# Patient Record
Sex: MALE | Race: WHITE | Employment: OTHER
[De-identification: names, ages, dates, MRNs, and addresses within clinical notes are randomized per-mention and may not be internally consistent; named-entity substitution may affect disease eponyms.]

---

## 2017-01-05 ENCOUNTER — TELEPHONE (OUTPATIENT)
Dept: FAMILY MEDICINE CLINIC | Facility: CLINIC | Age: 52
End: 2017-01-05

## 2017-01-11 ENCOUNTER — OFFICE VISIT (OUTPATIENT)
Dept: FAMILY MEDICINE CLINIC | Facility: CLINIC | Age: 52
End: 2017-01-11

## 2017-01-11 ENCOUNTER — CARE COORDINATION (OUTPATIENT)
Dept: CARE COORDINATION | Facility: CLINIC | Age: 52
End: 2017-01-11

## 2017-01-11 VITALS
DIASTOLIC BLOOD PRESSURE: 80 MMHG | HEIGHT: 68 IN | HEART RATE: 68 BPM | WEIGHT: 225.8 LBS | TEMPERATURE: 98.7 F | SYSTOLIC BLOOD PRESSURE: 130 MMHG | OXYGEN SATURATION: 96 % | RESPIRATION RATE: 20 BRPM | BODY MASS INDEX: 34.22 KG/M2

## 2017-01-11 DIAGNOSIS — Z12.11 COLON CANCER SCREENING: ICD-10-CM

## 2017-01-11 DIAGNOSIS — E78.5 HYPERLIPIDEMIA WITH TARGET LDL LESS THAN 70: ICD-10-CM

## 2017-01-11 DIAGNOSIS — Z87.891 EX-SMOKER: ICD-10-CM

## 2017-01-11 DIAGNOSIS — M54.2 NECK PAIN OF OVER 3 MONTHS DURATION: ICD-10-CM

## 2017-01-11 DIAGNOSIS — L85.3 DRY SKIN: ICD-10-CM

## 2017-01-11 DIAGNOSIS — F33.3 SEVERE RECURRENT MAJOR DEPRESSIVE DISORDER WITH PSYCHOTIC FEATURES (HCC): ICD-10-CM

## 2017-01-11 DIAGNOSIS — I10 ESSENTIAL HYPERTENSION: Primary | ICD-10-CM

## 2017-01-11 DIAGNOSIS — I25.10 CORONARY ARTERY DISEASE INVOLVING NATIVE CORONARY ARTERY OF NATIVE HEART WITHOUT ANGINA PECTORIS: ICD-10-CM

## 2017-01-11 DIAGNOSIS — R26.89 BALANCE PROBLEM: ICD-10-CM

## 2017-01-11 DIAGNOSIS — G89.29 CHRONIC RIGHT SHOULDER PAIN: ICD-10-CM

## 2017-01-11 DIAGNOSIS — R63.8 ABNORMAL CRAVING: ICD-10-CM

## 2017-01-11 DIAGNOSIS — R06.09 DOE (DYSPNEA ON EXERTION): ICD-10-CM

## 2017-01-11 DIAGNOSIS — M25.511 CHRONIC RIGHT SHOULDER PAIN: ICD-10-CM

## 2017-01-11 PROCEDURE — 99215 OFFICE O/P EST HI 40 MIN: CPT | Performed by: FAMILY MEDICINE

## 2017-01-11 RX ORDER — TIOTROPIUM BROMIDE INHALATION SPRAY 1.56 UG/1
SPRAY, METERED RESPIRATORY (INHALATION)
Refills: 0 | COMMUNITY
Start: 2016-12-24 | End: 2017-02-17 | Stop reason: SDUPTHER

## 2017-01-11 RX ORDER — TIOTROPIUM BROMIDE 18 UG/1
CAPSULE ORAL; RESPIRATORY (INHALATION)
Refills: 0 | COMMUNITY
Start: 2016-11-08 | End: 2017-01-11 | Stop reason: ALTCHOICE

## 2017-01-11 RX ORDER — AMMONIUM LACTATE 12 G/100G
LOTION TOPICAL
Qty: 225 G | Refills: 1 | Status: SHIPPED | OUTPATIENT
Start: 2017-01-11 | End: 2018-07-26

## 2017-01-11 ASSESSMENT — ENCOUNTER SYMPTOMS
VOMITING: 0
NAUSEA: 0
COUGH: 0
ABDOMINAL DISTENTION: 0
WHEEZING: 0
DIARRHEA: 0
ABDOMINAL PAIN: 0
CONSTIPATION: 0
SHORTNESS OF BREATH: 1
CHEST TIGHTNESS: 0

## 2017-01-13 ENCOUNTER — CARE COORDINATION (OUTPATIENT)
Dept: CARE COORDINATION | Facility: CLINIC | Age: 52
End: 2017-01-13

## 2017-01-13 ENCOUNTER — TELEPHONE (OUTPATIENT)
Dept: FAMILY MEDICINE CLINIC | Facility: CLINIC | Age: 52
End: 2017-01-13

## 2017-01-16 ENCOUNTER — TELEPHONE (OUTPATIENT)
Dept: FAMILY MEDICINE CLINIC | Facility: CLINIC | Age: 52
End: 2017-01-16

## 2017-01-17 ENCOUNTER — CARE COORDINATION (OUTPATIENT)
Dept: CARE COORDINATION | Facility: CLINIC | Age: 52
End: 2017-01-17

## 2017-01-17 ENCOUNTER — TELEPHONE (OUTPATIENT)
Dept: FAMILY MEDICINE CLINIC | Facility: CLINIC | Age: 52
End: 2017-01-17

## 2017-01-18 ENCOUNTER — CARE COORDINATION (OUTPATIENT)
Dept: CARE COORDINATION | Facility: CLINIC | Age: 52
End: 2017-01-18

## 2017-01-31 ENCOUNTER — TELEPHONE (OUTPATIENT)
Dept: FAMILY MEDICINE CLINIC | Facility: CLINIC | Age: 52
End: 2017-01-31

## 2017-01-31 DIAGNOSIS — I10 ESSENTIAL HYPERTENSION: ICD-10-CM

## 2017-01-31 RX ORDER — HYDRALAZINE HYDROCHLORIDE 50 MG/1
TABLET, FILM COATED ORAL
Qty: 120 TABLET | Refills: 0 | OUTPATIENT
Start: 2017-01-31

## 2017-02-01 RX ORDER — HYDRALAZINE HYDROCHLORIDE 50 MG/1
100 TABLET, FILM COATED ORAL 2 TIMES DAILY
Qty: 120 TABLET | Refills: 1 | Status: SHIPPED | OUTPATIENT
Start: 2017-02-01 | End: 2017-03-31 | Stop reason: SDUPTHER

## 2017-02-06 ENCOUNTER — TELEPHONE (OUTPATIENT)
Dept: FAMILY MEDICINE CLINIC | Facility: CLINIC | Age: 52
End: 2017-02-06

## 2017-02-17 RX ORDER — TIOTROPIUM BROMIDE INHALATION SPRAY 1.56 UG/1
SPRAY, METERED RESPIRATORY (INHALATION)
Qty: 4 INHALER | Refills: 5 | Status: SHIPPED | OUTPATIENT
Start: 2017-02-17 | End: 2017-08-02 | Stop reason: SDUPTHER

## 2017-03-10 ENCOUNTER — OFFICE VISIT (OUTPATIENT)
Dept: FAMILY MEDICINE CLINIC | Facility: CLINIC | Age: 52
End: 2017-03-10

## 2017-03-10 VITALS
HEIGHT: 69 IN | SYSTOLIC BLOOD PRESSURE: 172 MMHG | OXYGEN SATURATION: 96 % | BODY MASS INDEX: 32.91 KG/M2 | TEMPERATURE: 97.2 F | WEIGHT: 222.2 LBS | DIASTOLIC BLOOD PRESSURE: 94 MMHG | HEART RATE: 85 BPM

## 2017-03-10 DIAGNOSIS — H60.392 OTHER INFECTIVE ACUTE OTITIS EXTERNA OF LEFT EAR: ICD-10-CM

## 2017-03-10 DIAGNOSIS — E78.5 HYPERLIPIDEMIA WITH TARGET LDL LESS THAN 70: ICD-10-CM

## 2017-03-10 DIAGNOSIS — Z13.31 POSITIVE DEPRESSION SCREENING: ICD-10-CM

## 2017-03-10 DIAGNOSIS — Z87.891 EX-SMOKER: ICD-10-CM

## 2017-03-10 DIAGNOSIS — I25.119 CORONARY ARTERY DISEASE INVOLVING NATIVE CORONARY ARTERY OF NATIVE HEART WITH ANGINA PECTORIS (HCC): ICD-10-CM

## 2017-03-10 DIAGNOSIS — I25.10 CORONARY ARTERY DISEASE INVOLVING NATIVE CORONARY ARTERY OF NATIVE HEART WITHOUT ANGINA PECTORIS: ICD-10-CM

## 2017-03-10 DIAGNOSIS — J44.9 MIXED TYPE COPD (CHRONIC OBSTRUCTIVE PULMONARY DISEASE) (HCC): ICD-10-CM

## 2017-03-10 DIAGNOSIS — Z12.11 COLON CANCER SCREENING: ICD-10-CM

## 2017-03-10 DIAGNOSIS — I10 ESSENTIAL HYPERTENSION: Primary | ICD-10-CM

## 2017-03-10 DIAGNOSIS — F33.3 SEVERE RECURRENT MAJOR DEPRESSIVE DISORDER WITH PSYCHOTIC FEATURES (HCC): ICD-10-CM

## 2017-03-10 DIAGNOSIS — Z55.0 UNABLE TO READ OR WRITE: ICD-10-CM

## 2017-03-10 PROCEDURE — 99215 OFFICE O/P EST HI 40 MIN: CPT | Performed by: FAMILY MEDICINE

## 2017-03-10 PROCEDURE — G8431 POS CLIN DEPRES SCRN F/U DOC: HCPCS | Performed by: FAMILY MEDICINE

## 2017-03-10 PROCEDURE — 96127 BRIEF EMOTIONAL/BEHAV ASSMT: CPT | Performed by: FAMILY MEDICINE

## 2017-03-10 RX ORDER — NEOMYCIN SULFATE, POLYMYXIN B SULFATE AND HYDROCORTISONE 10; 3.5; 1 MG/ML; MG/ML; [USP'U]/ML
3 SUSPENSION/ DROPS AURICULAR (OTIC) 4 TIMES DAILY
Qty: 1 BOTTLE | Refills: 0 | Status: ON HOLD | OUTPATIENT
Start: 2017-03-10 | End: 2017-11-28 | Stop reason: HOSPADM

## 2017-03-10 RX ORDER — ATORVASTATIN CALCIUM 20 MG/1
TABLET, FILM COATED ORAL
Qty: 30 TABLET | Refills: 3 | Status: SHIPPED | OUTPATIENT
Start: 2017-03-10 | End: 2017-07-03 | Stop reason: SDUPTHER

## 2017-03-10 RX ORDER — GUAIFENESIN 600 MG/1
600 TABLET, EXTENDED RELEASE ORAL 2 TIMES DAILY
Qty: 30 TABLET | Refills: 0 | Status: SHIPPED | OUTPATIENT
Start: 2017-03-10 | End: 2017-04-11 | Stop reason: SDUPTHER

## 2017-03-10 SDOH — EDUCATIONAL SECURITY - EDUCATION ATTAINMENT: ILITERACY AND LOW LEVEL LITERACY: Z55.0

## 2017-03-10 ASSESSMENT — PATIENT HEALTH QUESTIONNAIRE - PHQ9
SUM OF ALL RESPONSES TO PHQ9 QUESTIONS 1 & 2: 4
5. POOR APPETITE OR OVEREATING: 2
7. TROUBLE CONCENTRATING ON THINGS, SUCH AS READING THE NEWSPAPER OR WATCHING TELEVISION: 3
8. MOVING OR SPEAKING SO SLOWLY THAT OTHER PEOPLE COULD HAVE NOTICED. OR THE OPPOSITE, BEING SO FIGETY OR RESTLESS THAT YOU HAVE BEEN MOVING AROUND A LOT MORE THAN USUAL: 3
3. TROUBLE FALLING OR STAYING ASLEEP: 3
9. THOUGHTS THAT YOU WOULD BE BETTER OFF DEAD, OR OF HURTING YOURSELF: 1
2. FEELING DOWN, DEPRESSED OR HOPELESS: 3
1. LITTLE INTEREST OR PLEASURE IN DOING THINGS: 1
4. FEELING TIRED OR HAVING LITTLE ENERGY: 3
SUM OF ALL RESPONSES TO PHQ QUESTIONS 1-9: 22
6. FEELING BAD ABOUT YOURSELF - OR THAT YOU ARE A FAILURE OR HAVE LET YOURSELF OR YOUR FAMILY DOWN: 3
10. IF YOU CHECKED OFF ANY PROBLEMS, HOW DIFFICULT HAVE THESE PROBLEMS MADE IT FOR YOU TO DO YOUR WORK, TAKE CARE OF THINGS AT HOME, OR GET ALONG WITH OTHER PEOPLE: 2

## 2017-03-10 ASSESSMENT — ENCOUNTER SYMPTOMS
SHORTNESS OF BREATH: 1
CHEST TIGHTNESS: 0
NAUSEA: 0
WHEEZING: 0
ABDOMINAL PAIN: 0
VOMITING: 0
COUGH: 1
ABDOMINAL DISTENTION: 0
CONSTIPATION: 0
DIARRHEA: 0

## 2017-03-13 LAB
AVERAGE GLUCOSE: NORMAL
HBA1C MFR BLD: 5.5 %

## 2017-03-14 DIAGNOSIS — M19.019 SHOULDER ARTHRITIS: ICD-10-CM

## 2017-03-15 RX ORDER — IBUPROFEN 800 MG/1
TABLET ORAL
Qty: 30 TABLET | Refills: 1 | Status: SHIPPED | OUTPATIENT
Start: 2017-03-15 | End: 2017-05-15 | Stop reason: SDUPTHER

## 2017-03-16 ENCOUNTER — TELEPHONE (OUTPATIENT)
Dept: FAMILY MEDICINE CLINIC | Age: 52
End: 2017-03-16

## 2017-03-21 ENCOUNTER — TELEPHONE (OUTPATIENT)
Dept: PULMONOLOGY | Age: 52
End: 2017-03-21

## 2017-03-31 DIAGNOSIS — I10 ESSENTIAL HYPERTENSION: ICD-10-CM

## 2017-03-31 RX ORDER — HYDRALAZINE HYDROCHLORIDE 50 MG/1
TABLET, FILM COATED ORAL
Qty: 120 TABLET | Refills: 1 | Status: SHIPPED | OUTPATIENT
Start: 2017-03-31 | End: 2017-05-26 | Stop reason: SDUPTHER

## 2017-04-11 ENCOUNTER — TELEPHONE (OUTPATIENT)
Dept: PULMONOLOGY | Age: 52
End: 2017-04-11

## 2017-04-11 DIAGNOSIS — J44.9 MIXED TYPE COPD (CHRONIC OBSTRUCTIVE PULMONARY DISEASE) (HCC): ICD-10-CM

## 2017-04-14 DIAGNOSIS — Z29.9 PREVENTIVE MEASURE: ICD-10-CM

## 2017-04-14 RX ORDER — MULTIVITAMIN WITH FOLIC ACID 400 MCG
TABLET ORAL
Qty: 90 TABLET | Refills: 4 | Status: SHIPPED | OUTPATIENT
Start: 2017-04-14 | End: 2019-02-21 | Stop reason: SDUPTHER

## 2017-04-16 DIAGNOSIS — I10 ESSENTIAL HYPERTENSION: ICD-10-CM

## 2017-04-16 RX ORDER — CLONIDINE HYDROCHLORIDE 0.2 MG/1
TABLET ORAL
Qty: 60 TABLET | Refills: 3 | Status: SHIPPED | OUTPATIENT
Start: 2017-04-16 | End: 2017-08-02 | Stop reason: SDUPTHER

## 2017-05-11 ENCOUNTER — HOSPITAL ENCOUNTER (EMERGENCY)
Age: 52
Discharge: HOME | End: 2017-05-11
Attending: EMERGENCY MEDICINE
Payer: COMMERCIAL

## 2017-05-11 VITALS
HEART RATE: 86 BPM | BODY MASS INDEX: 33.33 KG/M2 | RESPIRATION RATE: 27 BRPM | WEIGHT: 225 LBS | DIASTOLIC BLOOD PRESSURE: 73 MMHG | SYSTOLIC BLOOD PRESSURE: 126 MMHG | OXYGEN SATURATION: 96 % | TEMPERATURE: 97.9 F | HEIGHT: 69 IN

## 2017-05-11 DIAGNOSIS — K29.20 ACUTE ALCOHOLIC GASTRITIS WITHOUT HEMORRHAGE: Primary | ICD-10-CM

## 2017-05-11 LAB
% CKMB: 1.9 % (ref 0–3.5)
ABSOLUTE EOS #: 0.1 K/UL (ref 0–0.4)
ABSOLUTE LYMPH #: 1.1 K/UL (ref 1–4.8)
ABSOLUTE MONO #: 0.6 K/UL (ref 0.2–0.8)
ALBUMIN SERPL-MCNC: 3.9 G/DL (ref 3.5–5.2)
ALBUMIN/GLOBULIN RATIO: NORMAL (ref 1–2.5)
ALP BLD-CCNC: 128 U/L (ref 40–129)
ALT SERPL-CCNC: 13 U/L (ref 5–41)
ANION GAP SERPL CALCULATED.3IONS-SCNC: 17 MMOL/L (ref 9–17)
AST SERPL-CCNC: 19 U/L
BASOPHILS # BLD: 0 %
BASOPHILS ABSOLUTE: 0.1 K/UL (ref 0–0.2)
BILIRUB SERPL-MCNC: 0.43 MG/DL (ref 0.3–1.2)
BILIRUBIN DIRECT: 0.13 MG/DL
BILIRUBIN, INDIRECT: 0.3 MG/DL (ref 0–1)
BUN BLDV-MCNC: 8 MG/DL (ref 6–20)
BUN/CREAT BLD: 10 (ref 9–20)
CALCIUM SERPL-MCNC: 8.4 MG/DL (ref 8.6–10.4)
CHLORIDE BLD-SCNC: 95 MMOL/L (ref 98–107)
CK MB: 3.5 NG/ML
CKMB INTERPRETATION: NORMAL
CO2: 23 MMOL/L (ref 20–31)
CREAT SERPL-MCNC: 0.84 MG/DL (ref 0.7–1.2)
DIFFERENTIAL TYPE: ABNORMAL
EKG ATRIAL RATE: 82 BPM
EKG P AXIS: 65 DEGREES
EKG P-R INTERVAL: 154 MS
EKG Q-T INTERVAL: 394 MS
EKG QRS DURATION: 94 MS
EKG QTC CALCULATION (BAZETT): 460 MS
EKG R AXIS: -6 DEGREES
EKG T AXIS: 80 DEGREES
EKG VENTRICULAR RATE: 82 BPM
EOSINOPHILS RELATIVE PERCENT: 1 %
ETHANOL PERCENT: 0.14 %
ETHANOL: 136 MG/DL
GFR AFRICAN AMERICAN: >60 ML/MIN
GFR NON-AFRICAN AMERICAN: >60 ML/MIN
GFR SERPL CREATININE-BSD FRML MDRD: ABNORMAL ML/MIN/{1.73_M2}
GFR SERPL CREATININE-BSD FRML MDRD: ABNORMAL ML/MIN/{1.73_M2}
GLOBULIN: NORMAL G/DL (ref 1.5–3.8)
GLUCOSE BLD-MCNC: 100 MG/DL (ref 70–99)
HCT VFR BLD CALC: 42.2 % (ref 41–53)
HEMOGLOBIN: 13.8 G/DL (ref 13.5–17.5)
LIPASE: 23 U/L (ref 13–60)
LYMPHOCYTES # BLD: 7 %
MCH RBC QN AUTO: 27.6 PG (ref 26–34)
MCHC RBC AUTO-ENTMCNC: 32.8 G/DL (ref 31–37)
MCV RBC AUTO: 84.1 FL (ref 80–100)
MONOCYTES # BLD: 4 %
MYOGLOBIN: 67 NG/ML (ref 28–72)
PDW BLD-RTO: 15.8 % (ref 11.5–14.5)
PLATELET # BLD: 203 K/UL (ref 130–400)
PLATELET ESTIMATE: ABNORMAL
PMV BLD AUTO: 9.1 FL (ref 6–12)
POTASSIUM SERPL-SCNC: 3.4 MMOL/L (ref 3.7–5.3)
RBC # BLD: 5.01 M/UL (ref 4.5–5.9)
RBC # BLD: ABNORMAL 10*6/UL
SEG NEUTROPHILS: 88 %
SEGMENTED NEUTROPHILS ABSOLUTE COUNT: 14.2 K/UL (ref 1.8–7.7)
SODIUM BLD-SCNC: 135 MMOL/L (ref 135–144)
TOTAL CK: 183 U/L (ref 39–308)
TOTAL PROTEIN: 7.1 G/DL (ref 6.4–8.3)
TROPONIN INTERP: NORMAL
TROPONIN T: <0.03 NG/ML
WBC # BLD: 16 K/UL (ref 3.5–11)
WBC # BLD: ABNORMAL 10*3/UL

## 2017-05-11 PROCEDURE — 96375 TX/PRO/DX INJ NEW DRUG ADDON: CPT

## 2017-05-11 PROCEDURE — 83874 ASSAY OF MYOGLOBIN: CPT

## 2017-05-11 PROCEDURE — G0480 DRUG TEST DEF 1-7 CLASSES: HCPCS

## 2017-05-11 PROCEDURE — 85025 COMPLETE CBC W/AUTO DIFF WBC: CPT

## 2017-05-11 PROCEDURE — C9113 INJ PANTOPRAZOLE SODIUM, VIA: HCPCS | Performed by: EMERGENCY MEDICINE

## 2017-05-11 PROCEDURE — 84484 ASSAY OF TROPONIN QUANT: CPT

## 2017-05-11 PROCEDURE — 82553 CREATINE MB FRACTION: CPT

## 2017-05-11 PROCEDURE — 83690 ASSAY OF LIPASE: CPT

## 2017-05-11 PROCEDURE — 80076 HEPATIC FUNCTION PANEL: CPT

## 2017-05-11 PROCEDURE — 96374 THER/PROPH/DIAG INJ IV PUSH: CPT

## 2017-05-11 PROCEDURE — 96361 HYDRATE IV INFUSION ADD-ON: CPT

## 2017-05-11 PROCEDURE — 2580000003 HC RX 258: Performed by: EMERGENCY MEDICINE

## 2017-05-11 PROCEDURE — 80048 BASIC METABOLIC PNL TOTAL CA: CPT

## 2017-05-11 PROCEDURE — 93005 ELECTROCARDIOGRAM TRACING: CPT

## 2017-05-11 PROCEDURE — 6360000002 HC RX W HCPCS: Performed by: EMERGENCY MEDICINE

## 2017-05-11 PROCEDURE — 99284 EMERGENCY DEPT VISIT MOD MDM: CPT

## 2017-05-11 PROCEDURE — 82550 ASSAY OF CK (CPK): CPT

## 2017-05-11 RX ORDER — 0.9 % SODIUM CHLORIDE 0.9 %
10 VIAL (ML) INJECTION ONCE
Status: COMPLETED | OUTPATIENT
Start: 2017-05-11 | End: 2017-05-11

## 2017-05-11 RX ORDER — SODIUM CHLORIDE 9 MG/ML
INJECTION, SOLUTION INTRAVENOUS CONTINUOUS
Status: DISCONTINUED | OUTPATIENT
Start: 2017-05-11 | End: 2017-05-11 | Stop reason: HOSPADM

## 2017-05-11 RX ORDER — 0.9 % SODIUM CHLORIDE 0.9 %
1000 INTRAVENOUS SOLUTION INTRAVENOUS ONCE
Status: COMPLETED | OUTPATIENT
Start: 2017-05-11 | End: 2017-05-11

## 2017-05-11 RX ORDER — ONDANSETRON 4 MG/1
4 TABLET, FILM COATED ORAL EVERY 6 HOURS PRN
Qty: 10 TABLET | Refills: 0 | Status: SHIPPED | OUTPATIENT
Start: 2017-05-11 | End: 2018-07-26

## 2017-05-11 RX ORDER — ONDANSETRON 2 MG/ML
4 INJECTION INTRAMUSCULAR; INTRAVENOUS ONCE
Status: COMPLETED | OUTPATIENT
Start: 2017-05-11 | End: 2017-05-11

## 2017-05-11 RX ORDER — PANTOPRAZOLE SODIUM 40 MG/10ML
40 INJECTION, POWDER, LYOPHILIZED, FOR SOLUTION INTRAVENOUS ONCE
Status: COMPLETED | OUTPATIENT
Start: 2017-05-11 | End: 2017-05-11

## 2017-05-11 RX ORDER — PANTOPRAZOLE SODIUM 40 MG/1
40 TABLET, DELAYED RELEASE ORAL DAILY
Qty: 20 TABLET | Refills: 0 | Status: SHIPPED | OUTPATIENT
Start: 2017-05-11 | End: 2018-01-11 | Stop reason: SDUPTHER

## 2017-05-11 RX ADMIN — SODIUM CHLORIDE 1000 ML: 9 INJECTION, SOLUTION INTRAVENOUS at 01:24

## 2017-05-11 RX ADMIN — ONDANSETRON 4 MG: 2 INJECTION INTRAMUSCULAR; INTRAVENOUS at 01:24

## 2017-05-11 RX ADMIN — PANTOPRAZOLE SODIUM 40 MG: 40 INJECTION, POWDER, FOR SOLUTION INTRAVENOUS at 01:25

## 2017-05-11 RX ADMIN — Medication 10 ML: at 01:25

## 2017-05-15 DIAGNOSIS — M19.019 SHOULDER ARTHRITIS: ICD-10-CM

## 2017-05-15 RX ORDER — IBUPROFEN 800 MG/1
TABLET ORAL
Qty: 30 TABLET | Refills: 1 | Status: SHIPPED | OUTPATIENT
Start: 2017-05-15 | End: 2018-03-27 | Stop reason: SDUPTHER

## 2017-05-16 DIAGNOSIS — J44.9 MIXED TYPE COPD (CHRONIC OBSTRUCTIVE PULMONARY DISEASE) (HCC): ICD-10-CM

## 2017-05-26 DIAGNOSIS — I10 ESSENTIAL HYPERTENSION: ICD-10-CM

## 2017-05-26 RX ORDER — HYDRALAZINE HYDROCHLORIDE 50 MG/1
TABLET, FILM COATED ORAL
Qty: 120 TABLET | Refills: 3 | Status: SHIPPED | OUTPATIENT
Start: 2017-05-26 | End: 2017-09-06 | Stop reason: SDUPTHER

## 2017-06-16 DIAGNOSIS — Z87.891 EX-SMOKER: ICD-10-CM

## 2017-06-16 DIAGNOSIS — R63.8 ABNORMAL CRAVING: ICD-10-CM

## 2017-06-16 RX ORDER — DM/PSEUDOEPHED/ACETAMINOPHEN 30-60-650
SOLUTION, ORAL ORAL
Qty: 170 EACH | Refills: 3 | Status: SHIPPED | OUTPATIENT
Start: 2017-06-16 | End: 2017-10-28 | Stop reason: SDUPTHER

## 2017-07-03 DIAGNOSIS — I25.119 CORONARY ARTERY DISEASE INVOLVING NATIVE CORONARY ARTERY OF NATIVE HEART WITH ANGINA PECTORIS (HCC): ICD-10-CM

## 2017-07-03 DIAGNOSIS — E78.5 HYPERLIPIDEMIA WITH TARGET LDL LESS THAN 70: ICD-10-CM

## 2017-07-03 RX ORDER — ATORVASTATIN CALCIUM 20 MG/1
TABLET, FILM COATED ORAL
Qty: 30 TABLET | Refills: 11 | Status: SHIPPED | OUTPATIENT
Start: 2017-07-03 | End: 2019-11-03 | Stop reason: SDUPTHER

## 2017-07-07 ENCOUNTER — TELEPHONE (OUTPATIENT)
Dept: FAMILY MEDICINE CLINIC | Age: 52
End: 2017-07-07

## 2017-07-20 ENCOUNTER — HOSPITAL ENCOUNTER (EMERGENCY)
Age: 52
Discharge: HOME | End: 2017-07-20
Attending: EMERGENCY MEDICINE
Payer: COMMERCIAL

## 2017-07-20 VITALS
DIASTOLIC BLOOD PRESSURE: 71 MMHG | BODY MASS INDEX: 33.23 KG/M2 | OXYGEN SATURATION: 98 % | WEIGHT: 225 LBS | RESPIRATION RATE: 17 BRPM | TEMPERATURE: 98.5 F | SYSTOLIC BLOOD PRESSURE: 138 MMHG | HEART RATE: 68 BPM

## 2017-07-20 DIAGNOSIS — K44.9 HIATAL HERNIA: Primary | ICD-10-CM

## 2017-07-20 DIAGNOSIS — H60.392 INFECTIVE OTITIS EXTERNA, LEFT: ICD-10-CM

## 2017-07-20 PROCEDURE — 93005 ELECTROCARDIOGRAM TRACING: CPT

## 2017-07-20 PROCEDURE — 6370000000 HC RX 637 (ALT 250 FOR IP): Performed by: EMERGENCY MEDICINE

## 2017-07-20 PROCEDURE — 99283 EMERGENCY DEPT VISIT LOW MDM: CPT

## 2017-07-20 RX ORDER — FAMOTIDINE 20 MG/1
20 TABLET, FILM COATED ORAL ONCE
Status: COMPLETED | OUTPATIENT
Start: 2017-07-20 | End: 2017-07-20

## 2017-07-20 RX ORDER — FAMOTIDINE 20 MG/1
20 TABLET, FILM COATED ORAL 2 TIMES DAILY
Qty: 60 TABLET | Refills: 0 | Status: SHIPPED | OUTPATIENT
Start: 2017-07-20 | End: 2017-07-30

## 2017-07-20 RX ORDER — HYDROCODONE BITARTRATE AND ACETAMINOPHEN 5; 325 MG/1; MG/1
1 TABLET ORAL EVERY 6 HOURS PRN
Qty: 10 TABLET | Refills: 0 | Status: SHIPPED | OUTPATIENT
Start: 2017-07-20 | End: 2017-07-27

## 2017-07-20 RX ORDER — CIPROFLOXACIN HYDROCHLORIDE 3.5 MG/ML
1 SOLUTION/ DROPS TOPICAL
Status: DISCONTINUED | OUTPATIENT
Start: 2017-07-20 | End: 2017-07-21 | Stop reason: HOSPADM

## 2017-07-20 RX ORDER — HYDROCODONE BITARTRATE AND ACETAMINOPHEN 5; 325 MG/1; MG/1
2 TABLET ORAL ONCE
Status: COMPLETED | OUTPATIENT
Start: 2017-07-20 | End: 2017-07-20

## 2017-07-20 RX ADMIN — FAMOTIDINE 20 MG: 20 TABLET ORAL at 22:36

## 2017-07-20 RX ADMIN — HYDROCODONE BITARTRATE AND ACETAMINOPHEN 2 TABLET: 5; 325 TABLET ORAL at 22:35

## 2017-07-20 RX ADMIN — CIPROFLOXACIN HYDROCHLORIDE 1 DROP: 3 SOLUTION/ DROPS OPHTHALMIC at 22:36

## 2017-07-20 ASSESSMENT — ENCOUNTER SYMPTOMS
ABDOMINAL PAIN: 0
NAUSEA: 0
VOMITING: 0
SHORTNESS OF BREATH: 0
DIARRHEA: 0

## 2017-07-20 ASSESSMENT — PAIN DESCRIPTION - DESCRIPTORS: DESCRIPTORS: DISCOMFORT;CRAMPING;SHARP

## 2017-07-20 ASSESSMENT — PAIN SCALES - GENERAL
PAINLEVEL_OUTOF10: 10
PAINLEVEL_OUTOF10: 2
PAINLEVEL_OUTOF10: 8

## 2017-07-20 ASSESSMENT — PAIN DESCRIPTION - FREQUENCY: FREQUENCY: INTERMITTENT

## 2017-07-20 ASSESSMENT — PAIN DESCRIPTION - ORIENTATION: ORIENTATION: MID;LOWER

## 2017-07-20 ASSESSMENT — PAIN DESCRIPTION - LOCATION: LOCATION: CHEST

## 2017-07-24 LAB
EKG ATRIAL RATE: 61 BPM
EKG P AXIS: 46 DEGREES
EKG P-R INTERVAL: 158 MS
EKG Q-T INTERVAL: 426 MS
EKG QRS DURATION: 92 MS
EKG QTC CALCULATION (BAZETT): 428 MS
EKG R AXIS: -19 DEGREES
EKG T AXIS: 66 DEGREES
EKG VENTRICULAR RATE: 61 BPM

## 2017-07-30 ENCOUNTER — HOSPITAL ENCOUNTER (EMERGENCY)
Age: 52
Discharge: HOME | End: 2017-07-30
Attending: EMERGENCY MEDICINE
Payer: COMMERCIAL

## 2017-07-30 ENCOUNTER — APPOINTMENT (OUTPATIENT)
Dept: GENERAL RADIOLOGY | Age: 52
End: 2017-07-30
Payer: COMMERCIAL

## 2017-07-30 VITALS
BODY MASS INDEX: 33.97 KG/M2 | SYSTOLIC BLOOD PRESSURE: 177 MMHG | TEMPERATURE: 98.6 F | DIASTOLIC BLOOD PRESSURE: 113 MMHG | OXYGEN SATURATION: 98 % | HEART RATE: 62 BPM | RESPIRATION RATE: 16 BRPM | WEIGHT: 230 LBS

## 2017-07-30 DIAGNOSIS — K21.9 GASTROESOPHAGEAL REFLUX DISEASE, ESOPHAGITIS PRESENCE NOT SPECIFIED: Primary | ICD-10-CM

## 2017-07-30 DIAGNOSIS — R10.13 ABDOMINAL PAIN, EPIGASTRIC: ICD-10-CM

## 2017-07-30 DIAGNOSIS — R07.9 CHEST PAIN, UNSPECIFIED TYPE: ICD-10-CM

## 2017-07-30 LAB
ABSOLUTE EOS #: 0.1 K/UL (ref 0–0.4)
ABSOLUTE LYMPH #: 1 K/UL (ref 1–4.8)
ABSOLUTE MONO #: 0.5 K/UL (ref 0.1–1.3)
ALBUMIN SERPL-MCNC: 3.7 G/DL (ref 3.5–5.2)
ALBUMIN/GLOBULIN RATIO: NORMAL (ref 1–2.5)
ALP BLD-CCNC: 124 U/L (ref 40–129)
ALT SERPL-CCNC: 10 U/L (ref 5–41)
ANION GAP SERPL CALCULATED.3IONS-SCNC: 12 MMOL/L (ref 9–17)
AST SERPL-CCNC: 15 U/L
BASOPHILS # BLD: 1 %
BASOPHILS ABSOLUTE: 0.1 K/UL (ref 0–0.2)
BILIRUB SERPL-MCNC: 0.31 MG/DL (ref 0.3–1.2)
BNP INTERPRETATION: ABNORMAL
BUN BLDV-MCNC: 7 MG/DL (ref 6–20)
BUN/CREAT BLD: NORMAL (ref 9–20)
CALCIUM SERPL-MCNC: 8.6 MG/DL (ref 8.6–10.4)
CHLORIDE BLD-SCNC: 102 MMOL/L (ref 98–107)
CO2: 25 MMOL/L (ref 20–31)
CREAT SERPL-MCNC: 0.94 MG/DL (ref 0.7–1.2)
DIFFERENTIAL TYPE: ABNORMAL
EOSINOPHILS RELATIVE PERCENT: 2 %
GFR AFRICAN AMERICAN: >60 ML/MIN
GFR NON-AFRICAN AMERICAN: >60 ML/MIN
GFR SERPL CREATININE-BSD FRML MDRD: NORMAL ML/MIN/{1.73_M2}
GFR SERPL CREATININE-BSD FRML MDRD: NORMAL ML/MIN/{1.73_M2}
GLUCOSE BLD-MCNC: 97 MG/DL (ref 70–99)
HCT VFR BLD CALC: 42.1 % (ref 41–53)
HEMOGLOBIN: 13.8 G/DL (ref 13.5–17.5)
LIPASE: 21 U/L (ref 13–60)
LYMPHOCYTES # BLD: 16 %
MCH RBC QN AUTO: 27.6 PG (ref 26–34)
MCHC RBC AUTO-ENTMCNC: 32.9 G/DL (ref 31–37)
MCV RBC AUTO: 83.8 FL (ref 80–100)
MONOCYTES # BLD: 8 %
PDW BLD-RTO: 15.8 % (ref 11.5–14.9)
PLATELET # BLD: 170 K/UL (ref 150–450)
PLATELET ESTIMATE: ABNORMAL
PMV BLD AUTO: 9 FL (ref 6–12)
POTASSIUM SERPL-SCNC: 3.8 MMOL/L (ref 3.7–5.3)
PRO-BNP: 413 PG/ML
RBC # BLD: 5.02 M/UL (ref 4.5–5.9)
RBC # BLD: ABNORMAL 10*6/UL
SEG NEUTROPHILS: 73 %
SEGMENTED NEUTROPHILS ABSOLUTE COUNT: 4.5 K/UL (ref 1.3–9.1)
SODIUM BLD-SCNC: 139 MMOL/L (ref 135–144)
TOTAL PROTEIN: 6.7 G/DL (ref 6.4–8.3)
TROPONIN INTERP: NORMAL
TROPONIN INTERP: NORMAL
TROPONIN T: <0.03 NG/ML
TROPONIN T: <0.03 NG/ML
WBC # BLD: 6.2 K/UL (ref 3.5–11)
WBC # BLD: ABNORMAL 10*3/UL

## 2017-07-30 PROCEDURE — 6370000000 HC RX 637 (ALT 250 FOR IP): Performed by: EMERGENCY MEDICINE

## 2017-07-30 PROCEDURE — 83880 ASSAY OF NATRIURETIC PEPTIDE: CPT

## 2017-07-30 PROCEDURE — 83690 ASSAY OF LIPASE: CPT

## 2017-07-30 PROCEDURE — 99285 EMERGENCY DEPT VISIT HI MDM: CPT

## 2017-07-30 PROCEDURE — 6360000002 HC RX W HCPCS: Performed by: EMERGENCY MEDICINE

## 2017-07-30 PROCEDURE — 80053 COMPREHEN METABOLIC PANEL: CPT

## 2017-07-30 PROCEDURE — 71020 XR CHEST STANDARD TWO VW: CPT

## 2017-07-30 PROCEDURE — 84484 ASSAY OF TROPONIN QUANT: CPT

## 2017-07-30 PROCEDURE — 85025 COMPLETE CBC W/AUTO DIFF WBC: CPT

## 2017-07-30 PROCEDURE — 36415 COLL VENOUS BLD VENIPUNCTURE: CPT

## 2017-07-30 PROCEDURE — 93005 ELECTROCARDIOGRAM TRACING: CPT

## 2017-07-30 RX ORDER — ONDANSETRON 4 MG/1
4 TABLET, ORALLY DISINTEGRATING ORAL ONCE
Status: COMPLETED | OUTPATIENT
Start: 2017-07-30 | End: 2017-07-30

## 2017-07-30 RX ORDER — FAMOTIDINE 20 MG/1
20 TABLET, FILM COATED ORAL ONCE
Status: COMPLETED | OUTPATIENT
Start: 2017-07-30 | End: 2017-07-30

## 2017-07-30 RX ORDER — MAGNESIUM HYDROXIDE/ALUMINUM HYDROXICE/SIMETHICONE 120; 1200; 1200 MG/30ML; MG/30ML; MG/30ML
30 SUSPENSION ORAL
Status: COMPLETED | OUTPATIENT
Start: 2017-07-30 | End: 2017-07-30

## 2017-07-30 RX ORDER — AMOXICILLIN 500 MG/1
500 CAPSULE ORAL 2 TIMES DAILY
Qty: 20 CAPSULE | Refills: 0 | Status: SHIPPED | OUTPATIENT
Start: 2017-07-30 | End: 2017-08-09

## 2017-07-30 RX ORDER — ACETAMINOPHEN 500 MG
1000 TABLET ORAL ONCE
Status: COMPLETED | OUTPATIENT
Start: 2017-07-30 | End: 2017-07-30

## 2017-07-30 RX ORDER — FAMOTIDINE 40 MG/1
40 TABLET, FILM COATED ORAL DAILY
Qty: 14 TABLET | Refills: 0 | Status: ON HOLD | OUTPATIENT
Start: 2017-07-30 | End: 2017-11-28 | Stop reason: HOSPADM

## 2017-07-30 RX ADMIN — ALUMINUM HYDROXIDE, MAGNESIUM HYDROXIDE, AND SIMETHICONE 30 ML: 200; 200; 20 SUSPENSION ORAL at 19:08

## 2017-07-30 RX ADMIN — Medication 15 ML: at 19:08

## 2017-07-30 RX ADMIN — ACETAMINOPHEN 1000 MG: 500 TABLET ORAL at 20:12

## 2017-07-30 RX ADMIN — FAMOTIDINE 20 MG: 20 TABLET ORAL at 19:07

## 2017-07-30 RX ADMIN — ONDANSETRON 4 MG: 4 TABLET, ORALLY DISINTEGRATING ORAL at 19:07

## 2017-07-30 ASSESSMENT — ENCOUNTER SYMPTOMS
ABDOMINAL PAIN: 1
COUGH: 0
DIARRHEA: 0
CHEST TIGHTNESS: 0
EYE DISCHARGE: 0
EYE REDNESS: 0
TROUBLE SWALLOWING: 1
SORE THROAT: 0
SHORTNESS OF BREATH: 1
NAUSEA: 1
RHINORRHEA: 0
VOMITING: 1

## 2017-07-30 ASSESSMENT — PAIN SCALES - GENERAL
PAINLEVEL_OUTOF10: 9

## 2017-07-30 ASSESSMENT — PAIN DESCRIPTION - LOCATION
LOCATION: HEAD
LOCATION: HEAD

## 2017-07-30 ASSESSMENT — PAIN DESCRIPTION - PAIN TYPE
TYPE: ACUTE PAIN
TYPE: ACUTE PAIN

## 2017-08-02 DIAGNOSIS — I10 ESSENTIAL HYPERTENSION: ICD-10-CM

## 2017-08-02 DIAGNOSIS — R94.2 RESTRICTIVE PATTERN PRESENT ON PULMONARY FUNCTION TESTING: ICD-10-CM

## 2017-08-02 DIAGNOSIS — I25.10 CORONARY ARTERY DISEASE INVOLVING NATIVE CORONARY ARTERY OF NATIVE HEART WITHOUT ANGINA PECTORIS: ICD-10-CM

## 2017-08-02 RX ORDER — TIOTROPIUM BROMIDE INHALATION SPRAY 1.56 UG/1
SPRAY, METERED RESPIRATORY (INHALATION)
Qty: 4 INHALER | Refills: 5 | Status: SHIPPED | OUTPATIENT
Start: 2017-08-02 | End: 2018-02-27 | Stop reason: SDUPTHER

## 2017-08-02 RX ORDER — ACETAMINOPHEN/DIPHENHYDRAMINE 500MG-25MG
TABLET ORAL
Qty: 30 TABLET | Refills: 3 | Status: ON HOLD | OUTPATIENT
Start: 2017-08-02 | End: 2017-11-27 | Stop reason: SDUPTHER

## 2017-08-02 RX ORDER — CLONIDINE HYDROCHLORIDE 0.2 MG/1
TABLET ORAL
Qty: 60 TABLET | Refills: 3 | Status: ON HOLD | OUTPATIENT
Start: 2017-08-02 | End: 2017-11-27 | Stop reason: SDUPTHER

## 2017-08-03 LAB
EKG ATRIAL RATE: 78 BPM
EKG P AXIS: 22 DEGREES
EKG P-R INTERVAL: 162 MS
EKG Q-T INTERVAL: 368 MS
EKG QRS DURATION: 90 MS
EKG QTC CALCULATION (BAZETT): 419 MS
EKG R AXIS: -20 DEGREES
EKG T AXIS: 59 DEGREES
EKG VENTRICULAR RATE: 78 BPM

## 2017-09-06 DIAGNOSIS — I10 ESSENTIAL HYPERTENSION: ICD-10-CM

## 2017-09-06 RX ORDER — HYDRALAZINE HYDROCHLORIDE 50 MG/1
TABLET, FILM COATED ORAL
Qty: 120 TABLET | Refills: 3 | Status: SHIPPED | OUTPATIENT
Start: 2017-09-06 | End: 2018-02-27 | Stop reason: SDUPTHER

## 2017-10-28 DIAGNOSIS — R63.8 ABNORMAL CRAVING: ICD-10-CM

## 2017-10-28 DIAGNOSIS — Z87.891 EX-SMOKER: ICD-10-CM

## 2017-11-01 ENCOUNTER — HOSPITAL ENCOUNTER (EMERGENCY)
Age: 52
Discharge: HOME | End: 2017-11-01
Attending: EMERGENCY MEDICINE
Payer: COMMERCIAL

## 2017-11-01 VITALS
DIASTOLIC BLOOD PRESSURE: 106 MMHG | BODY MASS INDEX: 33.33 KG/M2 | SYSTOLIC BLOOD PRESSURE: 185 MMHG | OXYGEN SATURATION: 96 % | HEART RATE: 75 BPM | TEMPERATURE: 98.6 F | RESPIRATION RATE: 16 BRPM | HEIGHT: 69 IN | WEIGHT: 225 LBS

## 2017-11-01 DIAGNOSIS — M25.511 BILATERAL SHOULDER PAIN, UNSPECIFIED CHRONICITY: ICD-10-CM

## 2017-11-01 DIAGNOSIS — F17.200 SMOKING ADDICTION: ICD-10-CM

## 2017-11-01 DIAGNOSIS — H10.9 CONJUNCTIVITIS OF BOTH EYES, UNSPECIFIED CONJUNCTIVITIS TYPE: Primary | ICD-10-CM

## 2017-11-01 DIAGNOSIS — S16.1XXS STRAIN OF NECK MUSCLE, SEQUELA: ICD-10-CM

## 2017-11-01 DIAGNOSIS — M25.512 BILATERAL SHOULDER PAIN, UNSPECIFIED CHRONICITY: ICD-10-CM

## 2017-11-01 PROCEDURE — 6360000002 HC RX W HCPCS: Performed by: EMERGENCY MEDICINE

## 2017-11-01 PROCEDURE — 99282 EMERGENCY DEPT VISIT SF MDM: CPT

## 2017-11-01 PROCEDURE — 96372 THER/PROPH/DIAG INJ SC/IM: CPT

## 2017-11-01 PROCEDURE — 6370000000 HC RX 637 (ALT 250 FOR IP): Performed by: EMERGENCY MEDICINE

## 2017-11-01 RX ORDER — HYDROCODONE BITARTRATE AND ACETAMINOPHEN 5; 325 MG/1; MG/1
1 TABLET ORAL ONCE
Status: COMPLETED | OUTPATIENT
Start: 2017-11-01 | End: 2017-11-01

## 2017-11-01 RX ORDER — NAPROXEN 500 MG/1
500 TABLET ORAL 2 TIMES DAILY WITH MEALS
Qty: 60 TABLET | Refills: 3 | Status: SHIPPED | OUTPATIENT
Start: 2017-11-01 | End: 2018-11-30

## 2017-11-01 RX ORDER — CYCLOBENZAPRINE HCL 10 MG
10 TABLET ORAL ONCE
Status: COMPLETED | OUTPATIENT
Start: 2017-11-01 | End: 2017-11-01

## 2017-11-01 RX ORDER — KETOROLAC TROMETHAMINE 30 MG/ML
60 INJECTION, SOLUTION INTRAMUSCULAR; INTRAVENOUS ONCE
Status: COMPLETED | OUTPATIENT
Start: 2017-11-01 | End: 2017-11-01

## 2017-11-01 RX ORDER — CLONIDINE HYDROCHLORIDE 0.1 MG/1
0.3 TABLET ORAL ONCE
Status: COMPLETED | OUTPATIENT
Start: 2017-11-01 | End: 2017-11-01

## 2017-11-01 RX ORDER — CYCLOBENZAPRINE HCL 10 MG
10 TABLET ORAL 3 TIMES DAILY PRN
Qty: 30 TABLET | Refills: 0 | Status: SHIPPED | OUTPATIENT
Start: 2017-11-01 | End: 2017-11-11

## 2017-11-01 RX ORDER — POLYMYXIN B SULFATE AND TRIMETHOPRIM 1; 10000 MG/ML; [USP'U]/ML
1 SOLUTION OPHTHALMIC
Status: DISCONTINUED | OUTPATIENT
Start: 2017-11-01 | End: 2017-11-01 | Stop reason: HOSPADM

## 2017-11-01 RX ADMIN — CYCLOBENZAPRINE HYDROCHLORIDE 10 MG: 10 TABLET, FILM COATED ORAL at 02:33

## 2017-11-01 RX ADMIN — POLYMYXIN B SULFATE AND TRIMETHOPRIM SULFATE 1 DROP: 10000; 1 SOLUTION/ DROPS OPHTHALMIC at 03:02

## 2017-11-01 RX ADMIN — CLONIDINE HYDROCHLORIDE 0.3 MG: 0.1 TABLET ORAL at 02:52

## 2017-11-01 RX ADMIN — HYDROCODONE BITARTRATE AND ACETAMINOPHEN 1 TABLET: 5; 325 TABLET ORAL at 02:33

## 2017-11-01 RX ADMIN — KETOROLAC TROMETHAMINE 60 MG: 30 INJECTION, SOLUTION INTRAMUSCULAR at 02:34

## 2017-11-01 ASSESSMENT — PAIN SCALES - GENERAL
PAINLEVEL_OUTOF10: 8
PAINLEVEL_OUTOF10: 9
PAINLEVEL_OUTOF10: 9
PAINLEVEL_OUTOF10: 5

## 2017-11-01 ASSESSMENT — PAIN DESCRIPTION - PAIN TYPE: TYPE: CHRONIC PAIN;ACUTE PAIN

## 2017-11-01 ASSESSMENT — PAIN DESCRIPTION - LOCATION: LOCATION: NECK

## 2017-11-01 ASSESSMENT — PAIN DESCRIPTION - ORIENTATION: ORIENTATION: RIGHT;LEFT
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
## 2017-11-16 ASSESSMENT — ENCOUNTER SYMPTOMS
SORE THROAT: 0
COUGH: 0
EYE REDNESS: 1
BACK PAIN: 0
NAUSEA: 0
EYE PAIN: 0
SHORTNESS OF BREATH: 0
ABDOMINAL PAIN: 0
VOMITING: 0
DIARRHEA: 0

## 2017-11-26 ENCOUNTER — APPOINTMENT (OUTPATIENT)
Dept: GENERAL RADIOLOGY | Age: 52
DRG: 113 | End: 2017-11-26
Payer: COMMERCIAL

## 2017-11-26 ENCOUNTER — HOSPITAL ENCOUNTER (INPATIENT)
Age: 52
LOS: 2 days | Discharge: HOME | DRG: 113 | End: 2017-11-28
Attending: EMERGENCY MEDICINE | Admitting: INTERNAL MEDICINE
Payer: COMMERCIAL

## 2017-11-26 ENCOUNTER — APPOINTMENT (OUTPATIENT)
Dept: CT IMAGING | Age: 52
DRG: 113 | End: 2017-11-26
Payer: COMMERCIAL

## 2017-11-26 DIAGNOSIS — H70.91 MASTOIDITIS OF RIGHT SIDE: Primary | ICD-10-CM

## 2017-11-26 DIAGNOSIS — I16.0 HYPERTENSIVE URGENCY: ICD-10-CM

## 2017-11-26 PROBLEM — H70.90 MASTOIDITIS: Status: ACTIVE | Noted: 2017-11-26

## 2017-11-26 PROBLEM — I25.810 CORONARY ARTERY DISEASE INVOLVING CORONARY BYPASS GRAFT OF NATIVE HEART: Status: ACTIVE | Noted: 2017-11-26

## 2017-11-26 LAB
ABSOLUTE EOS #: 0.2 K/UL (ref 0–0.4)
ABSOLUTE IMMATURE GRANULOCYTE: ABNORMAL K/UL (ref 0–0.3)
ABSOLUTE LYMPH #: 1.4 K/UL (ref 1–4.8)
ABSOLUTE MONO #: 0.4 K/UL (ref 0.1–1.3)
ALBUMIN SERPL-MCNC: 4 G/DL (ref 3.5–5.2)
ALBUMIN/GLOBULIN RATIO: ABNORMAL (ref 1–2.5)
ALP BLD-CCNC: 136 U/L (ref 40–129)
ALT SERPL-CCNC: 10 U/L (ref 5–41)
ANION GAP SERPL CALCULATED.3IONS-SCNC: 11 MMOL/L (ref 9–17)
AST SERPL-CCNC: 16 U/L
BASOPHILS # BLD: 1 % (ref 0–2)
BASOPHILS ABSOLUTE: 0.1 K/UL (ref 0–0.2)
BILIRUB SERPL-MCNC: 0.43 MG/DL (ref 0.3–1.2)
BUN BLDV-MCNC: 9 MG/DL (ref 6–20)
BUN/CREAT BLD: ABNORMAL (ref 9–20)
C-REACTIVE PROTEIN: 15.4 MG/L (ref 0–5)
CALCIUM SERPL-MCNC: 9.2 MG/DL (ref 8.6–10.4)
CHLORIDE BLD-SCNC: 100 MMOL/L (ref 98–107)
CO2: 30 MMOL/L (ref 20–31)
CREAT SERPL-MCNC: 0.85 MG/DL (ref 0.7–1.2)
DIFFERENTIAL TYPE: ABNORMAL
EOSINOPHILS RELATIVE PERCENT: 3 % (ref 0–4)
GFR AFRICAN AMERICAN: >60 ML/MIN
GFR NON-AFRICAN AMERICAN: >60 ML/MIN
GFR SERPL CREATININE-BSD FRML MDRD: ABNORMAL ML/MIN/{1.73_M2}
GFR SERPL CREATININE-BSD FRML MDRD: ABNORMAL ML/MIN/{1.73_M2}
GLUCOSE BLD-MCNC: 98 MG/DL (ref 70–99)
HCT VFR BLD CALC: 44.2 % (ref 41–53)
HEMOGLOBIN: 14.9 G/DL (ref 13.5–17.5)
IMMATURE GRANULOCYTES: ABNORMAL %
LACTIC ACID, WHOLE BLOOD: NORMAL MMOL/L (ref 0.7–2.1)
LACTIC ACID: 0.8 MMOL/L (ref 0.5–2.2)
LYMPHOCYTES # BLD: 22 % (ref 24–44)
MAGNESIUM: 1.8 MG/DL (ref 1.6–2.6)
MCH RBC QN AUTO: 28.7 PG (ref 26–34)
MCHC RBC AUTO-ENTMCNC: 33.7 G/DL (ref 31–37)
MCV RBC AUTO: 85.1 FL (ref 80–100)
MONOCYTES # BLD: 7 % (ref 1–7)
PDW BLD-RTO: 15.6 % (ref 11.5–14.9)
PLATELET # BLD: 173 K/UL (ref 150–450)
PLATELET ESTIMATE: ABNORMAL
PMV BLD AUTO: 9.8 FL (ref 6–12)
POTASSIUM SERPL-SCNC: 4 MMOL/L (ref 3.7–5.3)
RBC # BLD: 5.19 M/UL (ref 4.5–5.9)
RBC # BLD: ABNORMAL 10*6/UL
SEDIMENTATION RATE, ERYTHROCYTE: 9 MM (ref 0–15)
SEG NEUTROPHILS: 67 % (ref 36–66)
SEGMENTED NEUTROPHILS ABSOLUTE COUNT: 4.3 K/UL (ref 1.3–9.1)
SODIUM BLD-SCNC: 141 MMOL/L (ref 135–144)
TOTAL PROTEIN: 7.1 G/DL (ref 6.4–8.3)
TROPONIN INTERP: NORMAL
TROPONIN T: <0.03 NG/ML
WBC # BLD: 6.4 K/UL (ref 3.5–11)
WBC # BLD: ABNORMAL 10*3/UL

## 2017-11-26 PROCEDURE — 96374 THER/PROPH/DIAG INJ IV PUSH: CPT

## 2017-11-26 PROCEDURE — 1200000000 HC SEMI PRIVATE

## 2017-11-26 PROCEDURE — 71020 XR CHEST STANDARD TWO VW: CPT

## 2017-11-26 PROCEDURE — 83605 ASSAY OF LACTIC ACID: CPT

## 2017-11-26 PROCEDURE — 86140 C-REACTIVE PROTEIN: CPT

## 2017-11-26 PROCEDURE — 6360000002 HC RX W HCPCS: Performed by: HOSPITALIST

## 2017-11-26 PROCEDURE — 85651 RBC SED RATE NONAUTOMATED: CPT

## 2017-11-26 PROCEDURE — 84484 ASSAY OF TROPONIN QUANT: CPT

## 2017-11-26 PROCEDURE — 6370000000 HC RX 637 (ALT 250 FOR IP): Performed by: HOSPITALIST

## 2017-11-26 PROCEDURE — 80053 COMPREHEN METABOLIC PANEL: CPT

## 2017-11-26 PROCEDURE — 99285 EMERGENCY DEPT VISIT HI MDM: CPT

## 2017-11-26 PROCEDURE — 2500000003 HC RX 250 WO HCPCS: Performed by: EMERGENCY MEDICINE

## 2017-11-26 PROCEDURE — 36415 COLL VENOUS BLD VENIPUNCTURE: CPT

## 2017-11-26 PROCEDURE — 85025 COMPLETE CBC W/AUTO DIFF WBC: CPT

## 2017-11-26 PROCEDURE — 94664 DEMO&/EVAL PT USE INHALER: CPT

## 2017-11-26 PROCEDURE — 93005 ELECTROCARDIOGRAM TRACING: CPT

## 2017-11-26 PROCEDURE — 6370000000 HC RX 637 (ALT 250 FOR IP): Performed by: EMERGENCY MEDICINE

## 2017-11-26 PROCEDURE — 6360000002 HC RX W HCPCS: Performed by: EMERGENCY MEDICINE

## 2017-11-26 PROCEDURE — 70450 CT HEAD/BRAIN W/O DYE: CPT

## 2017-11-26 PROCEDURE — 2580000003 HC RX 258: Performed by: EMERGENCY MEDICINE

## 2017-11-26 PROCEDURE — 6360000002 HC RX W HCPCS: Performed by: RADIOLOGY

## 2017-11-26 PROCEDURE — 2580000003 HC RX 258: Performed by: HOSPITALIST

## 2017-11-26 PROCEDURE — 96375 TX/PRO/DX INJ NEW DRUG ADDON: CPT

## 2017-11-26 PROCEDURE — 81003 URINALYSIS AUTO W/O SCOPE: CPT

## 2017-11-26 PROCEDURE — 83735 ASSAY OF MAGNESIUM: CPT

## 2017-11-26 RX ORDER — MORPHINE SULFATE 8 MG/ML
4 INJECTION, SOLUTION INTRAMUSCULAR; INTRAVENOUS
Status: DISCONTINUED | OUTPATIENT
Start: 2017-11-26 | End: 2017-11-27

## 2017-11-26 RX ORDER — ONDANSETRON 2 MG/ML
4 INJECTION INTRAMUSCULAR; INTRAVENOUS EVERY 6 HOURS PRN
Status: DISCONTINUED | OUTPATIENT
Start: 2017-11-26 | End: 2017-11-28 | Stop reason: HOSPADM

## 2017-11-26 RX ORDER — POTASSIUM CHLORIDE 20MEQ/15ML
40 LIQUID (ML) ORAL PRN
Status: DISCONTINUED | OUTPATIENT
Start: 2017-11-26 | End: 2017-11-28 | Stop reason: HOSPADM

## 2017-11-26 RX ORDER — DOCUSATE SODIUM 100 MG/1
100 CAPSULE, LIQUID FILLED ORAL DAILY
Status: DISCONTINUED | OUTPATIENT
Start: 2017-11-26 | End: 2017-11-28 | Stop reason: HOSPADM

## 2017-11-26 RX ORDER — CLONIDINE HYDROCHLORIDE 0.1 MG/1
0.2 TABLET ORAL 2 TIMES DAILY
Status: DISCONTINUED | OUTPATIENT
Start: 2017-11-26 | End: 2017-11-28 | Stop reason: HOSPADM

## 2017-11-26 RX ORDER — ONDANSETRON 2 MG/ML
4 INJECTION INTRAMUSCULAR; INTRAVENOUS ONCE
Status: COMPLETED | OUTPATIENT
Start: 2017-11-26 | End: 2017-11-26

## 2017-11-26 RX ORDER — MORPHINE SULFATE 8 MG/ML
2 INJECTION, SOLUTION INTRAMUSCULAR; INTRAVENOUS
Status: DISCONTINUED | OUTPATIENT
Start: 2017-11-26 | End: 2017-11-27

## 2017-11-26 RX ORDER — SODIUM CHLORIDE 9 MG/ML
INJECTION, SOLUTION INTRAVENOUS CONTINUOUS
Status: DISCONTINUED | OUTPATIENT
Start: 2017-11-26 | End: 2017-11-28 | Stop reason: HOSPADM

## 2017-11-26 RX ORDER — POTASSIUM CHLORIDE 20 MEQ/1
40 TABLET, EXTENDED RELEASE ORAL PRN
Status: DISCONTINUED | OUTPATIENT
Start: 2017-11-26 | End: 2017-11-28 | Stop reason: HOSPADM

## 2017-11-26 RX ORDER — DULOXETIN HYDROCHLORIDE 30 MG/1
60 CAPSULE, DELAYED RELEASE ORAL DAILY
Status: DISCONTINUED | OUTPATIENT
Start: 2017-11-26 | End: 2017-11-28 | Stop reason: HOSPADM

## 2017-11-26 RX ORDER — TAMSULOSIN HYDROCHLORIDE 0.4 MG/1
0.4 CAPSULE ORAL DAILY
Status: DISCONTINUED | OUTPATIENT
Start: 2017-11-26 | End: 2017-11-28 | Stop reason: HOSPADM

## 2017-11-26 RX ORDER — FENTANYL CITRATE 50 UG/ML
100 INJECTION, SOLUTION INTRAMUSCULAR; INTRAVENOUS ONCE
Status: COMPLETED | OUTPATIENT
Start: 2017-11-26 | End: 2017-11-26

## 2017-11-26 RX ORDER — ATORVASTATIN CALCIUM 20 MG/1
20 TABLET, FILM COATED ORAL DAILY
Status: DISCONTINUED | OUTPATIENT
Start: 2017-11-26 | End: 2017-11-28 | Stop reason: HOSPADM

## 2017-11-26 RX ORDER — ACETAMINOPHEN 325 MG/1
650 TABLET ORAL EVERY 4 HOURS PRN
Status: DISCONTINUED | OUTPATIENT
Start: 2017-11-26 | End: 2017-11-28 | Stop reason: HOSPADM

## 2017-11-26 RX ORDER — NITROGLYCERIN 0.4 MG/1
0.4 TABLET SUBLINGUAL EVERY 5 MIN PRN
Status: DISCONTINUED | OUTPATIENT
Start: 2017-11-26 | End: 2017-11-28 | Stop reason: HOSPADM

## 2017-11-26 RX ORDER — HYDROXYZINE HYDROCHLORIDE 25 MG/1
25 TABLET, FILM COATED ORAL 3 TIMES DAILY PRN
Status: DISCONTINUED | OUTPATIENT
Start: 2017-11-26 | End: 2017-11-28 | Stop reason: HOSPADM

## 2017-11-26 RX ORDER — GUAIFENESIN 600 MG/1
600 TABLET, EXTENDED RELEASE ORAL 2 TIMES DAILY
Status: DISCONTINUED | OUTPATIENT
Start: 2017-11-26 | End: 2017-11-28 | Stop reason: HOSPADM

## 2017-11-26 RX ORDER — ALBUTEROL SULFATE 90 UG/1
2 AEROSOL, METERED RESPIRATORY (INHALATION) 4 TIMES DAILY
Status: DISCONTINUED | OUTPATIENT
Start: 2017-11-26 | End: 2017-11-28 | Stop reason: HOSPADM

## 2017-11-26 RX ORDER — M-VIT,TX,IRON,MINS/CALC/FOLIC 27MG-0.4MG
1 TABLET ORAL DAILY
Status: DISCONTINUED | OUTPATIENT
Start: 2017-11-26 | End: 2017-11-28 | Stop reason: HOSPADM

## 2017-11-26 RX ORDER — PANTOPRAZOLE SODIUM 40 MG/1
40 TABLET, DELAYED RELEASE ORAL DAILY
Status: DISCONTINUED | OUTPATIENT
Start: 2017-11-26 | End: 2017-11-28 | Stop reason: HOSPADM

## 2017-11-26 RX ORDER — NICOTINE 21 MG/24HR
1 PATCH, TRANSDERMAL 24 HOURS TRANSDERMAL DAILY PRN
Status: DISCONTINUED | OUTPATIENT
Start: 2017-11-26 | End: 2017-11-28 | Stop reason: HOSPADM

## 2017-11-26 RX ORDER — ASPIRIN 81 MG/1
324 TABLET, CHEWABLE ORAL ONCE
Status: COMPLETED | OUTPATIENT
Start: 2017-11-26 | End: 2017-11-26

## 2017-11-26 RX ORDER — HYDRALAZINE HYDROCHLORIDE 50 MG/1
50 TABLET, FILM COATED ORAL 2 TIMES DAILY
Status: DISCONTINUED | OUTPATIENT
Start: 2017-11-26 | End: 2017-11-28 | Stop reason: HOSPADM

## 2017-11-26 RX ORDER — NEOMYCIN SULFATE, POLYMYXIN B SULFATE AND HYDROCORTISONE 10; 3.5; 1 MG/ML; MG/ML; [USP'U]/ML
3 SUSPENSION/ DROPS AURICULAR (OTIC) 4 TIMES DAILY
Status: DISCONTINUED | OUTPATIENT
Start: 2017-11-26 | End: 2017-11-27

## 2017-11-26 RX ORDER — TRAZODONE HYDROCHLORIDE 50 MG/1
100 TABLET ORAL NIGHTLY
Status: DISCONTINUED | OUTPATIENT
Start: 2017-11-26 | End: 2017-11-28 | Stop reason: HOSPADM

## 2017-11-26 RX ORDER — MAGNESIUM SULFATE 1 G/100ML
1 INJECTION INTRAVENOUS PRN
Status: DISCONTINUED | OUTPATIENT
Start: 2017-11-26 | End: 2017-11-28 | Stop reason: HOSPADM

## 2017-11-26 RX ORDER — HYDRALAZINE HYDROCHLORIDE 20 MG/ML
10 INJECTION INTRAMUSCULAR; INTRAVENOUS EVERY 6 HOURS PRN
Status: DISCONTINUED | OUTPATIENT
Start: 2017-11-26 | End: 2017-11-28 | Stop reason: HOSPADM

## 2017-11-26 RX ORDER — POTASSIUM CHLORIDE 7.45 MG/ML
10 INJECTION INTRAVENOUS PRN
Status: DISCONTINUED | OUTPATIENT
Start: 2017-11-26 | End: 2017-11-28 | Stop reason: HOSPADM

## 2017-11-26 RX ORDER — SODIUM CHLORIDE 0.9 % (FLUSH) 0.9 %
10 SYRINGE (ML) INJECTION PRN
Status: DISCONTINUED | OUTPATIENT
Start: 2017-11-26 | End: 2017-11-28 | Stop reason: HOSPADM

## 2017-11-26 RX ORDER — BISACODYL 10 MG
10 SUPPOSITORY, RECTAL RECTAL DAILY PRN
Status: DISCONTINUED | OUTPATIENT
Start: 2017-11-26 | End: 2017-11-28 | Stop reason: HOSPADM

## 2017-11-26 RX ORDER — SODIUM CHLORIDE 0.9 % (FLUSH) 0.9 %
10 SYRINGE (ML) INJECTION EVERY 12 HOURS SCHEDULED
Status: DISCONTINUED | OUTPATIENT
Start: 2017-11-26 | End: 2017-11-28 | Stop reason: HOSPADM

## 2017-11-26 RX ORDER — ASPIRIN 81 MG/1
81 TABLET ORAL DAILY
Status: DISCONTINUED | OUTPATIENT
Start: 2017-11-26 | End: 2017-11-28 | Stop reason: HOSPADM

## 2017-11-26 RX ADMIN — ACETAMINOPHEN 650 MG: 325 TABLET, FILM COATED ORAL at 22:28

## 2017-11-26 RX ADMIN — METOPROLOL TARTRATE 25 MG: 25 TABLET ORAL at 22:29

## 2017-11-26 RX ADMIN — DEXTROSE 300 MG: 50 INJECTION, SOLUTION INTRAVENOUS at 17:56

## 2017-11-26 RX ADMIN — ONDANSETRON 4 MG: 2 INJECTION INTRAMUSCULAR; INTRAVENOUS at 16:49

## 2017-11-26 RX ADMIN — SODIUM CHLORIDE: 9 INJECTION, SOLUTION INTRAVENOUS at 22:28

## 2017-11-26 RX ADMIN — ASPIRIN 81 MG 324 MG: 81 TABLET ORAL at 16:47

## 2017-11-26 RX ADMIN — Medication 2 PUFF: at 22:29

## 2017-11-26 RX ADMIN — HYDRALAZINE HYDROCHLORIDE 50 MG: 50 TABLET, FILM COATED ORAL at 22:29

## 2017-11-26 RX ADMIN — HYDRALAZINE HYDROCHLORIDE 10 MG: 20 INJECTION INTRAMUSCULAR; INTRAVENOUS at 21:05

## 2017-11-26 RX ADMIN — NITROGLYCERIN 0.4 MG: 0.4 TABLET SUBLINGUAL at 18:09

## 2017-11-26 RX ADMIN — GUAIFENESIN 600 MG: 600 TABLET, EXTENDED RELEASE ORAL at 22:28

## 2017-11-26 RX ADMIN — TRAZODONE HYDROCHLORIDE 100 MG: 50 TABLET ORAL at 22:28

## 2017-11-26 RX ADMIN — FENTANYL CITRATE 100 MCG: 50 INJECTION, SOLUTION INTRAMUSCULAR; INTRAVENOUS at 16:49

## 2017-11-26 RX ADMIN — NEOMYCIN SULFATE, POLYMYXIN B SULFATE AND HYDROCORTISONE 3 DROP: 3.5; 10000; 1 SUSPENSION AURICULAR (OTIC) at 22:29

## 2017-11-26 RX ADMIN — CLONIDINE HYDROCHLORIDE 0.2 MG: 0.1 TABLET ORAL at 22:28

## 2017-11-26 RX ADMIN — MORPHINE SULFATE 4 MG: 8 INJECTION, SOLUTION INTRAMUSCULAR; INTRAVENOUS at 21:05

## 2017-11-26 RX ADMIN — Medication 10 ML: at 21:05

## 2017-11-26 ASSESSMENT — ENCOUNTER SYMPTOMS
NAUSEA: 0
CONSTIPATION: 1
VOMITING: 0
DIARRHEA: 0
SORE THROAT: 0
BLOOD IN STOOL: 0
WHEEZING: 0
ABDOMINAL PAIN: 0
SINUS PAIN: 0
BLURRED VISION: 1
SHORTNESS OF BREATH: 1

## 2017-11-26 ASSESSMENT — PAIN DESCRIPTION - PAIN TYPE
TYPE: ACUTE PAIN
TYPE: ACUTE PAIN

## 2017-11-26 ASSESSMENT — PAIN DESCRIPTION - ORIENTATION
ORIENTATION: RIGHT
ORIENTATION: RIGHT

## 2017-11-26 ASSESSMENT — PAIN DESCRIPTION - DESCRIPTORS: DESCRIPTORS: THROBBING

## 2017-11-26 ASSESSMENT — PAIN SCALES - GENERAL
PAINLEVEL_OUTOF10: 7
PAINLEVEL_OUTOF10: 10
PAINLEVEL_OUTOF10: 5
PAINLEVEL_OUTOF10: 10
PAINLEVEL_OUTOF10: 10
PAINLEVEL_OUTOF10: 0

## 2017-11-26 ASSESSMENT — PAIN DESCRIPTION - LOCATION: LOCATION: EAR

## 2017-11-26 ASSESSMENT — PAIN DESCRIPTION - DIRECTION: RADIATING_TOWARDS: HEAD

## 2017-11-26 ASSESSMENT — PAIN DESCRIPTION - FREQUENCY: FREQUENCY: CONTINUOUS
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
## 2017-11-26 NOTE — ED PROVIDER NOTES
[address]  eMERGENCY dEPARTMENT eNCOUnter    Pt Name: Dianne Osman  MRN: [id number]  YOB: 1965  Date of evaluation: 11/26/17  PCP: Willy Baig MD    [address] [place] [address]       Chief Complaint   Patient presents with   Hollice Davis    Headache    Shortness of Breath    Chest Pain    Dysuria       HISTORY OF PRESENT ILLNESS    Dianne Osman is a 45 y.o. male who presents With a chief complaint right ear pain. Patient originally placed on side to fast track however he mentioned during triage and is also having chest pain and a headache and was moved to the acute side. Patient states her pain has been going on approximately 4 days. States it radiates to his right eye. He denies any significant dizziness, lightheadedness or any visual changes. He does report some congestion as well. Left ear does not hurt. Patient is also complaining of chest pain that has been going on for about 4 days as well. Reports it as centrally located, nonradiating and moderate in intensity. Nothing seems to make the symptoms better or worse. Patient does acknowledge a long cardiac history including a bypass surgery in 2011 and a recent heart cath. He did take a baby aspirin this morning. States he has been taking all his hypertensive medications as prescribed. Denies any recent fever or chills. Denies any difficulty breathing or cough. Denies abdominal pain, nausea or vomiting. Patient has no other additional complaints at this time. REVIEW OF SYSTEMS       Constitutional: Denies recent fever, chills, fatigue. HEENT: Denies visual changes, sore throat. Positive for right ear pain and congestion. Neck: Denies neck pain or swelling. Respiratory: Denies recent shortness of breath or cough. Cardiac:  Positive for chest pain. GI: Denies recent abdominal pain, nausea or vomiting. Denies diarrhea, constipation, blood in the stool or black tarry stools. : Denies dysuria or hematuria. Anxiety Disorder in his sister; Depression in his brother, sister, and sister; Diabetes in his father; Heart Disease in his father, maternal grandmother, and mother; High Blood Pressure in his father, mother, sister, and sister; Reginold Cascade in his father and mother; Thyroid Disease in his sister. SOCIAL HISTORY      reports that he has been smoking Cigarettes. He has a 37.00 pack-year smoking history. He does not have any smokeless tobacco history on file. He reports that he does not drink alcohol or use drugs. PHYSICAL EXAM     INITIAL VITALS:  height is 5' 9\" (1.753 m) and weight is 225 lb (102.1 kg). His oral temperature is 98.3 °F (36.8 °C). His blood pressure is 195/110 (significant, abnormal) and his pulse is 73. His respiration is 31 (abnormal) and oxygen saturation is 97%. Physical Exam   Constitutional: He is oriented to person, place, and time and well-developed, well-nourished, and in no distress. No distress. HENT:   Head: Normocephalic and atraumatic. Right Ear: There is tenderness. There is mastoid tenderness. Tympanic membrane is erythematous. Tympanic membrane is not bulging. Left Ear: Tympanic membrane normal.   Mouth/Throat: Oropharynx is clear and moist.   Eyes: Conjunctivae are normal. Pupils are equal, round, and reactive to light. Neck: Neck supple. Cardiovascular: Normal rate, regular rhythm, normal heart sounds and intact distal pulses. No murmur heard. Pulmonary/Chest: Effort normal and breath sounds normal. No respiratory distress. Abdominal: Soft. Bowel sounds are normal. He exhibits no distension. There is no tenderness. Musculoskeletal: He exhibits no edema or tenderness. Lymphadenopathy:     He has no cervical adenopathy. Neurological: He is alert and oriented to person, place, and time. He has normal motor skills, normal sensation, normal strength and intact cranial nerves. GCS score is 15. Skin: Skin is warm and dry. No rash noted.    Psychiatric: Affect and judgment normal.   Nursing note and vitals reviewed. DIFFERENTIAL DIAGNOSIS/MDM:   ACS  Stable versus unstable angina  Hypertensive urgency versus emergency  Mastoiditis  Otitis media  Viral versus bacterial URI    49-year-old male with long history of cardiac disease as well as hypertension presents with right ear pain, chest pain and found to be hypertensive in the 200s over 100s. He is afebrile and nontoxic in appearance. Neurologic exam is unremarkable. Differential diagnosis is quite broad for this patient as he doesn't multiple comorbidities. We'll cardiac workup. We'll also get a CT scan of the head. Lower suspicion for mastoiditis however patient does have very mild tenderness of the mastoid bone however no erythema, protrusion of the ear or any discharge from the ear. Anticipate admission. DIAGNOSTIC RESULTS     EKG: All EKG's are interpreted by the Emergency Department Physician who either signs or Co-signs this chart in the absence of a cardiologist.    EKG Interpretation    Interpreted by emergency department physician at 4:07 PM.    Compared to EKG from July 2017    Rhythm: normal sinus   Rate: normal  Axis: left  Ectopy: none  Conduction: normal  ST Segments: nonspecific changes  T Waves: inversion in  aVl  Q Waves: none    EKG  Impression: non-specific EKG      RADIOLOGY:   I directly visualized the following  images and reviewed the radiologist interpretations:  CT Head WO Contrast   Final Result   Right otomastoiditis. Otherwise no acute intracranial disease. XR CHEST STANDARD (2 VW)   Final Result   No acute process.                  ED BEDSIDE ULTRASOUND:      LABS:  Labs Reviewed   CBC WITH AUTO DIFFERENTIAL - Abnormal; Notable for the following:        Result Value    RDW 15.6 (*)     Seg Neutrophils 67 (*)     Lymphocytes 22 (*)     All other components within normal limits   COMPREHENSIVE METABOLIC PANEL - Abnormal; Notable for the following:     Alkaline

## 2017-11-26 NOTE — ED NOTES
Pt ambulates to room- gait steady. Pt initially states he is here for otalgia. Once in room- states he is SOB/has CP/ and dysuria. Pt AOx4.  RR easy, unlabored     Trevon La RN  11/26/17 [id number]

## 2017-11-27 DIAGNOSIS — I25.10 CORONARY ARTERY DISEASE INVOLVING NATIVE CORONARY ARTERY OF NATIVE HEART WITHOUT ANGINA PECTORIS: ICD-10-CM

## 2017-11-27 DIAGNOSIS — I10 ESSENTIAL HYPERTENSION: ICD-10-CM

## 2017-11-27 PROBLEM — H70.91 MASTOIDITIS OF RIGHT SIDE: Status: ACTIVE | Noted: 2017-11-26

## 2017-11-27 LAB
ANION GAP SERPL CALCULATED.3IONS-SCNC: 11 MMOL/L (ref 9–17)
BILIRUBIN URINE: NEGATIVE
BUN BLDV-MCNC: 8 MG/DL (ref 6–20)
BUN/CREAT BLD: ABNORMAL (ref 9–20)
CALCIUM SERPL-MCNC: 8.9 MG/DL (ref 8.6–10.4)
CHLORIDE BLD-SCNC: 100 MMOL/L (ref 98–107)
CO2: 27 MMOL/L (ref 20–31)
COLOR: YELLOW
COMMENT UA: NORMAL
CREAT SERPL-MCNC: 0.8 MG/DL (ref 0.7–1.2)
EKG ATRIAL RATE: 75 BPM
EKG P AXIS: 28 DEGREES
EKG P-R INTERVAL: 162 MS
EKG Q-T INTERVAL: 362 MS
EKG QRS DURATION: 96 MS
EKG QTC CALCULATION (BAZETT): 404 MS
EKG R AXIS: -25 DEGREES
EKG T AXIS: 71 DEGREES
EKG VENTRICULAR RATE: 75 BPM
GFR AFRICAN AMERICAN: >60 ML/MIN
GFR NON-AFRICAN AMERICAN: >60 ML/MIN
GFR SERPL CREATININE-BSD FRML MDRD: ABNORMAL ML/MIN/{1.73_M2}
GFR SERPL CREATININE-BSD FRML MDRD: ABNORMAL ML/MIN/{1.73_M2}
GLUCOSE BLD-MCNC: 130 MG/DL (ref 70–99)
GLUCOSE URINE: NEGATIVE
HCT VFR BLD CALC: 46.2 % (ref 41–53)
HEMOGLOBIN: 15.5 G/DL (ref 13.5–17.5)
INR BLD: 1
KETONES, URINE: NEGATIVE
LEUKOCYTE ESTERASE, URINE: NEGATIVE
MCH RBC QN AUTO: 28.8 PG (ref 26–34)
MCHC RBC AUTO-ENTMCNC: 33.6 G/DL (ref 31–37)
MCV RBC AUTO: 85.7 FL (ref 80–100)
NITRITE, URINE: NEGATIVE
PDW BLD-RTO: 15.7 % (ref 11.5–14.9)
PH UA: 6.5 (ref 5–8)
PLATELET # BLD: 167 K/UL (ref 150–450)
PMV BLD AUTO: 9.4 FL (ref 6–12)
POTASSIUM SERPL-SCNC: 4.1 MMOL/L (ref 3.7–5.3)
PROTEIN UA: NEGATIVE
PROTHROMBIN TIME: 10.6 SEC (ref 9.7–12)
RBC # BLD: 5.39 M/UL (ref 4.5–5.9)
SODIUM BLD-SCNC: 138 MMOL/L (ref 135–144)
SPECIFIC GRAVITY UA: 1.01 (ref 1–1.03)
TROPONIN INTERP: NORMAL
TROPONIN INTERP: NORMAL
TROPONIN T: <0.03 NG/ML
TROPONIN T: <0.03 NG/ML
TURBIDITY: CLEAR
URINE HGB: NEGATIVE
UROBILINOGEN, URINE: NORMAL
WBC # BLD: 5.6 K/UL (ref 3.5–11)

## 2017-11-27 PROCEDURE — 93005 ELECTROCARDIOGRAM TRACING: CPT

## 2017-11-27 PROCEDURE — 97161 PT EVAL LOW COMPLEX 20 MIN: CPT

## 2017-11-27 PROCEDURE — G8980 MOBILITY D/C STATUS: HCPCS

## 2017-11-27 PROCEDURE — 6370000000 HC RX 637 (ALT 250 FOR IP): Performed by: INTERNAL MEDICINE

## 2017-11-27 PROCEDURE — 85027 COMPLETE CBC AUTOMATED: CPT

## 2017-11-27 PROCEDURE — G8978 MOBILITY CURRENT STATUS: HCPCS

## 2017-11-27 PROCEDURE — 6370000000 HC RX 637 (ALT 250 FOR IP): Performed by: OTOLARYNGOLOGY

## 2017-11-27 PROCEDURE — 6360000002 HC RX W HCPCS: Performed by: HOSPITALIST

## 2017-11-27 PROCEDURE — 84484 ASSAY OF TROPONIN QUANT: CPT

## 2017-11-27 PROCEDURE — G8979 MOBILITY GOAL STATUS: HCPCS

## 2017-11-27 PROCEDURE — 99223 1ST HOSP IP/OBS HIGH 75: CPT | Performed by: INTERNAL MEDICINE

## 2017-11-27 PROCEDURE — 80048 BASIC METABOLIC PNL TOTAL CA: CPT

## 2017-11-27 PROCEDURE — 2500000003 HC RX 250 WO HCPCS: Performed by: HOSPITALIST

## 2017-11-27 PROCEDURE — 2580000003 HC RX 258: Performed by: HOSPITALIST

## 2017-11-27 PROCEDURE — 6370000000 HC RX 637 (ALT 250 FOR IP): Performed by: HOSPITALIST

## 2017-11-27 PROCEDURE — 36415 COLL VENOUS BLD VENIPUNCTURE: CPT

## 2017-11-27 PROCEDURE — 1200000000 HC SEMI PRIVATE

## 2017-11-27 PROCEDURE — 6360000002 HC RX W HCPCS: Performed by: RADIOLOGY

## 2017-11-27 PROCEDURE — 85610 PROTHROMBIN TIME: CPT

## 2017-11-27 PROCEDURE — 6370000000 HC RX 637 (ALT 250 FOR IP): Performed by: RADIOLOGY

## 2017-11-27 RX ORDER — OXYCODONE HYDROCHLORIDE AND ACETAMINOPHEN 5; 325 MG/1; MG/1
1 TABLET ORAL EVERY 4 HOURS PRN
Status: DISCONTINUED | OUTPATIENT
Start: 2017-11-27 | End: 2017-11-28 | Stop reason: HOSPADM

## 2017-11-27 RX ORDER — CIPROFLOXACIN AND DEXAMETHASONE 3; 1 MG/ML; MG/ML
4 SUSPENSION/ DROPS AURICULAR (OTIC) 2 TIMES DAILY
Status: DISCONTINUED | OUTPATIENT
Start: 2017-11-27 | End: 2017-11-28 | Stop reason: HOSPADM

## 2017-11-27 RX ORDER — ACETAMINOPHEN/DIPHENHYDRAMINE 500MG-25MG
TABLET ORAL
Qty: 30 TABLET | Refills: 3 | Status: SHIPPED | OUTPATIENT
Start: 2017-11-27 | End: 2019-02-21 | Stop reason: SDUPTHER

## 2017-11-27 RX ORDER — CLONIDINE HYDROCHLORIDE 0.2 MG/1
TABLET ORAL
Qty: 60 TABLET | Refills: 3 | Status: SHIPPED | OUTPATIENT
Start: 2017-11-27 | End: 2019-09-06 | Stop reason: SDUPTHER

## 2017-11-27 RX ADMIN — CLONIDINE HYDROCHLORIDE 0.2 MG: 0.1 TABLET ORAL at 21:25

## 2017-11-27 RX ADMIN — Medication 2 PUFF: at 09:19

## 2017-11-27 RX ADMIN — HYDRALAZINE HYDROCHLORIDE 10 MG: 20 INJECTION INTRAMUSCULAR; INTRAVENOUS at 07:08

## 2017-11-27 RX ADMIN — Medication 10 ML: at 21:35

## 2017-11-27 RX ADMIN — CIPROFLOXACIN AND DEXAMETHASONE 4 DROP: 3; 1 SUSPENSION/ DROPS AURICULAR (OTIC) at 21:30

## 2017-11-27 RX ADMIN — Medication 2 PUFF: at 21:26

## 2017-11-27 RX ADMIN — HYDRALAZINE HYDROCHLORIDE 50 MG: 50 TABLET, FILM COATED ORAL at 09:19

## 2017-11-27 RX ADMIN — CLINDAMYCIN PHOSPHATE 300 MG: 150 INJECTION, SOLUTION, CONCENTRATE INTRAVENOUS at 04:29

## 2017-11-27 RX ADMIN — SODIUM CHLORIDE: 9 INJECTION, SOLUTION INTRAVENOUS at 14:16

## 2017-11-27 RX ADMIN — GUAIFENESIN 600 MG: 600 TABLET, EXTENDED RELEASE ORAL at 09:19

## 2017-11-27 RX ADMIN — TIOTROPIUM BROMIDE 18 MCG: 18 CAPSULE ORAL; RESPIRATORY (INHALATION) at 11:35

## 2017-11-27 RX ADMIN — DOCUSATE SODIUM 100 MG: 100 CAPSULE, LIQUID FILLED ORAL at 09:19

## 2017-11-27 RX ADMIN — TAMSULOSIN HYDROCHLORIDE 0.4 MG: 0.4 CAPSULE ORAL at 09:19

## 2017-11-27 RX ADMIN — MORPHINE SULFATE 2 MG: 8 INJECTION, SOLUTION INTRAMUSCULAR; INTRAVENOUS at 05:49

## 2017-11-27 RX ADMIN — CLINDAMYCIN PHOSPHATE 300 MG: 150 INJECTION, SOLUTION, CONCENTRATE INTRAVENOUS at 23:21

## 2017-11-27 RX ADMIN — MORPHINE SULFATE 4 MG: 8 INJECTION, SOLUTION INTRAMUSCULAR; INTRAVENOUS at 01:43

## 2017-11-27 RX ADMIN — OXYCODONE HYDROCHLORIDE AND ACETAMINOPHEN 1 TABLET: 5; 325 TABLET ORAL at 17:59

## 2017-11-27 RX ADMIN — ASPIRIN 81 MG: 81 TABLET, COATED ORAL at 09:19

## 2017-11-27 RX ADMIN — OXYCODONE HYDROCHLORIDE AND ACETAMINOPHEN 1 TABLET: 5; 325 TABLET ORAL at 11:47

## 2017-11-27 RX ADMIN — MULTIPLE VITAMINS W/ MINERALS TAB 1 TABLET: TAB at 09:19

## 2017-11-27 RX ADMIN — METOPROLOL TARTRATE 25 MG: 25 TABLET ORAL at 21:25

## 2017-11-27 RX ADMIN — PANTOPRAZOLE SODIUM 40 MG: 40 TABLET, DELAYED RELEASE ORAL at 09:19

## 2017-11-27 RX ADMIN — ATORVASTATIN CALCIUM 20 MG: 20 TABLET, FILM COATED ORAL at 09:19

## 2017-11-27 RX ADMIN — CIPROFLOXACIN AND DEXAMETHASONE 4 DROP: 3; 1 SUSPENSION/ DROPS AURICULAR (OTIC) at 16:53

## 2017-11-27 RX ADMIN — HYDRALAZINE HYDROCHLORIDE 50 MG: 50 TABLET, FILM COATED ORAL at 21:21

## 2017-11-27 RX ADMIN — ENOXAPARIN SODIUM 30 MG: 30 INJECTION SUBCUTANEOUS at 21:26

## 2017-11-27 RX ADMIN — ENOXAPARIN SODIUM 30 MG: 30 INJECTION SUBCUTANEOUS at 09:19

## 2017-11-27 RX ADMIN — METOPROLOL TARTRATE 25 MG: 25 TABLET ORAL at 09:19

## 2017-11-27 RX ADMIN — ONDANSETRON 4 MG: 2 INJECTION INTRAMUSCULAR; INTRAVENOUS at 09:36

## 2017-11-27 RX ADMIN — CLINDAMYCIN PHOSPHATE 300 MG: 150 INJECTION, SOLUTION, CONCENTRATE INTRAVENOUS at 11:35

## 2017-11-27 RX ADMIN — OXYCODONE HYDROCHLORIDE AND ACETAMINOPHEN 1 TABLET: 5; 325 TABLET ORAL at 21:21

## 2017-11-27 RX ADMIN — Medication 2 PUFF: at 17:53

## 2017-11-27 RX ADMIN — CLONIDINE HYDROCHLORIDE 0.2 MG: 0.1 TABLET ORAL at 09:19

## 2017-11-27 RX ADMIN — GUAIFENESIN 600 MG: 600 TABLET, EXTENDED RELEASE ORAL at 21:21

## 2017-11-27 RX ADMIN — DULOXETINE HYDROCHLORIDE 60 MG: 30 CAPSULE, DELAYED RELEASE ORAL at 09:19

## 2017-11-27 RX ADMIN — NEOMYCIN SULFATE, POLYMYXIN B SULFATE AND HYDROCORTISONE 3 DROP: 3.5; 10000; 1 SUSPENSION AURICULAR (OTIC) at 09:20

## 2017-11-27 ASSESSMENT — PAIN DESCRIPTION - DESCRIPTORS
DESCRIPTORS: THROBBING
DESCRIPTORS: THROBBING

## 2017-11-27 ASSESSMENT — PAIN DESCRIPTION - ORIENTATION
ORIENTATION: RIGHT;LEFT
ORIENTATION: RIGHT

## 2017-11-27 ASSESSMENT — PAIN SCALES - GENERAL
PAINLEVEL_OUTOF10: 4
PAINLEVEL_OUTOF10: 7
PAINLEVEL_OUTOF10: 10
PAINLEVEL_OUTOF10: 5
PAINLEVEL_OUTOF10: 0
PAINLEVEL_OUTOF10: 5
PAINLEVEL_OUTOF10: 7
PAINLEVEL_OUTOF10: 9
PAINLEVEL_OUTOF10: 5
PAINLEVEL_OUTOF10: 6
PAINLEVEL_OUTOF10: 7

## 2017-11-27 ASSESSMENT — PAIN DESCRIPTION - ONSET: ONSET: ON-GOING

## 2017-11-27 ASSESSMENT — PAIN DESCRIPTION - LOCATION
LOCATION: EAR
LOCATION: HEAD;EAR

## 2017-11-27 ASSESSMENT — PAIN DESCRIPTION - PROGRESSION: CLINICAL_PROGRESSION: NOT CHANGED

## 2017-11-27 ASSESSMENT — PAIN DESCRIPTION - PAIN TYPE
TYPE: ACUTE PAIN
TYPE: ACUTE PAIN

## 2017-11-27 ASSESSMENT — PAIN DESCRIPTION - FREQUENCY
FREQUENCY: CONTINUOUS
FREQUENCY: CONTINUOUS

## 2017-11-27 ASSESSMENT — PAIN DESCRIPTION - DIRECTION: RADIATING_TOWARDS: ENTIRE HEAD
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
## 2017-11-27 NOTE — PROGRESS NOTES
Occupational Therapy    Date: 2017  Patient Name: Belva Hashimoto        : 1965        pt indep adls, ambulation, no OT needs ID        Jered Toth OTR/L Date: 2017 11:00

## 2017-11-27 NOTE — PLAN OF CARE
Problem: Falls - Risk of  Goal: Absence of falls  Outcome: Ongoing  Patient up and about per self gait steady    Problem: Pain:  Goal: Pain level will decrease  Pain level will decrease   Outcome: Ongoing  Patient medicated for pain as ordered with  Moderate effectivenss
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
## 2017-11-28 VITALS
BODY MASS INDEX: 33.33 KG/M2 | HEART RATE: 62 BPM | OXYGEN SATURATION: 96 % | RESPIRATION RATE: 14 BRPM | WEIGHT: 225 LBS | DIASTOLIC BLOOD PRESSURE: 71 MMHG | TEMPERATURE: 97.7 F | SYSTOLIC BLOOD PRESSURE: 126 MMHG | HEIGHT: 69 IN

## 2017-11-28 LAB
EKG ATRIAL RATE: 65 BPM
EKG P AXIS: 30 DEGREES
EKG P-R INTERVAL: 168 MS
EKG Q-T INTERVAL: 432 MS
EKG QRS DURATION: 96 MS
EKG QTC CALCULATION (BAZETT): 449 MS
EKG R AXIS: -25 DEGREES
EKG T AXIS: 78 DEGREES
EKG VENTRICULAR RATE: 65 BPM

## 2017-11-28 PROCEDURE — 6370000000 HC RX 637 (ALT 250 FOR IP): Performed by: INTERNAL MEDICINE

## 2017-11-28 PROCEDURE — 6370000000 HC RX 637 (ALT 250 FOR IP): Performed by: HOSPITALIST

## 2017-11-28 PROCEDURE — 6370000000 HC RX 637 (ALT 250 FOR IP): Performed by: RADIOLOGY

## 2017-11-28 PROCEDURE — 2500000003 HC RX 250 WO HCPCS: Performed by: HOSPITALIST

## 2017-11-28 PROCEDURE — 2580000003 HC RX 258: Performed by: HOSPITALIST

## 2017-11-28 PROCEDURE — 99239 HOSP IP/OBS DSCHRG MGMT >30: CPT | Performed by: INTERNAL MEDICINE

## 2017-11-28 RX ORDER — CIPROFLOXACIN AND DEXAMETHASONE 3; 1 MG/ML; MG/ML
4 SUSPENSION/ DROPS AURICULAR (OTIC) 2 TIMES DAILY
Qty: 1 BOTTLE | Refills: 0 | Status: SHIPPED | OUTPATIENT
Start: 2017-11-28 | End: 2017-12-05

## 2017-11-28 RX ORDER — CLINDAMYCIN HYDROCHLORIDE 300 MG/1
300 CAPSULE ORAL 3 TIMES DAILY
Qty: 21 CAPSULE | Refills: 0 | Status: SHIPPED | OUTPATIENT
Start: 2017-11-28 | End: 2017-12-05

## 2017-11-28 RX ORDER — HYDROCODONE BITARTRATE AND ACETAMINOPHEN 5; 325 MG/1; MG/1
1 TABLET ORAL EVERY 8 HOURS PRN
Qty: 21 TABLET | Refills: 0 | Status: SHIPPED | OUTPATIENT
Start: 2017-11-28 | End: 2017-12-05

## 2017-11-28 RX ADMIN — ATORVASTATIN CALCIUM 20 MG: 20 TABLET, FILM COATED ORAL at 07:39

## 2017-11-28 RX ADMIN — GUAIFENESIN 600 MG: 600 TABLET, EXTENDED RELEASE ORAL at 07:39

## 2017-11-28 RX ADMIN — MULTIPLE VITAMINS W/ MINERALS TAB 1 TABLET: TAB at 07:39

## 2017-11-28 RX ADMIN — OXYCODONE HYDROCHLORIDE AND ACETAMINOPHEN 1 TABLET: 5; 325 TABLET ORAL at 07:38

## 2017-11-28 RX ADMIN — TAMSULOSIN HYDROCHLORIDE 0.4 MG: 0.4 CAPSULE ORAL at 07:39

## 2017-11-28 RX ADMIN — TIOTROPIUM BROMIDE 18 MCG: 18 CAPSULE ORAL; RESPIRATORY (INHALATION) at 07:38

## 2017-11-28 RX ADMIN — PANTOPRAZOLE SODIUM 40 MG: 40 TABLET, DELAYED RELEASE ORAL at 07:39

## 2017-11-28 RX ADMIN — HYDRALAZINE HYDROCHLORIDE 50 MG: 50 TABLET, FILM COATED ORAL at 07:39

## 2017-11-28 RX ADMIN — Medication 2 PUFF: at 07:40

## 2017-11-28 RX ADMIN — METOPROLOL TARTRATE 25 MG: 25 TABLET ORAL at 07:39

## 2017-11-28 RX ADMIN — Medication 2 PUFF: at 11:45

## 2017-11-28 RX ADMIN — CLINDAMYCIN PHOSPHATE 300 MG: 150 INJECTION, SOLUTION, CONCENTRATE INTRAVENOUS at 03:16

## 2017-11-28 RX ADMIN — CIPROFLOXACIN AND DEXAMETHASONE 4 DROP: 3; 1 SUSPENSION/ DROPS AURICULAR (OTIC) at 07:40

## 2017-11-28 RX ADMIN — CLONIDINE HYDROCHLORIDE 0.2 MG: 0.1 TABLET ORAL at 07:39

## 2017-11-28 RX ADMIN — DOCUSATE SODIUM 100 MG: 100 CAPSULE, LIQUID FILLED ORAL at 07:39

## 2017-11-28 RX ADMIN — DULOXETINE HYDROCHLORIDE 60 MG: 30 CAPSULE, DELAYED RELEASE ORAL at 07:39

## 2017-11-28 RX ADMIN — CLINDAMYCIN PHOSPHATE 300 MG: 150 INJECTION, SOLUTION, CONCENTRATE INTRAVENOUS at 11:45

## 2017-11-28 RX ADMIN — ASPIRIN 81 MG: 81 TABLET, COATED ORAL at 07:39

## 2017-11-28 ASSESSMENT — PAIN SCALES - GENERAL
PAINLEVEL_OUTOF10: 6
PAINLEVEL_OUTOF10: 7
PAINLEVEL_OUTOF10: 0
PAINLEVEL_OUTOF10: 6

## 2017-11-28 NOTE — DISCHARGE SUMMARY
[facility]    Patient name:  Cesar Ni  YOB: 1965  Primary Care Physician: Luan Hatch MD    Date of admission:  11/26/2017  3:42 PM  Date of discharge:11/28/2017       DISCHARGE DIAGNOSES     Principal Problem:    Mastoiditis of right side  Active Problems:    Essential hypertension    Unable to read or write    Cord compression [facility]) s/p decompression C5-6 CORPECTOMY; C4-7 FUSION 5/17/16    Cervical disc herniation    History of syncope    Mixed type COPD (chronic obstructive pulmonary disease) [address]    Hypertensive urgency    Coronary artery disease involving coronary bypass graft of [facility]      HOSPITAL COURSE      Pt admitted wit right sided ear and below ear pain  Found to have acute suppurpraive otitits media with no bone disturctino  perforatin of rig ttympani membrane  Clinically mastoidits  Treated with iv abx  Discharged on po  F/u [facility] ent and pcp    Consultants:  -dr Myranda Bird ent      Procedures: ct head    DISCHARGE MEDICATIONS        [facility]   Home Medication Instructions [id number]    Printed on:11/28/17 1137   Medication Information                      ammonium lactate (LAC-HYDRIN) 12 % lotion  Apply topically daily prn hands             atorvastatin (LIPITOR) 20 MG tablet  take 1 tablet by mouth once daily             ciprofloxacin-dexamethasone (CIPRODEX) 0.3-0.1 % otic suspension  Place 4 drops into the right ear 2 times daily for 7 days             clindamycin (CLEOCIN) 300 MG capsule  Take 1 capsule by mouth 3 times daily for 7 days             cloNIDine (CATAPRES) 0.2 MG tablet  take 1 tablet by mouth twice a day             docusate sodium (COLACE) 100 MG capsule  Take 1 capsule by mouth 2 times daily as needed for Constipation             DULoxetine (CYMBALTA) 60 MG extended release capsule  Take 1 capsule by mouth daily             hydrALAZINE Follow-up:   with Cathie Burton MD,    Discharge time spent on pt and paperworki more than MD TERA Melchor[address]    Phone [phone]   Fax: [phone]  Answering Service: [phone]

## 2017-11-28 NOTE — FLOWSHEET NOTE
11/28/17 1100   Encounter Summary   Services provided to: Patient   Referral/Consult From: Rounding   Continue Visiting (11/28/17)   Volunteer Visit Yes   Complexity of Encounter Low   Length of Encounter 15 minutes   Routine   Type Initial   Intervention (Visited by spiritual care volunteer)

## 2017-11-28 NOTE — CARE COORDINATION
ONGOING DISCHARGE PLAN:    Spoke with patient regarding discharge plan and patient confirms that plan is to return home today  Will d/c on home po  ABX, to follow with ENT    Will continue to follow for additional discharge needs.     Electronically signed by Osito Pryor RN on 11/28/2017 at 12:40 PM

## 2017-11-29 ENCOUNTER — TELEPHONE (OUTPATIENT)
Dept: FAMILY MEDICINE CLINIC | Age: 52
End: 2017-11-29

## 2017-11-29 NOTE — TELEPHONE ENCOUNTER
Voicemail left for patient to call the office to discuss a recent hospital stay and to schedule a Hospital follow up visit.

## 2017-12-11 ENCOUNTER — HOSPITAL ENCOUNTER (EMERGENCY)
Age: 52
Discharge: HOME | End: 2017-12-11
Attending: EMERGENCY MEDICINE
Payer: COMMERCIAL

## 2017-12-11 ENCOUNTER — APPOINTMENT (OUTPATIENT)
Dept: CT IMAGING | Age: 52
End: 2017-12-11
Payer: COMMERCIAL

## 2017-12-11 VITALS
HEART RATE: 74 BPM | TEMPERATURE: 97.8 F | SYSTOLIC BLOOD PRESSURE: 186 MMHG | BODY MASS INDEX: 33.23 KG/M2 | WEIGHT: 225 LBS | RESPIRATION RATE: 19 BRPM | OXYGEN SATURATION: 97 % | DIASTOLIC BLOOD PRESSURE: 94 MMHG

## 2017-12-11 DIAGNOSIS — R10.13 EPIGASTRIC PAIN: Primary | ICD-10-CM

## 2017-12-11 DIAGNOSIS — R19.7 DIARRHEA, UNSPECIFIED TYPE: ICD-10-CM

## 2017-12-11 LAB
-: ABNORMAL
ABSOLUTE EOS #: 0.2 K/UL (ref 0–0.4)
ABSOLUTE IMMATURE GRANULOCYTE: ABNORMAL K/UL (ref 0–0.3)
ABSOLUTE LYMPH #: 1.1 K/UL (ref 1–4.8)
ABSOLUTE MONO #: 0.4 K/UL (ref 0.1–1.3)
ALBUMIN SERPL-MCNC: 4.1 G/DL (ref 3.5–5.2)
ALBUMIN/GLOBULIN RATIO: NORMAL (ref 1–2.5)
ALP BLD-CCNC: 125 U/L (ref 40–129)
ALT SERPL-CCNC: 14 U/L (ref 5–41)
AMORPHOUS: ABNORMAL
ANION GAP SERPL CALCULATED.3IONS-SCNC: 9 MMOL/L (ref 9–17)
AST SERPL-CCNC: 22 U/L
BACTERIA: ABNORMAL
BASOPHILS # BLD: 1 % (ref 0–2)
BASOPHILS ABSOLUTE: 0.1 K/UL (ref 0–0.2)
BILIRUB SERPL-MCNC: 0.49 MG/DL (ref 0.3–1.2)
BILIRUBIN DIRECT: 0.13 MG/DL
BILIRUBIN URINE: NEGATIVE
BILIRUBIN, INDIRECT: 0.36 MG/DL (ref 0–1)
BUN BLDV-MCNC: 10 MG/DL (ref 6–20)
BUN/CREAT BLD: NORMAL (ref 9–20)
CALCIUM SERPL-MCNC: 9 MG/DL (ref 8.6–10.4)
CASTS UA: ABNORMAL /LPF
CHLORIDE BLD-SCNC: 100 MMOL/L (ref 98–107)
CO2: 29 MMOL/L (ref 20–31)
COLOR: YELLOW
COMMENT UA: ABNORMAL
CREAT SERPL-MCNC: 0.83 MG/DL (ref 0.7–1.2)
CRYSTALS, UA: ABNORMAL /HPF
DIFFERENTIAL TYPE: ABNORMAL
EOSINOPHILS RELATIVE PERCENT: 3 % (ref 0–4)
EPITHELIAL CELLS UA: ABNORMAL /HPF
GFR AFRICAN AMERICAN: >60 ML/MIN
GFR NON-AFRICAN AMERICAN: >60 ML/MIN
GFR SERPL CREATININE-BSD FRML MDRD: NORMAL ML/MIN/{1.73_M2}
GFR SERPL CREATININE-BSD FRML MDRD: NORMAL ML/MIN/{1.73_M2}
GLOBULIN: NORMAL G/DL (ref 1.5–3.8)
GLUCOSE BLD-MCNC: 94 MG/DL (ref 70–99)
GLUCOSE URINE: NEGATIVE
HCT VFR BLD CALC: 45.9 % (ref 41–53)
HEMOGLOBIN: 15.5 G/DL (ref 13.5–17.5)
IMMATURE GRANULOCYTES: ABNORMAL %
KETONES, URINE: NEGATIVE
LACTIC ACID: 1.1 MMOL/L (ref 0.5–2.2)
LEUKOCYTE ESTERASE, URINE: NEGATIVE
LIPASE: 31 U/L (ref 13–60)
LYMPHOCYTES # BLD: 15 % (ref 24–44)
MCH RBC QN AUTO: 28.7 PG (ref 26–34)
MCHC RBC AUTO-ENTMCNC: 33.7 G/DL (ref 31–37)
MCV RBC AUTO: 85.2 FL (ref 80–100)
MONOCYTES # BLD: 5 % (ref 1–7)
MUCUS: ABNORMAL
NITRITE, URINE: NEGATIVE
OTHER OBSERVATIONS UA: ABNORMAL
PDW BLD-RTO: 15.8 % (ref 11.5–14.9)
PH UA: 7 (ref 5–8)
PLATELET # BLD: 152 K/UL (ref 150–450)
PLATELET ESTIMATE: ABNORMAL
PMV BLD AUTO: 9.8 FL (ref 6–12)
POTASSIUM SERPL-SCNC: 4.2 MMOL/L (ref 3.7–5.3)
PROTEIN UA: NEGATIVE
RBC # BLD: 5.39 M/UL (ref 4.5–5.9)
RBC # BLD: ABNORMAL 10*6/UL
RBC UA: ABNORMAL /HPF
RENAL EPITHELIAL, UA: ABNORMAL /HPF
SEG NEUTROPHILS: 76 % (ref 36–66)
SEGMENTED NEUTROPHILS ABSOLUTE COUNT: 5.7 K/UL (ref 1.3–9.1)
SODIUM BLD-SCNC: 138 MMOL/L (ref 135–144)
SPECIFIC GRAVITY UA: 1.01 (ref 1–1.03)
TOTAL PROTEIN: 7.1 G/DL (ref 6.4–8.3)
TRICHOMONAS: ABNORMAL
TURBIDITY: CLEAR
URINE HGB: NEGATIVE
UROBILINOGEN, URINE: NORMAL
WBC # BLD: 7.6 K/UL (ref 3.5–11)
WBC # BLD: ABNORMAL 10*3/UL
WBC UA: ABNORMAL /HPF
YEAST: ABNORMAL

## 2017-12-11 PROCEDURE — 81001 URINALYSIS AUTO W/SCOPE: CPT

## 2017-12-11 PROCEDURE — 80076 HEPATIC FUNCTION PANEL: CPT

## 2017-12-11 PROCEDURE — 74177 CT ABD & PELVIS W/CONTRAST: CPT

## 2017-12-11 PROCEDURE — 80048 BASIC METABOLIC PNL TOTAL CA: CPT

## 2017-12-11 PROCEDURE — 2580000003 HC RX 258: Performed by: EMERGENCY MEDICINE

## 2017-12-11 PROCEDURE — 83605 ASSAY OF LACTIC ACID: CPT

## 2017-12-11 PROCEDURE — 6360000002 HC RX W HCPCS: Performed by: EMERGENCY MEDICINE

## 2017-12-11 PROCEDURE — 36415 COLL VENOUS BLD VENIPUNCTURE: CPT

## 2017-12-11 PROCEDURE — 6370000000 HC RX 637 (ALT 250 FOR IP): Performed by: EMERGENCY MEDICINE

## 2017-12-11 PROCEDURE — 6360000004 HC RX CONTRAST MEDICATION: Performed by: EMERGENCY MEDICINE

## 2017-12-11 PROCEDURE — 99285 EMERGENCY DEPT VISIT HI MDM: CPT

## 2017-12-11 PROCEDURE — 85025 COMPLETE CBC W/AUTO DIFF WBC: CPT

## 2017-12-11 PROCEDURE — 83690 ASSAY OF LIPASE: CPT

## 2017-12-11 RX ORDER — 0.9 % SODIUM CHLORIDE 0.9 %
100 INTRAVENOUS SOLUTION INTRAVENOUS ONCE
Status: COMPLETED | OUTPATIENT
Start: 2017-12-11 | End: 2017-12-11

## 2017-12-11 RX ORDER — ONDANSETRON 4 MG/1
4 TABLET, FILM COATED ORAL ONCE
Status: COMPLETED | OUTPATIENT
Start: 2017-12-11 | End: 2017-12-11

## 2017-12-11 RX ORDER — METOPROLOL TARTRATE 50 MG/1
25 TABLET, FILM COATED ORAL ONCE
Status: COMPLETED | OUTPATIENT
Start: 2017-12-11 | End: 2017-12-11

## 2017-12-11 RX ORDER — FAMOTIDINE 20 MG/1
20 TABLET, FILM COATED ORAL 2 TIMES DAILY
Qty: 30 TABLET | Refills: 0 | Status: SHIPPED | OUTPATIENT
Start: 2017-12-11 | End: 2018-07-26

## 2017-12-11 RX ORDER — HYDRALAZINE HYDROCHLORIDE 50 MG/1
50 TABLET, FILM COATED ORAL ONCE
Status: COMPLETED | OUTPATIENT
Start: 2017-12-11 | End: 2017-12-11

## 2017-12-11 RX ORDER — SODIUM CHLORIDE 0.9 % (FLUSH) 0.9 %
10 SYRINGE (ML) INJECTION PRN
Status: DISCONTINUED | OUTPATIENT
Start: 2017-12-11 | End: 2017-12-12 | Stop reason: HOSPADM

## 2017-12-11 RX ADMIN — Medication 10 ML: at 21:48

## 2017-12-11 RX ADMIN — SODIUM CHLORIDE 100 ML: 9 INJECTION, SOLUTION INTRAVENOUS at 21:48

## 2017-12-11 RX ADMIN — IOPAMIDOL 100 ML: 755 INJECTION, SOLUTION INTRAVENOUS at 21:48

## 2017-12-11 RX ADMIN — HYDRALAZINE HYDROCHLORIDE 50 MG: 50 TABLET, FILM COATED ORAL at 21:10

## 2017-12-11 RX ADMIN — ONDANSETRON HYDROCHLORIDE 4 MG: 4 TABLET, FILM COATED ORAL at 21:10

## 2017-12-11 RX ADMIN — METOPROLOL TARTRATE 25 MG: 50 TABLET ORAL at 21:10

## 2017-12-11 ASSESSMENT — ENCOUNTER SYMPTOMS
DIARRHEA: 1
VOMITING: 0
SORE THROAT: 0
SHORTNESS OF BREATH: 0
ABDOMINAL PAIN: 1
COUGH: 0
NAUSEA: 1

## 2017-12-11 ASSESSMENT — PAIN SCALES - GENERAL: PAINLEVEL_OUTOF10: 9

## 2017-12-11 ASSESSMENT — PAIN DESCRIPTION - PAIN TYPE: TYPE: ACUTE PAIN

## 2017-12-11 ASSESSMENT — PAIN DESCRIPTION - LOCATION: LOCATION: ABDOMEN;HEAD
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
## 2017-12-12 NOTE — ED PROVIDER NOTES
[address]  eMERGENCY dEPARTMENT eNCOUnter   Attending Attestation     Pt Name: Varghese Gonzalez  MRN: [id number]  [place] 1965  Date of evaluation: 12/11/17       Varghese Gonzalez is a 45 y.o. male who presents with Shortness of Breath; Diarrhea; and Abdominal Pain      History:   Patient is here complaining of acute onset of epigastric abdominal pain about 2 hours ago. Patient denies radiation of the pain but does state his whole stomach hurts but mostly in the epigastrium. Patient states been very nauseous but no vomiting. Patient has had diarrhea but no blood in it. Patient also states she's been having some dysuria. Patient denies any chest pain or change in his normal shortness of breath. Patient is had no fevers. Exam: Vitals:   Vitals:    12/11/17 2006   BP: (!) 199/107   Pulse: 74   Resp: 19   Temp: 97.8 °F (36.6 °C)   TempSrc: Oral   SpO2: 97%   Weight: 225 lb (102.1 kg)     Heart regular rate rhythm no murmurs. Lungs diminished but clear bilaterally. Abdomen soft nondistended with some mild tenderness palpation in the epigastrium no peritoneal signs. I performed a history and physical examination of the patient and discussed management with the resident. I reviewed the residents note and agree with the documented findings and plan of care. Any areas of disagreement are noted on the chart. I was personally present for the key portions of any procedures. I have documented in the chart those procedures where I was not present during the key portions. I have personally reviewed all images and agree with the resident's interpretation. I have reviewed the emergency nurses triage note. I agree with the chief complaint, past medical history, past surgical history, allergies, medications, social and family history as documented unless otherwise noted below.  Documentation of the HPI, Physical Exam and Medical Decision Making performed by medical students or scribes is based on my
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
## 2018-01-11 DIAGNOSIS — K21.9 GASTROESOPHAGEAL REFLUX DISEASE, ESOPHAGITIS PRESENCE NOT SPECIFIED: ICD-10-CM

## 2018-01-11 RX ORDER — PANTOPRAZOLE SODIUM 40 MG/1
TABLET, DELAYED RELEASE ORAL
Qty: 90 TABLET | Refills: 3 | Status: SHIPPED | OUTPATIENT
Start: 2018-01-11 | End: 2018-02-15 | Stop reason: SDUPTHER

## 2018-02-09 ENCOUNTER — HOSPITAL ENCOUNTER (EMERGENCY)
Age: 53
Discharge: HOME | End: 2018-02-09
Attending: EMERGENCY MEDICINE
Payer: COMMERCIAL

## 2018-02-09 ENCOUNTER — APPOINTMENT (OUTPATIENT)
Dept: GENERAL RADIOLOGY | Age: 53
End: 2018-02-09
Payer: COMMERCIAL

## 2018-02-09 VITALS
WEIGHT: 230 LBS | HEART RATE: 64 BPM | RESPIRATION RATE: 16 BRPM | OXYGEN SATURATION: 96 % | SYSTOLIC BLOOD PRESSURE: 155 MMHG | TEMPERATURE: 98 F | BODY MASS INDEX: 34.07 KG/M2 | HEIGHT: 69 IN | DIASTOLIC BLOOD PRESSURE: 82 MMHG

## 2018-02-09 DIAGNOSIS — M79.89 LEG SWELLING: ICD-10-CM

## 2018-02-09 DIAGNOSIS — M79.672 LEFT FOOT PAIN: Primary | ICD-10-CM

## 2018-02-09 LAB
ABSOLUTE EOS #: 0.4 K/UL (ref 0–0.4)
ABSOLUTE IMMATURE GRANULOCYTE: ABNORMAL K/UL (ref 0–0.3)
ABSOLUTE LYMPH #: 1.4 K/UL (ref 1–4.8)
ABSOLUTE MONO #: 0.4 K/UL (ref 0.1–1.3)
ALBUMIN SERPL-MCNC: 3.8 G/DL (ref 3.5–5.2)
ALBUMIN/GLOBULIN RATIO: ABNORMAL (ref 1–2.5)
ALP BLD-CCNC: 119 U/L (ref 40–129)
ALT SERPL-CCNC: 12 U/L (ref 5–41)
ANION GAP SERPL CALCULATED.3IONS-SCNC: 10 MMOL/L (ref 9–17)
AST SERPL-CCNC: 19 U/L
BASOPHILS # BLD: 1 % (ref 0–2)
BASOPHILS ABSOLUTE: 0.1 K/UL (ref 0–0.2)
BILIRUB SERPL-MCNC: 0.54 MG/DL (ref 0.3–1.2)
BNP INTERPRETATION: ABNORMAL
BUN BLDV-MCNC: 9 MG/DL (ref 6–20)
BUN/CREAT BLD: ABNORMAL (ref 9–20)
CALCIUM SERPL-MCNC: 9 MG/DL (ref 8.6–10.4)
CHLORIDE BLD-SCNC: 104 MMOL/L (ref 98–107)
CO2: 27 MMOL/L (ref 20–31)
CREAT SERPL-MCNC: 0.84 MG/DL (ref 0.7–1.2)
DIFFERENTIAL TYPE: ABNORMAL
EOSINOPHILS RELATIVE PERCENT: 6 % (ref 0–4)
GFR AFRICAN AMERICAN: >60 ML/MIN
GFR NON-AFRICAN AMERICAN: >60 ML/MIN
GFR SERPL CREATININE-BSD FRML MDRD: ABNORMAL ML/MIN/{1.73_M2}
GFR SERPL CREATININE-BSD FRML MDRD: ABNORMAL ML/MIN/{1.73_M2}
GLUCOSE BLD-MCNC: 102 MG/DL (ref 70–99)
HCT VFR BLD CALC: 45.8 % (ref 41–53)
HEMOGLOBIN: 15.5 G/DL (ref 13.5–17.5)
IMMATURE GRANULOCYTES: ABNORMAL %
LYMPHOCYTES # BLD: 23 % (ref 24–44)
MCH RBC QN AUTO: 29.2 PG (ref 26–34)
MCHC RBC AUTO-ENTMCNC: 33.8 G/DL (ref 31–37)
MCV RBC AUTO: 86.3 FL (ref 80–100)
MONOCYTES # BLD: 6 % (ref 1–7)
NRBC AUTOMATED: ABNORMAL PER 100 WBC
PDW BLD-RTO: 15.8 % (ref 11.5–14.9)
PLATELET # BLD: 116 K/UL (ref 150–450)
PLATELET ESTIMATE: ABNORMAL
PMV BLD AUTO: 10.2 FL (ref 6–12)
POTASSIUM SERPL-SCNC: 4.1 MMOL/L (ref 3.7–5.3)
PRO-BNP: 353 PG/ML
RBC # BLD: 5.31 M/UL (ref 4.5–5.9)
RBC # BLD: ABNORMAL 10*6/UL
SEG NEUTROPHILS: 64 % (ref 36–66)
SEGMENTED NEUTROPHILS ABSOLUTE COUNT: 4.1 K/UL (ref 1.3–9.1)
SODIUM BLD-SCNC: 141 MMOL/L (ref 135–144)
TOTAL PROTEIN: 6.9 G/DL (ref 6.4–8.3)
WBC # BLD: 6.3 K/UL (ref 3.5–11)
WBC # BLD: ABNORMAL 10*3/UL

## 2018-02-09 PROCEDURE — 6370000000 HC RX 637 (ALT 250 FOR IP): Performed by: EMERGENCY MEDICINE

## 2018-02-09 PROCEDURE — 85025 COMPLETE CBC W/AUTO DIFF WBC: CPT

## 2018-02-09 PROCEDURE — 36415 COLL VENOUS BLD VENIPUNCTURE: CPT

## 2018-02-09 PROCEDURE — 99284 EMERGENCY DEPT VISIT MOD MDM: CPT

## 2018-02-09 PROCEDURE — 71046 X-RAY EXAM CHEST 2 VIEWS: CPT

## 2018-02-09 PROCEDURE — 83880 ASSAY OF NATRIURETIC PEPTIDE: CPT

## 2018-02-09 PROCEDURE — 80053 COMPREHEN METABOLIC PANEL: CPT

## 2018-02-09 RX ORDER — ACETAMINOPHEN 500 MG
1000 TABLET ORAL ONCE
Status: COMPLETED | OUTPATIENT
Start: 2018-02-09 | End: 2018-02-09

## 2018-02-09 RX ADMIN — ACETAMINOPHEN 1000 MG: 500 TABLET ORAL at 22:51

## 2018-02-09 ASSESSMENT — PAIN DESCRIPTION - DESCRIPTORS
DESCRIPTORS_2: PINS AND NEEDLES
DESCRIPTORS: ACHING

## 2018-02-09 ASSESSMENT — ENCOUNTER SYMPTOMS
EYES NEGATIVE: 1
RESPIRATORY NEGATIVE: 1
ALLERGIC/IMMUNOLOGIC NEGATIVE: 1
GASTROINTESTINAL NEGATIVE: 1

## 2018-02-09 ASSESSMENT — PAIN DESCRIPTION - LOCATION
LOCATION: HEAD
LOCATION_2: FOOT

## 2018-02-09 ASSESSMENT — PAIN DESCRIPTION - FREQUENCY: FREQUENCY: CONTINUOUS

## 2018-02-09 ASSESSMENT — PAIN DESCRIPTION - PAIN TYPE: TYPE: ACUTE PAIN

## 2018-02-09 ASSESSMENT — PAIN DESCRIPTION - DURATION: DURATION_2: CONTINUOUS

## 2018-02-09 ASSESSMENT — PAIN SCALES - GENERAL
PAINLEVEL_OUTOF10: 8
PAINLEVEL_OUTOF10: 7

## 2018-02-09 ASSESSMENT — PAIN DESCRIPTION - ORIENTATION: ORIENTATION_2: RIGHT;LEFT
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
## 2018-02-15 ENCOUNTER — TELEPHONE (OUTPATIENT)
Dept: INTERNAL MEDICINE CLINIC | Age: 53
End: 2018-02-15

## 2018-02-15 ENCOUNTER — OFFICE VISIT (OUTPATIENT)
Dept: INTERNAL MEDICINE CLINIC | Age: 53
End: 2018-02-15
Payer: COMMERCIAL

## 2018-02-15 VITALS
BODY MASS INDEX: 34.96 KG/M2 | DIASTOLIC BLOOD PRESSURE: 94 MMHG | SYSTOLIC BLOOD PRESSURE: 158 MMHG | WEIGHT: 236 LBS | HEIGHT: 69 IN

## 2018-02-15 DIAGNOSIS — F17.200 SMOKER: ICD-10-CM

## 2018-02-15 DIAGNOSIS — K21.00 GASTROESOPHAGEAL REFLUX DISEASE WITH ESOPHAGITIS: ICD-10-CM

## 2018-02-15 DIAGNOSIS — J32.0 CHRONIC MAXILLARY SINUSITIS: ICD-10-CM

## 2018-02-15 DIAGNOSIS — K21.9 GASTROESOPHAGEAL REFLUX DISEASE, ESOPHAGITIS PRESENCE NOT SPECIFIED: ICD-10-CM

## 2018-02-15 DIAGNOSIS — G95.20 CORD COMPRESSION (HCC): ICD-10-CM

## 2018-02-15 DIAGNOSIS — F32.A DEPRESSION, UNSPECIFIED DEPRESSION TYPE: ICD-10-CM

## 2018-02-15 DIAGNOSIS — R73.03 PREDIABETES: Primary | ICD-10-CM

## 2018-02-15 DIAGNOSIS — Z12.11 COLON CANCER SCREENING: ICD-10-CM

## 2018-02-15 DIAGNOSIS — I50.32 CHRONIC DIASTOLIC CONGESTIVE HEART FAILURE (HCC): ICD-10-CM

## 2018-02-15 DIAGNOSIS — I25.810 CORONARY ARTERY DISEASE INVOLVING CORONARY BYPASS GRAFT OF NATIVE HEART WITHOUT ANGINA PECTORIS: ICD-10-CM

## 2018-02-15 DIAGNOSIS — R60.0 PEDAL EDEMA: ICD-10-CM

## 2018-02-15 DIAGNOSIS — I10 ESSENTIAL HYPERTENSION: ICD-10-CM

## 2018-02-15 DIAGNOSIS — Z91.14 POOR COMPLIANCE WITH MEDICATION: Primary | ICD-10-CM

## 2018-02-15 PROCEDURE — G8484 FLU IMMUNIZE NO ADMIN: HCPCS | Performed by: INTERNAL MEDICINE

## 2018-02-15 PROCEDURE — 3017F COLORECTAL CA SCREEN DOC REV: CPT | Performed by: INTERNAL MEDICINE

## 2018-02-15 PROCEDURE — 4004F PT TOBACCO SCREEN RCVD TLK: CPT | Performed by: INTERNAL MEDICINE

## 2018-02-15 PROCEDURE — G8417 CALC BMI ABV UP PARAM F/U: HCPCS | Performed by: INTERNAL MEDICINE

## 2018-02-15 PROCEDURE — G8427 DOCREV CUR MEDS BY ELIG CLIN: HCPCS | Performed by: INTERNAL MEDICINE

## 2018-02-15 PROCEDURE — 99215 OFFICE O/P EST HI 40 MIN: CPT | Performed by: INTERNAL MEDICINE

## 2018-02-15 PROCEDURE — G8598 ASA/ANTIPLAT THER USED: HCPCS | Performed by: INTERNAL MEDICINE

## 2018-02-15 RX ORDER — FLUTICASONE PROPIONATE 50 MCG
1 SPRAY, SUSPENSION (ML) NASAL DAILY
Qty: 1 BOTTLE | Refills: 3 | Status: SHIPPED | OUTPATIENT
Start: 2018-02-15 | End: 2018-07-26

## 2018-02-15 RX ORDER — FUROSEMIDE 20 MG/1
20 TABLET ORAL DAILY
Qty: 60 TABLET | Refills: 3 | Status: SHIPPED | OUTPATIENT
Start: 2018-02-15 | End: 2018-03-23 | Stop reason: SDUPTHER

## 2018-02-15 RX ORDER — PANTOPRAZOLE SODIUM 40 MG/1
40 TABLET, DELAYED RELEASE ORAL DAILY
Qty: 90 TABLET | Refills: 3 | Status: SHIPPED | OUTPATIENT
Start: 2018-02-15 | End: 2019-02-21 | Stop reason: SDUPTHER

## 2018-02-15 RX ORDER — SODIUM CHLORIDE 0.65 %
AEROSOL, SPRAY (ML) NASAL
Refills: 0 | COMMUNITY
Start: 2018-02-07 | End: 2018-03-23 | Stop reason: SDUPTHER

## 2018-02-15 NOTE — PROGRESS NOTES
MEDICATION:      Current Outpatient Prescriptions on File Prior to Visit   Medication Sig Dispense Refill    famotidine (PEPCID) 20 MG tablet Take 1 tablet by mouth 2 times daily 30 tablet 0    cloNIDine (CATAPRES) 0.2 MG tablet take 1 tablet by mouth twice a day 60 tablet 3    RA ASPIRIN EC 81 MG EC tablet take 1 tablet by mouth once daily 30 tablet 3    naproxen (NAPROXEN) 500 MG EC tablet Take 1 tablet by mouth 2 times daily (with meals) 60 tablet 3    hydrALAZINE (APRESOLINE) 50 MG tablet take 2 tablets by mouth twice a day 120 tablet 3    PROAIR  (90 Base) MCG/ACT inhaler inhale 2 puffs by mouth every 6 hours if needed for wheezing or shortness of breath 8.5 Inhaler 3    SPIRIVA RESPIMAT 1.25 MCG/ACT AERS inhaler inhale 2 puffs by mouth once daily 4 Inhaler 5    atorvastatin (LIPITOR) 20 MG tablet take 1 tablet by mouth once daily 30 tablet 11    MUCINEX 600 MG extended release tablet take 1 tablet by mouth twice a day 30 tablet 0    ibuprofen (ADVIL;MOTRIN) 800 MG tablet take 1 tablet by mouth every 8 hours if needed for pain 30 tablet 1    ondansetron (ZOFRAN) 4 MG tablet Take 1 tablet by mouth every 6 hours as needed for Nausea or Vomiting 10 tablet 0    Multiple Vitamin (MULTIVITAMIN) tablet take 1 tablet by mouth once daily 90 tablet 4    ammonium lactate (LAC-HYDRIN) 12 % lotion Apply topically daily prn hands 225 g 1    traZODone (DESYREL) 100 MG tablet take 1 tablet by mouth at bedtime if needed for sleep 30 tablet 1    hydrOXYzine (ATARAX) 25 MG tablet Take 1 tablet by mouth 3 times daily as needed for Itching or Anxiety 90 tablet 0    DULoxetine (CYMBALTA) 60 MG extended release capsule Take 1 capsule by mouth daily 30 capsule 0    docusate sodium (COLACE) 100 MG capsule Take 1 capsule by mouth 2 times daily as needed for Constipation 60 capsule 3    metoprolol tartrate (LOPRESSOR) 25 MG tablet Take 1 tablet by mouth 2 times daily 60 tablet 3    tamsulosin (FLOMAX) 0.4 MG time  Neck - supple, no significant adenopathy  Lymphatics - no palpable lymphadenopathy, no hepatosplenomegaly  Chest - clear to auscultation, no wheezes, rales or rhonchi, symmetric air entry  Heart - normal rate, regular rhythm, normal S1, S2, no murmurs, rubs, clicks or gallops  Abdomen - soft, nontender, nondistended, no masses or organomegaly  Back exam - full range of motion, no tenderness, palpable spasm or pain on motion  Neurological - alert, oriented, normal speech, no focal findings or movement disorder noted  Musculoskeletal - no joint tenderness, deformity or swelling  Extremities - peripheral pulses normal, Bilateral pedal edema Present , Left > Right side   Skin - normal coloration and turgor, no rashes, no suspicious skin lesions noted      LABORATORY FINDINGS:    CBC:   Lab Results   Component Value Date    WBC 6.3 02/09/2018    HGB 15.5 02/09/2018     02/09/2018     01/28/2012     BMP:    Lab Results   Component Value Date     02/09/2018    K 4.1 02/09/2018     02/09/2018    CO2 27 02/09/2018    BUN 9 02/09/2018    CREATININE 0.84 02/09/2018    GLUCOSE 102 02/09/2018    GLUCOSE 104 01/25/2012     Hemoglobin A1C:   Lab Results   Component Value Date    LABA1C 5.8 05/27/2016     Lipid profile:   Lab Results   Component Value Date    CHOL 151 03/10/2016    TRIG 64 03/10/2016    HDL 43 03/10/2016     Thyroid functions:   Lab Results   Component Value Date    TSH 1.46 03/10/2016      Hepatic functions:   Lab Results   Component Value Date    ALT 12 02/09/2018    AST 19 02/09/2018    PROT 6.9 02/09/2018    BILITOT 0.54 02/09/2018    BILIDIR 0.13 12/11/2017    LABALBU 3.8 02/09/2018       ASSESSMENT AND PLAN:     1. Prediabetes  - Hemoglobin A1C; Future    2. Colon cancer screening    - POCT Fecal Immunochemical Test (FIT); Future    3. Essential hypertension- uncontrolled , Patient is not taking his medications     4. Pedal edema    - US DOPPLER VENOUS LEG LEFT; Future    5.

## 2018-02-15 NOTE — TELEPHONE ENCOUNTER
Pt was seen by Dr. Lukas Ramos today & he stated pt had been set up for hm care w/ [facility] but moved & Dr. Lukas Ramos wanted to make sure that pts hm care services continued. Writer called [facility] & spoke w/ nurse & she stated that pt had been d/c'd from services some time ago because pt did not have a permanent residence. [facility] will need a new referral to start hm care back up again.     Please fax referral to # [phone]

## 2018-02-27 ENCOUNTER — HOSPITAL ENCOUNTER (OUTPATIENT)
Dept: NON INVASIVE DIAGNOSTICS | Age: 53
Discharge: HOME | End: 2018-02-27
Payer: COMMERCIAL

## 2018-02-27 ENCOUNTER — HOSPITAL ENCOUNTER (OUTPATIENT)
Dept: VASCULAR LAB | Age: 53
Discharge: HOME | End: 2018-02-27
Payer: COMMERCIAL

## 2018-02-27 ENCOUNTER — HOSPITAL ENCOUNTER (OUTPATIENT)
Age: 53
Discharge: HOME | End: 2018-02-27
Payer: COMMERCIAL

## 2018-02-27 DIAGNOSIS — R63.8 ABNORMAL CRAVING: ICD-10-CM

## 2018-02-27 DIAGNOSIS — R60.0 PEDAL EDEMA: ICD-10-CM

## 2018-02-27 DIAGNOSIS — Z87.891 EX-SMOKER: ICD-10-CM

## 2018-02-27 DIAGNOSIS — I50.32 CHRONIC DIASTOLIC CONGESTIVE HEART FAILURE (HCC): ICD-10-CM

## 2018-02-27 DIAGNOSIS — R73.03 PREDIABETES: ICD-10-CM

## 2018-02-27 DIAGNOSIS — I10 ESSENTIAL HYPERTENSION: ICD-10-CM

## 2018-02-27 DIAGNOSIS — R94.2 RESTRICTIVE PATTERN PRESENT ON PULMONARY FUNCTION TESTING: ICD-10-CM

## 2018-02-27 LAB
ESTIMATED AVERAGE GLUCOSE: 120 MG/DL
HBA1C MFR BLD: 5.8 % (ref 4–6)
LV EF: 55 %
LVEF MODALITY: NORMAL

## 2018-02-27 PROCEDURE — 93971 EXTREMITY STUDY: CPT

## 2018-02-27 PROCEDURE — 93306 TTE W/DOPPLER COMPLETE: CPT

## 2018-02-27 PROCEDURE — 83036 HEMOGLOBIN GLYCOSYLATED A1C: CPT

## 2018-02-27 PROCEDURE — 36415 COLL VENOUS BLD VENIPUNCTURE: CPT

## 2018-02-27 RX ORDER — SODIUM CHLORIDE 0.65 %
AEROSOL, SPRAY (ML) NASAL
Qty: 30 EACH | Refills: 0 | Status: SHIPPED | OUTPATIENT
Start: 2018-02-27 | End: 2018-07-26 | Stop reason: SDUPTHER

## 2018-02-27 RX ORDER — TIOTROPIUM BROMIDE INHALATION SPRAY 1.56 UG/1
SPRAY, METERED RESPIRATORY (INHALATION)
Qty: 4 G | Refills: 0 | Status: SHIPPED | OUTPATIENT
Start: 2018-02-27 | End: 2018-03-23 | Stop reason: SDUPTHER

## 2018-02-27 RX ORDER — HYDRALAZINE HYDROCHLORIDE 50 MG/1
TABLET, FILM COATED ORAL
Qty: 120 TABLET | Refills: 0 | Status: SHIPPED | OUTPATIENT
Start: 2018-02-27 | End: 2018-07-23 | Stop reason: SDUPTHER

## 2018-03-02 ENCOUNTER — TELEPHONE (OUTPATIENT)
Dept: INTERNAL MEDICINE CLINIC | Age: 53
End: 2018-03-02

## 2018-03-23 ENCOUNTER — OFFICE VISIT (OUTPATIENT)
Dept: INTERNAL MEDICINE CLINIC | Age: 53
End: 2018-03-23
Payer: COMMERCIAL

## 2018-03-23 VITALS
WEIGHT: 238 LBS | SYSTOLIC BLOOD PRESSURE: 110 MMHG | HEART RATE: 92 BPM | HEIGHT: 69 IN | DIASTOLIC BLOOD PRESSURE: 84 MMHG | BODY MASS INDEX: 35.25 KG/M2 | OXYGEN SATURATION: 94 %

## 2018-03-23 DIAGNOSIS — Z12.11 COLON CANCER SCREENING: ICD-10-CM

## 2018-03-23 DIAGNOSIS — M19.019 SHOULDER ARTHRITIS: ICD-10-CM

## 2018-03-23 DIAGNOSIS — R94.2 RESTRICTIVE PATTERN PRESENT ON PULMONARY FUNCTION TESTING: ICD-10-CM

## 2018-03-23 DIAGNOSIS — I50.32 CHRONIC DIASTOLIC CONGESTIVE HEART FAILURE (HCC): ICD-10-CM

## 2018-03-23 DIAGNOSIS — I25.83 CORONARY ARTERY DISEASE DUE TO LIPID RICH PLAQUE: ICD-10-CM

## 2018-03-23 DIAGNOSIS — R19.5 POSITIVE FIT (FECAL IMMUNOCHEMICAL TEST): Primary | ICD-10-CM

## 2018-03-23 DIAGNOSIS — J44.9 CHRONIC OBSTRUCTIVE PULMONARY DISEASE, UNSPECIFIED COPD TYPE (HCC): Primary | ICD-10-CM

## 2018-03-23 DIAGNOSIS — R60.0 PEDAL EDEMA: ICD-10-CM

## 2018-03-23 DIAGNOSIS — I25.10 CORONARY ARTERY DISEASE DUE TO LIPID RICH PLAQUE: ICD-10-CM

## 2018-03-23 LAB
CONTROL: PRESENT
HEMOCCULT STL QL: POSITIVE

## 2018-03-23 PROCEDURE — 3023F SPIROM DOC REV: CPT | Performed by: INTERNAL MEDICINE

## 2018-03-23 PROCEDURE — 99214 OFFICE O/P EST MOD 30 MIN: CPT | Performed by: INTERNAL MEDICINE

## 2018-03-23 PROCEDURE — G8484 FLU IMMUNIZE NO ADMIN: HCPCS | Performed by: INTERNAL MEDICINE

## 2018-03-23 PROCEDURE — 3017F COLORECTAL CA SCREEN DOC REV: CPT | Performed by: INTERNAL MEDICINE

## 2018-03-23 PROCEDURE — G8598 ASA/ANTIPLAT THER USED: HCPCS | Performed by: INTERNAL MEDICINE

## 2018-03-23 PROCEDURE — 4004F PT TOBACCO SCREEN RCVD TLK: CPT | Performed by: INTERNAL MEDICINE

## 2018-03-23 PROCEDURE — G8427 DOCREV CUR MEDS BY ELIG CLIN: HCPCS | Performed by: INTERNAL MEDICINE

## 2018-03-23 PROCEDURE — G8417 CALC BMI ABV UP PARAM F/U: HCPCS | Performed by: INTERNAL MEDICINE

## 2018-03-23 PROCEDURE — G8926 SPIRO NO PERF OR DOC: HCPCS | Performed by: INTERNAL MEDICINE

## 2018-03-23 PROCEDURE — 82274 ASSAY TEST FOR BLOOD FECAL: CPT | Performed by: FAMILY MEDICINE

## 2018-03-23 RX ORDER — LIDOCAINE 50 MG/G
1 PATCH TOPICAL DAILY
Qty: 30 PATCH | Refills: 0 | Status: SHIPPED | OUTPATIENT
Start: 2018-03-23 | End: 2018-07-26

## 2018-03-23 RX ORDER — SODIUM CHLORIDE 0.65 %
AEROSOL, SPRAY (ML) NASAL
Qty: 110 EACH | Refills: 3 | Status: SHIPPED | OUTPATIENT
Start: 2018-03-23 | End: 2018-11-30

## 2018-03-23 RX ORDER — FUROSEMIDE 40 MG/1
40 TABLET ORAL DAILY
Qty: 60 TABLET | Refills: 3 | Status: ON HOLD | OUTPATIENT
Start: 2018-03-23 | End: 2018-10-30 | Stop reason: HOSPADM

## 2018-03-23 RX ORDER — IBUPROFEN 800 MG/1
TABLET ORAL
Qty: 30 TABLET | Refills: 3 | Status: CANCELLED | OUTPATIENT
Start: 2018-03-23

## 2018-03-23 ASSESSMENT — PATIENT HEALTH QUESTIONNAIRE - PHQ9
1. LITTLE INTEREST OR PLEASURE IN DOING THINGS: 0
SUM OF ALL RESPONSES TO PHQ QUESTIONS 1-9: 0
2. FEELING DOWN, DEPRESSED OR HOPELESS: 0
SUM OF ALL RESPONSES TO PHQ9 QUESTIONS 1 & 2: 0

## 2018-03-23 NOTE — PROGRESS NOTES
Lidoderm patch. Health Maintenance Due   Topic Date Due    Shingles Vaccine (1 of 2 - 2 Dose Series) 04/04/2013    Colon cancer screen colonoscopy  04/04/2013         Allergies   Allergen Reactions    Morphine Itching         MEDICATIONS:      Current Outpatient Prescriptions   Medication Sig Dispense Refill    RA NICOTINE 2 MG gum chew 1 by mouth every 3 hours if needed for SMOKING CESSATION 110 each 3    tiotropium (SPIRIVA RESPIMAT) 1.25 MCG/ACT AERS inhaler inhale 2 puffs by mouth once daily 4 g 3    furosemide (LASIX) 40 MG tablet Take 1 tablet by mouth daily 60 tablet 3    lidocaine (LIDODERM) 5 % Place 1 patch onto the skin daily 12 hours on, 12 hours off.  30 patch 0    pantoprazole (PROTONIX) 40 MG tablet Take 1 tablet by mouth daily 90 tablet 3    fluticasone (FLONASE) 50 MCG/ACT nasal spray 1 spray by Nasal route daily 1 Bottle 3    famotidine (PEPCID) 20 MG tablet Take 1 tablet by mouth 2 times daily 30 tablet 0    cloNIDine (CATAPRES) 0.2 MG tablet take 1 tablet by mouth twice a day 60 tablet 3    RA ASPIRIN EC 81 MG EC tablet take 1 tablet by mouth once daily 30 tablet 3    naproxen (NAPROXEN) 500 MG EC tablet Take 1 tablet by mouth 2 times daily (with meals) 60 tablet 3    PROAIR  (90 Base) MCG/ACT inhaler inhale 2 puffs by mouth every 6 hours if needed for wheezing or shortness of breath 8.5 Inhaler 3    atorvastatin (LIPITOR) 20 MG tablet take 1 tablet by mouth once daily 30 tablet 11    MUCINEX 600 MG extended release tablet take 1 tablet by mouth twice a day 30 tablet 0    ibuprofen (ADVIL;MOTRIN) 800 MG tablet take 1 tablet by mouth every 8 hours if needed for pain 30 tablet 1    ondansetron (ZOFRAN) 4 MG tablet Take 1 tablet by mouth every 6 hours as needed for Nausea or Vomiting 10 tablet 0    Multiple Vitamin (MULTIVITAMIN) tablet take 1 tablet by mouth once daily 90 tablet 4    ammonium lactate (LAC-HYDRIN) 12 % lotion Apply topically daily prn hands 225 g (1.753 m)     BP Readings from Last 3 Encounters:   03/23/18 110/84   02/15/18 (!) 158/94   02/09/18 (!) 155/82        General appearance - alert, well appearing, and in no distress  Mental status - alert, oriented to person, place, and time  Mouth - mucous membranes moist, pharynx normal without lesions  Neck - supple, no significant adenopathy  Lymphatics - no palpable lymphadenopathy, no hepatosplenomegaly  Chest - clear to auscultation, no wheezes, rales or rhonchi, symmetric air entry  Heart - normal rate, regular rhythm, normal S1, S2, no murmurs, rubs, clicks or gallops  Abdomen - soft, nontender, nondistended, no masses or organomegaly  Back exam - Tenderness Present in Neck , Restricted Movement present   Neurological - alert, oriented, normal speech, no focal findings or movement disorder noted  Musculoskeletal - no joint tenderness, deformity or swelling  Extremities - peripheral pulses normal,Bilateral pedal edema present , no clubbing or cyanosis  Skin - normal coloration and turgor, no rashes, no suspicious skin lesions noted      LABORATORY FINDINGS:    CBC:  Lab Results   Component Value Date    WBC 6.3 02/09/2018    HGB 15.5 02/09/2018     02/09/2018     01/28/2012       BMP:    Lab Results   Component Value Date     02/09/2018    K 4.1 02/09/2018     02/09/2018    CO2 27 02/09/2018    BUN 9 02/09/2018    CREATININE 0.84 02/09/2018    GLUCOSE 102 02/09/2018    GLUCOSE 104 01/25/2012       HEMOGLOBIN A1C:   Lab Results   Component Value Date    LABA1C 5.8 02/27/2018       FASTING LIPID PANEL:  Lab Results   Component Value Date    CHOL 151 03/10/2016    HDL 43 03/10/2016    TRIG 64 03/10/2016       ASSESSMENT AND PLAN:      1. Shoulder arthritis  - lidocaine (LIDODERM) 5 %; Place 1 patch onto the skin daily 12 hours on, 12 hours off. Dispense: 30 patch; Refill: 0    2.  Restrictive pattern present on pulmonary function testing  - tiotropium (SPIRIVA RESPIMAT) 1.25 MCG/ACT AERS inhaler; inhale 2 puffs by mouth once daily  Dispense: 4 g; Refill: 3    3. Chronic diastolic congestive heart failure (HCC)  - furosemide (LASIX) 40 MG tablet; Take 1 tablet by mouth daily  Dispense: 60 tablet; Refill: 3    4. Chronic obstructive pulmonary disease, unspecified COPD type (HCC)    - RA NICOTINE 2 MG gum; chew 1 by mouth every 3 hours if needed for SMOKING CESSATION  Dispense: 110 each; Refill: 3  - FULL PFT STUDY WITH PRE AND POST; Future    5. Pedal edema    - Protein / Creatinine Ratio, Urine; Future    6. Coronary artery disease due to lipid rich plaque status post CABG, stable        FOLLOW UP AND INSTRUCTIONS:   · Return in about 3 months (around 6/23/2018). · Discussed use, benefit, and side effects of prescribed medications. Barriers to medication compliance addressed. All patient questions answered. Pt voiced understanding.      · Patient given educational materials - see patient instructions    MD TERA CampbellSaint [facility]  3/23/2018, 2:16 PM

## 2018-03-27 DIAGNOSIS — M19.019 SHOULDER ARTHRITIS: ICD-10-CM

## 2018-03-27 RX ORDER — IBUPROFEN 800 MG/1
TABLET ORAL
Qty: 30 TABLET | Refills: 1 | Status: SHIPPED | OUTPATIENT
Start: 2018-03-27 | End: 2019-12-09

## 2018-04-02 ENCOUNTER — TELEPHONE (OUTPATIENT)
Dept: INTERNAL MEDICINE CLINIC | Age: 53
End: 2018-04-02

## 2018-04-11 ENCOUNTER — PROCEDURE VISIT (OUTPATIENT)
Dept: INTERNAL MEDICINE CLINIC | Age: 53
End: 2018-04-11

## 2018-04-11 ENCOUNTER — TELEPHONE (OUTPATIENT)
Dept: INTERNAL MEDICINE CLINIC | Age: 53
End: 2018-04-11

## 2018-04-11 DIAGNOSIS — J44.9 CHRONIC OBSTRUCTIVE PULMONARY DISEASE, UNSPECIFIED COPD TYPE (HCC): ICD-10-CM

## 2018-05-31 ENCOUNTER — HOSPITAL ENCOUNTER (EMERGENCY)
Age: 53
Discharge: HOME | End: 2018-06-01
Attending: EMERGENCY MEDICINE
Payer: COMMERCIAL

## 2018-05-31 ENCOUNTER — APPOINTMENT (OUTPATIENT)
Dept: CT IMAGING | Age: 53
End: 2018-05-31
Payer: COMMERCIAL

## 2018-05-31 ENCOUNTER — APPOINTMENT (OUTPATIENT)
Dept: GENERAL RADIOLOGY | Age: 53
End: 2018-05-31
Payer: COMMERCIAL

## 2018-05-31 VITALS
SYSTOLIC BLOOD PRESSURE: 163 MMHG | OXYGEN SATURATION: 96 % | BODY MASS INDEX: 33.33 KG/M2 | HEART RATE: 69 BPM | DIASTOLIC BLOOD PRESSURE: 96 MMHG | HEIGHT: 69 IN | RESPIRATION RATE: 16 BRPM | WEIGHT: 225 LBS | TEMPERATURE: 98 F

## 2018-05-31 DIAGNOSIS — R55 SYNCOPE AND COLLAPSE: Primary | ICD-10-CM

## 2018-05-31 DIAGNOSIS — R51.9 HEADACHE, UNSPECIFIED HEADACHE TYPE: ICD-10-CM

## 2018-05-31 DIAGNOSIS — R60.9 PERIPHERAL EDEMA: ICD-10-CM

## 2018-05-31 LAB
ABSOLUTE EOS #: 0.3 K/UL (ref 0–0.4)
ABSOLUTE IMMATURE GRANULOCYTE: ABNORMAL K/UL (ref 0–0.3)
ABSOLUTE LYMPH #: 1.8 K/UL (ref 1–4.8)
ABSOLUTE MONO #: 0.6 K/UL (ref 0.1–1.3)
ANION GAP SERPL CALCULATED.3IONS-SCNC: 11 MMOL/L (ref 9–17)
BASOPHILS # BLD: 1 % (ref 0–2)
BASOPHILS ABSOLUTE: 0.1 K/UL (ref 0–0.2)
BNP INTERPRETATION: ABNORMAL
BUN BLDV-MCNC: 11 MG/DL (ref 6–20)
BUN/CREAT BLD: NORMAL (ref 9–20)
CALCIUM SERPL-MCNC: 9 MG/DL (ref 8.6–10.4)
CHLORIDE BLD-SCNC: 100 MMOL/L (ref 98–107)
CO2: 28 MMOL/L (ref 20–31)
CREAT SERPL-MCNC: 0.97 MG/DL (ref 0.7–1.2)
DIFFERENTIAL TYPE: ABNORMAL
EKG ATRIAL RATE: 65 BPM
EKG P AXIS: 17 DEGREES
EKG P-R INTERVAL: 162 MS
EKG Q-T INTERVAL: 418 MS
EKG QRS DURATION: 100 MS
EKG QTC CALCULATION (BAZETT): 434 MS
EKG R AXIS: -16 DEGREES
EKG T AXIS: 49 DEGREES
EKG VENTRICULAR RATE: 65 BPM
EOSINOPHILS RELATIVE PERCENT: 4 % (ref 0–4)
GFR AFRICAN AMERICAN: >60 ML/MIN
GFR NON-AFRICAN AMERICAN: >60 ML/MIN
GFR SERPL CREATININE-BSD FRML MDRD: NORMAL ML/MIN/{1.73_M2}
GFR SERPL CREATININE-BSD FRML MDRD: NORMAL ML/MIN/{1.73_M2}
GLUCOSE BLD-MCNC: 83 MG/DL (ref 70–99)
HCT VFR BLD CALC: 45.5 % (ref 41–53)
HEMOGLOBIN: 15.1 G/DL (ref 13.5–17.5)
IMMATURE GRANULOCYTES: ABNORMAL %
LYMPHOCYTES # BLD: 25 % (ref 24–44)
MCH RBC QN AUTO: 28.2 PG (ref 26–34)
MCHC RBC AUTO-ENTMCNC: 33.3 G/DL (ref 31–37)
MCV RBC AUTO: 84.8 FL (ref 80–100)
MONOCYTES # BLD: 9 % (ref 1–7)
NRBC AUTOMATED: ABNORMAL PER 100 WBC
PDW BLD-RTO: 15.1 % (ref 11.5–14.9)
PLATELET # BLD: 191 K/UL (ref 150–450)
PLATELET ESTIMATE: ABNORMAL
PMV BLD AUTO: 9.4 FL (ref 6–12)
POTASSIUM SERPL-SCNC: 3.7 MMOL/L (ref 3.7–5.3)
PRO-BNP: 387 PG/ML
RBC # BLD: 5.37 M/UL (ref 4.5–5.9)
RBC # BLD: ABNORMAL 10*6/UL
SEG NEUTROPHILS: 61 % (ref 36–66)
SEGMENTED NEUTROPHILS ABSOLUTE COUNT: 4.4 K/UL (ref 1.3–9.1)
SODIUM BLD-SCNC: 139 MMOL/L (ref 135–144)
TROPONIN INTERP: NORMAL
TROPONIN INTERP: NORMAL
TROPONIN T: <0.03 NG/ML
TROPONIN T: <0.03 NG/ML
WBC # BLD: 7.2 K/UL (ref 3.5–11)
WBC # BLD: ABNORMAL 10*3/UL

## 2018-05-31 PROCEDURE — 70450 CT HEAD/BRAIN W/O DYE: CPT

## 2018-05-31 PROCEDURE — 80048 BASIC METABOLIC PNL TOTAL CA: CPT

## 2018-05-31 PROCEDURE — 83880 ASSAY OF NATRIURETIC PEPTIDE: CPT

## 2018-05-31 PROCEDURE — 85025 COMPLETE CBC W/AUTO DIFF WBC: CPT

## 2018-05-31 PROCEDURE — 6360000002 HC RX W HCPCS: Performed by: EMERGENCY MEDICINE

## 2018-05-31 PROCEDURE — 93005 ELECTROCARDIOGRAM TRACING: CPT

## 2018-05-31 PROCEDURE — 96374 THER/PROPH/DIAG INJ IV PUSH: CPT

## 2018-05-31 PROCEDURE — 71046 X-RAY EXAM CHEST 2 VIEWS: CPT

## 2018-05-31 PROCEDURE — 96375 TX/PRO/DX INJ NEW DRUG ADDON: CPT

## 2018-05-31 PROCEDURE — 99284 EMERGENCY DEPT VISIT MOD MDM: CPT

## 2018-05-31 PROCEDURE — 36415 COLL VENOUS BLD VENIPUNCTURE: CPT

## 2018-05-31 PROCEDURE — 84484 ASSAY OF TROPONIN QUANT: CPT

## 2018-05-31 PROCEDURE — 93970 EXTREMITY STUDY: CPT

## 2018-05-31 RX ORDER — KETOROLAC TROMETHAMINE 30 MG/ML
30 INJECTION, SOLUTION INTRAMUSCULAR; INTRAVENOUS ONCE
Status: COMPLETED | OUTPATIENT
Start: 2018-05-31 | End: 2018-05-31

## 2018-05-31 RX ORDER — DIPHENHYDRAMINE HYDROCHLORIDE 50 MG/ML
25 INJECTION INTRAMUSCULAR; INTRAVENOUS ONCE
Status: COMPLETED | OUTPATIENT
Start: 2018-05-31 | End: 2018-05-31

## 2018-05-31 RX ORDER — MEDICAL SUPPLY, MISCELLANEOUS
1 EACH MISCELLANEOUS DAILY PRN
Qty: 3 EACH | Refills: 0 | Status: SHIPPED | OUTPATIENT
Start: 2018-05-31 | End: 2018-07-26

## 2018-05-31 RX ORDER — DEXAMETHASONE SODIUM PHOSPHATE 4 MG/ML
10 INJECTION, SOLUTION INTRA-ARTICULAR; INTRALESIONAL; INTRAMUSCULAR; INTRAVENOUS; SOFT TISSUE ONCE
Status: COMPLETED | OUTPATIENT
Start: 2018-05-31 | End: 2018-05-31

## 2018-05-31 RX ORDER — PROMETHAZINE HYDROCHLORIDE 25 MG/ML
12.5 INJECTION, SOLUTION INTRAMUSCULAR; INTRAVENOUS ONCE
Status: COMPLETED | OUTPATIENT
Start: 2018-05-31 | End: 2018-05-31

## 2018-05-31 RX ADMIN — PROMETHAZINE HYDROCHLORIDE 12.5 MG: 25 INJECTION INTRAMUSCULAR; INTRAVENOUS at 20:56

## 2018-05-31 RX ADMIN — KETOROLAC TROMETHAMINE 30 MG: 30 INJECTION, SOLUTION INTRAMUSCULAR at 20:57

## 2018-05-31 RX ADMIN — DEXAMETHASONE SODIUM PHOSPHATE 10 MG: 4 INJECTION, SOLUTION INTRAMUSCULAR; INTRAVENOUS at 20:57

## 2018-05-31 RX ADMIN — DIPHENHYDRAMINE HYDROCHLORIDE 25 MG: 50 INJECTION, SOLUTION INTRAMUSCULAR; INTRAVENOUS at 20:56

## 2018-05-31 ASSESSMENT — ENCOUNTER SYMPTOMS
DIARRHEA: 0
BACK PAIN: 0
SORE THROAT: 0
SHORTNESS OF BREATH: 0
VOMITING: 0
ABDOMINAL PAIN: 0
NAUSEA: 0
COUGH: 0
EYE PAIN: 0

## 2018-05-31 ASSESSMENT — PAIN DESCRIPTION - PAIN TYPE: TYPE: ACUTE PAIN

## 2018-05-31 ASSESSMENT — PAIN DESCRIPTION - ORIENTATION: ORIENTATION: RIGHT;LEFT

## 2018-05-31 ASSESSMENT — PAIN SCALES - GENERAL
PAINLEVEL_OUTOF10: 7
PAINLEVEL_OUTOF10: 6

## 2018-05-31 ASSESSMENT — PAIN DESCRIPTION - LOCATION: LOCATION: LEG;HEAD

## 2018-06-04 ENCOUNTER — CARE COORDINATION (OUTPATIENT)
Dept: CARE COORDINATION | Age: 53
End: 2018-06-04

## 2018-06-25 ENCOUNTER — TELEPHONE (OUTPATIENT)
Dept: INTERNAL MEDICINE CLINIC | Age: 53
End: 2018-06-25

## 2018-07-23 DIAGNOSIS — I10 ESSENTIAL HYPERTENSION: ICD-10-CM

## 2018-07-24 RX ORDER — HYDRALAZINE HYDROCHLORIDE 50 MG/1
TABLET, FILM COATED ORAL
Qty: 120 TABLET | Refills: 0 | Status: SHIPPED | OUTPATIENT
Start: 2018-07-24 | End: 2018-09-18 | Stop reason: SDUPTHER

## 2018-07-26 ENCOUNTER — OFFICE VISIT (OUTPATIENT)
Dept: INTERNAL MEDICINE CLINIC | Age: 53
End: 2018-07-26
Payer: COMMERCIAL

## 2018-07-26 ENCOUNTER — HOSPITAL ENCOUNTER (OUTPATIENT)
Dept: GENERAL RADIOLOGY | Facility: CLINIC | Age: 53
Discharge: HOME | End: 2018-07-28
Payer: COMMERCIAL

## 2018-07-26 ENCOUNTER — HOSPITAL ENCOUNTER (OUTPATIENT)
Facility: CLINIC | Age: 53
Discharge: HOME | End: 2018-07-28
Payer: COMMERCIAL

## 2018-07-26 VITALS
HEIGHT: 69 IN | BODY MASS INDEX: 34.8 KG/M2 | OXYGEN SATURATION: 98 % | DIASTOLIC BLOOD PRESSURE: 68 MMHG | WEIGHT: 235 LBS | SYSTOLIC BLOOD PRESSURE: 104 MMHG | HEART RATE: 74 BPM

## 2018-07-26 DIAGNOSIS — M25.511 CHRONIC RIGHT SHOULDER PAIN: Primary | ICD-10-CM

## 2018-07-26 DIAGNOSIS — M25.511 CHRONIC RIGHT SHOULDER PAIN: ICD-10-CM

## 2018-07-26 DIAGNOSIS — I10 ESSENTIAL HYPERTENSION: ICD-10-CM

## 2018-07-26 DIAGNOSIS — G89.29 CHRONIC RIGHT SHOULDER PAIN: ICD-10-CM

## 2018-07-26 DIAGNOSIS — R19.5 POSITIVE FIT (FECAL IMMUNOCHEMICAL TEST): ICD-10-CM

## 2018-07-26 DIAGNOSIS — J44.9 MIXED TYPE COPD (CHRONIC OBSTRUCTIVE PULMONARY DISEASE) (HCC): ICD-10-CM

## 2018-07-26 DIAGNOSIS — G89.29 CHRONIC RIGHT SHOULDER PAIN: Primary | ICD-10-CM

## 2018-07-26 DIAGNOSIS — R94.2 RESTRICTIVE PATTERN PRESENT ON PULMONARY FUNCTION TESTING: ICD-10-CM

## 2018-07-26 DIAGNOSIS — I25.10 CORONARY ARTERY DISEASE INVOLVING NATIVE CORONARY ARTERY OF NATIVE HEART WITHOUT ANGINA PECTORIS: ICD-10-CM

## 2018-07-26 PROCEDURE — 99214 OFFICE O/P EST MOD 30 MIN: CPT | Performed by: INTERNAL MEDICINE

## 2018-07-26 PROCEDURE — G8926 SPIRO NO PERF OR DOC: HCPCS | Performed by: INTERNAL MEDICINE

## 2018-07-26 PROCEDURE — 3017F COLORECTAL CA SCREEN DOC REV: CPT | Performed by: INTERNAL MEDICINE

## 2018-07-26 PROCEDURE — G8598 ASA/ANTIPLAT THER USED: HCPCS | Performed by: INTERNAL MEDICINE

## 2018-07-26 PROCEDURE — G8417 CALC BMI ABV UP PARAM F/U: HCPCS | Performed by: INTERNAL MEDICINE

## 2018-07-26 PROCEDURE — G8427 DOCREV CUR MEDS BY ELIG CLIN: HCPCS | Performed by: INTERNAL MEDICINE

## 2018-07-26 PROCEDURE — 3023F SPIROM DOC REV: CPT | Performed by: INTERNAL MEDICINE

## 2018-07-26 PROCEDURE — 73030 X-RAY EXAM OF SHOULDER: CPT

## 2018-07-26 PROCEDURE — 4004F PT TOBACCO SCREEN RCVD TLK: CPT | Performed by: INTERNAL MEDICINE

## 2018-07-26 RX ORDER — ALBUTEROL SULFATE 90 UG/1
2 AEROSOL, METERED RESPIRATORY (INHALATION) EVERY 6 HOURS PRN
Qty: 8.5 INHALER | Refills: 3 | Status: SHIPPED | OUTPATIENT
Start: 2018-07-26 | End: 2018-12-14 | Stop reason: SDUPTHER

## 2018-07-26 ASSESSMENT — ENCOUNTER SYMPTOMS
FACIAL SWELLING: 0
ABDOMINAL PAIN: 0
CONSTIPATION: 0
SHORTNESS OF BREATH: 0
BACK PAIN: 0
DIARRHEA: 0
ABDOMINAL DISTENTION: 0
COUGH: 0
COLOR CHANGE: 0
WHEEZING: 0
APNEA: 0
CHEST TIGHTNESS: 0

## 2018-08-20 ENCOUNTER — HOSPITAL ENCOUNTER (OUTPATIENT)
Dept: PAIN MANAGEMENT | Age: 53
Discharge: HOME | End: 2018-08-20
Payer: COMMERCIAL

## 2018-08-20 VITALS
RESPIRATION RATE: 20 BRPM | TEMPERATURE: 98.6 F | SYSTOLIC BLOOD PRESSURE: 134 MMHG | BODY MASS INDEX: 34.8 KG/M2 | DIASTOLIC BLOOD PRESSURE: 76 MMHG | OXYGEN SATURATION: 98 % | HEART RATE: 76 BPM | WEIGHT: 235 LBS | HEIGHT: 69 IN

## 2018-08-20 DIAGNOSIS — Z98.1 STATUS POST CERVICAL SPINAL FUSION: ICD-10-CM

## 2018-08-20 DIAGNOSIS — M50.30 DEGENERATIVE DISC DISEASE, CERVICAL: Primary | ICD-10-CM

## 2018-08-20 DIAGNOSIS — M19.019 AC JOINT ARTHROPATHY: ICD-10-CM

## 2018-08-20 DIAGNOSIS — M54.2 NECK PAIN OF OVER 3 MONTHS DURATION: ICD-10-CM

## 2018-08-20 DIAGNOSIS — M75.41 IMPINGEMENT SYNDROME OF RIGHT SHOULDER: ICD-10-CM

## 2018-08-20 DIAGNOSIS — M54.2 CERVICALGIA: ICD-10-CM

## 2018-08-20 DIAGNOSIS — M47.812 SPONDYLOSIS OF CERVICAL REGION WITHOUT MYELOPATHY OR RADICULOPATHY: ICD-10-CM

## 2018-08-20 PROCEDURE — 80307 DRUG TEST PRSMV CHEM ANLYZR: CPT

## 2018-08-20 PROCEDURE — 99244 OFF/OP CNSLTJ NEW/EST MOD 40: CPT | Performed by: PAIN MEDICINE

## 2018-08-20 PROCEDURE — 99204 OFFICE O/P NEW MOD 45 MIN: CPT

## 2018-08-20 ASSESSMENT — ENCOUNTER SYMPTOMS
DIARRHEA: 0
ABDOMINAL PAIN: 0
STRIDOR: 0
VOMITING: 0
BLOOD IN STOOL: 0
BLURRED VISION: 0
COUGH: 0
DOUBLE VISION: 0
HEMOPTYSIS: 0
HEARTBURN: 1
BACK PAIN: 1
SPUTUM PRODUCTION: 0
SHORTNESS OF BREATH: 0
CONSTIPATION: 0
SINUS PAIN: 0
ORTHOPNEA: 0
PHOTOPHOBIA: 0
SORE THROAT: 0
EYE PAIN: 0
EYE DISCHARGE: 0
NAUSEA: 0
EYE REDNESS: 0
WHEEZING: 0

## 2018-08-20 ASSESSMENT — PAIN DESCRIPTION - DESCRIPTORS: DESCRIPTORS: ACHING;TINGLING

## 2018-08-20 ASSESSMENT — PAIN DESCRIPTION - LOCATION: LOCATION: NECK;SHOULDER

## 2018-08-20 ASSESSMENT — PAIN DESCRIPTION - ONSET: ONSET: ON-GOING

## 2018-08-20 ASSESSMENT — PAIN DESCRIPTION - ORIENTATION: ORIENTATION: RIGHT;LEFT

## 2018-08-20 ASSESSMENT — PAIN DESCRIPTION - PAIN TYPE: TYPE: ACUTE PAIN;CHRONIC PAIN

## 2018-08-20 ASSESSMENT — PAIN DESCRIPTION - FREQUENCY: FREQUENCY: CONTINUOUS

## 2018-08-20 ASSESSMENT — PAIN SCALES - GENERAL: PAINLEVEL_OUTOF10: 7

## 2018-08-20 ASSESSMENT — PAIN DESCRIPTION - PROGRESSION: CLINICAL_PROGRESSION: GRADUALLY WORSENING
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
## 2018-08-23 LAB
6-ACETYLMORPHINE, UR: NOT DETECTED
7-AMINOCLONAZEPAM, URINE: NOT DETECTED
ALPHA-OH-ALPRAZ, URINE: NOT DETECTED
ALPRAZOLAM, URINE: NOT DETECTED
AMPHETAMINES, URINE: NOT DETECTED
BARBITURATES, URINE: NOT DETECTED
BENZOYLECGONINE, UR: NOT DETECTED
BUPRENORPHINE URINE: NOT DETECTED
CARISOPRODOL, UR: NOT DETECTED
CLONAZEPAM, URINE: NOT DETECTED
CODEINE, URINE: NOT DETECTED
CREATININE URINE: 151.9 MG/DL (ref 20–400)
DIAZEPAM, URINE: NOT DETECTED
DRUGS EXPECTED, UR: NORMAL
EER HI RES INTERP UR: NORMAL
ETHYL GLUCURONIDE UR: NOT DETECTED
FENTANYL URINE: NOT DETECTED
HYDROCODONE, URINE: NOT DETECTED
HYDROMORPHONE, URINE: NOT DETECTED
LORAZEPAM, URINE: NOT DETECTED
MARIJUANA METAB, UR: PRESENT
MDA, UR: NOT DETECTED
MDEA, EVE, UR: NOT DETECTED
MDMA URINE: NOT DETECTED
MEPERIDINE METAB, UR: NOT DETECTED
METHADONE, URINE: NOT DETECTED
METHAMPHETAMINE, URINE: NOT DETECTED
METHYLPHENIDATE: NOT DETECTED
MIDAZOLAM, URINE: NOT DETECTED
MORPHINE URINE: NOT DETECTED
NORBUPRENORPHINE, URINE: NOT DETECTED
NORDIAZEPAM, URINE: NOT DETECTED
NORFENTANYL, URINE: NOT DETECTED
NORHYDROCODONE, URINE: NOT DETECTED
NOROXYCODONE, URINE: NOT DETECTED
NOROXYMORPHONE, URINE: NOT DETECTED
OXAZEPAM, URINE: NOT DETECTED
OXYCODONE URINE: NOT DETECTED
OXYMORPHONE, URINE: NOT DETECTED
PAIN MANAGEMENT DRUG PANEL INTERP, URINE: NORMAL
PAIN MGT DRUG PANEL, HI RES, UR: NORMAL
PCP,URINE: NOT DETECTED
PHENTERMINE, UR: NOT DETECTED
PROPOXYPHENE, URINE: NOT DETECTED
TAPENTADOL, URINE: NOT DETECTED
TAPENTADOL-O-SULFATE, URINE: NOT DETECTED
TEMAZEPAM, URINE: NOT DETECTED
TRAMADOL, URINE: NOT DETECTED
ZOLPIDEM, URINE: NOT DETECTED

## 2018-09-18 DIAGNOSIS — I10 ESSENTIAL HYPERTENSION: ICD-10-CM

## 2018-09-18 RX ORDER — HYDRALAZINE HYDROCHLORIDE 50 MG/1
TABLET, FILM COATED ORAL
Qty: 120 TABLET | Refills: 3 | Status: SHIPPED | OUTPATIENT
Start: 2018-09-18 | End: 2018-11-30 | Stop reason: SDUPTHER

## 2018-10-17 ENCOUNTER — HOSPITAL ENCOUNTER (OUTPATIENT)
Age: 53
Setting detail: OBSERVATION
Discharge: HOME | End: 2018-10-18
Attending: EMERGENCY MEDICINE | Admitting: EMERGENCY MEDICINE
Payer: COMMERCIAL

## 2018-10-17 ENCOUNTER — APPOINTMENT (OUTPATIENT)
Dept: GENERAL RADIOLOGY | Age: 53
End: 2018-10-17
Payer: COMMERCIAL

## 2018-10-17 DIAGNOSIS — R07.9 CHEST PAIN, UNSPECIFIED TYPE: Primary | ICD-10-CM

## 2018-10-17 LAB
ABSOLUTE EOS #: 0.14 K/UL (ref 0–0.44)
ABSOLUTE IMMATURE GRANULOCYTE: <0.03 K/UL (ref 0–0.3)
ABSOLUTE LYMPH #: 2.2 K/UL (ref 1.1–3.7)
ABSOLUTE MONO #: 0.7 K/UL (ref 0.1–1.2)
ANION GAP SERPL CALCULATED.3IONS-SCNC: 10 MMOL/L (ref 9–17)
BASOPHILS # BLD: 1 % (ref 0–2)
BASOPHILS ABSOLUTE: 0.08 K/UL (ref 0–0.2)
BUN BLDV-MCNC: 13 MG/DL (ref 6–20)
BUN/CREAT BLD: ABNORMAL (ref 9–20)
CALCIUM SERPL-MCNC: 8.6 MG/DL (ref 8.6–10.4)
CHLORIDE BLD-SCNC: 103 MMOL/L (ref 98–107)
CO2: 23 MMOL/L (ref 20–31)
CREAT SERPL-MCNC: 1.03 MG/DL (ref 0.7–1.2)
D-DIMER QUANTITATIVE: 0.49 MG/L FEU
DIFFERENTIAL TYPE: ABNORMAL
EOSINOPHILS RELATIVE PERCENT: 2 % (ref 1–4)
GFR AFRICAN AMERICAN: >60 ML/MIN
GFR NON-AFRICAN AMERICAN: >60 ML/MIN
GFR SERPL CREATININE-BSD FRML MDRD: ABNORMAL ML/MIN/{1.73_M2}
GFR SERPL CREATININE-BSD FRML MDRD: ABNORMAL ML/MIN/{1.73_M2}
GLUCOSE BLD-MCNC: 143 MG/DL (ref 70–99)
HCT VFR BLD CALC: 49.4 % (ref 40.7–50.3)
HEMOGLOBIN: 16.3 G/DL (ref 13–17)
IMMATURE GRANULOCYTES: 0 %
LYMPHOCYTES # BLD: 29 % (ref 24–43)
MCH RBC QN AUTO: 27.9 PG (ref 25.2–33.5)
MCHC RBC AUTO-ENTMCNC: 33 G/DL (ref 28.4–34.8)
MCV RBC AUTO: 84.6 FL (ref 82.6–102.9)
MONOCYTES # BLD: 9 % (ref 3–12)
NRBC AUTOMATED: 0 PER 100 WBC
PDW BLD-RTO: 14.3 % (ref 11.8–14.4)
PLATELET # BLD: 171 K/UL (ref 138–453)
PLATELET ESTIMATE: ABNORMAL
PMV BLD AUTO: 11.5 FL (ref 8.1–13.5)
POC TROPONIN I: 0.01 NG/ML (ref 0–0.1)
POC TROPONIN I: 0.02 NG/ML (ref 0–0.1)
POC TROPONIN INTERP: NORMAL
POC TROPONIN INTERP: NORMAL
POTASSIUM SERPL-SCNC: 3.9 MMOL/L (ref 3.7–5.3)
RBC # BLD: 5.84 M/UL (ref 4.21–5.77)
RBC # BLD: ABNORMAL 10*6/UL
SEG NEUTROPHILS: 59 % (ref 36–65)
SEGMENTED NEUTROPHILS ABSOLUTE COUNT: 4.51 K/UL (ref 1.5–8.1)
SODIUM BLD-SCNC: 136 MMOL/L (ref 135–144)
WBC # BLD: 7.7 K/UL (ref 3.5–11.3)
WBC # BLD: ABNORMAL 10*3/UL

## 2018-10-17 PROCEDURE — 2580000003 HC RX 258: Performed by: EMERGENCY MEDICINE

## 2018-10-17 PROCEDURE — 85025 COMPLETE CBC W/AUTO DIFF WBC: CPT

## 2018-10-17 PROCEDURE — 85379 FIBRIN DEGRADATION QUANT: CPT

## 2018-10-17 PROCEDURE — G0378 HOSPITAL OBSERVATION PER HR: HCPCS

## 2018-10-17 PROCEDURE — 6370000000 HC RX 637 (ALT 250 FOR IP): Performed by: EMERGENCY MEDICINE

## 2018-10-17 PROCEDURE — 71046 X-RAY EXAM CHEST 2 VIEWS: CPT

## 2018-10-17 PROCEDURE — 99285 EMERGENCY DEPT VISIT HI MDM: CPT

## 2018-10-17 PROCEDURE — 93005 ELECTROCARDIOGRAM TRACING: CPT

## 2018-10-17 PROCEDURE — 80048 BASIC METABOLIC PNL TOTAL CA: CPT

## 2018-10-17 PROCEDURE — 84484 ASSAY OF TROPONIN QUANT: CPT

## 2018-10-17 RX ORDER — ACETAMINOPHEN 325 MG/1
650 TABLET ORAL EVERY 4 HOURS PRN
Status: DISCONTINUED | OUTPATIENT
Start: 2018-10-17 | End: 2018-10-18 | Stop reason: HOSPADM

## 2018-10-17 RX ORDER — ASPIRIN 81 MG/1
81 TABLET ORAL DAILY
Status: DISCONTINUED | OUTPATIENT
Start: 2018-10-18 | End: 2018-10-18 | Stop reason: HOSPADM

## 2018-10-17 RX ORDER — ATORVASTATIN CALCIUM 20 MG/1
20 TABLET, FILM COATED ORAL DAILY
Status: DISCONTINUED | OUTPATIENT
Start: 2018-10-18 | End: 2018-10-18 | Stop reason: HOSPADM

## 2018-10-17 RX ORDER — NITROGLYCERIN 0.4 MG/1
0.4 TABLET SUBLINGUAL EVERY 5 MIN PRN
Status: DISCONTINUED | OUTPATIENT
Start: 2018-10-17 | End: 2018-10-18 | Stop reason: HOSPADM

## 2018-10-17 RX ORDER — ALBUTEROL SULFATE 90 UG/1
2 AEROSOL, METERED RESPIRATORY (INHALATION) EVERY 6 HOURS PRN
Status: DISCONTINUED | OUTPATIENT
Start: 2018-10-17 | End: 2018-10-18 | Stop reason: HOSPADM

## 2018-10-17 RX ORDER — TAMSULOSIN HYDROCHLORIDE 0.4 MG/1
0.4 CAPSULE ORAL DAILY
Status: DISCONTINUED | OUTPATIENT
Start: 2018-10-18 | End: 2018-10-18 | Stop reason: HOSPADM

## 2018-10-17 RX ORDER — ASPIRIN 81 MG/1
243 TABLET, CHEWABLE ORAL ONCE
Status: COMPLETED | OUTPATIENT
Start: 2018-10-17 | End: 2018-10-17

## 2018-10-17 RX ORDER — CLONIDINE HYDROCHLORIDE 0.2 MG/1
0.2 TABLET ORAL DAILY
Status: DISCONTINUED | OUTPATIENT
Start: 2018-10-18 | End: 2018-10-18 | Stop reason: HOSPADM

## 2018-10-17 RX ORDER — ACETAMINOPHEN 325 MG/1
650 TABLET ORAL ONCE
Status: COMPLETED | OUTPATIENT
Start: 2018-10-17 | End: 2018-10-17

## 2018-10-17 RX ORDER — SODIUM CHLORIDE 0.9 % (FLUSH) 0.9 %
10 SYRINGE (ML) INJECTION PRN
Status: DISCONTINUED | OUTPATIENT
Start: 2018-10-17 | End: 2018-10-18 | Stop reason: HOSPADM

## 2018-10-17 RX ORDER — HYDRALAZINE HYDROCHLORIDE 50 MG/1
50 TABLET, FILM COATED ORAL 2 TIMES DAILY
Status: DISCONTINUED | OUTPATIENT
Start: 2018-10-17 | End: 2018-10-18 | Stop reason: HOSPADM

## 2018-10-17 RX ORDER — FUROSEMIDE 40 MG/1
40 TABLET ORAL DAILY
Status: DISCONTINUED | OUTPATIENT
Start: 2018-10-18 | End: 2018-10-18 | Stop reason: HOSPADM

## 2018-10-17 RX ORDER — SODIUM CHLORIDE 0.9 % (FLUSH) 0.9 %
10 SYRINGE (ML) INJECTION EVERY 12 HOURS SCHEDULED
Status: DISCONTINUED | OUTPATIENT
Start: 2018-10-17 | End: 2018-10-18 | Stop reason: HOSPADM

## 2018-10-17 RX ORDER — PANTOPRAZOLE SODIUM 40 MG/1
40 TABLET, DELAYED RELEASE ORAL DAILY
Status: DISCONTINUED | OUTPATIENT
Start: 2018-10-18 | End: 2018-10-18 | Stop reason: HOSPADM

## 2018-10-17 RX ADMIN — ACETAMINOPHEN 650 MG: 325 TABLET ORAL at 20:07

## 2018-10-17 RX ADMIN — Medication 10 ML: at 23:21

## 2018-10-17 RX ADMIN — METOPROLOL TARTRATE 25 MG: 25 TABLET ORAL at 23:01

## 2018-10-17 RX ADMIN — ASPIRIN 243 MG: 81 TABLET ORAL at 18:30

## 2018-10-17 RX ADMIN — NITROGLYCERIN 0.4 MG: 0.4 TABLET SUBLINGUAL at 18:13

## 2018-10-17 RX ADMIN — HYDRALAZINE HYDROCHLORIDE 50 MG: 50 TABLET, FILM COATED ORAL at 23:21

## 2018-10-17 ASSESSMENT — PAIN DESCRIPTION - PROGRESSION: CLINICAL_PROGRESSION: NOT CHANGED

## 2018-10-17 ASSESSMENT — PAIN DESCRIPTION - FREQUENCY: FREQUENCY: CONTINUOUS

## 2018-10-17 ASSESSMENT — PAIN SCALES - GENERAL
PAINLEVEL_OUTOF10: 8
PAINLEVEL_OUTOF10: 6

## 2018-10-17 ASSESSMENT — PAIN DESCRIPTION - LOCATION: LOCATION: HEAD

## 2018-10-17 ASSESSMENT — PAIN DESCRIPTION - PAIN TYPE: TYPE: ACUTE PAIN

## 2018-10-17 ASSESSMENT — PAIN DESCRIPTION - DESCRIPTORS: DESCRIPTORS: ACHING

## 2018-10-17 NOTE — ED PROVIDER NOTES
RRR, NO MRG. ABD SOFT, NONDISTENDED, NONTENDER. NORMAL BOWEL SOUNDS. LAB RESULTS REVIEWED. CXR REVIEWED. IMP-CHEST PAIN. PLAN-ADMIT, OBS, CARDIOLOGY CONSULT.       Kalie Hannah MD  10/17/18 1648

## 2018-10-18 VITALS
HEART RATE: 65 BPM | HEIGHT: 69 IN | SYSTOLIC BLOOD PRESSURE: 140 MMHG | TEMPERATURE: 97.9 F | DIASTOLIC BLOOD PRESSURE: 91 MMHG | OXYGEN SATURATION: 97 % | BODY MASS INDEX: 34.07 KG/M2 | WEIGHT: 230 LBS | RESPIRATION RATE: 18 BRPM

## 2018-10-18 LAB
TROPONIN INTERP: NORMAL
TROPONIN T: <0.03 NG/ML

## 2018-10-18 PROCEDURE — 6370000000 HC RX 637 (ALT 250 FOR IP): Performed by: EMERGENCY MEDICINE

## 2018-10-18 PROCEDURE — 2580000003 HC RX 258: Performed by: EMERGENCY MEDICINE

## 2018-10-18 PROCEDURE — 6370000000 HC RX 637 (ALT 250 FOR IP): Performed by: INTERNAL MEDICINE

## 2018-10-18 PROCEDURE — 84484 ASSAY OF TROPONIN QUANT: CPT

## 2018-10-18 PROCEDURE — G0378 HOSPITAL OBSERVATION PER HR: HCPCS

## 2018-10-18 PROCEDURE — 93005 ELECTROCARDIOGRAM TRACING: CPT

## 2018-10-18 PROCEDURE — 6370000000 HC RX 637 (ALT 250 FOR IP): Performed by: STUDENT IN AN ORGANIZED HEALTH CARE EDUCATION/TRAINING PROGRAM

## 2018-10-18 PROCEDURE — 36415 COLL VENOUS BLD VENIPUNCTURE: CPT

## 2018-10-18 RX ORDER — METOPROLOL TARTRATE 50 MG/1
50 TABLET, FILM COATED ORAL 2 TIMES DAILY
Status: DISCONTINUED | OUTPATIENT
Start: 2018-10-18 | End: 2018-10-18 | Stop reason: HOSPADM

## 2018-10-18 RX ORDER — METOPROLOL TARTRATE 50 MG/1
25 TABLET, FILM COATED ORAL ONCE
Status: COMPLETED | OUTPATIENT
Start: 2018-10-18 | End: 2018-10-18

## 2018-10-18 RX ORDER — METOPROLOL TARTRATE 50 MG/1
50 TABLET, FILM COATED ORAL 2 TIMES DAILY
Qty: 60 TABLET | Refills: 0 | Status: SHIPPED | OUTPATIENT
Start: 2018-10-18 | End: 2018-11-16 | Stop reason: SDUPTHER

## 2018-10-18 RX ORDER — ISOSORBIDE MONONITRATE 30 MG/1
30 TABLET, EXTENDED RELEASE ORAL DAILY
Status: DISCONTINUED | OUTPATIENT
Start: 2018-10-18 | End: 2018-10-18 | Stop reason: HOSPADM

## 2018-10-18 RX ORDER — ISOSORBIDE MONONITRATE 30 MG/1
30 TABLET, EXTENDED RELEASE ORAL DAILY
Qty: 30 TABLET | Refills: 3 | Status: SHIPPED | OUTPATIENT
Start: 2018-10-19 | End: 2018-10-18

## 2018-10-18 RX ORDER — METOPROLOL TARTRATE 50 MG/1
50 TABLET, FILM COATED ORAL 2 TIMES DAILY
Qty: 60 TABLET | Refills: 3 | Status: SHIPPED | OUTPATIENT
Start: 2018-10-18 | End: 2018-10-18

## 2018-10-18 RX ORDER — ISOSORBIDE MONONITRATE 30 MG/1
30 TABLET, EXTENDED RELEASE ORAL DAILY
Qty: 30 TABLET | Refills: 0 | Status: SHIPPED | OUTPATIENT
Start: 2018-10-19 | End: 2018-11-16 | Stop reason: SDUPTHER

## 2018-10-18 RX ADMIN — Medication 10 ML: at 09:12

## 2018-10-18 RX ADMIN — CLONIDINE HYDROCHLORIDE 0.2 MG: 0.2 TABLET ORAL at 09:04

## 2018-10-18 RX ADMIN — ISOSORBIDE MONONITRATE 30 MG: 30 TABLET ORAL at 11:46

## 2018-10-18 RX ADMIN — METOPROLOL TARTRATE 25 MG: 25 TABLET ORAL at 09:04

## 2018-10-18 RX ADMIN — METOPROLOL TARTRATE 25 MG: 25 TABLET ORAL at 11:46

## 2018-10-18 RX ADMIN — PANTOPRAZOLE SODIUM 40 MG: 40 TABLET, DELAYED RELEASE ORAL at 09:04

## 2018-10-18 RX ADMIN — ATORVASTATIN CALCIUM 20 MG: 20 TABLET, FILM COATED ORAL at 09:04

## 2018-10-18 RX ADMIN — ASPIRIN 81 MG: 81 TABLET ORAL at 09:04

## 2018-10-18 RX ADMIN — HYDRALAZINE HYDROCHLORIDE 50 MG: 50 TABLET, FILM COATED ORAL at 09:04

## 2018-10-18 RX ADMIN — FUROSEMIDE 40 MG: 40 TABLET ORAL at 09:04

## 2018-10-18 RX ADMIN — TAMSULOSIN HYDROCHLORIDE 0.4 MG: 0.4 CAPSULE ORAL at 09:04

## 2018-10-18 ASSESSMENT — ENCOUNTER SYMPTOMS
CHEST TIGHTNESS: 0
SHORTNESS OF BREATH: 1
RHINORRHEA: 0
SINUS PRESSURE: 0
VOMITING: 0
COLOR CHANGE: 0
ABDOMINAL PAIN: 0
SINUS PAIN: 0
NAUSEA: 1
EYE PAIN: 0
CONSTIPATION: 0

## 2018-10-18 ASSESSMENT — PAIN SCALES - GENERAL
PAINLEVEL_OUTOF10: 0

## 2018-10-18 ASSESSMENT — HEART SCORE: ECG: 1
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
## 2018-10-18 NOTE — H&P
pertinent family history. I have reviewed and agree with the family history entered. I have reviewed the Family History and it is not significant to the case    SOCIAL HISTORY      reports that he has been smoking Cigarettes. He has a 37.00 pack-year smoking history. He has never used smokeless tobacco. He reports that he does not drink alcohol or use drugs. I have reviewed and agree with all Social.  There are no concerns for substance abuse/use. PHYSICAL EXAM     INITIAL VITALS:  height is 5' 9\" (1.753 m) and weight is 230 lb (104.3 kg). His oral temperature is 97.9 °F (36.6 °C). His blood pressure is 140/91 (abnormal) and his pulse is 65. His respiration is 18 and oxygen saturation is 97%.       CONSTITUTIONAL: AOx4, no apparent distress, appears stated age    HEAD: normocephalic, atraumatic   EYES: PERRLA, EOMI    ENT: moist mucous membranes, uvula midline   NECK: supple, symmetric   BACK: symmetric   LUNGS: clear to auscultation bilaterally   CARDIOVASCULAR: regular rate and rhythm, no murmurs, rubs or gallops   ABDOMEN: soft, non-tender, non-distended with normal active bowel sounds   NEUROLOGIC:  MAEx4, no focal sensory or motor deficits   MUSCULOSKELETAL: no clubbing, cyanosis or edema   SKIN: no rash or wounds       DIFFERENTIAL DIAGNOSIS/MDM:   Chest Pain:  DDX: Emergent: ACS/NSTEMI/STEMI/angina, arrhythmia, trauma, aortic dissection,  PE, PNA, pneumothroax, esophageal rupture, tamponade, Cocaine use  Nonemergent: pneumonia, pericarditis, GERD, MSK, Endocarditis, anxiety  Evaluated for: diaphoresis, present chest pain, tachypnea, BP both arms, heart sounds, JVD, tender chest wall, wheezing        DIAGNOSTIC RESULTS     EKG: All EKG's are interpreted by the Observation Physician who either signs or Co-signs this chart in the absence of a cardiologist.    EKG Interpretation    Interpreted by observation physician    Rhythm: normal sinus   Rate: bradycardia   Axis: normal  Ectopy: none  Conduction:
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
## 2018-10-19 LAB
EKG ATRIAL RATE: 108 BPM
EKG ATRIAL RATE: 56 BPM
EKG P AXIS: 41 DEGREES
EKG P AXIS: 44 DEGREES
EKG P-R INTERVAL: 152 MS
EKG P-R INTERVAL: 176 MS
EKG Q-T INTERVAL: 328 MS
EKG Q-T INTERVAL: 444 MS
EKG QRS DURATION: 84 MS
EKG QRS DURATION: 88 MS
EKG QTC CALCULATION (BAZETT): 428 MS
EKG QTC CALCULATION (BAZETT): 439 MS
EKG R AXIS: -19 DEGREES
EKG R AXIS: -28 DEGREES
EKG T AXIS: 63 DEGREES
EKG T AXIS: 77 DEGREES
EKG VENTRICULAR RATE: 108 BPM
EKG VENTRICULAR RATE: 56 BPM

## 2018-10-27 DIAGNOSIS — I50.32 CHRONIC DIASTOLIC CONGESTIVE HEART FAILURE (HCC): ICD-10-CM

## 2018-10-28 ENCOUNTER — TELEPHONE (OUTPATIENT)
Dept: OTHER | Facility: CLINIC | Age: 53
End: 2018-10-28

## 2018-10-28 ENCOUNTER — HOSPITAL ENCOUNTER (OUTPATIENT)
Age: 53
Setting detail: OBSERVATION
Discharge: ANOTHER HOSPITAL | End: 2018-10-30
Attending: EMERGENCY MEDICINE | Admitting: INTERNAL MEDICINE
Payer: COMMERCIAL

## 2018-10-28 ENCOUNTER — APPOINTMENT (OUTPATIENT)
Dept: GENERAL RADIOLOGY | Age: 53
End: 2018-10-28
Payer: COMMERCIAL

## 2018-10-28 DIAGNOSIS — R07.9 CHEST PAIN, UNSPECIFIED TYPE: Primary | ICD-10-CM

## 2018-10-28 LAB
ABSOLUTE EOS #: 0.2 K/UL (ref 0–0.4)
ABSOLUTE IMMATURE GRANULOCYTE: ABNORMAL K/UL (ref 0–0.3)
ABSOLUTE LYMPH #: 1.1 K/UL (ref 1–4.8)
ABSOLUTE MONO #: 0.6 K/UL (ref 0.1–1.3)
ALBUMIN SERPL-MCNC: 3.8 G/DL (ref 3.5–5.2)
ALBUMIN/GLOBULIN RATIO: ABNORMAL (ref 1–2.5)
ALP BLD-CCNC: 124 U/L (ref 40–129)
ALT SERPL-CCNC: 13 U/L (ref 5–41)
ANION GAP SERPL CALCULATED.3IONS-SCNC: 10 MMOL/L (ref 9–17)
AST SERPL-CCNC: 18 U/L
BASOPHILS # BLD: 1 % (ref 0–2)
BASOPHILS ABSOLUTE: 0.1 K/UL (ref 0–0.2)
BILIRUB SERPL-MCNC: 0.47 MG/DL (ref 0.3–1.2)
BUN BLDV-MCNC: 8 MG/DL (ref 6–20)
BUN/CREAT BLD: ABNORMAL (ref 9–20)
CALCIUM SERPL-MCNC: 8.9 MG/DL (ref 8.6–10.4)
CHLORIDE BLD-SCNC: 98 MMOL/L (ref 98–107)
CO2: 29 MMOL/L (ref 20–31)
CREAT SERPL-MCNC: 1.07 MG/DL (ref 0.7–1.2)
DIFFERENTIAL TYPE: ABNORMAL
EKG ATRIAL RATE: 85 BPM
EKG P AXIS: 57 DEGREES
EKG P-R INTERVAL: 158 MS
EKG Q-T INTERVAL: 370 MS
EKG QRS DURATION: 84 MS
EKG QTC CALCULATION (BAZETT): 440 MS
EKG R AXIS: -13 DEGREES
EKG T AXIS: 61 DEGREES
EKG VENTRICULAR RATE: 85 BPM
EOSINOPHILS RELATIVE PERCENT: 2 % (ref 0–4)
GFR AFRICAN AMERICAN: >60 ML/MIN
GFR NON-AFRICAN AMERICAN: >60 ML/MIN
GFR SERPL CREATININE-BSD FRML MDRD: ABNORMAL ML/MIN/{1.73_M2}
GFR SERPL CREATININE-BSD FRML MDRD: ABNORMAL ML/MIN/{1.73_M2}
GLUCOSE BLD-MCNC: 136 MG/DL (ref 70–99)
HCT VFR BLD CALC: 43.6 % (ref 41–53)
HEMOGLOBIN: 14.8 G/DL (ref 13.5–17.5)
IMMATURE GRANULOCYTES: ABNORMAL %
LYMPHOCYTES # BLD: 13 % (ref 24–44)
MCH RBC QN AUTO: 28.5 PG (ref 26–34)
MCHC RBC AUTO-ENTMCNC: 34 G/DL (ref 31–37)
MCV RBC AUTO: 83.8 FL (ref 80–100)
MONOCYTES # BLD: 7 % (ref 1–7)
NRBC AUTOMATED: ABNORMAL PER 100 WBC
PDW BLD-RTO: 15.1 % (ref 11.5–14.9)
PLATELET # BLD: 179 K/UL (ref 150–450)
PLATELET ESTIMATE: ABNORMAL
PMV BLD AUTO: 9.2 FL (ref 6–12)
POTASSIUM SERPL-SCNC: 3.6 MMOL/L (ref 3.7–5.3)
RBC # BLD: 5.2 M/UL (ref 4.5–5.9)
RBC # BLD: ABNORMAL 10*6/UL
SEG NEUTROPHILS: 77 % (ref 36–66)
SEGMENTED NEUTROPHILS ABSOLUTE COUNT: 6.5 K/UL (ref 1.3–9.1)
SODIUM BLD-SCNC: 137 MMOL/L (ref 135–144)
TOTAL PROTEIN: 6.9 G/DL (ref 6.4–8.3)
TROPONIN INTERP: NORMAL
TROPONIN INTERP: NORMAL
TROPONIN T: <0.03 NG/ML
TROPONIN T: <0.03 NG/ML
WBC # BLD: 8.5 K/UL (ref 3.5–11)
WBC # BLD: ABNORMAL 10*3/UL

## 2018-10-28 PROCEDURE — 36415 COLL VENOUS BLD VENIPUNCTURE: CPT

## 2018-10-28 PROCEDURE — 6360000002 HC RX W HCPCS: Performed by: INTERNAL MEDICINE

## 2018-10-28 PROCEDURE — G0378 HOSPITAL OBSERVATION PER HR: HCPCS

## 2018-10-28 PROCEDURE — 6370000000 HC RX 637 (ALT 250 FOR IP): Performed by: INTERNAL MEDICINE

## 2018-10-28 PROCEDURE — 71045 X-RAY EXAM CHEST 1 VIEW: CPT

## 2018-10-28 PROCEDURE — 96365 THER/PROPH/DIAG IV INF INIT: CPT

## 2018-10-28 PROCEDURE — 99285 EMERGENCY DEPT VISIT HI MDM: CPT

## 2018-10-28 PROCEDURE — 93005 ELECTROCARDIOGRAM TRACING: CPT

## 2018-10-28 PROCEDURE — 99220 PR INITIAL OBSERVATION CARE/DAY 70 MINUTES: CPT | Performed by: INTERNAL MEDICINE

## 2018-10-28 PROCEDURE — 94761 N-INVAS EAR/PLS OXIMETRY MLT: CPT

## 2018-10-28 PROCEDURE — 84484 ASSAY OF TROPONIN QUANT: CPT

## 2018-10-28 PROCEDURE — 2580000003 HC RX 258: Performed by: INTERNAL MEDICINE

## 2018-10-28 PROCEDURE — 85025 COMPLETE CBC W/AUTO DIFF WBC: CPT

## 2018-10-28 PROCEDURE — 96372 THER/PROPH/DIAG INJ SC/IM: CPT

## 2018-10-28 PROCEDURE — 94640 AIRWAY INHALATION TREATMENT: CPT

## 2018-10-28 PROCEDURE — 6370000000 HC RX 637 (ALT 250 FOR IP): Performed by: EMERGENCY MEDICINE

## 2018-10-28 PROCEDURE — 80053 COMPREHEN METABOLIC PANEL: CPT

## 2018-10-28 RX ORDER — ALBUTEROL SULFATE 90 UG/1
2 AEROSOL, METERED RESPIRATORY (INHALATION) EVERY 6 HOURS PRN
Status: DISCONTINUED | OUTPATIENT
Start: 2018-10-28 | End: 2018-10-30 | Stop reason: HOSPADM

## 2018-10-28 RX ORDER — SODIUM CHLORIDE 0.9 % (FLUSH) 0.9 %
10 SYRINGE (ML) INJECTION PRN
Status: DISCONTINUED | OUTPATIENT
Start: 2018-10-28 | End: 2018-10-30 | Stop reason: HOSPADM

## 2018-10-28 RX ORDER — PANTOPRAZOLE SODIUM 40 MG/1
40 TABLET, DELAYED RELEASE ORAL DAILY
Status: DISCONTINUED | OUTPATIENT
Start: 2018-10-28 | End: 2018-10-30 | Stop reason: HOSPADM

## 2018-10-28 RX ORDER — METOPROLOL TARTRATE 50 MG/1
50 TABLET, FILM COATED ORAL 2 TIMES DAILY
Status: DISCONTINUED | OUTPATIENT
Start: 2018-10-28 | End: 2018-10-30 | Stop reason: HOSPADM

## 2018-10-28 RX ORDER — ACETAMINOPHEN 325 MG/1
650 TABLET ORAL ONCE
Status: COMPLETED | OUTPATIENT
Start: 2018-10-28 | End: 2018-10-28

## 2018-10-28 RX ORDER — ASPIRIN 81 MG/1
324 TABLET, CHEWABLE ORAL ONCE
Status: DISCONTINUED | OUTPATIENT
Start: 2018-10-28 | End: 2018-10-30

## 2018-10-28 RX ORDER — ACETAMINOPHEN 325 MG/1
650 TABLET ORAL EVERY 4 HOURS PRN
Status: DISCONTINUED | OUTPATIENT
Start: 2018-10-28 | End: 2018-10-30 | Stop reason: HOSPADM

## 2018-10-28 RX ORDER — ATORVASTATIN CALCIUM 20 MG/1
20 TABLET, FILM COATED ORAL DAILY
Status: DISCONTINUED | OUTPATIENT
Start: 2018-10-28 | End: 2018-10-30 | Stop reason: HOSPADM

## 2018-10-28 RX ORDER — NITROGLYCERIN 0.4 MG/1
0.4 TABLET SUBLINGUAL EVERY 5 MIN PRN
Status: DISCONTINUED | OUTPATIENT
Start: 2018-10-28 | End: 2018-10-30 | Stop reason: HOSPADM

## 2018-10-28 RX ORDER — ZOLPIDEM TARTRATE 10 MG/1
10 TABLET ORAL NIGHTLY PRN
Status: DISCONTINUED | OUTPATIENT
Start: 2018-10-28 | End: 2018-10-30 | Stop reason: HOSPADM

## 2018-10-28 RX ORDER — ASPIRIN 81 MG/1
81 TABLET ORAL DAILY
Status: DISCONTINUED | OUTPATIENT
Start: 2018-10-28 | End: 2018-10-30 | Stop reason: HOSPADM

## 2018-10-28 RX ORDER — HYDRALAZINE HYDROCHLORIDE 50 MG/1
50 TABLET, FILM COATED ORAL 2 TIMES DAILY
Status: DISCONTINUED | OUTPATIENT
Start: 2018-10-28 | End: 2018-10-30 | Stop reason: HOSPADM

## 2018-10-28 RX ORDER — ISOSORBIDE MONONITRATE 30 MG/1
30 TABLET, EXTENDED RELEASE ORAL DAILY
Status: DISCONTINUED | OUTPATIENT
Start: 2018-10-28 | End: 2018-10-30 | Stop reason: HOSPADM

## 2018-10-28 RX ORDER — FUROSEMIDE 40 MG/1
40 TABLET ORAL DAILY
Status: DISCONTINUED | OUTPATIENT
Start: 2018-10-28 | End: 2018-10-30 | Stop reason: HOSPADM

## 2018-10-28 RX ORDER — PREDNISONE 20 MG/1
20 TABLET ORAL DAILY
Status: DISCONTINUED | OUTPATIENT
Start: 2018-10-28 | End: 2018-10-30 | Stop reason: HOSPADM

## 2018-10-28 RX ORDER — LEVOFLOXACIN 5 MG/ML
500 INJECTION, SOLUTION INTRAVENOUS EVERY 24 HOURS
Status: DISCONTINUED | OUTPATIENT
Start: 2018-10-28 | End: 2018-10-30 | Stop reason: HOSPADM

## 2018-10-28 RX ORDER — IPRATROPIUM BROMIDE AND ALBUTEROL SULFATE 2.5; .5 MG/3ML; MG/3ML
1 SOLUTION RESPIRATORY (INHALATION)
Status: DISCONTINUED | OUTPATIENT
Start: 2018-10-28 | End: 2018-10-30 | Stop reason: HOSPADM

## 2018-10-28 RX ORDER — SODIUM CHLORIDE 0.9 % (FLUSH) 0.9 %
10 SYRINGE (ML) INJECTION EVERY 12 HOURS SCHEDULED
Status: DISCONTINUED | OUTPATIENT
Start: 2018-10-28 | End: 2018-10-30 | Stop reason: HOSPADM

## 2018-10-28 RX ORDER — CLONIDINE HYDROCHLORIDE 0.2 MG/1
0.2 TABLET ORAL 2 TIMES DAILY
Status: DISCONTINUED | OUTPATIENT
Start: 2018-10-28 | End: 2018-10-30 | Stop reason: HOSPADM

## 2018-10-28 RX ORDER — TAMSULOSIN HYDROCHLORIDE 0.4 MG/1
0.4 CAPSULE ORAL DAILY
Status: DISCONTINUED | OUTPATIENT
Start: 2018-10-28 | End: 2018-10-30 | Stop reason: HOSPADM

## 2018-10-28 RX ADMIN — ENOXAPARIN SODIUM 30 MG: 30 INJECTION SUBCUTANEOUS at 20:31

## 2018-10-28 RX ADMIN — ATORVASTATIN CALCIUM 20 MG: 20 TABLET, FILM COATED ORAL at 13:40

## 2018-10-28 RX ADMIN — METOPROLOL TARTRATE 50 MG: 50 TABLET ORAL at 20:31

## 2018-10-28 RX ADMIN — PREDNISONE 20 MG: 20 TABLET ORAL at 13:40

## 2018-10-28 RX ADMIN — LEVOFLOXACIN 500 MG: 5 INJECTION, SOLUTION INTRAVENOUS at 15:53

## 2018-10-28 RX ADMIN — ACETAMINOPHEN 650 MG: 325 TABLET, FILM COATED ORAL at 15:57

## 2018-10-28 RX ADMIN — ISOSORBIDE MONONITRATE 30 MG: 30 TABLET, EXTENDED RELEASE ORAL at 08:56

## 2018-10-28 RX ADMIN — IPRATROPIUM BROMIDE AND ALBUTEROL SULFATE 1 AMPULE: .5; 3 SOLUTION RESPIRATORY (INHALATION) at 20:38

## 2018-10-28 RX ADMIN — CLONIDINE HYDROCHLORIDE 0.2 MG: 0.2 TABLET ORAL at 08:56

## 2018-10-28 RX ADMIN — Medication 10 ML: at 21:59

## 2018-10-28 RX ADMIN — HYDRALAZINE HYDROCHLORIDE 50 MG: 50 TABLET, FILM COATED ORAL at 20:31

## 2018-10-28 RX ADMIN — ENOXAPARIN SODIUM 30 MG: 30 INJECTION SUBCUTANEOUS at 08:57

## 2018-10-28 RX ADMIN — TAMSULOSIN HYDROCHLORIDE 0.4 MG: 0.4 CAPSULE ORAL at 13:40

## 2018-10-28 RX ADMIN — FUROSEMIDE 40 MG: 40 TABLET ORAL at 13:40

## 2018-10-28 RX ADMIN — CLONIDINE HYDROCHLORIDE 0.2 MG: 0.2 TABLET ORAL at 20:31

## 2018-10-28 RX ADMIN — PANTOPRAZOLE SODIUM 40 MG: 40 TABLET, DELAYED RELEASE ORAL at 13:40

## 2018-10-28 RX ADMIN — ACETAMINOPHEN 650 MG: 325 TABLET, FILM COATED ORAL at 05:01

## 2018-10-28 RX ADMIN — HYDRALAZINE HYDROCHLORIDE 50 MG: 50 TABLET, FILM COATED ORAL at 08:56

## 2018-10-28 RX ADMIN — ZOLPIDEM TARTRATE 10 MG: 10 TABLET, FILM COATED ORAL at 21:59

## 2018-10-28 RX ADMIN — Medication 10 ML: at 08:59

## 2018-10-28 RX ADMIN — METOPROLOL TARTRATE 50 MG: 50 TABLET ORAL at 08:56

## 2018-10-28 RX ADMIN — IPRATROPIUM BROMIDE AND ALBUTEROL SULFATE 1 AMPULE: .5; 3 SOLUTION RESPIRATORY (INHALATION) at 12:11

## 2018-10-28 RX ADMIN — IPRATROPIUM BROMIDE AND ALBUTEROL SULFATE 1 AMPULE: .5; 3 SOLUTION RESPIRATORY (INHALATION) at 15:23

## 2018-10-28 RX ADMIN — ASPIRIN 81 MG: 81 TABLET, COATED ORAL at 13:40

## 2018-10-28 ASSESSMENT — PAIN SCALES - GENERAL
PAINLEVEL_OUTOF10: 8
PAINLEVEL_OUTOF10: 6
PAINLEVEL_OUTOF10: 5
PAINLEVEL_OUTOF10: 7
PAINLEVEL_OUTOF10: 0
PAINLEVEL_OUTOF10: 6

## 2018-10-28 ASSESSMENT — ENCOUNTER SYMPTOMS
BACK PAIN: 0
SHORTNESS OF BREATH: 1
ABDOMINAL PAIN: 0
GASTROINTESTINAL NEGATIVE: 1
EYES NEGATIVE: 1
COUGH: 0

## 2018-10-28 ASSESSMENT — PAIN DESCRIPTION - LOCATION
LOCATION: HEAD
LOCATION: CHEST;HEAD

## 2018-10-28 ASSESSMENT — PAIN DESCRIPTION - PAIN TYPE: TYPE: ACUTE PAIN

## 2018-10-28 ASSESSMENT — PAIN DESCRIPTION - DESCRIPTORS: DESCRIPTORS: TIGHTNESS

## 2018-10-28 ASSESSMENT — PAIN DESCRIPTION - FREQUENCY: FREQUENCY: CONTINUOUS

## 2018-10-28 NOTE — ED PROVIDER NOTES
every 8 hours if needed for pain    ISOSORBIDE MONONITRATE (IMDUR) 30 MG EXTENDED RELEASE TABLET    Take 1 tablet by mouth daily    METOPROLOL TARTRATE (LOPRESSOR) 50 MG TABLET    Take 1 tablet by mouth 2 times daily    MULTIPLE VITAMIN (MULTIVITAMIN) TABLET    take 1 tablet by mouth once daily    NAPROXEN (NAPROXEN) 500 MG EC TABLET    Take 1 tablet by mouth 2 times daily (with meals)    NITROSTAT 0.4 MG SL TABLET    Place 0.4 mg under the tongue every 5 minutes as needed for Chest pain     PANTOPRAZOLE (PROTONIX) 40 MG TABLET    Take 1 tablet by mouth daily    RA ASPIRIN EC 81 MG EC TABLET    take 1 tablet by mouth once daily    RA NICOTINE 2 MG GUM    chew 1 by mouth every 3 hours if needed for SMOKING CESSATION    TAMSULOSIN (FLOMAX) 0.4 MG CAPSULE    Take 0.4 mg by mouth daily     ALLERGIES     is allergic to morphine. FAMILY HISTORY     indicated that his mother is . He indicated that his father is . He indicated that only one of his two sisters is alive. He indicated that his brother is . He indicated that his maternal grandmother is . He indicated that his maternal grandfather is . He indicated that his paternal grandmother is . He indicated that his paternal grandfather is . SOCIAL HISTORY       Social History   Substance Use Topics    Smoking status: Current Every Day Smoker     Packs/day: 1.00     Years: 37.00     Types: Cigarettes    Smokeless tobacco: Never Used    Alcohol use No      Comment: 10/21/16  denies significant use     PHYSICAL EXAM     INITIAL VITALS: BP (!) 166/81   Pulse 87   Temp 98.5 °F (36.9 °C) (Oral)   Resp 23   Ht 5' 9\" (1.753 m)   Wt 230 lb (104.3 kg)   SpO2 91%   BMI 33.97 kg/m²    Physical Exam   Constitutional: He is oriented to person, place, and time. No distress. HENT:   Head: Normocephalic and atraumatic. Eyes: Conjunctivae are normal.   Neck: Normal range of motion. Neck supple.    Cardiovascular:

## 2018-10-28 NOTE — ED NOTES
Pt brought in by squad c/o mid sternal chest pain, shoulder pain and a headache. Pt states he  woke up around 0200 this morning with the symptoms. Pt was given 324 mg aspirin and 1 nitro sublingual by squad. Pt has a Hx of quadruple bypass surgery in 2011. Pt states he has an appt with a cardiologist on 11/2, and has an appt with his PCP on Monday. Pt alert and oriented, PWD, PMS intact.       Claretha Litten, RN  10/28/18 0400

## 2018-10-28 NOTE — ED NOTES
Report called to floor, received by [facility]. Report pt was brought in by EMS d/t chest pain, shoulder pain and headache. Pt has 20 g in right hand. Pt alert and oriented, ambulates with steady gait. Pt given sandwich, snacks and drink in the ED. @nd trop due at 030 70 25 13. RN verbalize understanding.      Donnell Nguyen RN  10/28/18 0225
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
## 2018-10-29 ENCOUNTER — APPOINTMENT (OUTPATIENT)
Dept: NUCLEAR MEDICINE | Age: 53
End: 2018-10-29
Payer: COMMERCIAL

## 2018-10-29 LAB
ABSOLUTE EOS #: 0.1 K/UL (ref 0–0.4)
ABSOLUTE IMMATURE GRANULOCYTE: ABNORMAL K/UL (ref 0–0.3)
ABSOLUTE LYMPH #: 1.5 K/UL (ref 1–4.8)
ABSOLUTE MONO #: 0.7 K/UL (ref 0.1–1.3)
ANION GAP SERPL CALCULATED.3IONS-SCNC: 9 MMOL/L (ref 9–17)
BASOPHILS # BLD: 1 % (ref 0–2)
BASOPHILS ABSOLUTE: 0 K/UL (ref 0–0.2)
BUN BLDV-MCNC: 8 MG/DL (ref 6–20)
BUN/CREAT BLD: ABNORMAL (ref 9–20)
CALCIUM SERPL-MCNC: 8.9 MG/DL (ref 8.6–10.4)
CHLORIDE BLD-SCNC: 102 MMOL/L (ref 98–107)
CO2: 26 MMOL/L (ref 20–31)
CREAT SERPL-MCNC: 0.9 MG/DL (ref 0.7–1.2)
DIFFERENTIAL TYPE: ABNORMAL
EOSINOPHILS RELATIVE PERCENT: 1 % (ref 0–4)
GFR AFRICAN AMERICAN: >60 ML/MIN
GFR NON-AFRICAN AMERICAN: >60 ML/MIN
GFR SERPL CREATININE-BSD FRML MDRD: ABNORMAL ML/MIN/{1.73_M2}
GFR SERPL CREATININE-BSD FRML MDRD: ABNORMAL ML/MIN/{1.73_M2}
GLUCOSE BLD-MCNC: 137 MG/DL (ref 70–99)
HCT VFR BLD CALC: 42.5 % (ref 41–53)
HEMOGLOBIN: 14.3 G/DL (ref 13.5–17.5)
IMMATURE GRANULOCYTES: ABNORMAL %
LV EF: 57 %
LVEF MODALITY: NORMAL
LYMPHOCYTES # BLD: 19 % (ref 24–44)
MCH RBC QN AUTO: 28.6 PG (ref 26–34)
MCHC RBC AUTO-ENTMCNC: 33.6 G/DL (ref 31–37)
MCV RBC AUTO: 85.3 FL (ref 80–100)
MONOCYTES # BLD: 10 % (ref 1–7)
NRBC AUTOMATED: ABNORMAL PER 100 WBC
PDW BLD-RTO: 15.4 % (ref 11.5–14.9)
PLATELET # BLD: 178 K/UL (ref 150–450)
PLATELET ESTIMATE: ABNORMAL
PMV BLD AUTO: 9.2 FL (ref 6–12)
POTASSIUM SERPL-SCNC: 4.1 MMOL/L (ref 3.7–5.3)
RBC # BLD: 4.98 M/UL (ref 4.5–5.9)
RBC # BLD: ABNORMAL 10*6/UL
SEG NEUTROPHILS: 69 % (ref 36–66)
SEGMENTED NEUTROPHILS ABSOLUTE COUNT: 5.5 K/UL (ref 1.3–9.1)
SODIUM BLD-SCNC: 137 MMOL/L (ref 135–144)
WBC # BLD: 7.8 K/UL (ref 3.5–11)
WBC # BLD: ABNORMAL 10*3/UL

## 2018-10-29 PROCEDURE — G0378 HOSPITAL OBSERVATION PER HR: HCPCS

## 2018-10-29 PROCEDURE — 3430000000 HC RX DIAGNOSTIC RADIOPHARMACEUTICAL: Performed by: INTERNAL MEDICINE

## 2018-10-29 PROCEDURE — 80048 BASIC METABOLIC PNL TOTAL CA: CPT

## 2018-10-29 PROCEDURE — 6370000000 HC RX 637 (ALT 250 FOR IP): Performed by: INTERNAL MEDICINE

## 2018-10-29 PROCEDURE — 6360000002 HC RX W HCPCS: Performed by: INTERNAL MEDICINE

## 2018-10-29 PROCEDURE — 99232 SBSQ HOSP IP/OBS MODERATE 35: CPT | Performed by: INTERNAL MEDICINE

## 2018-10-29 PROCEDURE — 2580000003 HC RX 258: Performed by: INTERNAL MEDICINE

## 2018-10-29 PROCEDURE — A9500 TC99M SESTAMIBI: HCPCS | Performed by: INTERNAL MEDICINE

## 2018-10-29 PROCEDURE — 94640 AIRWAY INHALATION TREATMENT: CPT

## 2018-10-29 PROCEDURE — 93005 ELECTROCARDIOGRAM TRACING: CPT

## 2018-10-29 PROCEDURE — 78452 HT MUSCLE IMAGE SPECT MULT: CPT

## 2018-10-29 PROCEDURE — 36415 COLL VENOUS BLD VENIPUNCTURE: CPT

## 2018-10-29 PROCEDURE — 96366 THER/PROPH/DIAG IV INF ADDON: CPT

## 2018-10-29 PROCEDURE — 85025 COMPLETE CBC W/AUTO DIFF WBC: CPT

## 2018-10-29 PROCEDURE — 93017 CV STRESS TEST TRACING ONLY: CPT

## 2018-10-29 PROCEDURE — 96372 THER/PROPH/DIAG INJ SC/IM: CPT

## 2018-10-29 RX ORDER — AMINOPHYLLINE DIHYDRATE 25 MG/ML
100 INJECTION, SOLUTION INTRAVENOUS
Status: ACTIVE | OUTPATIENT
Start: 2018-10-29 | End: 2018-10-29

## 2018-10-29 RX ORDER — SODIUM CHLORIDE 0.9 % (FLUSH) 0.9 %
10 SYRINGE (ML) INJECTION PRN
Status: DISCONTINUED | OUTPATIENT
Start: 2018-10-29 | End: 2018-10-29

## 2018-10-29 RX ORDER — METOPROLOL TARTRATE 5 MG/5ML
2.5 INJECTION INTRAVENOUS PRN
Status: ACTIVE | OUTPATIENT
Start: 2018-10-29 | End: 2018-10-29

## 2018-10-29 RX ORDER — NITROGLYCERIN 0.4 MG/1
0.4 TABLET SUBLINGUAL EVERY 5 MIN PRN
Status: ACTIVE | OUTPATIENT
Start: 2018-10-29 | End: 2018-10-29

## 2018-10-29 RX ORDER — SODIUM CHLORIDE 9 MG/ML
INJECTION, SOLUTION INTRAVENOUS ONCE
Status: DISCONTINUED | OUTPATIENT
Start: 2018-10-29 | End: 2018-10-30

## 2018-10-29 RX ORDER — FUROSEMIDE 20 MG/1
TABLET ORAL
Qty: 60 TABLET | Refills: 3 | Status: SHIPPED | OUTPATIENT
Start: 2018-10-29 | End: 2019-07-28 | Stop reason: SDUPTHER

## 2018-10-29 RX ADMIN — ISOSORBIDE MONONITRATE 30 MG: 30 TABLET, EXTENDED RELEASE ORAL at 12:11

## 2018-10-29 RX ADMIN — METOPROLOL TARTRATE 50 MG: 50 TABLET ORAL at 20:52

## 2018-10-29 RX ADMIN — PANTOPRAZOLE SODIUM 40 MG: 40 TABLET, DELAYED RELEASE ORAL at 12:10

## 2018-10-29 RX ADMIN — METOPROLOL TARTRATE 50 MG: 50 TABLET ORAL at 12:11

## 2018-10-29 RX ADMIN — IPRATROPIUM BROMIDE AND ALBUTEROL SULFATE 1 AMPULE: .5; 3 SOLUTION RESPIRATORY (INHALATION) at 20:54

## 2018-10-29 RX ADMIN — REGADENOSON 0.4 MG: 0.08 INJECTION, SOLUTION INTRAVENOUS at 10:30

## 2018-10-29 RX ADMIN — ENOXAPARIN SODIUM 30 MG: 30 INJECTION SUBCUTANEOUS at 20:52

## 2018-10-29 RX ADMIN — TAMSULOSIN HYDROCHLORIDE 0.4 MG: 0.4 CAPSULE ORAL at 12:11

## 2018-10-29 RX ADMIN — IPRATROPIUM BROMIDE AND ALBUTEROL SULFATE 1 AMPULE: .5; 3 SOLUTION RESPIRATORY (INHALATION) at 15:05

## 2018-10-29 RX ADMIN — Medication 10 ML: at 10:30

## 2018-10-29 RX ADMIN — ENOXAPARIN SODIUM 30 MG: 30 INJECTION SUBCUTANEOUS at 12:12

## 2018-10-29 RX ADMIN — CLONIDINE HYDROCHLORIDE 0.2 MG: 0.2 TABLET ORAL at 12:12

## 2018-10-29 RX ADMIN — ATORVASTATIN CALCIUM 20 MG: 20 TABLET, FILM COATED ORAL at 12:11

## 2018-10-29 RX ADMIN — Medication 10 ML: at 07:31

## 2018-10-29 RX ADMIN — Medication 10 ML: at 20:52

## 2018-10-29 RX ADMIN — Medication 10 ML: at 12:13

## 2018-10-29 RX ADMIN — ASPIRIN 81 MG: 81 TABLET, COATED ORAL at 12:11

## 2018-10-29 RX ADMIN — FUROSEMIDE 40 MG: 40 TABLET ORAL at 12:10

## 2018-10-29 RX ADMIN — LEVOFLOXACIN 500 MG: 5 INJECTION, SOLUTION INTRAVENOUS at 12:48

## 2018-10-29 RX ADMIN — CLONIDINE HYDROCHLORIDE 0.2 MG: 0.2 TABLET ORAL at 20:52

## 2018-10-29 RX ADMIN — PREDNISONE 20 MG: 20 TABLET ORAL at 12:11

## 2018-10-29 RX ADMIN — TETRAKIS(2-METHOXYISOBUTYLISOCYANIDE)COPPER(I) TETRAFLUOROBORATE 11.8 MILLICURIE: 1 INJECTION, POWDER, LYOPHILIZED, FOR SOLUTION INTRAVENOUS at 07:30

## 2018-10-29 RX ADMIN — HYDRALAZINE HYDROCHLORIDE 50 MG: 50 TABLET, FILM COATED ORAL at 20:52

## 2018-10-29 RX ADMIN — Medication 10 ML: at 09:32

## 2018-10-29 RX ADMIN — TETRAKIS(2-METHOXYISOBUTYLISOCYANIDE)COPPER(I) TETRAFLUOROBORATE 37.1 MILLICURIE: 1 INJECTION, POWDER, LYOPHILIZED, FOR SOLUTION INTRAVENOUS at 10:43

## 2018-10-29 RX ADMIN — HYDRALAZINE HYDROCHLORIDE 50 MG: 50 TABLET, FILM COATED ORAL at 12:11

## 2018-10-29 RX ADMIN — IPRATROPIUM BROMIDE AND ALBUTEROL SULFATE 1 AMPULE: .5; 3 SOLUTION RESPIRATORY (INHALATION) at 07:48
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
## 2018-10-29 NOTE — DISCHARGE INSTR - COC
Isolation/Infection:   Isolation          No Isolation            Nurse Assessment:  Last Vital Signs: BP (!) 181/102   Pulse 62   Temp 97.7 °F (36.5 °C) (Oral)   Resp 18   Ht 5' 9\" (1.753 m)   Wt 230 lb (104.3 kg)   SpO2 98%   BMI 33.97 kg/m²     Last documented pain score (0-10 scale): Pain Level: 0  Last Weight:   Wt Readings from Last 1 Encounters:   10/28/18 230 lb (104.3 kg)     Mental Status:  oriented    IV Access:  - None    Nursing Mobility/ADLs:  Walking   Independent  Transfer  Independent  Bathing  Independent  Dressing  Independent  Toileting  Independent  Feeding  Independent  Med Admin  Independent  Med Delivery   whole    Wound Care Documentation and Therapy:        Elimination:  Continence:   · Bowel: Yes  · Bladder: Yes  Urinary Catheter: None   Colostomy/Ileostomy/Ileal Conduit: No       Date of Last BM:     Intake/Output Summary (Last 24 hours) at 10/29/18 1215  Last data filed at 10/28/18 1649   Gross per 24 hour   Intake              569 ml   Output                0 ml   Net              569 ml     I/O last 3 completed shifts: In: 569 [I.V.:569]  Out: -     Safety Concerns:     Patient Does not Read or Write    Impairments/Disabilities:      Patient does not read or write    Nutrition Therapy:  Current Nutrition Therapy:   - Oral Diet:  General    Routes of Feeding: Oral  Liquids: No Restrictions  Daily Fluid Restriction: no  Last Modified Barium Swallow with Video (Video Swallowing Test): not done    Treatments at the Time of Hospital Discharge:   Respiratory Treatments:   Oxygen Therapy:  is not on home oxygen therapy. Ventilator:    - No ventilator support    Rehab Therapies: {THERAPEUTIC INTERVENTION:[id number]}  Weight Bearing Status/Restrictions: No weight bearing restirctions  Other Medical Equipment (for information only, NOT a DME order):  cane  Other Treatments: Skilled Nursing Assessment. Medication Education & Monitoring & Following.  PT CANNOT READ OR WRITER, NEEDS

## 2018-10-30 ENCOUNTER — HOSPITAL ENCOUNTER (OUTPATIENT)
Dept: CARDIAC CATH/INVASIVE PROCEDURES | Age: 53
Discharge: HOME | End: 2018-10-30
Payer: COMMERCIAL

## 2018-10-30 VITALS
OXYGEN SATURATION: 95 % | DIASTOLIC BLOOD PRESSURE: 76 MMHG | RESPIRATION RATE: 20 BRPM | HEART RATE: 55 BPM | TEMPERATURE: 97 F | WEIGHT: 230 LBS | SYSTOLIC BLOOD PRESSURE: 131 MMHG | HEIGHT: 69 IN | BODY MASS INDEX: 34.07 KG/M2

## 2018-10-30 VITALS
TEMPERATURE: 97.1 F | SYSTOLIC BLOOD PRESSURE: 131 MMHG | BODY MASS INDEX: 34.07 KG/M2 | WEIGHT: 230 LBS | HEIGHT: 69 IN | DIASTOLIC BLOOD PRESSURE: 72 MMHG | RESPIRATION RATE: 19 BRPM | OXYGEN SATURATION: 97 % | HEART RATE: 49 BPM

## 2018-10-30 LAB
ANION GAP SERPL CALCULATED.3IONS-SCNC: 9 MMOL/L (ref 9–17)
BUN BLDV-MCNC: 9 MG/DL (ref 6–20)
BUN/CREAT BLD: ABNORMAL (ref 9–20)
CALCIUM SERPL-MCNC: 8.7 MG/DL (ref 8.6–10.4)
CHLORIDE BLD-SCNC: 103 MMOL/L (ref 98–107)
CO2: 26 MMOL/L (ref 20–31)
CREAT SERPL-MCNC: 0.96 MG/DL (ref 0.7–1.2)
GFR AFRICAN AMERICAN: >60 ML/MIN
GFR NON-AFRICAN AMERICAN: >60 ML/MIN
GFR SERPL CREATININE-BSD FRML MDRD: ABNORMAL ML/MIN/{1.73_M2}
GFR SERPL CREATININE-BSD FRML MDRD: ABNORMAL ML/MIN/{1.73_M2}
GLUCOSE BLD-MCNC: 128 MG/DL (ref 70–99)
POTASSIUM SERPL-SCNC: 4.1 MMOL/L (ref 3.7–5.3)
SODIUM BLD-SCNC: 138 MMOL/L (ref 135–144)

## 2018-10-30 PROCEDURE — 80048 BASIC METABOLIC PNL TOTAL CA: CPT

## 2018-10-30 PROCEDURE — 93459 L HRT ART/GRFT ANGIO: CPT | Performed by: INTERNAL MEDICINE

## 2018-10-30 PROCEDURE — 6370000000 HC RX 637 (ALT 250 FOR IP): Performed by: INTERNAL MEDICINE

## 2018-10-30 PROCEDURE — 36415 COLL VENOUS BLD VENIPUNCTURE: CPT

## 2018-10-30 PROCEDURE — 7100000011 HC PHASE II RECOVERY - ADDTL 15 MIN

## 2018-10-30 PROCEDURE — G0378 HOSPITAL OBSERVATION PER HR: HCPCS

## 2018-10-30 PROCEDURE — 6360000004 HC RX CONTRAST MEDICATION

## 2018-10-30 PROCEDURE — 2709999900 HC NON-CHARGEABLE SUPPLY

## 2018-10-30 PROCEDURE — C1894 INTRO/SHEATH, NON-LASER: HCPCS

## 2018-10-30 PROCEDURE — 6360000002 HC RX W HCPCS

## 2018-10-30 PROCEDURE — 99239 HOSP IP/OBS DSCHRG MGMT >30: CPT | Performed by: INTERNAL MEDICINE

## 2018-10-30 PROCEDURE — C1769 GUIDE WIRE: HCPCS

## 2018-10-30 PROCEDURE — 7100000010 HC PHASE II RECOVERY - FIRST 15 MIN

## 2018-10-30 PROCEDURE — 6370000000 HC RX 637 (ALT 250 FOR IP)

## 2018-10-30 RX ORDER — SODIUM CHLORIDE 9 MG/ML
INJECTION, SOLUTION INTRAVENOUS CONTINUOUS
Status: DISCONTINUED | OUTPATIENT
Start: 2018-10-30 | End: 2018-10-30 | Stop reason: HOSPADM

## 2018-10-30 RX ORDER — ACETAMINOPHEN 325 MG/1
650 TABLET ORAL EVERY 4 HOURS PRN
Status: DISCONTINUED | OUTPATIENT
Start: 2018-10-30 | End: 2018-10-31 | Stop reason: HOSPADM

## 2018-10-30 RX ORDER — SODIUM CHLORIDE 0.9 % (FLUSH) 0.9 %
10 SYRINGE (ML) INJECTION EVERY 12 HOURS SCHEDULED
Status: DISCONTINUED | OUTPATIENT
Start: 2018-10-30 | End: 2018-10-31 | Stop reason: HOSPADM

## 2018-10-30 RX ORDER — SODIUM CHLORIDE 0.9 % (FLUSH) 0.9 %
10 SYRINGE (ML) INJECTION PRN
Status: DISCONTINUED | OUTPATIENT
Start: 2018-10-30 | End: 2018-10-31 | Stop reason: HOSPADM

## 2018-10-30 RX ORDER — PREDNISONE 20 MG/1
20 TABLET ORAL DAILY
Status: ON HOLD | COMMUNITY
Start: 2018-10-28 | End: 2018-10-30 | Stop reason: HOSPADM

## 2018-10-30 RX ORDER — SODIUM CHLORIDE 9 MG/ML
INJECTION, SOLUTION INTRAVENOUS CONTINUOUS
Status: DISCONTINUED | OUTPATIENT
Start: 2018-10-30 | End: 2018-10-30

## 2018-10-30 RX ORDER — ZOLPIDEM TARTRATE 10 MG/1
10 TABLET ORAL NIGHTLY PRN
COMMUNITY
End: 2018-11-30

## 2018-10-30 RX ADMIN — SODIUM CHLORIDE: 9 INJECTION, SOLUTION INTRAVENOUS at 08:25

## 2018-10-30 RX ADMIN — CLONIDINE HYDROCHLORIDE 0.2 MG: 0.2 TABLET ORAL at 06:02

## 2018-10-30 RX ADMIN — ISOSORBIDE MONONITRATE 30 MG: 30 TABLET, EXTENDED RELEASE ORAL at 06:03

## 2018-10-30 RX ADMIN — ACETAMINOPHEN 650 MG: 325 TABLET ORAL at 11:26

## 2018-10-30 RX ADMIN — PANTOPRAZOLE SODIUM 40 MG: 40 TABLET, DELAYED RELEASE ORAL at 06:02

## 2018-10-30 RX ADMIN — HYDRALAZINE HYDROCHLORIDE 50 MG: 50 TABLET, FILM COATED ORAL at 06:03

## 2018-10-30 ASSESSMENT — PAIN SCALES - GENERAL
PAINLEVEL_OUTOF10: 3
PAINLEVEL_OUTOF10: 0
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
## 2018-10-30 NOTE — PROGRESS NOTES
Patient transported to [facility]. [facility] by stretcher via [id number] [facility]. Monitor applied. No distress noted.

## 2018-11-05 NOTE — DISCHARGE SUMMARY
Cardiology     Procedures:     Hospital Course:   Pt admit with chest pain   Cardiac cath March 2017 patent LIMA to LAD and SVG to PDA, occluded SVG to OM and diagonal 1  CABG in 2011    Stress test   Mild reversible perfusion defect within the inferolateral wall mid and   apical segments, suggestive of ischemia.         Plan cardiac cath am       Disposition:   Home    Discharged Condition:  Stable     Discharge Medications:       Ronn Mccord   Home Medication Instructions WRA:[id number]    Printed on:11/05/18 0120   Medication Information                      albuterol sulfate HFA (PROAIR HFA) 108 (90 Base) MCG/ACT inhaler  Inhale 2 puffs into the lungs every 6 hours as needed for Wheezing             atorvastatin (LIPITOR) 20 MG tablet  take 1 tablet by mouth once daily             cloNIDine (CATAPRES) 0.2 MG tablet  take 1 tablet by mouth twice a day             furosemide (LASIX) 20 MG tablet  take 1 tablet by mouth once daily             hydrALAZINE (APRESOLINE) 50 MG tablet  take 2 tablets by mouth twice a day             ibuprofen (ADVIL;MOTRIN) 800 MG tablet  take 1 tablet by mouth every 8 hours if needed for pain             isosorbide mononitrate (IMDUR) 30 MG extended release tablet  Take 1 tablet by mouth daily             metoprolol tartrate (LOPRESSOR) 50 MG tablet  Take 1 tablet by mouth 2 times daily             Multiple Vitamin (MULTIVITAMIN) tablet  take 1 tablet by mouth once daily             naproxen (NAPROXEN) 500 MG EC tablet  Take 1 tablet by mouth 2 times daily (with meals)             NITROSTAT 0.4 MG SL tablet  Place 0.4 mg under the tongue every 5 minutes as needed for Chest pain              pantoprazole (PROTONIX) 40 MG tablet  Take 1 tablet by mouth daily             RA ASPIRIN EC 81 MG EC tablet  take 1 tablet by mouth once daily             RA NICOTINE 2 MG gum  chew 1 by mouth every 3 hours if needed for SMOKING CESSATION             tamsulosin (FLOMAX) 0.4 MG

## 2018-11-16 RX ORDER — METOPROLOL TARTRATE 50 MG/1
50 TABLET, FILM COATED ORAL 2 TIMES DAILY
Qty: 60 TABLET | Refills: 0 | Status: SHIPPED | OUTPATIENT
Start: 2018-11-16 | End: 2019-01-10 | Stop reason: SDUPTHER

## 2018-11-16 RX ORDER — ISOSORBIDE MONONITRATE 30 MG/1
30 TABLET, EXTENDED RELEASE ORAL DAILY
Qty: 30 TABLET | Refills: 0 | Status: SHIPPED | OUTPATIENT
Start: 2018-11-16 | End: 2018-12-27 | Stop reason: SDUPTHER

## 2018-11-16 NOTE — TELEPHONE ENCOUNTER
Medication: IMDUR  METOPROLOL  Last visit: 07/26/18  Next visit: 11/30/2018  Last refill: 10/19/18  Pharmacy: [facility] [address]

## 2018-11-30 ENCOUNTER — OFFICE VISIT (OUTPATIENT)
Dept: INTERNAL MEDICINE CLINIC | Age: 53
End: 2018-11-30
Payer: COMMERCIAL

## 2018-11-30 VITALS
DIASTOLIC BLOOD PRESSURE: 52 MMHG | OXYGEN SATURATION: 96 % | SYSTOLIC BLOOD PRESSURE: 99 MMHG | HEART RATE: 63 BPM | WEIGHT: 237 LBS | HEIGHT: 69 IN | BODY MASS INDEX: 35.1 KG/M2

## 2018-11-30 DIAGNOSIS — R53.82 CHRONIC FATIGUE: ICD-10-CM

## 2018-11-30 DIAGNOSIS — I10 ESSENTIAL HYPERTENSION: ICD-10-CM

## 2018-11-30 DIAGNOSIS — I25.10 CORONARY ARTERY DISEASE INVOLVING NATIVE CORONARY ARTERY OF NATIVE HEART WITHOUT ANGINA PECTORIS: Primary | ICD-10-CM

## 2018-11-30 DIAGNOSIS — Z09 HOSPITAL DISCHARGE FOLLOW-UP: ICD-10-CM

## 2018-11-30 DIAGNOSIS — E78.5 HYPERLIPIDEMIA WITH TARGET LDL LESS THAN 70: ICD-10-CM

## 2018-11-30 DIAGNOSIS — Z71.6 TOBACCO ABUSE COUNSELING: ICD-10-CM

## 2018-11-30 DIAGNOSIS — J44.9 MIXED TYPE COPD (CHRONIC OBSTRUCTIVE PULMONARY DISEASE) (HCC): ICD-10-CM

## 2018-11-30 PROCEDURE — 3017F COLORECTAL CA SCREEN DOC REV: CPT | Performed by: PHYSICIAN ASSISTANT

## 2018-11-30 PROCEDURE — 4004F PT TOBACCO SCREEN RCVD TLK: CPT | Performed by: PHYSICIAN ASSISTANT

## 2018-11-30 PROCEDURE — G8598 ASA/ANTIPLAT THER USED: HCPCS | Performed by: PHYSICIAN ASSISTANT

## 2018-11-30 PROCEDURE — 99214 OFFICE O/P EST MOD 30 MIN: CPT | Performed by: PHYSICIAN ASSISTANT

## 2018-11-30 PROCEDURE — G8427 DOCREV CUR MEDS BY ELIG CLIN: HCPCS | Performed by: PHYSICIAN ASSISTANT

## 2018-11-30 PROCEDURE — G8417 CALC BMI ABV UP PARAM F/U: HCPCS | Performed by: PHYSICIAN ASSISTANT

## 2018-11-30 PROCEDURE — 3023F SPIROM DOC REV: CPT | Performed by: PHYSICIAN ASSISTANT

## 2018-11-30 PROCEDURE — 1111F DSCHRG MED/CURRENT MED MERGE: CPT | Performed by: PHYSICIAN ASSISTANT

## 2018-11-30 PROCEDURE — G8484 FLU IMMUNIZE NO ADMIN: HCPCS | Performed by: PHYSICIAN ASSISTANT

## 2018-11-30 PROCEDURE — G8926 SPIRO NO PERF OR DOC: HCPCS | Performed by: PHYSICIAN ASSISTANT

## 2018-11-30 RX ORDER — HYDRALAZINE HYDROCHLORIDE 50 MG/1
50 TABLET, FILM COATED ORAL 2 TIMES DAILY
Qty: 120 TABLET | Refills: 3 | Status: SHIPPED | OUTPATIENT
Start: 2018-11-30 | End: 2019-08-02 | Stop reason: SDUPTHER

## 2018-11-30 RX ORDER — HYDROXYZINE HYDROCHLORIDE 25 MG/1
25 TABLET, FILM COATED ORAL 3 TIMES DAILY PRN
COMMUNITY
End: 2019-07-03 | Stop reason: SDUPTHER
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
## 2018-12-14 ENCOUNTER — OFFICE VISIT (OUTPATIENT)
Dept: INTERNAL MEDICINE CLINIC | Age: 53
End: 2018-12-14
Payer: COMMERCIAL

## 2018-12-14 VITALS
OXYGEN SATURATION: 94 % | DIASTOLIC BLOOD PRESSURE: 64 MMHG | SYSTOLIC BLOOD PRESSURE: 122 MMHG | HEART RATE: 62 BPM | BODY MASS INDEX: 36.73 KG/M2 | WEIGHT: 248 LBS | HEIGHT: 69 IN

## 2018-12-14 DIAGNOSIS — I10 ESSENTIAL HYPERTENSION: ICD-10-CM

## 2018-12-14 DIAGNOSIS — Z87.891 PERSONAL HISTORY OF TOBACCO USE: Primary | ICD-10-CM

## 2018-12-14 DIAGNOSIS — J44.9 MIXED TYPE COPD (CHRONIC OBSTRUCTIVE PULMONARY DISEASE) (HCC): ICD-10-CM

## 2018-12-14 DIAGNOSIS — E78.5 HYPERLIPIDEMIA WITH TARGET LDL LESS THAN 70: ICD-10-CM

## 2018-12-14 DIAGNOSIS — K21.00 GASTROESOPHAGEAL REFLUX DISEASE WITH ESOPHAGITIS: ICD-10-CM

## 2018-12-14 DIAGNOSIS — R94.2 RESTRICTIVE PATTERN PRESENT ON PULMONARY FUNCTION TESTING: ICD-10-CM

## 2018-12-14 PROCEDURE — G8598 ASA/ANTIPLAT THER USED: HCPCS | Performed by: PHYSICIAN ASSISTANT

## 2018-12-14 PROCEDURE — G8926 SPIRO NO PERF OR DOC: HCPCS | Performed by: PHYSICIAN ASSISTANT

## 2018-12-14 PROCEDURE — G8417 CALC BMI ABV UP PARAM F/U: HCPCS | Performed by: PHYSICIAN ASSISTANT

## 2018-12-14 PROCEDURE — G8427 DOCREV CUR MEDS BY ELIG CLIN: HCPCS | Performed by: PHYSICIAN ASSISTANT

## 2018-12-14 PROCEDURE — G8484 FLU IMMUNIZE NO ADMIN: HCPCS | Performed by: PHYSICIAN ASSISTANT

## 2018-12-14 PROCEDURE — 3017F COLORECTAL CA SCREEN DOC REV: CPT | Performed by: PHYSICIAN ASSISTANT

## 2018-12-14 PROCEDURE — 4004F PT TOBACCO SCREEN RCVD TLK: CPT | Performed by: PHYSICIAN ASSISTANT

## 2018-12-14 PROCEDURE — G0296 VISIT TO DETERM LDCT ELIG: HCPCS | Performed by: PHYSICIAN ASSISTANT

## 2018-12-14 PROCEDURE — 3023F SPIROM DOC REV: CPT | Performed by: PHYSICIAN ASSISTANT

## 2018-12-14 PROCEDURE — 99214 OFFICE O/P EST MOD 30 MIN: CPT | Performed by: PHYSICIAN ASSISTANT

## 2018-12-14 RX ORDER — ALBUTEROL SULFATE 90 UG/1
2 AEROSOL, METERED RESPIRATORY (INHALATION) EVERY 6 HOURS PRN
Qty: 8.5 INHALER | Refills: 3 | Status: SHIPPED | OUTPATIENT
Start: 2018-12-14 | End: 2020-01-10

## 2018-12-14 ASSESSMENT — ENCOUNTER SYMPTOMS
EYE PAIN: 0
NAUSEA: 0
COLOR CHANGE: 0
BACK PAIN: 0
ABDOMINAL PAIN: 0
EYE DISCHARGE: 0
BLOOD IN STOOL: 0
DIARRHEA: 0
RHINORRHEA: 0
SHORTNESS OF BREATH: 0
EYE ITCHING: 0
CHEST TIGHTNESS: 0
CONSTIPATION: 0
COUGH: 0
SORE THROAT: 0
VOMITING: 0
SINUS PAIN: 0

## 2018-12-19 ENCOUNTER — TELEPHONE (OUTPATIENT)
Dept: ONCOLOGY | Age: 53
End: 2018-12-19

## 2018-12-21 ENCOUNTER — HOSPITAL ENCOUNTER (OUTPATIENT)
Dept: CT IMAGING | Age: 53
Discharge: HOME | End: 2018-12-23
Payer: COMMERCIAL

## 2018-12-21 DIAGNOSIS — Z87.891 PERSONAL HISTORY OF TOBACCO USE: ICD-10-CM

## 2018-12-21 PROCEDURE — G0297 LDCT FOR LUNG CA SCREEN: HCPCS

## 2018-12-31 RX ORDER — ISOSORBIDE MONONITRATE 30 MG/1
TABLET, EXTENDED RELEASE ORAL
Qty: 30 TABLET | Refills: 5 | Status: SHIPPED | OUTPATIENT
Start: 2018-12-31 | End: 2019-07-19 | Stop reason: SDUPTHER

## 2019-01-10 DIAGNOSIS — I10 ESSENTIAL HYPERTENSION: Primary | ICD-10-CM

## 2019-01-10 RX ORDER — METOPROLOL TARTRATE 50 MG/1
TABLET, FILM COATED ORAL
Qty: 60 TABLET | Refills: 0 | Status: SHIPPED | OUTPATIENT
Start: 2019-01-10 | End: 2019-08-31 | Stop reason: SDUPTHER

## 2019-01-18 RX ORDER — TIOTROPIUM BROMIDE INHALATION SPRAY 1.56 UG/1
SPRAY, METERED RESPIRATORY (INHALATION)
Qty: 12 G | Refills: 5 | Status: SHIPPED | OUTPATIENT
Start: 2019-01-18 | End: 2019-01-24 | Stop reason: SDUPTHER

## 2019-02-21 DIAGNOSIS — Z29.9 PREVENTIVE MEASURE: ICD-10-CM

## 2019-02-21 DIAGNOSIS — I25.10 CORONARY ARTERY DISEASE INVOLVING NATIVE CORONARY ARTERY OF NATIVE HEART WITHOUT ANGINA PECTORIS: ICD-10-CM

## 2019-02-21 DIAGNOSIS — K21.9 GASTROESOPHAGEAL REFLUX DISEASE, ESOPHAGITIS PRESENCE NOT SPECIFIED: ICD-10-CM

## 2019-02-21 RX ORDER — ASPIRIN 81 MG/1
81 TABLET ORAL DAILY
Qty: 90 TABLET | Refills: 3 | Status: SHIPPED | OUTPATIENT
Start: 2019-02-21 | End: 2019-12-27

## 2019-02-21 RX ORDER — PANTOPRAZOLE SODIUM 40 MG/1
40 TABLET, DELAYED RELEASE ORAL DAILY
Qty: 90 TABLET | Refills: 3 | Status: SHIPPED | OUTPATIENT
Start: 2019-02-21 | End: 2020-02-07 | Stop reason: SDUPTHER

## 2019-02-21 RX ORDER — MULTIVITAMIN WITH FOLIC ACID 400 MCG
1 TABLET ORAL DAILY
Qty: 90 TABLET | Refills: 3 | Status: SHIPPED | OUTPATIENT
Start: 2019-02-21 | End: 2020-09-25

## 2019-03-15 ENCOUNTER — OFFICE VISIT (OUTPATIENT)
Dept: INTERNAL MEDICINE CLINIC | Age: 54
End: 2019-03-15
Payer: COMMERCIAL

## 2019-03-15 VITALS
BODY MASS INDEX: 36.14 KG/M2 | WEIGHT: 244 LBS | DIASTOLIC BLOOD PRESSURE: 80 MMHG | SYSTOLIC BLOOD PRESSURE: 132 MMHG | HEART RATE: 66 BPM | HEIGHT: 69 IN | OXYGEN SATURATION: 96 %

## 2019-03-15 DIAGNOSIS — J44.9 MIXED TYPE COPD (CHRONIC OBSTRUCTIVE PULMONARY DISEASE) (HCC): ICD-10-CM

## 2019-03-15 DIAGNOSIS — R73.03 PREDIABETES: ICD-10-CM

## 2019-03-15 DIAGNOSIS — E78.5 HYPERLIPIDEMIA WITH TARGET LDL LESS THAN 70: ICD-10-CM

## 2019-03-15 DIAGNOSIS — Z71.6 TOBACCO ABUSE COUNSELING: ICD-10-CM

## 2019-03-15 DIAGNOSIS — G89.29 CHRONIC RIGHT SHOULDER PAIN: ICD-10-CM

## 2019-03-15 DIAGNOSIS — M25.511 CHRONIC RIGHT SHOULDER PAIN: ICD-10-CM

## 2019-03-15 DIAGNOSIS — I10 ESSENTIAL HYPERTENSION: Primary | ICD-10-CM

## 2019-03-15 LAB — HBA1C MFR BLD: 5.3 %

## 2019-03-15 PROCEDURE — 83036 HEMOGLOBIN GLYCOSYLATED A1C: CPT | Performed by: PHYSICIAN ASSISTANT

## 2019-03-15 PROCEDURE — G8484 FLU IMMUNIZE NO ADMIN: HCPCS | Performed by: PHYSICIAN ASSISTANT

## 2019-03-15 PROCEDURE — 99214 OFFICE O/P EST MOD 30 MIN: CPT | Performed by: PHYSICIAN ASSISTANT

## 2019-03-15 PROCEDURE — 4004F PT TOBACCO SCREEN RCVD TLK: CPT | Performed by: PHYSICIAN ASSISTANT

## 2019-03-15 PROCEDURE — 3023F SPIROM DOC REV: CPT | Performed by: PHYSICIAN ASSISTANT

## 2019-03-15 PROCEDURE — 3017F COLORECTAL CA SCREEN DOC REV: CPT | Performed by: PHYSICIAN ASSISTANT

## 2019-03-15 PROCEDURE — G8598 ASA/ANTIPLAT THER USED: HCPCS | Performed by: PHYSICIAN ASSISTANT

## 2019-03-15 PROCEDURE — G8427 DOCREV CUR MEDS BY ELIG CLIN: HCPCS | Performed by: PHYSICIAN ASSISTANT

## 2019-03-15 PROCEDURE — G8417 CALC BMI ABV UP PARAM F/U: HCPCS | Performed by: PHYSICIAN ASSISTANT

## 2019-03-15 PROCEDURE — G8926 SPIRO NO PERF OR DOC: HCPCS | Performed by: PHYSICIAN ASSISTANT

## 2019-03-18 ASSESSMENT — ENCOUNTER SYMPTOMS
EYE DISCHARGE: 0
EYE PAIN: 0
CONSTIPATION: 0
NAUSEA: 0
SORE THROAT: 0
BLOOD IN STOOL: 0
SINUS PAIN: 0
EYE ITCHING: 0
COLOR CHANGE: 0
ABDOMINAL PAIN: 0
RHINORRHEA: 0
DIARRHEA: 0
VOMITING: 0
BACK PAIN: 0
WHEEZING: 0
COUGH: 0
CHEST TIGHTNESS: 0
SHORTNESS OF BREATH: 1

## 2019-05-06 DIAGNOSIS — J44.9 CHRONIC OBSTRUCTIVE PULMONARY DISEASE, UNSPECIFIED COPD TYPE (HCC): ICD-10-CM

## 2019-05-16 ENCOUNTER — HOSPITAL ENCOUNTER (OUTPATIENT)
Dept: GENERAL RADIOLOGY | Age: 54
Discharge: HOME | End: 2019-05-18
Payer: COMMERCIAL

## 2019-05-16 ENCOUNTER — HOSPITAL ENCOUNTER (OUTPATIENT)
Age: 54
Discharge: HOME | End: 2019-05-16
Payer: COMMERCIAL

## 2019-05-16 ENCOUNTER — HOSPITAL ENCOUNTER (OUTPATIENT)
Age: 54
Discharge: HOME | End: 2019-05-18
Payer: COMMERCIAL

## 2019-05-16 DIAGNOSIS — E78.5 HYPERLIPIDEMIA WITH TARGET LDL LESS THAN 70: ICD-10-CM

## 2019-05-16 DIAGNOSIS — M25.511 CHRONIC RIGHT SHOULDER PAIN: ICD-10-CM

## 2019-05-16 DIAGNOSIS — G89.29 CHRONIC RIGHT SHOULDER PAIN: ICD-10-CM

## 2019-05-16 DIAGNOSIS — Z09 HOSPITAL DISCHARGE FOLLOW-UP: ICD-10-CM

## 2019-05-16 DIAGNOSIS — R53.82 CHRONIC FATIGUE: ICD-10-CM

## 2019-05-16 LAB
ABSOLUTE EOS #: 0.2 K/UL (ref 0–0.4)
ABSOLUTE IMMATURE GRANULOCYTE: ABNORMAL K/UL (ref 0–0.3)
ABSOLUTE LYMPH #: 1.2 K/UL (ref 1–4.8)
ABSOLUTE MONO #: 0.5 K/UL (ref 0.1–1.3)
ALBUMIN SERPL-MCNC: 3.7 G/DL (ref 3.5–5.2)
ALBUMIN/GLOBULIN RATIO: ABNORMAL (ref 1–2.5)
ALP BLD-CCNC: 145 U/L (ref 40–129)
ALT SERPL-CCNC: 16 U/L (ref 5–41)
ANION GAP SERPL CALCULATED.3IONS-SCNC: 12 MMOL/L (ref 9–17)
AST SERPL-CCNC: 18 U/L
BASOPHILS # BLD: 1 % (ref 0–2)
BASOPHILS ABSOLUTE: 0.1 K/UL (ref 0–0.2)
BILIRUB SERPL-MCNC: 0.97 MG/DL (ref 0.3–1.2)
BUN BLDV-MCNC: 11 MG/DL (ref 6–20)
BUN/CREAT BLD: ABNORMAL (ref 9–20)
CALCIUM SERPL-MCNC: 8.8 MG/DL (ref 8.6–10.4)
CHLORIDE BLD-SCNC: 104 MMOL/L (ref 98–107)
CHOLESTEROL/HDL RATIO: 3.6
CHOLESTEROL: 116 MG/DL
CO2: 25 MMOL/L (ref 20–31)
CREAT SERPL-MCNC: 1.02 MG/DL (ref 0.7–1.2)
DIFFERENTIAL TYPE: ABNORMAL
EOSINOPHILS RELATIVE PERCENT: 4 % (ref 0–4)
GFR AFRICAN AMERICAN: >60 ML/MIN
GFR NON-AFRICAN AMERICAN: >60 ML/MIN
GFR SERPL CREATININE-BSD FRML MDRD: ABNORMAL ML/MIN/{1.73_M2}
GFR SERPL CREATININE-BSD FRML MDRD: ABNORMAL ML/MIN/{1.73_M2}
GLUCOSE BLD-MCNC: 99 MG/DL (ref 70–99)
HCT VFR BLD CALC: 46.1 % (ref 41–53)
HDLC SERPL-MCNC: 32 MG/DL
HEMOGLOBIN: 15.5 G/DL (ref 13.5–17.5)
IMMATURE GRANULOCYTES: ABNORMAL %
LDL CHOLESTEROL: 58 MG/DL (ref 0–130)
LYMPHOCYTES # BLD: 22 % (ref 24–44)
MCH RBC QN AUTO: 27.8 PG (ref 26–34)
MCHC RBC AUTO-ENTMCNC: 33.6 G/DL (ref 31–37)
MCV RBC AUTO: 82.8 FL (ref 80–100)
MONOCYTES # BLD: 9 % (ref 1–7)
NRBC AUTOMATED: ABNORMAL PER 100 WBC
PDW BLD-RTO: 15.7 % (ref 11.5–14.9)
PLATELET # BLD: 154 K/UL (ref 150–450)
PLATELET ESTIMATE: ABNORMAL
PMV BLD AUTO: 9.5 FL (ref 6–12)
POTASSIUM SERPL-SCNC: 4.1 MMOL/L (ref 3.7–5.3)
RBC # BLD: 5.57 M/UL (ref 4.5–5.9)
RBC # BLD: ABNORMAL 10*6/UL
SEG NEUTROPHILS: 64 % (ref 36–66)
SEGMENTED NEUTROPHILS ABSOLUTE COUNT: 3.6 K/UL (ref 1.3–9.1)
SODIUM BLD-SCNC: 141 MMOL/L (ref 135–144)
TOTAL PROTEIN: 7.1 G/DL (ref 6.4–8.3)
TRIGL SERPL-MCNC: 131 MG/DL
TSH SERPL DL<=0.05 MIU/L-ACNC: 3.17 MIU/L (ref 0.3–5)
VLDLC SERPL CALC-MCNC: ABNORMAL MG/DL (ref 1–30)
WBC # BLD: 5.6 K/UL (ref 3.5–11)
WBC # BLD: ABNORMAL 10*3/UL

## 2019-05-16 PROCEDURE — 80061 LIPID PANEL: CPT

## 2019-05-16 PROCEDURE — 73030 X-RAY EXAM OF SHOULDER: CPT

## 2019-05-16 PROCEDURE — 80053 COMPREHEN METABOLIC PANEL: CPT

## 2019-05-16 PROCEDURE — 36415 COLL VENOUS BLD VENIPUNCTURE: CPT

## 2019-05-16 PROCEDURE — 85025 COMPLETE CBC W/AUTO DIFF WBC: CPT

## 2019-05-16 PROCEDURE — 84443 ASSAY THYROID STIM HORMONE: CPT

## 2019-07-03 ENCOUNTER — OFFICE VISIT (OUTPATIENT)
Dept: INTERNAL MEDICINE CLINIC | Age: 54
End: 2019-07-03
Payer: COMMERCIAL

## 2019-07-03 VITALS
OXYGEN SATURATION: 96 % | SYSTOLIC BLOOD PRESSURE: 138 MMHG | HEART RATE: 67 BPM | DIASTOLIC BLOOD PRESSURE: 80 MMHG | BODY MASS INDEX: 35.76 KG/M2 | WEIGHT: 241.4 LBS | HEIGHT: 69 IN

## 2019-07-03 DIAGNOSIS — R19.5 POSITIVE FIT (FECAL IMMUNOCHEMICAL TEST): ICD-10-CM

## 2019-07-03 DIAGNOSIS — I10 ESSENTIAL HYPERTENSION: Primary | ICD-10-CM

## 2019-07-03 DIAGNOSIS — F32.A DEPRESSION, UNSPECIFIED DEPRESSION TYPE: ICD-10-CM

## 2019-07-03 DIAGNOSIS — F41.1 GAD (GENERALIZED ANXIETY DISORDER): ICD-10-CM

## 2019-07-03 DIAGNOSIS — K21.00 GASTROESOPHAGEAL REFLUX DISEASE WITH ESOPHAGITIS: ICD-10-CM

## 2019-07-03 DIAGNOSIS — N40.0 BENIGN PROSTATIC HYPERPLASIA WITHOUT LOWER URINARY TRACT SYMPTOMS: ICD-10-CM

## 2019-07-03 DIAGNOSIS — G47.00 INSOMNIA, UNSPECIFIED TYPE: ICD-10-CM

## 2019-07-03 DIAGNOSIS — R21 RASH: ICD-10-CM

## 2019-07-03 PROCEDURE — G8417 CALC BMI ABV UP PARAM F/U: HCPCS | Performed by: INTERNAL MEDICINE

## 2019-07-03 PROCEDURE — 3017F COLORECTAL CA SCREEN DOC REV: CPT | Performed by: INTERNAL MEDICINE

## 2019-07-03 PROCEDURE — 4004F PT TOBACCO SCREEN RCVD TLK: CPT | Performed by: INTERNAL MEDICINE

## 2019-07-03 PROCEDURE — G8598 ASA/ANTIPLAT THER USED: HCPCS | Performed by: INTERNAL MEDICINE

## 2019-07-03 PROCEDURE — 99214 OFFICE O/P EST MOD 30 MIN: CPT | Performed by: INTERNAL MEDICINE

## 2019-07-03 PROCEDURE — G8427 DOCREV CUR MEDS BY ELIG CLIN: HCPCS | Performed by: INTERNAL MEDICINE

## 2019-07-03 RX ORDER — HYDROXYZINE HYDROCHLORIDE 25 MG/1
25 TABLET, FILM COATED ORAL 3 TIMES DAILY PRN
Qty: 90 TABLET | Refills: 2 | Status: SHIPPED | OUTPATIENT
Start: 2019-07-03 | End: 2020-01-29 | Stop reason: SDUPTHER

## 2019-07-03 RX ORDER — DULOXETIN HYDROCHLORIDE 60 MG/1
60 CAPSULE, DELAYED RELEASE ORAL 2 TIMES DAILY
Qty: 60 CAPSULE | Refills: 3 | Status: SHIPPED | OUTPATIENT
Start: 2019-07-03 | End: 2019-11-23 | Stop reason: SDUPTHER

## 2019-07-03 RX ORDER — NICOTINE 21 MG/24HR
1 PATCH, TRANSDERMAL 24 HOURS TRANSDERMAL DAILY
COMMUNITY
End: 2019-10-04

## 2019-07-03 RX ORDER — TAMSULOSIN HYDROCHLORIDE 0.4 MG/1
0.4 CAPSULE ORAL DAILY
COMMUNITY
End: 2019-07-03 | Stop reason: SDUPTHER

## 2019-07-03 RX ORDER — TAMSULOSIN HYDROCHLORIDE 0.4 MG/1
0.4 CAPSULE ORAL DAILY
Qty: 30 CAPSULE | Refills: 2 | Status: SHIPPED | OUTPATIENT
Start: 2019-07-03 | End: 2020-02-05

## 2019-07-03 RX ORDER — DIAPER,BRIEF,INFANT-TODD,DISP
EACH MISCELLANEOUS
Qty: 1 TUBE | Refills: 1 | Status: SHIPPED | OUTPATIENT
Start: 2019-07-03 | End: 2019-07-10

## 2019-07-03 RX ORDER — TRAZODONE HYDROCHLORIDE 100 MG/1
100 TABLET ORAL NIGHTLY
Qty: 30 TABLET | Refills: 2 | Status: SHIPPED | OUTPATIENT
Start: 2019-07-03 | End: 2019-11-04

## 2019-07-03 ASSESSMENT — ENCOUNTER SYMPTOMS
WHEEZING: 0
ABDOMINAL DISTENTION: 0
COUGH: 0
CHEST TIGHTNESS: 0
FACIAL SWELLING: 0
APNEA: 0
COLOR CHANGE: 0
DIARRHEA: 0
BACK PAIN: 0
SHORTNESS OF BREATH: 0
ABDOMINAL PAIN: 0
CONSTIPATION: 0

## 2019-07-03 NOTE — PROGRESS NOTES
Completed    Hepatitis C screen  Addressed       HPI- patient is here for evaluation of Multiple medical problems , He has Depression, GAB , BPH , COPD, CAD s/p CABG , he noticed Rash on his Chest and Abdominal Wall , Noticed 3 weeks , getting worse , No Pain , No new medication , Had similar Rash many years ago.  , No new Perfumes , Medication , he has started new body soap   His FIT test was positive in 3/18 , He did not keep appointment with GI for Colonoscopy, he had Blood in stools , went to ED , in May, 19   He mentioned that his ANxiety is not controlled , likely scratching his skin especially in his sleep     Review of Systems   Constitutional: Negative for activity change, appetite change, chills and diaphoresis. HENT: Negative for congestion, dental problem, ear discharge, facial swelling and hearing loss. Respiratory: Negative for apnea, cough, chest tightness, shortness of breath and wheezing. Cardiovascular: Negative for chest pain and leg swelling. Gastrointestinal: Negative for abdominal distention, abdominal pain, constipation and diarrhea. Genitourinary: Negative for difficulty urinating, dysuria, enuresis, flank pain and frequency. Musculoskeletal: Negative for arthralgias, back pain, gait problem and joint swelling. Skin: Positive for rash (on chest wall and Upper abdomen ). Negative for color change and pallor. Neurological: Negative for dizziness, seizures, facial asymmetry, light-headedness, numbness and headaches. Psychiatric/Behavioral: Positive for sleep disturbance. Negative for agitation, behavioral problems, confusion, decreased concentration and dysphoric mood. The patient is nervous/anxious. Objective:   Physical Exam   Constitutional: He is oriented to person, place, and time. He appears well-developed. Central obesity    HENT:   Head: Normocephalic and atraumatic. Mouth/Throat: No oropharyngeal exudate.    Eyes: Pupils are equal, round, and reactive to light. Conjunctivae are normal. Right eye exhibits no discharge. Left eye exhibits no discharge. No scleral icterus. Neck: Normal range of motion. Neck supple. No JVD present. No tracheal deviation present. No thyromegaly present. Cardiovascular: Normal rate and normal heart sounds. Exam reveals no gallop. No murmur heard. Pulmonary/Chest: Effort normal and breath sounds normal. No stridor. No respiratory distress. He has no wheezes. He has no rales. He exhibits no tenderness. Abdominal: Soft. Bowel sounds are normal. He exhibits no distension. There is no tenderness. There is no rebound and no guarding. Musculoskeletal: Normal range of motion. He exhibits no edema. Neurological: He is alert and oriented to person, place, and time. Skin: Rash (Macular , Eythematous on Chest wall and Upper abdomen ) noted. He is not diaphoretic. Assessment / Plan:   1. Essential hypertension  Controlled     2. Gastroesophageal reflux disease with esophagitis  Stable     3. Benign prostatic hyperplasia without lower urinary tract symptoms    - tamsulosin (FLOMAX) 0.4 MG capsule; Take 1 capsule by mouth daily  Dispense: 30 capsule; Refill: 2    4. Depression, unspecified depression type  - DULoxetine (CYMBALTA) 60 MG extended release capsule; Take 1 capsule by mouth 2 times daily  Dispense: 60 capsule; Refill: 3    5. Insomnia, unspecified type  - traZODone (DESYREL) 100 MG tablet; Take 1 tablet by mouth nightly  Dispense: 30 tablet; Refill: 2    6. GAB (generalized anxiety disorder)  - hydrOXYzine (ATARAX) 25 MG tablet; Take 1 tablet by mouth 3 times daily as needed for Itching  Dispense: 90 tablet; Refill: 2    7. Positive FIT (fecal immunochemical test)  - Ambulatory referral to Gastroenterology  Emphasized the importance of colonoscopy with positive FIT test , patient voiced understanding  8.  Rash  I strongly believe that his rash is from excessive scratching  And advised to follow with his psychiatrist, his

## 2019-07-11 ENCOUNTER — TELEPHONE (OUTPATIENT)
Dept: GASTROENTEROLOGY | Age: 54
End: 2019-07-11

## 2019-07-11 DIAGNOSIS — Z12.11 ENCOUNTER FOR SCREENING COLONOSCOPY: Primary | ICD-10-CM

## 2019-07-11 DIAGNOSIS — R19.5 POSITIVE FIT (FECAL IMMUNOCHEMICAL TEST): ICD-10-CM

## 2019-07-12 RX ORDER — POLYETHYLENE GLYCOL 3350 17 G/17G
POWDER, FOR SOLUTION ORAL
Qty: 255 G | Refills: 0 | Status: SHIPPED | OUTPATIENT
Start: 2019-07-12 | End: 2019-11-04

## 2019-07-19 RX ORDER — ISOSORBIDE MONONITRATE 30 MG/1
TABLET, EXTENDED RELEASE ORAL
Qty: 60 TABLET | Refills: 11 | Status: SHIPPED | OUTPATIENT
Start: 2019-07-19 | End: 2019-08-01 | Stop reason: SDUPTHER

## 2019-07-30 ENCOUNTER — HOSPITAL ENCOUNTER (OUTPATIENT)
Age: 54
Setting detail: OUTPATIENT SURGERY
Discharge: HOME | End: 2019-07-30
Attending: INTERNAL MEDICINE | Admitting: INTERNAL MEDICINE
Payer: COMMERCIAL

## 2019-07-30 ENCOUNTER — ANESTHESIA EVENT (OUTPATIENT)
Dept: ENDOSCOPY | Age: 54
End: 2019-07-30
Payer: COMMERCIAL

## 2019-07-30 ENCOUNTER — ANESTHESIA (OUTPATIENT)
Dept: ENDOSCOPY | Age: 54
End: 2019-07-30
Payer: COMMERCIAL

## 2019-07-30 VITALS
WEIGHT: 240 LBS | OXYGEN SATURATION: 95 % | SYSTOLIC BLOOD PRESSURE: 139 MMHG | HEIGHT: 69 IN | RESPIRATION RATE: 28 BRPM | DIASTOLIC BLOOD PRESSURE: 86 MMHG | HEART RATE: 69 BPM | TEMPERATURE: 98.1 F | BODY MASS INDEX: 35.55 KG/M2

## 2019-07-30 VITALS — SYSTOLIC BLOOD PRESSURE: 116 MMHG | DIASTOLIC BLOOD PRESSURE: 68 MMHG | OXYGEN SATURATION: 98 %

## 2019-07-30 PROCEDURE — 7100000001 HC PACU RECOVERY - ADDTL 15 MIN: Performed by: INTERNAL MEDICINE

## 2019-07-30 PROCEDURE — 45380 COLONOSCOPY AND BIOPSY: CPT | Performed by: INTERNAL MEDICINE

## 2019-07-30 PROCEDURE — 3609010600 HC COLONOSCOPY POLYPECTOMY SNARE/COLD BIOPSY: Performed by: INTERNAL MEDICINE

## 2019-07-30 PROCEDURE — 88305 TISSUE EXAM BY PATHOLOGIST: CPT

## 2019-07-30 PROCEDURE — 3700000001 HC ADD 15 MINUTES (ANESTHESIA): Performed by: INTERNAL MEDICINE

## 2019-07-30 PROCEDURE — 6360000002 HC RX W HCPCS: Performed by: NURSE ANESTHETIST, CERTIFIED REGISTERED

## 2019-07-30 PROCEDURE — 45385 COLONOSCOPY W/LESION REMOVAL: CPT | Performed by: INTERNAL MEDICINE

## 2019-07-30 PROCEDURE — 2580000003 HC RX 258: Performed by: ANESTHESIOLOGY

## 2019-07-30 PROCEDURE — 2709999900 HC NON-CHARGEABLE SUPPLY: Performed by: INTERNAL MEDICINE

## 2019-07-30 PROCEDURE — 7100000000 HC PACU RECOVERY - FIRST 15 MIN: Performed by: INTERNAL MEDICINE

## 2019-07-30 PROCEDURE — 3700000000 HC ANESTHESIA ATTENDED CARE: Performed by: INTERNAL MEDICINE

## 2019-07-30 PROCEDURE — 2500000003 HC RX 250 WO HCPCS: Performed by: NURSE ANESTHETIST, CERTIFIED REGISTERED

## 2019-07-30 RX ORDER — PROPOFOL 10 MG/ML
INJECTION, EMULSION INTRAVENOUS CONTINUOUS PRN
Status: DISCONTINUED | OUTPATIENT
Start: 2019-07-30 | End: 2019-07-30 | Stop reason: SDUPTHER

## 2019-07-30 RX ORDER — SODIUM CHLORIDE, SODIUM LACTATE, POTASSIUM CHLORIDE, CALCIUM CHLORIDE 600; 310; 30; 20 MG/100ML; MG/100ML; MG/100ML; MG/100ML
INJECTION, SOLUTION INTRAVENOUS CONTINUOUS
Status: DISCONTINUED | OUTPATIENT
Start: 2019-07-30 | End: 2019-07-30 | Stop reason: HOSPADM

## 2019-07-30 RX ORDER — GLYCOPYRROLATE 1 MG/5 ML
SYRINGE (ML) INTRAVENOUS PRN
Status: DISCONTINUED | OUTPATIENT
Start: 2019-07-30 | End: 2019-07-30 | Stop reason: SDUPTHER

## 2019-07-30 RX ORDER — PROPOFOL 10 MG/ML
INJECTION, EMULSION INTRAVENOUS PRN
Status: DISCONTINUED | OUTPATIENT
Start: 2019-07-30 | End: 2019-07-30 | Stop reason: SDUPTHER

## 2019-07-30 RX ADMIN — PROPOFOL 125 MCG/KG/MIN: 10 INJECTION, EMULSION INTRAVENOUS at 10:41

## 2019-07-30 RX ADMIN — PROPOFOL 40 MG: 10 INJECTION, EMULSION INTRAVENOUS at 10:41

## 2019-07-30 RX ADMIN — Medication 0.2 MG: at 10:47

## 2019-07-30 RX ADMIN — SODIUM CHLORIDE, POTASSIUM CHLORIDE, SODIUM LACTATE AND CALCIUM CHLORIDE: 600; 310; 30; 20 INJECTION, SOLUTION INTRAVENOUS at 10:17

## 2019-07-30 ASSESSMENT — PULMONARY FUNCTION TESTS
PIF_VALUE: 0
PIF_VALUE: 1
PIF_VALUE: 0
PIF_VALUE: 1
PIF_VALUE: 0

## 2019-07-30 ASSESSMENT — LIFESTYLE VARIABLES: SMOKING_STATUS: 1

## 2019-07-30 ASSESSMENT — PAIN SCALES - GENERAL: PAINLEVEL_OUTOF10: 0

## 2019-07-30 ASSESSMENT — ENCOUNTER SYMPTOMS: SHORTNESS OF BREATH: 1

## 2019-07-30 ASSESSMENT — PAIN - FUNCTIONAL ASSESSMENT: PAIN_FUNCTIONAL_ASSESSMENT: 0-10

## 2019-07-30 NOTE — H&P
Physical activity:     Days per week: None     Minutes per session: None    Stress: None   Relationships    Social connections:     Talks on phone: None     Gets together: None     Attends Advent service: None     Active member of club or organization: None     Attends meetings of clubs or organizations: None     Relationship status: None    Intimate partner violence:     Fear of current or ex partner: None     Emotionally abused: None     Physically abused: None     Forced sexual activity: None   Other Topics Concern    None   Social History Narrative    None           REVIEW OF SYSTEMS      Allergies   Allergen Reactions    Morphine Itching       No current facility-administered medications on file prior to encounter. Current Outpatient Medications on File Prior to Encounter   Medication Sig Dispense Refill    polyethylene glycol (GLYCOLAX) powder Use as directed by following your patient instructions given by office. 255 g 0    bisacodyl (BISACODYL) 5 MG EC tablet Take 2 tabs the day before colonoscopy as directed on bowel prep instructions given by physician office.  2 tablet 0    nicotine (NICODERM CQ) 14 MG/24HR Place 1 patch onto the skin daily      tamsulosin (FLOMAX) 0.4 MG capsule Take 1 capsule by mouth daily 30 capsule 2    hydrOXYzine (ATARAX) 25 MG tablet Take 1 tablet by mouth 3 times daily as needed for Itching 90 tablet 2    DULoxetine (CYMBALTA) 60 MG extended release capsule Take 1 capsule by mouth 2 times daily 60 capsule 3    traZODone (DESYREL) 100 MG tablet Take 1 tablet by mouth nightly 30 tablet 2    nicotine polacrilex (RA NICOTINE) 2 MG gum chew AND PARK 1 PIECE OF GUM EVERY 3 HOURS IF NEEDED FOR SMOKING CESSATION 110 each 3    pantoprazole (PROTONIX) 40 MG tablet Take 1 tablet by mouth daily 90 tablet 3    Multiple Vitamin (MULTIVITAMIN) tablet Take 1 tablet by mouth daily 90 tablet 3    metoprolol tartrate (LOPRESSOR) 50 MG tablet take 1 tablet by mouth twice a day 60 tablet 0    hydrALAZINE (APRESOLINE) 50 MG tablet Take 1 tablet by mouth 2 times daily 120 tablet 3    ibuprofen (ADVIL;MOTRIN) 800 MG tablet take 1 tablet by mouth every 8 hours if needed for pain 30 tablet 1    cloNIDine (CATAPRES) 0.2 MG tablet take 1 tablet by mouth twice a day 60 tablet 3    atorvastatin (LIPITOR) 20 MG tablet take 1 tablet by mouth once daily 30 tablet 11    aspirin (RA ASPIRIN EC) 81 MG EC tablet Take 1 tablet by mouth daily 90 tablet 3    tiotropium (SPIRIVA RESPIMAT) 1.25 MCG/ACT AERS inhaler INHALE TWO PUFFS BY MOUTH ONCE A DAY 12 g 5    albuterol sulfate HFA (PROAIR HFA) 108 (90 Base) MCG/ACT inhaler Inhale 2 puffs into the lungs every 6 hours as needed for Wheezing 8.5 Inhaler 3    NITROSTAT 0.4 MG SL tablet Place 0.4 mg under the tongue every 5 minutes as needed for Chest pain          Negative except for what is mentioned in the HPI. GENERAL PHYSICAL EXAM     Vitals: /86   Pulse 66   Temp 97.5 °F (36.4 °C) (Oral)   Resp 18   Ht 5' 9\" (1.753 m)   Wt 240 lb (108.9 kg)   SpO2 95%   BMI 35.44 kg/m²  Body mass index is 35.44 kg/m². GENERAL APPEARANCE:   Camila Downing is 62 y.o.,  male, moderately obese, nourished, conscious, alert. Does not appear to be distress or pain at this time. SKIN:  Warm, dry, no cyanosis or jaundice. HEAD:  Normocephalic, atraumatic, no swelling or tenderness. EYES:  Pupils equal, reactive to light. EARS:  No discharge, no marked hearing loss. NOSE:  No rhinorrhea, epistaxis or septal deformity. THROAT:  Not congested. No ulceration bleeding or discharge. NECK:  No stiffness, trachea central.  No palpable masses or L.N.                 CHEST:  Symmetrical and equal on expansion. HEART:  RRR S1 > S2. No audible murmurs or gallops.                  LUNGS:  Equal on expansion, normal breath sounds. No adventitious sounds. ABDOMEN:  Obese. Soft on palpation. No dysphagia, No localized tenderness. No guarding or rigidity. No palpable hepatosplenomegaly. LYMPHATICS:  No palpable cervical lymphadenopathy. LOCOMOTOR, BACK AND SPINE:  No tenderness or deformities. EXTREMITIES:  Symmetrical, no pretibial edema. Johns sign negative. No discoloration or ulcerations. NEUROLOGIC:  The patient is conscious, alert, oriented,Cranial nerve II-XII intact, taste and smell were not examined. No apparent focal sensory or motor deficits.              PROVISIONAL DIAGNOSES / SURGERY:      POSITIVE FECAL IMMUNOCHEMICAL TEST & SCREENING     COLONOSCOPY DIAGNOSTIC    Patient Active Problem List    Diagnosis Date Noted    Tobacco abuse counseling 11/30/2018    Chest pain 10/17/2018    Degenerative disc disease, cervical     Positive FIT (fecal immunochemical test)     Constipation     Depression 02/15/2018    Gastroesophageal reflux disease with esophagitis 02/15/2018    Mastoiditis of right side 11/26/2017    Hypertensive urgency 11/26/2017    Coronary artery disease involving coronary bypass graft of native heart 11/26/2017    Mixed type COPD (chronic obstructive pulmonary disease) (HCC) 03/10/2017    Neck pain of over 3 months duration 01/11/2017    Ex-smoker 01/11/2017    Dry skin 01/11/2017    EDUARDO (dyspnea on exertion) 01/11/2017    Abnormal craving 01/11/2017    Slow transit constipation 08/24/2016    Cornu cutaneum, right arm 08/24/2016    History of syncope 08/10/2016    Balance problem 05/26/2016    Prediabetes 05/26/2016    Status post cervical spinal fusion 05/26/2016    Cervical disc herniation     Neuroforaminal stenosis of spine     Cord compression [facility]) s/p decompression C5-6 CORPECTOMY; C4-7 FUSION 5/17/16 05/17/2016    Cervical neuropathic pain, b/l, C7-C8 04/19/2016    Insomnia 04/19/2016    Tremor 04/11/2016    Bilateral
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
## 2019-07-30 NOTE — ANESTHESIA POSTPROCEDURE EVALUATION
Department of Anesthesiology  Postprocedure Note    Patient: Dick Chou  MRN: [id number]  YOB: 1965  Date of evaluation: 7/30/2019  Time:  2:27 PM     Procedure Summary     Date:  07/30/19 Room / Location:  [address] [place] [address] ENDO 01 / [facility] ENDO    Anesthesia Start:  2491 Anesthesia Stop:  1112    Procedure:  COLONOSCOPY POLYPECTOMY SNARE/COLD BIOPSY OF TRANSVERSE COLON AND SIGMOID COLON & RECTAL POLYPECTOMY (N/A ) Diagnosis:  (POSITIVE FECAL IMMUNOCHEMICAL TEST & SCREENING   PAT ON ADMIT PER ANES)    Surgeon:  Donna Rodriguez MD Responsible Provider:  Ottoniel Lorenz MD    Anesthesia Type:  MAC ASA Status:  3          Anesthesia Type: MAC    Don Phase I: Don Score: 10    Don Phase II:      Last vitals: Reviewed and per EMR flowsheets.        Anesthesia Post Evaluation

## 2019-08-01 DIAGNOSIS — I50.32 CHRONIC DIASTOLIC CONGESTIVE HEART FAILURE (HCC): ICD-10-CM

## 2019-08-01 LAB — SURGICAL PATHOLOGY REPORT: NORMAL

## 2019-08-01 RX ORDER — ISOSORBIDE MONONITRATE 30 MG/1
TABLET, EXTENDED RELEASE ORAL
Qty: 60 TABLET | Refills: 1 | Status: SHIPPED | OUTPATIENT
Start: 2019-08-01 | End: 2019-12-23

## 2019-08-01 RX ORDER — FUROSEMIDE 20 MG/1
TABLET ORAL
Qty: 62 TABLET | Refills: 1 | Status: ON HOLD | OUTPATIENT
Start: 2019-08-01 | End: 2019-12-12 | Stop reason: HOSPADM

## 2019-08-02 DIAGNOSIS — I10 ESSENTIAL HYPERTENSION: ICD-10-CM

## 2019-08-02 RX ORDER — HYDRALAZINE HYDROCHLORIDE 50 MG/1
TABLET, FILM COATED ORAL
Qty: 120 TABLET | Refills: 3 | Status: SHIPPED | OUTPATIENT
Start: 2019-08-02 | End: 2019-11-15 | Stop reason: SDUPTHER

## 2019-08-08 ENCOUNTER — TELEPHONE (OUTPATIENT)
Dept: GASTROENTEROLOGY | Age: 54
End: 2019-08-08

## 2019-08-29 ENCOUNTER — TELEPHONE (OUTPATIENT)
Dept: INTERNAL MEDICINE CLINIC | Age: 54
End: 2019-08-29

## 2019-08-29 DIAGNOSIS — R05.9 COUGH: ICD-10-CM

## 2019-08-29 DIAGNOSIS — J06.9 UPPER RESPIRATORY TRACT INFECTION, UNSPECIFIED TYPE: Primary | ICD-10-CM

## 2019-08-29 RX ORDER — AMOXICILLIN AND CLAVULANATE POTASSIUM 875; 125 MG/1; MG/1
1 TABLET, FILM COATED ORAL 2 TIMES DAILY
Qty: 14 TABLET | Refills: 0 | Status: SHIPPED | OUTPATIENT
Start: 2019-08-29 | End: 2019-11-15 | Stop reason: SDUPTHER

## 2019-08-29 RX ORDER — GUAIFENESIN AND DEXTROMETHORPHAN HYDROBROMIDE 100; 10 MG/5ML; MG/5ML
10 SOLUTION ORAL EVERY 4 HOURS PRN
Qty: 473 ML | Refills: 0 | Status: SHIPPED | OUTPATIENT
Start: 2019-08-29 | End: 2019-11-15

## 2019-08-29 NOTE — TELEPHONE ENCOUNTER
Patient does not want to take lasix any longer. When asked Devon Sarabia, he states that he is having to urinate too much and it is causing him to not get good sleep at night.

## 2019-08-31 DIAGNOSIS — I10 ESSENTIAL HYPERTENSION: ICD-10-CM

## 2019-09-03 RX ORDER — METOPROLOL TARTRATE 50 MG/1
TABLET, FILM COATED ORAL
Qty: 184 TABLET | Refills: 0 | Status: ON HOLD | OUTPATIENT
Start: 2019-09-03 | End: 2019-12-12 | Stop reason: HOSPADM

## 2019-09-04 ENCOUNTER — TELEPHONE (OUTPATIENT)
Dept: INTERNAL MEDICINE CLINIC | Age: 54
End: 2019-09-04

## 2019-09-06 DIAGNOSIS — I10 ESSENTIAL HYPERTENSION: ICD-10-CM

## 2019-09-06 RX ORDER — CLONIDINE HYDROCHLORIDE 0.2 MG/1
TABLET ORAL
Qty: 184 TABLET | Refills: 0 | Status: SHIPPED | OUTPATIENT
Start: 2019-09-06 | End: 2019-09-11 | Stop reason: SDUPTHER

## 2019-09-11 DIAGNOSIS — I10 ESSENTIAL HYPERTENSION: ICD-10-CM

## 2019-09-12 RX ORDER — CLONIDINE HYDROCHLORIDE 0.2 MG/1
TABLET ORAL
Qty: 184 TABLET | Refills: 0 | Status: SHIPPED | OUTPATIENT
Start: 2019-09-12 | End: 2020-04-20

## 2019-09-28 ENCOUNTER — APPOINTMENT (OUTPATIENT)
Dept: GENERAL RADIOLOGY | Age: 54
End: 2019-09-28
Payer: COMMERCIAL

## 2019-09-28 ENCOUNTER — HOSPITAL ENCOUNTER (EMERGENCY)
Age: 54
Discharge: HOME | End: 2019-09-28
Attending: EMERGENCY MEDICINE
Payer: COMMERCIAL

## 2019-09-28 VITALS
DIASTOLIC BLOOD PRESSURE: 90 MMHG | TEMPERATURE: 98.5 F | BODY MASS INDEX: 34.8 KG/M2 | WEIGHT: 235 LBS | SYSTOLIC BLOOD PRESSURE: 155 MMHG | HEART RATE: 84 BPM | RESPIRATION RATE: 18 BRPM | HEIGHT: 69 IN | OXYGEN SATURATION: 93 %

## 2019-09-28 DIAGNOSIS — S92.355A CLOSED NONDISPLACED FRACTURE OF FIFTH METATARSAL BONE OF LEFT FOOT, INITIAL ENCOUNTER: Primary | ICD-10-CM

## 2019-09-28 PROCEDURE — 73630 X-RAY EXAM OF FOOT: CPT

## 2019-09-28 PROCEDURE — 73610 X-RAY EXAM OF ANKLE: CPT

## 2019-09-28 PROCEDURE — 99283 EMERGENCY DEPT VISIT LOW MDM: CPT

## 2019-09-28 PROCEDURE — 6370000000 HC RX 637 (ALT 250 FOR IP): Performed by: NURSE PRACTITIONER

## 2019-09-28 PROCEDURE — 29515 APPLICATION SHORT LEG SPLINT: CPT

## 2019-09-28 RX ORDER — HYDROCODONE BITARTRATE AND ACETAMINOPHEN 5; 325 MG/1; MG/1
1 TABLET ORAL EVERY 6 HOURS PRN
Qty: 10 TABLET | Refills: 0 | Status: SHIPPED | OUTPATIENT
Start: 2019-09-28 | End: 2019-10-04 | Stop reason: SDUPTHER

## 2019-09-28 RX ORDER — HYDROCODONE BITARTRATE AND ACETAMINOPHEN 5; 325 MG/1; MG/1
1 TABLET ORAL ONCE
Status: COMPLETED | OUTPATIENT
Start: 2019-09-28 | End: 2019-09-28

## 2019-09-28 RX ADMIN — HYDROCODONE BITARTRATE AND ACETAMINOPHEN 1 TABLET: 5; 325 TABLET ORAL at 20:23

## 2019-09-28 ASSESSMENT — PAIN SCALES - GENERAL
PAINLEVEL_OUTOF10: 2
PAINLEVEL_OUTOF10: 10
PAINLEVEL_OUTOF10: 10

## 2019-09-28 ASSESSMENT — PAIN DESCRIPTION - DESCRIPTORS: DESCRIPTORS: ACHING

## 2019-09-28 ASSESSMENT — ENCOUNTER SYMPTOMS
COUGH: 0
TROUBLE SWALLOWING: 0
SHORTNESS OF BREATH: 0
NAUSEA: 0
VOMITING: 0

## 2019-09-28 ASSESSMENT — PAIN DESCRIPTION - LOCATION: LOCATION: FOOT

## 2019-09-28 ASSESSMENT — PAIN DESCRIPTION - ORIENTATION: ORIENTATION: RIGHT

## 2019-09-28 ASSESSMENT — PAIN DESCRIPTION - PAIN TYPE: TYPE: ACUTE PAIN

## 2019-09-30 ENCOUNTER — TELEPHONE (OUTPATIENT)
Dept: INTERNAL MEDICINE CLINIC | Age: 54
End: 2019-09-30

## 2019-10-04 ENCOUNTER — OFFICE VISIT (OUTPATIENT)
Dept: INTERNAL MEDICINE CLINIC | Age: 54
End: 2019-10-04
Payer: COMMERCIAL

## 2019-10-04 VITALS
SYSTOLIC BLOOD PRESSURE: 162 MMHG | WEIGHT: 224 LBS | DIASTOLIC BLOOD PRESSURE: 98 MMHG | BODY MASS INDEX: 33.18 KG/M2 | HEIGHT: 69 IN

## 2019-10-04 DIAGNOSIS — S92.902A CLOSED FRACTURE OF LEFT FOOT, INITIAL ENCOUNTER: Primary | ICD-10-CM

## 2019-10-04 DIAGNOSIS — S92.355A CLOSED NONDISPLACED FRACTURE OF FIFTH METATARSAL BONE OF LEFT FOOT, INITIAL ENCOUNTER: ICD-10-CM

## 2019-10-04 PROCEDURE — G8484 FLU IMMUNIZE NO ADMIN: HCPCS | Performed by: INTERNAL MEDICINE

## 2019-10-04 PROCEDURE — G8427 DOCREV CUR MEDS BY ELIG CLIN: HCPCS | Performed by: INTERNAL MEDICINE

## 2019-10-04 PROCEDURE — G8598 ASA/ANTIPLAT THER USED: HCPCS | Performed by: INTERNAL MEDICINE

## 2019-10-04 PROCEDURE — 99214 OFFICE O/P EST MOD 30 MIN: CPT | Performed by: INTERNAL MEDICINE

## 2019-10-04 PROCEDURE — G8417 CALC BMI ABV UP PARAM F/U: HCPCS | Performed by: INTERNAL MEDICINE

## 2019-10-04 PROCEDURE — 4004F PT TOBACCO SCREEN RCVD TLK: CPT | Performed by: INTERNAL MEDICINE

## 2019-10-04 PROCEDURE — 3017F COLORECTAL CA SCREEN DOC REV: CPT | Performed by: INTERNAL MEDICINE

## 2019-10-04 RX ORDER — HYDROCODONE BITARTRATE AND ACETAMINOPHEN 5; 325 MG/1; MG/1
1 TABLET ORAL EVERY 6 HOURS PRN
Qty: 10 TABLET | Refills: 0 | Status: SHIPPED | OUTPATIENT
Start: 2019-10-04 | End: 2019-10-07

## 2019-10-29 ENCOUNTER — CARE COORDINATION (OUTPATIENT)
Dept: CARE COORDINATION | Age: 54
End: 2019-10-29

## 2019-11-03 DIAGNOSIS — I25.119 CORONARY ARTERY DISEASE INVOLVING NATIVE CORONARY ARTERY OF NATIVE HEART WITH ANGINA PECTORIS (HCC): ICD-10-CM

## 2019-11-03 DIAGNOSIS — E78.5 HYPERLIPIDEMIA WITH TARGET LDL LESS THAN 70: ICD-10-CM

## 2019-11-04 ENCOUNTER — APPOINTMENT (OUTPATIENT)
Dept: GENERAL RADIOLOGY | Age: 54
DRG: 139 | End: 2019-11-04
Payer: COMMERCIAL

## 2019-11-04 ENCOUNTER — HOSPITAL ENCOUNTER (INPATIENT)
Age: 54
LOS: 2 days | Discharge: HOME HEALTH | DRG: 139 | End: 2019-11-07
Attending: EMERGENCY MEDICINE | Admitting: INTERNAL MEDICINE
Payer: COMMERCIAL

## 2019-11-04 DIAGNOSIS — I10 ESSENTIAL HYPERTENSION: ICD-10-CM

## 2019-11-04 DIAGNOSIS — Z72.0 TOBACCO ABUSE: ICD-10-CM

## 2019-11-04 DIAGNOSIS — J18.9 PNEUMONIA DUE TO ORGANISM: Primary | ICD-10-CM

## 2019-11-04 LAB
ABSOLUTE EOS #: 0.2 K/UL (ref 0–0.4)
ABSOLUTE IMMATURE GRANULOCYTE: ABNORMAL K/UL (ref 0–0.3)
ABSOLUTE LYMPH #: 0.8 K/UL (ref 1–4.8)
ABSOLUTE MONO #: 0.2 K/UL (ref 0.1–1.3)
ALBUMIN SERPL-MCNC: 3.1 G/DL (ref 3.5–5.2)
ALBUMIN/GLOBULIN RATIO: ABNORMAL (ref 1–2.5)
ALP BLD-CCNC: 182 U/L (ref 40–129)
ALT SERPL-CCNC: 9 U/L (ref 5–41)
ANION GAP SERPL CALCULATED.3IONS-SCNC: 13 MMOL/L (ref 9–17)
AST SERPL-CCNC: 18 U/L
BASOPHILS # BLD: 1 % (ref 0–2)
BASOPHILS ABSOLUTE: 0.1 K/UL (ref 0–0.2)
BILIRUB SERPL-MCNC: 0.94 MG/DL (ref 0.3–1.2)
BNP INTERPRETATION: ABNORMAL
BUN BLDV-MCNC: 9 MG/DL (ref 6–20)
BUN/CREAT BLD: ABNORMAL (ref 9–20)
CALCIUM SERPL-MCNC: 8.6 MG/DL (ref 8.6–10.4)
CHLORIDE BLD-SCNC: 100 MMOL/L (ref 98–107)
CO2: 24 MMOL/L (ref 20–31)
CREAT SERPL-MCNC: 1.17 MG/DL (ref 0.7–1.2)
DIFFERENTIAL TYPE: ABNORMAL
DIRECT EXAM: NORMAL
DIRECT EXAM: NORMAL
EOSINOPHILS RELATIVE PERCENT: 4 % (ref 0–4)
GFR AFRICAN AMERICAN: >60 ML/MIN
GFR NON-AFRICAN AMERICAN: >60 ML/MIN
GFR SERPL CREATININE-BSD FRML MDRD: ABNORMAL ML/MIN/{1.73_M2}
GFR SERPL CREATININE-BSD FRML MDRD: ABNORMAL ML/MIN/{1.73_M2}
GLUCOSE BLD-MCNC: 95 MG/DL (ref 70–99)
HCT VFR BLD CALC: 37.4 % (ref 41–53)
HEMOGLOBIN: 12.4 G/DL (ref 13.5–17.5)
IMMATURE GRANULOCYTES: ABNORMAL %
INR BLD: 1.1
LACTIC ACID, WHOLE BLOOD: NORMAL MMOL/L (ref 0.7–2.1)
LACTIC ACID: 1.5 MMOL/L (ref 0.5–2.2)
LYMPHOCYTES # BLD: 15 % (ref 24–44)
Lab: NORMAL
Lab: NORMAL
MAGNESIUM: 1.5 MG/DL (ref 1.6–2.6)
MCH RBC QN AUTO: 26.4 PG (ref 26–34)
MCHC RBC AUTO-ENTMCNC: 33.2 G/DL (ref 31–37)
MCV RBC AUTO: 79.6 FL (ref 80–100)
MONOCYTES # BLD: 5 % (ref 1–7)
NRBC AUTOMATED: ABNORMAL PER 100 WBC
PDW BLD-RTO: 17 % (ref 11.5–14.9)
PLATELET # BLD: 151 K/UL (ref 150–450)
PLATELET ESTIMATE: ABNORMAL
PMV BLD AUTO: 7.6 FL (ref 6–12)
POTASSIUM SERPL-SCNC: 3.4 MMOL/L (ref 3.7–5.3)
PRO-BNP: 1199 PG/ML
PROTHROMBIN TIME: 14.1 SEC (ref 11.8–14.6)
RBC # BLD: 4.7 M/UL (ref 4.5–5.9)
RBC # BLD: ABNORMAL 10*6/UL
SEG NEUTROPHILS: 75 % (ref 36–66)
SEGMENTED NEUTROPHILS ABSOLUTE COUNT: 3.9 K/UL (ref 1.3–9.1)
SODIUM BLD-SCNC: 137 MMOL/L (ref 135–144)
SPECIMEN DESCRIPTION: NORMAL
SPECIMEN DESCRIPTION: NORMAL
TOTAL PROTEIN: 6.8 G/DL (ref 6.4–8.3)
TROPONIN INTERP: NORMAL
TROPONIN T: NORMAL NG/ML
TROPONIN, HIGH SENSITIVITY: 11 NG/L (ref 0–22)
WBC # BLD: 5.2 K/UL (ref 3.5–11)
WBC # BLD: ABNORMAL 10*3/UL

## 2019-11-04 PROCEDURE — 96367 TX/PROPH/DG ADDL SEQ IV INF: CPT

## 2019-11-04 PROCEDURE — 2580000003 HC RX 258: Performed by: PHYSICIAN ASSISTANT

## 2019-11-04 PROCEDURE — 99285 EMERGENCY DEPT VISIT HI MDM: CPT

## 2019-11-04 PROCEDURE — G0378 HOSPITAL OBSERVATION PER HR: HCPCS

## 2019-11-04 PROCEDURE — 83605 ASSAY OF LACTIC ACID: CPT

## 2019-11-04 PROCEDURE — 80053 COMPREHEN METABOLIC PANEL: CPT

## 2019-11-04 PROCEDURE — 96372 THER/PROPH/DIAG INJ SC/IM: CPT

## 2019-11-04 PROCEDURE — 6360000002 HC RX W HCPCS: Performed by: NURSE PRACTITIONER

## 2019-11-04 PROCEDURE — 83880 ASSAY OF NATRIURETIC PEPTIDE: CPT

## 2019-11-04 PROCEDURE — 71046 X-RAY EXAM CHEST 2 VIEWS: CPT

## 2019-11-04 PROCEDURE — 6370000000 HC RX 637 (ALT 250 FOR IP): Performed by: NURSE PRACTITIONER

## 2019-11-04 PROCEDURE — 93005 ELECTROCARDIOGRAM TRACING: CPT | Performed by: PHYSICIAN ASSISTANT

## 2019-11-04 PROCEDURE — 85610 PROTHROMBIN TIME: CPT

## 2019-11-04 PROCEDURE — 87880 STREP A ASSAY W/OPTIC: CPT

## 2019-11-04 PROCEDURE — 84484 ASSAY OF TROPONIN QUANT: CPT

## 2019-11-04 PROCEDURE — 6370000000 HC RX 637 (ALT 250 FOR IP): Performed by: PHYSICIAN ASSISTANT

## 2019-11-04 PROCEDURE — 2580000003 HC RX 258: Performed by: NURSE PRACTITIONER

## 2019-11-04 PROCEDURE — 96365 THER/PROPH/DIAG IV INF INIT: CPT

## 2019-11-04 PROCEDURE — 87804 INFLUENZA ASSAY W/OPTIC: CPT

## 2019-11-04 PROCEDURE — 83735 ASSAY OF MAGNESIUM: CPT

## 2019-11-04 PROCEDURE — 85025 COMPLETE CBC W/AUTO DIFF WBC: CPT

## 2019-11-04 PROCEDURE — 6360000002 HC RX W HCPCS: Performed by: PHYSICIAN ASSISTANT

## 2019-11-04 PROCEDURE — 36415 COLL VENOUS BLD VENIPUNCTURE: CPT

## 2019-11-04 PROCEDURE — 94640 AIRWAY INHALATION TREATMENT: CPT

## 2019-11-04 PROCEDURE — 96375 TX/PRO/DX INJ NEW DRUG ADDON: CPT

## 2019-11-04 RX ORDER — SODIUM CHLORIDE 9 MG/ML
INJECTION, SOLUTION INTRAVENOUS CONTINUOUS
Status: DISCONTINUED | OUTPATIENT
Start: 2019-11-04 | End: 2019-11-05

## 2019-11-04 RX ORDER — METOPROLOL TARTRATE 50 MG/1
50 TABLET, FILM COATED ORAL 2 TIMES DAILY
Status: DISCONTINUED | OUTPATIENT
Start: 2019-11-04 | End: 2019-11-07 | Stop reason: HOSPADM

## 2019-11-04 RX ORDER — ALBUTEROL SULFATE 2.5 MG/3ML
2.5 SOLUTION RESPIRATORY (INHALATION)
Status: DISCONTINUED | OUTPATIENT
Start: 2019-11-04 | End: 2019-11-07 | Stop reason: HOSPADM

## 2019-11-04 RX ORDER — IBUPROFEN 800 MG/1
800 TABLET ORAL EVERY 8 HOURS PRN
Status: DISCONTINUED | OUTPATIENT
Start: 2019-11-04 | End: 2019-11-07 | Stop reason: HOSPADM

## 2019-11-04 RX ORDER — ATORVASTATIN CALCIUM 20 MG/1
TABLET, FILM COATED ORAL
Qty: 90 TABLET | Refills: 3 | Status: SHIPPED | OUTPATIENT
Start: 2019-11-04 | End: 2020-11-19

## 2019-11-04 RX ORDER — CLONIDINE HYDROCHLORIDE 0.2 MG/1
0.2 TABLET ORAL 2 TIMES DAILY
Status: DISCONTINUED | OUTPATIENT
Start: 2019-11-04 | End: 2019-11-07 | Stop reason: HOSPADM

## 2019-11-04 RX ORDER — ASPIRIN 81 MG/1
81 TABLET ORAL DAILY
Status: DISCONTINUED | OUTPATIENT
Start: 2019-11-05 | End: 2019-11-07 | Stop reason: HOSPADM

## 2019-11-04 RX ORDER — HYDROXYZINE HYDROCHLORIDE 25 MG/1
25 TABLET, FILM COATED ORAL 3 TIMES DAILY PRN
Status: DISCONTINUED | OUTPATIENT
Start: 2019-11-04 | End: 2019-11-07 | Stop reason: HOSPADM

## 2019-11-04 RX ORDER — DULOXETIN HYDROCHLORIDE 60 MG/1
60 CAPSULE, DELAYED RELEASE ORAL NIGHTLY
Status: DISCONTINUED | OUTPATIENT
Start: 2019-11-04 | End: 2019-11-07 | Stop reason: HOSPADM

## 2019-11-04 RX ORDER — IPRATROPIUM BROMIDE AND ALBUTEROL SULFATE 2.5; .5 MG/3ML; MG/3ML
1 SOLUTION RESPIRATORY (INHALATION)
Status: DISCONTINUED | OUTPATIENT
Start: 2019-11-04 | End: 2019-11-05

## 2019-11-04 RX ORDER — NICOTINE 21 MG/24HR
1 PATCH, TRANSDERMAL 24 HOURS TRANSDERMAL DAILY
Status: DISCONTINUED | OUTPATIENT
Start: 2019-11-04 | End: 2019-11-07 | Stop reason: HOSPADM

## 2019-11-04 RX ORDER — GUAIFENESIN 600 MG/1
600 TABLET, EXTENDED RELEASE ORAL 2 TIMES DAILY
Status: DISCONTINUED | OUTPATIENT
Start: 2019-11-04 | End: 2019-11-07 | Stop reason: HOSPADM

## 2019-11-04 RX ORDER — M-VIT,TX,IRON,MINS/CALC/FOLIC 27MG-0.4MG
1 TABLET ORAL DAILY
Status: DISCONTINUED | OUTPATIENT
Start: 2019-11-05 | End: 2019-11-07 | Stop reason: HOSPADM

## 2019-11-04 RX ORDER — SODIUM CHLORIDE 0.9 % (FLUSH) 0.9 %
10 SYRINGE (ML) INJECTION EVERY 12 HOURS SCHEDULED
Status: DISCONTINUED | OUTPATIENT
Start: 2019-11-04 | End: 2019-11-07 | Stop reason: HOSPADM

## 2019-11-04 RX ORDER — ATORVASTATIN CALCIUM 20 MG/1
20 TABLET, FILM COATED ORAL NIGHTLY
Status: DISCONTINUED | OUTPATIENT
Start: 2019-11-04 | End: 2019-11-07 | Stop reason: HOSPADM

## 2019-11-04 RX ORDER — PANTOPRAZOLE SODIUM 40 MG/1
40 TABLET, DELAYED RELEASE ORAL DAILY
Status: DISCONTINUED | OUTPATIENT
Start: 2019-11-05 | End: 2019-11-07 | Stop reason: HOSPADM

## 2019-11-04 RX ORDER — ISOSORBIDE MONONITRATE 30 MG/1
30 TABLET, EXTENDED RELEASE ORAL DAILY
Status: DISCONTINUED | OUTPATIENT
Start: 2019-11-05 | End: 2019-11-07 | Stop reason: HOSPADM

## 2019-11-04 RX ORDER — ACETAMINOPHEN 325 MG/1
650 TABLET ORAL EVERY 4 HOURS PRN
Status: DISCONTINUED | OUTPATIENT
Start: 2019-11-04 | End: 2019-11-07 | Stop reason: HOSPADM

## 2019-11-04 RX ORDER — SODIUM CHLORIDE 0.9 % (FLUSH) 0.9 %
10 SYRINGE (ML) INJECTION PRN
Status: DISCONTINUED | OUTPATIENT
Start: 2019-11-04 | End: 2019-11-07 | Stop reason: HOSPADM

## 2019-11-04 RX ORDER — ONDANSETRON 2 MG/ML
4 INJECTION INTRAMUSCULAR; INTRAVENOUS EVERY 6 HOURS PRN
Status: DISCONTINUED | OUTPATIENT
Start: 2019-11-04 | End: 2019-11-07 | Stop reason: HOSPADM

## 2019-11-04 RX ORDER — IPRATROPIUM BROMIDE AND ALBUTEROL SULFATE 2.5; .5 MG/3ML; MG/3ML
1 SOLUTION RESPIRATORY (INHALATION)
Status: DISCONTINUED | OUTPATIENT
Start: 2019-11-05 | End: 2019-11-07 | Stop reason: HOSPADM

## 2019-11-04 RX ORDER — SODIUM CHLORIDE 9 MG/ML
1000 INJECTION, SOLUTION INTRAVENOUS CONTINUOUS
Status: DISCONTINUED | OUTPATIENT
Start: 2019-11-04 | End: 2019-11-05

## 2019-11-04 RX ORDER — TAMSULOSIN HYDROCHLORIDE 0.4 MG/1
0.4 CAPSULE ORAL DAILY
Status: DISCONTINUED | OUTPATIENT
Start: 2019-11-05 | End: 2019-11-07 | Stop reason: HOSPADM

## 2019-11-04 RX ORDER — HYDRALAZINE HYDROCHLORIDE 50 MG/1
100 TABLET, FILM COATED ORAL 2 TIMES DAILY
Status: DISCONTINUED | OUTPATIENT
Start: 2019-11-04 | End: 2019-11-07 | Stop reason: HOSPADM

## 2019-11-04 RX ADMIN — ONDANSETRON 4 MG: 2 INJECTION INTRAMUSCULAR; INTRAVENOUS at 22:49

## 2019-11-04 RX ADMIN — ATORVASTATIN CALCIUM 20 MG: 20 TABLET, FILM COATED ORAL at 23:20

## 2019-11-04 RX ADMIN — ENOXAPARIN SODIUM 30 MG: 30 INJECTION SUBCUTANEOUS at 23:31

## 2019-11-04 RX ADMIN — HYDRALAZINE HYDROCHLORIDE 100 MG: 50 TABLET, FILM COATED ORAL at 23:20

## 2019-11-04 RX ADMIN — METOPROLOL TARTRATE 50 MG: 50 TABLET ORAL at 23:20

## 2019-11-04 RX ADMIN — AZITHROMYCIN MONOHYDRATE 500 MG: 500 INJECTION, POWDER, LYOPHILIZED, FOR SOLUTION INTRAVENOUS at 22:34

## 2019-11-04 RX ADMIN — SODIUM CHLORIDE 1000 ML: 9 INJECTION, SOLUTION INTRAVENOUS at 21:07

## 2019-11-04 RX ADMIN — CEFTRIAXONE SODIUM 1 G: 1 INJECTION, POWDER, FOR SOLUTION INTRAMUSCULAR; INTRAVENOUS at 21:07

## 2019-11-04 RX ADMIN — IPRATROPIUM BROMIDE AND ALBUTEROL SULFATE 1 AMPULE: .5; 3 SOLUTION RESPIRATORY (INHALATION) at 19:55

## 2019-11-04 RX ADMIN — DULOXETINE HYDROCHLORIDE 60 MG: 60 CAPSULE, DELAYED RELEASE ORAL at 23:20

## 2019-11-04 RX ADMIN — CLONIDINE HYDROCHLORIDE 0.2 MG: 0.2 TABLET ORAL at 23:20

## 2019-11-04 RX ADMIN — SODIUM CHLORIDE: 9 INJECTION, SOLUTION INTRAVENOUS at 23:22

## 2019-11-04 RX ADMIN — GUAIFENESIN 600 MG: 600 TABLET, EXTENDED RELEASE ORAL at 23:20

## 2019-11-04 RX ADMIN — ACETAMINOPHEN 650 MG: 325 TABLET, FILM COATED ORAL at 23:20

## 2019-11-04 ASSESSMENT — ENCOUNTER SYMPTOMS
CONSTIPATION: 0
BACK PAIN: 0
SHORTNESS OF BREATH: 0
VOMITING: 0
ABDOMINAL PAIN: 0
RHINORRHEA: 1
COUGH: 1
WHEEZING: 0
SORE THROAT: 0
DIARRHEA: 0
NAUSEA: 0

## 2019-11-04 ASSESSMENT — PAIN SCALES - GENERAL
PAINLEVEL_OUTOF10: 3
PAINLEVEL_OUTOF10: 6
PAINLEVEL_OUTOF10: 0
PAINLEVEL_OUTOF10: 0

## 2019-11-04 ASSESSMENT — PAIN DESCRIPTION - LOCATION: LOCATION: HEAD

## 2019-11-04 ASSESSMENT — PAIN DESCRIPTION - PAIN TYPE: TYPE: ACUTE PAIN

## 2019-11-05 ENCOUNTER — APPOINTMENT (OUTPATIENT)
Dept: GENERAL RADIOLOGY | Age: 54
DRG: 139 | End: 2019-11-05
Payer: COMMERCIAL

## 2019-11-05 PROBLEM — E83.42 HYPOMAGNESEMIA: Status: ACTIVE | Noted: 2019-11-05

## 2019-11-05 LAB
-: NORMAL
ANION GAP SERPL CALCULATED.3IONS-SCNC: 11 MMOL/L (ref 9–17)
BUN BLDV-MCNC: 8 MG/DL (ref 6–20)
BUN/CREAT BLD: ABNORMAL (ref 9–20)
CALCIUM SERPL-MCNC: 8.1 MG/DL (ref 8.6–10.4)
CHLORIDE BLD-SCNC: 104 MMOL/L (ref 98–107)
CO2: 23 MMOL/L (ref 20–31)
CREAT SERPL-MCNC: 1.06 MG/DL (ref 0.7–1.2)
EKG ATRIAL RATE: 98 BPM
EKG P AXIS: 34 DEGREES
EKG P-R INTERVAL: 140 MS
EKG Q-T INTERVAL: 328 MS
EKG QRS DURATION: 86 MS
EKG QTC CALCULATION (BAZETT): 418 MS
EKG R AXIS: -6 DEGREES
EKG T AXIS: 58 DEGREES
EKG VENTRICULAR RATE: 98 BPM
GFR AFRICAN AMERICAN: >60 ML/MIN
GFR NON-AFRICAN AMERICAN: >60 ML/MIN
GFR SERPL CREATININE-BSD FRML MDRD: ABNORMAL ML/MIN/{1.73_M2}
GFR SERPL CREATININE-BSD FRML MDRD: ABNORMAL ML/MIN/{1.73_M2}
GLUCOSE BLD-MCNC: 123 MG/DL (ref 70–99)
HCT VFR BLD CALC: 35.4 % (ref 41–53)
HEMOGLOBIN: 11.6 G/DL (ref 13.5–17.5)
MAGNESIUM: 2.2 MG/DL (ref 1.6–2.6)
MCH RBC QN AUTO: 26.2 PG (ref 26–34)
MCHC RBC AUTO-ENTMCNC: 32.7 G/DL (ref 31–37)
MCV RBC AUTO: 80.1 FL (ref 80–100)
NRBC AUTOMATED: ABNORMAL PER 100 WBC
PDW BLD-RTO: 17 % (ref 11.5–14.9)
PLATELET # BLD: 137 K/UL (ref 150–450)
PMV BLD AUTO: 7.6 FL (ref 6–12)
POTASSIUM SERPL-SCNC: 3.5 MMOL/L (ref 3.7–5.3)
PROCALCITONIN: 0.07 NG/ML
RBC # BLD: 4.42 M/UL (ref 4.5–5.9)
REASON FOR REJECTION: NORMAL
SODIUM BLD-SCNC: 138 MMOL/L (ref 135–144)
WBC # BLD: 3.6 K/UL (ref 3.5–11)
ZZ NTE CLEAN UP: ORDERED TEST: NORMAL
ZZ NTE WITH NAME CLEAN UP: SPECIMEN SOURCE: NORMAL

## 2019-11-05 PROCEDURE — 6370000000 HC RX 637 (ALT 250 FOR IP): Performed by: INTERNAL MEDICINE

## 2019-11-05 PROCEDURE — 83735 ASSAY OF MAGNESIUM: CPT

## 2019-11-05 PROCEDURE — 2580000003 HC RX 258: Performed by: NURSE PRACTITIONER

## 2019-11-05 PROCEDURE — 99223 1ST HOSP IP/OBS HIGH 75: CPT | Performed by: INTERNAL MEDICINE

## 2019-11-05 PROCEDURE — 1200000000 HC SEMI PRIVATE

## 2019-11-05 PROCEDURE — 96366 THER/PROPH/DIAG IV INF ADDON: CPT

## 2019-11-05 PROCEDURE — 71046 X-RAY EXAM CHEST 2 VIEWS: CPT

## 2019-11-05 PROCEDURE — 87205 SMEAR GRAM STAIN: CPT

## 2019-11-05 PROCEDURE — 93010 ELECTROCARDIOGRAM REPORT: CPT | Performed by: INTERNAL MEDICINE

## 2019-11-05 PROCEDURE — 96372 THER/PROPH/DIAG INJ SC/IM: CPT

## 2019-11-05 PROCEDURE — 6370000000 HC RX 637 (ALT 250 FOR IP): Performed by: NURSE PRACTITIONER

## 2019-11-05 PROCEDURE — 85027 COMPLETE CBC AUTOMATED: CPT

## 2019-11-05 PROCEDURE — 36415 COLL VENOUS BLD VENIPUNCTURE: CPT

## 2019-11-05 PROCEDURE — 6360000002 HC RX W HCPCS: Performed by: NURSE PRACTITIONER

## 2019-11-05 PROCEDURE — 87070 CULTURE OTHR SPECIMN AEROBIC: CPT

## 2019-11-05 PROCEDURE — 84145 PROCALCITONIN (PCT): CPT

## 2019-11-05 PROCEDURE — 94761 N-INVAS EAR/PLS OXIMETRY MLT: CPT

## 2019-11-05 PROCEDURE — 94640 AIRWAY INHALATION TREATMENT: CPT

## 2019-11-05 PROCEDURE — 80048 BASIC METABOLIC PNL TOTAL CA: CPT

## 2019-11-05 PROCEDURE — 96367 TX/PROPH/DG ADDL SEQ IV INF: CPT

## 2019-11-05 RX ORDER — POTASSIUM CHLORIDE 20 MEQ/1
40 TABLET, EXTENDED RELEASE ORAL ONCE
Status: COMPLETED | OUTPATIENT
Start: 2019-11-05 | End: 2019-11-05

## 2019-11-05 RX ORDER — FLUTICASONE PROPIONATE 50 MCG
1 SPRAY, SUSPENSION (ML) NASAL DAILY
Status: DISCONTINUED | OUTPATIENT
Start: 2019-11-05 | End: 2019-11-07 | Stop reason: HOSPADM

## 2019-11-05 RX ORDER — MAGNESIUM SULFATE 1 G/100ML
1 INJECTION INTRAVENOUS PRN
Status: DISCONTINUED | OUTPATIENT
Start: 2019-11-05 | End: 2019-11-07 | Stop reason: HOSPADM

## 2019-11-05 RX ADMIN — GUAIFENESIN 600 MG: 600 TABLET, EXTENDED RELEASE ORAL at 08:24

## 2019-11-05 RX ADMIN — IPRATROPIUM BROMIDE AND ALBUTEROL SULFATE 1 AMPULE: .5; 3 SOLUTION RESPIRATORY (INHALATION) at 14:51

## 2019-11-05 RX ADMIN — MULTIPLE VITAMINS W/ MINERALS TAB 1 TABLET: TAB at 08:23

## 2019-11-05 RX ADMIN — CLONIDINE HYDROCHLORIDE 0.2 MG: 0.2 TABLET ORAL at 08:23

## 2019-11-05 RX ADMIN — ASPIRIN 81 MG: 81 TABLET, COATED ORAL at 08:23

## 2019-11-05 RX ADMIN — ACETAMINOPHEN 650 MG: 325 TABLET, FILM COATED ORAL at 18:39

## 2019-11-05 RX ADMIN — HYDRALAZINE HYDROCHLORIDE 100 MG: 50 TABLET, FILM COATED ORAL at 08:23

## 2019-11-05 RX ADMIN — CEFTRIAXONE SODIUM 1 G: 1 INJECTION, POWDER, FOR SOLUTION INTRAMUSCULAR; INTRAVENOUS at 22:13

## 2019-11-05 RX ADMIN — TAMSULOSIN HYDROCHLORIDE 0.4 MG: 0.4 CAPSULE ORAL at 08:23

## 2019-11-05 RX ADMIN — ATORVASTATIN CALCIUM 20 MG: 20 TABLET, FILM COATED ORAL at 20:08

## 2019-11-05 RX ADMIN — ENOXAPARIN SODIUM 30 MG: 30 INJECTION SUBCUTANEOUS at 08:24

## 2019-11-05 RX ADMIN — CLONIDINE HYDROCHLORIDE 0.2 MG: 0.2 TABLET ORAL at 20:03

## 2019-11-05 RX ADMIN — ALBUTEROL SULFATE 2.5 MG: 2.5 SOLUTION RESPIRATORY (INHALATION) at 01:12

## 2019-11-05 RX ADMIN — METOPROLOL TARTRATE 50 MG: 50 TABLET ORAL at 08:24

## 2019-11-05 RX ADMIN — IPRATROPIUM BROMIDE AND ALBUTEROL SULFATE 1 AMPULE: .5; 3 SOLUTION RESPIRATORY (INHALATION) at 06:58

## 2019-11-05 RX ADMIN — METOPROLOL TARTRATE 50 MG: 50 TABLET ORAL at 20:02

## 2019-11-05 RX ADMIN — HYDRALAZINE HYDROCHLORIDE 100 MG: 50 TABLET, FILM COATED ORAL at 20:02

## 2019-11-05 RX ADMIN — ISOSORBIDE MONONITRATE 30 MG: 30 TABLET, EXTENDED RELEASE ORAL at 08:23

## 2019-11-05 RX ADMIN — IPRATROPIUM BROMIDE AND ALBUTEROL SULFATE 1 AMPULE: .5; 3 SOLUTION RESPIRATORY (INHALATION) at 19:12

## 2019-11-05 RX ADMIN — ONDANSETRON 4 MG: 2 INJECTION INTRAMUSCULAR; INTRAVENOUS at 20:33

## 2019-11-05 RX ADMIN — POTASSIUM CHLORIDE 40 MEQ: 20 TABLET, EXTENDED RELEASE ORAL at 13:42

## 2019-11-05 RX ADMIN — FLUTICASONE PROPIONATE 1 SPRAY: 50 SPRAY, METERED NASAL at 18:32

## 2019-11-05 RX ADMIN — AZITHROMYCIN MONOHYDRATE 500 MG: 500 INJECTION, POWDER, LYOPHILIZED, FOR SOLUTION INTRAVENOUS at 20:03

## 2019-11-05 RX ADMIN — DULOXETINE HYDROCHLORIDE 60 MG: 60 CAPSULE, DELAYED RELEASE ORAL at 20:02

## 2019-11-05 RX ADMIN — GUAIFENESIN 600 MG: 600 TABLET, EXTENDED RELEASE ORAL at 20:02

## 2019-11-05 RX ADMIN — Medication 10 ML: at 20:04

## 2019-11-05 RX ADMIN — ENOXAPARIN SODIUM 30 MG: 30 INJECTION SUBCUTANEOUS at 20:03

## 2019-11-05 RX ADMIN — IPRATROPIUM BROMIDE AND ALBUTEROL SULFATE 1 AMPULE: .5; 3 SOLUTION RESPIRATORY (INHALATION) at 10:50

## 2019-11-05 RX ADMIN — MAGNESIUM SULFATE HEPTAHYDRATE 2 G: 500 INJECTION, SOLUTION INTRAMUSCULAR; INTRAVENOUS at 02:38

## 2019-11-05 RX ADMIN — PANTOPRAZOLE SODIUM 40 MG: 40 TABLET, DELAYED RELEASE ORAL at 08:24

## 2019-11-05 ASSESSMENT — PAIN DESCRIPTION - ONSET
ONSET: GRADUAL

## 2019-11-05 ASSESSMENT — PAIN DESCRIPTION - LOCATION
LOCATION: HEAD

## 2019-11-05 ASSESSMENT — PAIN DESCRIPTION - FREQUENCY
FREQUENCY: INTERMITTENT

## 2019-11-05 ASSESSMENT — PAIN SCALES - GENERAL
PAINLEVEL_OUTOF10: 9
PAINLEVEL_OUTOF10: 0
PAINLEVEL_OUTOF10: 5

## 2019-11-05 ASSESSMENT — PAIN DESCRIPTION - PAIN TYPE
TYPE: ACUTE PAIN

## 2019-11-05 ASSESSMENT — PAIN DESCRIPTION - DESCRIPTORS
DESCRIPTORS: ACHING;HEADACHE
DESCRIPTORS: ACHING;HEADACHE
DESCRIPTORS: HEADACHE;THROBBING;POUNDING
DESCRIPTORS: ACHING;HEADACHE
DESCRIPTORS: ACHING;HEADACHE

## 2019-11-05 ASSESSMENT — PAIN DESCRIPTION - ORIENTATION: ORIENTATION: OTHER (COMMENT)

## 2019-11-06 PROBLEM — J44.0 CHRONIC OBSTRUCTIVE PULMONARY DISEASE WITH ACUTE LOWER RESPIRATORY INFECTION (HCC): Status: ACTIVE | Noted: 2017-03-10

## 2019-11-06 LAB
ANION GAP SERPL CALCULATED.3IONS-SCNC: 13 MMOL/L (ref 9–17)
BUN BLDV-MCNC: 9 MG/DL (ref 6–20)
BUN/CREAT BLD: ABNORMAL (ref 9–20)
CALCIUM SERPL-MCNC: 8.4 MG/DL (ref 8.6–10.4)
CHLORIDE BLD-SCNC: 101 MMOL/L (ref 98–107)
CO2: 21 MMOL/L (ref 20–31)
CREAT SERPL-MCNC: 1.16 MG/DL (ref 0.7–1.2)
CULTURE: ABNORMAL
DIRECT EXAM: ABNORMAL
GFR AFRICAN AMERICAN: >60 ML/MIN
GFR NON-AFRICAN AMERICAN: >60 ML/MIN
GFR SERPL CREATININE-BSD FRML MDRD: ABNORMAL ML/MIN/{1.73_M2}
GFR SERPL CREATININE-BSD FRML MDRD: ABNORMAL ML/MIN/{1.73_M2}
GLUCOSE BLD-MCNC: 98 MG/DL (ref 70–99)
HCT VFR BLD CALC: 38.7 % (ref 41–53)
HEMOGLOBIN: 12.5 G/DL (ref 13.5–17.5)
Lab: ABNORMAL
MCH RBC QN AUTO: 26.6 PG (ref 26–34)
MCHC RBC AUTO-ENTMCNC: 32.4 G/DL (ref 31–37)
MCV RBC AUTO: 82.3 FL (ref 80–100)
NRBC AUTOMATED: ABNORMAL PER 100 WBC
PDW BLD-RTO: 17.7 % (ref 11.5–14.9)
PLATELET # BLD: 162 K/UL (ref 150–450)
PMV BLD AUTO: 7.7 FL (ref 6–12)
POTASSIUM SERPL-SCNC: 4.5 MMOL/L (ref 3.7–5.3)
RBC # BLD: 4.71 M/UL (ref 4.5–5.9)
SODIUM BLD-SCNC: 135 MMOL/L (ref 135–144)
SPECIMEN DESCRIPTION: ABNORMAL
WBC # BLD: 6.1 K/UL (ref 3.5–11)

## 2019-11-06 PROCEDURE — 6360000002 HC RX W HCPCS: Performed by: NURSE PRACTITIONER

## 2019-11-06 PROCEDURE — 6370000000 HC RX 637 (ALT 250 FOR IP): Performed by: NURSE PRACTITIONER

## 2019-11-06 PROCEDURE — 2580000003 HC RX 258: Performed by: NURSE PRACTITIONER

## 2019-11-06 PROCEDURE — 85027 COMPLETE CBC AUTOMATED: CPT

## 2019-11-06 PROCEDURE — 80048 BASIC METABOLIC PNL TOTAL CA: CPT

## 2019-11-06 PROCEDURE — 1200000000 HC SEMI PRIVATE

## 2019-11-06 PROCEDURE — 99233 SBSQ HOSP IP/OBS HIGH 50: CPT | Performed by: INTERNAL MEDICINE

## 2019-11-06 PROCEDURE — 94640 AIRWAY INHALATION TREATMENT: CPT

## 2019-11-06 PROCEDURE — 36415 COLL VENOUS BLD VENIPUNCTURE: CPT

## 2019-11-06 PROCEDURE — 6360000002 HC RX W HCPCS: Performed by: INTERNAL MEDICINE

## 2019-11-06 RX ORDER — METHYLPREDNISOLONE SODIUM SUCCINATE 40 MG/ML
40 INJECTION, POWDER, LYOPHILIZED, FOR SOLUTION INTRAMUSCULAR; INTRAVENOUS EVERY 6 HOURS
Status: DISCONTINUED | OUTPATIENT
Start: 2019-11-06 | End: 2019-11-07 | Stop reason: HOSPADM

## 2019-11-06 RX ADMIN — TAMSULOSIN HYDROCHLORIDE 0.4 MG: 0.4 CAPSULE ORAL at 08:46

## 2019-11-06 RX ADMIN — IPRATROPIUM BROMIDE AND ALBUTEROL SULFATE 1 AMPULE: .5; 3 SOLUTION RESPIRATORY (INHALATION) at 16:00

## 2019-11-06 RX ADMIN — ONDANSETRON 4 MG: 2 INJECTION INTRAMUSCULAR; INTRAVENOUS at 21:53

## 2019-11-06 RX ADMIN — METHYLPREDNISOLONE SODIUM SUCCINATE 40 MG: 40 INJECTION, POWDER, FOR SOLUTION INTRAMUSCULAR; INTRAVENOUS at 21:41

## 2019-11-06 RX ADMIN — METHYLPREDNISOLONE SODIUM SUCCINATE 40 MG: 40 INJECTION, POWDER, FOR SOLUTION INTRAMUSCULAR; INTRAVENOUS at 09:50

## 2019-11-06 RX ADMIN — MULTIPLE VITAMINS W/ MINERALS TAB 1 TABLET: TAB at 08:46

## 2019-11-06 RX ADMIN — HYDRALAZINE HYDROCHLORIDE 100 MG: 50 TABLET, FILM COATED ORAL at 08:46

## 2019-11-06 RX ADMIN — ASPIRIN 81 MG: 81 TABLET, COATED ORAL at 08:46

## 2019-11-06 RX ADMIN — HYDRALAZINE HYDROCHLORIDE 100 MG: 50 TABLET, FILM COATED ORAL at 21:43

## 2019-11-06 RX ADMIN — IPRATROPIUM BROMIDE AND ALBUTEROL SULFATE 1 AMPULE: .5; 3 SOLUTION RESPIRATORY (INHALATION) at 19:48

## 2019-11-06 RX ADMIN — IPRATROPIUM BROMIDE AND ALBUTEROL SULFATE 1 AMPULE: .5; 3 SOLUTION RESPIRATORY (INHALATION) at 06:56

## 2019-11-06 RX ADMIN — DULOXETINE HYDROCHLORIDE 60 MG: 60 CAPSULE, DELAYED RELEASE ORAL at 21:43

## 2019-11-06 RX ADMIN — ATORVASTATIN CALCIUM 20 MG: 20 TABLET, FILM COATED ORAL at 21:43

## 2019-11-06 RX ADMIN — METHYLPREDNISOLONE SODIUM SUCCINATE 40 MG: 40 INJECTION, POWDER, FOR SOLUTION INTRAMUSCULAR; INTRAVENOUS at 15:45

## 2019-11-06 RX ADMIN — ENOXAPARIN SODIUM 30 MG: 30 INJECTION SUBCUTANEOUS at 21:41

## 2019-11-06 RX ADMIN — Medication 10 ML: at 15:46

## 2019-11-06 RX ADMIN — Medication 10 ML: at 21:45

## 2019-11-06 RX ADMIN — AZITHROMYCIN MONOHYDRATE 500 MG: 500 INJECTION, POWDER, LYOPHILIZED, FOR SOLUTION INTRAVENOUS at 21:41

## 2019-11-06 RX ADMIN — FLUTICASONE PROPIONATE 1 SPRAY: 50 SPRAY, METERED NASAL at 08:58

## 2019-11-06 RX ADMIN — ENOXAPARIN SODIUM 30 MG: 30 INJECTION SUBCUTANEOUS at 08:45

## 2019-11-06 RX ADMIN — GUAIFENESIN 600 MG: 600 TABLET, EXTENDED RELEASE ORAL at 08:46

## 2019-11-06 RX ADMIN — Medication 10 ML: at 09:50

## 2019-11-06 RX ADMIN — ISOSORBIDE MONONITRATE 30 MG: 30 TABLET, EXTENDED RELEASE ORAL at 08:46

## 2019-11-06 RX ADMIN — CLONIDINE HYDROCHLORIDE 0.2 MG: 0.2 TABLET ORAL at 08:46

## 2019-11-06 RX ADMIN — Medication 10 ML: at 08:47

## 2019-11-06 RX ADMIN — PANTOPRAZOLE SODIUM 40 MG: 40 TABLET, DELAYED RELEASE ORAL at 08:46

## 2019-11-06 RX ADMIN — GUAIFENESIN 600 MG: 600 TABLET, EXTENDED RELEASE ORAL at 21:43

## 2019-11-06 RX ADMIN — CLONIDINE HYDROCHLORIDE 0.2 MG: 0.2 TABLET ORAL at 21:42

## 2019-11-06 RX ADMIN — METOPROLOL TARTRATE 50 MG: 50 TABLET ORAL at 08:46

## 2019-11-06 RX ADMIN — IPRATROPIUM BROMIDE AND ALBUTEROL SULFATE 1 AMPULE: .5; 3 SOLUTION RESPIRATORY (INHALATION) at 11:36

## 2019-11-06 RX ADMIN — METOPROLOL TARTRATE 50 MG: 50 TABLET ORAL at 21:43

## 2019-11-06 ASSESSMENT — PAIN SCALES - GENERAL
PAINLEVEL_OUTOF10: 0
PAINLEVEL_OUTOF10: 9

## 2019-11-06 ASSESSMENT — PAIN DESCRIPTION - PAIN TYPE
TYPE: ACUTE PAIN
TYPE: ACUTE PAIN

## 2019-11-06 ASSESSMENT — PAIN DESCRIPTION - LOCATION
LOCATION: HEAD
LOCATION: HEAD

## 2019-11-06 ASSESSMENT — PAIN DESCRIPTION - DESCRIPTORS: DESCRIPTORS: ACHING;HEADACHE

## 2019-11-06 ASSESSMENT — PAIN DESCRIPTION - FREQUENCY: FREQUENCY: INTERMITTENT

## 2019-11-06 ASSESSMENT — PAIN DESCRIPTION - ONSET: ONSET: GRADUAL

## 2019-11-07 VITALS
WEIGHT: 224 LBS | OXYGEN SATURATION: 93 % | BODY MASS INDEX: 33.18 KG/M2 | TEMPERATURE: 97.3 F | HEIGHT: 69 IN | DIASTOLIC BLOOD PRESSURE: 75 MMHG | SYSTOLIC BLOOD PRESSURE: 134 MMHG | HEART RATE: 75 BPM | RESPIRATION RATE: 16 BRPM

## 2019-11-07 LAB
ANION GAP SERPL CALCULATED.3IONS-SCNC: 12 MMOL/L (ref 9–17)
BUN BLDV-MCNC: 13 MG/DL (ref 6–20)
BUN/CREAT BLD: ABNORMAL (ref 9–20)
CALCIUM SERPL-MCNC: 8.6 MG/DL (ref 8.6–10.4)
CHLORIDE BLD-SCNC: 100 MMOL/L (ref 98–107)
CO2: 22 MMOL/L (ref 20–31)
CREAT SERPL-MCNC: 1.14 MG/DL (ref 0.7–1.2)
GFR AFRICAN AMERICAN: >60 ML/MIN
GFR NON-AFRICAN AMERICAN: >60 ML/MIN
GFR SERPL CREATININE-BSD FRML MDRD: ABNORMAL ML/MIN/{1.73_M2}
GFR SERPL CREATININE-BSD FRML MDRD: ABNORMAL ML/MIN/{1.73_M2}
GLUCOSE BLD-MCNC: 186 MG/DL (ref 70–99)
HCT VFR BLD CALC: 36.5 % (ref 41–53)
HEMOGLOBIN: 12 G/DL (ref 13.5–17.5)
MCH RBC QN AUTO: 26.4 PG (ref 26–34)
MCHC RBC AUTO-ENTMCNC: 32.9 G/DL (ref 31–37)
MCV RBC AUTO: 80.2 FL (ref 80–100)
NRBC AUTOMATED: ABNORMAL PER 100 WBC
PDW BLD-RTO: 17.7 % (ref 11.5–14.9)
PLATELET # BLD: 182 K/UL (ref 150–450)
PMV BLD AUTO: 7.5 FL (ref 6–12)
POTASSIUM SERPL-SCNC: 4.2 MMOL/L (ref 3.7–5.3)
RBC # BLD: 4.55 M/UL (ref 4.5–5.9)
SODIUM BLD-SCNC: 134 MMOL/L (ref 135–144)
WBC # BLD: 7.6 K/UL (ref 3.5–11)

## 2019-11-07 PROCEDURE — 94640 AIRWAY INHALATION TREATMENT: CPT

## 2019-11-07 PROCEDURE — 6370000000 HC RX 637 (ALT 250 FOR IP): Performed by: NURSE PRACTITIONER

## 2019-11-07 PROCEDURE — 6360000002 HC RX W HCPCS: Performed by: NURSE PRACTITIONER

## 2019-11-07 PROCEDURE — 2580000003 HC RX 258: Performed by: NURSE PRACTITIONER

## 2019-11-07 PROCEDURE — 99239 HOSP IP/OBS DSCHRG MGMT >30: CPT | Performed by: INTERNAL MEDICINE

## 2019-11-07 PROCEDURE — 85027 COMPLETE CBC AUTOMATED: CPT

## 2019-11-07 PROCEDURE — 80048 BASIC METABOLIC PNL TOTAL CA: CPT

## 2019-11-07 PROCEDURE — 6360000002 HC RX W HCPCS: Performed by: INTERNAL MEDICINE

## 2019-11-07 PROCEDURE — 36415 COLL VENOUS BLD VENIPUNCTURE: CPT

## 2019-11-07 RX ORDER — IPRATROPIUM BROMIDE AND ALBUTEROL SULFATE 2.5; .5 MG/3ML; MG/3ML
1 SOLUTION RESPIRATORY (INHALATION) EVERY 4 HOURS PRN
Qty: 360 ML | Refills: 0 | Status: SHIPPED | OUTPATIENT
Start: 2019-11-07 | End: 2020-12-18

## 2019-11-07 RX ORDER — AZITHROMYCIN 250 MG/1
250 TABLET, FILM COATED ORAL SEE ADMIN INSTRUCTIONS
Qty: 6 TABLET | Refills: 0 | Status: SHIPPED | OUTPATIENT
Start: 2019-11-07 | End: 2019-11-12

## 2019-11-07 RX ORDER — METHYLPREDNISOLONE 4 MG/1
TABLET ORAL
Qty: 1 KIT | Refills: 0 | Status: SHIPPED | OUTPATIENT
Start: 2019-11-07 | End: 2019-11-13

## 2019-11-07 RX ADMIN — CLONIDINE HYDROCHLORIDE 0.2 MG: 0.2 TABLET ORAL at 07:37

## 2019-11-07 RX ADMIN — TAMSULOSIN HYDROCHLORIDE 0.4 MG: 0.4 CAPSULE ORAL at 07:37

## 2019-11-07 RX ADMIN — Medication 10 ML: at 07:39

## 2019-11-07 RX ADMIN — ASPIRIN 81 MG: 81 TABLET, COATED ORAL at 07:37

## 2019-11-07 RX ADMIN — MULTIPLE VITAMINS W/ MINERALS TAB 1 TABLET: TAB at 07:40

## 2019-11-07 RX ADMIN — HYDRALAZINE HYDROCHLORIDE 100 MG: 50 TABLET, FILM COATED ORAL at 07:37

## 2019-11-07 RX ADMIN — Medication 10 ML: at 09:51

## 2019-11-07 RX ADMIN — IPRATROPIUM BROMIDE AND ALBUTEROL SULFATE 1 AMPULE: .5; 3 SOLUTION RESPIRATORY (INHALATION) at 07:10

## 2019-11-07 RX ADMIN — ENOXAPARIN SODIUM 30 MG: 30 INJECTION SUBCUTANEOUS at 07:42

## 2019-11-07 RX ADMIN — IBUPROFEN 800 MG: 800 TABLET ORAL at 12:04

## 2019-11-07 RX ADMIN — FLUTICASONE PROPIONATE 1 SPRAY: 50 SPRAY, METERED NASAL at 07:40

## 2019-11-07 RX ADMIN — METHYLPREDNISOLONE SODIUM SUCCINATE 40 MG: 40 INJECTION, POWDER, FOR SOLUTION INTRAMUSCULAR; INTRAVENOUS at 09:51

## 2019-11-07 RX ADMIN — ISOSORBIDE MONONITRATE 30 MG: 30 TABLET, EXTENDED RELEASE ORAL at 07:37

## 2019-11-07 RX ADMIN — PANTOPRAZOLE SODIUM 40 MG: 40 TABLET, DELAYED RELEASE ORAL at 07:37

## 2019-11-07 RX ADMIN — GUAIFENESIN 600 MG: 600 TABLET, EXTENDED RELEASE ORAL at 07:37

## 2019-11-07 RX ADMIN — IPRATROPIUM BROMIDE AND ALBUTEROL SULFATE 1 AMPULE: .5; 3 SOLUTION RESPIRATORY (INHALATION) at 11:16

## 2019-11-07 RX ADMIN — ALBUTEROL SULFATE 2.5 MG: 2.5 SOLUTION RESPIRATORY (INHALATION) at 11:15

## 2019-11-07 RX ADMIN — METOPROLOL TARTRATE 50 MG: 50 TABLET ORAL at 07:37

## 2019-11-07 RX ADMIN — METHYLPREDNISOLONE SODIUM SUCCINATE 40 MG: 40 INJECTION, POWDER, FOR SOLUTION INTRAMUSCULAR; INTRAVENOUS at 03:57

## 2019-11-07 ASSESSMENT — PAIN DESCRIPTION - DESCRIPTORS: DESCRIPTORS: ACHING

## 2019-11-07 ASSESSMENT — PAIN DESCRIPTION - PAIN TYPE: TYPE: CHRONIC PAIN

## 2019-11-07 ASSESSMENT — PAIN SCALES - GENERAL
PAINLEVEL_OUTOF10: 7
PAINLEVEL_OUTOF10: 0

## 2019-11-07 ASSESSMENT — PAIN DESCRIPTION - LOCATION: LOCATION: FOOT

## 2019-11-07 ASSESSMENT — PAIN DESCRIPTION - ORIENTATION: ORIENTATION: LEFT

## 2019-11-11 ENCOUNTER — CARE COORDINATION (OUTPATIENT)
Dept: CARE COORDINATION | Age: 54
End: 2019-11-11

## 2019-11-15 ENCOUNTER — OFFICE VISIT (OUTPATIENT)
Dept: INTERNAL MEDICINE CLINIC | Age: 54
End: 2019-11-15
Payer: COMMERCIAL

## 2019-11-15 VITALS
BODY MASS INDEX: 33.03 KG/M2 | DIASTOLIC BLOOD PRESSURE: 60 MMHG | HEIGHT: 69 IN | HEART RATE: 91 BPM | SYSTOLIC BLOOD PRESSURE: 82 MMHG | OXYGEN SATURATION: 96 % | WEIGHT: 223 LBS

## 2019-11-15 DIAGNOSIS — M79.672 LEFT FOOT PAIN: ICD-10-CM

## 2019-11-15 DIAGNOSIS — J40 BRONCHITIS: ICD-10-CM

## 2019-11-15 DIAGNOSIS — J44.9 MIXED TYPE COPD (CHRONIC OBSTRUCTIVE PULMONARY DISEASE) (HCC): ICD-10-CM

## 2019-11-15 DIAGNOSIS — I10 ESSENTIAL HYPERTENSION: ICD-10-CM

## 2019-11-15 DIAGNOSIS — I25.119 CORONARY ARTERY DISEASE INVOLVING NATIVE CORONARY ARTERY OF NATIVE HEART WITH ANGINA PECTORIS (HCC): Primary | ICD-10-CM

## 2019-11-15 DIAGNOSIS — J06.9 UPPER RESPIRATORY TRACT INFECTION, UNSPECIFIED TYPE: ICD-10-CM

## 2019-11-15 PROCEDURE — 1111F DSCHRG MED/CURRENT MED MERGE: CPT | Performed by: INTERNAL MEDICINE

## 2019-11-15 PROCEDURE — 99214 OFFICE O/P EST MOD 30 MIN: CPT | Performed by: INTERNAL MEDICINE

## 2019-11-15 RX ORDER — AMOXICILLIN AND CLAVULANATE POTASSIUM 875; 125 MG/1; MG/1
1 TABLET, FILM COATED ORAL 2 TIMES DAILY
Qty: 14 TABLET | Refills: 0 | Status: SHIPPED | OUTPATIENT
Start: 2019-11-15 | End: 2019-11-22

## 2019-11-15 RX ORDER — HYDRALAZINE HYDROCHLORIDE 50 MG/1
25 TABLET, FILM COATED ORAL 3 TIMES DAILY
Qty: 120 TABLET | Refills: 3 | Status: SHIPPED | OUTPATIENT
Start: 2019-11-15 | End: 2020-08-05

## 2019-11-15 RX ORDER — TRAMADOL HYDROCHLORIDE 50 MG/1
50 TABLET ORAL EVERY 8 HOURS PRN
Qty: 15 TABLET | Refills: 0 | Status: SHIPPED | OUTPATIENT
Start: 2019-11-15 | End: 2019-11-30

## 2019-11-15 RX ORDER — HYDRALAZINE HYDROCHLORIDE 50 MG/1
25 TABLET, FILM COATED ORAL 3 TIMES DAILY
Qty: 120 TABLET | Refills: 3 | Status: SHIPPED | OUTPATIENT
Start: 2019-11-15 | End: 2019-11-15 | Stop reason: SDUPTHER

## 2019-11-15 RX ORDER — AMOXICILLIN AND CLAVULANATE POTASSIUM 875; 125 MG/1; MG/1
1 TABLET, FILM COATED ORAL 2 TIMES DAILY
Qty: 14 TABLET | Refills: 0 | Status: SHIPPED | OUTPATIENT
Start: 2019-11-15 | End: 2019-11-15 | Stop reason: SDUPTHER

## 2019-11-20 ENCOUNTER — CARE COORDINATION (OUTPATIENT)
Dept: CARE COORDINATION | Age: 54
End: 2019-11-20

## 2019-11-22 ENCOUNTER — CARE COORDINATION (OUTPATIENT)
Dept: CARE COORDINATION | Age: 54
End: 2019-11-22

## 2019-11-23 DIAGNOSIS — F32.A DEPRESSION, UNSPECIFIED DEPRESSION TYPE: ICD-10-CM

## 2019-11-25 RX ORDER — DULOXETIN HYDROCHLORIDE 60 MG/1
60 CAPSULE, DELAYED RELEASE ORAL NIGHTLY
Qty: 60 CAPSULE | Refills: 3 | Status: SHIPPED | OUTPATIENT
Start: 2019-11-25 | End: 2020-01-03

## 2019-11-25 ASSESSMENT — ENCOUNTER SYMPTOMS
COUGH: 0
APNEA: 0
CHEST TIGHTNESS: 0
CONSTIPATION: 0
COLOR CHANGE: 0
ABDOMINAL DISTENTION: 0
FACIAL SWELLING: 0
SHORTNESS OF BREATH: 1
DIARRHEA: 0
ABDOMINAL PAIN: 0
BACK PAIN: 0
WHEEZING: 0
CHOKING: 1

## 2019-12-03 ENCOUNTER — CARE COORDINATION (OUTPATIENT)
Dept: CARE COORDINATION | Age: 54
End: 2019-12-03

## 2019-12-04 ENCOUNTER — TELEPHONE (OUTPATIENT)
Dept: INTERNAL MEDICINE CLINIC | Age: 54
End: 2019-12-04

## 2019-12-05 ENCOUNTER — TELEPHONE (OUTPATIENT)
Dept: INTERNAL MEDICINE CLINIC | Age: 54
End: 2019-12-05

## 2019-12-05 NOTE — TELEPHONE ENCOUNTER
Pt called to let staff know he has been sick in bed x3 days which is why he missed his PFT appt on 12/3/19. Pt will call back to reschd once he feels better. Thank you!

## 2019-12-09 ENCOUNTER — APPOINTMENT (OUTPATIENT)
Dept: CT IMAGING | Age: 54
DRG: 194 | End: 2019-12-09
Payer: COMMERCIAL

## 2019-12-09 ENCOUNTER — HOSPITAL ENCOUNTER (INPATIENT)
Age: 54
LOS: 1 days | Discharge: HOME HEALTH | DRG: 194 | End: 2019-12-12
Attending: EMERGENCY MEDICINE | Admitting: INTERNAL MEDICINE
Payer: COMMERCIAL

## 2019-12-09 ENCOUNTER — TELEPHONE (OUTPATIENT)
Dept: INTERNAL MEDICINE CLINIC | Age: 54
End: 2019-12-09

## 2019-12-09 DIAGNOSIS — J90 PLEURAL EFFUSION: Primary | ICD-10-CM

## 2019-12-09 DIAGNOSIS — I21.4 NSTEMI (NON-ST ELEVATED MYOCARDIAL INFARCTION) (HCC): ICD-10-CM

## 2019-12-09 DIAGNOSIS — R06.00 DYSPNEA, UNSPECIFIED TYPE: ICD-10-CM

## 2019-12-09 DIAGNOSIS — R79.89 ELEVATED SERUM CREATININE: ICD-10-CM

## 2019-12-09 DIAGNOSIS — R31.21 ASYMPTOMATIC MICROSCOPIC HEMATURIA: ICD-10-CM

## 2019-12-09 LAB
-: ABNORMAL
ABSOLUTE EOS #: 0.2 K/UL (ref 0–0.4)
ABSOLUTE IMMATURE GRANULOCYTE: ABNORMAL K/UL (ref 0–0.3)
ABSOLUTE LYMPH #: 0.6 K/UL (ref 1–4.8)
ABSOLUTE MONO #: 0.2 K/UL (ref 0.1–1.3)
ACETAMINOPHEN LEVEL: <5 UG/ML (ref 10–30)
ALBUMIN SERPL-MCNC: 3.4 G/DL (ref 3.5–5.2)
ALBUMIN/GLOBULIN RATIO: ABNORMAL (ref 1–2.5)
ALLEN TEST: ABNORMAL
ALLEN TEST: ABNORMAL
ALP BLD-CCNC: 177 U/L (ref 40–129)
ALT SERPL-CCNC: 8 U/L (ref 5–41)
AMORPHOUS: ABNORMAL
ANION GAP SERPL CALCULATED.3IONS-SCNC: 15 MMOL/L (ref 9–17)
AST SERPL-CCNC: 16 U/L
BACTERIA: ABNORMAL
BASOPHILS # BLD: 1 % (ref 0–2)
BASOPHILS ABSOLUTE: 0.1 K/UL (ref 0–0.2)
BILIRUB SERPL-MCNC: 0.83 MG/DL (ref 0.3–1.2)
BILIRUBIN URINE: NEGATIVE
BNP INTERPRETATION: ABNORMAL
BUN BLDV-MCNC: 11 MG/DL (ref 6–20)
BUN/CREAT BLD: ABNORMAL (ref 9–20)
CALCIUM SERPL-MCNC: 8.8 MG/DL (ref 8.6–10.4)
CARBOXYHEMOGLOBIN: 2.2 % (ref 0–5)
CARBOXYHEMOGLOBIN: 3.2 % (ref 0–5)
CHLORIDE BLD-SCNC: 103 MMOL/L (ref 98–107)
CO2: 20 MMOL/L (ref 20–31)
COLOR: YELLOW
COMMENT UA: ABNORMAL
CREAT SERPL-MCNC: 1.33 MG/DL (ref 0.7–1.2)
DIFFERENTIAL TYPE: ABNORMAL
DIRECT EXAM: NORMAL
EOSINOPHILS RELATIVE PERCENT: 4 % (ref 0–4)
EPITHELIAL CELLS UA: ABNORMAL /HPF
ETHANOL PERCENT: <0.01 %
ETHANOL: <10 MG/DL
FIO2: ABNORMAL
FIO2: ABNORMAL
GFR AFRICAN AMERICAN: >60 ML/MIN
GFR NON-AFRICAN AMERICAN: 56 ML/MIN
GFR SERPL CREATININE-BSD FRML MDRD: ABNORMAL ML/MIN/{1.73_M2}
GFR SERPL CREATININE-BSD FRML MDRD: ABNORMAL ML/MIN/{1.73_M2}
GLUCOSE BLD-MCNC: 140 MG/DL (ref 70–99)
GLUCOSE URINE: NEGATIVE
HCO3 ARTERIAL: 21.7 MMOL/L (ref 22–26)
HCO3 VENOUS: 23.8 MMOL/L (ref 24–30)
HCT VFR BLD CALC: 38.5 % (ref 41–53)
HEMOGLOBIN: 12.5 G/DL (ref 13.5–17.5)
IMMATURE GRANULOCYTES: ABNORMAL %
INR BLD: 1
KETONES, URINE: NEGATIVE
LACTIC ACID, SEPSIS WHOLE BLOOD: ABNORMAL MMOL/L (ref 0.5–1.9)
LACTIC ACID, SEPSIS WHOLE BLOOD: NORMAL MMOL/L (ref 0.5–1.9)
LACTIC ACID, SEPSIS: 1.6 MMOL/L (ref 0.5–1.9)
LACTIC ACID, SEPSIS: 3.6 MMOL/L (ref 0.5–1.9)
LEUKOCYTE ESTERASE, URINE: NEGATIVE
LYMPHOCYTES # BLD: 14 % (ref 24–44)
Lab: NORMAL
MCH RBC QN AUTO: 25.8 PG (ref 26–34)
MCHC RBC AUTO-ENTMCNC: 32.5 G/DL (ref 31–37)
MCV RBC AUTO: 79.2 FL (ref 80–100)
METHEMOGLOBIN: 0.4 % (ref 0–1.9)
METHEMOGLOBIN: 0.4 % (ref 0–1.9)
MODE: ABNORMAL
MODE: ABNORMAL
MONOCYTES # BLD: 5 % (ref 1–7)
MUCUS: ABNORMAL
NEGATIVE BASE EXCESS, ART: 2.6 MMOL/L (ref 0–2)
NEGATIVE BASE EXCESS, VEN: 0.7 MMOL/L (ref 0–2)
NITRITE, URINE: NEGATIVE
NOTIFICATION TIME: ABNORMAL
NOTIFICATION TIME: ABNORMAL
NOTIFICATION: ABNORMAL
NOTIFICATION: ABNORMAL
NRBC AUTOMATED: ABNORMAL PER 100 WBC
O2 DEVICE/FLOW/%: ABNORMAL
O2 DEVICE/FLOW/%: ABNORMAL
O2 SAT, ARTERIAL: 95.6 % (ref 95–98)
O2 SAT, VEN: 70.3 % (ref 60–85)
OXYHEMOGLOBIN: ABNORMAL % (ref 95–98)
OXYHEMOGLOBIN: ABNORMAL % (ref 95–98)
PARTIAL THROMBOPLASTIN TIME: 45 SEC (ref 24–36)
PATIENT TEMP: 37
PATIENT TEMP: 37
PCO2 ARTERIAL: 32.7 MMHG (ref 35–45)
PCO2, ART, TEMP ADJ: ABNORMAL (ref 35–45)
PCO2, VEN, TEMP ADJ: ABNORMAL MMHG (ref 39–55)
PCO2, VEN: 37.1 (ref 39–55)
PDW BLD-RTO: 17.8 % (ref 11.5–14.9)
PEEP/CPAP: ABNORMAL
PEEP/CPAP: ABNORMAL
PH ARTERIAL: 7.43 (ref 7.35–7.45)
PH UA: 6 (ref 5–8)
PH VENOUS: 7.42 (ref 7.32–7.42)
PH, ART, TEMP ADJ: ABNORMAL (ref 7.35–7.45)
PH, VEN, TEMP ADJ: ABNORMAL (ref 7.32–7.42)
PLATELET # BLD: 146 K/UL (ref 150–450)
PLATELET ESTIMATE: ABNORMAL
PMV BLD AUTO: 8 FL (ref 6–12)
PO2 ARTERIAL: 97.5 MMHG (ref 80–100)
PO2, ART, TEMP ADJ: ABNORMAL MMHG (ref 80–100)
PO2, VEN, TEMP ADJ: ABNORMAL MMHG (ref 30–50)
PO2, VEN: 36.9 (ref 30–50)
POSITIVE BASE EXCESS, ART: ABNORMAL MMOL/L (ref 0–2)
POSITIVE BASE EXCESS, VEN: ABNORMAL MMOL/L (ref 0–2)
POTASSIUM SERPL-SCNC: 3.4 MMOL/L (ref 3.7–5.3)
PRO-BNP: 680 PG/ML
PROTEIN UA: NEGATIVE
PROTHROMBIN TIME: 13.6 SEC (ref 11.8–14.6)
PSV: ABNORMAL
PSV: ABNORMAL
PT. POSITION: ABNORMAL
PT. POSITION: ABNORMAL
RBC # BLD: 4.86 M/UL (ref 4.5–5.9)
RBC # BLD: ABNORMAL 10*6/UL
RBC UA: ABNORMAL /HPF
RENAL EPITHELIAL, UA: ABNORMAL /HPF
RESPIRATORY RATE: 37
RESPIRATORY RATE: ABNORMAL
SALICYLATE LEVEL: <1 MG/DL (ref 3–10)
SAMPLE SITE: ABNORMAL
SAMPLE SITE: ABNORMAL
SEG NEUTROPHILS: 76 % (ref 36–66)
SEGMENTED NEUTROPHILS ABSOLUTE COUNT: 3.4 K/UL (ref 1.3–9.1)
SET RATE: ABNORMAL
SET RATE: ABNORMAL
SODIUM BLD-SCNC: 138 MMOL/L (ref 135–144)
SPECIFIC GRAVITY UA: 1.02 (ref 1–1.03)
SPECIMEN DESCRIPTION: NORMAL
TEXT FOR RESPIRATORY: ABNORMAL
TEXT FOR RESPIRATORY: ABNORMAL
TOTAL HB: ABNORMAL G/DL (ref 12–16)
TOTAL HB: ABNORMAL G/DL (ref 12–16)
TOTAL PROTEIN: 6.9 G/DL (ref 6.4–8.3)
TOTAL RATE: ABNORMAL
TOTAL RATE: ABNORMAL
TOXIC TRICYCLIC SC,BLOOD: ABNORMAL
TRICHOMONAS: ABNORMAL
TRICYCLIC ANTIDEP,URINE: NEGATIVE
TROPONIN INTERP: ABNORMAL
TROPONIN INTERP: ABNORMAL
TROPONIN INTERP: NORMAL
TROPONIN T: ABNORMAL NG/ML
TROPONIN T: ABNORMAL NG/ML
TROPONIN T: NORMAL NG/ML
TROPONIN, HIGH SENSITIVITY: 19 NG/L (ref 0–22)
TROPONIN, HIGH SENSITIVITY: 27 NG/L (ref 0–22)
TROPONIN, HIGH SENSITIVITY: 36 NG/L (ref 0–22)
TSH SERPL DL<=0.05 MIU/L-ACNC: 3.68 MIU/L (ref 0.3–5)
TURBIDITY: CLEAR
URINE HGB: ABNORMAL
UROBILINOGEN, URINE: NORMAL
VT: ABNORMAL
VT: ABNORMAL
WBC # BLD: 4.5 K/UL (ref 3.5–11)
WBC # BLD: ABNORMAL 10*3/UL
WBC UA: ABNORMAL /HPF
YEAST: ABNORMAL

## 2019-12-09 PROCEDURE — 84484 ASSAY OF TROPONIN QUANT: CPT

## 2019-12-09 PROCEDURE — 87804 INFLUENZA ASSAY W/OPTIC: CPT

## 2019-12-09 PROCEDURE — 6360000002 HC RX W HCPCS: Performed by: EMERGENCY MEDICINE

## 2019-12-09 PROCEDURE — 2500000003 HC RX 250 WO HCPCS: Performed by: EMERGENCY MEDICINE

## 2019-12-09 PROCEDURE — 6360000004 HC RX CONTRAST MEDICATION: Performed by: EMERGENCY MEDICINE

## 2019-12-09 PROCEDURE — 96367 TX/PROPH/DG ADDL SEQ IV INF: CPT

## 2019-12-09 PROCEDURE — 36415 COLL VENOUS BLD VENIPUNCTURE: CPT

## 2019-12-09 PROCEDURE — 71260 CT THORAX DX C+: CPT

## 2019-12-09 PROCEDURE — 6360000002 HC RX W HCPCS: Performed by: NURSE PRACTITIONER

## 2019-12-09 PROCEDURE — 2580000003 HC RX 258: Performed by: EMERGENCY MEDICINE

## 2019-12-09 PROCEDURE — 85025 COMPLETE CBC W/AUTO DIFF WBC: CPT

## 2019-12-09 PROCEDURE — G0378 HOSPITAL OBSERVATION PER HR: HCPCS

## 2019-12-09 PROCEDURE — 87086 URINE CULTURE/COLONY COUNT: CPT

## 2019-12-09 PROCEDURE — 80053 COMPREHEN METABOLIC PANEL: CPT

## 2019-12-09 PROCEDURE — 94660 CPAP INITIATION&MGMT: CPT

## 2019-12-09 PROCEDURE — 82805 BLOOD GASES W/O2 SATURATION: CPT

## 2019-12-09 PROCEDURE — 83880 ASSAY OF NATRIURETIC PEPTIDE: CPT

## 2019-12-09 PROCEDURE — 70450 CT HEAD/BRAIN W/O DYE: CPT

## 2019-12-09 PROCEDURE — 6360000002 HC RX W HCPCS: Performed by: INTERNAL MEDICINE

## 2019-12-09 PROCEDURE — 82800 BLOOD PH: CPT

## 2019-12-09 PROCEDURE — 83605 ASSAY OF LACTIC ACID: CPT

## 2019-12-09 PROCEDURE — 96372 THER/PROPH/DIAG INJ SC/IM: CPT

## 2019-12-09 PROCEDURE — 84443 ASSAY THYROID STIM HORMONE: CPT

## 2019-12-09 PROCEDURE — 2580000003 HC RX 258: Performed by: NURSE PRACTITIONER

## 2019-12-09 PROCEDURE — 99285 EMERGENCY DEPT VISIT HI MDM: CPT

## 2019-12-09 PROCEDURE — 2580000003 HC RX 258: Performed by: INTERNAL MEDICINE

## 2019-12-09 PROCEDURE — G0480 DRUG TEST DEF 1-7 CLASSES: HCPCS

## 2019-12-09 PROCEDURE — 96365 THER/PROPH/DIAG IV INF INIT: CPT

## 2019-12-09 PROCEDURE — 96375 TX/PRO/DX INJ NEW DRUG ADDON: CPT

## 2019-12-09 PROCEDURE — 93005 ELECTROCARDIOGRAM TRACING: CPT | Performed by: EMERGENCY MEDICINE

## 2019-12-09 PROCEDURE — 85730 THROMBOPLASTIN TIME PARTIAL: CPT

## 2019-12-09 PROCEDURE — 80307 DRUG TEST PRSMV CHEM ANLYZR: CPT

## 2019-12-09 PROCEDURE — 85610 PROTHROMBIN TIME: CPT

## 2019-12-09 PROCEDURE — 6370000000 HC RX 637 (ALT 250 FOR IP): Performed by: NURSE PRACTITIONER

## 2019-12-09 PROCEDURE — 6370000000 HC RX 637 (ALT 250 FOR IP): Performed by: EMERGENCY MEDICINE

## 2019-12-09 PROCEDURE — 36600 WITHDRAWAL OF ARTERIAL BLOOD: CPT

## 2019-12-09 PROCEDURE — 87040 BLOOD CULTURE FOR BACTERIA: CPT

## 2019-12-09 PROCEDURE — 81001 URINALYSIS AUTO W/SCOPE: CPT

## 2019-12-09 RX ORDER — SODIUM CHLORIDE 0.9 % (FLUSH) 0.9 %
10 SYRINGE (ML) INJECTION EVERY 12 HOURS SCHEDULED
Status: DISCONTINUED | OUTPATIENT
Start: 2019-12-09 | End: 2019-12-12 | Stop reason: HOSPADM

## 2019-12-09 RX ORDER — SODIUM CHLORIDE 9 MG/ML
INJECTION, SOLUTION INTRAVENOUS CONTINUOUS
Status: DISCONTINUED | OUTPATIENT
Start: 2019-12-09 | End: 2019-12-09

## 2019-12-09 RX ORDER — FENTANYL CITRATE 50 UG/ML
50 INJECTION, SOLUTION INTRAMUSCULAR; INTRAVENOUS ONCE
Status: COMPLETED | OUTPATIENT
Start: 2019-12-09 | End: 2019-12-09

## 2019-12-09 RX ORDER — HYDRALAZINE HYDROCHLORIDE 50 MG/1
25 TABLET, FILM COATED ORAL 3 TIMES DAILY
Status: DISCONTINUED | OUTPATIENT
Start: 2019-12-09 | End: 2019-12-10

## 2019-12-09 RX ORDER — SODIUM CHLORIDE 0.9 % (FLUSH) 0.9 %
10 SYRINGE (ML) INJECTION ONCE
Status: COMPLETED | OUTPATIENT
Start: 2019-12-09 | End: 2019-12-09

## 2019-12-09 RX ORDER — ALBUTEROL SULFATE 2.5 MG/3ML
2.5 SOLUTION RESPIRATORY (INHALATION) EVERY 4 HOURS
Status: DISCONTINUED | OUTPATIENT
Start: 2019-12-09 | End: 2019-12-09

## 2019-12-09 RX ORDER — 0.9 % SODIUM CHLORIDE 0.9 %
80 INTRAVENOUS SOLUTION INTRAVENOUS ONCE
Status: COMPLETED | OUTPATIENT
Start: 2019-12-09 | End: 2019-12-09

## 2019-12-09 RX ORDER — NICOTINE 21 MG/24HR
1 PATCH, TRANSDERMAL 24 HOURS TRANSDERMAL DAILY PRN
Status: DISCONTINUED | OUTPATIENT
Start: 2019-12-09 | End: 2019-12-12 | Stop reason: HOSPADM

## 2019-12-09 RX ORDER — LEVALBUTEROL 1.25 MG/.5ML
1.25 SOLUTION, CONCENTRATE RESPIRATORY (INHALATION) EVERY 4 HOURS
Status: DISCONTINUED | OUTPATIENT
Start: 2019-12-09 | End: 2019-12-12 | Stop reason: HOSPADM

## 2019-12-09 RX ORDER — ASPIRIN 81 MG/1
324 TABLET, CHEWABLE ORAL ONCE
Status: COMPLETED | OUTPATIENT
Start: 2019-12-09 | End: 2019-12-09

## 2019-12-09 RX ORDER — MAGNESIUM SULFATE 1 G/100ML
1 INJECTION INTRAVENOUS PRN
Status: DISCONTINUED | OUTPATIENT
Start: 2019-12-09 | End: 2019-12-12 | Stop reason: HOSPADM

## 2019-12-09 RX ORDER — POTASSIUM CHLORIDE 20 MEQ/1
40 TABLET, EXTENDED RELEASE ORAL PRN
Status: DISCONTINUED | OUTPATIENT
Start: 2019-12-09 | End: 2019-12-12 | Stop reason: HOSPADM

## 2019-12-09 RX ORDER — ASPIRIN 81 MG/1
81 TABLET ORAL DAILY
Status: DISCONTINUED | OUTPATIENT
Start: 2019-12-10 | End: 2019-12-12 | Stop reason: HOSPADM

## 2019-12-09 RX ORDER — HYDROXYZINE HYDROCHLORIDE 25 MG/1
25 TABLET, FILM COATED ORAL 3 TIMES DAILY PRN
Status: DISCONTINUED | OUTPATIENT
Start: 2019-12-09 | End: 2019-12-12 | Stop reason: HOSPADM

## 2019-12-09 RX ORDER — DULOXETIN HYDROCHLORIDE 60 MG/1
60 CAPSULE, DELAYED RELEASE ORAL NIGHTLY
Status: DISCONTINUED | OUTPATIENT
Start: 2019-12-09 | End: 2019-12-12 | Stop reason: HOSPADM

## 2019-12-09 RX ORDER — 0.9 % SODIUM CHLORIDE 0.9 %
30 INTRAVENOUS SOLUTION INTRAVENOUS ONCE
Status: COMPLETED | OUTPATIENT
Start: 2019-12-09 | End: 2019-12-09

## 2019-12-09 RX ORDER — METOPROLOL TARTRATE 50 MG/1
50 TABLET, FILM COATED ORAL 2 TIMES DAILY
Status: DISCONTINUED | OUTPATIENT
Start: 2019-12-09 | End: 2019-12-10

## 2019-12-09 RX ORDER — M-VIT,TX,IRON,MINS/CALC/FOLIC 27MG-0.4MG
1 TABLET ORAL DAILY
Status: DISCONTINUED | OUTPATIENT
Start: 2019-12-10 | End: 2019-12-12 | Stop reason: HOSPADM

## 2019-12-09 RX ORDER — IPRATROPIUM BROMIDE AND ALBUTEROL SULFATE 2.5; .5 MG/3ML; MG/3ML
1 SOLUTION RESPIRATORY (INHALATION) EVERY 4 HOURS PRN
Status: DISCONTINUED | OUTPATIENT
Start: 2019-12-09 | End: 2019-12-12 | Stop reason: HOSPADM

## 2019-12-09 RX ORDER — ISOSORBIDE MONONITRATE 30 MG/1
30 TABLET, EXTENDED RELEASE ORAL DAILY
Status: DISCONTINUED | OUTPATIENT
Start: 2019-12-10 | End: 2019-12-12 | Stop reason: HOSPADM

## 2019-12-09 RX ORDER — DIPHENHYDRAMINE HYDROCHLORIDE 50 MG/ML
25 INJECTION INTRAMUSCULAR; INTRAVENOUS ONCE
Status: COMPLETED | OUTPATIENT
Start: 2019-12-09 | End: 2019-12-09

## 2019-12-09 RX ORDER — PANTOPRAZOLE SODIUM 40 MG/1
40 TABLET, DELAYED RELEASE ORAL DAILY
Status: DISCONTINUED | OUTPATIENT
Start: 2019-12-10 | End: 2019-12-12 | Stop reason: HOSPADM

## 2019-12-09 RX ORDER — POTASSIUM CHLORIDE 7.45 MG/ML
10 INJECTION INTRAVENOUS PRN
Status: DISCONTINUED | OUTPATIENT
Start: 2019-12-09 | End: 2019-12-12 | Stop reason: HOSPADM

## 2019-12-09 RX ORDER — ACETAMINOPHEN 325 MG/1
650 TABLET ORAL EVERY 4 HOURS PRN
Status: DISCONTINUED | OUTPATIENT
Start: 2019-12-09 | End: 2019-12-12 | Stop reason: HOSPADM

## 2019-12-09 RX ORDER — NITROGLYCERIN 0.4 MG/1
0.4 TABLET SUBLINGUAL EVERY 5 MIN PRN
Status: DISCONTINUED | OUTPATIENT
Start: 2019-12-09 | End: 2019-12-12 | Stop reason: HOSPADM

## 2019-12-09 RX ORDER — TAMSULOSIN HYDROCHLORIDE 0.4 MG/1
0.4 CAPSULE ORAL DAILY
Status: DISCONTINUED | OUTPATIENT
Start: 2019-12-10 | End: 2019-12-12 | Stop reason: HOSPADM

## 2019-12-09 RX ORDER — PROCHLORPERAZINE EDISYLATE 5 MG/ML
10 INJECTION INTRAMUSCULAR; INTRAVENOUS ONCE
Status: COMPLETED | OUTPATIENT
Start: 2019-12-09 | End: 2019-12-09

## 2019-12-09 RX ORDER — SODIUM CHLORIDE 0.9 % (FLUSH) 0.9 %
10 SYRINGE (ML) INJECTION PRN
Status: DISCONTINUED | OUTPATIENT
Start: 2019-12-09 | End: 2019-12-12 | Stop reason: HOSPADM

## 2019-12-09 RX ORDER — CLONIDINE HYDROCHLORIDE 0.2 MG/1
0.2 TABLET ORAL 2 TIMES DAILY
Status: DISCONTINUED | OUTPATIENT
Start: 2019-12-09 | End: 2019-12-12 | Stop reason: HOSPADM

## 2019-12-09 RX ORDER — ONDANSETRON 2 MG/ML
4 INJECTION INTRAMUSCULAR; INTRAVENOUS EVERY 6 HOURS PRN
Status: DISCONTINUED | OUTPATIENT
Start: 2019-12-09 | End: 2019-12-12 | Stop reason: HOSPADM

## 2019-12-09 RX ORDER — ATORVASTATIN CALCIUM 20 MG/1
20 TABLET, FILM COATED ORAL NIGHTLY
Status: DISCONTINUED | OUTPATIENT
Start: 2019-12-09 | End: 2019-12-12 | Stop reason: HOSPADM

## 2019-12-09 RX ADMIN — PROCHLORPERAZINE EDISYLATE 10 MG: 5 INJECTION INTRAMUSCULAR; INTRAVENOUS at 19:22

## 2019-12-09 RX ADMIN — AZITHROMYCIN MONOHYDRATE 500 MG: 500 INJECTION, POWDER, LYOPHILIZED, FOR SOLUTION INTRAVENOUS at 20:50

## 2019-12-09 RX ADMIN — POTASSIUM CHLORIDE 40 MEQ: 1500 TABLET, EXTENDED RELEASE ORAL at 23:27

## 2019-12-09 RX ADMIN — DIPHENHYDRAMINE HYDROCHLORIDE 25 MG: 50 INJECTION INTRAMUSCULAR; INTRAVENOUS at 19:22

## 2019-12-09 RX ADMIN — SODIUM CHLORIDE 80 ML: 9 INJECTION, SOLUTION INTRAVENOUS at 17:55

## 2019-12-09 RX ADMIN — Medication 10 ML: at 17:55

## 2019-12-09 RX ADMIN — Medication 10 ML: at 23:30

## 2019-12-09 RX ADMIN — FENTANYL CITRATE 50 MCG: 50 INJECTION INTRAMUSCULAR; INTRAVENOUS at 16:51

## 2019-12-09 RX ADMIN — ASPIRIN 81 MG 324 MG: 81 TABLET ORAL at 18:42

## 2019-12-09 RX ADMIN — ENOXAPARIN SODIUM 30 MG: 30 INJECTION SUBCUTANEOUS at 23:27

## 2019-12-09 RX ADMIN — IOVERSOL 75 ML: 741 INJECTION INTRA-ARTERIAL; INTRAVENOUS at 17:55

## 2019-12-09 RX ADMIN — PIPERACILLIN AND TAZOBACTAM 3.38 G: 3; .375 INJECTION, POWDER, FOR SOLUTION INTRAVENOUS at 19:22

## 2019-12-09 RX ADMIN — Medication 1750 MG: at 20:28

## 2019-12-09 RX ADMIN — SODIUM CHLORIDE 3267 ML: 9 INJECTION, SOLUTION INTRAVENOUS at 16:51

## 2019-12-09 RX ADMIN — Medication 10 ML: at 23:31

## 2019-12-09 RX ADMIN — AMPICILLIN SODIUM AND SULBACTAM SODIUM 3 G: 1; .5 INJECTION, POWDER, FOR SOLUTION INTRAMUSCULAR; INTRAVENOUS at 23:37

## 2019-12-09 ASSESSMENT — PAIN SCALES - GENERAL
PAINLEVEL_OUTOF10: 6
PAINLEVEL_OUTOF10: 10
PAINLEVEL_OUTOF10: 10
PAINLEVEL_OUTOF10: 8

## 2019-12-09 ASSESSMENT — PAIN DESCRIPTION - LOCATION: LOCATION: GENERALIZED;CHEST;HEAD

## 2019-12-09 ASSESSMENT — PAIN DESCRIPTION - PAIN TYPE: TYPE: ACUTE PAIN

## 2019-12-09 ASSESSMENT — PAIN DESCRIPTION - FREQUENCY: FREQUENCY: CONTINUOUS

## 2019-12-09 ASSESSMENT — PAIN DESCRIPTION - ONSET: ONSET: ON-GOING

## 2019-12-09 ASSESSMENT — PAIN DESCRIPTION - DESCRIPTORS: DESCRIPTORS: ACHING

## 2019-12-10 ENCOUNTER — CARE COORDINATION (OUTPATIENT)
Dept: CARE COORDINATION | Age: 54
End: 2019-12-10

## 2019-12-10 ENCOUNTER — APPOINTMENT (OUTPATIENT)
Dept: GENERAL RADIOLOGY | Age: 54
DRG: 194 | End: 2019-12-10
Payer: COMMERCIAL

## 2019-12-10 PROBLEM — I25.10 CAD (CORONARY ARTERY DISEASE): Status: ACTIVE | Noted: 2019-12-10

## 2019-12-10 PROBLEM — J44.9 COPD (CHRONIC OBSTRUCTIVE PULMONARY DISEASE) (HCC): Status: ACTIVE | Noted: 2019-12-10

## 2019-12-10 LAB
ANION GAP SERPL CALCULATED.3IONS-SCNC: 11 MMOL/L (ref 9–17)
BNP INTERPRETATION: ABNORMAL
BUN BLDV-MCNC: 8 MG/DL (ref 6–20)
BUN/CREAT BLD: ABNORMAL (ref 9–20)
CALCIUM SERPL-MCNC: 7.7 MG/DL (ref 8.6–10.4)
CHLORIDE BLD-SCNC: 104 MMOL/L (ref 98–107)
CO2: 21 MMOL/L (ref 20–31)
CREAT SERPL-MCNC: 1.36 MG/DL (ref 0.7–1.2)
EKG ATRIAL RATE: 117 BPM
EKG ATRIAL RATE: 148 BPM
EKG P AXIS: 48 DEGREES
EKG P AXIS: 63 DEGREES
EKG P-R INTERVAL: 126 MS
EKG P-R INTERVAL: 154 MS
EKG Q-T INTERVAL: 324 MS
EKG Q-T INTERVAL: 350 MS
EKG QRS DURATION: 80 MS
EKG QRS DURATION: 84 MS
EKG QTC CALCULATION (BAZETT): 451 MS
EKG QTC CALCULATION (BAZETT): 549 MS
EKG R AXIS: -12 DEGREES
EKG R AXIS: -4 DEGREES
EKG T AXIS: 59 DEGREES
EKG T AXIS: 60 DEGREES
EKG VENTRICULAR RATE: 117 BPM
EKG VENTRICULAR RATE: 148 BPM
FERRITIN: 202 UG/L (ref 30–400)
GFR AFRICAN AMERICAN: >60 ML/MIN
GFR NON-AFRICAN AMERICAN: 54 ML/MIN
GFR SERPL CREATININE-BSD FRML MDRD: ABNORMAL ML/MIN/{1.73_M2}
GFR SERPL CREATININE-BSD FRML MDRD: ABNORMAL ML/MIN/{1.73_M2}
GLUCOSE BLD-MCNC: 118 MG/DL (ref 70–99)
HCT VFR BLD CALC: 30.8 % (ref 41–53)
HEMOGLOBIN: 10.1 G/DL (ref 13.5–17.5)
INR BLD: 1.1
IRON SATURATION: 60 % (ref 20–55)
IRON: 41 UG/DL (ref 59–158)
LACTIC ACID: 1 MMOL/L (ref 0.5–2.2)
LV EF: 58 %
LVEF MODALITY: NORMAL
MCH RBC QN AUTO: 25.9 PG (ref 26–34)
MCHC RBC AUTO-ENTMCNC: 32.8 G/DL (ref 31–37)
MCV RBC AUTO: 78.8 FL (ref 80–100)
MRSA, DNA, NASAL: NORMAL
NRBC AUTOMATED: ABNORMAL PER 100 WBC
PDW BLD-RTO: 17.9 % (ref 11.5–14.9)
PLATELET # BLD: 130 K/UL (ref 150–450)
PMV BLD AUTO: 7.5 FL (ref 6–12)
POTASSIUM SERPL-SCNC: 3.8 MMOL/L (ref 3.7–5.3)
PRO-BNP: 1471 PG/ML
PROCALCITONIN: 0.1 NG/ML
PROTHROMBIN TIME: 14.1 SEC (ref 11.8–14.6)
RBC # BLD: 3.91 M/UL (ref 4.5–5.9)
SODIUM BLD-SCNC: 136 MMOL/L (ref 135–144)
SPECIMEN DESCRIPTION: NORMAL
TOTAL IRON BINDING CAPACITY: 68 UG/DL (ref 250–450)
TROPONIN INTERP: ABNORMAL
TROPONIN T: ABNORMAL NG/ML
TROPONIN, HIGH SENSITIVITY: 23 NG/L (ref 0–22)
UNSATURATED IRON BINDING CAPACITY: 27 UG/DL (ref 112–347)
WBC # BLD: 4 K/UL (ref 3.5–11)

## 2019-12-10 PROCEDURE — 6370000000 HC RX 637 (ALT 250 FOR IP): Performed by: INTERNAL MEDICINE

## 2019-12-10 PROCEDURE — 83550 IRON BINDING TEST: CPT

## 2019-12-10 PROCEDURE — 96366 THER/PROPH/DIAG IV INF ADDON: CPT

## 2019-12-10 PROCEDURE — 85610 PROTHROMBIN TIME: CPT

## 2019-12-10 PROCEDURE — 6360000002 HC RX W HCPCS: Performed by: NURSE PRACTITIONER

## 2019-12-10 PROCEDURE — 36415 COLL VENOUS BLD VENIPUNCTURE: CPT

## 2019-12-10 PROCEDURE — 2700000000 HC OXYGEN THERAPY PER DAY

## 2019-12-10 PROCEDURE — 84484 ASSAY OF TROPONIN QUANT: CPT

## 2019-12-10 PROCEDURE — 96375 TX/PRO/DX INJ NEW DRUG ADDON: CPT

## 2019-12-10 PROCEDURE — 2580000003 HC RX 258: Performed by: NURSE PRACTITIONER

## 2019-12-10 PROCEDURE — 99223 1ST HOSP IP/OBS HIGH 75: CPT | Performed by: INTERNAL MEDICINE

## 2019-12-10 PROCEDURE — 96372 THER/PROPH/DIAG INJ SC/IM: CPT

## 2019-12-10 PROCEDURE — 6360000002 HC RX W HCPCS: Performed by: INTERNAL MEDICINE

## 2019-12-10 PROCEDURE — G0378 HOSPITAL OBSERVATION PER HR: HCPCS

## 2019-12-10 PROCEDURE — 85027 COMPLETE CBC AUTOMATED: CPT

## 2019-12-10 PROCEDURE — 94761 N-INVAS EAR/PLS OXIMETRY MLT: CPT

## 2019-12-10 PROCEDURE — 87641 MR-STAPH DNA AMP PROBE: CPT

## 2019-12-10 PROCEDURE — 82728 ASSAY OF FERRITIN: CPT

## 2019-12-10 PROCEDURE — 80048 BASIC METABOLIC PNL TOTAL CA: CPT

## 2019-12-10 PROCEDURE — 2580000003 HC RX 258: Performed by: INTERNAL MEDICINE

## 2019-12-10 PROCEDURE — 96376 TX/PRO/DX INJ SAME DRUG ADON: CPT

## 2019-12-10 PROCEDURE — 83540 ASSAY OF IRON: CPT

## 2019-12-10 PROCEDURE — 83880 ASSAY OF NATRIURETIC PEPTIDE: CPT

## 2019-12-10 PROCEDURE — 83605 ASSAY OF LACTIC ACID: CPT

## 2019-12-10 PROCEDURE — 84145 PROCALCITONIN (PCT): CPT

## 2019-12-10 PROCEDURE — C8929 TTE W OR WO FOL WCON,DOPPLER: HCPCS

## 2019-12-10 PROCEDURE — 93010 ELECTROCARDIOGRAM REPORT: CPT | Performed by: INTERNAL MEDICINE

## 2019-12-10 PROCEDURE — 71045 X-RAY EXAM CHEST 1 VIEW: CPT

## 2019-12-10 PROCEDURE — 6360000002 HC RX W HCPCS: Performed by: STUDENT IN AN ORGANIZED HEALTH CARE EDUCATION/TRAINING PROGRAM

## 2019-12-10 PROCEDURE — 94640 AIRWAY INHALATION TREATMENT: CPT

## 2019-12-10 RX ORDER — HYDRALAZINE HYDROCHLORIDE 50 MG/1
50 TABLET, FILM COATED ORAL 3 TIMES DAILY
Status: DISCONTINUED | OUTPATIENT
Start: 2019-12-10 | End: 2019-12-12 | Stop reason: HOSPADM

## 2019-12-10 RX ORDER — FUROSEMIDE 10 MG/ML
40 INJECTION INTRAMUSCULAR; INTRAVENOUS ONCE
Status: COMPLETED | OUTPATIENT
Start: 2019-12-10 | End: 2019-12-10

## 2019-12-10 RX ORDER — METOPROLOL TARTRATE 5 MG/5ML
5 INJECTION INTRAVENOUS EVERY 6 HOURS PRN
Status: DISCONTINUED | OUTPATIENT
Start: 2019-12-10 | End: 2019-12-12 | Stop reason: HOSPADM

## 2019-12-10 RX ORDER — METOPROLOL TARTRATE 100 MG/1
100 TABLET ORAL 2 TIMES DAILY
Status: DISCONTINUED | OUTPATIENT
Start: 2019-12-10 | End: 2019-12-12 | Stop reason: HOSPADM

## 2019-12-10 RX ORDER — FUROSEMIDE 10 MG/ML
40 INJECTION INTRAMUSCULAR; INTRAVENOUS DAILY
Status: DISCONTINUED | OUTPATIENT
Start: 2019-12-11 | End: 2019-12-11

## 2019-12-10 RX ORDER — KETOROLAC TROMETHAMINE 30 MG/ML
30 INJECTION, SOLUTION INTRAMUSCULAR; INTRAVENOUS ONCE
Status: DISCONTINUED | OUTPATIENT
Start: 2019-12-10 | End: 2019-12-12 | Stop reason: HOSPADM

## 2019-12-10 RX ADMIN — METOPROLOL TARTRATE 100 MG: 100 TABLET ORAL at 12:40

## 2019-12-10 RX ADMIN — LEVALBUTEROL 1.25 MG: 1.25 SOLUTION, CONCENTRATE RESPIRATORY (INHALATION) at 01:45

## 2019-12-10 RX ADMIN — PANTOPRAZOLE SODIUM 40 MG: 40 TABLET, DELAYED RELEASE ORAL at 10:12

## 2019-12-10 RX ADMIN — LEVALBUTEROL 1.25 MG: 1.25 SOLUTION, CONCENTRATE RESPIRATORY (INHALATION) at 16:42

## 2019-12-10 RX ADMIN — AMPICILLIN SODIUM AND SULBACTAM SODIUM 3 G: 1; .5 INJECTION, POWDER, FOR SOLUTION INTRAMUSCULAR; INTRAVENOUS at 05:17

## 2019-12-10 RX ADMIN — LEVALBUTEROL 1.25 MG: 1.25 SOLUTION, CONCENTRATE RESPIRATORY (INHALATION) at 07:47

## 2019-12-10 RX ADMIN — LEVALBUTEROL 1.25 MG: 1.25 SOLUTION, CONCENTRATE RESPIRATORY (INHALATION) at 11:31

## 2019-12-10 RX ADMIN — IPRATROPIUM BROMIDE 0.5 MG: 0.5 SOLUTION RESPIRATORY (INHALATION) at 16:42

## 2019-12-10 RX ADMIN — IPRATROPIUM BROMIDE 0.5 MG: 0.5 SOLUTION RESPIRATORY (INHALATION) at 07:47

## 2019-12-10 RX ADMIN — TAMSULOSIN HYDROCHLORIDE 0.4 MG: 0.4 CAPSULE ORAL at 10:12

## 2019-12-10 RX ADMIN — Medication 10 ML: at 10:19

## 2019-12-10 RX ADMIN — DULOXETIN HYDROCHLORIDE 60 MG: 60 CAPSULE, DELAYED RELEASE ORAL at 00:54

## 2019-12-10 RX ADMIN — HYDRALAZINE HYDROCHLORIDE 50 MG: 50 TABLET, FILM COATED ORAL at 13:29

## 2019-12-10 RX ADMIN — IPRATROPIUM BROMIDE 0.5 MG: 0.5 SOLUTION RESPIRATORY (INHALATION) at 01:45

## 2019-12-10 RX ADMIN — HYDRALAZINE HYDROCHLORIDE 25 MG: 50 TABLET, FILM COATED ORAL at 10:12

## 2019-12-10 RX ADMIN — FUROSEMIDE 40 MG: 10 INJECTION, SOLUTION INTRAMUSCULAR; INTRAVENOUS at 10:07

## 2019-12-10 RX ADMIN — ATORVASTATIN CALCIUM 20 MG: 20 TABLET, FILM COATED ORAL at 00:54

## 2019-12-10 RX ADMIN — IPRATROPIUM BROMIDE 0.5 MG: 0.5 SOLUTION RESPIRATORY (INHALATION) at 11:31

## 2019-12-10 RX ADMIN — DULOXETIN HYDROCHLORIDE 60 MG: 60 CAPSULE, DELAYED RELEASE ORAL at 20:38

## 2019-12-10 RX ADMIN — Medication 10 ML: at 20:39

## 2019-12-10 RX ADMIN — ENOXAPARIN SODIUM 30 MG: 30 INJECTION SUBCUTANEOUS at 12:38

## 2019-12-10 RX ADMIN — AZITHROMYCIN MONOHYDRATE 500 MG: 500 INJECTION, POWDER, LYOPHILIZED, FOR SOLUTION INTRAVENOUS at 21:30

## 2019-12-10 RX ADMIN — CLONIDINE HYDROCHLORIDE 0.2 MG: 0.2 TABLET ORAL at 10:12

## 2019-12-10 RX ADMIN — HYDRALAZINE HYDROCHLORIDE 50 MG: 50 TABLET, FILM COATED ORAL at 20:38

## 2019-12-10 RX ADMIN — METOPROLOL TARTRATE 100 MG: 100 TABLET ORAL at 20:38

## 2019-12-10 RX ADMIN — IPRATROPIUM BROMIDE 0.5 MG: 0.5 SOLUTION RESPIRATORY (INHALATION) at 20:13

## 2019-12-10 RX ADMIN — ENOXAPARIN SODIUM 30 MG: 30 INJECTION SUBCUTANEOUS at 23:31

## 2019-12-10 RX ADMIN — METOPROLOL TARTRATE 50 MG: 50 TABLET, FILM COATED ORAL at 00:54

## 2019-12-10 RX ADMIN — CLONIDINE HYDROCHLORIDE 0.2 MG: 0.2 TABLET ORAL at 00:54

## 2019-12-10 RX ADMIN — ISOSORBIDE MONONITRATE 30 MG: 30 TABLET, EXTENDED RELEASE ORAL at 10:12

## 2019-12-10 RX ADMIN — ATORVASTATIN CALCIUM 20 MG: 20 TABLET, FILM COATED ORAL at 20:39

## 2019-12-10 RX ADMIN — HYDRALAZINE HYDROCHLORIDE 25 MG: 50 TABLET, FILM COATED ORAL at 00:53

## 2019-12-10 RX ADMIN — LEVALBUTEROL 1.25 MG: 1.25 SOLUTION, CONCENTRATE RESPIRATORY (INHALATION) at 20:13

## 2019-12-10 RX ADMIN — Medication 1 TABLET: at 10:12

## 2019-12-10 RX ADMIN — ASPIRIN 81 MG: 81 TABLET ORAL at 10:12

## 2019-12-10 RX ADMIN — CLONIDINE HYDROCHLORIDE 0.2 MG: 0.2 TABLET ORAL at 20:38

## 2019-12-10 ASSESSMENT — PAIN SCALES - GENERAL
PAINLEVEL_OUTOF10: 4
PAINLEVEL_OUTOF10: 6
PAINLEVEL_OUTOF10: 4

## 2019-12-10 ASSESSMENT — ENCOUNTER SYMPTOMS
BLOOD IN STOOL: 0
SHORTNESS OF BREATH: 1
CONSTIPATION: 0
SINUS PAIN: 0
SINUS PRESSURE: 0
CHEST TIGHTNESS: 0
ABDOMINAL DISTENTION: 0
VOMITING: 0
TROUBLE SWALLOWING: 0
RHINORRHEA: 0
CHEST TIGHTNESS: 1
COUGH: 1
EYE PAIN: 0
ABDOMINAL PAIN: 0
NAUSEA: 0
COUGH: 0
BACK PAIN: 0
DIARRHEA: 0

## 2019-12-11 PROBLEM — R31.21 ASYMPTOMATIC MICROSCOPIC HEMATURIA: Status: ACTIVE | Noted: 2019-12-11

## 2019-12-11 PROBLEM — I50.31 CHF (CONGESTIVE HEART FAILURE), NYHA CLASS I, ACUTE, DIASTOLIC (HCC): Status: ACTIVE | Noted: 2019-12-11

## 2019-12-11 PROBLEM — I50.33 ACUTE ON CHRONIC DIASTOLIC (CONGESTIVE) HEART FAILURE (HCC): Status: ACTIVE | Noted: 2019-12-11

## 2019-12-11 LAB
-: ABNORMAL
AMORPHOUS: ABNORMAL
ANION GAP SERPL CALCULATED.3IONS-SCNC: 11 MMOL/L (ref 9–17)
BACTERIA: ABNORMAL
BILIRUBIN URINE: NEGATIVE
BNP INTERPRETATION: ABNORMAL
BUN BLDV-MCNC: 13 MG/DL (ref 6–20)
BUN/CREAT BLD: ABNORMAL (ref 9–20)
CALCIUM SERPL-MCNC: 7.7 MG/DL (ref 8.6–10.4)
CASTS UA: ABNORMAL /LPF
CHLORIDE BLD-SCNC: 101 MMOL/L (ref 98–107)
CO2: 26 MMOL/L (ref 20–31)
COLOR: YELLOW
COMMENT UA: ABNORMAL
CREAT SERPL-MCNC: 1.61 MG/DL (ref 0.7–1.2)
CRYSTALS, UA: ABNORMAL /HPF
CULTURE: NO GROWTH
EPITHELIAL CELLS UA: ABNORMAL /HPF
GFR AFRICAN AMERICAN: 54 ML/MIN
GFR NON-AFRICAN AMERICAN: 45 ML/MIN
GFR SERPL CREATININE-BSD FRML MDRD: ABNORMAL ML/MIN/{1.73_M2}
GFR SERPL CREATININE-BSD FRML MDRD: ABNORMAL ML/MIN/{1.73_M2}
GLUCOSE BLD-MCNC: 109 MG/DL (ref 70–99)
GLUCOSE URINE: NEGATIVE
HCT VFR BLD CALC: 31.2 % (ref 41–53)
HEMOGLOBIN: 10.4 G/DL (ref 13.5–17.5)
KETONES, URINE: NEGATIVE
LEUKOCYTE ESTERASE, URINE: NEGATIVE
Lab: NORMAL
MAGNESIUM: 1.4 MG/DL (ref 1.6–2.6)
MCH RBC QN AUTO: 26.2 PG (ref 26–34)
MCHC RBC AUTO-ENTMCNC: 33.4 G/DL (ref 31–37)
MCV RBC AUTO: 78.3 FL (ref 80–100)
MUCUS: ABNORMAL
NITRITE, URINE: NEGATIVE
NRBC AUTOMATED: ABNORMAL PER 100 WBC
OTHER OBSERVATIONS UA: ABNORMAL
PDW BLD-RTO: 17.7 % (ref 11.5–14.9)
PH UA: 7 (ref 5–8)
PLATELET # BLD: 140 K/UL (ref 150–450)
PMV BLD AUTO: 7.6 FL (ref 6–12)
POTASSIUM SERPL-SCNC: 3.4 MMOL/L (ref 3.7–5.3)
PRO-BNP: 2343 PG/ML
PROTEIN UA: NEGATIVE
RBC # BLD: 3.98 M/UL (ref 4.5–5.9)
RBC UA: ABNORMAL /HPF
RENAL EPITHELIAL, UA: ABNORMAL /HPF
SODIUM BLD-SCNC: 138 MMOL/L (ref 135–144)
SPECIFIC GRAVITY UA: 1.01 (ref 1–1.03)
SPECIMEN DESCRIPTION: NORMAL
TRICHOMONAS: ABNORMAL
TURBIDITY: CLEAR
URINE HGB: ABNORMAL
UROBILINOGEN, URINE: ABNORMAL
WBC # BLD: 4.4 K/UL (ref 3.5–11)
WBC UA: ABNORMAL /HPF
YEAST: ABNORMAL

## 2019-12-11 PROCEDURE — 6360000002 HC RX W HCPCS: Performed by: NURSE PRACTITIONER

## 2019-12-11 PROCEDURE — 83880 ASSAY OF NATRIURETIC PEPTIDE: CPT

## 2019-12-11 PROCEDURE — 2060000000 HC ICU INTERMEDIATE R&B

## 2019-12-11 PROCEDURE — 2700000000 HC OXYGEN THERAPY PER DAY

## 2019-12-11 PROCEDURE — 2580000003 HC RX 258: Performed by: NURSE PRACTITIONER

## 2019-12-11 PROCEDURE — 99233 SBSQ HOSP IP/OBS HIGH 50: CPT | Performed by: INTERNAL MEDICINE

## 2019-12-11 PROCEDURE — 6360000002 HC RX W HCPCS: Performed by: INTERNAL MEDICINE

## 2019-12-11 PROCEDURE — 80048 BASIC METABOLIC PNL TOTAL CA: CPT

## 2019-12-11 PROCEDURE — 96366 THER/PROPH/DIAG IV INF ADDON: CPT

## 2019-12-11 PROCEDURE — 96372 THER/PROPH/DIAG INJ SC/IM: CPT

## 2019-12-11 PROCEDURE — 36415 COLL VENOUS BLD VENIPUNCTURE: CPT

## 2019-12-11 PROCEDURE — 85027 COMPLETE CBC AUTOMATED: CPT

## 2019-12-11 PROCEDURE — 94761 N-INVAS EAR/PLS OXIMETRY MLT: CPT

## 2019-12-11 PROCEDURE — 6370000000 HC RX 637 (ALT 250 FOR IP): Performed by: INTERNAL MEDICINE

## 2019-12-11 PROCEDURE — 94640 AIRWAY INHALATION TREATMENT: CPT

## 2019-12-11 PROCEDURE — 96367 TX/PROPH/DG ADDL SEQ IV INF: CPT

## 2019-12-11 PROCEDURE — 6370000000 HC RX 637 (ALT 250 FOR IP): Performed by: NURSE PRACTITIONER

## 2019-12-11 PROCEDURE — 6370000000 HC RX 637 (ALT 250 FOR IP): Performed by: STUDENT IN AN ORGANIZED HEALTH CARE EDUCATION/TRAINING PROGRAM

## 2019-12-11 PROCEDURE — 83735 ASSAY OF MAGNESIUM: CPT

## 2019-12-11 PROCEDURE — 96376 TX/PRO/DX INJ SAME DRUG ADON: CPT

## 2019-12-11 PROCEDURE — 81001 URINALYSIS AUTO W/SCOPE: CPT

## 2019-12-11 RX ORDER — GUAIFENESIN 600 MG/1
600 TABLET, EXTENDED RELEASE ORAL 2 TIMES DAILY
Status: DISCONTINUED | OUTPATIENT
Start: 2019-12-11 | End: 2019-12-12 | Stop reason: HOSPADM

## 2019-12-11 RX ORDER — FUROSEMIDE 40 MG/1
40 TABLET ORAL DAILY
Status: DISCONTINUED | OUTPATIENT
Start: 2019-12-11 | End: 2019-12-12 | Stop reason: HOSPADM

## 2019-12-11 RX ORDER — MAGNESIUM SULFATE 1 G/100ML
1 INJECTION INTRAVENOUS ONCE
Status: DISCONTINUED | OUTPATIENT
Start: 2019-12-11 | End: 2019-12-11 | Stop reason: SDUPTHER

## 2019-12-11 RX ADMIN — ENOXAPARIN SODIUM 30 MG: 30 INJECTION SUBCUTANEOUS at 20:53

## 2019-12-11 RX ADMIN — DULOXETIN HYDROCHLORIDE 60 MG: 60 CAPSULE, DELAYED RELEASE ORAL at 20:52

## 2019-12-11 RX ADMIN — IPRATROPIUM BROMIDE 0.5 MG: 0.5 SOLUTION RESPIRATORY (INHALATION) at 07:07

## 2019-12-11 RX ADMIN — Medication 10 ML: at 09:12

## 2019-12-11 RX ADMIN — Medication 1 MG: at 21:28

## 2019-12-11 RX ADMIN — POTASSIUM CHLORIDE 40 MEQ: 1500 TABLET, EXTENDED RELEASE ORAL at 10:02

## 2019-12-11 RX ADMIN — Medication 10 ML: at 23:04

## 2019-12-11 RX ADMIN — LEVALBUTEROL 1.25 MG: 1.25 SOLUTION, CONCENTRATE RESPIRATORY (INHALATION) at 23:53

## 2019-12-11 RX ADMIN — MAGNESIUM SULFATE HEPTAHYDRATE 1 G: 1 INJECTION, SOLUTION INTRAVENOUS at 10:02

## 2019-12-11 RX ADMIN — LEVALBUTEROL 1.25 MG: 1.25 SOLUTION, CONCENTRATE RESPIRATORY (INHALATION) at 07:07

## 2019-12-11 RX ADMIN — IPRATROPIUM BROMIDE 0.5 MG: 0.5 SOLUTION RESPIRATORY (INHALATION) at 00:26

## 2019-12-11 RX ADMIN — Medication 1 TABLET: at 09:10

## 2019-12-11 RX ADMIN — CLONIDINE HYDROCHLORIDE 0.2 MG: 0.2 TABLET ORAL at 23:02

## 2019-12-11 RX ADMIN — HYDRALAZINE HYDROCHLORIDE 50 MG: 50 TABLET, FILM COATED ORAL at 09:17

## 2019-12-11 RX ADMIN — ENOXAPARIN SODIUM 30 MG: 30 INJECTION SUBCUTANEOUS at 09:09

## 2019-12-11 RX ADMIN — LEVALBUTEROL 1.25 MG: 1.25 SOLUTION, CONCENTRATE RESPIRATORY (INHALATION) at 11:27

## 2019-12-11 RX ADMIN — PANTOPRAZOLE SODIUM 40 MG: 40 TABLET, DELAYED RELEASE ORAL at 09:12

## 2019-12-11 RX ADMIN — GUAIFENESIN 600 MG: 600 TABLET, EXTENDED RELEASE ORAL at 20:51

## 2019-12-11 RX ADMIN — LEVALBUTEROL 1.25 MG: 1.25 SOLUTION, CONCENTRATE RESPIRATORY (INHALATION) at 19:03

## 2019-12-11 RX ADMIN — ASPIRIN 81 MG: 81 TABLET ORAL at 09:11

## 2019-12-11 RX ADMIN — LEVALBUTEROL 1.25 MG: 1.25 SOLUTION, CONCENTRATE RESPIRATORY (INHALATION) at 14:28

## 2019-12-11 RX ADMIN — HYDROXYZINE HYDROCHLORIDE 25 MG: 25 TABLET, FILM COATED ORAL at 20:51

## 2019-12-11 RX ADMIN — METOPROLOL TARTRATE 100 MG: 100 TABLET ORAL at 09:17

## 2019-12-11 RX ADMIN — MAGNESIUM SULFATE HEPTAHYDRATE 1 G: 1 INJECTION, SOLUTION INTRAVENOUS at 11:26

## 2019-12-11 RX ADMIN — IPRATROPIUM BROMIDE 0.5 MG: 0.5 SOLUTION RESPIRATORY (INHALATION) at 19:03

## 2019-12-11 RX ADMIN — LEVALBUTEROL 1.25 MG: 1.25 SOLUTION, CONCENTRATE RESPIRATORY (INHALATION) at 04:39

## 2019-12-11 RX ADMIN — TAMSULOSIN HYDROCHLORIDE 0.4 MG: 0.4 CAPSULE ORAL at 09:12

## 2019-12-11 RX ADMIN — IPRATROPIUM BROMIDE 0.5 MG: 0.5 SOLUTION RESPIRATORY (INHALATION) at 14:28

## 2019-12-11 RX ADMIN — IPRATROPIUM BROMIDE 0.5 MG: 0.5 SOLUTION RESPIRATORY (INHALATION) at 11:28

## 2019-12-11 RX ADMIN — IPRATROPIUM BROMIDE 0.5 MG: 0.5 SOLUTION RESPIRATORY (INHALATION) at 23:53

## 2019-12-11 RX ADMIN — HYDRALAZINE HYDROCHLORIDE 50 MG: 50 TABLET, FILM COATED ORAL at 20:54

## 2019-12-11 RX ADMIN — METOPROLOL TARTRATE 100 MG: 100 TABLET ORAL at 20:51

## 2019-12-11 RX ADMIN — GUAIFENESIN 600 MG: 600 TABLET, EXTENDED RELEASE ORAL at 09:08

## 2019-12-11 RX ADMIN — CLONIDINE HYDROCHLORIDE 0.2 MG: 0.2 TABLET ORAL at 09:11

## 2019-12-11 RX ADMIN — ATORVASTATIN CALCIUM 20 MG: 20 TABLET, FILM COATED ORAL at 23:03

## 2019-12-11 RX ADMIN — ISOSORBIDE MONONITRATE 30 MG: 30 TABLET, EXTENDED RELEASE ORAL at 09:11

## 2019-12-11 RX ADMIN — FUROSEMIDE 40 MG: 40 TABLET ORAL at 09:09

## 2019-12-11 RX ADMIN — LEVALBUTEROL 1.25 MG: 1.25 SOLUTION, CONCENTRATE RESPIRATORY (INHALATION) at 00:26

## 2019-12-11 RX ADMIN — IPRATROPIUM BROMIDE 0.5 MG: 0.5 SOLUTION RESPIRATORY (INHALATION) at 04:40

## 2019-12-11 RX ADMIN — HYDRALAZINE HYDROCHLORIDE 50 MG: 50 TABLET, FILM COATED ORAL at 14:16

## 2019-12-11 ASSESSMENT — ENCOUNTER SYMPTOMS
SORE THROAT: 0
COUGH: 1
ABDOMINAL PAIN: 0
CHEST TIGHTNESS: 0
BACK PAIN: 0
SHORTNESS OF BREATH: 1
ABDOMINAL DISTENTION: 0
SINUS PAIN: 0
TROUBLE SWALLOWING: 0

## 2019-12-12 VITALS
HEART RATE: 62 BPM | HEIGHT: 69 IN | DIASTOLIC BLOOD PRESSURE: 65 MMHG | WEIGHT: 213.85 LBS | TEMPERATURE: 97.7 F | BODY MASS INDEX: 31.67 KG/M2 | RESPIRATION RATE: 20 BRPM | SYSTOLIC BLOOD PRESSURE: 107 MMHG | OXYGEN SATURATION: 100 %

## 2019-12-12 LAB
ANION GAP SERPL CALCULATED.3IONS-SCNC: 11 MMOL/L (ref 9–17)
BUN BLDV-MCNC: 12 MG/DL (ref 6–20)
BUN/CREAT BLD: ABNORMAL (ref 9–20)
CALCIUM SERPL-MCNC: 7.7 MG/DL (ref 8.6–10.4)
CHLORIDE BLD-SCNC: 98 MMOL/L (ref 98–107)
CO2: 24 MMOL/L (ref 20–31)
CREAT SERPL-MCNC: 1.55 MG/DL (ref 0.7–1.2)
GFR AFRICAN AMERICAN: 56 ML/MIN
GFR NON-AFRICAN AMERICAN: 47 ML/MIN
GFR SERPL CREATININE-BSD FRML MDRD: ABNORMAL ML/MIN/{1.73_M2}
GFR SERPL CREATININE-BSD FRML MDRD: ABNORMAL ML/MIN/{1.73_M2}
GLUCOSE BLD-MCNC: 158 MG/DL (ref 70–99)
HCT VFR BLD CALC: 30.4 % (ref 41–53)
HEMOGLOBIN: 10.1 G/DL (ref 13.5–17.5)
MCH RBC QN AUTO: 26.3 PG (ref 26–34)
MCHC RBC AUTO-ENTMCNC: 33.3 G/DL (ref 31–37)
MCV RBC AUTO: 78.9 FL (ref 80–100)
NRBC AUTOMATED: ABNORMAL PER 100 WBC
PDW BLD-RTO: 17.9 % (ref 11.5–14.9)
PLATELET # BLD: 142 K/UL (ref 150–450)
PMV BLD AUTO: 7.9 FL (ref 6–12)
POTASSIUM SERPL-SCNC: 3.8 MMOL/L (ref 3.7–5.3)
RBC # BLD: 3.86 M/UL (ref 4.5–5.9)
SODIUM BLD-SCNC: 133 MMOL/L (ref 135–144)
WBC # BLD: 4.2 K/UL (ref 3.5–11)

## 2019-12-12 PROCEDURE — 36415 COLL VENOUS BLD VENIPUNCTURE: CPT

## 2019-12-12 PROCEDURE — 2700000000 HC OXYGEN THERAPY PER DAY

## 2019-12-12 PROCEDURE — 94640 AIRWAY INHALATION TREATMENT: CPT

## 2019-12-12 PROCEDURE — 6370000000 HC RX 637 (ALT 250 FOR IP): Performed by: NURSE PRACTITIONER

## 2019-12-12 PROCEDURE — 99239 HOSP IP/OBS DSCHRG MGMT >30: CPT | Performed by: INTERNAL MEDICINE

## 2019-12-12 PROCEDURE — 6360000002 HC RX W HCPCS: Performed by: STUDENT IN AN ORGANIZED HEALTH CARE EDUCATION/TRAINING PROGRAM

## 2019-12-12 PROCEDURE — 80048 BASIC METABOLIC PNL TOTAL CA: CPT

## 2019-12-12 PROCEDURE — 6360000002 HC RX W HCPCS: Performed by: INTERNAL MEDICINE

## 2019-12-12 PROCEDURE — 6370000000 HC RX 637 (ALT 250 FOR IP): Performed by: INTERNAL MEDICINE

## 2019-12-12 PROCEDURE — 85027 COMPLETE CBC AUTOMATED: CPT

## 2019-12-12 PROCEDURE — 2580000003 HC RX 258: Performed by: STUDENT IN AN ORGANIZED HEALTH CARE EDUCATION/TRAINING PROGRAM

## 2019-12-12 PROCEDURE — 94761 N-INVAS EAR/PLS OXIMETRY MLT: CPT

## 2019-12-12 PROCEDURE — 6360000002 HC RX W HCPCS: Performed by: NURSE PRACTITIONER

## 2019-12-12 PROCEDURE — 6370000000 HC RX 637 (ALT 250 FOR IP): Performed by: STUDENT IN AN ORGANIZED HEALTH CARE EDUCATION/TRAINING PROGRAM

## 2019-12-12 PROCEDURE — 2580000003 HC RX 258: Performed by: NURSE PRACTITIONER

## 2019-12-12 RX ORDER — FUROSEMIDE 40 MG/1
40 TABLET ORAL DAILY
Qty: 60 TABLET | Refills: 3 | Status: ON HOLD | OUTPATIENT
Start: 2019-12-13 | End: 2020-07-13 | Stop reason: HOSPADM

## 2019-12-12 RX ORDER — METOPROLOL TARTRATE 100 MG/1
100 TABLET ORAL 2 TIMES DAILY
Qty: 60 TABLET | Refills: 3 | Status: SHIPPED | OUTPATIENT
Start: 2019-12-12 | End: 2020-01-02 | Stop reason: SDUPTHER

## 2019-12-12 RX ORDER — PREDNISONE 20 MG/1
40 TABLET ORAL DAILY
Status: DISCONTINUED | OUTPATIENT
Start: 2019-12-12 | End: 2019-12-12 | Stop reason: HOSPADM

## 2019-12-12 RX ORDER — PREDNISONE 20 MG/1
40 TABLET ORAL DAILY
Qty: 8 TABLET | Refills: 0 | Status: SHIPPED | OUTPATIENT
Start: 2019-12-13 | End: 2019-12-17

## 2019-12-12 RX ORDER — KETOROLAC TROMETHAMINE 30 MG/ML
30 INJECTION, SOLUTION INTRAMUSCULAR; INTRAVENOUS ONCE
Status: COMPLETED | OUTPATIENT
Start: 2019-12-12 | End: 2019-12-12

## 2019-12-12 RX ADMIN — Medication 1 TABLET: at 09:14

## 2019-12-12 RX ADMIN — KETOROLAC TROMETHAMINE 30 MG: 30 INJECTION, SOLUTION INTRAMUSCULAR at 14:18

## 2019-12-12 RX ADMIN — GUAIFENESIN 600 MG: 600 TABLET, EXTENDED RELEASE ORAL at 09:05

## 2019-12-12 RX ADMIN — CALCIUM GLUCONATE 2 G: 98 INJECTION, SOLUTION INTRAVENOUS at 11:10

## 2019-12-12 RX ADMIN — HYDRALAZINE HYDROCHLORIDE 50 MG: 50 TABLET, FILM COATED ORAL at 09:05

## 2019-12-12 RX ADMIN — ACETAMINOPHEN 650 MG: 325 TABLET, FILM COATED ORAL at 03:15

## 2019-12-12 RX ADMIN — IPRATROPIUM BROMIDE 0.5 MG: 0.5 SOLUTION RESPIRATORY (INHALATION) at 04:15

## 2019-12-12 RX ADMIN — LEVALBUTEROL 1.25 MG: 1.25 SOLUTION, CONCENTRATE RESPIRATORY (INHALATION) at 04:15

## 2019-12-12 RX ADMIN — ASPIRIN 81 MG: 81 TABLET ORAL at 09:11

## 2019-12-12 RX ADMIN — LEVALBUTEROL 1.25 MG: 1.25 SOLUTION, CONCENTRATE RESPIRATORY (INHALATION) at 08:18

## 2019-12-12 RX ADMIN — HYDRALAZINE HYDROCHLORIDE 50 MG: 50 TABLET, FILM COATED ORAL at 13:29

## 2019-12-12 RX ADMIN — FUROSEMIDE 40 MG: 40 TABLET ORAL at 09:05

## 2019-12-12 RX ADMIN — IPRATROPIUM BROMIDE 0.5 MG: 0.5 SOLUTION RESPIRATORY (INHALATION) at 11:24

## 2019-12-12 RX ADMIN — IPRATROPIUM BROMIDE 0.5 MG: 0.5 SOLUTION RESPIRATORY (INHALATION) at 08:17

## 2019-12-12 RX ADMIN — Medication 10 ML: at 09:14

## 2019-12-12 RX ADMIN — TAMSULOSIN HYDROCHLORIDE 0.4 MG: 0.4 CAPSULE ORAL at 09:13

## 2019-12-12 RX ADMIN — METOPROLOL TARTRATE 100 MG: 100 TABLET ORAL at 09:04

## 2019-12-12 RX ADMIN — PANTOPRAZOLE SODIUM 40 MG: 40 TABLET, DELAYED RELEASE ORAL at 09:12

## 2019-12-12 RX ADMIN — ONDANSETRON 4 MG: 2 INJECTION INTRAMUSCULAR; INTRAVENOUS at 03:16

## 2019-12-12 RX ADMIN — ISOSORBIDE MONONITRATE 30 MG: 30 TABLET, EXTENDED RELEASE ORAL at 09:12

## 2019-12-12 RX ADMIN — ENOXAPARIN SODIUM 30 MG: 30 INJECTION SUBCUTANEOUS at 09:04

## 2019-12-12 RX ADMIN — PREDNISONE 40 MG: 20 TABLET ORAL at 13:27

## 2019-12-12 RX ADMIN — LEVALBUTEROL 1.25 MG: 1.25 SOLUTION, CONCENTRATE RESPIRATORY (INHALATION) at 15:31

## 2019-12-12 RX ADMIN — CLONIDINE HYDROCHLORIDE 0.2 MG: 0.2 TABLET ORAL at 09:10

## 2019-12-12 RX ADMIN — IPRATROPIUM BROMIDE 0.5 MG: 0.5 SOLUTION RESPIRATORY (INHALATION) at 15:31

## 2019-12-12 RX ADMIN — LEVALBUTEROL 1.25 MG: 1.25 SOLUTION, CONCENTRATE RESPIRATORY (INHALATION) at 11:25

## 2019-12-12 ASSESSMENT — ENCOUNTER SYMPTOMS
ABDOMINAL DISTENTION: 0
SORE THROAT: 0
SINUS PAIN: 0
BACK PAIN: 0
SHORTNESS OF BREATH: 1
CHEST TIGHTNESS: 0
COUGH: 0
ABDOMINAL PAIN: 0
TROUBLE SWALLOWING: 0

## 2019-12-12 ASSESSMENT — PAIN SCALES - GENERAL
PAINLEVEL_OUTOF10: 6
PAINLEVEL_OUTOF10: 9
PAINLEVEL_OUTOF10: 0

## 2019-12-15 LAB
CULTURE: NORMAL
CULTURE: NORMAL
Lab: NORMAL
Lab: NORMAL
SPECIMEN DESCRIPTION: NORMAL
SPECIMEN DESCRIPTION: NORMAL

## 2019-12-17 ENCOUNTER — OFFICE VISIT (OUTPATIENT)
Dept: INTERNAL MEDICINE CLINIC | Age: 54
End: 2019-12-17
Payer: COMMERCIAL

## 2019-12-17 VITALS
SYSTOLIC BLOOD PRESSURE: 124 MMHG | HEIGHT: 69 IN | OXYGEN SATURATION: 95 % | HEART RATE: 60 BPM | BODY MASS INDEX: 32.73 KG/M2 | WEIGHT: 221 LBS | DIASTOLIC BLOOD PRESSURE: 60 MMHG

## 2019-12-17 DIAGNOSIS — Z09 HOSPITAL DISCHARGE FOLLOW-UP: Primary | ICD-10-CM

## 2019-12-17 DIAGNOSIS — I50.33 ACUTE ON CHRONIC DIASTOLIC (CONGESTIVE) HEART FAILURE (HCC): ICD-10-CM

## 2019-12-17 DIAGNOSIS — R31.29 MICROSCOPIC HEMATURIA: ICD-10-CM

## 2019-12-17 DIAGNOSIS — J90 PLEURAL EFFUSION: ICD-10-CM

## 2019-12-17 DIAGNOSIS — I25.119 CORONARY ARTERY DISEASE INVOLVING NATIVE CORONARY ARTERY OF NATIVE HEART WITH ANGINA PECTORIS (HCC): ICD-10-CM

## 2019-12-17 DIAGNOSIS — J44.9 MIXED TYPE COPD (CHRONIC OBSTRUCTIVE PULMONARY DISEASE) (HCC): ICD-10-CM

## 2019-12-17 DIAGNOSIS — Z71.6 TOBACCO ABUSE COUNSELING: ICD-10-CM

## 2019-12-17 DIAGNOSIS — I10 ESSENTIAL HYPERTENSION: ICD-10-CM

## 2019-12-17 PROCEDURE — 1111F DSCHRG MED/CURRENT MED MERGE: CPT | Performed by: PHYSICIAN ASSISTANT

## 2019-12-17 PROCEDURE — 94010 BREATHING CAPACITY TEST: CPT | Performed by: PHYSICIAN ASSISTANT

## 2019-12-17 PROCEDURE — 99215 OFFICE O/P EST HI 40 MIN: CPT | Performed by: PHYSICIAN ASSISTANT

## 2019-12-18 ENCOUNTER — HOSPITAL ENCOUNTER (OUTPATIENT)
Dept: SLEEP CENTER | Age: 54
Discharge: HOME | End: 2019-12-20
Payer: COMMERCIAL

## 2019-12-18 DIAGNOSIS — J44.9 MIXED TYPE COPD (CHRONIC OBSTRUCTIVE PULMONARY DISEASE) (HCC): ICD-10-CM

## 2019-12-18 PROCEDURE — 95810 POLYSOM 6/> YRS 4/> PARAM: CPT

## 2019-12-19 ENCOUNTER — CARE COORDINATION (OUTPATIENT)
Dept: CARE COORDINATION | Age: 54
End: 2019-12-19

## 2019-12-19 VITALS — RESPIRATION RATE: 16 BRPM | BODY MASS INDEX: 32.73 KG/M2 | HEIGHT: 69 IN | WEIGHT: 221 LBS | HEART RATE: 64 BPM

## 2019-12-19 ASSESSMENT — SLEEP AND FATIGUE QUESTIONNAIRES
HOW LIKELY ARE YOU TO NOD OFF OR FALL ASLEEP WHILE LYING DOWN TO REST IN THE AFTERNOON WHEN CIRCUMSTANCES PERMIT: 2
HOW LIKELY ARE YOU TO NOD OFF OR FALL ASLEEP WHILE SITTING INACTIVE IN A PUBLIC PLACE: 2
HOW LIKELY ARE YOU TO NOD OFF OR FALL ASLEEP IN A CAR, WHILE STOPPED FOR A FEW MINUTES IN TRAFFIC: 0
HOW LIKELY ARE YOU TO NOD OFF OR FALL ASLEEP WHILE SITTING AND TALKING TO SOMEONE: 0
HOW LIKELY ARE YOU TO NOD OFF OR FALL ASLEEP WHILE WATCHING TV: 3
ESS TOTAL SCORE: 10
HOW LIKELY ARE YOU TO NOD OFF OR FALL ASLEEP WHEN YOU ARE A PASSENGER IN A CAR FOR AN HOUR WITHOUT A BREAK: 1
HOW LIKELY ARE YOU TO NOD OFF OR FALL ASLEEP WHILE SITTING AND READING: 0
HOW LIKELY ARE YOU TO NOD OFF OR FALL ASLEEP WHILE SITTING QUIETLY AFTER LUNCH WITHOUT ALCOHOL: 2

## 2019-12-20 ENCOUNTER — TELEPHONE (OUTPATIENT)
Dept: OTHER | Facility: CLINIC | Age: 54
End: 2019-12-20

## 2019-12-20 ENCOUNTER — HOSPITAL ENCOUNTER (EMERGENCY)
Age: 54
Discharge: HOME | End: 2019-12-20
Attending: EMERGENCY MEDICINE
Payer: COMMERCIAL

## 2019-12-20 ENCOUNTER — APPOINTMENT (OUTPATIENT)
Dept: GENERAL RADIOLOGY | Age: 54
End: 2019-12-20
Payer: COMMERCIAL

## 2019-12-20 ENCOUNTER — TELEPHONE (OUTPATIENT)
Dept: UROLOGY | Age: 54
End: 2019-12-20

## 2019-12-20 VITALS
TEMPERATURE: 98.1 F | HEART RATE: 90 BPM | HEIGHT: 69 IN | WEIGHT: 224 LBS | RESPIRATION RATE: 14 BRPM | BODY MASS INDEX: 33.18 KG/M2 | OXYGEN SATURATION: 97 % | DIASTOLIC BLOOD PRESSURE: 96 MMHG | SYSTOLIC BLOOD PRESSURE: 167 MMHG

## 2019-12-20 DIAGNOSIS — E87.6 HYPOKALEMIA: Primary | ICD-10-CM

## 2019-12-20 DIAGNOSIS — R31.9 HEMATURIA, UNSPECIFIED TYPE: ICD-10-CM

## 2019-12-20 LAB
-: ABNORMAL
ABSOLUTE EOS #: 0.47 K/UL (ref 0–0.4)
ABSOLUTE IMMATURE GRANULOCYTE: ABNORMAL K/UL (ref 0–0.3)
ABSOLUTE LYMPH #: 0.77 K/UL (ref 1–4.8)
ABSOLUTE MONO #: 0.24 K/UL (ref 0.1–1.3)
ALBUMIN SERPL-MCNC: 3.3 G/DL (ref 3.5–5.2)
ALBUMIN/GLOBULIN RATIO: ABNORMAL (ref 1–2.5)
ALP BLD-CCNC: 129 U/L (ref 40–129)
ALT SERPL-CCNC: 13 U/L (ref 5–41)
AMORPHOUS: ABNORMAL
ANION GAP SERPL CALCULATED.3IONS-SCNC: 9 MMOL/L (ref 9–17)
AST SERPL-CCNC: 17 U/L
BACTERIA: ABNORMAL
BASOPHILS # BLD: 1 % (ref 0–2)
BASOPHILS ABSOLUTE: 0.06 K/UL (ref 0–0.2)
BILIRUB SERPL-MCNC: 0.87 MG/DL (ref 0.3–1.2)
BILIRUBIN URINE: NEGATIVE
BNP INTERPRETATION: ABNORMAL
BUN BLDV-MCNC: 22 MG/DL (ref 6–20)
BUN/CREAT BLD: ABNORMAL (ref 9–20)
CALCIUM SERPL-MCNC: 8.7 MG/DL (ref 8.6–10.4)
CASTS UA: ABNORMAL /LPF
CHLORIDE BLD-SCNC: 94 MMOL/L (ref 98–107)
CO2: 28 MMOL/L (ref 20–31)
COLOR: ABNORMAL
COMMENT UA: ABNORMAL
CREAT SERPL-MCNC: 1.56 MG/DL (ref 0.7–1.2)
CRYSTALS, UA: ABNORMAL /HPF
DIFFERENTIAL TYPE: ABNORMAL
EOSINOPHILS RELATIVE PERCENT: 8 % (ref 0–4)
EPITHELIAL CELLS UA: ABNORMAL /HPF
GFR AFRICAN AMERICAN: 56 ML/MIN
GFR NON-AFRICAN AMERICAN: 46 ML/MIN
GFR SERPL CREATININE-BSD FRML MDRD: ABNORMAL ML/MIN/{1.73_M2}
GFR SERPL CREATININE-BSD FRML MDRD: ABNORMAL ML/MIN/{1.73_M2}
GLUCOSE BLD-MCNC: 141 MG/DL (ref 70–99)
GLUCOSE URINE: NEGATIVE
HCT VFR BLD CALC: 35.6 % (ref 41–53)
HEMOGLOBIN: 12.2 G/DL (ref 13.5–17.5)
IMMATURE GRANULOCYTES: ABNORMAL %
KETONES, URINE: NEGATIVE
LEUKOCYTE ESTERASE, URINE: ABNORMAL
LYMPHOCYTES # BLD: 13 % (ref 24–44)
MCH RBC QN AUTO: 27.2 PG (ref 26–34)
MCHC RBC AUTO-ENTMCNC: 34.3 G/DL (ref 31–37)
MCV RBC AUTO: 79.4 FL (ref 80–100)
MONOCYTES # BLD: 4 % (ref 1–7)
MORPHOLOGY: ABNORMAL
MUCUS: ABNORMAL
NITRITE, URINE: NEGATIVE
NRBC AUTOMATED: ABNORMAL PER 100 WBC
OTHER OBSERVATIONS UA: ABNORMAL
PDW BLD-RTO: 18.6 % (ref 11.5–14.9)
PH UA: 6.5 (ref 5–8)
PLATELET # BLD: 169 K/UL (ref 150–450)
PLATELET ESTIMATE: ABNORMAL
PMV BLD AUTO: 7.2 FL (ref 6–12)
POTASSIUM SERPL-SCNC: 3.1 MMOL/L (ref 3.7–5.3)
PRO-BNP: 626 PG/ML
PROTEIN UA: ABNORMAL
RBC # BLD: 4.48 M/UL (ref 4.5–5.9)
RBC # BLD: ABNORMAL 10*6/UL
RBC UA: ABNORMAL /HPF
RENAL EPITHELIAL, UA: ABNORMAL /HPF
SEG NEUTROPHILS: 74 % (ref 36–66)
SEGMENTED NEUTROPHILS ABSOLUTE COUNT: 4.36 K/UL (ref 1.3–9.1)
SODIUM BLD-SCNC: 131 MMOL/L (ref 135–144)
SPECIFIC GRAVITY UA: 1.02 (ref 1–1.03)
TOTAL PROTEIN: 6.9 G/DL (ref 6.4–8.3)
TRICHOMONAS: ABNORMAL
TROPONIN INTERP: NORMAL
TROPONIN INTERP: NORMAL
TROPONIN T: NORMAL NG/ML
TROPONIN T: NORMAL NG/ML
TROPONIN, HIGH SENSITIVITY: 14 NG/L (ref 0–22)
TROPONIN, HIGH SENSITIVITY: 14 NG/L (ref 0–22)
TURBIDITY: ABNORMAL
URINE HGB: ABNORMAL
UROBILINOGEN, URINE: ABNORMAL
WBC # BLD: 5.9 K/UL (ref 3.5–11)
WBC # BLD: ABNORMAL 10*3/UL
WBC UA: ABNORMAL /HPF
YEAST: ABNORMAL

## 2019-12-20 PROCEDURE — 36415 COLL VENOUS BLD VENIPUNCTURE: CPT

## 2019-12-20 PROCEDURE — 83880 ASSAY OF NATRIURETIC PEPTIDE: CPT

## 2019-12-20 PROCEDURE — 99285 EMERGENCY DEPT VISIT HI MDM: CPT

## 2019-12-20 PROCEDURE — 80053 COMPREHEN METABOLIC PANEL: CPT

## 2019-12-20 PROCEDURE — 87086 URINE CULTURE/COLONY COUNT: CPT

## 2019-12-20 PROCEDURE — 85025 COMPLETE CBC W/AUTO DIFF WBC: CPT

## 2019-12-20 PROCEDURE — 81001 URINALYSIS AUTO W/SCOPE: CPT

## 2019-12-20 PROCEDURE — 6370000000 HC RX 637 (ALT 250 FOR IP): Performed by: EMERGENCY MEDICINE

## 2019-12-20 PROCEDURE — 71046 X-RAY EXAM CHEST 2 VIEWS: CPT

## 2019-12-20 PROCEDURE — 84484 ASSAY OF TROPONIN QUANT: CPT

## 2019-12-20 PROCEDURE — 93005 ELECTROCARDIOGRAM TRACING: CPT | Performed by: EMERGENCY MEDICINE

## 2019-12-20 RX ORDER — POTASSIUM CHLORIDE 20 MEQ/1
40 TABLET, EXTENDED RELEASE ORAL ONCE
Status: COMPLETED | OUTPATIENT
Start: 2019-12-20 | End: 2019-12-20

## 2019-12-20 RX ORDER — HYDRALAZINE HYDROCHLORIDE 50 MG/1
50 TABLET, FILM COATED ORAL ONCE
Status: COMPLETED | OUTPATIENT
Start: 2019-12-20 | End: 2019-12-20

## 2019-12-20 RX ORDER — CLONIDINE HYDROCHLORIDE 0.1 MG/1
0.1 TABLET ORAL ONCE
Status: COMPLETED | OUTPATIENT
Start: 2019-12-20 | End: 2019-12-20

## 2019-12-20 RX ORDER — HYDROCODONE BITARTRATE AND ACETAMINOPHEN 5; 325 MG/1; MG/1
2 TABLET ORAL ONCE
Status: COMPLETED | OUTPATIENT
Start: 2019-12-20 | End: 2019-12-20

## 2019-12-20 RX ORDER — HYDROCODONE BITARTRATE AND ACETAMINOPHEN 5; 325 MG/1; MG/1
1 TABLET ORAL EVERY 6 HOURS PRN
Qty: 6 TABLET | Refills: 0 | Status: SHIPPED | OUTPATIENT
Start: 2019-12-20 | End: 2019-12-23

## 2019-12-20 RX ORDER — NITROFURANTOIN 25; 75 MG/1; MG/1
100 CAPSULE ORAL 2 TIMES DAILY
Qty: 10 CAPSULE | Refills: 0 | Status: SHIPPED | OUTPATIENT
Start: 2019-12-20 | End: 2019-12-25

## 2019-12-20 RX ORDER — POTASSIUM CHLORIDE 750 MG/1
20 TABLET, EXTENDED RELEASE ORAL 2 TIMES DAILY
Qty: 30 TABLET | Refills: 0 | Status: ON HOLD | OUTPATIENT
Start: 2019-12-20 | End: 2020-03-02

## 2019-12-20 RX ADMIN — HYDROCODONE BITARTRATE AND ACETAMINOPHEN 2 TABLET: 5; 325 TABLET ORAL at 18:31

## 2019-12-20 RX ADMIN — CLONIDINE HYDROCHLORIDE 0.1 MG: 0.1 TABLET ORAL at 16:20

## 2019-12-20 RX ADMIN — HYDRALAZINE HYDROCHLORIDE 50 MG: 50 TABLET, FILM COATED ORAL at 16:20

## 2019-12-20 RX ADMIN — POTASSIUM CHLORIDE 40 MEQ: 1500 TABLET, EXTENDED RELEASE ORAL at 18:31

## 2019-12-20 ASSESSMENT — PAIN DESCRIPTION - PAIN TYPE: TYPE: ACUTE PAIN

## 2019-12-20 ASSESSMENT — ENCOUNTER SYMPTOMS
SHORTNESS OF BREATH: 1
VOMITING: 0
BACK PAIN: 0
ABDOMINAL PAIN: 1

## 2019-12-20 ASSESSMENT — PAIN SCALES - GENERAL
PAINLEVEL_OUTOF10: 8
PAINLEVEL_OUTOF10: 8

## 2019-12-20 ASSESSMENT — PAIN DESCRIPTION - LOCATION: LOCATION: GENERALIZED

## 2019-12-20 ASSESSMENT — PAIN DESCRIPTION - DESCRIPTORS: DESCRIPTORS: ACHING

## 2019-12-21 LAB
CULTURE: NORMAL
EKG ATRIAL RATE: 75 BPM
EKG P AXIS: 43 DEGREES
EKG P-R INTERVAL: 152 MS
EKG Q-T INTERVAL: 364 MS
EKG QRS DURATION: 102 MS
EKG QTC CALCULATION (BAZETT): 406 MS
EKG R AXIS: -10 DEGREES
EKG T AXIS: 76 DEGREES
EKG VENTRICULAR RATE: 75 BPM
Lab: NORMAL
SPECIMEN DESCRIPTION: NORMAL

## 2019-12-21 PROCEDURE — 93010 ELECTROCARDIOGRAM REPORT: CPT | Performed by: INTERNAL MEDICINE

## 2019-12-23 ENCOUNTER — TELEPHONE (OUTPATIENT)
Dept: INTERNAL MEDICINE CLINIC | Age: 54
End: 2019-12-23

## 2019-12-23 ENCOUNTER — TELEPHONE (OUTPATIENT)
Dept: OTHER | Facility: CLINIC | Age: 54
End: 2019-12-23

## 2019-12-23 ENCOUNTER — CARE COORDINATION (OUTPATIENT)
Dept: CARE COORDINATION | Age: 54
End: 2019-12-23

## 2019-12-23 RX ORDER — ISOSORBIDE MONONITRATE 30 MG/1
TABLET, EXTENDED RELEASE ORAL
Qty: 60 TABLET | Refills: 1 | Status: SHIPPED | OUTPATIENT
Start: 2019-12-23 | End: 2020-01-03

## 2019-12-26 DIAGNOSIS — I25.10 CORONARY ARTERY DISEASE INVOLVING NATIVE CORONARY ARTERY OF NATIVE HEART WITHOUT ANGINA PECTORIS: ICD-10-CM

## 2019-12-27 RX ORDER — ASPIRIN 81 MG/1
TABLET ORAL
Qty: 30 TABLET | Refills: 3 | Status: SHIPPED | OUTPATIENT
Start: 2019-12-27 | End: 2020-01-10 | Stop reason: SDUPTHER

## 2019-12-27 RX ORDER — TERBINAFINE HYDROCHLORIDE 250 MG/1
TABLET ORAL
Status: ON HOLD | COMMUNITY
Start: 2019-12-10 | End: 2020-03-02

## 2019-12-27 RX ORDER — POTASSIUM CHLORIDE 750 MG/1
TABLET, FILM COATED, EXTENDED RELEASE ORAL
COMMUNITY
Start: 2019-12-21 | End: 2020-01-29

## 2020-01-02 RX ORDER — METOPROLOL TARTRATE 100 MG/1
100 TABLET ORAL 2 TIMES DAILY
Qty: 180 TABLET | Refills: 3 | Status: ON HOLD | OUTPATIENT
Start: 2020-01-02 | End: 2020-12-20 | Stop reason: HOSPADM

## 2020-01-03 ENCOUNTER — CARE COORDINATION (OUTPATIENT)
Dept: CARE COORDINATION | Age: 55
End: 2020-01-03

## 2020-01-03 RX ORDER — ISOSORBIDE MONONITRATE 30 MG/1
TABLET, EXTENDED RELEASE ORAL
Qty: 60 TABLET | Refills: 1 | Status: SHIPPED | OUTPATIENT
Start: 2020-01-03 | End: 2020-06-12

## 2020-01-03 RX ORDER — DULOXETIN HYDROCHLORIDE 60 MG/1
CAPSULE, DELAYED RELEASE ORAL
Qty: 60 CAPSULE | Refills: 3 | Status: ON HOLD | OUTPATIENT
Start: 2020-01-03 | End: 2020-12-22 | Stop reason: HOSPADM

## 2020-01-03 NOTE — CARE COORDINATION
(NICORETTE) 2 MG gum Take 1 each by mouth as needed for Smoking cessation  Patient not taking: Reported on 12/17/2019 12/12/19   Shelby Ontiveros MD   DULoxetine (CYMBALTA) 60 MG extended release capsule Take 1 capsule by mouth nightly 11/25/19   Ghada Mckeon MD   hydrALAZINE (APRESOLINE) 50 MG tablet Take 0.5 tablets by mouth 3 times daily 11/15/19   Ghada Mckeon MD   ipratropium-albuterol (DUONEB) 0.5-2.5 (3) MG/3ML SOLN nebulizer solution Inhale 3 mLs into the lungs every 4 hours as needed for Shortness of Breath 11/7/19   Ghada Mckeon MD   atorvastatin (LIPITOR) 20 MG tablet take 1 tablet by mouth at bedtime 11/4/19   Ghada Mckeon MD   cloNIDine (CATAPRES) 0.2 MG tablet take 1 tablet by mouth twice a day 9/12/19   Ghada Mckeon MD   tamsulosin (FLOMAX) 0.4 MG capsule Take 1 capsule by mouth daily 7/3/19   Ghada Mckeon MD   hydrOXYzine (ATARAX) 25 MG tablet Take 1 tablet by mouth 3 times daily as needed for Itching 7/3/19   Ghada Mckeon MD   pantoprazole (PROTONIX) 40 MG tablet Take 1 tablet by mouth daily 2/21/19   Giovanna Diallo PA-C   Multiple Vitamin (MULTIVITAMIN) tablet Take 1 tablet by mouth daily 2/21/19   Giovanna Diallo PA-C   tiotropium (SPIRIVA RESPIMAT) 1.25 MCG/ACT AERS inhaler INHALE TWO PUFFS BY MOUTH ONCE A DAY 1/24/19   Ghada Mckeon MD   albuterol sulfate HFA (PROAIR HFA) 108 (90 Base) MCG/ACT inhaler Inhale 2 puffs into the lungs every 6 hours as needed for Wheezing 12/14/18   Giovanna Diallo PA-C   NITROSTAT 0.4 MG SL tablet Place 0.4 mg under the tongue every 5 minutes as needed for Chest pain  3/26/16   Historical Provider, MD       Future Appointments   Date Time Provider Gregg Monroy   1/6/2020  1:00 PM Eric Simpson   1/21/2020  3:30 PM Zuleyma Carrizales  [facility]   1/29/2020  2:30 PM Ghada Mckeon MD [address]      and   General Assessment    Do you have any symptoms that are causing concern?:  Yes  Progression since

## 2020-01-06 ENCOUNTER — TELEPHONE (OUTPATIENT)
Dept: INTERNAL MEDICINE CLINIC | Age: 55
End: 2020-01-06

## 2020-01-09 ENCOUNTER — CARE COORDINATION (OUTPATIENT)
Dept: CARE COORDINATION | Age: 55
End: 2020-01-09

## 2020-01-09 NOTE — CARE COORDINATION
Attempting to reach patient for a follow up care coordination call regarding any needs, questions or concerns.   Rena Lazar, [address] [facility]

## 2020-01-10 RX ORDER — ALBUTEROL SULFATE 90 UG/1
AEROSOL, METERED RESPIRATORY (INHALATION)
Qty: 18 G | Refills: 5 | Status: SHIPPED | OUTPATIENT
Start: 2020-01-10 | End: 2020-09-08 | Stop reason: SDUPTHER

## 2020-01-10 RX ORDER — ASPIRIN 81 MG/1
81 TABLET ORAL DAILY
Qty: 90 TABLET | Refills: 3 | Status: SHIPPED | OUTPATIENT
Start: 2020-01-10 | End: 2020-07-09

## 2020-01-10 NOTE — TELEPHONE ENCOUNTER
Medication: aspirin  albuterol  Last visit: 12/17/19  Next visit: 1/29/2020  Last refill:   Pharmacy: [facility]

## 2020-01-14 LAB — STATUS: NORMAL

## 2020-01-15 ENCOUNTER — CARE COORDINATION (OUTPATIENT)
Dept: CARE COORDINATION | Age: 55
End: 2020-01-15

## 2020-01-15 NOTE — CARE COORDINATION
tongue every 5 minutes as needed for Chest pain  3/26/16   Historical Provider, MD       Future Appointments   Date Time Provider Gregg Monroy   1/21/2020  3:30 PM Abbie Dwyer  [facility] [place]   1/29/2020  2:30 PM Shakila Strange MD [address]     ,   Congestive Heart Failure Assessment    Are you currently restricting fluids?:  Other  Do you understand a low sodium diet?:  Yes  Do you understand how to read food labels?:  Yes  Do you salt your food before tasting it?:  No     No patient-reported symptoms      Symptoms:   None:  Yes      Symptom course:  stable  Salt intake watch compared to last visit:  stable     ,   COPD Assessment    Does the patient understand envrionmental exposure?:  Yes     No patient-reported symptoms         Symptoms:          and   General Assessment    Do you have any symptoms that are causing concern?:  No

## 2020-01-21 ENCOUNTER — OFFICE VISIT (OUTPATIENT)
Dept: UROLOGY | Age: 55
End: 2020-01-21
Payer: COMMERCIAL

## 2020-01-21 ENCOUNTER — HOSPITAL ENCOUNTER (OUTPATIENT)
Age: 55
Setting detail: SPECIMEN
Discharge: HOME | End: 2020-01-21
Payer: COMMERCIAL

## 2020-01-21 ENCOUNTER — HOSPITAL ENCOUNTER (EMERGENCY)
Age: 55
Discharge: HOME | End: 2020-01-21
Attending: EMERGENCY MEDICINE
Payer: COMMERCIAL

## 2020-01-21 VITALS
DIASTOLIC BLOOD PRESSURE: 80 MMHG | BODY MASS INDEX: 32.14 KG/M2 | TEMPERATURE: 98.5 F | WEIGHT: 217 LBS | SYSTOLIC BLOOD PRESSURE: 130 MMHG | HEIGHT: 69 IN

## 2020-01-21 VITALS
TEMPERATURE: 98.8 F | OXYGEN SATURATION: 96 % | DIASTOLIC BLOOD PRESSURE: 91 MMHG | RESPIRATION RATE: 16 BRPM | HEART RATE: 97 BPM | WEIGHT: 217 LBS | SYSTOLIC BLOOD PRESSURE: 161 MMHG | BODY MASS INDEX: 32.14 KG/M2 | HEIGHT: 69 IN

## 2020-01-21 LAB
BILIRUBIN, POC: ABNORMAL
BLOOD URINE, POC: ABNORMAL
CLARITY, POC: ABNORMAL
COLOR, POC: ABNORMAL
GLUCOSE URINE, POC: ABNORMAL
KETONES, POC: ABNORMAL
LEUKOCYTE EST, POC: ABNORMAL
NITRITE, POC: ABNORMAL
PH, POC: ABNORMAL
PROTEIN, POC: ABNORMAL
SPECIFIC GRAVITY, POC: ABNORMAL
UROBILINOGEN, POC: ABNORMAL

## 2020-01-21 PROCEDURE — G8427 DOCREV CUR MEDS BY ELIG CLIN: HCPCS | Performed by: UROLOGY

## 2020-01-21 PROCEDURE — 81002 URINALYSIS NONAUTO W/O SCOPE: CPT | Performed by: UROLOGY

## 2020-01-21 PROCEDURE — 6370000000 HC RX 637 (ALT 250 FOR IP): Performed by: EMERGENCY MEDICINE

## 2020-01-21 PROCEDURE — 3017F COLORECTAL CA SCREEN DOC REV: CPT | Performed by: UROLOGY

## 2020-01-21 PROCEDURE — G8484 FLU IMMUNIZE NO ADMIN: HCPCS | Performed by: UROLOGY

## 2020-01-21 PROCEDURE — 94640 AIRWAY INHALATION TREATMENT: CPT

## 2020-01-21 PROCEDURE — 4004F PT TOBACCO SCREEN RCVD TLK: CPT | Performed by: UROLOGY

## 2020-01-21 PROCEDURE — 94761 N-INVAS EAR/PLS OXIMETRY MLT: CPT

## 2020-01-21 PROCEDURE — G8417 CALC BMI ABV UP PARAM F/U: HCPCS | Performed by: UROLOGY

## 2020-01-21 PROCEDURE — 99204 OFFICE O/P NEW MOD 45 MIN: CPT | Performed by: UROLOGY

## 2020-01-21 PROCEDURE — 99284 EMERGENCY DEPT VISIT MOD MDM: CPT

## 2020-01-21 RX ORDER — IPRATROPIUM BROMIDE AND ALBUTEROL SULFATE 2.5; .5 MG/3ML; MG/3ML
1 SOLUTION RESPIRATORY (INHALATION) ONCE
Status: COMPLETED | OUTPATIENT
Start: 2020-01-21 | End: 2020-01-21

## 2020-01-21 RX ORDER — HYDROXYZINE HYDROCHLORIDE 25 MG/1
25 TABLET, FILM COATED ORAL ONCE
Status: COMPLETED | OUTPATIENT
Start: 2020-01-21 | End: 2020-01-21

## 2020-01-21 RX ADMIN — IPRATROPIUM BROMIDE AND ALBUTEROL SULFATE 1 AMPULE: .5; 3 SOLUTION RESPIRATORY (INHALATION) at 17:49

## 2020-01-21 RX ADMIN — HYDROXYZINE HYDROCHLORIDE 25 MG: 25 TABLET, FILM COATED ORAL at 19:16

## 2020-01-21 ASSESSMENT — ENCOUNTER SYMPTOMS
SHORTNESS OF BREATH: 0
COUGH: 0
NAUSEA: 0
BACK PAIN: 0
EYE REDNESS: 0
VOMITING: 0
EYE PAIN: 0
BACK PAIN: 1
COUGH: 0
COLOR CHANGE: 0
WHEEZING: 0
ABDOMINAL PAIN: 0
ABDOMINAL PAIN: 0

## 2020-01-21 ASSESSMENT — PAIN DESCRIPTION - LOCATION: LOCATION: NECK;SHOULDER;ABDOMEN

## 2020-01-21 ASSESSMENT — PAIN SCALES - GENERAL: PAINLEVEL_OUTOF10: 3

## 2020-01-21 NOTE — PROGRESS NOTES
Review of Systems   Constitutional: Negative for appetite change, chills and fever. Eyes: Negative for pain, redness and visual disturbance. Respiratory: Negative for cough, shortness of breath and wheezing. Cardiovascular: Negative for chest pain and leg swelling. Gastrointestinal: Negative for abdominal pain, nausea and vomiting. Genitourinary: Positive for hematuria. Negative for difficulty urinating, discharge, dysuria, flank pain, frequency, scrotal swelling, testicular pain and urgency. Musculoskeletal: Negative for back pain, joint swelling and myalgias. Skin: Negative for color change, rash and wound. Neurological: Negative for dizziness, tremors and numbness. Hematological: Negative for adenopathy. Does not bruise/bleed easily.
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
## 2020-01-21 NOTE — PROGRESS NOTES
Kylie Gomez MD at . [address] [address]  12/2011    [address]/   [facility].    [address]    Right collapsed Lung  /  [facility]  w/  W    SHOULDER ARTHROSCOPY Right 09/12/2016    [id number]    UPPER GASTROINTESTINAL ENDOSCOPY  6/29/15     Family History   Problem Relation Age of Onset    Anxiety Disorder Sister     Depression Sister     High Blood Pressure Sister     Thyroid Disease Sister     Depression Sister     High Blood Pressure Sister     Lung Cancer Mother     Heart Disease Mother     High Blood Pressure Mother     High Blood Pressure Father     Diabetes Father     Heart Disease Father     Lung Cancer Father     Heart Disease Maternal Grandmother     Depression Brother      Outpatient Medications Marked as Taking for the 1/21/20 encounter (Office Visit) with Lolly Marsh MD   Medication Sig Dispense Refill    albuterol sulfate  (90 Base) MCG/ACT inhaler inhale 2 puffs by mouth every 6 hours if needed for wheezing 18 g 5    aspirin 81 MG EC tablet Take 1 tablet by mouth daily 90 tablet 3    DULoxetine (CYMBALTA) 60 MG extended release capsule take 1 capsule by mouth twice a day 60 capsule 3    isosorbide mononitrate (IMDUR) 30 MG extended release tablet take 1 tablet by mouth once daily 60 tablet 1    metoprolol (LOPRESSOR) 100 MG tablet Take 1 tablet by mouth 2 times daily 180 tablet 3    potassium chloride (KLOR-CON) 10 MEQ extended release tablet       terbinafine (LAMISIL) 250 MG tablet       potassium chloride (KLOR-CON M) 10 MEQ extended release tablet Take 2 tablets by mouth 2 times daily 30 tablet 0    furosemide (LASIX) 40 MG tablet Take 1 tablet by mouth daily 60 tablet 3    nicotine polacrilex (NICORETTE) 2 MG gum Take 1 each by mouth as needed for Smoking cessation 110 each 0    hydrALAZINE (APRESOLINE) 50 MG tablet Take 0.5 tablets by mouth 3 times daily 120 tablet 3    ipratropium-albuterol (DUONEB) 0.5-2.5 (3) MG/3ML SOLN nebulizer solution Inhale 3 mLs into the lungs every 4 hours as needed for Shortness of Breath 360 mL 0    atorvastatin (LIPITOR) 20 MG tablet take 1 tablet by mouth at bedtime 90 tablet 3    cloNIDine (CATAPRES) 0.2 MG tablet take 1 tablet by mouth twice a day 184 tablet 0    tamsulosin (FLOMAX) 0.4 MG capsule Take 1 capsule by mouth daily 30 capsule 2    hydrOXYzine (ATARAX) 25 MG tablet Take 1 tablet by mouth 3 times daily as needed for Itching 90 tablet 2    pantoprazole (PROTONIX) 40 MG tablet Take 1 tablet by mouth daily 90 tablet 3    Multiple Vitamin (MULTIVITAMIN) tablet Take 1 tablet by mouth daily 90 tablet 3    tiotropium (SPIRIVA RESPIMAT) 1.25 MCG/ACT AERS inhaler INHALE TWO PUFFS BY MOUTH ONCE A DAY 12 g 5    NITROSTAT 0.4 MG SL tablet Place 0.4 mg under the tongue every 5 minutes as needed for Chest pain          Morphine  Social History     Tobacco Use   Smoking Status Current Every Day Smoker    Packs/day: 0.50    Years: 37.00    Pack years: 18.50    Types: Cigarettes   Smokeless Tobacco Never Used      (If patient a smoker, smoking cessation counseling offered)   Social History     Substance and Sexual Activity   Alcohol Use No    Alcohol/week: 0.0 standard drinks    Comment: 10/21/16  denies significant use       REVIEW OF SYSTEMS:  Review of Systems    Physical Exam:    This a 62 y.o. female      Vitals:    01/21/20 1547   BP: 130/80   Temp: 98.5 °F (36.9 °C)     Body mass index is 32.05 kg/m². Physical Exam  Constitutional: Patient in no acute distress, ggod grooming, appropriately dressed  Neuro: Alert and oriented to person, place and time.   Psych:Mood normal, affect normal  Skin: No rash noted  HEENT: Head: Normocephalic and atraumatic,Conjunctivae and EOM are normal,Nose- normal, Right/Left External Ear: normal, Mouth: Mucosa Moist  Neck: Supple  Lungs: Respiratory effort is normal  Cardiovascular: strong and regular, no lower leg edema  Abdomen: Soft, non-tender, non-distended with no CVA,    Lymphatics: No cervical palpable lymphadenopathy. Bladder non-tender and not distended. Musculoskeletal: Normal gait and station        Assessment and Plan      1. Microscopic hematuria    2. Benign prostatic hyperplasia with weak urinary stream            Plan:    increase flomax to bid  Ct urogram  Cysto  psa    Prescriptions Ordered:  No orders of the defined types were placed in this encounter. Orders Placed:  Orders Placed This Encounter   Procedures    CT Urogram     Standing Status:   Future     Standing Expiration Date:   1/21/2021    PSA, Diagnostic     Standing Status:   Future     Standing Expiration Date:   1/15/2021    POCT Urinalysis no Charlette Rueda MD    Agree with the ROS entered by the MA.

## 2020-01-21 NOTE — ED NOTES
Provisional Diagnosis:   Depression and Anxiety    Psychosocial and Contextual Factors:   Patient has social issue. Patient has physical health issues. Patient has relationship issues. C-SSRS Summary:      Patient: X  Family:   Agency:         Present Suicidal Behavior:  Patient denies. Verbal:     Attempt:    Past Suicidal Behavior: X    Verbal: Patient reports having suicidal thoughts in the past.     Attempt: Patient denies. Substance Abuse: Patient denies. Self-Injurious/Self-Mutilation: Patient denies. Trauma Identified:  Patient reports he has had multiple health issues. Protective Factors:    Patient has housing. Patient has insurance. Patient has a family care doctor. Risk Factors:    Patient has minimal support system. Patient has physical health issues. Patient is not linked with psychiatrist.       Clinical Summary:    Patient is a 62year old  male that is brought to the ED today by his brother in law for mental health problems. Patient reports he has had an increase in depression and anxiety over the past month. Patient denies S/I and H/I at this time. Patient reports having suicidal thoughts in the past but denies ever acting on these thoughts. Patient reports a week ago he felt so anxious he \"ran away. \" Patient reports he was sleeping at random places but his family made sure he was warm and had blankets. Patient reports today his brother in law picked him up because he had an appointment with a urologist. Patient reports he has had multiple surgeries over the past 6 years and expects to have several more soon. Patient reports feeling very anxious and upset about these upcoming health issues. Patient reports a history of depression and was linked with an outpatient service in the past. Patient reports it has been several years since he was linked or taken any psych medications.            Level of Care Disposition:    Writer and  Efe Will, ED physician agree that patient does not meet criteria for inpatient psychiatric admission at this time. Patient could benefit from psychiatric medications to reduce symptoms of depression and anxiety. Patient is referred to [facility] and will be provided a cab to access these services now.

## 2020-01-22 ENCOUNTER — CARE COORDINATION (OUTPATIENT)
Dept: CARE COORDINATION | Age: 55
End: 2020-01-22

## 2020-01-22 LAB
CULTURE: NO GROWTH
Lab: NORMAL
SPECIMEN DESCRIPTION: NORMAL

## 2020-01-22 NOTE — CARE COORDINATION
Ambulatory Care Coordination Note  1/22/2020  CM Risk Score: 14  Charlson 10 Year Mortality Risk Score: 23%     ACC: Jose Roberto Hinton, NOAH    Summary Note: Spoke with patient following ED visit on 1.21.2020. Per patient he is having increasing depression and ED recommended [facility] with [facility]. Per patient he had a bad experience with them before and he does not want to go. Patient denied any plan to hurt himself. He agreed to see PCP, next week. He asked CC to look into transportation through [place]. Patient qualifies for help with transportation through his insurance but per patient they often do not show up to get him. He had transportation with medicaid in the past and loved it. Referral to MARJORIE Sue and JINA Rogers placed. CC Plan:   Follow up with patient at upcoming PCP appointment      Care Coordination Interventions    Program Enrollment:  Complex Care  Referral from Primary Care Provider:  No  Suggested Interventions and Community Resources  Disease Specific Clinic:  Completed (Comment: Urology, Dr. Gerald Diaz)  Social Work:  Completed  Transportation Support:  Declined  Zone Management Tools:  Completed         Goals Addressed                 This Visit's Progress     Conditions and Symptoms   Improving     I will schedule office visits, as directed by my provider. I will keep my appointment or reschedule if I have to cancel. I will notify my provider of any barriers to my plan of care. I will follow my Zone Management tool to seek urgent or emergent care. I will notify my provider of any symptoms that indicate a worsening of my condition. Barriers: overwhelmed by complexity of regimen  Plan for overcoming my barriers: work with CC   Confidence: 7/10  Anticipated Goal Completion Date: 1.11.20              Prior to Admission medications    Medication Sig Start Date End Date Taking?  Authorizing Provider   albuterol sulfate  (90 Base) MCG/ACT inhaler inhale 2

## 2020-01-23 ENCOUNTER — TELEPHONE (OUTPATIENT)
Dept: INTERNAL MEDICINE CLINIC | Age: 55
End: 2020-01-23

## 2020-01-23 ENCOUNTER — HOSPITAL ENCOUNTER (EMERGENCY)
Age: 55
Discharge: HOME | End: 2020-01-23
Attending: EMERGENCY MEDICINE
Payer: COMMERCIAL

## 2020-01-23 ENCOUNTER — CARE COORDINATION (OUTPATIENT)
Dept: CARE COORDINATION | Age: 55
End: 2020-01-23

## 2020-01-23 ENCOUNTER — TELEPHONE (OUTPATIENT)
Dept: UROLOGY | Age: 55
End: 2020-01-23

## 2020-01-23 VITALS
WEIGHT: 217 LBS | HEART RATE: 83 BPM | OXYGEN SATURATION: 98 % | HEIGHT: 69 IN | TEMPERATURE: 98.2 F | RESPIRATION RATE: 20 BRPM | DIASTOLIC BLOOD PRESSURE: 63 MMHG | SYSTOLIC BLOOD PRESSURE: 112 MMHG | BODY MASS INDEX: 32.14 KG/M2

## 2020-01-23 LAB
-: ABNORMAL
ABSOLUTE EOS #: 0.3 K/UL (ref 0–0.4)
ABSOLUTE IMMATURE GRANULOCYTE: ABNORMAL K/UL (ref 0–0.3)
ABSOLUTE LYMPH #: 0.5 K/UL (ref 1–4.8)
ABSOLUTE MONO #: 0.2 K/UL (ref 0.1–1.3)
ALBUMIN SERPL-MCNC: 3.3 G/DL (ref 3.5–5.2)
ALBUMIN/GLOBULIN RATIO: ABNORMAL (ref 1–2.5)
ALP BLD-CCNC: 172 U/L (ref 40–129)
ALT SERPL-CCNC: 8 U/L (ref 5–41)
AMORPHOUS: ABNORMAL
ANION GAP SERPL CALCULATED.3IONS-SCNC: 11 MMOL/L (ref 9–17)
AST SERPL-CCNC: 17 U/L
BACTERIA: ABNORMAL
BASOPHILS # BLD: 1 % (ref 0–2)
BASOPHILS ABSOLUTE: 0.1 K/UL (ref 0–0.2)
BILIRUB SERPL-MCNC: 0.94 MG/DL (ref 0.3–1.2)
BILIRUBIN URINE: NEGATIVE
BUN BLDV-MCNC: 17 MG/DL (ref 6–20)
BUN/CREAT BLD: ABNORMAL (ref 9–20)
CALCIUM SERPL-MCNC: 8.5 MG/DL (ref 8.6–10.4)
CASTS UA: ABNORMAL /LPF
CHLORIDE BLD-SCNC: 95 MMOL/L (ref 98–107)
CO2: 27 MMOL/L (ref 20–31)
COLOR: YELLOW
COMMENT UA: ABNORMAL
CREAT SERPL-MCNC: 1.68 MG/DL (ref 0.7–1.2)
CRYSTALS, UA: ABNORMAL /HPF
DIFFERENTIAL TYPE: ABNORMAL
EOSINOPHILS RELATIVE PERCENT: 5 % (ref 0–4)
EPITHELIAL CELLS UA: ABNORMAL /HPF
GFR AFRICAN AMERICAN: 51 ML/MIN
GFR NON-AFRICAN AMERICAN: 42 ML/MIN
GFR SERPL CREATININE-BSD FRML MDRD: ABNORMAL ML/MIN/{1.73_M2}
GFR SERPL CREATININE-BSD FRML MDRD: ABNORMAL ML/MIN/{1.73_M2}
GLUCOSE BLD-MCNC: 115 MG/DL (ref 70–99)
GLUCOSE URINE: NEGATIVE
HCT VFR BLD CALC: 35.3 % (ref 41–53)
HEMOGLOBIN: 11.5 G/DL (ref 13.5–17.5)
IMMATURE GRANULOCYTES: ABNORMAL %
KETONES, URINE: NEGATIVE
LEUKOCYTE ESTERASE, URINE: NEGATIVE
LIPASE: 31 U/L (ref 13–60)
LYMPHOCYTES # BLD: 8 % (ref 24–44)
MCH RBC QN AUTO: 25.5 PG (ref 26–34)
MCHC RBC AUTO-ENTMCNC: 32.6 G/DL (ref 31–37)
MCV RBC AUTO: 78.4 FL (ref 80–100)
MONOCYTES # BLD: 3 % (ref 1–7)
MUCUS: ABNORMAL
NITRITE, URINE: NEGATIVE
NRBC AUTOMATED: ABNORMAL PER 100 WBC
OTHER OBSERVATIONS UA: ABNORMAL
PDW BLD-RTO: 18 % (ref 11.5–14.9)
PH UA: 6.5 (ref 5–8)
PLATELET # BLD: 186 K/UL (ref 150–450)
PLATELET ESTIMATE: ABNORMAL
PMV BLD AUTO: 7.6 FL (ref 6–12)
POTASSIUM SERPL-SCNC: 3.7 MMOL/L (ref 3.7–5.3)
PROTEIN UA: NEGATIVE
RBC # BLD: 4.5 M/UL (ref 4.5–5.9)
RBC # BLD: ABNORMAL 10*6/UL
RBC UA: ABNORMAL /HPF
RENAL EPITHELIAL, UA: ABNORMAL /HPF
SEG NEUTROPHILS: 83 % (ref 36–66)
SEGMENTED NEUTROPHILS ABSOLUTE COUNT: 5.3 K/UL (ref 1.3–9.1)
SODIUM BLD-SCNC: 133 MMOL/L (ref 135–144)
SPECIFIC GRAVITY UA: 1.01 (ref 1–1.03)
TOTAL PROTEIN: 7 G/DL (ref 6.4–8.3)
TRICHOMONAS: ABNORMAL
TURBIDITY: CLEAR
URINE HGB: ABNORMAL
UROBILINOGEN, URINE: NORMAL
WBC # BLD: 6.4 K/UL (ref 3.5–11)
WBC # BLD: ABNORMAL 10*3/UL
WBC UA: ABNORMAL /HPF
YEAST: ABNORMAL

## 2020-01-23 PROCEDURE — 81001 URINALYSIS AUTO W/SCOPE: CPT

## 2020-01-23 PROCEDURE — 99284 EMERGENCY DEPT VISIT MOD MDM: CPT

## 2020-01-23 PROCEDURE — 36415 COLL VENOUS BLD VENIPUNCTURE: CPT

## 2020-01-23 PROCEDURE — 2580000003 HC RX 258: Performed by: STUDENT IN AN ORGANIZED HEALTH CARE EDUCATION/TRAINING PROGRAM

## 2020-01-23 PROCEDURE — 80053 COMPREHEN METABOLIC PANEL: CPT

## 2020-01-23 PROCEDURE — 6360000002 HC RX W HCPCS: Performed by: STUDENT IN AN ORGANIZED HEALTH CARE EDUCATION/TRAINING PROGRAM

## 2020-01-23 PROCEDURE — 85025 COMPLETE CBC W/AUTO DIFF WBC: CPT

## 2020-01-23 PROCEDURE — 83690 ASSAY OF LIPASE: CPT

## 2020-01-23 PROCEDURE — 96374 THER/PROPH/DIAG INJ IV PUSH: CPT

## 2020-01-23 RX ORDER — ACETAMINOPHEN 325 MG/1
650 TABLET ORAL EVERY 6 HOURS PRN
Qty: 30 TABLET | Refills: 0 | Status: SHIPPED | OUTPATIENT
Start: 2020-01-23 | End: 2021-02-23

## 2020-01-23 RX ORDER — KETOROLAC TROMETHAMINE 30 MG/ML
15 INJECTION, SOLUTION INTRAMUSCULAR; INTRAVENOUS ONCE
Status: COMPLETED | OUTPATIENT
Start: 2020-01-23 | End: 2020-01-23

## 2020-01-23 RX ORDER — 0.9 % SODIUM CHLORIDE 0.9 %
1000 INTRAVENOUS SOLUTION INTRAVENOUS ONCE
Status: COMPLETED | OUTPATIENT
Start: 2020-01-23 | End: 2020-01-23

## 2020-01-23 RX ADMIN — SODIUM CHLORIDE 1000 ML: 9 INJECTION, SOLUTION INTRAVENOUS at 16:59

## 2020-01-23 RX ADMIN — KETOROLAC TROMETHAMINE 15 MG: 30 INJECTION, SOLUTION INTRAMUSCULAR at 17:00

## 2020-01-23 ASSESSMENT — ENCOUNTER SYMPTOMS
DIARRHEA: 0
CONSTIPATION: 0
COUGH: 0
NAUSEA: 0
ABDOMINAL PAIN: 1
SHORTNESS OF BREATH: 0
VOMITING: 0

## 2020-01-23 ASSESSMENT — PAIN SCALES - GENERAL
PAINLEVEL_OUTOF10: 9
PAINLEVEL_OUTOF10: 10

## 2020-01-23 ASSESSMENT — PAIN DESCRIPTION - LOCATION: LOCATION: ABDOMEN

## 2020-01-23 ASSESSMENT — PAIN DESCRIPTION - PAIN TYPE: TYPE: ACUTE PAIN

## 2020-01-23 NOTE — ED PROVIDER NOTES
partner violence:     Fear of current or ex partner: Not on file     Emotionally abused: Not on file     Physically abused: Not on file     Forced sexual activity: Not on file   Other Topics Concern    Not on file   Social History Narrative    Not on file       Family History   Problem Relation Age of Onset    Anxiety Disorder Sister     Depression Sister     High Blood Pressure Sister     Thyroid Disease Sister     Depression Sister     High Blood Pressure Sister     Lung Cancer Mother     Heart Disease Mother     High Blood Pressure Mother     High Blood Pressure Father     Diabetes Father     Heart Disease Father     Lung Cancer Father     Heart Disease Maternal Grandmother     Depression Brother         Allergies:  Morphine    Home Medications:  Prior to Admission medications    Medication Sig Start Date End Date Taking?  Authorizing Provider   acetaminophen (TYLENOL) 325 MG tablet Take 2 tablets by mouth every 6 hours as needed for Pain 1/23/20  Yes Fernando Michael DO   albuterol sulfate  (90 Base) MCG/ACT inhaler inhale 2 puffs by mouth every 6 hours if needed for wheezing 1/10/20   Ben Ruffin MD   aspirin 81 MG EC tablet Take 1 tablet by mouth daily 1/10/20   Ben Ruffin MD   DULoxetine (CYMBALTA) 60 MG extended release capsule take 1 capsule by mouth twice a day 1/3/20   Ben Ruffin MD   isosorbide mononitrate (IMDUR) 30 MG extended release tablet take 1 tablet by mouth once daily 1/3/20   Ben Ruffin MD   metoprolol (LOPRESSOR) 100 MG tablet Take 1 tablet by mouth 2 times daily 1/2/20   Ben Ruffin MD   potassium chloride (KLOR-CON) 10 MEQ extended release tablet  12/21/19   Historical Provider, MD   terbinafine (LAMISIL) 250 MG tablet  12/10/19   Historical Provider, MD   potassium chloride (KLOR-CON M) 10 MEQ extended release tablet Take 2 tablets by mouth 2 times daily 12/20/19   Ignacio Ibrahim MD   furosemide (LASIX) 40 MG tablet Take 1 tablet by mouth daily light-headedness and headaches. PHYSICAL EXAM   (up to 7 for level 4, 8 or more forlevel 5)      INITIAL VITALS:   ED Triage Vitals   BP Temp Temp Source Pulse Resp SpO2 Height Weight   01/23/20 1616 01/23/20 1614 01/23/20 1614 01/23/20 1614 01/23/20 1614 01/23/20 1614 01/23/20 1614 01/23/20 1614   112/63 98.2 °F (36.8 °C) Oral 83 20 98 % 5' 9\" (1.753 m) 217 lb (98.4 kg)       Physical Exam  Vitals signs and nursing note reviewed. Constitutional:       General: He is not in acute distress. Appearance: Normal appearance. He is well-developed. He is not diaphoretic. HENT:      Head: Normocephalic and atraumatic. Cardiovascular:      Rate and Rhythm: Normal rate and regular rhythm. Heart sounds: Normal heart sounds. Pulmonary:      Effort: Pulmonary effort is normal. No respiratory distress. Breath sounds: Normal breath sounds. No wheezing or rales. Abdominal:      General: Bowel sounds are normal. There is no distension. Palpations: Abdomen is soft. Tenderness: There is tenderness in the right lower quadrant and suprapubic area. There is no right CVA tenderness, left CVA tenderness or guarding. Musculoskeletal: Normal range of motion. Skin:     General: Skin is warm and dry. Neurological:      Mental Status: He is alert and oriented to person, place, and time. Psychiatric:         Behavior: Behavior normal.         Thought Content:  Thought content normal.         DIFFERENTIAL  DIAGNOSIS     PLAN (LABS / IMAGING / EKG):  Orders Placed This Encounter   Procedures    CBC Auto Differential    Comprehensive Metabolic Panel    Lipase    Urinalysis Reflex to Culture    Microscopic Urinalysis       MEDICATIONS ORDERED:  Orders Placed This Encounter   Medications    0.9 % sodium chloride bolus    ketorolac (TORADOL) injection 15 mg    acetaminophen (TYLENOL) 325 MG tablet     Sig: Take 2 tablets by mouth every 6 hours as needed for Pain     Dispense:  30 tablet pain.  Patient agreeable for discharge at this time, all questions answered. Patient discharged home in stable condition. DIAGNOSTIC RESULTS / EMERGENCYDEPARTMENT COURSE / MDM     LABS:  Labs Reviewed   CBC WITH AUTO DIFFERENTIAL - Abnormal; Notable for the following components:       Result Value    Hemoglobin 11.5 (*)     Hematocrit 35.3 (*)     MCV 78.4 (*)     MCH 25.5 (*)     RDW 18.0 (*)     Seg Neutrophils 83 (*)     Lymphocytes 8 (*)     Eosinophils % 5 (*)     Absolute Lymph # 0.50 (*)     All other components within normal limits   COMPREHENSIVE METABOLIC PANEL - Abnormal; Notable for the following components:    Glucose 115 (*)     CREATININE 1.68 (*)     Calcium 8.5 (*)     Sodium 133 (*)     Chloride 95 (*)     Alkaline Phosphatase 172 (*)     Alb 3.3 (*)     GFR Non- 42 (*)     GFR  51 (*)     All other components within normal limits   URINE RT REFLEX TO CULTURE - Abnormal; Notable for the following components:    Urine Hgb SMALL (*)     All other components within normal limits   MICROSCOPIC URINALYSIS - Abnormal; Notable for the following components:    Bacteria, UA FEW (*)     All other components within normal limits   LIPASE           No results found. PROCEDURES:  None    CONSULTS:  None    CRITICAL CARE:  Please see attending note    FINAL IMPRESSION      1.  Flank pain          DISPOSITION / PLAN     DISPOSITION : Decision to discharge      PATIENT REFERRED TO:  [facility] ED  [facility] [address]  [address] [facility] [address]  [phone]  Go to   If symptoms worsen    Adalgisa Mera, [address]  [phone]    In 3 days        DISCHARGE MEDICATIONS:  Discharge Medication List as of 1/23/2020  6:24 PM      START taking these medications    Details   acetaminophen (TYLENOL) 325 MG tablet Take 2 tablets by mouth every 6 hours as needed for Pain, Disp-30 tablet, R-0Print             Artie Montgomery, DO  Emergency Medicine

## 2020-01-23 NOTE — ED PROVIDER NOTES
[address] ED  eMERGENCY dEPARTMENT eNCOUnter   Attending Attestation     Pt Name: Julissa Younger  MRN: [id number]  [place] 1965  Date of evaluation: 1/23/20       Julissa Younger is a 62 y.o. male who presents with Abdominal Pain and Urinary Frequency      History:   Patient is here complaining of urinary frequency hematuria and abdominal pain. This is been ongoing and has an appointment to see her urologist.    Exam: Vitals:   Vitals:    01/23/20 1614 01/23/20 1616   BP:  112/63   Pulse: 83    Resp: 20    Temp: 98.2 °F (36.8 °C)    TempSrc: Oral    SpO2: 98%    Weight: 217 lb (98.4 kg)    Height: 5' 9\" (1.753 m)      Abdomen is soft with tenderness palpation mostly left lower quadrant but no peritoneal signs. I performed a history and physical examination of the patient and discussed management with the resident. I reviewed the residents note and agree with the documented findings and plan of care. Any areas of disagreement are noted on the chart. I was personally present for the key portions of any procedures. I have documented in the chart those procedures where I was not present during the key portions. I have personally reviewed all images and agree with the resident's interpretation. I have reviewed the emergency nurses triage note. I agree with the chief complaint, past medical history, past surgical history, allergies, medications, social and family history as documented unless otherwise noted below. Documentation of the HPI, Physical Exam and Medical Decision Making performed by medical students or scribes is based on my personal performance of the HPI, PE and MDM. I personally evaluated and examined the patient in conjunction with the APC and agree with the assessment, treatment plan, and disposition of the patient as recorded by the APC. Additional findings are as noted.     Tai Yanez MD  Attending Emergency  Physician              Tracy Rhodes MD  01/23/20 7912

## 2020-01-23 NOTE — CARE COORDINATION
Pt was referred to  for assistance in setting up his medical transportation. HC phoned pt, made introductions and shared the reason for calling. Pt was receptive, [facility]. shared the contact number for [facility] transportation system. HC also shared the number of rides that the is eligible for per calendar year, and the information that he will have to share when scheduling his transportation. Pt expressed an understanding. HC gave pt her contact information for questions or future social needs. HC called pt back and shared with him that he has to call two business days prior to his need for transportation, Monday-Friday, 8:30a-5p.   Plan of Care  Will assist pt with future social needs   Will follow up with pt regarding scheduling his cab rides

## 2020-01-24 ENCOUNTER — TELEPHONE (OUTPATIENT)
Dept: INTERNAL MEDICINE CLINIC | Age: 55
End: 2020-01-24

## 2020-01-24 ENCOUNTER — CARE COORDINATION (OUTPATIENT)
Dept: CARE COORDINATION | Age: 55
End: 2020-01-24

## 2020-01-24 NOTE — CARE COORDINATION
Ambulatory Care Coordination Note  1/24/2020  CM Risk Score: 14  Charlson 10 Year Mortality Risk Score: 23%     ACC: Mouna Martinez RN    Summary Note: Spoke with patient as a follow up to ED visit on 1.23.2020. Per patient he is feeling a lot better. He has a follow up with PCP scheduled, does not want to move it up. Per patient they gave him a RX for tylenol and it is helping his pain. CC Plan:   Follow up with patient at upcoming PCP appointment      Care Coordination Interventions    Program Enrollment:  Complex Care  Referral from Primary Care Provider:  No  Suggested Interventions and Community Resources  Disease Specific Clinic:  Completed (Comment: Urology, Dr. Kaylan Jurado)  Social Work:  Completed  Transportation Support:  Declined  Zone Management Tools:  Completed         Goals Addressed                 This Visit's Progress     Conditions and Symptoms   Improving     I will schedule office visits, as directed by my provider. I will keep my appointment or reschedule if I have to cancel. I will notify my provider of any barriers to my plan of care. I will follow my Zone Management tool to seek urgent or emergent care. I will notify my provider of any symptoms that indicate a worsening of my condition. Barriers: overwhelmed by complexity of regimen  Plan for overcoming my barriers: work with CC   Confidence: 7/10  Anticipated Goal Completion Date: 1.11.20              Prior to Admission medications    Medication Sig Start Date End Date Taking?  Authorizing Provider   acetaminophen (TYLENOL) 325 MG tablet Take 2 tablets by mouth every 6 hours as needed for Pain 1/23/20   Ottoniel Crockett DO   albuterol sulfate  (90 Base) MCG/ACT inhaler inhale 2 puffs by mouth every 6 hours if needed for wheezing 1/10/20   Arnold Alonzo MD   aspirin 81 MG EC tablet Take 1 tablet by mouth daily 1/10/20   Arnold Alonzo MD   DULoxetine (CYMBALTA) 60 MG extended release capsule take 1 capsule by mouth twice a day Future Appointments   Date Time Provider Gregg Madridi   1/29/2020  2:30 PM Enoc Sweeney MD [address]   1/31/2020 10:15 AM [facility] CT RM 1 [facility] CT SCAN [facility] Radiolog   2/4/2020 11:00 AM [facility] PAT RM 2 [facility] PAT [facility]     ,   Congestive Heart Failure Assessment    Are you currently restricting fluids?:  Other  Do you understand a low sodium diet?:  Yes  Do you understand how to read food labels?:  Yes  Do you salt your food before tasting it?:  No         Symptoms:   None:  Yes      Symptom course:  stable  Weight trend:  stable  Salt intake watch compared to last visit:  stable     ,   COPD Assessment    Does the patient understand envrionmental exposure?:  Yes  Is the patient able to verbalize Rescue vs. Long Acting medications?:  Yes  Does the patient use a space with inhaled medications?:  Yes     No patient-reported symptoms         Symptoms:          and   General Assessment    Do you have any symptoms that are causing concern?:  No

## 2020-01-29 ENCOUNTER — OFFICE VISIT (OUTPATIENT)
Dept: INTERNAL MEDICINE CLINIC | Age: 55
End: 2020-01-29
Payer: COMMERCIAL

## 2020-01-29 VITALS
OXYGEN SATURATION: 97 % | HEART RATE: 84 BPM | HEIGHT: 69 IN | DIASTOLIC BLOOD PRESSURE: 82 MMHG | WEIGHT: 213 LBS | SYSTOLIC BLOOD PRESSURE: 124 MMHG | BODY MASS INDEX: 31.55 KG/M2

## 2020-01-29 PROCEDURE — G8926 SPIRO NO PERF OR DOC: HCPCS | Performed by: INTERNAL MEDICINE

## 2020-01-29 PROCEDURE — 3017F COLORECTAL CA SCREEN DOC REV: CPT | Performed by: INTERNAL MEDICINE

## 2020-01-29 PROCEDURE — G8417 CALC BMI ABV UP PARAM F/U: HCPCS | Performed by: INTERNAL MEDICINE

## 2020-01-29 PROCEDURE — 4004F PT TOBACCO SCREEN RCVD TLK: CPT | Performed by: INTERNAL MEDICINE

## 2020-01-29 PROCEDURE — 3023F SPIROM DOC REV: CPT | Performed by: INTERNAL MEDICINE

## 2020-01-29 PROCEDURE — 99214 OFFICE O/P EST MOD 30 MIN: CPT | Performed by: INTERNAL MEDICINE

## 2020-01-29 PROCEDURE — G8484 FLU IMMUNIZE NO ADMIN: HCPCS | Performed by: INTERNAL MEDICINE

## 2020-01-29 PROCEDURE — G8427 DOCREV CUR MEDS BY ELIG CLIN: HCPCS | Performed by: INTERNAL MEDICINE

## 2020-01-29 RX ORDER — HYDROXYZINE HYDROCHLORIDE 25 MG/1
25 TABLET, FILM COATED ORAL EVERY 8 HOURS
Qty: 90 TABLET | Refills: 2 | Status: ON HOLD | OUTPATIENT
Start: 2020-01-29 | End: 2020-07-13 | Stop reason: HOSPADM

## 2020-01-29 ASSESSMENT — ENCOUNTER SYMPTOMS
ABDOMINAL DISTENTION: 0
APNEA: 0
FACIAL SWELLING: 0
BACK PAIN: 0
CONSTIPATION: 0
WHEEZING: 0
CHEST TIGHTNESS: 0
DIARRHEA: 0
COLOR CHANGE: 0
COUGH: 0

## 2020-01-29 NOTE — PROGRESS NOTES
numbness and headaches. Psychiatric/Behavioral: Positive for dysphoric mood. Negative for agitation, behavioral problems, confusion and decreased concentration. The patient is nervous/anxious. Objective:   Physical Exam  Vitals signs and nursing note reviewed. Constitutional:       General: He is not in acute distress. Appearance: He is well-developed. He is not diaphoretic. HENT:      Head: Normocephalic and atraumatic. Mouth/Throat:      Pharynx: No oropharyngeal exudate. Eyes:      General: No scleral icterus. Right eye: No discharge. Left eye: No discharge. Conjunctiva/sclera: Conjunctivae normal.      Pupils: Pupils are equal, round, and reactive to light. Neck:      Musculoskeletal: Normal range of motion and neck supple. Thyroid: No thyromegaly. Vascular: No JVD. Trachea: No tracheal deviation. Cardiovascular:      Rate and Rhythm: Normal rate. Heart sounds: Normal heart sounds. No murmur. No gallop. Pulmonary:      Effort: Pulmonary effort is normal. No respiratory distress. Breath sounds: Normal breath sounds. No stridor. No wheezing or rales. Abdominal:      General: Bowel sounds are normal. There is no distension. Palpations: Abdomen is soft. Tenderness: There is no abdominal tenderness. There is no guarding or rebound. Musculoskeletal: Normal range of motion. General: No tenderness. Skin:     General: Skin is warm and dry. Findings: No erythema or rash. Neurological:      Mental Status: He is alert and oriented to person, place, and time. Assessment / Plan:   1. Depression, unspecified depression type  Controlled    2. GAB (generalized anxiety disorder)    - hydrOXYzine (ATARAX) 25 MG tablet; Take 1 tablet by mouth every 8 hours  Dispense: 90 tablet; Refill: 2    3. Mixed type COPD (chronic obstructive pulmonary disease) ([facility] [address]  Compensated    4.  Flank pain  Has appointment coming with

## 2020-01-31 ENCOUNTER — HOSPITAL ENCOUNTER (OUTPATIENT)
Dept: CT IMAGING | Age: 55
Discharge: HOME | End: 2020-02-02
Payer: COMMERCIAL

## 2020-01-31 LAB
BUN BLDV-MCNC: 15 MG/DL (ref 6–20)
CREAT SERPL-MCNC: 1.53 MG/DL (ref 0.7–1.2)
GFR AFRICAN AMERICAN: 57 ML/MIN
GFR NON-AFRICAN AMERICAN: 47 ML/MIN
GFR SERPL CREATININE-BSD FRML MDRD: ABNORMAL ML/MIN/{1.73_M2}
GFR SERPL CREATININE-BSD FRML MDRD: ABNORMAL ML/MIN/{1.73_M2}

## 2020-01-31 PROCEDURE — 82565 ASSAY OF CREATININE: CPT

## 2020-01-31 PROCEDURE — 2580000003 HC RX 258: Performed by: UROLOGY

## 2020-01-31 PROCEDURE — 74178 CT ABD&PLV WO CNTR FLWD CNTR: CPT

## 2020-01-31 PROCEDURE — 84520 ASSAY OF UREA NITROGEN: CPT

## 2020-01-31 PROCEDURE — 6360000004 HC RX CONTRAST MEDICATION: Performed by: UROLOGY

## 2020-01-31 PROCEDURE — 36415 COLL VENOUS BLD VENIPUNCTURE: CPT

## 2020-01-31 RX ORDER — 0.9 % SODIUM CHLORIDE 0.9 %
80 INTRAVENOUS SOLUTION INTRAVENOUS ONCE
Status: COMPLETED | OUTPATIENT
Start: 2020-01-31 | End: 2020-01-31

## 2020-01-31 RX ORDER — SODIUM CHLORIDE 0.9 % (FLUSH) 0.9 %
10 SYRINGE (ML) INJECTION PRN
Status: DISCONTINUED | OUTPATIENT
Start: 2020-01-31 | End: 2020-02-03 | Stop reason: HOSPADM

## 2020-01-31 RX ADMIN — IOPAMIDOL 100 ML: 755 INJECTION, SOLUTION INTRAVENOUS at 10:13

## 2020-01-31 RX ADMIN — SODIUM CHLORIDE 80 ML: 9 INJECTION, SOLUTION INTRAVENOUS at 10:13

## 2020-01-31 RX ADMIN — Medication 10 ML: at 10:13

## 2020-02-04 ENCOUNTER — HOSPITAL ENCOUNTER (OUTPATIENT)
Dept: PREADMISSION TESTING | Age: 55
Discharge: HOME | End: 2020-02-08
Payer: COMMERCIAL

## 2020-02-04 ENCOUNTER — ANESTHESIA EVENT (OUTPATIENT)
Dept: OPERATING ROOM | Age: 55
End: 2020-02-04
Payer: COMMERCIAL

## 2020-02-04 VITALS
WEIGHT: 213 LBS | SYSTOLIC BLOOD PRESSURE: 124 MMHG | DIASTOLIC BLOOD PRESSURE: 87 MMHG | HEART RATE: 89 BPM | OXYGEN SATURATION: 100 % | TEMPERATURE: 97.9 F | RESPIRATION RATE: 20 BRPM | HEIGHT: 69 IN | BODY MASS INDEX: 31.55 KG/M2

## 2020-02-04 LAB
ABSOLUTE EOS #: 0.33 K/UL (ref 0–0.4)
ABSOLUTE IMMATURE GRANULOCYTE: ABNORMAL K/UL (ref 0–0.3)
ABSOLUTE LYMPH #: 0.61 K/UL (ref 1–4.8)
ABSOLUTE MONO #: 0.28 K/UL (ref 0.1–1.3)
ANION GAP SERPL CALCULATED.3IONS-SCNC: 14 MMOL/L (ref 9–17)
BASOPHILS # BLD: 1 % (ref 0–2)
BASOPHILS ABSOLUTE: 0.06 K/UL (ref 0–0.2)
BUN BLDV-MCNC: 14 MG/DL (ref 6–20)
BUN/CREAT BLD: ABNORMAL (ref 9–20)
CALCIUM SERPL-MCNC: 8.7 MG/DL (ref 8.6–10.4)
CHLORIDE BLD-SCNC: 95 MMOL/L (ref 98–107)
CO2: 26 MMOL/L (ref 20–31)
CREAT SERPL-MCNC: 1.64 MG/DL (ref 0.7–1.2)
DIFFERENTIAL TYPE: ABNORMAL
EOSINOPHILS RELATIVE PERCENT: 6 % (ref 0–4)
GFR AFRICAN AMERICAN: 53 ML/MIN
GFR NON-AFRICAN AMERICAN: 44 ML/MIN
GFR SERPL CREATININE-BSD FRML MDRD: ABNORMAL ML/MIN/{1.73_M2}
GFR SERPL CREATININE-BSD FRML MDRD: ABNORMAL ML/MIN/{1.73_M2}
GLUCOSE BLD-MCNC: 160 MG/DL (ref 70–99)
HCT VFR BLD CALC: 34.9 % (ref 41–53)
HEMOGLOBIN: 11.6 G/DL (ref 13.5–17.5)
IMMATURE GRANULOCYTES: ABNORMAL %
LYMPHOCYTES # BLD: 11 % (ref 24–44)
MCH RBC QN AUTO: 25.9 PG (ref 26–34)
MCHC RBC AUTO-ENTMCNC: 33.3 G/DL (ref 31–37)
MCV RBC AUTO: 77.6 FL (ref 80–100)
MONOCYTES # BLD: 5 % (ref 1–7)
MORPHOLOGY: ABNORMAL
NRBC AUTOMATED: ABNORMAL PER 100 WBC
PDW BLD-RTO: 18.3 % (ref 11.5–14.9)
PLATELET # BLD: 209 K/UL (ref 150–450)
PLATELET ESTIMATE: ABNORMAL
PMV BLD AUTO: 7.8 FL (ref 6–12)
POTASSIUM SERPL-SCNC: 3.6 MMOL/L (ref 3.7–5.3)
RBC # BLD: 4.49 M/UL (ref 4.5–5.9)
RBC # BLD: ABNORMAL 10*6/UL
SEG NEUTROPHILS: 77 % (ref 36–66)
SEGMENTED NEUTROPHILS ABSOLUTE COUNT: 4.22 K/UL (ref 1.3–9.1)
SODIUM BLD-SCNC: 135 MMOL/L (ref 135–144)
WBC # BLD: 5.5 K/UL (ref 3.5–11)
WBC # BLD: ABNORMAL 10*3/UL

## 2020-02-04 PROCEDURE — 80048 BASIC METABOLIC PNL TOTAL CA: CPT

## 2020-02-04 PROCEDURE — 85025 COMPLETE CBC W/AUTO DIFF WBC: CPT

## 2020-02-04 PROCEDURE — 36415 COLL VENOUS BLD VENIPUNCTURE: CPT
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
## 2020-02-04 NOTE — H&P
 Positive cardiac stress test     Positive FIT (fecal immunochemical test)     Rotator cuff disorder     Severe recurrent major depressive disorder with psychotic features ([facility] [address] 3/21/2016    Snores     possible sleep apnea, not tested    SOB (shortness of breath)     Suicidal ideation 1/2016, 2009    none currently    Syncope 08/09/2016    meds&dehydration, THC+     SURGICAL HISTORY       Past Surgical History:   Procedure Laterality Date    BACK SURGERY      CARDIAC CATHETERIZATION  10/30/2018    Dr. Chin Enter 2011   [address]  5/19/16    C5-6 CORPECTOMY; C4-7 FUSION    COLONOSCOPY N/A 7/30/2019    COLONOSCOPY POLYPECTOMY SNARE/COLD BIOPSY OF TRANSVERSE COLON AND SIGMOID COLON & RECTAL POLYPECTOMY performed by Miguel Martino MD at [facility].  12/2011    [facility]   [facility]     FOOT SURGERY Left     screw placed    LUNG SURGERY  1982    Right collapsed Lung  /  [facility]  w/  W    SHOULDER ARTHROSCOPY Right 09/12/2016    [id number]    UPPER GASTROINTESTINAL ENDOSCOPY  6/29/15       FAMILY HISTORY       Family History   Problem Relation Age of Onset    Anxiety Disorder Sister     Depression Sister     High Blood Pressure Sister     Thyroid Disease Sister     Depression Sister     High Blood Pressure Sister     Lung Cancer Mother     Heart Disease Mother     High Blood Pressure Mother     High Blood Pressure Father     Diabetes Father     Heart Disease Father     Lung Cancer Father     Heart Disease Maternal Grandmother     Depression Brother        SOCIAL HISTORY       Social History     Socioeconomic History    Marital status: Single     Spouse name: None    Number of children: 3    Years of education: None    Highest education level: None   Occupational History    Occupation: Disabled since 2011   Social Needs    Financial resource strain: None    Food insecurity: Worry: None     Inability: None    Transportation needs:     Medical: None     Non-medical: None   Tobacco Use    Smoking status: Current Every Day Smoker     Packs/day: 0.50     Years: 37.00     Pack years: 18.50     Types: Cigarettes    Smokeless tobacco: Never Used   Substance and Sexual Activity    Alcohol use: No     Alcohol/week: 0.0 standard drinks    Drug use: Not Currently    Sexual activity: Not Currently   Lifestyle    Physical activity:     Days per week: None     Minutes per session: None    Stress: None   Relationships    Social connections:     Talks on phone: None     Gets together: None     Attends Restorationism service: None     Active member of club or organization: None     Attends meetings of clubs or organizations: None     Relationship status: None    Intimate partner violence:     Fear of current or ex partner: None     Emotionally abused: None     Physically abused: None     Forced sexual activity: None   Other Topics Concern    None   Social History Narrative    None           REVIEW OF SYSTEMS      Allergies   Allergen Reactions    Morphine Itching       Current Outpatient Medications on File Prior to Encounter   Medication Sig Dispense Refill    hydrOXYzine (ATARAX) 25 MG tablet Take 1 tablet by mouth every 8 hours 90 tablet 2    acetaminophen (TYLENOL) 325 MG tablet Take 2 tablets by mouth every 6 hours as needed for Pain 30 tablet 0    albuterol sulfate  (90 Base) MCG/ACT inhaler inhale 2 puffs by mouth every 6 hours if needed for wheezing 18 g 5    aspirin 81 MG EC tablet Take 1 tablet by mouth daily 90 tablet 3    DULoxetine (CYMBALTA) 60 MG extended release capsule take 1 capsule by mouth twice a day 60 capsule 3    isosorbide mononitrate (IMDUR) 30 MG extended release tablet take 1 tablet by mouth once daily 60 tablet 1    metoprolol (LOPRESSOR) 100 MG tablet Take 1 tablet by mouth 2 times daily 180 tablet 3    terbinafine (LAMISIL) 250 MG tablet       potassium chloride (KLOR-CON M) 10 MEQ extended release tablet Take 2 tablets by mouth 2 times daily 30 tablet 0    furosemide (LASIX) 40 MG tablet Take 1 tablet by mouth daily 60 tablet 3    nicotine polacrilex (NICORETTE) 2 MG gum Take 1 each by mouth as needed for Smoking cessation 110 each 0    hydrALAZINE (APRESOLINE) 50 MG tablet Take 0.5 tablets by mouth 3 times daily 120 tablet 3    ipratropium-albuterol (DUONEB) 0.5-2.5 (3) MG/3ML SOLN nebulizer solution Inhale 3 mLs into the lungs every 4 hours as needed for Shortness of Breath 360 mL 0    atorvastatin (LIPITOR) 20 MG tablet take 1 tablet by mouth at bedtime 90 tablet 3    cloNIDine (CATAPRES) 0.2 MG tablet take 1 tablet by mouth twice a day 184 tablet 0    tamsulosin (FLOMAX) 0.4 MG capsule Take 1 capsule by mouth daily 30 capsule 2    pantoprazole (PROTONIX) 40 MG tablet Take 1 tablet by mouth daily 90 tablet 3    Multiple Vitamin (MULTIVITAMIN) tablet Take 1 tablet by mouth daily 90 tablet 3    tiotropium (SPIRIVA RESPIMAT) 1.25 MCG/ACT AERS inhaler INHALE TWO PUFFS BY MOUTH ONCE A DAY 12 g 5    NITROSTAT 0.4 MG SL tablet Place 0.4 mg under the tongue every 5 minutes as needed for Chest pain        No current facility-administered medications on file prior to encounter. General health:  Fairly good. No fever or chills. Skin:  No itching, redness or rash. HEENT:  No headache, epistaxis or sore throat. Neck:  No pain, stiffness or masses. Cardiovascular/Respiratory system:  No chest pain, palpitation or shortness of breath. Gastrointestinal tract: No abdominal pain, Dysphagia, nausea, vomiting, diarrhea or constipation. Genitourinary: See HPI. Locomotor:  No bone or joint pains. No swelling. Neuropsychiatric:  No referable complaints.                  GENERAL PHYSICAL EXAM:     Vitals: /87   Pulse 89 Temp 97.9 °F (36.6 °C)   Resp 20   Ht 5' 9\" (1.753 m)   Wt 213 lb (96.6 kg)   SpO2 100%   BMI 31.45 kg/m²  Body mass index is 31.45 kg/m². GENERAL APPEARANCE:   Ana Lyons is 62 y.o.,  male, moderately obese, nourished, conscious, alert. Does not appear to be distress or pain at this time. SKIN:  Warm, dry, no cyanosis or jaundice. HEAD:  Normocephalic, atraumatic, no swelling or tenderness. EYES:  Pupils equal, reactive to light. EARS:  No discharge, no marked hearing loss. NOSE:  No rhinorrhea, epistaxis or septal deformity. THROAT:  Not congested. No ulceration bleeding or discharge. NECK:  No stiffness, trachea central.                  CHEST:  Symmetrical and equal on expansion. Tenderness to the right flank area. HEART:  RRR S1 > S2. No audible murmurs or gallops. LUNGS:  Equal on expansion, normal breath sounds. No adventitious sounds. ABDOMEN:  Obese. Soft on palpation. No localized tenderness. No guarding or rigidity. No palpable hepatosplenomegaly. LYMPHATICS:  No palpable cervical lymphadenopathy. LOCOMOTOR, BACK AND SPINE:  No tenderness or deformities. EXTREMITIES:  Symmetrical, no pretibial edema. Johns sign negative or no calf tenderness. No discoloration or ulcerations. NEUROLOGIC:  The patient is conscious, alert, oriented,Cranial nerve II-XII intact, taste and smell were not examined. No apparent focal sensory or motor deficits.                                                                                      PROVISIONAL DIAGNOSES / SURGERY:      Microscopic hematuria     Benign prostatic hyperplasia with weak urinary stream      CYSTOSCOPY      Patient Active Problem List    Diagnosis Date Noted    Microscopic hematuria 12/11/2019    Acute on chronic diastolic (congestive)

## 2020-02-05 ENCOUNTER — TELEPHONE (OUTPATIENT)
Dept: INTERNAL MEDICINE CLINIC | Age: 55
End: 2020-02-05

## 2020-02-05 RX ORDER — SODIUM CHLORIDE 0.9 % (FLUSH) 0.9 %
10 SYRINGE (ML) INJECTION PRN
Status: CANCELLED | OUTPATIENT
Start: 2020-02-05

## 2020-02-05 RX ORDER — TAMSULOSIN HYDROCHLORIDE 0.4 MG/1
CAPSULE ORAL
Qty: 30 CAPSULE | Refills: 2 | Status: SHIPPED | OUTPATIENT
Start: 2020-02-05 | End: 2020-03-12 | Stop reason: SDUPTHER

## 2020-02-05 RX ORDER — SODIUM CHLORIDE, SODIUM LACTATE, POTASSIUM CHLORIDE, CALCIUM CHLORIDE 600; 310; 30; 20 MG/100ML; MG/100ML; MG/100ML; MG/100ML
INJECTION, SOLUTION INTRAVENOUS CONTINUOUS
Status: CANCELLED | OUTPATIENT
Start: 2020-02-05

## 2020-02-05 RX ORDER — SODIUM CHLORIDE 0.9 % (FLUSH) 0.9 %
10 SYRINGE (ML) INJECTION EVERY 12 HOURS SCHEDULED
Status: CANCELLED | OUTPATIENT
Start: 2020-02-05

## 2020-02-05 RX ORDER — LIDOCAINE HYDROCHLORIDE 10 MG/ML
1 INJECTION, SOLUTION EPIDURAL; INFILTRATION; INTRACAUDAL; PERINEURAL
Status: CANCELLED | OUTPATIENT
Start: 2020-02-05 | End: 2020-02-05

## 2020-02-05 ASSESSMENT — ENCOUNTER SYMPTOMS
ABDOMINAL PAIN: 1
SHORTNESS OF BREATH: 1

## 2020-02-05 NOTE — TELEPHONE ENCOUNTER
She states she received some of the orders yesterday but there are some missing. Please give her a call.

## 2020-02-06 ENCOUNTER — CARE COORDINATION (OUTPATIENT)
Dept: CARE COORDINATION | Age: 55
End: 2020-02-06

## 2020-02-06 ENCOUNTER — TELEPHONE (OUTPATIENT)
Dept: INTERNAL MEDICINE CLINIC | Age: 55
End: 2020-02-06

## 2020-02-06 NOTE — CARE COORDINATION
Attempting to reach patient for a follow up care coordination call. Left detailed message for the patient to return my call with any questions or concerns.   Madeline Becerril RN Care Coordinator

## 2020-02-07 RX ORDER — PANTOPRAZOLE SODIUM 40 MG/1
40 TABLET, DELAYED RELEASE ORAL DAILY
Qty: 90 TABLET | Refills: 3 | Status: SHIPPED | OUTPATIENT
Start: 2020-02-07 | End: 2021-04-16

## 2020-02-07 NOTE — TELEPHONE ENCOUNTER
Medication: Pantoprazole  Last visit: 1/29/2020  Next visit: 4/8/2020  Last refill: 2/21/2019  Pharmacy: [facility] / [place]

## 2020-02-10 ENCOUNTER — CARE COORDINATION (OUTPATIENT)
Dept: CARE COORDINATION | Age: 55
End: 2020-02-10

## 2020-02-10 NOTE — CARE COORDINATION
HC phoned pt today to follow up on his medical transportation through [employer]. Pt stated that he hasn't used the medical transportation but will for an upcoming surgical procedure. Pt thanked that [facility] for contacting him.       Plan of Care  [facility] will follow up with pt and his transportation experience

## 2020-02-17 ENCOUNTER — CARE COORDINATION (OUTPATIENT)
Dept: CARE COORDINATION | Age: 55
End: 2020-02-17

## 2020-02-18 ENCOUNTER — ANESTHESIA (OUTPATIENT)
Dept: OPERATING ROOM | Age: 55
End: 2020-02-18
Payer: COMMERCIAL

## 2020-02-18 ENCOUNTER — HOSPITAL ENCOUNTER (OUTPATIENT)
Age: 55
Setting detail: OUTPATIENT SURGERY
Discharge: HOME | End: 2020-02-18
Attending: UROLOGY | Admitting: UROLOGY
Payer: COMMERCIAL

## 2020-02-18 VITALS
BODY MASS INDEX: 31.55 KG/M2 | RESPIRATION RATE: 16 BRPM | DIASTOLIC BLOOD PRESSURE: 67 MMHG | SYSTOLIC BLOOD PRESSURE: 124 MMHG | WEIGHT: 213 LBS | TEMPERATURE: 98.1 F | HEIGHT: 69 IN | OXYGEN SATURATION: 93 % | HEART RATE: 67 BPM

## 2020-02-18 VITALS — SYSTOLIC BLOOD PRESSURE: 107 MMHG | DIASTOLIC BLOOD PRESSURE: 65 MMHG | OXYGEN SATURATION: 96 %

## 2020-02-18 PROCEDURE — 3600000002 HC SURGERY LEVEL 2 BASE: Performed by: UROLOGY

## 2020-02-18 PROCEDURE — 3600000012 HC SURGERY LEVEL 2 ADDTL 15MIN: Performed by: UROLOGY

## 2020-02-18 PROCEDURE — 2580000003 HC RX 258: Performed by: ANESTHESIOLOGY

## 2020-02-18 PROCEDURE — 7100000031 HC ASPR PHASE II RECOVERY - ADDTL 15 MIN: Performed by: UROLOGY

## 2020-02-18 PROCEDURE — 6370000000 HC RX 637 (ALT 250 FOR IP): Performed by: UROLOGY

## 2020-02-18 PROCEDURE — 3700000001 HC ADD 15 MINUTES (ANESTHESIA): Performed by: UROLOGY

## 2020-02-18 PROCEDURE — 7100000011 HC PHASE II RECOVERY - ADDTL 15 MIN: Performed by: UROLOGY

## 2020-02-18 PROCEDURE — 2500000003 HC RX 250 WO HCPCS: Performed by: NURSE ANESTHETIST, CERTIFIED REGISTERED

## 2020-02-18 PROCEDURE — 6360000002 HC RX W HCPCS: Performed by: NURSE ANESTHETIST, CERTIFIED REGISTERED

## 2020-02-18 PROCEDURE — 7100000010 HC PHASE II RECOVERY - FIRST 15 MIN: Performed by: UROLOGY

## 2020-02-18 PROCEDURE — 7100000001 HC PACU RECOVERY - ADDTL 15 MIN: Performed by: UROLOGY

## 2020-02-18 PROCEDURE — 88108 CYTOPATH CONCENTRATE TECH: CPT

## 2020-02-18 PROCEDURE — 2709999900 HC NON-CHARGEABLE SUPPLY: Performed by: UROLOGY

## 2020-02-18 PROCEDURE — 7100000030 HC ASPR PHASE II RECOVERY - FIRST 15 MIN: Performed by: UROLOGY

## 2020-02-18 PROCEDURE — 7100000000 HC PACU RECOVERY - FIRST 15 MIN: Performed by: UROLOGY

## 2020-02-18 PROCEDURE — 3700000000 HC ANESTHESIA ATTENDED CARE: Performed by: UROLOGY

## 2020-02-18 RX ORDER — LIDOCAINE HYDROCHLORIDE 10 MG/ML
1 INJECTION, SOLUTION EPIDURAL; INFILTRATION; INTRACAUDAL; PERINEURAL
Status: DISCONTINUED | OUTPATIENT
Start: 2020-02-18 | End: 2020-02-18 | Stop reason: HOSPADM

## 2020-02-18 RX ORDER — KETAMINE HYDROCHLORIDE 10 MG/ML
INJECTION, SOLUTION INTRAMUSCULAR; INTRAVENOUS PRN
Status: DISCONTINUED | OUTPATIENT
Start: 2020-02-18 | End: 2020-02-18 | Stop reason: SDUPTHER

## 2020-02-18 RX ORDER — MEPERIDINE HYDROCHLORIDE 25 MG/ML
12.5 INJECTION INTRAMUSCULAR; INTRAVENOUS; SUBCUTANEOUS EVERY 5 MIN PRN
Status: DISCONTINUED | OUTPATIENT
Start: 2020-02-18 | End: 2020-02-18 | Stop reason: HOSPADM

## 2020-02-18 RX ORDER — SODIUM CHLORIDE 0.9 % (FLUSH) 0.9 %
10 SYRINGE (ML) INJECTION PRN
Status: DISCONTINUED | OUTPATIENT
Start: 2020-02-18 | End: 2020-02-18 | Stop reason: HOSPADM

## 2020-02-18 RX ORDER — HYDRALAZINE HYDROCHLORIDE 20 MG/ML
5 INJECTION INTRAMUSCULAR; INTRAVENOUS EVERY 10 MIN PRN
Status: DISCONTINUED | OUTPATIENT
Start: 2020-02-18 | End: 2020-02-18 | Stop reason: HOSPADM

## 2020-02-18 RX ORDER — MIDAZOLAM HYDROCHLORIDE 1 MG/ML
INJECTION INTRAMUSCULAR; INTRAVENOUS PRN
Status: DISCONTINUED | OUTPATIENT
Start: 2020-02-18 | End: 2020-02-18 | Stop reason: SDUPTHER

## 2020-02-18 RX ORDER — LIDOCAINE HYDROCHLORIDE 20 MG/ML
JELLY TOPICAL PRN
Status: DISCONTINUED | OUTPATIENT
Start: 2020-02-18 | End: 2020-02-18 | Stop reason: ALTCHOICE

## 2020-02-18 RX ORDER — LABETALOL 20 MG/4 ML (5 MG/ML) INTRAVENOUS SYRINGE
5 EVERY 10 MIN PRN
Status: DISCONTINUED | OUTPATIENT
Start: 2020-02-18 | End: 2020-02-18 | Stop reason: HOSPADM

## 2020-02-18 RX ORDER — LIDOCAINE HYDROCHLORIDE 10 MG/ML
INJECTION, SOLUTION EPIDURAL; INFILTRATION; INTRACAUDAL; PERINEURAL PRN
Status: DISCONTINUED | OUTPATIENT
Start: 2020-02-18 | End: 2020-02-18 | Stop reason: SDUPTHER

## 2020-02-18 RX ORDER — PROPOFOL 10 MG/ML
INJECTION, EMULSION INTRAVENOUS PRN
Status: DISCONTINUED | OUTPATIENT
Start: 2020-02-18 | End: 2020-02-18 | Stop reason: SDUPTHER

## 2020-02-18 RX ORDER — FENTANYL CITRATE 50 UG/ML
25 INJECTION, SOLUTION INTRAMUSCULAR; INTRAVENOUS EVERY 5 MIN PRN
Status: DISCONTINUED | OUTPATIENT
Start: 2020-02-18 | End: 2020-02-18 | Stop reason: HOSPADM

## 2020-02-18 RX ORDER — ONDANSETRON 2 MG/ML
4 INJECTION INTRAMUSCULAR; INTRAVENOUS
Status: DISCONTINUED | OUTPATIENT
Start: 2020-02-18 | End: 2020-02-18 | Stop reason: HOSPADM

## 2020-02-18 RX ORDER — HYDROCODONE BITARTRATE AND ACETAMINOPHEN 5; 325 MG/1; MG/1
2 TABLET ORAL PRN
Status: DISCONTINUED | OUTPATIENT
Start: 2020-02-18 | End: 2020-02-18 | Stop reason: HOSPADM

## 2020-02-18 RX ORDER — HYDROCODONE BITARTRATE AND ACETAMINOPHEN 5; 325 MG/1; MG/1
1 TABLET ORAL PRN
Status: DISCONTINUED | OUTPATIENT
Start: 2020-02-18 | End: 2020-02-18 | Stop reason: HOSPADM

## 2020-02-18 RX ORDER — DIPHENHYDRAMINE HYDROCHLORIDE 50 MG/ML
12.5 INJECTION INTRAMUSCULAR; INTRAVENOUS
Status: DISCONTINUED | OUTPATIENT
Start: 2020-02-18 | End: 2020-02-18 | Stop reason: HOSPADM

## 2020-02-18 RX ORDER — SODIUM CHLORIDE, SODIUM LACTATE, POTASSIUM CHLORIDE, CALCIUM CHLORIDE 600; 310; 30; 20 MG/100ML; MG/100ML; MG/100ML; MG/100ML
INJECTION, SOLUTION INTRAVENOUS CONTINUOUS
Status: DISCONTINUED | OUTPATIENT
Start: 2020-02-18 | End: 2020-02-18 | Stop reason: HOSPADM

## 2020-02-18 RX ORDER — METOCLOPRAMIDE HYDROCHLORIDE 5 MG/ML
10 INJECTION INTRAMUSCULAR; INTRAVENOUS
Status: DISCONTINUED | OUTPATIENT
Start: 2020-02-18 | End: 2020-02-18 | Stop reason: HOSPADM

## 2020-02-18 RX ORDER — FENTANYL CITRATE 50 UG/ML
50 INJECTION, SOLUTION INTRAMUSCULAR; INTRAVENOUS EVERY 5 MIN PRN
Status: DISCONTINUED | OUTPATIENT
Start: 2020-02-18 | End: 2020-02-18 | Stop reason: HOSPADM

## 2020-02-18 RX ORDER — SODIUM CHLORIDE 0.9 % (FLUSH) 0.9 %
10 SYRINGE (ML) INJECTION EVERY 12 HOURS SCHEDULED
Status: DISCONTINUED | OUTPATIENT
Start: 2020-02-18 | End: 2020-02-18 | Stop reason: HOSPADM

## 2020-02-18 RX ADMIN — KETAMINE HYDROCHLORIDE 20 MG: 10 INJECTION, SOLUTION INTRAMUSCULAR; INTRAVENOUS at 10:51

## 2020-02-18 RX ADMIN — MIDAZOLAM 2 MG: 1 INJECTION INTRAMUSCULAR; INTRAVENOUS at 10:41

## 2020-02-18 RX ADMIN — LIDOCAINE HYDROCHLORIDE 50 MG: 10 INJECTION, SOLUTION EPIDURAL; INFILTRATION; INTRACAUDAL; PERINEURAL at 10:57

## 2020-02-18 RX ADMIN — PROPOFOL 50 MG: 10 INJECTION, EMULSION INTRAVENOUS at 10:57

## 2020-02-18 RX ADMIN — MIDAZOLAM 2 MG: 1 INJECTION INTRAMUSCULAR; INTRAVENOUS at 10:49

## 2020-02-18 RX ADMIN — SODIUM CHLORIDE, POTASSIUM CHLORIDE, SODIUM LACTATE AND CALCIUM CHLORIDE: 600; 310; 30; 20 INJECTION, SOLUTION INTRAVENOUS at 09:26

## 2020-02-18 ASSESSMENT — ENCOUNTER SYMPTOMS
SHORTNESS OF BREATH: 1
STRIDOR: 0

## 2020-02-18 ASSESSMENT — PULMONARY FUNCTION TESTS
PIF_VALUE: 0

## 2020-02-18 ASSESSMENT — PAIN - FUNCTIONAL ASSESSMENT: PAIN_FUNCTIONAL_ASSESSMENT: 0-10

## 2020-02-18 ASSESSMENT — PAIN SCALES - GENERAL: PAINLEVEL_OUTOF10: 0

## 2020-02-18 ASSESSMENT — COPD QUESTIONNAIRES: CAT_SEVERITY: NO INTERVAL CHANGE

## 2020-02-18 NOTE — ANESTHESIA POSTPROCEDURE EVALUATION
POST- ANESTHESIA EVALUATION       Pt Name: Johny Campoverde  MRN: [id number]  YOB: 1965  Date of evaluation: 2/18/2020  Time:  1:58 PM      /67   Pulse 67   Temp 98.1 °F (36.7 °C) (Temporal)   Resp 16   Ht 5' 9\" (1.753 m)   Wt 213 lb (96.6 kg)   SpO2 93%   BMI 31.45 kg/m²      Consciousness Level  Awake  Cardiopulmonary Status  Stable  Pain Adequately Treated YES  Nausea / Vomiting  NO  Adequate Hydration  YES  Anesthesia Related Complications NONE      Electronically signed by Dutch Philippe MD on 2/18/2020 at 1:58 PM       Department of Anesthesiology  Postprocedure Note    Patient: Johny Campoverde  MRN: [id number]  YOB: 1965  Date of evaluation: 2/18/2020  Time:  1:58 PM     Procedure Summary     Date:  02/18/20 Room / Location:  [address] [place] [address] 01 / [facility]    Anesthesia Start:  2598 Anesthesia Stop:  2063    Procedure:  CYSTOSCOPY (N/A ) Diagnosis:  (HEMATURIA)    Surgeon:  Delroy Rueda MD Responsible Provider:  Dutch Philippe MD    Anesthesia Type:  MAC ASA Status:  3          Anesthesia Type: MAC    Don Phase I: Don Score: 9    Don Phase II: Don Score: 10    Last vitals: Reviewed and per EMR flowsheets.        Anesthesia Post Evaluation
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
## 2020-02-18 NOTE — FLOWSHEET NOTE
MEWS OF 4--PT. STABLE---RESTFUL. RESPIRATIONS SHALLOW,  UNLABORED. RESPIRATIONS DECREASE WHEN AWAKE.

## 2020-02-19 ENCOUNTER — CARE COORDINATION (OUTPATIENT)
Dept: CARE COORDINATION | Age: 55
End: 2020-02-19

## 2020-02-19 LAB — SURGICAL PATHOLOGY REPORT: NORMAL

## 2020-02-19 NOTE — OP NOTE
[address]                 [address] [facility], [address]                                OPERATIVE REPORT    PATIENT NAME: Kassandra Seo                   :        1965  MED REC NO:   [id number]                              ROOM:  ACCOUNT NO:   [de-identified]                           ADMIT DATE: 2020  PROVIDER:     Antonio Dewey    DATE OF PROCEDURE:  2020    PREOPERATIVE DIAGNOSIS:  Hematuria. POSTOPERATIVE DIAGNOSIS:  Hematuria. OPERATION PERFORMED:  Cystoscopy. SURGEON:  Antonio Dewey MD    ASSISTANT:  None. ANESTHESIA:  MAC.    COMPLICATIONS:  None. ESTIMATED BLOOD LOSS:  Minimal.    NARRATIVE OF PROCEDURE:  This is a 47-year-old white male, who has a  history of microscopic hematuria. Hematuria evaluation was started,  which consists of CT urogram which was negative for any urologic  abnormalities and I scheduled the above procedure to complete the  hematuria evaluation. All the risks and benefits were explained to the  patient and informed consent was obtained. This 47-year-old white male was brought back to the operating room and  positioned in the supine position after MAC anesthesia was started. He  was sterilely prepped and draped in the standard fashion. A flexible  cystoscope was inserted into his urethra and carefully advanced into his  bladder. Bladder was examined. No tumors or stones were noted. Both  ureteral orifices were normal.  I collected urine for cytology. I  reexamined the urethra. There was mild enlargement of the prostate, but  otherwise there were no other abnormalities in the bladder or the  urethra. I removed the scope and that was the end of the procedure. The patient will be discharged home and he will follow up with me in one  month. I was present and scrubbed throughout the entire case.         Jacqueline Lofton    D: 2020 12:14:10       T: 2020 13:18:14     [id number]  Job#: [id number]     Doc#: [id number]    CC:  Phuc Ring

## 2020-02-26 ENCOUNTER — CARE COORDINATION (OUTPATIENT)
Dept: CARE COORDINATION | Age: 55
End: 2020-02-26

## 2020-02-26 ENCOUNTER — TELEPHONE (OUTPATIENT)
Dept: UROLOGY | Age: 55
End: 2020-02-26

## 2020-02-26 NOTE — CARE COORDINATION
Spoke with patient who reported he is currently admitted to [facility]. He is having \"lots of pain\" and trouble swallowing. Patient will call cc when discharged to make a follow up appointment with PCP. He did say he is healing up well from his urology procedure.

## 2020-02-28 ENCOUNTER — CARE COORDINATION (OUTPATIENT)
Dept: CARE COORDINATION | Age: 55
End: 2020-02-28

## 2020-03-02 ENCOUNTER — HOSPITAL ENCOUNTER (INPATIENT)
Age: 55
LOS: 4 days | Discharge: HOME | DRG: 139 | End: 2020-03-06
Attending: EMERGENCY MEDICINE | Admitting: INTERNAL MEDICINE
Payer: COMMERCIAL

## 2020-03-02 ENCOUNTER — APPOINTMENT (OUTPATIENT)
Dept: CT IMAGING | Age: 55
DRG: 139 | End: 2020-03-02
Payer: COMMERCIAL

## 2020-03-02 ENCOUNTER — TELEPHONE (OUTPATIENT)
Dept: INTERNAL MEDICINE CLINIC | Age: 55
End: 2020-03-02

## 2020-03-02 PROBLEM — J18.9 PNEUMONIA: Status: ACTIVE | Noted: 2020-03-02

## 2020-03-02 PROBLEM — K76.9 LIVER LESION: Status: ACTIVE | Noted: 2020-03-02

## 2020-03-02 LAB
ABSOLUTE EOS #: 0.26 K/UL (ref 0–0.4)
ABSOLUTE IMMATURE GRANULOCYTE: ABNORMAL K/UL (ref 0–0.3)
ABSOLUTE LYMPH #: 0.78 K/UL (ref 1–4.8)
ABSOLUTE MONO #: 0.22 K/UL (ref 0.1–1.3)
ALBUMIN SERPL-MCNC: 3.2 G/DL (ref 3.5–5.2)
ALBUMIN/GLOBULIN RATIO: ABNORMAL (ref 1–2.5)
ALP BLD-CCNC: 141 U/L (ref 40–129)
ALT SERPL-CCNC: 7 U/L (ref 5–41)
ANION GAP SERPL CALCULATED.3IONS-SCNC: 11 MMOL/L (ref 9–17)
AST SERPL-CCNC: 14 U/L
BASOPHILS # BLD: 1 % (ref 0–2)
BASOPHILS ABSOLUTE: 0.04 K/UL (ref 0–0.2)
BILIRUB SERPL-MCNC: 0.8 MG/DL (ref 0.3–1.2)
BUN BLDV-MCNC: 15 MG/DL (ref 6–20)
BUN/CREAT BLD: ABNORMAL (ref 9–20)
CALCIUM SERPL-MCNC: 8.8 MG/DL (ref 8.6–10.4)
CHLORIDE BLD-SCNC: 101 MMOL/L (ref 98–107)
CO2: 26 MMOL/L (ref 20–31)
CREAT SERPL-MCNC: 1.58 MG/DL (ref 0.7–1.2)
DIFFERENTIAL TYPE: ABNORMAL
EOSINOPHILS RELATIVE PERCENT: 7 % (ref 0–4)
GFR AFRICAN AMERICAN: 55 ML/MIN
GFR NON-AFRICAN AMERICAN: 46 ML/MIN
GFR SERPL CREATININE-BSD FRML MDRD: ABNORMAL ML/MIN/{1.73_M2}
GFR SERPL CREATININE-BSD FRML MDRD: ABNORMAL ML/MIN/{1.73_M2}
GLUCOSE BLD-MCNC: 111 MG/DL (ref 70–99)
HCT VFR BLD CALC: 31.3 % (ref 41–53)
HEMOGLOBIN: 10.2 G/DL (ref 13.5–17.5)
IMMATURE GRANULOCYTES: ABNORMAL %
LACTIC ACID: 1.5 MMOL/L (ref 0.5–2.2)
LYMPHOCYTES # BLD: 21 % (ref 24–44)
MCH RBC QN AUTO: 25.4 PG (ref 26–34)
MCHC RBC AUTO-ENTMCNC: 32.6 G/DL (ref 31–37)
MCV RBC AUTO: 78 FL (ref 80–100)
MONOCYTES # BLD: 6 % (ref 1–7)
MORPHOLOGY: ABNORMAL
MORPHOLOGY: ABNORMAL
NRBC AUTOMATED: ABNORMAL PER 100 WBC
PDW BLD-RTO: 18 % (ref 11.5–14.9)
PLATELET # BLD: 171 K/UL (ref 150–450)
PLATELET ESTIMATE: ABNORMAL
PMV BLD AUTO: 7.8 FL (ref 6–12)
POTASSIUM SERPL-SCNC: 4.4 MMOL/L (ref 3.7–5.3)
PROCALCITONIN: 0.06 NG/ML
RBC # BLD: 4.01 M/UL (ref 4.5–5.9)
RBC # BLD: ABNORMAL 10*6/UL
SEG NEUTROPHILS: 65 % (ref 36–66)
SEGMENTED NEUTROPHILS ABSOLUTE COUNT: 2.4 K/UL (ref 1.3–9.1)
SODIUM BLD-SCNC: 138 MMOL/L (ref 135–144)
TOTAL PROTEIN: 7 G/DL (ref 6.4–8.3)
TROPONIN INTERP: NORMAL
TROPONIN T: NORMAL NG/ML
TROPONIN, HIGH SENSITIVITY: 10 NG/L (ref 0–22)
WBC # BLD: 3.7 K/UL (ref 3.5–11)
WBC # BLD: ABNORMAL 10*3/UL

## 2020-03-02 PROCEDURE — 84484 ASSAY OF TROPONIN QUANT: CPT

## 2020-03-02 PROCEDURE — 87070 CULTURE OTHR SPECIMN AEROBIC: CPT

## 2020-03-02 PROCEDURE — 71260 CT THORAX DX C+: CPT

## 2020-03-02 PROCEDURE — 99285 EMERGENCY DEPT VISIT HI MDM: CPT

## 2020-03-02 PROCEDURE — 94640 AIRWAY INHALATION TREATMENT: CPT

## 2020-03-02 PROCEDURE — 74177 CT ABD & PELVIS W/CONTRAST: CPT

## 2020-03-02 PROCEDURE — 6370000000 HC RX 637 (ALT 250 FOR IP): Performed by: EMERGENCY MEDICINE

## 2020-03-02 PROCEDURE — 6360000004 HC RX CONTRAST MEDICATION: Performed by: EMERGENCY MEDICINE

## 2020-03-02 PROCEDURE — 87205 SMEAR GRAM STAIN: CPT

## 2020-03-02 PROCEDURE — 99223 1ST HOSP IP/OBS HIGH 75: CPT | Performed by: INTERNAL MEDICINE

## 2020-03-02 PROCEDURE — 85025 COMPLETE CBC W/AUTO DIFF WBC: CPT

## 2020-03-02 PROCEDURE — 94761 N-INVAS EAR/PLS OXIMETRY MLT: CPT

## 2020-03-02 PROCEDURE — 6360000002 HC RX W HCPCS: Performed by: EMERGENCY MEDICINE

## 2020-03-02 PROCEDURE — 2580000003 HC RX 258: Performed by: EMERGENCY MEDICINE

## 2020-03-02 PROCEDURE — 93005 ELECTROCARDIOGRAM TRACING: CPT | Performed by: EMERGENCY MEDICINE

## 2020-03-02 PROCEDURE — 83605 ASSAY OF LACTIC ACID: CPT

## 2020-03-02 PROCEDURE — 1200000000 HC SEMI PRIVATE

## 2020-03-02 PROCEDURE — 36415 COLL VENOUS BLD VENIPUNCTURE: CPT

## 2020-03-02 PROCEDURE — 96374 THER/PROPH/DIAG INJ IV PUSH: CPT

## 2020-03-02 PROCEDURE — 84145 PROCALCITONIN (PCT): CPT

## 2020-03-02 PROCEDURE — 80053 COMPREHEN METABOLIC PANEL: CPT

## 2020-03-02 RX ORDER — ALBUTEROL SULFATE 2.5 MG/3ML
2.5 SOLUTION RESPIRATORY (INHALATION)
Status: DISCONTINUED | OUTPATIENT
Start: 2020-03-02 | End: 2020-03-06 | Stop reason: HOSPADM

## 2020-03-02 RX ORDER — ISOSORBIDE MONONITRATE 30 MG/1
30 TABLET, EXTENDED RELEASE ORAL DAILY
Status: DISCONTINUED | OUTPATIENT
Start: 2020-03-03 | End: 2020-03-06 | Stop reason: HOSPADM

## 2020-03-02 RX ORDER — SODIUM CHLORIDE 0.9 % (FLUSH) 0.9 %
10 SYRINGE (ML) INJECTION PRN
Status: DISCONTINUED | OUTPATIENT
Start: 2020-03-02 | End: 2020-03-06 | Stop reason: HOSPADM

## 2020-03-02 RX ORDER — SODIUM CHLORIDE 9 MG/ML
INJECTION, SOLUTION INTRAVENOUS CONTINUOUS
Status: DISCONTINUED | OUTPATIENT
Start: 2020-03-02 | End: 2020-03-06 | Stop reason: HOSPADM

## 2020-03-02 RX ORDER — M-VIT,TX,IRON,MINS/CALC/FOLIC 27MG-0.4MG
1 TABLET ORAL DAILY
Status: DISCONTINUED | OUTPATIENT
Start: 2020-03-03 | End: 2020-03-06 | Stop reason: HOSPADM

## 2020-03-02 RX ORDER — DULOXETIN HYDROCHLORIDE 60 MG/1
60 CAPSULE, DELAYED RELEASE ORAL 2 TIMES DAILY
Status: DISCONTINUED | OUTPATIENT
Start: 2020-03-02 | End: 2020-03-06 | Stop reason: HOSPADM

## 2020-03-02 RX ORDER — BUDESONIDE AND FORMOTEROL FUMARATE DIHYDRATE 160; 4.5 UG/1; UG/1
2 AEROSOL RESPIRATORY (INHALATION) 2 TIMES DAILY
Status: DISCONTINUED | OUTPATIENT
Start: 2020-03-02 | End: 2020-03-06 | Stop reason: HOSPADM

## 2020-03-02 RX ORDER — ASPIRIN 81 MG/1
81 TABLET ORAL DAILY
Status: DISCONTINUED | OUTPATIENT
Start: 2020-03-03 | End: 2020-03-06 | Stop reason: HOSPADM

## 2020-03-02 RX ORDER — HYDROXYZINE HYDROCHLORIDE 25 MG/1
25 TABLET, FILM COATED ORAL EVERY 8 HOURS
Status: DISCONTINUED | OUTPATIENT
Start: 2020-03-02 | End: 2020-03-06 | Stop reason: HOSPADM

## 2020-03-02 RX ORDER — FUROSEMIDE 40 MG/1
40 TABLET ORAL DAILY
Status: DISCONTINUED | OUTPATIENT
Start: 2020-03-03 | End: 2020-03-04

## 2020-03-02 RX ORDER — ONDANSETRON 2 MG/ML
4 INJECTION INTRAMUSCULAR; INTRAVENOUS EVERY 8 HOURS PRN
Status: DISCONTINUED | OUTPATIENT
Start: 2020-03-02 | End: 2020-03-06 | Stop reason: HOSPADM

## 2020-03-02 RX ORDER — DOXYCYCLINE HYCLATE 100 MG
100 TABLET ORAL 2 TIMES DAILY
Status: ON HOLD | COMMUNITY
Start: 2020-02-27 | End: 2020-03-06 | Stop reason: HOSPADM

## 2020-03-02 RX ORDER — NITROGLYCERIN 0.4 MG/1
0.4 TABLET SUBLINGUAL EVERY 5 MIN PRN
Status: DISCONTINUED | OUTPATIENT
Start: 2020-03-02 | End: 2020-03-06 | Stop reason: HOSPADM

## 2020-03-02 RX ORDER — SODIUM CHLORIDE 0.9 % (FLUSH) 0.9 %
10 SYRINGE (ML) INJECTION EVERY 12 HOURS SCHEDULED
Status: DISCONTINUED | OUTPATIENT
Start: 2020-03-02 | End: 2020-03-06 | Stop reason: HOSPADM

## 2020-03-02 RX ORDER — HYDRALAZINE HYDROCHLORIDE 25 MG/1
25 TABLET, FILM COATED ORAL 3 TIMES DAILY
Status: DISCONTINUED | OUTPATIENT
Start: 2020-03-02 | End: 2020-03-06 | Stop reason: HOSPADM

## 2020-03-02 RX ORDER — PANTOPRAZOLE SODIUM 40 MG/1
40 TABLET, DELAYED RELEASE ORAL DAILY
Status: DISCONTINUED | OUTPATIENT
Start: 2020-03-03 | End: 2020-03-03

## 2020-03-02 RX ORDER — ACETAMINOPHEN 325 MG/1
650 TABLET ORAL EVERY 4 HOURS PRN
Status: DISCONTINUED | OUTPATIENT
Start: 2020-03-02 | End: 2020-03-06 | Stop reason: HOSPADM

## 2020-03-02 RX ORDER — 0.9 % SODIUM CHLORIDE 0.9 %
80 INTRAVENOUS SOLUTION INTRAVENOUS ONCE
Status: COMPLETED | OUTPATIENT
Start: 2020-03-02 | End: 2020-03-02

## 2020-03-02 RX ORDER — METOPROLOL TARTRATE 100 MG/1
100 TABLET ORAL 2 TIMES DAILY
Status: DISCONTINUED | OUTPATIENT
Start: 2020-03-02 | End: 2020-03-06 | Stop reason: HOSPADM

## 2020-03-02 RX ORDER — FLUTICASONE FUROATE AND VILANTEROL 200; 25 UG/1; UG/1
1 POWDER RESPIRATORY (INHALATION) DAILY
COMMUNITY
End: 2020-12-02 | Stop reason: SDUPTHER

## 2020-03-02 RX ORDER — FENTANYL CITRATE 50 UG/ML
50 INJECTION, SOLUTION INTRAMUSCULAR; INTRAVENOUS ONCE
Status: COMPLETED | OUTPATIENT
Start: 2020-03-02 | End: 2020-03-02

## 2020-03-02 RX ORDER — PROMETHAZINE HYDROCHLORIDE 25 MG/1
12.5 TABLET ORAL EVERY 6 HOURS PRN
Status: DISCONTINUED | OUTPATIENT
Start: 2020-03-02 | End: 2020-03-06 | Stop reason: HOSPADM

## 2020-03-02 RX ORDER — ATORVASTATIN CALCIUM 20 MG/1
20 TABLET, FILM COATED ORAL NIGHTLY
Status: DISCONTINUED | OUTPATIENT
Start: 2020-03-02 | End: 2020-03-06 | Stop reason: HOSPADM

## 2020-03-02 RX ORDER — TAMSULOSIN HYDROCHLORIDE 0.4 MG/1
0.4 CAPSULE ORAL DAILY
Status: DISCONTINUED | OUTPATIENT
Start: 2020-03-03 | End: 2020-03-06 | Stop reason: HOSPADM

## 2020-03-02 RX ORDER — IPRATROPIUM BROMIDE AND ALBUTEROL SULFATE 2.5; .5 MG/3ML; MG/3ML
1 SOLUTION RESPIRATORY (INHALATION)
Status: DISCONTINUED | OUTPATIENT
Start: 2020-03-03 | End: 2020-03-06 | Stop reason: HOSPADM

## 2020-03-02 RX ORDER — CLONIDINE HYDROCHLORIDE 0.2 MG/1
0.2 TABLET ORAL 2 TIMES DAILY
Status: DISCONTINUED | OUTPATIENT
Start: 2020-03-02 | End: 2020-03-06 | Stop reason: HOSPADM

## 2020-03-02 RX ORDER — SODIUM CHLORIDE 0.9 % (FLUSH) 0.9 %
10 SYRINGE (ML) INJECTION ONCE
Status: COMPLETED | OUTPATIENT
Start: 2020-03-02 | End: 2020-03-02

## 2020-03-02 RX ORDER — IPRATROPIUM BROMIDE AND ALBUTEROL SULFATE 2.5; .5 MG/3ML; MG/3ML
1 SOLUTION RESPIRATORY (INHALATION) ONCE
Status: COMPLETED | OUTPATIENT
Start: 2020-03-02 | End: 2020-03-02

## 2020-03-02 RX ADMIN — IPRATROPIUM BROMIDE AND ALBUTEROL SULFATE 1 AMPULE: .5; 3 SOLUTION RESPIRATORY (INHALATION) at 16:51

## 2020-03-02 RX ADMIN — PIPERACILLIN AND TAZOBACTAM 4.5 G: 4; .5 INJECTION, POWDER, LYOPHILIZED, FOR SOLUTION INTRAVENOUS; PARENTERAL at 18:42

## 2020-03-02 RX ADMIN — FENTANYL CITRATE 50 MCG: 50 INJECTION, SOLUTION INTRAMUSCULAR; INTRAVENOUS at 17:45

## 2020-03-02 RX ADMIN — SODIUM CHLORIDE 80 ML: 9 INJECTION, SOLUTION INTRAVENOUS at 17:32

## 2020-03-02 RX ADMIN — IOPAMIDOL 75 ML: 755 INJECTION, SOLUTION INTRAVENOUS at 17:32

## 2020-03-02 RX ADMIN — Medication 10 ML: at 17:32

## 2020-03-02 ASSESSMENT — PAIN SCALES - GENERAL
PAINLEVEL_OUTOF10: 7
PAINLEVEL_OUTOF10: 7
PAINLEVEL_OUTOF10: 3

## 2020-03-02 ASSESSMENT — ENCOUNTER SYMPTOMS
VOMITING: 0
SHORTNESS OF BREATH: 1
NAUSEA: 1
BACK PAIN: 0
CONSTIPATION: 0
SORE THROAT: 0
ABDOMINAL PAIN: 1
WHEEZING: 0
WHEEZING: 1
ANAL BLEEDING: 1
COUGH: 1
SHORTNESS OF BREATH: 0
NAUSEA: 0
DIARRHEA: 0

## 2020-03-02 ASSESSMENT — PAIN DESCRIPTION - PAIN TYPE: TYPE: ACUTE PAIN
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
## 2020-03-03 ENCOUNTER — APPOINTMENT (OUTPATIENT)
Dept: GENERAL RADIOLOGY | Age: 55
DRG: 139 | End: 2020-03-03
Payer: COMMERCIAL

## 2020-03-03 PROBLEM — E44.1 MILD MALNUTRITION (HCC): Status: ACTIVE | Noted: 2020-03-03

## 2020-03-03 LAB
-: ABNORMAL
ADENOVIRUS PCR: NOT DETECTED
AMORPHOUS: ABNORMAL
ANION GAP SERPL CALCULATED.3IONS-SCNC: 14 MMOL/L (ref 9–17)
BACTERIA: ABNORMAL
BILIRUBIN URINE: NEGATIVE
BORDETELLA PARAPERTUSSIS: NOT DETECTED
BORDETELLA PERTUSSIS PCR: NOT DETECTED
BUN BLDV-MCNC: 12 MG/DL (ref 6–20)
BUN/CREAT BLD: ABNORMAL (ref 9–20)
CALCIUM SERPL-MCNC: 8.6 MG/DL (ref 8.6–10.4)
CASTS UA: ABNORMAL /LPF
CHLAMYDIA PNEUMONIAE BY PCR: NOT DETECTED
CHLORIDE BLD-SCNC: 101 MMOL/L (ref 98–107)
CO2: 24 MMOL/L (ref 20–31)
COLOR: YELLOW
COMMENT UA: ABNORMAL
CORONAVIRUS 229E PCR: NOT DETECTED
CORONAVIRUS HKU1 PCR: NOT DETECTED
CORONAVIRUS NL63 PCR: NOT DETECTED
CORONAVIRUS OC43 PCR: NOT DETECTED
CREAT SERPL-MCNC: 1.74 MG/DL (ref 0.7–1.2)
CRYSTALS, UA: ABNORMAL /HPF
EKG ATRIAL RATE: 75 BPM
EKG P AXIS: 51 DEGREES
EKG P-R INTERVAL: 156 MS
EKG Q-T INTERVAL: 384 MS
EKG QRS DURATION: 98 MS
EKG QTC CALCULATION (BAZETT): 428 MS
EKG R AXIS: 4 DEGREES
EKG T AXIS: 67 DEGREES
EKG VENTRICULAR RATE: 75 BPM
EPITHELIAL CELLS UA: ABNORMAL /HPF
GFR AFRICAN AMERICAN: 49 ML/MIN
GFR NON-AFRICAN AMERICAN: 41 ML/MIN
GFR SERPL CREATININE-BSD FRML MDRD: ABNORMAL ML/MIN/{1.73_M2}
GFR SERPL CREATININE-BSD FRML MDRD: ABNORMAL ML/MIN/{1.73_M2}
GLUCOSE BLD-MCNC: 92 MG/DL (ref 70–99)
GLUCOSE URINE: NEGATIVE
HCT VFR BLD CALC: 31.1 % (ref 41–53)
HEMOGLOBIN: 10.3 G/DL (ref 13.5–17.5)
HUMAN METAPNEUMOVIRUS PCR: NOT DETECTED
INFLUENZA A BY PCR: NOT DETECTED
INFLUENZA A H1 (2009) PCR: NORMAL
INFLUENZA A H1 PCR: NORMAL
INFLUENZA A H3 PCR: NORMAL
INFLUENZA B BY PCR: NOT DETECTED
KETONES, URINE: NEGATIVE
LEUKOCYTE ESTERASE, URINE: NEGATIVE
MCH RBC QN AUTO: 25.8 PG (ref 26–34)
MCHC RBC AUTO-ENTMCNC: 33 G/DL (ref 31–37)
MCV RBC AUTO: 78.1 FL (ref 80–100)
MUCUS: ABNORMAL
MYCOPLASMA PNEUMONIAE PCR: NOT DETECTED
NITRITE, URINE: NEGATIVE
NRBC AUTOMATED: ABNORMAL PER 100 WBC
OTHER OBSERVATIONS UA: ABNORMAL
PARAINFLUENZA 1 PCR: NOT DETECTED
PARAINFLUENZA 2 PCR: NOT DETECTED
PARAINFLUENZA 3 PCR: NOT DETECTED
PARAINFLUENZA 4 PCR: NOT DETECTED
PDW BLD-RTO: 18.7 % (ref 11.5–14.9)
PH UA: 6 (ref 5–8)
PLATELET # BLD: 169 K/UL (ref 150–450)
PMV BLD AUTO: 7.7 FL (ref 6–12)
POTASSIUM SERPL-SCNC: 3.8 MMOL/L (ref 3.7–5.3)
PROTEIN UA: ABNORMAL
RBC # BLD: 3.99 M/UL (ref 4.5–5.9)
RBC UA: ABNORMAL /HPF
RENAL EPITHELIAL, UA: ABNORMAL /HPF
RESP SYNCYTIAL VIRUS PCR: NOT DETECTED
RHINO/ENTEROVIRUS PCR: NOT DETECTED
SODIUM BLD-SCNC: 139 MMOL/L (ref 135–144)
SPECIFIC GRAVITY UA: 1.02 (ref 1–1.03)
SPECIMEN DESCRIPTION: NORMAL
TRICHOMONAS: ABNORMAL
TURBIDITY: CLEAR
URINE HGB: ABNORMAL
UROBILINOGEN, URINE: ABNORMAL
WBC # BLD: 3.9 K/UL (ref 3.5–11)
WBC UA: ABNORMAL /HPF
YEAST: ABNORMAL

## 2020-03-03 PROCEDURE — 81001 URINALYSIS AUTO W/SCOPE: CPT

## 2020-03-03 PROCEDURE — 6360000002 HC RX W HCPCS: Performed by: NURSE PRACTITIONER

## 2020-03-03 PROCEDURE — 71046 X-RAY EXAM CHEST 2 VIEWS: CPT

## 2020-03-03 PROCEDURE — 1200000000 HC SEMI PRIVATE

## 2020-03-03 PROCEDURE — 6370000000 HC RX 637 (ALT 250 FOR IP): Performed by: INTERNAL MEDICINE

## 2020-03-03 PROCEDURE — 2580000003 HC RX 258: Performed by: INTERNAL MEDICINE

## 2020-03-03 PROCEDURE — 94761 N-INVAS EAR/PLS OXIMETRY MLT: CPT

## 2020-03-03 PROCEDURE — 36415 COLL VENOUS BLD VENIPUNCTURE: CPT

## 2020-03-03 PROCEDURE — C9113 INJ PANTOPRAZOLE SODIUM, VIA: HCPCS | Performed by: INTERNAL MEDICINE

## 2020-03-03 PROCEDURE — 80048 BASIC METABOLIC PNL TOTAL CA: CPT

## 2020-03-03 PROCEDURE — 85027 COMPLETE CBC AUTOMATED: CPT

## 2020-03-03 PROCEDURE — 0100U HC RESPIRPTHGN MULT REV TRANS & AMP PRB TECH 21 TRGT: CPT

## 2020-03-03 PROCEDURE — 93010 ELECTROCARDIOGRAM REPORT: CPT | Performed by: INTERNAL MEDICINE

## 2020-03-03 PROCEDURE — 6370000000 HC RX 637 (ALT 250 FOR IP): Performed by: NURSE PRACTITIONER

## 2020-03-03 PROCEDURE — 6360000002 HC RX W HCPCS: Performed by: INTERNAL MEDICINE

## 2020-03-03 PROCEDURE — 2580000003 HC RX 258: Performed by: NURSE PRACTITIONER

## 2020-03-03 PROCEDURE — 94640 AIRWAY INHALATION TREATMENT: CPT

## 2020-03-03 RX ORDER — SODIUM CHLORIDE 9 MG/ML
10 INJECTION INTRAVENOUS 2 TIMES DAILY
Status: DISCONTINUED | OUTPATIENT
Start: 2020-03-03 | End: 2020-03-06 | Stop reason: HOSPADM

## 2020-03-03 RX ORDER — HYDROCODONE BITARTRATE AND ACETAMINOPHEN 5; 325 MG/1; MG/1
1 TABLET ORAL EVERY 4 HOURS PRN
Status: DISCONTINUED | OUTPATIENT
Start: 2020-03-03 | End: 2020-03-06 | Stop reason: HOSPADM

## 2020-03-03 RX ORDER — FENTANYL CITRATE 50 UG/ML
25 INJECTION, SOLUTION INTRAMUSCULAR; INTRAVENOUS EVERY 4 HOURS PRN
Status: DISCONTINUED | OUTPATIENT
Start: 2020-03-03 | End: 2020-03-06 | Stop reason: HOSPADM

## 2020-03-03 RX ORDER — PANTOPRAZOLE SODIUM 40 MG/10ML
40 INJECTION, POWDER, LYOPHILIZED, FOR SOLUTION INTRAVENOUS 2 TIMES DAILY
Status: DISCONTINUED | OUTPATIENT
Start: 2020-03-03 | End: 2020-03-06 | Stop reason: HOSPADM

## 2020-03-03 RX ADMIN — FUROSEMIDE 40 MG: 40 TABLET ORAL at 08:13

## 2020-03-03 RX ADMIN — HYDRALAZINE HYDROCHLORIDE 25 MG: 25 TABLET, FILM COATED ORAL at 00:21

## 2020-03-03 RX ADMIN — Medication 10 ML: at 21:37

## 2020-03-03 RX ADMIN — CLONIDINE HYDROCHLORIDE 0.2 MG: 0.2 TABLET ORAL at 08:14

## 2020-03-03 RX ADMIN — IPRATROPIUM BROMIDE AND ALBUTEROL SULFATE 1 AMPULE: 2.5; .5 SOLUTION RESPIRATORY (INHALATION) at 07:15

## 2020-03-03 RX ADMIN — ENOXAPARIN SODIUM 40 MG: 40 INJECTION SUBCUTANEOUS at 08:13

## 2020-03-03 RX ADMIN — METOPROLOL TARTRATE 100 MG: 100 TABLET ORAL at 00:22

## 2020-03-03 RX ADMIN — PIPERACILLIN SODIUM AND TAZOBACTAM SODIUM 3.38 G: 3; .375 INJECTION, POWDER, LYOPHILIZED, FOR SOLUTION INTRAVENOUS at 08:13

## 2020-03-03 RX ADMIN — ACETAMINOPHEN 650 MG: 325 TABLET, FILM COATED ORAL at 00:23

## 2020-03-03 RX ADMIN — DULOXETINE HYDROCHLORIDE 60 MG: 60 CAPSULE, DELAYED RELEASE ORAL at 00:22

## 2020-03-03 RX ADMIN — METOPROLOL TARTRATE 100 MG: 100 TABLET ORAL at 20:34

## 2020-03-03 RX ADMIN — METOPROLOL TARTRATE 100 MG: 100 TABLET ORAL at 08:13

## 2020-03-03 RX ADMIN — Medication 2 PUFF: at 20:25

## 2020-03-03 RX ADMIN — FENTANYL CITRATE 25 MCG: 50 INJECTION INTRAMUSCULAR; INTRAVENOUS at 03:17

## 2020-03-03 RX ADMIN — DULOXETINE HYDROCHLORIDE 60 MG: 60 CAPSULE, DELAYED RELEASE ORAL at 08:14

## 2020-03-03 RX ADMIN — SODIUM CHLORIDE: 9 INJECTION, SOLUTION INTRAVENOUS at 22:46

## 2020-03-03 RX ADMIN — PANTOPRAZOLE SODIUM 40 MG: 40 TABLET, DELAYED RELEASE ORAL at 08:13

## 2020-03-03 RX ADMIN — HYDROXYZINE HYDROCHLORIDE 25 MG: 25 TABLET, FILM COATED ORAL at 20:35

## 2020-03-03 RX ADMIN — ASPIRIN 81 MG: 81 TABLET, COATED ORAL at 08:13

## 2020-03-03 RX ADMIN — Medication 2 PUFF: at 08:13

## 2020-03-03 RX ADMIN — HYDROXYZINE HYDROCHLORIDE 25 MG: 25 TABLET, FILM COATED ORAL at 00:21

## 2020-03-03 RX ADMIN — Medication 2 PUFF: at 00:20

## 2020-03-03 RX ADMIN — CLONIDINE HYDROCHLORIDE 0.2 MG: 0.2 TABLET ORAL at 00:23

## 2020-03-03 RX ADMIN — MULTIPLE VITAMINS W/ MINERALS TAB 1 TABLET: TAB at 08:13

## 2020-03-03 RX ADMIN — IPRATROPIUM BROMIDE AND ALBUTEROL SULFATE 1 AMPULE: 2.5; .5 SOLUTION RESPIRATORY (INHALATION) at 11:14

## 2020-03-03 RX ADMIN — PIPERACILLIN SODIUM AND TAZOBACTAM SODIUM 3.38 G: 3; .375 INJECTION, POWDER, LYOPHILIZED, FOR SOLUTION INTRAVENOUS at 00:27

## 2020-03-03 RX ADMIN — ENOXAPARIN SODIUM 40 MG: 40 INJECTION SUBCUTANEOUS at 00:24

## 2020-03-03 RX ADMIN — CLONIDINE HYDROCHLORIDE 0.2 MG: 0.2 TABLET ORAL at 20:34

## 2020-03-03 RX ADMIN — HYDRALAZINE HYDROCHLORIDE 25 MG: 25 TABLET, FILM COATED ORAL at 20:35

## 2020-03-03 RX ADMIN — FENTANYL CITRATE 25 MCG: 50 INJECTION INTRAMUSCULAR; INTRAVENOUS at 08:13

## 2020-03-03 RX ADMIN — PANTOPRAZOLE SODIUM 40 MG: 40 INJECTION, POWDER, FOR SOLUTION INTRAVENOUS at 20:34

## 2020-03-03 RX ADMIN — IPRATROPIUM BROMIDE AND ALBUTEROL SULFATE 1 AMPULE: 2.5; .5 SOLUTION RESPIRATORY (INHALATION) at 15:49

## 2020-03-03 RX ADMIN — HYDROCODONE BITARTRATE AND ACETAMINOPHEN 1 TABLET: 5; 325 TABLET ORAL at 17:44

## 2020-03-03 RX ADMIN — SODIUM CHLORIDE: 9 INJECTION, SOLUTION INTRAVENOUS at 01:47

## 2020-03-03 RX ADMIN — ATORVASTATIN CALCIUM 20 MG: 20 TABLET, FILM COATED ORAL at 00:22

## 2020-03-03 RX ADMIN — DULOXETINE HYDROCHLORIDE 60 MG: 60 CAPSULE, DELAYED RELEASE ORAL at 20:35

## 2020-03-03 RX ADMIN — HYDROCODONE BITARTRATE AND ACETAMINOPHEN 1 TABLET: 5; 325 TABLET ORAL at 22:45

## 2020-03-03 RX ADMIN — ISOSORBIDE MONONITRATE 30 MG: 30 TABLET, EXTENDED RELEASE ORAL at 08:13

## 2020-03-03 RX ADMIN — PIPERACILLIN SODIUM AND TAZOBACTAM SODIUM 3.38 G: 3; .375 INJECTION, POWDER, LYOPHILIZED, FOR SOLUTION INTRAVENOUS at 16:55

## 2020-03-03 RX ADMIN — TAMSULOSIN HYDROCHLORIDE 0.4 MG: 0.4 CAPSULE ORAL at 08:13

## 2020-03-03 RX ADMIN — HYDROXYZINE HYDROCHLORIDE 25 MG: 25 TABLET, FILM COATED ORAL at 04:47

## 2020-03-03 RX ADMIN — HYDRALAZINE HYDROCHLORIDE 25 MG: 25 TABLET, FILM COATED ORAL at 08:14

## 2020-03-03 RX ADMIN — HYDROXYZINE HYDROCHLORIDE 25 MG: 25 TABLET, FILM COATED ORAL at 13:44

## 2020-03-03 RX ADMIN — IPRATROPIUM BROMIDE AND ALBUTEROL SULFATE 1 AMPULE: 2.5; .5 SOLUTION RESPIRATORY (INHALATION) at 20:33

## 2020-03-03 RX ADMIN — ATORVASTATIN CALCIUM 20 MG: 20 TABLET, FILM COATED ORAL at 20:34

## 2020-03-03 ASSESSMENT — PAIN DESCRIPTION - DESCRIPTORS: DESCRIPTORS: ACHING

## 2020-03-03 ASSESSMENT — PAIN DESCRIPTION - LOCATION
LOCATION: ABDOMEN
LOCATION: ABDOMEN

## 2020-03-03 ASSESSMENT — PAIN SCALES - GENERAL
PAINLEVEL_OUTOF10: 7
PAINLEVEL_OUTOF10: 6
PAINLEVEL_OUTOF10: 4
PAINLEVEL_OUTOF10: 6
PAINLEVEL_OUTOF10: 5
PAINLEVEL_OUTOF10: 10
PAINLEVEL_OUTOF10: 7
PAINLEVEL_OUTOF10: 4
PAINLEVEL_OUTOF10: 2

## 2020-03-03 ASSESSMENT — PAIN DESCRIPTION - PAIN TYPE
TYPE: ACUTE PAIN
TYPE: ACUTE PAIN

## 2020-03-03 ASSESSMENT — PAIN DESCRIPTION - ORIENTATION
ORIENTATION: MID
ORIENTATION: MID

## 2020-03-03 NOTE — PLAN OF CARE
Problem: Falls - Risk of:  Goal: Will remain free from falls  Description  Will remain free from falls  3/3/2020 0440 by Cathie Granados RN  Outcome: Ongoing  3/3/2020 0400 by Cathie Granados RN  Outcome: Ongoing     Problem: Falls - Risk of:  Goal: Absence of physical injury  Description  Absence of physical injury  Outcome: Ongoing     Problem: Pain:  Goal: Patient's pain/discomfort is manageable  Description  Patient's pain/discomfort is manageable  3/3/2020 0440 by Cathie Granados RN  Outcome: Ongoing  3/3/2020 0400 by Cathie Granados RN  Outcome: Ongoing     Problem: Safety:  Goal: Free from accidental physical injury  Description  Free from accidental physical injury  Outcome: Ongoing

## 2020-03-03 NOTE — PROGRESS NOTES
Medication History completed:    New medications: Incruse, Breo, doxycycline    Medications discontinued: Spiriva, terbinafine, potassium chloride    Changes to dosing: none    Stated allergies: As listed    Other pertinent information: Medications confirmed with [facility]. The patient states he has a nurse who sets up his pill boxes.      Thank you,  Etelvina Bee, PharmD, BCPS  [phone]

## 2020-03-03 NOTE — PROGRESS NOTES
Pt admitted to room 2058. A & O x4. Denies needs at this time. Pt voices pain r/t hernia in espohgus.

## 2020-03-03 NOTE — PLAN OF CARE
Problem: Falls - Risk of:  Goal: Will remain free from falls  Description  Will remain free from falls  3/3/2020 1600 by Mirlande Espinoza RN  Outcome: Ongoing   Pt. Remains free of falls, appropriate fall precautions in place. Problem: Pain:  Goal: Patient's pain/discomfort is manageable  Description  Patient's pain/discomfort is manageable  3/3/2020 1600 by Mirlande Espinoza RN  Outcome: Ongoing   Pt. Pain is at a tolerable level.

## 2020-03-04 ENCOUNTER — APPOINTMENT (OUTPATIENT)
Dept: MRI IMAGING | Age: 55
DRG: 139 | End: 2020-03-04
Payer: COMMERCIAL

## 2020-03-04 LAB
ANION GAP SERPL CALCULATED.3IONS-SCNC: 11 MMOL/L (ref 9–17)
BNP INTERPRETATION: ABNORMAL
BUN BLDV-MCNC: 11 MG/DL (ref 6–20)
BUN/CREAT BLD: ABNORMAL (ref 9–20)
CALCIUM SERPL-MCNC: 8.2 MG/DL (ref 8.6–10.4)
CHLORIDE BLD-SCNC: 102 MMOL/L (ref 98–107)
CO2: 26 MMOL/L (ref 20–31)
CREAT SERPL-MCNC: 1.74 MG/DL (ref 0.7–1.2)
GFR AFRICAN AMERICAN: 49 ML/MIN
GFR NON-AFRICAN AMERICAN: 41 ML/MIN
GFR SERPL CREATININE-BSD FRML MDRD: ABNORMAL ML/MIN/{1.73_M2}
GFR SERPL CREATININE-BSD FRML MDRD: ABNORMAL ML/MIN/{1.73_M2}
GLUCOSE BLD-MCNC: 98 MG/DL (ref 70–99)
HCT VFR BLD CALC: 28.7 % (ref 41–53)
HEMOGLOBIN: 9.3 G/DL (ref 13.5–17.5)
MCH RBC QN AUTO: 25.7 PG (ref 26–34)
MCHC RBC AUTO-ENTMCNC: 32.5 G/DL (ref 31–37)
MCV RBC AUTO: 79.2 FL (ref 80–100)
NRBC AUTOMATED: ABNORMAL PER 100 WBC
PDW BLD-RTO: 18.3 % (ref 11.5–14.9)
PLATELET # BLD: 144 K/UL (ref 150–450)
PMV BLD AUTO: 7.7 FL (ref 6–12)
POTASSIUM SERPL-SCNC: 4.2 MMOL/L (ref 3.7–5.3)
PRO-BNP: 1049 PG/ML
RBC # BLD: 3.63 M/UL (ref 4.5–5.9)
SODIUM BLD-SCNC: 139 MMOL/L (ref 135–144)
WBC # BLD: 3.5 K/UL (ref 3.5–11)

## 2020-03-04 PROCEDURE — 6360000002 HC RX W HCPCS: Performed by: INTERNAL MEDICINE

## 2020-03-04 PROCEDURE — 94761 N-INVAS EAR/PLS OXIMETRY MLT: CPT

## 2020-03-04 PROCEDURE — 83880 ASSAY OF NATRIURETIC PEPTIDE: CPT

## 2020-03-04 PROCEDURE — 6360000002 HC RX W HCPCS: Performed by: NURSE PRACTITIONER

## 2020-03-04 PROCEDURE — 99232 SBSQ HOSP IP/OBS MODERATE 35: CPT | Performed by: INTERNAL MEDICINE

## 2020-03-04 PROCEDURE — C9113 INJ PANTOPRAZOLE SODIUM, VIA: HCPCS | Performed by: INTERNAL MEDICINE

## 2020-03-04 PROCEDURE — 6370000000 HC RX 637 (ALT 250 FOR IP): Performed by: NURSE PRACTITIONER

## 2020-03-04 PROCEDURE — 1200000000 HC SEMI PRIVATE

## 2020-03-04 PROCEDURE — 99254 IP/OBS CNSLTJ NEW/EST MOD 60: CPT | Performed by: INTERNAL MEDICINE

## 2020-03-04 PROCEDURE — 85027 COMPLETE CBC AUTOMATED: CPT

## 2020-03-04 PROCEDURE — 74181 MRI ABDOMEN W/O CONTRAST: CPT

## 2020-03-04 PROCEDURE — 94640 AIRWAY INHALATION TREATMENT: CPT

## 2020-03-04 PROCEDURE — 80048 BASIC METABOLIC PNL TOTAL CA: CPT

## 2020-03-04 PROCEDURE — 36415 COLL VENOUS BLD VENIPUNCTURE: CPT

## 2020-03-04 PROCEDURE — 6370000000 HC RX 637 (ALT 250 FOR IP): Performed by: INTERNAL MEDICINE

## 2020-03-04 PROCEDURE — 2580000003 HC RX 258: Performed by: NURSE PRACTITIONER

## 2020-03-04 PROCEDURE — 2700000000 HC OXYGEN THERAPY PER DAY

## 2020-03-04 PROCEDURE — 2580000003 HC RX 258: Performed by: INTERNAL MEDICINE

## 2020-03-04 PROCEDURE — 87641 MR-STAPH DNA AMP PROBE: CPT

## 2020-03-04 RX ORDER — 0.9 % SODIUM CHLORIDE 0.9 %
1000 INTRAVENOUS SOLUTION INTRAVENOUS ONCE
Status: COMPLETED | OUTPATIENT
Start: 2020-03-04 | End: 2020-03-04

## 2020-03-04 RX ADMIN — CLONIDINE HYDROCHLORIDE 0.2 MG: 0.2 TABLET ORAL at 21:48

## 2020-03-04 RX ADMIN — PIPERACILLIN SODIUM AND TAZOBACTAM SODIUM 3.38 G: 3; .375 INJECTION, POWDER, LYOPHILIZED, FOR SOLUTION INTRAVENOUS at 16:25

## 2020-03-04 RX ADMIN — Medication 2 PUFF: at 21:59

## 2020-03-04 RX ADMIN — DULOXETINE HYDROCHLORIDE 60 MG: 60 CAPSULE, DELAYED RELEASE ORAL at 16:15

## 2020-03-04 RX ADMIN — FENTANYL CITRATE 25 MCG: 50 INJECTION INTRAMUSCULAR; INTRAVENOUS at 04:05

## 2020-03-04 RX ADMIN — ASPIRIN 81 MG: 81 TABLET, COATED ORAL at 16:15

## 2020-03-04 RX ADMIN — CLONIDINE HYDROCHLORIDE 0.2 MG: 0.2 TABLET ORAL at 09:45

## 2020-03-04 RX ADMIN — PANTOPRAZOLE SODIUM 40 MG: 40 INJECTION, POWDER, FOR SOLUTION INTRAVENOUS at 09:45

## 2020-03-04 RX ADMIN — HYDRALAZINE HYDROCHLORIDE 25 MG: 25 TABLET, FILM COATED ORAL at 17:48

## 2020-03-04 RX ADMIN — HYDROXYZINE HYDROCHLORIDE 25 MG: 25 TABLET, FILM COATED ORAL at 17:48

## 2020-03-04 RX ADMIN — FENTANYL CITRATE 25 MCG: 50 INJECTION INTRAMUSCULAR; INTRAVENOUS at 20:46

## 2020-03-04 RX ADMIN — IPRATROPIUM BROMIDE AND ALBUTEROL SULFATE 1 AMPULE: 2.5; .5 SOLUTION RESPIRATORY (INHALATION) at 15:51

## 2020-03-04 RX ADMIN — TAMSULOSIN HYDROCHLORIDE 0.4 MG: 0.4 CAPSULE ORAL at 16:15

## 2020-03-04 RX ADMIN — METOPROLOL TARTRATE 100 MG: 100 TABLET ORAL at 09:45

## 2020-03-04 RX ADMIN — FENTANYL CITRATE 25 MCG: 50 INJECTION INTRAMUSCULAR; INTRAVENOUS at 09:38

## 2020-03-04 RX ADMIN — DULOXETINE HYDROCHLORIDE 60 MG: 60 CAPSULE, DELAYED RELEASE ORAL at 21:48

## 2020-03-04 RX ADMIN — HYDROXYZINE HYDROCHLORIDE 25 MG: 25 TABLET, FILM COATED ORAL at 21:48

## 2020-03-04 RX ADMIN — SODIUM CHLORIDE 1000 ML: 9 INJECTION, SOLUTION INTRAVENOUS at 17:48

## 2020-03-04 RX ADMIN — VANCOMYCIN HYDROCHLORIDE 2500 MG: 500 INJECTION, POWDER, LYOPHILIZED, FOR SOLUTION INTRAVENOUS at 22:11

## 2020-03-04 RX ADMIN — MULTIPLE VITAMINS W/ MINERALS TAB 1 TABLET: TAB at 16:15

## 2020-03-04 RX ADMIN — Medication 10 ML: at 09:45

## 2020-03-04 RX ADMIN — ATORVASTATIN CALCIUM 20 MG: 20 TABLET, FILM COATED ORAL at 21:48

## 2020-03-04 RX ADMIN — IPRATROPIUM BROMIDE AND ALBUTEROL SULFATE 1 AMPULE: 2.5; .5 SOLUTION RESPIRATORY (INHALATION) at 10:43

## 2020-03-04 RX ADMIN — HYDRALAZINE HYDROCHLORIDE 25 MG: 25 TABLET, FILM COATED ORAL at 09:45

## 2020-03-04 RX ADMIN — Medication 10 ML: at 21:59

## 2020-03-04 RX ADMIN — HYDRALAZINE HYDROCHLORIDE 25 MG: 25 TABLET, FILM COATED ORAL at 21:48

## 2020-03-04 RX ADMIN — SODIUM CHLORIDE: 9 INJECTION, SOLUTION INTRAVENOUS at 21:56

## 2020-03-04 RX ADMIN — ISOSORBIDE MONONITRATE 30 MG: 30 TABLET, EXTENDED RELEASE ORAL at 16:15

## 2020-03-04 RX ADMIN — IPRATROPIUM BROMIDE AND ALBUTEROL SULFATE 1 AMPULE: 2.5; .5 SOLUTION RESPIRATORY (INHALATION) at 07:18

## 2020-03-04 RX ADMIN — PIPERACILLIN SODIUM AND TAZOBACTAM SODIUM 3.38 G: 3; .375 INJECTION, POWDER, LYOPHILIZED, FOR SOLUTION INTRAVENOUS at 00:10

## 2020-03-04 RX ADMIN — HYDROCODONE BITARTRATE AND ACETAMINOPHEN 1 TABLET: 5; 325 TABLET ORAL at 16:25

## 2020-03-04 RX ADMIN — IPRATROPIUM BROMIDE AND ALBUTEROL SULFATE 1 AMPULE: 2.5; .5 SOLUTION RESPIRATORY (INHALATION) at 20:28

## 2020-03-04 RX ADMIN — PANTOPRAZOLE SODIUM 40 MG: 40 INJECTION, POWDER, FOR SOLUTION INTRAVENOUS at 21:47

## 2020-03-04 RX ADMIN — METOPROLOL TARTRATE 100 MG: 100 TABLET ORAL at 21:47

## 2020-03-04 RX ADMIN — HYDROCODONE BITARTRATE AND ACETAMINOPHEN 1 TABLET: 5; 325 TABLET ORAL at 21:48

## 2020-03-04 ASSESSMENT — PAIN SCALES - GENERAL
PAINLEVEL_OUTOF10: 8
PAINLEVEL_OUTOF10: 4
PAINLEVEL_OUTOF10: 4
PAINLEVEL_OUTOF10: 8
PAINLEVEL_OUTOF10: 9
PAINLEVEL_OUTOF10: 6
PAINLEVEL_OUTOF10: 7
PAINLEVEL_OUTOF10: 8
PAINLEVEL_OUTOF10: 7
PAINLEVEL_OUTOF10: 3

## 2020-03-04 ASSESSMENT — PAIN DESCRIPTION - LOCATION: LOCATION: ABDOMEN

## 2020-03-04 ASSESSMENT — PAIN DESCRIPTION - DESCRIPTORS: DESCRIPTORS: ACHING

## 2020-03-04 ASSESSMENT — PAIN DESCRIPTION - PAIN TYPE: TYPE: ACUTE PAIN

## 2020-03-04 ASSESSMENT — PAIN DESCRIPTION - ORIENTATION: ORIENTATION: MID

## 2020-03-04 NOTE — CARE COORDINATION
ONGOING DISCHARGE PLAN:    Spoke with patient regarding discharge plan and patient confirms that plan is still to go home with [facility] [address]. Pulmonary Consulted    Remains on Iv Zosyn, IV fluids    Follow up appt scheduled for 3/9/20 at 2:15p.m. Will continue to follow for additional discharge needs.     Electronically signed by Brandon Telles RN on 3/4/2020 at 11:55 AM

## 2020-03-04 NOTE — CONSULTS
Consult note dictated:  ?? Hemoptysis  Right-sided patchy infiltrates ? ? Atypical pneumonia versus edema  Bilateral pleural effusions history of diastolic CHF  Chronic respiratory failure with hypoxia on as needed home O2  COPD with chronic bronchitis  Possible LEYLA  Tobacco abuse around 1 pack/day for 40 years quit 7 days ago  HTN, CAD, diastolic CHF, mild pulmonary hypertension  History of anxiety, depression and ADHD  ? ?   CKD

## 2020-03-04 NOTE — PLAN OF CARE
Problem: Falls - Risk of:  Goal: Will remain free from falls  Description  Will remain free from falls  3/4/2020 0437 by Louie Sánchez RN  Outcome: Met This Shift  Note:   Safety maintained. No falls this shift. Safety protocols in place. 3/3/2020 1600 by Tonya Campos RN  Outcome: Ongoing     Problem: Pain:  Goal: Patient's pain/discomfort is manageable  Description  Patient's pain/discomfort is manageable  3/4/2020 0437 by Louie Sánchez RN  Outcome: Met This Shift  Note:   Adequate pain control achieved this shift.  See MAR.   3/3/2020 1600 by Tonya Campos RN  Outcome: Ongoing

## 2020-03-04 NOTE — PROGRESS NOTES
 High Blood Pressure Sister     Lung Cancer Mother     Heart Disease Mother     High Blood Pressure Mother     High Blood Pressure Father     Diabetes Father     Heart Disease Father     Lung Cancer Father     Heart Disease Maternal Grandmother     Depression Brother        Vitals:  /76   Pulse 64   Temp 97.5 °F (36.4 °C) (Oral)   Resp 16   Ht 5' 9\" (1.753 m)   Wt 213 lb (96.6 kg)   SpO2 98%   BMI 31.45 kg/m²   Temp (24hrs), Av.8 °F (36.6 °C), Min:97.4 °F (36.3 °C), Max:98.4 °F (36.9 °C)    No results for input(s): POCGLU in the last 72 hours. I/O (24Hr): Intake/Output Summary (Last 24 hours) at 3/4/2020 1552  Last data filed at 3/4/2020 0407  Gross per 24 hour   Intake 625 ml   Output --   Net 625 ml       Labs:    Lab Results   Component Value Date    WBC 3.5 2020    HGB 9.3 (L) 2020    HCT 28.7 (L) 2020    MCV 79.2 (L) 2020     (L) 2020     Lab Results   Component Value Date     2020    K 4.2 2020     2020    CO2 26 2020    BUN 11 2020    CREATININE 1.74 2020    GLUCOSE 98 2020    GLUCOSE 104 2012    CALCIUM 8.2 2020          Lab Results   Component Value Date/Time    SPECIAL NOT REPORTED 2020 09:46 PM     Lab Results   Component Value Date/Time    CULTURE NO GROWTH 2020 09:46 PM         Radiology:    Recent data reviewed    Physical Examination:        General appearance:  alert, cooperative and no distress  Eyes: Anicteric sclera. Pupils are equally round and reactive to light. Extraocular movements are intact.   Lungs:  clear to auscultation bilaterally, normal effort  Heart:  regular rate and rhythm, no murmur  Abdomen:  soft, nontender, nondistended, normal bowel sounds, no masses, hepatomegaly, splenomegaly  Extremities:  no edema, redness, tenderness in the calves  Skin:  no gross lesions, rashes, induration  Neuro:  Alert, oriented X 3, Gait normal. Non-focal.  Assessment:        Primary Problem  Pneumonia    Active Hospital Problems    Diagnosis Date Noted    Mild malnutrition ([facility] [address] [E44.1] 03/03/2020    Pneumonia [J18.9] 03/02/2020    Liver lesion [K76.9] 03/02/2020    Acute on chronic diastolic (congestive) heart failure (HCC) [I50.33] 12/11/2019    Chronic obstructive pulmonary disease with acute lower respiratory infection ([facility] [address] [J44.0] 03/10/2017    Unable to read or write [Z55.0] 03/23/2016    Restrictive pattern present on pulmonary function testing [R94.2] 03/23/2016    Severe recurrent major depressive disorder with psychotic features ([facility] [address] [F33.3] 03/21/2016    Tobacco abuse [Z72.0] 01/12/2016    Coronary artery disease involving native coronary artery of native heart without angina pectoris [I25.10] 01/12/2016    Essential hypertension [I10]            DVT prophylaxis: IPC only due to likely history of bleed    Day number for antibiotics: Day 2 Zosyn and vancomycin    Plan:        61-year-old male admitted for cough with hemoptysis, abdominal pain and bright red bleeding per rectum. Recent discharge from [facility] end of February after being treated for interstitial lung disease and dysphagia. Patient reports bronchoscopy was pending but not done.     1.  Bilateral LL pneumonia- Zosyn and vancomycin for suspected healthcare associated pneumonia. Pneumonia panel ordered-in process. Will follow. No fever spikes or leukocytosis. 2.  Interstitial lung disease-with hemoptysis. Apparently bronchoscopy was planned in previous admission but could not be done. pulmonology following. 3.  Dysphagia with  epigastric discomfort, patient has lost about 30 pounds in last month. IV PPI added . GI following. 4.  Bright red bleeding per rectum- history of hemorrhoids, digital rectal exam revealed multiple skin tags. Recent colonoscopy July 2019. Hemoglobin downtrending-will monitor.   5.  Liver lesion-noted on recent imaging-MRI liver -fatty infiltration. 6.  Acute kidney injury- saline hydration. Monitor creatinine . Lasix on hold. saline bolus and recheck labs tomorrow. 7.  Lower abdominal tenderness-UA sent. 8.  COPD on home oxygen- chronic hypoxic respiratory failure, patient continues to need oxygen by nasal cannula. Not wheezing, no steroids prescribed. 9.  Hypertension-continue home medication. 10.  Coronary artery disease-continue home medication. 11.  Mild pulmonary hypertension-RVSP 40 mmHg per echo 12/2019. .-Home Lasix on hold due to current ARON.     3/4-pneumonia panel in process, anemia and GI work-up re liver  in process. improvimg from pneumonia perspective.  No further bleed, but hb has dropped further    Zac Arteaga MD  3/4/2020  3:52 PM

## 2020-03-04 NOTE — CONSULTS
[address]                 [address] [facility], [address]                                  CONSULTATION    PATIENT NAME: Mata Davenport                   :        1965  MED REC NO:   [id number]                              ROOM:       2058  ACCOUNT NO:   [de-identified]                           ADMIT DATE: 2020  PROVIDER:     Shahida Telles    PULMONARY CRITICAL CARE CONSULTATION    CONSULT DATE:  2020    REFERRING PROVIDER:  Dr. Arnie Limon. REASON FOR CONSULTATION:  Possible hemoptysis. HISTORY OF PRESENT ILLNESS:  This is a 76-year-old gentleman known to  service from previous admission, failed to follow up with office with  history of COPD with chronic bronchitis, possible LEYLA. He notes that he  had a sleep study recently, I do not see results yet. He was admitted  on the  with two weeks of increased short of breath, worse with  activity, little bit better with rest.  Had some orthopnea, did not note  any increased leg swelling. REVIEW OF SYSTEMS:  GENERAL:  He denies fever or chills. NEUROLOGIC:  Had headache, dizziness and weakness. EYES:  No redness or watery eyes. ENT:  No nasal congestion or drip. CARDIAC:  No chest pain or palpitation. RESPIRATORY:  As noted increased short of breath. Had some intermittent  wheezing, been coughing, he has had reddish sputum, but he was pretty  vague. He noted it was dark sputum. GASTROINTESTINAL:  No nausea, vomiting or choking episode. No diarrhea  or bleeding. GENITOURINARY:  No dysuria or hematuria. MUSCULOSKELETAL:  Had neck and bilateral shoulder pain. SKIN:  No new rash or ulceration noted. PSYCHIATRIC:  Noted he has been anxious. Denies any depressed mood. PAST MEDICAL HISTORY:  Significant for chronic respiratory failure. He  is on home oxygen as needed. He has COPD with chronic bronchitis.   Had  diastolic CHF before, hypertension, coronary artery disease, had Lab work, potassium 4.2, BUN 11,  creatinine 1.74. His blood sugar was 98. LFTs were unremarkable. His  white count was 3.5, hemoglobin 9.3 and platelets 660. His urinalysis  unremarkable. ASSESSMENT:  1.  Questionable hemoptysis. The patient is not a great historian. 2.  Right-sided patchy infiltrates, questionable atypical pneumonia  versus edema. 3.  Bilateral pleural effusions, history of diastolic CHF. 4.  Chronic respiratory failure with hypoxia. He is on p.r.n. home O2.  5.  COPD with chronic bronchitis. 6.  Possible LEYLA. Noted he did have a sleep study recently. 7.  Tobacco abuse around a pack a day for 40 years, quit 7 days ago. 8.  Hypertension, coronary artery disease, diastolic CHF, had mild  pulmonary hypertension on most recent echo. 9.  History of anxiety, depression and ADHD. 10.  Possible chronic kidney disease. PLAN OF TREATMENT:  We are going to check a BNP and his procalcitonin  was unremarkable. Check sputum results and urine for Legionella and  pneumococcal.  He is on empiric antibiotic with Zosyn. If he has  elevated BNP, maybe we will lean more towards aggressive diuresis. Continue with aerosol treatment. He is on DuoNeb and Symbicort and  needs to follow up on his sleep study results. He is on p.r.n. nicotine  gums, Lovenox for DVT prophylaxis. Watch for any further hemoptysis. We do appreciate the consultation and we will follow.         Kimberly Parson    D: 03/04/2020 12:01:53       T: 03/04/2020 12:16:27     [email or web address]  Job#: [id number]     Doc#: [id number]    CC:
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
## 2020-03-04 NOTE — PROGRESS NOTES
I notified Dr. Evy Godoy at 7:12 am on 3/4/2020. Reina Thornton    I will make sure the patient is on the GI list.Cristina Garay
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
## 2020-03-05 LAB
AFP: 1.6 UG/L
ANION GAP SERPL CALCULATED.3IONS-SCNC: 10 MMOL/L (ref 9–17)
BUN BLDV-MCNC: 10 MG/DL (ref 6–20)
BUN/CREAT BLD: ABNORMAL (ref 9–20)
CALCIUM SERPL-MCNC: 8.2 MG/DL (ref 8.6–10.4)
CHLORIDE BLD-SCNC: 102 MMOL/L (ref 98–107)
CO2: 25 MMOL/L (ref 20–31)
CREAT SERPL-MCNC: 1.69 MG/DL (ref 0.7–1.2)
FERRITIN: 198 UG/L (ref 30–400)
GFR AFRICAN AMERICAN: 51 ML/MIN
GFR NON-AFRICAN AMERICAN: 42 ML/MIN
GFR SERPL CREATININE-BSD FRML MDRD: ABNORMAL ML/MIN/{1.73_M2}
GFR SERPL CREATININE-BSD FRML MDRD: ABNORMAL ML/MIN/{1.73_M2}
GLUCOSE BLD-MCNC: 116 MG/DL (ref 70–99)
HAV IGM SER IA-ACNC: NONREACTIVE
HCT VFR BLD CALC: 29 % (ref 41–53)
HEMOGLOBIN: 9.7 G/DL (ref 13.5–17.5)
HEPATITIS B CORE IGM ANTIBODY: NONREACTIVE
HEPATITIS B SURFACE ANTIGEN: NONREACTIVE
HEPATITIS C ANTIBODY: NONREACTIVE
IRON SATURATION: 22 % (ref 20–55)
IRON: 24 UG/DL (ref 59–158)
MCH RBC QN AUTO: 26.6 PG (ref 26–34)
MCHC RBC AUTO-ENTMCNC: 33.6 G/DL (ref 31–37)
MCV RBC AUTO: 79.2 FL (ref 80–100)
NRBC AUTOMATED: ABNORMAL PER 100 WBC
PDW BLD-RTO: 18.4 % (ref 11.5–14.9)
PLATELET # BLD: 164 K/UL (ref 150–450)
PMV BLD AUTO: 7.6 FL (ref 6–12)
POTASSIUM SERPL-SCNC: 4.1 MMOL/L (ref 3.7–5.3)
RBC # BLD: 3.66 M/UL (ref 4.5–5.9)
SODIUM BLD-SCNC: 137 MMOL/L (ref 135–144)
TOTAL IRON BINDING CAPACITY: 110 UG/DL (ref 250–450)
UNSATURATED IRON BINDING CAPACITY: 86 UG/DL (ref 112–347)
WBC # BLD: 3.6 K/UL (ref 3.5–11)

## 2020-03-05 PROCEDURE — 6370000000 HC RX 637 (ALT 250 FOR IP): Performed by: NURSE PRACTITIONER

## 2020-03-05 PROCEDURE — 94640 AIRWAY INHALATION TREATMENT: CPT

## 2020-03-05 PROCEDURE — 1200000000 HC SEMI PRIVATE

## 2020-03-05 PROCEDURE — 87899 AGENT NOS ASSAY W/OPTIC: CPT

## 2020-03-05 PROCEDURE — 2580000003 HC RX 258: Performed by: NURSE PRACTITIONER

## 2020-03-05 PROCEDURE — 6360000002 HC RX W HCPCS: Performed by: INTERNAL MEDICINE

## 2020-03-05 PROCEDURE — 99232 SBSQ HOSP IP/OBS MODERATE 35: CPT | Performed by: INTERNAL MEDICINE

## 2020-03-05 PROCEDURE — 6370000000 HC RX 637 (ALT 250 FOR IP): Performed by: INTERNAL MEDICINE

## 2020-03-05 PROCEDURE — 2700000000 HC OXYGEN THERAPY PER DAY

## 2020-03-05 PROCEDURE — 85027 COMPLETE CBC AUTOMATED: CPT

## 2020-03-05 PROCEDURE — 83550 IRON BINDING TEST: CPT

## 2020-03-05 PROCEDURE — 2580000003 HC RX 258: Performed by: INTERNAL MEDICINE

## 2020-03-05 PROCEDURE — 80048 BASIC METABOLIC PNL TOTAL CA: CPT

## 2020-03-05 PROCEDURE — 36415 COLL VENOUS BLD VENIPUNCTURE: CPT

## 2020-03-05 PROCEDURE — 80074 ACUTE HEPATITIS PANEL: CPT

## 2020-03-05 PROCEDURE — 87449 NOS EACH ORGANISM AG IA: CPT

## 2020-03-05 PROCEDURE — 83540 ASSAY OF IRON: CPT

## 2020-03-05 PROCEDURE — C9113 INJ PANTOPRAZOLE SODIUM, VIA: HCPCS | Performed by: INTERNAL MEDICINE

## 2020-03-05 PROCEDURE — 6360000002 HC RX W HCPCS: Performed by: NURSE PRACTITIONER

## 2020-03-05 PROCEDURE — 82105 ALPHA-FETOPROTEIN SERUM: CPT

## 2020-03-05 PROCEDURE — 94761 N-INVAS EAR/PLS OXIMETRY MLT: CPT

## 2020-03-05 PROCEDURE — 82728 ASSAY OF FERRITIN: CPT

## 2020-03-05 RX ORDER — LEVOFLOXACIN 500 MG/1
750 TABLET, FILM COATED ORAL DAILY
Status: DISCONTINUED | OUTPATIENT
Start: 2020-03-05 | End: 2020-03-06 | Stop reason: HOSPADM

## 2020-03-05 RX ORDER — DOXYCYCLINE 100 MG/1
100 CAPSULE ORAL EVERY 12 HOURS SCHEDULED
Status: DISCONTINUED | OUTPATIENT
Start: 2020-03-05 | End: 2020-03-06

## 2020-03-05 RX ADMIN — PANTOPRAZOLE SODIUM 40 MG: 40 INJECTION, POWDER, FOR SOLUTION INTRAVENOUS at 21:54

## 2020-03-05 RX ADMIN — HYDROCODONE BITARTRATE AND ACETAMINOPHEN 1 TABLET: 5; 325 TABLET ORAL at 04:30

## 2020-03-05 RX ADMIN — HYDROCODONE BITARTRATE AND ACETAMINOPHEN 1 TABLET: 5; 325 TABLET ORAL at 09:28

## 2020-03-05 RX ADMIN — ISOSORBIDE MONONITRATE 30 MG: 30 TABLET, EXTENDED RELEASE ORAL at 09:30

## 2020-03-05 RX ADMIN — HYDROCODONE BITARTRATE AND ACETAMINOPHEN 1 TABLET: 5; 325 TABLET ORAL at 15:34

## 2020-03-05 RX ADMIN — DOXYCYCLINE 100 MG: 100 CAPSULE ORAL at 11:19

## 2020-03-05 RX ADMIN — Medication 10 ML: at 21:54

## 2020-03-05 RX ADMIN — METOPROLOL TARTRATE 100 MG: 100 TABLET ORAL at 21:54

## 2020-03-05 RX ADMIN — Medication 2 PUFF: at 21:54

## 2020-03-05 RX ADMIN — ENOXAPARIN SODIUM 40 MG: 40 INJECTION SUBCUTANEOUS at 09:30

## 2020-03-05 RX ADMIN — Medication 2 PUFF: at 09:30

## 2020-03-05 RX ADMIN — PIPERACILLIN SODIUM AND TAZOBACTAM SODIUM 3.38 G: 3; .375 INJECTION, POWDER, LYOPHILIZED, FOR SOLUTION INTRAVENOUS at 01:30

## 2020-03-05 RX ADMIN — METOPROLOL TARTRATE 100 MG: 100 TABLET ORAL at 09:30

## 2020-03-05 RX ADMIN — Medication 10 ML: at 09:31

## 2020-03-05 RX ADMIN — IPRATROPIUM BROMIDE AND ALBUTEROL SULFATE 1 AMPULE: 2.5; .5 SOLUTION RESPIRATORY (INHALATION) at 15:14

## 2020-03-05 RX ADMIN — IPRATROPIUM BROMIDE AND ALBUTEROL SULFATE 1 AMPULE: 2.5; .5 SOLUTION RESPIRATORY (INHALATION) at 11:01

## 2020-03-05 RX ADMIN — ATORVASTATIN CALCIUM 20 MG: 20 TABLET, FILM COATED ORAL at 21:55

## 2020-03-05 RX ADMIN — IPRATROPIUM BROMIDE AND ALBUTEROL SULFATE 1 AMPULE: 2.5; .5 SOLUTION RESPIRATORY (INHALATION) at 18:47

## 2020-03-05 RX ADMIN — DULOXETINE HYDROCHLORIDE 60 MG: 60 CAPSULE, DELAYED RELEASE ORAL at 09:29

## 2020-03-05 RX ADMIN — PROMETHAZINE HYDROCHLORIDE 12.5 MG: 25 TABLET ORAL at 11:19

## 2020-03-05 RX ADMIN — IPRATROPIUM BROMIDE AND ALBUTEROL SULFATE 1 AMPULE: 2.5; .5 SOLUTION RESPIRATORY (INHALATION) at 06:51

## 2020-03-05 RX ADMIN — DOXYCYCLINE 100 MG: 100 CAPSULE ORAL at 22:25

## 2020-03-05 RX ADMIN — PIPERACILLIN SODIUM AND TAZOBACTAM SODIUM 3.38 G: 3; .375 INJECTION, POWDER, LYOPHILIZED, FOR SOLUTION INTRAVENOUS at 09:30

## 2020-03-05 RX ADMIN — CLONIDINE HYDROCHLORIDE 0.2 MG: 0.2 TABLET ORAL at 21:55

## 2020-03-05 RX ADMIN — HYDROXYZINE HYDROCHLORIDE 25 MG: 25 TABLET, FILM COATED ORAL at 21:55

## 2020-03-05 RX ADMIN — HYDROXYZINE HYDROCHLORIDE 25 MG: 25 TABLET, FILM COATED ORAL at 15:23

## 2020-03-05 RX ADMIN — CLONIDINE HYDROCHLORIDE 0.2 MG: 0.2 TABLET ORAL at 09:30

## 2020-03-05 RX ADMIN — DULOXETINE HYDROCHLORIDE 60 MG: 60 CAPSULE, DELAYED RELEASE ORAL at 21:55

## 2020-03-05 RX ADMIN — HYDRALAZINE HYDROCHLORIDE 25 MG: 25 TABLET, FILM COATED ORAL at 15:23

## 2020-03-05 RX ADMIN — ASPIRIN 81 MG: 81 TABLET, COATED ORAL at 09:30

## 2020-03-05 RX ADMIN — HYDROXYZINE HYDROCHLORIDE 25 MG: 25 TABLET, FILM COATED ORAL at 04:30

## 2020-03-05 RX ADMIN — MULTIPLE VITAMINS W/ MINERALS TAB 1 TABLET: TAB at 09:30

## 2020-03-05 RX ADMIN — HYDROCODONE BITARTRATE AND ACETAMINOPHEN 1 TABLET: 5; 325 TABLET ORAL at 21:54

## 2020-03-05 RX ADMIN — PANTOPRAZOLE SODIUM 40 MG: 40 INJECTION, POWDER, FOR SOLUTION INTRAVENOUS at 09:31

## 2020-03-05 RX ADMIN — TAMSULOSIN HYDROCHLORIDE 0.4 MG: 0.4 CAPSULE ORAL at 09:29

## 2020-03-05 RX ADMIN — HYDRALAZINE HYDROCHLORIDE 25 MG: 25 TABLET, FILM COATED ORAL at 09:29

## 2020-03-05 RX ADMIN — LEVOFLOXACIN 750 MG: 500 TABLET, FILM COATED ORAL at 15:23

## 2020-03-05 RX ADMIN — HYDRALAZINE HYDROCHLORIDE 25 MG: 25 TABLET, FILM COATED ORAL at 21:55

## 2020-03-05 RX ADMIN — SODIUM CHLORIDE: 9 INJECTION, SOLUTION INTRAVENOUS at 23:56

## 2020-03-05 ASSESSMENT — PAIN SCALES - GENERAL
PAINLEVEL_OUTOF10: 7
PAINLEVEL_OUTOF10: 4
PAINLEVEL_OUTOF10: 8
PAINLEVEL_OUTOF10: 8
PAINLEVEL_OUTOF10: 3
PAINLEVEL_OUTOF10: 4
PAINLEVEL_OUTOF10: 5
PAINLEVEL_OUTOF10: 5

## 2020-03-05 ASSESSMENT — PAIN DESCRIPTION - PAIN TYPE: TYPE: ACUTE PAIN

## 2020-03-05 ASSESSMENT — PAIN DESCRIPTION - DESCRIPTORS: DESCRIPTORS: BURNING

## 2020-03-05 ASSESSMENT — PAIN DESCRIPTION - FREQUENCY: FREQUENCY: CONTINUOUS

## 2020-03-05 ASSESSMENT — PAIN DESCRIPTION - PROGRESSION: CLINICAL_PROGRESSION: NOT CHANGED

## 2020-03-05 ASSESSMENT — PAIN DESCRIPTION - LOCATION: LOCATION: FLANK;STERNUM
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
## 2020-03-05 NOTE — PLAN OF CARE
Problem: Falls - Risk of:  Goal: Will remain free from falls  Description  Will remain free from falls  Outcome: Ongoing     Problem: Pain:  Goal: Patient's pain/discomfort is manageable  Description  Patient's pain/discomfort is manageable  Outcome: Ongoing     Problem: Nutrition  Goal: Optimal nutrition therapy  Outcome: Ongoing

## 2020-03-05 NOTE — CARE COORDINATION
ONGOING DISCHARGE PLAN:    Spoke with patient regarding discharge plan and patient confirms that plan is still to go home with [facility] - [address]. Had an MRI of liver yesterday shows    Impression:        1. Posterior hepatic segment 4B area is most consistent with fatty  infiltration as described the overall assessment limited by lack of IV  contrast.  2. Redemonstration of right renal simple cyst.  3. Persistent mild bilateral pleural effusions with associated atelectasis. Remains on IV Zosyn, IV Vanco, IV fluids    Probable discharge home today or tomorrow    Will continue to follow for additional discharge needs.     Electronically signed by Shelley Jones RN on 3/5/2020 at 11:53 AM

## 2020-03-05 NOTE — PROGRESS NOTES
GI Progress notes    3/5/2020   12:31 PM    Name:  Yohana Ponce  MRN:    [id number]     Acct:     [de-identified]   Room:  2058/2058-01   Day: 3     Admit Date: 3/2/2020  3:32 PM  PCP: Tanisha Espinal MD    Subjective:     C/C:   Chief Complaint   Patient presents with    Hemoptysis       Interval History: Status: improved. Patient seen and examined. No acute events overnight. Patient reports R flank pain this morning, resolved presently. No nausea. Reports increased eructations and had emesis x1 today. He reports loose stools, questionable bleeding? Patient had colonoscopy last year, polyps x3. Has hemorrhoids. Patient had MRI to evaluate liver lesion. Likely fatty infiltration. Labs and progress notes reviewed. ROS:  Constitutional: negative for chills, fevers and sweats  Gastrointestinal: negative for constipation, nausea       Medications: Allergies:    Allergies   Allergen Reactions    Morphine Itching       Current Meds: vancomycin (VANCOCIN) 1,500 mg in dextrose 5 % 250 mL IVPB (ADDAVIAL), Q24H  doxycycline monohydrate (MONODOX) capsule 100 mg, 2 times per day  vancomycin (VANCOCIN) intermittent dosing (placeholder), RX Placeholder  fentaNYL (SUBLIMAZE) injection 25 mcg, Q4H PRN  pantoprazole (PROTONIX) injection 40 mg, BID    And  sodium chloride (PF) 0.9 % injection 10 mL, BID  HYDROcodone-acetaminophen (NORCO) 5-325 MG per tablet 1 tablet, Q4H PRN  sodium chloride flush 0.9 % injection 10 mL, 2 times per day  sodium chloride flush 0.9 % injection 10 mL, PRN  acetaminophen (TYLENOL) tablet 650 mg, Q4H PRN  enoxaparin (LOVENOX) injection 40 mg, Daily  ondansetron (ZOFRAN) injection 4 mg, Q8H PRN  aspirin EC tablet 81 mg, Daily  atorvastatin (LIPITOR) tablet 20 mg, Nightly  cloNIDine (CATAPRES) tablet 0.2 mg, BID  DULoxetine (CYMBALTA) extended release capsule 60 mg, BID  tamsulosin (FLOMAX) capsule 0.4 mg, Daily  nitroGLYCERIN (NITROSTAT) SL tablet 0.4 mg, Q5 Min PRN  nicotine polacrilex (NICORETTE) gum 2 mg, PRN  therapeutic multivitamin-minerals 1 tablet, Daily  metoprolol (LOPRESSOR) tablet 100 mg, BID  isosorbide mononitrate (IMDUR) extended release tablet 30 mg, Daily  hydrOXYzine (ATARAX) tablet 25 mg, Q8H  hydrALAZINE (APRESOLINE) tablet 25 mg, TID  budesonide-formoterol (SYMBICORT) 160-4.5 MCG/ACT inhaler 2 puff, BID  piperacillin-tazobactam (ZOSYN) 3.375 g in dextrose 5 % 50 mL IVPB extended infusion (mini-bag), Q8H  0.9 % sodium chloride infusion, Continuous  magnesium hydroxide (MILK OF MAGNESIA) 400 MG/5ML suspension 30 mL, Daily PRN  promethazine (PHENERGAN) tablet 12.5 mg, Q6H PRN  albuterol (PROVENTIL) nebulizer solution 2.5 mg, Q2H PRN  ipratropium-albuterol (DUONEB) nebulizer solution 1 ampule, Q4H WA        Data:     Code Status:  Full Code    Family History   Problem Relation Age of Onset    Anxiety Disorder Sister     Depression Sister     High Blood Pressure Sister     Thyroid Disease Sister     Depression Sister     High Blood Pressure Sister     Lung Cancer Mother     Heart Disease Mother     High Blood Pressure Mother     High Blood Pressure Father     Diabetes Father     Heart Disease Father     Lung Cancer Father     Heart Disease Maternal Grandmother     Depression Brother        Social History     Socioeconomic History    Marital status: Single     Spouse name: Not on file    Number of children: 3    Years of education: Not on file    Highest education level: Not on file   Occupational History    Occupation: Disabled since 2011   Social Needs    Financial resource strain: Not on file    Food insecurity:     Worry: Not on file     Inability: Not on file   Droplet needs:     Medical: Not on file     Non-medical: Not on file   Tobacco Use    Smoking status: Current Every Day Smoker     Packs/day: 0.50     Years: 37.00     Pack years: 18.50     Types: Cigarettes    Smokeless tobacco: Never Used   Substance and Sexual Activity    03/02/2020    MONOPCT 6 03/02/2020    BASOPCT 1 03/02/2020    MONOSABS 0.22 03/02/2020    LYMPHSABS 0.78 03/02/2020    EOSABS 0.26 03/02/2020    BASOSABS 0.04 03/02/2020    DIFFTYPE NOT REPORTED 03/02/2020     Hemoglobin/Hematocrit:    Lab Results   Component Value Date    HGB 9.7 03/05/2020    HCT 29.0 03/05/2020     CMP:    Lab Results   Component Value Date     03/05/2020    K 4.1 03/05/2020     03/05/2020    CO2 25 03/05/2020    BUN 10 03/05/2020    CREATININE 1.69 03/05/2020    GFRAA 51 03/05/2020    LABGLOM 42 03/05/2020    GLUCOSE 116 03/05/2020    GLUCOSE 104 01/25/2012    PROT 7.0 03/02/2020    LABALBU 3.2 03/02/2020    CALCIUM 8.2 03/05/2020    BILITOT 0.80 03/02/2020    ALKPHOS 141 03/02/2020    AST 14 03/02/2020    ALT 7 03/02/2020     BMP:    Lab Results   Component Value Date     03/05/2020    K 4.1 03/05/2020     03/05/2020    CO2 25 03/05/2020    BUN 10 03/05/2020    LABALBU 3.2 03/02/2020    CREATININE 1.69 03/05/2020    CALCIUM 8.2 03/05/2020    GFRAA 51 03/05/2020    LABGLOM 42 03/05/2020    GLUCOSE 116 03/05/2020    GLUCOSE 104 01/25/2012     PT/INR:    Lab Results   Component Value Date    PROTIME 14.1 12/10/2019    PROTIME 10.6 12/19/2011    INR 1.1 12/10/2019     PTT:    Lab Results   Component Value Date    APTT 45.0 12/09/2019   [APTT}    Physical Examination:        General appearance: alert, cooperative and no distress.   Mental Status: oriented to person, place and time and anxious affect  Abdomen: soft, nontender, nondistended, bowel sounds present   Assessment:        Primary Problem  Pneumonia     Active Hospital Problems    Diagnosis Date Noted    Mild malnutrition ([address] [E44.1] 03/03/2020    Pneumonia [J18.9] 03/02/2020    Liver lesion [K76.9] 03/02/2020    Acute on chronic diastolic (congestive) heart failure (HCC) [I50.33] 12/11/2019    Chronic obstructive pulmonary disease with acute [facility] [J44.0] 03/10/2017    Unable to

## 2020-03-05 NOTE — PROGRESS NOTES
Pulmonary Progress Note  [facility]      Patient - Waldemar Correia,  Age - 62 y.o.    - 1965      Room Number - 8202/8304-99   N -  [id number]   [facility]  Date of Admission -  3/2/2020  3:32 PM    Follow-up: Hemoptysis    Consulting Louie Barrera MD  Primary Care Physician - Reyna Childress MD     SUBJECTIVE   Feeling better, less short of breath, coughing thick gray sputum without any further blood, no wheezing  Denies any fever or chills, no chest pain  Stomach ache with heartburn no vomiting or diarrhea  On 2 L O2    OBJECTIVE   VITALS    height is 5' 9\" (1.753 m) and weight is 213 lb (96.6 kg). His oral temperature is 98.1 °F (36.7 °C). His blood pressure is 136/78 and his pulse is 65. His respiration is 16 and oxygen saturation is 98%. Body mass index is 31.45 kg/m². Temperature Range: Temp: 98.1 °F (36.7 °C) Temp  Av.7 °F (36.5 °C)  Min: 97.4 °F (36.3 °C)  Max: 98.1 °F (36.7 °C)  BP Range:  Systolic (69NZC), BO , Min:123 , DEF:588     Diastolic (98JIL), QIF:94, Min:76, Max:78    Pulse Range: Pulse  Av.7  Min: 64  Max: 68  Respiration Range: Resp  Av.3  Min: 16  Max: 18  Current Pulse Ox[de-identified]  SpO2: 98 %  24HR Pulse Ox Range:  SpO2  Av.1 %  Min: 95 %  Max: 99 %  Oxygen Amount and Delivery: O2 Flow Rate (L/min): 2 L/min    Wt Readings from Last 3 Encounters:   20 213 lb (96.6 kg)   20 213 lb (96.6 kg)   20 213 lb (96.6 kg)       I/O (24 Hours)    Intake/Output Summary (Last 24 hours) at 3/5/2020 1030  Last data filed at 3/5/2020 0956  Gross per 24 hour   Intake --   Output 225 ml   Net -225 ml       EXAM     General Appearance  Awake, alert, oriented, in no acute distress  HEENT - normocephalic, atraumatic.   Neck - Supple,  trachea midline, no JVD  Lungs -decreased sounds, coarse, bibasilar crepitations, no wheezing  Cardiovascular - Heart sounds are normal.  Regular rate and rhythm   Abdomen - Soft, nontender, nondistended, no guarding  Neurologic -appropriate, following commands, anxious  Skin - No bruising or bleeding  Extremities - No clubbing, cyanosis, edema    MEDS      vancomycin  1,500 mg Intravenous Q24H    vancomycin (VANCOCIN) intermittent dosing (placeholder)   Other RX Placeholder    pantoprazole  40 mg Intravenous BID    And    sodium chloride (PF)  10 mL Intravenous BID    sodium chloride flush  10 mL Intravenous 2 times per day    enoxaparin  40 mg Subcutaneous Daily    aspirin  81 mg Oral Daily    atorvastatin  20 mg Oral Nightly    cloNIDine  0.2 mg Oral BID    DULoxetine  60 mg Oral BID    tamsulosin  0.4 mg Oral Daily    therapeutic multivitamin-minerals  1 tablet Oral Daily    metoprolol  100 mg Oral BID    isosorbide mononitrate  30 mg Oral Daily    hydrOXYzine  25 mg Oral Q8H    hydrALAZINE  25 mg Oral TID    budesonide-formoterol  2 puff Inhalation BID    piperacillin-tazobactam (ZOSYN) 3.375 g in dextrose 5% IVPB extended infusion (mini-bag)  3.375 g Intravenous Q8H    ipratropium-albuterol  1 ampule Inhalation Q4H WA      sodium chloride 75 mL/hr at 03/04/20 2156     fentanNYL, HYDROcodone 5 mg - acetaminophen, sodium chloride flush, acetaminophen, ondansetron, nitroGLYCERIN, nicotine polacrilex, magnesium hydroxide, promethazine, albuterol    LABS   CBC   Recent Labs     03/05/20  0542   WBC 3.6   HGB 9.7*   HCT 29.0*   MCV 79.2*        BMP:   Lab Results   Component Value Date     03/05/2020    K 4.1 03/05/2020     03/05/2020    CO2 25 03/05/2020    BUN 10 03/05/2020    LABALBU 3.2 03/02/2020    CREATININE 1.69 03/05/2020    CALCIUM 8.2 03/05/2020    GFRAA 51 03/05/2020    LABGLOM 42 03/05/2020     ABGs:  Lab Results   Component Value Date    PHART 7.430 12/09/2019    PO2ART 97.5 12/09/2019    HUD6IYF 32.7 12/09/2019      Lab Results   Component Value Date    MODE NOT REPORTED 12/09/2019 Ionized Calcium:  No results found for: IONCA  Magnesium:    Lab Results   Component Value Date    MG 1.4 12/11/2019      Phosphorus:  No results found for: PHOS     LIVER PROFILE   Recent Labs     03/02/20  1605   AST 14   ALT 7   BILITOT 0.80   ALKPHOS 141*     INR No results for input(s): INR in the last 72 hours. PTT No results for input(s): APTT in the last 72 hours. BNP No results for input(s): BNP in the last 72 hours. RADIOLOGY     (See actual reports for details)    ASSESSMENT/PLAN   Principal Problem:    Pneumonia  Active Problems:    Severe recurrent major depressive disorder with psychotic features (HCC)    Chronic obstructive pulmonary disease with acute lower respiratory infection ([facility] [address]    Acute on chronic diastolic (congestive) heart failure ([facility] [address]    1. Questionable hemoptysis. No further blood noted  2. Right-sided patchy infiltrates, questionable atypical pneumonia  versus edema. 3.  Bilateral pleural effusions, history of diastolic CHF. 4.  Chronic respiratory failure with hypoxia. He is on p.r.n. home O2.  5.  COPD with chronic bronchitis. 6.  Possible LEYLA. Noted he did have a sleep study recently. 7.  Tobacco abuse around a pack a day for 40 years, quit 7 days prior to admission. 8.  Hypertension, coronary artery disease, diastolic CHF, had mild  pulmonary hypertension on most recent echo. 9.  History of anxiety, depression and ADHD. 10.  Possible chronic kidney disease.     PLAN OF TREATMENT:    Pro-BNP at 1049, and his procalcitonin was unremarkable. Check sputum, MRSA screen and urine for Legionella and  pneumococcal.     empiric antibiotic with Zosyn and vancomycin. Add doxycycline  Bronchodilators  needs to follow up on his sleep study results. p.r.n. nicotine gums,   Lovenox for DVT prophylaxis. Watch for any further hemoptysis.     Electronically signed by Lauren Abernathy MD on 3/5/2020 at 10:30 AM

## 2020-03-06 ENCOUNTER — ANESTHESIA (OUTPATIENT)
Dept: ENDOSCOPY | Age: 55
DRG: 139 | End: 2020-03-06
Payer: COMMERCIAL

## 2020-03-06 ENCOUNTER — ANESTHESIA EVENT (OUTPATIENT)
Dept: ENDOSCOPY | Age: 55
DRG: 139 | End: 2020-03-06
Payer: COMMERCIAL

## 2020-03-06 VITALS
OXYGEN SATURATION: 97 % | RESPIRATION RATE: 20 BRPM | WEIGHT: 221.78 LBS | SYSTOLIC BLOOD PRESSURE: 162 MMHG | HEART RATE: 60 BPM | TEMPERATURE: 97.5 F | BODY MASS INDEX: 32.85 KG/M2 | HEIGHT: 69 IN | DIASTOLIC BLOOD PRESSURE: 95 MMHG

## 2020-03-06 VITALS
DIASTOLIC BLOOD PRESSURE: 8 MMHG | SYSTOLIC BLOOD PRESSURE: 120 MMHG | OXYGEN SATURATION: 98 % | RESPIRATION RATE: 11 BRPM

## 2020-03-06 LAB
ANION GAP SERPL CALCULATED.3IONS-SCNC: 9 MMOL/L (ref 9–17)
BUN BLDV-MCNC: 12 MG/DL (ref 6–20)
BUN/CREAT BLD: ABNORMAL (ref 9–20)
CALCIUM SERPL-MCNC: 8.2 MG/DL (ref 8.6–10.4)
CHLORIDE BLD-SCNC: 105 MMOL/L (ref 98–107)
CO2: 25 MMOL/L (ref 20–31)
CREAT SERPL-MCNC: 1.52 MG/DL (ref 0.7–1.2)
DIRECT EXAM: NORMAL
DIRECT EXAM: NORMAL
GFR AFRICAN AMERICAN: 58 ML/MIN
GFR NON-AFRICAN AMERICAN: 48 ML/MIN
GFR SERPL CREATININE-BSD FRML MDRD: ABNORMAL ML/MIN/{1.73_M2}
GFR SERPL CREATININE-BSD FRML MDRD: ABNORMAL ML/MIN/{1.73_M2}
GLUCOSE BLD-MCNC: 90 MG/DL (ref 70–99)
HCT VFR BLD CALC: 29.1 % (ref 41–53)
HEMOGLOBIN: 9.3 G/DL (ref 13.5–17.5)
Lab: NORMAL
Lab: NORMAL
MCH RBC QN AUTO: 25.5 PG (ref 26–34)
MCHC RBC AUTO-ENTMCNC: 31.9 G/DL (ref 31–37)
MCV RBC AUTO: 80 FL (ref 80–100)
NRBC AUTOMATED: ABNORMAL PER 100 WBC
PDW BLD-RTO: 18.9 % (ref 11.5–14.9)
PLATELET # BLD: 145 K/UL (ref 150–450)
PMV BLD AUTO: 7.8 FL (ref 6–12)
POTASSIUM SERPL-SCNC: 4.5 MMOL/L (ref 3.7–5.3)
RBC # BLD: 3.64 M/UL (ref 4.5–5.9)
SODIUM BLD-SCNC: 139 MMOL/L (ref 135–144)
SPECIMEN DESCRIPTION: NORMAL
SPECIMEN DESCRIPTION: NORMAL
WBC # BLD: 4.1 K/UL (ref 3.5–11)

## 2020-03-06 PROCEDURE — 94640 AIRWAY INHALATION TREATMENT: CPT

## 2020-03-06 PROCEDURE — 3700000000 HC ANESTHESIA ATTENDED CARE: Performed by: INTERNAL MEDICINE

## 2020-03-06 PROCEDURE — 94761 N-INVAS EAR/PLS OXIMETRY MLT: CPT

## 2020-03-06 PROCEDURE — 36415 COLL VENOUS BLD VENIPUNCTURE: CPT

## 2020-03-06 PROCEDURE — 43235 EGD DIAGNOSTIC BRUSH WASH: CPT | Performed by: INTERNAL MEDICINE

## 2020-03-06 PROCEDURE — 6370000000 HC RX 637 (ALT 250 FOR IP): Performed by: NURSE PRACTITIONER

## 2020-03-06 PROCEDURE — 2580000003 HC RX 258: Performed by: INTERNAL MEDICINE

## 2020-03-06 PROCEDURE — 2500000003 HC RX 250 WO HCPCS: Performed by: NURSE ANESTHETIST, CERTIFIED REGISTERED

## 2020-03-06 PROCEDURE — 3609017100 HC EGD: Performed by: INTERNAL MEDICINE

## 2020-03-06 PROCEDURE — 99239 HOSP IP/OBS DSCHRG MGMT >30: CPT | Performed by: INTERNAL MEDICINE

## 2020-03-06 PROCEDURE — 6360000002 HC RX W HCPCS: Performed by: INTERNAL MEDICINE

## 2020-03-06 PROCEDURE — 85027 COMPLETE CBC AUTOMATED: CPT

## 2020-03-06 PROCEDURE — 6370000000 HC RX 637 (ALT 250 FOR IP): Performed by: INTERNAL MEDICINE

## 2020-03-06 PROCEDURE — 7100000001 HC PACU RECOVERY - ADDTL 15 MIN: Performed by: INTERNAL MEDICINE

## 2020-03-06 PROCEDURE — 6360000002 HC RX W HCPCS: Performed by: NURSE ANESTHETIST, CERTIFIED REGISTERED

## 2020-03-06 PROCEDURE — 0DJ08ZZ INSPECTION OF UPPER INTESTINAL TRACT, VIA NATURAL OR ARTIFICIAL OPENING ENDOSCOPIC: ICD-10-PCS | Performed by: INTERNAL MEDICINE

## 2020-03-06 PROCEDURE — C9113 INJ PANTOPRAZOLE SODIUM, VIA: HCPCS | Performed by: INTERNAL MEDICINE

## 2020-03-06 PROCEDURE — 80048 BASIC METABOLIC PNL TOTAL CA: CPT

## 2020-03-06 PROCEDURE — 2709999900 HC NON-CHARGEABLE SUPPLY: Performed by: INTERNAL MEDICINE

## 2020-03-06 PROCEDURE — 7100000000 HC PACU RECOVERY - FIRST 15 MIN: Performed by: INTERNAL MEDICINE

## 2020-03-06 PROCEDURE — 2700000000 HC OXYGEN THERAPY PER DAY

## 2020-03-06 RX ORDER — METOCLOPRAMIDE 10 MG/1
10 TABLET ORAL
Qty: 120 TABLET | Refills: 3 | Status: SHIPPED | OUTPATIENT
Start: 2020-03-06 | End: 2020-09-28

## 2020-03-06 RX ORDER — LIDOCAINE HYDROCHLORIDE 10 MG/ML
INJECTION, SOLUTION EPIDURAL; INFILTRATION; INTRACAUDAL; PERINEURAL PRN
Status: DISCONTINUED | OUTPATIENT
Start: 2020-03-06 | End: 2020-03-06 | Stop reason: SDUPTHER

## 2020-03-06 RX ORDER — METOCLOPRAMIDE 10 MG/1
10 TABLET ORAL
Status: DISCONTINUED | OUTPATIENT
Start: 2020-03-06 | End: 2020-03-06 | Stop reason: HOSPADM

## 2020-03-06 RX ORDER — PROPOFOL 10 MG/ML
INJECTION, EMULSION INTRAVENOUS PRN
Status: DISCONTINUED | OUTPATIENT
Start: 2020-03-06 | End: 2020-03-06 | Stop reason: SDUPTHER

## 2020-03-06 RX ORDER — AMOXICILLIN AND CLAVULANATE POTASSIUM 875; 125 MG/1; MG/1
1 TABLET, FILM COATED ORAL 2 TIMES DAILY
Qty: 14 TABLET | Refills: 0 | Status: SHIPPED | OUTPATIENT
Start: 2020-03-06 | End: 2020-03-13

## 2020-03-06 RX ADMIN — HYDROXYZINE HYDROCHLORIDE 25 MG: 25 TABLET, FILM COATED ORAL at 06:04

## 2020-03-06 RX ADMIN — Medication 10 ML: at 08:38

## 2020-03-06 RX ADMIN — LEVOFLOXACIN 750 MG: 500 TABLET, FILM COATED ORAL at 14:05

## 2020-03-06 RX ADMIN — PROPOFOL 150 MG: 10 INJECTION, EMULSION INTRAVENOUS at 12:09

## 2020-03-06 RX ADMIN — PANTOPRAZOLE SODIUM 40 MG: 40 INJECTION, POWDER, FOR SOLUTION INTRAVENOUS at 08:38

## 2020-03-06 RX ADMIN — Medication 2 PUFF: at 08:38

## 2020-03-06 RX ADMIN — HYDROXYZINE HYDROCHLORIDE 25 MG: 25 TABLET, FILM COATED ORAL at 14:03

## 2020-03-06 RX ADMIN — ISOSORBIDE MONONITRATE 30 MG: 30 TABLET, EXTENDED RELEASE ORAL at 14:04

## 2020-03-06 RX ADMIN — ONDANSETRON 4 MG: 2 INJECTION INTRAMUSCULAR; INTRAVENOUS at 08:41

## 2020-03-06 RX ADMIN — ASPIRIN 81 MG: 81 TABLET, COATED ORAL at 14:02

## 2020-03-06 RX ADMIN — METOPROLOL TARTRATE 100 MG: 100 TABLET ORAL at 08:38

## 2020-03-06 RX ADMIN — PROPOFOL 50 MG: 10 INJECTION, EMULSION INTRAVENOUS at 12:11

## 2020-03-06 RX ADMIN — LIDOCAINE HYDROCHLORIDE 50 MG: 10 INJECTION, SOLUTION EPIDURAL; INFILTRATION; INTRACAUDAL; PERINEURAL at 12:09

## 2020-03-06 RX ADMIN — HYDRALAZINE HYDROCHLORIDE 25 MG: 25 TABLET, FILM COATED ORAL at 14:02

## 2020-03-06 RX ADMIN — HYDROCODONE BITARTRATE AND ACETAMINOPHEN 1 TABLET: 5; 325 TABLET ORAL at 14:02

## 2020-03-06 RX ADMIN — IPRATROPIUM BROMIDE AND ALBUTEROL SULFATE 1 AMPULE: 2.5; .5 SOLUTION RESPIRATORY (INHALATION) at 08:38

## 2020-03-06 ASSESSMENT — PULMONARY FUNCTION TESTS
PIF_VALUE: 1
PIF_VALUE: 0
PIF_VALUE: 1
PIF_VALUE: 0

## 2020-03-06 ASSESSMENT — ENCOUNTER SYMPTOMS
STRIDOR: 0
SHORTNESS OF BREATH: 1

## 2020-03-06 ASSESSMENT — PAIN SCALES - GENERAL
PAINLEVEL_OUTOF10: 8
PAINLEVEL_OUTOF10: 0

## 2020-03-06 ASSESSMENT — PAIN - FUNCTIONAL ASSESSMENT: PAIN_FUNCTIONAL_ASSESSMENT: 0-10

## 2020-03-06 ASSESSMENT — PAIN DESCRIPTION - DESCRIPTORS: DESCRIPTORS: CRAMPING

## 2020-03-06 ASSESSMENT — PAIN DESCRIPTION - LOCATION: LOCATION: ABDOMEN

## 2020-03-06 NOTE — DISCHARGE SUMMARY
No fever spikes or leukocytosis. 2.  Interstitial lung disease-with hemoptysis.  Apparently bronchoscopy was planned in previous admission but could not be done.   pulmonology following. 3.  LUWAGUDKT with  epigastric discomfort, patient has lost about 30 pounds in last month.  EGD done-shows some delayed gastric emptying. 4.  Gastroparesis-noted on EGD-patient started on Reglan. 5.  Bright red bleeding per rectum- history of hemorrhoids, digital rectal exam revealed multiple skin tags.  Recent colonoscopy July 2019. Hemoglobin stable. No further interventions in this admission. 6.  Liver lesion-noted on recent imaging-MRI liver -fatty infiltration. 7.  Acute kidney injury- saline hydration. Improved. 8.  COPD on home oxygen- chronic hypoxic respiratory failure, patient continues to need oxygen by nasal cannula.  Not wheezing, no steroids prescribed. 9.  Hypertension-continue home medication. 10.  Coronary artery disease-continue home medication. 11.  Mild pulmonary hypertension-RVSP 40 mmHg per echo 12/2019. .-Home Lasix on hold due to current ARON.          Significant therapeutic interventions: EGD    Significant Diagnostic Studies:   Labs / Micro:    Lab Results   Component Value Date    WBC 4.1 03/06/2020    HGB 9.3 (L) 03/06/2020    HCT 29.1 (L) 03/06/2020    MCV 80.0 03/06/2020     (L) 03/06/2020          Lab Results   Component Value Date     03/06/2020    K 4.5 03/06/2020     03/06/2020    CO2 25 03/06/2020    BUN 12 03/06/2020    CREATININE 1.52 03/06/2020    GLUCOSE 90 03/06/2020    GLUCOSE 104 01/25/2012    CALCIUM 8.2 03/06/2020          Radiology:    Xr Chest Standard (2 Vw)    Result Date: 3/3/2020  EXAMINATION: TWO XRAY VIEWS OF THE CHEST 3/3/2020 9:31 am COMPARISON: 20 December 2019 HISTORY: ORDERING SYSTEM PROVIDED HISTORY: Pneumonia TECHNOLOGIST PROVIDED HISTORY: Pneumonia Reason for Exam: pneumonia Acuity: Unknown Type of Exam: Unknown FINDINGS: Patient is status SOCIAL WORK  IP CONSULT TO PULMONOLOGY  IP CONSULT TO GI  IP CONSULT TO PHARMACY      The patient was seen and examined on day of discharge and this discharge summary is in conjunction with any daily progress note from day of discharge. Discharge plan:     Disposition: Home    Instructions to Patient: Follow-up with pulmonology as well as gastroenterology as recommended. Requiring Further Evaluation/Follow Up POST HOSPITALIZATION/Incidental Findings:    Physician Follow Up: RUTH Mcmillan [facility] [place] [address]  [phone]    Go on 3/9/2020  [facility] Follow up, Appointment Time at 2;15p.m. If unable to keep appt, please call to reschedule. Josef Hensley, [address] [phone]             Diet: Low-fat low spice. Activity: As tolerated    Discharge Medications:      Medication List      START taking these medications    amoxicillin-clavulanate 875-125 MG per tablet  Commonly known as:   Augmentin  Take 1 tablet by mouth 2 times daily for 7 days     metoclopramide 10 MG tablet  Commonly known as:  REGLAN  Take 1 tablet by mouth 3 times daily (before meals)        CONTINUE taking these medications    acetaminophen 325 MG tablet  Commonly known as:  Tylenol  Take 2 tablets by mouth every 6 hours as needed for Pain     albuterol sulfate  (90 Base) MCG/ACT inhaler  inhale 2 puffs by mouth every 6 hours if needed for wheezing     aspirin 81 MG EC tablet  Take 1 tablet by mouth daily     atorvastatin 20 MG tablet  Commonly known as:  LIPITOR  take 1 tablet by mouth at bedtime     Breo Ellipta 200-25 MCG/INH Aepb inhaler  Generic drug:  Fluticasone furoate-vilanterol     cloNIDine 0.2 MG tablet  Commonly known as:  CATAPRES  take 1 tablet by mouth twice a day     DULoxetine 60 MG extended release capsule  Commonly known as:  CYMBALTA  take 1 capsule by mouth twice a day     furosemide 40 MG tablet  Commonly known as:  LASIX  Take 1 tablet by

## 2020-03-06 NOTE — PROGRESS NOTES
Nutrition Assessment    Type and Reason for Visit: Reassess    Nutrition Recommendations: Continue Dental Soft diet and Magic Cup 2x/day. Nutrition Assessment: Patient was NPO for EGD this morning with large amount of retained food found in stomach. GI physician will start Reglan. Continue Dental Soft diet and Magic Cup 2x/day. Malnutrition Assessment:  · Malnutrition Status: Mild Malnutrition  · Context: Acute illness or injury  · Findings of the 6 clinical characteristics of malnutrition (Minimum of 2 out of 6 clinical characteristics is required to make the diagnosis of moderate or severe Protein Calorie Malnutrition based on AND/ASPEN Guidelines):  1. Energy Intake-Less than or equal to 50% of estimated energy requirement, Greater than or equal to 1 month    2. Weight Loss-10% loss or greater, in 1 year  3. Fat Loss-Unable to assess,    4. Muscle Loss-Unable to assess,    5. Fluid Accumulation-No significant fluid accumulation, Extremities  6.  Strength-Not measured    Nutrition Risk Level: Moderate    Nutrient Needs:  · Estimated Daily Total Kcal: 4288-1955 kcal based on 16-18 kcal/kg of admission weight   · Estimated Daily Protein (g): 87-94 gm of protein based on 1.2-1.3 gm/kg of ideal weight     Nutrition Diagnosis:   · Problem: Inadequate oral intake  · Etiology: related to Difficulty swallowing     Signs and symptoms:  as evidenced by Diet history of poor intake, Weight loss, GI abnormality    Objective Information:  · Nutrition-Focused Physical Findings: No edema.  Difficulty/ painful swallowing related to hernia in esophagus  · Current Nutrition Therapies:  · Oral Diet Orders: Dental Soft   · Oral Diet intake: 51-75%, %  · Oral Nutrition Supplement (ONS) Orders: Frozen Oral Supplement  · Anthropometric Measures:  · Ht: 5' 9\" (175.3 cm)   · Current Body Wt: 221 lb (100.2 kg)  · Admission Body Wt: 213 lb (96.6 kg)  · Usual Body Wt: 244 lb (110.7 kg)(3/15/19)  · % Weight Change:  ,
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
## 2020-03-06 NOTE — PROGRESS NOTES
CALCIUM 8.2 03/06/2020    GFRAA 58 03/06/2020    LABGLOM 48 03/06/2020     ABGs:  Lab Results   Component Value Date    PHART 7.430 12/09/2019    PO2ART 97.5 12/09/2019    LNK5YZN 32.7 12/09/2019      Lab Results   Component Value Date    MODE NOT REPORTED 12/09/2019     Ionized Calcium:  No results found for: IONCA  Magnesium:    Lab Results   Component Value Date    MG 1.4 12/11/2019      Phosphorus:  No results found for: PHOS     LIVER PROFILE   No results for input(s): AST, ALT, LIPASE, BILIDIR, BILITOT, ALKPHOS in the last 72 hours. Invalid input(s): AMYLASE,  ALB  INR No results for input(s): INR in the last 72 hours. PTT No results for input(s): APTT in the last 72 hours. BNP No results for input(s): BNP in the last 72 hours. RADIOLOGY     (See actual reports for details)    ASSESSMENT/PLAN   Principal Problem:    Pneumonia  Active Problems:    Severe recurrent major depressive disorder with psychotic features (HCC)    Chronic obstructive pulmonary disease with acute lower respiratory infection ([address]    Acute on chronic diastolic (congestive) heart failure ([facility] [address]    1. ?? hemoptysis. Improved, possibly secondary to  pneumonia  2. Right-sided patchy infiltrates, with the EGD findings raise possibility of aspiration pneumonia, negative urine for Legionella and pneumococcal, normal jones on sputum  3. Bilateral pleural effusions, history of diastolic CHF. 4.  Chronic respiratory failure with hypoxia. He is on p.r.n. home O2.  5.  COPD with chronic bronchitis. 6.  Possible LEYLA. Noted he did have a sleep study recently. 7.  Tobacco abuse around a pack a day for 40 years, quit 7 days prior to admission. 8.  Hypertension, coronary artery disease, diastolic CHF, had mild  pulmonary hypertension on most recent echo. 9.  History of anxiety, depression and ADHD. 10.  Possible chronic kidney disease.   GERD with large amount of retained food in the stomach on EGD  Diarrhea x2, no abdominal pain or leukocytosis     PLAN OF TREATMENT:    Pro-BNP at 1049, and his procalcitonin was unremarkable. Check  MRSA screen, negative  urine for Legionella and  pneumococcal.    Would favor Augmentin to cover for aspiration  Bronchodilators  needs to follow up on his sleep study results. p.r.n. nicotine gums,   Lovenox for DVT prophylaxis.   .    Electronically signed by Dick Perdomo MD on 3/6/2020 at 1:40 PM

## 2020-03-06 NOTE — CARE COORDINATION
ONGOING DISCHARGE PLAN:    Spoke with patient regarding discharge plan and patient confirms that plan is still to go home with [address]    S/P EGD - Findings:     Retropharyngeal area was grossly normal appearing     Esophagus: abnormal: REFLUXED FOOD FROM THE STOMACH  NO GROSS PATHOLOGY     Stomach:    Fundus: abnormal: LARGE AMOUNT OF RETAINED FOOD    Body: abnormal: RETAINED FOOD MUCOSA GROSSLY NORAML    Antrum: normal     Duodenum:     Descending: normal    Bulb: normal    Probable discharge home today    Remains on Iv fluids    Will continue to follow for additional discharge needs.     Electronically signed by Vane Castillo RN on 3/6/2020 at 3:18 PM
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
## 2020-03-06 NOTE — PLAN OF CARE
Problem: Falls - Risk of:  Goal: Will remain free from falls  Description  Will remain free from falls  Outcome: Met This Shift  Note:   Call light in reach. Bed in low position with wheels locked. Non-skid footwear on. Goal: Absence of physical injury  Description  Absence of physical injury  Outcome: Met This Shift     Problem: Safety:  Goal: Free from accidental physical injury  Description  Free from accidental physical injury  Outcome: Met This Shift     Problem: Pain:  Goal: Patient's pain/discomfort is manageable  Description  Patient's pain/discomfort is manageable  Outcome: Ongoing  Note:   Patient using PRN Norco and is satisfied with pain level upon reassessment. Problem: Nutrition  Goal: Optimal nutrition therapy  Outcome: Ongoing  Note:   Denies nausea/vomiting. On cardiac diet. NPO after MN for possible EGD today.

## 2020-03-07 LAB
CULTURE: ABNORMAL
DIRECT EXAM: ABNORMAL
Lab: ABNORMAL
SPECIMEN DESCRIPTION: ABNORMAL

## 2020-03-09 ENCOUNTER — CARE COORDINATION (OUTPATIENT)
Dept: CARE COORDINATION | Age: 55
End: 2020-03-09

## 2020-03-09 RX ORDER — TERBINAFINE HYDROCHLORIDE 250 MG/1
250 TABLET ORAL
COMMUNITY
Start: 2019-12-10 | End: 2020-03-17 | Stop reason: ALTCHOICE

## 2020-03-09 RX ORDER — OXYCODONE HYDROCHLORIDE AND ACETAMINOPHEN 5; 325 MG/1; MG/1
650 TABLET ORAL
COMMUNITY
Start: 2020-01-23 | End: 2020-03-09

## 2020-03-09 RX ORDER — POTASSIUM CHLORIDE 750 MG/1
20 TABLET, EXTENDED RELEASE ORAL
Status: ON HOLD | COMMUNITY
Start: 2019-12-20 | End: 2020-07-13 | Stop reason: HOSPADM

## 2020-03-09 RX ORDER — DOXYCYCLINE HYCLATE 100 MG/1
CAPSULE ORAL
COMMUNITY
Start: 2020-02-27 | End: 2020-03-17 | Stop reason: ALTCHOICE

## 2020-03-09 NOTE — CARE COORDINATION
mononitrate (IMDUR) 30 MG extended release tablet take 1 tablet by mouth once daily 1/3/20   Margie Estrada MD   metoprolol (LOPRESSOR) 100 MG tablet Take 1 tablet by mouth 2 times daily 1/2/20   Margie Estrada MD   furosemide (LASIX) 40 MG tablet Take 1 tablet by mouth daily 12/13/19   Kaelyn Ramos MD   nicotine polacrilex (NICORETTE) 2 MG gum Take 1 each by mouth as needed for Smoking cessation 12/12/19   Kaelyn Ramos MD   hydrALAZINE (APRESOLINE) 50 MG tablet Take 0.5 tablets by mouth 3 times daily 11/15/19   Margie Estrada MD   ipratropium-albuterol (DUONEB) 0.5-2.5 (3) MG/3ML SOLN nebulizer solution Inhale 3 mLs into the lungs every 4 hours as needed for Shortness of Breath 11/7/19   Margie Estrada MD   atorvastatin (LIPITOR) 20 MG tablet take 1 tablet by mouth at bedtime 11/4/19   Margie Estrada MD   cloNIDine (CATAPRES) 0.2 MG tablet take 1 tablet by mouth twice a day 9/12/19   Margie Estrada MD   Multiple Vitamin (MULTIVITAMIN) tablet Take 1 tablet by mouth daily 2/21/19   Gerard Sood PA-C   NITROSTAT 0.4 MG SL tablet Place 0.4 mg under the tongue every 5 minutes as needed for Chest pain  3/26/16   Historical Provider, MD       Future Appointments   Date Time Provider Gregg Monroy   3/12/2020 11:20 AM Jason Parada MD [address] [place] [address]   3/13/2020  1:30 PM [facility]ood [facility]-C [facility]   4/8/2020  3:45 PM [facility]     ,   Congestive Heart Failure Assessment    Are you currently restricting fluids?:  Other  Do you understand a low sodium diet?:  Yes  Do you understand how to read food labels?:  Yes  Do you salt your food before tasting it?:  No         Symptoms:          and   COPD Assessment    Does the patient understand envrionmental exposure?:  Yes  Is the patient able to verbalize Rescue vs. Long Acting medications?:  Yes  Does the patient use a space with inhaled medications?:  Yes            Symptoms:

## 2020-03-10 ENCOUNTER — TELEPHONE (OUTPATIENT)
Dept: INTERNAL MEDICINE CLINIC | Age: 55
End: 2020-03-10

## 2020-03-10 ENCOUNTER — TELEPHONE (OUTPATIENT)
Dept: GASTROENTEROLOGY | Age: 55
End: 2020-03-10

## 2020-03-10 NOTE — TELEPHONE ENCOUNTER
[facility] 45 Transitions Initial Follow Up Call    Outreach made within 2 business days of discharge: Yes    Patient: Miguel Yung Patient : 1965   MRN: [id number]  Reason for Admission: There are no discharge diagnoses documented for the most recent discharge. Discharge Date: 3/6/20       Spoke with: marcy     Discharge department/facility: [address] [place] [address] Interactive Patient Contact:  Was patient able to fill all prescriptions:   Was patient instructed to bring all medications to the follow-up visit:   Is patient taking all medications as directed in the discharge summary?    Does patient understand their discharge instructions:   Does patient have questions or concerns that need addressed prior to 7-14 day follow up office visit:     Scheduled appointment with PCP within 7-14 days    Follow Up  Future Appointments   Date Time Provider Gregg Monroy   3/12/2020 11:20 AM Ezra Hernandez MD [address] [place] [address]   3/13/2020  1:30 PM Amanda Herbert PA-C [facility]   3/18/2020 12:45 PM Francesca Chavez MD [facility]   2020  3:45 PM Krista Harris MD [address] [place] [address]

## 2020-03-12 ENCOUNTER — OFFICE VISIT (OUTPATIENT)
Dept: UROLOGY | Age: 55
End: 2020-03-12
Payer: COMMERCIAL

## 2020-03-12 VITALS
WEIGHT: 209.4 LBS | DIASTOLIC BLOOD PRESSURE: 80 MMHG | SYSTOLIC BLOOD PRESSURE: 153 MMHG | HEIGHT: 69 IN | BODY MASS INDEX: 31.01 KG/M2 | TEMPERATURE: 98 F

## 2020-03-12 PROCEDURE — G8484 FLU IMMUNIZE NO ADMIN: HCPCS | Performed by: UROLOGY

## 2020-03-12 PROCEDURE — 3017F COLORECTAL CA SCREEN DOC REV: CPT | Performed by: UROLOGY

## 2020-03-12 PROCEDURE — 1036F TOBACCO NON-USER: CPT | Performed by: UROLOGY

## 2020-03-12 PROCEDURE — 99214 OFFICE O/P EST MOD 30 MIN: CPT | Performed by: UROLOGY

## 2020-03-12 PROCEDURE — G8417 CALC BMI ABV UP PARAM F/U: HCPCS | Performed by: UROLOGY

## 2020-03-12 PROCEDURE — G8427 DOCREV CUR MEDS BY ELIG CLIN: HCPCS | Performed by: UROLOGY

## 2020-03-12 PROCEDURE — 1111F DSCHRG MED/CURRENT MED MERGE: CPT | Performed by: UROLOGY

## 2020-03-12 RX ORDER — TAMSULOSIN HYDROCHLORIDE 0.4 MG/1
0.4 CAPSULE ORAL DAILY
Qty: 30 CAPSULE | Refills: 5 | Status: SHIPPED | OUTPATIENT
Start: 2020-03-12 | End: 2020-11-23

## 2020-03-12 NOTE — LETTER
[facility]  [facility] [place]  [facility] [address]  Dept: [phone]  Dept Fax: [phone]        3/12/20    Patient: Paola Wallace  YOB: 1965    Dear Aria Marie,    I had the pleasure of seeing one of your patients, Adilson Flores today in the office today. Below are the relevant portions of my assessment and plan of care. IMPRESSION:  1. Asymptomatic microscopic hematuria    2. Current smoker    3. Urgency incontinence    4. Nocturia    5. Benign prostatic hyperplasia without lower urinary tract symptoms        PLAN:  continue Tamsulosin    Smoking cessation    Report any blood in thye urine    Urine cytology was negative    6 month f/u with urine POCT. Have PSA done prior to next visit. .       Thank you for allowing me to participate in the care of this patient. I will keep you updated on this patient's follow up and I look forward to serving you and your patients again in the future.         Noah Pittman
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
## 2020-03-17 ENCOUNTER — OFFICE VISIT (OUTPATIENT)
Dept: INTERNAL MEDICINE CLINIC | Age: 55
End: 2020-03-17
Payer: COMMERCIAL

## 2020-03-17 VITALS
OXYGEN SATURATION: 95 % | SYSTOLIC BLOOD PRESSURE: 125 MMHG | DIASTOLIC BLOOD PRESSURE: 60 MMHG | HEIGHT: 69 IN | BODY MASS INDEX: 31.04 KG/M2 | HEART RATE: 58 BPM | WEIGHT: 209.6 LBS

## 2020-03-17 PROBLEM — N17.9 AKI (ACUTE KIDNEY INJURY) (HCC): Status: ACTIVE | Noted: 2020-03-17

## 2020-03-17 PROBLEM — R13.10 DYSPHAGIA: Status: ACTIVE | Noted: 2020-03-17

## 2020-03-17 PROBLEM — K31.84 GASTROPARESIS: Status: ACTIVE | Noted: 2020-03-17

## 2020-03-17 PROCEDURE — 99214 OFFICE O/P EST MOD 30 MIN: CPT | Performed by: PHYSICIAN ASSISTANT

## 2020-03-17 PROCEDURE — 1111F DSCHRG MED/CURRENT MED MERGE: CPT | Performed by: PHYSICIAN ASSISTANT

## 2020-03-17 RX ORDER — CYCLOBENZAPRINE HCL 5 MG
5 TABLET ORAL NIGHTLY PRN
Qty: 30 TABLET | Refills: 0 | Status: SHIPPED | OUTPATIENT
Start: 2020-03-17 | End: 2020-03-27

## 2020-03-17 RX ORDER — NITROGLYCERIN 0.4 MG/1
0.4 TABLET SUBLINGUAL EVERY 5 MIN PRN
Qty: 25 TABLET | Refills: 2 | Status: SHIPPED | OUTPATIENT
Start: 2020-03-17 | End: 2020-05-05 | Stop reason: SDUPTHER

## 2020-03-17 NOTE — PROGRESS NOTES
cervical    Chest pain    Tobacco abuse counseling    Polyp of transverse colon    Polyp of descending colon    Rectal polyp    Pneumonia due to organism    Hypomagnesemia    Pleural effusion    COPD (chronic obstructive pulmonary disease) (HCC)    CAD (coronary artery disease)    Microscopic hematuria    Acute on chronic diastolic (congestive) heart failure (HCC)    CHF (congestive heart failure), NYHA class I, acute, diastolic (HCC)    Pneumonia    Liver lesion    Mild malnutrition (HCC)    Dysphagia    Gastroparesis    ARON (acute kidney injury) (HCC)     Allergies   Allergen Reactions    Morphine Itching     Medications listed as ordered at the time of discharge from hospital   Lum French   Home Medication Instructions NÉSTOR:    Printed on:03/17/20 7506   Medication Information                      acetaminophen (TYLENOL) 325 MG tablet  Take 2 tablets by mouth every 6 hours as needed for Pain             albuterol sulfate  (90 Base) MCG/ACT inhaler  inhale 2 puffs by mouth every 6 hours if needed for wheezing             aspirin 81 MG EC tablet  Take 1 tablet by mouth daily             atorvastatin (LIPITOR) 20 MG tablet  take 1 tablet by mouth at bedtime             cloNIDine (CATAPRES) 0.2 MG tablet  take 1 tablet by mouth twice a day             cyclobenzaprine (FLEXERIL) 5 MG tablet  Take 1 tablet by mouth nightly as needed for Muscle spasms             DULoxetine (CYMBALTA) 60 MG extended release capsule  take 1 capsule by mouth twice a day             Fluticasone furoate-vilanterol (BREO ELLIPTA) 200-25 MCG/INH AEPB inhaler  Inhale 1 puff into the lungs daily             furosemide (LASIX) 40 MG tablet  Take 1 tablet by mouth daily             hydrALAZINE (APRESOLINE) 50 MG tablet  Take 0.5 tablets by mouth 3 times daily             hydrOXYzine (ATARAX) 25 MG tablet  Take 1 tablet by mouth every 8 hours             ipratropium-albuterol (DUONEB) 0.5-2.5 (3) MG/3ML SOLN (90642)      potassium chloride (KLOR-CON M) 10 MEQ extended release tablet Take 20 mEq by mouth      metoclopramide (REGLAN) 10 MG tablet Take 1 tablet by mouth 3 times daily (before meals) 120 tablet 3    Fluticasone furoate-vilanterol (BREO ELLIPTA) 200-25 MCG/INH AEPB inhaler Inhale 1 puff into the lungs daily      pantoprazole (PROTONIX) 40 MG tablet Take 1 tablet by mouth daily 90 tablet 3    hydrOXYzine (ATARAX) 25 MG tablet Take 1 tablet by mouth every 8 hours 90 tablet 2    acetaminophen (TYLENOL) 325 MG tablet Take 2 tablets by mouth every 6 hours as needed for Pain 30 tablet 0    albuterol sulfate  (90 Base) MCG/ACT inhaler inhale 2 puffs by mouth every 6 hours if needed for wheezing 18 g 5    aspirin 81 MG EC tablet Take 1 tablet by mouth daily 90 tablet 3    DULoxetine (CYMBALTA) 60 MG extended release capsule take 1 capsule by mouth twice a day 60 capsule 3    isosorbide mononitrate (IMDUR) 30 MG extended release tablet take 1 tablet by mouth once daily 60 tablet 1    metoprolol (LOPRESSOR) 100 MG tablet Take 1 tablet by mouth 2 times daily 180 tablet 3    furosemide (LASIX) 40 MG tablet Take 1 tablet by mouth daily 60 tablet 3    hydrALAZINE (APRESOLINE) 50 MG tablet Take 0.5 tablets by mouth 3 times daily 120 tablet 3    ipratropium-albuterol (DUONEB) 0.5-2.5 (3) MG/3ML SOLN nebulizer solution Inhale 3 mLs into the lungs every 4 hours as needed for Shortness of Breath 360 mL 0    atorvastatin (LIPITOR) 20 MG tablet take 1 tablet by mouth at bedtime 90 tablet 3    cloNIDine (CATAPRES) 0.2 MG tablet take 1 tablet by mouth twice a day 184 tablet 0    Multiple Vitamin (MULTIVITAMIN) tablet Take 1 tablet by mouth daily 90 tablet 3      Medications patient taking as of now reconciled against medications ordered at time of hospital discharge: Yes    Chief Complaint   Patient presents with    [facility]. . Stomach pain     Shortness of Breath adenopathy.  Bowel loops are normal   caliber.  Normal heart size.  Redemonstration of mild bilateral pleural   effusions with associated atelectasis.         Impression   1. Posterior hepatic segment 4B area is most consistent with fatty   infiltration as described the overall assessment limited by lack of IV   contrast.   2. Redemonstration of right renal simple cyst.   3. Persistent mild bilateral pleural effusions with associated atelectasis.         A comprehensive review of systems was negative except for what was noted in the HPI. Vitals:    03/17/20 0848 03/17/20 0849   BP: (!) 135/114 125/60   Site: Right Upper Arm Left Upper Arm   Position: Sitting    Cuff Size: Large Adult    Pulse: 58 58   SpO2: 95%    Weight: 209 lb 9.6 oz (95.1 kg)    Height: 5' 9.02\" (1.753 m)      Body mass index is 30.94 kg/m². Wt Readings from Last 3 Encounters:   03/17/20 209 lb 9.6 oz (95.1 kg)   03/12/20 209 lb 6.4 oz (95 kg)   03/06/20 221 lb 12.5 oz (100.6 kg)     BP Readings from Last 3 Encounters:   03/17/20 125/60   03/12/20 (!) 153/80   03/06/20 (!) 162/95      Physical Exam:  General - alert, well appearing, and in no distress  Skin - normal coloration and turgor, no rashes  Eyes - pupils equal and reactive, extraocular eye movements intact  Mouth - mucous membranes moist  Neck - supple, no significant adenopathy, no palpable masses, no bruit ruthann  Chest - faint inspiratory crackles lower lobes bilaterally, no wheezes, rales or rhonchi, symmetric air entry, no hypoxia, no respiratory distress  Heart - normal rate, regular rhythm, no murmur  Abdomen - non distended, soft, no tenderness to palpation   Back - no flank tenderness bilaterally   Neurological -alert, oriented, normal speech, no focal sensory or motor deficit  Extremities - peripheral pulses normal, no pedal edema    Assessment/Plan:  1.  Hospital discharge follow-up  [facility] discharge summary reviewed, medications reconciled   - [address]  RECONCILED W/ CURRENT OUTPATIENT MED LIST    2. Pneumonia of both lower lobes due to infectious organism [facility])  - Status post antibiotics, clinically resolved     3. Chronic obstructive pulmonary disease, unspecified COPD type [facility]  - Stable, discussed smoking cessation, follow up with pulmonologist   - Continue present management    4. Gastroesophageal reflux disease with esophagitis  - Controlled  - Continue present management    5. Dysphagia, unspecified type  6. Gastroparesis  - Started on Reglan with improvement, no extrapyramidal symptoms  - Continue present management, follow up with GI    7. ARON (acute kidney injury) ([facility]  - Basic Metabolic Panel; Future    8. Essential hypertension  - Controlled  - Continue present management    9. Coronary artery disease involving native coronary artery of native heart without angina pectoris  - Stable, no angina  - Continue present management    10. Tobacco abuse  - Emphasized the importance of cessation, risks of continued use  - Patient has cut back to a few cigarettes daily, continue towards cessation   - Continue to assess at each visit      Medical Decision Making: high complexity    Return in about 6 weeks (around 4/28/2020), earlier if needed.      RUTH Mcmillan [facility]  3/17/2020, 9:32 AM

## 2020-03-17 NOTE — PROGRESS NOTES
Hepatitis A vaccine  Aged Out    Hepatitis B vaccine  Aged Out    Hib vaccine  Aged Out    Meningococcal (ACWY) vaccine  Aged Out

## 2020-03-18 ENCOUNTER — CARE COORDINATION (OUTPATIENT)
Dept: CARE COORDINATION | Age: 55
End: 2020-03-18

## 2020-03-18 NOTE — CARE COORDINATION
Attempting to reach patient for a follow up care coordination call regarding any needs, questions or concerns. Clotilde Carbajal, [address] [facility]  Noted patient did attend his follow up with PCP yesterday.

## 2020-03-20 ENCOUNTER — TELEPHONE (OUTPATIENT)
Dept: INTERNAL MEDICINE CLINIC | Age: 55
End: 2020-03-20

## 2020-03-21 ENCOUNTER — HOSPITAL ENCOUNTER (EMERGENCY)
Age: 55
Discharge: HOME | End: 2020-03-21
Attending: EMERGENCY MEDICINE
Payer: COMMERCIAL

## 2020-03-21 ENCOUNTER — APPOINTMENT (OUTPATIENT)
Dept: GENERAL RADIOLOGY | Age: 55
End: 2020-03-21
Payer: COMMERCIAL

## 2020-03-21 VITALS
HEIGHT: 69 IN | HEART RATE: 72 BPM | SYSTOLIC BLOOD PRESSURE: 154 MMHG | DIASTOLIC BLOOD PRESSURE: 99 MMHG | OXYGEN SATURATION: 97 % | BODY MASS INDEX: 30.66 KG/M2 | WEIGHT: 207 LBS | RESPIRATION RATE: 20 BRPM | TEMPERATURE: 98.5 F

## 2020-03-21 LAB
ABSOLUTE EOS #: 0.27 K/UL (ref 0–0.4)
ABSOLUTE IMMATURE GRANULOCYTE: ABNORMAL K/UL (ref 0–0.3)
ABSOLUTE LYMPH #: 0.63 K/UL (ref 1–4.8)
ABSOLUTE MONO #: 0.27 K/UL (ref 0.1–1.3)
ALBUMIN SERPL-MCNC: 3.3 G/DL (ref 3.5–5.2)
ALBUMIN/GLOBULIN RATIO: ABNORMAL (ref 1–2.5)
ALP BLD-CCNC: 176 U/L (ref 40–129)
ALT SERPL-CCNC: 9 U/L (ref 5–41)
ANION GAP SERPL CALCULATED.3IONS-SCNC: 11 MMOL/L (ref 9–17)
AST SERPL-CCNC: 23 U/L
BASOPHILS # BLD: 1 % (ref 0–2)
BASOPHILS ABSOLUTE: 0.05 K/UL (ref 0–0.2)
BILIRUB SERPL-MCNC: 0.64 MG/DL (ref 0.3–1.2)
BUN BLDV-MCNC: 9 MG/DL (ref 6–20)
BUN/CREAT BLD: ABNORMAL (ref 9–20)
CALCIUM SERPL-MCNC: 8.2 MG/DL (ref 8.6–10.4)
CHLORIDE BLD-SCNC: 98 MMOL/L (ref 98–107)
CO2: 27 MMOL/L (ref 20–31)
CREAT SERPL-MCNC: 1.7 MG/DL (ref 0.7–1.2)
DIFFERENTIAL TYPE: ABNORMAL
EOSINOPHILS RELATIVE PERCENT: 6 % (ref 0–4)
GFR AFRICAN AMERICAN: 51 ML/MIN
GFR NON-AFRICAN AMERICAN: 42 ML/MIN
GFR SERPL CREATININE-BSD FRML MDRD: ABNORMAL ML/MIN/{1.73_M2}
GFR SERPL CREATININE-BSD FRML MDRD: ABNORMAL ML/MIN/{1.73_M2}
GLUCOSE BLD-MCNC: 102 MG/DL (ref 70–99)
HCT VFR BLD CALC: 36 % (ref 41–53)
HEMOGLOBIN: 11.7 G/DL (ref 13.5–17.5)
IMMATURE GRANULOCYTES: ABNORMAL %
INR BLD: 1
LYMPHOCYTES # BLD: 14 % (ref 24–44)
MCH RBC QN AUTO: 25.6 PG (ref 26–34)
MCHC RBC AUTO-ENTMCNC: 32.5 G/DL (ref 31–37)
MCV RBC AUTO: 78.7 FL (ref 80–100)
MONOCYTES # BLD: 6 % (ref 1–7)
MORPHOLOGY: ABNORMAL
MORPHOLOGY: ABNORMAL
NRBC AUTOMATED: ABNORMAL PER 100 WBC
PDW BLD-RTO: 17.6 % (ref 11.5–14.9)
PLATELET # BLD: 177 K/UL (ref 150–450)
PLATELET ESTIMATE: ABNORMAL
PMV BLD AUTO: 7.6 FL (ref 6–12)
POTASSIUM SERPL-SCNC: 3.7 MMOL/L (ref 3.7–5.3)
PROTHROMBIN TIME: 13 SEC (ref 11.8–14.6)
RBC # BLD: 4.58 M/UL (ref 4.5–5.9)
RBC # BLD: ABNORMAL 10*6/UL
SEG NEUTROPHILS: 73 % (ref 36–66)
SEGMENTED NEUTROPHILS ABSOLUTE COUNT: 3.28 K/UL (ref 1.3–9.1)
SODIUM BLD-SCNC: 136 MMOL/L (ref 135–144)
TOTAL PROTEIN: 7 G/DL (ref 6.4–8.3)
WBC # BLD: 4.5 K/UL (ref 3.5–11)
WBC # BLD: ABNORMAL 10*3/UL

## 2020-03-21 PROCEDURE — 80053 COMPREHEN METABOLIC PANEL: CPT

## 2020-03-21 PROCEDURE — 85025 COMPLETE CBC W/AUTO DIFF WBC: CPT

## 2020-03-21 PROCEDURE — 36415 COLL VENOUS BLD VENIPUNCTURE: CPT

## 2020-03-21 PROCEDURE — 99285 EMERGENCY DEPT VISIT HI MDM: CPT

## 2020-03-21 PROCEDURE — 6370000000 HC RX 637 (ALT 250 FOR IP): Performed by: EMERGENCY MEDICINE

## 2020-03-21 PROCEDURE — 71045 X-RAY EXAM CHEST 1 VIEW: CPT

## 2020-03-21 PROCEDURE — 85610 PROTHROMBIN TIME: CPT

## 2020-03-21 RX ORDER — LEVOFLOXACIN 750 MG/1
750 TABLET ORAL ONCE
Status: COMPLETED | OUTPATIENT
Start: 2020-03-21 | End: 2020-03-21

## 2020-03-21 RX ORDER — BUTALBITAL, ACETAMINOPHEN AND CAFFEINE 300; 40; 50 MG/1; MG/1; MG/1
1 CAPSULE ORAL ONCE
Status: COMPLETED | OUTPATIENT
Start: 2020-03-21 | End: 2020-03-21

## 2020-03-21 RX ORDER — LEVOFLOXACIN 750 MG/1
750 TABLET ORAL DAILY
Qty: 5 TABLET | Refills: 0 | Status: SHIPPED | OUTPATIENT
Start: 2020-03-21 | End: 2020-03-26

## 2020-03-21 RX ORDER — 0.9 % SODIUM CHLORIDE 0.9 %
500 INTRAVENOUS SOLUTION INTRAVENOUS ONCE
Status: DISCONTINUED | OUTPATIENT
Start: 2020-03-21 | End: 2020-03-21 | Stop reason: HOSPADM

## 2020-03-21 RX ADMIN — BUTALBITAL, ACETAMINOPHEN AND CAFFEINE 1 CAPSULE: 300; 40; 50 CAPSULE ORAL at 18:21

## 2020-03-21 RX ADMIN — LEVOFLOXACIN 750 MG: 750 TABLET, FILM COATED ORAL at 19:40

## 2020-03-21 ASSESSMENT — PAIN DESCRIPTION - PAIN TYPE: TYPE: ACUTE PAIN

## 2020-03-21 ASSESSMENT — PAIN SCALES - GENERAL: PAINLEVEL_OUTOF10: 10

## 2020-03-21 ASSESSMENT — PAIN DESCRIPTION - LOCATION: LOCATION: HEAD

## 2020-03-21 ASSESSMENT — PAIN DESCRIPTION - DESCRIPTORS: DESCRIPTORS: ACHING;SHARP

## 2020-03-21 NOTE — ED PROVIDER NOTES
medications which have NOT CHANGED    Details   NITROSTAT 0.4 MG SL tablet Place 1 tablet under the tongue every 5 minutes as needed for Chest pain, Disp-25 tablet, R-2, DAWNormal      cyclobenzaprine (FLEXERIL) 5 MG tablet Take 1 tablet by mouth nightly as needed for Muscle spasms, Disp-30 tablet, R-0Normal      tamsulosin (FLOMAX) 0.4 MG capsule Take 1 capsule by mouth daily, Disp-30 capsule, R-5Normal      tiotropium (SPIRIVA RESPIMAT) 1.25 MCG/ACT AERS inhaler tiotropium tiotropium (SPIRIVA RESPIMAT) 1.25 MCG/ACT AERS inhaler INHALE TWO PUFFS BY MOUTH ONCE A DAY 12 g 5 01/24/2019 Active 01- [address] [place] [address] ([id number])Historical Med      potassium chloride (KLOR-CON M) 10 MEQ extended release tablet Take 20 mEq by mouthHistorical Med      metoclopramide (REGLAN) 10 MG tablet Take 1 tablet by mouth 3 times daily (before meals), Disp-120 tablet, R-3Normal      Fluticasone furoate-vilanterol (BREO ELLIPTA) 200-25 MCG/INH AEPB inhaler Inhale 1 puff into the lungs dailyHistorical Med      pantoprazole (PROTONIX) 40 MG tablet Take 1 tablet by mouth daily, Disp-90 tablet, R-3Normal      hydrOXYzine (ATARAX) 25 MG tablet Take 1 tablet by mouth every 8 hours, Disp-90 tablet, R-2Normal      acetaminophen (TYLENOL) 325 MG tablet Take 2 tablets by mouth every 6 hours as needed for Pain, Disp-30 tablet, R-0Print      albuterol sulfate  (90 Base) MCG/ACT inhaler inhale 2 puffs by mouth every 6 hours if needed for wheezing, Disp-18 g, R-5Normal      aspirin 81 MG EC tablet Take 1 tablet by mouth daily, Disp-90 tablet, R-3Normal      DULoxetine (CYMBALTA) 60 MG extended release capsule take 1 capsule by mouth twice a day, Disp-60 capsule, R-3Normal      isosorbide mononitrate (IMDUR) 30 MG extended release tablet take 1 tablet by mouth once daily, Disp-60 tablet, R-1Normal      metoprolol (LOPRESSOR) 100 MG tablet Take 1 tablet by mouth 2 times daily, Disp-180 tablet, R-3Normal      furosemide

## 2020-03-22 ASSESSMENT — ENCOUNTER SYMPTOMS
PHOTOPHOBIA: 0
ABDOMINAL PAIN: 0
SHORTNESS OF BREATH: 1
COUGH: 1

## 2020-03-23 ENCOUNTER — CARE COORDINATION (OUTPATIENT)
Dept: CARE COORDINATION | Age: 55
End: 2020-03-23

## 2020-03-23 ENCOUNTER — TELEPHONE (OUTPATIENT)
Dept: OTHER | Facility: CLINIC | Age: 55
End: 2020-03-23

## 2020-03-23 NOTE — CARE COORDINATION
COVID-19 Screening Initial Follow-up Note    Patient contacted regarding COVID-19  risk. Care Transition Nurse/ Ambulatory Care Manager contacted the parent by telephone to perform post discharge assessment. Verified name and  with patient as identifiers. Provided introduction to self, and explanation of the CTN/ACM role, and reason for call due to risk factors for infection and/or exposure to COVID-19. Symptoms reviewed with patient who verbalized the following symptoms: Contraindications ED visit, pneumonia, fatigue, cough, sweating, fast breathing, \"dizziness/lightheadedness and no new/worsening symptoms       Due to Traits encounter was not routed to provider for escalation. Patient has following risk factors of: heart failure, COPD, asthma, pneumonia, and no known risk factors. CTN/ACM reviewed discharge instructions, medical action plan and red flags such as increased shortness of breath, increasing fever and signs of decompensation with patient who verbalized understanding. Discussed exposure protocols and quarantine with CDC Guidelines What to do if you are sick with coronavirus disease 2019 Patient who was given an opportunity for questions and concerns. The patient agrees to contact the [facility] exposure line [phone], local [facility] department [place] Department of Health: ([phone]) and PCP office for questions related to their healthcare. CTN/ACM provided contact information for future reference.     Reviewed and educated patient on any new and changed medications related to discharge diagnosis     Plan for follow-up call in 5-7 days based on severity of symptoms and risk factors

## 2020-03-26 ENCOUNTER — TELEPHONE (OUTPATIENT)
Dept: INTERNAL MEDICINE CLINIC | Age: 55
End: 2020-03-26

## 2020-03-26 ENCOUNTER — CARE COORDINATION (OUTPATIENT)
Dept: CARE COORDINATION | Age: 55
End: 2020-03-26

## 2020-03-26 RX ORDER — NITROGLYCERIN 0.4 MG/1
0.4 TABLET SUBLINGUAL EVERY 5 MIN PRN
Qty: 25 TABLET | Refills: 3 | Status: CANCELLED | OUTPATIENT
Start: 2020-03-26

## 2020-03-26 NOTE — TELEPHONE ENCOUNTER
Received a fax, insurance denied the Nitrostat due to not being covered. Brand name not preferred. Please advise.

## 2020-03-26 NOTE — CARE COORDINATION
RX called in patient notified of PCP recommendations. Patient notified that Nitrostat is not covered  per PCP's MA, she is working on getting a different strength covered.

## 2020-03-26 NOTE — CARE COORDINATION
Patient called stating he is having pain in his shoulder, neck,  And elbow especially when he coughs. He is finishing antibiotics in a couple days, still coughing. No fever. [facility] uses [facility] on [address]. He has tried OTC pain medication that is not helping. Please advise.

## 2020-03-27 NOTE — TELEPHONE ENCOUNTER
Pt notified medication was not covered and to give his insurance company a call to find out which medication is covered

## 2020-03-31 ENCOUNTER — CARE COORDINATION (OUTPATIENT)
Dept: CARE COORDINATION | Age: 55
End: 2020-03-31

## 2020-04-02 ENCOUNTER — CARE COORDINATION (OUTPATIENT)
Dept: CARE COORDINATION | Age: 55
End: 2020-04-02

## 2020-04-02 NOTE — CARE COORDINATION
Attempted to call pt today, there was no answer.   HC left pt a message with contact information for a return call      Plan of Care    [facility]. will follow up with pt with his medical transportation

## 2020-04-06 ENCOUNTER — CARE COORDINATION (OUTPATIENT)
Dept: CARE COORDINATION | Age: 55
End: 2020-04-06

## 2020-04-10 ENCOUNTER — CARE COORDINATION (OUTPATIENT)
Dept: CARE COORDINATION | Age: 55
End: 2020-04-10

## 2020-04-13 ENCOUNTER — CARE COORDINATION (OUTPATIENT)
Dept: CARE COORDINATION | Age: 55
End: 2020-04-13

## 2020-04-13 NOTE — CARE COORDINATION
Attempting to reach patient for a follow up care coordination call regarding any needs, questions or concerns.   Stoney Carl, [address] [facility]

## 2020-04-14 ENCOUNTER — CARE COORDINATION (OUTPATIENT)
Dept: CARE COORDINATION | Age: 55
End: 2020-04-14

## 2020-04-20 RX ORDER — CLONIDINE HYDROCHLORIDE 0.2 MG/1
TABLET ORAL
Qty: 184 TABLET | Refills: 0 | Status: SHIPPED | OUTPATIENT
Start: 2020-04-20 | End: 2020-05-26

## 2020-04-23 ENCOUNTER — CARE COORDINATION (OUTPATIENT)
Dept: CARE COORDINATION | Age: 55
End: 2020-04-23

## 2020-05-05 RX ORDER — NITROGLYCERIN 0.4 MG/1
0.4 TABLET SUBLINGUAL EVERY 5 MIN PRN
Qty: 25 TABLET | Refills: 2 | Status: SHIPPED | OUTPATIENT
Start: 2020-05-05 | End: 2020-07-09

## 2020-05-07 ENCOUNTER — CARE COORDINATION (OUTPATIENT)
Dept: CARE COORDINATION | Age: 55
End: 2020-05-07

## 2020-05-07 NOTE — CARE COORDINATION
Date Taking?  Authorizing Provider   NITROSTAT 0.4 MG SL tablet Place 1 tablet under the tongue every 5 minutes as needed for Chest pain 5/5/20   Jose Rafael Bro MD   cloNIDine (CATAPRES) 0.2 MG tablet take 1 tablet by mouth twice a day 4/20/20   Jose Rafael Bro MD   tamsulosin (FLOMAX) 0.4 MG capsule Take 1 capsule by mouth daily 3/12/20   Dilip Blount MD   tiotropium (SPIRIVA RESPIMAT) 1.25 MCG/ACT AERS inhaler tiotropium tiotropium (SPIRIVA RESPIMAT) 1.25 MCG/ACT AERS inhaler INHALE TWO PUFFS BY MOUTH ONCE A DAY 12 g 5 01/24/2019 Active 01- [address] [place] [address] ([id number]) 1/24/19   Historical Provider, MD   potassium chloride (KLOR-CON M) 10 MEQ extended release tablet Take 20 mEq by mouth 12/20/19   Historical Provider, MD   metoclopramide (REGLAN) 10 MG tablet Take 1 tablet by mouth 3 times daily (before meals) 3/6/20   Renee Plascencia MD   Fluticasone furoate-vilanterol (BREO ELLIPTA) 200-25 MCG/INH AEPB inhaler Inhale 1 puff into the lungs daily    Historical Provider, MD   pantoprazole (PROTONIX) 40 MG tablet Take 1 tablet by mouth daily 2/7/20   Jose Rafael Bro MD   hydrOXYzine (ATARAX) 25 MG tablet Take 1 tablet by mouth every 8 hours 1/29/20   Jose Rafael Bro MD   acetaminophen (TYLENOL) 325 MG tablet Take 2 tablets by mouth every 6 hours as needed for Pain 1/23/20   Queenie Fernandez DO   albuterol sulfate  (90 Base) MCG/ACT inhaler inhale 2 puffs by mouth every 6 hours if needed for wheezing 1/10/20   Jose Rafael Bro MD   aspirin 81 MG EC tablet Take 1 tablet by mouth daily 1/10/20   Jose Rafael Bro MD   DULoxetine (CYMBALTA) 60 MG extended release capsule take 1 capsule by mouth twice a day 1/3/20   Jose Rafael Bro MD   isosorbide mononitrate (IMDUR) 30 MG extended release tablet take 1 tablet by mouth once daily 1/3/20   Jose Rafael Bro MD   metoprolol (LOPRESSOR) 100 MG tablet Take 1 tablet by mouth 2 times daily 1/2/20   Jose Rafael Bro MD   furosemide (LASIX) 40 MG tablet Take 1 tablet

## 2020-05-26 ENCOUNTER — CARE COORDINATION (OUTPATIENT)
Dept: CARE COORDINATION | Age: 55
End: 2020-05-26

## 2020-05-26 RX ORDER — NITROGLYCERIN 0.4 MG/1
0.4 TABLET SUBLINGUAL EVERY 5 MIN PRN
Qty: 25 TABLET | Refills: 3 | Status: SHIPPED | OUTPATIENT
Start: 2020-05-26 | End: 2021-11-19 | Stop reason: SDUPTHER

## 2020-05-26 RX ORDER — CLONIDINE HYDROCHLORIDE 0.2 MG/1
TABLET ORAL
Qty: 90 TABLET | Refills: 1 | Status: SHIPPED | OUTPATIENT
Start: 2020-05-26 | End: 2020-08-10

## 2020-06-12 RX ORDER — ISOSORBIDE MONONITRATE 30 MG/1
TABLET, EXTENDED RELEASE ORAL
Qty: 60 TABLET | Refills: 1 | Status: SHIPPED | OUTPATIENT
Start: 2020-06-12 | End: 2021-03-05

## 2020-07-09 ENCOUNTER — HOSPITAL ENCOUNTER (INPATIENT)
Age: 55
LOS: 4 days | Discharge: HOME | DRG: 754 | End: 2020-07-13
Attending: EMERGENCY MEDICINE | Admitting: PSYCHIATRY & NEUROLOGY
Payer: COMMERCIAL

## 2020-07-09 PROBLEM — F32.9 MAJOR DEPRESSIVE DISORDER, SINGLE EPISODE: Status: ACTIVE | Noted: 2020-07-09

## 2020-07-09 LAB
SARS-COV-2, PCR: NORMAL
SARS-COV-2, RAPID: NOT DETECTED
SARS-COV-2: NORMAL
SOURCE: NORMAL

## 2020-07-09 PROCEDURE — U0002 COVID-19 LAB TEST NON-CDC: HCPCS

## 2020-07-09 PROCEDURE — 99285 EMERGENCY DEPT VISIT HI MDM: CPT

## 2020-07-09 PROCEDURE — 6370000000 HC RX 637 (ALT 250 FOR IP): Performed by: NURSE PRACTITIONER

## 2020-07-09 PROCEDURE — 1240000000 HC EMOTIONAL WELLNESS R&B

## 2020-07-09 RX ORDER — ALBUTEROL SULFATE 90 UG/1
2 AEROSOL, METERED RESPIRATORY (INHALATION) EVERY 6 HOURS PRN
Status: DISCONTINUED | OUTPATIENT
Start: 2020-07-09 | End: 2020-07-13 | Stop reason: HOSPADM

## 2020-07-09 RX ORDER — ARIPIPRAZOLE 5 MG/1
5 TABLET ORAL DAILY
Status: DISCONTINUED | OUTPATIENT
Start: 2020-07-10 | End: 2020-07-10

## 2020-07-09 RX ORDER — POLYETHYLENE GLYCOL 3350 17 G/17G
17 POWDER, FOR SOLUTION ORAL DAILY PRN
Status: DISCONTINUED | OUTPATIENT
Start: 2020-07-09 | End: 2020-07-13 | Stop reason: HOSPADM

## 2020-07-09 RX ORDER — HYDRALAZINE HYDROCHLORIDE 25 MG/1
25 TABLET, FILM COATED ORAL 3 TIMES DAILY
Status: DISCONTINUED | OUTPATIENT
Start: 2020-07-09 | End: 2020-07-13 | Stop reason: HOSPADM

## 2020-07-09 RX ORDER — ATORVASTATIN CALCIUM 20 MG/1
20 TABLET, FILM COATED ORAL NIGHTLY
Status: DISCONTINUED | OUTPATIENT
Start: 2020-07-09 | End: 2020-07-13 | Stop reason: HOSPADM

## 2020-07-09 RX ORDER — TAMSULOSIN HYDROCHLORIDE 0.4 MG/1
0.4 CAPSULE ORAL DAILY
Status: DISCONTINUED | OUTPATIENT
Start: 2020-07-10 | End: 2020-07-13 | Stop reason: HOSPADM

## 2020-07-09 RX ORDER — ARIPIPRAZOLE 5 MG/1
5 TABLET ORAL DAILY
Status: ON HOLD | COMMUNITY
End: 2020-07-13 | Stop reason: HOSPADM

## 2020-07-09 RX ORDER — HYDROXYZINE 50 MG/1
50 TABLET, FILM COATED ORAL 3 TIMES DAILY PRN
Status: DISCONTINUED | OUTPATIENT
Start: 2020-07-09 | End: 2020-07-13 | Stop reason: HOSPADM

## 2020-07-09 RX ORDER — METOPROLOL TARTRATE 50 MG/1
100 TABLET, FILM COATED ORAL 2 TIMES DAILY
Status: DISCONTINUED | OUTPATIENT
Start: 2020-07-09 | End: 2020-07-13 | Stop reason: HOSPADM

## 2020-07-09 RX ORDER — TRAZODONE HYDROCHLORIDE 100 MG/1
100 TABLET ORAL NIGHTLY PRN
Status: DISCONTINUED | OUTPATIENT
Start: 2020-07-09 | End: 2020-07-13 | Stop reason: HOSPADM

## 2020-07-09 RX ORDER — TRAZODONE HYDROCHLORIDE 100 MG/1
100 TABLET ORAL NIGHTLY PRN
Status: ON HOLD | COMMUNITY
End: 2020-12-22 | Stop reason: SDUPTHER

## 2020-07-09 RX ORDER — PANTOPRAZOLE SODIUM 40 MG/1
40 TABLET, DELAYED RELEASE ORAL DAILY
Status: DISCONTINUED | OUTPATIENT
Start: 2020-07-10 | End: 2020-07-13 | Stop reason: HOSPADM

## 2020-07-09 RX ORDER — ACETAMINOPHEN 325 MG/1
650 TABLET ORAL EVERY 4 HOURS PRN
Status: DISCONTINUED | OUTPATIENT
Start: 2020-07-09 | End: 2020-07-13 | Stop reason: HOSPADM

## 2020-07-09 RX ORDER — HYDROXYZINE HYDROCHLORIDE 25 MG/1
25 TABLET, FILM COATED ORAL 3 TIMES DAILY PRN
Status: DISCONTINUED | OUTPATIENT
Start: 2020-07-09 | End: 2020-07-09

## 2020-07-09 RX ORDER — MAGNESIUM HYDROXIDE/ALUMINUM HYDROXICE/SIMETHICONE 120; 1200; 1200 MG/30ML; MG/30ML; MG/30ML
30 SUSPENSION ORAL EVERY 6 HOURS PRN
Status: DISCONTINUED | OUTPATIENT
Start: 2020-07-09 | End: 2020-07-13 | Stop reason: HOSPADM

## 2020-07-09 RX ORDER — FUROSEMIDE 40 MG/1
40 TABLET ORAL DAILY
Status: DISCONTINUED | OUTPATIENT
Start: 2020-07-10 | End: 2020-07-10

## 2020-07-09 RX ORDER — DULOXETIN HYDROCHLORIDE 60 MG/1
120 CAPSULE, DELAYED RELEASE ORAL DAILY
Status: DISCONTINUED | OUTPATIENT
Start: 2020-07-10 | End: 2020-07-13 | Stop reason: HOSPADM

## 2020-07-09 RX ORDER — ISOSORBIDE MONONITRATE 30 MG/1
30 TABLET, EXTENDED RELEASE ORAL DAILY
Status: DISCONTINUED | OUTPATIENT
Start: 2020-07-10 | End: 2020-07-13 | Stop reason: HOSPADM

## 2020-07-09 RX ORDER — NICOTINE 21 MG/24HR
1 PATCH, TRANSDERMAL 24 HOURS TRANSDERMAL DAILY
Status: DISCONTINUED | OUTPATIENT
Start: 2020-07-10 | End: 2020-07-13 | Stop reason: HOSPADM

## 2020-07-09 RX ORDER — TRAZODONE HYDROCHLORIDE 50 MG/1
50 TABLET ORAL NIGHTLY PRN
Status: DISCONTINUED | OUTPATIENT
Start: 2020-07-09 | End: 2020-07-09

## 2020-07-09 RX ORDER — CLONIDINE HYDROCHLORIDE 0.1 MG/1
0.2 TABLET ORAL 2 TIMES DAILY
Status: DISCONTINUED | OUTPATIENT
Start: 2020-07-09 | End: 2020-07-13 | Stop reason: HOSPADM

## 2020-07-09 RX ORDER — METOCLOPRAMIDE 10 MG/1
10 TABLET ORAL
Status: DISCONTINUED | OUTPATIENT
Start: 2020-07-09 | End: 2020-07-13 | Stop reason: HOSPADM

## 2020-07-09 RX ADMIN — ATORVASTATIN CALCIUM 20 MG: 20 TABLET, FILM COATED ORAL at 21:19

## 2020-07-09 RX ADMIN — TRAZODONE HYDROCHLORIDE 100 MG: 100 TABLET ORAL at 21:19

## 2020-07-09 RX ADMIN — METOPROLOL TARTRATE 100 MG: 50 TABLET, FILM COATED ORAL at 21:19

## 2020-07-09 RX ADMIN — CLONIDINE HYDROCHLORIDE 0.2 MG: 0.1 TABLET ORAL at 21:19

## 2020-07-09 RX ADMIN — HYDRALAZINE HYDROCHLORIDE 25 MG: 25 TABLET, FILM COATED ORAL at 21:19

## 2020-07-09 RX ADMIN — ALBUTEROL SULFATE 2 PUFF: 90 AEROSOL, METERED RESPIRATORY (INHALATION) at 21:18

## 2020-07-09 ASSESSMENT — PATIENT HEALTH QUESTIONNAIRE - PHQ9: SUM OF ALL RESPONSES TO PHQ QUESTIONS 1-9: 11

## 2020-07-09 ASSESSMENT — SLEEP AND FATIGUE QUESTIONNAIRES
DO YOU HAVE DIFFICULTY SLEEPING: NO
DO YOU USE A SLEEP AID: YES
RESTFUL SLEEP: YES
DIFFICULTY FALLING ASLEEP: NO
DIFFICULTY ARISING: NO
DIFFICULTY STAYING ASLEEP: NO
AVERAGE NUMBER OF SLEEP HOURS: 8
SLEEP PATTERN: NORMAL

## 2020-07-09 ASSESSMENT — PAIN SCALES - GENERAL: PAINLEVEL_OUTOF10: 3

## 2020-07-09 ASSESSMENT — PAIN DESCRIPTION - PAIN TYPE: TYPE: CHRONIC PAIN

## 2020-07-09 ASSESSMENT — PAIN DESCRIPTION - LOCATION: LOCATION: NECK;SHOULDER

## 2020-07-09 ASSESSMENT — LIFESTYLE VARIABLES: HISTORY_ALCOHOL_USE: NO

## 2020-07-09 NOTE — PROGRESS NOTES
Medication History completed:    New medications:  Aripiprazole 5 mg tablet, take 5 mg by mouth once daily  Trazodone 100 mg tablet, take 100 mg by mouth nightly as needed for sleep    Medications discontinued:  Aspirin 81 mg EC tablet  Nicotine polacrilex 2 mg gum    Stated allergies: Pt confirmed morphine allergy, denied presence of any other known allergies    Other pertinent information: Pt unable to confirm med list. Pt utilizes a pill box and has a home nurse manage his meds. [address] to confirm meds. Unable to confirm Breo Ellipta inhaler or Duoneb nebulizer solution.     Thank you,    Dana Mackenzie, PharmD Candidate [id number]
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
## 2020-07-09 NOTE — ED NOTES
Provisional Diagnosis:   Major Depressive disorder    Psychosocial and Contextual Factors:   Patient reports he has been having severe feelings of loneliness. Patient states he has been isolating himself in his room for the last few months. C-SSRS Summary:      Patient: X  Family:   Agency:     Substance Abuse: Patient Denies. Present Suicidal Behavior:  Patient reports suicidal ideation today and yesterday in which he states he was thinking of way that would be \"quick and not be messy\" to end his life. Verbal: X    Attempt:    Past Suicidal Behavior: Patient denies past suicide attempts, but reports multiple past times of suicidal ideation. Verbal:     Attempt:      Self-Injurious/Self-Mutilation:Patient denies. Trauma Identified:  Patient denies. Protective Factors:    Patient has housing and an income (SSI). Risk Factors:    Patient has poor coping skills. Patient reports severe loneliness. Clinical Summary:    Judi Dorman is a 60 y.o. male who presents to the ED for suicidal ideation. Patient states last night and today he was thinking of things to end his life that \"would be quick and not be messy. \"   Patient states \"Nobody cares about me so I don't care. ... Nobody visits me, I don't have any friend. \"    Patient states over the last \"few months\" he has went from weighing 240 lbs, to 190 lbs, due to loss of appetite, and states none of his cloths fit him. Patient reports poor appetite. Patient reports poor daily sleep. Patient reports he has been having daily crying spells and states last night he could not stop himself from crying. Patient reports he has been isolating himself in his room for months. Patient states \"I feel tired all of the time. I mope all day. \"    Patient reports anxious racing thoughts at night. Patient reports daily feelings of hopelessness, helplessness and worthlessness.     Patient states he lives with his brother in-law, and states he has a nurse that comes to his home every Friday to help with his medications as he can not read or write. Patient states he recently began services at [facility], but states he has only had appointments over the phone so far due to COVID-19 pandemic. Level of Care Disposition:    Writer consulted with Issac Jama CNP who recommends inpatient psychiatric admission for safety and stabilization. Patient is in agreement and signed application for voluntary admission.

## 2020-07-10 ENCOUNTER — APPOINTMENT (OUTPATIENT)
Dept: ULTRASOUND IMAGING | Age: 55
DRG: 754 | End: 2020-07-10
Payer: COMMERCIAL

## 2020-07-10 PROBLEM — R45.851 DEPRESSION WITH SUICIDAL IDEATION: Status: ACTIVE | Noted: 2018-02-15

## 2020-07-10 PROBLEM — E44.0 MODERATE MALNUTRITION (HCC): Status: ACTIVE | Noted: 2020-07-10

## 2020-07-10 PROBLEM — R13.19 ESOPHAGEAL DYSPHAGIA: Status: ACTIVE | Noted: 2020-07-10

## 2020-07-10 PROBLEM — R63.4 UNINTENTIONAL WEIGHT LOSS OF 10% BODY WEIGHT WITHIN 6 MONTHS: Status: ACTIVE | Noted: 2020-07-10

## 2020-07-10 LAB
ABSOLUTE EOS #: 0.2 K/UL (ref 0–0.4)
ABSOLUTE IMMATURE GRANULOCYTE: ABNORMAL K/UL (ref 0–0.3)
ABSOLUTE LYMPH #: 0.6 K/UL (ref 1–4.8)
ABSOLUTE MONO #: 0.3 K/UL (ref 0.1–1.3)
ALBUMIN SERPL-MCNC: 3 G/DL (ref 3.5–5.2)
ALBUMIN/GLOBULIN RATIO: ABNORMAL (ref 1–2.5)
ALP BLD-CCNC: 171 U/L (ref 40–129)
ALT SERPL-CCNC: <5 U/L (ref 5–41)
ANION GAP SERPL CALCULATED.3IONS-SCNC: 10 MMOL/L (ref 9–17)
AST SERPL-CCNC: 10 U/L
BASOPHILS # BLD: 2 % (ref 0–2)
BASOPHILS ABSOLUTE: 0.1 K/UL (ref 0–0.2)
BILIRUB SERPL-MCNC: 0.67 MG/DL (ref 0.3–1.2)
BUN BLDV-MCNC: 21 MG/DL (ref 6–20)
BUN/CREAT BLD: ABNORMAL (ref 9–20)
CALCIUM SERPL-MCNC: 8.3 MG/DL (ref 8.6–10.4)
CHLORIDE BLD-SCNC: 102 MMOL/L (ref 98–107)
CHOLESTEROL/HDL RATIO: 7.8
CHOLESTEROL: 141 MG/DL
CO2: 25 MMOL/L (ref 20–31)
CREAT SERPL-MCNC: 2.42 MG/DL (ref 0.7–1.2)
DIFFERENTIAL TYPE: ABNORMAL
EOSINOPHILS RELATIVE PERCENT: 6 % (ref 0–4)
ESTIMATED AVERAGE GLUCOSE: 100 MG/DL
GFR AFRICAN AMERICAN: 34 ML/MIN
GFR NON-AFRICAN AMERICAN: 28 ML/MIN
GFR SERPL CREATININE-BSD FRML MDRD: ABNORMAL ML/MIN/{1.73_M2}
GFR SERPL CREATININE-BSD FRML MDRD: ABNORMAL ML/MIN/{1.73_M2}
GLUCOSE BLD-MCNC: 105 MG/DL (ref 70–99)
HBA1C MFR BLD: 5.1 % (ref 4–6)
HCT VFR BLD CALC: 29.9 % (ref 41–53)
HDLC SERPL-MCNC: 18 MG/DL
HEMOGLOBIN: 9.7 G/DL (ref 13.5–17.5)
IMMATURE GRANULOCYTES: ABNORMAL %
LDL CHOLESTEROL: 77 MG/DL (ref 0–130)
LYMPHOCYTES # BLD: 20 % (ref 24–44)
MAGNESIUM: 1.6 MG/DL (ref 1.6–2.6)
MCH RBC QN AUTO: 24.7 PG (ref 26–34)
MCHC RBC AUTO-ENTMCNC: 32.6 G/DL (ref 31–37)
MCV RBC AUTO: 75.6 FL (ref 80–100)
MONOCYTES # BLD: 8 % (ref 1–7)
NRBC AUTOMATED: ABNORMAL PER 100 WBC
PDW BLD-RTO: 18.9 % (ref 11.5–14.9)
PLATELET # BLD: 140 K/UL (ref 150–450)
PLATELET ESTIMATE: ABNORMAL
PMV BLD AUTO: 7.6 FL (ref 6–12)
POTASSIUM SERPL-SCNC: 3.2 MMOL/L (ref 3.7–5.3)
RBC # BLD: 3.95 M/UL (ref 4.5–5.9)
RBC # BLD: ABNORMAL 10*6/UL
SEG NEUTROPHILS: 64 % (ref 36–66)
SEGMENTED NEUTROPHILS ABSOLUTE COUNT: 2 K/UL (ref 1.3–9.1)
SODIUM BLD-SCNC: 137 MMOL/L (ref 135–144)
THYROXINE, FREE: 1.28 NG/DL (ref 0.93–1.7)
TOTAL PROTEIN: 6.7 G/DL (ref 6.4–8.3)
TRIGL SERPL-MCNC: 229 MG/DL
TSH SERPL DL<=0.05 MIU/L-ACNC: 2.82 MIU/L (ref 0.3–5)
VLDLC SERPL CALC-MCNC: ABNORMAL MG/DL (ref 1–30)
WBC # BLD: 3 K/UL (ref 3.5–11)
WBC # BLD: ABNORMAL 10*3/UL

## 2020-07-10 PROCEDURE — 76775 US EXAM ABDO BACK WALL LIM: CPT

## 2020-07-10 PROCEDURE — 83036 HEMOGLOBIN GLYCOSYLATED A1C: CPT

## 2020-07-10 PROCEDURE — 6370000000 HC RX 637 (ALT 250 FOR IP): Performed by: NURSE PRACTITIONER

## 2020-07-10 PROCEDURE — 36415 COLL VENOUS BLD VENIPUNCTURE: CPT

## 2020-07-10 PROCEDURE — 85025 COMPLETE CBC W/AUTO DIFF WBC: CPT

## 2020-07-10 PROCEDURE — 1240000000 HC EMOTIONAL WELLNESS R&B

## 2020-07-10 PROCEDURE — 99222 1ST HOSP IP/OBS MODERATE 55: CPT | Performed by: INTERNAL MEDICINE

## 2020-07-10 PROCEDURE — 80053 COMPREHEN METABOLIC PANEL: CPT

## 2020-07-10 PROCEDURE — 6370000000 HC RX 637 (ALT 250 FOR IP): Performed by: PSYCHIATRY & NEUROLOGY

## 2020-07-10 PROCEDURE — 80061 LIPID PANEL: CPT

## 2020-07-10 PROCEDURE — 83735 ASSAY OF MAGNESIUM: CPT

## 2020-07-10 PROCEDURE — 84439 ASSAY OF FREE THYROXINE: CPT

## 2020-07-10 PROCEDURE — 84443 ASSAY THYROID STIM HORMONE: CPT

## 2020-07-10 PROCEDURE — 99223 1ST HOSP IP/OBS HIGH 75: CPT | Performed by: PSYCHIATRY & NEUROLOGY

## 2020-07-10 RX ORDER — OLANZAPINE 5 MG/1
5 TABLET ORAL NIGHTLY
Status: DISCONTINUED | OUTPATIENT
Start: 2020-07-10 | End: 2020-07-13 | Stop reason: HOSPADM

## 2020-07-10 RX ADMIN — HYDRALAZINE HYDROCHLORIDE 25 MG: 25 TABLET, FILM COATED ORAL at 15:00

## 2020-07-10 RX ADMIN — PANTOPRAZOLE SODIUM 40 MG: 40 TABLET, DELAYED RELEASE ORAL at 08:07

## 2020-07-10 RX ADMIN — TRAZODONE HYDROCHLORIDE 100 MG: 100 TABLET ORAL at 21:14

## 2020-07-10 RX ADMIN — ARIPIPRAZOLE 5 MG: 5 TABLET ORAL at 08:06

## 2020-07-10 RX ADMIN — DULOXETINE HYDROCHLORIDE 120 MG: 60 CAPSULE, DELAYED RELEASE ORAL at 08:07

## 2020-07-10 RX ADMIN — CLONIDINE HYDROCHLORIDE 0.2 MG: 0.1 TABLET ORAL at 08:06

## 2020-07-10 RX ADMIN — HYDRALAZINE HYDROCHLORIDE 25 MG: 25 TABLET, FILM COATED ORAL at 08:07

## 2020-07-10 RX ADMIN — TAMSULOSIN HYDROCHLORIDE 0.4 MG: 0.4 CAPSULE ORAL at 08:06

## 2020-07-10 RX ADMIN — FUROSEMIDE 40 MG: 40 TABLET ORAL at 08:12

## 2020-07-10 RX ADMIN — METOPROLOL TARTRATE 100 MG: 50 TABLET, FILM COATED ORAL at 08:06

## 2020-07-10 RX ADMIN — METOCLOPRAMIDE 10 MG: 10 TABLET ORAL at 12:00

## 2020-07-10 RX ADMIN — ATORVASTATIN CALCIUM 20 MG: 20 TABLET, FILM COATED ORAL at 21:14

## 2020-07-10 RX ADMIN — METOCLOPRAMIDE 10 MG: 10 TABLET ORAL at 15:30

## 2020-07-10 RX ADMIN — ISOSORBIDE MONONITRATE 30 MG: 30 TABLET, EXTENDED RELEASE ORAL at 08:12

## 2020-07-10 RX ADMIN — METOCLOPRAMIDE 10 MG: 10 TABLET ORAL at 07:30

## 2020-07-10 RX ADMIN — OLANZAPINE 5 MG: 5 TABLET, FILM COATED ORAL at 21:14

## 2020-07-10 ASSESSMENT — LIFESTYLE VARIABLES: HISTORY_ALCOHOL_USE: NO
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
## 2020-07-10 NOTE — PLAN OF CARE
Problem: Pain:  Goal: Pain level will decrease  Description: Pain level will decrease  7/10/2020 1104 by Darby Burgess  Outcome: Ongoing   Patient denies pain at this time, but is encouraged to seek out staff if that changes. Problem: Altered Mood, Depressive Behavior:  Goal: Able to verbalize and/or display a decrease in depressive symptoms  Description: Able to verbalize and/or display a decrease in depressive symptoms  7/10/2020 1104 by Darby Burgess  Outcome: Ongoing   Patient denies depressive behavior at this time. Patient is pleasant upon approach and states he is eager to start attending groups. Problem: Altered Mood, Depressive Behavior:  Goal: Ability to disclose and discuss suicidal ideas will improve  Description: Ability to disclose and discuss suicidal ideas will improve  7/10/2020 1104 by Darby Burgess  Outcome: Ongoing   Patient denies suicidal ideation at this time. Problem: Altered Mood, Depressive Behavior:  Goal: Absence of self-harm  Description: Absence of self-harm  7/10/2020 1104 by Darby Burgess  Outcome: Ongoing   Patient is absent of self-harm at this time. Patient is safety check every fifteen minutes.
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
## 2020-07-10 NOTE — GROUP NOTE
Group Therapy Note    Date: 7/10/2020    Group Start Time: 1430  Group End Time: 4210  Group Topic: Cognitive Skills    [facility]    ALIDA Guillen        Group Therapy Note    Attendees: 11/19         Patient's Goal:  To increase social interaction and to practice problem solving and communication skills. Notes: Pt participated fully in group task . Pt was able to practice problem solving and communication skills. Pt was pleasant and supportive of peers. Status After Intervention:  Improved     Participation Level:  Active Listener and Interactive     Participation Quality: Appropriate, Attentive, Sharing and Supportive        Speech:  normal        Thought Process/Content: Logical  Linear        Affective Functioning: Congruent        Mood: euthymic        Level of consciousness:  Alert, Oriented x4 and Attentive        Response to Learning: Able to verbalize current knowledge/experience, Able to verbalize/acknowledge new learning, Able to retain information and Progressing to goal        Endings: None Reported     Modes of Intervention: Education, Support, Socialization, Exploration, Clarifying and Problem-solving        Discipline Responsible: Psychoeducational Specialist        Signature:  Etat Quinonez

## 2020-07-10 NOTE — BH NOTE
Patient with complaints of left foot discomfort related to foot surgery with metal appliances applied. MD ok'd order for shoes no laces.

## 2020-07-10 NOTE — BH NOTE
Patient given tobacco quitline number 18773102737 at this time, refusing to call at this time, states \" I just dont want to quit now\"- patient given information as to the dangers of long term tobacco use. Continue to reinforce the importance of tobacco cessation.
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
## 2020-07-10 NOTE — PLAN OF CARE
Nutrition Problem:  Moderate malnutrition, In context of social or environmental circumstances  Intervention: Food and/or Nutrient Delivery: Continue current diet, Start ONS  Nutritional Goals: PO intakes are greater than 75% at meals

## 2020-07-10 NOTE — GROUP NOTE
Group Therapy Note    Date: 7/10/2020    Group Start Time: 0900  Group End Time: 0920  Group Topic: Community Meeting    ALIDA Araujo     Pt did not attend 0900 goal setting  group d/t resting in room despite staff invitation to attend. 1:1 talk time offered as alternative to group session, pt declined.        Signature:  Michelle House

## 2020-07-10 NOTE — GROUP NOTE
Group Therapy Note    Date: 7/10/2020    Group Start Time: 1100  Group End Time: 2347  Group Topic: Cognitive Skills    [facility]    Sheryle Miguel, CTRS        Group Therapy Note    Attendees: 9/20         Patient's Goal:  To increase social interaction and to practice problem solving and communication skills. Notes: Pt participated fully in group task . Pt was able to practice problem solving and communication skills. Pt was pleasant and supportive of peers. Status After Intervention:  Improved     Participation Level:  Active Listener and Interactive     Participation Quality: Appropriate, Attentive, Sharing and Supportive        Speech:  normal        Thought Process/Content: Logical  Linear        Affective Functioning: Congruent        Mood: euthymic        Level of consciousness:  Alert, Oriented x4 and Attentive        Response to Learning: Able to verbalize current knowledge/experience, Able to verbalize/acknowledge new learning, Able to retain information and Progressing to goal        Endings: None Reported     Modes of Intervention: Education, Support, Socialization, Exploration, Clarifying and Problem-solving        Discipline Responsible: Psychoeducational Specialist        Signature:  Arabella Llanes

## 2020-07-10 NOTE — GROUP NOTE
Group Therapy Note    Date: 7/10/2020    Group Start Time: 1600  Group End Time: 9523  Group Topic: Healthy [facility] [address] [place] [address] Psychiatry    Arjun Ban        Group Therapy Note    Attendees: 7/19         Patient's Goal:  To Promote Healthy living and wellness    Notes:      Status After Intervention:  Improved    Participation Level:  Active Listener    Participation Quality: Appropriate      Speech:  normal      Thought Process/Content: Logical      Affective Functioning: Congruent      Mood: normal      Level of consciousness:  Alert      Response to Learning: Able to verbalize current knowledge/experience      Endings: None Reported    Modes of Intervention: Education      Discipline Responsible: [address] [facility] [employer]      Signature:  Arjun Celaya
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
## 2020-07-10 NOTE — PLAN OF CARE
Problem: Pain:  Goal: Pain level will decrease  Description: Pain level will decrease  7/10/2020 1108 by Zena Ca LPN  Outcome: Ongoing  Note: Patient denies any discomfort and exhibits no symptoms. Problem: Pain:  Goal: Control of acute pain  Description: Control of acute pain  7/10/2020 1108 by Zena Ca LPN  Outcome: Ongoing     Problem: Pain:  Goal: Control of chronic pain  Description: Control of chronic pain  7/10/2020 1108 by Zena Ca LPN  Outcome: Ongoing     Problem: Altered Mood, Depressive Behavior:  Goal: Able to verbalize and/or display a decrease in depressive symptoms  Description: Able to verbalize and/or display a decrease in depressive symptoms  7/10/2020 1108 by Zena Ca LPN  Outcome: Ongoing  Note: Patient denies any suicidal or homicidal ideations but verbalizes feeling depressed and anxious due to lack of support and life stressors. Patient is unable to accept his current position in life and is unable to problem those issues which causes causes him to feel depressed and anxious. Tips were provided to support patient emotionally and the importance of programming and med stabilization provided.    7/10/2020 1104 by David Asa  Outcome: Ongoing  7/10/2020 0725 by ALIDA Carey  Outcome: Ongoing     Problem: Altered Mood, Depressive Behavior:  Goal: Ability to disclose and discuss suicidal ideas will improve  Description: Ability to disclose and discuss suicidal ideas will improve  7/10/2020 1108 by Zena Ca LPN  Outcome: Ongoing     Problem: Altered Mood, Depressive Behavior:  Goal: Absence of self-harm  Description: Absence of self-harm  7/10/2020 1108 by Zena Ca LPN  Outcome: Ongoing

## 2020-07-11 LAB
AFP: 1.1 UG/L
EKG ATRIAL RATE: 53 BPM
EKG P AXIS: 47 DEGREES
EKG P-R INTERVAL: 172 MS
EKG Q-T INTERVAL: 480 MS
EKG QRS DURATION: 100 MS
EKG QTC CALCULATION (BAZETT): 450 MS
EKG R AXIS: 12 DEGREES
EKG T AXIS: 58 DEGREES
EKG VENTRICULAR RATE: 53 BPM
HIV AG/AB: NONREACTIVE

## 2020-07-11 PROCEDURE — 86038 ANTINUCLEAR ANTIBODIES: CPT

## 2020-07-11 PROCEDURE — 6370000000 HC RX 637 (ALT 250 FOR IP): Performed by: PSYCHIATRY & NEUROLOGY

## 2020-07-11 PROCEDURE — 36415 COLL VENOUS BLD VENIPUNCTURE: CPT

## 2020-07-11 PROCEDURE — 6370000000 HC RX 637 (ALT 250 FOR IP): Performed by: NURSE PRACTITIONER

## 2020-07-11 PROCEDURE — 84165 PROTEIN E-PHORESIS SERUM: CPT

## 2020-07-11 PROCEDURE — 99232 SBSQ HOSP IP/OBS MODERATE 35: CPT | Performed by: NURSE PRACTITIONER

## 2020-07-11 PROCEDURE — 1240000000 HC EMOTIONAL WELLNESS R&B

## 2020-07-11 PROCEDURE — 84155 ASSAY OF PROTEIN SERUM: CPT

## 2020-07-11 PROCEDURE — 99232 SBSQ HOSP IP/OBS MODERATE 35: CPT | Performed by: INTERNAL MEDICINE

## 2020-07-11 PROCEDURE — 82105 ALPHA-FETOPROTEIN SERUM: CPT

## 2020-07-11 PROCEDURE — 93005 ELECTROCARDIOGRAM TRACING: CPT | Performed by: PSYCHIATRY & NEUROLOGY

## 2020-07-11 PROCEDURE — 87389 HIV-1 AG W/HIV-1&-2 AB AG IA: CPT

## 2020-07-11 RX ADMIN — CLONIDINE HYDROCHLORIDE 0.2 MG: 0.1 TABLET ORAL at 21:40

## 2020-07-11 RX ADMIN — ALBUTEROL SULFATE 2 PUFF: 90 AEROSOL, METERED RESPIRATORY (INHALATION) at 09:46

## 2020-07-11 RX ADMIN — HYDRALAZINE HYDROCHLORIDE 25 MG: 25 TABLET, FILM COATED ORAL at 21:40

## 2020-07-11 RX ADMIN — HYDRALAZINE HYDROCHLORIDE 25 MG: 25 TABLET, FILM COATED ORAL at 15:51

## 2020-07-11 RX ADMIN — PANTOPRAZOLE SODIUM 40 MG: 40 TABLET, DELAYED RELEASE ORAL at 08:45

## 2020-07-11 RX ADMIN — HYDRALAZINE HYDROCHLORIDE 25 MG: 25 TABLET, FILM COATED ORAL at 08:45

## 2020-07-11 RX ADMIN — METOCLOPRAMIDE 10 MG: 10 TABLET ORAL at 06:50

## 2020-07-11 RX ADMIN — ISOSORBIDE MONONITRATE 30 MG: 30 TABLET, EXTENDED RELEASE ORAL at 08:45

## 2020-07-11 RX ADMIN — ATORVASTATIN CALCIUM 20 MG: 20 TABLET, FILM COATED ORAL at 21:40

## 2020-07-11 RX ADMIN — CLONIDINE HYDROCHLORIDE 0.2 MG: 0.1 TABLET ORAL at 08:44

## 2020-07-11 RX ADMIN — DULOXETINE HYDROCHLORIDE 120 MG: 60 CAPSULE, DELAYED RELEASE ORAL at 08:45

## 2020-07-11 RX ADMIN — OLANZAPINE 5 MG: 5 TABLET, FILM COATED ORAL at 21:40

## 2020-07-11 RX ADMIN — METOPROLOL TARTRATE 100 MG: 50 TABLET, FILM COATED ORAL at 21:41

## 2020-07-11 RX ADMIN — METOPROLOL TARTRATE 100 MG: 50 TABLET, FILM COATED ORAL at 08:44

## 2020-07-11 RX ADMIN — TAMSULOSIN HYDROCHLORIDE 0.4 MG: 0.4 CAPSULE ORAL at 08:45

## 2020-07-11 RX ADMIN — ALBUTEROL SULFATE 2 PUFF: 90 AEROSOL, METERED RESPIRATORY (INHALATION) at 15:58

## 2020-07-11 RX ADMIN — TRAZODONE HYDROCHLORIDE 100 MG: 100 TABLET ORAL at 21:40

## 2020-07-11 RX ADMIN — METOCLOPRAMIDE 10 MG: 10 TABLET ORAL at 15:50

## 2020-07-11 NOTE — GROUP NOTE
Group Therapy Note    Date: 7/11/2020    Group Start Time: 1100  Group End Time: 9632  Group Topic: Group Therapy    TODD Iraheta LPN; Rubi Mujica RN        Group Therapy Note    Attendees: 4             Status After Intervention:  Improved    Participation Level:  Active Listener    Participation Quality: Appropriate and Attentive      Speech:  normal      Thought Process/Content: Logical      Affective Functioning: Flat      Mood: anxious      Level of consciousness:  Alert and Oriented x4      Response to Learning: Able to verbalize current knowledge/experience and Able to verbalize/acknowledge new learning      Endings: None Reported    Modes of Intervention: Education, Support and Socialization      Discipline Responsible: Licensed Practical Nurse      Signature:  Rubi Mujica RN

## 2020-07-11 NOTE — GROUP NOTE
Group Therapy Note    Date: 7/11/2020    Group Start Time: 0900  Group End Time: 0915  Group Topic: Community Meeting    TODD VERNON    Shruthi Garcia        Group Therapy Note    Attendees: 4/16         Patient's Goal:  To increase interpersonal interaction    Notes:      Status After Intervention:  Improved    Participation Level:  Active Listener and Interactive    Participation Quality: Appropriate, Attentive and Sharing      Speech:  normal      Thought Process/Content: Logical  Linear      Affective Functioning: Congruent      Mood: euthymic      Level of consciousness:  Alert, Oriented x4 and Attentive      Response to Learning: Able to verbalize current knowledge/experience, Able to verbalize/acknowledge new learning, Able to retain information and Progressing to goal      Endings: None Reported    Modes of Intervention: Education, Support, Socialization, Exploration, Clarifying and Problem-solving      Discipline Responsible: Psychoeducational Specialist      Signature:  Shruthi Garcia
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
## 2020-07-11 NOTE — PLAN OF CARE
Mr. Luis F Frost is seen in the dayroom Reports poor appetite and poor sleep. Out in dayroom he is social with peers and attends groups. Adequate  hygiene and is showering. Denies suicidal ideation or HI. Taking medications without  any problems. 15 minute safety rounds continue.

## 2020-07-11 NOTE — VIRTUAL HEALTH
Department of Psychiatry  Attending Progress Note  Chief Complaint: Depression with suicidal ideation     SUBJECTIVE: Met with patient via telehealth with nurse while in the treatment room. Patient reports improvement. Continues to be overwhelmed and helpless at times. Much of his difficulty currently is related to his GI distress. Is complaining of abdominal pain intermittently. Also talked about his use of oxygen at home. Will need to discuss this further and have evaluated. OBJECTIVE    Physical  /88   Pulse 53   Temp 97.5 °F (36.4 °C) (Oral)   Resp 14   Ht 5' 9\" (1.753 m)   Wt 190 lb (86.2 kg)   SpO2 96%   BMI 28.06 kg/m²      Mental Status Evaluation:  Orientation: alertness: alert   Mood:. anxious and depressed      Affect:  constricted      Appearance:  age appropriate and casually dressed   Activity:  Within Normal Limits   Gait/Posture: Normal   Speech:  normal pitch and normal volume   Thought Process:  within normal limits   Thought Content:  suicidal   Sensorium:  person, place, time/date and situation   Cognition:  grossly intact   Memory: intact   Insight:  limited   Judgment: limited   Suicidal Ideations: passive   Homicidal Ideations: Negative for homicidal ideation      Medication Side Effects: absent       Attention Span attention span and concentration were age appropriate       Labs  Recent Results (from the past 67 hour(s))   COVID-19    Collection Time: 07/09/20  3:51 PM    Specimen: Other   Result Value Ref Range    SARS-CoV-2          SARS-CoV-2, Rapid Not Detected Not Detected    Source . NASOPHARYNGEAL SWAB     SARS-CoV-2, PCR         Comprehensive Metabolic Panel w/ Reflex to MG    Collection Time: 07/10/20  6:18 AM   Result Value Ref Range    Glucose 105 (H) 70 - 99 mg/dL    BUN 21 (H) 6 - 20 mg/dL    CREATININE 2.42 (H) 0.70 - 1.20 mg/dL    Bun/Cre Ratio NOT REPORTED 9 - 20    Calcium 8.3 (L) 8.6 - 10.4 mg/dL    Sodium 137 135 - 144 mmol/L    Potassium 3.2 (L) 3.7 - 5.3 mmol/L    Chloride 102 98 - 107 mmol/L    CO2 25 20 - 31 mmol/L    Anion Gap 10 9 - 17 mmol/L    Alkaline Phosphatase 171 (H) 40 - 129 U/L    ALT <5 (L) 5 - 41 U/L    AST 10 <40 U/L    Total Bilirubin 0.67 0.3 - 1.2 mg/dL    Total Protein 6.7 6.4 - 8.3 g/dL    Alb 3.0 (L) 3.5 - 5.2 g/dL    Albumin/Globulin Ratio NOT REPORTED 1.0 - 2.5    GFR Non-African American 28 (L) >60 mL/min    GFR  34 (L) >60 mL/min    GFR Comment          GFR Staging NOT REPORTED    Hemoglobin A1c    Collection Time: 07/10/20  6:18 AM   Result Value Ref Range    Hemoglobin A1C 5.1 4.0 - 6.0 %    Estimated Avg Glucose 100 mg/dL   Lipid panel - fasting    Collection Time: 07/10/20  6:18 AM   Result Value Ref Range    Cholesterol 141 <200 mg/dL    HDL 18 (L) >40 mg/dL    LDL Cholesterol 77 0 - 130 mg/dL    Chol/HDL Ratio 7.8 (H) <5    Triglycerides 229 (H) <150 mg/dL    VLDL NOT REPORTED (H) 1 - 30 mg/dL   CBC auto differential    Collection Time: 07/10/20  6:18 AM   Result Value Ref Range    WBC 3.0 (L) 3.5 - 11.0 k/uL    RBC 3.95 (L) 4.5 - 5.9 m/uL    Hemoglobin 9.7 (L) 13.5 - 17.5 g/dL    Hematocrit 29.9 (L) 41 - 53 %    MCV 75.6 (L) 80 - 100 fL    MCH 24.7 (L) 26 - 34 pg    MCHC 32.6 31 - 37 g/dL    RDW 18.9 (H) 11.5 - 14.9 %    Platelets 347 (L) 135 - 450 k/uL    MPV 7.6 6.0 - 12.0 fL    NRBC Automated NOT REPORTED per 100 WBC    Differential Type NOT REPORTED     Immature Granulocytes NOT REPORTED 0 %    Absolute Immature Granulocyte NOT REPORTED 0.00 - 0.30 k/uL    WBC Morphology NOT REPORTED     RBC Morphology NOT REPORTED     Platelet Estimate NOT REPORTED     Seg Neutrophils 64 36 - 66 %    Lymphocytes 20 (L) 24 - 44 %    Monocytes 8 (H) 1 - 7 %    Eosinophils % 6 (H) 0 - 4 %    Basophils 2 0 - 2 %    Segs Absolute 2.00 1.3 - 9.1 k/uL    Absolute Lymph # 0.60 (L) 1.0 - 4.8 k/uL    Absolute Mono # 0.30 0.1 - 1.3 k/uL    Absolute Eos # 0.20 0.0 - 0.4 k/uL    Basophils Absolute 0.10 0.0 - 0.2 k/uL   T4, Free aluminum & magnesium hydroxide-simethicone (MAALOX) 200-200-20 MG/5ML suspension 30 mL  30 mL Oral Q6H PRN Peter White APRN - CNP        albuterol sulfate  (90 Base) MCG/ACT inhaler 2 puff  2 puff Inhalation Q6H PRN Antony Deter, APRN - CNP   2 puff at 07/11/20 0946    atorvastatin (LIPITOR) tablet 20 mg  20 mg Oral Nightly Antony Deter, APRN - CNP   20 mg at 07/10/20 2114    cloNIDine (CATAPRES) tablet 0.2 mg  0.2 mg Oral BID Antony Deter, APRN - CNP   0.2 mg at 07/11/20 0844    DULoxetine (CYMBALTA) extended release capsule 120 mg  120 mg Oral Daily Antony Deter, APRN - CNP   120 mg at 07/11/20 0845    hydrALAZINE (APRESOLINE) tablet 25 mg  25 mg Oral TID Antony Deter, APRN - CNP   25 mg at 07/11/20 0845    isosorbide mononitrate (IMDUR) extended release tablet 30 mg  30 mg Oral Daily Antony Deter, APRN - CNP   30 mg at 07/11/20 0845    metoclopramide (REGLAN) tablet 10 mg  10 mg Oral TID AC Antony Deter, APRN - CNP   10 mg at 07/11/20 0650    metoprolol tartrate (LOPRESSOR) tablet 100 mg  100 mg Oral BID Antony Deter, APRN - CNP   100 mg at 07/11/20 0844    pantoprazole (PROTONIX) tablet 40 mg  40 mg Oral Daily Antony Deter, APRN - CNP   40 mg at 07/11/20 0845    tamsulosin (FLOMAX) capsule 0.4 mg  0.4 mg Oral Daily Antony Deter, APRN - CNP   0.4 mg at 07/11/20 0845    traZODone (DESYREL) tablet 100 mg  100 mg Oral Nightly PRN Antony Deter, APRN - CNP   100 mg at 07/10/20 2114    nicotine (NICODERM CQ) 14 MG/24HR 1 patch  1 patch Transdermal Daily Antony Deter, APRN - CNP   1 patch at 07/11/20 0843         OLANZapine  5 mg Oral Nightly    atorvastatin  20 mg Oral Nightly    cloNIDine  0.2 mg Oral BID    DULoxetine  120 mg Oral Daily    hydrALAZINE  25 mg Oral TID    isosorbide mononitrate  30 mg Oral Daily    metoclopramide  10 mg Oral TID AC    metoprolol  100 mg Oral BID    pantoprazole  40 mg Oral Daily    tamsulosin  0.4 mg Oral Daily    nicotine  1 patch Transdermal Daily       ASSESSMENT  Depression with suicidal ideation     Patient's Response to Treatment: positive    PLAN  Continue olanzapine 5 mg at night  Prn Haldol 5mg and Vistaril 50mg q6hr for extreme agitation. Trazodone as ordered for insomnia  Vistaril as ordered for anxiety  Discussed with the patient risk, benefit, alternative and common side effects for the  proposed medication treatment. Patient is consenting to the treatment. Electronically signed by Yoli Howard RN, NP on 7/11/2020 at 2:06 PM.    Dragon voice recognition software used in portions of this document. Patient Location:  [address] [place] [address]    Provider Location ([facility]): [facility]  This virtual visit was conducted via interactive/real-time audio/video.

## 2020-07-11 NOTE — GROUP NOTE
Group Therapy Note    Date: 7/11/2020    Group Start Time: 1330  Group End Time: 1400  Group Topic: Healthy Living/Wellness    [facility]    Nery Carter        Group Therapy Note    Attendees: 5/17         Patient's Goal:  To increase positive coping skills     Notes:      Status After Intervention:  Improved    Participation Level:  Active Listener and Interactive    Participation Quality: Appropriate, Attentive and Sharing      Speech:  normal      Thought Process/Content: Logical  Linear      Affective Functioning: Congruent      Mood: euthymic      Level of consciousness:  Alert, Oriented x4 and Attentive      Response to Learning: Able to verbalize current knowledge/experience, Able to verbalize/acknowledge new learning, Able to retain information and Progressing to goal      Endings: None Reported    Modes of Intervention: Education, Support, Socialization, Exploration, Clarifying and Problem-solving      Discipline Responsible: Psychoeducational Specialist      Signature:  Nery Carter

## 2020-07-12 LAB
-: ABNORMAL
ABSOLUTE EOS #: 0.17 K/UL (ref 0–0.4)
ABSOLUTE IMMATURE GRANULOCYTE: ABNORMAL K/UL (ref 0–0.3)
ABSOLUTE LYMPH #: 0.73 K/UL (ref 1–4.8)
ABSOLUTE MONO #: 0.3 K/UL (ref 0.1–1.3)
AMORPHOUS: ABNORMAL
ANION GAP SERPL CALCULATED.3IONS-SCNC: 10 MMOL/L (ref 9–17)
BACTERIA: ABNORMAL
BASOPHILS # BLD: 2 % (ref 0–2)
BASOPHILS ABSOLUTE: 0.07 K/UL (ref 0–0.2)
BILIRUBIN URINE: NEGATIVE
BUN BLDV-MCNC: 18 MG/DL (ref 6–20)
BUN/CREAT BLD: ABNORMAL (ref 9–20)
CALCIUM SERPL-MCNC: 8.1 MG/DL (ref 8.6–10.4)
CASTS UA: ABNORMAL /LPF
CHLORIDE BLD-SCNC: 103 MMOL/L (ref 98–107)
CHLORIDE, UR: 26 MMOL/L
CO2: 25 MMOL/L (ref 20–31)
COLOR: YELLOW
COMMENT UA: ABNORMAL
CREAT SERPL-MCNC: 2.06 MG/DL (ref 0.7–1.2)
CREATININE URINE: 82.8 MG/DL (ref 39–259)
CRYSTALS, UA: ABNORMAL /HPF
DIFFERENTIAL TYPE: ABNORMAL
EOSINOPHILS RELATIVE PERCENT: 5 % (ref 0–4)
EPITHELIAL CELLS UA: ABNORMAL /HPF
GFR AFRICAN AMERICAN: 41 ML/MIN
GFR NON-AFRICAN AMERICAN: 33 ML/MIN
GFR SERPL CREATININE-BSD FRML MDRD: ABNORMAL ML/MIN/{1.73_M2}
GFR SERPL CREATININE-BSD FRML MDRD: ABNORMAL ML/MIN/{1.73_M2}
GLUCOSE BLD-MCNC: 90 MG/DL (ref 70–99)
GLUCOSE URINE: NEGATIVE
HCT VFR BLD CALC: 27.3 % (ref 41–53)
HEMOGLOBIN: 9.1 G/DL (ref 13.5–17.5)
IMMATURE GRANULOCYTES: ABNORMAL %
KETONES, URINE: NEGATIVE
LEUKOCYTE ESTERASE, URINE: NEGATIVE
LYMPHOCYTES # BLD: 22 % (ref 24–44)
MAGNESIUM: 1.5 MG/DL (ref 1.6–2.6)
MCH RBC QN AUTO: 25.5 PG (ref 26–34)
MCHC RBC AUTO-ENTMCNC: 33.4 G/DL (ref 31–37)
MCV RBC AUTO: 76.2 FL (ref 80–100)
MONOCYTES # BLD: 9 % (ref 1–7)
MORPHOLOGY: ABNORMAL
MORPHOLOGY: ABNORMAL
MUCUS: ABNORMAL
NITRITE, URINE: NEGATIVE
NRBC AUTOMATED: ABNORMAL PER 100 WBC
OTHER OBSERVATIONS UA: ABNORMAL
PDW BLD-RTO: 18.7 % (ref 11.5–14.9)
PH UA: 7 (ref 5–8)
PLATELET # BLD: 135 K/UL (ref 150–450)
PLATELET ESTIMATE: ABNORMAL
PMV BLD AUTO: 7.5 FL (ref 6–12)
POTASSIUM SERPL-SCNC: 3.7 MMOL/L (ref 3.7–5.3)
POTASSIUM, UR: 12.5 MMOL/L
PROTEIN UA: ABNORMAL
RBC # BLD: 3.59 M/UL (ref 4.5–5.9)
RBC # BLD: ABNORMAL 10*6/UL
RBC UA: ABNORMAL /HPF
RENAL EPITHELIAL, UA: ABNORMAL /HPF
SEG NEUTROPHILS: 62 % (ref 36–66)
SEGMENTED NEUTROPHILS ABSOLUTE COUNT: 2.03 K/UL (ref 1.3–9.1)
SODIUM BLD-SCNC: 138 MMOL/L (ref 135–144)
SODIUM,UR: 41 MMOL/L
SPECIFIC GRAVITY UA: 1.01 (ref 1–1.03)
TOTAL PROTEIN, URINE: 42 MG/DL
TRICHOMONAS: ABNORMAL
TURBIDITY: CLEAR
URINE HGB: ABNORMAL
UROBILINOGEN, URINE: ABNORMAL
WBC # BLD: 3.3 K/UL (ref 3.5–11)
WBC # BLD: ABNORMAL 10*3/UL
WBC UA: ABNORMAL /HPF
YEAST: ABNORMAL

## 2020-07-12 PROCEDURE — 6370000000 HC RX 637 (ALT 250 FOR IP): Performed by: PSYCHIATRY & NEUROLOGY

## 2020-07-12 PROCEDURE — 82436 ASSAY OF URINE CHLORIDE: CPT

## 2020-07-12 PROCEDURE — 6370000000 HC RX 637 (ALT 250 FOR IP): Performed by: NURSE PRACTITIONER

## 2020-07-12 PROCEDURE — 85025 COMPLETE CBC W/AUTO DIFF WBC: CPT

## 2020-07-12 PROCEDURE — 6370000000 HC RX 637 (ALT 250 FOR IP): Performed by: INTERNAL MEDICINE

## 2020-07-12 PROCEDURE — 84300 ASSAY OF URINE SODIUM: CPT

## 2020-07-12 PROCEDURE — 82570 ASSAY OF URINE CREATININE: CPT

## 2020-07-12 PROCEDURE — 84133 ASSAY OF URINE POTASSIUM: CPT

## 2020-07-12 PROCEDURE — 99232 SBSQ HOSP IP/OBS MODERATE 35: CPT | Performed by: INTERNAL MEDICINE

## 2020-07-12 PROCEDURE — 99232 SBSQ HOSP IP/OBS MODERATE 35: CPT | Performed by: NURSE PRACTITIONER

## 2020-07-12 PROCEDURE — 84156 ASSAY OF PROTEIN URINE: CPT

## 2020-07-12 PROCEDURE — 36415 COLL VENOUS BLD VENIPUNCTURE: CPT

## 2020-07-12 PROCEDURE — 80048 BASIC METABOLIC PNL TOTAL CA: CPT

## 2020-07-12 PROCEDURE — 83735 ASSAY OF MAGNESIUM: CPT

## 2020-07-12 PROCEDURE — 83516 IMMUNOASSAY NONANTIBODY: CPT

## 2020-07-12 PROCEDURE — 1240000000 HC EMOTIONAL WELLNESS R&B

## 2020-07-12 PROCEDURE — 81001 URINALYSIS AUTO W/SCOPE: CPT

## 2020-07-12 RX ADMIN — PANTOPRAZOLE SODIUM 40 MG: 40 TABLET, DELAYED RELEASE ORAL at 08:38

## 2020-07-12 RX ADMIN — Medication 400 MG: at 20:45

## 2020-07-12 RX ADMIN — TRAZODONE HYDROCHLORIDE 100 MG: 100 TABLET ORAL at 20:49

## 2020-07-12 RX ADMIN — HYDROXYZINE HYDROCHLORIDE 50 MG: 50 TABLET, FILM COATED ORAL at 20:43

## 2020-07-12 RX ADMIN — OLANZAPINE 5 MG: 5 TABLET, FILM COATED ORAL at 20:50

## 2020-07-12 RX ADMIN — METOPROLOL TARTRATE 100 MG: 50 TABLET, FILM COATED ORAL at 20:43

## 2020-07-12 RX ADMIN — HYDRALAZINE HYDROCHLORIDE 25 MG: 25 TABLET, FILM COATED ORAL at 20:45

## 2020-07-12 RX ADMIN — ISOSORBIDE MONONITRATE 30 MG: 30 TABLET, EXTENDED RELEASE ORAL at 08:38

## 2020-07-12 RX ADMIN — HYDRALAZINE HYDROCHLORIDE 25 MG: 25 TABLET, FILM COATED ORAL at 14:16

## 2020-07-12 RX ADMIN — CLONIDINE HYDROCHLORIDE 0.2 MG: 0.1 TABLET ORAL at 08:38

## 2020-07-12 RX ADMIN — DULOXETINE HYDROCHLORIDE 120 MG: 60 CAPSULE, DELAYED RELEASE ORAL at 08:37

## 2020-07-12 RX ADMIN — METOCLOPRAMIDE 10 MG: 10 TABLET ORAL at 12:43

## 2020-07-12 RX ADMIN — Medication 400 MG: at 17:12

## 2020-07-12 RX ADMIN — HYDRALAZINE HYDROCHLORIDE 25 MG: 25 TABLET, FILM COATED ORAL at 08:37

## 2020-07-12 RX ADMIN — METOCLOPRAMIDE 10 MG: 10 TABLET ORAL at 06:16

## 2020-07-12 RX ADMIN — ALBUTEROL SULFATE 2 PUFF: 90 AEROSOL, METERED RESPIRATORY (INHALATION) at 11:17

## 2020-07-12 RX ADMIN — TAMSULOSIN HYDROCHLORIDE 0.4 MG: 0.4 CAPSULE ORAL at 08:38

## 2020-07-12 RX ADMIN — CLONIDINE HYDROCHLORIDE 0.2 MG: 0.1 TABLET ORAL at 20:44

## 2020-07-12 RX ADMIN — METOCLOPRAMIDE 10 MG: 10 TABLET ORAL at 17:13

## 2020-07-12 RX ADMIN — ATORVASTATIN CALCIUM 20 MG: 20 TABLET, FILM COATED ORAL at 20:49

## 2020-07-12 ASSESSMENT — PAIN SCALES - GENERAL: PAINLEVEL_OUTOF10: 6

## 2020-07-12 ASSESSMENT — PAIN DESCRIPTION - ORIENTATION: ORIENTATION: RIGHT

## 2020-07-12 ASSESSMENT — PAIN DESCRIPTION - LOCATION: LOCATION: SHOULDER
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
## 2020-07-12 NOTE — BH NOTE
SAFETY DRILL>  Safety group completed. Unit areas and all room checked for food/trash/contraband. No safety issues noted. Food removed from rooms. Pt. Encouraged to address and safety concerns to staff.

## 2020-07-12 NOTE — PLAN OF CARE
Problem: Altered Mood, Depressive Behavior:  Goal: Able to verbalize and/or display a decrease in depressive symptoms  Description: Able to verbalize and/or display a decrease in depressive symptoms  Outcome: Ongoing   Patient denies depression but presents with depressive symptoms such as flat affect and isolative behaviors. Will continue to offer and provide support as needed as well as maintain Q15 minute checks for safety. Problem: Altered Mood, Depressive Behavior:  Goal: Ability to disclose and discuss suicidal ideas will improve  Description: Ability to disclose and discuss suicidal ideas will improve  Outcome: Ongoing   Patient denies suicidal ideations. Agreeable to talking with staff if thoughts to harm self arise. Q15 minute checks maintained for safety. Problem: Altered Mood, Depressive Behavior:  Goal: Absence of self-harm  Description: Absence of self-harm  Outcome: Ongoing   Safe environment maintained and patient remains free from self-harm. Agreeable to seeking staff should thoughts to harm self or others arise. Q15 minute checks for safety.

## 2020-07-12 NOTE — GROUP NOTE
Group Therapy Note    Date: 7/12/2020    Group Start Time: 1330  Group End Time: 0014  Group Topic: Cognitive Skills    [facility]    Ericka Benavides        Group Therapy Note    Attendees: 13/20         Patient's Goal: To increase interpersonal interaction      Notes:      Status After Intervention:  Improved    Participation Level:  Active Listener and Interactive    Participation Quality: Appropriate, Attentive and Sharing      Speech:  normal      Thought Process/Content: Logical  Linear      Affective Functioning: Congruent      Mood: euthymic      Level of consciousness:  Alert, Oriented x4 and Attentive      Response to Learning: Able to verbalize current knowledge/experience, Able to verbalize/acknowledge new learning, Able to retain information and Progressing to goal      Endings: None Reported    Modes of Intervention: Education, Support, Socialization, Exploration, Clarifying and Problem-solving      Discipline Responsible: Psychoeducational Specialist      Signature:  Ericka Benavides

## 2020-07-12 NOTE — VIRTUAL HEALTH
Department of Psychiatry  Attending Progress Note  Chief Complaint: Depression with suicidal ideation     SUBJECTIVE: Met with patient via telehealth with nurse while in the patient's bedroom. Endorses that he is feeling better and depressive symptoms have started to alleviate. Has been going to groups and has learned about some ways to cope better with his stressors. Biggest difficulty with is with poor appetite as he is having some difficulty with swallowing. Denies any auditory visual hallucinations. OBJECTIVE    Physical  /65   Pulse 54   Temp 97.9 °F (36.6 °C) (Oral)   Resp 14   Ht 5' 9\" (1.753 m)   Wt 190 lb (86.2 kg)   SpO2 96%   BMI 28.06 kg/m²      Mental Status Evaluation:  Orientation: alertness: alert   Mood:. anxious and depressed      Affect:  constricted      Appearance:  age appropriate and casually dressed   Activity:  Within Normal Limits   Gait/Posture: Normal   Speech:  normal pitch and normal volume   Thought Process:  within normal limits   Thought Content:  suicidal   Sensorium:  person, place, time/date and situation   Cognition:  grossly intact   Memory: intact   Insight:  limited   Judgment: limited   Suicidal Ideations: passive   Homicidal Ideations: Negative for homicidal ideation      Medication Side Effects: absent       Attention Span attention span and concentration were age appropriate       Labs  Recent Results (from the past 67 hour(s))   COVID-19    Collection Time: 07/09/20  3:51 PM    Specimen: Other   Result Value Ref Range    SARS-CoV-2          SARS-CoV-2, Rapid Not Detected Not Detected    Source . NASOPHARYNGEAL SWAB     SARS-CoV-2, PCR         Comprehensive Metabolic Panel w/ Reflex to MG    Collection Time: 07/10/20  6:18 AM   Result Value Ref Range    Glucose 105 (H) 70 - 99 mg/dL    BUN 21 (H) 6 - 20 mg/dL    CREATININE 2.42 (H) 0.70 - 1.20 mg/dL    Bun/Cre Ratio NOT REPORTED 9 - 20    Calcium 8.3 (L) 8.6 - 10.4 mg/dL    Sodium 137 135 - 144 mmol/L Potassium 3.2 (L) 3.7 - 5.3 mmol/L    Chloride 102 98 - 107 mmol/L    CO2 25 20 - 31 mmol/L    Anion Gap 10 9 - 17 mmol/L    Alkaline Phosphatase 171 (H) 40 - 129 U/L    ALT <5 (L) 5 - 41 U/L    AST 10 <40 U/L    Total Bilirubin 0.67 0.3 - 1.2 mg/dL    Total Protein 6.7 6.4 - 8.3 g/dL    Alb 3.0 (L) 3.5 - 5.2 g/dL    Albumin/Globulin Ratio NOT REPORTED 1.0 - 2.5    GFR Non-African American 28 (L) >60 mL/min    GFR  34 (L) >60 mL/min    GFR Comment          GFR Staging NOT REPORTED    Hemoglobin A1c    Collection Time: 07/10/20  6:18 AM   Result Value Ref Range    Hemoglobin A1C 5.1 4.0 - 6.0 %    Estimated Avg Glucose 100 mg/dL   Lipid panel - fasting    Collection Time: 07/10/20  6:18 AM   Result Value Ref Range    Cholesterol 141 <200 mg/dL    HDL 18 (L) >40 mg/dL    LDL Cholesterol 77 0 - 130 mg/dL    Chol/HDL Ratio 7.8 (H) <5    Triglycerides 229 (H) <150 mg/dL    VLDL NOT REPORTED (H) 1 - 30 mg/dL   CBC auto differential    Collection Time: 07/10/20  6:18 AM   Result Value Ref Range    WBC 3.0 (L) 3.5 - 11.0 k/uL    RBC 3.95 (L) 4.5 - 5.9 m/uL    Hemoglobin 9.7 (L) 13.5 - 17.5 g/dL    Hematocrit 29.9 (L) 41 - 53 %    MCV 75.6 (L) 80 - 100 fL    MCH 24.7 (L) 26 - 34 pg    MCHC 32.6 31 - 37 g/dL    RDW 18.9 (H) 11.5 - 14.9 %    Platelets 236 (L) 918 - 450 k/uL    MPV 7.6 6.0 - 12.0 fL    NRBC Automated NOT REPORTED per 100 WBC    Differential Type NOT REPORTED     Immature Granulocytes NOT REPORTED 0 %    Absolute Immature Granulocyte NOT REPORTED 0.00 - 0.30 k/uL    WBC Morphology NOT REPORTED     RBC Morphology NOT REPORTED     Platelet Estimate NOT REPORTED     Seg Neutrophils 64 36 - 66 %    Lymphocytes 20 (L) 24 - 44 %    Monocytes 8 (H) 1 - 7 %    Eosinophils % 6 (H) 0 - 4 %    Basophils 2 0 - 2 %    Segs Absolute 2.00 1.3 - 9.1 k/uL    Absolute Lymph # 0.60 (L) 1.0 - 4.8 k/uL    Absolute Mono # 0.30 0.1 - 1.3 k/uL    Absolute Eos # 0.20 0.0 - 0.4 k/uL    Basophils Absolute 0.10 0.0 - 0.2 k/uL   T4, Free    Collection Time: 07/10/20  6:18 AM   Result Value Ref Range    Thyroxine, Free 1.28 0.93 - 1.70 ng/dL   TSH without Reflex    Collection Time: 07/10/20  6:18 AM   Result Value Ref Range    TSH 2.82 0.30 - 5.00 mIU/L   Magnesium    Collection Time: 07/10/20  6:18 AM   Result Value Ref Range    Magnesium 1.6 1.6 - 2.6 mg/dL   Electrophoresis Protein, Serum without Reflex to Immunofixation    Collection Time: 07/11/20  5:38 AM   Result Value Ref Range    Total Protein 5.8 (L) 6.4 - 8.3 g/dL    Albumin (calculated) PENDING g/dL    Albumin % PENDING %    Alpha-1-Globulin PENDING g/dL    Alpha 1 % PENDING %    Alpha-2-Globulin PENDING g/dL    Alpha 2 % PENDING %    Beta Globulin PENDING g/dL    Beta Percent PENDING %    Gamma Globulin PENDING g/dL    Gamma Globulin % PENDING %    Total Prot. Sum PENDING g/dL    Total Prot.  Sum,% PENDING %    Protein Electrophoresis, Serum PENDING     Pathologist PENDING    HIV Screen    Collection Time: 07/11/20  5:38 AM   Result Value Ref Range    HIV Ag/Ab NONREACTIVE NONREACTIVE   Alpha Fetoprotein    Collection Time: 07/11/20  5:38 AM   Result Value Ref Range    AFP (Alpha Fetoprotein) 1.1 <8.4 ug/L   EKG 12 Lead    Collection Time: 07/11/20  5:53 AM   Result Value Ref Range    Ventricular Rate 53 BPM    Atrial Rate 53 BPM    P-R Interval 172 ms    QRS Duration 100 ms    Q-T Interval 480 ms    QTc Calculation (Bazett) 450 ms    P Axis 47 degrees    R Axis 12 degrees    T Axis 58 degrees   Basic Metabolic Panel    Collection Time: 07/12/20  6:23 AM   Result Value Ref Range    Glucose 90 70 - 99 mg/dL    BUN 18 6 - 20 mg/dL    CREATININE 2.06 (H) 0.70 - 1.20 mg/dL    Bun/Cre Ratio NOT REPORTED 9 - 20    Calcium 8.1 (L) 8.6 - 10.4 mg/dL    Sodium 138 135 - 144 mmol/L    Potassium 3.7 3.7 - 5.3 mmol/L    Chloride 103 98 - 107 mmol/L    CO2 25 20 - 31 mmol/L    Anion Gap 10 9 - 17 mmol/L    GFR Non-African American 33 (L) >60 mL/min    GFR  41 (L) >60 mL/min    GFR Comment          GFR Staging NOT REPORTED    CBC Auto Differential    Collection Time: 07/12/20  6:23 AM   Result Value Ref Range    WBC 3.3 (L) 3.5 - 11.0 k/uL    RBC 3.59 (L) 4.5 - 5.9 m/uL    Hemoglobin 9.1 (L) 13.5 - 17.5 g/dL    Hematocrit 27.3 (L) 41 - 53 %    MCV 76.2 (L) 80 - 100 fL    MCH 25.5 (L) 26 - 34 pg    MCHC 33.4 31 - 37 g/dL    RDW 18.7 (H) 11.5 - 14.9 %    Platelets 397 (L) 993 - 450 k/uL    MPV 7.5 6.0 - 12.0 fL    NRBC Automated NOT REPORTED per 100 WBC    Differential Type NOT REPORTED     Immature Granulocytes NOT REPORTED 0 %    Absolute Immature Granulocyte NOT REPORTED 0.00 - 0.30 k/uL    WBC Morphology NOT REPORTED     RBC Morphology NOT REPORTED     Platelet Estimate NOT REPORTED     Seg Neutrophils 62 36 - 66 %    Lymphocytes 22 (L) 24 - 44 %    Monocytes 9 (H) 1 - 7 %    Eosinophils % 5 (H) 0 - 4 %    Basophils 2 0 - 2 %    Segs Absolute 2.03 1.3 - 9.1 k/uL    Absolute Lymph # 0.73 (L) 1.0 - 4.8 k/uL    Absolute Mono # 0.30 0.1 - 1.3 k/uL    Absolute Eos # 0.17 0.0 - 0.4 k/uL    Basophils Absolute 0.07 0.0 - 0.2 k/uL    Morphology ANISOCYTOSIS PRESENT     Morphology HYPOCHROMIA PRESENT    Magnesium    Collection Time: 07/12/20  6:23 AM   Result Value Ref Range    Magnesium 1.5 (L) 1.6 - 2.6 mg/dL       Medications  Current Facility-Administered Medications   Medication Dose Route Frequency Provider Last Rate Last Dose    OLANZapine (ZYPREXA) tablet 5 mg  5 mg Oral Nightly Nik Bell MD   5 mg at 07/11/20 2140    acetaminophen (TYLENOL) tablet 650 mg  650 mg Oral Q4H PRN PJ Cooper CNP        polyethylene glycol (GLYCOLAX) packet 17 g  17 g Oral Daily PRN PJ Cooper CNP        hydrOXYzine (ATARAX) tablet 50 mg  50 mg Oral TID PRN PJ Cooper CNP        aluminum & magnesium hydroxide-simethicone (MAALOX) 200-200-20 MG/5ML suspension 30 mL  30 mL Oral Q6H PRN PJ Cooper - CNP        albuterol sulfate  (90 Base) MCG/ACT Vistaril 50mg q6hr for extreme agitation. Trazodone as ordered for insomnia  Vistaril as ordered for anxiety  Discussed with the patient risk, benefit, alternative and common side effects for the  proposed medication treatment. Patient is consenting to the treatment. Electronically signed by Jhoana Marin RN, NP on 7/12/2020 at 9:48 AM.    [address] [place] [address] voice recognition software used in portions of this document. Patient Location:  [address] [place] [address]    Provider Location ([facility]): [facility]  This virtual visit was conducted via interactive/real-time audio/video.
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
## 2020-07-12 NOTE — PROGRESS NOTES
Nephrology Progress Note    Subjective/   60y.o. year old male who we are seeing in consultation for acute kidney injury. Interval history:  Patient has increased his oral fluid intake. He denies nausea, vomiting, flank pain. Serum creatinine has improved to 2.0 mg/dL. History of Present Illness: This is a 60 y.o. male with a history of essential hypertension, coronary artery disease status post triple bypass in 2011, chronic kidney disease with a baseline creatinine of 1.6 to 1.7 mg/dL. Patient was admitted to the [facility] for depression. He reports weight loss of 50 pounds over the last 3 to 4 months. He has difficulty swallowing food and feels food is stuck in the chest.  He denies any night sweats, fever, cough, hemoptysis or hematochezia. As a result he has had diminished oral intake, loss of appetite and poor nutrition. Dysphagia is mostly to solids and he has no difficulty drinking fluids. He presented with a creatinine of 2.42 mg/dL and BUN of 21. Patient had a serum creatinine of 1.7 mg/dL on 3/21/2020 during admission in  March 2020 after presenting with hemoptysis, abdominal pain. He was managed for bilateral lobe pneumonia and interstitial lung disease. EGD done showed  delayed gastric emptying and patient was started on Reglan. Patient does describe weak stream, incomplete emptying of his bladder but denies nick gross hematuria, urgency dysuria or history of recurrent UTIs or kidney stones. Denies any skin rash, arthralgia, oral ulcers or visual symptoms   Patient uses Aleve infrequently for headaches about once every 2 weeks. There has been no recent exposure to IV contrast. There is no history  of paraprotein disease.    Home medications include tamsulosin, clonidine and hydralazine.     Objective/     Vitals:    07/11/20 1550 07/11/20 1939 07/12/20 0732 07/12/20 0836   BP: 125/80 (!) 158/86 132/65 132/65   Pulse:  70 54 54   Resp:  14 14    Temp:  98 °F (36.7 °C) 97.9 °F (36.6 °C)    TempSrc:   Oral    SpO2:       Weight:       Height:         24HR INTAKE/OUTPUT:  No intake or output data in the 24 hours ending 07/12/20 1355  Patient Vitals for the past 96 hrs (Last 3 readings):   Weight   07/09/20 1836 190 lb (86.2 kg)   07/09/20 1307 190 lb (86.2 kg)       Constitutional:  Alert, awake, no apparent distress  Cardiovascular:  S1, S2 without m/r/g  Respiratory:  CTA B without w/r/r  Abdomen: +bs, soft, nt  Ext:  LE edema    Data/  Recent Labs     07/10/20  0618 07/12/20  0623   WBC 3.0* 3.3*   HGB 9.7* 9.1*   HCT 29.9* 27.3*   MCV 75.6* 76.2*   * 135*     Recent Labs     07/10/20  0618 07/12/20  0623    138   K 3.2* 3.7    103   CO2 25 25   GLUCOSE 105* 90   MG 1.6 1.5*   BUN 21* 18   CREATININE 2.42* 2.06*   LABGLOM 28* 33*   GFRAA 34* 41*         Assessment/   Assessment:  1. Acute kidney injury, nonoliguric likely from prolonged prerenal factors related to poor oral intake and diminished fluid intake. Obstructive uropathy is a differential given his lower urinary tract symptoms. Renal ultrasound does not show any hydronephrosis or collecting system dilatation. Will evaluate for renal vasculitis given prior history of hemoptysis and microscopic hematuria in March 2020  Renal function stable today    2. Chronic kidney disease stage 3 secondary to nephrosclerosis-Baseline creatinine 1.6 to 1.7 mg/dL     3. History of dysphagia     4. Moderately severe protein energy malnutrition.     5. Benign prostatic hypertrophy on tamsulosin     6. Anemia with microcytosis-rule out iron deficiency     7. Hypomagnesemia secondary to nutritional deficiency.     8. Hypertension-resume blood pressure medications    Plan/     1. Encourage  oral fluid intake-  2. Follow  protein electrophoresis to r/o element of occult dysproteinemia. 3.Follow JANAE,ANCA, ANTI GBM and serologies. 4. Check a postvoid bladder scan  5. Start oral magnesium 400 mg twice daily  6.   Continue Flomax 0.4 mg dly        Jacinto Lenz M.D, [facility]  Nephrologist

## 2020-07-12 NOTE — PLAN OF CARE
Problem: Pain:  Goal: Pain level will decrease  Description: Pain level will decrease  Outcome: Ongoing  Note: Denies pain at this time     Problem: Pain:  Goal: Control of acute pain  Description: Control of acute pain  Outcome: Ongoing  Note: Denies pain at this time     Problem: Pain:  Goal: Control of chronic pain  Description: Control of chronic pain  Outcome: Ongoing  Note: Denies pain at this time     Problem: Altered Mood, Depressive Behavior:  Goal: Able to verbalize and/or display a decrease in depressive symptoms  Description: Able to verbalize and/or display a decrease in depressive symptoms  7/12/2020 0009 by Villa Walker RN  Outcome: Ongoing  Note: Pt denies suicidal/homicidal ideation and visual/auditory hallucinations. Isolates to room resting quietly in bed with eyes closed. Is pleasant and cooperative. Endorses anxiety and depression. 1 Medication compliant. 15 minute safety rounds maintained per protocol. Will continue to monitor     Problem: Altered Mood, Depressive Behavior:  Goal: Ability to disclose and discuss suicidal ideas will improve  Description: Ability to disclose and discuss suicidal ideas will improve  Outcome: Ongoing  Note: Pt denies suicidal/homicidal ideation and visual/auditory hallucinations. Isolates to room resting quietly in bed with eyes closed. Is pleasant and cooperative. Endorses anxiety and depression. 1 Medication compliant. 15 minute safety rounds maintained per protocol.  Will continue to monitor     Problem: Altered Mood, Depressive Behavior:  Goal: Absence of self-harm  Description: Absence of self-harm  Outcome: Ongoing  Note: Pt free from harm     Problem: Nutrition  Goal: Optimal nutrition therapy  Outcome: Ongoing

## 2020-07-12 NOTE — GROUP NOTE
Group Therapy Note    Date: 7/12/2020    Group Start Time: 0900  Group End Time: 0915  Group Topic: Community Meeting    TODD LAWSI JANEEN Goddard    Patient refused to attend community meeting/ goals group at 0900 after encouragement from staff. 1:1 talk time provided by staff.       Signature:  Michelle Goddard

## 2020-07-13 VITALS
HEIGHT: 69 IN | BODY MASS INDEX: 28.14 KG/M2 | WEIGHT: 190 LBS | OXYGEN SATURATION: 96 % | SYSTOLIC BLOOD PRESSURE: 139 MMHG | HEART RATE: 56 BPM | RESPIRATION RATE: 14 BRPM | DIASTOLIC BLOOD PRESSURE: 78 MMHG | TEMPERATURE: 98.2 F

## 2020-07-13 LAB
ANION GAP SERPL CALCULATED.3IONS-SCNC: 7 MMOL/L (ref 9–17)
ANTI-NUCLEAR ANTIBODY (ANA): NEGATIVE
BUN BLDV-MCNC: 18 MG/DL (ref 6–20)
BUN/CREAT BLD: ABNORMAL (ref 9–20)
CALCIUM SERPL-MCNC: 8.1 MG/DL (ref 8.6–10.4)
CHLORIDE BLD-SCNC: 103 MMOL/L (ref 98–107)
CO2: 27 MMOL/L (ref 20–31)
CREAT SERPL-MCNC: 2.25 MG/DL (ref 0.7–1.2)
FREE KAPPA/LAMBDA RATIO: 1.82 (ref 0.26–1.65)
GFR AFRICAN AMERICAN: 37 ML/MIN
GFR NON-AFRICAN AMERICAN: 30 ML/MIN
GFR SERPL CREATININE-BSD FRML MDRD: ABNORMAL ML/MIN/{1.73_M2}
GFR SERPL CREATININE-BSD FRML MDRD: ABNORMAL ML/MIN/{1.73_M2}
GLUCOSE BLD-MCNC: 94 MG/DL (ref 70–99)
KAPPA FREE LIGHT CHAINS QNT: 15.41 MG/DL (ref 0.37–1.94)
LAMBDA FREE LIGHT CHAINS QNT: 8.49 MG/DL (ref 0.57–2.63)
POTASSIUM SERPL-SCNC: 4.1 MMOL/L (ref 3.7–5.3)
SODIUM BLD-SCNC: 137 MMOL/L (ref 135–144)

## 2020-07-13 PROCEDURE — 99238 HOSP IP/OBS DSCHRG MGMT 30/<: CPT | Performed by: PSYCHIATRY & NEUROLOGY

## 2020-07-13 PROCEDURE — 36415 COLL VENOUS BLD VENIPUNCTURE: CPT

## 2020-07-13 PROCEDURE — 99232 SBSQ HOSP IP/OBS MODERATE 35: CPT | Performed by: INTERNAL MEDICINE

## 2020-07-13 PROCEDURE — 80048 BASIC METABOLIC PNL TOTAL CA: CPT

## 2020-07-13 PROCEDURE — 83883 ASSAY NEPHELOMETRY NOT SPEC: CPT

## 2020-07-13 PROCEDURE — 6370000000 HC RX 637 (ALT 250 FOR IP): Performed by: NURSE PRACTITIONER

## 2020-07-13 PROCEDURE — 6370000000 HC RX 637 (ALT 250 FOR IP): Performed by: INTERNAL MEDICINE

## 2020-07-13 RX ORDER — HYDROXYZINE 50 MG/1
50 TABLET, FILM COATED ORAL 3 TIMES DAILY PRN
Qty: 30 TABLET | Refills: 0 | Status: SHIPPED | OUTPATIENT
Start: 2020-07-13 | End: 2020-07-23

## 2020-07-13 RX ORDER — OLANZAPINE 5 MG/1
5 TABLET ORAL NIGHTLY
Qty: 30 TABLET | Refills: 3 | Status: ON HOLD | OUTPATIENT
Start: 2020-07-13 | End: 2022-04-16 | Stop reason: HOSPADM

## 2020-07-13 RX ADMIN — DULOXETINE HYDROCHLORIDE 120 MG: 60 CAPSULE, DELAYED RELEASE ORAL at 08:50

## 2020-07-13 RX ADMIN — TAMSULOSIN HYDROCHLORIDE 0.4 MG: 0.4 CAPSULE ORAL at 08:50

## 2020-07-13 RX ADMIN — PANTOPRAZOLE SODIUM 40 MG: 40 TABLET, DELAYED RELEASE ORAL at 08:50

## 2020-07-13 RX ADMIN — HYDRALAZINE HYDROCHLORIDE 25 MG: 25 TABLET, FILM COATED ORAL at 08:50

## 2020-07-13 RX ADMIN — METOCLOPRAMIDE 10 MG: 10 TABLET ORAL at 11:55

## 2020-07-13 RX ADMIN — CLONIDINE HYDROCHLORIDE 0.2 MG: 0.1 TABLET ORAL at 08:50

## 2020-07-13 RX ADMIN — ISOSORBIDE MONONITRATE 30 MG: 30 TABLET, EXTENDED RELEASE ORAL at 08:50

## 2020-07-13 RX ADMIN — Medication 400 MG: at 08:50

## 2020-07-13 RX ADMIN — METOCLOPRAMIDE 10 MG: 10 TABLET ORAL at 06:32

## 2020-07-13 RX ADMIN — HYDRALAZINE HYDROCHLORIDE 25 MG: 25 TABLET, FILM COATED ORAL at 12:00

## 2020-07-13 RX ADMIN — METOPROLOL TARTRATE 100 MG: 50 TABLET, FILM COATED ORAL at 08:50

## 2020-07-13 RX ADMIN — ALBUTEROL SULFATE 2 PUFF: 90 AEROSOL, METERED RESPIRATORY (INHALATION) at 08:49

## 2020-07-13 NOTE — BH NOTE
[facility]  Discharge Note    Pt discharged home with family transporting with followings belongings:   Dentures: None  Vision - Corrective Lenses: None  Hearing Aid: None  Jewelry: None  Body Piercings Removed: N/A  Clothing: Socks, Undergarments (Comment), Footwear, Pants, Shirt  Were All Patient Medications Collected?: Yes  Other Valuables: Money (Comment), Wallet, Cell phone, Other (Comment)(insurance card, [place] ID, credit cards)   Valuables sent home with patient. Valuables retrieved from safe, Security envelope number:  [id number] and [id number] and returned to patient. Patient education on aftercare instructions: on the importance of maintaining follow up care and medication compliance. Information faxed to [facility] by nurse. Patient verbalize understanding of AVS:  Via teach back method.     Status EXAM upon discharge:  Status and Exam  Normal: Yes  Facial Expression: Brightened, Flat  Affect: Appropriate  Level of Consciousness: Alert  Mood:Normal: Yes  Mood: Anxious  Motor Activity:Normal: Yes  Motor Activity: Decreased  Interview Behavior: Cooperative  Preception: [place] to Person, Terrye Christians to Time, [place] to Place, [place] to Situation  Attention:Normal: Yes  Attention: Distractible  Thought Processes: Circumstantial  Thought Content:Normal: Yes  Thought Content: Preoccupations, Poverty of Content  Hallucinations: None  Delusions: No  Memory:Normal: Yes  Memory: Poor Recent  Insight and Judgment: No  Insight and Judgment: Poor Judgment  Present Suicidal Ideation: No  Present Homicidal Ideation: No      Metabolic Screening:    Lab Results   Component Value Date    LABA1C 5.1 07/10/2020       Lab Results   Component Value Date    CHOL 141 07/10/2020    CHOL 116 05/16/2019    CHOL 151 03/10/2016    CHOL 150 06/29/2015    CHOL 122 12/22/2012    CHOL 116 01/28/2012    CHOL 161 12/18/2011     Lab Results   Component Value Date    TRIG 229 (H) 07/10/2020    TRIG 131 05/16/2019    TRIG 64 03/10/2016    TRIG 86 06/29/2015    TRIG 53 12/22/2012    TRIG 61 01/28/2012    TRIG 62 12/18/2011     Lab Results   Component Value Date    HDL 18 (L) 07/10/2020    HDL 32 (L) 05/16/2019    HDL 43 03/10/2016    HDL 29 (L) 06/29/2015    HDL 38 (L) 12/22/2012    HDL 40 01/28/2012    HDL 43 12/18/2011     No components found for: LDLCAL  No results found for: LABVLDL    Patient is calm, controlled and cooperative. Patient currently denies any depression, suicidal or homicidal ideations, and audio/visual hallucinations.   Prescriptions were electronically prescribed to [facility] on [address], RN

## 2020-07-13 NOTE — BH NOTE
Patient given tobacco quitline number [phone] at this time, refusing to call at this time, states \" I just dont want to quit now\"- patient given information as to the dangers of long term tobacco use. Continue to reinforce the importance of tobacco cessation.

## 2020-07-13 NOTE — GROUP NOTE
Group Therapy Note    Date: 7/13/2020    Group Start Time: 1100  Group End Time: 1130  Group Topic: Healthy Living/Wellness    TODD Valera, CTRS    Pt did not attend 1100 wellness group d/t resting in room despite staff invitation to attend. 1:1 talk time offered as alternative to group session, pt declined.             Signature:  Lana Escobedo
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
## 2020-07-13 NOTE — PLAN OF CARE
Problem: Altered Mood, Depressive Behavior:  Goal: Able to verbalize and/or display a decrease in depressive symptoms  Description: Able to verbalize and/or display a decrease in depressive symptoms  7/13/2020 1211 by Yanick Lugo  Outcome: Ongoing   Patient denies depressive feelings, but states anxiety regarding discharge. Problem: Altered Mood, Depressive Behavior:  Goal: Ability to disclose and discuss suicidal ideas will improve  Description: Ability to disclose and discuss suicidal ideas will improve  7/13/2020 1211 by Yanick Lugo  Outcome: Ongoing   Patient denies suicidal and homicidal ideation at this time. Problem: Altered Mood, Depressive Behavior:  Goal: Absence of self-harm  Description: Absence of self-harm  7/13/2020 1211 by Yanick Lugo  Outcome: Ongoing   Patient denies self-harm at this time.  Patient is safety check every fifteen minutes

## 2020-07-13 NOTE — CONSULTS
tablet take 1 tablet by mouth twice a day 5/26/20  Yes Rosaura Landry MD   tamsulosin (FLOMAX) 0.4 MG capsule Take 1 capsule by mouth daily 3/12/20  Yes Nakia Vick MD   metoclopramide (REGLAN) 10 MG tablet Take 1 tablet by mouth 3 times daily (before meals) 3/6/20  Yes Lord Mati MD   pantoprazole (PROTONIX) 40 MG tablet Take 1 tablet by mouth daily 2/7/20  Yes Rosaura Landry MD   hydrOXYzine (ATARAX) 25 MG tablet Take 1 tablet by mouth every 8 hours  Patient taking differently: Take 25 mg by mouth 3 times daily as needed  1/29/20  Yes Rosaura Landry MD   acetaminophen (TYLENOL) 325 MG tablet Take 2 tablets by mouth every 6 hours as needed for Pain 1/23/20  Yes Deedee Dan DO   DULoxetine (CYMBALTA) 60 MG extended release capsule take 1 capsule by mouth twice a day  Patient taking differently: Take by mouth daily 2 capsules by mouth once daily. Do not crush or break. May add contents of capsule to apple juice or apple sauce, but not chocolate. 1/3/20  Yes Rosaura Landry MD   metoprolol (LOPRESSOR) 100 MG tablet Take 1 tablet by mouth 2 times daily 1/2/20  Yes Rosaura Landry MD   furosemide (LASIX) 40 MG tablet Take 1 tablet by mouth daily 12/13/19  Yes Rodney Dale MD   hydrALAZINE (APRESOLINE) 50 MG tablet Take 0.5 tablets by mouth 3 times daily 11/15/19  Yes Rosaura Landry MD   atorvastatin (LIPITOR) 20 MG tablet take 1 tablet by mouth at bedtime 11/4/19  Yes Rosaura Landry MD   traZODone (DESYREL) 100 MG tablet Take 100 mg by mouth nightly as needed for Sleep    Historical Provider, MD   nitroGLYCERIN (NITROSTAT) 0.4 MG SL tablet Place 1 tablet under the tongue every 5 minutes as needed for Chest pain up to max of 3 total doses.  If no relief after 1 dose, call 911. 5/26/20   Rosaura Landry MD   tiotropium (SPIRIVA RESPIMAT) 2.5 MCG/ACT AERS inhaler  1/24/19   Historical Provider, MD   potassium chloride (KLOR-CON M) 10 MEQ extended release tablet Take 20 mEq by mouth 12/20/19   Historical Provider, MD   Fluticasone furoate-vilanterol (BREO ELLIPTA) 200-25 MCG/INH AEPB inhaler Inhale 1 puff into the lungs daily    Historical Provider, MD   albuterol sulfate  (90 Base) MCG/ACT inhaler inhale 2 puffs by mouth every 6 hours if needed for wheezing 1/10/20   Dioni Shankar MD   ipratropium-albuterol (DUONEB) 0.5-2.5 (3) MG/3ML SOLN nebulizer solution Inhale 3 mLs into the lungs every 4 hours as needed for Shortness of Breath 11/7/19   Dioni Shankar MD   Multiple Vitamin (MULTIVITAMIN) tablet Take 1 tablet by mouth daily 2/21/19   Christiano Fuller PA-C        Allergies:     Morphine    Social History:     Tobacco:    reports that he quit smoking about 4 months ago. His smoking use included cigarettes. He has a 18.50 pack-year smoking history. He has never used smokeless tobacco.  Alcohol:      reports no history of alcohol use. Drug Use:  reports previous drug use. Family History:     Family History   Problem Relation Age of Onset    Anxiety Disorder Sister     Depression Sister     High Blood Pressure Sister     Thyroid Disease Sister     Depression Sister     High Blood Pressure Sister     Lung Cancer Mother     Heart Disease Mother     High Blood Pressure Mother     High Blood Pressure Father     Diabetes Father     Heart Disease Father     Lung Cancer Father     Heart Disease Maternal Grandmother     Depression Brother        Review of Systems:     Positive and Negative as described in HPI. CONSTITUTIONAL:  negative for fevers, chills, sweats, fatigue, positive for 50 pound weight loss  HEENT:  negative for vision, hearing changes, runny nose, throat pain  RESPIRATORY:  negative for shortness of breath, cough, congestion, wheezing. CARDIOVASCULAR:  negative for chest pain, palpitations.   GASTROINTESTINAL: Positive for trouble swallowing no odynophagia no diarrhea  GENITOURINARY:  negative for difficulty of urination, burning with urination, frequency   INTEGUMENT:  negative for rash, skin lesions, easy bruising   HEMATOLOGIC/LYMPHATIC:  negative for swelling/edema   ALLERGIC/IMMUNOLOGIC:  negative for urticaria , itching  ENDOCRINE:  negative increase in drinking, increase in urination, hot or cold intolerance  MUSCULOSKELETAL:  negative joint pains, muscle aches, swelling of joints  NEUROLOGICAL:  negative for headaches, dizziness, lightheadedness, numbness, pain, tingling extremities      Physical Exam:     BP (!) 141/69   Pulse 71   Temp 98.2 °F (36.8 °C) (Oral)   Resp 14   Ht 5' 9\" (1.753 m)   Wt 190 lb (86.2 kg)   SpO2 96%   BMI 28.06 kg/m²   Temp (24hrs), Av.2 °F (36.8 °C), Min:98.2 °F (36.8 °C), Max:98.2 °F (36.8 °C)    No results for input(s): POCGLU in the last 72 hours. No intake or output data in the 24 hours ending 20 1023    General Appearance:  alert, well appearing, and in no acute distress  Mental status: oriented to person, place, and time with normal affect  Head:  normocephalic, atraumatic. Eye: no icterus, redness, pupils equal and reactive, extraocular eye movements intact, conjunctiva clear  Ear: normal external ear, no discharge, hearing intact  Nose:  no drainage noted  Mouth: mucous membranes moist  Neck: supple, no carotid bruits, thyroid not palpable  Lungs: Bilateral equal air entry, clear to ausculation, no wheezing, rales or rhonchi, normal effort  Cardiovascular: normal rate, regular rhythm, no murmur, gallop, rub.   Abdomen: Soft, nontender, nondistended, normal bowel sounds, no hepatomegaly or splenomegaly  Neurologic: There are no new focal motor or sensory deficits, normal muscle tone and bulk, no abnormal sensation, normal speech, cranial nerves II through XII grossly intact  Skin: No gross lesions, rashes, bruising or bleeding on exposed skin area  Extremities:  peripheral pulses palpable, no pedal edema or calf pain with palpation      Investigations:      Laboratory Testing:  Recent Results (from the past 24 hour(s))   Protein, urine, random    Collection Time: 07/12/20 11:26 AM   Result Value Ref Range    Total Protein, Urine 42 mg/dL   Urinalysis with Microscopic    Collection Time: 07/12/20 11:26 AM   Result Value Ref Range    Color, UA YELLOW YELLOW    Turbidity UA CLEAR CLEAR    Glucose, Ur NEGATIVE NEGATIVE    Bilirubin Urine NEGATIVE NEGATIVE    Ketones, Urine NEGATIVE NEGATIVE    Specific Gravity, UA 1.008 1.000 - 1.030    Urine Hgb LARGE (A) NEGATIVE    pH, UA 7.0 5.0 - 8.0    Protein, UA 2+ (A) NEGATIVE    Urobilinogen, Urine ELEVATED (A) Normal    Nitrite, Urine NEGATIVE NEGATIVE    Leukocyte Esterase, Urine NEGATIVE NEGATIVE    Urinalysis Comments NOT REPORTED     -          WBC, UA 0 TO 2 /HPF    RBC, UA TOO NUMEROUS TO COUNT /HPF    Casts UA NOT REPORTED /LPF    Crystals, UA NOT REPORTED None /HPF    Epithelial Cells UA 0 TO 2 /HPF    Renal Epithelial, UA NOT REPORTED 0 /HPF    Bacteria, UA FEW (A) None    Mucus, UA NOT REPORTED None    Trichomonas, UA NOT REPORTED None    Amorphous, UA NOT REPORTED None    Other Observations UA NOT REPORTED NOT REQ.     Yeast, UA NOT REPORTED None   Electrolytes urine random    Collection Time: 07/12/20  4:48 PM   Result Value Ref Range    Chloride, Ur 26 mmol/L    Potassium, Ur 12.5 mmol/L    Sodium,Ur 41 mmol/L   Creatinine, Random Urine    Collection Time: 07/12/20  4:48 PM   Result Value Ref Range    Creatinine, Ur 82.8 39.0 - 259.0 mg/dL   Basic Metabolic Panel    Collection Time: 07/13/20  6:32 AM   Result Value Ref Range    Glucose 94 70 - 99 mg/dL    BUN 18 6 - 20 mg/dL    CREATININE 2.25 (H) 0.70 - 1.20 mg/dL    Bun/Cre Ratio NOT REPORTED 9 - 20    Calcium 8.1 (L) 8.6 - 10.4 mg/dL    Sodium 137 135 - 144 mmol/L    Potassium 4.1 3.7 - 5.3 mmol/L    Chloride 103 98 - 107 mmol/L    CO2 27 20 - 31 mmol/L    Anion Gap 7 (L) 9 - 17 mmol/L    GFR Non-African American 30 (L) >60 mL/min    GFR  37 (L) >60 mL/min    GFR Comment          GFR Staging NOT REPORTED Imaging/Diagonstics:      Assessment :      Primary Problem  Depression with suicidal ideation    Active Hospital Problems    Diagnosis Date Noted    Unintentional weight loss of 10% body weight within 6 months [R63.4] 07/10/2020    Esophageal dysphagia [R13.10] 07/10/2020    Moderate malnutrition ([address] [E44.0] 07/10/2020    Major depressive disorder, single episode [F32.9] 07/09/2020    ARON (acute kidney injury) ([facility] [address] [N17.9] 03/17/2020    Acute on chronic diastolic (congestive) heart failure (HCC) [I50.33] 12/11/2019    CHF (congestive heart failure), NYHA class I, acute, diastolic (HCC) [Q05.65] 43/07/0160    Gastroesophageal reflux disease with esophagitis [K21.0] 02/15/2018    Depression with suicidal ideation [F32.9, R45.851] 02/15/2018    Coronary artery disease involving coronary bypass graft of native heart [I25.810] 11/26/2017    Chronic obstructive pulmonary disease with acute lower respiratory infection ([facility] [address] [J44.0] 03/10/2017    Bilateral neuropathy of upper extremities ([facility] [address] [G56.93] 04/11/2016    Tobacco abuse [Z72.0] 01/12/2016       Plan:     7/11/20  Mild anemia as well as hypokalemia and elevated creatinine noted  Nephrology has been consulted    URINE ANALYSIS: No results found for: LABURIN     CBC:  Lab Results   Component Value Date    WBC 3.3 07/12/2020    HGB 9.1 07/12/2020     07/12/2020     01/28/2012        BMP:    Lab Results   Component Value Date     07/13/2020    K 4.1 07/13/2020     07/13/2020    CO2 27 07/13/2020    BUN 18 07/13/2020    CREATININE 2.25 07/13/2020    GLUCOSE 94 07/13/2020    GLUCOSE 104 01/25/2012      LIVER PROFILE:  Lab Results   Component Value Date    ALT <5 07/10/2020    AST 10 07/10/2020    PROT 5.8 07/11/2020    BILITOT 0.67 07/10/2020    BILIDIR 0.13 12/11/2017    LABALBU 3.0 07/10/2020      ·     ·  2.           3. Chronic kidney disease likely secondary to hypertension creatinine baseline 1.6 acute kidney injury sodium, potassium, chloride, Urine protein and creatinine to quantify the proteinuria if present. Will check urinary eosinophils. 3. Check Renal Ultrasound to assess for urinary obstruction and to assess the kidney size , echogenicity. 4.Will Order serum and urine protein electrophoresis to r/o element of occult dysproteinemia. 5.Check  Complement levels, JANAE,ANCA, ANTI GBM and serologies. 6. Check a postvoid bladder scan  7. Start oral magnesium 400 mg twice daily     Thank you for the consultation. ·    15.           7/13/20    · Serum creatinine is fluctuating was 2.06 yesterday to 2.15 today potassium is all right 16. Will sign off nephrology is following  17. Consultations:   IP CONSULT TO HISTORY AND PHYSICAL  IP CONSULT TO NEPHROLOGY  IP CONSULT TO INTERNAL MEDICINE      Terence Cancino MD  7/13/2020  10:23 AM    Copy sent to Dr. Sheryl Nice MD    Please note that this chart was generated using voice recognition Dragon dictation software. Although every effort was made to ensure the accuracy of this automated transcription, some errors in transcription may have occurred.

## 2020-07-14 LAB
ALBUMIN (CALCULATED): 2.9 G/DL (ref 3.2–5.2)
ALBUMIN PERCENT: 50 % (ref 45–65)
ALPHA 1 PERCENT: 3 % (ref 3–6)
ALPHA 2 PERCENT: 11 % (ref 6–13)
ALPHA-1-GLOBULIN: 0.2 G/DL (ref 0.1–0.4)
ALPHA-2-GLOBULIN: 0.7 G/DL (ref 0.5–0.9)
ANCA MYELOPEROXIDASE: 544 AU/ML
ANCA PROTEINASE 3: 378 AU/ML
BETA GLOBULIN: 0.5 G/DL (ref 0.5–1.1)
BETA PERCENT: 9 % (ref 11–19)
GAMMA GLOBULIN %: 26 % (ref 9–20)
GAMMA GLOBULIN: 1.5 G/DL (ref 0.5–1.5)
GBM ANTIBODY IGG: 16 AU/ML
PATHOLOGIST: ABNORMAL
PROTEIN ELECTROPHORESIS, SERUM: ABNORMAL
TOTAL PROT. SUM,%: 99 % (ref 98–102)
TOTAL PROT. SUM: 5.8 G/DL (ref 6.3–8.2)
TOTAL PROTEIN: 5.8 G/DL (ref 6.4–8.3)

## 2020-07-22 PROBLEM — E11.9 TYPE 2 DIABETES MELLITUS WITHOUT COMPLICATION (HCC): Status: ACTIVE | Noted: 2017-03-14

## 2020-07-22 PROBLEM — R76.8 ELEVATED SERUM IMMUNOGLOBULIN FREE LIGHT CHAIN LEVEL: Status: ACTIVE | Noted: 2020-07-22

## 2020-07-22 PROBLEM — J84.9 INTERSTITIAL LUNG DISEASE (HCC): Status: ACTIVE | Noted: 2020-02-27

## 2020-07-22 PROBLEM — D63.1 ANEMIA IN STAGE 3 CHRONIC KIDNEY DISEASE (HCC): Status: ACTIVE | Noted: 2020-07-22

## 2020-07-22 PROBLEM — F41.9 ANXIETY: Status: ACTIVE | Noted: 2017-03-14

## 2020-07-22 PROBLEM — N18.30 CKD (CHRONIC KIDNEY DISEASE), STAGE III (HCC): Status: ACTIVE | Noted: 2020-07-22

## 2020-07-22 PROBLEM — S92.353A CLOSED FRACTURE OF FIFTH METATARSAL BONE: Status: ACTIVE | Noted: 2020-07-22

## 2020-07-22 PROBLEM — R76.8 ABNORMAL ANCA (ANTINEUTROPHIL CYTOPLASMIC ANTIBODY): Status: ACTIVE | Noted: 2020-07-22

## 2020-07-22 PROBLEM — I21.4 NSTEMI (NON-ST ELEVATED MYOCARDIAL INFARCTION) (HCC): Status: ACTIVE | Noted: 2017-03-13

## 2020-07-22 PROBLEM — N18.30 ANEMIA IN STAGE 3 CHRONIC KIDNEY DISEASE (HCC): Status: ACTIVE | Noted: 2020-07-22

## 2020-07-22 PROBLEM — E83.51 HYPOCALCEMIA: Status: ACTIVE | Noted: 2020-07-22

## 2020-07-26 ENCOUNTER — HOSPITAL ENCOUNTER (OUTPATIENT)
Dept: PREADMISSION TESTING | Age: 55
Setting detail: SPECIMEN
Discharge: HOME | End: 2020-07-30
Payer: COMMERCIAL

## 2020-07-26 PROCEDURE — U0003 INFECTIOUS AGENT DETECTION BY NUCLEIC ACID (DNA OR RNA); SEVERE ACUTE RESPIRATORY SYNDROME CORONAVIRUS 2 (SARS-COV-2) (CORONAVIRUS DISEASE [COVID-19]), AMPLIFIED PROBE TECHNIQUE, MAKING USE OF HIGH THROUGHPUT TECHNOLOGIES AS DESCRIBED BY CMS-2020-01-R: HCPCS

## 2020-07-27 ENCOUNTER — HOSPITAL ENCOUNTER (OUTPATIENT)
Age: 55
Discharge: HOME | End: 2020-07-27
Payer: COMMERCIAL

## 2020-07-27 LAB
-: ABNORMAL
ABSOLUTE EOS #: 0.2 K/UL (ref 0–0.4)
ABSOLUTE IMMATURE GRANULOCYTE: ABNORMAL K/UL (ref 0–0.3)
ABSOLUTE LYMPH #: 0.68 K/UL (ref 1–4.8)
ABSOLUTE MONO #: 0.28 K/UL (ref 0.1–1.3)
ALBUMIN SERPL-MCNC: 3 G/DL (ref 3.5–5.2)
ALBUMIN/GLOBULIN RATIO: ABNORMAL (ref 1–2.5)
ALP BLD-CCNC: 189 U/L (ref 40–129)
ALT SERPL-CCNC: 5 U/L (ref 5–41)
AMORPHOUS: ABNORMAL
ANION GAP SERPL CALCULATED.3IONS-SCNC: 11 MMOL/L (ref 9–17)
AST SERPL-CCNC: 11 U/L
BACTERIA: ABNORMAL
BASOPHILS # BLD: 1 % (ref 0–2)
BASOPHILS ABSOLUTE: 0.04 K/UL (ref 0–0.2)
BILIRUB SERPL-MCNC: 0.52 MG/DL (ref 0.3–1.2)
BILIRUBIN URINE: ABNORMAL
BUN BLDV-MCNC: 23 MG/DL (ref 6–20)
BUN/CREAT BLD: ABNORMAL (ref 9–20)
CALCIUM SERPL-MCNC: 8.3 MG/DL (ref 8.6–10.4)
CASTS UA: ABNORMAL /LPF
CHLORIDE BLD-SCNC: 99 MMOL/L (ref 98–107)
CO2: 27 MMOL/L (ref 20–31)
COLOR: YELLOW
COMMENT UA: ABNORMAL
CREAT SERPL-MCNC: 3.23 MG/DL (ref 0.7–1.2)
CRYSTALS, UA: ABNORMAL /HPF
DIFFERENTIAL TYPE: ABNORMAL
EOSINOPHILS RELATIVE PERCENT: 5 % (ref 0–4)
EPITHELIAL CELLS UA: ABNORMAL /HPF
GFR AFRICAN AMERICAN: 24 ML/MIN
GFR NON-AFRICAN AMERICAN: 20 ML/MIN
GFR SERPL CREATININE-BSD FRML MDRD: ABNORMAL ML/MIN/{1.73_M2}
GFR SERPL CREATININE-BSD FRML MDRD: ABNORMAL ML/MIN/{1.73_M2}
GLUCOSE BLD-MCNC: 156 MG/DL (ref 70–99)
GLUCOSE URINE: NEGATIVE
HCT VFR BLD CALC: 30 % (ref 41–53)
HEMOGLOBIN: 9.8 G/DL (ref 13.5–17.5)
IMMATURE GRANULOCYTES: ABNORMAL %
KETONES, URINE: NEGATIVE
LEUKOCYTE ESTERASE, URINE: ABNORMAL
LYMPHOCYTES # BLD: 17 % (ref 24–44)
MAGNESIUM: 1.9 MG/DL (ref 1.6–2.6)
MCH RBC QN AUTO: 25.3 PG (ref 26–34)
MCHC RBC AUTO-ENTMCNC: 32.6 G/DL (ref 31–37)
MCV RBC AUTO: 77.8 FL (ref 80–100)
MONOCYTES # BLD: 7 % (ref 1–7)
MORPHOLOGY: ABNORMAL
MUCUS: ABNORMAL
NITRITE, URINE: NEGATIVE
NRBC AUTOMATED: ABNORMAL PER 100 WBC
OTHER OBSERVATIONS UA: ABNORMAL
PDW BLD-RTO: 18.5 % (ref 11.5–14.9)
PH UA: 5.5 (ref 5–8)
PHOSPHORUS: 3.8 MG/DL (ref 2.5–4.5)
PLATELET # BLD: 132 K/UL (ref 150–450)
PLATELET ESTIMATE: ABNORMAL
PMV BLD AUTO: 7.3 FL (ref 6–12)
POTASSIUM SERPL-SCNC: 4 MMOL/L (ref 3.7–5.3)
PROTEIN UA: ABNORMAL
RBC # BLD: 3.86 M/UL (ref 4.5–5.9)
RBC # BLD: ABNORMAL 10*6/UL
RBC UA: ABNORMAL /HPF
RENAL EPITHELIAL, UA: ABNORMAL /HPF
SEG NEUTROPHILS: 70 % (ref 36–66)
SEGMENTED NEUTROPHILS ABSOLUTE COUNT: 2.8 K/UL (ref 1.3–9.1)
SODIUM BLD-SCNC: 137 MMOL/L (ref 135–144)
SPECIFIC GRAVITY UA: 1.02 (ref 1–1.03)
TOTAL PROTEIN: 6.6 G/DL (ref 6.4–8.3)
TRICHOMONAS: ABNORMAL
TURBIDITY: ABNORMAL
URINE HGB: ABNORMAL
UROBILINOGEN, URINE: NORMAL
WBC # BLD: 4 K/UL (ref 3.5–11)
WBC # BLD: ABNORMAL 10*3/UL
WBC UA: ABNORMAL /HPF
YEAST: ABNORMAL

## 2020-07-27 PROCEDURE — 36415 COLL VENOUS BLD VENIPUNCTURE: CPT

## 2020-07-27 PROCEDURE — 83735 ASSAY OF MAGNESIUM: CPT

## 2020-07-27 PROCEDURE — 85025 COMPLETE CBC W/AUTO DIFF WBC: CPT

## 2020-07-27 PROCEDURE — 81001 URINALYSIS AUTO W/SCOPE: CPT

## 2020-07-27 PROCEDURE — 84100 ASSAY OF PHOSPHORUS: CPT

## 2020-07-27 PROCEDURE — 80053 COMPREHEN METABOLIC PANEL: CPT

## 2020-07-29 RX ORDER — SODIUM CHLORIDE 9 MG/ML
INJECTION, SOLUTION INTRAVENOUS CONTINUOUS
Status: CANCELLED | OUTPATIENT
Start: 2020-07-29

## 2020-07-30 ENCOUNTER — HOSPITAL ENCOUNTER (OUTPATIENT)
Dept: INTERVENTIONAL RADIOLOGY/VASCULAR | Age: 55
Discharge: HOME | End: 2020-08-01
Payer: COMMERCIAL

## 2020-07-30 VITALS
HEART RATE: 62 BPM | RESPIRATION RATE: 18 BRPM | DIASTOLIC BLOOD PRESSURE: 87 MMHG | HEIGHT: 69 IN | OXYGEN SATURATION: 95 % | SYSTOLIC BLOOD PRESSURE: 146 MMHG | WEIGHT: 205 LBS | BODY MASS INDEX: 30.36 KG/M2 | TEMPERATURE: 97.3 F

## 2020-07-30 LAB
INR BLD: 1
PARTIAL THROMBOPLASTIN TIME: 55.3 SEC (ref 24–36)
PLATELET # BLD: 181 K/UL (ref 150–450)
PROTHROMBIN TIME: 13.1 SEC (ref 11.8–14.6)
SARS-COV-2, NAA: NOT DETECTED
SURGICAL PATHOLOGY REPORT: NORMAL

## 2020-07-30 PROCEDURE — 7100000030 HC ASPR PHASE II RECOVERY - FIRST 15 MIN

## 2020-07-30 PROCEDURE — 36415 COLL VENOUS BLD VENIPUNCTURE: CPT

## 2020-07-30 PROCEDURE — 7100000031 HC ASPR PHASE II RECOVERY - ADDTL 15 MIN

## 2020-07-30 PROCEDURE — 88300 SURGICAL PATH GROSS: CPT

## 2020-07-30 PROCEDURE — 88307 TISSUE EXAM BY PATHOLOGIST: CPT

## 2020-07-30 PROCEDURE — 85610 PROTHROMBIN TIME: CPT

## 2020-07-30 PROCEDURE — 6360000002 HC RX W HCPCS: Performed by: RADIOLOGY

## 2020-07-30 PROCEDURE — 85730 THROMBOPLASTIN TIME PARTIAL: CPT

## 2020-07-30 PROCEDURE — 2709999900 CT BIOPSY RENAL

## 2020-07-30 PROCEDURE — 6370000000 HC RX 637 (ALT 250 FOR IP): Performed by: RADIOLOGY

## 2020-07-30 PROCEDURE — 85049 AUTOMATED PLATELET COUNT: CPT

## 2020-07-30 RX ORDER — MIDAZOLAM HYDROCHLORIDE 1 MG/ML
INJECTION INTRAMUSCULAR; INTRAVENOUS
Status: COMPLETED | OUTPATIENT
Start: 2020-07-30 | End: 2020-07-30

## 2020-07-30 RX ORDER — FENTANYL CITRATE 50 UG/ML
INJECTION, SOLUTION INTRAMUSCULAR; INTRAVENOUS
Status: COMPLETED | OUTPATIENT
Start: 2020-07-30 | End: 2020-07-30

## 2020-07-30 RX ORDER — ACETAMINOPHEN 325 MG/1
650 TABLET ORAL EVERY 4 HOURS PRN
Status: DISCONTINUED | OUTPATIENT
Start: 2020-07-30 | End: 2020-08-02 | Stop reason: HOSPADM

## 2020-07-30 RX ORDER — SODIUM CHLORIDE 9 MG/ML
INJECTION, SOLUTION INTRAVENOUS CONTINUOUS
Status: DISCONTINUED | OUTPATIENT
Start: 2020-07-30 | End: 2020-08-02 | Stop reason: HOSPADM

## 2020-07-30 RX ADMIN — MICROFIBRILLAR COLLAGEN HEMOSTAT POWDER 0.5 G: POWDER at 11:57

## 2020-07-30 RX ADMIN — FENTANYL CITRATE 50 MCG: 50 INJECTION, SOLUTION INTRAMUSCULAR; INTRAVENOUS at 11:27

## 2020-07-30 RX ADMIN — MIDAZOLAM 1 MG: 1 INJECTION INTRAMUSCULAR; INTRAVENOUS at 11:27

## 2020-07-30 ASSESSMENT — PAIN SCALES - GENERAL
PAINLEVEL_OUTOF10: 0
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
## 2020-07-30 NOTE — POST SEDATION
Sedation Post Procedure Note    Patient Name: Benton Galindo   YOB: 1965  Room/Bed: Room/bed info not found  Medical Record Number: [id number]  Date: 7/30/2020   Time: 12:16 PM         Physicians/Assistants: Juan Dobbins MD    Procedure Performed:  Renal bx    Post-Sedation Vital Signs:  Vitals:    07/30/20 1200   BP: (!) 141/84   Pulse: 54   Resp: 25   Temp:    SpO2: 99%      Vital signs were reviewed and were stable after the procedure (see flow sheet for vitals)            Post-Sedation Exam: pt remains stable           Complications: no sedation complication, moderate perinephric hematoma    Electronically signed by Juan Dobbins on 7/30/2020 at 12:16 PM
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
## 2020-08-10 RX ORDER — CLONIDINE HYDROCHLORIDE 0.2 MG/1
TABLET ORAL
Qty: 184 TABLET | Refills: 1 | Status: SHIPPED | OUTPATIENT
Start: 2020-08-10 | End: 2021-02-01

## 2020-08-16 ENCOUNTER — HOSPITAL ENCOUNTER (EMERGENCY)
Age: 55
Discharge: HOME | End: 2020-08-16
Attending: EMERGENCY MEDICINE
Payer: COMMERCIAL

## 2020-08-16 ENCOUNTER — APPOINTMENT (OUTPATIENT)
Dept: GENERAL RADIOLOGY | Age: 55
End: 2020-08-16
Payer: COMMERCIAL

## 2020-08-16 VITALS
OXYGEN SATURATION: 94 % | HEART RATE: 49 BPM | RESPIRATION RATE: 11 BRPM | HEIGHT: 69 IN | WEIGHT: 210 LBS | DIASTOLIC BLOOD PRESSURE: 61 MMHG | SYSTOLIC BLOOD PRESSURE: 100 MMHG | TEMPERATURE: 97.4 F | BODY MASS INDEX: 31.1 KG/M2

## 2020-08-16 LAB
ABSOLUTE EOS #: 0.2 K/UL (ref 0–0.4)
ABSOLUTE IMMATURE GRANULOCYTE: ABNORMAL K/UL (ref 0–0.3)
ABSOLUTE LYMPH #: 0.8 K/UL (ref 1–4.8)
ABSOLUTE MONO #: 0.3 K/UL (ref 0.1–1.3)
ALBUMIN SERPL-MCNC: 3.2 G/DL (ref 3.5–5.2)
ALBUMIN/GLOBULIN RATIO: ABNORMAL (ref 1–2.5)
ALP BLD-CCNC: 149 U/L (ref 40–129)
ALT SERPL-CCNC: 5 U/L (ref 5–41)
ANION GAP SERPL CALCULATED.3IONS-SCNC: 9 MMOL/L (ref 9–17)
AST SERPL-CCNC: 12 U/L
BASOPHILS # BLD: 1 % (ref 0–2)
BASOPHILS ABSOLUTE: 0.1 K/UL (ref 0–0.2)
BILIRUB SERPL-MCNC: 0.43 MG/DL (ref 0.3–1.2)
BUN BLDV-MCNC: 31 MG/DL (ref 6–20)
BUN/CREAT BLD: ABNORMAL (ref 9–20)
CALCIUM SERPL-MCNC: 8.3 MG/DL (ref 8.6–10.4)
CHLORIDE BLD-SCNC: 100 MMOL/L (ref 98–107)
CO2: 26 MMOL/L (ref 20–31)
CREAT SERPL-MCNC: 3.68 MG/DL (ref 0.7–1.2)
DIFFERENTIAL TYPE: ABNORMAL
EOSINOPHILS RELATIVE PERCENT: 5 % (ref 0–4)
GFR AFRICAN AMERICAN: 21 ML/MIN
GFR NON-AFRICAN AMERICAN: 17 ML/MIN
GFR SERPL CREATININE-BSD FRML MDRD: ABNORMAL ML/MIN/{1.73_M2}
GFR SERPL CREATININE-BSD FRML MDRD: ABNORMAL ML/MIN/{1.73_M2}
GLUCOSE BLD-MCNC: 99 MG/DL (ref 70–99)
HCT VFR BLD CALC: 28.6 % (ref 41–53)
HEMOGLOBIN: 9.4 G/DL (ref 13.5–17.5)
IMMATURE GRANULOCYTES: ABNORMAL %
LYMPHOCYTES # BLD: 19 % (ref 24–44)
MCH RBC QN AUTO: 25.8 PG (ref 26–34)
MCHC RBC AUTO-ENTMCNC: 32.9 G/DL (ref 31–37)
MCV RBC AUTO: 78.5 FL (ref 80–100)
MONOCYTES # BLD: 7 % (ref 1–7)
NRBC AUTOMATED: ABNORMAL PER 100 WBC
PDW BLD-RTO: 18.9 % (ref 11.5–14.9)
PLATELET # BLD: 150 K/UL (ref 150–450)
PLATELET ESTIMATE: ABNORMAL
PMV BLD AUTO: 7.1 FL (ref 6–12)
POTASSIUM SERPL-SCNC: 4.4 MMOL/L (ref 3.7–5.3)
RBC # BLD: 3.65 M/UL (ref 4.5–5.9)
RBC # BLD: ABNORMAL 10*6/UL
SEG NEUTROPHILS: 68 % (ref 36–66)
SEGMENTED NEUTROPHILS ABSOLUTE COUNT: 2.9 K/UL (ref 1.3–9.1)
SODIUM BLD-SCNC: 135 MMOL/L (ref 135–144)
TOTAL PROTEIN: 6.9 G/DL (ref 6.4–8.3)
TROPONIN INTERP: NORMAL
TROPONIN INTERP: NORMAL
TROPONIN T: NORMAL NG/ML
TROPONIN T: NORMAL NG/ML
TROPONIN, HIGH SENSITIVITY: 13 NG/L (ref 0–22)
TROPONIN, HIGH SENSITIVITY: 14 NG/L (ref 0–22)
WBC # BLD: 4.3 K/UL (ref 3.5–11)
WBC # BLD: ABNORMAL 10*3/UL

## 2020-08-16 PROCEDURE — 80053 COMPREHEN METABOLIC PANEL: CPT

## 2020-08-16 PROCEDURE — 36415 COLL VENOUS BLD VENIPUNCTURE: CPT

## 2020-08-16 PROCEDURE — 84484 ASSAY OF TROPONIN QUANT: CPT

## 2020-08-16 PROCEDURE — 85025 COMPLETE CBC W/AUTO DIFF WBC: CPT

## 2020-08-16 PROCEDURE — 99284 EMERGENCY DEPT VISIT MOD MDM: CPT

## 2020-08-16 PROCEDURE — 93005 ELECTROCARDIOGRAM TRACING: CPT | Performed by: EMERGENCY MEDICINE

## 2020-08-16 PROCEDURE — 71045 X-RAY EXAM CHEST 1 VIEW: CPT

## 2020-08-16 ASSESSMENT — ENCOUNTER SYMPTOMS
NAUSEA: 0
COUGH: 0
ABDOMINAL PAIN: 0
VOMITING: 0
SHORTNESS OF BREATH: 0
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
## 2020-08-17 LAB
EKG ATRIAL RATE: 54 BPM
EKG P AXIS: 4 DEGREES
EKG P-R INTERVAL: 182 MS
EKG Q-T INTERVAL: 444 MS
EKG QRS DURATION: 94 MS
EKG QTC CALCULATION (BAZETT): 421 MS
EKG R AXIS: -13 DEGREES
EKG T AXIS: 63 DEGREES
EKG VENTRICULAR RATE: 54 BPM

## 2020-08-17 PROCEDURE — 93010 ELECTROCARDIOGRAM REPORT: CPT | Performed by: INTERNAL MEDICINE

## 2020-09-08 ENCOUNTER — OFFICE VISIT (OUTPATIENT)
Dept: INTERNAL MEDICINE CLINIC | Age: 55
End: 2020-09-08
Payer: COMMERCIAL

## 2020-09-08 VITALS
TEMPERATURE: 97.3 F | BODY MASS INDEX: 29.62 KG/M2 | HEART RATE: 68 BPM | SYSTOLIC BLOOD PRESSURE: 106 MMHG | WEIGHT: 200 LBS | HEIGHT: 69 IN | DIASTOLIC BLOOD PRESSURE: 68 MMHG | RESPIRATION RATE: 16 BRPM

## 2020-09-08 PROCEDURE — G8431 POS CLIN DEPRES SCRN F/U DOC: HCPCS | Performed by: NURSE PRACTITIONER

## 2020-09-08 PROCEDURE — 96160 PT-FOCUSED HLTH RISK ASSMT: CPT | Performed by: NURSE PRACTITIONER

## 2020-09-08 PROCEDURE — 99215 OFFICE O/P EST HI 40 MIN: CPT | Performed by: NURSE PRACTITIONER

## 2020-09-08 PROCEDURE — 1111F DSCHRG MED/CURRENT MED MERGE: CPT | Performed by: NURSE PRACTITIONER

## 2020-09-08 RX ORDER — ALBUTEROL SULFATE 90 UG/1
AEROSOL, METERED RESPIRATORY (INHALATION)
Qty: 18 G | Refills: 5 | Status: SHIPPED | OUTPATIENT
Start: 2020-09-08 | End: 2020-12-02 | Stop reason: SDUPTHER

## 2020-09-08 ASSESSMENT — PATIENT HEALTH QUESTIONNAIRE - PHQ9
5. POOR APPETITE OR OVEREATING: 1
SUM OF ALL RESPONSES TO PHQ QUESTIONS 1-9: 10
9. THOUGHTS THAT YOU WOULD BE BETTER OFF DEAD, OR OF HURTING YOURSELF: 0
2. FEELING DOWN, DEPRESSED OR HOPELESS: 1
6. FEELING BAD ABOUT YOURSELF - OR THAT YOU ARE A FAILURE OR HAVE LET YOURSELF OR YOUR FAMILY DOWN: 0
4. FEELING TIRED OR HAVING LITTLE ENERGY: 3
10. IF YOU CHECKED OFF ANY PROBLEMS, HOW DIFFICULT HAVE THESE PROBLEMS MADE IT FOR YOU TO DO YOUR WORK, TAKE CARE OF THINGS AT HOME, OR GET ALONG WITH OTHER PEOPLE: 1
7. TROUBLE CONCENTRATING ON THINGS, SUCH AS READING THE NEWSPAPER OR WATCHING TELEVISION: 0
3. TROUBLE FALLING OR STAYING ASLEEP: 1
8. MOVING OR SPEAKING SO SLOWLY THAT OTHER PEOPLE COULD HAVE NOTICED. OR THE OPPOSITE, BEING SO FIGETY OR RESTLESS THAT YOU HAVE BEEN MOVING AROUND A LOT MORE THAN USUAL: 2
1. LITTLE INTEREST OR PLEASURE IN DOING THINGS: 2
SUM OF ALL RESPONSES TO PHQ QUESTIONS 1-9: 10
SUM OF ALL RESPONSES TO PHQ9 QUESTIONS 1 & 2: 3

## 2020-09-08 ASSESSMENT — ENCOUNTER SYMPTOMS
CONSTIPATION: 0
NAUSEA: 0
VOMITING: 0
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
## 2020-09-10 DIAGNOSIS — N04.1 NEPHROTIC SYNDROME WITH FOCAL GLOMERULOSCLEROSIS: ICD-10-CM

## 2020-09-10 DIAGNOSIS — N01.7 RAPID PROGRESSV NEPHRITIC SYNDRM, DIFFUSE CRESCENTC GLOMERULONEPHRITIS: ICD-10-CM

## 2020-09-10 DIAGNOSIS — N17.0 ACUTE KIDNEY FAILURE WITH LESION OF TUBULAR NECROSIS (HCC): ICD-10-CM

## 2020-09-10 RX ORDER — SULFAMETHOXAZOLE AND TRIMETHOPRIM 400; 80 MG/1; MG/1
TABLET ORAL
Qty: 84 TABLET | Refills: 0 | Status: SHIPPED | OUTPATIENT
Start: 2020-09-10 | End: 2020-11-06

## 2020-09-11 ASSESSMENT — ENCOUNTER SYMPTOMS
EYE PAIN: 0
COLOR CHANGE: 0
TROUBLE SWALLOWING: 0
COUGH: 0
SHORTNESS OF BREATH: 0
WHEEZING: 0
RHINORRHEA: 0

## 2020-09-21 ENCOUNTER — TELEPHONE (OUTPATIENT)
Dept: INFUSION THERAPY | Age: 55
End: 2020-09-21

## 2020-09-21 ENCOUNTER — HOSPITAL ENCOUNTER (OUTPATIENT)
Age: 55
Discharge: HOME | End: 2020-09-21
Payer: COMMERCIAL

## 2020-09-21 LAB
-: ABNORMAL
ABSOLUTE BANDS #: 0.09 K/UL (ref 0–1)
ABSOLUTE EOS #: 0.09 K/UL (ref 0–0.4)
ABSOLUTE IMMATURE GRANULOCYTE: ABNORMAL K/UL (ref 0–0.3)
ABSOLUTE LYMPH #: 0.83 K/UL (ref 1–4.8)
ABSOLUTE MONO #: 0.18 K/UL (ref 0.1–1.3)
ALBUMIN SERPL-MCNC: 3.2 G/DL (ref 3.5–5.2)
ALBUMIN/GLOBULIN RATIO: ABNORMAL (ref 1–2.5)
ALP BLD-CCNC: 125 U/L (ref 40–129)
ALT SERPL-CCNC: 21 U/L (ref 5–41)
AMORPHOUS: ABNORMAL
ANION GAP SERPL CALCULATED.3IONS-SCNC: 11 MMOL/L (ref 9–17)
AST SERPL-CCNC: 23 U/L
BACTERIA: ABNORMAL
BANDS: 1 % (ref 0–10)
BASOPHILS # BLD: 0 % (ref 0–2)
BASOPHILS ABSOLUTE: 0 K/UL (ref 0–0.2)
BILIRUB SERPL-MCNC: 0.51 MG/DL (ref 0.3–1.2)
BILIRUBIN URINE: NEGATIVE
BUN BLDV-MCNC: 51 MG/DL (ref 6–20)
BUN/CREAT BLD: ABNORMAL (ref 9–20)
CALCIUM SERPL-MCNC: 8 MG/DL (ref 8.6–10.4)
CASTS UA: ABNORMAL /LPF
CHLORIDE BLD-SCNC: 100 MMOL/L (ref 98–107)
CO2: 25 MMOL/L (ref 20–31)
COLOR: YELLOW
COMMENT UA: ABNORMAL
CREAT SERPL-MCNC: 2.35 MG/DL (ref 0.7–1.2)
CRYSTALS, UA: ABNORMAL /HPF
DIFFERENTIAL TYPE: ABNORMAL
EOSINOPHILS RELATIVE PERCENT: 1 % (ref 0–4)
EPITHELIAL CELLS UA: ABNORMAL /HPF
GFR AFRICAN AMERICAN: 35 ML/MIN
GFR NON-AFRICAN AMERICAN: 29 ML/MIN
GFR SERPL CREATININE-BSD FRML MDRD: ABNORMAL ML/MIN/{1.73_M2}
GFR SERPL CREATININE-BSD FRML MDRD: ABNORMAL ML/MIN/{1.73_M2}
GLUCOSE BLD-MCNC: 142 MG/DL (ref 70–99)
GLUCOSE URINE: ABNORMAL
HCT VFR BLD CALC: 34.4 % (ref 41–53)
HEMOGLOBIN: 11.1 G/DL (ref 13.5–17.5)
IMMATURE GRANULOCYTES: ABNORMAL %
KETONES, URINE: NEGATIVE
LEUKOCYTE ESTERASE, URINE: NEGATIVE
LYMPHOCYTES # BLD: 9 % (ref 24–44)
MAGNESIUM: 2 MG/DL (ref 1.6–2.6)
MCH RBC QN AUTO: 26.9 PG (ref 26–34)
MCHC RBC AUTO-ENTMCNC: 32.3 G/DL (ref 31–37)
MCV RBC AUTO: 83.1 FL (ref 80–100)
MONOCYTES # BLD: 2 % (ref 1–7)
MORPHOLOGY: ABNORMAL
MUCUS: ABNORMAL
NITRITE, URINE: NEGATIVE
NRBC AUTOMATED: ABNORMAL PER 100 WBC
OTHER OBSERVATIONS UA: ABNORMAL
PDW BLD-RTO: 22.1 % (ref 11.5–14.9)
PH UA: 6.5 (ref 5–8)
PHOSPHORUS: 4.5 MG/DL (ref 2.5–4.5)
PLATELET # BLD: 201 K/UL (ref 150–450)
PLATELET ESTIMATE: ABNORMAL
PMV BLD AUTO: 8.4 FL (ref 6–12)
POTASSIUM SERPL-SCNC: 4.7 MMOL/L (ref 3.7–5.3)
PROTEIN UA: ABNORMAL
RBC # BLD: 4.13 M/UL (ref 4.5–5.9)
RBC # BLD: ABNORMAL 10*6/UL
RBC UA: ABNORMAL /HPF
RENAL EPITHELIAL, UA: ABNORMAL /HPF
SEG NEUTROPHILS: 87 % (ref 36–66)
SEGMENTED NEUTROPHILS ABSOLUTE COUNT: 8.01 K/UL (ref 1.3–9.1)
SODIUM BLD-SCNC: 136 MMOL/L (ref 135–144)
SPECIFIC GRAVITY UA: 1.01 (ref 1–1.03)
TOTAL PROTEIN: 6 G/DL (ref 6.4–8.3)
TRICHOMONAS: ABNORMAL
TURBIDITY: CLEAR
URINE HGB: ABNORMAL
UROBILINOGEN, URINE: NORMAL
WBC # BLD: 9.2 K/UL (ref 3.5–11)
WBC # BLD: ABNORMAL 10*3/UL
WBC UA: ABNORMAL /HPF
YEAST: ABNORMAL

## 2020-09-21 PROCEDURE — 85025 COMPLETE CBC W/AUTO DIFF WBC: CPT

## 2020-09-21 PROCEDURE — 81001 URINALYSIS AUTO W/SCOPE: CPT

## 2020-09-21 PROCEDURE — 83735 ASSAY OF MAGNESIUM: CPT

## 2020-09-21 PROCEDURE — 84100 ASSAY OF PHOSPHORUS: CPT

## 2020-09-21 PROCEDURE — 80053 COMPREHEN METABOLIC PANEL: CPT

## 2020-09-21 PROCEDURE — 36415 COLL VENOUS BLD VENIPUNCTURE: CPT

## 2020-09-21 NOTE — TELEPHONE ENCOUNTER
Chemo orders received, ht 69\", wt 200lbs, and bsa 2.09 verified. Dose of chemotherapy verified;  Cytoxan 500mg flat dose.

## 2020-09-21 NOTE — TELEPHONE ENCOUNTER
Chemotherapy orders received:    Ht=69 inches  Gl=791 lbs  BSA=2.10  Chemotherapy doses verified:  Cytoxan 500 mg flat dose   Tommy Dennis RN

## 2020-09-24 ENCOUNTER — TELEPHONE (OUTPATIENT)
Dept: UROLOGY | Age: 55
End: 2020-09-24

## 2020-09-24 ENCOUNTER — OFFICE VISIT (OUTPATIENT)
Dept: UROLOGY | Age: 55
End: 2020-09-24
Payer: COMMERCIAL

## 2020-09-24 VITALS
HEART RATE: 54 BPM | DIASTOLIC BLOOD PRESSURE: 69 MMHG | SYSTOLIC BLOOD PRESSURE: 120 MMHG | WEIGHT: 210 LBS | TEMPERATURE: 97.1 F | BODY MASS INDEX: 31.1 KG/M2 | HEIGHT: 69 IN

## 2020-09-24 LAB
BILIRUBIN, POC: ABNORMAL
BLOOD URINE, POC: ABNORMAL
CLARITY, POC: CLEAR
COLOR, POC: YELLOW
GLUCOSE URINE, POC: ABNORMAL
KETONES, POC: ABNORMAL
LEUKOCYTE EST, POC: ABNORMAL
NITRITE, POC: ABNORMAL
PH, POC: ABNORMAL
PROTEIN, POC: ABNORMAL
SPECIFIC GRAVITY, POC: ABNORMAL
UROBILINOGEN, POC: ABNORMAL

## 2020-09-24 PROCEDURE — 4004F PT TOBACCO SCREEN RCVD TLK: CPT | Performed by: NURSE PRACTITIONER

## 2020-09-24 PROCEDURE — G8427 DOCREV CUR MEDS BY ELIG CLIN: HCPCS | Performed by: NURSE PRACTITIONER

## 2020-09-24 PROCEDURE — 3017F COLORECTAL CA SCREEN DOC REV: CPT | Performed by: NURSE PRACTITIONER

## 2020-09-24 PROCEDURE — 99213 OFFICE O/P EST LOW 20 MIN: CPT | Performed by: NURSE PRACTITIONER

## 2020-09-24 PROCEDURE — 81002 URINALYSIS NONAUTO W/O SCOPE: CPT | Performed by: NURSE PRACTITIONER

## 2020-09-24 PROCEDURE — G8417 CALC BMI ABV UP PARAM F/U: HCPCS | Performed by: NURSE PRACTITIONER

## 2020-09-24 ASSESSMENT — ENCOUNTER SYMPTOMS
SHORTNESS OF BREATH: 0
WHEEZING: 1
ABDOMINAL PAIN: 0
CONSTIPATION: 0
VOMITING: 0
NAUSEA: 0
EYE REDNESS: 0
DIARRHEA: 0
COUGH: 1
BACK PAIN: 0
EYE PAIN: 0

## 2020-09-24 NOTE — PATIENT INSTRUCTIONS
PSA was not done, will need to update PSA      reviewed with Dr Israel Mondragon- recent cysto and FISH negative.  Will follow up in one year per his request, no further testing at this time    Report any visible blood in thee urine, urinary pain or urological questions or cocenrns,
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
## 2020-09-24 NOTE — TELEPHONE ENCOUNTER
Can you see if pt has a  that is helping him navigate his health care needs. He has not had PSA and not sure what day he starts dialysis?

## 2020-09-28 RX ORDER — METOCLOPRAMIDE 10 MG/1
TABLET ORAL
Qty: 120 TABLET | Refills: 3 | Status: SHIPPED | OUTPATIENT
Start: 2020-09-28 | End: 2021-07-30

## 2020-10-01 ENCOUNTER — HOSPITAL ENCOUNTER (OUTPATIENT)
Dept: INFUSION THERAPY | Age: 55
Discharge: HOME | End: 2020-10-01
Payer: COMMERCIAL

## 2020-10-01 VITALS
SYSTOLIC BLOOD PRESSURE: 142 MMHG | WEIGHT: 213.6 LBS | DIASTOLIC BLOOD PRESSURE: 77 MMHG | BODY MASS INDEX: 31.54 KG/M2 | HEART RATE: 68 BPM | TEMPERATURE: 97.7 F | RESPIRATION RATE: 20 BRPM

## 2020-10-01 DIAGNOSIS — N18.4 BENIGN HYPERTENSION WITH CKD (CHRONIC KIDNEY DISEASE) STAGE IV (HCC): Primary | ICD-10-CM

## 2020-10-01 DIAGNOSIS — N04.1 NEPHROTIC SYNDROME WITH FOCAL GLOMERULOSCLEROSIS: ICD-10-CM

## 2020-10-01 DIAGNOSIS — N01.7 RAPID PROGRESSV NEPHRITIC SYNDRM, DIFFUSE CRESCENTC GLOMERULONEPHRITIS: ICD-10-CM

## 2020-10-01 DIAGNOSIS — N18.4 ANEMIA IN STAGE 4 CHRONIC KIDNEY DISEASE (HCC): ICD-10-CM

## 2020-10-01 DIAGNOSIS — N17.0 ACUTE KIDNEY FAILURE WITH LESION OF TUBULAR NECROSIS (HCC): ICD-10-CM

## 2020-10-01 DIAGNOSIS — D63.1 ANEMIA IN STAGE 4 CHRONIC KIDNEY DISEASE (HCC): ICD-10-CM

## 2020-10-01 DIAGNOSIS — N18.4 CKD (CHRONIC KIDNEY DISEASE), STAGE IV (HCC): ICD-10-CM

## 2020-10-01 DIAGNOSIS — I12.9 BENIGN HYPERTENSION WITH CKD (CHRONIC KIDNEY DISEASE) STAGE IV (HCC): Primary | ICD-10-CM

## 2020-10-01 LAB
ABSOLUTE EOS #: 0.11 K/UL (ref 0–0.4)
ABSOLUTE IMMATURE GRANULOCYTE: ABNORMAL K/UL (ref 0–0.3)
ABSOLUTE LYMPH #: 1.02 K/UL (ref 1–4.8)
ABSOLUTE MONO #: 0.34 K/UL (ref 0.1–0.8)
ANION GAP SERPL CALCULATED.3IONS-SCNC: 13 MMOL/L (ref 9–17)
BASOPHILS # BLD: 0 % (ref 0–2)
BASOPHILS ABSOLUTE: 0 K/UL (ref 0–0.2)
BUN BLDV-MCNC: 52 MG/DL (ref 6–20)
BUN/CREAT BLD: ABNORMAL (ref 9–20)
CALCIUM SERPL-MCNC: 9.2 MG/DL (ref 8.6–10.4)
CHLORIDE BLD-SCNC: 100 MMOL/L (ref 98–107)
CO2: 27 MMOL/L (ref 20–31)
CREAT SERPL-MCNC: 2.27 MG/DL (ref 0.7–1.2)
DIFFERENTIAL TYPE: ABNORMAL
EOSINOPHILS RELATIVE PERCENT: 1 % (ref 1–4)
GFR AFRICAN AMERICAN: 36 ML/MIN
GFR NON-AFRICAN AMERICAN: 30 ML/MIN
GFR SERPL CREATININE-BSD FRML MDRD: ABNORMAL ML/MIN/{1.73_M2}
GFR SERPL CREATININE-BSD FRML MDRD: ABNORMAL ML/MIN/{1.73_M2}
GLUCOSE BLD-MCNC: 187 MG/DL (ref 70–99)
HCT VFR BLD CALC: 36 % (ref 41–53)
HEMOGLOBIN: 11.2 G/DL (ref 13.5–17.5)
IMMATURE GRANULOCYTES: ABNORMAL %
LYMPHOCYTES # BLD: 9 % (ref 24–44)
MCH RBC QN AUTO: 26.3 PG (ref 26–34)
MCHC RBC AUTO-ENTMCNC: 31.2 G/DL (ref 31–37)
MCV RBC AUTO: 84.3 FL (ref 80–100)
MONOCYTES # BLD: 3 % (ref 1–7)
MORPHOLOGY: ABNORMAL
NRBC AUTOMATED: ABNORMAL PER 100 WBC
PDW BLD-RTO: 23.1 % (ref 12.5–15.4)
PLATELET # BLD: 186 K/UL (ref 140–450)
PLATELET ESTIMATE: ABNORMAL
PMV BLD AUTO: 8.6 FL (ref 6–12)
POTASSIUM SERPL-SCNC: 4.6 MMOL/L (ref 3.7–5.3)
RBC # BLD: 4.27 M/UL (ref 4.5–5.9)
RBC # BLD: ABNORMAL 10*6/UL
SEG NEUTROPHILS: 87 % (ref 36–66)
SEGMENTED NEUTROPHILS ABSOLUTE COUNT: 9.83 K/UL (ref 1.8–7.7)
SODIUM BLD-SCNC: 140 MMOL/L (ref 135–144)
WBC # BLD: 11.3 K/UL (ref 3.5–11)
WBC # BLD: ABNORMAL 10*3/UL

## 2020-10-01 PROCEDURE — 2580000003 HC RX 258: Performed by: INTERNAL MEDICINE

## 2020-10-01 PROCEDURE — 96413 CHEMO IV INFUSION 1 HR: CPT

## 2020-10-01 PROCEDURE — 96375 TX/PRO/DX INJ NEW DRUG ADDON: CPT

## 2020-10-01 PROCEDURE — 6360000002 HC RX W HCPCS: Performed by: INTERNAL MEDICINE

## 2020-10-01 PROCEDURE — 36415 COLL VENOUS BLD VENIPUNCTURE: CPT

## 2020-10-01 PROCEDURE — 80048 BASIC METABOLIC PNL TOTAL CA: CPT

## 2020-10-01 PROCEDURE — 85025 COMPLETE CBC W/AUTO DIFF WBC: CPT

## 2020-10-01 RX ORDER — SODIUM CHLORIDE 9 MG/ML
20 INJECTION, SOLUTION INTRAVENOUS ONCE
Status: CANCELLED | OUTPATIENT
Start: 2020-10-29

## 2020-10-01 RX ORDER — SODIUM CHLORIDE 9 MG/ML
INJECTION, SOLUTION INTRAVENOUS CONTINUOUS
Status: CANCELLED | OUTPATIENT
Start: 2021-01-14

## 2020-10-01 RX ORDER — ONDANSETRON 2 MG/ML
4 INJECTION INTRAMUSCULAR; INTRAVENOUS ONCE
Status: CANCELLED
Start: 2021-01-14

## 2020-10-01 RX ORDER — HEPARIN SODIUM (PORCINE) LOCK FLUSH IV SOLN 100 UNIT/ML 100 UNIT/ML
500 SOLUTION INTRAVENOUS PRN
Status: CANCELLED | OUTPATIENT
Start: 2020-10-29

## 2020-10-01 RX ORDER — EPINEPHRINE 1 MG/ML
0.3 INJECTION, SOLUTION, CONCENTRATE INTRAVENOUS PRN
Status: CANCELLED | OUTPATIENT
Start: 2020-10-15

## 2020-10-01 RX ORDER — HEPARIN SODIUM (PORCINE) LOCK FLUSH IV SOLN 100 UNIT/ML 100 UNIT/ML
500 SOLUTION INTRAVENOUS PRN
Status: DISCONTINUED | OUTPATIENT
Start: 2020-10-01 | End: 2020-10-02 | Stop reason: HOSPADM

## 2020-10-01 RX ORDER — METHYLPREDNISOLONE SODIUM SUCCINATE 125 MG/2ML
125 INJECTION, POWDER, LYOPHILIZED, FOR SOLUTION INTRAMUSCULAR; INTRAVENOUS ONCE
Status: CANCELLED | OUTPATIENT
Start: 2020-11-12

## 2020-10-01 RX ORDER — SODIUM CHLORIDE 9 MG/ML
20 INJECTION, SOLUTION INTRAVENOUS ONCE
Status: CANCELLED | OUTPATIENT
Start: 2020-11-12

## 2020-10-01 RX ORDER — SODIUM CHLORIDE 0.9 % (FLUSH) 0.9 %
5 SYRINGE (ML) INJECTION PRN
Status: CANCELLED | OUTPATIENT
Start: 2021-01-14

## 2020-10-01 RX ORDER — EPINEPHRINE 1 MG/ML
0.3 INJECTION, SOLUTION, CONCENTRATE INTRAVENOUS PRN
Status: CANCELLED | OUTPATIENT
Start: 2020-11-12

## 2020-10-01 RX ORDER — SODIUM CHLORIDE 0.9 % (FLUSH) 0.9 %
10 SYRINGE (ML) INJECTION PRN
Status: CANCELLED | OUTPATIENT
Start: 2020-11-12

## 2020-10-01 RX ORDER — METHYLPREDNISOLONE SODIUM SUCCINATE 125 MG/2ML
125 INJECTION, POWDER, LYOPHILIZED, FOR SOLUTION INTRAMUSCULAR; INTRAVENOUS ONCE
Status: CANCELLED | OUTPATIENT
Start: 2020-10-15

## 2020-10-01 RX ORDER — SODIUM CHLORIDE 0.9 % (FLUSH) 0.9 %
10 SYRINGE (ML) INJECTION PRN
Status: CANCELLED | OUTPATIENT
Start: 2020-12-10

## 2020-10-01 RX ORDER — SODIUM CHLORIDE 0.9 % (FLUSH) 0.9 %
5 SYRINGE (ML) INJECTION PRN
Status: CANCELLED | OUTPATIENT
Start: 2020-10-15

## 2020-10-01 RX ORDER — SODIUM CHLORIDE 9 MG/ML
20 INJECTION, SOLUTION INTRAVENOUS ONCE
Status: CANCELLED | OUTPATIENT
Start: 2020-12-10

## 2020-10-01 RX ORDER — ONDANSETRON 2 MG/ML
4 INJECTION INTRAMUSCULAR; INTRAVENOUS ONCE
Status: CANCELLED
Start: 2020-12-10

## 2020-10-01 RX ORDER — SODIUM CHLORIDE 9 MG/ML
INJECTION, SOLUTION INTRAVENOUS CONTINUOUS
Status: CANCELLED | OUTPATIENT
Start: 2020-12-10

## 2020-10-01 RX ORDER — HEPARIN SODIUM (PORCINE) LOCK FLUSH IV SOLN 100 UNIT/ML 100 UNIT/ML
500 SOLUTION INTRAVENOUS PRN
Status: CANCELLED | OUTPATIENT
Start: 2020-12-10

## 2020-10-01 RX ORDER — EPINEPHRINE 1 MG/ML
0.3 INJECTION, SOLUTION, CONCENTRATE INTRAVENOUS PRN
Status: CANCELLED | OUTPATIENT
Start: 2020-10-01

## 2020-10-01 RX ORDER — DIPHENHYDRAMINE HYDROCHLORIDE 50 MG/ML
50 INJECTION INTRAMUSCULAR; INTRAVENOUS ONCE
Status: CANCELLED | OUTPATIENT
Start: 2020-10-01

## 2020-10-01 RX ORDER — SODIUM CHLORIDE 9 MG/ML
INJECTION, SOLUTION INTRAVENOUS CONTINUOUS
Status: CANCELLED | OUTPATIENT
Start: 2020-11-12

## 2020-10-01 RX ORDER — ONDANSETRON 2 MG/ML
4 INJECTION INTRAMUSCULAR; INTRAVENOUS ONCE
Status: COMPLETED | OUTPATIENT
Start: 2020-10-01 | End: 2020-10-01

## 2020-10-01 RX ORDER — EPINEPHRINE 1 MG/ML
0.3 INJECTION, SOLUTION, CONCENTRATE INTRAVENOUS PRN
Status: CANCELLED | OUTPATIENT
Start: 2020-10-29

## 2020-10-01 RX ORDER — SODIUM CHLORIDE 9 MG/ML
20 INJECTION, SOLUTION INTRAVENOUS ONCE
Status: COMPLETED | OUTPATIENT
Start: 2020-10-01 | End: 2020-10-01

## 2020-10-01 RX ORDER — SODIUM CHLORIDE 0.9 % (FLUSH) 0.9 %
10 SYRINGE (ML) INJECTION PRN
Status: CANCELLED | OUTPATIENT
Start: 2020-10-29

## 2020-10-01 RX ORDER — DIPHENHYDRAMINE HYDROCHLORIDE 50 MG/ML
50 INJECTION INTRAMUSCULAR; INTRAVENOUS ONCE
Status: CANCELLED | OUTPATIENT
Start: 2020-11-12

## 2020-10-01 RX ORDER — DIPHENHYDRAMINE HYDROCHLORIDE 50 MG/ML
50 INJECTION INTRAMUSCULAR; INTRAVENOUS ONCE
Status: CANCELLED | OUTPATIENT
Start: 2020-10-15

## 2020-10-01 RX ORDER — SODIUM CHLORIDE 0.9 % (FLUSH) 0.9 %
5 SYRINGE (ML) INJECTION PRN
Status: CANCELLED | OUTPATIENT
Start: 2020-12-10

## 2020-10-01 RX ORDER — SODIUM CHLORIDE 0.9 % (FLUSH) 0.9 %
5 SYRINGE (ML) INJECTION PRN
Status: CANCELLED | OUTPATIENT
Start: 2020-11-12

## 2020-10-01 RX ORDER — EPINEPHRINE 1 MG/ML
0.3 INJECTION, SOLUTION, CONCENTRATE INTRAVENOUS PRN
Status: CANCELLED | OUTPATIENT
Start: 2021-01-14

## 2020-10-01 RX ORDER — SODIUM CHLORIDE 0.9 % (FLUSH) 0.9 %
10 SYRINGE (ML) INJECTION PRN
Status: DISCONTINUED | OUTPATIENT
Start: 2020-10-01 | End: 2020-10-02 | Stop reason: HOSPADM

## 2020-10-01 RX ORDER — METHYLPREDNISOLONE SODIUM SUCCINATE 125 MG/2ML
125 INJECTION, POWDER, LYOPHILIZED, FOR SOLUTION INTRAMUSCULAR; INTRAVENOUS ONCE
Status: CANCELLED | OUTPATIENT
Start: 2020-10-01

## 2020-10-01 RX ORDER — HEPARIN SODIUM (PORCINE) LOCK FLUSH IV SOLN 100 UNIT/ML 100 UNIT/ML
500 SOLUTION INTRAVENOUS PRN
Status: CANCELLED | OUTPATIENT
Start: 2020-11-12

## 2020-10-01 RX ORDER — DIPHENHYDRAMINE HYDROCHLORIDE 50 MG/ML
50 INJECTION INTRAMUSCULAR; INTRAVENOUS ONCE
Status: CANCELLED | OUTPATIENT
Start: 2021-01-14

## 2020-10-01 RX ORDER — METHYLPREDNISOLONE SODIUM SUCCINATE 125 MG/2ML
125 INJECTION, POWDER, LYOPHILIZED, FOR SOLUTION INTRAMUSCULAR; INTRAVENOUS ONCE
Status: CANCELLED | OUTPATIENT
Start: 2021-01-14

## 2020-10-01 RX ORDER — SODIUM CHLORIDE 9 MG/ML
INJECTION, SOLUTION INTRAVENOUS CONTINUOUS
Status: CANCELLED | OUTPATIENT
Start: 2020-10-01

## 2020-10-01 RX ORDER — EPINEPHRINE 1 MG/ML
0.3 INJECTION, SOLUTION, CONCENTRATE INTRAVENOUS PRN
Status: CANCELLED | OUTPATIENT
Start: 2020-12-10

## 2020-10-01 RX ORDER — SODIUM CHLORIDE 0.9 % (FLUSH) 0.9 %
5 SYRINGE (ML) INJECTION PRN
Status: CANCELLED | OUTPATIENT
Start: 2020-10-29

## 2020-10-01 RX ORDER — METHYLPREDNISOLONE SODIUM SUCCINATE 125 MG/2ML
125 INJECTION, POWDER, LYOPHILIZED, FOR SOLUTION INTRAMUSCULAR; INTRAVENOUS ONCE
Status: CANCELLED | OUTPATIENT
Start: 2020-12-10

## 2020-10-01 RX ORDER — ONDANSETRON 2 MG/ML
4 INJECTION INTRAMUSCULAR; INTRAVENOUS ONCE
Status: CANCELLED
Start: 2020-10-29

## 2020-10-01 RX ORDER — DIPHENHYDRAMINE HYDROCHLORIDE 50 MG/ML
50 INJECTION INTRAMUSCULAR; INTRAVENOUS ONCE
Status: CANCELLED | OUTPATIENT
Start: 2020-10-29

## 2020-10-01 RX ORDER — SODIUM CHLORIDE 0.9 % (FLUSH) 0.9 %
10 SYRINGE (ML) INJECTION PRN
Status: CANCELLED | OUTPATIENT
Start: 2021-01-14

## 2020-10-01 RX ORDER — SODIUM CHLORIDE 0.9 % (FLUSH) 0.9 %
5 SYRINGE (ML) INJECTION PRN
Status: CANCELLED | OUTPATIENT
Start: 2020-10-01

## 2020-10-01 RX ORDER — METHYLPREDNISOLONE SODIUM SUCCINATE 125 MG/2ML
125 INJECTION, POWDER, LYOPHILIZED, FOR SOLUTION INTRAMUSCULAR; INTRAVENOUS ONCE
Status: CANCELLED | OUTPATIENT
Start: 2020-10-29

## 2020-10-01 RX ORDER — HEPARIN SODIUM (PORCINE) LOCK FLUSH IV SOLN 100 UNIT/ML 100 UNIT/ML
500 SOLUTION INTRAVENOUS PRN
Status: CANCELLED | OUTPATIENT
Start: 2020-10-15

## 2020-10-01 RX ORDER — ONDANSETRON 2 MG/ML
4 INJECTION INTRAMUSCULAR; INTRAVENOUS ONCE
Status: CANCELLED
Start: 2020-10-15

## 2020-10-01 RX ORDER — SODIUM CHLORIDE 0.9 % (FLUSH) 0.9 %
10 SYRINGE (ML) INJECTION PRN
Status: CANCELLED | OUTPATIENT
Start: 2020-10-15

## 2020-10-01 RX ORDER — DIPHENHYDRAMINE HYDROCHLORIDE 50 MG/ML
50 INJECTION INTRAMUSCULAR; INTRAVENOUS ONCE
Status: CANCELLED | OUTPATIENT
Start: 2020-12-10

## 2020-10-01 RX ORDER — SODIUM CHLORIDE 9 MG/ML
20 INJECTION, SOLUTION INTRAVENOUS ONCE
Status: CANCELLED | OUTPATIENT
Start: 2020-10-15

## 2020-10-01 RX ORDER — ONDANSETRON 2 MG/ML
4 INJECTION INTRAMUSCULAR; INTRAVENOUS ONCE
Status: CANCELLED
Start: 2020-11-12

## 2020-10-01 RX ORDER — SODIUM CHLORIDE 9 MG/ML
20 INJECTION, SOLUTION INTRAVENOUS ONCE
Status: CANCELLED | OUTPATIENT
Start: 2021-01-14

## 2020-10-01 RX ORDER — HEPARIN SODIUM (PORCINE) LOCK FLUSH IV SOLN 100 UNIT/ML 100 UNIT/ML
500 SOLUTION INTRAVENOUS PRN
Status: CANCELLED | OUTPATIENT
Start: 2021-01-14

## 2020-10-01 RX ORDER — SODIUM CHLORIDE 9 MG/ML
INJECTION, SOLUTION INTRAVENOUS CONTINUOUS
Status: CANCELLED | OUTPATIENT
Start: 2020-10-15

## 2020-10-01 RX ORDER — SODIUM CHLORIDE 9 MG/ML
INJECTION, SOLUTION INTRAVENOUS CONTINUOUS
Status: CANCELLED | OUTPATIENT
Start: 2020-10-29

## 2020-10-01 RX ADMIN — SODIUM CHLORIDE 20 ML/HR: 9 INJECTION, SOLUTION INTRAVENOUS at 11:38

## 2020-10-01 RX ADMIN — CYCLOPHOSPHAMIDE 500 MG: 500 INJECTION, POWDER, FOR SOLUTION INTRAVENOUS; ORAL at 11:55

## 2020-10-01 RX ADMIN — ONDANSETRON 4 MG: 2 INJECTION INTRAMUSCULAR; INTRAVENOUS at 11:38

## 2020-10-01 NOTE — PROGRESS NOTES
Pt here for 1 of 6 Cytoxan infusions. Denies any complaints. Labs drawn and results reviewed. [facility] information on drug and possible side effects discussed. Pt was treated without incident and d/c'd in stable condition. Pt will return on 10-15-20 for 2 of 6.

## 2020-10-07 ENCOUNTER — CARE COORDINATION (OUTPATIENT)
Dept: CARE COORDINATION | Age: 55
End: 2020-10-07

## 2020-10-07 ENCOUNTER — TELEPHONE (OUTPATIENT)
Dept: UROLOGY | Age: 55
End: 2020-10-07

## 2020-10-07 NOTE — TELEPHONE ENCOUNTER
Spoke with Rosalba Munoz with [facility] and she states that she can get patient set back up with a . Danayjessica Escobedod just states that she would need a msg of everything needed for patient and she will handle it. Melvin Palomino is familiar with patient.  Melvin Palomino can be reached at [phone]

## 2020-10-07 NOTE — CARE COORDINATION
Ambulatory Care Coordination Note  10/7/2020  CM Risk Score: 16  Charlson 10 Year Mortality Risk Score: 100%     ACC: Kim Virk, RN    Summary Note: Spoke with patient, explained care coordination at urology's request. Patient is accepting of program.  Patient is familiar with [facility], has contact information. He agreed to being a little overwhelmed with current health issues. ACM verified that he is not on dialysis, patient stated he meant to say chemo when speaking with urology. He denied transportation needs. He has home care setting up his medications weekly. Per patient he has emotional support, no spiritual needs today. He denied any food insecurities. Patient agreed to call AC with concerns and agreed to have AC check on him regularly. AC recommended patient asking to speak with the  at the cancer center as well, explained they have multiple helpful resources. Patient agreed. [facility] plans to call patient in 5-7 days for a follow up. Precautions reviewed for COVID-19 per CDC  Wash hands often with soap and water for at least 20 seconds  When soap is not available us hand  with at least 70% alcohol  Avoid touching your face  Avoid close contact with others  Maintain 6 feet distance if possible    If you are sick:  Cover mouth and nose when coughing or sneezing and then wash hands  Wear a mask when around others  Clean and disinfect surfaces often with soap or detergent and water. Ambulatory Care Coordination Assessment    Care Coordination Protocol  Program Enrollment:  Complex Care  Referral from Primary Care Provider:  No  Week 1 - Initial Assessment     Do you have all of your prescriptions and are they filled?:  Yes  Barriers to medication adherence:  None  How often do you have trouble taking your medications the way you have been told to take them?:  I always take them as prescribed.      Do you have Home O2 Therapy?:  No      Ability to seek help/take action for Emergent Urgent situations i.e. fire, crime, inclement weather or health crisis. :  Independent  Ability to ambulate to restroom:  Independent  Ability handle personal hygeine needs (bathing/dressing/grooming): Independent  Ability to manage Medications: Independent  Ability to prepare Food Preparation:  Independent  Ability to maintain home (clean home, laundry): Independent  Ability to drive and/or has transportation:  Independent  Ability to do shopping:  Independent  Ability to manage finances: Independent  Is patient able to live independently?:  Yes     Current Housing:  Private Residence              Are you experiencing loss of meaning?:  No  Are you experiencing loss of hope and peace?:  No     Thinking about your patient's physical health needs, are there any symptoms or problems (risk indicators) you are unsure about that require further investigation?:  No identified areas of uncertainly or problems already being investigated   Are the patients physical health problems impacting on their mental well-being?:  No identified areas of concern   Are there any problems with your patients lifestyle behaviors (alcohol, drugs, diet, exercise) that are impacting on physical or mental well-being?:  No identified areas of concern   Do you have any other concerns about your patients mental well-being?  How would you rate their severity and impact on the patient?:  No identified areas of concern   How would you rate their home environment in terms of safety and stability (including domestic violence, insecure housing, neighbor harassment)?:  Consistently safe, supportive, stable, no identified problems   How do daily activities impact on the patient's well-being? (include current or anticipated unemployment, work, caregiving, access to transportation or other):  No identified problems or perceived positive benefits   How would you rate their social network (family, work, friends)?:  Good participation with social networks   How would you rate their financial resources (including ability to afford all required medical care)?:  Financially secure, some resource challenges   How wells does the patient now understand their health and well-being (symptoms, signs or risk factors) and what they need to do to manage their health?:  Reasonable to good understanding and already engages in managing health or is willing to undertake better management   How well do you think your patient can engage in healthcare discussions? (Barriers include language, deafness, aphasia, alcohol or drug problems, learning difficulties, concentration):  Clear and open communication, no identified barriers   Do other services need to be involved to help this patient?:  Other care/services not required at this time   Are current services involved with this patient well-coordinated? (Include coordination with other services you are now recommendation):  Required care/services in place and adequately coordinated   Suggested Interventions and Community Resources                  Prior to Admission medications    Medication Sig Start Date End Date Taking?  Authorizing Provider   metoclopramide (REGLAN) 10 MG tablet take 1 tablet by mouth three times a day before meals 9/28/20   Rhiannon Roper MD   furosemide (LASIX) 40 MG tablet Take 1 tablet by mouth daily 9/25/20   Pedrito Sánchez MD   calcium carbonate (ANTACID) 500 MG chewable tablet Take 1 tablet by mouth daily 9/25/20 10/25/20  Pedrito Sánchez MD   Cholecalciferol (VITAMIN D3) 20 MCG (800 UNIT) TABS Take 800 Units daily 9/25/20   Eugene Hoffman MD   sulfamethoxazole-trimethoprim (BACTRIM) 400-80 MG per tablet Take 1 tablet by mouth daily for 12 weeks starting the day of cyclophosphamide treatment  Patient not taking: Reported on 9/25/2020 9/10/20   Pedrito Sánchez MD   albuterol sulfate  (90 Base) MCG/ACT inhaler inhale 2 puffs by mouth every 6 hours if needed for wheezing 9/8/20   Hayde Mcgovern, APRN - CNP   cloNIDine (CATAPRES) 0.2 MG tablet take 1 tablet by mouth twice a day 8/10/20   Abhi Lu MD   NIFEdipine (PROCARDIA XL) 30 MG extended release tablet Take 1 tablet by mouth daily 7/17/20   Jazmin Aguilar MD   OLANZapine (ZYPREXA) 5 MG tablet Take 1 tablet by mouth nightly 7/13/20   Dayday Mackenzie MD   magnesium oxide (MAG-OX) 400 (241.3 Mg) MG TABS tablet Take 1 tablet by mouth 2 times daily 7/13/20   Dayday Mackenzie MD   traZODone (DESYREL) 100 MG tablet Take 100 mg by mouth nightly as needed for Sleep    Historical Provider, MD   isosorbide mononitrate (IMDUR) 30 MG extended release tablet take 1 tablet by mouth once daily 6/12/20   Abhi Lu MD   nitroGLYCERIN (NITROSTAT) 0.4 MG SL tablet Place 1 tablet under the tongue every 5 minutes as needed for Chest pain up to max of 3 total doses. If no relief after 1 dose, call 911. Patient not taking: Reported on 9/25/2020 5/26/20   Abhi Lu MD   tamsulosin [facility]) 0.4 MG capsule Take 1 capsule by mouth daily 3/12/20   Shelton Donovan MD   Fluticasone furoate-vilanterol (BREO ELLIPTA) 200-25 MCG/INH AEPB inhaler Inhale 1 puff into the lungs daily    Historical Provider, MD   pantoprazole (PROTONIX) 40 MG tablet Take 1 tablet by mouth daily 2/7/20   Abhi Lu MD   acetaminophen (TYLENOL) 325 MG tablet Take 2 tablets by mouth every 6 hours as needed for Pain 1/23/20   Marylee Brunt, DO   DULoxetine (CYMBALTA) 60 MG extended release capsule take 1 capsule by mouth twice a day  Patient taking differently: Take by mouth daily 2 capsules by mouth once daily. Do not crush or break. May add contents of capsule to apple juice or apple sauce, but not chocolate.  1/3/20   Abhi Lu MD   metoprolol (LOPRESSOR) 100 MG tablet Take 1 tablet by mouth 2 times daily 1/2/20   Abhi Lu MD   ipratropium-albuterol (DUONEB) 0.5-2.5 (3) MG/3ML SOLN nebulizer solution Inhale 3 mLs into the lungs every 4 hours as needed for Shortness of Breath 11/7/19   Lizeth Rodriguez MD   atorvastatin (LIPITOR) 20 MG tablet take 1 tablet by mouth at bedtime 11/4/19   Lizeth Rodriguez MD       Future Appointments   Date Time Provider Gregg Monroy   10/8/2020  8:30 AM Lizteh Rodriguez MD [address]   10/15/2020 11:00 AM [facility]   10/29/2020 11:00 AM [facility] MED ONC CHAIR 09 [facility] FALGUNI Read

## 2020-10-08 ENCOUNTER — OFFICE VISIT (OUTPATIENT)
Dept: INTERNAL MEDICINE CLINIC | Age: 55
End: 2020-10-08
Payer: COMMERCIAL

## 2020-10-08 VITALS
WEIGHT: 209 LBS | TEMPERATURE: 98.8 F | HEIGHT: 69 IN | BODY MASS INDEX: 30.96 KG/M2 | SYSTOLIC BLOOD PRESSURE: 140 MMHG | RESPIRATION RATE: 16 BRPM | DIASTOLIC BLOOD PRESSURE: 100 MMHG | HEART RATE: 84 BPM

## 2020-10-08 PROCEDURE — G8427 DOCREV CUR MEDS BY ELIG CLIN: HCPCS | Performed by: INTERNAL MEDICINE

## 2020-10-08 PROCEDURE — 90686 IIV4 VACC NO PRSV 0.5 ML IM: CPT | Performed by: INTERNAL MEDICINE

## 2020-10-08 PROCEDURE — G8926 SPIRO NO PERF OR DOC: HCPCS | Performed by: INTERNAL MEDICINE

## 2020-10-08 PROCEDURE — G8417 CALC BMI ABV UP PARAM F/U: HCPCS | Performed by: INTERNAL MEDICINE

## 2020-10-08 PROCEDURE — 3044F HG A1C LEVEL LT 7.0%: CPT | Performed by: INTERNAL MEDICINE

## 2020-10-08 PROCEDURE — 90471 IMMUNIZATION ADMIN: CPT | Performed by: INTERNAL MEDICINE

## 2020-10-08 PROCEDURE — 4004F PT TOBACCO SCREEN RCVD TLK: CPT | Performed by: INTERNAL MEDICINE

## 2020-10-08 PROCEDURE — 3017F COLORECTAL CA SCREEN DOC REV: CPT | Performed by: INTERNAL MEDICINE

## 2020-10-08 PROCEDURE — G8431 POS CLIN DEPRES SCRN F/U DOC: HCPCS | Performed by: INTERNAL MEDICINE

## 2020-10-08 PROCEDURE — 2022F DILAT RTA XM EVC RTNOPTHY: CPT | Performed by: INTERNAL MEDICINE

## 2020-10-08 PROCEDURE — 96160 PT-FOCUSED HLTH RISK ASSMT: CPT | Performed by: INTERNAL MEDICINE

## 2020-10-08 PROCEDURE — 3023F SPIROM DOC REV: CPT | Performed by: INTERNAL MEDICINE

## 2020-10-08 PROCEDURE — 99214 OFFICE O/P EST MOD 30 MIN: CPT | Performed by: INTERNAL MEDICINE

## 2020-10-08 PROCEDURE — G8482 FLU IMMUNIZE ORDER/ADMIN: HCPCS | Performed by: INTERNAL MEDICINE

## 2020-10-08 RX ORDER — NIFEDIPINE 30 MG/1
60 TABLET, EXTENDED RELEASE ORAL DAILY
Qty: 90 TABLET | Refills: 3 | Status: ON HOLD | OUTPATIENT
Start: 2020-10-08 | End: 2020-12-20 | Stop reason: HOSPADM

## 2020-10-08 ASSESSMENT — ENCOUNTER SYMPTOMS
BACK PAIN: 0
COUGH: 0
FACIAL SWELLING: 0
ABDOMINAL PAIN: 0
COLOR CHANGE: 0
ABDOMINAL DISTENTION: 0
APNEA: 0
CONSTIPATION: 0
CHEST TIGHTNESS: 0
SHORTNESS OF BREATH: 1
WHEEZING: 0
DIARRHEA: 0

## 2020-10-08 ASSESSMENT — PATIENT HEALTH QUESTIONNAIRE - PHQ9
9. THOUGHTS THAT YOU WOULD BE BETTER OFF DEAD, OR OF HURTING YOURSELF: 0
10. IF YOU CHECKED OFF ANY PROBLEMS, HOW DIFFICULT HAVE THESE PROBLEMS MADE IT FOR YOU TO DO YOUR WORK, TAKE CARE OF THINGS AT HOME, OR GET ALONG WITH OTHER PEOPLE: 1
SUM OF ALL RESPONSES TO PHQ9 QUESTIONS 1 & 2: 4
2. FEELING DOWN, DEPRESSED OR HOPELESS: 3
SUM OF ALL RESPONSES TO PHQ QUESTIONS 1-9: 15
1. LITTLE INTEREST OR PLEASURE IN DOING THINGS: 1
5. POOR APPETITE OR OVEREATING: 2
7. TROUBLE CONCENTRATING ON THINGS, SUCH AS READING THE NEWSPAPER OR WATCHING TELEVISION: 3
6. FEELING BAD ABOUT YOURSELF - OR THAT YOU ARE A FAILURE OR HAVE LET YOURSELF OR YOUR FAMILY DOWN: 0
3. TROUBLE FALLING OR STAYING ASLEEP: 3
4. FEELING TIRED OR HAVING LITTLE ENERGY: 2
8. MOVING OR SPEAKING SO SLOWLY THAT OTHER PEOPLE COULD HAVE NOTICED. OR THE OPPOSITE, BEING SO FIGETY OR RESTLESS THAT YOU HAVE BEEN MOVING AROUND A LOT MORE THAN USUAL: 1
SUM OF ALL RESPONSES TO PHQ QUESTIONS 1-9: 15

## 2020-10-08 NOTE — PROGRESS NOTES
Subjective:      Patient ID: Tommi Skiff is a 60 y.o. male. Visit Information    Have you changed or started any medications since your last visit including any over-the-counter medicines, vitamins, or herbal medicines? no   Are you having any side effects from any of your medications? -  no  Have you stopped taking any of your medications? Is so, why? -  no    Have you seen any other physician or provider since your last visit? Yes - Records Obtained  Have you had any other diagnostic tests since your last visit? Yes - Records Obtained  Have you been seen in the emergency room and/or had an admission to a hospital since we last saw you? No  Have you had your routine dental cleaning in the past 6 months? yes -    Have you activated your [employer] account? If not, what are your barriers?  Yes     Patient Care Team:  Gopal Ortega MD as PCP - General (Internal Medicine)  Gopal Ortega MD as PCP - [facility]  Elieser Cunningham as Surgeon (Cardiothoracic Surgery)  Mili Leone MD (Cardiology)  Ronan Daniels MD as Orthopedic Surgeon (Orthopedic Surgery)  Robert Gonzales MD as Surgeon (Orthopedic Surgery)  Ugo Daniel MD as Surgeon (Neurosurgery)  Poncho Johnson MD as Consulting Physician (Neurology)  Zheng Mcgovern MD as Consulting Physician (Pulmonology)  Mis Mcgraw, RN as Ambulatory Care Manager    Medical History Review  Past Medical, Family, and Social History reviewed and does contribute to the patient presenting condition    Health Maintenance   Topic Date Due    Diabetic foot exam  04/04/1973    Diabetic retinal exam  04/04/1973    Hepatitis B vaccine (1 of 3 - Risk 3-dose series) 04/04/1982    Shingles Vaccine (1 of 2) 04/04/2013    Pneumococcal 0-64 years Vaccine (3 of 3 - PCV13) 03/14/2018    Flu vaccine (1) 09/01/2020    A1C test (Diabetic or Prediabetic)  07/10/2021    Lipid screen  07/10/2021    Potassium monitoring  10/01/2021    Creatinine monitoring 10/01/2021    DTaP/Tdap/Td vaccine (2 - Td) 11/17/2026    Colon cancer screen colonoscopy  07/30/2029    HIV screen  Completed    Hepatitis C screen  Addressed    Hepatitis A vaccine  Aged Out    Hib vaccine  Aged Out    Meningococcal (ACWY) vaccine  Aged Out     Chief Complaint   Patient presents with    Follow-up         HPI- Patient is here for evaluation of Multiple medical Problem , he has CAD s/p CABG, HTN, CKD, Follows with Nephrologist, underwent Renal Biopsy . 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 HENT: Negative for congestion, dental problem, ear discharge, facial swelling and hearing loss. Respiratory: Positive for shortness of breath. Negative for apnea, cough, chest tightness and wheezing. Cardiovascular: Negative for chest pain and leg swelling. Gastrointestinal: Negative for abdominal distention, abdominal pain, constipation and diarrhea. Genitourinary: Negative for difficulty urinating, dysuria, enuresis, flank pain and frequency. Musculoskeletal: Negative for arthralgias, back pain, gait problem and joint swelling. Skin: Negative for color change, pallor and rash. Neurological: Negative for dizziness, seizures, facial asymmetry, light-headedness, numbness and headaches. Psychiatric/Behavioral: Negative for agitation, behavioral problems, confusion, decreased concentration and dysphoric mood. Objective:   Physical Exam  Constitutional:       Appearance: He is well-developed.  He and health maintenance. · Discussed use, benefit, and side effects of prescribed medications. Barriers to medication compliance addressed. All patient questions answered. Pt voiced understanding. MD [facility]  10/8/2020, 9:04 AM    Please note that this chart was generated using voice recognition Dragon dictation software. Although every effort was made to ensure the accuracy of this automated transcription, some errors in transcription may have occurred.

## 2020-10-13 ENCOUNTER — HOSPITAL ENCOUNTER (OUTPATIENT)
Age: 55
Discharge: HOME | End: 2020-10-13
Payer: COMMERCIAL

## 2020-10-13 LAB
-: ABNORMAL
AMORPHOUS: ABNORMAL
BACTERIA: ABNORMAL
BILIRUBIN URINE: NEGATIVE
CASTS UA: ABNORMAL /LPF
COLOR: YELLOW
COMMENT UA: ABNORMAL
CRYSTALS, UA: ABNORMAL /HPF
EPITHELIAL CELLS UA: ABNORMAL /HPF
GLUCOSE URINE: NEGATIVE
KETONES, URINE: NEGATIVE
LEUKOCYTE ESTERASE, URINE: NEGATIVE
MUCUS: ABNORMAL
NITRITE, URINE: NEGATIVE
OTHER OBSERVATIONS UA: ABNORMAL
PH UA: 6 (ref 5–8)
PROTEIN UA: ABNORMAL
RBC UA: ABNORMAL /HPF
RENAL EPITHELIAL, UA: ABNORMAL /HPF
SPECIFIC GRAVITY UA: 1.01 (ref 1–1.03)
TRICHOMONAS: ABNORMAL
TURBIDITY: CLEAR
URINE HGB: NEGATIVE
UROBILINOGEN, URINE: NORMAL
WBC UA: ABNORMAL /HPF
YEAST: ABNORMAL

## 2020-10-13 PROCEDURE — 81001 URINALYSIS AUTO W/SCOPE: CPT

## 2020-10-15 ENCOUNTER — CARE COORDINATION (OUTPATIENT)
Dept: CARE COORDINATION | Age: 55
End: 2020-10-15

## 2020-10-15 ENCOUNTER — HOSPITAL ENCOUNTER (OUTPATIENT)
Dept: INFUSION THERAPY | Age: 55
Discharge: HOME | End: 2020-10-15
Payer: COMMERCIAL

## 2020-10-15 VITALS
DIASTOLIC BLOOD PRESSURE: 87 MMHG | SYSTOLIC BLOOD PRESSURE: 148 MMHG | RESPIRATION RATE: 16 BRPM | HEIGHT: 69 IN | BODY MASS INDEX: 31.07 KG/M2 | WEIGHT: 209.8 LBS | TEMPERATURE: 97.6 F | HEART RATE: 65 BPM

## 2020-10-15 DIAGNOSIS — D63.1 ANEMIA IN STAGE 4 CHRONIC KIDNEY DISEASE (HCC): ICD-10-CM

## 2020-10-15 DIAGNOSIS — R76.8 ABNORMAL ANCA (ANTINEUTROPHIL CYTOPLASMIC ANTIBODY): ICD-10-CM

## 2020-10-15 DIAGNOSIS — N18.4 CKD (CHRONIC KIDNEY DISEASE), STAGE IV (HCC): ICD-10-CM

## 2020-10-15 DIAGNOSIS — N01.7 RAPID PROGRESSV NEPHRITIC SYNDRM, DIFFUSE CRESCENTC GLOMERULONEPHRITIS: ICD-10-CM

## 2020-10-15 DIAGNOSIS — N18.4 BENIGN HYPERTENSION WITH CKD (CHRONIC KIDNEY DISEASE) STAGE IV (HCC): Primary | ICD-10-CM

## 2020-10-15 DIAGNOSIS — N18.4 ANEMIA IN STAGE 4 CHRONIC KIDNEY DISEASE (HCC): ICD-10-CM

## 2020-10-15 DIAGNOSIS — N04.1 NEPHROTIC SYNDROME WITH FOCAL GLOMERULOSCLEROSIS: ICD-10-CM

## 2020-10-15 DIAGNOSIS — I12.9 BENIGN HYPERTENSION WITH CKD (CHRONIC KIDNEY DISEASE) STAGE IV (HCC): Primary | ICD-10-CM

## 2020-10-15 DIAGNOSIS — N17.0 ACUTE KIDNEY FAILURE WITH LESION OF TUBULAR NECROSIS (HCC): ICD-10-CM

## 2020-10-15 LAB
ABSOLUTE EOS #: 0 K/UL (ref 0–0.4)
ABSOLUTE IMMATURE GRANULOCYTE: ABNORMAL K/UL (ref 0–0.3)
ABSOLUTE LYMPH #: 1.53 K/UL (ref 1–4.8)
ABSOLUTE MONO #: 0.37 K/UL (ref 0.1–0.8)
ANION GAP SERPL CALCULATED.3IONS-SCNC: 13 MMOL/L (ref 9–17)
BASOPHILS # BLD: 0 % (ref 0–2)
BASOPHILS ABSOLUTE: 0 K/UL (ref 0–0.2)
BUN BLDV-MCNC: 50 MG/DL (ref 6–20)
BUN/CREAT BLD: ABNORMAL (ref 9–20)
CALCIUM SERPL-MCNC: 9 MG/DL (ref 8.6–10.4)
CHLORIDE BLD-SCNC: 100 MMOL/L (ref 98–107)
CO2: 22 MMOL/L (ref 20–31)
CREAT SERPL-MCNC: 2.18 MG/DL (ref 0.7–1.2)
DIFFERENTIAL TYPE: ABNORMAL
EOSINOPHILS RELATIVE PERCENT: 0 % (ref 1–4)
GFR AFRICAN AMERICAN: 38 ML/MIN
GFR NON-AFRICAN AMERICAN: 31 ML/MIN
GFR SERPL CREATININE-BSD FRML MDRD: ABNORMAL ML/MIN/{1.73_M2}
GFR SERPL CREATININE-BSD FRML MDRD: ABNORMAL ML/MIN/{1.73_M2}
GLUCOSE BLD-MCNC: 244 MG/DL (ref 70–99)
HCT VFR BLD CALC: 39.7 % (ref 41–53)
HEMOGLOBIN: 12.8 G/DL (ref 13.5–17.5)
IMMATURE GRANULOCYTES: ABNORMAL %
LYMPHOCYTES # BLD: 21 % (ref 24–44)
MCH RBC QN AUTO: 26.8 PG (ref 26–34)
MCHC RBC AUTO-ENTMCNC: 32.3 G/DL (ref 31–37)
MCV RBC AUTO: 82.9 FL (ref 80–100)
METAMYELOCYTES ABSOLUTE COUNT: 0.29 K/UL
METAMYELOCYTES: 4 %
MONOCYTES # BLD: 5 % (ref 1–7)
MORPHOLOGY: ABNORMAL
NRBC AUTOMATED: ABNORMAL PER 100 WBC
PDW BLD-RTO: 21.4 % (ref 12.5–15.4)
PLATELET # BLD: 175 K/UL (ref 140–450)
PLATELET ESTIMATE: ABNORMAL
PMV BLD AUTO: 7.6 FL (ref 6–12)
POTASSIUM SERPL-SCNC: 4.2 MMOL/L (ref 3.7–5.3)
RBC # BLD: 4.78 M/UL (ref 4.5–5.9)
RBC # BLD: ABNORMAL 10*6/UL
SEG NEUTROPHILS: 70 % (ref 36–66)
SEGMENTED NEUTROPHILS ABSOLUTE COUNT: 5.11 K/UL (ref 1.8–7.7)
SODIUM BLD-SCNC: 135 MMOL/L (ref 135–144)
WBC # BLD: 7.3 K/UL (ref 3.5–11)
WBC # BLD: ABNORMAL 10*3/UL

## 2020-10-15 PROCEDURE — 85025 COMPLETE CBC W/AUTO DIFF WBC: CPT

## 2020-10-15 PROCEDURE — 36415 COLL VENOUS BLD VENIPUNCTURE: CPT

## 2020-10-15 PROCEDURE — 6360000002 HC RX W HCPCS: Performed by: INTERNAL MEDICINE

## 2020-10-15 PROCEDURE — 80048 BASIC METABOLIC PNL TOTAL CA: CPT

## 2020-10-15 PROCEDURE — 96413 CHEMO IV INFUSION 1 HR: CPT | Performed by: NURSE PRACTITIONER

## 2020-10-15 PROCEDURE — 2580000003 HC RX 258: Performed by: INTERNAL MEDICINE

## 2020-10-15 PROCEDURE — 96375 TX/PRO/DX INJ NEW DRUG ADDON: CPT | Performed by: NURSE PRACTITIONER

## 2020-10-15 RX ORDER — ONDANSETRON 2 MG/ML
4 INJECTION INTRAMUSCULAR; INTRAVENOUS ONCE
Status: COMPLETED | OUTPATIENT
Start: 2020-10-15 | End: 2020-10-15

## 2020-10-15 RX ORDER — SODIUM CHLORIDE 9 MG/ML
20 INJECTION, SOLUTION INTRAVENOUS ONCE
Status: COMPLETED | OUTPATIENT
Start: 2020-10-15 | End: 2020-10-15

## 2020-10-15 RX ADMIN — ONDANSETRON 4 MG: 2 INJECTION INTRAMUSCULAR; INTRAVENOUS at 11:01

## 2020-10-15 RX ADMIN — CYCLOPHOSPHAMIDE 500 MG: 500 INJECTION, POWDER, FOR SOLUTION INTRAVENOUS; ORAL at 11:35

## 2020-10-15 RX ADMIN — SODIUM CHLORIDE 20 ML/HR: 9 INJECTION, SOLUTION INTRAVENOUS at 10:57

## 2020-10-15 NOTE — CARE COORDINATION
After opening chart to call patient it is noted that he is currently at his chemo appointment, will call at a later date.

## 2020-10-21 ENCOUNTER — HOSPITAL ENCOUNTER (OUTPATIENT)
Dept: ULTRASOUND IMAGING | Age: 55
Discharge: HOME | End: 2020-10-23
Payer: COMMERCIAL

## 2020-10-21 PROCEDURE — 76775 US EXAM ABDO BACK WALL LIM: CPT

## 2020-10-23 ENCOUNTER — CARE COORDINATION (OUTPATIENT)
Dept: CARE COORDINATION | Age: 55
End: 2020-10-23

## 2020-10-23 SDOH — HEALTH STABILITY: MENTAL HEALTH
STRESS IS WHEN SOMEONE FEELS TENSE, NERVOUS, ANXIOUS, OR CAN'T SLEEP AT NIGHT BECAUSE THEIR MIND IS TROUBLED. HOW STRESSED ARE YOU?: TO SOME EXTENT

## 2020-10-23 SDOH — ECONOMIC STABILITY: FOOD INSECURITY: WITHIN THE PAST 12 MONTHS, YOU WORRIED THAT YOUR FOOD WOULD RUN OUT BEFORE YOU GOT MONEY TO BUY MORE.: NEVER TRUE

## 2020-10-23 SDOH — ECONOMIC STABILITY: TRANSPORTATION INSECURITY
IN THE PAST 12 MONTHS, HAS LACK OF TRANSPORTATION KEPT YOU FROM MEETINGS, WORK, OR FROM GETTING THINGS NEEDED FOR DAILY LIVING?: NO

## 2020-10-23 SDOH — ECONOMIC STABILITY: INCOME INSECURITY: HOW HARD IS IT FOR YOU TO PAY FOR THE VERY BASICS LIKE FOOD, HOUSING, MEDICAL CARE, AND HEATING?: NOT VERY HARD

## 2020-10-23 SDOH — HEALTH STABILITY: PHYSICAL HEALTH: ON AVERAGE, HOW MANY DAYS PER WEEK DO YOU ENGAGE IN MODERATE TO STRENUOUS EXERCISE (LIKE A BRISK WALK)?: 0 DAYS

## 2020-10-23 SDOH — ECONOMIC STABILITY: TRANSPORTATION INSECURITY
IN THE PAST 12 MONTHS, HAS THE LACK OF TRANSPORTATION KEPT YOU FROM MEDICAL APPOINTMENTS OR FROM GETTING MEDICATIONS?: NO

## 2020-10-23 SDOH — ECONOMIC STABILITY: FOOD INSECURITY: WITHIN THE PAST 12 MONTHS, THE FOOD YOU BOUGHT JUST DIDN'T LAST AND YOU DIDN'T HAVE MONEY TO GET MORE.: NEVER TRUE

## 2020-10-23 NOTE — CARE COORDINATION
chocolate.  1/3/20   Suzie Mane MD   metoprolol (LOPRESSOR) 100 MG tablet Take 1 tablet by mouth 2 times daily 1/2/20   Suzie Mane MD   ipratropium-albuterol (DUONEB) 0.5-2.5 (3) MG/3ML SOLN nebulizer solution Inhale 3 mLs into the lungs every 4 hours as needed for Shortness of Breath 11/7/19   Suzie Mane MD   atorvastatin (LIPITOR) 20 MG tablet take 1 tablet by mouth at bedtime 11/4/19   Suzie Mane MD       Future Appointments   Date Time Provider Gregg Monroy   10/29/2020 11:00 AM [facility] ONC CHAIR [address] [facility]     ,   Diabetes Assessment        No patient-reported symptoms      ,   Congestive Heart Failure Assessment    Are you currently restricting fluids?:  Other  Do you understand a low sodium diet?:  Yes  Do you understand how to read food labels?:  Yes  Do you salt your food before tasting it?:  No     No patient-reported symptoms      Symptoms:         ,   COPD Assessment    Does the patient understand envrionmental exposure?:  Yes  Is the patient able to verbalize Rescue vs. Long Acting medications?:  Yes  Does the patient use a space with inhaled medications?:  Yes     No patient-reported symptoms         Symptoms:          and   General Assessment    Do you have any symptoms that are causing concern?:  Yes  Progression since Onset:  Unchanged, Intermittent - Waxing/Waning  Reported Symptoms:  Pain

## 2020-10-27 ENCOUNTER — CARE COORDINATION (OUTPATIENT)
Dept: CARE COORDINATION | Age: 55
End: 2020-10-27

## 2020-10-29 ENCOUNTER — HOSPITAL ENCOUNTER (OUTPATIENT)
Dept: INFUSION THERAPY | Age: 55
Discharge: HOME | End: 2020-10-29
Payer: COMMERCIAL

## 2020-10-29 VITALS
DIASTOLIC BLOOD PRESSURE: 79 MMHG | TEMPERATURE: 97.4 F | BODY MASS INDEX: 32.93 KG/M2 | RESPIRATION RATE: 18 BRPM | SYSTOLIC BLOOD PRESSURE: 151 MMHG | WEIGHT: 223 LBS | HEART RATE: 73 BPM

## 2020-10-29 DIAGNOSIS — N04.1 NEPHROTIC SYNDROME WITH FOCAL GLOMERULOSCLEROSIS: ICD-10-CM

## 2020-10-29 DIAGNOSIS — N18.4 CKD (CHRONIC KIDNEY DISEASE), STAGE IV (HCC): ICD-10-CM

## 2020-10-29 DIAGNOSIS — N18.4 BENIGN HYPERTENSION WITH CKD (CHRONIC KIDNEY DISEASE) STAGE IV (HCC): Primary | ICD-10-CM

## 2020-10-29 DIAGNOSIS — N17.0 ACUTE KIDNEY FAILURE WITH LESION OF TUBULAR NECROSIS (HCC): ICD-10-CM

## 2020-10-29 DIAGNOSIS — N18.4 ANEMIA IN STAGE 4 CHRONIC KIDNEY DISEASE (HCC): ICD-10-CM

## 2020-10-29 DIAGNOSIS — N01.7 RAPID PROGRESSV NEPHRITIC SYNDRM, DIFFUSE CRESCENTC GLOMERULONEPHRITIS: ICD-10-CM

## 2020-10-29 DIAGNOSIS — I12.9 BENIGN HYPERTENSION WITH CKD (CHRONIC KIDNEY DISEASE) STAGE IV (HCC): Primary | ICD-10-CM

## 2020-10-29 DIAGNOSIS — D63.1 ANEMIA IN STAGE 4 CHRONIC KIDNEY DISEASE (HCC): ICD-10-CM

## 2020-10-29 LAB
ABSOLUTE EOS #: 0 K/UL (ref 0–0.4)
ABSOLUTE IMMATURE GRANULOCYTE: ABNORMAL K/UL (ref 0–0.3)
ABSOLUTE LYMPH #: 1.1 K/UL (ref 1–4.8)
ABSOLUTE MONO #: 0.51 K/UL (ref 0.1–0.8)
ANION GAP SERPL CALCULATED.3IONS-SCNC: 10 MMOL/L (ref 9–17)
BASOPHILS # BLD: 0 % (ref 0–2)
BASOPHILS ABSOLUTE: 0 K/UL (ref 0–0.2)
BUN BLDV-MCNC: 30 MG/DL (ref 6–20)
BUN/CREAT BLD: ABNORMAL (ref 9–20)
CALCIUM SERPL-MCNC: 8.5 MG/DL (ref 8.6–10.4)
CHLORIDE BLD-SCNC: 101 MMOL/L (ref 98–107)
CO2: 29 MMOL/L (ref 20–31)
CREAT SERPL-MCNC: 1.85 MG/DL (ref 0.7–1.2)
DIFFERENTIAL TYPE: ABNORMAL
EOSINOPHILS RELATIVE PERCENT: 0 % (ref 1–4)
GFR AFRICAN AMERICAN: 46 ML/MIN
GFR NON-AFRICAN AMERICAN: 38 ML/MIN
GFR SERPL CREATININE-BSD FRML MDRD: ABNORMAL ML/MIN/{1.73_M2}
GFR SERPL CREATININE-BSD FRML MDRD: ABNORMAL ML/MIN/{1.73_M2}
GLUCOSE BLD-MCNC: 236 MG/DL (ref 70–99)
HCT VFR BLD CALC: 38.3 % (ref 41–53)
HEMOGLOBIN: 12.5 G/DL (ref 13.5–17.5)
IMMATURE GRANULOCYTES: ABNORMAL %
LYMPHOCYTES # BLD: 15 % (ref 24–44)
MCH RBC QN AUTO: 27.4 PG (ref 26–34)
MCHC RBC AUTO-ENTMCNC: 32.7 G/DL (ref 31–37)
MCV RBC AUTO: 83.8 FL (ref 80–100)
METAMYELOCYTES ABSOLUTE COUNT: 0.15 K/UL
METAMYELOCYTES: 2 %
MONOCYTES # BLD: 7 % (ref 1–7)
MORPHOLOGY: ABNORMAL
MORPHOLOGY: ABNORMAL
NRBC AUTOMATED: ABNORMAL PER 100 WBC
PDW BLD-RTO: 20.6 % (ref 12.5–15.4)
PLATELET # BLD: 167 K/UL (ref 140–450)
PLATELET ESTIMATE: ABNORMAL
PMV BLD AUTO: 7.6 FL (ref 6–12)
POTASSIUM SERPL-SCNC: 3.4 MMOL/L (ref 3.7–5.3)
RBC # BLD: 4.57 M/UL (ref 4.5–5.9)
RBC # BLD: ABNORMAL 10*6/UL
SEG NEUTROPHILS: 76 % (ref 36–66)
SEGMENTED NEUTROPHILS ABSOLUTE COUNT: 5.54 K/UL (ref 1.8–7.7)
SODIUM BLD-SCNC: 140 MMOL/L (ref 135–144)
WBC # BLD: 7.3 K/UL (ref 3.5–11)
WBC # BLD: ABNORMAL 10*3/UL

## 2020-10-29 PROCEDURE — 85025 COMPLETE CBC W/AUTO DIFF WBC: CPT

## 2020-10-29 PROCEDURE — 2580000003 HC RX 258: Performed by: INTERNAL MEDICINE

## 2020-10-29 PROCEDURE — 6360000002 HC RX W HCPCS: Performed by: INTERNAL MEDICINE

## 2020-10-29 PROCEDURE — 36415 COLL VENOUS BLD VENIPUNCTURE: CPT

## 2020-10-29 PROCEDURE — 6370000000 HC RX 637 (ALT 250 FOR IP): Performed by: INTERNAL MEDICINE

## 2020-10-29 PROCEDURE — 96375 TX/PRO/DX INJ NEW DRUG ADDON: CPT

## 2020-10-29 PROCEDURE — 80048 BASIC METABOLIC PNL TOTAL CA: CPT

## 2020-10-29 PROCEDURE — 96413 CHEMO IV INFUSION 1 HR: CPT

## 2020-10-29 PROCEDURE — 96365 THER/PROPH/DIAG IV INF INIT: CPT

## 2020-10-29 RX ORDER — ONDANSETRON 2 MG/ML
4 INJECTION INTRAMUSCULAR; INTRAVENOUS ONCE
Status: COMPLETED | OUTPATIENT
Start: 2020-10-29 | End: 2020-10-29

## 2020-10-29 RX ORDER — SODIUM CHLORIDE 9 MG/ML
20 INJECTION, SOLUTION INTRAVENOUS ONCE
Status: COMPLETED | OUTPATIENT
Start: 2020-10-29 | End: 2020-10-29

## 2020-10-29 RX ORDER — POTASSIUM CHLORIDE 20 MEQ/1
40 TABLET, EXTENDED RELEASE ORAL ONCE
Status: COMPLETED | OUTPATIENT
Start: 2020-10-29 | End: 2020-10-29

## 2020-10-29 RX ADMIN — POTASSIUM CHLORIDE 40 MEQ: 1500 TABLET, EXTENDED RELEASE ORAL at 11:55

## 2020-10-29 RX ADMIN — SODIUM CHLORIDE 20 ML/HR: 9 INJECTION, SOLUTION INTRAVENOUS at 11:49

## 2020-10-29 RX ADMIN — ONDANSETRON 4 MG: 2 INJECTION INTRAMUSCULAR; INTRAVENOUS at 11:55

## 2020-10-29 RX ADMIN — CYCLOPHOSPHAMIDE 500 MG: 500 INJECTION, POWDER, FOR SOLUTION INTRAVENOUS; ORAL at 12:10

## 2020-10-29 ASSESSMENT — PAIN DESCRIPTION - PAIN TYPE: TYPE: CHRONIC PAIN

## 2020-10-29 ASSESSMENT — PAIN SCALES - GENERAL: PAINLEVEL_OUTOF10: 4

## 2020-10-29 ASSESSMENT — PAIN DESCRIPTION - PROGRESSION: CLINICAL_PROGRESSION: NOT CHANGED

## 2020-10-29 ASSESSMENT — PAIN DESCRIPTION - DESCRIPTORS: DESCRIPTORS: ACHING

## 2020-10-29 ASSESSMENT — PAIN DESCRIPTION - FREQUENCY: FREQUENCY: INTERMITTENT

## 2020-10-29 ASSESSMENT — PAIN DESCRIPTION - LOCATION: LOCATION: BACK

## 2020-10-29 ASSESSMENT — PAIN - FUNCTIONAL ASSESSMENT: PAIN_FUNCTIONAL_ASSESSMENT: PREVENTS OR INTERFERES SOME ACTIVE ACTIVITIES AND ADLS

## 2020-10-29 ASSESSMENT — PAIN DESCRIPTION - ORIENTATION: ORIENTATION: RIGHT

## 2020-10-29 ASSESSMENT — PAIN DESCRIPTION - ONSET: ONSET: ON-GOING

## 2020-11-03 PROBLEM — J18.9 PNEUMONIA DUE TO ORGANISM: Status: RESOLVED | Noted: 2019-11-04 | Resolved: 2020-11-03

## 2020-11-05 ENCOUNTER — HOSPITAL ENCOUNTER (OUTPATIENT)
Dept: CT IMAGING | Age: 55
Discharge: HOME | DRG: 194 | End: 2020-11-07
Payer: COMMERCIAL

## 2020-11-05 PROCEDURE — 72128 CT CHEST SPINE W/O DYE: CPT

## 2020-11-05 PROCEDURE — 72131 CT LUMBAR SPINE W/O DYE: CPT

## 2020-11-06 ENCOUNTER — APPOINTMENT (OUTPATIENT)
Dept: GENERAL RADIOLOGY | Age: 55
DRG: 194 | End: 2020-11-06
Payer: COMMERCIAL

## 2020-11-06 ENCOUNTER — CARE COORDINATION (OUTPATIENT)
Dept: CARE COORDINATION | Age: 55
End: 2020-11-06

## 2020-11-06 ENCOUNTER — HOSPITAL ENCOUNTER (INPATIENT)
Age: 55
LOS: 1 days | Discharge: HOME | DRG: 194 | End: 2020-11-07
Attending: EMERGENCY MEDICINE | Admitting: INTERNAL MEDICINE
Payer: COMMERCIAL

## 2020-11-06 PROBLEM — N18.9 CHRONIC KIDNEY DISEASE: Status: ACTIVE | Noted: 2020-07-22

## 2020-11-06 PROBLEM — R60.9 PERIPHERAL EDEMA: Status: ACTIVE | Noted: 2020-11-06

## 2020-11-06 PROBLEM — R60.0 PERIPHERAL EDEMA: Status: ACTIVE | Noted: 2020-11-06

## 2020-11-06 LAB
-: NORMAL
ABSOLUTE EOS #: 0 K/UL (ref 0–0.4)
ABSOLUTE IMMATURE GRANULOCYTE: ABNORMAL K/UL (ref 0–0.3)
ABSOLUTE LYMPH #: 1.06 K/UL (ref 1–4.8)
ABSOLUTE MONO #: 0.32 K/UL (ref 0.1–1.3)
ALBUMIN SERPL-MCNC: 3.2 G/DL (ref 3.5–5.2)
ALBUMIN/GLOBULIN RATIO: ABNORMAL (ref 1–2.5)
ALP BLD-CCNC: 141 U/L (ref 40–129)
ALT SERPL-CCNC: 23 U/L (ref 5–41)
AMORPHOUS: NORMAL
ANION GAP SERPL CALCULATED.3IONS-SCNC: 9 MMOL/L (ref 9–17)
AST SERPL-CCNC: 17 U/L
BACTERIA: NORMAL
BASOPHILS # BLD: 0 % (ref 0–2)
BASOPHILS ABSOLUTE: 0 K/UL (ref 0–0.2)
BILIRUB SERPL-MCNC: 0.72 MG/DL (ref 0.3–1.2)
BILIRUBIN URINE: NEGATIVE
BNP INTERPRETATION: ABNORMAL
BUN BLDV-MCNC: 32 MG/DL (ref 6–20)
BUN/CREAT BLD: ABNORMAL (ref 9–20)
C-REACTIVE PROTEIN: 60.4 MG/L (ref 0–5)
CALCIUM SERPL-MCNC: 8.7 MG/DL (ref 8.6–10.4)
CASTS UA: NORMAL /LPF
CHLORIDE BLD-SCNC: 96 MMOL/L (ref 98–107)
CO2: 31 MMOL/L (ref 20–31)
COLOR: YELLOW
COMMENT UA: ABNORMAL
CREAT SERPL-MCNC: 2.04 MG/DL (ref 0.7–1.2)
CRYSTALS, UA: NORMAL /HPF
DIFFERENTIAL TYPE: ABNORMAL
EOSINOPHILS RELATIVE PERCENT: 0 % (ref 0–4)
EPITHELIAL CELLS UA: NORMAL /HPF
GFR AFRICAN AMERICAN: 41 ML/MIN
GFR NON-AFRICAN AMERICAN: 34 ML/MIN
GFR SERPL CREATININE-BSD FRML MDRD: ABNORMAL ML/MIN/{1.73_M2}
GFR SERPL CREATININE-BSD FRML MDRD: ABNORMAL ML/MIN/{1.73_M2}
GLUCOSE BLD-MCNC: 113 MG/DL (ref 70–99)
GLUCOSE URINE: NEGATIVE
HCT VFR BLD CALC: 37.6 % (ref 41–53)
HEMOGLOBIN: 12.7 G/DL (ref 13.5–17.5)
IMMATURE GRANULOCYTES: ABNORMAL %
KETONES, URINE: NEGATIVE
LACTATE DEHYDROGENASE: 445 U/L (ref 135–225)
LEUKOCYTE ESTERASE, URINE: NEGATIVE
LYMPHOCYTES # BLD: 20 % (ref 24–44)
MCH RBC QN AUTO: 28.1 PG (ref 26–34)
MCHC RBC AUTO-ENTMCNC: 33.7 G/DL (ref 31–37)
MCV RBC AUTO: 83.6 FL (ref 80–100)
MONOCYTES # BLD: 6 % (ref 1–7)
MORPHOLOGY: ABNORMAL
MUCUS: NORMAL
NITRITE, URINE: NEGATIVE
NRBC AUTOMATED: ABNORMAL PER 100 WBC
OTHER OBSERVATIONS UA: NORMAL
PDW BLD-RTO: 20.7 % (ref 11.5–14.9)
PH UA: 7 (ref 5–8)
PLATELET # BLD: 124 K/UL (ref 150–450)
PLATELET ESTIMATE: ABNORMAL
PMV BLD AUTO: 7.7 FL (ref 6–12)
POTASSIUM SERPL-SCNC: 3.7 MMOL/L (ref 3.7–5.3)
PRO-BNP: 1555 PG/ML
PROTEIN UA: ABNORMAL
RBC # BLD: 4.5 M/UL (ref 4.5–5.9)
RBC # BLD: ABNORMAL 10*6/UL
RBC UA: NORMAL /HPF
RENAL EPITHELIAL, UA: NORMAL /HPF
SARS-COV-2, RAPID: NOT DETECTED
SARS-COV-2: NORMAL
SARS-COV-2: NORMAL
SEG NEUTROPHILS: 74 % (ref 36–66)
SEGMENTED NEUTROPHILS ABSOLUTE COUNT: 3.92 K/UL (ref 1.3–9.1)
SODIUM BLD-SCNC: 136 MMOL/L (ref 135–144)
SOURCE: NORMAL
SPECIFIC GRAVITY UA: 1.01 (ref 1–1.03)
TOTAL PROTEIN: 5.8 G/DL (ref 6.4–8.3)
TRICHOMONAS: NORMAL
TROPONIN INTERP: ABNORMAL
TROPONIN INTERP: ABNORMAL
TROPONIN T: ABNORMAL NG/ML
TROPONIN T: ABNORMAL NG/ML
TROPONIN, HIGH SENSITIVITY: 27 NG/L (ref 0–22)
TROPONIN, HIGH SENSITIVITY: 28 NG/L (ref 0–22)
TURBIDITY: CLEAR
URINE HGB: ABNORMAL
UROBILINOGEN, URINE: NORMAL
WBC # BLD: 5.3 K/UL (ref 3.5–11)
WBC # BLD: ABNORMAL 10*3/UL
WBC UA: NORMAL /HPF
YEAST: NORMAL

## 2020-11-06 PROCEDURE — 99223 1ST HOSP IP/OBS HIGH 75: CPT | Performed by: INTERNAL MEDICINE

## 2020-11-06 PROCEDURE — 86140 C-REACTIVE PROTEIN: CPT

## 2020-11-06 PROCEDURE — 84484 ASSAY OF TROPONIN QUANT: CPT

## 2020-11-06 PROCEDURE — 99285 EMERGENCY DEPT VISIT HI MDM: CPT

## 2020-11-06 PROCEDURE — 85025 COMPLETE CBC W/AUTO DIFF WBC: CPT

## 2020-11-06 PROCEDURE — 83615 LACTATE (LD) (LDH) ENZYME: CPT

## 2020-11-06 PROCEDURE — 81001 URINALYSIS AUTO W/SCOPE: CPT

## 2020-11-06 PROCEDURE — 80053 COMPREHEN METABOLIC PANEL: CPT

## 2020-11-06 PROCEDURE — 1200000000 HC SEMI PRIVATE

## 2020-11-06 PROCEDURE — 36415 COLL VENOUS BLD VENIPUNCTURE: CPT

## 2020-11-06 PROCEDURE — 6370000000 HC RX 637 (ALT 250 FOR IP): Performed by: STUDENT IN AN ORGANIZED HEALTH CARE EDUCATION/TRAINING PROGRAM

## 2020-11-06 PROCEDURE — 6360000002 HC RX W HCPCS: Performed by: EMERGENCY MEDICINE

## 2020-11-06 PROCEDURE — 96365 THER/PROPH/DIAG IV INF INIT: CPT

## 2020-11-06 PROCEDURE — 96375 TX/PRO/DX INJ NEW DRUG ADDON: CPT

## 2020-11-06 PROCEDURE — U0002 COVID-19 LAB TEST NON-CDC: HCPCS

## 2020-11-06 PROCEDURE — 71045 X-RAY EXAM CHEST 1 VIEW: CPT

## 2020-11-06 PROCEDURE — 93005 ELECTROCARDIOGRAM TRACING: CPT | Performed by: EMERGENCY MEDICINE

## 2020-11-06 PROCEDURE — 83880 ASSAY OF NATRIURETIC PEPTIDE: CPT

## 2020-11-06 PROCEDURE — 6370000000 HC RX 637 (ALT 250 FOR IP): Performed by: EMERGENCY MEDICINE

## 2020-11-06 PROCEDURE — 2580000003 HC RX 258: Performed by: EMERGENCY MEDICINE

## 2020-11-06 RX ORDER — POLYETHYLENE GLYCOL 3350 17 G
2 POWDER IN PACKET (EA) ORAL
Status: DISCONTINUED | OUTPATIENT
Start: 2020-11-06 | End: 2020-11-07 | Stop reason: HOSPADM

## 2020-11-06 RX ORDER — ACETAMINOPHEN 325 MG/1
650 TABLET ORAL EVERY 6 HOURS PRN
Status: DISCONTINUED | OUTPATIENT
Start: 2020-11-06 | End: 2020-11-07 | Stop reason: HOSPADM

## 2020-11-06 RX ORDER — HYDRALAZINE HYDROCHLORIDE 50 MG/1
25 TABLET, FILM COATED ORAL 3 TIMES DAILY
COMMUNITY
End: 2020-11-11 | Stop reason: ALTCHOICE

## 2020-11-06 RX ORDER — ONDANSETRON 2 MG/ML
4 INJECTION INTRAMUSCULAR; INTRAVENOUS EVERY 6 HOURS PRN
Status: DISCONTINUED | OUTPATIENT
Start: 2020-11-06 | End: 2020-11-07 | Stop reason: HOSPADM

## 2020-11-06 RX ORDER — NICOTINE 21 MG/24HR
1 PATCH, TRANSDERMAL 24 HOURS TRANSDERMAL DAILY
Status: DISCONTINUED | OUTPATIENT
Start: 2020-11-06 | End: 2020-11-07 | Stop reason: HOSPADM

## 2020-11-06 RX ORDER — METOCLOPRAMIDE 10 MG/1
10 TABLET ORAL
Status: DISCONTINUED | OUTPATIENT
Start: 2020-11-06 | End: 2020-11-07 | Stop reason: HOSPADM

## 2020-11-06 RX ORDER — FAMOTIDINE 20 MG/1
20 TABLET, FILM COATED ORAL 2 TIMES DAILY
Status: CANCELLED | OUTPATIENT
Start: 2020-11-06

## 2020-11-06 RX ORDER — METOPROLOL SUCCINATE 25 MG/1
25 TABLET, EXTENDED RELEASE ORAL DAILY
Status: CANCELLED | OUTPATIENT
Start: 2020-11-06

## 2020-11-06 RX ORDER — OLANZAPINE 10 MG/1
5 TABLET ORAL NIGHTLY
Status: DISCONTINUED | OUTPATIENT
Start: 2020-11-06 | End: 2020-11-07 | Stop reason: HOSPADM

## 2020-11-06 RX ORDER — METOPROLOL TARTRATE 50 MG/1
100 TABLET, FILM COATED ORAL 2 TIMES DAILY
Status: DISCONTINUED | OUTPATIENT
Start: 2020-11-06 | End: 2020-11-07 | Stop reason: HOSPADM

## 2020-11-06 RX ORDER — PANTOPRAZOLE SODIUM 40 MG/1
40 TABLET, DELAYED RELEASE ORAL DAILY
Status: DISCONTINUED | OUTPATIENT
Start: 2020-11-06 | End: 2020-11-07 | Stop reason: HOSPADM

## 2020-11-06 RX ORDER — FUROSEMIDE 10 MG/ML
40 INJECTION INTRAMUSCULAR; INTRAVENOUS ONCE
Status: COMPLETED | OUTPATIENT
Start: 2020-11-06 | End: 2020-11-06

## 2020-11-06 RX ORDER — ACETAMINOPHEN 650 MG/1
650 SUPPOSITORY RECTAL EVERY 6 HOURS PRN
Status: DISCONTINUED | OUTPATIENT
Start: 2020-11-06 | End: 2020-11-07 | Stop reason: HOSPADM

## 2020-11-06 RX ORDER — PROMETHAZINE HYDROCHLORIDE 25 MG/1
12.5 TABLET ORAL EVERY 6 HOURS PRN
Status: DISCONTINUED | OUTPATIENT
Start: 2020-11-06 | End: 2020-11-07 | Stop reason: HOSPADM

## 2020-11-06 RX ORDER — NIFEDIPINE 60 MG/1
60 TABLET, FILM COATED, EXTENDED RELEASE ORAL DAILY
Status: DISCONTINUED | OUTPATIENT
Start: 2020-11-06 | End: 2020-11-07 | Stop reason: HOSPADM

## 2020-11-06 RX ORDER — ISOSORBIDE MONONITRATE 30 MG/1
30 TABLET, EXTENDED RELEASE ORAL DAILY
Status: DISCONTINUED | OUTPATIENT
Start: 2020-11-06 | End: 2020-11-07 | Stop reason: HOSPADM

## 2020-11-06 RX ORDER — LISINOPRIL 5 MG/1
5 TABLET ORAL DAILY
Status: DISCONTINUED | OUTPATIENT
Start: 2020-11-06 | End: 2020-11-07 | Stop reason: HOSPADM

## 2020-11-06 RX ORDER — OXYCODONE HYDROCHLORIDE AND ACETAMINOPHEN 5; 325 MG/1; MG/1
1 TABLET ORAL ONCE
Status: COMPLETED | OUTPATIENT
Start: 2020-11-06 | End: 2020-11-06

## 2020-11-06 RX ADMIN — CEFTRIAXONE SODIUM 1 G: 1 INJECTION, POWDER, FOR SOLUTION INTRAMUSCULAR; INTRAVENOUS at 18:08

## 2020-11-06 RX ADMIN — AZITHROMYCIN MONOHYDRATE 500 MG: 500 INJECTION, POWDER, LYOPHILIZED, FOR SOLUTION INTRAVENOUS at 19:40

## 2020-11-06 RX ADMIN — FUROSEMIDE 40 MG: 10 INJECTION, SOLUTION INTRAVENOUS at 14:40

## 2020-11-06 RX ADMIN — OXYCODONE HYDROCHLORIDE AND ACETAMINOPHEN 1 TABLET: 5; 325 TABLET ORAL at 14:57

## 2020-11-06 RX ADMIN — ACETAMINOPHEN 650 MG: 325 TABLET, FILM COATED ORAL at 21:40

## 2020-11-06 RX ADMIN — PANTOPRAZOLE SODIUM 40 MG: 40 TABLET, DELAYED RELEASE ORAL at 21:40

## 2020-11-06 ASSESSMENT — PAIN SCALES - GENERAL
PAINLEVEL_OUTOF10: 7
PAINLEVEL_OUTOF10: 4
PAINLEVEL_OUTOF10: 2
PAINLEVEL_OUTOF10: 7

## 2020-11-06 ASSESSMENT — ENCOUNTER SYMPTOMS
ABDOMINAL PAIN: 0
CHEST TIGHTNESS: 0
DIARRHEA: 0
CONSTIPATION: 0
NAUSEA: 0
COUGH: 1
VOMITING: 0
BLOOD IN STOOL: 0
BACK PAIN: 1
COLOR CHANGE: 0
TROUBLE SWALLOWING: 0
COUGH: 0
SHORTNESS OF BREATH: 1
SORE THROAT: 0

## 2020-11-06 ASSESSMENT — PAIN DESCRIPTION - ORIENTATION: ORIENTATION: RIGHT;LEFT

## 2020-11-06 ASSESSMENT — PAIN DESCRIPTION - PAIN TYPE: TYPE: ACUTE PAIN

## 2020-11-06 ASSESSMENT — PAIN DESCRIPTION - LOCATION
LOCATION: BACK
LOCATION: LEG;FOOT
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
## 2020-11-07 VITALS
OXYGEN SATURATION: 93 % | DIASTOLIC BLOOD PRESSURE: 72 MMHG | HEIGHT: 69 IN | BODY MASS INDEX: 33.33 KG/M2 | TEMPERATURE: 98 F | SYSTOLIC BLOOD PRESSURE: 131 MMHG | RESPIRATION RATE: 16 BRPM | WEIGHT: 225 LBS | HEART RATE: 100 BPM

## 2020-11-07 LAB
ABSOLUTE BANDS #: 0.26 K/UL (ref 0–1)
ABSOLUTE EOS #: 0.04 K/UL (ref 0–0.4)
ABSOLUTE IMMATURE GRANULOCYTE: ABNORMAL K/UL (ref 0–0.3)
ABSOLUTE LYMPH #: 0.81 K/UL (ref 1–4.8)
ABSOLUTE MONO #: 0.55 K/UL (ref 0.1–1.3)
ANION GAP SERPL CALCULATED.3IONS-SCNC: 10 MMOL/L (ref 9–17)
BANDS: 6 % (ref 0–10)
BASOPHILS # BLD: 0 % (ref 0–2)
BASOPHILS ABSOLUTE: 0 K/UL (ref 0–0.2)
BUN BLDV-MCNC: 34 MG/DL (ref 6–20)
BUN/CREAT BLD: ABNORMAL (ref 9–20)
CALCIUM SERPL-MCNC: 8.1 MG/DL (ref 8.6–10.4)
CHLORIDE BLD-SCNC: 99 MMOL/L (ref 98–107)
CHOLESTEROL/HDL RATIO: 3.6
CHOLESTEROL: 188 MG/DL
CO2: 29 MMOL/L (ref 20–31)
CREAT SERPL-MCNC: 2.05 MG/DL (ref 0.7–1.2)
DIFFERENTIAL TYPE: ABNORMAL
EOSINOPHILS RELATIVE PERCENT: 1 % (ref 0–4)
GFR AFRICAN AMERICAN: 41 ML/MIN
GFR NON-AFRICAN AMERICAN: 34 ML/MIN
GFR SERPL CREATININE-BSD FRML MDRD: ABNORMAL ML/MIN/{1.73_M2}
GFR SERPL CREATININE-BSD FRML MDRD: ABNORMAL ML/MIN/{1.73_M2}
GLUCOSE BLD-MCNC: 137 MG/DL (ref 70–99)
HCT VFR BLD CALC: 35.5 % (ref 41–53)
HDLC SERPL-MCNC: 52 MG/DL
HEMOGLOBIN: 11.9 G/DL (ref 13.5–17.5)
IMMATURE GRANULOCYTES: ABNORMAL %
LDL CHOLESTEROL: 89 MG/DL (ref 0–130)
LV EF: 55 %
LVEF MODALITY: NORMAL
LYMPHOCYTES # BLD: 19 % (ref 24–44)
MAGNESIUM: 1.8 MG/DL (ref 1.6–2.6)
MCH RBC QN AUTO: 28.2 PG (ref 26–34)
MCHC RBC AUTO-ENTMCNC: 33.4 G/DL (ref 31–37)
MCV RBC AUTO: 84.2 FL (ref 80–100)
METAMYELOCYTES ABSOLUTE COUNT: 0.13 K/UL
METAMYELOCYTES: 3 %
MONOCYTES # BLD: 13 % (ref 1–7)
MORPHOLOGY: ABNORMAL
MORPHOLOGY: ABNORMAL
NRBC AUTOMATED: ABNORMAL PER 100 WBC
NUCLEATED RED BLOOD CELLS: 1 PER 100 WBC
PDW BLD-RTO: 20.5 % (ref 11.5–14.9)
PLATELET # BLD: 116 K/UL (ref 150–450)
PLATELET ESTIMATE: ABNORMAL
PMV BLD AUTO: 7.3 FL (ref 6–12)
POTASSIUM SERPL-SCNC: 3.3 MMOL/L (ref 3.7–5.3)
PROCALCITONIN: 0.11 NG/ML
RBC # BLD: 4.22 M/UL (ref 4.5–5.9)
RBC # BLD: ABNORMAL 10*6/UL
SEG NEUTROPHILS: 58 % (ref 36–66)
SEGMENTED NEUTROPHILS ABSOLUTE COUNT: 2.47 K/UL (ref 1.3–9.1)
SODIUM BLD-SCNC: 138 MMOL/L (ref 135–144)
TRIGL SERPL-MCNC: 235 MG/DL
VLDLC SERPL CALC-MCNC: ABNORMAL MG/DL (ref 1–30)
WBC # BLD: 4.3 K/UL (ref 3.5–11)
WBC # BLD: ABNORMAL 10*3/UL

## 2020-11-07 PROCEDURE — 6370000000 HC RX 637 (ALT 250 FOR IP): Performed by: STUDENT IN AN ORGANIZED HEALTH CARE EDUCATION/TRAINING PROGRAM

## 2020-11-07 PROCEDURE — 6360000002 HC RX W HCPCS: Performed by: STUDENT IN AN ORGANIZED HEALTH CARE EDUCATION/TRAINING PROGRAM

## 2020-11-07 PROCEDURE — 6370000000 HC RX 637 (ALT 250 FOR IP): Performed by: INTERNAL MEDICINE

## 2020-11-07 PROCEDURE — 6360000004 HC RX CONTRAST MEDICATION: Performed by: INTERNAL MEDICINE

## 2020-11-07 PROCEDURE — 36415 COLL VENOUS BLD VENIPUNCTURE: CPT

## 2020-11-07 PROCEDURE — 84145 PROCALCITONIN (PCT): CPT

## 2020-11-07 PROCEDURE — 83735 ASSAY OF MAGNESIUM: CPT

## 2020-11-07 PROCEDURE — 80061 LIPID PANEL: CPT

## 2020-11-07 PROCEDURE — 85025 COMPLETE CBC W/AUTO DIFF WBC: CPT

## 2020-11-07 PROCEDURE — 80048 BASIC METABOLIC PNL TOTAL CA: CPT

## 2020-11-07 PROCEDURE — C8929 TTE W OR WO FOL WCON,DOPPLER: HCPCS

## 2020-11-07 RX ORDER — CLONIDINE HYDROCHLORIDE 0.1 MG/1
0.2 TABLET ORAL 2 TIMES DAILY
Status: DISCONTINUED | OUTPATIENT
Start: 2020-11-07 | End: 2020-11-07 | Stop reason: HOSPADM

## 2020-11-07 RX ORDER — BUDESONIDE AND FORMOTEROL FUMARATE DIHYDRATE 160; 4.5 UG/1; UG/1
2 AEROSOL RESPIRATORY (INHALATION) 2 TIMES DAILY
Status: DISCONTINUED | OUTPATIENT
Start: 2020-11-07 | End: 2020-11-07 | Stop reason: HOSPADM

## 2020-11-07 RX ORDER — DULOXETIN HYDROCHLORIDE 60 MG/1
120 CAPSULE, DELAYED RELEASE ORAL NIGHTLY
Status: DISCONTINUED | OUTPATIENT
Start: 2020-11-07 | End: 2020-11-07 | Stop reason: HOSPADM

## 2020-11-07 RX ORDER — FUROSEMIDE 10 MG/ML
20 INJECTION INTRAMUSCULAR; INTRAVENOUS ONCE
Status: COMPLETED | OUTPATIENT
Start: 2020-11-07 | End: 2020-11-07

## 2020-11-07 RX ORDER — LISINOPRIL 5 MG/1
5 TABLET ORAL DAILY
Qty: 30 TABLET | Refills: 1 | Status: SHIPPED | OUTPATIENT
Start: 2020-11-08 | End: 2021-01-08

## 2020-11-07 RX ORDER — NITROGLYCERIN 0.4 MG/1
0.4 TABLET SUBLINGUAL EVERY 5 MIN PRN
Status: DISCONTINUED | OUTPATIENT
Start: 2020-11-07 | End: 2020-11-07 | Stop reason: HOSPADM

## 2020-11-07 RX ORDER — IPRATROPIUM BROMIDE AND ALBUTEROL SULFATE 2.5; .5 MG/3ML; MG/3ML
1 SOLUTION RESPIRATORY (INHALATION) EVERY 4 HOURS PRN
Status: DISCONTINUED | OUTPATIENT
Start: 2020-11-07 | End: 2020-11-07 | Stop reason: HOSPADM

## 2020-11-07 RX ORDER — SODIUM CHLORIDE 0.9 % (FLUSH) 0.9 %
10 SYRINGE (ML) INJECTION PRN
Status: DISCONTINUED | OUTPATIENT
Start: 2020-11-07 | End: 2020-11-07 | Stop reason: HOSPADM

## 2020-11-07 RX ORDER — ATORVASTATIN CALCIUM 20 MG/1
20 TABLET, FILM COATED ORAL NIGHTLY
Status: DISCONTINUED | OUTPATIENT
Start: 2020-11-07 | End: 2020-11-07 | Stop reason: HOSPADM

## 2020-11-07 RX ORDER — POTASSIUM CHLORIDE 20 MEQ/1
40 TABLET, EXTENDED RELEASE ORAL PRN
Status: DISCONTINUED | OUTPATIENT
Start: 2020-11-07 | End: 2020-11-07 | Stop reason: HOSPADM

## 2020-11-07 RX ORDER — BUMETANIDE 2 MG/1
2 TABLET ORAL DAILY
Qty: 30 TABLET | Refills: 1 | Status: ON HOLD | OUTPATIENT
Start: 2020-11-08 | End: 2020-12-20 | Stop reason: SDUPTHER

## 2020-11-07 RX ORDER — SPIRONOLACTONE 25 MG/1
25 TABLET ORAL DAILY
Qty: 30 TABLET | Refills: 1 | Status: SHIPPED | OUTPATIENT
Start: 2020-11-08 | End: 2021-01-08

## 2020-11-07 RX ORDER — BUMETANIDE 1 MG/1
2 TABLET ORAL DAILY
Status: DISCONTINUED | OUTPATIENT
Start: 2020-11-07 | End: 2020-11-07 | Stop reason: HOSPADM

## 2020-11-07 RX ORDER — SODIUM CHLORIDE 0.9 % (FLUSH) 0.9 %
10 SYRINGE (ML) INJECTION EVERY 12 HOURS SCHEDULED
Status: DISCONTINUED | OUTPATIENT
Start: 2020-11-07 | End: 2020-11-07 | Stop reason: HOSPADM

## 2020-11-07 RX ORDER — NICOTINE 21 MG/24HR
1 PATCH, TRANSDERMAL 24 HOURS TRANSDERMAL DAILY
Qty: 30 PATCH | Refills: 1 | Status: SHIPPED | OUTPATIENT
Start: 2020-11-08 | End: 2020-11-11 | Stop reason: SDUPTHER

## 2020-11-07 RX ORDER — POTASSIUM CHLORIDE 7.45 MG/ML
10 INJECTION INTRAVENOUS PRN
Status: DISCONTINUED | OUTPATIENT
Start: 2020-11-07 | End: 2020-11-07 | Stop reason: HOSPADM

## 2020-11-07 RX ORDER — TAMSULOSIN HYDROCHLORIDE 0.4 MG/1
0.4 CAPSULE ORAL DAILY
Status: DISCONTINUED | OUTPATIENT
Start: 2020-11-07 | End: 2020-11-07 | Stop reason: HOSPADM

## 2020-11-07 RX ORDER — ALBUTEROL SULFATE 2.5 MG/3ML
2.5 SOLUTION RESPIRATORY (INHALATION) EVERY 4 HOURS PRN
Status: DISCONTINUED | OUTPATIENT
Start: 2020-11-07 | End: 2020-11-07 | Stop reason: HOSPADM

## 2020-11-07 RX ORDER — AZITHROMYCIN 500 MG/1
500 TABLET, FILM COATED ORAL DAILY
Qty: 3 TABLET | Refills: 0 | Status: SHIPPED | OUTPATIENT
Start: 2020-11-07 | End: 2020-11-10

## 2020-11-07 RX ORDER — POLYETHYLENE GLYCOL 3350 17 G/17G
17 POWDER, FOR SOLUTION ORAL DAILY PRN
Status: DISCONTINUED | OUTPATIENT
Start: 2020-11-07 | End: 2020-11-07 | Stop reason: HOSPADM

## 2020-11-07 RX ORDER — SPIRONOLACTONE 25 MG/1
25 TABLET ORAL DAILY
Status: DISCONTINUED | OUTPATIENT
Start: 2020-11-07 | End: 2020-11-07 | Stop reason: HOSPADM

## 2020-11-07 RX ADMIN — METOCLOPRAMIDE 10 MG: 10 TABLET ORAL at 08:52

## 2020-11-07 RX ADMIN — BUMETANIDE 2 MG: 1 TABLET ORAL at 11:44

## 2020-11-07 RX ADMIN — NIFEDIPINE 60 MG: 60 TABLET, EXTENDED RELEASE ORAL at 08:52

## 2020-11-07 RX ADMIN — PANTOPRAZOLE SODIUM 40 MG: 40 TABLET, DELAYED RELEASE ORAL at 08:52

## 2020-11-07 RX ADMIN — Medication 400 MG: at 05:18

## 2020-11-07 RX ADMIN — SPIRONOLACTONE 25 MG: 25 TABLET, FILM COATED ORAL at 08:52

## 2020-11-07 RX ADMIN — CLONIDINE HYDROCHLORIDE 0.2 MG: 0.1 TABLET ORAL at 08:52

## 2020-11-07 RX ADMIN — LISINOPRIL 5 MG: 5 TABLET ORAL at 05:17

## 2020-11-07 RX ADMIN — METOCLOPRAMIDE 10 MG: 10 TABLET ORAL at 11:45

## 2020-11-07 RX ADMIN — ENOXAPARIN SODIUM 30 MG: 30 INJECTION SUBCUTANEOUS at 08:52

## 2020-11-07 RX ADMIN — METOCLOPRAMIDE 10 MG: 10 TABLET ORAL at 15:58

## 2020-11-07 RX ADMIN — POTASSIUM CHLORIDE 40 MEQ: 1500 TABLET, EXTENDED RELEASE ORAL at 11:45

## 2020-11-07 RX ADMIN — PERFLUTREN 2.2 MG: 6.52 INJECTION, SUSPENSION INTRAVENOUS at 10:31

## 2020-11-07 RX ADMIN — TAMSULOSIN HYDROCHLORIDE 0.4 MG: 0.4 CAPSULE ORAL at 08:52

## 2020-11-07 RX ADMIN — ISOSORBIDE MONONITRATE 30 MG: 30 TABLET, EXTENDED RELEASE ORAL at 08:52

## 2020-11-07 RX ADMIN — BUDESONIDE AND FORMOTEROL FUMARATE DIHYDRATE 2 PUFF: 160; 4.5 AEROSOL RESPIRATORY (INHALATION) at 08:54

## 2020-11-07 RX ADMIN — FUROSEMIDE 20 MG: 10 INJECTION, SOLUTION INTRAMUSCULAR; INTRAVENOUS at 15:58

## 2020-11-07 RX ADMIN — METOPROLOL TARTRATE 100 MG: 50 TABLET, FILM COATED ORAL at 05:17

## 2020-11-07 ASSESSMENT — ENCOUNTER SYMPTOMS
TROUBLE SWALLOWING: 0
VOMITING: 0
CONSTIPATION: 0
COUGH: 0
CHOKING: 0
STRIDOR: 0
ABDOMINAL PAIN: 0
SHORTNESS OF BREATH: 0
CHEST TIGHTNESS: 0
DIARRHEA: 0
NAUSEA: 0

## 2020-11-07 ASSESSMENT — PAIN SCALES - GENERAL: PAINLEVEL_OUTOF10: 0
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
## 2020-11-07 NOTE — PROGRESS NOTES
DATE: 2020    NAME: Clorinda Klinefelter  MRN: [id number]   : 1965    Patient not seen this date for Physical Therapy due to:  [] Blood transfusion in progress  [] Hemodialysis  [] Patient Declined  [] Spine Precautions   [] Strict Bedrest  [] Surgery/ Procedure  [] Testing      [x] Other: Patient reports IND with all functional mobility and ambulation. Patient seen walking in room without difficulty     [] PT is being discontinued at this time. Patient independent. No further needs. [] PT is being discontinued at this time due to declining physical/ medical status. Therapy is not appropriate at this time.     Trudy Galindo, PT

## 2020-11-07 NOTE — CARE COORDINATION
CASE MANAGEMENT NOTE:    Admission Date:  11/6/2020 Chaim Moreno is a 60 y.o.  male    Admitted for : Peripheral edema [R60.9]    Met with:  Patient    PCP:  Vera Liz                                Insurance:  [facility]      Current Residence/ Living Arrangements:  independently at home             Current Services PTA:  Chemo for lung CA at [facility]    Is patient agreeable to VNS: Current with [address] of choice provided:  Yes    List of [address] provided: No    VNS chosen:  Will resume [address]    DME:  straight cane    Home Oxygen: No    Nebulizer: Yes    CPAP/BIPAP: No    Supplier: N/A    Potential Assistance Needed: No    SNF needed: No    Freedom of choice and list provided: No    Pharmacy:  [facility]       Does Patient want to use MEDS to BEDS? No    Is patient currently receiving oral anticoagulation therapy? No    Is the Patient an [facility] HEL[facility] with Readmission Risk Score greater than 14%? Yes  If yes, pt needs a follow up appointment made within 7 days. Family Members/Caregivers that pt would like involved in their care:    Yes    If yes, list name here:  Merlin White and susana King    Transportation Provider:  Family             Is patient in Isolation/One on One/Altered Mental Status? No  If yes, skip next question. If no, would they like an I-Pad to  use? No  If yes, call [phone]. Discharge Plan:  11/7 [facility] - independent from home with brother in law. DME: cane. Peripheral edema. On IV zithromax, IV rocephin. Cardio consult for CHF. PT/OT ordered. Gets chemo @ [facility] ctr for lung CA. Current with [facility]. Will resume VNS. DENISE NEEDS SIGNED AND COMPLETED.                  Electronically signed by: Aurora Marsh RN on 11/7/2020 at 12:40 PM

## 2020-11-07 NOTE — PROGRESS NOTES
Medication History completed:    New medications: Hydralazine 25 mg three times daily    Medications discontinued: Bactrim course completed in September    Changes to dosing: None    Stated allergies: As listed    Other pertinent information: Medications confirmed with patient and [address]      Thank you,  Kimberly Seo, PharmD Candidate [id number]

## 2020-11-07 NOTE — PROGRESS NOTES
[address] [facility].    PROGRESS NOTE             11/7/2020    7:24 AM    Name:   Sanaz Yap  MRN:     [id number]     [place]:      [de-identified]   Room:   GUDELIA/GUDELIA  IP Day:  1  Admit Date:  11/6/2020  2:11 PM    PCP:  Michael Ayers MD  Code Status:  Prior    Subjective:     C/C:   Chief Complaint   Patient presents with    Back Pain    Leg Swelling     Bilateral    Leg Pain    Headache    Shortness of Breath    Cough    Nausea    Nasal Congestion     Interval History Status: improved. Patient resting comfortable in bed getting echocardiogram. No acute events overnight. Patient has been 186/105 and hypertensive overnight. Cardiology had been consulted for recommendations. Patient's bilateral lower extremity has improved from prior, 1+ 2+ peripheral edema. Brief History:     From 11/06/2020 H&P: The patient is a 60 y.o. Non-/non  male who presents withBack Pain; Leg Swelling (Bilateral); Leg Pain; Headache; Shortness of Breath; Cough; Nausea; and Nasal Congestion and he is admitted to the hospital for the management of  Leg swelling bilaterally associated with orthopnea and difficulties sleeping. Patient states his symptoms began 14 days ago and have progressively gotten worse. Patient states his symptoms get better with 2-4 pillows and does not specify what makes it worse. He does reveal that he has been hospitalized for similar symptoms in the past. Patient states he has been compliant to his medication for the most part; however, states that his diet has been erratic. Patient states he has reduced his smoking to 0.5PPD and says he is a chronic smoker. Patient states he does drink alcohol, but does not quantify the amount of alcohol he consumes.  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       Review of Systems:     Review of Systems   Constitutional: Negative for activity change, appetite change, chills, fatigue and fever. HENT: Negative for trouble swallowing. Respiratory: Negative for cough, choking, chest tightness, shortness of breath and stridor. Cardiovascular: Positive for leg swelling. Negative for chest pain and palpitations. Gastrointestinal: Negative for abdominal pain, constipation, diarrhea, nausea and vomiting. Genitourinary: Negative for difficulty urinating and dysuria. Musculoskeletal: Negative for arthralgias and myalgias. Patient states he has chronic back pain alleviated with Tylenol, exacerbated with strenous physical activity, non-radiating, mild-moderate in sseverity    Skin: Negative for pallor and rash. Neurological: Negative for dizziness and syncope. Psychiatric/Behavioral: Negative for agitation, confusion, dysphoric mood, self-injury, sleep disturbance and suicidal ideas. The patient is not nervous/anxious. All other systems reviewed and are negative. Medications: Allergies:     Allergies   Allergen Reactions    Morphine Itching    Hydralazine        Current Meds:   Scheduled Meds:    cloNIDine  0.2 mg Oral BID    azithromycin  250 mg Intravenous Q24H    cefTRIAXone (ROCEPHIN) IV  1 g Intravenous Q24H    magnesium oxide  400 mg Oral BID    metoprolol  100 mg Oral BID    OLANZapine  5 mg Oral Nightly    pantoprazole  40 mg Oral Daily    enoxaparin  30 mg Subcutaneous BID    nicotine  1 patch Transdermal Daily    lisinopril  5 mg Oral Daily    isosorbide mononitrate  30 mg Oral Daily    metoclopramide  10 mg Oral TID AC    NIFEdipine  60 mg Oral Daily     Continuous Infusions:   PRN Meds: acetaminophen **OR** acetaminophen, promethazine **OR** ondansetron, nicotine polacrilex    Data:     Past Medical History:   has a past medical history of ADHD (attention deficit hyperactivity disorder), Biceps rupture, distal, CAD (coronary artery disease), Cardiac disease, Cervical disc disease, Chest REPORTED 03/05/2020 10:15 PM    SPECIAL NOT REPORTED 03/05/2020 10:15 PM     Lab Results   Component Value Date/Time    CULTURE NORMAL RESPIRATORY DOUGIE MODERATE GROWTH 03/05/2020 09:54 AM       [unfilled]    Radiology:    Ct Thoracic Spine Wo Contrast    Result Date: 11/5/2020  EXAMINATION: CT OF THE LUMBAR SPINE WITHOUT CONTRAST; CT OF THE THORACIC SPINE WITHOUT CONTRAST  11/5/2020 TECHNIQUE: CT of the lumbar spine was performed without the administration of intravenous contrast. Multiplanar reformatted images are provided for review. Dose modulation, iterative reconstruction, and/or weight based adjustment of the mA/kV was utilized to reduce the radiation dose to as low as reasonably achievable.; CT of the thoracic spine was performed without the administration of intravenous contrast. Multiplanar reformatted images are provided for review. Dose modulation, iterative reconstruction, and/or weight based adjustment of the mA/kV was utilized to reduce the radiation dose to as low as reasonably achievable. COMPARISON: CT chest March 2, 2020; lumbar radiographs November 2, 2016 HISTORY: ORDERING SYSTEM PROVIDED HISTORY: Flank pain TECHNOLOGIST PROVIDED HISTORY: Ckd 3b and Flank pain Reason for Exam: Ckd 3b and Flank pain Acuity: Unknown Type of Exam: Unknown Additional signs and symptoms: PT STATES FLANK PAIN AND BACK PAIN X 1 MONTH Relevant Medical/Surgical History: NO SURGERIES TO AREA Upper and lower back pain for 1 month. FINDINGS: THORACIC SPINE: BONES/ALIGNMENT: Thoracic kyphosis within normal limits. Mild dextroconvex curvature, within normal limits and unchanged. Vertebral body heights maintained. Multiple Schmorl's nodes and degenerative endplate changes in the mid and lower thoracic spine. Anterolateral bridging syndesmophyte formation of the mid and lower thoracic spine. Partially visualized lower cervical ACDF hardware redemonstrated.  DEGENERATIVE CHANGES: Disc height loss and bridging bone formation at T8-9. No significant hypertrophic facet changes are neural foraminal narrowing. No appreciable central canal narrowing. SOFT TISSUES: No paraspinal mass is seen. Calcified mediastinal lymph nodes and calcified splenic granuloma compatible with prior granulomatous disease. Atherosclerotic calcifications noted of the aortic arch and proximal great vessels. Apical predominant emphysematous changes and basilar interstitial changes redemonstrated. LUMBAR SPINE: BONES/ALIGNMENT: There is normal alignment of the spine. The vertebral body heights are maintained. No osseous destructive lesion is seen. DEGENERATIVE CHANGES: There appear to be mild disc bulges at L3-4 and L4-5 without significant central canal narrowing. Hypertrophic facet changes noted at L1-2 on the left, L2-3 on the right and L5-S1 on the right. Mild neural foraminal narrowing at L2-3 and L5-S1 on the right. SOFT TISSUES/RETROPERITONEUM: No paraspinal mass is seen. Visualized retroperitoneal contents are unremarkable aside from aortoiliac atherosclerotic calcifications. No acute abnormality identified of the thoracic or lumbar spine. Confluent anterolateral vertebral body syndesmophyte formation of the mid to lower thoracic spine compatible with DISH. Degenerative endplate changes and multiple small Schmorl's nodes noted from T6 through T12. Mild degenerative changes of the lumbar spine as described above. Within the limits of technique, there appear to be disc bulges at L3-4 and L4-5 without significant central canal narrowing. Ct Lumbar Spine Wo Contrast    Result Date: 11/5/2020  EXAMINATION: CT OF THE LUMBAR SPINE WITHOUT CONTRAST; CT OF THE THORACIC SPINE WITHOUT CONTRAST  11/5/2020 TECHNIQUE: CT of the lumbar spine was performed without the administration of intravenous contrast. Multiplanar reformatted images are provided for review.  Dose modulation, iterative reconstruction, and/or weight based adjustment of the mA/kV was utilized to reduce the radiation dose to as low as reasonably achievable.; CT of the thoracic spine was performed without the administration of intravenous contrast. Multiplanar reformatted images are provided for review. Dose modulation, iterative reconstruction, and/or weight based adjustment of the mA/kV was utilized to reduce the radiation dose to as low as reasonably achievable. COMPARISON: CT chest March 2, 2020; lumbar radiographs November 2, 2016 HISTORY: ORDERING SYSTEM PROVIDED HISTORY: Flank pain TECHNOLOGIST PROVIDED HISTORY: Ckd 3b and Flank pain Reason for Exam: Ckd 3b and Flank pain Acuity: Unknown Type of Exam: Unknown Additional signs and symptoms: PT STATES FLANK PAIN AND BACK PAIN X 1 MONTH Relevant Medical/Surgical History: NO SURGERIES TO AREA Upper and lower back pain for 1 month. FINDINGS: THORACIC SPINE: BONES/ALIGNMENT: Thoracic kyphosis within normal limits. Mild dextroconvex curvature, within normal limits and unchanged. Vertebral body heights maintained. Multiple Schmorl's nodes and degenerative endplate changes in the mid and lower thoracic spine. Anterolateral bridging syndesmophyte formation of the mid and lower thoracic spine. Partially visualized lower cervical ACDF hardware redemonstrated. DEGENERATIVE CHANGES: Disc height loss and bridging bone formation at T8-9. No significant hypertrophic facet changes are neural foraminal narrowing. No appreciable central canal narrowing. SOFT TISSUES: No paraspinal mass is seen. Calcified mediastinal lymph nodes and calcified splenic granuloma compatible with prior granulomatous disease. Atherosclerotic calcifications noted of the aortic arch and proximal great vessels. Apical predominant emphysematous changes and basilar interstitial changes redemonstrated. LUMBAR SPINE: BONES/ALIGNMENT: There is normal alignment of the spine. The vertebral body heights are maintained. No osseous destructive lesion is seen.  DEGENERATIVE CHANGES: There appear to be mild disc bulges at L3-4 and L4-5 without significant central canal narrowing. Hypertrophic facet changes noted at L1-2 on the left, L2-3 on the right and L5-S1 on the right. Mild neural foraminal narrowing at L2-3 and L5-S1 on the right. SOFT TISSUES/RETROPERITONEUM: No paraspinal mass is seen. Visualized retroperitoneal contents are unremarkable aside from aortoiliac atherosclerotic calcifications. No acute abnormality identified of the thoracic or lumbar spine. Confluent anterolateral vertebral body syndesmophyte formation of the mid to lower thoracic spine compatible with DISH. Degenerative endplate changes and multiple small Schmorl's nodes noted from T6 through T12. Mild degenerative changes of the lumbar spine as described above. Within the limits of technique, there appear to be disc bulges at L3-4 and L4-5 without significant central canal narrowing. Us Urinary Bladder Limited    Result Date: 10/21/2020  EXAMINATION: ULTRASOUND OF THE URINARY BLADDER 10/21/2020 COMPARISON: None. HISTORY: ORDERING SYSTEM PROVIDED HISTORY: Stage 3 chronic kidney disease, unspecified whether stage 3a or 3b CKD TECHNOLOGIST PROVIDED HISTORY: Flank pain, Uti Symptoms, Frequent urination FINDINGS: Prevoid urinary bladder volume 116 mL. Postvoid urinary bladder volume 0.9 mL. No focal urinary bladder wall abnormality was appreciated. Bilateral ureteral jets were visualized. Unremarkable ultrasound of the bladder. Xr Chest Portable    Result Date: 11/6/2020  EXAMINATION: ONE XRAY VIEW OF THE CHEST 11/6/2020 2:30 pm COMPARISON: August 16, 2020 and CT March 2, 2020 HISTORY: ORDERING SYSTEM PROVIDED HISTORY: Back pain, fluid overload TECHNOLOGIST PROVIDED HISTORY: Back pain, fluid overload Reason for Exam: sob Acuity: Unknown Type of Exam: Unknown Shortness of breath FINDINGS: Cardiomediastinal silhouette appears unchanged. Basilar predominant interstitial changes redemonstrated.   No acute pulmonary Esophageal dysphagia [R13.10] 07/10/2020    Liver lesion [K76.9] 03/02/2020    Acute on chronic diastolic (congestive) heart failure (HCC) [I50.33] 12/11/2019    CHF (congestive heart failure), NYHA class I, acute, diastolic (HCC) [M32.68] 17/71/3524    COPD (chronic obstructive pulmonary disease) ([facility] [J44.9] 12/10/2019    CAD (coronary artery disease) [I25.10] 12/10/2019    Polyp of descending colon [K63.5]     Tobacco abuse counseling [Z71.6] 11/30/2018    Gastroesophageal reflux disease with esophagitis [K21.00] 02/15/2018    Coronary artery disease involving coronary bypass graft of native heart [I25.810] 11/26/2017    Type 2 diabetes mellitus without complication ([facility] [U53.1] 03/14/2017    Anxiety [F41.9] 03/14/2017    Insomnia [G47.00] 04/19/2016    Unable to read or write [Z55.0] 03/23/2016    Poor compliance with medication [Z91.14] 03/23/2016    Severe recurrent major depressive disorder with psychotic features ([facility] [F33.3] 03/21/2016    Tobacco abuse [Z72.0] 01/12/2016    Essential hypertension [I10]     Impingement syndrome of right shoulder [M75.41] 12/13/2012    Chronic right shoulder pain [M25.511, G89.29] 12/13/2012       Plan:        Bilateral lower extremity edema, Acute exacerbation of CHF secondary to HTN or ischemic cardiomyopathy, HTN,   Last Echo 2019: EF 93-39%,  diastolic dysfunction, RVSP 30 mmHg, mitral regurgitation disease; repeat ECHO pending  Trops negative  BNP 1555  CXR - no acute [place] infiltrate; chronic basilar predominant interstitial changes concerning for interstitial pneumonia, consider high-resolution CT  Lasix one time 40 mg IV 11/6/2020  Lasix 20mg IV one time 11/7/2020   Lopressor 100mg  Nifedipine 60mg PO daily  Catapres 0.2mg twice a day  Imdur 30mg daily  Aldactone 25mg PO daily  Prinivil; Zestril 5mg PO daily  Nitroglycerin 0.4mg sublingual PRN for chest pain  Monitor renal function  F/u Echo, trops  [place]man for UOP management and given poor mobility. 2g sodium fluid restricted diet, dietican consulted for diet education  Strict I/O   Cardiology, heart failure nurse/coordinator consulted                Possible CAP, interstial PNA, COPD, tobacco abuse  Rocephin, Zithro  Pro-klarissa - pending  Symbicort, Proventil, Duoneb    Hyperlipidemia  Lipitor 20mg nightly PO  Triglycerides 235    Depression, unable to read/write  Olanzapine 5mg PO nightly  Cymbalata 120mg PO nightly    DVT prophylaxis: lovenox 30mg SQ twice daily  GI prophylaxis: Protonix 40mg pO daily      Leigh Harris MD  11/7/2020  7:24 AM          Please note that this chart was generated using voice recognition Dragon dictation software. Although every effort was made to ensure the accuracy of this automated transcription, some errors in transcription may have occurred.

## 2020-11-07 NOTE — ED NOTES
Report given to Edu Becerril from ED. Report method by phone   The following was reviewed with receiving RN:   Current vital signs:  BP (!) 159/103   Pulse 94   Temp 97.7 °F (36.5 °C) (Oral)   Resp 29   Ht 5' 9\" (1.753 m)   Wt 225 lb (102.1 kg)   SpO2 95%   BMI 33.23 kg/m²                MEWS Score: 2     Any medication or safety alerts were reviewed. Any pending diagnostics and notifications were also reviewed, as well as any safety concerns or issues, abnormal labs, abnormal imaging, and abnormal assessment findings. Questions were answered.             Abigail Kay RN  11/07/20 [id number]

## 2020-11-07 NOTE — PLAN OF CARE
Problem: Falls - Risk of:  Goal: Will remain free from falls  Description: Will remain free from falls  Outcome: Ongoing, Patient is alert and oriented, able to make needs known.  Patient is aware of limitations, call light within reach, bed locked, non slip socks on      Problem: Skin Integrity:  Goal: Skin integrity will stabilize  Description: Skin integrity will stabilize  Outcome: Ongoing, Patient encouraged to reposition

## 2020-11-07 NOTE — ED NOTES
Admission Dx: pneumonia    Pts Chief Complaints on Arrival: back pain, leg pain and leg swelling    ADL's - Self-care    Pending Diagnostics:  ECHO    Residence PTA: single story    Special Considerations/Circumstances:  N/V    Vitals: Current vital signs:  BP (!) 159/103   Pulse 94   Temp 97.7 °F (36.5 °C) (Oral)   Resp 29   Ht 5' 9\" (1.753 m)   Wt 225 lb (102.1 kg)   SpO2 95%   BMI 33.23 kg/m²                MEWS Score:  [address] [facility], RN  11/07/20 [id number]

## 2020-11-07 NOTE — FLOWSHEET NOTE
Writer left prayer card with [facility] office phone number; will check back.       11/07/20 1112   Encounter Summary   Services provided to: Patient   Referral/Consult From: Physician;Palliative Care   Support System Family members   Complexity of Encounter Low   Length of Encounter 15 minutes   Spiritual/Jewish   Type Spiritual support   Assessment Sleeping   Intervention Prayer;Provided reading materials/devotional materials

## 2020-11-07 NOTE — CARE COORDINATION
Continuity of Care Form    Patient Name: Nara Dover   :  1965  MRN:  [id number]    Admit date:  2020  Discharge date:      Code Status Order: Full Code   Advance Directives:   Advance Care Flowsheet Documentation     Date/Time Healthcare Directive Type of Healthcare Directive Copy in [address] Agent's Name Healthcare Agent's Phone Number    [phone]  No, patient does not have an advance directive for healthcare treatment -- -- -- -- --          Admitting Physician:  Jennifer Bcukley MD  PCP: Suzie Mane MD    Discharging Nurse: [facility] Unit/Room#: 0235/0315-77  Discharging Unit Phone Number: [phone]    Emergency Contact:   Extended Emergency Contact Information  Primary Emergency Contact: Francisconando Wheeler  Address: [address] [place] [address] Phone: [phone]  Work Phone: [phone]  Mobile Phone: [phone]  Relation: Brother/Sister  Secondary Emergency Contact: Christine Blackburn  Address: [address] Phone: [phone]  Work Phone: [phone]  Mobile Phone: [phone]  Relation: Niece/Nephew    Past Surgical History:  Past Surgical History:   Procedure Laterality Date    BACK SURGERY      CARDIAC CATHETERIZATION  10/30/2018    Dr. Fer Rivero  14    C5-6 CORPECTOMY; C4-7 FUSION    COLONOSCOPY N/A 2019    COLONOSCOPY POLYPECTOMY SNARE/COLD BIOPSY OF TRANSVERSE COLON AND SIGMOID COLON & RECTAL POLYPECTOMY performed by Walter Martinez [facility]  2011    [facility]/   [facility].     CT BIOPSY RENAL  2020    CT BIOPSY RENAL 2020 [facility]ED[facility]    CYSTOSCOPY N/A 2020    CYSTOSCOPY performed by Jessica Birch MD at [address] [place] [address] Left     screw placed   [address] [facility]    Right disease with acute lower respiratory infection ([facility] [address] J44.0    Mastoiditis of right side H70.91    Hypertensive urgency I16.0    Coronary artery disease involving coronary bypass graft of native heart I25.810    Depression with suicidal ideation F32.9, R45.851    Gastroesophageal reflux disease with esophagitis K21.00    Positive FIT (fecal immunochemical test) R19.5    Constipation K59.00    Degenerative disc disease, cervical M50.30    Chest pain R07.9    Tobacco abuse counseling Z71.6    Polyp of transverse colon K63.5    Polyp of descending colon K63.5    Rectal polyp K62.1    Hypomagnesemia E83.42    Pleural effusion J90    COPD (chronic obstructive pulmonary disease) ([facility]) J44.9    CAD (coronary artery disease) I25.10    Microscopic hematuria R31.29    Acute on chronic diastolic (congestive) heart failure ([facility]) I50.33    CHF (congestive heart failure), NYHA class I, acute, diastolic ([facility]) G62.78    Pneumonia J18.9    Liver lesion K76.9    Mild malnutrition ([facility]) E44.1    Dysphagia R13.10    Gastroparesis K31.84    ARON (acute kidney injury) ([facility]) N17.9    Major depressive disorder, single episode F32.9    Unintentional weight loss of 10% body weight within 6 months R63.4    Esophageal dysphagia R13.10    Moderate malnutrition ([facility]) E44.0    Anxiety F41.9    Closed fracture of fifth metatarsal bone S92.353A    Interstitial lung disease ([facility]) J84.9    NSTEMI (non-ST elevated myocardial infarction) ([facility]) I21.4    Type 2 diabetes mellitus without complication ([facility]) O20.5    Elevated serum immunoglobulin free light chain level R76.8    Abnormal ANCA (antineutrophil cytoplasmic antibody) R76.8    Chronic kidney disease N18.9    Hypocalcemia E83.51    Anemia in stage 3 chronic kidney disease N18.30, D63.1    Acute kidney failure with lesion of tubular necrosis ([facility]) N17.0    Nephrotic syndrome with focal glomerulosclerosis N04.1    Rapid progressv nephritic syndrm, 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    Peripheral edema R60.9       Isolation/Infection:   Isolation          No Isolation        Patient Infection Status     Infection Onset Added Last Indicated Last Indicated By Review Planned Expiration Resolved Resolved By    None active    Resolved    COVID-19 Rule Out 11/06/20 11/06/20 11/06/20 COVID-19 (Ordered)   11/06/20 Rule-Out Test Resulted    COVID-19 Rule Out 07/26/20 07/26/20 07/26/20 Covid-19 Ambulatory (Ordered)   07/30/20 Rule-Out Test Resulted          Nurse Assessment:  Last Vital Signs: /73   Pulse 65   Temp 97.8 °F (36.6 °C) (Oral)   Resp 16   Ht 5' 9\" (1.753 m)   Wt 225 lb (102.1 kg)   SpO2 93%   BMI 33.23 kg/m²     Last documented pain score (0-10 scale): Pain Level: 0  Last Weight:   Wt Readings from Last 1 Encounters:   11/06/20 225 lb (102.1 kg)     Mental Status:  oriented and alert    IV Access:  - None    Nursing Mobility/ADLs:  Walking   Independent  Transfer  Independent  Bathing  Assisted  Dressing  Independent  Toileting  Assisted  Feeding  Independent  Med Admin  Assisted  Med Delivery   whole    Wound Care Documentation and Therapy:        Elimination:  Continence:   · Bowel: Yes  · Bladder: Yes  Urinary Catheter: None   Colostomy/Ileostomy/Ileal Conduit: No       Date of Last BM: 11/7    Intake/Output Summary (Last 24 hours) at 11/7/2020 1249  Last data filed at 11/7/2020 0544  Gross per 24 hour   Intake --   Output 1250 ml   Net -1250 ml     I/O last 3 completed shifts:  In: -   Out: 1250 [Urine:1250]    Safety Concerns:     None    Impairments/Disabilities:      None    Nutrition Therapy:  Current Nutrition Therapy:   - Oral Diet:  General    Routes of Feeding: Oral  Liquids: Thin Liquids  Daily Fluid Restriction: no  Last Modified Barium Swallow with Video (Video Swallowing Test): not done    Treatments at the Time of Hospital Discharge:   Respiratory Treatments: n/a  Oxygen Therapy:  is not on home oxygen therapy.   Ventilator: - No ventilator support    Rehab Therapies: Physical Therapy and Occupational Therapy  Weight Bearing Status/Restrictions: No weight bearing restirctions  Other Medical Equipment (for information only, NOT a DME order):  cane  Other Treatments: Skilled nursing assessment. Medication administration and education. Patient's personal belongings (please select all that are sent with patient):  None    RN SIGNATURE:  Electronically signed by Yolanda Wright RN on 11/7/20 at 5:51 PM EST    CASE MANAGEMENT/SOCIAL WORK SECTION    Inpatient Status Date:      Readmission Risk Assessment Score:  Readmission Risk              Risk of Unplanned Readmission:        43           Discharging to Facility/ . [address] [facility] [place]  [phone]          / signature: Electronically signed by Sav Del Real RN on 11/7/20 at 12:49 PM EST    PHYSICIAN SECTION    Prognosis: Good    Condition at Discharge: Stable    Rehab Potential (if transferring to Rehab): Good    Recommended Labs or Other Treatments After Discharge: See above    Physician Certification: I certify the above information and transfer of Shakira Moore  is necessary for the continuing treatment of the diagnosis listed and that he requires Home Care for greater 30 days. Update Admission H&P: No change in H&P    PHYSICIAN SIGNATURE:  Electronically signed by Sav Del Real RN on 11/7/20 at 5:48 PM EST per VO from Dr. Lois Diop.      Current Discharge Medication List     START taking these medications     Medication  Dose    bumetanide (BUMEX) 2 MG tablet  2 mg    Take 1 tablet by mouth daily    Quantity: 30 tablet  Refills: 1        lisinopril (PRINIVIL;ZESTRIL) 5 MG tablet  5 mg    Take 1 tablet by mouth daily    Quantity: 30 tablet  Refills: 1        nicotine (NICODERM CQ) 14 MG/24HR  1 patch    Place 1 patch onto the skin daily    Quantity: 30 patch  Refills: 1 spironolactone (ALDACTONE) 25 MG tablet  25 mg    Take 1 tablet by mouth daily    Quantity: 30 tablet  Refills: 1        azithromycin (ZITHROMAX) 500 MG tablet  500 mg    Take 1 tablet by mouth daily for 3 days    Quantity: 3 tablet  Refills: 0            CONTINUE these medications which have NOT CHANGED     Medication  Dose    hydrALAZINE (APRESOLINE) 50 MG tablet  25 mg    Take 25 mg by mouth 3 times daily        traMADol (ULTRAM) 50 MG tablet  50 mg    Take 50 mg by mouth every 8 hours as needed for Pain.         NIFEdipine (PROCARDIA XL) 30 MG extended release tablet  60 mg    Take 2 tablets by mouth daily    Quantity: 90 tablet  Refills: 3        metoclopramide (REGLAN) 10 MG tablet     take 1 tablet by mouth three times a day before meals    Quantity: 120 tablet  Refills: 3        Cholecalciferol (VITAMIN D3) 20 MCG (800 UNIT) TABS     Take 800 Units daily    Quantity: 30 tablet  Refills: 3        albuterol sulfate  (90 Base) MCG/ACT inhaler     inhale 2 puffs by mouth every 6 hours if needed for wheezing    Quantity: 18 g  Refills: 5        cloNIDine (CATAPRES) 0.2 MG tablet     take 1 tablet by mouth twice a day    Quantity: 184 tablet  Refills: 1        OLANZapine (ZYPREXA) 5 MG tablet  5 mg    Take 1 tablet by mouth nightly    Quantity: 30 tablet  Refills: 3        magnesium oxide (MAG-OX) 400 (241.3 Mg) MG TABS tablet  400 mg    Take 1 tablet by mouth 2 times daily    Quantity: 60 tablet  Refills: 0        traZODone (DESYREL) 100 MG tablet  100 mg    Take 100 mg by mouth nightly as needed for Sleep        isosorbide mononitrate (IMDUR) 30 MG extended release tablet     take 1 tablet by mouth once daily    Quantity: 60 tablet  Refills: 1        tamsulosin (FLOMAX) 0.4 MG capsule  0.4 mg    Take 1 capsule by mouth daily    Quantity: 30 capsule  Refills: 5        Fluticasone furoate-vilanterol (BREO ELLIPTA) 200-25 MCG/INH AEPB inhaler  1 puff    Inhale 1 puff into the lungs daily        pantoprazole

## 2020-11-08 LAB
EKG ATRIAL RATE: 81 BPM
EKG P AXIS: 34 DEGREES
EKG P-R INTERVAL: 146 MS
EKG Q-T INTERVAL: 354 MS
EKG QRS DURATION: 82 MS
EKG QTC CALCULATION (BAZETT): 411 MS
EKG R AXIS: -15 DEGREES
EKG T AXIS: 88 DEGREES
EKG VENTRICULAR RATE: 81 BPM

## 2020-11-08 PROCEDURE — 93010 ELECTROCARDIOGRAM REPORT: CPT | Performed by: INTERNAL MEDICINE

## 2020-11-08 NOTE — DISCHARGE SUMMARY
[address]    Discharge Summary     Patient ID: Norman Harman  :  1965   MRN: [id number]     ACCOUNT:  [de-identified]   Patient's PCP: Ana Colin MD  Admit Date: 2020   Discharge Date: 2020     Length of Stay: 1  Code Status:  Prior  Admitting Physician: Ar Tolbert MD  Discharge Physician: Ammy Austin MD     Active Discharge Diagnoses:       Primary Problem  Acute on chronic diastolic (congestive) heart failure [facility])      [place] Problems    Diagnosis Date Noted    Peripheral edema [R60.9] 2020    Rapid progressv nephritic syndrm, diffuse crescentc glomerulonephritis [N01.7] 09/10/2020    Nephrotic syndrome with focal glomerulosclerosis [N04.1] 09/10/2020    Chronic kidney disease [N18.9] 2020    Anemia in stage 3 chronic kidney disease [N18.30, D63.1] 2020    Abnormal ANCA (antineutrophil cytoplasmic antibody) [R76.8] 2020    Esophageal dysphagia [R13.10] 07/10/2020    Liver lesion [K76.9] 2020    Acute on chronic diastolic (congestive) heart failure ([facility] [I50.33] 2019    CHF (congestive heart failure), NYHA class I, acute, diastolic ([facility] [O53.12]     COPD (chronic obstructive pulmonary disease) ([facility] [J44.9] 12/10/2019    CAD (coronary artery disease) [I25.10] 12/10/2019    Polyp of descending colon [K63.5]     Tobacco abuse counseling [Z71.6] 2018    Gastroesophageal reflux disease with esophagitis [K21.00] 02/15/2018    Coronary artery disease involving coronary bypass graft of native heart [I25.810] 2017    Type 2 diabetes mellitus without complication ([facility] [J36.1] 2017    Anxiety [F41.9] 2017    Insomnia [G47.00] 2016    Unable to read or write [Z55.0] 2016    Poor compliance with medication [Z91.14] 2016    Severe recurrent major depressive disorder with psychotic features ([facility] [F33.3] 03/21/2016    Tobacco abuse [Z72.0] 01/12/2016    Essential hypertension [I10]     Impingement syndrome of right shoulder [M75.41] 12/13/2012    Chronic right shoulder pain [M25.511, G89.29] 12/13/2012       Admission Condition:  poor     Discharged Condition: stable    Hospital Stay:       Hospital Course:  Nara Dover is a 60 y.o. male who was admitted for the management of   Acute on chronic diastolic (congestive) heart failure ([facility] , presented to ER with Back Pain; Leg Swelling (Bilateral); Leg Pain; Headache; Shortness of Breath; Cough; Nausea; and Nasal Congestion    Patient admitted on 11/06/2020 for management of CHF exacerbation associated with orthopnea and b/l lower extremity swelling. Possible Pneumonia required treatment with Rocephin and Zithro. Patient stated he was illiterate and was provided detailed verbal instructions on medication regimen with teach-back method utilized to assess patient understanding. The patient was advised to follow a low fat, low cholesterol diet, reduce salt in diet and cooking and continue current healthy lifestyle patterns. Based upon patient's motivation to change his behavior, the following plan was agreed upon: patient will think about his/her triggers for using tobacco, patient will think about reasons why he/she should quit using tobacco, patient will learn more about the harmful effects of tobacco and patient will avoid triggers and negative influences. Significant therapeutic interventions: CXR, Echocardiogram; bumex added to medication regimen once a day.      Significant Diagnostic Studies:   Labs / Micro:  CBC:   Lab Results   Component Value Date    WBC 4.3 11/07/2020    RBC 4.22 11/07/2020    RBC 4.67 01/28/2012    HGB 11.9 11/07/2020    HCT 35.5 11/07/2020    MCV 84.2 11/07/2020    MCH 28.2 11/07/2020    MCHC 33.4 11/07/2020    RDW 20.5 11/07/2020     11/07/2020     01/28/2012     BMP:    Lab Results OF THE THORACIC SPINE WITHOUT CONTRAST  11/5/2020 TECHNIQUE: CT of the lumbar spine was performed without the administration of intravenous contrast. Multiplanar reformatted images are provided for review. Dose modulation, iterative reconstruction, and/or weight based adjustment of the mA/kV was utilized to reduce the radiation dose to as low as reasonably achievable.; CT of the thoracic spine was performed without the administration of intravenous contrast. Multiplanar reformatted images are provided for review. Dose modulation, iterative reconstruction, and/or weight based adjustment of the mA/kV was utilized to reduce the radiation dose to as low as reasonably achievable. COMPARISON: CT chest March 2, 2020; lumbar radiographs November 2, 2016 HISTORY: ORDERING SYSTEM PROVIDED HISTORY: Flank pain TECHNOLOGIST PROVIDED HISTORY: Ckd 3b and Flank pain Reason for Exam: Ckd 3b and Flank pain Acuity: Unknown Type of Exam: Unknown Additional signs and symptoms: PT STATES FLANK PAIN AND BACK PAIN X 1 MONTH Relevant Medical/Surgical History: NO SURGERIES TO AREA Upper and lower back pain for 1 month. FINDINGS: THORACIC SPINE: BONES/ALIGNMENT: Thoracic kyphosis within normal limits. Mild dextroconvex curvature, within normal limits and unchanged. Vertebral body heights maintained. Multiple Schmorl's nodes and degenerative endplate changes in the mid and lower thoracic spine. Anterolateral bridging syndesmophyte formation of the mid and lower thoracic spine. Partially visualized lower cervical ACDF hardware redemonstrated. DEGENERATIVE CHANGES: Disc height loss and bridging bone formation at T8-9. No significant hypertrophic facet changes are neural foraminal narrowing. No appreciable central canal narrowing. SOFT TISSUES: No paraspinal mass is seen. Calcified mediastinal lymph nodes and calcified splenic granuloma compatible with prior granulomatous disease.  Atherosclerotic calcifications noted of the aortic arch and proximal great vessels. Apical predominant emphysematous changes and basilar interstitial changes redemonstrated. LUMBAR SPINE: BONES/ALIGNMENT: There is normal alignment of the spine. The vertebral body heights are maintained. No osseous destructive lesion is seen. DEGENERATIVE CHANGES: There appear to be mild disc bulges at L3-4 and L4-5 without significant central canal narrowing. Hypertrophic facet changes noted at L1-2 on the left, L2-3 on the right and L5-S1 on the right. Mild neural foraminal narrowing at L2-3 and L5-S1 on the right. SOFT TISSUES/RETROPERITONEUM: No paraspinal mass is seen. Visualized retroperitoneal contents are unremarkable aside from aortoiliac atherosclerotic calcifications. No acute abnormality identified of the thoracic or lumbar spine. Confluent anterolateral vertebral body syndesmophyte formation of the mid to lower thoracic spine compatible with DISH. Degenerative endplate changes and multiple small Schmorl's nodes noted from T6 through T12. Mild degenerative changes of the lumbar spine as described above. Within the limits of technique, there appear to be disc bulges at L3-4 and L4-5 without significant central canal narrowing. Ct Lumbar Spine Wo Contrast    Result Date: 11/5/2020  EXAMINATION: CT OF THE LUMBAR SPINE WITHOUT CONTRAST; CT OF THE THORACIC SPINE WITHOUT CONTRAST  11/5/2020 TECHNIQUE: CT of the lumbar spine was performed without the administration of intravenous contrast. Multiplanar reformatted images are provided for review. Dose modulation, iterative reconstruction, and/or weight based adjustment of the mA/kV was utilized to reduce the radiation dose to as low as reasonably achievable.; CT of the thoracic spine was performed without the administration of intravenous contrast. Multiplanar reformatted images are provided for review.  Dose modulation, iterative reconstruction, and/or weight based adjustment of the mA/kV was utilized to reduce the radiation contents are unremarkable aside from aortoiliac atherosclerotic calcifications. No acute abnormality identified of the thoracic or lumbar spine. Confluent anterolateral vertebral body syndesmophyte formation of the mid to lower thoracic spine compatible with DISH. Degenerative endplate changes and multiple small Schmorl's nodes noted from T6 through T12. Mild degenerative changes of the lumbar spine as described above. Within the limits of technique, there appear to be disc bulges at L3-4 and L4-5 without significant central canal narrowing. Us Urinary Bladder Limited    Result Date: 10/21/2020  EXAMINATION: ULTRASOUND OF THE URINARY BLADDER 10/21/2020 COMPARISON: None. HISTORY: ORDERING SYSTEM PROVIDED HISTORY: Stage 3 chronic kidney disease, unspecified whether stage 3a or 3b CKD TECHNOLOGIST PROVIDED HISTORY: Flank pain, Uti Symptoms, Frequent urination FINDINGS: Prevoid urinary bladder volume 116 mL. Postvoid urinary bladder volume 0.9 mL. No focal urinary bladder wall abnormality was appreciated. Bilateral ureteral jets were visualized. Unremarkable ultrasound of the bladder. Xr Chest Portable    Result Date: 11/6/2020  EXAMINATION: ONE XRAY VIEW OF THE CHEST 11/6/2020 2:30 pm COMPARISON: August 16, 2020 and CT March 2, 2020 HISTORY: ORDERING SYSTEM PROVIDED HISTORY: Back pain, fluid overload TECHNOLOGIST PROVIDED HISTORY: Back pain, fluid overload Reason for Exam: sob Acuity: Unknown Type of Exam: Unknown Shortness of breath FINDINGS: Cardiomediastinal silhouette appears unchanged. Basilar predominant interstitial changes redemonstrated. No acute pulmonary consolidation identified. No pneumothorax or subdiaphragmatic free air. No acute osseous abnormality identified. No acute pulmonary infiltrate. Chronic basilar predominant interstitial changes concerning for interstitial pneumonia. Routine outpatient high-resolution chest CT may be helpful for further evaluation. Consultations:    Consults:     Final Specialist Recommendations/Findings:   IP CONSULT TO INTERNAL MEDICINE  IP CONSULT TO HEART FAILURE NURSE/COORDINATOR  IP CONSULT TO DIETITIAN  IP CONSULT TO CARDIOLOGY  IP CONSULT TO PALLIATIVE CARE  IP CONSULT TO CASE MANAGEMENT  IP CONSULT TO SPIRITUAL SERVICES  IP CONSULT TO CARDIOLOGY      The patient was seen and examined on day of discharge and this discharge summary is in conjunction with any daily progress note from day of discharge. Discharge plan:       Disposition: Home    Physician Follow Up:     [address] [facility]  [address] [facility]  [phone]      They will call and schedule an appointment for a home visit. Sami Rosas, [address]  [phone]    In 1 week  [facility] Visit       Requiring Further Evaluation/Follow Up POST HOSPITALIZATION/Incidental Findings: diastolic CHF with EF of 5%    Diet: cardiac diet and renal diet    Activity: As tolerated    Instructions to Patient: Please follow up with primary care physician in 3-5 days.     Discharge Medications:      Medication List      START taking these medications    azithromycin 500 MG tablet  Commonly known as:  ZITHROMAX  Take 1 tablet by mouth daily for 3 days  Notes to patient:  Antibiotic      bumetanide 2 MG tablet  Commonly known as:  BUMEX  Take 1 tablet by mouth daily  Notes to patient:  Water pill     lisinopril 5 MG tablet  Commonly known as:  PRINIVIL;ZESTRIL  Take 1 tablet by mouth daily  Notes to patient:  Blood pressure      nicotine 14 MG/24HR  Commonly known as:  NICODERM CQ  Place 1 patch onto the skin daily  Notes to patient:  tobacco     spironolactone 25 MG tablet  Commonly known as:  ALDACTONE  Take 1 tablet by mouth daily  Notes to patient:  Water pill         CONTINUE taking these medications    acetaminophen 325 MG tablet  Commonly known as:  Tylenol  Take 2 tablets by mouth every 6 hours as needed for Pain  Notes to patient:  pain     albuterol sulfate  (90 Base) MCG/ACT inhaler  inhale 2 puffs by mouth every 6 hours if needed for wheezing  Notes to patient:  Shortness of breath      atorvastatin 20 MG tablet  Commonly known as:  LIPITOR  take 1 tablet by mouth at bedtime  Notes to patient:  cholesterol       Breo Ellipta 200-25 MCG/INH Aepb inhaler  Generic drug:  Fluticasone furoate-vilanterol  Notes to patient:  Shortness of breath      cloNIDine 0.2 MG tablet  Commonly known as:  CATAPRES  take 1 tablet by mouth twice a day  Notes to patient:  Blood pressure      DULoxetine 60 MG extended release capsule  Commonly known as:  CYMBALTA  take 1 capsule by mouth twice a day  Notes to patient:  Depression      hydrALAZINE 50 MG tablet  Commonly known as:  APRESOLINE  Notes to patient:  Blood pressure      ipratropium-albuterol 0.5-2.5 (3) MG/3ML Soln nebulizer solution  Commonly known as:  DUONEB  Inhale 3 mLs into the lungs every 4 hours as needed for Shortness of Breath     isosorbide mononitrate 30 MG extended release tablet  Commonly known as:  IMDUR  take 1 tablet by mouth once daily  Notes to patient:  Heart      magnesium oxide 400 (241.3 Mg) MG Tabs tablet  Commonly known as:  MAG-OX  Take 1 tablet by mouth 2 times daily  Notes to patient:  Supplement      metoclopramide 10 MG tablet  Commonly known as:  REGLAN  take 1 tablet by mouth three times a day before meals  Notes to patient:  Digestion      metoprolol 100 MG tablet  Commonly known as:  LOPRESSOR  Take 1 tablet by mouth 2 times daily  Notes to patient:  Blood pressure      NIFEdipine 30 MG extended release tablet  Commonly known as:  PROCARDIA XL  Take 2 tablets by mouth daily  Notes to patient:  Blood pressure      nitroGLYCERIN 0.4 MG SL tablet  Commonly known as:  Nitrostat  Place 1 tablet under the tongue every 5 minutes as needed for Chest pain up to max of 3 total doses. If no relief after 1 dose, call 911.   Notes to patient:  Chest pain      OLANZapine 5 MG tablet  Commonly known as:  ZYPREXA  Take 1 tablet by mouth nightly  Notes to patient:  mood     pantoprazole 40 MG tablet  Commonly known as:  PROTONIX  Take 1 tablet by mouth daily  Notes to patient:  Acid reflex     tamsulosin 0.4 MG capsule  Commonly known as:  FLOMAX  Take 1 capsule by mouth daily  Notes to patient:  Urination      traMADol 50 MG tablet  Commonly known as:  ULTRAM  Notes to patient:  pain     traZODone 100 MG tablet  Commonly known as:  DESYREL  Notes to patient:  Sleep     Vitamin D3 20 MCG (800 UNIT) Tabs  Take 800 Units daily  Notes to patient:  Supplement         STOP taking these medications    furosemide 40 MG tablet  Commonly known as:  Lasix           Where to Get Your Medications      These medications were sent to [address] [place] [address]    Phone:  [phone]   · azithromycin 500 MG tablet  · bumetanide 2 MG tablet  · lisinopril 5 MG tablet  · nicotine 14 MG/24HR  · spironolactone 25 MG tablet         Time Spent on discharge is  50 mins in patient examination, evaluation, counseling as well as medication reconciliation, prescriptions for required medications, discharge plan and follow up. Electronically signed by   Leigh Harris MD  11/8/2020  11:06 AM      Thank you Dr. Kurt García MD for the opportunity to be involved in this patient's care.         Please note that this chart was generated using voice recognition Dragon dictation software. Although every effort was made to ensure the accuracy of this automated transcription, some errors in transcription may have occurred.

## 2020-11-10 ENCOUNTER — CARE COORDINATION (OUTPATIENT)
Dept: CARE COORDINATION | Age: 55
End: 2020-11-10

## 2020-11-10 NOTE — CARE COORDINATION
Ambulatory Care Coordination Note  11/10/2020  CM Risk Score: 16  Charlson 10 Year Mortality Risk Score: 100%     ACC: Nusrat Morales, RN    Summary Note: Spoke with patient as a follow up from ED. Per patient he is feeling better but is having swelling in both of his feet. He would prefer a VV with PCP due to transportation not being reliable. ACM will have PCP call to schedule. Education provided about fluid restrictions, low salt diet and reporting any concerns. Precautions reviewed for COVID-19 per CDC  Wash hands often with soap and water for at least 20 seconds  When soap is not available us hand  with at least 70% alcohol  Avoid touching your face  Avoid close contact with others  Maintain 6 feet distance if possible    If you are sick:  Cover mouth and nose when coughing or sneezing and then wash hands  Wear a mask when around others  Clean and disinfect surfaces often with soap or detergent and water. Care Coordination Interventions    Program Enrollment:  Complex Care  Referral from Primary Care Provider:  No  Suggested Interventions and Community Resources  Grief Counselor:  [address] [place] [address]:  Completed  Transportation Support:  Declined  Zone Management Tools: In Process         Goals Addressed                 This Visit's Progress    [employer] Goal   Improving     I will work with CC team for any assistance needs. .    Barriers: overwhelmed by complexity of regimen  Plan for overcoming my barriers: work with ACM  Confidence: 8/10  Anticipated Goal Completion Date: 1.7.20            Prior to Admission medications    Medication Sig Start Date End Date Taking?  Authorizing Provider   bumetanide (BUMEX) 2 MG tablet Take 1 tablet by mouth daily 11/8/20   Steve Melara MD   lisinopril (PRINIVIL;ZESTRIL) 5 MG tablet Take 1 tablet by mouth daily 11/8/20   Steve Melara MD   nicotine (NICODERM CQ) 14 MG/24HR Place 1 patch onto the skin daily 11/8/20   Elio Goldmann Dre Woodward MD   spironolactone (ALDACTONE) 25 MG tablet Take 1 tablet by mouth daily 11/8/20   Conchis Guerra MD   azithromycin (ZITHROMAX) 500 MG tablet Take 1 tablet by mouth daily for 3 days 11/7/20 11/10/20  Conchis Guerra MD   hydrALAZINE (APRESOLINE) 50 MG tablet Take 25 mg by mouth 3 times daily    Historical Provider, MD   traMADol (ULTRAM) 50 MG tablet Take 50 mg by mouth every 8 hours as needed for Pain. Historical Provider, MD   NIFEdipine (PROCARDIA XL) 30 MG extended release tablet Take 2 tablets by mouth daily 10/8/20   Rhiannon Roper MD   metoclopramide (REGLAN) 10 MG tablet take 1 tablet by mouth three times a day before meals 9/28/20   Rhiannon Roper MD   Cholecalciferol (VITAMIN D3) 20 MCG (800 UNIT) TABS Take 800 Units daily 9/25/20   Eugene Sam MD   albuterol sulfate  (90 Base) MCG/ACT inhaler inhale 2 puffs by mouth every 6 hours if needed for wheezing 9/8/20   PJ Campbell CNP   cloNIDine (CATAPRES) 0.2 MG tablet take 1 tablet by mouth twice a day 8/10/20   Rhiannon Roper MD   OLANZapine (ZYPREXA) 5 MG tablet Take 1 tablet by mouth nightly 7/13/20   Deep Marshall MD   magnesium oxide (MAG-OX) 400 (241.3 Mg) MG TABS tablet Take 1 tablet by mouth 2 times daily 7/13/20   Deep Marshall MD   traZODone (DESYREL) 100 MG tablet Take 100 mg by mouth nightly as needed for Sleep    Historical Provider, MD   isosorbide mononitrate (IMDUR) 30 MG extended release tablet take 1 tablet by mouth once daily 6/12/20   Rhiannon Roper MD   nitroGLYCERIN (NITROSTAT) 0.4 MG SL tablet Place 1 tablet under the tongue every 5 minutes as needed for Chest pain up to max of 3 total doses.  If no relief after 1 dose, call 911. 5/26/20   Rhiannon Roper MD   tamsulosin (FLOMAX) 0.4 MG capsule Take 1 capsule by mouth daily 3/12/20   Brenna Pool MD   Fluticasone furoate-vilanterol (BREO ELLIPTA) 200-25 MCG/INH AEPB inhaler Inhale 1 puff into the lungs daily    Historical Provider, MD pantoprazole (PROTONIX) 40 MG tablet Take 1 tablet by mouth daily 2/7/20   Sandra Harris MD   acetaminophen (TYLENOL) 325 MG tablet Take 2 tablets by mouth every 6 hours as needed for Pain 1/23/20   Amanda Lara DO   DULoxetine (CYMBALTA) 60 MG extended release capsule take 1 capsule by mouth twice a day 1/3/20   Sandra Harris MD   metoprolol (LOPRESSOR) 100 MG tablet Take 1 tablet by mouth 2 times daily 1/2/20   Sandra Harris MD   ipratropium-albuterol (DUONEB) 0.5-2.5 (3) MG/3ML SOLN nebulizer solution Inhale 3 mLs into the lungs every 4 hours as needed for Shortness of Breath 11/7/19   Sandra Harris MD   atorvastatin (LIPITOR) 20 MG tablet take 1 tablet by mouth at bedtime 11/4/19   Sandra Harris MD       Future Appointments   Date Time Provider Gregg Eliana   11/12/2020 11:00 AM [facility] MED ONC CHAIR 04 [facility]   11/25/2020 11:00 AM [facility] MED ONC CHAIR 09 [facility]   12/2/2020 11:00 AM Sandra Harris MD [address]   12/14/2020 11:00 AM Bernard Wagner MD [facility]     ,   Diabetes Assessment        No patient-reported symptoms      ,   Congestive Heart Failure Assessment    Are you currently restricting fluids?:  Other  Do you understand a low sodium diet?:  Yes  Do you understand how to read food labels?:  Yes  Do you salt your food before tasting it?:  No         Symptoms:         ,   COPD Assessment    Does the patient understand envrionmental exposure?:  Yes  Is the patient able to verbalize Rescue vs. Long Acting medications?:  Yes  Does the patient use a space with inhaled medications?:  Yes     No patient-reported symptoms         Symptoms:          and   General Assessment    Do you have any symptoms that are causing concern?:  Yes  Progression since Onset:  Unchanged  Reported Symptoms:   (Comment: feet swelling)

## 2020-11-11 ENCOUNTER — VIRTUAL VISIT (OUTPATIENT)
Dept: INTERNAL MEDICINE CLINIC | Age: 55
End: 2020-11-11
Payer: COMMERCIAL

## 2020-11-11 PROCEDURE — 1111F DSCHRG MED/CURRENT MED MERGE: CPT | Performed by: PHYSICIAN ASSISTANT

## 2020-11-11 PROCEDURE — 99443 PR PHYS/QHP TELEPHONE EVALUATION 21-30 MIN: CPT | Performed by: PHYSICIAN ASSISTANT

## 2020-11-11 RX ORDER — ASPIRIN 81 MG/1
81 TABLET ORAL DAILY
Qty: 30 TABLET | Refills: 5 | Status: SHIPPED | OUTPATIENT
Start: 2020-11-11 | End: 2021-04-30 | Stop reason: SDUPTHER

## 2020-11-11 RX ORDER — NICOTINE 21 MG/24HR
1 PATCH, TRANSDERMAL 24 HOURS TRANSDERMAL DAILY
Qty: 30 PATCH | Refills: 1 | Status: ON HOLD | OUTPATIENT
Start: 2020-11-11 | End: 2022-03-17 | Stop reason: HOSPADM

## 2020-11-11 NOTE — PROGRESS NOTES
Visit Information    Have you changed or started any medications since your last visit including any over-the-counter medicines, vitamins, or herbal medicines? no   Are you having any side effects from any of your medications? -  no  Have you stopped taking any of your medications? Is so, why? -  no    Have you seen any other physician or provider since your last visit? Yes - Records Obtained  Have you had any other diagnostic tests since your last visit? Yes - Records Obtained  Have you been seen in the emergency room and/or had an admission to a hospital since we last saw you? Yes - Records Obtained  Have you had your routine dental cleaning in the past 6 months? no    Have you activated your [employer] account? If not, what are your barriers?  {yes :[id number]::[email or web address]    Patient Care Team:  Vera Liz MD as PCP - General (Internal Medicine)  Vera Liz MD as PCP - [facility]  Joanna Cavazos as Surgeon (Cardiothoracic Surgery)  Magaly Ramos MD (Cardiology)  Ryan Carrasquillo MD as Orthopedic Surgeon (Orthopedic Surgery)  Erinn Hogue MD as Surgeon (Orthopedic Surgery)  Karthik Mata MD as Surgeon (Neurosurgery)  Bowen Greene MD as Consulting Physician (Neurology)  Soheila Kam MD as Consulting Physician (Pulmonology)  Katie Chan RN as Ambulatory Care Manager    Medical History Review  Past Medical, Family, and Social History reviewed and does not contribute to the patient presenting condition    Health Maintenance   Topic Date Due    Diabetic retinal exam  04/04/1973    Hepatitis B vaccine (1 of 3 - Risk 3-dose series) 04/04/1982    Shingles Vaccine (1 of 2) 04/04/2013    Pneumococcal 0-64 years Vaccine (3 of 3 - PCV13) 03/14/2018    A1C test (Diabetic or Prediabetic)  07/10/2021    Diabetic foot exam  10/08/2021    Lipid screen  11/07/2021    Potassium monitoring  11/07/2021    Creatinine monitoring  11/07/2021    DTaP/Tdap/Td vaccine (2 - Td) 11/17/2026    Colon cancer screen colonoscopy  07/30/2029    Flu vaccine  Completed    HIV screen  Completed    Hepatitis C screen  Addressed    Hepatitis A vaccine  Aged Out    Hib vaccine  Aged Out    Meningococcal (ACWY) vaccine  Aged Out
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
## 2020-11-12 ENCOUNTER — HOSPITAL ENCOUNTER (OUTPATIENT)
Dept: INFUSION THERAPY | Age: 55
Discharge: HOME | End: 2020-11-12
Payer: COMMERCIAL

## 2020-11-12 VITALS
WEIGHT: 220 LBS | DIASTOLIC BLOOD PRESSURE: 92 MMHG | TEMPERATURE: 97.4 F | SYSTOLIC BLOOD PRESSURE: 150 MMHG | BODY MASS INDEX: 32.49 KG/M2 | RESPIRATION RATE: 18 BRPM | HEART RATE: 68 BPM

## 2020-11-12 DIAGNOSIS — N18.4 BENIGN HYPERTENSION WITH CKD (CHRONIC KIDNEY DISEASE) STAGE IV (HCC): Primary | ICD-10-CM

## 2020-11-12 DIAGNOSIS — N18.4 ANEMIA IN STAGE 4 CHRONIC KIDNEY DISEASE (HCC): ICD-10-CM

## 2020-11-12 DIAGNOSIS — N18.4 CKD (CHRONIC KIDNEY DISEASE), STAGE IV (HCC): ICD-10-CM

## 2020-11-12 DIAGNOSIS — N01.7 RAPID PROGRESSV NEPHRITIC SYNDRM, DIFFUSE CRESCENTC GLOMERULONEPHRITIS: ICD-10-CM

## 2020-11-12 DIAGNOSIS — I12.9 BENIGN HYPERTENSION WITH CKD (CHRONIC KIDNEY DISEASE) STAGE IV (HCC): Primary | ICD-10-CM

## 2020-11-12 DIAGNOSIS — N04.1 NEPHROTIC SYNDROME WITH FOCAL GLOMERULOSCLEROSIS: ICD-10-CM

## 2020-11-12 DIAGNOSIS — N17.0 ACUTE KIDNEY FAILURE WITH LESION OF TUBULAR NECROSIS (HCC): ICD-10-CM

## 2020-11-12 DIAGNOSIS — D63.1 ANEMIA IN STAGE 4 CHRONIC KIDNEY DISEASE (HCC): ICD-10-CM

## 2020-11-12 LAB
ABSOLUTE EOS #: 0.11 K/UL (ref 0–0.4)
ABSOLUTE IMMATURE GRANULOCYTE: ABNORMAL K/UL (ref 0–0.3)
ABSOLUTE LYMPH #: 1.14 K/UL (ref 1–4.8)
ABSOLUTE MONO #: 0.57 K/UL (ref 0.1–0.8)
ANION GAP SERPL CALCULATED.3IONS-SCNC: 10 MMOL/L (ref 9–17)
BASOPHILS # BLD: 0 % (ref 0–2)
BASOPHILS ABSOLUTE: 0 K/UL (ref 0–0.2)
BUN BLDV-MCNC: 31 MG/DL (ref 6–20)
BUN/CREAT BLD: ABNORMAL (ref 9–20)
CALCIUM SERPL-MCNC: 9.2 MG/DL (ref 8.6–10.4)
CHLORIDE BLD-SCNC: 102 MMOL/L (ref 98–107)
CO2: 28 MMOL/L (ref 20–31)
CREAT SERPL-MCNC: 1.98 MG/DL (ref 0.7–1.2)
DIFFERENTIAL TYPE: ABNORMAL
EOSINOPHILS RELATIVE PERCENT: 1 % (ref 1–4)
GFR AFRICAN AMERICAN: 42 ML/MIN
GFR NON-AFRICAN AMERICAN: 35 ML/MIN
GFR SERPL CREATININE-BSD FRML MDRD: ABNORMAL ML/MIN/{1.73_M2}
GFR SERPL CREATININE-BSD FRML MDRD: ABNORMAL ML/MIN/{1.73_M2}
GLUCOSE BLD-MCNC: 204 MG/DL (ref 70–99)
HCT VFR BLD CALC: 38.8 % (ref 41–53)
HEMOGLOBIN: 12.8 G/DL (ref 13.5–17.5)
IMMATURE GRANULOCYTES: ABNORMAL %
LYMPHOCYTES # BLD: 10 % (ref 24–44)
MCH RBC QN AUTO: 27.7 PG (ref 26–34)
MCHC RBC AUTO-ENTMCNC: 33 G/DL (ref 31–37)
MCV RBC AUTO: 84.1 FL (ref 80–100)
METAMYELOCYTES ABSOLUTE COUNT: 0.11 K/UL
METAMYELOCYTES: 1 %
MONOCYTES # BLD: 5 % (ref 1–7)
MORPHOLOGY: ABNORMAL
NRBC AUTOMATED: ABNORMAL PER 100 WBC
PDW BLD-RTO: 20 % (ref 12.5–15.4)
PLATELET # BLD: 243 K/UL (ref 140–450)
PLATELET ESTIMATE: ABNORMAL
PMV BLD AUTO: 7.6 FL (ref 6–12)
POTASSIUM SERPL-SCNC: 3.8 MMOL/L (ref 3.7–5.3)
RBC # BLD: 4.61 M/UL (ref 4.5–5.9)
RBC # BLD: ABNORMAL 10*6/UL
SEG NEUTROPHILS: 83 % (ref 36–66)
SEGMENTED NEUTROPHILS ABSOLUTE COUNT: 9.47 K/UL (ref 1.8–7.7)
SODIUM BLD-SCNC: 140 MMOL/L (ref 135–144)
WBC # BLD: 11.4 K/UL (ref 3.5–11)
WBC # BLD: ABNORMAL 10*3/UL

## 2020-11-12 PROCEDURE — 80048 BASIC METABOLIC PNL TOTAL CA: CPT

## 2020-11-12 PROCEDURE — 85025 COMPLETE CBC W/AUTO DIFF WBC: CPT

## 2020-11-12 PROCEDURE — 2580000003 HC RX 258: Performed by: INTERNAL MEDICINE

## 2020-11-12 PROCEDURE — 36415 COLL VENOUS BLD VENIPUNCTURE: CPT

## 2020-11-12 PROCEDURE — 96375 TX/PRO/DX INJ NEW DRUG ADDON: CPT

## 2020-11-12 PROCEDURE — 6360000002 HC RX W HCPCS: Performed by: INTERNAL MEDICINE

## 2020-11-12 PROCEDURE — 96413 CHEMO IV INFUSION 1 HR: CPT

## 2020-11-12 RX ORDER — ONDANSETRON 2 MG/ML
4 INJECTION INTRAMUSCULAR; INTRAVENOUS ONCE
Status: COMPLETED | OUTPATIENT
Start: 2020-11-12 | End: 2020-11-12

## 2020-11-12 RX ORDER — SODIUM CHLORIDE 9 MG/ML
20 INJECTION, SOLUTION INTRAVENOUS ONCE
Status: COMPLETED | OUTPATIENT
Start: 2020-11-12 | End: 2020-11-12

## 2020-11-12 RX ADMIN — SODIUM CHLORIDE 20 ML/HR: 9 INJECTION, SOLUTION INTRAVENOUS at 12:00

## 2020-11-12 RX ADMIN — CYCLOPHOSPHAMIDE 500 MG: 500 INJECTION, POWDER, FOR SOLUTION INTRAVENOUS; ORAL at 12:14

## 2020-11-12 RX ADMIN — ONDANSETRON 4 MG: 2 INJECTION INTRAMUSCULAR; INTRAVENOUS at 12:05

## 2020-11-12 NOTE — PROGRESS NOTES
Pt here for C1D43. Pt was discharged from hospital on 11/9. Called and spoke to Dr Kyra Bassett and we went over his labs, admission into the hospital and orders. He would like us to continue with treatment today and he needs to see him in the office. Instructed Ethan that he needs to call the office and make an appt. Pt denies any problems and tolerates treatment very well. Pt verbalizes understanding that he needs to call the Dr office. Will return 11/25 for treatment.

## 2020-11-19 RX ORDER — HYDRALAZINE HYDROCHLORIDE 50 MG/1
TABLET, FILM COATED ORAL
Qty: 145 TABLET | Refills: 0 | Status: ON HOLD | OUTPATIENT
Start: 2020-11-19 | End: 2020-12-20 | Stop reason: HOSPADM

## 2020-11-19 RX ORDER — ATORVASTATIN CALCIUM 20 MG/1
TABLET, FILM COATED ORAL
Qty: 300 TABLET | Refills: 1 | Status: SHIPPED | OUTPATIENT
Start: 2020-11-19 | End: 2021-01-21 | Stop reason: DRUGHIGH

## 2020-11-24 RX ORDER — TAMSULOSIN HYDROCHLORIDE 0.4 MG/1
CAPSULE ORAL
Qty: 90 CAPSULE | Refills: 3 | Status: ON HOLD | OUTPATIENT
Start: 2020-11-24 | End: 2020-12-20 | Stop reason: HOSPADM

## 2020-12-02 ENCOUNTER — OFFICE VISIT (OUTPATIENT)
Dept: INTERNAL MEDICINE CLINIC | Age: 55
End: 2020-12-02
Payer: COMMERCIAL

## 2020-12-02 VITALS
HEIGHT: 69 IN | RESPIRATION RATE: 16 BRPM | WEIGHT: 211 LBS | BODY MASS INDEX: 31.25 KG/M2 | SYSTOLIC BLOOD PRESSURE: 114 MMHG | DIASTOLIC BLOOD PRESSURE: 74 MMHG | TEMPERATURE: 97 F | HEART RATE: 76 BPM

## 2020-12-02 LAB — HBA1C MFR BLD: 5.9 %

## 2020-12-02 PROCEDURE — G8431 POS CLIN DEPRES SCRN F/U DOC: HCPCS | Performed by: INTERNAL MEDICINE

## 2020-12-02 PROCEDURE — 83036 HEMOGLOBIN GLYCOSYLATED A1C: CPT | Performed by: INTERNAL MEDICINE

## 2020-12-02 PROCEDURE — 1111F DSCHRG MED/CURRENT MED MERGE: CPT | Performed by: INTERNAL MEDICINE

## 2020-12-02 PROCEDURE — 99214 OFFICE O/P EST MOD 30 MIN: CPT | Performed by: INTERNAL MEDICINE

## 2020-12-02 RX ORDER — ALBUTEROL SULFATE 90 UG/1
AEROSOL, METERED RESPIRATORY (INHALATION)
Qty: 18 G | Refills: 5 | Status: ON HOLD | OUTPATIENT
Start: 2020-12-02 | End: 2022-07-22

## 2020-12-02 ASSESSMENT — PATIENT HEALTH QUESTIONNAIRE - PHQ9
SUM OF ALL RESPONSES TO PHQ9 QUESTIONS 1 & 2: 5
8. MOVING OR SPEAKING SO SLOWLY THAT OTHER PEOPLE COULD HAVE NOTICED. OR THE OPPOSITE, BEING SO FIGETY OR RESTLESS THAT YOU HAVE BEEN MOVING AROUND A LOT MORE THAN USUAL: 0
1. LITTLE INTEREST OR PLEASURE IN DOING THINGS: 2
SUM OF ALL RESPONSES TO PHQ QUESTIONS 1-9: 12
SUM OF ALL RESPONSES TO PHQ QUESTIONS 1-9: 12
3. TROUBLE FALLING OR STAYING ASLEEP: 3
6. FEELING BAD ABOUT YOURSELF - OR THAT YOU ARE A FAILURE OR HAVE LET YOURSELF OR YOUR FAMILY DOWN: 0
2. FEELING DOWN, DEPRESSED OR HOPELESS: 3
5. POOR APPETITE OR OVEREATING: 1
7. TROUBLE CONCENTRATING ON THINGS, SUCH AS READING THE NEWSPAPER OR WATCHING TELEVISION: 3
9. THOUGHTS THAT YOU WOULD BE BETTER OFF DEAD, OR OF HURTING YOURSELF: 0
10. IF YOU CHECKED OFF ANY PROBLEMS, HOW DIFFICULT HAVE THESE PROBLEMS MADE IT FOR YOU TO DO YOUR WORK, TAKE CARE OF THINGS AT HOME, OR GET ALONG WITH OTHER PEOPLE: 1
SUM OF ALL RESPONSES TO PHQ QUESTIONS 1-9: 12

## 2020-12-02 NOTE — PROGRESS NOTES
 Colon cancer screen colonoscopy  07/30/2029    Flu vaccine  Completed    HIV screen  Completed    Hepatitis C screen  Addressed    Hepatitis A vaccine  Aged Out    Hib vaccine  Aged Out    Meningococcal (ACWY) vaccine  Aged Out     Chief Complaint   Patient presents with    Follow-up     [facility]. Pt was diagnosed with stage 3 kidney failure. Pt states since hoem from the hospital he still has the flank pain and denies any other concerns at this time.        HPI    Review of Systems    Objective:   Physical Exam    Assessment / Plan:

## 2020-12-08 ASSESSMENT — ENCOUNTER SYMPTOMS
WHEEZING: 0
CONSTIPATION: 0
ABDOMINAL PAIN: 0
CHEST TIGHTNESS: 0
COUGH: 1
FACIAL SWELLING: 0
BACK PAIN: 0
ABDOMINAL DISTENTION: 0
COLOR CHANGE: 0
SHORTNESS OF BREATH: 1
APNEA: 0
DIARRHEA: 0

## 2020-12-09 ENCOUNTER — HOSPITAL ENCOUNTER (OUTPATIENT)
Age: 55
Discharge: HOME | End: 2020-12-09
Payer: COMMERCIAL

## 2020-12-09 ENCOUNTER — CARE COORDINATION (OUTPATIENT)
Dept: CARE COORDINATION | Age: 55
End: 2020-12-09

## 2020-12-09 LAB
ANION GAP SERPL CALCULATED.3IONS-SCNC: 13 MMOL/L (ref 9–17)
BUN BLDV-MCNC: 17 MG/DL (ref 6–20)
BUN/CREAT BLD: ABNORMAL (ref 9–20)
CALCIUM SERPL-MCNC: 9.6 MG/DL (ref 8.6–10.4)
CHLORIDE BLD-SCNC: 100 MMOL/L (ref 98–107)
CO2: 27 MMOL/L (ref 20–31)
CREAT SERPL-MCNC: 2.15 MG/DL (ref 0.7–1.2)
GFR AFRICAN AMERICAN: 39 ML/MIN
GFR NON-AFRICAN AMERICAN: 32 ML/MIN
GFR SERPL CREATININE-BSD FRML MDRD: ABNORMAL ML/MIN/{1.73_M2}
GFR SERPL CREATININE-BSD FRML MDRD: ABNORMAL ML/MIN/{1.73_M2}
GLUCOSE BLD-MCNC: 102 MG/DL (ref 70–99)
POTASSIUM SERPL-SCNC: 4.1 MMOL/L (ref 3.7–5.3)
SODIUM BLD-SCNC: 140 MMOL/L (ref 135–144)

## 2020-12-09 PROCEDURE — 36415 COLL VENOUS BLD VENIPUNCTURE: CPT

## 2020-12-09 PROCEDURE — 80048 BASIC METABOLIC PNL TOTAL CA: CPT

## 2020-12-09 NOTE — CARE COORDINATION
Attempting to reach patient for a follow up care coordination call. Left detailed message for the patient to return my call with any questions or concerns.   Cecily Archibald RN, ambulatory care manager

## 2020-12-10 ENCOUNTER — HOSPITAL ENCOUNTER (OUTPATIENT)
Dept: INFUSION THERAPY | Age: 55
Discharge: HOME | End: 2020-12-10
Payer: COMMERCIAL

## 2020-12-10 VITALS
TEMPERATURE: 97.5 F | SYSTOLIC BLOOD PRESSURE: 110 MMHG | DIASTOLIC BLOOD PRESSURE: 72 MMHG | WEIGHT: 213.4 LBS | HEART RATE: 76 BPM | BODY MASS INDEX: 31.51 KG/M2 | RESPIRATION RATE: 16 BRPM

## 2020-12-10 DIAGNOSIS — N18.4 CKD (CHRONIC KIDNEY DISEASE), STAGE IV (HCC): ICD-10-CM

## 2020-12-10 DIAGNOSIS — N18.4 BENIGN HYPERTENSION WITH CKD (CHRONIC KIDNEY DISEASE) STAGE IV (HCC): Primary | ICD-10-CM

## 2020-12-10 DIAGNOSIS — N18.4 ANEMIA IN STAGE 4 CHRONIC KIDNEY DISEASE (HCC): ICD-10-CM

## 2020-12-10 DIAGNOSIS — D63.1 ANEMIA IN STAGE 4 CHRONIC KIDNEY DISEASE (HCC): ICD-10-CM

## 2020-12-10 DIAGNOSIS — I12.9 BENIGN HYPERTENSION WITH CKD (CHRONIC KIDNEY DISEASE) STAGE IV (HCC): Primary | ICD-10-CM

## 2020-12-10 DIAGNOSIS — N17.0 ACUTE KIDNEY FAILURE WITH LESION OF TUBULAR NECROSIS (HCC): ICD-10-CM

## 2020-12-10 DIAGNOSIS — N01.7 RAPID PROGRESSV NEPHRITIC SYNDRM, DIFFUSE CRESCENTC GLOMERULONEPHRITIS: ICD-10-CM

## 2020-12-10 DIAGNOSIS — N04.1 NEPHROTIC SYNDROME WITH FOCAL GLOMERULOSCLEROSIS: ICD-10-CM

## 2020-12-10 LAB
ABSOLUTE EOS #: 0.3 K/UL (ref 0–0.4)
ABSOLUTE IMMATURE GRANULOCYTE: ABNORMAL K/UL (ref 0–0.3)
ABSOLUTE LYMPH #: 1.8 K/UL (ref 1–4.8)
ABSOLUTE MONO #: 0.5 K/UL (ref 0.1–1.2)
ANION GAP SERPL CALCULATED.3IONS-SCNC: 10 MMOL/L (ref 9–17)
BASOPHILS # BLD: 2 % (ref 0–2)
BASOPHILS ABSOLUTE: 0.2 K/UL (ref 0–0.2)
BUN BLDV-MCNC: 15 MG/DL (ref 6–20)
BUN/CREAT BLD: ABNORMAL (ref 9–20)
CALCIUM SERPL-MCNC: 9.2 MG/DL (ref 8.6–10.4)
CHLORIDE BLD-SCNC: 98 MMOL/L (ref 98–107)
CO2: 28 MMOL/L (ref 20–31)
CREAT SERPL-MCNC: 2.14 MG/DL (ref 0.7–1.2)
DIFFERENTIAL TYPE: ABNORMAL
EOSINOPHILS RELATIVE PERCENT: 5 % (ref 1–4)
GFR AFRICAN AMERICAN: 39 ML/MIN
GFR NON-AFRICAN AMERICAN: 32 ML/MIN
GFR SERPL CREATININE-BSD FRML MDRD: ABNORMAL ML/MIN/{1.73_M2}
GFR SERPL CREATININE-BSD FRML MDRD: ABNORMAL ML/MIN/{1.73_M2}
GLUCOSE BLD-MCNC: 162 MG/DL (ref 70–99)
HCT VFR BLD CALC: 36.8 % (ref 41–53)
HEMOGLOBIN: 12.1 G/DL (ref 13.5–17.5)
IMMATURE GRANULOCYTES: ABNORMAL %
LYMPHOCYTES # BLD: 25 % (ref 24–44)
MCH RBC QN AUTO: 28.2 PG (ref 26–34)
MCHC RBC AUTO-ENTMCNC: 33 G/DL (ref 31–37)
MCV RBC AUTO: 85.4 FL (ref 80–100)
MONOCYTES # BLD: 7 % (ref 2–11)
NRBC AUTOMATED: ABNORMAL PER 100 WBC
PDW BLD-RTO: 17.6 % (ref 12.5–15.4)
PLATELET # BLD: 237 K/UL (ref 140–450)
PLATELET ESTIMATE: ABNORMAL
PMV BLD AUTO: 7.6 FL (ref 6–12)
POTASSIUM SERPL-SCNC: 3.9 MMOL/L (ref 3.7–5.3)
RBC # BLD: 4.31 M/UL (ref 4.5–5.9)
RBC # BLD: ABNORMAL 10*6/UL
SEG NEUTROPHILS: 61 % (ref 36–66)
SEGMENTED NEUTROPHILS ABSOLUTE COUNT: 4.5 K/UL (ref 1.8–7.7)
SODIUM BLD-SCNC: 136 MMOL/L (ref 135–144)
WBC # BLD: 7.2 K/UL (ref 3.5–11)
WBC # BLD: ABNORMAL 10*3/UL

## 2020-12-10 PROCEDURE — 6360000002 HC RX W HCPCS: Performed by: INTERNAL MEDICINE

## 2020-12-10 PROCEDURE — 36415 COLL VENOUS BLD VENIPUNCTURE: CPT

## 2020-12-10 PROCEDURE — 2580000003 HC RX 258: Performed by: INTERNAL MEDICINE

## 2020-12-10 PROCEDURE — 96375 TX/PRO/DX INJ NEW DRUG ADDON: CPT | Performed by: NURSE PRACTITIONER

## 2020-12-10 PROCEDURE — 85025 COMPLETE CBC W/AUTO DIFF WBC: CPT

## 2020-12-10 PROCEDURE — 96413 CHEMO IV INFUSION 1 HR: CPT | Performed by: NURSE PRACTITIONER

## 2020-12-10 PROCEDURE — 80048 BASIC METABOLIC PNL TOTAL CA: CPT

## 2020-12-10 RX ORDER — SODIUM CHLORIDE 9 MG/ML
20 INJECTION, SOLUTION INTRAVENOUS ONCE
Status: COMPLETED | OUTPATIENT
Start: 2020-12-10 | End: 2020-12-10

## 2020-12-10 RX ORDER — ONDANSETRON 2 MG/ML
4 INJECTION INTRAMUSCULAR; INTRAVENOUS ONCE
Status: COMPLETED | OUTPATIENT
Start: 2020-12-10 | End: 2020-12-10

## 2020-12-10 RX ADMIN — SODIUM CHLORIDE 20 ML/HR: 9 INJECTION, SOLUTION INTRAVENOUS at 11:55

## 2020-12-10 RX ADMIN — ONDANSETRON 4 MG: 2 INJECTION INTRAMUSCULAR; INTRAVENOUS at 11:55

## 2020-12-10 RX ADMIN — CYCLOPHOSPHAMIDE 500 MG: 1 INJECTION, POWDER, FOR SOLUTION INTRAVENOUS; ORAL at 12:11

## 2020-12-10 NOTE — PROGRESS NOTES
Patient is here for his Cytoxan. His creatine is 2.14 today. Dr. Linda Thapa is the ordering physician. I called his office to clarify order is to be followed today. I spoke to [place], his MA. She stated that his baseline creatine is over 2,0. The doctor ordered it with his creatine elevated over 2, so she was certain he wanted it to be given. Patient treated without incident.

## 2020-12-18 ENCOUNTER — HOSPITAL ENCOUNTER (INPATIENT)
Age: 55
LOS: 4 days | Discharge: HOME HEALTH | DRG: 204 | End: 2020-12-22
Attending: EMERGENCY MEDICINE | Admitting: INTERNAL MEDICINE
Payer: COMMERCIAL

## 2020-12-18 ENCOUNTER — APPOINTMENT (OUTPATIENT)
Dept: CT IMAGING | Age: 55
DRG: 204 | End: 2020-12-18
Payer: COMMERCIAL

## 2020-12-18 ENCOUNTER — APPOINTMENT (OUTPATIENT)
Dept: GENERAL RADIOLOGY | Age: 55
DRG: 204 | End: 2020-12-18
Payer: COMMERCIAL

## 2020-12-18 PROBLEM — R55 SYNCOPE AND COLLAPSE: Status: ACTIVE | Noted: 2020-12-18

## 2020-12-18 LAB
ABSOLUTE EOS #: 0.5 K/UL (ref 0–0.4)
ABSOLUTE IMMATURE GRANULOCYTE: ABNORMAL K/UL (ref 0–0.3)
ABSOLUTE LYMPH #: 1.5 K/UL (ref 1–4.8)
ABSOLUTE MONO #: 0.4 K/UL (ref 0.1–1.3)
ALBUMIN SERPL-MCNC: 3.3 G/DL (ref 3.5–5.2)
ALBUMIN/GLOBULIN RATIO: ABNORMAL (ref 1–2.5)
ALP BLD-CCNC: 143 U/L (ref 40–129)
ALT SERPL-CCNC: 6 U/L (ref 5–41)
AMPHETAMINE SCREEN URINE: NEGATIVE
ANION GAP SERPL CALCULATED.3IONS-SCNC: 9 MMOL/L (ref 9–17)
AST SERPL-CCNC: 15 U/L
BARBITURATE SCREEN URINE: NEGATIVE
BASOPHILS # BLD: 1 % (ref 0–2)
BASOPHILS ABSOLUTE: 0.1 K/UL (ref 0–0.2)
BENZODIAZEPINE SCREEN, URINE: NEGATIVE
BILIRUB SERPL-MCNC: 0.35 MG/DL (ref 0.3–1.2)
BILIRUBIN URINE: NEGATIVE
BUN BLDV-MCNC: 21 MG/DL (ref 6–20)
BUN/CREAT BLD: ABNORMAL (ref 9–20)
BUPRENORPHINE URINE: NORMAL
C-REACTIVE PROTEIN: 9.4 MG/L (ref 0–5)
CALCIUM SERPL-MCNC: 8.9 MG/DL (ref 8.6–10.4)
CANNABINOID SCREEN URINE: NEGATIVE
CHLORIDE BLD-SCNC: 99 MMOL/L (ref 98–107)
CO2: 28 MMOL/L (ref 20–31)
COCAINE METABOLITE, URINE: NEGATIVE
COLOR: YELLOW
COMMENT UA: NORMAL
CREAT SERPL-MCNC: 2.49 MG/DL (ref 0.7–1.2)
DIFFERENTIAL TYPE: ABNORMAL
EOSINOPHILS RELATIVE PERCENT: 8 % (ref 0–4)
ETHANOL PERCENT: <0.01 %
ETHANOL: <10 MG/DL
FERRITIN: 189 UG/L (ref 30–400)
GFR AFRICAN AMERICAN: 33 ML/MIN
GFR NON-AFRICAN AMERICAN: 27 ML/MIN
GFR SERPL CREATININE-BSD FRML MDRD: ABNORMAL ML/MIN/{1.73_M2}
GFR SERPL CREATININE-BSD FRML MDRD: ABNORMAL ML/MIN/{1.73_M2}
GLUCOSE BLD-MCNC: 136 MG/DL (ref 70–99)
GLUCOSE URINE: NEGATIVE
HCT VFR BLD CALC: 33.9 % (ref 41–53)
HEMOGLOBIN: 11.2 G/DL (ref 13.5–17.5)
IMMATURE GRANULOCYTES: ABNORMAL %
INR BLD: 1
KETONES, URINE: NEGATIVE
LACTATE DEHYDROGENASE: 251 U/L (ref 135–225)
LEUKOCYTE ESTERASE, URINE: NEGATIVE
LYMPHOCYTES # BLD: 23 % (ref 24–44)
MAGNESIUM: 1.9 MG/DL (ref 1.6–2.6)
MCH RBC QN AUTO: 28.7 PG (ref 26–34)
MCHC RBC AUTO-ENTMCNC: 33.2 G/DL (ref 31–37)
MCV RBC AUTO: 86.5 FL (ref 80–100)
MDMA URINE: NORMAL
METHADONE SCREEN, URINE: NEGATIVE
METHAMPHETAMINE, URINE: NORMAL
MONOCYTES # BLD: 6 % (ref 1–7)
NITRITE, URINE: NEGATIVE
NRBC AUTOMATED: ABNORMAL PER 100 WBC
OPIATES, URINE: NEGATIVE
OXYCODONE SCREEN URINE: NEGATIVE
PDW BLD-RTO: 17.6 % (ref 11.5–14.9)
PH UA: 6.5 (ref 5–8)
PHENCYCLIDINE, URINE: NEGATIVE
PLATELET # BLD: 209 K/UL (ref 150–450)
PLATELET ESTIMATE: ABNORMAL
PMV BLD AUTO: 8.5 FL (ref 6–12)
POTASSIUM SERPL-SCNC: 4.1 MMOL/L (ref 3.7–5.3)
PROPOXYPHENE, URINE: NORMAL
PROTEIN UA: NEGATIVE
PROTHROMBIN TIME: 12.9 SEC (ref 11.8–14.6)
RBC # BLD: 3.91 M/UL (ref 4.5–5.9)
RBC # BLD: ABNORMAL 10*6/UL
SARS-COV-2, RAPID: NOT DETECTED
SARS-COV-2: NORMAL
SARS-COV-2: NORMAL
SEG NEUTROPHILS: 62 % (ref 36–66)
SEGMENTED NEUTROPHILS ABSOLUTE COUNT: 4.2 K/UL (ref 1.3–9.1)
SODIUM BLD-SCNC: 136 MMOL/L (ref 135–144)
SOURCE: NORMAL
SPECIFIC GRAVITY UA: 1.01 (ref 1–1.03)
TEST INFORMATION: NORMAL
TOTAL PROTEIN: 5.9 G/DL (ref 6.4–8.3)
TRICYCLIC ANTIDEPRESSANTS, UR: NORMAL
TROPONIN INTERP: NORMAL
TROPONIN INTERP: NORMAL
TROPONIN T: NORMAL NG/ML
TROPONIN T: NORMAL NG/ML
TROPONIN, HIGH SENSITIVITY: 20 NG/L (ref 0–22)
TROPONIN, HIGH SENSITIVITY: 22 NG/L (ref 0–22)
TURBIDITY: CLEAR
URINE HGB: NEGATIVE
UROBILINOGEN, URINE: NORMAL
WBC # BLD: 6.7 K/UL (ref 3.5–11)
WBC # BLD: ABNORMAL 10*3/UL

## 2020-12-18 PROCEDURE — U0002 COVID-19 LAB TEST NON-CDC: HCPCS

## 2020-12-18 PROCEDURE — 81003 URINALYSIS AUTO W/O SCOPE: CPT

## 2020-12-18 PROCEDURE — 86140 C-REACTIVE PROTEIN: CPT

## 2020-12-18 PROCEDURE — 80307 DRUG TEST PRSMV CHEM ANLYZR: CPT

## 2020-12-18 PROCEDURE — 36415 COLL VENOUS BLD VENIPUNCTURE: CPT

## 2020-12-18 PROCEDURE — 86901 BLOOD TYPING SEROLOGIC RH(D): CPT

## 2020-12-18 PROCEDURE — 80053 COMPREHEN METABOLIC PANEL: CPT

## 2020-12-18 PROCEDURE — 84484 ASSAY OF TROPONIN QUANT: CPT

## 2020-12-18 PROCEDURE — G0480 DRUG TEST DEF 1-7 CLASSES: HCPCS

## 2020-12-18 PROCEDURE — 6370000000 HC RX 637 (ALT 250 FOR IP): Performed by: STUDENT IN AN ORGANIZED HEALTH CARE EDUCATION/TRAINING PROGRAM

## 2020-12-18 PROCEDURE — 87086 URINE CULTURE/COLONY COUNT: CPT

## 2020-12-18 PROCEDURE — 85610 PROTHROMBIN TIME: CPT

## 2020-12-18 PROCEDURE — 82728 ASSAY OF FERRITIN: CPT

## 2020-12-18 PROCEDURE — 83735 ASSAY OF MAGNESIUM: CPT

## 2020-12-18 PROCEDURE — 2580000003 HC RX 258: Performed by: STUDENT IN AN ORGANIZED HEALTH CARE EDUCATION/TRAINING PROGRAM

## 2020-12-18 PROCEDURE — 99283 EMERGENCY DEPT VISIT LOW MDM: CPT

## 2020-12-18 PROCEDURE — U0003 INFECTIOUS AGENT DETECTION BY NUCLEIC ACID (DNA OR RNA); SEVERE ACUTE RESPIRATORY SYNDROME CORONAVIRUS 2 (SARS-COV-2) (CORONAVIRUS DISEASE [COVID-19]), AMPLIFIED PROBE TECHNIQUE, MAKING USE OF HIGH THROUGHPUT TECHNOLOGIES AS DESCRIBED BY CMS-2020-01-R: HCPCS

## 2020-12-18 PROCEDURE — 83615 LACTATE (LD) (LDH) ENZYME: CPT

## 2020-12-18 PROCEDURE — 86850 RBC ANTIBODY SCREEN: CPT

## 2020-12-18 PROCEDURE — 86900 BLOOD TYPING SEROLOGIC ABO: CPT

## 2020-12-18 PROCEDURE — 2060000000 HC ICU INTERMEDIATE R&B

## 2020-12-18 PROCEDURE — 93005 ELECTROCARDIOGRAM TRACING: CPT | Performed by: STUDENT IN AN ORGANIZED HEALTH CARE EDUCATION/TRAINING PROGRAM

## 2020-12-18 PROCEDURE — 70450 CT HEAD/BRAIN W/O DYE: CPT

## 2020-12-18 PROCEDURE — 71045 X-RAY EXAM CHEST 1 VIEW: CPT

## 2020-12-18 PROCEDURE — 96361 HYDRATE IV INFUSION ADD-ON: CPT

## 2020-12-18 PROCEDURE — 96360 HYDRATION IV INFUSION INIT: CPT

## 2020-12-18 PROCEDURE — 2580000003 HC RX 258: Performed by: EMERGENCY MEDICINE

## 2020-12-18 PROCEDURE — 99223 1ST HOSP IP/OBS HIGH 75: CPT | Performed by: INTERNAL MEDICINE

## 2020-12-18 PROCEDURE — 86920 COMPATIBILITY TEST SPIN: CPT

## 2020-12-18 PROCEDURE — 85025 COMPLETE CBC W/AUTO DIFF WBC: CPT

## 2020-12-18 RX ORDER — BUMETANIDE 1 MG/1
2 TABLET ORAL DAILY
Status: DISCONTINUED | OUTPATIENT
Start: 2020-12-18 | End: 2020-12-22

## 2020-12-18 RX ORDER — ISOSORBIDE MONONITRATE 30 MG/1
30 TABLET, EXTENDED RELEASE ORAL DAILY
Status: DISCONTINUED | OUTPATIENT
Start: 2020-12-18 | End: 2020-12-22 | Stop reason: HOSPADM

## 2020-12-18 RX ORDER — HYDRALAZINE HYDROCHLORIDE 25 MG/1
25 TABLET, FILM COATED ORAL EVERY 8 HOURS SCHEDULED
Status: CANCELLED | OUTPATIENT
Start: 2020-12-18

## 2020-12-18 RX ORDER — SODIUM CHLORIDE 0.9 % (FLUSH) 0.9 %
10 SYRINGE (ML) INJECTION PRN
Status: DISCONTINUED | OUTPATIENT
Start: 2020-12-18 | End: 2020-12-22 | Stop reason: HOSPADM

## 2020-12-18 RX ORDER — POTASSIUM CHLORIDE 20 MEQ/1
40 TABLET, EXTENDED RELEASE ORAL PRN
Status: DISCONTINUED | OUTPATIENT
Start: 2020-12-18 | End: 2020-12-22 | Stop reason: HOSPADM

## 2020-12-18 RX ORDER — CLONIDINE HYDROCHLORIDE 0.2 MG/1
0.2 TABLET ORAL 2 TIMES DAILY
Status: DISCONTINUED | OUTPATIENT
Start: 2020-12-18 | End: 2020-12-22 | Stop reason: HOSPADM

## 2020-12-18 RX ORDER — AZATHIOPRINE 50 MG/1
75 TABLET ORAL DAILY
Status: DISCONTINUED | OUTPATIENT
Start: 2020-12-18 | End: 2020-12-22 | Stop reason: HOSPADM

## 2020-12-18 RX ORDER — ACETAMINOPHEN 650 MG/1
650 SUPPOSITORY RECTAL EVERY 6 HOURS PRN
Status: DISCONTINUED | OUTPATIENT
Start: 2020-12-18 | End: 2020-12-22 | Stop reason: HOSPADM

## 2020-12-18 RX ORDER — PREDNISONE 20 MG/1
40 TABLET ORAL DAILY
Status: DISCONTINUED | OUTPATIENT
Start: 2020-12-18 | End: 2020-12-22

## 2020-12-18 RX ORDER — POLYETHYLENE GLYCOL 3350 17 G/17G
17 POWDER, FOR SOLUTION ORAL DAILY PRN
Status: DISCONTINUED | OUTPATIENT
Start: 2020-12-18 | End: 2020-12-22 | Stop reason: HOSPADM

## 2020-12-18 RX ORDER — POTASSIUM CHLORIDE 7.45 MG/ML
10 INJECTION INTRAVENOUS PRN
Status: DISCONTINUED | OUTPATIENT
Start: 2020-12-18 | End: 2020-12-22 | Stop reason: HOSPADM

## 2020-12-18 RX ORDER — PROMETHAZINE HYDROCHLORIDE 25 MG/1
12.5 TABLET ORAL EVERY 6 HOURS PRN
Status: DISCONTINUED | OUTPATIENT
Start: 2020-12-18 | End: 2020-12-22 | Stop reason: HOSPADM

## 2020-12-18 RX ORDER — FAMOTIDINE 20 MG/1
20 TABLET, FILM COATED ORAL DAILY
Status: DISCONTINUED | OUTPATIENT
Start: 2020-12-18 | End: 2020-12-22 | Stop reason: HOSPADM

## 2020-12-18 RX ORDER — ONDANSETRON 2 MG/ML
4 INJECTION INTRAMUSCULAR; INTRAVENOUS EVERY 6 HOURS PRN
Status: DISCONTINUED | OUTPATIENT
Start: 2020-12-18 | End: 2020-12-22 | Stop reason: HOSPADM

## 2020-12-18 RX ORDER — BUDESONIDE AND FORMOTEROL FUMARATE DIHYDRATE 160; 4.5 UG/1; UG/1
2 AEROSOL RESPIRATORY (INHALATION) 2 TIMES DAILY
Status: DISCONTINUED | OUTPATIENT
Start: 2020-12-18 | End: 2020-12-22 | Stop reason: HOSPADM

## 2020-12-18 RX ORDER — ASPIRIN 81 MG/1
81 TABLET ORAL DAILY
Status: DISCONTINUED | OUTPATIENT
Start: 2020-12-18 | End: 2020-12-22 | Stop reason: HOSPADM

## 2020-12-18 RX ORDER — ATORVASTATIN CALCIUM 20 MG/1
20 TABLET, FILM COATED ORAL NIGHTLY
Status: DISCONTINUED | OUTPATIENT
Start: 2020-12-18 | End: 2020-12-22 | Stop reason: HOSPADM

## 2020-12-18 RX ORDER — OLANZAPINE 10 MG/1
5 TABLET ORAL NIGHTLY
Status: DISCONTINUED | OUTPATIENT
Start: 2020-12-18 | End: 2020-12-22 | Stop reason: HOSPADM

## 2020-12-18 RX ORDER — DULOXETIN HYDROCHLORIDE 60 MG/1
60 CAPSULE, DELAYED RELEASE ORAL DAILY
Status: DISCONTINUED | OUTPATIENT
Start: 2020-12-18 | End: 2020-12-21

## 2020-12-18 RX ORDER — SODIUM CHLORIDE 9 MG/ML
INJECTION, SOLUTION INTRAVENOUS CONTINUOUS
Status: DISCONTINUED | OUTPATIENT
Start: 2020-12-18 | End: 2020-12-21

## 2020-12-18 RX ORDER — TRAMADOL HYDROCHLORIDE 50 MG/1
50 TABLET ORAL EVERY 8 HOURS PRN
Status: DISCONTINUED | OUTPATIENT
Start: 2020-12-18 | End: 2020-12-22 | Stop reason: HOSPADM

## 2020-12-18 RX ORDER — METOCLOPRAMIDE 10 MG/1
10 TABLET ORAL
Status: DISCONTINUED | OUTPATIENT
Start: 2020-12-18 | End: 2020-12-22 | Stop reason: HOSPADM

## 2020-12-18 RX ORDER — HYDRALAZINE HYDROCHLORIDE 25 MG/1
25 TABLET, FILM COATED ORAL EVERY 8 HOURS SCHEDULED
Status: DISCONTINUED | OUTPATIENT
Start: 2020-12-18 | End: 2020-12-22

## 2020-12-18 RX ORDER — NICOTINE POLACRILEX 4 MG
15 LOZENGE BUCCAL PRN
Status: DISCONTINUED | OUTPATIENT
Start: 2020-12-18 | End: 2020-12-22 | Stop reason: HOSPADM

## 2020-12-18 RX ORDER — LISINOPRIL 5 MG/1
5 TABLET ORAL DAILY
Status: DISCONTINUED | OUTPATIENT
Start: 2020-12-18 | End: 2020-12-22 | Stop reason: HOSPADM

## 2020-12-18 RX ORDER — SODIUM CHLORIDE 0.9 % (FLUSH) 0.9 %
10 SYRINGE (ML) INJECTION EVERY 12 HOURS SCHEDULED
Status: DISCONTINUED | OUTPATIENT
Start: 2020-12-18 | End: 2020-12-22 | Stop reason: HOSPADM

## 2020-12-18 RX ORDER — 0.9 % SODIUM CHLORIDE 0.9 %
1000 INTRAVENOUS SOLUTION INTRAVENOUS ONCE
Status: COMPLETED | OUTPATIENT
Start: 2020-12-18 | End: 2020-12-18

## 2020-12-18 RX ORDER — DEXTROSE MONOHYDRATE 50 MG/ML
100 INJECTION, SOLUTION INTRAVENOUS PRN
Status: DISCONTINUED | OUTPATIENT
Start: 2020-12-18 | End: 2020-12-22 | Stop reason: HOSPADM

## 2020-12-18 RX ORDER — TAMSULOSIN HYDROCHLORIDE 0.4 MG/1
0.4 CAPSULE ORAL DAILY
Status: DISCONTINUED | OUTPATIENT
Start: 2020-12-18 | End: 2020-12-22 | Stop reason: HOSPADM

## 2020-12-18 RX ORDER — HYDROXYZINE HYDROCHLORIDE 25 MG/1
50 TABLET, FILM COATED ORAL EVERY 8 HOURS PRN
Status: DISCONTINUED | OUTPATIENT
Start: 2020-12-18 | End: 2020-12-22 | Stop reason: HOSPADM

## 2020-12-18 RX ORDER — TRAZODONE HYDROCHLORIDE 100 MG/1
100 TABLET ORAL NIGHTLY PRN
Status: DISCONTINUED | OUTPATIENT
Start: 2020-12-18 | End: 2020-12-22 | Stop reason: HOSPADM

## 2020-12-18 RX ORDER — METOPROLOL TARTRATE 100 MG/1
100 TABLET ORAL 2 TIMES DAILY
Status: DISCONTINUED | OUTPATIENT
Start: 2020-12-18 | End: 2020-12-21

## 2020-12-18 RX ORDER — ACETAMINOPHEN 325 MG/1
650 TABLET ORAL EVERY 6 HOURS PRN
Status: DISCONTINUED | OUTPATIENT
Start: 2020-12-18 | End: 2020-12-22 | Stop reason: HOSPADM

## 2020-12-18 RX ORDER — NICOTINE 21 MG/24HR
1 PATCH, TRANSDERMAL 24 HOURS TRANSDERMAL DAILY
Status: DISCONTINUED | OUTPATIENT
Start: 2020-12-18 | End: 2020-12-22 | Stop reason: HOSPADM

## 2020-12-18 RX ORDER — DEXTROSE MONOHYDRATE 25 G/50ML
12.5 INJECTION, SOLUTION INTRAVENOUS PRN
Status: DISCONTINUED | OUTPATIENT
Start: 2020-12-18 | End: 2020-12-22 | Stop reason: HOSPADM

## 2020-12-18 RX ORDER — SPIRONOLACTONE 25 MG/1
25 TABLET ORAL DAILY
Status: DISCONTINUED | OUTPATIENT
Start: 2020-12-18 | End: 2020-12-22 | Stop reason: HOSPADM

## 2020-12-18 RX ORDER — NIFEDIPINE 60 MG/1
60 TABLET, FILM COATED, EXTENDED RELEASE ORAL DAILY
Status: DISCONTINUED | OUTPATIENT
Start: 2020-12-18 | End: 2020-12-21

## 2020-12-18 RX ORDER — IPRATROPIUM BROMIDE AND ALBUTEROL SULFATE 2.5; .5 MG/3ML; MG/3ML
1 SOLUTION RESPIRATORY (INHALATION) EVERY 4 HOURS PRN
Status: DISCONTINUED | OUTPATIENT
Start: 2020-12-18 | End: 2020-12-22 | Stop reason: HOSPADM

## 2020-12-18 RX ADMIN — Medication 400 MG: at 21:27

## 2020-12-18 RX ADMIN — SODIUM CHLORIDE: 9 INJECTION, SOLUTION INTRAVENOUS at 21:28

## 2020-12-18 RX ADMIN — TRAZODONE HYDROCHLORIDE 100 MG: 100 TABLET ORAL at 21:29

## 2020-12-18 RX ADMIN — Medication 10 ML: at 21:30

## 2020-12-18 RX ADMIN — OLANZAPINE 5 MG: 10 TABLET, FILM COATED ORAL at 21:27

## 2020-12-18 RX ADMIN — SODIUM CHLORIDE 1000 ML: 9 INJECTION, SOLUTION INTRAVENOUS at 14:54

## 2020-12-18 RX ADMIN — ATORVASTATIN CALCIUM 20 MG: 20 TABLET, FILM COATED ORAL at 21:28

## 2020-12-18 ASSESSMENT — ENCOUNTER SYMPTOMS
VOMITING: 0
CHEST TIGHTNESS: 0
NAUSEA: 0
ABDOMINAL DISTENTION: 0
DIARRHEA: 0
COUGH: 1
SHORTNESS OF BREATH: 0
SHORTNESS OF BREATH: 1
WHEEZING: 0
ABDOMINAL PAIN: 0

## 2020-12-18 ASSESSMENT — PAIN DESCRIPTION - PAIN TYPE: TYPE: CHRONIC PAIN

## 2020-12-18 ASSESSMENT — PAIN SCALES - GENERAL: PAINLEVEL_OUTOF10: 4

## 2020-12-18 NOTE — PROGRESS NOTES
Medication History completed:    New medications: none    Medications discontinued: Duoneb    Changes to dosing: none    Stated allergies: As listed    Other pertinent information: Medications confirmed with [facility].      Thank you,  Johana Christian, PharmD, BCPS  [phone]
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
## 2020-12-19 PROBLEM — N05.8 PRIMARY PAUCI-IMMUNE NECROTIZING AND CRESCENTIC GLOMERULONEPHRITIS: Status: ACTIVE | Noted: 2020-12-19

## 2020-12-19 PROBLEM — N05.7 PRIMARY PAUCI-IMMUNE NECROTIZING AND CRESCENTIC GLOMERULONEPHRITIS: Status: ACTIVE | Noted: 2020-12-19

## 2020-12-19 LAB
ANION GAP SERPL CALCULATED.3IONS-SCNC: 10 MMOL/L (ref 9–17)
BUN BLDV-MCNC: 21 MG/DL (ref 6–20)
BUN/CREAT BLD: ABNORMAL (ref 9–20)
CALCIUM SERPL-MCNC: 8.3 MG/DL (ref 8.6–10.4)
CHLORIDE BLD-SCNC: 102 MMOL/L (ref 98–107)
CO2: 28 MMOL/L (ref 20–31)
CREAT SERPL-MCNC: 2.22 MG/DL (ref 0.7–1.2)
CULTURE: NO GROWTH
EKG ATRIAL RATE: 54 BPM
EKG ATRIAL RATE: 56 BPM
EKG P AXIS: 15 DEGREES
EKG P AXIS: 44 DEGREES
EKG P-R INTERVAL: 170 MS
EKG P-R INTERVAL: 180 MS
EKG Q-T INTERVAL: 430 MS
EKG Q-T INTERVAL: 472 MS
EKG QRS DURATION: 98 MS
EKG QRS DURATION: 98 MS
EKG QTC CALCULATION (BAZETT): 414 MS
EKG QTC CALCULATION (BAZETT): 447 MS
EKG R AXIS: -20 DEGREES
EKG R AXIS: -9 DEGREES
EKG T AXIS: 51 DEGREES
EKG T AXIS: 62 DEGREES
EKG VENTRICULAR RATE: 54 BPM
EKG VENTRICULAR RATE: 56 BPM
GFR AFRICAN AMERICAN: 37 ML/MIN
GFR NON-AFRICAN AMERICAN: 31 ML/MIN
GFR SERPL CREATININE-BSD FRML MDRD: ABNORMAL ML/MIN/{1.73_M2}
GFR SERPL CREATININE-BSD FRML MDRD: ABNORMAL ML/MIN/{1.73_M2}
GLUCOSE BLD-MCNC: 105 MG/DL (ref 70–99)
Lab: NORMAL
MAGNESIUM: 1.8 MG/DL (ref 1.6–2.6)
PHOSPHORUS: 4.1 MG/DL (ref 2.5–4.5)
POTASSIUM SERPL-SCNC: 3.9 MMOL/L (ref 3.7–5.3)
SODIUM BLD-SCNC: 140 MMOL/L (ref 135–144)
SPECIMEN DESCRIPTION: NORMAL

## 2020-12-19 PROCEDURE — 2060000000 HC ICU INTERMEDIATE R&B

## 2020-12-19 PROCEDURE — 93005 ELECTROCARDIOGRAM TRACING: CPT | Performed by: EMERGENCY MEDICINE

## 2020-12-19 PROCEDURE — 93880 EXTRACRANIAL BILAT STUDY: CPT

## 2020-12-19 PROCEDURE — 99232 SBSQ HOSP IP/OBS MODERATE 35: CPT | Performed by: INTERNAL MEDICINE

## 2020-12-19 PROCEDURE — 84100 ASSAY OF PHOSPHORUS: CPT

## 2020-12-19 PROCEDURE — 80048 BASIC METABOLIC PNL TOTAL CA: CPT

## 2020-12-19 PROCEDURE — 36415 COLL VENOUS BLD VENIPUNCTURE: CPT

## 2020-12-19 PROCEDURE — 97162 PT EVAL MOD COMPLEX 30 MIN: CPT

## 2020-12-19 PROCEDURE — 93010 ELECTROCARDIOGRAM REPORT: CPT | Performed by: INTERNAL MEDICINE

## 2020-12-19 PROCEDURE — 6360000002 HC RX W HCPCS: Performed by: STUDENT IN AN ORGANIZED HEALTH CARE EDUCATION/TRAINING PROGRAM

## 2020-12-19 PROCEDURE — 83735 ASSAY OF MAGNESIUM: CPT

## 2020-12-19 PROCEDURE — 6370000000 HC RX 637 (ALT 250 FOR IP): Performed by: STUDENT IN AN ORGANIZED HEALTH CARE EDUCATION/TRAINING PROGRAM

## 2020-12-19 RX ADMIN — BUDESONIDE AND FORMOTEROL FUMARATE DIHYDRATE 2 PUFF: 160; 4.5 AEROSOL RESPIRATORY (INHALATION) at 10:01

## 2020-12-19 RX ADMIN — ASPIRIN 81 MG: 81 TABLET, COATED ORAL at 09:55

## 2020-12-19 RX ADMIN — METOCLOPRAMIDE 10 MG: 10 TABLET ORAL at 09:55

## 2020-12-19 RX ADMIN — ENOXAPARIN SODIUM 40 MG: 40 INJECTION SUBCUTANEOUS at 09:55

## 2020-12-19 RX ADMIN — FAMOTIDINE 20 MG: 20 TABLET ORAL at 09:54

## 2020-12-19 RX ADMIN — METOCLOPRAMIDE 10 MG: 10 TABLET ORAL at 05:44

## 2020-12-19 RX ADMIN — PREDNISONE 40 MG: 20 TABLET ORAL at 09:54

## 2020-12-19 RX ADMIN — ISOSORBIDE MONONITRATE 30 MG: 30 TABLET, EXTENDED RELEASE ORAL at 09:54

## 2020-12-19 RX ADMIN — DULOXETINE HYDROCHLORIDE 60 MG: 60 CAPSULE, DELAYED RELEASE ORAL at 09:54

## 2020-12-19 RX ADMIN — Medication 400 MG: at 09:54

## 2020-12-19 RX ADMIN — Medication 400 MG: at 21:04

## 2020-12-19 RX ADMIN — TAMSULOSIN HYDROCHLORIDE 0.4 MG: 0.4 CAPSULE ORAL at 09:54

## 2020-12-19 RX ADMIN — ATORVASTATIN CALCIUM 20 MG: 20 TABLET, FILM COATED ORAL at 21:04

## 2020-12-19 RX ADMIN — CLONIDINE HYDROCHLORIDE 0.2 MG: 0.2 TABLET ORAL at 21:04

## 2020-12-19 RX ADMIN — TRAZODONE HYDROCHLORIDE 100 MG: 100 TABLET ORAL at 21:04

## 2020-12-19 RX ADMIN — OLANZAPINE 5 MG: 10 TABLET, FILM COATED ORAL at 21:04

## 2020-12-19 RX ADMIN — METOCLOPRAMIDE 10 MG: 10 TABLET ORAL at 17:26

## 2020-12-19 RX ADMIN — AZATHIOPRINE 75 MG: 50 TABLET ORAL at 12:22

## 2020-12-19 ASSESSMENT — ENCOUNTER SYMPTOMS
SHORTNESS OF BREATH: 0
VOMITING: 0
ABDOMINAL PAIN: 0
DIARRHEA: 0
NAUSEA: 0
ABDOMINAL DISTENTION: 0
CHEST TIGHTNESS: 0
COUGH: 0
WHEEZING: 0

## 2020-12-19 NOTE — PROGRESS NOTES
Breath Sounds: Diminished  RR[de-identified] 18  Pulse Sat: 95%  Home Meds: Albuterol MDI PRN    · Bronchodilator assessment at level: 1  · [x]    Home Level  BRONCHODILATOR ASSESSMENT SCORE  Score 0 1 2 3 4 5   Breath Sounds   []  Patient Baseline [x]  No Wheeze good aeration []  Faint, scattered wheezing, good aeration []  Expiratory Wheezing and or moderately diminished []  Insp/Exp wheeze and/or very diminished []  Insp/Exp and/ or marked distress   Respiratory Rate   []  Patient Baseline [x]  Less than 20 []  Less than 20 []  20-25 []  Greater than 25 []  Greater than 25   Peak flow % of Pred or PB []  NA   []  Greater than 90%  []  81-90% []  71-80% []  Less than or equal to 70%  or unable to perform []  Unable due to Respiratory Distress   Dyspnea re []  Patient Baseline []  No SOB []  No SOB [x]  SOB on exertion []  SOB min activity []  At rest/acute   e FEV% Predicted       []  NA []  Above 69%  [x]  Unable []  Above 60-69%  []  Unable []  Above 50-59%  []  Unable []  Above 35-49%  []  Unable []  Less than 35%  []  Unable
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
## 2020-12-19 NOTE — CARE COORDINATION
CASE MANAGEMENT NOTE:    Admission Date:  12/18/2020 Sanaz Yap is a 60 y.o.  male    Admitted for : Syncope and collapse [R55]    Met with:  Patient    PCP:  Dr. Mary Zuleta:  Lianne Buerger:  independently at home with brother-in-law          Current Services PTA:  Yes - current with [address] and follows at [facility] for Necrotizing and Crescentic Glomerulonephritis    Is patient agreeable to VNS: Yes    Freedom of choice provided:  Yes    List of [address] provided: No-current with [address] and would resumed. VNS chosen:  [facility]. Referral faxed to notify of admission. DME:  straight cane    Home Oxygen: Yes    Nebulizer: Yes    CPAP/BIPAP: No    Supplier: [facility]     Potential Assistance Needed: resume home care    SNF needed: No    Freedom of choice and list provided: NA    Pharmacy:  [facility] on [place]       Does Patient want to use MEDS to BEDS? No    Is patient currently receiving oral anticoagulation therapy? No    Is the Patient an [facility] with Readmission Risk Score greater than 14%? Yes  If yes, pt needs a follow up appointment made within 7 days. Family Members/Caregivers that pt would like involved in their care:    Yes    If yes, list name here:  Odette Bell    Transportation Provider:  Family and cab                    Discharge Plan:  Home with [address].                  Electronically signed by: Blessing Stephenson RN on 12/19/2020 at 2:36 PM

## 2020-12-19 NOTE — PLAN OF CARE
Problem: Falls - Risk of:  Goal: Will remain free from falls  Description: Will remain free from falls  Outcome: Ongoing  Note: Pt remains free from falls overnight. Bed alarm on overnight for safety. Patient does not complain of dizziness overnight. Up with standby assistance for safety.    Goal: Absence of physical injury  Description: Absence of physical injury  Outcome: Ongoing
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
## 2020-12-20 LAB
ANION GAP SERPL CALCULATED.3IONS-SCNC: 9 MMOL/L (ref 9–17)
BUN BLDV-MCNC: 19 MG/DL (ref 6–20)
BUN/CREAT BLD: ABNORMAL (ref 9–20)
CALCIUM SERPL-MCNC: 8.8 MG/DL (ref 8.6–10.4)
CHLORIDE BLD-SCNC: 104 MMOL/L (ref 98–107)
CO2: 25 MMOL/L (ref 20–31)
CREAT SERPL-MCNC: 2.2 MG/DL (ref 0.7–1.2)
GFR AFRICAN AMERICAN: 38 ML/MIN
GFR NON-AFRICAN AMERICAN: 31 ML/MIN
GFR SERPL CREATININE-BSD FRML MDRD: ABNORMAL ML/MIN/{1.73_M2}
GFR SERPL CREATININE-BSD FRML MDRD: ABNORMAL ML/MIN/{1.73_M2}
GLUCOSE BLD-MCNC: 111 MG/DL (ref 70–99)
POTASSIUM SERPL-SCNC: 5.2 MMOL/L (ref 3.7–5.3)
SODIUM BLD-SCNC: 138 MMOL/L (ref 135–144)

## 2020-12-20 PROCEDURE — 2060000000 HC ICU INTERMEDIATE R&B

## 2020-12-20 PROCEDURE — 80048 BASIC METABOLIC PNL TOTAL CA: CPT

## 2020-12-20 PROCEDURE — 6370000000 HC RX 637 (ALT 250 FOR IP): Performed by: STUDENT IN AN ORGANIZED HEALTH CARE EDUCATION/TRAINING PROGRAM

## 2020-12-20 PROCEDURE — 6370000000 HC RX 637 (ALT 250 FOR IP): Performed by: INTERNAL MEDICINE

## 2020-12-20 PROCEDURE — 99233 SBSQ HOSP IP/OBS HIGH 50: CPT | Performed by: INTERNAL MEDICINE

## 2020-12-20 PROCEDURE — 36415 COLL VENOUS BLD VENIPUNCTURE: CPT

## 2020-12-20 PROCEDURE — 6360000002 HC RX W HCPCS: Performed by: STUDENT IN AN ORGANIZED HEALTH CARE EDUCATION/TRAINING PROGRAM

## 2020-12-20 PROCEDURE — 97110 THERAPEUTIC EXERCISES: CPT

## 2020-12-20 RX ORDER — NIFEDIPINE 30 MG/1
30 TABLET, FILM COATED, EXTENDED RELEASE ORAL DAILY
Status: DISCONTINUED | OUTPATIENT
Start: 2020-12-20 | End: 2020-12-21

## 2020-12-20 RX ORDER — BUMETANIDE 1 MG/1
1 TABLET ORAL DAILY
Qty: 30 TABLET | Refills: 1 | Status: SHIPPED | OUTPATIENT
Start: 2020-12-20 | End: 2020-12-22 | Stop reason: HOSPADM

## 2020-12-20 RX ADMIN — NIFEDIPINE 30 MG: 30 TABLET, EXTENDED RELEASE ORAL at 15:00

## 2020-12-20 RX ADMIN — METOCLOPRAMIDE 10 MG: 10 TABLET ORAL at 17:57

## 2020-12-20 RX ADMIN — ENOXAPARIN SODIUM 40 MG: 40 INJECTION SUBCUTANEOUS at 09:17

## 2020-12-20 RX ADMIN — CLONIDINE HYDROCHLORIDE 0.2 MG: 0.2 TABLET ORAL at 21:38

## 2020-12-20 RX ADMIN — CLONIDINE HYDROCHLORIDE 0.2 MG: 0.2 TABLET ORAL at 09:16

## 2020-12-20 RX ADMIN — BUDESONIDE AND FORMOTEROL FUMARATE DIHYDRATE 2 PUFF: 160; 4.5 AEROSOL RESPIRATORY (INHALATION) at 09:26

## 2020-12-20 RX ADMIN — Medication 400 MG: at 09:17

## 2020-12-20 RX ADMIN — PREDNISONE 40 MG: 20 TABLET ORAL at 09:16

## 2020-12-20 RX ADMIN — TAMSULOSIN HYDROCHLORIDE 0.4 MG: 0.4 CAPSULE ORAL at 09:18

## 2020-12-20 RX ADMIN — AZATHIOPRINE 75 MG: 50 TABLET ORAL at 09:19

## 2020-12-20 RX ADMIN — Medication 400 MG: at 21:38

## 2020-12-20 RX ADMIN — FAMOTIDINE 20 MG: 20 TABLET ORAL at 09:17

## 2020-12-20 RX ADMIN — ATORVASTATIN CALCIUM 20 MG: 20 TABLET, FILM COATED ORAL at 21:39

## 2020-12-20 RX ADMIN — METOCLOPRAMIDE 10 MG: 10 TABLET ORAL at 05:34

## 2020-12-20 RX ADMIN — ASPIRIN 81 MG: 81 TABLET, COATED ORAL at 09:15

## 2020-12-20 RX ADMIN — ISOSORBIDE MONONITRATE 30 MG: 30 TABLET, EXTENDED RELEASE ORAL at 09:16

## 2020-12-20 RX ADMIN — DULOXETINE HYDROCHLORIDE 60 MG: 60 CAPSULE, DELAYED RELEASE ORAL at 09:15

## 2020-12-20 RX ADMIN — METOCLOPRAMIDE 10 MG: 10 TABLET ORAL at 09:17

## 2020-12-20 RX ADMIN — OLANZAPINE 5 MG: 10 TABLET, FILM COATED ORAL at 21:39

## 2020-12-20 ASSESSMENT — ENCOUNTER SYMPTOMS
COUGH: 0
ABDOMINAL DISTENTION: 0
DIARRHEA: 0
VOMITING: 0
NAUSEA: 0
SHORTNESS OF BREATH: 0
CHEST TIGHTNESS: 0
WHEEZING: 0
ABDOMINAL PAIN: 0

## 2020-12-20 ASSESSMENT — PAIN - FUNCTIONAL ASSESSMENT: PAIN_FUNCTIONAL_ASSESSMENT: ACTIVITIES ARE NOT PREVENTED

## 2020-12-20 ASSESSMENT — PAIN DESCRIPTION - PROGRESSION: CLINICAL_PROGRESSION: NOT CHANGED

## 2020-12-20 ASSESSMENT — PAIN DESCRIPTION - DESCRIPTORS: DESCRIPTORS: ACHING

## 2020-12-20 ASSESSMENT — PAIN DESCRIPTION - FREQUENCY: FREQUENCY: CONTINUOUS

## 2020-12-20 ASSESSMENT — PAIN DESCRIPTION - PAIN TYPE: TYPE: CHRONIC PAIN

## 2020-12-20 ASSESSMENT — PAIN DESCRIPTION - ONSET: ONSET: ON-GOING

## 2020-12-20 ASSESSMENT — PAIN SCALES - GENERAL: PAINLEVEL_OUTOF10: 6

## 2020-12-20 ASSESSMENT — PAIN DESCRIPTION - ORIENTATION: ORIENTATION: RIGHT

## 2020-12-20 ASSESSMENT — PAIN DESCRIPTION - LOCATION: LOCATION: SHOULDER

## 2020-12-20 NOTE — PROGRESS NOTES
Attending Physician Statement  I have discussed the care of Norman Harman and I have examined the patient myselft and taken ros and hpi , including pertinent history and exam findings,  with the resident. I have reviewed the key elements of all parts of the encounter with the resident. I agree with the assessment, plan and orders as documented by the resident.       Discharge summary per the resident  Patient admitted with dizziness found to have orthostatic hypotension and borderline bradycardia on metoprolol patient's her diuretics has been reduced from 2 mg Bumex to 1 mg discontinued Flomax continued his Imdur and clonidine  Discontinue metoprolol for bradycardia patient also seen in consultation with cardiologist  And orthostatic hypotension bradycardia resolved please review the discharge meds advised to follow-up with PCP within a week  Electronically signed by Ar Tolbert MD

## 2020-12-20 NOTE — DISCHARGE SUMMARY
[place] [address] Internal Medicine    Discharge Summary     Patient ID: Mont Gilford  :  1965   MRN: [id number]     ACCOUNT:  [de-identified]   Patient's PCP: Micheal Vargas MD  Admit Date: 2020   Discharge Date: 2020    Length of Stay: 2  Code Status:  Full Code  Admitting Physician: Tay Pacheco MD  Discharge Physician: Tay Pacheco MD     Active Discharge Diagnoses:     Primary Problem  Syncope and collapse      [place] Problems    Diagnosis Date Noted    Primary pauci-immune necrotizing and crescentic glomerulonephritis [N05.8, N05.7] 2020    Syncope and collapse [R55] 2020    COPD (chronic obstructive pulmonary disease) ([facility] [J44.9] 12/10/2019    CAD (coronary artery disease) [I25.10] 12/10/2019    Type 2 diabetes mellitus without complication ([facility] [K76.4] 2017    History of syncope [Z87.898] 08/10/2016    Tremor [R25.1] 2016    Severe recurrent major depressive disorder with psychotic features ([facility] [F33.3] 2016       Admission Condition:  fair     Discharged Condition: fair    Hospital Stay:     Hospital Course:  Mont Gilford is a 60 y.o. male who was admitted for the management of Syncope and collapse , presented to ER with Loss of Consciousness (Syncopal episodes \"A couple times a day\" x 1.5 weeks), Shortness of Breath, Headache, Fatigue, and Dizziness (Lightheadedness)  Patient admitted with dizziness found to have orthostatic hypotension and borderline bradycardia on metoprolol patient's her diuretics has been reduced from 2 mg Bumex to 1 mg discontinued Flomax continued his Imdur and clonidine  Discontinue metoprolol for bradycardia patient also seen in consultation with cardiologist  And orthostatic hypotension bradycardia resolved please review the discharge meds advised to follow-up with PCP within a week    Dr Tonya Martino cardio      Significant therapeutic interventions: Significant Diagnostic Studies:   Labs / Micro:        ,     Radiology:    Ct Head Wo Contrast    Result Date: 12/18/2020  EXAMINATION: CT OF THE HEAD WITHOUT CONTRAST  12/18/2020 2:19 pm TECHNIQUE: CT of the head was performed without the administration of intravenous contrast. Dose modulation, iterative reconstruction, and/or weight based adjustment of the mA/kV was utilized to reduce the radiation dose to as low as reasonably achievable. COMPARISON: 9 December 2019 HISTORY: ORDERING SYSTEM PROVIDED HISTORY: syncope TECHNOLOGIST PROVIDED HISTORY: syncope Reason for Exam: passing out, mult falls Acuity: Unknown Type of Exam: Unknown FINDINGS: BRAIN/VENTRICLES: There is no acute intracranial hemorrhage, mass effect or midline shift. No abnormal extra-axial fluid collection. The gray-white differentiation is maintained without evidence of an acute infarct. There is no evidence of hydrocephalus. Atherosclerotic calcification of the vertebral and cavernous carotid arteries is noted. Minimal heterogeneity in the white matter is noted likely related to minimal chronic microvascular change. ORBITS: The visualized portion of the orbits demonstrate no acute abnormality. SINUSES: The visualized paranasal sinuses and mastoid air cells demonstrate no acute abnormality. SOFT TISSUES/SKULL:  No acute abnormality of the visualized skull or soft tissues. No acute intracranial abnormality. Subtle senescent changes. Atherosclerotic calcification of the major intracranial vessels.      Xr Chest Portable    Result Date: 12/18/2020 EXAMINATION: ONE XRAY VIEW OF THE CHEST 12/18/2020 11:04 am COMPARISON: 11/06/2020 HISTORY: ORDERING SYSTEM PROVIDED HISTORY: syncope TECHNOLOGIST PROVIDED HISTORY: syncope Reason for Exam: Shortness of breath Acuity: Acute Type of Exam: Initial FINDINGS: Postsurgical changes overlying the mediastinum and cervical spine. The cardiac size is normal. No acute infiltrates or pleural effusions are seen. Pulmonary vascularity appears normal. There is mild ectasia of the thoracic aorta. No acute bony abnormalities.  The hilar structures are normal.     No acute cardiopulmonary disease     Vl Carotid Bilateral    Result Date: 12/19/2020 [facility]  Vascular Carotid Procedure   Patient Name   Riaz Post       Date of Study           12/19/2020                 Steve Taveras   Date of Birth  1965   Gender                  Male   Age            60 year(s)   Race                       Room Number    2106   Corporate ID # [id number]   Patient Acct # [de-identified]   MR #           [id number]       Hayley Perla   Accession #    [id number]   Interpreting Physician  [facility]   Referring                   Referring Physician     [facility]  Nurse  Practitioner  Procedure Type of Study:   Cerebral: Carotid, Carotid Scan Bilateral.  Indications for Study:Syncope. Patient Status: In Patient. Technical Quality:Adequate visualization. Conclusions   Summary   Mild < 50% stenosis of the right internal carotid artery. Moderate 50-69% stenosis of the left internal carotid artery. Patent vertebral arteries bilaterally with antegrade flow. Signature   ----------------------------------------------------------------  Electronically signed by Amilcar Núñez(Sonographer) on  12/19/2020 09:46 AM  ----------------------------------------------------------------   ----------------------------------------------------------------  Electronically signed by Rosanna Limber Reyes,Arthur(Interpreting  physician) on 12/19/2020 04:56 PM  ----------------------------------------------------------------  Findings:   Right Impression:                    Left Impression:  The common carotid artery is normal. The common carotid artery is normal.   The carotid bulb is normal.          The carotid bulb is normal.   The external carotid artery is       The external carotid artery is  normal.                              normal.   The internal carotid artery has an   The internal carotid artery has a  irregular calcific plaque causing a  smooth homogeneous plaque causing a  <50% stenosis based on velocities. 50-69% stenosis based on velocities. The vertebral artery is patent with  The vertebral artery is patent with  antegrade flow. antegrade flow. Allergies   - Allergy:Morphine(Drug). Velocities are measured in cm/s ; Diameters are measured in cm Carotid Right Measurements +------------+--------+--------+--------+-------+------------+-------------+ ! Location    ! PSV     ! EDV     ! Angle   ! RI     !%Stenosis   ! Tortuosity   ! +------------+--------+--------+--------+-------+------------+-------------+ ! Prox CCA    ! 68.09   !14.19   !        !0.79   !            !             ! +------------+--------+--------+--------+-------+------------+-------------+ ! Mid CCA     !58.19   !13.09   !        !0.78   !            !             ! +------------+--------+--------+--------+-------+------------+-------------+ ! Dist CCA    !69.19   !17.49   !        !0.75   !            !             ! +------------+--------+--------+--------+-------+------------+-------------+ ! Bulb        !43.89   !4.29    !        !0.9    !            !             ! +------------+--------+--------+--------+-------+------------+-------------+ ! Prox ICA    !87.62   !26.7    !        !0.7    !            !             ! +------------+--------+--------+--------+-------+------------+-------------+ ! Mid ICA     !86.32   !29.29   !        !0.66   !            !             ! +------------+--------+--------+--------+-------+------------+-------------+ ! Dist ICA    !74.66   !25.4    !        !0.66   !            !             ! +------------+--------+--------+--------+-------+------------+-------------+ ! Prox ECA    !88.01   !11.54   !        !0.87   !            !             ! +------------+--------+--------+--------+-------+------------+-------------+ ! Vertebral   !53.79   !3.19    !        !0.94   !            !             ! +------------+--------+--------+--------+-------+------------+-------------+   - There is antegrade vertebral flow noted on the right side.    - Additional Measurements:ICAPSV/CCAPSV 1.29. ICAEDV/CCAEDV 2.06. Carotid Left Measurements +-----------+--------+-------+-------+------+-----------+------------------+ ! Location   ! PSV     ! EDV    ! Angle  ! RI    !%Stenosis  ! Tortuosity        ! +-----------+--------+-------+-------+------+-----------+------------------+ ! Prox CCA   !77.31   !24.08  !       !0.69  !           !                  ! +-----------+--------+-------+-------+------+-----------+------------------+ ! Mid CCA    !66.84   !19.72  !       !0.7   ! !                  ! +-----------+--------+-------+-------+------+-----------+------------------+ ! Dist CCA   !62.47   !17.1   !       !0.73  !           !                  ! +-----------+--------+-------+-------+------+-----------+------------------+ ! Bulb       !44.41   !10.38  !       !0.77  !           !                  ! +-----------+--------+-------+-------+------+-----------+------------------+ ! Prox ICA   !160.91  !47.82  !       !0.7   ! !                  ! +-----------+--------+-------+-------+------+-----------+------------------+ ! Mid ICA    !130.12  !53.11  !       !0.59  !           !                  ! +-----------+--------+-------+-------+------+-----------+------------------+ ! Dist ICA   !108.4   !29.42  !       !0.73  !           !                  ! +-----------+--------+-------+-------+------+-----------+------------------+ ! Prox ECA   !118.9   !15.51  !       !0.87  !           !                  ! +-----------+--------+-------+-------+------+-----------+------------------+ ! Vertebral  !51.39   !17.36  !       !0.66  !           !                  ! +-----------+--------+-------+-------+------+-----------+------------------+   - There is antegrade vertebral flow noted on the left side. - Additional Measurements:ICAPSV/CCAPSV 2.08. ICAEDV/CCAEDV 2.21.         Consultations:    Consults:     Final Specialist Recommendations/Findings: IP CONSULT TO INTERNAL MEDICINE  IP CONSULT TO SOCIAL WORK  IP CONSULT TO CARDIOLOGY      The patient was seen and examined on day of discharge and this discharge summary is in conjunction with any daily progress note from day of discharge. Discharge plan:     Disposition: Home    Physician Follow Up:     [address]      they will call you to set up a time to come out to your house       Requiring Further Evaluation/Follow Up POST HOSPITALIZATION/Incidental Findings:    Diet: cardiac diet    Activity: As tolerated    Instructions to Patient:     Discharge Medications:      Medication List      CHANGE how you take these medications    bumetanide 1 MG tablet  Commonly known as: BUMEX  Take 1 tablet by mouth daily  What changed:   · medication strength  · how much to take        CONTINUE taking these medications    acetaminophen 325 MG tablet  Commonly known as: Tylenol  Take 2 tablets by mouth every 6 hours as needed for Pain     albuterol sulfate  (90 Base) MCG/ACT inhaler  inhale 2 puffs by mouth every 6 hours if needed for wheezing     aspirin EC 81 MG EC tablet  Take 1 tablet by mouth daily     atorvastatin 20 MG tablet  Commonly known as: LIPITOR  take 1 tablet by mouth at bedtime     azaTHIOprine 50 MG tablet  Commonly known as: Imuran  Take 1.5 tablets by mouth daily     Breo Ellipta 200-25 MCG/INH Aepb inhaler  Generic drug: Fluticasone furoate-vilanterol  Inhale 1 puff into the lungs daily     cloNIDine 0.2 MG tablet  Commonly known as: CATAPRES  take 1 tablet by mouth twice a day     DULoxetine 60 MG extended release capsule  Commonly known as: CYMBALTA  take 1 capsule by mouth twice a day     isosorbide mononitrate 30 MG extended release tablet  Commonly known as: IMDUR  take 1 tablet by mouth once daily     lisinopril 5 MG tablet  Commonly known as: PRINIVIL;ZESTRIL  Take 1 tablet by mouth daily magnesium oxide 400 (241.3 Mg) MG Tabs tablet  Commonly known as: MAG-OX  Take 1 tablet by mouth 2 times daily     magnesium oxide 400 MG tablet  Commonly known as: MAG-OX     metoclopramide 10 MG tablet  Commonly known as: REGLAN  take 1 tablet by mouth three times a day before meals     nicotine 14 MG/24HR  Commonly known as: NICODERM CQ  Place 1 patch onto the skin daily     nitroGLYCERIN 0.4 MG SL tablet  Commonly known as: Nitrostat  Place 1 tablet under the tongue every 5 minutes as needed for Chest pain up to max of 3 total doses. If no relief after 1 dose, call 911.      OLANZapine 5 MG tablet  Commonly known as: ZYPREXA  Take 1 tablet by mouth nightly     pantoprazole 40 MG tablet  Commonly known as: PROTONIX  Take 1 tablet by mouth daily     predniSONE 10 MG tablet  Commonly known as: DELTASONE  Take 1 tablet by mouth daily Take 10 mg daily after taking 40 mg for 1 week, then 20 mg for 1 week     spironolactone 25 MG tablet  Commonly known as: ALDACTONE  Take 1 tablet by mouth daily     traMADol 50 MG tablet  Commonly known as: ULTRAM     traZODone 100 MG tablet  Commonly known as: DESYREL     Vitamin D3 20 MCG (800 UNIT) Tabs  Take 800 Units daily        STOP taking these medications    hydrALAZINE 50 MG tablet  Commonly known as: APRESOLINE     hydrOXYzine 50 MG tablet  Commonly known as: ATARAX     JOBST KNEE HIGH COMPRESSION SM Misc     metoprolol 100 MG tablet  Commonly known as: LOPRESSOR     NIFEdipine 30 MG extended release tablet  Commonly known as: PROCARDIA XL     sulfamethoxazole-trimethoprim 800-160 MG per tablet  Commonly known as: BACTRIM DS;SEPTRA DS     tamsulosin 0.4 MG capsule  Commonly known as: FLOMAX           Where to Get Your Medications      These medications were sent to [address] [place] [address], [facility]    Phone: [phone]   · bumetanide 1 MG tablet Time Spent on discharge is  35 mins in patient examination, evaluation, counseling as well as medication reconciliation, prescriptions for required medications, discharge plan and follow up. Electronically signed by   Manuel Camarena MD  12/20/2020  11:58 AM      Thank you Dr. Mira Henderson MD for the opportunity to be involved in this patient's care.
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
## 2020-12-20 NOTE — CARE COORDINATION
DISCHARGE PLANNING NOTE:    Pt will be discharged home today with [facility]. Faxed DENISE and d/c med list to [facility].     Electronically signed by Nallely Bartholomew RN on 12/20/2020 at 1:49 PM
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
## 2020-12-20 NOTE — PLAN OF CARE
Problem: Falls - Risk of:  Goal: Will remain free from falls  Description: Will remain free from falls  12/20/2020 0417 by Uzma Gasca RN  Outcome: Ongoing  Note: Pt remains free from falls overnight. Up with standby assistance for safety. No complaints of dizziness overnight. Problem: Falls - Risk of:  Goal: Absence of physical injury  Description: Absence of physical injury  12/20/2020 0417 by Uzma Gasca RN  Outcome: Ongoing     Problem: Musculor/Skeletal Functional Status  Goal: Highest potential functional level  12/20/2020 0417 by Uzma Gasca RN  Outcome: Ongoing     Problem: Musculor/Skeletal Functional Status  Goal: Absence of falls  12/20/2020 0417 by Uzma Gasca RN  Outcome: Ongoing     Problem: Breathing Pattern - Ineffective:  Goal: Ability to achieve and maintain a regular respiratory rate will improve  Description: Ability to achieve and maintain a regular respiratory rate will improve  Outcome: Ongoing  Note: Pt maintains adequate oxygenation overnight. Patient has 1-2L nasal cannula prn at home. Has not needed oxygen overnight.
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
## 2020-12-21 LAB
ANION GAP SERPL CALCULATED.3IONS-SCNC: 6 MMOL/L (ref 9–17)
BUN BLDV-MCNC: 21 MG/DL (ref 6–20)
BUN/CREAT BLD: ABNORMAL (ref 9–20)
CALCIUM SERPL-MCNC: 8.9 MG/DL (ref 8.6–10.4)
CHLORIDE BLD-SCNC: 105 MMOL/L (ref 98–107)
CO2: 27 MMOL/L (ref 20–31)
CREAT SERPL-MCNC: 1.93 MG/DL (ref 0.7–1.2)
GFR AFRICAN AMERICAN: 44 ML/MIN
GFR NON-AFRICAN AMERICAN: 36 ML/MIN
GFR SERPL CREATININE-BSD FRML MDRD: ABNORMAL ML/MIN/{1.73_M2}
GFR SERPL CREATININE-BSD FRML MDRD: ABNORMAL ML/MIN/{1.73_M2}
GLUCOSE BLD-MCNC: 124 MG/DL (ref 70–99)
POTASSIUM SERPL-SCNC: 5.1 MMOL/L (ref 3.7–5.3)
SODIUM BLD-SCNC: 138 MMOL/L (ref 135–144)

## 2020-12-21 PROCEDURE — 6370000000 HC RX 637 (ALT 250 FOR IP): Performed by: STUDENT IN AN ORGANIZED HEALTH CARE EDUCATION/TRAINING PROGRAM

## 2020-12-21 PROCEDURE — 2060000000 HC ICU INTERMEDIATE R&B

## 2020-12-21 PROCEDURE — 97166 OT EVAL MOD COMPLEX 45 MIN: CPT

## 2020-12-21 PROCEDURE — 2580000003 HC RX 258: Performed by: STUDENT IN AN ORGANIZED HEALTH CARE EDUCATION/TRAINING PROGRAM

## 2020-12-21 PROCEDURE — 80048 BASIC METABOLIC PNL TOTAL CA: CPT

## 2020-12-21 PROCEDURE — 6370000000 HC RX 637 (ALT 250 FOR IP): Performed by: INTERNAL MEDICINE

## 2020-12-21 PROCEDURE — 36415 COLL VENOUS BLD VENIPUNCTURE: CPT

## 2020-12-21 PROCEDURE — 6360000002 HC RX W HCPCS: Performed by: STUDENT IN AN ORGANIZED HEALTH CARE EDUCATION/TRAINING PROGRAM

## 2020-12-21 PROCEDURE — 99233 SBSQ HOSP IP/OBS HIGH 50: CPT | Performed by: INTERNAL MEDICINE

## 2020-12-21 RX ORDER — DULOXETIN HYDROCHLORIDE 30 MG/1
30 CAPSULE, DELAYED RELEASE ORAL DAILY
Status: DISCONTINUED | OUTPATIENT
Start: 2020-12-22 | End: 2020-12-22 | Stop reason: HOSPADM

## 2020-12-21 RX ADMIN — CLONIDINE HYDROCHLORIDE 0.2 MG: 0.2 TABLET ORAL at 09:31

## 2020-12-21 RX ADMIN — METOCLOPRAMIDE 10 MG: 10 TABLET ORAL at 16:23

## 2020-12-21 RX ADMIN — BUDESONIDE AND FORMOTEROL FUMARATE DIHYDRATE 2 PUFF: 160; 4.5 AEROSOL RESPIRATORY (INHALATION) at 09:31

## 2020-12-21 RX ADMIN — METOCLOPRAMIDE 10 MG: 10 TABLET ORAL at 05:54

## 2020-12-21 RX ADMIN — METOCLOPRAMIDE 10 MG: 10 TABLET ORAL at 12:16

## 2020-12-21 RX ADMIN — NIFEDIPINE 30 MG: 30 TABLET, EXTENDED RELEASE ORAL at 09:31

## 2020-12-21 RX ADMIN — DULOXETINE HYDROCHLORIDE 60 MG: 60 CAPSULE, DELAYED RELEASE ORAL at 09:31

## 2020-12-21 RX ADMIN — SODIUM CHLORIDE: 9 INJECTION, SOLUTION INTRAVENOUS at 00:28

## 2020-12-21 RX ADMIN — PREDNISONE 40 MG: 20 TABLET ORAL at 09:31

## 2020-12-21 RX ADMIN — HYDRALAZINE HYDROCHLORIDE 25 MG: 25 TABLET, FILM COATED ORAL at 00:25

## 2020-12-21 RX ADMIN — TRAZODONE HYDROCHLORIDE 100 MG: 100 TABLET ORAL at 21:47

## 2020-12-21 RX ADMIN — ATORVASTATIN CALCIUM 20 MG: 20 TABLET, FILM COATED ORAL at 21:47

## 2020-12-21 RX ADMIN — METOPROLOL TARTRATE 25 MG: 25 TABLET, FILM COATED ORAL at 09:31

## 2020-12-21 RX ADMIN — TAMSULOSIN HYDROCHLORIDE 0.4 MG: 0.4 CAPSULE ORAL at 09:31

## 2020-12-21 RX ADMIN — ENOXAPARIN SODIUM 40 MG: 40 INJECTION SUBCUTANEOUS at 09:31

## 2020-12-21 RX ADMIN — HYDRALAZINE HYDROCHLORIDE 25 MG: 25 TABLET, FILM COATED ORAL at 05:54

## 2020-12-21 RX ADMIN — Medication 400 MG: at 09:31

## 2020-12-21 RX ADMIN — LISINOPRIL 5 MG: 5 TABLET ORAL at 09:31

## 2020-12-21 RX ADMIN — AZATHIOPRINE 75 MG: 50 TABLET ORAL at 09:34

## 2020-12-21 RX ADMIN — Medication 10 ML: at 21:47

## 2020-12-21 RX ADMIN — FAMOTIDINE 20 MG: 20 TABLET ORAL at 09:31

## 2020-12-21 RX ADMIN — CLONIDINE HYDROCHLORIDE 0.2 MG: 0.2 TABLET ORAL at 21:47

## 2020-12-21 RX ADMIN — OLANZAPINE 5 MG: 10 TABLET, FILM COATED ORAL at 21:47

## 2020-12-21 RX ADMIN — Medication 400 MG: at 21:47

## 2020-12-21 RX ADMIN — SPIRONOLACTONE 25 MG: 25 TABLET, FILM COATED ORAL at 09:31

## 2020-12-21 RX ADMIN — ASPIRIN 81 MG: 81 TABLET, COATED ORAL at 09:31

## 2020-12-21 RX ADMIN — ISOSORBIDE MONONITRATE 30 MG: 30 TABLET, EXTENDED RELEASE ORAL at 09:31

## 2020-12-21 RX ADMIN — HYDRALAZINE HYDROCHLORIDE 25 MG: 25 TABLET, FILM COATED ORAL at 21:52

## 2020-12-21 ASSESSMENT — ENCOUNTER SYMPTOMS
ABDOMINAL PAIN: 0
CHEST TIGHTNESS: 0
VOMITING: 0
WHEEZING: 0
ABDOMINAL DISTENTION: 0
SHORTNESS OF BREATH: 0
COUGH: 0
NAUSEA: 0
DIARRHEA: 0

## 2020-12-21 ASSESSMENT — PAIN SCALES - GENERAL: PAINLEVEL_OUTOF10: 0
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
## 2020-12-21 NOTE — PROGRESS NOTES
Physical Therapy  DATE: 2020    NAME: Mari Rios  MRN: [id number]   : 1965    Patient not seen this date for Physical Therapy due to:  [] Blood transfusion in progress  [] Hemodialysis  [x] Patient Declined  20: Pt prefers to hold PT today - reports ambulating far distance with nursing earlier, would like PT to come back tomorrow 1425  [] Spine Precautions   [] Strict Bedrest  [] Surgery/ Procedure  [] Testing      [] Other        [] PT is being discontinued at this time. Patient independent. No further needs. [] PT is being discontinued at this time due to declining physical/ medical status. Therapy is not appropriate at this time.     [place], PT

## 2020-12-21 NOTE — PLAN OF CARE
Problem: Falls - Risk of:  Goal: Will remain free from falls  Description: Will remain free from falls  Outcome: Ongoing  Note: No falls noted this shift. Patient ambulates with x1 staff assistance without difficulty. Bed kept in low position. Safe environment maintained. Bedside table & call light in reach. Uses call light appropriately when needing assistance. Goal: Absence of physical injury  Description: Absence of physical injury  Outcome: Ongoing     Problem: Musculor/Skeletal Functional Status  Goal: Highest potential functional level  Outcome: Ongoing  Goal: Absence of falls  Outcome: Ongoing     Problem: Breathing Pattern - Ineffective:  Goal: Ability to achieve and maintain a regular respiratory rate will improve  Description: Ability to achieve and maintain a regular respiratory rate will improve  Outcome: Ongoing  Note: Resp rate remains Wnl. Continue to monitor. Problem: Pain:  Goal: Pain level will decrease  Description: Pain level will decrease  Outcome: Ongoing  Note: No pain at this time. 0/10 pain scale.     Goal: Control of acute pain  Description: Control of acute pain  Outcome: Ongoing  Goal: Control of chronic pain  Description: Control of chronic pain  Outcome: Ongoing

## 2020-12-21 NOTE — FLOWSHEET NOTE
12/21/20 1332   Vital Signs   Blood Pressure Lying 98/58   Pulse Lying 64 PER MINUTE   Blood Pressure Sitting 89/67   Pulse Sitting 73 PER MINUTE   Blood Pressure Standing 91/66   Pulse Standing 75 PER MINUTE     Dr. Fantasma Hidalgo notified of the above orthostatic readings. Discontinued nifedipine and added administration instructions to not give hydralazine if SBP is less than 120. Continue with metoprolol given pts hx of bypass. Continue to monitor. Per Dr. Fantasma Hidalgo, pt is not stable for discharge today.      Electronically signed by Filemon Sanches RN on 12/21/2020 at 1:52 PM

## 2020-12-21 NOTE — CARE COORDINATION
ONGOING DISCHARGE PLAN:    Spoke with patient regarding discharge plan and patient confirms that plan is still home with [address]. Called [facility] to inform them that pt didn't end up discharging home yesterday. Cardiology slowly restarting cardiac meds. Will continue to follow for additional discharge needs.     Electronically signed by Mari Escobar RN on 12/21/2020 at 2:05 PM
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
## 2020-12-22 VITALS
BODY MASS INDEX: 31.93 KG/M2 | HEART RATE: 65 BPM | TEMPERATURE: 98.1 F | HEIGHT: 69 IN | SYSTOLIC BLOOD PRESSURE: 109 MMHG | WEIGHT: 215.61 LBS | RESPIRATION RATE: 18 BRPM | DIASTOLIC BLOOD PRESSURE: 75 MMHG | OXYGEN SATURATION: 96 %

## 2020-12-22 LAB
ANION GAP SERPL CALCULATED.3IONS-SCNC: 9 MMOL/L (ref 9–17)
BUN BLDV-MCNC: 23 MG/DL (ref 6–20)
BUN/CREAT BLD: ABNORMAL (ref 9–20)
CALCIUM SERPL-MCNC: 8.8 MG/DL (ref 8.6–10.4)
CHLORIDE BLD-SCNC: 106 MMOL/L (ref 98–107)
CO2: 24 MMOL/L (ref 20–31)
CREAT SERPL-MCNC: 1.76 MG/DL (ref 0.7–1.2)
GFR AFRICAN AMERICAN: 49 ML/MIN
GFR NON-AFRICAN AMERICAN: 40 ML/MIN
GFR SERPL CREATININE-BSD FRML MDRD: ABNORMAL ML/MIN/{1.73_M2}
GFR SERPL CREATININE-BSD FRML MDRD: ABNORMAL ML/MIN/{1.73_M2}
GLUCOSE BLD-MCNC: 112 MG/DL (ref 70–99)
POTASSIUM SERPL-SCNC: 4.3 MMOL/L (ref 3.7–5.3)
SODIUM BLD-SCNC: 139 MMOL/L (ref 135–144)

## 2020-12-22 PROCEDURE — 6370000000 HC RX 637 (ALT 250 FOR IP): Performed by: STUDENT IN AN ORGANIZED HEALTH CARE EDUCATION/TRAINING PROGRAM

## 2020-12-22 PROCEDURE — 97110 THERAPEUTIC EXERCISES: CPT

## 2020-12-22 PROCEDURE — 80048 BASIC METABOLIC PNL TOTAL CA: CPT

## 2020-12-22 PROCEDURE — 99239 HOSP IP/OBS DSCHRG MGMT >30: CPT | Performed by: INTERNAL MEDICINE

## 2020-12-22 PROCEDURE — 6360000002 HC RX W HCPCS: Performed by: STUDENT IN AN ORGANIZED HEALTH CARE EDUCATION/TRAINING PROGRAM

## 2020-12-22 PROCEDURE — 2580000003 HC RX 258: Performed by: STUDENT IN AN ORGANIZED HEALTH CARE EDUCATION/TRAINING PROGRAM

## 2020-12-22 PROCEDURE — 97116 GAIT TRAINING THERAPY: CPT

## 2020-12-22 PROCEDURE — 6370000000 HC RX 637 (ALT 250 FOR IP): Performed by: INTERNAL MEDICINE

## 2020-12-22 PROCEDURE — 36415 COLL VENOUS BLD VENIPUNCTURE: CPT

## 2020-12-22 RX ORDER — PREDNISONE 20 MG/1
20 TABLET ORAL DAILY
Status: DISCONTINUED | OUTPATIENT
Start: 2020-12-22 | End: 2020-12-22 | Stop reason: HOSPADM

## 2020-12-22 RX ORDER — PREDNISONE 10 MG/1
10 TABLET ORAL DAILY
Qty: 90 TABLET | Refills: 3 | Status: SHIPPED | OUTPATIENT
Start: 2020-12-22 | End: 2020-12-22 | Stop reason: HOSPADM

## 2020-12-22 RX ORDER — BUMETANIDE 1 MG/1
1 TABLET ORAL DAILY
Status: DISCONTINUED | OUTPATIENT
Start: 2020-12-23 | End: 2020-12-22

## 2020-12-22 RX ORDER — TRAZODONE HYDROCHLORIDE 100 MG/1
50 TABLET ORAL NIGHTLY PRN
Qty: 15 TABLET | Refills: 1 | Status: SHIPPED | OUTPATIENT
Start: 2020-12-22 | End: 2022-01-21 | Stop reason: SDUPTHER

## 2020-12-22 RX ORDER — PREDNISONE 20 MG/1
TABLET ORAL
Qty: 11 TABLET | Refills: 0 | Status: SHIPPED | OUTPATIENT
Start: 2020-12-23 | End: 2021-01-06

## 2020-12-22 RX ORDER — DULOXETIN HYDROCHLORIDE 30 MG/1
30 CAPSULE, DELAYED RELEASE ORAL DAILY
Qty: 30 CAPSULE | Refills: 3 | Status: SHIPPED | OUTPATIENT
Start: 2020-12-23 | End: 2021-05-07 | Stop reason: SDUPTHER

## 2020-12-22 RX ADMIN — TAMSULOSIN HYDROCHLORIDE 0.4 MG: 0.4 CAPSULE ORAL at 08:44

## 2020-12-22 RX ADMIN — METOCLOPRAMIDE 10 MG: 10 TABLET ORAL at 07:07

## 2020-12-22 RX ADMIN — FAMOTIDINE 20 MG: 20 TABLET ORAL at 08:43

## 2020-12-22 RX ADMIN — LISINOPRIL 5 MG: 5 TABLET ORAL at 08:43

## 2020-12-22 RX ADMIN — SPIRONOLACTONE 25 MG: 25 TABLET, FILM COATED ORAL at 08:44

## 2020-12-22 RX ADMIN — Medication 400 MG: at 08:43

## 2020-12-22 RX ADMIN — HYDRALAZINE HYDROCHLORIDE 25 MG: 25 TABLET, FILM COATED ORAL at 07:15

## 2020-12-22 RX ADMIN — Medication 10 ML: at 08:48

## 2020-12-22 RX ADMIN — DULOXETINE HYDROCHLORIDE 30 MG: 30 CAPSULE, DELAYED RELEASE ORAL at 08:43

## 2020-12-22 RX ADMIN — ENOXAPARIN SODIUM 40 MG: 40 INJECTION SUBCUTANEOUS at 08:44

## 2020-12-22 RX ADMIN — PREDNISONE 40 MG: 20 TABLET ORAL at 08:44

## 2020-12-22 RX ADMIN — METOPROLOL TARTRATE 25 MG: 25 TABLET, FILM COATED ORAL at 08:43

## 2020-12-22 RX ADMIN — AZATHIOPRINE 75 MG: 50 TABLET ORAL at 08:47

## 2020-12-22 RX ADMIN — METOCLOPRAMIDE 10 MG: 10 TABLET ORAL at 11:22

## 2020-12-22 RX ADMIN — ISOSORBIDE MONONITRATE 30 MG: 30 TABLET, EXTENDED RELEASE ORAL at 08:43

## 2020-12-22 RX ADMIN — CLONIDINE HYDROCHLORIDE 0.2 MG: 0.2 TABLET ORAL at 08:44

## 2020-12-22 RX ADMIN — ASPIRIN 81 MG: 81 TABLET, COATED ORAL at 08:43

## 2020-12-22 ASSESSMENT — ENCOUNTER SYMPTOMS
WHEEZING: 0
NAUSEA: 0
COUGH: 0
ABDOMINAL DISTENTION: 0
ABDOMINAL PAIN: 0
VOMITING: 0
SHORTNESS OF BREATH: 0
DIARRHEA: 0
CHEST TIGHTNESS: 0

## 2020-12-22 NOTE — PROGRESS NOTES
Patient educated on discharge instructions which include: medication schedule, reasons for new medications and some side effects and need to follow-up. Patient given new prescriptions during teaching. Patient verbalizes understanding of discharge and states readiness for discharge. All belongings were gathered by patient and in patient's possession. No distress noted at this time.      Current vital signs:  /75   Pulse 65   Temp 98.1 °F (36.7 °C) (Oral)   Resp 18   Ht 5' 9\" (1.753 m)   Wt 215 lb 9.8 oz (97.8 kg)   SpO2 96%   BMI 31.84 kg/m²                MEWS Score: 1

## 2020-12-22 NOTE — PROGRESS NOTES
Rounded with Dr. Narcisa Robbins. Reviewed orthostatic readings from today. Changed metoprolol to 25mg BID, discontinued hydralazine, and discontinued bumex. Pt not to go home on bumex. Since pt has been asymptomatic today and has been up walking around per self, Dr. Narcisa Robbins is OK with pt discharge. Follow up outpatient.      Electronically signed by Fareed Coto RN on 12/22/2020 at 1:18 PM
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
## 2020-12-22 NOTE — PROGRESS NOTES
[facility]    PROGRESS NOTE             12/22/2020    10:22 AM    Name:   Arcelia Lua  MRN:     [id number]     Acct:      [de-identified]   Room:   210/2106-01   Day:  4  Admit Date:  12/18/2020  1:25 PM    PCP:  Jewel Hensley MD  Code Status:  Full Code    Subjective:     C/C:   Chief Complaint   Patient presents with    Loss of Consciousness     Syncopal episodes \"A couple times a day\" x 1.5 weeks    Shortness of Breath    Headache    Fatigue    Dizziness     Lightheadedness     Interval History Status: improved. Patient seen and examined at the bedside. No acute events overnight. Orthostats this morning are  lying down, 137 sitting, 145 standing. HR 67 lying down, 96 on standing. Possible POTS? Patient receiving metoprolol 25 daily, hydralazine 25 3 times daily, clonidine 0.2 mg twice daily, aspirin 81 mg, Aldactone 25 mg Bumex 1 mg daily, lisinopril 5 mg, Aldactone 25 mg. Per cardiology note: If no significant postural hypotension and blood pressure and heart rate is stable probable discharge home    Brief History: In brief, this is a 57-year-old male patient who presented due to recurrent syncopal episodes, bradycardia and hypotension. Patient was taking 6 different blood pressure medications which were initially put on hold. Cardiology was consulted and with gradual resumption of patient's antihypertensives. Review of Systems:     Review of Systems   Constitutional: Negative for activity change, appetite change, chills and fever. HENT: Negative for congestion. Respiratory: Negative for cough, chest tightness, shortness of breath and wheezing. Cardiovascular: Negative for chest pain and leg swelling. Gastrointestinal: Negative for abdominal distention, abdominal pain, diarrhea, nausea and vomiting. Genitourinary: Negative for dysuria and flank pain. Skin: Negative for rash. Intake/Output Summary (Last 24 hours) at 12/22/2020 1022  Last data filed at 12/22/2020 4898  Gross per 24 hour   Intake 720 ml   Output 475 ml   Net 245 ml       Labs:    [unfilled]    Lab Results   Component Value Date/Time    SPECIAL NOT REPORTED 12/18/2020 02:00 PM     Lab Results   Component Value Date/Time    CULTURE NO GROWTH 12/18/2020 02:00 PM       [unfilled]    Radiology:    Ct Head Wo Contrast    Result Date: 12/18/2020  EXAMINATION: CT OF THE HEAD WITHOUT CONTRAST  12/18/2020 2:19 pm TECHNIQUE: CT of the head was performed without the administration of intravenous contrast. Dose modulation, iterative reconstruction, and/or weight based adjustment of the mA/kV was utilized to reduce the radiation dose to as low as reasonably achievable. COMPARISON: 9 December 2019 HISTORY: ORDERING SYSTEM PROVIDED HISTORY: syncope TECHNOLOGIST PROVIDED HISTORY: syncope Reason for Exam: passing out, mult falls Acuity: Unknown Type of Exam: Unknown FINDINGS: BRAIN/VENTRICLES: There is no acute intracranial hemorrhage, mass effect or midline shift. No abnormal extra-axial fluid collection. The gray-white differentiation is maintained without evidence of an acute infarct. There is no evidence of hydrocephalus. Atherosclerotic calcification of the vertebral and cavernous carotid arteries is noted. Minimal heterogeneity in the white matter is noted likely related to minimal chronic microvascular change. ORBITS: The visualized portion of the orbits demonstrate no acute abnormality. SINUSES: The visualized paranasal sinuses and mastoid air cells demonstrate no acute abnormality. SOFT TISSUES/SKULL:  No acute abnormality of the visualized skull or soft tissues. No acute intracranial abnormality. Subtle senescent changes. Atherosclerotic calcification of the major intracranial vessels.      Xr Chest Portable    Result Date: 12/18/2020 EXAMINATION: ONE XRAY VIEW OF THE CHEST 12/18/2020 11:04 am COMPARISON: 11/06/2020 HISTORY: ORDERING SYSTEM PROVIDED HISTORY: syncope TECHNOLOGIST PROVIDED HISTORY: syncope Reason for Exam: Shortness of breath Acuity: Acute Type of Exam: Initial FINDINGS: Postsurgical changes overlying the mediastinum and cervical spine. The cardiac size is normal. No acute infiltrates or pleural effusions are seen. Pulmonary vascularity appears normal. There is mild ectasia of the thoracic aorta. No acute bony abnormalities.  The hilar structures are normal.     No acute cardiopulmonary disease     Vl Carotid Bilateral    Result Date: 12/19/2020 2767 [address] [place] [address]  Vascular Carotid Procedure   Patient Name   Herminia Gallo       Date of Study           12/19/2020                 Dilip Pa   Date of Birth  1965   Gender                  Male   Age            60 year(s)   Race                       Room Number    2106   Corporate ID # [id number]   Patient Acct # [de-identified]   MR #           [id number]       Irene Mcgovern   Accession #    [id number]   Interpreting Physician  Tessa Almonte   Referring                   Referring Physician     Lidia Rodriguez  Nurse  Practitioner  Procedure Type of Study:   Cerebral: Carotid, Carotid Scan Bilateral.  Indications for Study:Syncope. Patient Status: In Patient. Technical Quality:Adequate visualization. Conclusions   Summary   Mild < 50% stenosis of the right internal carotid artery. Moderate 50-69% stenosis of the left internal carotid artery. Patent vertebral arteries bilaterally with antegrade flow. Signature   ----------------------------------------------------------------  Electronically signed by Amilcar Resendiz(Sonographer) on  12/19/2020 09:46 AM  ----------------------------------------------------------------   ----------------------------------------------------------------  Electronically signed by Cherylann Mody Reyes,Arthur(Interpreting  physician) on 12/19/2020 04:56 PM  ----------------------------------------------------------------  Findings:   Right Impression:                    Left Impression:  The common carotid artery is normal. The common carotid artery is normal.   The carotid bulb is normal.          The carotid bulb is normal.   The external carotid artery is       The external carotid artery is  normal.                              normal.   The internal carotid artery has an   The internal carotid artery has a  irregular calcific plaque causing a  smooth homogeneous plaque causing a  <50% stenosis based on velocities. 50-69% stenosis based on velocities. The vertebral artery is patent with  The vertebral artery is patent with  antegrade flow. antegrade flow. Allergies   - Allergy:Morphine(Drug). Velocities are measured in cm/s ; Diameters are measured in cm Carotid Right Measurements +------------+--------+--------+--------+-------+------------+-------------+ ! Location    ! PSV     ! EDV     ! Angle   ! RI     !%Stenosis   ! Tortuosity   ! +------------+--------+--------+--------+-------+------------+-------------+ ! Prox CCA    ! 68.09   !14.19   !        !0.79   !            !             ! +------------+--------+--------+--------+-------+------------+-------------+ ! Mid CCA     !58.19   !13.09   !        !0.78   !            !             ! +------------+--------+--------+--------+-------+------------+-------------+ ! Dist CCA    !69.19   !17.49   !        !0.75   !            !             ! +------------+--------+--------+--------+-------+------------+-------------+ ! Bulb        !43.89   !4.29    !        !0.9    !            !             ! +------------+--------+--------+--------+-------+------------+-------------+ ! Prox ICA    !87.62   !26.7    !        !0.7    !            !             ! +------------+--------+--------+--------+-------+------------+-------------+ ! Mid ICA     !86.32   !29.29   !        !0.66   !            !             ! +------------+--------+--------+--------+-------+------------+-------------+ ! Dist ICA    !74.66   !25.4    !        !0.66   !            !             ! +------------+--------+--------+--------+-------+------------+-------------+ ! Prox ECA    !88.01   !11.54   !        !0.87   !            !             ! +------------+--------+--------+--------+-------+------------+-------------+ ! Vertebral   !53.79   !3.19    !        !0.94   !            !             ! +------------+--------+--------+--------+-------+------------+-------------+   - There is antegrade vertebral flow noted on the right side.    - Additional Measurements:ICAPSV/CCAPSV 1.29. ICAEDV/CCAEDV 2.06. Carotid Left Measurements +-----------+--------+-------+-------+------+-----------+------------------+ ! Location   ! PSV     ! EDV    ! Angle  ! RI    !%Stenosis  ! Tortuosity        ! +-----------+--------+-------+-------+------+-----------+------------------+ ! Prox CCA   !77.31   !24.08  !       !0.69  !           !                  ! +-----------+--------+-------+-------+------+-----------+------------------+ ! Mid CCA    !66.84   !19.72  !       !0.7   ! !                  ! +-----------+--------+-------+-------+------+-----------+------------------+ ! Dist CCA   !62.47   !17.1   !       !0.73  !           !                  ! +-----------+--------+-------+-------+------+-----------+------------------+ ! Bulb       !44.41   !10.38  !       !0.77  !           !                  ! +-----------+--------+-------+-------+------+-----------+------------------+ ! Prox ICA   !160.91  !47.82  !       !0.7   ! !                  ! +-----------+--------+-------+-------+------+-----------+------------------+ ! Mid ICA    !130.12  !53.11  !       !0.59  !           !                  ! +-----------+--------+-------+-------+------+-----------+------------------+ ! Dist ICA   !108.4   !29.42  !       !0.73  !           !                  ! +-----------+--------+-------+-------+------+-----------+------------------+ ! Prox ECA   !118.9   !15.51  !       !0.87  !           !                  ! +-----------+--------+-------+-------+------+-----------+------------------+ ! Vertebral  !51.39   !17.36  !       !0.66  !           !                  ! +-----------+--------+-------+-------+------+-----------+------------------+   - There is antegrade vertebral flow noted on the left side. - Additional Measurements:ICAPSV/CCAPSV 2.08. ICAEDV/CCAEDV 2.21. Physical Examination:        Physical Exam  Constitutional:       General: He is not in acute distress.

## 2020-12-22 NOTE — CARE COORDINATION
ONGOING DISCHARGE PLAN:    Spoke with patient regarding discharge plan and patient confirms that plan is still to DC to home w/ [facility] [place]. Pt. Will continue w/ [facility]. Per Notes, Cardio is ok for DC. Writer notified, [address] of Anticipated DC today & they state \"They did receive all paperwork for start of care, yesterday\". They will follow. Will continue to follow for additional discharge needs.     Electronically signed by Javi Chavira RN on 12/22/2020 at 1:33 PM

## 2020-12-22 NOTE — DISCHARGE SUMMARY
[address]    Discharge Summary     Patient ID: Reinaldo Butler  :  1965   MRN: [id number]     ACCOUNT:  [de-identified]   Patient's PCP: Vannessa Rose MD  Admit Date: 2020   Discharge Date: 2020     Length of Stay: 4  Code Status:  Full Code  Admitting Physician: Laya Avalos MD  Discharge Physician: Radha Mistry MD     Active Discharge Diagnoses:       Primary Problem  Syncope and collapse      [place] Problems    Diagnosis Date Noted    Primary pauci-immune necrotizing and crescentic glomerulonephritis [N05.8, N05.7] 2020    Syncope and collapse [R55] 2020    COPD (chronic obstructive pulmonary disease) ([facility] [J44.9] 12/10/2019    CAD (coronary artery disease) [I25.10] 12/10/2019    Type 2 diabetes mellitus without complication ([facility] [I04.6] 2017    History of syncope [Z87.898] 08/10/2016    Tremor [R25.1] 2016    Severe recurrent major depressive disorder with psychotic features ([facility] [F33.3] 2016       Admission Condition:  poor     Discharged Condition: good    Hospital Stay:       Hospital Course:  Reinaldo Butler is a 60 y.o. male who was admitted for the management of   Syncope and collapse , presented to ER with Loss of Consciousness (Syncopal episodes \"A couple times a day\" x 1.5 weeks), Shortness of Breath, Headache, Fatigue, and Dizziness (Lightheadedness)  Patient presented with dizziness and recurrent syncopal episodes from orthostatic hypotension, bradycardia on multiple antihypertensives. Carotid ultrasound of neck showed mild stenosis on the right side and mild to moderate stenosis of the left side  During admission Flomax was discontinued, cardiology was consulted for orthostatic hypotension. Component Value Date    COLORU YELLOW 12/18/2020    TURBIDITY CLEAR 12/18/2020    SPECGRAV 1.008 12/18/2020    HGBUR NEGATIVE 12/18/2020    PHUR 6.5 12/18/2020    PROTEINU NEGATIVE 12/18/2020    GLUCOSEU NEGATIVE 12/18/2020    KETUA NEGATIVE 12/18/2020    BILIRUBINUR NEGATIVE 12/18/2020    UROBILINOGEN Normal 12/18/2020    NITRU NEGATIVE 12/18/2020    LEUKOCYTESUR NEGATIVE 12/18/2020     TSH:    Lab Results   Component Value Date    TSH 2.82 07/10/2020           Radiology:    Ct Head Wo Contrast    Result Date: 12/18/2020  EXAMINATION: CT OF THE HEAD WITHOUT CONTRAST  12/18/2020 2:19 pm TECHNIQUE: CT of the head was performed without the administration of intravenous contrast. Dose modulation, iterative reconstruction, and/or weight based adjustment of the mA/kV was utilized to reduce the radiation dose to as low as reasonably achievable. COMPARISON: 9 December 2019 HISTORY: ORDERING SYSTEM PROVIDED HISTORY: syncope TECHNOLOGIST PROVIDED HISTORY: syncope Reason for Exam: passing out, mult falls Acuity: Unknown Type of Exam: Unknown FINDINGS: BRAIN/VENTRICLES: There is no acute intracranial hemorrhage, mass effect or midline shift. No abnormal extra-axial fluid collection. The gray-white differentiation is maintained without evidence of an acute infarct. There is no evidence of hydrocephalus. Atherosclerotic calcification of the vertebral and cavernous carotid arteries is noted. Minimal heterogeneity in the white matter is noted likely related to minimal chronic microvascular change. ORBITS: The visualized portion of the orbits demonstrate no acute abnormality. SINUSES: The visualized paranasal sinuses and mastoid air cells demonstrate no acute abnormality. SOFT TISSUES/SKULL:  No acute abnormality of the visualized skull or soft tissues. No acute intracranial abnormality. Subtle senescent changes. Atherosclerotic calcification of the major intracranial vessels.      Xr Chest Portable Result Date: 12/18/2020  EXAMINATION: ONE XRAY VIEW OF THE CHEST 12/18/2020 11:04 am COMPARISON: 11/06/2020 HISTORY: ORDERING SYSTEM PROVIDED HISTORY: syncope TECHNOLOGIST PROVIDED HISTORY: syncope Reason for Exam: Shortness of breath Acuity: Acute Type of Exam: Initial FINDINGS: Postsurgical changes overlying the mediastinum and cervical spine. The cardiac size is normal. No acute infiltrates or pleural effusions are seen. Pulmonary vascularity appears normal. There is mild ectasia of the thoracic aorta. No acute bony abnormalities.  The hilar structures are normal.     No acute cardiopulmonary disease     Vl Carotid Bilateral    Result Date: 12/19/2020 [address] [place] [address]  Vascular Carotid Procedure   Patient Name   Mariann Patel       Date of Study           12/19/2020                 Leopoldo Fern   Date of Birth  1965   Gender                  Male   Age            60 year(s)   Race                       Room Number    2106   Corporate ID # [id number]   Patient Acct # [de-identified]   MR #           [id number]       Adolph Guzman   Accession #    [id number]   Interpreting Physician  Zeinab Hernandez   Referring                   Referring Physician     Guillaume Burden  Nurse  Practitioner  Procedure Type of Study:   Cerebral: Carotid, Carotid Scan Bilateral.  Indications for Study:Syncope. Patient Status: In Patient. Technical Quality:Adequate visualization. Conclusions   Summary   Mild < 50% stenosis of the right internal carotid artery. Moderate 50-69% stenosis of the left internal carotid artery. Patent vertebral arteries bilaterally with antegrade flow. Signature   ----------------------------------------------------------------  Electronically signed by Amilcar Delcid(Sonographer) on  12/19/2020 09:46 AM  ----------------------------------------------------------------   ----------------------------------------------------------------  Electronically signed by Florance Seip Reyes,Arthur(Interpreting  physician) on 12/19/2020 04:56 PM  ----------------------------------------------------------------  Findings:   Right Impression:                    Left Impression:  The common carotid artery is normal. The common carotid artery is normal.   The carotid bulb is normal.          The carotid bulb is normal.   The external carotid artery is       The external carotid artery is  normal.                              normal.   The internal carotid artery has an   The internal carotid artery has a  irregular calcific plaque causing a  smooth homogeneous plaque causing a  <50% stenosis based on velocities. 50-69% stenosis based on velocities. The vertebral artery is patent with  The vertebral artery is patent with  antegrade flow. antegrade flow. Allergies   - Allergy:Morphine(Drug). Velocities are measured in cm/s ; Diameters are measured in cm Carotid Right Measurements +------------+--------+--------+--------+-------+------------+-------------+ ! Location    ! PSV     ! EDV     ! Angle   ! RI     !%Stenosis   ! Tortuosity   ! +------------+--------+--------+--------+-------+------------+-------------+ ! Prox CCA    ! 68.09   !14.19   !        !0.79   !            !             ! +------------+--------+--------+--------+-------+------------+-------------+ ! Mid CCA     !58.19   !13.09   !        !0.78   !            !             ! +------------+--------+--------+--------+-------+------------+-------------+ ! Dist CCA    !69.19   !17.49   !        !0.75   !            !             ! +------------+--------+--------+--------+-------+------------+-------------+ ! Bulb        !43.89   !4.29    !        !0.9    !            !             ! +------------+--------+--------+--------+-------+------------+-------------+ ! Prox ICA    !87.62   !26.7    !        !0.7    !            !             ! +------------+--------+--------+--------+-------+------------+-------------+ ! Mid ICA     !86.32   !29.29   !        !0.66   !            !             ! +------------+--------+--------+--------+-------+------------+-------------+ ! Dist ICA    !74.66   !25.4    !        !0.66   !            !             ! +------------+--------+--------+--------+-------+------------+-------------+ ! Prox ECA    !88.01   !11.54   !        !0.87   !            !             ! +------------+--------+--------+--------+-------+------------+-------------+ ! Vertebral   !53.79   !3.19    !        !0.94   !            !             ! +------------+--------+--------+--------+-------+------------+-------------+   - There is antegrade vertebral flow noted on the right side.    - Additional Measurements:ICAPSV/CCAPSV 1.29. ICAEDV/CCAEDV 2.06. Carotid Left Measurements +-----------+--------+-------+-------+------+-----------+------------------+ ! Location   ! PSV     ! EDV    ! Angle  ! RI    !%Stenosis  ! Tortuosity        ! +-----------+--------+-------+-------+------+-----------+------------------+ ! Prox CCA   !77.31   !24.08  !       !0.69  !           !                  ! +-----------+--------+-------+-------+------+-----------+------------------+ ! Mid CCA    !66.84   !19.72  !       !0.7   ! !                  ! +-----------+--------+-------+-------+------+-----------+------------------+ ! Dist CCA   !62.47   !17.1   !       !0.73  !           !                  ! +-----------+--------+-------+-------+------+-----------+------------------+ ! Bulb       !44.41   !10.38  !       !0.77  !           !                  ! +-----------+--------+-------+-------+------+-----------+------------------+ ! Prox ICA   !160.91  !47.82  !       !0.7   ! !                  ! +-----------+--------+-------+-------+------+-----------+------------------+ ! Mid ICA    !130.12  !53.11  !       !0.59  !           !                  ! +-----------+--------+-------+-------+------+-----------+------------------+ ! Dist ICA   !108.4   !29.42  !       !0.73  !           !                  ! +-----------+--------+-------+-------+------+-----------+------------------+ ! Prox ECA   !118.9   !15.51  !       !0.87  !           !                  ! +-----------+--------+-------+-------+------+-----------+------------------+ ! Vertebral  !51.39   !17.36  !       !0.66  !           !                  ! +-----------+--------+-------+-------+------+-----------+------------------+   - There is antegrade vertebral flow noted on the left side. - Additional Measurements:ICAPSV/CCAPSV 2.08. ICAEDV/CCAEDV 2.21.         Consultations:    Consults:     Final Specialist Recommendations/Findings: IP CONSULT TO INTERNAL MEDICINE  IP CONSULT TO SOCIAL WORK  IP CONSULT TO CARDIOLOGY      The patient was seen and examined on day of discharge and this discharge summary is in conjunction with any daily progress note from day of discharge. Discharge plan:       Disposition: Home    Physician Follow Up:     [address]  [address] [facility] [address]  [phone]      they will call you to set up a time to come out to your house    Mariluz Oar, APRN - [address] [address]  [phone]    Go on 12/30/2020  [facility] Follow Up @ [facility], [address] [place], [place] [phone]    In 2 weeks  For blood pressure management, orthostatic hypotension          Diet: renal diet    Activity: As tolerated    Instructions to Patient: Please take medications as directed and follow-up with cardiology outpatient    Discharge Medications:      Medication List      CHANGE how you take these medications    bumetanide 1 MG tablet  Commonly known as: BUMEX  Take 1 tablet by mouth daily  What changed:   · medication strength  · how much to take     DULoxetine 30 MG extended release capsule  Commonly known as: CYMBALTA  Take 1 capsule by mouth daily  Start taking on: December 23, 2020  What changed:   · medication strength  · See the new instructions. metoprolol tartrate 25 MG tablet  Commonly known as: LOPRESSOR  Take 1 tablet by mouth 2 times daily  What changed:   · medication strength  · how much to take     predniSONE 10 MG tablet  Commonly known as: DELTASONE  Take 1 tablet by mouth daily Take 20 mg for 1 week, then 10 mg for 1 week.  Then stop  What changed: additional instructions     traZODone 100 MG tablet  Commonly known as: DESYREL  Take 0.5 tablets by mouth nightly as needed for Sleep  What changed: how much to take        CONTINUE taking these medications    acetaminophen 325 MG tablet  Commonly known as: Tylenol Take 2 tablets by mouth every 6 hours as needed for Pain     albuterol sulfate  (90 Base) MCG/ACT inhaler  inhale 2 puffs by mouth every 6 hours if needed for wheezing     aspirin EC 81 MG EC tablet  Take 1 tablet by mouth daily     atorvastatin 20 MG tablet  Commonly known as: LIPITOR  take 1 tablet by mouth at bedtime     azaTHIOprine 50 MG tablet  Commonly known as: Imuran  Take 1.5 tablets by mouth daily     Breo Ellipta 200-25 MCG/INH Aepb inhaler  Generic drug: Fluticasone furoate-vilanterol  Inhale 1 puff into the lungs daily     cloNIDine 0.2 MG tablet  Commonly known as: CATAPRES  take 1 tablet by mouth twice a day     isosorbide mononitrate 30 MG extended release tablet  Commonly known as: IMDUR  take 1 tablet by mouth once daily     lisinopril 5 MG tablet  Commonly known as: PRINIVIL;ZESTRIL  Take 1 tablet by mouth daily     magnesium oxide 400 (241.3 Mg) MG Tabs tablet  Commonly known as: MAG-OX  Take 1 tablet by mouth 2 times daily     magnesium oxide 400 MG tablet  Commonly known as: MAG-OX     metoclopramide 10 MG tablet  Commonly known as: REGLAN  take 1 tablet by mouth three times a day before meals     nicotine 14 MG/24HR  Commonly known as: NICODERM CQ  Place 1 patch onto the skin daily     nitroGLYCERIN 0.4 MG SL tablet  Commonly known as: Nitrostat  Place 1 tablet under the tongue every 5 minutes as needed for Chest pain up to max of 3 total doses. If no relief after 1 dose, call 911.      OLANZapine 5 MG tablet  Commonly known as: ZYPREXA  Take 1 tablet by mouth nightly     pantoprazole 40 MG tablet  Commonly known as: PROTONIX  Take 1 tablet by mouth daily     spironolactone 25 MG tablet  Commonly known as: ALDACTONE  Take 1 tablet by mouth daily     traMADol 50 MG tablet  Commonly known as: ULTRAM     Vitamin D3 20 MCG (800 UNIT) Tabs  Take 800 Units daily        STOP taking these medications    hydrALAZINE 50 MG tablet  Commonly known as: APRESOLINE     hydrOXYzine 50 MG tablet Commonly known as: ATARAX     JOBST KNEE HIGH COMPRESSION SM Misc     NIFEdipine 30 MG extended release tablet  Commonly known as: PROCARDIA XL     sulfamethoxazole-trimethoprim 800-160 MG per tablet  Commonly known as: BACTRIM DS;SEPTRA DS     tamsulosin 0.4 MG capsule  Commonly known as: FLOMAX           Where to Get Your Medications      These medications were sent to [address] [place] [address] [phone]  [address] [place] [id number]    Phone: [phone]   · bumetanide 1 MG tablet  · DULoxetine 30 MG extended release capsule  · metoprolol tartrate 25 MG tablet  · predniSONE 10 MG tablet  · traZODone 100 MG tablet         Time Spent on discharge is  31 mins in patient examination, evaluation, counseling as well as medication reconciliation, prescriptions for required medications, discharge plan and follow up. Electronically signed by   Mahsa Cosme MD  12/22/2020  3:51 PM      Thank you Dr. Lex Cowden, MD for the opportunity to be involved in this patient's care.
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
## 2020-12-22 NOTE — PLAN OF CARE
Problem: Pain:  Goal: Pain level will decrease  Description: Pain level will decrease  12/22/2020 1602 by Amairani Obrien RN  Outcome: Met This Shift  Note: Pt denies pain this shift. Problem: Falls - Risk of:  Goal: Will remain free from falls  Description: Will remain free from falls  12/22/2020 1602 by Amairani Obrien RN  Outcome: Ongoing  Note: The patient remained free from falls this shift, call light within reach, bed in locked and lowest position. Side rails up x2. Continue to monitor closely. Problem: Breathing Pattern - Ineffective:  Goal: Ability to achieve and maintain a regular respiratory rate will improve  Description: Ability to achieve and maintain a regular respiratory rate will improve  12/22/2020 1602 by Amairani Obrien RN  Outcome: Ongoing  Note: Patients respiratory status stable at this time. No signs or symptoms of distress noted. Respirations non-labored and regular. Nasal canula 02 in place as needed to maintain sp02>90% or per md order.

## 2020-12-23 ENCOUNTER — CARE COORDINATION (OUTPATIENT)
Dept: CARE COORDINATION | Age: 55
End: 2020-12-23

## 2020-12-24 LAB
ABO/RH: NORMAL
ANTIBODY SCREEN: NEGATIVE
ARM BAND NUMBER: NORMAL
BLD PROD TYP BPU: NORMAL
BLOOD BANK COMMENT: NORMAL
CROSSMATCH RESULT: NORMAL
DISPENSE STATUS BLOOD BANK: NORMAL
EXPIRATION DATE: NORMAL
TRANSFUSION STATUS: NORMAL
UNIT DIVISION: 0
UNIT NUMBER: NORMAL

## 2020-12-27 NOTE — DISCHARGE INSTR - ACTIVITY
"           Subjective:       Patient ID: Rosa Maria Vazquez is a 66 y.o. female.    Vitals:  height is 5' 4" (1.626 m) and weight is 97.1 kg (214 lb). Her skin temperature is 97.5 °F (36.4 °C). Her blood pressure is 163/75 (abnormal) and her pulse is 60. Her respiration is 16 and oxygen saturation is 97%.     Chief Complaint: Eye Problem (right eye )    Pt c/o itchy, eyelid swelling, and redness in right eye; x 3 days  Pt is using abx since 12/25/20 with no relief of symptoms     Eye Problem   The right eye is affected. This is a new problem. The current episode started in the past 7 days. The problem occurs constantly. The problem has been unchanged. The injury mechanism is unknown. The pain is at a severity of 0/10. The patient is experiencing no pain. There is no known exposure to pink eye. She does not wear contacts. Associated symptoms include eye redness and itching. Pertinent negatives include no blurred vision, eye discharge, double vision, fever, nausea, photophobia or vomiting. She has tried eye drops (abx) for the symptoms. The treatment provided mild relief.       Constitution: Negative for chills and fever.   HENT: Negative for congestion and sinus pain.    Eyes: Positive for eye itching, eye redness and eyelid swelling. Negative for eye trauma, foreign body in eye, eye discharge, eye pain, photophobia, vision loss, double vision and blurred vision.   Gastrointestinal: Negative for nausea and vomiting.   Genitourinary: Negative for history of kidney stones.   Skin: Negative for rash.   Allergic/Immunologic: Negative for seasonal allergies and itching.   Neurological: Negative for headaches.       Objective:      Physical Exam   Constitutional: She is oriented to person, place, and time. She appears well-developed.   HENT:   Head: Normocephalic and atraumatic.   Ears:   Right Ear: External ear normal.   Left Ear: External ear normal.   Nose: Nose normal.   Mouth/Throat: Oropharynx is clear and moist.   Eyes: " Activity as tolerated. Pupils are equal, round, and reactive to light. Conjunctivae, EOM and lids are normal. Right eye exhibits hordeolum.       Neck: Trachea normal, full passive range of motion without pain and phonation normal. Neck supple.   Musculoskeletal: Normal range of motion.   Neurological: She is alert and oriented to person, place, and time.   Skin: Skin is warm, dry and intact. Psychiatric: Her speech is normal and behavior is normal. Judgment and thought content normal.   Nursing note and vitals reviewed.        Assessment:       1. Hordeolum externum of right lower eyelid        Plan:         Hordeolum externum of right lower eyelid         Patient Instructions      STYE   If your condition worsens or fails to improve we recommend that you receive another evaluation at the ER immediately or contact your PCP to discuss your concerns or return here. You must understand that you've received an urgent care treatment only and that you may be released before all your medical problems are known or treated. You the patient will arrange for followup care as instructed.   Use the eye ointment as directed for the full course of therapy.   Warm compresses to affected eye.  Do not wear your contact lens ( if you use them) for at least 5 days after you stop having symptoms and are rechecked by your doctor. Throw away the contacts, contact solution and carrying case you were using and start with new material.   If you develop increase eye symptoms or change in your vision seek medical care immediately either with your ophthalomologist or the ER or return here.

## 2020-12-28 ENCOUNTER — CARE COORDINATION (OUTPATIENT)
Dept: CARE COORDINATION | Age: 55
End: 2020-12-28

## 2020-12-28 NOTE — CARE COORDINATION
Ambulatory Care Coordination Note  12/28/2020  CM Risk Score: 16  Charlson 10 Year Mortality Risk Score: 100%     ACC: Carey Pereyra, NOAH    Summary Note: Spoke with patient briefly, he was busy at the time of the call but stated he is doing ok, has all of his medications and will call with any concerns. ACM will follow up in 1-2 weeks for needs. Precautions reviewed for COVID-19 per CDC  Wash hands often with soap and water for at least 20 seconds  When soap is not available us hand  with at least 70% alcohol  Avoid touching your face  Avoid close contact with others  Maintain 6 feet distance if possible    If you are sick:  Cover mouth and nose when coughing or sneezing and then wash hands  Wear a mask when around others  Clean and disinfect surfaces often with soap or detergent and water. Care Coordination Interventions    Program Enrollment: Complex Care  Referral from Primary Care Provider: No  Suggested Interventions and Community Resources  Grief Counselor: [address] [place] [address]: Completed  Transportation Support: Declined  Zone Management Tools: In Process         Goals Addressed    None         Prior to Admission medications    Medication Sig Start Date End Date Taking? Authorizing Provider   DULoxetine (CYMBALTA) 30 MG extended release capsule Take 1 capsule by mouth daily 12/23/20   Emory Fenton MD   traZODone (DESYREL) 100 MG tablet Take 0.5 tablets by mouth nightly as needed for Sleep 12/22/20 1/21/21  Emory Fenton MD   metoprolol tartrate (LOPRESSOR) 25 MG tablet Take 1 tablet by mouth 2 times daily 12/22/20   Emory Fenton MD   predniSONE (DELTASONE) 20 MG tablet Take 1 tablet by mouth daily for 7 days, THEN 0.5 tablets daily for 7 days.  12/23/20 1/6/21  Emory Fenton MD   magnesium oxide (MAG-OX) 400 MG tablet Take 400 mg by mouth 2 times daily  11/30/20   Historical Provider, MD azaTHIOprine (IMURAN) 50 MG tablet Take 1.5 tablets by mouth daily 12/14/20   Manju Feng MD   albuterol sulfate  (90 Base) MCG/ACT inhaler inhale 2 puffs by mouth every 6 hours if needed for wheezing 12/2/20   Amarjit Stacy MD   Fluticasone furoate-vilanterol (BREO ELLIPTA) 200-25 MCG/INH AEPB inhaler Inhale 1 puff into the lungs daily 12/2/20   Amarjit Stacy MD   atorvastatin (LIPITOR) 20 MG tablet take 1 tablet by mouth at bedtime 11/19/20   Amarjit Stacy MD   aspirin EC 81 MG EC tablet Take 1 tablet by mouth daily 11/11/20   Abmrocio Espino PA-C   nicotine (NICODERM CQ) 14 MG/24HR Place 1 patch onto the skin daily 11/11/20   Ambrocio Espino PA-C   lisinopril (PRINIVIL;ZESTRIL) 5 MG tablet Take 1 tablet by mouth daily 11/8/20   Steve Melara MD   spironolactone (ALDACTONE) 25 MG tablet Take 1 tablet by mouth daily 11/8/20   Steve Melara MD   traMADol (ULTRAM) 50 MG tablet Take 50 mg by mouth every 8 hours as needed for Pain. Historical Provider, MD   metoclopramide (REGLAN) 10 MG tablet take 1 tablet by mouth three times a day before meals 9/28/20   Amarjit Stacy MD   Cholecalciferol (VITAMIN D3) 20 MCG (800 UNIT) TABS Take 800 Units daily 9/25/20   Alexa Irvin MD   cloNIDine (CATAPRES) 0.2 MG tablet take 1 tablet by mouth twice a day 8/10/20   Amarjit Stacy MD   OLANZapine (ZYPREXA) 5 MG tablet Take 1 tablet by mouth nightly 7/13/20   No Suarez MD   magnesium oxide (MAG-OX) 400 (241.3 Mg) MG TABS tablet Take 1 tablet by mouth 2 times daily 7/13/20   No Suarez MD   isosorbide mononitrate (IMDUR) 30 MG extended release tablet take 1 tablet by mouth once daily 6/12/20   Amarjit Stacy MD   nitroGLYCERIN (NITROSTAT) 0.4 MG SL tablet Place 1 tablet under the tongue every 5 minutes as needed for Chest pain up to max of 3 total doses. If no relief after 1 dose, call 911.

## 2021-01-06 ENCOUNTER — TELEPHONE (OUTPATIENT)
Dept: INTERNAL MEDICINE CLINIC | Age: 56
End: 2021-01-06

## 2021-01-08 RX ORDER — SPIRONOLACTONE 25 MG/1
TABLET ORAL
Qty: 30 TABLET | Refills: 1 | Status: SHIPPED | OUTPATIENT
Start: 2021-01-08 | End: 2021-01-11 | Stop reason: SDUPTHER

## 2021-01-08 RX ORDER — LISINOPRIL 5 MG/1
TABLET ORAL
Qty: 30 TABLET | Refills: 1 | Status: SHIPPED | OUTPATIENT
Start: 2021-01-08 | End: 2021-01-11 | Stop reason: SDUPTHER

## 2021-01-13 ENCOUNTER — TELEPHONE (OUTPATIENT)
Dept: INFUSION THERAPY | Age: 56
End: 2021-01-13

## 2021-01-13 NOTE — TELEPHONE ENCOUNTER
Weekly Cytoxan x6 was ordered for pt and 1st infusion was 10/1/20. Last treatment was 12/10/20. Pt has completed 5 treatments. Dr. Cifuentes Cody progress note from 12/14/20 states pt completed 6 treatments and was starting on Imuran. Spoke with Trisha Mohs in office and she states Dr. Compa Barnett wants pt to have last infusion since he has not started the Imuran. Called and spoke with pt and he is planning on his being here for his appt tomorrow.

## 2021-01-14 ENCOUNTER — HOSPITAL ENCOUNTER (OUTPATIENT)
Dept: INFUSION THERAPY | Age: 56
Discharge: HOME | End: 2021-01-14
Payer: COMMERCIAL

## 2021-01-14 VITALS
HEART RATE: 67 BPM | SYSTOLIC BLOOD PRESSURE: 105 MMHG | WEIGHT: 217.4 LBS | DIASTOLIC BLOOD PRESSURE: 71 MMHG | TEMPERATURE: 97.9 F | RESPIRATION RATE: 16 BRPM | BODY MASS INDEX: 32.1 KG/M2

## 2021-01-14 DIAGNOSIS — N01.7 RAPID PROGRESSV NEPHRITIC SYNDRM, DIFFUSE CRESCENTC GLOMERULONEPHRITIS: ICD-10-CM

## 2021-01-14 DIAGNOSIS — N18.32 ANEMIA IN STAGE 3B CHRONIC KIDNEY DISEASE (HCC): ICD-10-CM

## 2021-01-14 DIAGNOSIS — N18.30 BENIGN HYPERTENSION WITH CKD (CHRONIC KIDNEY DISEASE) STAGE III (HCC): Primary | ICD-10-CM

## 2021-01-14 DIAGNOSIS — N18.32 STAGE 3B CHRONIC KIDNEY DISEASE (HCC): ICD-10-CM

## 2021-01-14 DIAGNOSIS — I77.82 ANCA-ASSOCIATED VASCULITIS: ICD-10-CM

## 2021-01-14 DIAGNOSIS — N04.1 NEPHROTIC SYNDROME WITH FOCAL GLOMERULOSCLEROSIS: ICD-10-CM

## 2021-01-14 DIAGNOSIS — N17.0 ACUTE KIDNEY FAILURE WITH LESION OF TUBULAR NECROSIS (HCC): ICD-10-CM

## 2021-01-14 DIAGNOSIS — I12.9 BENIGN HYPERTENSION WITH CKD (CHRONIC KIDNEY DISEASE) STAGE III (HCC): Primary | ICD-10-CM

## 2021-01-14 DIAGNOSIS — D63.1 ANEMIA IN STAGE 3B CHRONIC KIDNEY DISEASE (HCC): ICD-10-CM

## 2021-01-14 LAB
ABSOLUTE EOS #: 0.4 K/UL (ref 0–0.4)
ABSOLUTE IMMATURE GRANULOCYTE: ABNORMAL K/UL (ref 0–0.3)
ABSOLUTE LYMPH #: 1.1 K/UL (ref 1–4.8)
ABSOLUTE MONO #: 0.2 K/UL (ref 0.1–1.2)
ANION GAP SERPL CALCULATED.3IONS-SCNC: 9 MMOL/L (ref 9–17)
BASOPHILS # BLD: 2 % (ref 0–2)
BASOPHILS ABSOLUTE: 0.1 K/UL (ref 0–0.2)
BUN BLDV-MCNC: 21 MG/DL (ref 6–20)
BUN/CREAT BLD: ABNORMAL (ref 9–20)
CALCIUM SERPL-MCNC: 9.3 MG/DL (ref 8.6–10.4)
CHLORIDE BLD-SCNC: 103 MMOL/L (ref 98–107)
CO2: 27 MMOL/L (ref 20–31)
CREAT SERPL-MCNC: 1.98 MG/DL (ref 0.7–1.2)
DIFFERENTIAL TYPE: ABNORMAL
EOSINOPHILS RELATIVE PERCENT: 8 % (ref 1–4)
GFR AFRICAN AMERICAN: 42 ML/MIN
GFR NON-AFRICAN AMERICAN: 35 ML/MIN
GFR SERPL CREATININE-BSD FRML MDRD: ABNORMAL ML/MIN/{1.73_M2}
GFR SERPL CREATININE-BSD FRML MDRD: ABNORMAL ML/MIN/{1.73_M2}
GLUCOSE BLD-MCNC: 127 MG/DL (ref 70–99)
HCT VFR BLD CALC: 38.3 % (ref 41–53)
HEMOGLOBIN: 12.6 G/DL (ref 13.5–17.5)
IMMATURE GRANULOCYTES: ABNORMAL %
LYMPHOCYTES # BLD: 21 % (ref 24–44)
MCH RBC QN AUTO: 28.8 PG (ref 26–34)
MCHC RBC AUTO-ENTMCNC: 32.8 G/DL (ref 31–37)
MCV RBC AUTO: 87.7 FL (ref 80–100)
MONOCYTES # BLD: 4 % (ref 2–11)
NRBC AUTOMATED: ABNORMAL PER 100 WBC
PDW BLD-RTO: 16.1 % (ref 12.5–15.4)
PLATELET # BLD: 189 K/UL (ref 140–450)
PLATELET ESTIMATE: ABNORMAL
PMV BLD AUTO: 8.3 FL (ref 6–12)
POTASSIUM SERPL-SCNC: 4.5 MMOL/L (ref 3.7–5.3)
RBC # BLD: 4.37 M/UL (ref 4.5–5.9)
RBC # BLD: ABNORMAL 10*6/UL
SEG NEUTROPHILS: 65 % (ref 36–66)
SEGMENTED NEUTROPHILS ABSOLUTE COUNT: 3.6 K/UL (ref 1.8–7.7)
SODIUM BLD-SCNC: 139 MMOL/L (ref 135–144)
WBC # BLD: 5.5 K/UL (ref 3.5–11)
WBC # BLD: ABNORMAL 10*3/UL

## 2021-01-14 PROCEDURE — 6360000002 HC RX W HCPCS: Performed by: INTERNAL MEDICINE

## 2021-01-14 PROCEDURE — 80048 BASIC METABOLIC PNL TOTAL CA: CPT

## 2021-01-14 PROCEDURE — 2580000003 HC RX 258: Performed by: INTERNAL MEDICINE

## 2021-01-14 PROCEDURE — 96413 CHEMO IV INFUSION 1 HR: CPT | Performed by: NURSE PRACTITIONER

## 2021-01-14 PROCEDURE — 96375 TX/PRO/DX INJ NEW DRUG ADDON: CPT | Performed by: NURSE PRACTITIONER

## 2021-01-14 PROCEDURE — 85025 COMPLETE CBC W/AUTO DIFF WBC: CPT

## 2021-01-14 PROCEDURE — 36415 COLL VENOUS BLD VENIPUNCTURE: CPT

## 2021-01-14 RX ORDER — ONDANSETRON 2 MG/ML
4 INJECTION INTRAMUSCULAR; INTRAVENOUS ONCE
Status: COMPLETED | OUTPATIENT
Start: 2021-01-14 | End: 2021-01-14

## 2021-01-14 RX ORDER — SODIUM CHLORIDE 9 MG/ML
20 INJECTION, SOLUTION INTRAVENOUS ONCE
Status: COMPLETED | OUTPATIENT
Start: 2021-01-14 | End: 2021-01-14

## 2021-01-14 RX ADMIN — SODIUM CHLORIDE 20 ML/HR: 9 INJECTION, SOLUTION INTRAVENOUS at 09:20

## 2021-01-14 RX ADMIN — ONDANSETRON 4 MG: 2 INJECTION INTRAMUSCULAR; INTRAVENOUS at 09:24

## 2021-01-14 RX ADMIN — CYCLOPHOSPHAMIDE 500 MG: 500 INJECTION, POWDER, FOR SOLUTION INTRAVENOUS; ORAL at 09:40

## 2021-01-14 NOTE — PROGRESS NOTES
Patient here for his last cytoxan. He will continue on oral. Creatine has increased since December. I called Dr. Jcarlos Patel, updated on labs. He instructed to give treatment as ordered. Kidney function is the reason for the treatments. Patient treated without incident.

## 2021-01-21 ENCOUNTER — OFFICE VISIT (OUTPATIENT)
Dept: INTERNAL MEDICINE CLINIC | Age: 56
End: 2021-01-21
Payer: COMMERCIAL

## 2021-01-21 ENCOUNTER — CARE COORDINATION (OUTPATIENT)
Dept: CARE COORDINATION | Age: 56
End: 2021-01-21

## 2021-01-21 VITALS
BODY MASS INDEX: 31.16 KG/M2 | HEART RATE: 64 BPM | TEMPERATURE: 98 F | OXYGEN SATURATION: 98 % | WEIGHT: 211 LBS | DIASTOLIC BLOOD PRESSURE: 74 MMHG | SYSTOLIC BLOOD PRESSURE: 128 MMHG

## 2021-01-21 DIAGNOSIS — I50.33 ACUTE ON CHRONIC DIASTOLIC (CONGESTIVE) HEART FAILURE (HCC): ICD-10-CM

## 2021-01-21 DIAGNOSIS — I21.4 NSTEMI (NON-ST ELEVATED MYOCARDIAL INFARCTION) (HCC): ICD-10-CM

## 2021-01-21 DIAGNOSIS — N18.31 STAGE 3A CHRONIC KIDNEY DISEASE (HCC): ICD-10-CM

## 2021-01-21 DIAGNOSIS — R55 SYNCOPE AND COLLAPSE: ICD-10-CM

## 2021-01-21 DIAGNOSIS — J44.9 MIXED TYPE COPD (CHRONIC OBSTRUCTIVE PULMONARY DISEASE) (HCC): ICD-10-CM

## 2021-01-21 DIAGNOSIS — F33.3 SEVERE RECURRENT MAJOR DEPRESSIVE DISORDER WITH PSYCHOTIC FEATURES (HCC): ICD-10-CM

## 2021-01-21 DIAGNOSIS — G56.93 BILATERAL NEUROPATHY OF UPPER EXTREMITIES: ICD-10-CM

## 2021-01-21 DIAGNOSIS — I10 ESSENTIAL HYPERTENSION: ICD-10-CM

## 2021-01-21 DIAGNOSIS — J44.0 CHRONIC OBSTRUCTIVE PULMONARY DISEASE WITH ACUTE LOWER RESPIRATORY INFECTION (HCC): Primary | ICD-10-CM

## 2021-01-21 DIAGNOSIS — I25.810 CORONARY ARTERY DISEASE INVOLVING CORONARY BYPASS GRAFT OF NATIVE HEART WITHOUT ANGINA PECTORIS: ICD-10-CM

## 2021-01-21 DIAGNOSIS — E11.9 TYPE 2 DIABETES MELLITUS WITHOUT COMPLICATION, WITHOUT LONG-TERM CURRENT USE OF INSULIN (HCC): ICD-10-CM

## 2021-01-21 PROCEDURE — 2022F DILAT RTA XM EVC RTNOPTHY: CPT | Performed by: INTERNAL MEDICINE

## 2021-01-21 PROCEDURE — 3023F SPIROM DOC REV: CPT | Performed by: INTERNAL MEDICINE

## 2021-01-21 PROCEDURE — G8427 DOCREV CUR MEDS BY ELIG CLIN: HCPCS | Performed by: INTERNAL MEDICINE

## 2021-01-21 PROCEDURE — 3046F HEMOGLOBIN A1C LEVEL >9.0%: CPT | Performed by: INTERNAL MEDICINE

## 2021-01-21 PROCEDURE — 3017F COLORECTAL CA SCREEN DOC REV: CPT | Performed by: INTERNAL MEDICINE

## 2021-01-21 PROCEDURE — 99213 OFFICE O/P EST LOW 20 MIN: CPT | Performed by: INTERNAL MEDICINE

## 2021-01-21 PROCEDURE — G8417 CALC BMI ABV UP PARAM F/U: HCPCS | Performed by: INTERNAL MEDICINE

## 2021-01-21 PROCEDURE — 4004F PT TOBACCO SCREEN RCVD TLK: CPT | Performed by: INTERNAL MEDICINE

## 2021-01-21 PROCEDURE — G8482 FLU IMMUNIZE ORDER/ADMIN: HCPCS | Performed by: INTERNAL MEDICINE

## 2021-01-21 PROCEDURE — 1111F DSCHRG MED/CURRENT MED MERGE: CPT | Performed by: INTERNAL MEDICINE

## 2021-01-21 PROCEDURE — G8926 SPIRO NO PERF OR DOC: HCPCS | Performed by: INTERNAL MEDICINE

## 2021-01-21 RX ORDER — OMEGA-3S/DHA/EPA/FISH OIL/D3 300MG-1000
CAPSULE ORAL
Status: ON HOLD | COMMUNITY
Start: 2020-12-29 | End: 2022-03-17 | Stop reason: HOSPADM

## 2021-01-21 RX ORDER — ATORVASTATIN CALCIUM 40 MG/1
40 TABLET, FILM COATED ORAL DAILY
Qty: 30 TABLET | Refills: 3 | Status: SHIPPED | OUTPATIENT
Start: 2021-01-21 | End: 2021-05-24

## 2021-01-21 ASSESSMENT — PATIENT HEALTH QUESTIONNAIRE - PHQ9
SUM OF ALL RESPONSES TO PHQ9 QUESTIONS 1 & 2: 2
2. FEELING DOWN, DEPRESSED OR HOPELESS: 1
SUM OF ALL RESPONSES TO PHQ QUESTIONS 1-9: 2
1. LITTLE INTEREST OR PLEASURE IN DOING THINGS: 1

## 2021-01-21 NOTE — CARE COORDINATION
Ambulatory Care Coordination Note  1/21/2021  CM Risk Score: 16  Charlson 10 Year Mortality Risk Score: 100%     ACC: Asa Bean RN    Summary Note: Spoke with patient who denied any needs from PCP or ACM at this time. He stated he is doing well, his treatments are completed. He has a follow up with PCP today, plans on keeping this appointment. ACM will follow up in 2 weeks for needs. Care Coordination Interventions    Program Enrollment: Complex Care  Referral from Primary Care Provider: No  Suggested Interventions and Community Resources  Grief Counselor: [address] [place] [address]: Completed  Transportation Support: Declined  Zone Management Tools: In Process         Goals Addressed                 This Visit's Progress    [employer] Goal   On track     I will work with CC team for any assistance needs. .    Barriers: overwhelmed by complexity of regimen  Plan for overcoming my barriers: work with ACM  Confidence: 8/10  Anticipated Goal Completion Date: 1.7.20            Prior to Admission medications    Medication Sig Start Date End Date Taking?  Authorizing Provider   calcium carbonate-vitamin D3 (CALCIUM 600-D) 600-400 MG-UNIT TABS per tab Take 1 tablet by mouth 2 times daily 1/13/21   Nevaeh Crowley MD   lisinopril (PRINIVIL;ZESTRIL) 5 MG tablet take 1 tablet by mouth once daily 1/11/21   Nevaeh Crowley MD   spironolactone (ALDACTONE) 25 MG tablet take 1 tablet by mouth once daily 1/11/21   Nevaeh Crowley MD   DULoxetine (CYMBALTA) 30 MG extended release capsule Take 1 capsule by mouth daily 12/23/20   Sussy Bautista MD   traZODone (DESYREL) 100 MG tablet Take 0.5 tablets by mouth nightly as needed for Sleep 12/22/20 1/21/21  Sussy Bautista MD   metoprolol tartrate (LOPRESSOR) 25 MG tablet Take 1 tablet by mouth 2 times daily 12/22/20   Sussy Bautista MD   magnesium oxide (MAG-OX) 400 MG tablet Take 400 mg by mouth 2 times daily  11/30/20   Historical Provider, MD   azaTHIOprine (IMURAN) 50 MG tablet Take 1.5 tablets by mouth daily 12/14/20   Timo Duke MD   albuterol sulfate  (90 Base) MCG/ACT inhaler inhale 2 puffs by mouth every 6 hours if needed for wheezing 12/2/20   Jaquelin Beckford MD   Fluticasone furoate-vilanterol (BREO ELLIPTA) 200-25 MCG/INH AEPB inhaler Inhale 1 puff into the lungs daily 12/2/20   Jaquelin Beckford MD   atorvastatin (LIPITOR) 20 MG tablet take 1 tablet by mouth at bedtime 11/19/20   Jaquelin Beckford MD   aspirin EC 81 MG EC tablet Take 1 tablet by mouth daily 11/11/20   Jacki Caraballo PA-C   nicotine (NICODERM CQ) 14 MG/24HR Place 1 patch onto the skin daily 11/11/20   Jacki Caraballo PA-C   traMADol (ULTRAM) 50 MG tablet Take 50 mg by mouth every 8 hours as needed for Pain. Historical Provider, MD   metoclopramide (REGLAN) 10 MG tablet take 1 tablet by mouth three times a day before meals 9/28/20   Jaquelin Beckford MD   Cholecalciferol (VITAMIN D3) 20 MCG (800 UNIT) TABS Take 800 Units daily 9/25/20   Angela Dale MD   cloNIDine (CATAPRES) 0.2 MG tablet take 1 tablet by mouth twice a day 8/10/20   Jaquelin Beckford MD   OLANZapine (ZYPREXA) 5 MG tablet Take 1 tablet by mouth nightly 7/13/20   Juvenal Morales MD   magnesium oxide (MAG-OX) 400 (241.3 Mg) MG TABS tablet Take 1 tablet by mouth 2 times daily 7/13/20   Juvenal Morales MD   isosorbide mononitrate (IMDUR) 30 MG extended release tablet take 1 tablet by mouth once daily 6/12/20   Jaquelin Beckford MD   nitroGLYCERIN (NITROSTAT) 0.4 MG SL tablet Place 1 tablet under the tongue every 5 minutes as needed for Chest pain up to max of 3 total doses. If no relief after 1 dose, call 911. Patient taking differently: Place 0.4 mg under the tongue every 5 minutes as needed for Chest pain up to max of 3 total doses.  If no relief after 1 dose, call 911. 5/26/20   Jaquelin Beckford MD   pantoprazole (PROTONIX) 40 MG tablet Take 1 tablet by mouth daily 2/7/20   Keagan Francis Bettencourt MD   acetaminophen (TYLENOL) 325 MG tablet Take 2 tablets by mouth every 6 hours as needed for Pain  Patient taking differently: Take 650 mg by mouth every 6 hours as needed for Pain  1/23/20   Remedios Phipps, DO       Future Appointments   Date Time Provider Gregg Monroy   1/21/2021  3:15 PM Lyudmila Gage MD [address]   3/15/2021  3:00 PM Linda Magaña MD [address]     ,   Diabetes Assessment    Meal Planning: Avoidance of concentrated sweets   How often do you test your blood sugar?: Other   Do you have barriers with adherence to non-pharmacologic self-management interventions?  (Nutrition/Exercise/Self-Monitoring): No   Have you ever had to go to the ED for symptoms of low blood sugar?: No       No patient-reported symptoms      ,   Congestive Heart Failure Assessment    Are you currently restricting fluids?: Other  Do you understand a low sodium diet?: Yes  Do you understand how to read food labels?: Yes  Do you salt your food before tasting it?: No     No patient-reported symptoms      Symptoms:        ,   COPD Assessment    Does the patient understand envrionmental exposure?: Yes  Is the patient able to verbalize Rescue vs. Long Acting medications?: Yes  Does the patient use a space with inhaled medications?: Yes     No patient-reported symptoms         Symptoms:         and   General Assessment    Do you have any symptoms that are causing concern?: No
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
## 2021-01-31 DIAGNOSIS — I10 ESSENTIAL HYPERTENSION: ICD-10-CM

## 2021-02-01 RX ORDER — CLONIDINE HYDROCHLORIDE 0.2 MG/1
TABLET ORAL
Qty: 180 TABLET | Refills: 0 | Status: SHIPPED | OUTPATIENT
Start: 2021-02-01 | End: 2021-04-27

## 2021-02-09 ENCOUNTER — TELEPHONE (OUTPATIENT)
Dept: INTERNAL MEDICINE CLINIC | Age: 56
End: 2021-02-09

## 2021-02-16 ENCOUNTER — CARE COORDINATION (OUTPATIENT)
Dept: CARE COORDINATION | Age: 56
End: 2021-02-16

## 2021-02-16 NOTE — CARE COORDINATION
Fluticasone furoate-vilanterol (BREO ELLIPTA) 200-25 MCG/INH AEPB inhaler Inhale 1 puff into the lungs daily 1/21/21   Fantasma Tadeo MD   calcium carbonate-vitamin D3 (CALCIUM 600-D) 600-400 MG-UNIT TABS per tab Take 1 tablet by mouth 2 times daily 1/13/21   Janet Man MD   lisinopril (PRINIVIL;ZESTRIL) 5 MG tablet take 1 tablet by mouth once daily 1/11/21   William Correia MD   spironolactone (ALDACTONE) 25 MG tablet take 1 tablet by mouth once daily 1/11/21   William Correia MD   DULoxetine (CYMBALTA) 30 MG extended release capsule Take 1 capsule by mouth daily 12/23/20   Maximilian Okeefe MD   traZODone (DESYREL) 100 MG tablet Take 0.5 tablets by mouth nightly as needed for Sleep 12/22/20 1/21/21  Maximilian Okeefe MD   metoprolol tartrate (LOPRESSOR) 25 MG tablet Take 1 tablet by mouth 2 times daily 12/22/20   Maximilian Okeefe MD   magnesium oxide (MAG-OX) 400 MG tablet Take 400 mg by mouth 2 times daily  11/30/20   Historical Provider, MD   azaTHIOprine (IMURAN) 50 MG tablet Take 1.5 tablets by mouth daily 12/14/20   William Correia MD   albuterol sulfate  (90 Base) MCG/ACT inhaler inhale 2 puffs by mouth every 6 hours if needed for wheezing 12/2/20   Fernandez Kenyon MD   aspirin EC 81 MG EC tablet Take 1 tablet by mouth daily 11/11/20   Nafisa Lei PA-C   nicotine (NICODERM CQ) 14 MG/24HR Place 1 patch onto the skin daily 11/11/20   Nafisa Lei PA-C   traMADol (ULTRAM) 50 MG tablet Take 50 mg by mouth every 8 hours as needed for Pain.      Historical Provider, MD   metoclopramide (REGLAN) 10 MG tablet take 1 tablet by mouth three times a day before meals 9/28/20   Fernandez Kenyon MD   OLANZapine (ZYPREXA) 5 MG tablet Take 1 tablet by mouth nightly 7/13/20   Pallavi Doan MD   magnesium oxide (MAG-OX) 400 (241.3 Mg) MG TABS tablet Take 1 tablet by mouth 2 times daily 7/13/20   Pallavi Doan MD isosorbide mononitrate (IMDUR) 30 MG extended release tablet take 1 tablet by mouth once daily 6/12/20   Kenrick Hackett MD   nitroGLYCERIN (NITROSTAT) 0.4 MG SL tablet Place 1 tablet under the tongue every 5 minutes as needed for Chest pain up to max of 3 total doses. If no relief after 1 dose, call 911. Patient taking differently: Place 0.4 mg under the tongue every 5 minutes as needed for Chest pain up to max of 3 total doses. If no relief after 1 dose, call 911. 5/26/20   Kenrick Hackett MD   pantoprazole (PROTONIX) 40 MG tablet Take 1 tablet by mouth daily 2/7/20   Kenrick Hackett MD   acetaminophen (TYLENOL) 325 MG tablet Take 2 tablets by mouth every 6 hours as needed for Pain  Patient taking differently: Take 650 mg by mouth every 6 hours as needed for Pain  1/23/20   Robert Ayala, DO       Future Appointments   Date Time Provider Gregg Monroy   4/21/2021  1:30 PM Kenrick Hackett MD [address]     ,   Diabetes Assessment    Meal Planning: Avoidance of concentrated sweets   How often do you test your blood sugar?: Other   Do you have barriers with adherence to non-pharmacologic self-management interventions?  (Nutrition/Exercise/Self-Monitoring): No   Have you ever had to go to the ED for symptoms of low blood sugar?: No       No patient-reported symptoms      ,   Congestive Heart Failure Assessment    Are you currently restricting fluids?: Other  Do you understand a low sodium diet?: Yes  Do you understand how to read food labels?: Yes  Do you salt your food before tasting it?: No     No patient-reported symptoms      Symptoms:     Weight trend: stable  Salt intake watch compared to last visit: stable     ,   COPD Assessment    Does the patient understand envrionmental exposure?: Yes  Is the patient able to verbalize Rescue vs. Long Acting medications?: Yes  Does the patient use a space with inhaled medications?: Yes     No patient-reported symptoms         Symptoms:         and General Assessment    Do you have any symptoms that are causing concern?: No

## 2021-02-23 ENCOUNTER — HOSPITAL ENCOUNTER (EMERGENCY)
Age: 56
Discharge: HOME | End: 2021-02-23
Attending: EMERGENCY MEDICINE
Payer: COMMERCIAL

## 2021-02-23 ENCOUNTER — APPOINTMENT (OUTPATIENT)
Dept: GENERAL RADIOLOGY | Age: 56
End: 2021-02-23
Payer: COMMERCIAL

## 2021-02-23 VITALS
RESPIRATION RATE: 20 BRPM | OXYGEN SATURATION: 96 % | HEIGHT: 69 IN | HEART RATE: 65 BPM | TEMPERATURE: 97.2 F | BODY MASS INDEX: 32.58 KG/M2 | DIASTOLIC BLOOD PRESSURE: 84 MMHG | SYSTOLIC BLOOD PRESSURE: 149 MMHG | WEIGHT: 220 LBS

## 2021-02-23 DIAGNOSIS — S20.212A CONTUSION OF RIB ON LEFT SIDE, INITIAL ENCOUNTER: ICD-10-CM

## 2021-02-23 DIAGNOSIS — R07.81 RIB PAIN ON LEFT SIDE: Primary | ICD-10-CM

## 2021-02-23 PROCEDURE — 71046 X-RAY EXAM CHEST 2 VIEWS: CPT

## 2021-02-23 PROCEDURE — 6360000002 HC RX W HCPCS: Performed by: STUDENT IN AN ORGANIZED HEALTH CARE EDUCATION/TRAINING PROGRAM

## 2021-02-23 PROCEDURE — 6370000000 HC RX 637 (ALT 250 FOR IP): Performed by: STUDENT IN AN ORGANIZED HEALTH CARE EDUCATION/TRAINING PROGRAM

## 2021-02-23 PROCEDURE — 99283 EMERGENCY DEPT VISIT LOW MDM: CPT

## 2021-02-23 PROCEDURE — 96372 THER/PROPH/DIAG INJ SC/IM: CPT

## 2021-02-23 RX ORDER — ACETAMINOPHEN 500 MG
1000 TABLET ORAL ONCE
Status: COMPLETED | OUTPATIENT
Start: 2021-02-23 | End: 2021-02-23

## 2021-02-23 RX ORDER — METHOCARBAMOL 500 MG/1
500 TABLET, FILM COATED ORAL 4 TIMES DAILY
Qty: 40 TABLET | Refills: 0 | Status: SHIPPED | OUTPATIENT
Start: 2021-02-23 | End: 2021-03-05

## 2021-02-23 RX ORDER — LIDOCAINE 4 G/G
1 PATCH TOPICAL ONCE
Status: DISCONTINUED | OUTPATIENT
Start: 2021-02-23 | End: 2021-02-23 | Stop reason: HOSPADM

## 2021-02-23 RX ORDER — ACETAMINOPHEN 325 MG/1
650 TABLET ORAL EVERY 6 HOURS PRN
Qty: 60 TABLET | Refills: 0 | Status: ON HOLD | OUTPATIENT
Start: 2021-02-23 | End: 2022-07-22

## 2021-02-23 RX ORDER — ORPHENADRINE CITRATE 30 MG/ML
60 INJECTION INTRAMUSCULAR; INTRAVENOUS ONCE
Status: COMPLETED | OUTPATIENT
Start: 2021-02-23 | End: 2021-02-23

## 2021-02-23 RX ORDER — LIDOCAINE 4 G/G
1 PATCH TOPICAL DAILY
Qty: 15 PATCH | Refills: 0 | Status: SHIPPED | OUTPATIENT
Start: 2021-02-23 | End: 2021-03-10

## 2021-02-23 RX ADMIN — ORPHENADRINE CITRATE 60 MG: 60 INJECTION INTRAMUSCULAR; INTRAVENOUS at 12:08

## 2021-02-23 RX ADMIN — ACETAMINOPHEN 1000 MG: 500 TABLET ORAL at 12:07

## 2021-02-23 ASSESSMENT — ENCOUNTER SYMPTOMS
COUGH: 0
COLOR CHANGE: 0
SHORTNESS OF BREATH: 0
WHEEZING: 0
CONSTIPATION: 0
CHEST TIGHTNESS: 0
VOMITING: 0
ABDOMINAL PAIN: 0
DIARRHEA: 0
PHOTOPHOBIA: 0
NAUSEA: 0
BACK PAIN: 1

## 2021-02-23 ASSESSMENT — PAIN DESCRIPTION - ORIENTATION: ORIENTATION: LEFT

## 2021-02-23 ASSESSMENT — PAIN SCALES - GENERAL: PAINLEVEL_OUTOF10: 8

## 2021-02-23 ASSESSMENT — PAIN DESCRIPTION - LOCATION: LOCATION: RIB CAGE

## 2021-02-23 NOTE — ED PROVIDER NOTES
[facility]     Emergency Department     Faculty Attestation        I performed a history and physical examination of the patient and discussed management with the resident. I reviewed the residents note and agree with the documented findings and plan of care. Any areas of disagreement are noted on the chart. I was personally present for the key portions of any procedures. I have documented in the chart those procedures where I was not present during the key portions. I have reviewed the emergency nurses triage note. I agree with the chief complaint, past medical history, past surgical history, allergies, medications, social and family history as documented unless otherwise noted below. For mid-level providers such as nurse practitioners as well as physicians assistants:    I have personally seen and evaluated the patient. I find the patient's history and physical exam are consistent with NP/PA documentation. I agree with the care provided, treatment rendered, disposition, & follow-up plan. Additional findings are as noted. Vital Signs: BP (!) 149/84   Pulse 65   Temp 97.2 °F (36.2 °C) (Infrared)   Resp 20   Ht 5' 9\" (1.753 m)   Wt 220 lb (99.8 kg)   SpO2 96%   BMI 32.49 kg/m²   PCP:  Andrew Goodman MD    Pertinent Comments:     He states he is walking on the stairs when he slipped and fell landing on the left side of his ribs. He did not hit his head or neck or lose consciousness. There is no anticoagulation use. He has focal tenderness over the left lateral chest wall. There is no deformity bruising or ecchymosis.       Critical Care  None          Judit Solitario MD  Attending Emergency Medicine Physician              Tuan Arce MD  02/23/21 0043

## 2021-02-23 NOTE — ED PROVIDER NOTES
(5/19/16); back surgery; Shoulder arthroscopy (Right, 09/12/2016); Colonoscopy (N/A, 7/30/2019); Cardiac surgery; Cardiac catheterization (10/30/2018); Foot surgery (Left); Cystoscopy (N/A, 2/18/2020); Upper gastrointestinal endoscopy (N/A, 3/6/2020); and CT BIOPSY RENAL (7/30/2020). Social History     Socioeconomic History    Marital status: Single     Spouse name: Not on file    Number of children: 3    Years of education: Not on file    Highest education level: Not on file   Occupational History    Occupation: Disabled since 2011   [address] resource strain: Not very hard    Food insecurity     Worry: Never true     Inability: Never true    Transportation needs     Medical: No     Non-medical: No   Tobacco Use    Smoking status: Current Every Day Smoker     Packs/day: 0.50     Years: 40.00     Pack years: 20.00     Types: Cigarettes    Smokeless tobacco: Never Used   Substance and Sexual Activity    Alcohol use: No     Alcohol/week: 0.0 standard drinks    Drug use: Not Currently    Sexual activity: Not Currently   Lifestyle    Physical activity     Days per week: 0 days     Minutes per session: Not on file    Stress:  To some extent   Relationships    Social connections     Talks on phone: Not on file     Gets together: Not on file     Attends Taoism service: Not on file     Active member of club or organization: Not on file     Attends meetings of clubs or organizations: Not on file     Relationship status: Not on file    Intimate partner violence     Fear of current or ex partner: Not on file     Emotionally abused: Not on file     Physically abused: Not on file     Forced sexual activity: Not on file   Other Topics Concern    Not on file   Social History Narrative    Not on file       Family History   Problem Relation Age of Onset    Anxiety Disorder Sister     Depression Sister     High Blood Pressure Sister     Thyroid Disease Sister     Depression Sister    Lesaelvia Hailey High Blood Pressure Sister     Lung Cancer Mother     Heart Disease Mother     High Blood Pressure Mother     High Blood Pressure Father     Diabetes Father     Heart Disease Father     Lung Cancer Father     Heart Disease Maternal Grandmother     Depression Brother         Allergies:  Morphine    Home Medications:  Prior to Admission medications    Medication Sig Start Date End Date Taking?  Authorizing Provider   acetaminophen (TYLENOL) 325 MG tablet Take 2 tablets by mouth every 6 hours as needed for Pain 2/23/21  Yes Kendrick Mcfadden, DO   methocarbamol (ROBAXIN) 500 MG tablet Take 1 tablet by mouth 4 times daily for 10 days 2/23/21 3/5/21 Yes Joe Mcfadden, DO   lidocaine 4 % external patch Place 1 patch onto the skin daily for 15 days 2/23/21 3/10/21 Yes Joel Mcfadden, DO   Umeclidinium Bromide 62.5 MCG/INH AEPB Inhale 1 puff into the lungs daily 2/9/21   Nicoel Toussaint MD   cloNIDine (CATAPRES) 0.2 MG tablet take 1 tablet by mouth twice a day 2/1/21   Abhishek Molina MD   vitamin D3 (CHOLECALCIFEROL) 10 MCG (400 UNIT) TABS tablet take 2 tablets (800MG) by mouth once daily 1/25/21   [place] Lana Yip MD   vitamin D3 (CHOLECALCIFEROL) 10 MCG (400 UNIT) TABS tablet take 2 tablets (800MG) by mouth once daily 12/29/20   Historical Provider, MD   atorvastatin (LIPITOR) 40 MG tablet Take 1 tablet by mouth daily 1/21/21   Nicole Toussaint MD   Fluticasone furoate-vilanterol (BREO ELLIPTA) 200-25 MCG/INH AEPB inhaler Inhale 1 puff into the lungs daily 1/21/21   Nicole Toussaint MD   calcium carbonate-vitamin D3 (CALCIUM 600-D) 600-400 MG-UNIT TABS per tab Take 1 tablet by mouth 2 times daily 1/13/21   Mer Romero MD   lisinopril (PRINIVIL;ZESTRIL) 5 MG tablet take 1 tablet by mouth once daily 1/11/21   Mer Romero MD   spironolactone (ALDACTONE) 25 MG tablet take 1 tablet by mouth once daily 1/11/21   Mer Romero MD   DULoxetine (CYMBALTA) 30 MG extended release capsule Take 1 capsule by mouth daily 12/23/20   Carlos Urias MD   traZODone (DESYREL) 100 MG tablet Take 0.5 tablets by mouth nightly as needed for Sleep 12/22/20 1/21/21  Carlos Urias MD   metoprolol tartrate (LOPRESSOR) 25 MG tablet Take 1 tablet by mouth 2 times daily 12/22/20   Carlos Urias MD   magnesium oxide (MAG-OX) 400 MG tablet Take 400 mg by mouth 2 times daily  11/30/20   Historical Provider, MD   azaTHIOprine (IMURAN) 50 MG tablet Take 1.5 tablets by mouth daily 12/14/20   Devon Williamson MD   albuterol sulfate  (90 Base) MCG/ACT inhaler inhale 2 puffs by mouth every 6 hours if needed for wheezing 12/2/20   Eusebia Hayes MD   aspirin EC 81 MG EC tablet Take 1 tablet by mouth daily 11/11/20   Valerie Jamison PA-C   nicotine (NICODERM CQ) 14 MG/24HR Place 1 patch onto the skin daily 11/11/20   Valerie Jamison PA-C   traMADol (ULTRAM) 50 MG tablet Take 50 mg by mouth every 8 hours as needed for Pain. Historical Provider, MD   metoclopramide (REGLAN) 10 MG tablet take 1 tablet by mouth three times a day before meals 9/28/20   Eusebia Hayes MD   OLANZapine (ZYPREXA) 5 MG tablet Take 1 tablet by mouth nightly 7/13/20   Dennise Jean MD   magnesium oxide (MAG-OX) 400 (241.3 Mg) MG TABS tablet Take 1 tablet by mouth 2 times daily 7/13/20   Dennise Jean MD   isosorbide mononitrate (IMDUR) 30 MG extended release tablet take 1 tablet by mouth once daily 6/12/20   Eusebia Hayes MD   nitroGLYCERIN (NITROSTAT) 0.4 MG SL tablet Place 1 tablet under the tongue every 5 minutes as needed for Chest pain up to max of 3 total doses. If no relief after 1 dose, call 911. Patient taking differently: Place 0.4 mg under the tongue every 5 minutes as needed for Chest pain up to max of 3 total doses.  If no relief after 1 dose, call 911. 5/26/20   Eusebia Hayes MD   pantoprazole (PROTONIX) 40 MG tablet Take 1 tablet by mouth daily 2/7/20   Eusebia Hayes MD       REVIEW Kathleen Altman (2-9 systems for level 4, 10 or more for level 5)      Review of Systems   Constitutional: Negative for chills, diaphoresis, fatigue and fever. Eyes: Negative for photophobia and visual disturbance. Respiratory: Negative for cough, chest tightness, shortness of breath and wheezing. Cardiovascular: Negative for chest pain, palpitations and leg swelling. Gastrointestinal: Negative for abdominal pain, constipation, diarrhea, nausea and vomiting. Genitourinary: Negative for difficulty urinating, dysuria, flank pain and hematuria. Musculoskeletal: Positive for back pain (Left posterior lateral ribs). Negative for arthralgias, neck pain and neck stiffness. Skin: Negative for color change, rash and wound. Neurological: Negative for weakness, light-headedness and headaches. PHYSICAL EXAM   (up to 7 for level 4, 8 or more forlevel 5)      INITIAL VITALS:   ED Triage Vitals   BP Temp Temp Source Pulse Resp SpO2 Height Weight   02/23/21 1202 02/23/21 1156 02/23/21 1156 02/23/21 1202 02/23/21 1202 02/23/21 1202 02/23/21 1202 02/23/21 1202   (!) 149/84 97.2 °F (36.2 °C) Infrared 65 20 96 % 5' 9\" (1.753 m) 220 lb (99.8 kg)       Physical Exam  Constitutional:       General: He is not in acute distress. Appearance: He is well-developed. He is not diaphoretic. HENT:      Head: Normocephalic and atraumatic. Eyes:      Conjunctiva/sclera: Conjunctivae normal.      Pupils: Pupils are equal, round, and reactive to light. Neck:      Musculoskeletal: Normal range of motion and neck supple. Vascular: No JVD. Trachea: No tracheal deviation. Cardiovascular:      Rate and Rhythm: Normal rate and regular rhythm. Pulses: Normal pulses. Heart sounds: Normal heart sounds. Pulmonary:      Effort: Pulmonary effort is normal. No respiratory distress. Breath sounds: Normal breath sounds. No wheezing or rales. Chest:      Chest wall: No tenderness.    Abdominal:      General: Bowel sounds are normal. There is no distension. Palpations: Abdomen is soft. Tenderness: There is no abdominal tenderness. There is no guarding. Musculoskeletal: Normal range of motion. General: Tenderness (L posterolateral lower ribs) present. No swelling or signs of injury. Comments: No midline CTL tenderness   Skin:     General: Skin is warm and dry. Capillary Refill: Capillary refill takes less than 2 seconds. Coloration: Skin is not pale. Findings: No erythema or rash. Neurological:      Mental Status: He is alert and oriented to person, place, and time. Cranial Nerves: No cranial nerve deficit. Psychiatric:         Mood and Affect: Mood normal.         Behavior: Behavior normal.         DIFFERENTIAL  DIAGNOSIS     PLAN (LABS / IMAGING / EKG):  Orders Placed This Encounter   Procedures    XR CHEST (2 VW)       MEDICATIONS ORDERED:  Orders Placed This Encounter   Medications    acetaminophen (TYLENOL) tablet 1,000 mg    orphenadrine (NORFLEX) injection 60 mg    DISCONTD: lidocaine 4 % external patch 1 patch    acetaminophen (TYLENOL) 325 MG tablet     Sig: Take 2 tablets by mouth every 6 hours as needed for Pain     Dispense:  60 tablet     Refill:  0    methocarbamol (ROBAXIN) 500 MG tablet     Sig: Take 1 tablet by mouth 4 times daily for 10 days     Dispense:  40 tablet     Refill:  0    lidocaine 4 % external patch     Sig: Place 1 patch onto the skin daily for 15 days     Dispense:  15 patch     Refill:  0       DDX: Rib fracture, rib strain, contusion    Initial MDM/Plan: 60 y.o. male who presents with left-sided rib pain status post fall. No midline cervical, thoracic, lumbar tenderness. Plan for chest x-ray, will treat symptomatically. Likely discharge.     DIAGNOSTIC RESULTS / EMERGENCYDEPARTMENT COURSE / MDM     LABS:  Labs Reviewed - No data to display      RADIOLOGY:  Xr Chest (2 Vw)    Result Date: 2/23/2021  EXAMINATION: TWO XRAY VIEWS OF THE CHEST 2/23/2021 12:54 pm COMPARISON: 12/18/2020 HISTORY: ORDERING SYSTEM PROVIDED HISTORY: fall, left sided rib pain TECHNOLOGIST PROVIDED HISTORY: fall, left sided rib pain Reason for Exam: Shortness of breath, COPD Acuity: Chronic Type of Exam: Unknown FINDINGS: Frontal and lateral views of the chest are submitted for review. The cardiac silhouette is normal in size. Stable chronic appearing bibasilar interstitial changes suggestive of chronic fibrosis or scarring although may represent infectious or inflammatory process. .  Status post midline sternotomy. Trachea is midline. Osseous structures and soft tissues are grossly intact. Similar appearing bibasilar chronic interstitial opacities which may represent a chronic infectious or inflammatory process. No new focal airspace consolidation, sizeable pleural effusion or pneumothorax. EKG      All EKG's are interpreted by the Emergency Department Physicianwho either signs or Co-signs this chart in the absence of a cardiologist.    EMERGENCY DEPARTMENT COURSE:  ED Course as of Feb 23 1526   Tue Feb 23, 2021   1319 Chronic lung disease on chest x-ray, no acute process. [JG]      ED Course User Index  [JG] Khari Stanley DO   Patient discharged with incentive spirometer, pain management. Counseled on use of incentive spirometer. Return precautions provided. PROCEDURES:  None    CONSULTS:  None    CRITICAL CARE:  Please see attending note    FINAL IMPRESSION      1. Rib pain on left side    2.  Contusion of rib on left side, initial encounter          DISPOSITION / PLAN     DISPOSITION Decision To Discharge 02/23/2021 01:15:47 PM      PATIENT REFERRED TO:  MD Lydia Hol[address] [facility]  [phone]    Go in 3 days      [facility] ED  [address] [place] [address]  [phone]  Go to   If symptoms worsen      DISCHARGE MEDICATIONS:  Discharge Medication List as of 2/23/2021  1:19 PM      START taking these medications    Details   methocarbamol (ROBAXIN) 500 MG tablet Take 1 tablet by mouth 4 times daily for 10 days, Disp-40 tablet, R-0Print      lidocaine 4 % external patch Place 1 patch onto the skin daily for 15 days, Transdermal, DAILY Starting Tue 2/23/2021, Until Wed 3/10/2021, For 15 days, Disp-15 patch, R-0, Print             Colvin Mohs, DO  Emergency Medicine Resident    (Please note that portions of this note were completed with a voice recognition program.Efforts were made to edit the dictations but occasionally words are mis-transcribed.)     Colvin Mohs, DO  Resident  02/23/21 3213

## 2021-02-23 NOTE — ED NOTES
Pt arrived to ED alert and oriented x4. Pt c/o rib pain s/p fall. Pt reports that he fell down 3 steps a day ago. Pt reports that the pain is on the left side, states that the pain is worse when taking a deep breath, coughing, or bending forward. Pt denies taking medication for pain. Pt denies having been around anyone suspected to have COVID-19 or anyone that has been sick, denies recent travel outside the [facility] or [address]. RR even and unlabored. NAD noted. Will continue with plan of care.      Whitney Ortiz RN  02/23/21 7585
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
## 2021-02-24 ENCOUNTER — CARE COORDINATION (OUTPATIENT)
Dept: CARE COORDINATION | Age: 56
End: 2021-02-24

## 2021-02-24 NOTE — CARE COORDINATION
Patient contacted regarding recent discharge and COVID-19 risk. Discussed COVID-19 related testing which was not done at this time. Test results were not done. Patient informed of results, if available? Not done  Patient evaluated for a fall with rib pain, no fracture per patient. He is using ice/heat and rest.     Care Transition Nurse/ Ambulatory Care Manager contacted the patient by telephone to perform post discharge assessment. Verified name and  with patient as identifiers. Patient has following risk factors of: heart failure, COPD and diabetes. CTN/ACM reviewed discharge instructions, medical action plan and red flags related to discharge diagnosis. Reviewed and educated them on any new and changed medications related to discharge diagnosis. Advised obtaining a 90-day supply of all daily and as-needed medications. Education provided regarding infection prevention, and signs and symptoms of COVID-19 and when to seek medical attention with patient who verbalized understanding. Discussed exposure protocols and quarantine from [address] you at higher risk for severe illness  and given an opportunity for questions and concerns. The patient agrees to contact the COVID-19 hotline [phone] or PCP office for questions related to their healthcare. CTN/ACM provided contact information for future reference. From CDC: Are you at higher risk for severe illness?  Wash your hands often.  Avoid close contact (6 feet, which is about two arm lengths) with people who are sick.  Put distance between yourself and other people if COVID-19 is spreading in your community.  Clean and disinfect frequently touched surfaces.  Avoid all cruise travel and non-essential air travel.  Call your healthcare professional if you have concerns about COVID-19 and your underlying condition or if you are sick.     For more information on steps you can take to protect yourself, see CDC's How to Protect Yourself    patient will call PCP for follow up based on severity of symptoms and risk factors.

## 2021-03-01 ENCOUNTER — TELEPHONE (OUTPATIENT)
Dept: ONCOLOGY | Age: 56
End: 2021-03-01

## 2021-03-01 DIAGNOSIS — Z87.891 PERSONAL HISTORY OF NICOTINE DEPENDENCE: Primary | ICD-10-CM

## 2021-03-01 NOTE — TELEPHONE ENCOUNTER
Our records indicate that your patient is due for their annual lung cancer screening follow up testing. For your convenience, we have pended the order for the scan for you. If you do not agree with the need for the test, please cancel the order and let us know. Sincerely,    [address] [place] [address] Screening Program    Auto printed reminder letter sent to patient.

## 2021-03-05 RX ORDER — ISOSORBIDE MONONITRATE 30 MG/1
TABLET, EXTENDED RELEASE ORAL
Qty: 60 TABLET | Refills: 1 | Status: SHIPPED | OUTPATIENT
Start: 2021-03-05 | End: 2021-06-29

## 2021-03-08 ENCOUNTER — TELEPHONE (OUTPATIENT)
Dept: ONCOLOGY | Age: 56
End: 2021-03-08

## 2021-03-08 NOTE — LETTER
3/8/2021        Ryan Singh  [id number]    Dear Ryan Singh:    Your healthcare provider has ordered a low dose CT scan of the chest for lung cancer screening. You will find enclosed, information about CT lung screening. Please review the statement of understanding, you will be asked to sign a copy of this at the time of your CT scan    If you have not already been contacted to make the appointment for your scan, please call our scheduling department at [phone]    Keep in mind that CT lung screening does not take the place of smoking cessation. If you are a current smoker, you will find enclosed smoking cessation resources. Please do not hesitate to contact me if you have any questions or concerns.     [facility],      [facility] Lung Screening Program  [phone]

## 2021-03-15 ENCOUNTER — TELEPHONE (OUTPATIENT)
Dept: ONCOLOGY | Age: 56
End: 2021-03-15

## 2021-03-15 NOTE — TELEPHONE ENCOUNTER
REVIEWED AUTO PRINTED LUNG SCREENING REMINDER LETTERS AND CHART, PATIENT IS DUE AND NOT SCHEDULED YET. MAILED LETTER TO PATIENT.    FAXED LUNG SCREEN ORDER TO [address] [place] [address]

## 2021-04-16 DIAGNOSIS — K21.9 GASTROESOPHAGEAL REFLUX DISEASE: ICD-10-CM

## 2021-04-16 RX ORDER — PANTOPRAZOLE SODIUM 40 MG/1
TABLET, DELAYED RELEASE ORAL
Qty: 90 TABLET | Refills: 3 | Status: SHIPPED | OUTPATIENT
Start: 2021-04-16 | End: 2021-10-22 | Stop reason: SDUPTHER

## 2021-04-21 ENCOUNTER — OFFICE VISIT (OUTPATIENT)
Dept: INTERNAL MEDICINE CLINIC | Age: 56
End: 2021-04-21
Payer: COMMERCIAL

## 2021-04-21 VITALS
TEMPERATURE: 97.4 F | WEIGHT: 220 LBS | DIASTOLIC BLOOD PRESSURE: 80 MMHG | SYSTOLIC BLOOD PRESSURE: 110 MMHG | HEIGHT: 69 IN | BODY MASS INDEX: 32.58 KG/M2

## 2021-04-21 DIAGNOSIS — E11.9 TYPE 2 DIABETES MELLITUS WITHOUT COMPLICATION, WITHOUT LONG-TERM CURRENT USE OF INSULIN (HCC): Primary | ICD-10-CM

## 2021-04-21 DIAGNOSIS — N18.4 STAGE 4 CHRONIC KIDNEY DISEASE (HCC): ICD-10-CM

## 2021-04-21 DIAGNOSIS — Z13.31 POSITIVE DEPRESSION SCREENING: ICD-10-CM

## 2021-04-21 DIAGNOSIS — F17.200 SMOKER: ICD-10-CM

## 2021-04-21 DIAGNOSIS — F33.3 SEVERE RECURRENT MAJOR DEPRESSIVE DISORDER WITH PSYCHOTIC SYMPTOMS (HCC): ICD-10-CM

## 2021-04-21 DIAGNOSIS — J44.9 CHRONIC OBSTRUCTIVE PULMONARY DISEASE, UNSPECIFIED COPD TYPE (HCC): ICD-10-CM

## 2021-04-21 LAB — HBA1C MFR BLD: 5.1 %

## 2021-04-21 PROCEDURE — G8431 POS CLIN DEPRES SCRN F/U DOC: HCPCS | Performed by: INTERNAL MEDICINE

## 2021-04-21 PROCEDURE — 3044F HG A1C LEVEL LT 7.0%: CPT | Performed by: INTERNAL MEDICINE

## 2021-04-21 PROCEDURE — G8417 CALC BMI ABV UP PARAM F/U: HCPCS | Performed by: INTERNAL MEDICINE

## 2021-04-21 PROCEDURE — 3017F COLORECTAL CA SCREEN DOC REV: CPT | Performed by: INTERNAL MEDICINE

## 2021-04-21 PROCEDURE — 99214 OFFICE O/P EST MOD 30 MIN: CPT | Performed by: INTERNAL MEDICINE

## 2021-04-21 PROCEDURE — 4004F PT TOBACCO SCREEN RCVD TLK: CPT | Performed by: INTERNAL MEDICINE

## 2021-04-21 PROCEDURE — G8926 SPIRO NO PERF OR DOC: HCPCS | Performed by: INTERNAL MEDICINE

## 2021-04-21 PROCEDURE — 3023F SPIROM DOC REV: CPT | Performed by: INTERNAL MEDICINE

## 2021-04-21 PROCEDURE — 2022F DILAT RTA XM EVC RTNOPTHY: CPT | Performed by: INTERNAL MEDICINE

## 2021-04-21 PROCEDURE — 83036 HEMOGLOBIN GLYCOSYLATED A1C: CPT | Performed by: INTERNAL MEDICINE

## 2021-04-21 PROCEDURE — G8427 DOCREV CUR MEDS BY ELIG CLIN: HCPCS | Performed by: INTERNAL MEDICINE

## 2021-04-21 RX ORDER — NICOTINE 21 MG/24HR
1 PATCH, TRANSDERMAL 24 HOURS TRANSDERMAL DAILY
Qty: 42 PATCH | Refills: 0 | Status: ON HOLD | OUTPATIENT
Start: 2021-04-21 | End: 2022-04-16 | Stop reason: HOSPADM

## 2021-04-21 ASSESSMENT — ENCOUNTER SYMPTOMS
CONSTIPATION: 0
APNEA: 0
ABDOMINAL DISTENTION: 0
FACIAL SWELLING: 0
COUGH: 0
CHEST TIGHTNESS: 0
DIARRHEA: 0
COLOR CHANGE: 0
BACK PAIN: 0
ABDOMINAL PAIN: 0
WHEEZING: 0
SHORTNESS OF BREATH: 0

## 2021-04-21 ASSESSMENT — PATIENT HEALTH QUESTIONNAIRE - PHQ9
3. TROUBLE FALLING OR STAYING ASLEEP: 3
5. POOR APPETITE OR OVEREATING: 3
7. TROUBLE CONCENTRATING ON THINGS, SUCH AS READING THE NEWSPAPER OR WATCHING TELEVISION: 3
1. LITTLE INTEREST OR PLEASURE IN DOING THINGS: 2
SUM OF ALL RESPONSES TO PHQ QUESTIONS 1-9: 21
SUM OF ALL RESPONSES TO PHQ QUESTIONS 1-9: 21

## 2021-04-21 ASSESSMENT — COLUMBIA-SUICIDE SEVERITY RATING SCALE - C-SSRS
6. HAVE YOU EVER DONE ANYTHING, STARTED TO DO ANYTHING, OR PREPARED TO DO ANYTHING TO END YOUR LIFE?: NO
2. HAVE YOU ACTUALLY HAD ANY THOUGHTS OF KILLING YOURSELF?: NO

## 2021-04-21 NOTE — PROGRESS NOTES
note that this chart was generated using voice recognition Dragon dictation software. Although every effort was made to ensure the accuracy of this automated transcription, some errors in transcription may have occurred. Visit Information    Have you changed or started any medications since your last visit including any over-the-counter medicines, vitamins, or herbal medicines? no   Are you having any side effects from any of your medications? -  no  Have you stopped taking any of your medications? Is so, why? -  no    Have you seen any other physician or provider since your last visit? No  Have you had any other diagnostic tests since your last visit? No  Have you been seen in the emergency room and/or had an admission to a hospital since we last saw you? No  Have you had your routine dental cleaning in the past 6 months? no    Have you activated your [employer] account? If not, what are your barriers?  Yes     Patient Care Team:  Tuan Pittman MD as PCP - General (Internal Medicine)  Tuan Pittman MD as PCP - [facility]  Rohini Luciano as Surgeon (Cardiothoracic Surgery)  Jenny Morales MD (Cardiology)  Dayami Lopez MD as Orthopedic Surgeon (Orthopedic Surgery)  Sandeep Weller MD as Surgeon (Orthopedic Surgery)  Emmanuelle Davis MD as Surgeon (Neurosurgery)  Jennifer Carrizales MD as Consulting Physician (Neurology)  Fredis Pedraza MD as Consulting Physician (Pulmonology)  Axel Plasencia RN as Imaging Navigator    Medical History Review  Past Medical, Family, and Social History reviewed and does not contribute to the patient presenting condition    Health Maintenance   Topic Date Due    Diabetic retinal exam  Never done    COVID-19 Vaccine (1) Never done    Hepatitis B vaccine (1 of 3 - Risk 3-dose series) Never done    Shingles Vaccine (1 of 2) Never done    Pneumococcal 0-64 years Vaccine (3 of 3 - PCV13) 03/14/2018    Low dose CT lung screening  02/24/2021    Diabetic foot exam  10/08/2021    Lipid screen  11/07/2021    A1C test (Diabetic or Prediabetic)  12/02/2021    Potassium monitoring  01/14/2022    Creatinine monitoring  01/14/2022    DTaP/Tdap/Td vaccine (2 - Td) 11/17/2026    Colon cancer screen colonoscopy  07/30/2029    Flu vaccine  Completed    Hepatitis C screen  Completed    HIV screen  Completed    Hepatitis A vaccine  Aged Out    Hib vaccine  Aged Out    Meningococcal (ACWY) vaccine  Aged Out          On the basis of positive PHQ-9 screening (PHQ-9 Total Score: 21), the following plan was implemented: patient declines further evaluation/treatment for depression. Patient will follow-up in 1 month(s) with psychotherapist.On the basis of positive PHQ-9 screening (PHQ-9 Total Score: 21), the following plan was implemented: patient declines further evaluation/treatment for depression.   Patient will follow-up in 1 week(s) with psychiatrist.

## 2021-04-27 DIAGNOSIS — I10 ESSENTIAL HYPERTENSION: ICD-10-CM

## 2021-04-27 RX ORDER — CLONIDINE HYDROCHLORIDE 0.2 MG/1
TABLET ORAL
Qty: 180 TABLET | Refills: 0 | Status: SHIPPED | OUTPATIENT
Start: 2021-04-27 | End: 2021-07-27

## 2021-04-28 ENCOUNTER — TELEPHONE (OUTPATIENT)
Dept: INTERNAL MEDICINE CLINIC | Age: 56
End: 2021-04-28

## 2021-04-28 NOTE — TELEPHONE ENCOUNTER
Jen from [facility] called asking to speak with RAUDEL Sevilla regarding paper work that was faxed over for Dr. Merlene Lopez that required a signature regarding revision for plan of care. Writer informed Jen that Jeannine Lui was seeing patients and was not available at this time, but I would leave the message with Jeannine Lui.

## 2021-04-30 RX ORDER — ASPIRIN 81 MG/1
81 TABLET ORAL DAILY
Qty: 90 TABLET | Refills: 3 | Status: SHIPPED | OUTPATIENT
Start: 2021-04-30 | End: 2022-06-14

## 2021-05-07 RX ORDER — DULOXETIN HYDROCHLORIDE 30 MG/1
30 CAPSULE, DELAYED RELEASE ORAL DAILY
Qty: 30 CAPSULE | Refills: 3 | Status: SHIPPED | OUTPATIENT
Start: 2021-05-07 | End: 2021-09-10 | Stop reason: SDUPTHER

## 2021-05-22 ENCOUNTER — HOSPITAL ENCOUNTER (OUTPATIENT)
Dept: LAB | Age: 56
Setting detail: SPECIMEN
Discharge: HOME | End: 2021-05-22
Payer: COMMERCIAL

## 2021-05-22 DIAGNOSIS — Z20.822 COVID-19 RULED OUT BY LABORATORY TESTING: Primary | ICD-10-CM

## 2021-05-23 DIAGNOSIS — I25.810 CORONARY ARTERY DISEASE INVOLVING CORONARY BYPASS GRAFT OF NATIVE HEART WITHOUT ANGINA PECTORIS: ICD-10-CM

## 2021-05-23 DIAGNOSIS — E11.9 TYPE 2 DIABETES MELLITUS WITHOUT COMPLICATION, WITHOUT LONG-TERM CURRENT USE OF INSULIN (HCC): ICD-10-CM

## 2021-05-24 RX ORDER — ATORVASTATIN CALCIUM 40 MG/1
TABLET, FILM COATED ORAL
Qty: 30 TABLET | Refills: 3 | Status: SHIPPED | OUTPATIENT
Start: 2021-05-24 | End: 2021-10-04

## 2021-06-29 RX ORDER — ISOSORBIDE MONONITRATE 30 MG/1
TABLET, EXTENDED RELEASE ORAL
Qty: 60 TABLET | Refills: 1 | Status: SHIPPED | OUTPATIENT
Start: 2021-06-29 | End: 2021-12-09 | Stop reason: SDUPTHER

## 2021-07-25 DIAGNOSIS — I10 ESSENTIAL HYPERTENSION: ICD-10-CM

## 2021-07-27 RX ORDER — CLONIDINE HYDROCHLORIDE 0.2 MG/1
TABLET ORAL
Qty: 180 TABLET | Refills: 0 | Status: SHIPPED | OUTPATIENT
Start: 2021-07-27 | End: 2021-10-29 | Stop reason: SDUPTHER

## 2021-07-30 RX ORDER — METOCLOPRAMIDE 10 MG/1
TABLET ORAL
Qty: 120 TABLET | Refills: 3 | Status: SHIPPED | OUTPATIENT
Start: 2021-07-30 | End: 2021-12-17 | Stop reason: SDUPTHER

## 2021-08-31 ENCOUNTER — TELEPHONE (OUTPATIENT)
Dept: INTERNAL MEDICINE CLINIC | Age: 56
End: 2021-08-31

## 2021-09-10 ENCOUNTER — TELEPHONE (OUTPATIENT)
Dept: INTERNAL MEDICINE CLINIC | Age: 56
End: 2021-09-10

## 2021-09-10 RX ORDER — DULOXETIN HYDROCHLORIDE 30 MG/1
30 CAPSULE, DELAYED RELEASE ORAL DAILY
Qty: 30 CAPSULE | Refills: 3 | Status: SHIPPED | OUTPATIENT
Start: 2021-09-10 | End: 2022-01-17

## 2021-09-10 RX ORDER — MAGNESIUM OXIDE 400 MG/1
400 TABLET ORAL 2 TIMES DAILY
Qty: 30 TABLET | Refills: 2 | Status: SHIPPED | OUTPATIENT
Start: 2021-09-10 | End: 2022-04-15 | Stop reason: SDUPTHER

## 2021-09-10 NOTE — TELEPHONE ENCOUNTER
Patient states he is not going to [facility] any longer. He claims it does not do him any good. [facility] was prescribing the Magnesium and Duloxetine for him. Did you want to prescribe these for him or please advise.     Last Appt:  04/21/21    Next Appt:  09/23/21

## 2021-10-04 DIAGNOSIS — E11.9 TYPE 2 DIABETES MELLITUS WITHOUT COMPLICATION, WITHOUT LONG-TERM CURRENT USE OF INSULIN (HCC): ICD-10-CM

## 2021-10-04 DIAGNOSIS — I25.810 CORONARY ARTERY DISEASE INVOLVING CORONARY BYPASS GRAFT OF NATIVE HEART WITHOUT ANGINA PECTORIS: ICD-10-CM

## 2021-10-04 RX ORDER — ATORVASTATIN CALCIUM 40 MG/1
TABLET, FILM COATED ORAL
Qty: 30 TABLET | Refills: 3 | Status: SHIPPED | OUTPATIENT
Start: 2021-10-04 | End: 2022-02-14

## 2021-10-22 DIAGNOSIS — K21.9 GASTROESOPHAGEAL REFLUX DISEASE: ICD-10-CM

## 2021-10-22 RX ORDER — PANTOPRAZOLE SODIUM 40 MG/1
40 TABLET, DELAYED RELEASE ORAL DAILY
Qty: 90 TABLET | Refills: 1 | Status: ON HOLD | OUTPATIENT
Start: 2021-10-22 | End: 2022-03-17 | Stop reason: SDUPTHER

## 2021-10-25 ENCOUNTER — OFFICE VISIT (OUTPATIENT)
Dept: INTERNAL MEDICINE CLINIC | Age: 56
End: 2021-10-25
Payer: COMMERCIAL

## 2021-10-25 VITALS
BODY MASS INDEX: 31.1 KG/M2 | SYSTOLIC BLOOD PRESSURE: 110 MMHG | WEIGHT: 210 LBS | DIASTOLIC BLOOD PRESSURE: 62 MMHG | HEIGHT: 69 IN

## 2021-10-25 DIAGNOSIS — N18.9 CHRONIC KIDNEY DISEASE, UNSPECIFIED CKD STAGE: ICD-10-CM

## 2021-10-25 DIAGNOSIS — I50.33 ACUTE ON CHRONIC DIASTOLIC (CONGESTIVE) HEART FAILURE (HCC): ICD-10-CM

## 2021-10-25 DIAGNOSIS — J44.9 CHRONIC OBSTRUCTIVE PULMONARY DISEASE, UNSPECIFIED COPD TYPE (HCC): Primary | ICD-10-CM

## 2021-10-25 DIAGNOSIS — N17.9 AKI (ACUTE KIDNEY INJURY) (HCC): ICD-10-CM

## 2021-10-25 DIAGNOSIS — J84.9 INTERSTITIAL LUNG DISEASE (HCC): ICD-10-CM

## 2021-10-25 PROCEDURE — G8417 CALC BMI ABV UP PARAM F/U: HCPCS | Performed by: INTERNAL MEDICINE

## 2021-10-25 PROCEDURE — 99214 OFFICE O/P EST MOD 30 MIN: CPT | Performed by: INTERNAL MEDICINE

## 2021-10-25 PROCEDURE — G8926 SPIRO NO PERF OR DOC: HCPCS | Performed by: INTERNAL MEDICINE

## 2021-10-25 PROCEDURE — 3017F COLORECTAL CA SCREEN DOC REV: CPT | Performed by: INTERNAL MEDICINE

## 2021-10-25 PROCEDURE — G8484 FLU IMMUNIZE NO ADMIN: HCPCS | Performed by: INTERNAL MEDICINE

## 2021-10-25 PROCEDURE — 4004F PT TOBACCO SCREEN RCVD TLK: CPT | Performed by: INTERNAL MEDICINE

## 2021-10-25 PROCEDURE — G8427 DOCREV CUR MEDS BY ELIG CLIN: HCPCS | Performed by: INTERNAL MEDICINE

## 2021-10-25 PROCEDURE — 3023F SPIROM DOC REV: CPT | Performed by: INTERNAL MEDICINE

## 2021-10-25 RX ORDER — LANOLIN ALCOHOL/MO/W.PET/CERES
CREAM (GRAM) TOPICAL
COMMUNITY
Start: 2021-10-20 | End: 2022-04-15 | Stop reason: SDUPTHER

## 2021-10-25 SDOH — ECONOMIC STABILITY: FOOD INSECURITY: WITHIN THE PAST 12 MONTHS, YOU WORRIED THAT YOUR FOOD WOULD RUN OUT BEFORE YOU GOT MONEY TO BUY MORE.: SOMETIMES TRUE

## 2021-10-25 SDOH — ECONOMIC STABILITY: FOOD INSECURITY: WITHIN THE PAST 12 MONTHS, THE FOOD YOU BOUGHT JUST DIDN'T LAST AND YOU DIDN'T HAVE MONEY TO GET MORE.: SOMETIMES TRUE

## 2021-10-25 ASSESSMENT — ENCOUNTER SYMPTOMS
CHEST TIGHTNESS: 0
FACIAL SWELLING: 0
DIARRHEA: 0
BACK PAIN: 1
CONSTIPATION: 0
SHORTNESS OF BREATH: 1
WHEEZING: 0
COLOR CHANGE: 0
ABDOMINAL DISTENTION: 0
ABDOMINAL PAIN: 0
APNEA: 0
COUGH: 1

## 2021-10-25 ASSESSMENT — SOCIAL DETERMINANTS OF HEALTH (SDOH): HOW HARD IS IT FOR YOU TO PAY FOR THE VERY BASICS LIKE FOOD, HOUSING, MEDICAL CARE, AND HEATING?: SOMEWHAT HARD

## 2021-10-25 NOTE — PROGRESS NOTES
Subjective:      Patient ID: Issa Hansen is a 60 y.o. male. HPI Patient has multiple medical problem which include diabetes, heart failure with preserved ejection fraction, CAD s/p CABG , COPD, CKD   He still smokes 0.5 PPD   COPD is Controlled , compliant with Inhalers , Baugh snot requires Rescue Inhalers   Last Creatinine was 1.98 , follows with Nephrologist , Found positive for ANCA serologies. ANCA myeloperoxidase was 544 and ANCA proteinase 3 was 378. Anti-GBM was negative  He underwent Kidney Biopsy in past   Did not get Low dose CT chest Ordered Last Visit   Has Visiting Nurse once a week   Not seen Nephrologist in long time   Review of Systems   Constitutional: Negative for activity change, appetite change, chills and diaphoresis. HENT: Negative for congestion, dental problem, ear discharge, facial swelling and hearing loss. Respiratory: Positive for cough and shortness of breath. Negative for apnea, chest tightness and wheezing. Cardiovascular: Negative for chest pain and leg swelling. Gastrointestinal: Negative for abdominal distention, abdominal pain, constipation and diarrhea. Genitourinary: Negative for difficulty urinating, dysuria, enuresis, flank pain and frequency. Musculoskeletal: Positive for arthralgias and back pain. Negative for gait problem and joint swelling. Skin: Negative for color change, pallor and rash. Neurological: Negative for dizziness, seizures, facial asymmetry, light-headedness, numbness and headaches. Psychiatric/Behavioral: Negative for agitation, behavioral problems, confusion, decreased concentration and dysphoric mood. Objective:   Physical Exam  Vitals and nursing note reviewed. Constitutional:       General: He is not in acute distress. Appearance: He is well-developed. He is obese. He is not diaphoretic. HENT:      Head: Normocephalic and atraumatic. Mouth/Throat:      Pharynx: No oropharyngeal exudate.    Eyes:      General: No scleral icterus. Right eye: No discharge. Left eye: No discharge. Conjunctiva/sclera: Conjunctivae normal.      Pupils: Pupils are equal, round, and reactive to light. Neck:      Thyroid: No thyromegaly. Vascular: No JVD. Trachea: No tracheal deviation. Cardiovascular:      Rate and Rhythm: Normal rate. Heart sounds: Normal heart sounds. No murmur heard. No gallop. Pulmonary:      Effort: Pulmonary effort is normal. No respiratory distress. Breath sounds: Normal breath sounds. No stridor. No wheezing or rales. Comments: Air entry B/L Decreased   Abdominal:      General: Bowel sounds are normal. There is no distension. Palpations: Abdomen is soft. Tenderness: There is no abdominal tenderness. There is no guarding or rebound. Musculoskeletal:         General: No tenderness. Normal range of motion. Cervical back: Normal range of motion and neck supple. Skin:     General: Skin is warm and dry. Findings: No erythema or rash. Neurological:      Mental Status: He is alert and oriented to person, place, and time. Assessment / Plan:   1. Chronic obstructive pulmonary disease, unspecified COPD type ([address]  Controlled     2. ARON (acute kidney injury) ([address]  Ordering BMP     3. Acute on chronic diastolic (congestive) heart failure (HCC)  Compensated  - Comprehensive Metabolic Panel; Future  - CBC; Future    4. Chronic kidney disease, unspecified CKD stage  Advised to follow with nephrologist  - [facility]) - Fannie Song MD, Nephrology, [place]    5. Interstitial lung disease ([facility] [address]  Advise to Follow with Pulmonologist       · Return in about 2 months (around 12/25/2021). · Reviewed prior labs and health maintenance. · Discussed use, benefit, and side effects of prescribed medications. Barriers to medication compliance addressed. All patient questions answered. Pt voiced understanding.          Bib Perkins MD  [facility] OLESYA MCNEIL [facility]  10/25/2021, 4:03 PM    Please note that this chart was generated using voice recognition Dragon dictation software. Although every effort was made to ensure the accuracy of this automated transcription, some errors in transcription may have occurred. Visit Information    Have you changed or started any medications since your last visit including any over-the-counter medicines, vitamins, or herbal medicines? no   Are you having any side effects from any of your medications? -  no  Have you stopped taking any of your medications? Is so, why? -  no    Have you seen any other physician or provider since your last visit? No  Have you had any other diagnostic tests since your last visit? No  Have you been seen in the emergency room and/or had an admission to a hospital since we last saw you? No  Have you had your routine dental cleaning in the past 6 months? no    Have you activated your [employer] account? If not, what are your barriers?  Yes     Patient Care Team:  Jacki Melendez MD as PCP - General (Internal Medicine)  Jacki Melendez MD as PCP - [facility]  Elder Otero as Surgeon (Cardiothoracic Surgery)  Zenia Palma MD (Cardiology)  Katiana Chamorro MD as Orthopedic Surgeon (Orthopedic Surgery)  Deepa Cali MD as Surgeon (Orthopedic Surgery)  Jodee West MD as Surgeon (Neurosurgery)  Mark Hernández MD as Consulting Physician (Neurology)  Cathie Fuller MD as Consulting Physician (Pulmonology)  Dustin Gallo RN as Imaging Navigator    Medical History Review  Past Medical, Family, and Social History reviewed and does not contribute to the patient presenting condition    Health Maintenance   Topic Date Due    Diabetic retinal exam  Never done    COVID-19 Vaccine (1) Never done    Hepatitis B vaccine (1 of 3 - Risk 3-dose series) Never done    Shingles Vaccine (1 of 2) Never done    Pneumococcal 0-64 years Vaccine (3 of 4 - PCV13) 03/14/2018    Low dose CT lung screening  02/24/2021    Flu vaccine (1) 09/01/2021    Diabetic foot exam  10/08/2021    Lipid screen  11/07/2021    Potassium monitoring  01/14/2022    Creatinine monitoring  01/14/2022    A1C test (Diabetic or Prediabetic)  04/21/2022    DTaP/Tdap/Td vaccine (2 - Td or Tdap) 11/17/2026    Colon cancer screen colonoscopy  07/30/2029    Hepatitis C screen  Completed    HIV screen  Completed    Hepatitis A vaccine  Aged Out    Hib vaccine  Aged Out    Meningococcal (ACWY) vaccine  Aged Out

## 2021-10-29 DIAGNOSIS — I10 ESSENTIAL HYPERTENSION: ICD-10-CM

## 2021-10-29 RX ORDER — CLONIDINE HYDROCHLORIDE 0.2 MG/1
0.2 TABLET ORAL 2 TIMES DAILY
Qty: 180 TABLET | Refills: 2 | Status: ON HOLD | OUTPATIENT
Start: 2021-10-29 | End: 2022-03-17 | Stop reason: HOSPADM

## 2021-11-19 RX ORDER — NITROGLYCERIN 0.4 MG/1
0.4 TABLET SUBLINGUAL EVERY 5 MIN PRN
Qty: 25 TABLET | Refills: 3 | Status: ON HOLD | OUTPATIENT
Start: 2021-11-19 | End: 2022-07-21 | Stop reason: SDUPTHER

## 2021-11-19 NOTE — TELEPHONE ENCOUNTER
Medication: Nitroglycerin and magnesium  Last visit: 10/25/2021  Next visit: 12/22/2021  Last refill: 7/13/20  Pharm[facility]: [facility]

## 2021-12-09 RX ORDER — ISOSORBIDE MONONITRATE 30 MG/1
30 TABLET, EXTENDED RELEASE ORAL DAILY
Qty: 90 TABLET | Refills: 1 | Status: ON HOLD | OUTPATIENT
Start: 2021-12-09 | End: 2022-04-16 | Stop reason: HOSPADM

## 2021-12-17 RX ORDER — METOCLOPRAMIDE 10 MG/1
10 TABLET ORAL 3 TIMES DAILY
Qty: 120 TABLET | Refills: 3 | Status: SHIPPED | OUTPATIENT
Start: 2021-12-17 | End: 2022-04-15 | Stop reason: ALTCHOICE
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
## 2021-12-22 ENCOUNTER — TELEPHONE (OUTPATIENT)
Dept: INTERNAL MEDICINE CLINIC | Age: 56
End: 2021-12-22

## 2022-01-17 RX ORDER — DULOXETIN HYDROCHLORIDE 30 MG/1
30 CAPSULE, DELAYED RELEASE ORAL DAILY
Qty: 30 CAPSULE | Refills: 2 | Status: SHIPPED | OUTPATIENT
Start: 2022-01-17 | End: 2022-06-28

## 2022-01-21 RX ORDER — TRAZODONE HYDROCHLORIDE 100 MG/1
50 TABLET ORAL NIGHTLY PRN
Qty: 30 TABLET | Refills: 1 | Status: SHIPPED | OUTPATIENT
Start: 2022-01-21 | End: 2022-04-15 | Stop reason: ALTCHOICE

## 2022-01-21 NOTE — TELEPHONE ENCOUNTER
He used to get this prescribed by [facility] but he no longer goes to [facility].

## 2022-01-25 ENCOUNTER — TELEPHONE (OUTPATIENT)
Dept: INTERNAL MEDICINE CLINIC | Age: 57
End: 2022-01-25

## 2022-01-25 NOTE — TELEPHONE ENCOUNTER
[facility] calling to inform that they have not been able to get in contact with patient for follow up appointment post CT Chest 2/24/20, also would like to  note patient has past history of no showing appointments  and patient will not return calls .

## 2022-02-10 RX ORDER — LANOLIN ALCOHOL/MO/W.PET/CERES
CREAM (GRAM) TOPICAL
Qty: 60 TABLET | Refills: 0 | Status: ON HOLD | OUTPATIENT
Start: 2022-02-10 | End: 2022-04-16 | Stop reason: HOSPADM

## 2022-02-14 DIAGNOSIS — I25.810 CORONARY ARTERY DISEASE INVOLVING CORONARY BYPASS GRAFT OF NATIVE HEART WITHOUT ANGINA PECTORIS: ICD-10-CM

## 2022-02-14 DIAGNOSIS — E11.9 TYPE 2 DIABETES MELLITUS WITHOUT COMPLICATION, WITHOUT LONG-TERM CURRENT USE OF INSULIN (HCC): ICD-10-CM

## 2022-02-14 RX ORDER — ATORVASTATIN CALCIUM 40 MG/1
TABLET, FILM COATED ORAL
Qty: 30 TABLET | Refills: 3 | Status: ON HOLD | OUTPATIENT
Start: 2022-02-14 | End: 2022-03-17 | Stop reason: HOSPADM

## 2022-02-18 ENCOUNTER — TELEPHONE (OUTPATIENT)
Dept: INTERNAL MEDICINE CLINIC | Age: 57
End: 2022-02-18

## 2022-02-18 NOTE — TELEPHONE ENCOUNTER
Patients home health nurse is calling in to inform the office that the patient is experiencing tremors that have progressively gotten worse. Dr. Mariaelena Mclean is out of the office, please advise.

## 2022-02-18 NOTE — TELEPHONE ENCOUNTER
----- Message from Allyson Delaney sent at 2/18/2022 12:07 PM EST -----  Subject: Message to Provider    QUESTIONS  Information for Provider? Patients home health nurse is calling in to   inform the office that the patient is experiencing tremors that have   progressively gotten worse.  ---------------------------------------------------------------------------  --------------  CALL BACK INFO  What is the best way for the office to contact you? OK to leave message on   voicemail  Preferred Call Back Phone Number? [phone]  ---------------------------------------------------------------------------  --------------  SCRIPT ANSWERS  Relationship to Patient? Third Party  Representative Name?  Danis  health nurse

## 2022-02-21 ENCOUNTER — HOSPITAL ENCOUNTER (INPATIENT)
Age: 57
LOS: 23 days | Discharge: NURSING, REHAB OR LONG-TERM CARE | DRG: 192 | End: 2022-03-17
Attending: EMERGENCY MEDICINE | Admitting: INTERNAL MEDICINE
Payer: COMMERCIAL

## 2022-02-21 ENCOUNTER — APPOINTMENT (OUTPATIENT)
Dept: CT IMAGING | Age: 57
DRG: 192 | End: 2022-02-21
Payer: COMMERCIAL

## 2022-02-21 ENCOUNTER — APPOINTMENT (OUTPATIENT)
Dept: GENERAL RADIOLOGY | Age: 57
DRG: 192 | End: 2022-02-21
Payer: COMMERCIAL

## 2022-02-21 DIAGNOSIS — K21.9 GASTROESOPHAGEAL REFLUX DISEASE: ICD-10-CM

## 2022-02-21 DIAGNOSIS — I46.9 CARDIAC ARREST (HCC): Primary | ICD-10-CM

## 2022-02-21 DIAGNOSIS — M48.02 CERVICAL STENOSIS OF SPINAL CANAL: ICD-10-CM

## 2022-02-21 DIAGNOSIS — K76.9 LIVER LESION: ICD-10-CM

## 2022-02-21 LAB
ABSOLUTE EOS #: 0.81 K/UL (ref 0–0.4)
ABSOLUTE IMMATURE GRANULOCYTE: 0.61 K/UL (ref 0–0.3)
ABSOLUTE LYMPH #: 5.86 K/UL (ref 1.1–3.7)
ABSOLUTE MONO #: 0.81 K/UL (ref 0.1–0.8)
ALBUMIN SERPL-MCNC: 3.5 G/DL (ref 3.5–5.2)
ALBUMIN/GLOBULIN RATIO: 1.4 (ref 1–2.5)
ALLEN TEST: POSITIVE
ALP BLD-CCNC: 198 U/L (ref 40–129)
ALT SERPL-CCNC: 35 U/L (ref 5–41)
ANION GAP SERPL CALCULATED.3IONS-SCNC: 20 MMOL/L (ref 9–17)
ANION GAP: 15 MMOL/L (ref 7–16)
AST SERPL-CCNC: 65 U/L
BASOPHILS # BLD: 0 % (ref 0–2)
BASOPHILS ABSOLUTE: 0 K/UL (ref 0–0.2)
BILIRUB SERPL-MCNC: 0.62 MG/DL (ref 0.3–1.2)
BILIRUBIN DIRECT: 0.23 MG/DL
BILIRUBIN, INDIRECT: 0.39 MG/DL (ref 0–1)
BUN BLDV-MCNC: 14 MG/DL (ref 6–20)
CALCIUM SERPL-MCNC: 8.8 MG/DL (ref 8.6–10.4)
CHLORIDE BLD-SCNC: 94 MMOL/L (ref 98–107)
CO2: 20 MMOL/L (ref 20–31)
CREAT SERPL-MCNC: 1.67 MG/DL (ref 0.7–1.2)
EOSINOPHILS RELATIVE PERCENT: 4 % (ref 1–4)
FIO2: 100
GFR AFRICAN AMERICAN: 51 ML/MIN
GFR NON-AFRICAN AMERICAN: 35 ML/MIN
GFR NON-AFRICAN AMERICAN: 42 ML/MIN
GFR SERPL CREATININE-BSD FRML MDRD: 42 ML/MIN
GFR SERPL CREATININE-BSD FRML MDRD: ABNORMAL ML/MIN/{1.73_M2}
GFR SERPL CREATININE-BSD FRML MDRD: ABNORMAL ML/MIN/{1.73_M2}
GLUCOSE BLD-MCNC: 148 MG/DL (ref 74–100)
GLUCOSE BLD-MCNC: 190 MG/DL (ref 74–100)
GLUCOSE BLD-MCNC: 195 MG/DL (ref 70–99)
HCO3 VENOUS: 23.9 MMOL/L (ref 22–29)
HCT VFR BLD CALC: 46.6 % (ref 40.7–50.3)
HEMOGLOBIN: 15.2 G/DL (ref 13–17)
IMMATURE GRANULOCYTES: 3 %
LACTIC ACID, WHOLE BLOOD: 3 MMOL/L (ref 0.7–2.1)
LYMPHOCYTES # BLD: 29 % (ref 24–44)
MCH RBC QN AUTO: 30.3 PG (ref 25.2–33.5)
MCHC RBC AUTO-ENTMCNC: 32.6 G/DL (ref 28.4–34.8)
MCV RBC AUTO: 93 FL (ref 82.6–102.9)
MODE: ABNORMAL
MONOCYTES # BLD: 4 % (ref 1–7)
MORPHOLOGY: ABNORMAL
NEGATIVE BASE EXCESS, ART: 4 (ref 0–2)
NEGATIVE BASE EXCESS, VEN: 9 (ref 0–2)
NRBC AUTOMATED: 0.2 PER 100 WBC
NUCLEATED RED BLOOD CELLS: 1 PER 100 WBC
O2 DEVICE/FLOW/%: ABNORMAL
O2 SAT, VEN: 18 % (ref 60–85)
PCO2, VEN: 89.4 MM HG (ref 41–51)
PDW BLD-RTO: 15.8 % (ref 11.8–14.4)
PH VENOUS: 7.04 (ref 7.32–7.43)
PLATELET # BLD: ABNORMAL K/UL (ref 138–453)
PLATELET, FLUORESCENCE: 111 K/UL (ref 138–453)
PLATELET, IMMATURE FRACTION: 5.4 % (ref 1.1–10.3)
PO2, VEN: 21.3 MM HG (ref 30–50)
POC BUN: 16 MG/DL (ref 8–26)
POC CHLORIDE: 99 MMOL/L (ref 98–107)
POC CREATININE: 1.98 MG/DL (ref 0.51–1.19)
POC HCO3: 22.3 MMOL/L (ref 21–28)
POC HEMATOCRIT: 48 % (ref 41–53)
POC HEMOGLOBIN: 16.3 G/DL (ref 13.5–17.5)
POC IONIZED CALCIUM: 1.21 MMOL/L (ref 1.15–1.33)
POC LACTIC ACID: 6.05 MMOL/L (ref 0.56–1.39)
POC O2 SATURATION: 100 % (ref 94–98)
POC PCO2: 44.7 MM HG (ref 35–48)
POC PH: 7.31 (ref 7.35–7.45)
POC PO2: 241.7 MM HG (ref 83–108)
POC POTASSIUM: 4 MMOL/L (ref 3.5–4.5)
POC SODIUM: 139 MMOL/L (ref 138–146)
POC TCO2: 26 MMOL/L (ref 22–30)
POTASSIUM SERPL-SCNC: 3.8 MMOL/L (ref 3.7–5.3)
RBC # BLD: 5.01 M/UL (ref 4.21–5.77)
SAMPLE SITE: ABNORMAL
SEG NEUTROPHILS: 60 % (ref 36–66)
SEGMENTED NEUTROPHILS ABSOLUTE COUNT: 12.11 K/UL (ref 1.8–7.7)
SODIUM BLD-SCNC: 134 MMOL/L (ref 135–144)
TOTAL PROTEIN: 6 G/DL (ref 6.4–8.3)
TROPONIN, HIGH SENSITIVITY: 106 NG/L (ref 0–22)
WBC # BLD: 20.2 K/UL (ref 3.5–11.3)

## 2022-02-21 PROCEDURE — 5A1955Z RESPIRATORY VENTILATION, GREATER THAN 96 CONSECUTIVE HOURS: ICD-10-PCS | Performed by: STUDENT IN AN ORGANIZED HEALTH CARE EDUCATION/TRAINING PROGRAM

## 2022-02-21 PROCEDURE — 82803 BLOOD GASES ANY COMBINATION: CPT

## 2022-02-21 PROCEDURE — 96367 TX/PROPH/DG ADDL SEQ IV INF: CPT

## 2022-02-21 PROCEDURE — 80051 ELECTROLYTE PANEL: CPT

## 2022-02-21 PROCEDURE — 74018 RADEX ABDOMEN 1 VIEW: CPT

## 2022-02-21 PROCEDURE — 2580000003 HC RX 258: Performed by: STUDENT IN AN ORGANIZED HEALTH CARE EDUCATION/TRAINING PROGRAM

## 2022-02-21 PROCEDURE — 82330 ASSAY OF CALCIUM: CPT

## 2022-02-21 PROCEDURE — 80048 BASIC METABOLIC PNL TOTAL CA: CPT

## 2022-02-21 PROCEDURE — 85055 RETICULATED PLATELET ASSAY: CPT

## 2022-02-21 PROCEDURE — 85014 HEMATOCRIT: CPT

## 2022-02-21 PROCEDURE — 82565 ASSAY OF CREATININE: CPT

## 2022-02-21 PROCEDURE — 96375 TX/PRO/DX INJ NEW DRUG ADDON: CPT

## 2022-02-21 PROCEDURE — 51702 INSERT TEMP BLADDER CATH: CPT

## 2022-02-21 PROCEDURE — 2500000003 HC RX 250 WO HCPCS: Performed by: STUDENT IN AN ORGANIZED HEALTH CARE EDUCATION/TRAINING PROGRAM

## 2022-02-21 PROCEDURE — 84520 ASSAY OF UREA NITROGEN: CPT

## 2022-02-21 PROCEDURE — 0BH18EZ INSERTION OF ENDOTRACHEAL AIRWAY INTO TRACHEA, VIA NATURAL OR ARTIFICIAL OPENING ENDOSCOPIC: ICD-10-PCS | Performed by: STUDENT IN AN ORGANIZED HEALTH CARE EDUCATION/TRAINING PROGRAM

## 2022-02-21 PROCEDURE — 93005 ELECTROCARDIOGRAM TRACING: CPT

## 2022-02-21 PROCEDURE — 83605 ASSAY OF LACTIC ACID: CPT

## 2022-02-21 PROCEDURE — 71045 X-RAY EXAM CHEST 1 VIEW: CPT

## 2022-02-21 PROCEDURE — 72125 CT NECK SPINE W/O DYE: CPT

## 2022-02-21 PROCEDURE — 99285 EMERGENCY DEPT VISIT HI MDM: CPT

## 2022-02-21 PROCEDURE — 85025 COMPLETE CBC W/AUTO DIFF WBC: CPT

## 2022-02-21 PROCEDURE — 36600 WITHDRAWAL OF ARTERIAL BLOOD: CPT

## 2022-02-21 PROCEDURE — 84484 ASSAY OF TROPONIN QUANT: CPT

## 2022-02-21 PROCEDURE — 70450 CT HEAD/BRAIN W/O DYE: CPT

## 2022-02-21 PROCEDURE — 82947 ASSAY GLUCOSE BLOOD QUANT: CPT

## 2022-02-21 PROCEDURE — 6360000002 HC RX W HCPCS: Performed by: STUDENT IN AN ORGANIZED HEALTH CARE EDUCATION/TRAINING PROGRAM

## 2022-02-21 PROCEDURE — 80076 HEPATIC FUNCTION PANEL: CPT

## 2022-02-21 PROCEDURE — 93005 ELECTROCARDIOGRAM TRACING: CPT | Performed by: INTERNAL MEDICINE

## 2022-02-21 PROCEDURE — 96365 THER/PROPH/DIAG IV INF INIT: CPT

## 2022-02-21 PROCEDURE — 6360000002 HC RX W HCPCS

## 2022-02-21 RX ORDER — HEPARIN SODIUM 1000 [USP'U]/ML
4000 INJECTION, SOLUTION INTRAVENOUS; SUBCUTANEOUS PRN
Status: DISCONTINUED | OUTPATIENT
Start: 2022-02-21 | End: 2022-03-04

## 2022-02-21 RX ORDER — PROPOFOL 10 MG/ML
5-50 INJECTION, EMULSION INTRAVENOUS
Status: DISCONTINUED | OUTPATIENT
Start: 2022-02-21 | End: 2022-02-22

## 2022-02-21 RX ORDER — PROPOFOL 10 MG/ML
INJECTION, EMULSION INTRAVENOUS
Status: COMPLETED
Start: 2022-02-21 | End: 2022-02-21

## 2022-02-21 RX ORDER — NOREPINEPHRINE BIT/0.9 % NACL 16MG/250ML
INFUSION BOTTLE (ML) INTRAVENOUS
Status: DISCONTINUED
Start: 2022-02-21 | End: 2022-02-22

## 2022-02-21 RX ORDER — HEPARIN SODIUM 1000 [USP'U]/ML
2000 INJECTION, SOLUTION INTRAVENOUS; SUBCUTANEOUS PRN
Status: DISCONTINUED | OUTPATIENT
Start: 2022-02-21 | End: 2022-03-04

## 2022-02-21 RX ORDER — MAGNESIUM SULFATE IN WATER 40 MG/ML
2000 INJECTION, SOLUTION INTRAVENOUS ONCE
Status: COMPLETED | OUTPATIENT
Start: 2022-02-21 | End: 2022-02-21

## 2022-02-21 RX ORDER — HEPARIN SODIUM 1000 [USP'U]/ML
4000 INJECTION, SOLUTION INTRAVENOUS; SUBCUTANEOUS ONCE
Status: COMPLETED | OUTPATIENT
Start: 2022-02-21 | End: 2022-02-21

## 2022-02-21 RX ADMIN — PROPOFOL 10 MCG/KG/MIN: 10 INJECTION, EMULSION INTRAVENOUS at 21:19

## 2022-02-21 RX ADMIN — Medication 11.1 UNITS/KG/HR: at 22:52

## 2022-02-21 RX ADMIN — HEPARIN SODIUM 4000 UNITS: 1000 INJECTION, SOLUTION INTRAVENOUS; SUBCUTANEOUS at 22:52

## 2022-02-21 RX ADMIN — DEXTROSE 150 MG: 50 INJECTION, SOLUTION INTRAVENOUS at 21:55

## 2022-02-21 RX ADMIN — AMIODARONE HYDROCHLORIDE 1 MG/MIN: 50 INJECTION, SOLUTION INTRAVENOUS at 22:34

## 2022-02-21 RX ADMIN — DEXTROSE MONOHYDRATE 5 MCG/MIN: 50 INJECTION, SOLUTION INTRAVENOUS at 23:42

## 2022-02-21 RX ADMIN — MAGNESIUM SULFATE HEPTAHYDRATE 2000 MG: 40 INJECTION, SOLUTION INTRAVENOUS at 21:51

## 2022-02-21 ASSESSMENT — PULMONARY FUNCTION TESTS
PIF_VALUE: 26
PIF_VALUE: 30

## 2022-02-22 ENCOUNTER — APPOINTMENT (OUTPATIENT)
Dept: GENERAL RADIOLOGY | Age: 57
DRG: 192 | End: 2022-02-22
Payer: COMMERCIAL

## 2022-02-22 ENCOUNTER — TELEPHONE (OUTPATIENT)
Dept: INTERNAL MEDICINE CLINIC | Age: 57
End: 2022-02-22

## 2022-02-22 PROBLEM — I46.9 CARDIAC ARREST (HCC): Status: ACTIVE | Noted: 2022-02-22

## 2022-02-22 LAB
ABSOLUTE EOS #: 0.17 K/UL (ref 0–0.44)
ABSOLUTE IMMATURE GRANULOCYTE: 0.25 K/UL (ref 0–0.3)
ABSOLUTE LYMPH #: 1.85 K/UL (ref 1.1–3.7)
ABSOLUTE MONO #: 1.13 K/UL (ref 0.1–1.2)
ALBUMIN SERPL-MCNC: 3.5 G/DL (ref 3.5–5.2)
ALBUMIN/GLOBULIN RATIO: 1.3 (ref 1–2.5)
ALLEN TEST: ABNORMAL
ALP BLD-CCNC: 198 U/L (ref 40–129)
ALT SERPL-CCNC: 35 U/L (ref 5–41)
ANION GAP SERPL CALCULATED.3IONS-SCNC: 10 MMOL/L (ref 9–17)
ANION GAP SERPL CALCULATED.3IONS-SCNC: 12 MMOL/L (ref 9–17)
ANION GAP SERPL CALCULATED.3IONS-SCNC: 13 MMOL/L (ref 9–17)
ANION GAP SERPL CALCULATED.3IONS-SCNC: 14 MMOL/L (ref 9–17)
AST SERPL-CCNC: 74 U/L
BASOPHILS # BLD: 1 % (ref 0–2)
BASOPHILS ABSOLUTE: 0.09 K/UL (ref 0–0.2)
BILIRUB SERPL-MCNC: 0.72 MG/DL (ref 0.3–1.2)
BUN BLDV-MCNC: 15 MG/DL (ref 6–20)
BUN BLDV-MCNC: 16 MG/DL (ref 6–20)
BUN BLDV-MCNC: 18 MG/DL (ref 6–20)
BUN BLDV-MCNC: 19 MG/DL (ref 6–20)
CALCIUM IONIZED: 1.05 MMOL/L (ref 1.13–1.33)
CALCIUM IONIZED: 1.15 MMOL/L (ref 1.13–1.33)
CALCIUM IONIZED: 1.19 MMOL/L (ref 1.13–1.33)
CALCIUM SERPL-MCNC: 8.1 MG/DL (ref 8.6–10.4)
CALCIUM SERPL-MCNC: 8.2 MG/DL (ref 8.6–10.4)
CALCIUM SERPL-MCNC: 8.5 MG/DL (ref 8.6–10.4)
CALCIUM SERPL-MCNC: 8.6 MG/DL (ref 8.6–10.4)
CHLORIDE BLD-SCNC: 101 MMOL/L (ref 98–107)
CHLORIDE BLD-SCNC: 103 MMOL/L (ref 98–107)
CHLORIDE BLD-SCNC: 103 MMOL/L (ref 98–107)
CHLORIDE BLD-SCNC: 108 MMOL/L (ref 98–107)
CO2: 17 MMOL/L (ref 20–31)
CO2: 18 MMOL/L (ref 20–31)
CO2: 18 MMOL/L (ref 20–31)
CO2: 21 MMOL/L (ref 20–31)
CREAT SERPL-MCNC: 1.35 MG/DL (ref 0.7–1.2)
CREAT SERPL-MCNC: 1.45 MG/DL (ref 0.7–1.2)
CREAT SERPL-MCNC: 1.54 MG/DL (ref 0.7–1.2)
CREAT SERPL-MCNC: 1.63 MG/DL (ref 0.7–1.2)
EKG ATRIAL RATE: 38 BPM
EKG P AXIS: 104 DEGREES
EKG P-R INTERVAL: 192 MS
EKG Q-T INTERVAL: 604 MS
EKG QRS DURATION: 86 MS
EKG QTC CALCULATION (BAZETT): 480 MS
EKG R AXIS: -27 DEGREES
EKG T AXIS: 129 DEGREES
EKG VENTRICULAR RATE: 38 BPM
EOSINOPHILS RELATIVE PERCENT: 1 % (ref 1–4)
FIO2: 40
FIO2: 40
FIO2: 60
GFR AFRICAN AMERICAN: 53 ML/MIN
GFR AFRICAN AMERICAN: 57 ML/MIN
GFR AFRICAN AMERICAN: >60 ML/MIN
GFR AFRICAN AMERICAN: >60 ML/MIN
GFR NON-AFRICAN AMERICAN: 44 ML/MIN
GFR NON-AFRICAN AMERICAN: 47 ML/MIN
GFR NON-AFRICAN AMERICAN: 50 ML/MIN
GFR NON-AFRICAN AMERICAN: 54 ML/MIN
GFR SERPL CREATININE-BSD FRML MDRD: ABNORMAL ML/MIN/{1.73_M2}
GLUCOSE BLD-MCNC: 102 MG/DL (ref 74–100)
GLUCOSE BLD-MCNC: 103 MG/DL (ref 75–110)
GLUCOSE BLD-MCNC: 107 MG/DL (ref 70–99)
GLUCOSE BLD-MCNC: 116 MG/DL (ref 75–110)
GLUCOSE BLD-MCNC: 118 MG/DL (ref 70–99)
GLUCOSE BLD-MCNC: 127 MG/DL (ref 70–99)
GLUCOSE BLD-MCNC: 131 MG/DL (ref 70–99)
GLUCOSE BLD-MCNC: 132 MG/DL (ref 75–110)
GLUCOSE BLD-MCNC: 146 MG/DL (ref 74–100)
HCT VFR BLD CALC: 46.9 % (ref 40.7–50.3)
HEMOGLOBIN: 14.9 G/DL (ref 13–17)
IMMATURE GRANULOCYTES: 1 %
INR BLD: 1.2
LACTIC ACID, WHOLE BLOOD: 1.2 MMOL/L (ref 0.7–2.1)
LACTIC ACID, WHOLE BLOOD: 1.4 MMOL/L (ref 0.7–2.1)
LYMPHOCYTES # BLD: 10 % (ref 24–43)
MAGNESIUM: 1.9 MG/DL (ref 1.6–2.6)
MAGNESIUM: 2 MG/DL (ref 1.6–2.6)
MAGNESIUM: 2.1 MG/DL (ref 1.6–2.6)
MAGNESIUM: 2.1 MG/DL (ref 1.6–2.6)
MCH RBC QN AUTO: 30 PG (ref 25.2–33.5)
MCHC RBC AUTO-ENTMCNC: 31.8 G/DL (ref 28.4–34.8)
MCV RBC AUTO: 94.6 FL (ref 82.6–102.9)
MODE: ABNORMAL
MONOCYTES # BLD: 6 % (ref 3–12)
MRSA, DNA, NASAL: NEGATIVE
NEGATIVE BASE EXCESS, ART: 2 (ref 0–2)
NEGATIVE BASE EXCESS, ART: 4 (ref 0–2)
NEGATIVE BASE EXCESS, ART: 4 (ref 0–2)
NRBC AUTOMATED: 0 PER 100 WBC
O2 DEVICE/FLOW/%: ABNORMAL
PARTIAL THROMBOPLASTIN TIME: 105.9 SEC (ref 20.5–30.5)
PARTIAL THROMBOPLASTIN TIME: 36.6 SEC (ref 20.5–30.5)
PARTIAL THROMBOPLASTIN TIME: >120 SEC (ref 20.5–30.5)
PATIENT TEMP: 33
PDW BLD-RTO: 15.9 % (ref 11.8–14.4)
PHOSPHORUS: 2.1 MG/DL (ref 2.5–4.5)
PHOSPHORUS: 2.2 MG/DL (ref 2.5–4.5)
PHOSPHORUS: 3.9 MG/DL (ref 2.5–4.5)
PHOSPHORUS: 4 MG/DL (ref 2.5–4.5)
PLATELET # BLD: 182 K/UL (ref 138–453)
PMV BLD AUTO: 10.7 FL (ref 8.1–13.5)
POC HCO3: 20.4 MMOL/L (ref 21–28)
POC HCO3: 21.2 MMOL/L (ref 21–28)
POC HCO3: 24.2 MMOL/L (ref 21–28)
POC O2 SATURATION: 100 % (ref 94–98)
POC O2 SATURATION: 98 % (ref 94–98)
POC O2 SATURATION: 98 % (ref 94–98)
POC PCO2 TEMP: 27 MM HG
POC PCO2 TEMP: 32 MM HG
POC PCO2 TEMP: 41 MM HG
POC PCO2: 32.1 MM HG (ref 35–48)
POC PCO2: 38.4 MM HG (ref 35–48)
POC PCO2: 48.9 MM HG (ref 35–48)
POC PH TEMP: 7.36
POC PH TEMP: 7.41
POC PH TEMP: 7.47
POC PH: 7.3 (ref 7.35–7.45)
POC PH: 7.35 (ref 7.35–7.45)
POC PH: 7.41 (ref 7.35–7.45)
POC PO2 TEMP: 299 MM HG
POC PO2 TEMP: 88 MM HG
POC PO2 TEMP: 93 MM HG
POC PO2: 110.3 MM HG (ref 83–108)
POC PO2: 115.4 MM HG (ref 83–108)
POC PO2: 317.9 MM HG (ref 83–108)
POTASSIUM SERPL-SCNC: 3.6 MMOL/L (ref 3.7–5.3)
POTASSIUM SERPL-SCNC: 3.9 MMOL/L (ref 3.7–5.3)
POTASSIUM SERPL-SCNC: 4.1 MMOL/L (ref 3.7–5.3)
POTASSIUM SERPL-SCNC: 4.4 MMOL/L (ref 3.7–5.3)
PROTHROMBIN TIME: 12.3 SEC (ref 9.1–12.3)
RBC # BLD: 4.96 M/UL (ref 4.21–5.77)
RBC # BLD: ABNORMAL 10*6/UL
SAMPLE SITE: ABNORMAL
SARS-COV-2, RAPID: NOT DETECTED
SEG NEUTROPHILS: 81 % (ref 36–65)
SEGMENTED NEUTROPHILS ABSOLUTE COUNT: 14.91 K/UL (ref 1.5–8.1)
SODIUM BLD-SCNC: 133 MMOL/L (ref 135–144)
SODIUM BLD-SCNC: 139 MMOL/L (ref 135–144)
SPECIMEN DESCRIPTION: NORMAL
SPECIMEN DESCRIPTION: NORMAL
TOTAL PROTEIN: 6.3 G/DL (ref 6.4–8.3)
TROPONIN, HIGH SENSITIVITY: 677 NG/L (ref 0–22)
TROPONIN, HIGH SENSITIVITY: 713 NG/L (ref 0–22)
TSH SERPL DL<=0.05 MIU/L-ACNC: 2 MIU/L (ref 0.3–5)
WBC # BLD: 18.4 K/UL (ref 3.5–11.3)

## 2022-02-22 PROCEDURE — 82803 BLOOD GASES ANY COMBINATION: CPT

## 2022-02-22 PROCEDURE — 95700 EEG CONT REC W/VID EEG TECH: CPT

## 2022-02-22 PROCEDURE — 94003 VENT MGMT INPAT SUBQ DAY: CPT

## 2022-02-22 PROCEDURE — 93005 ELECTROCARDIOGRAM TRACING: CPT | Performed by: STUDENT IN AN ORGANIZED HEALTH CARE EDUCATION/TRAINING PROGRAM

## 2022-02-22 PROCEDURE — 87205 SMEAR GRAM STAIN: CPT

## 2022-02-22 PROCEDURE — 2500000003 HC RX 250 WO HCPCS: Performed by: STUDENT IN AN ORGANIZED HEALTH CARE EDUCATION/TRAINING PROGRAM

## 2022-02-22 PROCEDURE — 36620 INSERTION CATHETER ARTERY: CPT

## 2022-02-22 PROCEDURE — 95714 VEEG EA 12-26 HR UNMNTR: CPT

## 2022-02-22 PROCEDURE — 80053 COMPREHEN METABOLIC PANEL: CPT

## 2022-02-22 PROCEDURE — 6370000000 HC RX 637 (ALT 250 FOR IP): Performed by: STUDENT IN AN ORGANIZED HEALTH CARE EDUCATION/TRAINING PROGRAM

## 2022-02-22 PROCEDURE — C9113 INJ PANTOPRAZOLE SODIUM, VIA: HCPCS | Performed by: STUDENT IN AN ORGANIZED HEALTH CARE EDUCATION/TRAINING PROGRAM

## 2022-02-22 PROCEDURE — 37799 UNLISTED PX VASCULAR SURGERY: CPT

## 2022-02-22 PROCEDURE — 87040 BLOOD CULTURE FOR BACTERIA: CPT

## 2022-02-22 PROCEDURE — 85025 COMPLETE CBC W/AUTO DIFF WBC: CPT

## 2022-02-22 PROCEDURE — 82330 ASSAY OF CALCIUM: CPT

## 2022-02-22 PROCEDURE — 87635 SARS-COV-2 COVID-19 AMP PRB: CPT

## 2022-02-22 PROCEDURE — 74018 RADEX ABDOMEN 1 VIEW: CPT

## 2022-02-22 PROCEDURE — 6360000002 HC RX W HCPCS: Performed by: STUDENT IN AN ORGANIZED HEALTH CARE EDUCATION/TRAINING PROGRAM

## 2022-02-22 PROCEDURE — 2500000003 HC RX 250 WO HCPCS: Performed by: INTERNAL MEDICINE

## 2022-02-22 PROCEDURE — 87641 MR-STAPH DNA AMP PROBE: CPT

## 2022-02-22 PROCEDURE — 2500000003 HC RX 250 WO HCPCS

## 2022-02-22 PROCEDURE — 94761 N-INVAS EAR/PLS OXIMETRY MLT: CPT

## 2022-02-22 PROCEDURE — 04HY32Z INSERTION OF MONITORING DEVICE INTO LOWER ARTERY, PERCUTANEOUS APPROACH: ICD-10-PCS | Performed by: STUDENT IN AN ORGANIZED HEALTH CARE EDUCATION/TRAINING PROGRAM

## 2022-02-22 PROCEDURE — 2700000000 HC OXYGEN THERAPY PER DAY

## 2022-02-22 PROCEDURE — 36556 INSERT NON-TUNNEL CV CATH: CPT

## 2022-02-22 PROCEDURE — 87070 CULTURE OTHR SPECIMN AEROBIC: CPT

## 2022-02-22 PROCEDURE — 99291 CRITICAL CARE FIRST HOUR: CPT | Performed by: INTERNAL MEDICINE

## 2022-02-22 PROCEDURE — 2580000003 HC RX 258: Performed by: STUDENT IN AN ORGANIZED HEALTH CARE EDUCATION/TRAINING PROGRAM

## 2022-02-22 PROCEDURE — 83605 ASSAY OF LACTIC ACID: CPT

## 2022-02-22 PROCEDURE — 82947 ASSAY GLUCOSE BLOOD QUANT: CPT

## 2022-02-22 PROCEDURE — 84100 ASSAY OF PHOSPHORUS: CPT

## 2022-02-22 PROCEDURE — 94002 VENT MGMT INPAT INIT DAY: CPT

## 2022-02-22 PROCEDURE — 85730 THROMBOPLASTIN TIME PARTIAL: CPT

## 2022-02-22 PROCEDURE — 2000000000 HC ICU R&B

## 2022-02-22 PROCEDURE — 80048 BASIC METABOLIC PNL TOTAL CA: CPT

## 2022-02-22 PROCEDURE — 6360000002 HC RX W HCPCS

## 2022-02-22 PROCEDURE — 84484 ASSAY OF TROPONIN QUANT: CPT

## 2022-02-22 PROCEDURE — 84443 ASSAY THYROID STIM HORMONE: CPT

## 2022-02-22 PROCEDURE — 83735 ASSAY OF MAGNESIUM: CPT

## 2022-02-22 PROCEDURE — 85610 PROTHROMBIN TIME: CPT

## 2022-02-22 PROCEDURE — 36415 COLL VENOUS BLD VENIPUNCTURE: CPT

## 2022-02-22 RX ORDER — POLYETHYLENE GLYCOL 3350 17 G/17G
17 POWDER, FOR SOLUTION ORAL DAILY PRN
Status: DISCONTINUED | OUTPATIENT
Start: 2022-02-22 | End: 2022-03-17 | Stop reason: HOSPADM

## 2022-02-22 RX ORDER — ACETAMINOPHEN 325 MG/1
650 TABLET ORAL EVERY 6 HOURS PRN
Status: DISCONTINUED | OUTPATIENT
Start: 2022-02-22 | End: 2022-03-12

## 2022-02-22 RX ORDER — NICOTINE POLACRILEX 4 MG
15 LOZENGE BUCCAL PRN
Status: DISCONTINUED | OUTPATIENT
Start: 2022-02-22 | End: 2022-03-17 | Stop reason: HOSPADM

## 2022-02-22 RX ORDER — SODIUM CHLORIDE 9 MG/ML
25 INJECTION, SOLUTION INTRAVENOUS PRN
Status: DISCONTINUED | OUTPATIENT
Start: 2022-02-22 | End: 2022-03-17 | Stop reason: HOSPADM

## 2022-02-22 RX ORDER — DEXTROSE MONOHYDRATE 25 G/50ML
12.5 INJECTION, SOLUTION INTRAVENOUS PRN
Status: DISCONTINUED | OUTPATIENT
Start: 2022-02-22 | End: 2022-02-22 | Stop reason: RX

## 2022-02-22 RX ORDER — SODIUM CHLORIDE 0.9 % (FLUSH) 0.9 %
5-40 SYRINGE (ML) INJECTION PRN
Status: DISCONTINUED | OUTPATIENT
Start: 2022-02-22 | End: 2022-03-17 | Stop reason: HOSPADM

## 2022-02-22 RX ORDER — POTASSIUM CHLORIDE 7.45 MG/ML
10 INJECTION INTRAVENOUS ONCE
Status: COMPLETED | OUTPATIENT
Start: 2022-02-22 | End: 2022-02-22

## 2022-02-22 RX ORDER — SODIUM CHLORIDE 9 MG/ML
INJECTION, SOLUTION INTRAVENOUS CONTINUOUS
Status: DISCONTINUED | OUTPATIENT
Start: 2022-02-22 | End: 2022-02-25

## 2022-02-22 RX ORDER — FENTANYL CITRATE 50 UG/ML
INJECTION, SOLUTION INTRAMUSCULAR; INTRAVENOUS
Status: COMPLETED
Start: 2022-02-22 | End: 2022-02-22

## 2022-02-22 RX ORDER — ATORVASTATIN CALCIUM 80 MG/1
80 TABLET, FILM COATED ORAL DAILY
Status: DISCONTINUED | OUTPATIENT
Start: 2022-02-22 | End: 2022-03-17 | Stop reason: HOSPADM

## 2022-02-22 RX ORDER — DEXTROSE MONOHYDRATE 50 MG/ML
100 INJECTION, SOLUTION INTRAVENOUS PRN
Status: DISCONTINUED | OUTPATIENT
Start: 2022-02-22 | End: 2022-03-17 | Stop reason: HOSPADM

## 2022-02-22 RX ORDER — NOREPINEPHRINE BIT/0.9 % NACL 16MG/250ML
1-100 INFUSION BOTTLE (ML) INTRAVENOUS CONTINUOUS
Status: DISCONTINUED | OUTPATIENT
Start: 2022-02-22 | End: 2022-02-22

## 2022-02-22 RX ORDER — ONDANSETRON 2 MG/ML
4 INJECTION INTRAMUSCULAR; INTRAVENOUS EVERY 6 HOURS PRN
Status: DISCONTINUED | OUTPATIENT
Start: 2022-02-22 | End: 2022-02-22

## 2022-02-22 RX ORDER — DOBUTAMINE HYDROCHLORIDE 400 MG/100ML
2.5-5 INJECTION INTRAVENOUS CONTINUOUS
Status: DISCONTINUED | OUTPATIENT
Start: 2022-02-22 | End: 2022-02-23

## 2022-02-22 RX ORDER — SODIUM CHLORIDE, SODIUM LACTATE, POTASSIUM CHLORIDE, AND CALCIUM CHLORIDE .6; .31; .03; .02 G/100ML; G/100ML; G/100ML; G/100ML
1000 INJECTION, SOLUTION INTRAVENOUS ONCE
Status: COMPLETED | OUTPATIENT
Start: 2022-02-22 | End: 2022-02-22

## 2022-02-22 RX ORDER — PANTOPRAZOLE SODIUM 40 MG/10ML
40 INJECTION, POWDER, LYOPHILIZED, FOR SOLUTION INTRAVENOUS ONCE
Status: COMPLETED | OUTPATIENT
Start: 2022-02-22 | End: 2022-02-22

## 2022-02-22 RX ORDER — SODIUM CHLORIDE 9 MG/ML
10 INJECTION INTRAVENOUS ONCE
Status: COMPLETED | OUTPATIENT
Start: 2022-02-22 | End: 2022-02-22

## 2022-02-22 RX ORDER — SODIUM CHLORIDE 0.9 % (FLUSH) 0.9 %
5-40 SYRINGE (ML) INJECTION EVERY 12 HOURS SCHEDULED
Status: DISCONTINUED | OUTPATIENT
Start: 2022-02-22 | End: 2022-03-17 | Stop reason: HOSPADM

## 2022-02-22 RX ORDER — ONDANSETRON 4 MG/1
4 TABLET, ORALLY DISINTEGRATING ORAL EVERY 8 HOURS PRN
Status: DISCONTINUED | OUTPATIENT
Start: 2022-02-22 | End: 2022-02-22

## 2022-02-22 RX ORDER — CHLORHEXIDINE GLUCONATE 0.12 MG/ML
15 RINSE ORAL 2 TIMES DAILY
Status: DISCONTINUED | OUTPATIENT
Start: 2022-02-22 | End: 2022-03-04

## 2022-02-22 RX ORDER — PANTOPRAZOLE SODIUM 40 MG/10ML
40 INJECTION, POWDER, LYOPHILIZED, FOR SOLUTION INTRAVENOUS DAILY
Status: DISCONTINUED | OUTPATIENT
Start: 2022-02-23 | End: 2022-03-03

## 2022-02-22 RX ORDER — ACETAMINOPHEN 650 MG/1
650 SUPPOSITORY RECTAL EVERY 6 HOURS PRN
Status: DISCONTINUED | OUTPATIENT
Start: 2022-02-22 | End: 2022-03-12

## 2022-02-22 RX ORDER — PROPOFOL 10 MG/ML
5-50 INJECTION, EMULSION INTRAVENOUS
Status: DISCONTINUED | OUTPATIENT
Start: 2022-02-22 | End: 2022-02-27

## 2022-02-22 RX ORDER — ONDANSETRON 4 MG/1
4 TABLET, ORALLY DISINTEGRATING ORAL EVERY 8 HOURS PRN
Status: DISCONTINUED | OUTPATIENT
Start: 2022-02-22 | End: 2022-03-17 | Stop reason: HOSPADM

## 2022-02-22 RX ORDER — ONDANSETRON 2 MG/ML
4 INJECTION INTRAMUSCULAR; INTRAVENOUS EVERY 6 HOURS PRN
Status: DISCONTINUED | OUTPATIENT
Start: 2022-02-22 | End: 2022-03-17 | Stop reason: HOSPADM

## 2022-02-22 RX ORDER — FENTANYL CITRATE 50 UG/ML
100 INJECTION, SOLUTION INTRAMUSCULAR; INTRAVENOUS ONCE
Status: COMPLETED | OUTPATIENT
Start: 2022-02-22 | End: 2022-02-22

## 2022-02-22 RX ORDER — CALCIUM GLUCONATE 20 MG/ML
1000 INJECTION, SOLUTION INTRAVENOUS ONCE
Status: COMPLETED | OUTPATIENT
Start: 2022-02-22 | End: 2022-02-22

## 2022-02-22 RX ORDER — MINERAL OIL AND WHITE PETROLATUM 150; 830 MG/G; MG/G
OINTMENT OPHTHALMIC EVERY 4 HOURS
Status: DISCONTINUED | OUTPATIENT
Start: 2022-02-22 | End: 2022-02-24

## 2022-02-22 RX ORDER — POLYVINYL ALCOHOL 14 MG/ML
1 SOLUTION/ DROPS OPHTHALMIC EVERY 4 HOURS
Status: DISCONTINUED | OUTPATIENT
Start: 2022-02-22 | End: 2022-03-04

## 2022-02-22 RX ORDER — ASPIRIN 81 MG/1
81 TABLET ORAL DAILY
Status: DISCONTINUED | OUTPATIENT
Start: 2022-02-22 | End: 2022-02-23

## 2022-02-22 RX ORDER — IPRATROPIUM BROMIDE AND ALBUTEROL SULFATE 2.5; .5 MG/3ML; MG/3ML
1 SOLUTION RESPIRATORY (INHALATION) EVERY 6 HOURS PRN
Status: DISCONTINUED | OUTPATIENT
Start: 2022-02-22 | End: 2022-03-17 | Stop reason: HOSPADM

## 2022-02-22 RX ORDER — NOREPINEPHRINE BIT/0.9 % NACL 16MG/250ML
1-100 INFUSION BOTTLE (ML) INTRAVENOUS CONTINUOUS
Status: DISCONTINUED | OUTPATIENT
Start: 2022-02-22 | End: 2022-02-24

## 2022-02-22 RX ADMIN — SODIUM CHLORIDE, PRESERVATIVE FREE 10 ML: 5 INJECTION INTRAVENOUS at 10:10

## 2022-02-22 RX ADMIN — Medication 100 MCG/HR: at 09:27

## 2022-02-22 RX ADMIN — SODIUM CHLORIDE: 9 INJECTION, SOLUTION INTRAVENOUS at 18:24

## 2022-02-22 RX ADMIN — CHLORHEXIDINE GLUCONATE 0.12% ORAL RINSE 15 ML: 1.2 LIQUID ORAL at 20:48

## 2022-02-22 RX ADMIN — SODIUM CHLORIDE, POTASSIUM CHLORIDE, SODIUM LACTATE AND CALCIUM CHLORIDE 1000 ML: 600; 310; 30; 20 INJECTION, SOLUTION INTRAVENOUS at 16:03

## 2022-02-22 RX ADMIN — PANTOPRAZOLE SODIUM 40 MG: 40 INJECTION, POWDER, FOR SOLUTION INTRAVENOUS at 10:09

## 2022-02-22 RX ADMIN — DOBUTAMINE HYDROCHLORIDE 2.5 MCG/KG/MIN: 400 INJECTION INTRAVENOUS at 12:13

## 2022-02-22 RX ADMIN — CHLORHEXIDINE GLUCONATE 0.12% ORAL RINSE 15 ML: 1.2 LIQUID ORAL at 08:55

## 2022-02-22 RX ADMIN — MINERAL OIL AND WHITE PETROLATUM: 150; 830 OINTMENT OPHTHALMIC at 11:03

## 2022-02-22 RX ADMIN — Medication 5 MCG/MIN: at 07:29

## 2022-02-22 RX ADMIN — SODIUM CHLORIDE, PRESERVATIVE FREE 10 ML: 5 INJECTION INTRAVENOUS at 08:48

## 2022-02-22 RX ADMIN — POLYVINYL ALCOHOL 1 DROP: 14 SOLUTION/ DROPS OPHTHALMIC at 05:27

## 2022-02-22 RX ADMIN — Medication 25 MCG/KG/HR: at 03:24

## 2022-02-22 RX ADMIN — POTASSIUM CHLORIDE 10 MEQ: 7.46 INJECTION, SOLUTION INTRAVENOUS at 22:52

## 2022-02-22 RX ADMIN — SODIUM CHLORIDE, POTASSIUM CHLORIDE, SODIUM LACTATE AND CALCIUM CHLORIDE 1000 ML: 600; 310; 30; 20 INJECTION, SOLUTION INTRAVENOUS at 12:39

## 2022-02-22 RX ADMIN — PROPOFOL 25 MCG/KG/MIN: 10 INJECTION, EMULSION INTRAVENOUS at 21:25

## 2022-02-22 RX ADMIN — PROPOFOL 50 MCG/KG/MIN: 10 INJECTION, EMULSION INTRAVENOUS at 05:06

## 2022-02-22 RX ADMIN — AMIODARONE HYDROCHLORIDE 0.5 MG/MIN: 50 INJECTION, SOLUTION INTRAVENOUS at 05:12

## 2022-02-22 RX ADMIN — ATORVASTATIN CALCIUM 80 MG: 80 TABLET, FILM COATED ORAL at 08:48

## 2022-02-22 RX ADMIN — CISATRACURIUM BESYLATE 2 MCG/KG/MIN: 200 INJECTION INTRAVENOUS at 04:00

## 2022-02-22 RX ADMIN — FENTANYL CITRATE 100 MCG: 50 INJECTION, SOLUTION INTRAMUSCULAR; INTRAVENOUS at 00:14

## 2022-02-22 RX ADMIN — AMPICILLIN AND SULBACTAM 3000 MG: 1; 2 INJECTION, POWDER, FOR SOLUTION INTRAMUSCULAR; INTRAVENOUS at 11:55

## 2022-02-22 RX ADMIN — AMPICILLIN AND SULBACTAM 3000 MG: 1; 2 INJECTION, POWDER, FOR SOLUTION INTRAMUSCULAR; INTRAVENOUS at 17:34

## 2022-02-22 RX ADMIN — POLYVINYL ALCOHOL 1 DROP: 14 SOLUTION/ DROPS OPHTHALMIC at 11:03

## 2022-02-22 RX ADMIN — PROPOFOL 40 MCG/KG/MIN: 10 INJECTION, EMULSION INTRAVENOUS at 02:10

## 2022-02-22 RX ADMIN — HEPARIN SODIUM 2000 UNITS: 1000 INJECTION INTRAVENOUS; SUBCUTANEOUS at 20:45

## 2022-02-22 RX ADMIN — POLYVINYL ALCOHOL 1 DROP: 14 SOLUTION/ DROPS OPHTHALMIC at 14:58

## 2022-02-22 RX ADMIN — AMIODARONE HYDROCHLORIDE 1 MG/MIN: 50 INJECTION, SOLUTION INTRAVENOUS at 23:00

## 2022-02-22 RX ADMIN — Medication 100 MCG/HR: at 19:26

## 2022-02-22 RX ADMIN — PROPOFOL 25 MCG/KG/MIN: 10 INJECTION, EMULSION INTRAVENOUS at 10:39

## 2022-02-22 RX ADMIN — CALCIUM GLUCONATE 1000 MG: 20 INJECTION, SOLUTION INTRAVENOUS at 10:51

## 2022-02-22 RX ADMIN — SODIUM CHLORIDE: 9 INJECTION, SOLUTION INTRAVENOUS at 17:16

## 2022-02-22 RX ADMIN — SODIUM CHLORIDE, PRESERVATIVE FREE 10 ML: 5 INJECTION INTRAVENOUS at 10:14

## 2022-02-22 RX ADMIN — POLYVINYL ALCOHOL 1 DROP: 14 SOLUTION/ DROPS OPHTHALMIC at 21:45

## 2022-02-22 RX ADMIN — Medication 2 MG/HR: at 10:52

## 2022-02-22 RX ADMIN — MINERAL OIL AND WHITE PETROLATUM: 150; 830 OINTMENT OPHTHALMIC at 14:58

## 2022-02-22 RX ADMIN — POLYVINYL ALCOHOL 1 DROP: 14 SOLUTION/ DROPS OPHTHALMIC at 18:00

## 2022-02-22 RX ADMIN — MINERAL OIL AND WHITE PETROLATUM: 150; 830 OINTMENT OPHTHALMIC at 05:27

## 2022-02-22 RX ADMIN — SODIUM CHLORIDE: 9 INJECTION, SOLUTION INTRAVENOUS at 05:23

## 2022-02-22 RX ADMIN — MINERAL OIL AND WHITE PETROLATUM: 150; 830 OINTMENT OPHTHALMIC at 18:00

## 2022-02-22 RX ADMIN — Medication 81 MG: at 08:48

## 2022-02-22 ASSESSMENT — PULMONARY FUNCTION TESTS
PIF_VALUE: 24
PIF_VALUE: 24
PIF_VALUE: 26
PIF_VALUE: 27
PIF_VALUE: 31
PIF_VALUE: 26
PIF_VALUE: 24
PIF_VALUE: 26
PIF_VALUE: 22
PIF_VALUE: 24
PIF_VALUE: 25
PIF_VALUE: 25
PIF_VALUE: 23
PIF_VALUE: 26
PIF_VALUE: 25
PIF_VALUE: 25
PIF_VALUE: 27
PIF_VALUE: 27
PIF_VALUE: 26
PIF_VALUE: 26
PIF_VALUE: 30
PIF_VALUE: 27

## 2022-02-22 ASSESSMENT — PAIN SCALES - GENERAL: PAINLEVEL_OUTOF10: 7

## 2022-02-22 NOTE — PLAN OF CARE
Problem: OXYGENATION/RESPIRATORY FUNCTION  Goal: Patient will maintain patent airway  2/22/2022 0912 by Mei Armas RCP  Outcome: Ongoing     Problem: OXYGENATION/RESPIRATORY FUNCTION  Goal: Patient will achieve/maintain normal respiratory rate/effort  Description: Respiratory rate and effort will be within normal limits for the patient  2/22/2022 0912 by Mei Armas RCP  Outcome: Ongoing  Note:   PROVIDE ADEQUATE OXYGENATION WITH ACCEPTABLE SP02/ABG'S    [x]  IDENTIFY APPROPRIATE OXYGEN THERAPY  [x]   MONITOR SP02/ABG'S AS NEEDED   [x]   PATIENT EDUCATION AS NEEDED        Problem: MECHANICAL VENTILATION  Goal: Patient will maintain patent airway  2/22/2022 0912 by Mei Armas RCP  Outcome: Ongoing  Note: MECHANICAL VENTILATION     [x]  PROVIDE OPTIMAL VENTILATION  [x]   ASSESS FOR EXTUBATION READINESS  [x]   ASSESS FOR WEANING READINESS  [x]  EXTUBATE AS TOLERATED  [x]  IMPLEMENT ADULT MECHANICAL VENTILATION PROTOCOL  [x]  MAINTAIN ADEQUATE OXYGENATION  [x]  PERFORM SPONTANEOUS WEANING TRIAL AS TOLERATED        Problem: MECHANICAL VENTILATION  Goal: Oral health is maintained or improved  2/22/2022 0912 by Mei Armas RCP  Outcome: Ongoing     Problem: MECHANICAL VENTILATION  Goal: ET tube will be managed safely  2/22/2022 0912 by Mei Armas RCP  Outcome: Ongoing     Problem: MECHANICAL VENTILATION  Goal: Ability to express needs and understand communication  2/22/2022 0912 by Mei Armas RCP  Outcome: Ongoing     Problem: MECHANICAL VENTILATION  Goal: Mobility/activity is maintained at optimum level for patient  2/22/2022 0912 by Mei Armas RCP  Outcome: Ongoing     Problem: SKIN INTEGRITY  Goal: Skin integrity is maintained or improved  2/22/2022 0912 by Mei Armas RCP  Outcome: Ongoing Detail Level: Simple Detail Level: Zone Detail Level: Detailed

## 2022-02-22 NOTE — PLAN OF CARE
Problem: OXYGENATION/RESPIRATORY FUNCTION  Goal: Patient will maintain patent airway  2/22/2022 1748 by Ghulam Pierson RN  Outcome: Ongoing  2/22/2022 1218 by Ghulam Pierson RN  Outcome: Ongoing  2/22/2022 0912 by Asmita Morgan RCP  Outcome: Ongoing  2/22/2022 0547 by Elías Sauceda RN  Outcome: Ongoing  Goal: Patient will achieve/maintain normal respiratory rate/effort  Description: Respiratory rate and effort will be within normal limits for the patient  2/22/2022 1748 by Ghulam Pierson RN  Outcome: Ongoing  2/22/2022 1218 by Ghulam Pierson RN  Outcome: Ongoing  2/22/2022 0912 by Asmita Morgan RCP  Outcome: Ongoing  Note:   PROVIDE ADEQUATE OXYGENATION WITH ACCEPTABLE SP02/ABG'S    [x]  IDENTIFY APPROPRIATE OXYGEN THERAPY  [x]   MONITOR SP02/ABG'S AS NEEDED   [x]   PATIENT EDUCATION AS NEEDED     2/22/2022 0547 by Elías Sauceda RN  Outcome: Ongoing     Problem: MECHANICAL VENTILATION  Goal: Patient will maintain patent airway  2/22/2022 1748 by Ghulam Pierson RN  Outcome: Ongoing  2/22/2022 1218 by Ghulam Pierson RN  Outcome: Ongoing  2/22/2022 0912 by Asmita Morgan RCP  Outcome: Ongoing  Note: MECHANICAL VENTILATION     [x]  PROVIDE OPTIMAL VENTILATION  [x]   ASSESS FOR EXTUBATION READINESS  [x]   ASSESS FOR WEANING READINESS  [x]  EXTUBATE AS TOLERATED  [x]  IMPLEMENT ADULT MECHANICAL VENTILATION PROTOCOL  [x]  MAINTAIN ADEQUATE OXYGENATION  [x]  PERFORM SPONTANEOUS WEANING TRIAL AS TOLERATED     2/22/2022 0547 by Elías Sauceda RN  Outcome: Ongoing  Goal: Oral health is maintained or improved  2/22/2022 1748 by Ghulam Pierson RN  Outcome: Ongoing  2/22/2022 1218 by Ghulam Pierson RN  Outcome: Ongoing  2/22/2022 0912 by Asmita Morgan RCP  Outcome: Ongoing  2/22/2022 0547 by Elías Sauceda RN  Outcome: Ongoing  Goal: ET tube will be managed safely  2/22/2022 1748 by Ghulam Pierson RN  Outcome: Ongoing  2/22/2022 1218 by Ghulam Pierson RN  Outcome: Ongoing  2/22/2022 0912 by Allison Espana RCP  Outcome: Ongoing  2/22/2022 0547 by Tonya Owens RN  Outcome: Ongoing  Goal: Ability to express needs and understand communication  2/22/2022 1748 by Abbey Wright RN  Outcome: Ongoing  2/22/2022 1218 by Abbey Wright RN  Outcome: Ongoing  2/22/2022 0912 by Allison Espana RCP  Outcome: Ongoing  2/22/2022 0547 by Tonya Owens RN  Outcome: Ongoing  Goal: Mobility/activity is maintained at optimum level for patient  2/22/2022 1748 by Abbey Wright RN  Outcome: Ongoing  2/22/2022 1218 by Abbey Wright RN  Outcome: Ongoing  2/22/2022 0912 by Allison Espana RCP  Outcome: Ongoing  2/22/2022 0547 by Tonya Owens RN  Outcome: Ongoing     Problem: SKIN INTEGRITY  Goal: Skin integrity is maintained or improved  2/22/2022 1748 by Abbey Wright RN  Outcome: Ongoing  2/22/2022 1218 by Abbey Wright RN  Outcome: Ongoing  2/22/2022 0912 by Allison Espana RCP  Outcome: Ongoing  2/22/2022 0547 by Tonya Owens RN  Outcome: Ongoing     Problem: Confusion - Acute:  Goal: Absence of continued neurological deterioration signs and symptoms  Description: Absence of continued neurological deterioration signs and symptoms  2/22/2022 1748 by Abbey Wright RN  Outcome: Ongoing  2/22/2022 1218 by Abbey Wright RN  Outcome: Ongoing  2/22/2022 0547 by Tonya Owens RN  Outcome: Ongoing  Goal: Mental status will be restored to baseline  Description: Mental status will be restored to baseline  2/22/2022 1748 by Abbey Wright RN  Outcome: Ongoing  2/22/2022 1218 by Abbey Wright RN  Outcome: Ongoing  2/22/2022 0547 by Tonya Owens RN  Outcome: Ongoing     Problem: Discharge Planning:  Goal: Ability to perform activities of daily living will improve  Description: Ability to perform activities of daily living will improve  2/22/2022 1748 by Abbey Baas, RN  Outcome: Ongoing  2/22/2022 1218 by Abbey Wright RN  Outcome: Ongoing  2/22/2022 0547 by Tonya Owens RN  Outcome: Ongoing  Goal: Participates in care planning  Description: Participates in care planning  2/22/2022 1748 by Earl Mcneal RN  Outcome: Ongoing  2/22/2022 1218 by Earl Mcneal RN  Outcome: Ongoing  2/22/2022 0547 by Danny Huber RN  Outcome: Ongoing     Problem: Injury - Risk of, Physical Injury:  Goal: Absence of physical injury  Description: Absence of physical injury  2/22/2022 1748 by Earl Mcneal RN  Outcome: Ongoing  2/22/2022 1218 by Earl Mcneal RN  Outcome: Ongoing  2/22/2022 0547 by Danny Huber RN  Outcome: Ongoing  Goal: Will remain free from falls  Description: Will remain free from falls  2/22/2022 1748 by Earl Mcneal RN  Outcome: Ongoing  2/22/2022 1218 by Earl Mcneal RN  Outcome: Ongoing  2/22/2022 0547 by Danny Huber RN  Outcome: Ongoing     Problem: Mood - Altered:  Goal: Mood stable  Description: Mood stable  2/22/2022 1748 by Earl Mcneal RN  Outcome: Ongoing  2/22/2022 1218 by Earl Mcneal RN  Outcome: Ongoing  2/22/2022 0547 by Danny Huber RN  Outcome: Ongoing  Goal: Absence of abusive behavior  Description: Absence of abusive behavior  2/22/2022 1748 by Earl Mcneal RN  Outcome: Ongoing  2/22/2022 1218 by Earl Mcneal RN  Outcome: Ongoing  2/22/2022 0547 by Danny Huber RN  Outcome: Ongoing  Goal: Verbalizations of feeling emotionally comfortable while being cared for will increase  Description: Verbalizations of feeling emotionally comfortable while being cared for will increase  2/22/2022 1748 by Earl Mcneal RN  Outcome: Ongoing  2/22/2022 1218 by Earl Mcneal RN  Outcome: Ongoing  2/22/2022 0547 by Danny Huber RN  Outcome: Ongoing     Problem: Psychomotor Activity - Altered:  Goal: Absence of psychomotor disturbance signs and symptoms  Description: Absence of psychomotor disturbance signs and symptoms  2/22/2022 1748 by Earl Mcneal RN  Outcome: Ongoing  2/22/2022 1218 by Earl Mcneal RN  Outcome: Ongoing  2/22/2022 0547 by Asha Dover RN  Outcome: Ongoing     Problem: Sensory Perception - Impaired:  Goal: Demonstrations of improved sensory functioning will increase  Description: Demonstrations of improved sensory functioning will increase  2/22/2022 1748 by Kelly Sensor, RN  Outcome: Ongoing  2/22/2022 1218 by Kelly Sensor, RN  Outcome: Ongoing  2/22/2022 0547 by Asha Dover RN  Outcome: Ongoing  Goal: Decrease in sensory misperception frequency  Description: Decrease in sensory misperception frequency  2/22/2022 1748 by Kelly Sensor, RN  Outcome: Ongoing  2/22/2022 1218 by Kelly Sensor, RN  Outcome: Ongoing  2/22/2022 0547 by Asha Dover RN  Outcome: Ongoing  Goal: Able to refrain from responding to false sensory perceptions  Description: Able to refrain from responding to false sensory perceptions  2/22/2022 1748 by Kelly Sensor, RN  Outcome: Ongoing  2/22/2022 1218 by Kelly Sensor, RN  Outcome: Ongoing  2/22/2022 0547 by Asha Dover RN  Outcome: Ongoing  Goal: Demonstrates accurate environmental perceptions  Description: Demonstrates accurate environmental perceptions  2/22/2022 1748 by Kelly Sensor, RN  Outcome: Ongoing  2/22/2022 1218 by Kelly Mckeon, RN  Outcome: Ongoing  2/22/2022 0547 by Asha Dover RN  Outcome: Ongoing  Goal: Able to distinguish between reality-based and nonreality-based thinking  Description: Able to distinguish between reality-based and nonreality-based thinking  2/22/2022 1748 by Kelly Sensor, RN  Outcome: Ongoing  2/22/2022 1218 by Kelly Sensor, RN  Outcome: Ongoing  2/22/2022 0547 by Asha Dover RN  Outcome: Ongoing  Goal: Able to interrupt nonreality-based thinking  Description: Able to interrupt nonreality-based thinking  2/22/2022 1748 by Kelly Sensor, RN  Outcome: Ongoing  2/22/2022 1218 by Kelly Sensor, RN  Outcome: Ongoing  2/22/2022 0547 by Asha Dover RN  Outcome: Ongoing     Problem: Sleep Pattern Disturbance:  Goal: Appears well-rested  Description: Appears well-rested  2/22/2022 1748 by Aurea De La Torre RN  Outcome: Ongoing  2/22/2022 1218 by Aurea De La Torre RN  Outcome: Ongoing  2/22/2022 0547 by Pat Burciaga RN  Outcome: Ongoing     Problem: Nutrition  Goal: Optimal nutrition therapy  2/22/2022 1748 by Aurea De La Torre RN  Outcome: Ongoing  2/22/2022 1628 by Hi Chun RD, LD  Outcome: Ongoing  Note: Nutrition Problem #1: Inadequate oral intake  Intervention: Food and/or Nutrient Delivery:  (Start nutrition as able.  If TF needed, suggest Peptide Based formula with goal rate of 60 mL/hr while on propofol at current rate.)  Nutritional Goals: meet % of estimated nutrient needs      Problem: Non-Violent Restraints  Goal: Removal from restraints as soon as assessed to be safe  2/22/2022 0547 by Pat Burciaga RN  Outcome: Completed  Goal: No harm/injury to patient while restraints in use  2/22/2022 0547 by Pat Burciaga RN  Outcome: Completed  Goal: Patient's dignity will be maintained  2/22/2022 0547 by Pat Burciaga RN  Outcome: Completed
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
## 2022-02-22 NOTE — PLAN OF CARE
Called to bedside by primary team.  Patient apparently was at home when he collapsed suddenly as witnessed by roommate, EMS was called who found the patient unresponsive in V. fib and was shocked back into rhythm followed by another episode of VFib, received another shock, achieved ROSC. As patient was being transferred out of the house, he had another PEA arrest, followed by multiple rounds of CPR and epi given with ROSC achieved. Initial EKG showed ST elevations in V3, V4, V5 with subsequent EKGs showing resolving ST elevations and Q waves. Patient currently intubated on my exam on no sedatives, not requiring any pressor support, no gag or corneal reflex, sluggish pupils, no spontaneous movement of extremities, no response to pain but has some twitching of left lower extremities. At this point due to poor neurological status, will defer active intervention. Will continue to monitor, treat medically and wait for neurological recovery before proceeding with cardiac cath. Plan was formulated with interventional cardiologist Dr. Daniela Barrera. Will admit to medical ICU. Discussed with MICU resident and ER resident.     Lebron Wisdom MD       Cardiovascular Fellow PGY-4  2/21/2022, 11:47 PM

## 2022-02-22 NOTE — PROGRESS NOTES
The transport originated from ER 13. Pt. was transported to CT Scan. Assisting with the transport was Zulay Roberts RN. Appropriate devices were applied to monitor the patient's condition during transport. Patient transported  via 100% O2 via ventilator. Patient tolerated well.         Mima Wheeler RCP  10:22 PM

## 2022-02-22 NOTE — ED NOTES
Bed: 13  Expected date:   Expected time:   Means of arrival:   Comments:     Adelbert Kayser, RN  02/21/22 2113

## 2022-02-22 NOTE — PROGRESS NOTES
Patient admitted on Mechanical Ventilator Protocol. Patients height measured at 70 for an IBW 73    Patient placed on the ventilator on settings as charted on flowsheeet. Ventilator Bronchodilator assessment    Breath sounds: clear  Inspiratory Pressure: 30  Plateau Pressure: 23    Patient assessed at level 1          []    Bronchodilator Assessment    BRONCHODILATOR ASSESSMENT SCORE  Score 0 (Home) 1 2 3 4   Breath Sounds   []  Chronic Ventilator: Patient at baseline [x]  Mild Wheezes/ Clear []  Intermittent wheezes with good air entry []  Bilateral/unilateral wheezing with diminished air entry []  Insp/Exp wheeze and/or poor aeration   Ventilator Pressures   []  Chronic Ventilator [x]  Insp. Pressure less than 25 cm H20 []  Insp. Pressure less than 25 cm H20 []  Insp. Pressure exceeds 25 cm H20 []  Insp.  Pressure exceeds 30 cm H20   Plateau Pressure []  NA   []  Plateau Pressure less than 4  []  Plateau Pressure less than or equal to 5 [x]  Plateau Pressure greater than or equal to 6 []  Plateau Pressure greater than or equal to 8       Barbra Altamirano RCP  9:54 PM

## 2022-02-22 NOTE — FLOWSHEET NOTE
Spoke to Jignesh Patel, pt's niece about family dynamics. Pt is not  and lives with Mary Jo's sister. Pt has 4 children oldest named Pierre Juan and a son named Bettina Vazquez. All children are estranged. Michelle Lemon only knows the name of Bettina Vazquez and Pierre Juan and doesn't have their phone numbers, but will contact them over social media to have them call the hospital. She stated that Bettina Vazquez will be able to give us more information about his other siblings. Pt's parents and siblings are all . Update- The other two children are both under the age of 25. Bettina Vazquez and Pierre Juan are the only children able to make decisions for pt.
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
## 2022-02-22 NOTE — ED NOTES
The following labs labeled with pt sticker and tubed to lab:     [x] Blue     [] Lavender   [] on ice  [x] Green/yellow  [x] Green/black [] on ice  [] Yellow  [] Red  [] Pink      [] COVID-19 swab    [] Rapid  [] PCR  [] Flu swab  [] Peds Viral Panel     [] Urine Sample  [] Pelvic Cultures  [] Blood Cultures            Noble Traylor RN  02/21/22 6247

## 2022-02-22 NOTE — TELEPHONE ENCOUNTER
Noted thanks  Looks like he may have been having MI and took nitro  Then collapsed due to vfib arrest  I rev hospital charts

## 2022-02-22 NOTE — ED PROVIDER NOTES
[facility] [facility]     Emergency Department     Faculty Attestation        I performed a history and physical examination of the patient and discussed management with the resident. I reviewed the residents note and agree with the documented findings and plan of care. Any areas of disagreement are noted on the chart. I was personally present for the key portions of any procedures. I have documented in the chart those procedures where I was not present during the key portions. I have reviewed the emergency nurses triage note. I agree with the chief complaint, past medical history, past surgical history, allergies, medications, social and family history as documented unless otherwise noted below. For Physician Assistant/ Nurse Practitioner cases/documentation I have personally evaluated this patient and have completed at least one if not all key elements of the E/M (history, physical exam, and MDM). Additional findings are as noted. Vital Signs: BP: (!) 145/118  Pulse: 109  Resp: 19    SpO2: 100 %  PCP:  Lili Lopes MD    Pertinent Comments:     Patient is a 75-year-old male with multiple medical problems including COPD as well as CAD with quadruple bypass in the past.    Patient apparently was at home when a person on the first floor heard him collapse upstairs and unfortunately was unable to go up and see him secondary to physical problem. EMS arrived found the patient unresponsive in V. fib and performed ACLS with initial shock. ACLS paramedic arrived on scene and second shock delivered with ROSC. Patient then had eye gel placed for airway and then was being taken out of the house and had another arrest with PEA. This responded to chest compressions and epinephrine.    Patient was brought here having also been given Norcuron prior to arrival.   Immediately upon arrival IJ was switched out under glide scope with no desaturations and good fogging of tube after intubation. Breath sounds equal bilateral with no sounds over the epigastrium good end-tidal CO2 color change past 6 breaths. Assessment/plan:   Status post V. fib arrest.    EMS initial EKG prior to arrival was concerning for anterior STEMI however our EKG here appears to have shown this resolved. Will discuss stat with interventional cardiologist as well as give magnesium and amiodarone loads. Dr Yevgeniy Wright interventional cardiology called back and discussed with resident as well as reviewing EKGs. At this time not meeting criteria for STEMI on our EKG. Recommends further work-up and repeat EKG to assure no evolving process. Also recommended cooling if CT head negative and obviously heparin. Repeat EKG is done after CT scan with CT head read as negative. This EKG still does not quite meet \"criteria for STEMI\" however the shape of the ST segments in the anterior leads is changing and concerning. Dr. Yevgeniy Wright with cardiology agrees and wishes to cath lab. Neuro exam was done at the bedside with no obvious intact findings and no corneal reflexes. Cardiology has changed her mind and wishes to hold off on cardiac catheterization because of poor neuro status at this time. Will place cooling catheter for cooling as well as likely pressor need        EKG Interpretation    Interpreted by emergency department physician    Rhythm: normal sinus   Rate: normal at 93 bpm  Axis: Left axis deviation  Conduction: normal  ST Segments: Slight ST elevation in lead V3 as well as V4 however criteria for STEMI not met. T Waves: no acute change  Q Waves: no acute change    Clinical Impression:  nonspecific EKG and appears very similar to previous EKG on 12/19/2020 when compared            CRITICAL CARE: There was a high probability of clinically significant/life threatening deterioration in this patient's condition which required my urgent intervention. Total critical care time was 30 minutes. This excludes any time for separately reportable procedures. This patient was evaluated in the Emergency Department for symptoms described in the history of present illness. He/she was evaluated in the context of the global COVID-19 pandemic, which necessitated consideration that the patient might be at risk for infection with the SARS-CoV-2 virus that causes COVID-19. Institutional protocols and algorithms that pertain to the evaluation of patients at risk for COVID-19 are in a state of rapid change based on information released by regulatory bodies including the CDC and federal and state organizations. These policies and algorithms were followed during the patient's care in the ED. (Please note that portions of this note were completed with a voice recognition program. Efforts were made to edit the dictations but occasionally words are mis-transcribed.  Whenever words are used in this note in any gender, they shall be construed as though they were used in the gender appropriate to the circumstances; and whenever words are used in this note in the singular or plural form, they shall be construed as though they were used in the form appropriate to the circumstances.)    MD Alyx Mendiola  Attending Emergency Medicine Physician           Sammy Pa MD  02/21/22 [address] [place] [address],[address], MD  02/21/22 7896

## 2022-02-22 NOTE — PLAN OF CARE
Nutrition Problem #1: Inadequate oral intake  Intervention: Food and/or Nutrient Delivery:  (Start nutrition as able.  If TF needed, suggest Peptide Based formula with goal rate of 60 mL/hr while on propofol at current rate.)  Nutritional Goals: meet % of estimated nutrient needs

## 2022-02-22 NOTE — PROGRESS NOTES
Date: 2/21/2022  Time: 2130  Patient identity confirmed:  Yes  Indications: post arrest  Preoxygenation: yes    Laryngoscope size and type Glidescope  Airway introducer used: No  Evac: No  ETT size:an 8.0 cuffed  Number of attempts:1   Cords visualized:  [x] Clearly  [] Poorly  Breath sounds present bilaterally: Yes   ETCO2   [x] Positive   ETT secured at  24 at teeth    ETT secured with [place]  Chest x-ray ordered: Yes     Difficult airway:    No       If yes, was red tape placed around ETT:   No    Was this a Code Situation:    No       If yes, was the rescue pod used:    No      BP: (!) 145/118        Procedure performed by: Tae Culver RCP  9:53 PM

## 2022-02-22 NOTE — PLAN OF CARE
to perform activities of daily living will improve  Outcome: Ongoing  Goal: Participates in care planning  Description: Participates in care planning  Outcome: Ongoing     Problem: Injury - Risk of, Physical Injury:  Goal: Absence of physical injury  Description: Absence of physical injury  Outcome: Ongoing  Goal: Will remain free from falls  Description: Will remain free from falls  Outcome: Ongoing     Problem: Mood - Altered:  Goal: Mood stable  Description: Mood stable  Outcome: Ongoing  Goal: Absence of abusive behavior  Description: Absence of abusive behavior  Outcome: Ongoing  Goal: Verbalizations of feeling emotionally comfortable while being cared for will increase  Description: Verbalizations of feeling emotionally comfortable while being cared for will increase  Outcome: Ongoing     Problem: Psychomotor Activity - Altered:  Goal: Absence of psychomotor disturbance signs and symptoms  Description: Absence of psychomotor disturbance signs and symptoms  Outcome: Ongoing     Problem: Sensory Perception - Impaired:  Goal: Demonstrations of improved sensory functioning will increase  Description: Demonstrations of improved sensory functioning will increase  Outcome: Ongoing  Goal: Decrease in sensory misperception frequency  Description: Decrease in sensory misperception frequency  Outcome: Ongoing  Goal: Able to refrain from responding to false sensory perceptions  Description: Able to refrain from responding to false sensory perceptions  Outcome: Ongoing  Goal: Demonstrates accurate environmental perceptions  Description: Demonstrates accurate environmental perceptions  Outcome: Ongoing  Goal: Able to distinguish between reality-based and nonreality-based thinking  Description: Able to distinguish between reality-based and nonreality-based thinking  Outcome: Ongoing  Goal: Able to interrupt nonreality-based thinking  Description: Able to interrupt nonreality-based thinking  Outcome: Ongoing     Problem: Sleep Pattern Disturbance:  Goal: Appears well-rested  Description: Appears well-rested  Outcome: Ongoing     Problem: Non-Violent Restraints  Goal: Removal from restraints as soon as assessed to be safe  Outcome: Completed  Goal: No harm/injury to patient while restraints in use  Outcome: Completed  Goal: Patient's dignity will be maintained  Outcome: Completed    Electronically signed by Piter Lisa RN on 2/22/2022 at 5:48 AM

## 2022-02-22 NOTE — RT PROTOCOL NOTE
Ventilator Bronchodilator assessment    Post Cardiac Arrest/ROSC currently on vent for respiratory failure. History of COPD. On home Breo Ellipta. Continues to COPD. No issues with wheezing. Normal compliance on vent. MD previously ordered PFT's multiple times but never completed. Unkwon severity. Did had one in past. Showed Restrictive Lung Pattern. Placed on a ICS-LABA. Will place on Vent Aerosol Protocol. Had PFTs 8/31/16: IMPRESSION: LUNG MECHANICS AND VOLUMES ARE SUGGESTIVE OF A POSSIBLE RESTRICTIVE VENTILATORY DEFECT OF MILD SEVERITY. CLINICAL CORRELATION IS  RECOMMENDED. Breath sounds: clear  Inspiratory Pressure: 27  Plateau Pressure: 24    Patient assessed at level 1    [x]    Bronchodilator Assessment    BRONCHODILATOR ASSESSMENT SCORE  Score 0 (Home) 1 2 3 4   Breath Sounds   []  Chronic Ventilator: Patient at baseline [x]  Mild Wheezes/ Clear []  Intermittent wheezes with good air entry []  Bilateral/unilateral wheezing with diminished air entry []  Insp/Exp wheeze and/or poor aeration   Ventilator Pressures   []  Chronic Ventilator [x]  Insp. Pressure less than 25 cm H20 [x]  Insp. Pressure less than 25 cm H20 []  Insp. Pressure exceeds 25 cm H20 []  Insp.  Pressure exceeds 30 cm H20   Plateau Pressure []  NA   [x]  Plateau Pressure less than 4  [x]  Plateau Pressure less than or equal to 5 []  Plateau Pressure greater than or equal to 6 []  Plateau Pressure greater than or equal to 8       BONILLA ADLER RCP  8:54 AM

## 2022-02-22 NOTE — FLOWSHEET NOTE
[facility] CARE DEPARTMENT - [facility]     Emergency/Trauma Note    PATIENT NAME: Anmol Rainey    Shift date:2/21/2022  Shift day: Monday   Shift # 3    Room # 13/13   Name: Anmol Rainey            Age: 60 y.o. Gender: male          Restoration: Restorationism   Place of Confucianism:     Trauma/Incident type: Stemi Alert  Admit Date & Time: 2/21/2022  9:13 PM  TRAUMA NAME:     ADVANCE DIRECTIVES IN CHART? No    NAME OF DECISION MAKER:    RELATIONSHIP OF DECISION MAKER TO PATIENT:    PATIENT/EVENT DESCRIPTION:  Anmol Rainey is a 60 y.o. male who arrived  as a Stemi Alert. Pt to be admitted to 13/13. SPIRITUAL ASSESSMENT/INTERVENTION:  Domo Horta was ministry of presence.  placed call to patient's brother Erinn Parada [phone], received busy signal.  Placed call to patient's niece Christine Bender no response. PATIENT BELONGINGS:  No belongings noted    ANY BELONGINGS OF SIGNIFICANT VALUE NOTED:  None Noted    REGISTRATION STAFF NOTIFIED? No      WHAT IS YOUR SPIRITUAL CARE PLAN FOR THIS PATIENT?:   Chaplains may be paged 24/7 via [address] [place] [address]. Electronically signed by Tino Morfin      02/21/22 7950   Encounter Summary   Services provided to: Patient; Family   Referral/Consult From: Multi-disciplinary team   Support System Family members   Continue Visiting   (2/21/2022)   Complexity of Encounter Moderate   Length of Encounter 15 minutes   Crisis   Type Stemi Alert   Assessment Unable to respond   Intervention Prayer;Sustaining presence/ Ministry of presence   Outcome Did not respond   , on 2/21/2022 at 11:28 PM.  [facility] [employer]  [phone]

## 2022-02-22 NOTE — PLAN OF CARE
NEUROSURGERY TO SIGN OFF     Please contact Neurosurgery with any questions or acute changes in neurological exam        If mentation improves would recommend MRI cervical spine  Please contact neurosurgery if this is completed      Electronically signed by PJ Sanders NP on 2/22/2022 at 8:52 AM
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
## 2022-02-22 NOTE — ED NOTES
Pt to ED as post arrest after being found down unresponsive at home. Per EMS, his wife states she heard him fall, but she wasn't able to witness it, she went upstairs and found him unresponsive on the ground. EMS found him in V-fib, started CPR and defibrillated. Pt received 1mg of Epi and obtained ROSC with a HR of 112. Pt sedated/paralyzed and has Igel in place with IO for IV access. Upon arrival to ED, pt stabilized and placed on cardiac monitor. Dr. Lisa Mathis, Dr. Camron Darby RT and writer at bedside.       Arminda Duarte, NOAH  02/21/22 3743

## 2022-02-22 NOTE — ED NOTES
The following labs labeled with pt sticker and tubed to lab:     [] Blue     [] Derril Howard   [] on ice  [] Green/yellow  [] Green/black [] on ice  [] Yellow  [] Red  [] Pink      [x] COVID-19 swab    [x] Rapid  [] PCR  [] Flu swab  [] Peds Viral Panel     [] Urine Sample  [] Pelvic Cultures  [] Blood Cultures              Davina Chavez RN  02/21/22 3470

## 2022-02-22 NOTE — ED NOTES
Dr. Jean-Pierre Holliday and Dr. Ariana Martinez at bedside inserting ETT tube. 8.0 ETT 24 @lip. Positive color change, bilateral breath sounds.       Lucho Odell RN  02/21/22 2122

## 2022-02-22 NOTE — ED NOTES
The following labs labeled with pt sticker and tubed to lab:     [x] Blue     [x] Lavender   [] on ice  [x] Green/yellow  [x] Green/black [] on ice  [x] Yellow  [x] Red  [] Pink      [] COVID-19 swab    [] Rapid  [] PCR  [] Flu swab  [] Peds Viral Panel     [] Urine Sample  [] Pelvic Cultures  [] Blood Cultures            Jong Hunter RN  02/21/22 1432

## 2022-02-22 NOTE — PROGRESS NOTES
Comprehensive Nutrition Assessment    Type and Reason for Visit:  Initial (vent)    Nutrition Recommendations/Plan: Start nutrition as able. If TF needed, suggest Peptide Based formula with goal rate of 60 mL/hr while on propofol at current rate. Will follow/monitor care plans. Nutrition Assessment:  Pt admitted s/p cardiac arrest. Pt is currently intubated and on hypothermia protocol. No nutrition support at present. Meds/labs reviewed. Malnutrition Assessment:  Malnutrition Status: At risk for malnutrition  Context:  Acute Illness     Findings of the 6 clinical characteristics of malnutrition:  Energy Intake:  Unable to assess  Weight Loss:  No significant weight loss (over past year, per EHR)     Body Fat Loss:  No significant body fat loss     Muscle Mass Loss:  No significant muscle mass loss    Fluid Accumulation:  No significant fluid accumulation     Strength:  Not Performed    Estimated Daily Nutrient Needs:  Energy (kcal):  2000 kcal/day; Weight Used for Energy Requirements:  Current     Protein (g):  110 g pro/day; Weight Used for Protein Requirements:  Ideal (1.5)        Fluid (ml/day):  2500 mL/day; Method Used for Fluid Requirements:  ml/Kg (28)      Nutrition Related Findings:  meds/labs reviewed      Wounds:  None       Current Nutrition Therapies:    Diet NPO    Additional Calorie Sources:   Propofol at 8.1 mL/wn=005 kcal/day    Anthropometric Measures:  · Height: 5' 10\" (177.8 cm)  · Current Body Weight: 197 lb 12 oz (89.7 kg)   · Usual Body Weight: 220 lb (99.8 kg) (approx 1 yr ago)     · Ideal Body Weight: 166 lbs; % Ideal Body Weight 119.1 %   · BMI: 28.4  · BMI Categories: Overweight (BMI 25.0-29. 9)       Nutrition Diagnosis:   · Inadequate oral intake related to impaired respiratory function as evidenced by NPO or clear liquid status due to medical condition    Nutrition Interventions:   Food and/or Nutrient Delivery:  Start nutrition as able.  If TF needed, suggest Peptide Based formula with goal rate of 60 mL/hr while on propofol at current rate. Nutrition Education/Counseling:  No recommendation at this time   Coordination of Nutrition Care:  Continue to monitor while inpatient    Goals:  meet % of estimated nutrient needs       Nutrition Monitoring and Evaluation:   Behavioral-Environmental Outcomes:  None Identified   Food/Nutrient Intake Outcomes:  Diet Advancement/Tolerance  Physical Signs/Symptoms Outcomes:  Biochemical Data,Nutrition Focused Physical Findings,Skin,Weight,Fluid Status or Edema     Discharge Planning:     Too soon to determine     Electronically signed by Ace Prakash RD, LD on 2/22/22 at 4:27 PM EST    Contact: [phone]

## 2022-02-22 NOTE — ED PROVIDER NOTES
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 documented below. Initially with stable vital signs and normal pressures not requiring vasopressors. Repeat EKG shows interval improvement in ST elevations. Will obtain initial metabolic w/u and contact interventional cardiology for STEMI. RADIOLOGY:  XR ABDOMEN (KUB) (SINGLE AP VIEW)    Result Date: 2/22/2022  Left femoral venous line appears in satisfactory position. XR ABDOMEN (KUB) (SINGLE AP VIEW)    Result Date: 2/21/2022  1. No acute abdominal abnormality by radiograph. 2. No IVC filter visualized. CT HEAD WO CONTRAST    Result Date: 2/21/2022  No acute intracranial abnormality. CT CERVICAL SPINE WO CONTRAST    Result Date: 2/21/2022  Remote anterior fusion from C4 through C7 with C5-C6 corpectomy and bone strut placement. Prominent/recurrent spondylosis with moderate cord flattening at C4-C5 and C5-C6. No acute fracture or traumatic malalignment. XR CHEST PORTABLE    Result Date: 2/21/2022  The ET and OG tubes have been placed in satisfactory position.  Right greater than left bibasilar airspace disease suspicious for pneumonia. EKG  Normal rate, sinus, left axis, normal intervals, 1.5 mm ST elevation in V3, 1 mm ST elevation in V4, subtle ST elevation in V5, no obvious reciprocal depressions, T wave inversion in aVR, T wave flattening in lead I, AVL, poor R wave progression    All EKG's are interpreted by the Emergency Department Physician who either signs or Co-signs this chart in the absence of a cardiologist.    EMERGENCY DEPARTMENT COURSE:  Patient seen and evaluated, intubated, sedated and paralyzed on initial presentation. Airway was stabilized, and rpt EKG performed. Still w/ST elevations in anterior leads but no longer meeting STEMI criteria. Interventional radiology paged and considering cath lab. Neuro exam obtained off sedation and was poor. Slow pupillary reflex, no corneals, cough or gag prior to cooling. Blood pressures were trending down, fluid bolus was given but minimal improvement. Levophed was started in the emergency department and a cooling catheter was placed to initiate cooling protocol. Upon repeat assessment MICU team was able to elicit some brainstem reflexes and left side movement to pain. Repeat exam was relayed to cardiology, cardiac cath lab was still deferred at this time due to poor exam. CTH was negative, initial troponin of 100. MICU assessed patient for admission. PROCEDURES:  PROCEDURE NOTE - EMERGENCY INTUBATION    PATIENT NAME: Evelyn Hutchinson,Second Floor [facility] RECORD NO. [id number]  DATE: 2/22/2022  ATTENDING PHYSICIAN: Dr. Gibson Ruiz DIAGNOSIS:  Acute Respiratory Failure  POSTOPERATIVE DIAGNOSIS:  Same  PROCEDURE PERFORMED:  Emergency endotracheal intubation  PERFORMING PHYSICIAN: Meryle Rue Rys, DO    MEDICATIONS: etomidate intravenously    DISCUSSION:  Marilynn Lew is a 60y.o.-year-old male who requires intubation and ventilatory support due to Respiratory failure, airway compromise and airway protection.   The history and physical examination were reviewed and confirmed. CONSENT: Unable to be obtained due to the emergent nature of this procedure. PROCEDURE:  A timeout was initiated by the bedside nurse and was confirmed by those present. The patient was placed in the appropriate position. Cricoid pressure was not required. Intubation was performed by video laryngoscopy an 8.0 cuffed endotracheal tube. The cuff was then inflated and the tube was secured appropriately at a distance of 24 cm to the dental ridge. Initial confirmation of placement included bilateral breath sounds, an end tidal CO2 detector, absence of sounds over the stomach, tube fogging, adequate chest rise and adequate pulse oximetry reading. A chest x-ray to verify correct placement of the tube showed appropriate tube position. The patient tolerated the procedure well. COMPLICATIONS:  None    PROCEDURE NOTE - CENTRAL VENOUS LINE PLACEMENT    PATIENT NAME: Evelyn Hutchinson,Second Floor [facility] RECORD NO. [id number]  DATE: 2/22/2022  ATTENDING PHYSICIAN: Dr. Digna Hall DIAGNOSIS:  vascular access, poor peripheral access, centrally administered medications and cooling  POSTOPERATIVE DIAGNOSIS:  Same  PROCEDURE PERFORMED:  Left Femoral Vein Central Line Insertion  PERFORMING PHYSICIAN: Greer Aviles DO  ANESTHESIA:  Local utilizing 1% lidocaine  ESTIMATED BLOOD LOSS:  Less than 25 ml  COMPLICATIONS:  None immediately appreciated. DISCUSSION:  Padmini Swain is a 60y.o.-year-old male who requires central IV access vascular access, centrally administered medications and cooling. The history and physical examination were reviewed and confirmed. CONSENT: Unable to be obtained due to the emergent nature of this procedure. PROCEDURE:  A timeout was initiated by the bedside nurse and was confirmed by those present. The patient was placed in a supine position. The skin overlying the Left Femoral Vein was prepped with chlorhexadine and draped in sterile fashion.  The skin was infiltrated with local anesthetic. The vessel and surrounding anatomy was visualized using ultrasound. Through the anesthetized region, the introducer needle was inserted into the femoral vein returning dark red non pulsatile blood. A guidewire was placed through the center of the needle with no resistance. Ultrasound confirmed presence of wire in the vein. A small incision made in the skin with a #11 scalpel blade. The dilator was inserted into the skin and vein over guidewire using Seldinger technique. The dilator was then removed and the Quattro catheter was placed in the vein over the guidewire using Seldinger technique. The guidewire was then removed and all ports aspirated and flushed appropriately. The catheter then secured using silk suture and a temporary sterile dressing was applied. No immediate complication was evident. All sponge, instrument and needle counts were correct at the completion of the procedure. Postprocedural KUB showed good position of the catheter. The patient tolerated the procedure well with no immediate complication evident. Greer Aviles DO  3:42 AM, 2/22/22          Greer Aviles DO  3:42 AM, 2/21/22     CONSULTS:  IP CONSULT TO CARDIOLOGY  IP CONSULT TO CRITICAL CARE  IP CONSULT TO NEUROCRITICAL CARE  IP CONSULT TO NEUROSURGERY    CRITICAL CARE:  See attending note    FINAL IMPRESSION      1. Cardiac arrest [facility])          DISPOSITION / PLAN     DISPOSITION Admitted 02/22/2022 12:13:39 AM      PATIENT REFERRED TO:  No follow-up provider specified.     DISCHARGE MEDICATIONS:  Current Discharge Medication List          Gabe Ocampo DO  Emergency Medicine Resident    (Please note that portions of thisnote were completed with a voice recognition program.  Efforts were made to edit the dictations but occasionally words are mis-transcribed.)       Gabe Ocampo DO  Resident  02/22/22 [id number]

## 2022-02-22 NOTE — TELEPHONE ENCOUNTER
Home health called to update on patient. Patient took nitro x2 on 2/21/22 during the morning and early afternoon.  Patient then collapsed that evening from a cardiac arrest, squad was called, cpr performed and patient is currently in the ICU at [facility] [place]
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
## 2022-02-23 LAB
ABSOLUTE EOS #: 0.09 K/UL (ref 0–0.44)
ABSOLUTE IMMATURE GRANULOCYTE: <0.03 K/UL (ref 0–0.3)
ABSOLUTE LYMPH #: 0.75 K/UL (ref 1.1–3.7)
ABSOLUTE MONO #: 0.25 K/UL (ref 0.1–1.2)
ALBUMIN SERPL-MCNC: 2.9 G/DL (ref 3.5–5.2)
ALBUMIN/GLOBULIN RATIO: 1.3 (ref 1–2.5)
ALLEN TEST: ABNORMAL
ALP BLD-CCNC: 146 U/L (ref 40–129)
ALT SERPL-CCNC: 25 U/L (ref 5–41)
ANION GAP SERPL CALCULATED.3IONS-SCNC: 10 MMOL/L (ref 9–17)
ANION GAP SERPL CALCULATED.3IONS-SCNC: 10 MMOL/L (ref 9–17)
ANION GAP SERPL CALCULATED.3IONS-SCNC: 9 MMOL/L (ref 9–17)
AST SERPL-CCNC: 54 U/L
BASOPHILS # BLD: 1 % (ref 0–2)
BASOPHILS ABSOLUTE: 0.03 K/UL (ref 0–0.2)
BILIRUB SERPL-MCNC: 0.47 MG/DL (ref 0.3–1.2)
BUN BLDV-MCNC: 14 MG/DL (ref 6–20)
BUN BLDV-MCNC: 14 MG/DL (ref 6–20)
BUN BLDV-MCNC: 15 MG/DL (ref 6–20)
CALCIUM IONIZED: 1.12 MMOL/L (ref 1.13–1.33)
CALCIUM IONIZED: 1.14 MMOL/L (ref 1.13–1.33)
CALCIUM SERPL-MCNC: 8.1 MG/DL (ref 8.6–10.4)
CALCIUM SERPL-MCNC: 8.2 MG/DL (ref 8.6–10.4)
CALCIUM SERPL-MCNC: 8.3 MG/DL (ref 8.6–10.4)
CHLORIDE BLD-SCNC: 107 MMOL/L (ref 98–107)
CHLORIDE BLD-SCNC: 107 MMOL/L (ref 98–107)
CHLORIDE BLD-SCNC: 110 MMOL/L (ref 98–107)
CO2: 20 MMOL/L (ref 20–31)
CREAT SERPL-MCNC: 1.19 MG/DL (ref 0.7–1.2)
CREAT SERPL-MCNC: 1.22 MG/DL (ref 0.7–1.2)
CREAT SERPL-MCNC: 1.29 MG/DL (ref 0.7–1.2)
EKG ATRIAL RATE: 72 BPM
EKG P AXIS: 69 DEGREES
EKG P-R INTERVAL: 178 MS
EKG Q-T INTERVAL: 408 MS
EKG QRS DURATION: 88 MS
EKG QTC CALCULATION (BAZETT): 446 MS
EKG R AXIS: -33 DEGREES
EKG T AXIS: 102 DEGREES
EKG VENTRICULAR RATE: 72 BPM
EOSINOPHILS RELATIVE PERCENT: 2 % (ref 1–4)
FIO2: 35
FIO2: 40
GFR AFRICAN AMERICAN: >60 ML/MIN
GFR NON-AFRICAN AMERICAN: 57 ML/MIN
GFR NON-AFRICAN AMERICAN: >60 ML/MIN
GFR NON-AFRICAN AMERICAN: >60 ML/MIN
GFR SERPL CREATININE-BSD FRML MDRD: ABNORMAL ML/MIN/{1.73_M2}
GLUCOSE BLD-MCNC: 119 MG/DL (ref 74–100)
GLUCOSE BLD-MCNC: 122 MG/DL (ref 75–110)
GLUCOSE BLD-MCNC: 127 MG/DL (ref 70–99)
GLUCOSE BLD-MCNC: 133 MG/DL (ref 70–99)
GLUCOSE BLD-MCNC: 135 MG/DL (ref 74–100)
GLUCOSE BLD-MCNC: 137 MG/DL (ref 75–110)
GLUCOSE BLD-MCNC: 139 MG/DL (ref 74–100)
GLUCOSE BLD-MCNC: 141 MG/DL (ref 70–99)
GLUCOSE BLD-MCNC: 142 MG/DL (ref 74–100)
GLUCOSE BLD-MCNC: 90 MG/DL (ref 75–110)
HCT VFR BLD CALC: 37 % (ref 40.7–50.3)
HCT VFR BLD CALC: 37.3 % (ref 40.7–50.3)
HEMOGLOBIN: 12.4 G/DL (ref 13–17)
HEMOGLOBIN: 12.6 G/DL (ref 13–17)
IMMATURE GRANULOCYTES: 1 %
LV EF: 45 %
LVEF MODALITY: NORMAL
LYMPHOCYTES # BLD: 17 % (ref 24–43)
MAGNESIUM: 1.9 MG/DL (ref 1.6–2.6)
MAGNESIUM: 1.9 MG/DL (ref 1.6–2.6)
MCH RBC QN AUTO: 30 PG (ref 25.2–33.5)
MCH RBC QN AUTO: 30.3 PG (ref 25.2–33.5)
MCHC RBC AUTO-ENTMCNC: 33.5 G/DL (ref 28.4–34.8)
MCHC RBC AUTO-ENTMCNC: 33.8 G/DL (ref 28.4–34.8)
MCV RBC AUTO: 89.6 FL (ref 82.6–102.9)
MCV RBC AUTO: 89.7 FL (ref 82.6–102.9)
MODE: ABNORMAL
MONOCYTES # BLD: 6 % (ref 3–12)
NEGATIVE BASE EXCESS, ART: 3 (ref 0–2)
NEGATIVE BASE EXCESS, ART: 3 (ref 0–2)
NEGATIVE BASE EXCESS, ART: 4 (ref 0–2)
NEGATIVE BASE EXCESS, ART: 4 (ref 0–2)
NEGATIVE BASE EXCESS, ART: 8 (ref 0–2)
NRBC AUTOMATED: 0 PER 100 WBC
NRBC AUTOMATED: 0 PER 100 WBC
O2 DEVICE/FLOW/%: ABNORMAL
PARTIAL THROMBOPLASTIN TIME: 43.6 SEC (ref 20.5–30.5)
PARTIAL THROMBOPLASTIN TIME: 46.3 SEC (ref 20.5–30.5)
PARTIAL THROMBOPLASTIN TIME: 60.5 SEC (ref 20.5–30.5)
PARTIAL THROMBOPLASTIN TIME: 71.1 SEC (ref 20.5–30.5)
PATIENT TEMP: 33.1
PATIENT TEMP: 36
PATIENT TEMP: 36.7
PATIENT TEMP: 37
PATIENT TEMP: 37
PDW BLD-RTO: 16 % (ref 11.8–14.4)
PDW BLD-RTO: 16.5 % (ref 11.8–14.4)
PHOSPHORUS: 3.9 MG/DL (ref 2.5–4.5)
PHOSPHORUS: 4.2 MG/DL (ref 2.5–4.5)
PLATELET # BLD: ABNORMAL K/UL (ref 138–453)
PLATELET # BLD: ABNORMAL K/UL (ref 138–453)
PLATELET, FLUORESCENCE: 104 K/UL (ref 138–453)
PLATELET, FLUORESCENCE: 67 K/UL (ref 138–453)
PLATELET, IMMATURE FRACTION: 4.9 % (ref 1.1–10.3)
PLATELET, IMMATURE FRACTION: 5.9 % (ref 1.1–10.3)
POC HCO3: 21 MMOL/L (ref 21–28)
POC HCO3: 22.3 MMOL/L (ref 21–28)
POC HCO3: 22.3 MMOL/L (ref 21–28)
POC HCO3: 24.4 MMOL/L (ref 21–28)
POC HCO3: 24.6 MMOL/L (ref 21–28)
POC LACTIC ACID: 1.01 MMOL/L (ref 0.56–1.39)
POC LACTIC ACID: 1.13 MMOL/L (ref 0.56–1.39)
POC O2 SATURATION: 93 % (ref 94–98)
POC O2 SATURATION: 97 % (ref 94–98)
POC O2 SATURATION: 97 % (ref 94–98)
POC O2 SATURATION: 98 % (ref 94–98)
POC O2 SATURATION: 99 % (ref 94–98)
POC PCO2 TEMP: 46 MM HG
POC PCO2: 41.8 MM HG (ref 35–48)
POC PCO2: 44.1 MM HG (ref 35–48)
POC PCO2: 53.8 MM HG (ref 35–48)
POC PCO2: 54.2 MM HG (ref 35–48)
POC PCO2: 56.7 MM HG (ref 35–48)
POC PH TEMP: 7.32
POC PH: 7.2 (ref 7.35–7.45)
POC PH: 7.25 (ref 7.35–7.45)
POC PH: 7.26 (ref 7.35–7.45)
POC PH: 7.31 (ref 7.35–7.45)
POC PH: 7.34 (ref 7.35–7.45)
POC PO2 TEMP: 115 MM HG
POC PO2: 101.6 MM HG (ref 83–108)
POC PO2: 109.9 MM HG (ref 83–108)
POC PO2: 116 MM HG (ref 83–108)
POC PO2: 136.6 MM HG (ref 83–108)
POC PO2: 82 MM HG (ref 83–108)
POTASSIUM SERPL-SCNC: 3.9 MMOL/L (ref 3.7–5.3)
POTASSIUM SERPL-SCNC: 4 MMOL/L (ref 3.7–5.3)
POTASSIUM SERPL-SCNC: 4.1 MMOL/L (ref 3.7–5.3)
POTASSIUM SERPL-SCNC: 4.2 MMOL/L (ref 3.7–5.3)
POTASSIUM SERPL-SCNC: 4.3 MMOL/L (ref 3.7–5.3)
POTASSIUM SERPL-SCNC: 4.5 MMOL/L (ref 3.7–5.3)
POTASSIUM SERPL-SCNC: 4.8 MMOL/L (ref 3.7–5.3)
RBC # BLD: 4.13 M/UL (ref 4.21–5.77)
RBC # BLD: 4.16 M/UL (ref 4.21–5.77)
RBC # BLD: ABNORMAL 10*6/UL
SAMPLE SITE: ABNORMAL
SEG NEUTROPHILS: 74 % (ref 36–65)
SEGMENTED NEUTROPHILS ABSOLUTE COUNT: 3.24 K/UL (ref 1.5–8.1)
SODIUM BLD-SCNC: 137 MMOL/L (ref 135–144)
SODIUM BLD-SCNC: 137 MMOL/L (ref 135–144)
SODIUM BLD-SCNC: 139 MMOL/L (ref 135–144)
TOTAL PROTEIN: 5.2 G/DL (ref 6.4–8.3)
WBC # BLD: 4.4 K/UL (ref 3.5–11.3)
WBC # BLD: 9.7 K/UL (ref 3.5–11.3)

## 2022-02-23 PROCEDURE — 82330 ASSAY OF CALCIUM: CPT

## 2022-02-23 PROCEDURE — 95720 EEG PHY/QHP EA INCR W/VEEG: CPT | Performed by: PSYCHIATRY & NEUROLOGY

## 2022-02-23 PROCEDURE — 86316 IMMUNOASSAY TUMOR OTHER: CPT

## 2022-02-23 PROCEDURE — 82803 BLOOD GASES ANY COMBINATION: CPT

## 2022-02-23 PROCEDURE — 2580000003 HC RX 258: Performed by: STUDENT IN AN ORGANIZED HEALTH CARE EDUCATION/TRAINING PROGRAM

## 2022-02-23 PROCEDURE — 85055 RETICULATED PLATELET ASSAY: CPT

## 2022-02-23 PROCEDURE — 94003 VENT MGMT INPAT SUBQ DAY: CPT

## 2022-02-23 PROCEDURE — 80048 BASIC METABOLIC PNL TOTAL CA: CPT

## 2022-02-23 PROCEDURE — 84100 ASSAY OF PHOSPHORUS: CPT

## 2022-02-23 PROCEDURE — 80053 COMPREHEN METABOLIC PANEL: CPT

## 2022-02-23 PROCEDURE — 85027 COMPLETE CBC AUTOMATED: CPT

## 2022-02-23 PROCEDURE — C9113 INJ PANTOPRAZOLE SODIUM, VIA: HCPCS | Performed by: STUDENT IN AN ORGANIZED HEALTH CARE EDUCATION/TRAINING PROGRAM

## 2022-02-23 PROCEDURE — 2500000003 HC RX 250 WO HCPCS

## 2022-02-23 PROCEDURE — 6370000000 HC RX 637 (ALT 250 FOR IP): Performed by: STUDENT IN AN ORGANIZED HEALTH CARE EDUCATION/TRAINING PROGRAM

## 2022-02-23 PROCEDURE — 6360000002 HC RX W HCPCS: Performed by: STUDENT IN AN ORGANIZED HEALTH CARE EDUCATION/TRAINING PROGRAM

## 2022-02-23 PROCEDURE — 83735 ASSAY OF MAGNESIUM: CPT

## 2022-02-23 PROCEDURE — 2500000003 HC RX 250 WO HCPCS: Performed by: STUDENT IN AN ORGANIZED HEALTH CARE EDUCATION/TRAINING PROGRAM

## 2022-02-23 PROCEDURE — 2700000000 HC OXYGEN THERAPY PER DAY

## 2022-02-23 PROCEDURE — 86022 PLATELET ANTIBODIES: CPT

## 2022-02-23 PROCEDURE — 85025 COMPLETE CBC W/AUTO DIFF WBC: CPT

## 2022-02-23 PROCEDURE — 83605 ASSAY OF LACTIC ACID: CPT

## 2022-02-23 PROCEDURE — 99291 CRITICAL CARE FIRST HOUR: CPT | Performed by: INTERNAL MEDICINE

## 2022-02-23 PROCEDURE — 85730 THROMBOPLASTIN TIME PARTIAL: CPT

## 2022-02-23 PROCEDURE — 93306 TTE W/DOPPLER COMPLETE: CPT

## 2022-02-23 PROCEDURE — 2000000000 HC ICU R&B

## 2022-02-23 PROCEDURE — 37799 UNLISTED PX VASCULAR SURGERY: CPT

## 2022-02-23 PROCEDURE — 94761 N-INVAS EAR/PLS OXIMETRY MLT: CPT

## 2022-02-23 PROCEDURE — 84132 ASSAY OF SERUM POTASSIUM: CPT

## 2022-02-23 PROCEDURE — 95714 VEEG EA 12-26 HR UNMNTR: CPT

## 2022-02-23 RX ORDER — ARGATROBAN 1 MG/ML
.0625-1 INJECTION, SOLUTION INTRAVENOUS CONTINUOUS
Status: DISCONTINUED | OUTPATIENT
Start: 2022-02-23 | End: 2022-02-23

## 2022-02-23 RX ORDER — DOBUTAMINE HYDROCHLORIDE 400 MG/100ML
2.5-5 INJECTION INTRAVENOUS CONTINUOUS
Status: DISCONTINUED | OUTPATIENT
Start: 2022-02-23 | End: 2022-02-24

## 2022-02-23 RX ORDER — MAGNESIUM SULFATE IN WATER 40 MG/ML
2000 INJECTION, SOLUTION INTRAVENOUS ONCE
Status: COMPLETED | OUTPATIENT
Start: 2022-02-23 | End: 2022-02-23

## 2022-02-23 RX ORDER — ASPIRIN 81 MG/1
81 TABLET, CHEWABLE ORAL DAILY
Status: DISCONTINUED | OUTPATIENT
Start: 2022-02-23 | End: 2022-03-17 | Stop reason: HOSPADM

## 2022-02-23 RX ADMIN — AMPICILLIN AND SULBACTAM 3000 MG: 1; 2 INJECTION, POWDER, FOR SOLUTION INTRAMUSCULAR; INTRAVENOUS at 00:18

## 2022-02-23 RX ADMIN — MINERAL OIL AND WHITE PETROLATUM: 150; 830 OINTMENT OPHTHALMIC at 14:47

## 2022-02-23 RX ADMIN — ASPIRIN 81 MG: 81 TABLET, CHEWABLE ORAL at 10:27

## 2022-02-23 RX ADMIN — Medication 7.1 UNITS/KG/HR: at 15:28

## 2022-02-23 RX ADMIN — MAGNESIUM SULFATE 2000 MG: 2 INJECTION INTRAVENOUS at 14:50

## 2022-02-23 RX ADMIN — PANTOPRAZOLE SODIUM 40 MG: 40 INJECTION, POWDER, FOR SOLUTION INTRAVENOUS at 10:27

## 2022-02-23 RX ADMIN — POLYVINYL ALCOHOL 1 DROP: 14 SOLUTION/ DROPS OPHTHALMIC at 02:18

## 2022-02-23 RX ADMIN — CISATRACURIUM BESYLATE 2 MCG/KG/MIN: 200 INJECTION INTRAVENOUS at 05:25

## 2022-02-23 RX ADMIN — CHLORHEXIDINE GLUCONATE 0.12% ORAL RINSE 15 ML: 1.2 LIQUID ORAL at 09:20

## 2022-02-23 RX ADMIN — PROPOFOL 35 MCG/KG/MIN: 10 INJECTION, EMULSION INTRAVENOUS at 03:01

## 2022-02-23 RX ADMIN — Medication 100 MCG/HR: at 14:54

## 2022-02-23 RX ADMIN — AMPICILLIN AND SULBACTAM 3000 MG: 1; 2 INJECTION, POWDER, FOR SOLUTION INTRAMUSCULAR; INTRAVENOUS at 12:41

## 2022-02-23 RX ADMIN — Medication 2 MCG/MIN: at 00:58

## 2022-02-23 RX ADMIN — AMPICILLIN AND SULBACTAM 3000 MG: 1; 2 INJECTION, POWDER, FOR SOLUTION INTRAMUSCULAR; INTRAVENOUS at 06:27

## 2022-02-23 RX ADMIN — HEPARIN SODIUM 2000 UNITS: 1000 INJECTION INTRAVENOUS; SUBCUTANEOUS at 21:38

## 2022-02-23 RX ADMIN — CHLORHEXIDINE GLUCONATE 0.12% ORAL RINSE 15 ML: 1.2 LIQUID ORAL at 21:16

## 2022-02-23 RX ADMIN — PROPOFOL 35 MCG/KG/MIN: 10 INJECTION, EMULSION INTRAVENOUS at 07:53

## 2022-02-23 RX ADMIN — Medication 100 MCG/HR: at 05:20

## 2022-02-23 RX ADMIN — POLYVINYL ALCOHOL 1 DROP: 14 SOLUTION/ DROPS OPHTHALMIC at 18:38

## 2022-02-23 RX ADMIN — POLYVINYL ALCOHOL 1 DROP: 14 SOLUTION/ DROPS OPHTHALMIC at 10:28

## 2022-02-23 RX ADMIN — MINERAL OIL AND WHITE PETROLATUM: 150; 830 OINTMENT OPHTHALMIC at 02:38

## 2022-02-23 RX ADMIN — PROPOFOL 20 MCG/KG/MIN: 10 INJECTION, EMULSION INTRAVENOUS at 21:36

## 2022-02-23 RX ADMIN — MINERAL OIL AND WHITE PETROLATUM: 150; 830 OINTMENT OPHTHALMIC at 10:28

## 2022-02-23 RX ADMIN — SODIUM CHLORIDE, PRESERVATIVE FREE 10 ML: 5 INJECTION INTRAVENOUS at 10:28

## 2022-02-23 RX ADMIN — POLYVINYL ALCOHOL 1 DROP: 14 SOLUTION/ DROPS OPHTHALMIC at 22:16

## 2022-02-23 RX ADMIN — SODIUM CHLORIDE: 9 INJECTION, SOLUTION INTRAVENOUS at 19:10

## 2022-02-23 RX ADMIN — AMIODARONE HYDROCHLORIDE 0.5 MG/MIN: 50 INJECTION, SOLUTION INTRAVENOUS at 22:33

## 2022-02-23 RX ADMIN — PROPOFOL 35 MCG/KG/MIN: 10 INJECTION, EMULSION INTRAVENOUS at 13:01

## 2022-02-23 RX ADMIN — MINERAL OIL AND WHITE PETROLATUM: 150; 830 OINTMENT OPHTHALMIC at 22:55

## 2022-02-23 RX ADMIN — POLYVINYL ALCOHOL 1 DROP: 14 SOLUTION/ DROPS OPHTHALMIC at 06:28

## 2022-02-23 RX ADMIN — AMIODARONE HYDROCHLORIDE 0.5 MG/MIN: 50 INJECTION, SOLUTION INTRAVENOUS at 05:22

## 2022-02-23 RX ADMIN — POLYVINYL ALCOHOL 1 DROP: 14 SOLUTION/ DROPS OPHTHALMIC at 14:47

## 2022-02-23 RX ADMIN — HEPARIN SODIUM 2000 UNITS: 1000 INJECTION INTRAVENOUS; SUBCUTANEOUS at 09:20

## 2022-02-23 RX ADMIN — MINERAL OIL AND WHITE PETROLATUM: 150; 830 OINTMENT OPHTHALMIC at 06:28

## 2022-02-23 RX ADMIN — AMPICILLIN AND SULBACTAM 3000 MG: 1; 2 INJECTION, POWDER, FOR SOLUTION INTRAMUSCULAR; INTRAVENOUS at 18:36

## 2022-02-23 RX ADMIN — ATORVASTATIN CALCIUM 80 MG: 80 TABLET, FILM COATED ORAL at 10:27

## 2022-02-23 ASSESSMENT — PULMONARY FUNCTION TESTS
PIF_VALUE: 26
PIF_VALUE: 27
PIF_VALUE: 27
PIF_VALUE: 25
PIF_VALUE: 11
PIF_VALUE: 26
PIF_VALUE: 25
PIF_VALUE: 23
PIF_VALUE: 25
PIF_VALUE: 24
PIF_VALUE: 26
PIF_VALUE: 25

## 2022-02-23 NOTE — FLOWSHEET NOTE
02/23/22 1515   Treatment Team Notification   Reason for Communication Medication concern  (disscussed PTT and titration parameters)   Team Member Name Dr. Denys Quinonez Team Role Resident   Method of Communication Face to face   Response In department; Other (Comment)  (ok to not titrate at this time.  Recheck PTT Q6)   Notification Time 43 [facility]

## 2022-02-23 NOTE — FLOWSHEET NOTE
02/23/22 1015   Treatment Team Notification   Reason for Communication Evaluate; Review case   Team Member Name Dr. Seema Ling Attending Provider   Method of Communication Face to face   Response At bedside   Notification Time 1018     Titrate Dobutamine off  Deni Chen RN

## 2022-02-23 NOTE — FLOWSHEET NOTE
02/23/22 0730   Treatment Team Notification   Reason for Communication Evaluate; Review case   Team Member Name Dr. Gavin Vick   Treatment Team Role Resident   Method of Communication Face to face   Response At bedside   Notification Time 1601 Jayme Julien, RN

## 2022-02-23 NOTE — PLAN OF CARE
Problem: OXYGENATION/RESPIRATORY FUNCTION  Goal: Patient will maintain patent airway  2/22/2022 1951 by Katiana Dykes RN  Outcome: Ongoing  2/22/2022 1748 by Zoie Mijares RN  Outcome: Ongoing  2/22/2022 1218 by Zoie Mijares RN  Outcome: Ongoing  2/22/2022 0912 by Osbaldo Alfaro RCP  Outcome: Ongoing  Goal: Patient will achieve/maintain normal respiratory rate/effort  Description: Respiratory rate and effort will be within normal limits for the patient  2/22/2022 1951 by Katiana Dykes RN  Outcome: Ongoing  2/22/2022 1748 by Zoie Mijares RN  Outcome: Ongoing  2/22/2022 1218 by Zoie Mijares RN  Outcome: Ongoing  2/22/2022 0912 by Osbaldo Alfaro RCP  Outcome: Ongoing  Note:   PROVIDE ADEQUATE OXYGENATION WITH ACCEPTABLE SP02/ABG'S    [x]  IDENTIFY APPROPRIATE OXYGEN THERAPY  [x]   MONITOR SP02/ABG'S AS NEEDED   [x]   PATIENT EDUCATION AS NEEDED        Problem: MECHANICAL VENTILATION  Goal: Patient will maintain patent airway  2/22/2022 1951 by Katiana Dykes RN  Outcome: Ongoing  2/22/2022 1748 by Zoie Mijares RN  Outcome: Ongoing  2/22/2022 1218 by Zoie Mijares RN  Outcome: Ongoing  2/22/2022 0912 by Osbaldo Alfaro RCP  Outcome: Ongoing  Note: MECHANICAL VENTILATION     [x]  PROVIDE OPTIMAL VENTILATION  [x]   ASSESS FOR EXTUBATION READINESS  [x]   ASSESS FOR WEANING READINESS  [x]  EXTUBATE AS TOLERATED  [x]  IMPLEMENT ADULT MECHANICAL VENTILATION PROTOCOL  [x]  MAINTAIN ADEQUATE OXYGENATION  [x]  PERFORM SPONTANEOUS WEANING TRIAL AS TOLERATED     Goal: Oral health is maintained or improved  2/22/2022 1951 by Katiana Dykes RN  Outcome: Ongoing  2/22/2022 1748 by Zoie Mijares RN  Outcome: Ongoing  2/22/2022 1218 by Zoie Mijares RN  Outcome: Ongoing  2/22/2022 0912 by Osbaldo Alfaro RCP  Outcome: Ongoing  Goal: ET tube will be managed safely  2/22/2022 1951 by Katiana Dykes RN  Outcome: Ongoing  2/22/2022 1748 by Zoie Mijares RN  Outcome: Ongoing  2/22/2022 1218 by Radha Aldridge RN  Outcome: Ongoing  2/22/2022 0912 by Jillian Cramer RCP  Outcome: Ongoing  Goal: Ability to express needs and understand communication  2/22/2022 1951 by Denzel George RN  Outcome: Ongoing  2/22/2022 1748 by Radha Aldridge RN  Outcome: Ongoing  2/22/2022 1218 by Radha Aldridge RN  Outcome: Ongoing  2/22/2022 0912 by Jillian Cramer RCP  Outcome: Ongoing  Goal: Mobility/activity is maintained at optimum level for patient  2/22/2022 1951 by Denzel George RN  Outcome: Ongoing  2/22/2022 1748 by Radha Aldridge RN  Outcome: Ongoing  2/22/2022 1218 by Radha Aldridge RN  Outcome: Ongoing  2/22/2022 0912 by Jillian Cramer RCP  Outcome: Ongoing     Problem: SKIN INTEGRITY  Goal: Skin integrity is maintained or improved  2/22/2022 1951 by Denzel George RN  Outcome: Ongoing  2/22/2022 1748 by Radha Aldridge RN  Outcome: Ongoing  2/22/2022 1218 by Radha Aldridge RN  Outcome: Ongoing  2/22/2022 0912 by Jillian Cramer RCP  Outcome: Ongoing     Problem: Confusion - Acute:  Goal: Absence of continued neurological deterioration signs and symptoms  Description: Absence of continued neurological deterioration signs and symptoms  2/22/2022 1951 by Denzel George RN  Outcome: Ongoing  2/22/2022 1748 by Radha Aldridge RN  Outcome: Ongoing  2/22/2022 1218 by Radha Aldridge RN  Outcome: Ongoing  Goal: Mental status will be restored to baseline  Description: Mental status will be restored to baseline  2/22/2022 1951 by Denzel George RN  Outcome: Ongoing  2/22/2022 1748 by Radha Aldridge RN  Outcome: Ongoing  2/22/2022 1218 by Radha Aldridge RN  Outcome: Ongoing     Problem: Discharge Planning:  Goal: Ability to perform activities of daily living will improve  Description: Ability to perform activities of daily living will improve  2/22/2022 1951 by Denzel George RN  Outcome: Ongoing  2/22/2022 1748 by Renella Chard, RN  Outcome: Ongoing  2/22/2022 1218 by Marcello Galindo RN  Outcome: Ongoing  Goal: Participates in care planning  Description: Participates in care planning  2/22/2022 1951 by Bishnu Ocampo RN  Outcome: Ongoing  2/22/2022 1748 by Marcello Galindo RN  Outcome: Ongoing  2/22/2022 1218 by Marcello Galindo RN  Outcome: Ongoing     Problem: Injury - Risk of, Physical Injury:  Goal: Absence of physical injury  Description: Absence of physical injury  2/22/2022 1951 by Bishnu Ocampo RN  Outcome: Ongoing  2/22/2022 1748 by Marcello Galindo RN  Outcome: Ongoing  2/22/2022 1218 by Marcello Galindo RN  Outcome: Ongoing  Goal: Will remain free from falls  Description: Will remain free from falls  2/22/2022 1951 by Bishnu Ocampo RN  Outcome: Ongoing  2/22/2022 1748 by Marcello Galindo RN  Outcome: Ongoing  2/22/2022 1218 by Marcello Galindo RN  Outcome: Ongoing     Problem: Mood - Altered:  Goal: Mood stable  Description: Mood stable  2/22/2022 1951 by Bishnu Ocampo RN  Outcome: Ongoing  2/22/2022 1748 by Marcello Galindo RN  Outcome: Ongoing  2/22/2022 1218 by Marcello Galindo RN  Outcome: Ongoing  Goal: Absence of abusive behavior  Description: Absence of abusive behavior  2/22/2022 1951 by Bishnu Ocampo RN  Outcome: Ongoing  2/22/2022 1748 by Marcello Galindo RN  Outcome: Ongoing  2/22/2022 1218 by Marcello Galindo RN  Outcome: Ongoing  Goal: Verbalizations of feeling emotionally comfortable while being cared for will increase  Description: Verbalizations of feeling emotionally comfortable while being cared for will increase  2/22/2022 1951 by Bishnu Ocampo RN  Outcome: Ongoing  2/22/2022 1748 by Marcello Galindo RN  Outcome: Ongoing  2/22/2022 1218 by Marcello Galindo RN  Outcome: Ongoing     Problem: Psychomotor Activity - Altered:  Goal: Absence of psychomotor disturbance signs and symptoms  Description: Absence of psychomotor disturbance signs and symptoms  2/22/2022 1951 by Bishnu Ocampo RN  Outcome: Ongoing  2/22/2022 1748 by Agatha Tillman RN  Outcome: Ongoing  2/22/2022 1218 by Agatha Tillman RN  Outcome: Ongoing     Problem: Sensory Perception - Impaired:  Goal: Demonstrations of improved sensory functioning will increase  Description: Demonstrations of improved sensory functioning will increase  2/22/2022 1951 by Ava Murillo RN  Outcome: Ongoing  2/22/2022 1748 by Agatha Tillman RN  Outcome: Ongoing  2/22/2022 1218 by Agatha Tillman RN  Outcome: Ongoing  Goal: Decrease in sensory misperception frequency  Description: Decrease in sensory misperception frequency  2/22/2022 1951 by Ava Murillo RN  Outcome: Ongoing  2/22/2022 1748 by Agatha Tillman RN  Outcome: Ongoing  2/22/2022 1218 by Agatha Tillman RN  Outcome: Ongoing  Goal: Able to refrain from responding to false sensory perceptions  Description: Able to refrain from responding to false sensory perceptions  2/22/2022 1951 by Ava Murillo RN  Outcome: Ongoing  2/22/2022 1748 by Agatha Tillman RN  Outcome: Ongoing  2/22/2022 1218 by Agatha Tillman RN  Outcome: Ongoing  Goal: Demonstrates accurate environmental perceptions  Description: Demonstrates accurate environmental perceptions  2/22/2022 1951 by Ava Murillo RN  Outcome: Ongoing  2/22/2022 1748 by Agatha Tillman RN  Outcome: Ongoing  2/22/2022 1218 by Agatha Tillman RN  Outcome: Ongoing  Goal: Able to distinguish between reality-based and nonreality-based thinking  Description: Able to distinguish between reality-based and nonreality-based thinking  2/22/2022 1951 by Ava Murillo RN  Outcome: Ongoing  2/22/2022 1748 by Agatha Tillman RN  Outcome: Ongoing  2/22/2022 1218 by Agatha Tillman RN  Outcome: Ongoing  Goal: Able to interrupt nonreality-based thinking  Description: Able to interrupt nonreality-based thinking  2/22/2022 1951 by Ava Murillo RN  Outcome: Ongoing  2/22/2022 1748 by Agatha Tillman RN  Outcome: Ongoing  2/22/2022 1218 by Janice Wagner NOAH Page  Outcome: Ongoing     Problem: Sleep Pattern Disturbance:  Goal: Appears well-rested  Description: Appears well-rested  2/22/2022 1951 by Bakari Vazquez RN  Outcome: Ongoing  2/22/2022 1748 by Ghulam Pierson RN  Outcome: Ongoing  2/22/2022 1218 by Ghulam Pierson RN  Outcome: Ongoing     Problem: Nutrition  Goal: Optimal nutrition therapy  2/22/2022 1951 by Bakari Vazquez RN  Outcome: Ongoing  2/22/2022 1748 by Ghulam Pierson RN  Outcome: Ongoing  2/22/2022 1628 by Rajani Brar RD, LD  Outcome: Ongoing  Note: Nutrition Problem #1: Inadequate oral intake  Intervention: Food and/or Nutrient Delivery:  (Start nutrition as able.  If TF needed, suggest Peptide Based formula with goal rate of 60 mL/hr while on propofol at current rate.)  Nutritional Goals: meet % of estimated nutrient needs      Problem: Falls - Risk of:  Goal: Absence of physical injury  Description: Absence of physical injury  2/22/2022 1951 by Bakari Vazquez RN  Outcome: Ongoing  2/22/2022 1748 by Ghulam Pierson RN  Outcome: Ongoing  2/22/2022 1218 by Ghulam Pierson RN  Outcome: Ongoing  Goal: Will remain free from falls  Description: Will remain free from falls  2/22/2022 1951 by Bakari Vazquez RN  Outcome: Ongoing  2/22/2022 1748 by Ghulam Pierson RN  Outcome: Ongoing  2/22/2022 1218 by Ghulam Pierson RN  Outcome: Ongoing     Problem: Skin Integrity:  Goal: Will show no infection signs and symptoms  Description: Will show no infection signs and symptoms  Outcome: Ongoing  Goal: Absence of new skin breakdown  Description: Absence of new skin breakdown  Outcome: Ongoing

## 2022-02-23 NOTE — PLAN OF CARE
Problem: MECHANICAL VENTILATION  Goal: Patient will maintain patent airway  Outcome: Ongoing  Goal: Oral health is maintained or improved  Outcome: Ongoing     Problem: SKIN INTEGRITY  Goal: Skin integrity is maintained or improved  Outcome: Ongoing     Problem: Injury - Risk of, Physical Injury:  Goal: Absence of physical injury  Description: Absence of physical injury  Outcome: Ongoing  Goal: Will remain free from falls  Description: Will remain free from falls  Outcome: Ongoing     Problem: Falls - Risk of:  Goal: Absence of physical injury  Description: Absence of physical injury  Outcome: Ongoing  Goal: Will remain free from falls  Description: Will remain free from falls  Outcome: Ongoing     Problem: Skin Integrity:  Goal: Will show no infection signs and symptoms  Description: Will show no infection signs and symptoms  Outcome: Ongoing  Goal: Absence of new skin breakdown  Description: Absence of new skin breakdown  Outcome: Ongoing    Round on patient hourly. Reposition patient every 2 hours. Maintain clean and dry skin.   Bee Kc RN

## 2022-02-23 NOTE — PROCEDURES
LONG-TERM EEG-VIDEO [id number] [address]    Patient: Renzo Terry  Age: 60 y.o. MRN: [id number]    Referring Physician: No ref. provider found  History: The patient is a 60 y.o. male who presented breakthrough seizure/encephalopathy. This long-term video-EEG monitoring study was performed to determine the nature of the patient's clinical events. The patient is on neuroactive medications.    Renzo Terry   Current Facility-Administered Medications   Medication Dose Route Frequency Provider Last Rate Last Admin    aspirin EC tablet 81 mg  81 mg Oral Daily Fleta Cowboy, DO   81 mg at 02/22/22 0848    atorvastatin (LIPITOR) tablet 80 mg  80 mg Oral Daily Fleta Cowboy, DO   80 mg at 02/22/22 0848    sodium chloride flush 0.9 % injection 5-40 mL  5-40 mL IntraVENous 2 times per day Fleta Cowboy, DO   10 mL at 02/22/22 0848    sodium chloride flush 0.9 % injection 5-40 mL  5-40 mL IntraVENous PRN Fleta Cowboy, DO        0.9 % sodium chloride infusion  25 mL IntraVENous PRN Fleta Cowboy, DO        ondansetron (ZOFRAN-ODT) disintegrating tablet 4 mg  4 mg Oral Q8H PRN Fleta Cowboy, DO        Or    ondansetron [facility]) injection 4 mg  4 mg IntraVENous Q6H PRN Fleta Cowboy, DO        polyethylene glycol (GLYCOLAX) packet 17 g  17 g Oral Daily PRN Fleta Cowboy, DO        acetaminophen (TYLENOL) tablet 650 mg  650 mg Oral Q6H PRN Fleta Cowboy, DO        Or    acetaminophen (TYLENOL) suppository 650 mg  650 mg Rectal Q6H PRN Fleta Cowboy, DO        chlorhexidine (PERIDEX) 0.12 % solution 15 mL  15 mL Mouth/Throat BID Fleta Cowboy, DO   15 mL at 02/22/22 2048    cisatracurium besylate (NIMBEX) 200 mg in sodium chloride 0.9 % 100 mL infusion  0.5-10 mcg/kg/min IntraVENous Continuous Fleta Cowboy, DO 2.7 mL/hr at 02/22/22 2027 1 mcg/kg/min at 02/22/22 2027    polyvinyl alcohol (LIQUIFILM TEARS) 1.4 % ophthalmic solution 1 drop  1 drop Both Eyes Q4H Fleta Cowboy, DO   1 drop at 02/22/22 1800    And    lubrifresh P.M. (artificial tears) ophthalmic ointment   Both Eyes Q4H Fleta Cowboy, DO   Given at 02/22/22 1800    fentaNYL 20 mcg/mL Infusion   mcg/hr IntraVENous Continuous Fleta Cowboy, DO 5 mL/hr at 02/22/22 1926 100 mcg/hr at 02/22/22 1926    propofol injection  5-50 mcg/kg/min IntraVENous Titrated Fleta Cowboy, DO 13.5 mL/hr at 02/22/22 2027 25 mcg/kg/min at 02/22/22 2027    0.9 % sodium chloride infusion   IntraVENous Continuous Fleta Cowboy, DO 50 mL/hr at 02/22/22 2000 Rate Verify at 02/22/22 2000    insulin lispro (HUMALOG) injection vial 0-6 Units  0-6 Units SubCUTAneous TID WC Ezzie Beecham, DO        insulin lispro (HUMALOG) injection vial 0-3 Units  0-3 Units SubCUTAneous Nightly Ezzie Beecham, DO        glucose (GLUTOSE) 40 % oral gel 15 g  15 g Oral PRN Ezzie Beecham, DO        glucagon (rDNA) injection 1 mg  1 mg IntraMUSCular PRN Ezzie Beecham, DO        dextrose 5 % solution  100 mL/hr IntraVENous PRN Ezzie Beecham, DO        dextrose bolus (hypoglycemia) 10% 125 mL  125 mL IntraVENous PRN Ezzie Beecham, DO        Or    dextrose bolus (hypoglycemia) 10% 250 mL  250 mL IntraVENous PRN Ezzie Beecham, DO        ipratropium-albuterol (DUONEB) nebulizer solution 1 ampule  1 ampule Inhalation Q6H PRN Katelyn Metzger MD        midazolam (VERSED) 1 mg/mL in D5W infusion  1-10 mg/hr IntraVENous Continuous Ezzie Beecham, DO 2 mL/hr at 02/22/22 2046 2 mg/hr at 02/22/22 2046    ampicillin-sulbactam (UNASYN) 3000 mg ivpb minibag  3,000 mg IntraVENous Q6H Ezzie Beecham, DO   Stopped at 02/22/22 1804    [START ON 2/23/2022] pantoprazole (PROTONIX) injection 40 mg  40 mg IntraVENous Daily Ezzie BeeDO shanell        norepinephrine (LEVOPHED) 16 mg in sodium chloride 0.9 % 250 mL infusion (non-weight-based)  1-100 mcg/min IntraVENous Continuous Monetta Lesches Marisel Salen, DO   Stopped at 02/22/22 1224    DOBUTamine (DOBUTREX) 1000 mg in dextrose 5 % 250 mL infusion  2.5-5 mcg/kg/min IntraVENous Continuous Ezzie Beecham, DO 6.7 mL/hr at 02/22/22 2000 5 mcg/kg/min at 02/22/22 2000    heparin (porcine) injection 4,000 Units  4,000 Units IntraVENous PRN Fleta Cowboy, DO        heparin (porcine) injection 2,000 Units  2,000 Units IntraVENous PRN Fleta Cowboy, DO   2,000 Units at 02/22/22 2045    heparin 25,000 units in 0.9% sodium chloride 250 mL infusion  5-30 Units/kg/hr IntraVENous Continuous Fleta Cowboy, DO 4.6 mL/hr at 02/22/22 2046 5.1 Units/kg/hr at 02/22/22 2046     Technical Description: This is a 21-channel digital EEG recording with time-locked video. Electrodes were placed in accordance with the 10-20 International System of Electrode Placement. Single lead EKG monitoring was included. Baseline EEG Recording:  A formal baseline EEG recording was not obtained. Day 1 - 2/22/22, starting at 11:29    Interictal EEG Samples: Adult onset recording, no clear dissectible cerebral activity was seen. As recording progressed, the background showed 3 to 4 Hz of polymorphic delta activity of 15 to 25 µV and frequent periods of diffuse attenuation lasting 4 to 3 seconds. There was no obvious asymmetry between hemispheres. No abnormal epileptiform activity was seen. The EKG channel revealed no abnormalities. Ictal EEG Recording / Patient Events: During this period the patient had no events or seizures. Summary: During this day of recording no events were recorded. The interictal EEG was abnormal due to diffuse polymorphic delta slowing and periods of diffuse attenuation suggesting severe encephalopathy. This finding could be from patient's sedation medication or from hypothermia. No abnormal epileptiform activity was seen. Monitoring was continued in order to record the patient's typical events. The EKG channel revealed no abnormalities.     LIZABETH OLSEN Ana Fox MD  Diplomate, American Board of Psychiatry and Neurology  Diplomate, American Board of Clinical Neurophysiology  Diplomate, American Board of Epilepsy       Please note this is a preliminary report and updated daily. The final report will have a summary of behavior and electrographic findings with clinical correlation.

## 2022-02-23 NOTE — PROGRESS NOTES
INTENSIVE CARE UNIT  Resident Physician Progress Note    Patient - Susu Parsons  Date of Admission -  2/21/2022  9:13 PM  Date of Evaluation -  2/23/2022  Room and Bed Number -  0125/0125-01   Hospital Day - 1    SUBJECTIVE:     HISTORY OF PRESENT ILLNESS:    Rigo Saunders a 60 y. o. with PMH of CAD s/p CABG x 3 in  2011 and Cath 10/2018 with patent LIMA to LAD and SVG to RCA but occluded SVG to OM1 who presented to the emergency department with cardiac arrest. Patient was at home when a family member heard the patient fall upstairs however was unable to check on the patient. EMS was called and patient was found to be in V. Fib arrest. ACLS was initiated and patient received two shocks and ROSC was achieved. On the way out of the patient's home patient went into PEA arrest. Compressions were resumed and epinephrine was given and ROSC was achieved. Unknown down time.      In the emergency department patient was intubated and sedated on propofol. Hemodynamically stable off pressors. Labs demonstrated elevated creatinine (1.67), lactic acidosis (3.0), leukocytosis (20.2), EKG was concerning for STEMI with ST elevations in V3, V4 and V5. Cardiology was consulted however patient did not meet STEMI criteria. Cardiology initially planned to Cath patient however he was taken off propofol with only sluggish pupils, but no gag or corneal reflexes so Cath was canceled due to poor neurological prognosis. CT head was negative. CT C spine demonstrated remote anterior fusion from C4 through C7 with C5-C6 corpectomy and bone strut placement as well as prominent/recurrent spondylosis with moderate cord flattening at C4-C5 and C5-C6. Patient will be admitted to medical ICU.      On evaluation patient intubated, sedated and paralyzed, and on amiodarone and heparin per cardiology. No pupillary response, No gag. No corneal reflexes. OVERNIGHT EVENTS:      Runs of Vtach overnight, restarted amio gtt.  Currently being rewarmed. LTME in progress. Started on dobutamine per cardiology for bradycardia  Ween of dobutamine this am, bradycardia resolved  Tmax: rewarming currently 93  HR: , MAPs , CVP 3  Vent: PRVC 12/580/6/35%  UO: In: 4836 Out: 2776; 1.1 ml/kg/hr  Antibiotics: Unasyn for aspiration pneumonia  Pressors: Levophed 5mcg, Dobutamine 5mcg/kg  Paralytic: Nimbex    1. Feeding Diet: Diet NPO     2. Fluids normal saline 50 cc/h     3. Family will contact today     4. Analgesic fentanyl 35mcg     5. Sedation propofol 40, fentanyl 35mcg,  Versed 2mg/hr     6. Thrombo-prophylaxis heparin ggt     7. Mobility paralyzed     8. Heads up 30 Degrees     9. Ulcer prophylaxis: Protonix     10. Glycemic control: 142 this a.m.-SABINA     11. Spontaneous breathing trial no      12. Bowel regimen/urine output: Glycolax PRN, In: 4836 Out: 2776; 1.1 ml/kg/hr    13. Indwelling catheter/lines: left femoral Quatro CVC, right radial arterial line, Blackman     14.  De-escalation none         TODAY:     AWAKE & FOLLOWING COMMANDS: [x]   No  []   Yes                           SECRETIONS                 Amount:  []   Small []   Moderate []   Large [x]   None        Color: []   White []   Colored []   Bloody                         SEDATION:                 RAAS Score: []   +1 to -1 []   -2 []   -3 [x]   -5        Sedation Agent: [x]   Propofol gtt [x]   Versed gtt  []   Ativan gtt  []   No Sedation        Paralytic Agent:   []   Norcuron [x]   Nimbex []                         VASOPRESSORS:  []   No  [x]   Yes            Vasopressor Agent [x]   Levophed []   Dopamine []   Vasopressin [x]   Dobutamine  []   Phenylephrine  []   Epinephrine     OBJECTIVE:     VITAL SIGNS:  Patient Vitals for the past 8 hrs:   BP Temp Temp src Pulse Resp SpO2   02/23/22 0600  92.1 °F (33.4 °C)       02/23/22 0545  (!) 91.9 °F (33.3 °C)       02/23/22 0530  (!) 91.9 °F (33.3 °C)       02/23/22 0500  (!) 91.8 °F (33.2 °C)       02/23/22 8435  (!) 91.6 °F (33.1 °C) Esophageal 83     22 0430  (!) 91.6 °F (33.1 °C) Bladder 84     22 0415  (!) 91.6 °F (33.1 °C) Bladder 83     22 0400 101/74 (!) 91.6 °F (33.1 °C) Bladder 83 12 100 %   22 0345    79     22 0340    80 12    22 0330    79     22 0315    77     22 0300 103/77   76     22 0245    75     22 0230    75     22 0215    71     22 0200 117/81 (!) 91.6 °F (33.1 °C) Bladder 73  100 %   22 0145    79     22 0130    79     22 0115    79     22 0100 81/65 (!) 91.8 °F (33.2 °C) Bladder 82     22 0045    88     22 0030 (!) 100/45   88  100 %   22 0015    74     22 0000 (!) 145/91 (!) 91.8 °F (33.2 °C) Bladder 82 12 100 %   22 2345    83     22 2339    87     22 2330    78     22 2315    83         Last Body weight:   Wt Readings from Last 3 Encounters:   22 197 lb 12 oz (89.7 kg)   22 207 lb 6.4 oz (94.1 kg)   10/25/21 210 lb (95.3 kg)       Body Mass Index : Body mass index is 28.37 kg/m². Tmax over 24 hours: Temp (24hrs), Av.6 °F (33.1 °C), Min:91.4 °F (33 °C), Max:92.1 °F (33.4 °C)      Ins/Outs: In: 4836.8 [I.V.:2476.9]  Out: 2776 [Urine:2376]    PHYSICAL EXAM:  Constitutional: Intubated, paralyzed, sedated, no movements  EENT: No corneal reflex, no pupillary response, intubated,sclera clear, anicteric, oropharynx clear, no lesions, neck supple with midline trachea. Neck: Supple, symmetrical, trachea midline, no adenopathy, thyroid symmetric, no jvd skin normal  Respiratory: clear to auscultation, no wheezes or rales and unlabored breathing.  No intercostal tenderness  Cardiovascular: regular rate and rhythm, normal S1, S2, no murmur noted and 2+ pulses throughout  Abdomen: soft, nontender, nondistended, no masses or organomegaly  Extremities: peripheral pulses normal, no pedal edema, no clubbing or cyanosis    MEDICATIONS:  Scheduled Meds:   aspirin EC  81 mg Oral Daily    atorvastatin  80 mg Oral Daily    sodium chloride flush  5-40 mL IntraVENous 2 times per day    chlorhexidine  15 mL Mouth/Throat BID    polyvinyl alcohol  1 drop Both Eyes Q4H    And    artificial tears   Both Eyes Q4H    insulin lispro  0-6 Units SubCUTAneous TID WC    insulin lispro  0-3 Units SubCUTAneous Nightly    ampicillin-sulbactam  3,000 mg IntraVENous Q6H    pantoprazole  40 mg IntraVENous Daily       Continuous Infusions:   sodium chloride      cisatracurium (NIMBEX) infusion 2 mcg/kg/min (02/23/22 0525)    fentaNYL 100 mcg/hr (02/23/22 0520)    propofol 35 mcg/kg/min (02/23/22 0400)    sodium chloride 50 mL/hr at 02/23/22 0400    dextrose      midazolam 2 mg/hr (02/23/22 0631)    norepinephrine 5 mcg/min (02/23/22 0631)    DOBUTamine 5 mcg/kg/min (02/23/22 0400)    amiodarone 0.5 mg/min (02/23/22 0525)    heparin (PORCINE) Infusion 5.1 Units/kg/hr (02/23/22 0400)       PRN Meds:   sodium chloride flush, 5-40 mL, PRN  sodium chloride, 25 mL, PRN  ondansetron, 4 mg, Q8H PRN   Or  ondansetron, 4 mg, Q6H PRN  polyethylene glycol, 17 g, Daily PRN  acetaminophen, 650 mg, Q6H PRN   Or  acetaminophen, 650 mg, Q6H PRN  glucose, 15 g, PRN  glucagon (rDNA), 1 mg, PRN  dextrose, 100 mL/hr, PRN  dextrose bolus (hypoglycemia), 125 mL, PRN   Or  dextrose bolus (hypoglycemia), 250 mL, PRN  ipratropium-albuterol, 1 ampule, Q6H PRN  heparin (porcine), 4,000 Units, PRN  heparin (porcine), 2,000 Units, PRN        SUPPORT DEVICES: [] Ventilator [] BIPAP  [] Nasal Cannula [] Room Air    VENT SETTINGS (Comprehensive) (if applicable):   PRVC mode, FiO2 35%, PEEP 6, Respiratory Rate 16, Tidal Volume 580  Vent Information  $Ventilation: $Subsequent Day  Skin Assessment: Clean, dry, & intact  Equipment ID: [id number]  Vent Type: Servo i  Vent Mode: PRVC  Vt Ordered: 580 mL  Rate Set: 12 bmp  FiO2 : (S) 35 % (decreased per abg)  SpO2: 100 %  SpO2/FiO2 ratio: 250  Sensitivity: 5  PEEP/CPAP: (S) 6 (decreased per abg)  I Time/ I Time %: 0.9 s  Humidification Source: HME  Additional Respiratory  Assessments  Pulse: 83  Resp: 12  SpO2: 100 %  End Tidal CO2: 27 (%)  Position: Semi-Willis's  Humidification Source: HME  Oral Care Completed?: Yes  Oral Care: Mouthwash,Lip moisturizer applied,Mouth moisturizer,Mouth suctioned  Subglottic Suction Done?: Yes  Cuff Pressure (cm H2O):  (mov)  Lab Results   Component Value Date    MODE [facility] 02/23/2022       ABGs:   Arterial Blood Gas result:  pH 7.31; pCO2 44.1; pO2 116; HCO3 ; %O2 Sat 100.   No results found for: PH, PCO2, PO2, HCO3, O2SAT    DATA:  Complete Blood Count:   Recent Labs     02/21/22 2129 02/22/22 0224 02/23/22  0450   WBC 20.2* 18.4* 4.4   RBC 5.01 4.96 4.13*   HGB 15.2 14.9 12.4*   HCT 46.6 46.9 37.0*   MCV 93.0 94.6 89.6   MCH 30.3 30.0 30.0   MCHC 32.6 31.8 33.5   RDW 15.8* 15.9* 16.0*   PLT See Reflexed IPF Result 182 See Reflexed IPF Result   MPV  --  10.7  --         Last 3 Blood Glucose:   Recent Labs     02/21/22 2129 02/22/22 0224 02/22/22  0810 02/22/22  1320 02/22/22 2010 02/23/22 0215 02/23/22  0450   GLUCOSE 195* 127* 131* 118* 107* 127* 141*        PT/INR:    Lab Results   Component Value Date    PROTIME 12.3 02/22/2022    PROTIME 10.6 12/19/2011    INR 1.2 02/22/2022     PTT:    Lab Results   Component Value Date    APTT 60.5 02/23/2022       Basic Metabolic Profile:   Recent Labs     02/22/22  1320 02/22/22  1320 02/22/22 2010 02/22/22 2010 02/23/22  0215 02/23/22  0450 02/23/22  0621   *   < > 139  --  137 137  --    K 3.6*   < > 3.9   < > 4.3 4.2 4.1      < > 108*  --  107 107  --    CO2 18*   < > 21  --  20 20  --    BUN 18   < > 16  --  15 14  --    CREATININE 1.45*   < > 1.35*  --  1.29* 1.22*  --    GLUCOSE 118*   < > 107*  --  127* 141*  --    PHOS 2.2*  --  3.9  --  4.2  --   --     < > = values in this interval not displayed. Liver Function:  Recent Labs     02/23/22  0450   PROT 5.2*   LABALBU 2.9*   ALT 25   AST 54*   ALKPHOS 146*   BILITOT 0.47       Magnesium:   Lab Results   Component Value Date    MG 1.9 02/23/2022    MG 2.0 02/22/2022    MG 1.9 02/22/2022     Phosphorus:   Lab Results   Component Value Date    PHOS 4.2 02/23/2022    PHOS 3.9 02/22/2022    PHOS 2.2 02/22/2022     Ionized Calcium:   Lab Results   Component Value Date    CAION 1.14 02/23/2022    CAION 1.19 02/22/2022    CAION 1.15 02/22/2022        Urinalysis:   Lab Results   Component Value Date    NITRU NEGATIVE 12/18/2020    COLORU YELLOW 12/18/2020    PHUR 6.5 12/18/2020    WBCUA 0 TO 2 11/06/2020    RBCUA 0 TO 2 11/06/2020    MUCUS NOT REPORTED 11/06/2020    TRICHOMONAS NOT REPORTED 11/06/2020    YEAST NOT REPORTED 11/06/2020    BACTERIA NOT REPORTED 11/06/2020    CLARITYU clear 09/24/2020    SPECGRAV 1.008 12/18/2020    LEUKOCYTESUR NEGATIVE 12/18/2020    UROBILINOGEN Normal 12/18/2020    BILIRUBINUR NEGATIVE 12/18/2020    BLOODU +++ 09/24/2020    GLUCOSEU NEGATIVE 12/18/2020    KETUA NEGATIVE 12/18/2020    AMORPHOUS NOT REPORTED 11/06/2020       HgBA1c:    Lab Results   Component Value Date    LABA1C 5.1 04/21/2021     TSH:    Lab Results   Component Value Date    TSH 2.00 02/22/2022     Lactic Acid:   Lab Results   Component Value Date    LACTA 1.5 03/02/2020    LACTA 1.0 12/10/2019    LACTA 1.5 11/04/2019      Troponin: No results for input(s): TROPONINI in the last 72 hours. Microbiology:      Other Labs:  Results for orders placed or performed during the hospital encounter of 02/21/22   COVID-19, Rapid    Specimen: Nasopharyngeal Swab   Result Value Ref Range    Specimen Description . NASOPHARYNGEAL SWAB     SARS-CoV-2, Rapid Not Detected Not Detected   MRSA DNA Probe, Nasal    Specimen: Nasal   Result Value Ref Range    Specimen Description . NASAL SWAB     MRSA, DNA, Nasal NEGATIVE NEGATIVE   Culture, Blood 1 Specimen: Blood   Result Value Ref Range    Specimen Description . BLOOD     Culture NO GROWTH 18 HOURS    Culture, Respiratory    Specimen: Endotracheal   Result Value Ref Range    Specimen Description . ENDOTRACHEAL     Direct Exam < 10 EPITHELIAL CELLS/LPF     Direct Exam <10 NEUTROPHILS/LPF     Direct Exam MIXED BACTERIAL MORPHOTYPES SEEN ON GRAM STAIN.      Culture PENDING    Basic Metabolic Panel   Result Value Ref Range    Glucose 195 (H) 70 - 99 mg/dL    BUN 14 6 - 20 mg/dL    CREATININE 1.67 (H) 0.70 - 1.20 mg/dL    Calcium 8.8 8.6 - 10.4 mg/dL    Sodium 134 (L) 135 - 144 mmol/L    Potassium 3.8 3.7 - 5.3 mmol/L    Chloride 94 (L) 98 - 107 mmol/L    CO2 20 20 - 31 mmol/L    Anion Gap 20 (H) 9 - 17 mmol/L    GFR Non-African American 42 (L) >60 mL/min    GFR  51 (L) >60 mL/min    GFR Comment         CBC with Auto Differential   Result Value Ref Range    WBC 20.2 (H) 3.5 - 11.3 k/uL    RBC 5.01 4.21 - 5.77 m/uL    Hemoglobin 15.2 13.0 - 17.0 g/dL    Hematocrit 46.6 40.7 - 50.3 %    MCV 93.0 82.6 - 102.9 fL    MCH 30.3 25.2 - 33.5 pg    MCHC 32.6 28.4 - 34.8 g/dL    RDW 15.8 (H) 11.8 - 14.4 %    Platelets See Reflexed IPF Result 138 - 453 k/uL    NRBC Automated 0.2 (H) 0.0 per 100 WBC    Immature Granulocytes 3 (H) 0 %    Seg Neutrophils 60 36 - 66 %    Lymphocytes 29 24 - 44 %    Monocytes 4 1 - 7 %    Eosinophils % 4 1 - 4 %    Basophils 0 0 - 2 %    nRBC 1 (H) 0 per 100 WBC    Absolute Immature Granulocyte 0.61 (H) 0.00 - 0.30 k/uL    Segs Absolute 12.11 (H) 1.8 - 7.7 k/uL    Absolute Lymph # 5.86 (H) 1.10 - 3.70 k/uL    Absolute Mono # 0.81 (H) 0.1 - 0.8 k/uL    Absolute Eos # 0.81 (H) 0.0 - 0.4 k/uL    Basophils Absolute 0.00 0.0 - 0.2 k/uL    Morphology ANISOCYTOSIS PRESENT    Troponin   Result Value Ref Range    Troponin, High Sensitivity 106 (HH) 0 - 22 ng/L   Hepatic Function Panel   Result Value Ref Range    Albumin 3.5 3.5 - 5.2 g/dL    Alkaline Phosphatase 198 (H) 40 - 129 U/L    ALT 35 5 - 41 U/L    AST 65 (H) <40 U/L    Total Bilirubin 0.62 0.3 - 1.2 mg/dL    Bilirubin, Direct 0.23 <0.31 mg/dL    Bilirubin, Indirect 0.39 0.00 - 1.00 mg/dL    Total Protein 6.0 (L) 6.4 - 8.3 g/dL    Albumin/Globulin Ratio 1.4 1.0 - 2.5   Lactic Acid   Result Value Ref Range    Lactic Acid, Whole Blood 3.0 (H) 0.7 - 2.1 mmol/L   ELECTROLYTES PLUS   Result Value Ref Range    POC Sodium 139 138 - 146 mmol/L    POC Potassium 4.0 3.5 - 4.5 mmol/L    POC Chloride 99 98 - 107 mmol/L    POC TCO2 26 22 - 30 mmol/L    Anion Gap 15 7 - 16 mmol/L   Hemoglobin and hematocrit, blood   Result Value Ref Range    POC Hemoglobin 16.3 13.5 - 17.5 g/dL    POC Hematocrit 48 41 - 53 %   CALCIUM, IONIC (POC)   Result Value Ref Range    POC Ionized Calcium 1.21 1.15 - 1.33 mmol/L   Immature Platelet Fraction   Result Value Ref Range    Platelet, Immature Fraction 5.4 1.1 - 10.3 %    Platelet, Fluorescence 111 (L) 138 - 453 k/uL   Comprehensive Metabolic Panel w/ Reflex to MG   Result Value Ref Range    Glucose 127 (H) 70 - 99 mg/dL    BUN 15 6 - 20 mg/dL    CREATININE 1.54 (H) 0.70 - 1.20 mg/dL    Calcium 8.6 8.6 - 10.4 mg/dL    Sodium 133 (L) 135 - 144 mmol/L    Potassium 4.4 3.7 - 5.3 mmol/L    Chloride 101 98 - 107 mmol/L    CO2 18 (L) 20 - 31 mmol/L    Anion Gap 14 9 - 17 mmol/L    Alkaline Phosphatase 198 (H) 40 - 129 U/L    ALT 35 5 - 41 U/L    AST 74 (H) <40 U/L    Total Bilirubin 0.72 0.3 - 1.2 mg/dL    Total Protein 6.3 (L) 6.4 - 8.3 g/dL    Albumin 3.5 3.5 - 5.2 g/dL    Albumin/Globulin Ratio 1.3 1.0 - 2.5    GFR Non- 47 (L) >60 mL/min    GFR  57 (L) >60 mL/min    GFR Comment         CBC with Auto Differential   Result Value Ref Range    WBC 18.4 (H) 3.5 - 11.3 k/uL    RBC 4.96 4.21 - 5.77 m/uL    Hemoglobin 14.9 13.0 - 17.0 g/dL    Hematocrit 46.9 40.7 - 50.3 %    MCV 94.6 82.6 - 102.9 fL    MCH 30.0 25.2 - 33.5 pg    MCHC 31.8 28.4 - 34.8 g/dL    RDW 15.9 (H) 11.8 - 14.4 %    Platelets 651 857 - 453 k/uL    MPV 10.7 8.1 - 13.5 fL    NRBC Automated 0.0 0.0 per 100 WBC    RBC Morphology ANISOCYTOSIS PRESENT     Seg Neutrophils 81 (H) 36 - 65 %    Lymphocytes 10 (L) 24 - 43 %    Monocytes 6 3 - 12 %    Eosinophils % 1 1 - 4 %    Basophils 1 0 - 2 %    Immature Granulocytes 1 (H) 0 %    Segs Absolute 14.91 (H) 1.50 - 8.10 k/uL    Absolute Lymph # 1.85 1.10 - 3.70 k/uL    Absolute Mono # 1.13 0.10 - 1.20 k/uL    Absolute Eos # 0.17 0.00 - 0.44 k/uL    Basophils Absolute 0.09 0.00 - 0.20 k/uL    Absolute Immature Granulocyte 0.25 0.00 - 0.30 k/uL   Magnesium   Result Value Ref Range    Magnesium 2.1 1.6 - 2.6 mg/dL   Phosphorus   Result Value Ref Range    Phosphorus 4.0 2.5 - 4.5 mg/dL   APTT   Result Value Ref Range    PTT >120.0 (HH) 20.5 - 30.5 sec   Basic Metabolic Panel   Result Value Ref Range    Glucose 131 (H) 70 - 99 mg/dL    BUN 19 6 - 20 mg/dL    CREATININE 1.63 (H) 0.70 - 1.20 mg/dL    Calcium 8.1 (L) 8.6 - 10.4 mg/dL    Sodium 133 (L) 135 - 144 mmol/L    Potassium 4.1 3.7 - 5.3 mmol/L    Chloride 103 98 - 107 mmol/L    CO2 17 (L) 20 - 31 mmol/L    Anion Gap 13 9 - 17 mmol/L    GFR Non-African American 44 (L) >60 mL/min    GFR  53 (L) >60 mL/min    GFR Comment         Basic Metabolic Panel   Result Value Ref Range    Glucose 118 (H) 70 - 99 mg/dL    BUN 18 6 - 20 mg/dL    CREATININE 1.45 (H) 0.70 - 1.20 mg/dL    Calcium 8.5 (L) 8.6 - 10.4 mg/dL    Sodium 133 (L) 135 - 144 mmol/L    Potassium 3.6 (L) 3.7 - 5.3 mmol/L    Chloride 103 98 - 107 mmol/L    CO2 18 (L) 20 - 31 mmol/L    Anion Gap 12 9 - 17 mmol/L    GFR Non-African American 50 (L) >60 mL/min    GFR African American >60 >60 mL/min    GFR Comment         Calcium, Ionized   Result Value Ref Range    Calcium, Ion 1.05 (L) 1.13 - 1.33 mmol/L   Calcium, Ionized   Result Value Ref Range    Calcium, Ion 1.15 1.13 - 1.33 mmol/L   Lactic Acid   Result Value Ref Range    Lactic Acid, Whole Blood 1.4 0.7 - 2.1 mmol/L   Magnesium   Result Value Ref Range    Magnesium 2.1 1.6 - 2.6 mg/dL   Magnesium   Result Value Ref Range    Magnesium 1.9 1.6 - 2.6 mg/dL   Phosphorus   Result Value Ref Range    Phosphorus 2.1 (L) 2.5 - 4.5 mg/dL   Phosphorus   Result Value Ref Range    Phosphorus 2.2 (L) 2.5 - 4.5 mg/dL   Troponin   Result Value Ref Range    Troponin, High Sensitivity 713 (HH) 0 - 22 ng/L   Protime-INR   Result Value Ref Range    Protime 12.3 9.1 - 12.3 sec    INR 1.2    APTT   Result Value Ref Range    .9 (HH) 20.5 - 30.5 sec   Basic Metabolic Panel   Result Value Ref Range    Glucose 107 (H) 70 - 99 mg/dL    BUN 16 6 - 20 mg/dL    CREATININE 1.35 (H) 0.70 - 1.20 mg/dL    Calcium 8.2 (L) 8.6 - 10.4 mg/dL    Sodium 139 135 - 144 mmol/L    Potassium 3.9 3.7 - 5.3 mmol/L    Chloride 108 (H) 98 - 107 mmol/L    CO2 21 20 - 31 mmol/L    Anion Gap 10 9 - 17 mmol/L    GFR Non-African American 54 (L) >60 mL/min    GFR African American >60 >60 mL/min    GFR Comment         Calcium, Ionized   Result Value Ref Range    Calcium, Ion 1.19 1.13 - 1.33 mmol/L   Magnesium   Result Value Ref Range    Magnesium 2.0 1.6 - 2.6 mg/dL   Phosphorus   Result Value Ref Range    Phosphorus 3.9 2.5 - 4.5 mg/dL   TSH with Reflex   Result Value Ref Range    TSH 2.00 0.30 - 5.00 mIU/L   Lactic Acid   Result Value Ref Range    Lactic Acid, Whole Blood 1.2 0.7 - 2.1 mmol/L   Troponin   Result Value Ref Range    Troponin, High Sensitivity 677 (HH) 0 - 22 ng/L   Basic Metabolic Panel   Result Value Ref Range    Glucose 127 (H) 70 - 99 mg/dL    BUN 15 6 - 20 mg/dL    CREATININE 1.29 (H) 0.70 - 1.20 mg/dL    Calcium 8.2 (L) 8.6 - 10.4 mg/dL    Sodium 137 135 - 144 mmol/L    Potassium 4.3 3.7 - 5.3 mmol/L    Chloride 107 98 - 107 mmol/L    CO2 20 20 - 31 mmol/L    Anion Gap 10 9 - 17 mmol/L    GFR Non-African American 57 (L) >60 mL/min    GFR African American >60 >60 mL/min    GFR Comment         Calcium, Ionized   Result Value Ref Range    Calcium, Ion 1.14 1.13 - 1.33 mmol/L Magnesium   Result Value Ref Range    Magnesium 1.9 1.6 - 2.6 mg/dL   Phosphorus   Result Value Ref Range    Phosphorus 4.2 2.5 - 4.5 mg/dL   APTT   Result Value Ref Range    PTT 36.6 (H) 20.5 - 30.5 sec   CBC with Auto Differential   Result Value Ref Range    WBC 4.4 3.5 - 11.3 k/uL    RBC 4.13 (L) 4.21 - 5.77 m/uL    Hemoglobin 12.4 (L) 13.0 - 17.0 g/dL    Hematocrit 37.0 (L) 40.7 - 50.3 %    MCV 89.6 82.6 - 102.9 fL    MCH 30.0 25.2 - 33.5 pg    MCHC 33.5 28.4 - 34.8 g/dL    RDW 16.0 (H) 11.8 - 14.4 %    Platelets See Reflexed IPF Result 138 - 453 k/uL    NRBC Automated 0.0 0.0 per 100 WBC    RBC Morphology ANISOCYTOSIS PRESENT     Seg Neutrophils 74 (H) 36 - 65 %    Lymphocytes 17 (L) 24 - 43 %    Monocytes 6 3 - 12 %    Eosinophils % 2 1 - 4 %    Basophils 1 0 - 2 %    Immature Granulocytes 1 (H) 0 %    Segs Absolute 3.24 1.50 - 8.10 k/uL    Absolute Lymph # 0.75 (L) 1.10 - 3.70 k/uL    Absolute Mono # 0.25 0.10 - 1.20 k/uL    Absolute Eos # 0.09 0.00 - 0.44 k/uL    Basophils Absolute 0.03 0.00 - 0.20 k/uL    Absolute Immature Granulocyte <0.03 0.00 - 0.30 k/uL   Comprehensive Metabolic Panel w/ Reflex to MG   Result Value Ref Range    Glucose 141 (H) 70 - 99 mg/dL    BUN 14 6 - 20 mg/dL    CREATININE 1.22 (H) 0.70 - 1.20 mg/dL    Calcium 8.3 (L) 8.6 - 10.4 mg/dL    Sodium 137 135 - 144 mmol/L    Potassium 4.2 3.7 - 5.3 mmol/L    Chloride 107 98 - 107 mmol/L    CO2 20 20 - 31 mmol/L    Anion Gap 10 9 - 17 mmol/L    Alkaline Phosphatase 146 (H) 40 - 129 U/L    ALT 25 5 - 41 U/L    AST 54 (H) <40 U/L    Total Bilirubin 0.47 0.3 - 1.2 mg/dL    Total Protein 5.2 (L) 6.4 - 8.3 g/dL    Albumin 2.9 (L) 3.5 - 5.2 g/dL    Albumin/Globulin Ratio 1.3 1.0 - 2.5    GFR Non-African American >60 >60 mL/min    GFR African American >60 >60 mL/min    GFR Comment         APTT   Result Value Ref Range    PTT 60.5 (H) 20.5 - 30.5 sec   Potassium   Result Value Ref Range    Potassium 4.1 3.7 - 5.3 mmol/L   Immature Platelet Ref Range    POC Glucose 132 (H) 75 - 110 mg/dL   POC Glucose Fingerstick   Result Value Ref Range    POC Glucose 116 (H) 75 - 110 mg/dL   Arterial Blood Gas, POC   Result Value Ref Range    POC pH 7.411 7.350 - 7.450    POC pCO2 32.1 (L) 35.0 - 48.0 mm Hg    POC PO2 110.3 (H) 83.0 - 108.0 mm Hg    POC HCO3 20.4 (L) 21.0 - 28.0 mmol/L    Negative Base Excess, Art 4 (H) 0.0 - 2.0    POC O2 SAT 98 94.0 - 98.0 %    O2 Device/Flow/% Adult Ventilator     Danilo Test NOT APPLICABLE     Sample Site Arterial Line     Mode PRVC     FIO2 40.0     Pt Temp 33.0     POC pH Temp 7.47     POC pCO2 Temp 27 mm Hg    POC pO2 Temp 88 mm Hg   POC Glucose Fingerstick   Result Value Ref Range    POC Glucose 103 75 - 110 mg/dL   Arterial Blood Gas, POC   Result Value Ref Range    POC pH 7.303 (L) 7.350 - 7.450    POC pCO2 48.9 (H) 35.0 - 48.0 mm Hg    POC PO2 115.4 (H) 83.0 - 108.0 mm Hg    POC HCO3 24.2 21.0 - 28.0 mmol/L    Negative Base Excess, Art 2 0.0 - 2.0    POC O2 SAT 98 94.0 - 98.0 %    O2 Device/Flow/% Adult Ventilator     Danilo Test NOT APPLICABLE     Sample Site Arterial Line     Mode PRVC     FIO2 40.0     Pt Temp 33.0     POC pH Temp 7.36     POC pCO2 Temp 41 mm Hg    POC pO2 Temp 93 mm Hg   POCT Glucose   Result Value Ref Range    POC Glucose 102 (H) 74 - 100 mg/dL   Arterial Blood Gas, POC   Result Value Ref Range    POC pH 7.262 (L) 7.350 - 7.450    POC pCO2 54.2 (H) 35.0 - 48.0 mm Hg    POC PO2 136.6 (H) 83.0 - 108.0 mm Hg    POC HCO3 24.4 21.0 - 28.0 mmol/L    Negative Base Excess, Art 3 (H) 0.0 - 2.0    POC O2 SAT 99 (H) 94.0 - 98.0 %    O2 Device/Flow/% Adult Ventilator     Danilo Test NOT APPLICABLE     Sample Site Arterial Line     Mode PRVC     FIO2 40.0     Pt Temp 33.1     POC pH Temp 7.32     POC pCO2 Temp 46 mm Hg    POC pO2 Temp 115 mm Hg   POCT Glucose   Result Value Ref Range    POC Glucose 142 (H) 74 - 100 mg/dL   EKG 12 Lead   Result Value Ref Range    Ventricular Rate 38 BPM    Atrial Rate 38 BPM    P-R Interval 192 ms    QRS Duration 86 ms    Q-T Interval 604 ms    QTc Calculation (Bazett) 480 ms    P Axis 104 degrees    R Axis -27 degrees    T Axis 129 degrees       Radiology/Imaging:  XR ABDOMEN (KUB) (SINGLE AP VIEW)   Final Result   Left femoral venous line appears in satisfactory position. XR ABDOMEN (KUB) (SINGLE AP VIEW)   Final Result   1. No acute abdominal abnormality by radiograph. 2. No IVC filter visualized. CT HEAD WO CONTRAST   Final Result   No acute intracranial abnormality. CT CERVICAL SPINE WO CONTRAST   Final Result   Remote anterior fusion from C4 through C7 with C5-C6 corpectomy and bone   strut placement. Prominent/recurrent spondylosis with moderate cord flattening at C4-C5 and   C5-C6. No acute fracture or traumatic malalignment. XR CHEST PORTABLE   Final Result   The ET and OG tubes have been placed in satisfactory position. Right greater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

## 2022-02-23 NOTE — CARE COORDINATION
Case Management Initial Discharge Plan  Leonila Lucio             Met with:patient's dtrFELIPE, to discuss discharge plans. Pt intubated  Information verified: address, contacts, phone number, , insurance Yes  Insurance Provider: [address] [place] [address]    Emergency Contact/Next of Kin name & number: Sandeep Roberts dtr [phone]  Who are involved in patient's support system? Carol, dtr    PCP: Mami Thomas MD  Date of last visit: dtr unsure      Discharge Planning    Living Arrangements: Lives with Liliana meza has 2 tories  Unsure how many stairs to climb to get into front door, dtr unsure how many stairs to climb to reach second floor  Location of bedroom/bathroom in home    - dtr unsure    Patient able to perform ADL's:Independent    Current Services (outpatient & in home) none  DME equipment: cane, walker  DME provider:    Is patient receiving oral anticoagulation therapy? Yes, aspirin  If indicated:   Physician managing anticoagulation treatment:  Where does patient obtain lab work for ATC treatment? Potential Assistance Needed:       Patient agreeable to home care: will discuss with pt if able  Freedom of choice provided:      Prior SNF/Rehab Placement and Facility: no  Agreeable to SNF/Rehab: will discuss with pt if able  Freedom of choice provided:      Evaluation: no    Expected Discharge date:       Patient expects to be discharged to: If home: is the family and/or caregiver wiling & able to provide support at home?yes  Who will be providing this support? [email or web address]  Follow Up Appointment: Best Day/ Time:      Transportation provider: family  Transportation arrangements needed for discharge: No, family     Readmission Risk              Risk of Unplanned Readmission:  29             Does patient have a readmission risk score greater than 14?: Yes  If yes, follow-up appointment must be made within 7 days of discharge.      Goals of Care:       Educated dtr on transitional options, provided freedom of choice and are agreeable with plan      Discharge Plan: Pt intubated.   Await pt to be re-warmed and neuro status to determine needs        Electronically signed by Armando Goncalves on 2/23/22 at 3:54 PM EST

## 2022-02-23 NOTE — PLAN OF CARE
STAT repeat platelet count came back to 104. No concern for HIT. Will discontinue Argatroban and switch back to IV heparin 12 units/kg. D/C HIT but lab already sent will follow up on lab draw. Sharon Yañez MD  Internal Medicine Resident, PGY- [place];  [place], [place]  2/23/2022, 12:36 PM

## 2022-02-23 NOTE — FLOWSHEET NOTE
02/23/22 0715   Treatment Team Notification   Reason for Communication Evaluate; Review case   Team Member Name Dr. Breanna Quintanilla Team Role Resident   Method of Communication Face to face   Response In department   Notification Time Lisa Mcdaniel 97, RN

## 2022-02-23 NOTE — PLAN OF CARE
Problem: OXYGENATION/RESPIRATORY FUNCTION  Goal: Patient will maintain patent airway  2/22/2022 2019 by Ngozi Varghese RCP  Outcome: Ongoing     Problem: OXYGENATION/RESPIRATORY FUNCTION  Goal: Patient will achieve/maintain normal respiratory rate/effort  Description: Respiratory rate and effort will be within normal limits for the patient  2/22/2022 2019 by Rachel Varghese RCP  Outcome: Ongoing     Problem: MECHANICAL VENTILATION  Goal: Patient will maintain patent airway  2/22/2022 2019 by Ngozi Varghese RCP  Outcome: Ongoing     Problem: MECHANICAL VENTILATION  Goal: Oral health is maintained or improved  2/22/2022 2019 by Rachel Varghese RCP  Outcome: Ongoing     Problem: MECHANICAL VENTILATION  Goal: ET tube will be managed safely  2/22/2022 2019 by Rachel Varghese RCP  Outcome: Ongoing     Problem: MECHANICAL VENTILATION  Goal: Ability to express needs and understand communication  2/22/2022 2019 by Rachel Varghese RCP  Outcome: Ongoing     Problem: MECHANICAL VENTILATION  Goal: Mobility/activity is maintained at optimum level for patient  2/22/2022 2019 by Rachel Varghese RCP  Outcome: Ongoing     Problem: SKIN INTEGRITY  Goal: Skin integrity is maintained or improved  2/22/2022 2019 by Jan Okeefe RCP  Outcome: Ongoing

## 2022-02-23 NOTE — PROCEDURES
LONG-TERM EEG-VIDEO 5656 [address]    Patient: Alan Guerra  Age: 60 y.o. MRN: [id number]    Referring Physician: No ref. provider found  History: The patient is a 60 y.o. male who presented breakthrough seizure/encephalopathy. This long-term video-EEG monitoring study was performed to determine the nature of the patient's clinical events. The patient is on neuroactive medications.    Alan Guerra   Current Facility-Administered Medications   Medication Dose Route Frequency Provider Last Rate Last Admin    insulin lispro (HUMALOG) injection vial 0-3 Units  0-3 Units SubCUTAneous Q6H Faustino Vuong MD        aspirin chewable tablet 81 mg  81 mg Oral Daily Faustino Vuong MD        atorvastatin (LIPITOR) tablet 80 mg  80 mg Oral Daily Vergie Addie, DO   80 mg at 02/22/22 0848    sodium chloride flush 0.9 % injection 5-40 mL  5-40 mL IntraVENous 2 times per day Vergie Addie, DO   10 mL at 02/22/22 0848    sodium chloride flush 0.9 % injection 5-40 mL  5-40 mL IntraVENous PRN Vergie [place], DO        0.9 % sodium chloride infusion  25 mL IntraVENous PRN Vergie Addie, DO        ondansetron (ZOFRAN-ODT) disintegrating tablet 4 mg  4 mg Oral Q8H PRN Vergie [place], DO        Or    ondansetron [facility] injection 4 mg  4 mg IntraVENous Q6H PRN Vergie [place], DO        polyethylene glycol (GLYCOLAX) packet 17 g  17 g Oral Daily PRN Vergie [place], DO        acetaminophen (TYLENOL) tablet 650 mg  650 mg Oral Q6H PRN Vergie [place], DO        Or    acetaminophen (TYLENOL) suppository 650 mg  650 mg Rectal Q6H PRN Vergie Addie, DO        chlorhexidine (PERIDEX) 0.12 % solution 15 mL  15 mL Mouth/Throat BID Vergie [place], DO   15 mL at 02/23/22 0920    cisatracurium besylate (NIMBEX) 200 mg in sodium chloride 0.9 % 100 mL infusion  0.5-10 mcg/kg/min IntraVENous Continuous Vergie [place], DO 5.4 mL/hr at 02/23/22 0925 2 mcg/kg/min at 02/23/22 0925    polyvinyl alcohol (LIQUIFILM TEARS) 1.4 % ophthalmic solution 1 drop  1 drop Both Eyes Q4H Juan Petit, DO   1 drop at 02/23/22 5037    And    lubrifresh P.M. (artificial tears) ophthalmic ointment   Both Eyes Q4H Juan Petit, DO   Given at 02/23/22 6992    fentaNYL 20 mcg/mL Infusion   mcg/hr IntraVENous Continuous Juan Petit, DO 5 mL/hr at 02/23/22 0520 100 mcg/hr at 02/23/22 0520    propofol injection  5-50 mcg/kg/min IntraVENous Titrated Juan Petit, DO 18.9 mL/hr at 02/23/22 0925 35 mcg/kg/min at 02/23/22 0925    0.9 % sodium chloride infusion   IntraVENous Continuous Juan Petit, DO 50 mL/hr at 02/23/22 0925 Rate Verify at 02/23/22 0925    glucose (GLUTOSE) 40 % oral gel 15 g  15 g Oral PRN Cookie Body, DO        glucagon (rDNA) injection 1 mg  1 mg IntraMUSCular PRN Cookie Body, DO        dextrose 5 % solution  100 mL/hr IntraVENous PRN Cookie Body, DO        dextrose bolus (hypoglycemia) 10% 125 mL  125 mL IntraVENous PRN Cookie Body, DO        Or    dextrose bolus (hypoglycemia) 10% 250 mL  250 mL IntraVENous PRN Cookie Body, DO        ipratropium-albuterol (DUONEB) nebulizer solution 1 ampule  1 ampule Inhalation Q6H PRN Natalia Perkins MD        midazolam (VERSED) 1 mg/mL in D5W infusion  1-10 mg/hr IntraVENous Continuous Cookie Body, DO 2 mL/hr at 02/23/22 0925 2 mg/hr at 02/23/22 0925    ampicillin-sulbactam (UNASYN) 3000 mg ivpb minibag  3,000 mg IntraVENous Q6H Cookie Body, DO   Stopped at 02/23/22 9456    pantoprazole (PROTONIX) injection 40 mg  40 mg IntraVENous Daily Cookie Body, DO        norepinephrine (LEVOPHED) 16 mg in sodium chloride 0.9 % 250 mL infusion (non-weight-based)  1-100 mcg/min IntraVENous Continuous Cookie Body, DO 4.7 mL/hr at 02/23/22 0925 5 mcg/min at 02/23/22 0925    DOBUTamine (DOBUTREX) 1000 mg in dextrose 5 % 250 mL infusion  2.5-5 mcg/kg/min IntraVENous Continuous Penne Becket, DO 6.7 mL/hr at 02/23/22 0925 5 mcg/kg/min at 02/23/22 9790    amiodarone (CORDARONE) 450 mg in dextrose 5 % 250 mL infusion  0.5 mg/min IntraVENous Continuous Ike Jose, DO 16.7 mL/hr at 02/23/22 0925 0.5 mg/min at 02/23/22 0085    heparin (porcine) injection 4,000 Units  4,000 Units IntraVENous PRN Ike Jose, DO        heparin (porcine) injection 2,000 Units  2,000 Units IntraVENous PRN Ike Jose, DO   2,000 Units at 02/23/22 0920    heparin 25,000 units in 0.9% sodium chloride 250 mL infusion  5-30 Units/kg/hr IntraVENous Continuous Ike Jose, DO 6.4 mL/hr at 02/23/22 0925 7.1 Units/kg/hr at 02/23/22 8963     Technical Description: This is a 21-channel digital EEG recording with time-locked video. Electrodes were placed in accordance with the 10-20 International System of Electrode Placement. Single lead EKG monitoring was included. Baseline EEG Recording:  A formal baseline EEG recording was not obtained. Day 1 - 2/22/22, starting at 11:29    Interictal EEG Samples: Adult onset recording, no clear dissectible cerebral activity was seen. As recording progressed, the background showed 3 to 4 Hz of polymorphic delta activity of 15 to 25 µV and frequent periods of diffuse attenuation lasting 4 to 3 seconds. There was no obvious asymmetry between hemispheres. No abnormal epileptiform activity was seen. The EKG channel revealed no abnormalities. Ictal EEG Recording / Patient Events: During this period the patient had no events or seizures. Summary: During this day of recording no events were recorded. The interictal EEG was abnormal due to diffuse polymorphic delta slowing and periods of diffuse attenuation suggesting severe encephalopathy. This finding could be from patient's sedation medication or from hypothermia. No abnormal epileptiform activity was seen. Monitoring was continued in order to record the patient's typical events.  The EKG channel revealed no abnormalities. Day 2 - 2/23/22, reviewed through 7:30 am    Interictal EEG Samples: Interictal EEG was unchanged from yesterday. Ictal EEG Recording / Patient Events: During this period the patient had no events or seizures. Summary: During this day of recording no events were recorded. The interictal EEG was abnormal due to diffuse polymorphic delta slowing and periods of diffuse attenuation suggesting severe encephalopathy. This finding could be from patient's sedation medication or from hypothermia. No abnormal epileptiform activity was seen. Monitoring was continued in order to record the patient's typical events. The EKG channel revealed no abnormalities. Ibrahima Jay MD  Diplomate, American Board of Psychiatry and Neurology  Diplomate, American Board of Clinical Neurophysiology  Diplomate, American Board of Epilepsy       Please note this is a preliminary report and updated daily. The final report will have a summary of behavior and electrographic findings with clinical correlation.
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
## 2022-02-23 NOTE — FLOWSHEET NOTE
02/23/22 1000   Treatment Team Notification   Reason for Communication Evaluate; Review case   Team Member Name Dr. Pretty Quintana Team Role Attending Provider   Method of Communication Face to face   Response At bedside   Notification Time 1 [facility], RN

## 2022-02-23 NOTE — PROGRESS NOTES
Daily Progress Note  Neuro Critical Care    Patient Name: Susu Parsons  Patient : 1965  Room/Bed: 0125/0125-01  Code Status: FULL  Allergies: Allergies   Allergen Reactions    Morphine Itching       CHIEF COMPLAINT:        Post Cardiac Arrest with ROSC     INTERVAL HISTORY    Initial Presentation (Admitted 2022): The patient is a 60 y.o. male who presents as post arrest with ROSC after V. Fib arrest. Patient was reportedly at home when a family member heard the patient fall upstairs however was unable to check on the patient. EMS was called and patient was found to be in V. Fib arrest. ACLS was initiated and patient received two shocks and ROSC was achieved. En route to hospital patient went into PEA arrest. Compressions were resumed and epinephrine was given and ROSC was achieved. Unknown total down time. I- Gel per EMS exchanged for definitive airway, ET tube in the ED. Requiring mechanical ventilation.      In the emergency department Hemodynamically stable off pressors. Labs demonstrated elevated creatinine (1.67), lactic acidosis (3.0), leukocytosis (20.2), EKG was concerning for STEMI with ST elevations in V3, V4 and V5. Cardiology was consulted however patient did not meet STEMI criteria. Cardiology initially planned to Cath patient however had concerns for poor neurological status. CT head was negative.      Neurocritical Care consulted for neuro prognostication. Last 24h:   Patient continues to be sedated and paralyzed. Pupils reactive. EEG reported unchanged from yesterday.      CURRENT MEDICATIONS:  SCHEDULED MEDICATIONS:   insulin lispro  0-3 Units SubCUTAneous Q6H    aspirin  81 mg Oral Daily    atorvastatin  80 mg Oral Daily    sodium chloride flush  5-40 mL IntraVENous 2 times per day    chlorhexidine  15 mL Mouth/Throat BID    polyvinyl alcohol  1 drop Both Eyes Q4H    And    artificial tears   Both Eyes Q4H    ampicillin-sulbactam  3,000 mg IntraVENous Q6H    pantoprazole  40 mg IntraVENous Daily     CONTINUOUS INFUSIONS:   DOBUTamine 2.5 mcg/kg/min (22 1022)    sodium chloride      cisatracurium (NIMBEX) infusion 2 mcg/kg/min (22)    fentaNYL 100 mcg/hr (22)    propofol 35 mcg/kg/min (22)    sodium chloride 50 mL/hr at 22 1031    dextrose      midazolam 2 mg/hr (22)    norepinephrine 10 mcg/min (22)    amiodarone 0.5 mg/min (22)    heparin (PORCINE) Infusion 7.1 Units/kg/hr (22)     PRN MEDICATIONS:   sodium chloride flush, sodium chloride, ondansetron **OR** ondansetron, polyethylene glycol, acetaminophen **OR** acetaminophen, glucose, glucagon (rDNA), dextrose, dextrose bolus (hypoglycemia) **OR** dextrose bolus (hypoglycemia), ipratropium-albuterol, heparin (porcine), heparin (porcine)    VITALS:  Temperature Range: Temp: 98.8 °F (37.1 °C) Temp  Av °F (33.3 °C)  Min: 91.4 °F (33 °C)  Max: 98.8 °F (37.1 °C)  BP Range: Systolic (37GRK), EPQ:636 , Min:68 , JAQ:634     Diastolic (53IBW), ZMV:02, Min:42, Max:91    Pulse Range: Pulse  Av.2  Min: 34  Max: 107  Respiration Range: Resp  Avg: 15.2  Min: 12  Max: 25  Current Pulse Ox: SpO2: 99 %  24HR Pulse Ox Range: SpO2  Av.9 %  Min: 99 %  Max: 100 %  Patient Vitals for the past 12 hrs:   BP Temp Temp src Pulse Resp SpO2   22 1015    104 12    22 1011    104     22 1000 95/77   98 12    22 0945    95 12    22 0930    101 12    22 0915    97 12    22 0900 94/73   91 12    22 0848    102 12 99 %   22 0815    105     22 0800 121/82 98.8 °F (37.1 °C) Esophageal 91     22 0745    105     22 0730  92.8 °F (33.8 °C) Esophageal 107 12 100 %   22 0715    107 12    22 0700 92/64   106 12    22 0600  92.1 °F (33.4 °C)       22 0545  (!) 91.9 °F (33.3 °C)       22 0530  (!) 91.9 °F (33.3 °C)       02/23/22 0500  (!) 91.8 °F (33.2 °C)       02/23/22 0445  (!) 91.6 °F (33.1 °C) Esophageal 83     02/23/22 0430  (!) 91.6 °F (33.1 °C) Bladder 84     02/23/22 0415  (!) 91.6 °F (33.1 °C) Bladder 83     02/23/22 0400 101/74 (!) 91.6 °F (33.1 °C) Bladder 83 12 100 %   02/23/22 0345    79     02/23/22 0340    80 12    02/23/22 0330    79     02/23/22 0315    77     02/23/22 0300 103/77   76     02/23/22 0245    75     02/23/22 0230    75     02/23/22 0215    71     02/23/22 0200 117/81 (!) 91.6 °F (33.1 °C) Bladder 73  100 %   02/23/22 0145    79     02/23/22 0130    79     02/23/22 0115    79     02/23/22 0100 81/65 (!) 91.8 °F (33.2 °C) Bladder 82     02/23/22 0045    88     02/23/22 0030 (!) 100/45   88  100 %   02/23/22 0015    74     02/23/22 0000 (!) 145/91 (!) 91.8 °F (33.2 °C) Bladder 82 12 100 %   02/22/22 2345    83     02/22/22 2339    87     02/22/22 2330    78     02/22/22 2315    83     02/22/22 2300 (!) 144/91 (!) 91.8 °F (33.2 °C) Bladder 79       Estimated body mass index is 28.37 kg/m² as calculated from the following:    Height as of this encounter: 5' 10\" (1.778 m).     Weight as of this encounter: 197 lb 12 oz (89.7 kg).  []<16 Severe malnutrition  []1616.99 Moderate malnutrition  []1718.49 Mild malnutrition  []18.524.9 Normal  [x]2529.9 Overweight (not obese)  []3034.9 Obese class 1 (Low Risk)  []3539.9 Obese class 2 (Moderate Risk)  []?40 Obese class 3 (High Risk)    RECENT LABS:   Lab Results   Component Value Date    WBC 4.4 02/23/2022    HGB 12.4 (L) 02/23/2022    HCT 37.0 (L) 02/23/2022    PLT See Reflexed IPF Result 02/23/2022    CHOL 188 11/07/2020    TRIG 235 (H) 11/07/2020    HDL 52 11/07/2020    ALT 25 02/23/2022    AST 54 (H) 02/23/2022     02/23/2022    K 4.1 02/23/2022     (H) 02/23/2022    CREATININE 1.19 02/23/2022    BUN 14 02/23/2022    CO2 20 02/23/2022    TSH 2.00 02/22/2022    PSA 0.22 01/13/2016    INR 1.2 02/22/2022    LABA1C 5.1 04/21/2021     24 HOUR INTAKE/OUTPUT:    Intake/Output Summary (Last 24 hours) at 2/23/2022 1045  Last data filed at 2/23/2022 1031  Gross per 24 hour   Intake 5016.56 ml   Output 3046 ml   Net 1970.56 ml       IMAGING:   CT Head WO Contrast 2/21/2022  Impression   No acute intracranial abnormality. CT Cervical Spine WO Contrast 2/21/2022  Impression   Remote anterior fusion from C4 through C7 with C5-C6 corpectomy and bone   strut placement.       Prominent/recurrent spondylosis with moderate cord flattening at C4-C5 and   C5-C6.       No acute fracture or traumatic malalignment. Labs and Images reviewed with:  [] Dr. Theresa Fermin    [x] Dr. Keren Reeves  [] Dr. Danilo Lara  [] There are no new interval images to review. PHYSICAL EXAM       CONSTITUTIONAL:  Intubated, sedated   HEAD:  normocephalic, atraumatic    EYES:  PERRLA, EOMI.   ENT:  moist mucous membranes   NECK:  supple, symmetric   LUNGS:  Equal air entry bilaterally   CARDIOVASCULAR:  normal s1 / s2, RRR, distal pulses intact   ABDOMEN:  Soft, no rigidity   NEUROLOGIC:  Mental Status:  Intubated, sedated             Cranial Nerves:    Pupils reactive b/l, sluggish  + Gag    Motor Exam:    Sedated, paralytic on board, rewarming    Sensory:    Touch:    Sedated, paralytic on board, rewarming         DRAINS:  [x] There are no drains for Neuro Critical Care to monitor at this time. ASSESSMENT AND PLAN:       This is a 64-year-old male who presented after V. fib cardiac arrest with unknown total downtime requiring defibrillation and subsequent ROSC, rearrest PEA with epinephrine x1 and intubated in the ED. Neuro critical care consultation obtained for neuro prognostication.     Presently undergoing TTM per MICU team    Will obtain NSE daily x 3 days (24hr, 48hr, 72hr)  Obtain MRI Brain at 72 hours  Also obtain MRI Cervical Spine at that time per [facility] rec  LTME monitoring    We will continue to follow along. For any changes in exam or patient status please contact Neuro Critical Care.       Parvin Mistry MD  Neuro Critical Care  Pager [phone]  2/23/2022     10:45 AM

## 2022-02-23 NOTE — PROGRESS NOTES
Upon reading previous notes and looking at vitals, 50 mcg/kg/min or propofol and 200 mcg/hr of Fentanyl were needed to achieve a RASS -5 prior to starting paralytic. Pt TOF was 4/4 at 20 mAp. Paraytic was started at 2 mg/kg/min and titrated up d/t TOF 4/4. Paralytic and sedation titrated down on days d/t vitals, BIS, and TOF. Upon first assessment, TOF was 4/4 and SBP into the 170s and breath initiated above the vent, as well as a BIS reading of 89. Sedation and paralytic increased per patient situation and will continue to monitor. Regardless of BIS reading, will titrate sedation as needed if pt vitals show that pt not adequately sedated, as pt is on less sedation than when he was prior to paralytic initiation overnight.      Electronically signed by Bakari Vazquez RN on 2/22/2022 at 8:53 PM
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
## 2022-02-24 LAB
ABSOLUTE EOS #: 0.21 K/UL (ref 0–0.44)
ABSOLUTE IMMATURE GRANULOCYTE: 0.07 K/UL (ref 0–0.3)
ABSOLUTE LYMPH #: 0.8 K/UL (ref 1.1–3.7)
ABSOLUTE MONO #: 0.68 K/UL (ref 0.1–1.2)
ALBUMIN SERPL-MCNC: 2.7 G/DL (ref 3.5–5.2)
ALBUMIN/GLOBULIN RATIO: 1.1 (ref 1–2.5)
ALLEN TEST: ABNORMAL
ALP BLD-CCNC: 140 U/L (ref 40–129)
ALT SERPL-CCNC: 21 U/L (ref 5–41)
AMMONIA: 24 UMOL/L (ref 16–60)
ANION GAP SERPL CALCULATED.3IONS-SCNC: 13 MMOL/L (ref 9–17)
AST SERPL-CCNC: 43 U/L
BASOPHILS # BLD: 1 % (ref 0–2)
BASOPHILS ABSOLUTE: 0.06 K/UL (ref 0–0.2)
BILIRUB SERPL-MCNC: 0.66 MG/DL (ref 0.3–1.2)
BUN BLDV-MCNC: 14 MG/DL (ref 6–20)
CALCIUM SERPL-MCNC: 8.1 MG/DL (ref 8.6–10.4)
CHLORIDE BLD-SCNC: 109 MMOL/L (ref 98–107)
CO2: 18 MMOL/L (ref 20–31)
CREAT SERPL-MCNC: 1.48 MG/DL (ref 0.7–1.2)
CULTURE: NORMAL
DIRECT EXAM: NORMAL
EOSINOPHILS RELATIVE PERCENT: 2 % (ref 1–4)
FIO2: 35
GFR AFRICAN AMERICAN: 59 ML/MIN
GFR NON-AFRICAN AMERICAN: 49 ML/MIN
GFR SERPL CREATININE-BSD FRML MDRD: ABNORMAL ML/MIN/{1.73_M2}
GLUCOSE BLD-MCNC: 79 MG/DL (ref 75–110)
GLUCOSE BLD-MCNC: 85 MG/DL (ref 70–99)
GLUCOSE BLD-MCNC: 85 MG/DL (ref 74–100)
GLUCOSE BLD-MCNC: 95 MG/DL (ref 75–110)
GLUCOSE BLD-MCNC: 99 MG/DL (ref 75–110)
HCT VFR BLD CALC: 35.4 % (ref 40.7–50.3)
HEMOGLOBIN: 11.9 G/DL (ref 13–17)
HEPARIN INDUCED PLATELET ANTIBODY: 0.15 O.D. (ref 0–0.4)
IMMATURE GRANULOCYTES: 1 %
LYMPHOCYTES # BLD: 7 % (ref 24–43)
MAGNESIUM: 2.1 MG/DL (ref 1.6–2.6)
MCH RBC QN AUTO: 30.7 PG (ref 25.2–33.5)
MCHC RBC AUTO-ENTMCNC: 33.6 G/DL (ref 28.4–34.8)
MCV RBC AUTO: 91.2 FL (ref 82.6–102.9)
MODE: ABNORMAL
MONOCYTES # BLD: 6 % (ref 3–12)
NEGATIVE BASE EXCESS, ART: 4 (ref 0–2)
NRBC AUTOMATED: 0 PER 100 WBC
O2 DEVICE/FLOW/%: ABNORMAL
PARTIAL THROMBOPLASTIN TIME: 54 SEC (ref 20.5–30.5)
PARTIAL THROMBOPLASTIN TIME: 72.1 SEC (ref 20.5–30.5)
PARTIAL THROMBOPLASTIN TIME: 76.6 SEC (ref 20.5–30.5)
PDW BLD-RTO: 16.8 % (ref 11.8–14.4)
PHOSPHORUS: 2.9 MG/DL (ref 2.5–4.5)
PLATELET # BLD: ABNORMAL K/UL (ref 138–453)
PLATELET, FLUORESCENCE: 103 K/UL (ref 138–453)
PLATELET, IMMATURE FRACTION: 5.6 % (ref 1.1–10.3)
POC HCO3: 21.2 MMOL/L (ref 21–28)
POC O2 SATURATION: 97 % (ref 94–98)
POC PCO2: 39.7 MM HG (ref 35–48)
POC PH: 7.34 (ref 7.35–7.45)
POC PO2: 95.5 MM HG (ref 83–108)
POTASSIUM SERPL-SCNC: 4.1 MMOL/L (ref 3.7–5.3)
RBC # BLD: 3.88 M/UL (ref 4.21–5.77)
RBC # BLD: ABNORMAL 10*6/UL
SAMPLE SITE: ABNORMAL
SEG NEUTROPHILS: 84 % (ref 36–65)
SEGMENTED NEUTROPHILS ABSOLUTE COUNT: 9.3 K/UL (ref 1.5–8.1)
SODIUM BLD-SCNC: 140 MMOL/L (ref 135–144)
SPECIMEN DESCRIPTION: NORMAL
TOTAL PROTEIN: 5.2 G/DL (ref 6.4–8.3)
WBC # BLD: 11.1 K/UL (ref 3.5–11.3)

## 2022-02-24 PROCEDURE — 80053 COMPREHEN METABOLIC PANEL: CPT

## 2022-02-24 PROCEDURE — 82803 BLOOD GASES ANY COMBINATION: CPT

## 2022-02-24 PROCEDURE — 94761 N-INVAS EAR/PLS OXIMETRY MLT: CPT

## 2022-02-24 PROCEDURE — 85055 RETICULATED PLATELET ASSAY: CPT

## 2022-02-24 PROCEDURE — 6360000002 HC RX W HCPCS: Performed by: STUDENT IN AN ORGANIZED HEALTH CARE EDUCATION/TRAINING PROGRAM

## 2022-02-24 PROCEDURE — 2700000000 HC OXYGEN THERAPY PER DAY

## 2022-02-24 PROCEDURE — 94003 VENT MGMT INPAT SUBQ DAY: CPT

## 2022-02-24 PROCEDURE — 2580000003 HC RX 258: Performed by: STUDENT IN AN ORGANIZED HEALTH CARE EDUCATION/TRAINING PROGRAM

## 2022-02-24 PROCEDURE — 37799 UNLISTED PX VASCULAR SURGERY: CPT

## 2022-02-24 PROCEDURE — C9113 INJ PANTOPRAZOLE SODIUM, VIA: HCPCS | Performed by: STUDENT IN AN ORGANIZED HEALTH CARE EDUCATION/TRAINING PROGRAM

## 2022-02-24 PROCEDURE — 82947 ASSAY GLUCOSE BLOOD QUANT: CPT

## 2022-02-24 PROCEDURE — 86316 IMMUNOASSAY TUMOR OTHER: CPT

## 2022-02-24 PROCEDURE — 85025 COMPLETE CBC W/AUTO DIFF WBC: CPT

## 2022-02-24 PROCEDURE — 82140 ASSAY OF AMMONIA: CPT

## 2022-02-24 PROCEDURE — 99291 CRITICAL CARE FIRST HOUR: CPT | Performed by: INTERNAL MEDICINE

## 2022-02-24 PROCEDURE — 84100 ASSAY OF PHOSPHORUS: CPT

## 2022-02-24 PROCEDURE — 6370000000 HC RX 637 (ALT 250 FOR IP): Performed by: INTERNAL MEDICINE

## 2022-02-24 PROCEDURE — 95720 EEG PHY/QHP EA INCR W/VEEG: CPT | Performed by: PSYCHIATRY & NEUROLOGY

## 2022-02-24 PROCEDURE — 2580000003 HC RX 258

## 2022-02-24 PROCEDURE — 6370000000 HC RX 637 (ALT 250 FOR IP): Performed by: STUDENT IN AN ORGANIZED HEALTH CARE EDUCATION/TRAINING PROGRAM

## 2022-02-24 PROCEDURE — 2500000003 HC RX 250 WO HCPCS: Performed by: STUDENT IN AN ORGANIZED HEALTH CARE EDUCATION/TRAINING PROGRAM

## 2022-02-24 PROCEDURE — 83735 ASSAY OF MAGNESIUM: CPT

## 2022-02-24 PROCEDURE — 95714 VEEG EA 12-26 HR UNMNTR: CPT

## 2022-02-24 PROCEDURE — 2000000000 HC ICU R&B

## 2022-02-24 PROCEDURE — 85730 THROMBOPLASTIN TIME PARTIAL: CPT

## 2022-02-24 RX ORDER — CISATRACURIUM BESYLATE 10 MG/ML
10 INJECTION, SOLUTION INTRAVENOUS ONCE
Status: DISCONTINUED | OUTPATIENT
Start: 2022-02-24 | End: 2022-02-24

## 2022-02-24 RX ORDER — VECURONIUM BROMIDE 1 MG/ML
10 INJECTION, POWDER, LYOPHILIZED, FOR SOLUTION INTRAVENOUS ONCE
Status: COMPLETED | OUTPATIENT
Start: 2022-02-24 | End: 2022-02-24

## 2022-02-24 RX ORDER — DEXMEDETOMIDINE HYDROCHLORIDE 4 UG/ML
.1-1.5 INJECTION, SOLUTION INTRAVENOUS CONTINUOUS
Status: DISCONTINUED | OUTPATIENT
Start: 2022-02-24 | End: 2022-02-27

## 2022-02-24 RX ORDER — 0.9 % SODIUM CHLORIDE 0.9 %
500 INTRAVENOUS SOLUTION INTRAVENOUS ONCE
Status: DISCONTINUED | OUTPATIENT
Start: 2022-02-24 | End: 2022-03-03

## 2022-02-24 RX ORDER — LABETALOL HYDROCHLORIDE 5 MG/ML
10 INJECTION, SOLUTION INTRAVENOUS EVERY 6 HOURS PRN
Status: DISCONTINUED | OUTPATIENT
Start: 2022-02-24 | End: 2022-03-17 | Stop reason: HOSPADM

## 2022-02-24 RX ORDER — CARVEDILOL 12.5 MG/1
12.5 TABLET ORAL 2 TIMES DAILY WITH MEALS
Status: DISCONTINUED | OUTPATIENT
Start: 2022-02-24 | End: 2022-02-26

## 2022-02-24 RX ADMIN — AMPICILLIN AND SULBACTAM 3000 MG: 1; 2 INJECTION, POWDER, FOR SOLUTION INTRAMUSCULAR; INTRAVENOUS at 13:29

## 2022-02-24 RX ADMIN — AMIODARONE HYDROCHLORIDE 0.5 MG/MIN: 50 INJECTION, SOLUTION INTRAVENOUS at 13:31

## 2022-02-24 RX ADMIN — AMPICILLIN AND SULBACTAM 3000 MG: 1; 2 INJECTION, POWDER, FOR SOLUTION INTRAMUSCULAR; INTRAVENOUS at 00:10

## 2022-02-24 RX ADMIN — ASPIRIN 81 MG: 81 TABLET, CHEWABLE ORAL at 10:38

## 2022-02-24 RX ADMIN — WATER 10 ML: 1 INJECTION INTRAMUSCULAR; INTRAVENOUS; SUBCUTANEOUS at 13:00

## 2022-02-24 RX ADMIN — CHLORHEXIDINE GLUCONATE 0.12% ORAL RINSE 15 ML: 1.2 LIQUID ORAL at 09:59

## 2022-02-24 RX ADMIN — Medication 150 MCG/HR: at 00:06

## 2022-02-24 RX ADMIN — AMPICILLIN AND SULBACTAM 3000 MG: 1; 2 INJECTION, POWDER, FOR SOLUTION INTRAMUSCULAR; INTRAVENOUS at 06:05

## 2022-02-24 RX ADMIN — POLYVINYL ALCOHOL 1 DROP: 14 SOLUTION/ DROPS OPHTHALMIC at 19:27

## 2022-02-24 RX ADMIN — POLYVINYL ALCOHOL 1 DROP: 14 SOLUTION/ DROPS OPHTHALMIC at 21:51

## 2022-02-24 RX ADMIN — SODIUM CHLORIDE, PRESERVATIVE FREE 10 ML: 5 INJECTION INTRAVENOUS at 20:43

## 2022-02-24 RX ADMIN — Medication 100 MCG/HR: at 14:28

## 2022-02-24 RX ADMIN — SODIUM CHLORIDE: 9 INJECTION, SOLUTION INTRAVENOUS at 13:24

## 2022-02-24 RX ADMIN — POLYVINYL ALCOHOL 1 DROP: 14 SOLUTION/ DROPS OPHTHALMIC at 06:07

## 2022-02-24 RX ADMIN — Medication 150 MCG/HR: at 06:03

## 2022-02-24 RX ADMIN — AMPICILLIN AND SULBACTAM 3000 MG: 1; 2 INJECTION, POWDER, FOR SOLUTION INTRAMUSCULAR; INTRAVENOUS at 18:26

## 2022-02-24 RX ADMIN — MINERAL OIL AND WHITE PETROLATUM: 150; 830 OINTMENT OPHTHALMIC at 06:07

## 2022-02-24 RX ADMIN — MINERAL OIL AND WHITE PETROLATUM: 150; 830 OINTMENT OPHTHALMIC at 02:06

## 2022-02-24 RX ADMIN — SODIUM CHLORIDE, PRESERVATIVE FREE 10 ML: 5 INJECTION INTRAVENOUS at 09:58

## 2022-02-24 RX ADMIN — ATORVASTATIN CALCIUM 80 MG: 80 TABLET, FILM COATED ORAL at 10:37

## 2022-02-24 RX ADMIN — METOPROLOL TARTRATE 25 MG: 25 TABLET ORAL at 10:37

## 2022-02-24 RX ADMIN — PROPOFOL 25 MCG/KG/MIN: 10 INJECTION, EMULSION INTRAVENOUS at 03:21

## 2022-02-24 RX ADMIN — POLYVINYL ALCOHOL 1 DROP: 14 SOLUTION/ DROPS OPHTHALMIC at 02:06

## 2022-02-24 RX ADMIN — POLYVINYL ALCOHOL 1 DROP: 14 SOLUTION/ DROPS OPHTHALMIC at 09:58

## 2022-02-24 RX ADMIN — POLYETHYLENE GLYCOL 3350 17 G: 17 POWDER, FOR SOLUTION ORAL at 21:58

## 2022-02-24 RX ADMIN — CARVEDILOL 12.5 MG: 12.5 TABLET, FILM COATED ORAL at 19:29

## 2022-02-24 RX ADMIN — CARVEDILOL 12.5 MG: 12.5 TABLET, FILM COATED ORAL at 13:34

## 2022-02-24 RX ADMIN — POLYVINYL ALCOHOL 1 DROP: 14 SOLUTION/ DROPS OPHTHALMIC at 13:59

## 2022-02-24 RX ADMIN — VECURONIUM BROMIDE 10 MG: 1 INJECTION, POWDER, LYOPHILIZED, FOR SOLUTION INTRAVENOUS at 13:00

## 2022-02-24 RX ADMIN — PANTOPRAZOLE SODIUM 40 MG: 40 INJECTION, POWDER, FOR SOLUTION INTRAVENOUS at 09:58

## 2022-02-24 ASSESSMENT — PULMONARY FUNCTION TESTS
PIF_VALUE: 20
PIF_VALUE: 24
PIF_VALUE: 25
PIF_VALUE: 33
PIF_VALUE: 27
PIF_VALUE: 25

## 2022-02-24 NOTE — PLAN OF CARE
Problem: OXYGENATION/RESPIRATORY FUNCTION  Goal: Patient will maintain patent airway  Outcome: Ongoing  Goal: Patient will achieve/maintain normal respiratory rate/effort  Description: Respiratory rate and effort will be within normal limits for the patient  Outcome: Ongoing     Problem: MECHANICAL VENTILATION  Goal: Patient will maintain patent airway  2/24/2022 0043 by Mary Godfrey RN  Outcome: Ongoing  2/23/2022 1806 by Jarrod Phipps RN  Outcome: Ongoing  Goal: Oral health is maintained or improved  2/24/2022 0043 by Mary Godfrey RN  Outcome: Ongoing  2/23/2022 1806 by Jarrod Phipps RN  Outcome: Ongoing  Goal: ET tube will be managed safely  Outcome: Ongoing  Goal: Ability to express needs and understand communication  Outcome: Ongoing  Goal: Mobility/activity is maintained at optimum level for patient  Outcome: Ongoing     Problem: SKIN INTEGRITY  Goal: Skin integrity is maintained or improved  2/24/2022 0043 by Mary Godfrey RN  Outcome: Ongoing  2/23/2022 1806 by Jarrod Phipps RN  Outcome: Ongoing     Problem: Confusion - Acute:  Goal: Absence of continued neurological deterioration signs and symptoms  Description: Absence of continued neurological deterioration signs and symptoms  Outcome: Ongoing  Goal: Mental status will be restored to baseline  Description: Mental status will be restored to baseline  Outcome: Ongoing     Problem: Discharge Planning:  Goal: Ability to perform activities of daily living will improve  Description: Ability to perform activities of daily living will improve  Outcome: Ongoing  Goal: Participates in care planning  Description: Participates in care planning  Outcome: Ongoing     Problem: Injury - Risk of, Physical Injury:  Goal: Absence of physical injury  Description: Absence of physical injury  2/24/2022 0043 by Mary Godfrey RN  Outcome: Ongoing  2/23/2022 1806 by Jarrod Phipps RN  Outcome: Ongoing  Goal: Will remain free from falls  Description: Will remain free from falls  2/24/2022 0043 by Jay Brito RN  Outcome: Ongoing  2/23/2022 1806 by Levi Chris RN  Outcome: Ongoing     Problem: Mood - Altered:  Goal: Mood stable  Description: Mood stable  Outcome: Ongoing  Goal: Absence of abusive behavior  Description: Absence of abusive behavior  Outcome: Ongoing  Goal: Verbalizations of feeling emotionally comfortable while being cared for will increase  Description: Verbalizations of feeling emotionally comfortable while being cared for will increase  Outcome: Ongoing     Problem: Psychomotor Activity - Altered:  Goal: Absence of psychomotor disturbance signs and symptoms  Description: Absence of psychomotor disturbance signs and symptoms  Outcome: Ongoing     Problem: Sensory Perception - Impaired:  Goal: Demonstrations of improved sensory functioning will increase  Description: Demonstrations of improved sensory functioning will increase  Outcome: Ongoing  Goal: Decrease in sensory misperception frequency  Description: Decrease in sensory misperception frequency  Outcome: Ongoing  Goal: Able to refrain from responding to false sensory perceptions  Description: Able to refrain from responding to false sensory perceptions  Outcome: Ongoing  Goal: Demonstrates accurate environmental perceptions  Description: Demonstrates accurate environmental perceptions  Outcome: Ongoing  Goal: Able to distinguish between reality-based and nonreality-based thinking  Description: Able to distinguish between reality-based and nonreality-based thinking  Outcome: Ongoing  Goal: Able to interrupt nonreality-based thinking  Description: Able to interrupt nonreality-based thinking  Outcome: Ongoing     Problem: Sleep Pattern Disturbance:  Goal: Appears well-rested  Description: Appears well-rested  Outcome: Ongoing     Problem: Nutrition  Goal: Optimal nutrition therapy  2/24/2022 0043 by Jay Brito RN  Outcome: Ongoing  2/23/2022 1806 by Levi Chris RN  Outcome: Not Met This Shift     Problem: Falls - Risk of:  Goal: Absence of physical injury  Description: Absence of physical injury  2/24/2022 0043 by Ivan Burton RN  Outcome: Ongoing  2/23/2022 1806 by Laurie Lewis RN  Outcome: Ongoing  Goal: Will remain free from falls  Description: Will remain free from falls  2/24/2022 0043 by Ivan Burton RN  Outcome: Ongoing  2/23/2022 1806 by Laurie Lewis RN  Outcome: Ongoing     Problem: Skin Integrity:  Goal: Will show no infection signs and symptoms  Description: Will show no infection signs and symptoms  2/24/2022 0043 by Ivan Burton RN  Outcome: Ongoing  2/23/2022 1806 by Laurie Lewis RN  Outcome: Ongoing  Goal: Absence of new skin breakdown  Description: Absence of new skin breakdown  2/24/2022 0043 by Ivan Burton RN  Outcome: Ongoing  2/23/2022 1806 by Laurie Lewis RN  Outcome: Ongoing
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
## 2022-02-24 NOTE — PROGRESS NOTES
Comprehensive Nutrition Assessment    Type and Reason for Visit:  Reassess,Consult (Tube Feeding ordering/management)    Nutrition Recommendations/Plan: Start Tube Feeding-suggest Standard without Fiber goal 70 mL/hr to provide 2016 kcal and 93 g pro/day. Will monitor TF tolerance/adequacy. Nutrition Assessment:  Chart reviewed. Pt remains on vent. Plan to start tube feeding today; RD consulted for ordering/management. Meds/labs reviewed. Malnutrition Assessment:  Malnutrition Status: At risk for malnutrition     Context:  Acute Illness     Findings of the 6 clinical characteristics of malnutrition:  Energy Intake:  Unable to assess  Weight Loss:  No significant weight loss (over past year, per EHR)     Body Fat Loss:  No significant body fat loss     Muscle Mass Loss:  No significant muscle mass loss    Fluid Accumulation:  No significant fluid accumulation     Strength:  Not Performed    Estimated Daily Nutrient Needs:  Energy (kcal):  2000 kcal/day; Weight Used for Energy Requirements:  Current     Protein (g):   g pro/day; Weight Used for Protein Requirements:   (1.2-1.5)    Fluid (ml/day):  2500 mL/day; Method Used for Fluid Requirements:  ml/Kg (28)      Nutrition Related Findings:  meds/labs reviewed      Wounds:  None       Current Nutrition Therapies:    Diet NPO    Additional Calorie Sources:   none    Anthropometric Measures:  · Height: 5' 10\" (177.8 cm)  · Current Body Weight: 207 lb 10.8 oz (94.2 kg)   · Admission Body Weight: 198 lb 6.6 oz (90 kg)    · Usual Body Weight: 220 lb (99.8 kg) (approx 1 yr ago)     · Ideal Body Weight: 166 lbs; % Ideal Body Weight 125.1 %   · BMI: 29.8  · BMI Categories: Overweight (BMI 25.0-29. 9)       Nutrition Diagnosis:   · Inadequate oral intake related to impaired respiratory function as evidenced by NPO or clear liquid status due to medical condition, need for enteral nutrition support    Nutrition Interventions:   Food and/or Nutrient Delivery: Start Tube Feeding-suggest Standard without Fiber goal 70 mL/hr to provide 2016 kcal and 93 g pro/day. Nutrition Education/Counseling:  No recommendation at this time   Coordination of Nutrition Care:  Continue to monitor while inpatient    Goals:  meet % of estimated nutrient needs -progressing towards goal       Nutrition Monitoring and Evaluation:   Behavioral-Environmental Outcomes:  None Identified   Food/Nutrient Intake Outcomes:  Enteral Nutrition Intake/Tolerance  Physical Signs/Symptoms Outcomes:  Biochemical Data,Nutrition Focused Physical Findings,Skin,Weight     Discharge Planning:     Too soon to determine     Electronically signed by Nancy Romero RD, LD on 2/24/22 at 2:17 PM EST    Contact: [phone]

## 2022-02-24 NOTE — PROGRESS NOTES
INTENSIVE CARE UNIT  Resident Physician Progress Note    Patient - Minnie Robison  Date of Admission -  2/21/2022  9:13 PM  Date of Evaluation -  2/24/2022  Room and Bed Number -  0125/0125-01   Hospital Day - 2    SUBJECTIVE:     HISTORY OF PRESENT ILLNESS:    Mackie Primrose a 60 y. o. with PMH of CAD s/p CABG x 3 in  2011 and Cath 10/2018 with patent LIMA to LAD and SVG to RCA but occluded SVG to OM1 who presented to the emergency department with cardiac arrest. Patient was at home when a family member heard the patient fall upstairs however was unable to check on the patient. EMS was called and patient was found to be in V. Fib arrest. ACLS was initiated and patient received two shocks and ROSC was achieved. On the way out of the patient's home patient went into PEA arrest. Compressions were resumed and epinephrine was given and ROSC was achieved. Unknown down time.      In the emergency department patient was intubated and sedated on propofol. Hemodynamically stable off pressors. Labs demonstrated elevated creatinine (1.67), lactic acidosis (3.0), leukocytosis (20.2), EKG was concerning for STEMI with ST elevations in V3, V4 and V5. Cardiology was consulted however patient did not meet STEMI criteria. Cardiology initially planned to Cath patient however he was taken off propofol with only sluggish pupils, but no gag or corneal reflexes so Cath was canceled due to poor neurological prognosis. CT head was negative. CT C spine demonstrated remote anterior fusion from C4 through C7 with C5-C6 corpectomy and bone strut placement as well as prominent/recurrent spondylosis with moderate cord flattening at C4-C5 and C5-C6. Patient will be admitted to medical ICU.      On evaluation patient intubated, sedated on Propofol and Fentanyl, Rewarmed to target temperature and off paralytics.  Afebrile, hemodynamically stable, Pupils 2mm and sluggish, corneal reflex present, will wince eyes due to pain, will not follow commands. Plantar reflex present. OVERNIGHT EVENTS:      Off paralytics, sedation decreased to assess neuro function. Pt became tachycardic, no arrythmia, no desaturations. Concern for HIT yesterday, however platelets remain stable at 103 from 104 yesterday. No leukocytosis  Cr increased 1.48 from 1.19  Ordered Mag and Phos labs  NeuroCrit: MRI at 72 hours     Tmax 98.1; HR ; MAP 79-92  PRVC 16/580/6/35%  ABG 7.336/39.7/95.5  Sedation: Fentanyl 150mcg  No pressor requirement  Heparin gtt  Amiodarone gtt  Unasyn    F.A.S.T. M. H.U.G.S. B.I.D.  Feeding Diet: Diet NPO,OG tube in place will start tube feeds   Fluids: NS 50ml/hr   Family: Will update   Analgesic: Fentanyl 150mcg/min   Sedation: Fentanyl; propofol   Thrombo-prophylaxis: Heparin gtt, EPC cuffs   Mobility: bedrest   Heads up: 30 degrees   Ulcer prophylaxis: [x] PPI Agent, [] [id number], [] Sucralfate, [] Other:   Glycemic control: 85   Spontaneous breathing trial: No   Bowel regimen/urine output: Glycolax PRN; UO: , OUT:    Indwelling catheter/lines:  Blackman, CVC triple lumen, Arterial line, PIV, OG   De-escalation: wean off sedation    TODAY:     AWAKE & FOLLOWING COMMANDS: [x]   No  []   Yes                           SECRETIONS                 Amount:  []   Small []   Moderate []   Large [x]   None        Color: []   White []   Colored []   Bloody                         SEDATION:                 RAAS Score: []   +1 to -1 [x]   -2 []   -3 []   -4        Sedation Agent: []   Propofol gtt []   Versed gtt  []   Ativan gtt  []   No Sedation  fentanyl      Paralytic Agent: []   Precedex []   Norcuron []   Nimbex [x]  none                       VASOPRESSORS:  [x]   No  []   Yes            Vasopressor Agent []   Levophed []   Dopamine []   Vasopressin []   Dobutamine  []   Phenylephrine  []   Epinephrine     OBJECTIVE:     VITAL SIGNS:  Patient Vitals for the past 8 hrs:   BP Temp Temp src Pulse Resp SpO2 Weight   02/24/22 0553       207 lb 10.8 oz (94.2 kg)   22 0500 92/61   91      22 0432    93      22 0400 (!) 117/53 98.1 °F (36.7 °C) Bladder 92 18 99 %    22 0300 (!) 81/35   95      22 0200 111/78   104      22 0142    106  100 %    22 0100 99/68   112  100 %    22 0000 107/71 98.6 °F (37 °C) Bladder 124 23 98 %        Last Body weight:   Wt Readings from Last 3 Encounters:   22 207 lb 10.8 oz (94.2 kg)   22 207 lb 6.4 oz (94.1 kg)   10/25/21 210 lb (95.3 kg)       Body Mass Index : Body mass index is 29.8 kg/m². Tmax over 24 hours: Temp (24hrs), Av.9 °F (36.6 °C), Min:95.9 °F (35.5 °C), Max:98.8 °F (37.1 °C)      Ins/Outs: In: 3311.5 [I.V.:2563.5; NG/GT:100]  Out: 4188 [Urine:915]    PHYSICAL EXAM:  Constitutional: Intubated and sedated, no spontaneous movement  EENT: PERRLA, EOMI, sclera clear, anicteric, oropharynx clear, no lesions, neck supple with midline trachea. Neck: Supple, symmetrical, trachea midline, no adenopathy, thyroid symmetric, no jvd skin normal  Respiratory: clear to auscultation, no wheezes or rales and unlabored breathing.  No intercostal tenderness  Cardiovascular: regular rate and rhythm, normal S1, S2, no murmur noted and 2+ pulses throughout  Abdomen: soft, nontender, nondistended, no masses or organomegaly  Extremities:  peripheral pulses normal, no pedal edema, no clubbing or cyanosis  Neuro: corneal reflex present, pupils constricted, sluggish/equal, winces to pain, plantar reflex present    MEDICATIONS:  Scheduled Meds:   insulin lispro  0-3 Units SubCUTAneous Q6H    aspirin  81 mg Oral Daily    atorvastatin  80 mg Oral Daily    sodium chloride flush  5-40 mL IntraVENous 2 times per day    chlorhexidine  15 mL Mouth/Throat BID    polyvinyl alcohol  1 drop Both Eyes Q4H    And    artificial tears   Both Eyes Q4H    ampicillin-sulbactam  3,000 mg IntraVENous Q6H    pantoprazole  40 mg IntraVENous Daily       Continuous Infusions:   DOBUTamine 2.5 mcg/kg/min (02/23/22 1022)    heparin (PORCINE) Infusion 9.1 Units/kg/hr (02/24/22 0732)    sodium chloride      cisatracurium (NIMBEX) infusion Stopped (02/23/22 1432)    fentaNYL 150 mcg/hr (02/24/22 0603)    propofol 20 mcg/kg/min (02/24/22 0732)    sodium chloride 50 mL/hr at 02/24/22 0732    dextrose      midazolam Stopped (02/23/22 1036)    norepinephrine Stopped (02/23/22 1740)    amiodarone 0.5 mg/min (02/24/22 0732)       PRN Meds:   sodium chloride flush, 5-40 mL, PRN  sodium chloride, 25 mL, PRN  ondansetron, 4 mg, Q8H PRN   Or  ondansetron, 4 mg, Q6H PRN  polyethylene glycol, 17 g, Daily PRN  acetaminophen, 650 mg, Q6H PRN   Or  acetaminophen, 650 mg, Q6H PRN  glucose, 15 g, PRN  glucagon (rDNA), 1 mg, PRN  dextrose, 100 mL/hr, PRN  dextrose bolus (hypoglycemia), 125 mL, PRN   Or  dextrose bolus (hypoglycemia), 250 mL, PRN  ipratropium-albuterol, 1 ampule, Q6H PRN  heparin (porcine), 4,000 Units, PRN  heparin (porcine), 2,000 Units, PRN        SUPPORT DEVICES: [x] Ventilator [] BIPAP  [] Nasal Cannula [] Room Air    VENT SETTINGS (Comprehensive) (if applicable):   PRVC mode, FiO2 35%, PEEP 6, Respiratory Rate 16, Tidal Volume 580  Vent Information  $Ventilation: $Subsequent Day  Skin Assessment: Clean, dry, & intact  Suction Catheter Diameter: 14  Equipment ID: [id number]  Equipment Changed: HME  Vent Type: Servo i  Vent Mode: PRVC  Vt Ordered: 580 mL  Rate Set: 16 bmp  FiO2 : 35 %  SpO2: 99 %  SpO2/FiO2 ratio: 285.71  Sensitivity: 5  PEEP/CPAP: 6  I Time/ I Time %: 0.9 s  Humidification Source: HME  Additional Respiratory  Assessments  Pulse: 91  Resp: 18  SpO2: 99 %  End Tidal CO2: 27 (%)  Position: Semi-Willis's  Humidification Source: HME  Oral Care Completed?: Yes  Oral Care: Mouth swabbed,Mouth suctioned  Subglottic Suction Done?: No  Cuff Pressure (cm H2O):  (mov)  Lab Results   Component Value Date    [facility] 02/24/2022 ABGs:   Arterial Blood Gas result:  pH 7.336; pCO2 39.7; pO2 95.5  No results found for: PH, PCO2, PO2, HCO3, O2SAT    DATA:  Complete Blood Count:   Recent Labs     02/22/22 0224 02/22/22 0224 02/23/22  0450 02/23/22  1215 02/24/22  0425   WBC 18.4*   < > 4.4 9.7 11.1   RBC 4.96   < > 4.13* 4.16* 3.88*   HGB 14.9   < > 12.4* 12.6* 11.9*   HCT 46.9   < > 37.0* 37.3* 35.4*   MCV 94.6   < > 89.6 89.7 91.2   MCH 30.0   < > 30.0 30.3 30.7   MCHC 31.8   < > 33.5 33.8 33.6   RDW 15.9*   < > 16.0* 16.5* 16.8*      < > See Reflexed IPF Result See Reflexed IPF Result See Reflexed IPF Result   MPV 10.7  --   --   --   --     < > = values in this interval not displayed. Last 3 Blood Glucose:   Recent Labs     02/22/22 0224 02/22/22  0810 02/22/22  1320 02/22/22 2010 02/23/22  0215 02/23/22  0450 02/23/22  0826 02/24/22  0425   GLUCOSE 127* 131* 118* 107* 127* 141* 133* 85        PT/INR:    Lab Results   Component Value Date    PROTIME 12.3 02/22/2022    PROTIME 10.6 12/19/2011    INR 1.2 02/22/2022     PTT:    Lab Results   Component Value Date    APTT 72.1 02/24/2022       Basic Metabolic Profile:   Recent Labs     02/22/22 2010 02/22/22 2010 02/23/22  0215 02/23/22  0215 02/23/22  0450 02/23/22  0621 02/23/22  0826 02/23/22  1017 02/23/22  1827 02/23/22  2045 02/24/22  0425      < > 137   < > 137  --  139  --   --   --  140   K 3.9   < > 4.3   < > 4.2   < > 4.1   < > 4.5 4.8 4.1   *   < > 107   < > 107  --  110*  --   --   --  109*   CO2 21   < > 20   < > 20  --  20  --   --   --  18*   BUN 16   < > 15   < > 14  --  14  --   --   --  14   CREATININE 1.35*   < > 1.29*   < > 1.22*  --  1.19  --   --   --  1.48*   GLUCOSE 107*   < > 127*   < > 141*  --  133*  --   --   --  85   PHOS 3.9  --  4.2  --   --   --  3.9  --   --   --   --     < > = values in this interval not displayed.        Liver Function:  Recent Labs     02/24/22  0425   PROT 5.2*   LABALBU 2.7*   ALT 21   AST 43*   ALKPHOS 140*   BILITOT 0.66       Magnesium:   Lab Results   Component Value Date    MG 1.9 02/23/2022    MG 1.9 02/23/2022    MG 2.0 02/22/2022     Phosphorus:   Lab Results   Component Value Date    PHOS 3.9 02/23/2022    PHOS 4.2 02/23/2022    PHOS 3.9 02/22/2022     Ionized Calcium:   Lab Results   Component Value Date    CAION 1.12 02/23/2022    CAION 1.14 02/23/2022    CAION 1.19 02/22/2022        Urinalysis:   Lab Results   Component Value Date    NITRU NEGATIVE 12/18/2020    COLORU YELLOW 12/18/2020    PHUR 6.5 12/18/2020    WBCUA 0 TO 2 11/06/2020    RBCUA 0 TO 2 11/06/2020    MUCUS NOT REPORTED 11/06/2020    TRICHOMONAS NOT REPORTED 11/06/2020    YEAST NOT REPORTED 11/06/2020    BACTERIA NOT REPORTED 11/06/2020    CLARITYU clear 09/24/2020    SPECGRAV 1.008 12/18/2020    LEUKOCYTESUR NEGATIVE 12/18/2020    UROBILINOGEN Normal 12/18/2020    BILIRUBINUR NEGATIVE 12/18/2020    BLOODU +++ 09/24/2020    GLUCOSEU NEGATIVE 12/18/2020    KETUA NEGATIVE 12/18/2020    AMORPHOUS NOT REPORTED 11/06/2020       HgBA1c:    Lab Results   Component Value Date    LABA1C 5.1 04/21/2021     TSH:    Lab Results   Component Value Date    TSH 2.00 02/22/2022     Lactic Acid:   Lab Results   Component Value Date    LACTA 1.5 03/02/2020    LACTA 1.0 12/10/2019    LACTA 1.5 11/04/2019      Troponin: No results for input(s): TROPONINI in the last 72 hours. Other Labs:  Results for orders placed or performed during the hospital encounter of 02/21/22   COVID-19, Rapid    Specimen: Nasopharyngeal Swab   Result Value Ref Range    Specimen Description . NASOPHARYNGEAL SWAB     SARS-CoV-2, Rapid Not Detected Not Detected   MRSA DNA Probe, Nasal    Specimen: Nasal   Result Value Ref Range    Specimen Description . NASAL SWAB     MRSA, DNA, Nasal NEGATIVE NEGATIVE   Culture, Blood 1    Specimen: Blood   Result Value Ref Range    Specimen Description . BLOOD     Culture NO GROWTH 1 DAY    Culture, Respiratory    Specimen: Endotracheal   Result Value Ref Range    Specimen Description . ENDOTRACHEAL     Direct Exam < 10 EPITHELIAL CELLS/LPF     Direct Exam <10 NEUTROPHILS/LPF     Direct Exam MIXED BACTERIAL MORPHOTYPES SEEN ON GRAM STAIN.      Culture CULTURE IN PROGRESS    Basic Metabolic Panel   Result Value Ref Range    Glucose 195 (H) 70 - 99 mg/dL    BUN 14 6 - 20 mg/dL    CREATININE 1.67 (H) 0.70 - 1.20 mg/dL    Calcium 8.8 8.6 - 10.4 mg/dL    Sodium 134 (L) 135 - 144 mmol/L    Potassium 3.8 3.7 - 5.3 mmol/L    Chloride 94 (L) 98 - 107 mmol/L    CO2 20 20 - 31 mmol/L    Anion Gap 20 (H) 9 - 17 mmol/L    GFR Non-African American 42 (L) >60 mL/min    GFR  51 (L) >60 mL/min    GFR Comment         CBC with Auto Differential   Result Value Ref Range    WBC 20.2 (H) 3.5 - 11.3 k/uL    RBC 5.01 4.21 - 5.77 m/uL    Hemoglobin 15.2 13.0 - 17.0 g/dL    Hematocrit 46.6 40.7 - 50.3 %    MCV 93.0 82.6 - 102.9 fL    MCH 30.3 25.2 - 33.5 pg    MCHC 32.6 28.4 - 34.8 g/dL    RDW 15.8 (H) 11.8 - 14.4 %    Platelets See Reflexed IPF Result 138 - 453 k/uL    NRBC Automated 0.2 (H) 0.0 per 100 WBC    Immature Granulocytes 3 (H) 0 %    Seg Neutrophils 60 36 - 66 %    Lymphocytes 29 24 - 44 %    Monocytes 4 1 - 7 %    Eosinophils % 4 1 - 4 %    Basophils 0 0 - 2 %    nRBC 1 (H) 0 per 100 WBC    Absolute Immature Granulocyte 0.61 (H) 0.00 - 0.30 k/uL    Segs Absolute 12.11 (H) 1.8 - 7.7 k/uL    Absolute Lymph # 5.86 (H) 1.10 - 3.70 k/uL    Absolute Mono # 0.81 (H) 0.1 - 0.8 k/uL    Absolute Eos # 0.81 (H) 0.0 - 0.4 k/uL    Basophils Absolute 0.00 0.0 - 0.2 k/uL    Morphology ANISOCYTOSIS PRESENT    Troponin   Result Value Ref Range    Troponin, High Sensitivity 106 (HH) 0 - 22 ng/L   Hepatic Function Panel   Result Value Ref Range    Albumin 3.5 3.5 - 5.2 g/dL    Alkaline Phosphatase 198 (H) 40 - 129 U/L    ALT 35 5 - 41 U/L    AST 65 (H) <40 U/L    Total Bilirubin 0.62 0.3 - 1.2 mg/dL    Bilirubin, Direct 0.23 <0.31 mg/dL    Bilirubin, Indirect 0.39 0.00 - 1.00 mg/dL    Total Protein 6.0 (L) 6.4 - 8.3 g/dL    Albumin/Globulin Ratio 1.4 1.0 - 2.5   Lactic Acid   Result Value Ref Range    Lactic Acid, Whole Blood 3.0 (H) 0.7 - 2.1 mmol/L   ELECTROLYTES PLUS   Result Value Ref Range    POC Sodium 139 138 - 146 mmol/L    POC Potassium 4.0 3.5 - 4.5 mmol/L    POC Chloride 99 98 - 107 mmol/L    POC TCO2 26 22 - 30 mmol/L    Anion Gap 15 7 - 16 mmol/L   Hemoglobin and hematocrit, blood   Result Value Ref Range    POC Hemoglobin 16.3 13.5 - 17.5 g/dL    POC Hematocrit 48 41 - 53 %   CALCIUM, IONIC (POC)   Result Value Ref Range    POC Ionized Calcium 1.21 1.15 - 1.33 mmol/L   Immature Platelet Fraction   Result Value Ref Range    Platelet, Immature Fraction 5.4 1.1 - 10.3 %    Platelet, Fluorescence 111 (L) 138 - 453 k/uL   Comprehensive Metabolic Panel w/ Reflex to MG   Result Value Ref Range    Glucose 127 (H) 70 - 99 mg/dL    BUN 15 6 - 20 mg/dL    CREATININE 1.54 (H) 0.70 - 1.20 mg/dL    Calcium 8.6 8.6 - 10.4 mg/dL    Sodium 133 (L) 135 - 144 mmol/L    Potassium 4.4 3.7 - 5.3 mmol/L    Chloride 101 98 - 107 mmol/L    CO2 18 (L) 20 - 31 mmol/L    Anion Gap 14 9 - 17 mmol/L    Alkaline Phosphatase 198 (H) 40 - 129 U/L    ALT 35 5 - 41 U/L    AST 74 (H) <40 U/L    Total Bilirubin 0.72 0.3 - 1.2 mg/dL    Total Protein 6.3 (L) 6.4 - 8.3 g/dL    Albumin 3.5 3.5 - 5.2 g/dL    Albumin/Globulin Ratio 1.3 1.0 - 2.5    GFR Non- 47 (L) >60 mL/min    GFR  57 (L) >60 mL/min    GFR Comment         CBC with Auto Differential   Result Value Ref Range    WBC 18.4 (H) 3.5 - 11.3 k/uL    RBC 4.96 4.21 - 5.77 m/uL    Hemoglobin 14.9 13.0 - 17.0 g/dL    Hematocrit 46.9 40.7 - 50.3 %    MCV 94.6 82.6 - 102.9 fL    MCH 30.0 25.2 - 33.5 pg    MCHC 31.8 28.4 - 34.8 g/dL    RDW 15.9 (H) 11.8 - 14.4 %    Platelets 167 499 - 041 k/uL    MPV 10.7 8.1 - 13.5 fL    NRBC Automated 0.0 0.0 per 100 WBC    RBC Morphology ANISOCYTOSIS PRESENT     Seg Neutrophils 81 (H) 36 - 65 %    Lymphocytes 10 (L) 24 - 43 %    Monocytes 6 3 - 12 %    Eosinophils % 1 1 - 4 %    Basophils 1 0 - 2 %    Immature Granulocytes 1 (H) 0 %    Segs Absolute 14.91 (H) 1.50 - 8.10 k/uL    Absolute Lymph # 1.85 1.10 - 3.70 k/uL    Absolute Mono # 1.13 0.10 - 1.20 k/uL    Absolute Eos # 0.17 0.00 - 0.44 k/uL    Basophils Absolute 0.09 0.00 - 0.20 k/uL    Absolute Immature Granulocyte 0.25 0.00 - 0.30 k/uL   Magnesium   Result Value Ref Range    Magnesium 2.1 1.6 - 2.6 mg/dL   Phosphorus   Result Value Ref Range    Phosphorus 4.0 2.5 - 4.5 mg/dL   APTT   Result Value Ref Range    PTT >120.0 (HH) 20.5 - 30.5 sec   Basic Metabolic Panel   Result Value Ref Range    Glucose 131 (H) 70 - 99 mg/dL    BUN 19 6 - 20 mg/dL    CREATININE 1.63 (H) 0.70 - 1.20 mg/dL    Calcium 8.1 (L) 8.6 - 10.4 mg/dL    Sodium 133 (L) 135 - 144 mmol/L    Potassium 4.1 3.7 - 5.3 mmol/L    Chloride 103 98 - 107 mmol/L    CO2 17 (L) 20 - 31 mmol/L    Anion Gap 13 9 - 17 mmol/L    GFR Non-African American 44 (L) >60 mL/min    GFR  53 (L) >60 mL/min    GFR Comment         Basic Metabolic Panel   Result Value Ref Range    Glucose 118 (H) 70 - 99 mg/dL    BUN 18 6 - 20 mg/dL    CREATININE 1.45 (H) 0.70 - 1.20 mg/dL    Calcium 8.5 (L) 8.6 - 10.4 mg/dL    Sodium 133 (L) 135 - 144 mmol/L    Potassium 3.6 (L) 3.7 - 5.3 mmol/L    Chloride 103 98 - 107 mmol/L    CO2 18 (L) 20 - 31 mmol/L    Anion Gap 12 9 - 17 mmol/L    GFR Non-African American 50 (L) >60 mL/min    GFR African American >60 >60 mL/min    GFR Comment         Calcium, Ionized   Result Value Ref Range    Calcium, Ion 1.05 (L) 1.13 - 1.33 mmol/L   Calcium, Ionized   Result Value Ref Range    Calcium, Ion 1.15 1.13 - 1.33 mmol/L   Lactic Acid   Result Value Ref Range    Lactic Acid, Whole Blood 1.4 0.7 - 2.1 mmol/L   Magnesium   Result Value Ref Range    Magnesium 2.1 1.6 - 2.6 mg/dL   Magnesium   Result Value Ref Range    Magnesium 1.9 1.6 - 2.6 mg/dL   Phosphorus   Result Value Ref Range    Phosphorus 2.1 (L) 2.5 - 4.5 mg/dL   Phosphorus   Result Value Ref Range    Phosphorus 2.2 (L) 2.5 - 4.5 mg/dL   Troponin   Result Value Ref Range    Troponin, High Sensitivity 713 (HH) 0 - 22 ng/L   Protime-INR   Result Value Ref Range    Protime 12.3 9.1 - 12.3 sec    INR 1.2    APTT   Result Value Ref Range    .9 (HH) 20.5 - 30.5 sec   Basic Metabolic Panel   Result Value Ref Range    Glucose 107 (H) 70 - 99 mg/dL    BUN 16 6 - 20 mg/dL    CREATININE 1.35 (H) 0.70 - 1.20 mg/dL    Calcium 8.2 (L) 8.6 - 10.4 mg/dL    Sodium 139 135 - 144 mmol/L    Potassium 3.9 3.7 - 5.3 mmol/L    Chloride 108 (H) 98 - 107 mmol/L    CO2 21 20 - 31 mmol/L    Anion Gap 10 9 - 17 mmol/L    GFR Non-African American 54 (L) >60 mL/min    GFR African American >60 >60 mL/min    GFR Comment         Calcium, Ionized   Result Value Ref Range    Calcium, Ion 1.19 1.13 - 1.33 mmol/L   Magnesium   Result Value Ref Range    Magnesium 2.0 1.6 - 2.6 mg/dL   Phosphorus   Result Value Ref Range    Phosphorus 3.9 2.5 - 4.5 mg/dL   TSH with Reflex   Result Value Ref Range    TSH 2.00 0.30 - 5.00 mIU/L   Lactic Acid   Result Value Ref Range    Lactic Acid, Whole Blood 1.2 0.7 - 2.1 mmol/L   Troponin   Result Value Ref Range    Troponin, High Sensitivity 677 (HH) 0 - 22 ng/L   Basic Metabolic Panel   Result Value Ref Range    Glucose 127 (H) 70 - 99 mg/dL    BUN 15 6 - 20 mg/dL    CREATININE 1.29 (H) 0.70 - 1.20 mg/dL    Calcium 8.2 (L) 8.6 - 10.4 mg/dL    Sodium 137 135 - 144 mmol/L    Potassium 4.3 3.7 - 5.3 mmol/L    Chloride 107 98 - 107 mmol/L    CO2 20 20 - 31 mmol/L    Anion Gap 10 9 - 17 mmol/L    GFR Non-African American 57 (L) >60 mL/min    GFR African American >60 >60 mL/min    GFR Comment         Calcium, Ionized   Result Value Ref Range    Calcium, Ion 1.14 1.13 - 1.33 mmol/L   Magnesium   Result Value Ref Range    Magnesium 1.9 1.6 - 2.6 mg/dL   Phosphorus   Result Value Ref Range    Phosphorus 4.2 2.5 - 4.5 mg/dL APTT   Result Value Ref Range    PTT 36.6 (H) 20.5 - 30.5 sec   CBC with Auto Differential   Result Value Ref Range    WBC 4.4 3.5 - 11.3 k/uL    RBC 4.13 (L) 4.21 - 5.77 m/uL    Hemoglobin 12.4 (L) 13.0 - 17.0 g/dL    Hematocrit 37.0 (L) 40.7 - 50.3 %    MCV 89.6 82.6 - 102.9 fL    MCH 30.0 25.2 - 33.5 pg    MCHC 33.5 28.4 - 34.8 g/dL    RDW 16.0 (H) 11.8 - 14.4 %    Platelets See Reflexed IPF Result 138 - 453 k/uL    NRBC Automated 0.0 0.0 per 100 WBC    RBC Morphology ANISOCYTOSIS PRESENT     Seg Neutrophils 74 (H) 36 - 65 %    Lymphocytes 17 (L) 24 - 43 %    Monocytes 6 3 - 12 %    Eosinophils % 2 1 - 4 %    Basophils 1 0 - 2 %    Immature Granulocytes 1 (H) 0 %    Segs Absolute 3.24 1.50 - 8.10 k/uL    Absolute Lymph # 0.75 (L) 1.10 - 3.70 k/uL    Absolute Mono # 0.25 0.10 - 1.20 k/uL    Absolute Eos # 0.09 0.00 - 0.44 k/uL    Basophils Absolute 0.03 0.00 - 0.20 k/uL    Absolute Immature Granulocyte <0.03 0.00 - 0.30 k/uL   Comprehensive Metabolic Panel w/ Reflex to MG   Result Value Ref Range    Glucose 141 (H) 70 - 99 mg/dL    BUN 14 6 - 20 mg/dL    CREATININE 1.22 (H) 0.70 - 1.20 mg/dL    Calcium 8.3 (L) 8.6 - 10.4 mg/dL    Sodium 137 135 - 144 mmol/L    Potassium 4.2 3.7 - 5.3 mmol/L    Chloride 107 98 - 107 mmol/L    CO2 20 20 - 31 mmol/L    Anion Gap 10 9 - 17 mmol/L    Alkaline Phosphatase 146 (H) 40 - 129 U/L    ALT 25 5 - 41 U/L    AST 54 (H) <40 U/L    Total Bilirubin 0.47 0.3 - 1.2 mg/dL    Total Protein 5.2 (L) 6.4 - 8.3 g/dL    Albumin 2.9 (L) 3.5 - 5.2 g/dL    Albumin/Globulin Ratio 1.3 1.0 - 2.5    GFR Non-African American >60 >60 mL/min    GFR African American >60 >60 mL/min    GFR Comment         Basic Metabolic Panel   Result Value Ref Range    Glucose 133 (H) 70 - 99 mg/dL    BUN 14 6 - 20 mg/dL    CREATININE 1.19 0.70 - 1.20 mg/dL    Calcium 8.1 (L) 8.6 - 10.4 mg/dL    Sodium 139 135 - 144 mmol/L    Potassium 4.1 3.7 - 5.3 mmol/L    Chloride 110 (H) 98 - 107 mmol/L    CO2 20 20 - 31 mmol/L Anion Gap 9 9 - 17 mmol/L    GFR Non-African American >60 >60 mL/min    GFR African American >60 >60 mL/min    GFR Comment         Calcium, Ionized   Result Value Ref Range    Calcium, Ion 1.12 (L) 1.13 - 1.33 mmol/L   Magnesium   Result Value Ref Range    Magnesium 1.9 1.6 - 2.6 mg/dL   Phosphorus   Result Value Ref Range    Phosphorus 3.9 2.5 - 4.5 mg/dL   APTT   Result Value Ref Range    PTT 60.5 (H) 20.5 - 30.5 sec   APTT   Result Value Ref Range    PTT 43.6 (H) 20.5 - 30.5 sec   Potassium   Result Value Ref Range    Potassium 4.1 3.7 - 5.3 mmol/L   Potassium   Result Value Ref Range    Potassium 3.9 3.7 - 5.3 mmol/L   Potassium   Result Value Ref Range    Potassium 4.0 3.7 - 5.3 mmol/L   Potassium   Result Value Ref Range    Potassium 4.1 3.7 - 5.3 mmol/L   Potassium   Result Value Ref Range    Potassium 4.1 3.7 - 5.3 mmol/L   Immature Platelet Fraction   Result Value Ref Range    Platelet, Immature Fraction 4.9 1.1 - 10.3 %    Platelet, Fluorescence 67 (L) 138 - 453 k/uL   Potassium   Result Value Ref Range    Potassium 4.5 3.7 - 5.3 mmol/L   Potassium   Result Value Ref Range    Potassium 4.8 3.7 - 5.3 mmol/L   APTT   Result Value Ref Range    PTT 71.1 (H) 20.5 - 30.5 sec   CBC   Result Value Ref Range    WBC 9.7 3.5 - 11.3 k/uL    RBC 4.16 (L) 4.21 - 5.77 m/uL    Hemoglobin 12.6 (L) 13.0 - 17.0 g/dL    Hematocrit 37.3 (L) 40.7 - 50.3 %    MCV 89.7 82.6 - 102.9 fL    MCH 30.3 25.2 - 33.5 pg    MCHC 33.8 28.4 - 34.8 g/dL    RDW 16.5 (H) 11.8 - 14.4 %    Platelets See Reflexed IPF Result 138 - 453 k/uL    NRBC Automated 0.0 0.0 per 100 WBC   Immature Platelet Fraction   Result Value Ref Range    Platelet, Immature Fraction 5.9 1.1 - 10.3 %    Platelet, Fluorescence 104 (L) 138 - 453 k/uL   CBC with Auto Differential   Result Value Ref Range    WBC 11.1 3.5 - 11.3 k/uL    RBC 3.88 (L) 4.21 - 5.77 m/uL    Hemoglobin 11.9 (L) 13.0 - 17.0 g/dL    Hematocrit 35.4 (L) 40.7 - 50.3 %    MCV 91.2 82.6 - 102.9 fL MCH 30.7 25.2 - 33.5 pg    MCHC 33.6 28.4 - 34.8 g/dL    RDW 16.8 (H) 11.8 - 14.4 %    Platelets See Reflexed IPF Result 138 - 453 k/uL    NRBC Automated 0.0 0.0 per 100 WBC    RBC Morphology ANISOCYTOSIS PRESENT     Seg Neutrophils 84 (H) 36 - 65 %    Lymphocytes 7 (L) 24 - 43 %    Monocytes 6 3 - 12 %    Eosinophils % 2 1 - 4 %    Basophils 1 0 - 2 %    Immature Granulocytes 1 (H) 0 %    Segs Absolute 9.30 (H) 1.50 - 8.10 k/uL    Absolute Lymph # 0.80 (L) 1.10 - 3.70 k/uL    Absolute Mono # 0.68 0.10 - 1.20 k/uL    Absolute Eos # 0.21 0.00 - 0.44 k/uL    Basophils Absolute 0.06 0.00 - 0.20 k/uL    Absolute Immature Granulocyte 0.07 0.00 - 0.30 k/uL   Comprehensive Metabolic Panel w/ Reflex to MG   Result Value Ref Range    Glucose 85 70 - 99 mg/dL    BUN 14 6 - 20 mg/dL    CREATININE 1.48 (H)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

## 2022-02-24 NOTE — PROCEDURES
LONG-TERM EEG-VIDEO [id number] [address]    Patient: Anmol Rainey  Age: 60 y.o. MRN: [id number]    Referring Physician: No ref. provider found  History: The patient is a 60 y.o. male who presented breakthrough seizure/encephalopathy. This long-term video-EEG monitoring study was performed to determine the nature of the patient's clinical events. The patient is on neuroactive medications.    Beauty Roller   Current Facility-Administered Medications   Medication Dose Route Frequency Provider Last Rate Last Admin    carvedilol (COREG) tablet 12.5 mg  12.5 mg Oral BID WC Oswald Levin MD   12.5 mg at 02/24/22 1334    labetalol (NORMODYNE;TRANDATE) injection 10 mg  10 mg IntraVENous Q6H PRN Oswald Levin MD        dexmedetomidine (PRECEDEX) 400 mcg in sodium chloride 0.9 % 100 mL infusion  0.1-1.5 mcg/kg/hr IntraVENous Continuous Bety Flynn MD        insulin lispro (HUMALOG) injection vial 0-3 Units  0-3 Units SubCUTAneous Q6H Bety Flynn MD        aspirin chewable tablet 81 mg  81 mg Oral Daily Bety Flynn MD   81 mg at 02/24/22 1038    heparin 25,000 units in 0.9% sodium chloride 250 mL infusion  5-30 Units/kg/hr IntraVENous Continuous Kelton Casillas MD 10 mL/hr at 02/24/22 1535 11.1 Units/kg/hr at 02/24/22 1535    atorvastatin (LIPITOR) tablet 80 mg  80 mg Oral Daily Merdis Pean, DO   80 mg at 02/24/22 1037    sodium chloride flush 0.9 % injection 5-40 mL  5-40 mL IntraVENous 2 times per day Merdis Pean, DO   10 mL at 02/24/22 0958    sodium chloride flush 0.9 % injection 5-40 mL  5-40 mL IntraVENous PRN Merdis Pean, DO   10 mL at 02/23/22 1028    0.9 % sodium chloride infusion  25 mL IntraVENous PRN Merdis Pean, DO        ondansetron (ZOFRAN-ODT) disintegrating tablet 4 mg  4 mg Oral Q8H PRN Merdis Pean, DO        Or    ondansetron [facility]) injection 4 mg  4 mg IntraVENous Q6H PRN Ashleyel Fermin, DO        polyethylene glycol [facility]) packet 17 g  17 g Oral Daily PRN Ashleyel Fermin, DO        acetaminophen (TYLENOL) tablet 650 mg  650 mg Oral Q6H PRN Yancy [place], DO        Or    acetaminophen (TYLENOL) suppository 650 mg  650 mg Rectal Q6H PRN Durel [place], DO        chlorhexidine (PERIDEX) 0.12 % solution 15 mL  15 mL Mouth/Throat BID Yancy [place], DO   15 mL at 02/24/22 0959    polyvinyl alcohol (LIQUIFILM TEARS) 1.4 % ophthalmic solution 1 drop  1 drop Both Eyes Q4H Ashleyel Fermin, DO   1 drop at 02/24/22 1359    fentaNYL 20 mcg/mL Infusion   mcg/hr IntraVENous Continuous Yancy Christensen, DO 2.5 mL/hr at 02/24/22 1442 50 mcg/hr at 02/24/22 1442    [Held by provider] propofol injection  5-50 mcg/kg/min IntraVENous Titrated Yancy Christensen, DO   Stopped at 02/24/22 0906    0.9 % sodium chloride infusion   IntraVENous Continuous Mami Medel  mL/hr at 02/24/22 1535 Rate Verify at 02/24/22 1535    glucose (GLUTOSE) 40 % oral gel 15 g  15 g Oral PRN Peter Morris, DO        glucagon (rDNA) injection 1 mg  1 mg IntraMUSCular PRN Peter Morris, DO        dextrose 5 % solution  100 mL/hr IntraVENous PRN Peter Morris, DO        dextrose bolus (hypoglycemia) 10% 125 mL  125 mL IntraVENous PRN Peter Morris DO        Or    dextrose bolus (hypoglycemia) 10% 250 mL  250 mL IntraVENous PRN Peter Tracyina, DO        ipratropium-albuterol (DUONEB) nebulizer solution 1 ampule  1 ampule Inhalation Q6H PRN Adama Fernandez MD        ampicillin-sulbactam (UNASYN) 3000 mg ivpb minibag  3,000 mg IntraVENous Q6H Peter Morris, DO   Stopped at 02/24/22 1359    pantoprazole (PROTONIX) injection 40 mg  40 mg IntraVENous Daily Peter Morris DO   40 mg at 02/24/22 6397    amiodarone (CORDARONE) 450 mg in dextrose 5 % 250 mL infusion  0.5 mg/min IntraVENous Continuous Yancy Christensen DO 16.7 mL/hr at 02/24/22 1535 0.5 mg/min at 02/24/22 1535    heparin (porcine) injection 4,000 Units  4,000 Units IntraVENous PRN Mikal Pilon, DO        heparin (porcine) injection 2,000 Units  2,000 Units IntraVENous PRN Mikal Pilon, DO   2,000 Units at 02/23/22 7306     Technical Description: This is a 21-channel digital EEG recording with time-locked video. Electrodes were placed in accordance with the 10-20 International System of Electrode Placement. Single lead EKG monitoring was included. Baseline EEG Recording:  A formal baseline EEG recording was not obtained. Day 1 - 2/22/22, starting at 11:29    Interictal EEG Samples: Adult onset recording, no clear dissectible cerebral activity was seen. As recording progressed, the background showed 3 to 4 Hz of polymorphic delta activity of 15 to 25 µV and frequent periods of diffuse attenuation lasting 4 to 3 seconds. There was no obvious asymmetry between hemispheres. No abnormal epileptiform activity was seen. The EKG channel revealed no abnormalities. Ictal EEG Recording / Patient Events: During this period the patient had no events or seizures. Summary: During this day of recording no events were recorded. The interictal EEG was abnormal due to diffuse polymorphic delta slowing and periods of diffuse attenuation suggesting severe encephalopathy. This finding could be from patient's sedation medication or from hypothermia. No abnormal epileptiform activity was seen. Monitoring was continued in order to record the patient's typical events. The EKG channel revealed no abnormalities. Day 2 - 2/23/22    Interictal EEG Samples: Interictal EEG was unchanged from yesterday. Ictal EEG Recording / Patient Events: During this period the patient had no events or seizures. Summary: During this day of recording no events were recorded. The interictal EEG was abnormal due to diffuse polymorphic delta slowing and periods of diffuse attenuation suggesting severe encephalopathy.   This finding could be from patient's sedation medication or from hypothermia. No abnormal epileptiform activity was seen. Monitoring was continued in order to record the patient's typical events. The EKG channel revealed no abnormalities. Day 3 - 2/24/22    Interictal EEG Samples: Interictal EEG was unchanged from yesterday. The interictal EEG appeared more continuous. Ictal EEG Recording / Patient Events: During this period the patient had no events or seizures. Summary: During this day of recording no events were recorded. Interictal EEG was abnormal due to diffuse polymorphic delta slowing suggesting severe encephalopathy. This EEG was continuous and appeared to have minimally improved from yesterday's recording. Monitoring was continued in order to record the patients typical events. The EKG channel revealed no abnormalities. Day 4 - 2/25/22, reviewed through 7:30 am Remainder of the EEG will be reviewed by my colleague Dr. Ansley Becker. Interictal EEG Samples: Interictal EEG was unchanged from yesterday. Ictal EEG Recording / Patient Events: During this period the patient had no events or seizures. Summary: During this day of recording no events were recorded. Interictal EEG was abnormal due to diffuse polymorphic delta slowing suggesting severe encephalopathy. This EEG was continuous and appeared to have minimally improved from yesterday's recording. Monitoring was continued in order to record the patients typical events. The EKG channel revealed no abnormalities.     Heron Jeffrey MD  Diplomate, American Board of Psychiatry and Neurology  Diplomate, American Board of Clinical Neurophysiology  Diplomate, American Board of Epilepsy
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
## 2022-02-24 NOTE — PLAN OF CARE
Problem: OXYGENATION/RESPIRATORY FUNCTION  Goal: Patient will maintain patent airway  2/24/2022 0951 by Rodo Aguillon, RCP  Outcome: Ongoing  2/24/2022 0043 by Mary Gofdrey RN  Outcome: Ongoing     Problem: OXYGENATION/RESPIRATORY FUNCTION  Goal: Patient will achieve/maintain normal respiratory rate/effort  Description: Respiratory rate and effort will be within normal limits for the patient  2/24/2022 0951 by Rodo Aguillon, RCP  Outcome: Ongoing  2/24/2022 0043 by Mary Godfrey RN  Outcome: Ongoing     Problem: MECHANICAL VENTILATION  Goal: Patient will maintain patent airway  2/24/2022 0951 by Rodo Aguillon, RCP  Outcome: Ongoing  2/24/2022 0043 by Mary Godfrey RN  Outcome: Ongoing     Problem: MECHANICAL VENTILATION  Goal: Oral health is maintained or improved  2/24/2022 0951 by Rodo Aguillon, RCP  Outcome: Ongoing  2/24/2022 0043 by Mary Godfrey RN  Outcome: Ongoing     Problem: MECHANICAL VENTILATION  Goal: ET tube will be managed safely  2/24/2022 0951 by Rodo Aguillon, RCP  Outcome: Ongoing  2/24/2022 0043 by Mary Godfrey RN  Outcome: Ongoing     Problem: MECHANICAL VENTILATION  Goal: Ability to express needs and understand communication  2/24/2022 0951 by MAYRA GuerraP  Outcome: Ongoing  2/24/2022 0043 by Mary Godfrey RN  Outcome: Ongoing

## 2022-02-25 ENCOUNTER — APPOINTMENT (OUTPATIENT)
Dept: MRI IMAGING | Age: 57
DRG: 192 | End: 2022-02-25
Payer: COMMERCIAL

## 2022-02-25 ENCOUNTER — ANCILLARY PROCEDURE (OUTPATIENT)
Dept: EMERGENCY DEPT | Age: 57
DRG: 192 | End: 2022-02-25
Payer: COMMERCIAL

## 2022-02-25 LAB
-: ABNORMAL
ABSOLUTE EOS #: 0.23 K/UL (ref 0–0.44)
ABSOLUTE IMMATURE GRANULOCYTE: 0.04 K/UL (ref 0–0.3)
ABSOLUTE LYMPH #: 1.11 K/UL (ref 1.1–3.7)
ABSOLUTE MONO #: 0.54 K/UL (ref 0.1–1.2)
ALBUMIN SERPL-MCNC: 2.7 G/DL (ref 3.5–5.2)
ALBUMIN/GLOBULIN RATIO: 1.2 (ref 1–2.5)
ALLEN TEST: ABNORMAL
ALP BLD-CCNC: 152 U/L (ref 40–129)
ALT SERPL-CCNC: 20 U/L (ref 5–41)
ANION GAP SERPL CALCULATED.3IONS-SCNC: 11 MMOL/L (ref 9–17)
AST SERPL-CCNC: 46 U/L
BASOPHILS # BLD: 1 % (ref 0–2)
BASOPHILS ABSOLUTE: 0.09 K/UL (ref 0–0.2)
BILIRUB SERPL-MCNC: 1.22 MG/DL (ref 0.3–1.2)
BILIRUBIN URINE: NEGATIVE
BUN BLDV-MCNC: 14 MG/DL (ref 6–20)
CALCIUM SERPL-MCNC: 7.8 MG/DL (ref 8.6–10.4)
CASTS UA: ABNORMAL /LPF (ref 0–8)
CHLORIDE BLD-SCNC: 111 MMOL/L (ref 98–107)
CHLORIDE, UR: 80 MMOL/L
CO2: 18 MMOL/L (ref 20–31)
COLOR: YELLOW
CREAT SERPL-MCNC: 1.65 MG/DL (ref 0.7–1.2)
CREATININE URINE: 126.5 MG/DL (ref 39–259)
EOSINOPHILS RELATIVE PERCENT: 3 % (ref 1–4)
EPITHELIAL CELLS UA: ABNORMAL /HPF (ref 0–5)
FIO2: 35
GFR AFRICAN AMERICAN: 52 ML/MIN
GFR NON-AFRICAN AMERICAN: 43 ML/MIN
GFR SERPL CREATININE-BSD FRML MDRD: ABNORMAL ML/MIN/{1.73_M2}
GLUCOSE BLD-MCNC: 100 MG/DL (ref 75–110)
GLUCOSE BLD-MCNC: 101 MG/DL (ref 75–110)
GLUCOSE BLD-MCNC: 107 MG/DL (ref 75–110)
GLUCOSE BLD-MCNC: 95 MG/DL (ref 75–110)
GLUCOSE BLD-MCNC: 97 MG/DL (ref 74–100)
GLUCOSE BLD-MCNC: 99 MG/DL (ref 70–99)
GLUCOSE URINE: NEGATIVE
HCT VFR BLD CALC: 33 % (ref 40.7–50.3)
HEMOGLOBIN: 10.9 G/DL (ref 13–17)
IMMATURE GRANULOCYTES: 0 %
KETONES, URINE: ABNORMAL
LEUKOCYTE ESTERASE, URINE: NEGATIVE
LYMPHOCYTES # BLD: 12 % (ref 24–43)
MCH RBC QN AUTO: 30.2 PG (ref 25.2–33.5)
MCHC RBC AUTO-ENTMCNC: 33 G/DL (ref 28.4–34.8)
MCV RBC AUTO: 91.4 FL (ref 82.6–102.9)
MODE: ABNORMAL
MONOCYTES # BLD: 6 % (ref 3–12)
NEGATIVE BASE EXCESS, ART: 5 (ref 0–2)
NEURON SPEC ENOLASE: 21.9 NG/ML
NITRITE, URINE: NEGATIVE
NRBC AUTOMATED: 0.2 PER 100 WBC
O2 DEVICE/FLOW/%: ABNORMAL
PARTIAL THROMBOPLASTIN TIME: 43.2 SEC (ref 20.5–30.5)
PARTIAL THROMBOPLASTIN TIME: 52 SEC (ref 20.5–30.5)
PARTIAL THROMBOPLASTIN TIME: 67.6 SEC (ref 20.5–30.5)
PDW BLD-RTO: 17.5 % (ref 11.8–14.4)
PH UA: 5.5 (ref 5–8)
PLATELET # BLD: ABNORMAL K/UL (ref 138–453)
PLATELET, FLUORESCENCE: 108 K/UL (ref 138–453)
PLATELET, IMMATURE FRACTION: 4.3 % (ref 1.1–10.3)
POC HCO3: 21.8 MMOL/L (ref 21–28)
POC O2 SATURATION: 91 % (ref 94–98)
POC PCO2: 48.1 MM HG (ref 35–48)
POC PH: 7.26 (ref 7.35–7.45)
POC PO2: 69.1 MM HG (ref 83–108)
POTASSIUM SERPL-SCNC: 4.3 MMOL/L (ref 3.7–5.3)
PROTEIN UA: NEGATIVE
RBC # BLD: 3.61 M/UL (ref 4.21–5.77)
RBC # BLD: ABNORMAL 10*6/UL
RBC UA: ABNORMAL /HPF (ref 0–4)
SAMPLE SITE: ABNORMAL
SEG NEUTROPHILS: 78 % (ref 36–65)
SEGMENTED NEUTROPHILS ABSOLUTE COUNT: 7.33 K/UL (ref 1.5–8.1)
SODIUM BLD-SCNC: 140 MMOL/L (ref 135–144)
SODIUM,UR: 99 MMOL/L
SPECIFIC GRAVITY UA: 1.02 (ref 1–1.03)
TOTAL PROTEIN, URINE: 33 MG/DL
TOTAL PROTEIN: 4.9 G/DL (ref 6.4–8.3)
TURBIDITY: CLEAR
URINE HGB: NEGATIVE
URINE TOTAL PROTEIN CREATININE RATIO: 0.26 (ref 0–0.2)
UROBILINOGEN, URINE: NORMAL
WBC # BLD: 9.3 K/UL (ref 3.5–11.3)
WBC UA: ABNORMAL /HPF (ref 0–5)

## 2022-02-25 PROCEDURE — 81001 URINALYSIS AUTO W/SCOPE: CPT

## 2022-02-25 PROCEDURE — 6370000000 HC RX 637 (ALT 250 FOR IP): Performed by: STUDENT IN AN ORGANIZED HEALTH CARE EDUCATION/TRAINING PROGRAM

## 2022-02-25 PROCEDURE — 99223 1ST HOSP IP/OBS HIGH 75: CPT | Performed by: INTERNAL MEDICINE

## 2022-02-25 PROCEDURE — 6360000002 HC RX W HCPCS: Performed by: STUDENT IN AN ORGANIZED HEALTH CARE EDUCATION/TRAINING PROGRAM

## 2022-02-25 PROCEDURE — 84300 ASSAY OF URINE SODIUM: CPT

## 2022-02-25 PROCEDURE — 94003 VENT MGMT INPAT SUBQ DAY: CPT

## 2022-02-25 PROCEDURE — 85025 COMPLETE CBC W/AUTO DIFF WBC: CPT

## 2022-02-25 PROCEDURE — 2580000003 HC RX 258: Performed by: STUDENT IN AN ORGANIZED HEALTH CARE EDUCATION/TRAINING PROGRAM

## 2022-02-25 PROCEDURE — 6370000000 HC RX 637 (ALT 250 FOR IP): Performed by: INTERNAL MEDICINE

## 2022-02-25 PROCEDURE — 2000000000 HC ICU R&B

## 2022-02-25 PROCEDURE — 95718 EEG PHYS/QHP 2-12 HR W/VEEG: CPT | Performed by: PSYCHIATRY & NEUROLOGY

## 2022-02-25 PROCEDURE — 82947 ASSAY GLUCOSE BLOOD QUANT: CPT

## 2022-02-25 PROCEDURE — 86316 IMMUNOASSAY TUMOR OTHER: CPT

## 2022-02-25 PROCEDURE — 70551 MRI BRAIN STEM W/O DYE: CPT

## 2022-02-25 PROCEDURE — 94761 N-INVAS EAR/PLS OXIMETRY MLT: CPT

## 2022-02-25 PROCEDURE — 82803 BLOOD GASES ANY COMBINATION: CPT

## 2022-02-25 PROCEDURE — C9113 INJ PANTOPRAZOLE SODIUM, VIA: HCPCS | Performed by: STUDENT IN AN ORGANIZED HEALTH CARE EDUCATION/TRAINING PROGRAM

## 2022-02-25 PROCEDURE — 82570 ASSAY OF URINE CREATININE: CPT

## 2022-02-25 PROCEDURE — 80053 COMPREHEN METABOLIC PANEL: CPT

## 2022-02-25 PROCEDURE — 85055 RETICULATED PLATELET ASSAY: CPT

## 2022-02-25 PROCEDURE — 82436 ASSAY OF URINE CHLORIDE: CPT

## 2022-02-25 PROCEDURE — 84156 ASSAY OF PROTEIN URINE: CPT

## 2022-02-25 PROCEDURE — APPSS15 APP SPLIT SHARED TIME 0-15 MINUTES: Performed by: NURSE PRACTITIONER

## 2022-02-25 PROCEDURE — 95720 EEG PHY/QHP EA INCR W/VEEG: CPT | Performed by: PSYCHIATRY & NEUROLOGY

## 2022-02-25 PROCEDURE — 2700000000 HC OXYGEN THERAPY PER DAY

## 2022-02-25 PROCEDURE — 95711 VEEG 2-12 HR UNMONITORED: CPT

## 2022-02-25 PROCEDURE — 99232 SBSQ HOSP IP/OBS MODERATE 35: CPT | Performed by: NEUROLOGICAL SURGERY

## 2022-02-25 PROCEDURE — 72141 MRI NECK SPINE W/O DYE: CPT

## 2022-02-25 PROCEDURE — 85730 THROMBOPLASTIN TIME PARTIAL: CPT

## 2022-02-25 PROCEDURE — 2500000003 HC RX 250 WO HCPCS: Performed by: INTERNAL MEDICINE

## 2022-02-25 PROCEDURE — 2580000003 HC RX 258: Performed by: INTERNAL MEDICINE

## 2022-02-25 PROCEDURE — 51798 US URINE CAPACITY MEASURE: CPT

## 2022-02-25 PROCEDURE — 76937 US GUIDE VASCULAR ACCESS: CPT

## 2022-02-25 PROCEDURE — 99291 CRITICAL CARE FIRST HOUR: CPT | Performed by: INTERNAL MEDICINE

## 2022-02-25 PROCEDURE — 37799 UNLISTED PX VASCULAR SURGERY: CPT

## 2022-02-25 RX ORDER — FENTANYL CITRATE 50 UG/ML
INJECTION, SOLUTION INTRAMUSCULAR; INTRAVENOUS
Status: DISPENSED
Start: 2022-02-25 | End: 2022-02-25

## 2022-02-25 RX ORDER — FENTANYL CITRATE 50 UG/ML
100 INJECTION, SOLUTION INTRAMUSCULAR; INTRAVENOUS ONCE
Status: DISCONTINUED | OUTPATIENT
Start: 2022-02-25 | End: 2022-03-03

## 2022-02-25 RX ORDER — FENTANYL CITRATE 50 UG/ML
125 INJECTION, SOLUTION INTRAMUSCULAR; INTRAVENOUS ONCE
Status: DISCONTINUED | OUTPATIENT
Start: 2022-02-25 | End: 2022-02-25

## 2022-02-25 RX ORDER — AZATHIOPRINE 50 MG/1
75 TABLET ORAL DAILY
Status: DISCONTINUED | OUTPATIENT
Start: 2022-02-25 | End: 2022-03-17 | Stop reason: HOSPADM

## 2022-02-25 RX ADMIN — AZATHIOPRINE 75 MG: 50 TABLET ORAL at 23:24

## 2022-02-25 RX ADMIN — CHLORHEXIDINE GLUCONATE 0.12% ORAL RINSE 15 ML: 1.2 LIQUID ORAL at 08:30

## 2022-02-25 RX ADMIN — PANTOPRAZOLE SODIUM 40 MG: 40 INJECTION, POWDER, FOR SOLUTION INTRAVENOUS at 08:30

## 2022-02-25 RX ADMIN — AMIODARONE HYDROCHLORIDE 0.5 MG/MIN: 50 INJECTION, SOLUTION INTRAVENOUS at 21:13

## 2022-02-25 RX ADMIN — Medication 10.1 UNITS/KG/HR: at 00:21

## 2022-02-25 RX ADMIN — POLYVINYL ALCOHOL 1 DROP: 14 SOLUTION/ DROPS OPHTHALMIC at 08:30

## 2022-02-25 RX ADMIN — SODIUM BICARBONATE: 84 INJECTION, SOLUTION INTRAVENOUS at 14:23

## 2022-02-25 RX ADMIN — SODIUM CHLORIDE: 9 INJECTION, SOLUTION INTRAVENOUS at 00:56

## 2022-02-25 RX ADMIN — AMPICILLIN AND SULBACTAM 3000 MG: 1; 2 INJECTION, POWDER, FOR SOLUTION INTRAMUSCULAR; INTRAVENOUS at 17:22

## 2022-02-25 RX ADMIN — CARVEDILOL 12.5 MG: 12.5 TABLET, FILM COATED ORAL at 18:15

## 2022-02-25 RX ADMIN — HEPARIN SODIUM 2000 UNITS: 1000 INJECTION INTRAVENOUS; SUBCUTANEOUS at 15:04

## 2022-02-25 RX ADMIN — AMPICILLIN AND SULBACTAM 3000 MG: 1; 2 INJECTION, POWDER, FOR SOLUTION INTRAMUSCULAR; INTRAVENOUS at 13:30

## 2022-02-25 RX ADMIN — POLYVINYL ALCOHOL 1 DROP: 14 SOLUTION/ DROPS OPHTHALMIC at 18:48

## 2022-02-25 RX ADMIN — CARVEDILOL 12.5 MG: 12.5 TABLET, FILM COATED ORAL at 08:30

## 2022-02-25 RX ADMIN — Medication 75 MCG/HR: at 06:04

## 2022-02-25 RX ADMIN — AMPICILLIN AND SULBACTAM 3000 MG: 1; 2 INJECTION, POWDER, FOR SOLUTION INTRAMUSCULAR; INTRAVENOUS at 00:19

## 2022-02-25 RX ADMIN — ATORVASTATIN CALCIUM 80 MG: 80 TABLET, FILM COATED ORAL at 08:30

## 2022-02-25 RX ADMIN — POLYVINYL ALCOHOL 1 DROP: 14 SOLUTION/ DROPS OPHTHALMIC at 05:55

## 2022-02-25 RX ADMIN — POLYVINYL ALCOHOL 1 DROP: 14 SOLUTION/ DROPS OPHTHALMIC at 14:19

## 2022-02-25 RX ADMIN — POLYVINYL ALCOHOL 1 DROP: 14 SOLUTION/ DROPS OPHTHALMIC at 02:30

## 2022-02-25 RX ADMIN — POLYVINYL ALCOHOL 1 DROP: 14 SOLUTION/ DROPS OPHTHALMIC at 22:15

## 2022-02-25 RX ADMIN — AMPICILLIN AND SULBACTAM 3000 MG: 1; 2 INJECTION, POWDER, FOR SOLUTION INTRAMUSCULAR; INTRAVENOUS at 05:54

## 2022-02-25 RX ADMIN — AMIODARONE HYDROCHLORIDE 0.5 MG/MIN: 50 INJECTION, SOLUTION INTRAVENOUS at 03:32

## 2022-02-25 RX ADMIN — ASPIRIN 81 MG: 81 TABLET, CHEWABLE ORAL at 08:30

## 2022-02-25 RX ADMIN — SODIUM CHLORIDE, PRESERVATIVE FREE 10 ML: 5 INJECTION INTRAVENOUS at 08:30

## 2022-02-25 ASSESSMENT — PULMONARY FUNCTION TESTS
PIF_VALUE: 30
PIF_VALUE: 26
PIF_VALUE: 27
PIF_VALUE: 26
PIF_VALUE: 32
PIF_VALUE: 23

## 2022-02-25 NOTE — FLOWSHEET NOTE
RN notified critical care resident of patient's ptt 76.6, requested to decrease by 1unit/kg/hr d/t recent difficulty maintaining pt's ptt in a therapeutic range. Per Dr. Kaleb Shafer, [facility] to decrease gtt by 1unit/kg/hr, see eMAR.      Electronically signed by Kenzie Quintero RN on 2/24/2022 at 9:49 PM

## 2022-02-25 NOTE — PLAN OF CARE
Problem: OXYGENATION/RESPIRATORY FUNCTION  Goal: Patient will maintain patent airway  2/25/2022 1733 by Enrrique Erazo RN  Outcome: Ongoing  2/25/2022 1638 by Nicole Hamilton RCP  Outcome: Ongoing  Goal: Patient will achieve/maintain normal respiratory rate/effort  Description: Respiratory rate and effort will be within normal limits for the patient  2/25/2022 1733 by Enrrique Erazo RN  Outcome: Ongoing  2/25/2022 1638 by Nicole Hamilton RCP  Outcome: Ongoing     Problem: MECHANICAL VENTILATION  Goal: Patient will maintain patent airway  2/25/2022 1638 by Nicole Hamilton RCP  Outcome: Ongoing  Goal: Oral health is maintained or improved  2/25/2022 1733 by Enrrique Erazo RN  Outcome: Ongoing  2/25/2022 1638 by Nicole Hamilton RCP  Outcome: Ongoing  Goal: ET tube will be managed safely  2/25/2022 1638 by Nicole Hamilton RCP  Outcome: Ongoing  Goal: Ability to express needs and understand communication  2/25/2022 1733 by Enrrique Erazo RN  Outcome: Ongoing  2/25/2022 1638 by Nicole Hamilton RCP  Outcome: Ongoing  Goal: Mobility/activity is maintained at optimum level for patient  2/25/2022 1638 by Nicole Hamilton RCP  Outcome: Ongoing     Problem: SKIN INTEGRITY  Goal: Skin integrity is maintained or improved  2/25/2022 1733 by Enrrique Erazo RN  Outcome: Ongoing  2/25/2022 1638 by Nicole Hamilton RCP  Outcome: Ongoing     Problem: Injury - Risk of, Physical Injury:  Goal: Absence of physical injury  Description: Absence of physical injury  Outcome: Ongoing     Problem: Falls - Risk of:  Goal: Absence of physical injury  Description: Absence of physical injury  Outcome: Ongoing       Patient will remain free from skin breakdown. Patient will be turned every two hours.      Enrrique Erazo RN

## 2022-02-25 NOTE — FLOWSHEET NOTE
Assessment: Patient is a 60 y.o. male who was admitted to the hospital due to a \"STEMI Alert. \" Patient was intubated at time of 's visit. Intervention:  visited patient per charge nurse referral, indicating that patient was \"critical.\"  met with patient's bedside nurse who indicated that patient will be undergoing an \"MRI,\" today. Patient's nurse indicated that family will not likely made any further decisions regarding his care until after the results of the MRI.  offered prayer at bedside for patient's wellness and comfort. Outcome: Patient remained intubated throughout visit. Plan: Chaplains can make follow-up visit, per request. Pari Meyers can be reached 24/7 via in[employer]. Yaa Screen       02/25/22 8158   Encounter Summary   Services provided to: Patient not available   Referral/Consult From: Nursing Supervisor/Manager   Continue Visiting   (2/24/2022, pt intubated)   Complexity of Encounter Low   Length of Encounter 15 minutes   Routine   Type Follow up   Spiritual/Sikh   Type Spiritual support   Assessment Sleeping; Unable to respond   Intervention Sustaining presence/ Ministry of presence;Prayer   Outcome Did not respond

## 2022-02-25 NOTE — PROGRESS NOTES
INTENSIVE CARE UNIT  Resident Physician Progress Note    Patient - Agnieszka Calzada  Date of Admission -  2/21/2022  9:13 PM  Date of Evaluation -  2/25/2022  Room and Bed Number -  0125/0125-01   Hospital Day - 3    SUBJECTIVE:     HISTORY OF PRESENT ILLNESS:    Adalberto Rosa a 60 y. o. with PMH of CAD s/p CABG x 3 in  2011 and Cath 10/2018 with patent LIMA to LAD and SVG to RCA but occluded SVG to OM1 who presented to the emergency department with cardiac arrest. Patient was at home when a family member heard the patient fall upstairs however was unable to check on the patient. EMS was called and patient was found to be in V. Fib arrest. ACLS was initiated and patient received two shocks and ROSC was achieved. On the way out of the patient's home patient went into PEA arrest. Compressions were resumed and epinephrine was given and ROSC was achieved. Unknown down time.      In the emergency department patient was intubated and sedated on propofol. Hemodynamically stable off pressors. Labs demonstrated elevated creatinine (1.67), lactic acidosis (3.0), leukocytosis (20.2), EKG was concerning for STEMI with ST elevations in V3, V4 and V5. Cardiology was consulted however patient did not meet STEMI criteria. Cardiology initially planned to Cath patient however he was taken off propofol with only sluggish pupils, but no gag or corneal reflexes so Cath was canceled due to poor neurological prognosis. CT head was negative. CT C spine demonstrated remote anterior fusion from C4 through C7 with C5-C6 corpectomy and bone strut placement as well as prominent/recurrent spondylosis with moderate cord flattening at C4-C5 and C5-C6. Patient will be admitted to medical ICU.           OVERNIGHT EVENTS:      No acute events overnight  Sedated on Fentanyl  Improving neuro status, will open eyes to verbal but does not track, does not follow commands. No response to pain in upper extremities, will move toes.  Pupils 2mm equal and reactive/sluggish. Worsening kidney function, positive fluid balance, decrease maintenance from 100-50cc/hr, will consult Nephro  MRI today    Tmax 99.5, HR   Vent: PRVC 16/580/6/35%  ABGs: 7.264/48.1/69.1  Amiodarone gtt  Heparin gtt  Unasyn for aspiration pneumonia  Diet: OG tube feeds  U/O: 4203 Out 1141 (0.3ml/kg/hr)    F.A.S.T. M. H.U.G.S. B.I.D.      Feeding Diet: Diet NPO   ADULT TUBE FEEDING; Orogastric; Standard without Fiber; Continuous; 20; Yes; 20; Q 4 hours; 70; 30; Q 4 hours   Fluids: NS 50cc/hr, (decreased from 100 this morning)   Family: Will update   Analgesic: Fentanyl gtt   Sedation: No sedation    Thrombo-prophylaxis: EPC cuffs, heparin gtt   Mobility: bedrest   Heads up: 30 degrees   Ulcer prophylaxis: [x] PPI Agent, [] [id number], [] Sucralfate, [] Other:   Glycemic control: 101, no insulin   Spontaneous breathing trial: No    Bowel regimen/urine output: Glycolax PRN, 0.3cc/kg/hr   Indwelling catheter/lines: CVC, PIV, umaña, arterial line   De-escalation: off sedation, assessing neuro function      TODAY:     AWAKE & FOLLOWING COMMANDS: [x]   No  []   Yes                           SECRETIONS                 Amount:  []   Small []   Moderate []   Large [x]   None        Color: []   White []   Colored []   Bloody                         SEDATION:                 RAAS Score: []   +1 to -1 []   -2 [x]   -3 []   -4        Sedation Agent: []   Propofol gtt []   Versed gtt  []   Ativan gtt  [x]   Fentanyl        Paralytic Agent: []   Precedex []   Norcuron []   Nimbex []                         VASOPRESSORS:  [x]   No  []   Yes            Vasopressor Agent []   Levophed []   Dopamine []   Vasopressin []   Dobutamine  []   Phenylephrine  []   Epinephrine     OBJECTIVE:     VITAL SIGNS:  Patient Vitals for the past 8 hrs:   BP Temp Temp src Pulse Resp SpO2 Weight   02/25/22 0700 122/74   72  100 %    02/25/22 0600 130/79 99.5 °F (37.5 °C)  80  96 %    02/25/22 0553       214 lb 1.1 oz (97.1 kg)   22 0500 123/87   79  100 %    22 0423    76  100 %    22 0400 113/76 99.5 °F (37.5 °C) Bladder 75 16 100 %    22 0300 103/66   73 16 100 %    22 0200 (!) 154/90 99.1 °F (37.3 °C)  82 16 100 %    22 0100 138/88   78 16 99 %    22 0005    73  100 %    22 0000 110/69 99.1 °F (37.3 °C) Bladder 72 16 100 %        Last Body weight:   Wt Readings from Last 3 Encounters:   22 214 lb 1.1 oz (97.1 kg)   22 207 lb 6.4 oz (94.1 kg)   10/25/21 210 lb (95.3 kg)       Body Mass Index : Body mass index is 30.72 kg/m². Tmax over 24 hours: Temp (24hrs), Av.8 °F (37.1 °C), Min:97.9 °F (36.6 °C), Max:99.5 °F (37.5 °C)      Ins/Outs: In: 3732.5 [I.V.:3092.6; NG/GT:240]  Out: 1141 [Urine:691]    PHYSICAL EXAM:  Constitutional: Intubated, no distress, opens eyes to verbal  EENT: Pupils equal, 2mm sluggish, upward gaze, sclera clear, anicteric, oropharynx clear, no lesions, neck supple with midline trachea.   Neck: Supple, symmetrical, trachea midline, no adenopathy, thyroid symmetric, no jvd skin normal  Respiratory: Expiratory rhonchi, No intercostal tenderness  Cardiovascular: regular rate and rhythm, normal S1, S2, no murmur noted and 2+ pulses throughout  Abdomen: soft, nontender, nondistended, no masses or organomegaly  Extremities:  peripheral pulses normal, no pedal edema, no clubbing or cyanosis    MEDICATIONS:  Scheduled Meds:   carvedilol  12.5 mg Oral BID WC    sodium chloride  500 mL IntraVENous Once    insulin lispro  0-3 Units SubCUTAneous Q6H    aspirin  81 mg Oral Daily    atorvastatin  80 mg Oral Daily    sodium chloride flush  5-40 mL IntraVENous 2 times per day    chlorhexidine  15 mL Mouth/Throat BID    polyvinyl alcohol  1 drop Both Eyes Q4H    ampicillin-sulbactam  3,000 mg IntraVENous Q6H    pantoprazole  40 mg IntraVENous Daily       Continuous Infusions:   dexmedetomidine      heparin (PORCINE) Infusion 10.1 Units/kg/hr (02/25/22 0400)    sodium chloride      fentaNYL 75 mcg/hr (02/25/22 0604)    [Held by provider] propofol Stopped (02/24/22 0906)    sodium chloride 100 mL/hr at 02/25/22 0400    dextrose      amiodarone 0.5 mg/min (02/25/22 0400)       PRN Meds:   labetalol, 10 mg, Q6H PRN  sodium chloride flush, 5-40 mL, PRN  sodium chloride, 25 mL, PRN  ondansetron, 4 mg, Q8H PRN   Or  ondansetron, 4 mg, Q6H PRN  polyethylene glycol, 17 g, Daily PRN  acetaminophen, 650 mg, Q6H PRN   Or  acetaminophen, 650 mg, Q6H PRN  glucose, 15 g, PRN  glucagon (rDNA), 1 mg, PRN  dextrose, 100 mL/hr, PRN  dextrose bolus (hypoglycemia), 125 mL, PRN   Or  dextrose bolus (hypoglycemia), 250 mL, PRN  ipratropium-albuterol, 1 ampule, Q6H PRN  heparin (porcine), 4,000 Units, PRN  heparin (porcine), 2,000 Units, PRN        SUPPORT DEVICES: [x] Ventilator [] BIPAP  [] Nasal Cannula [] Room Air    VENT SETTINGS (Comprehensive) (if applicable):   PRVC mode, FiO2 35%, PEEP 6, Respiratory Rate 16, Tidal Volume 580  Vent Information  $Ventilation: $Subsequent Day  Skin Assessment: Clean, dry, & intact  Suction Catheter Diameter: 14  Equipment ID: [id number]  Equipment Changed: Expiratory Filter (and HME)  Vent Type: Servo i  Vent Mode: PRVC  Vt Ordered: 580 mL  Rate Set: 16 bmp  FiO2 : 35 %  SpO2: 100 %  SpO2/FiO2 ratio: 285.71  Sensitivity: 5  PEEP/CPAP: 6  I Time/ I Time %: 0.9 s  Humidification Source: HME  Nitric Oxide/Epoprostenol In Use?: No  Additional Respiratory  Assessments  Pulse: 72  Resp: 16  SpO2: 100 %  End Tidal CO2: 32 (%)  Position: Semi-Willis's  Humidification Source: HME  Oral Care Completed?: Yes  Oral Care: Mouth swabbed,Mouth suctioned,Suction toothette  Subglottic Suction Done?: Yes  Cuff Pressure (cm H2O):  (mov)  Lab Results   Component Value Date    MODE [facility] 02/25/2022       ABGs:   As noted above in Overnight  No results found for: PH, PCO2, PO2, HCO3, O2SAT    DATA:  Complete Blood Count:   Recent Labs     02/23/22  1215 02/24/22  0425 02/25/22 0421   WBC 9.7 11.1 9.3   RBC 4.16* 3.88* 3.61*   HGB 12.6* 11.9* 10.9*   HCT 37.3* 35.4* 33.0*   MCV 89.7 91.2 91.4   MCH 30.3 30.7 30.2   MCHC 33.8 33.6 33.0   RDW 16.5* 16.8* 17.5*   PLT See Reflexed IPF Result See Reflexed IPF Result See Reflexed IPF Result        Last 3 Blood Glucose:   Recent Labs     02/22/22  0810 02/22/22  1320 02/22/22 2010 02/23/22  0215 02/23/22  0450 02/23/22  0826 02/24/22 0425 02/25/22 0421   GLUCOSE 131* 118* 107* 127* 141* 133* 85 99        PT/INR:    Lab Results   Component Value Date    PROTIME 12.3 02/22/2022    PROTIME 10.6 12/19/2011    INR 1.2 02/22/2022     PTT:    Lab Results   Component Value Date    APTT 52.0 02/25/2022       Basic Metabolic Profile:   Recent Labs     02/23/22  0215 02/23/22  0450 02/23/22  0826 02/23/22  1017 02/23/22 2045 02/24/22 0425 02/25/22 0421      < > 139  --   --  140 140   K 4.3   < > 4.1   < > 4.8 4.1 4.3      < > 110*  --   --  109* 111*   CO2 20   < > 20  --   --  18* 18*   BUN 15   < > 14  --   --  14 14   CREATININE 1.29*   < > 1.19  --   --  1.48* 1.65*   GLUCOSE 127*   < > 133*  --   --  85 99   PHOS 4.2  --  3.9  --   --  2.9  --     < > = values in this interval not displayed.        Liver Function:  Recent Labs     02/25/22 0421   PROT 4.9*   LABALBU 2.7*   ALT 20   AST 46*   ALKPHOS 152*   BILITOT 1.22*       Magnesium:   Lab Results   Component Value Date    MG 2.1 02/24/2022    MG 1.9 02/23/2022    MG 1.9 02/23/2022     Phosphorus:   Lab Results   Component Value Date    PHOS 2.9 02/24/2022    PHOS 3.9 02/23/2022    PHOS 4.2 02/23/2022     Ionized Calcium:   Lab Results   Component Value Date    CAION 1.12 02/23/2022    CAION 1.14 02/23/2022    CAION 1.19 02/22/2022        Urinalysis:   Lab Results   Component Value Date    NITRU NEGATIVE 12/18/2020    COLORU YELLOW 12/18/2020    PHUR 6.5 12/18/2020    WBCUA 0 TO 2 11/06/2020    RBCUA 0 TO 2 11/06/2020    MUCUS NOT REPORTED 11/06/2020    TRICHOMONAS NOT REPORTED 11/06/2020    YEAST NOT REPORTED 11/06/2020    BACTERIA NOT REPORTED 11/06/2020    CLARITYU clear 09/24/2020    SPECGRAV 1.008 12/18/2020    LEUKOCYTESUR NEGATIVE 12/18/2020    UROBILINOGEN Normal 12/18/2020    BILIRUBINUR NEGATIVE 12/18/2020    BLOODU +++ 09/24/2020    GLUCOSEU NEGATIVE 12/18/2020    KETUA NEGATIVE 12/18/2020    AMORPHOUS NOT REPORTED 11/06/2020       HgBA1c:    Lab Results   Component Value Date    LABA1C 5.1 04/21/2021     TSH:    Lab Results   Component Value Date    TSH 2.00 02/22/2022     Lactic Acid:   Lab Results   Component Value Date    LACTA 1.5 03/02/2020    LACTA 1.0 12/10/2019    LACTA 1.5 11/04/2019      Troponin: No results for input(s): TROPONINI in the last 72 hours. Microbiology:  None    Other Labs:  Results for orders placed or performed during the hospital encounter of 02/21/22   COVID-19, Rapid    Specimen: Nasopharyngeal Swab   Result Value Ref Range    Specimen Description . NASOPHARYNGEAL SWAB     SARS-CoV-2, Rapid Not Detected Not Detected   MRSA DNA Probe, Nasal    Specimen: Nasal   Result Value Ref Range    Specimen Description . NASAL SWAB     MRSA, DNA, Nasal NEGATIVE NEGATIVE   Culture, Blood 1    Specimen: Blood   Result Value Ref Range    Specimen Description . BLOOD     Culture NO GROWTH 2 DAYS    Culture, Respiratory    Specimen: Endotracheal   Result Value Ref Range    Specimen Description . ENDOTRACHEAL     Direct Exam < 10 EPITHELIAL CELLS/LPF     Direct Exam <10 NEUTROPHILS/LPF     Direct Exam MIXED BACTERIAL MORPHOTYPES SEEN ON GRAM STAIN.      Culture NORMAL RESPIRATORY DOUGIE MODERATE GROWTH    Basic Metabolic Panel   Result Value Ref Range    Glucose 195 (H) 70 - 99 mg/dL    BUN 14 6 - 20 mg/dL    CREATININE 1.67 (H) 0.70 - 1.20 mg/dL    Calcium 8.8 8.6 - 10.4 mg/dL    Sodium 134 (L) 135 - 144 mmol/L    Potassium 3.8 3.7 - 5.3 mmol/L    Chloride 94 (L) 98 - 107 mmol/L    CO2 20 20 - 31 mmol/L    Anion Gap 20 (H) 9 - 17 mmol/L    GFR Non-African American 42 (L) >60 mL/min    GFR  51 (L) >60 mL/min    GFR Comment         CBC with Auto Differential   Result Value Ref Range    WBC 20.2 (H) 3.5 - 11.3 k/uL    RBC 5.01 4.21 - 5.77 m/uL    Hemoglobin 15.2 13.0 - 17.0 g/dL    Hematocrit 46.6 40.7 - 50.3 %    MCV 93.0 82.6 - 102.9 fL    MCH 30.3 25.2 - 33.5 pg    MCHC 32.6 28.4 - 34.8 g/dL    RDW 15.8 (H) 11.8 - 14.4 %    Platelets See Reflexed IPF Result 138 - 453 k/uL    NRBC Automated 0.2 (H) 0.0 per 100 WBC    Immature Granulocytes 3 (H) 0 %    Seg Neutrophils 60 36 - 66 %    Lymphocytes 29 24 - 44 %    Monocytes 4 1 - 7 %    Eosinophils % 4 1 - 4 %    Basophils 0 0 - 2 %    nRBC 1 (H) 0 per 100 WBC    Absolute Immature Granulocyte 0.61 (H) 0.00 - 0.30 k/uL    Segs Absolute 12.11 (H) 1.8 - 7.7 k/uL    Absolute Lymph # 5.86 (H) 1.10 - 3.70 k/uL    Absolute Mono # 0.81 (H) 0.1 - 0.8 k/uL    Absolute Eos # 0.81 (H) 0.0 - 0.4 k/uL    Basophils Absolute 0.00 0.0 - 0.2 k/uL    Morphology ANISOCYTOSIS PRESENT    Troponin   Result Value Ref Range    Troponin, High Sensitivity 106 (HH) 0 - 22 ng/L   Hepatic Function Panel   Result Value Ref Range    Albumin 3.5 3.5 - 5.2 g/dL    Alkaline Phosphatase 198 (H) 40 - 129 U/L    ALT 35 5 - 41 U/L    AST 65 (H) <40 U/L    Total Bilirubin 0.62 0.3 - 1.2 mg/dL    Bilirubin, Direct 0.23 <0.31 mg/dL    Bilirubin, Indirect 0.39 0.00 - 1.00 mg/dL    Total Protein 6.0 (L) 6.4 - 8.3 g/dL    Albumin/Globulin Ratio 1.4 1.0 - 2.5   Lactic Acid   Result Value Ref Range    Lactic Acid, Whole Blood 3.0 (H) 0.7 - 2.1 mmol/L   ELECTROLYTES PLUS   Result Value Ref Range    POC Sodium 139 138 - 146 mmol/L    POC Potassium 4.0 3.5 - 4.5 mmol/L    POC Chloride 99 98 - 107 mmol/L    POC TCO2 26 22 - 30 mmol/L    Anion Gap 15 7 - 16 mmol/L   Hemoglobin and hematocrit, blood   Result Value Ref Range    POC Hemoglobin 16.3 13.5 - 17.5 g/dL    POC Hematocrit 48 41 - 53 % CALCIUM, IONIC (POC)   Result Value Ref Range    POC Ionized Calcium 1.21 1.15 - 1.33 mmol/L   Immature Platelet Fraction   Result Value Ref Range    Platelet, Immature Fraction 5.4 1.1 - 10.3 %    Platelet, Fluorescence 111 (L) 138 - 453 k/uL   Comprehensive Metabolic Panel w/ Reflex to MG   Result Value Ref Range    Glucose 127 (H) 70 - 99 mg/dL    BUN 15 6 - 20 mg/dL    CREATININE 1.54 (H) 0.70 - 1.20 mg/dL    Calcium 8.6 8.6 - 10.4 mg/dL    Sodium 133 (L) 135 - 144 mmol/L    Potassium 4.4 3.7 - 5.3 mmol/L    Chloride 101 98 - 107 mmol/L    CO2 18 (L) 20 - 31 mmol/L    Anion Gap 14 9 - 17 mmol/L    Alkaline Phosphatase 198 (H) 40 - 129 U/L    ALT 35 5 - 41 U/L    AST 74 (H) <40 U/L    Total Bilirubin 0.72 0.3 - 1.2 mg/dL    Total Protein 6.3 (L) 6.4 - 8.3 g/dL    Albumin 3.5 3.5 - 5.2 g/dL    Albumin/Globulin Ratio 1.3 1.0 - 2.5    GFR Non- 47 (L) >60 mL/min    GFR  57 (L) >60 mL/min    GFR Comment         CBC with Auto Differential   Result Value Ref Range    WBC 18.4 (H) 3.5 - 11.3 k/uL    RBC 4.96 4.21 - 5.77 m/uL    Hemoglobin 14.9 13.0 - 17.0 g/dL    Hematocrit 46.9 40.7 - 50.3 %    MCV 94.6 82.6 - 102.9 fL    MCH 30.0 25.2 - 33.5 pg    MCHC 31.8 28.4 - 34.8 g/dL    RDW 15.9 (H) 11.8 - 14.4 %    Platelets 324 048 - 674 k/uL    MPV 10.7 8.1 - 13.5 fL    NRBC Automated 0.0 0.0 per 100 WBC    RBC Morphology ANISOCYTOSIS PRESENT     Seg Neutrophils 81 (H) 36 - 65 %    Lymphocytes 10 (L) 24 - 43 %    Monocytes 6 3 - 12 %    Eosinophils % 1 1 - 4 %    Basophils 1 0 - 2 %    Immature Granulocytes 1 (H) 0 %    Segs Absolute 14.91 (H) 1.50 - 8.10 k/uL    Absolute Lymph # 1.85 1.10 - 3.70 k/uL    Absolute Mono # 1.13 0.10 - 1.20 k/uL    Absolute Eos # 0.17 0.00 - 0.44 k/uL    Basophils Absolute 0.09 0.00 - 0.20 k/uL    Absolute Immature Granulocyte 0.25 0.00 - 0.30 k/uL   Magnesium   Result Value Ref Range    Magnesium 2.1 1.6 - 2.6 mg/dL   Phosphorus   Result Value Ref Range Phosphorus 4.0 2.5 - 4.5 mg/dL   APTT   Result Value Ref Range    PTT >120.0 (HH) 20.5 - 30.5 sec   Basic Metabolic Panel   Result Value Ref Range    Glucose 131 (H) 70 - 99 mg/dL    BUN 19 6 - 20 mg/dL    CREATININE 1.63 (H) 0.70 - 1.20 mg/dL    Calcium 8.1 (L) 8.6 - 10.4 mg/dL    Sodium 133 (L) 135 - 144 mmol/L    Potassium 4.1 3.7 - 5.3 mmol/L    Chloride 103 98 - 107 mmol/L    CO2 17 (L) 20 - 31 mmol/L    Anion Gap 13 9 - 17 mmol/L    GFR Non-African American 44 (L) >60 mL/min    GFR  53 (L) >60 mL/min    GFR Comment         Basic Metabolic Panel   Result Value Ref Range    Glucose 118 (H) 70 - 99 mg/dL    BUN 18 6 - 20 mg/dL    CREATININE 1.45 (H) 0.70 - 1.20 mg/dL    Calcium 8.5 (L) 8.6 - 10.4 mg/dL    Sodium 133 (L) 135 - 144 mmol/L    Potassium 3.6 (L) 3.7 - 5.3 mmol/L    Chloride 103 98 - 107 mmol/L    CO2 18 (L) 20 - 31 mmol/L    Anion Gap 12 9 - 17 mmol/L    GFR Non-African American 50 (L) >60 mL/min    GFR African American >60 >60 mL/min    GFR Comment         Calcium, Ionized   Result Value Ref Range    Calcium, Ion 1.05 (L) 1.13 - 1.33 mmol/L   Calcium, Ionized   Result Value Ref Range    Calcium, Ion 1.15 1.13 - 1.33 mmol/L   Lactic Acid   Result Value Ref Range    Lactic Acid, Whole Blood 1.4 0.7 - 2.1 mmol/L   Magnesium   Result Value Ref Range    Magnesium 2.1 1.6 - 2.6 mg/dL   Magnesium   Result Value Ref Range    Magnesium 1.9 1.6 - 2.6 mg/dL   Phosphorus   Result Value Ref Range    Phosphorus 2.1 (L) 2.5 - 4.5 mg/dL   Phosphorus   Result Value Ref Range    Phosphorus 2.2 (L) 2.5 - 4.5 mg/dL   Troponin   Result Value Ref Range    Troponin, High Sensitivity 713 (HH) 0 - 22 ng/L   Protime-INR   Result Value Ref Range    Protime 12.3 9.1 - 12.3 sec    INR 1.2    APTT   Result Value Ref Range    .9 (HH) 20.5 - 30.5 sec   Basic Metabolic Panel   Result Value Ref Range    Glucose 107 (H) 70 - 99 mg/dL    BUN 16 6 - 20 mg/dL    CREATININE 1.35 (H) 0.70 - 1.20 mg/dL    Calcium 8.2 (L) 8.6 - 10.4 mg/dL    Sodium 139 135 - 144 mmol/L    Potassium 3.9 3.7 - 5.3 mmol/L    Chloride 108 (H) 98 - 107 mmol/L    CO2 21 20 - 31 mmol/L    Anion Gap 10 9 - 17 mmol/L    GFR Non-African American 54 (L) >60 mL/min    GFR African American >60 >60 mL/min    GFR Comment         Calcium, Ionized   Result Value Ref Range    Calcium, Ion 1.19 1.13 - 1.33 mmol/L   Magnesium   Result Value Ref Range    Magnesium 2.0 1.6 - 2.6 mg/dL   Phosphorus   Result Value Ref Range    Phosphorus 3.9 2.5 - 4.5 mg/dL   TSH with Reflex   Result Value Ref Range    TSH 2.00 0.30 - 5.00 mIU/L   Lactic Acid   Result Value Ref Range    Lactic Acid, Whole Blood 1.2 0.7 - 2.1 mmol/L   Troponin   Result Value Ref Range    Troponin, High Sensitivity 677 (HH) 0 - 22 ng/L   Basic Metabolic Panel   Result Value Ref Range    Glucose 127 (H) 70 - 99 mg/dL    BUN 15 6 - 20 mg/dL    CREATININE 1.29 (H) 0.70 - 1.20 mg/dL    Calcium 8.2 (L) 8.6 - 10.4 mg/dL    Sodium 137 135 - 144 mmol/L    Potassium 4.3 3.7 - 5.3 mmol/L    Chloride 107 98 - 107 mmol/L    CO2 20 20 - 31 mmol/L    Anion Gap 10 9 - 17 mmol/L    GFR Non-African American 57 (L) >60 mL/min    GFR African American >60 >60 mL/min    GFR Comment         Calcium, Ionized   Result Value Ref Range    Calcium, Ion 1.14 1.13 - 1.33 mmol/L   Magnesium   Result Value Ref Range    Magnesium 1.9 1.6 - 2.6 mg/dL   Phosphorus   Result Value Ref Range    Phosphorus 4.2 2.5 - 4.5 mg/dL   APTT   Result Value Ref Range    PTT 36.6 (H) 20.5 - 30.5 sec   CBC with Auto Differential   Result Value Ref Range    WBC 4.4 3.5 - 11.3 k/uL    RBC 4.13 (L) 4.21 - 5.77 m/uL    Hemoglobin 12.4 (L) 13.0 - 17.0 g/dL    Hematocrit 37.0 (L) 40.7 - 50.3 %    MCV 89.6 82.6 - 102.9 fL    MCH 30.0 25.2 - 33.5 pg    MCHC 33.5 28.4 - 34.8 g/dL    RDW 16.0 (H) 11.8 - 14.4 %    Platelets See Reflexed IPF Result 138 - 453 k/uL    NRBC Automated 0.0 0.0 per 100 WBC    RBC Morphology ANISOCYTOSIS PRESENT     Seg Neutrophils 74 (H) 36 - 65 %    Lymphocytes 17 (L) 24 - 43 %    Monocytes 6 3 - 12 %    Eosinophils % 2 1 - 4 %    Basophils 1 0 - 2 %    Immature Granulocytes 1 (H) 0 %    Segs Absolute 3.24 1.50 - 8.10 k/uL    Absolute Lymph # 0.75 (L) 1.10 - 3.70 k/uL    Absolute Mono # 0.25 0.10 - 1.20 k/uL    Absolute Eos # 0.09 0.00 - 0.44 k/uL    Basophils Absolute 0.03 0.00 - 0.20 k/uL    Absolute Immature Granulocyte <0.03 0.00 - 0.30 k/uL   Comprehensive Metabolic Panel w/ Reflex to MG   Result Value Ref Range    Glucose 141 (H) 70 - 99 mg/dL    BUN 14 6 - 20 mg/dL    CREATININE 1.22 (H) 0.70 - 1.20 mg/dL    Calcium 8.3 (L) 8.6 - 10.4 mg/dL    Sodium 137 135 - 144 mmol/L    Potassium 4.2 3.7 - 5.3 mmol/L    Chloride 107 98 - 107 mmol/L    CO2 20 20 - 31 mmol/L    Anion Gap 10 9 - 17 mmol/L    Alkaline Phosphatase 146 (H) 40 - 129 U/L    ALT 25 5 - 41 U/L    AST 54 (H) <40 U/L    Total Bilirubin 0.47 0.3 - 1.2 mg/dL    Total Protein 5.2 (L) 6.4 - 8.3 g/dL    Albumin 2.9 (L) 3.5 - 5.2 g/dL    Albumin/Globulin Ratio 1.3 1.0 - 2.5    GFR Non-African American >60 >60 mL/min    GFR African American >60 >60 mL/min    GFR Comment         Basic Metabolic Panel   Result Value Ref Range    Glucose 133 (H) 70 - 99 mg/dL    BUN 14 6 - 20 mg/dL    CREATININE 1.19 0.70 - 1.20 mg/dL    Calcium 8.1 (L) 8.6 - 10.4 mg/dL    Sodium 139 135 - 144 mmol/L    Potassium 4.1 3.7 - 5.3 mmol/L    Chloride 110 (H) 98 - 107 mmol/L    CO2 20 20 - 31 mmol/L    Anion Gap 9 9 - 17 mmol/L    GFR Non-African American >60 >60 mL/min    GFR African American >60 >60 mL/min    GFR Comment         Calcium, Ionized   Result Value Ref Range    Calcium, Ion 1.12 (L) 1.13 - 1.33 mmol/L   Magnesium   Result Value Ref Range    Magnesium 1.9 1.6 - 2.6 mg/dL   Phosphorus   Result Value Ref Range    Phosphorus 3.9 2.5 - 4.5 mg/dL   APTT   Result Value Ref Range    PTT 60.5 (H) 20.5 - 30.5 sec   APTT   Result Value Ref Range    PTT 43.6 (H) 20.5 - 30.5 sec   Potassium   Result Value Ref Range    Potassium 4.1 3.7 - 5.3 mmol/L   Potassium   Result Value Ref Range    Potassium 3.9 3.7 - 5.3 mmol/L   Potassium   Result Value Ref Range    Potassium 4.0 3.7 - 5.3 mmol/L   Potassium   Result Value Ref Range    Potassium 4.1 3.7 - 5.3 mmol/L   Potassium   Result Value Ref Range    Potassium 4.1 3.7 - 5.3 mmol/L   Immature Platelet Fraction   Result Value Ref Range    Platelet, Immature Fraction 4.9 1.1 - 10.3 %    Platelet, Fluorescence 67 (L) 138 - 453 k/uL   Potassium   Result Value Ref Range    Potassium 4.5 3.7 - 5.3 mmol/L   Potassium   Result Value Ref Range    Potassium 4.8 3.7 - 5.3 mmol/L   APTT   Result Value Ref Range    PTT 71.1 (H) 20.5 - 30.5 sec   Heparin-Induced Platelet Antibody   Result Value Ref Range    Heparin Induced Plt Ab 0.152 0.000 - 0.400 O. D.   CBC   Result Value Ref Range    WBC 9.7 3.5 - 11.3 k/uL    RBC 4.16 (L) 4.21 - 5.77 m/uL    Hemoglobin 12.6 (L) 13.0 - 17.0 g/dL    Hematocrit 37.3 (L) 40.7 - 50.3 %    MCV 89.7 82.6 - 102.9 fL    MCH 30.3 25.2 - 33.5 pg    MCHC 33.8 28.4 - 34.8 g/dL    RDW 16.5 (H) 11.8 - 14.4 %    Platelets See Reflexed IPF Result 138 - 453 k/uL    NRBC Automated 0.0 0.0 per 100 WBC   Immature Platelet Fraction   Result Value Ref Range    Platelet, Immature Fraction 5.9 1.1 - 10.3 %    Platelet, Fluorescence 104 (L) 138 - 453 k/uL   CBC with Auto Differential   Result Value Ref Range    WBC 11.1 3.5 - 11.3 k/uL    RBC 3.88 (L) 4.21 - 5.77 m/uL    Hemoglobin 11.9 (L) 13.0 - 17.0 g/dL    Hematocrit 35.4 (L) 40.7 - 50.3 %    MCV 91.2 82.6 - 102.9 fL    MCH 30.7 25.2 - 33.5 pg    MCHC 33.6 28.4 - 34.8 g/dL    RDW 16.8 (H) 11.8 - 14.4 %    Platelets See Reflexed IPF Result 138 - 453 k/uL    NRBC Automated 0.0 0.0 per 100 WBC    RBC Morphology ANISOCYTOSIS PRESENT     Seg Neutrophils 84 (H) 36 - 65 %    Lymphocytes 7 (L) 24 - 43 %    Monocytes 6 3 - 12 %    Eosinophils % 2 1 - 4 %    Basophils 1 0 - 2 %    Immature Granulocytes 1 (H) 0 %    Segs Absolute 9.30 (H) 1.50 - 8.10 k/uL    Absolute Lymph # 0.80 (L) 1.10 - 3.70 k/uL    Absolute Mono # 0.68 0.10 - 1.20 k/uL    Absolute Eos # 0.21 0.00 - 0.44 k/uL    Basophils Absolute 0.06 0.00 - 0.20 k/uL    Absolute Immature Granulocyte 0.07 0.00 - 0.30 k/uL   Comprehensive Metabolic Panel w/ Reflex to MG   Result Value Ref Range    Glucose 85 70 - 99 mg/dL    BUN 14 6 - 20 mg/dL    CREATININE 1.48 (H) 0.70 - 1.20 mg/dL    Calcium 8.1 (L) 8.6 - 10.4 mg/dL    Sodium 140 135 - 144 mmol/L    Potassium 4.1 3.7 - 5.3 mmol/L    Chloride 109 (H) 98 - 107 mmol/L    CO2 18 (L) 20 - 31 mmol/L    Anion Gap 13 9 - 17 mmol/L    Alkaline Phosphatase 140 (H) 40 - 129 U/L    ALT 21 5 - 41 U/L    AST 43 (H) <40 U/L    Total Bilirubin 0.66 0.3 - 1.2 mg/dL    Total Protein 5.2 (L) 6.4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

## 2022-02-25 NOTE — PROGRESS NOTES
[facility]   ELECTROPHYSIOLOGY Progress Note                   Date:   2/25/2022  Patient name: Daryl Chris  Date of admission:  2/21/2022  9:13 PM  MRN:   [id number]  YOB: 1965  PCP: Ranulfo Benítez MD    Reason for Admission:  Cardiac arrest    Subjective:       Patient is seen and examined bedside in Medical ICU. Labs and chart reviewed,  No acute issues overnight. Status post hypothermia protocol. Currently intubated and sedated with questionable improvement in neurological status. Plan cardiac cath on Monday as per Cardiology.        Medications:   Scheduled Meds:   fentanNYL  100 mcg IntraVENous Once    carvedilol  12.5 mg Oral BID WC    sodium chloride  500 mL IntraVENous Once    insulin lispro  0-3 Units SubCUTAneous Q6H    aspirin  81 mg Oral Daily    atorvastatin  80 mg Oral Daily    sodium chloride flush  5-40 mL IntraVENous 2 times per day    chlorhexidine  15 mL Mouth/Throat BID    polyvinyl alcohol  1 drop Both Eyes Q4H    ampicillin-sulbactam  3,000 mg IntraVENous Q6H    pantoprazole  40 mg IntraVENous Daily       Continuous Infusions:   sodium bicarbonate infusion 50 mL/hr at 02/25/22 1423    dexmedetomidine      heparin (PORCINE) Infusion 10.1 Units/kg/hr (02/25/22 1405)    sodium chloride      fentaNYL 75 mcg/hr (02/25/22 0604)    [Held by provider] propofol Stopped (02/24/22 0906)    dextrose      amiodarone 0.5 mg/min (02/25/22 1405)       CBC:   Recent Labs     02/23/22  1215 02/24/22  0425 02/25/22  0421   WBC 9.7 11.1 9.3   HGB 12.6* 11.9* 10.9*   PLT See Reflexed IPF Result See Reflexed IPF Result See Reflexed IPF Result     BMP:    Recent Labs     02/23/22  0826 02/23/22  1017 02/23/22  2045 02/24/22  0425 02/25/22  0421     --   --  140 140   K 4.1   < > 4.8 4.1 4.3   *  --   --  109* 111*   CO2 20  --   --  18* 18*   BUN 14  --   --  14 14   CREATININE 1.19  --   --  1.48* 1.65*   GLUCOSE 133*  --   --  85 99    < > = values in this interval not displayed. Hepatic:   Recent Labs     02/23/22  0450 02/24/22  0425 02/25/22  0421   AST 54* 43* 46*   ALT 25 21 20   BILITOT 0.47 0.66 1.22*   ALKPHOS 146* 140* 152*     Troponin: No results for input(s): TROPONINI in the last 72 hours. BNP: No results for input(s): BNP in the last 72 hours. Lipids: No results for input(s): CHOL, HDL in the last 72 hours. Invalid input(s): LDLCALCU  INR:   No results for input(s): INR in the last 72 hours. Objective:     Vitals: /84   Pulse 66   Temp 98.8 °F (37.1 °C) (Esophageal)   Resp 16   Ht 5' 10\" (1.778 m)   Wt 214 lb 1.1 oz (97.1 kg)   SpO2 100%   BMI 30.72 kg/m²     General appearance: intubated, sedated an dparalyzed  HEENT: Head: Normocephalic, no lesions, without obvious abnormality  Neck: no JVD  Lungs: clear to auscultation bilaterally, no basilar rales, no wheezing   Heart: regular rate and rhythm, S1, S2 normal, no murmur, click, rub or gallop  Abdomen: soft, non-tender; bowel sounds normal  Extremities: No LE edema  Neurologic: intubated and sedated. Echo 02/21/2022  Left ventricle is normal in size. Global left ventricular systolic function  is mildly reduced. Calculated ejection fraction 45% by Tirado's method. Left atrium is normal in size. Right atrium is normal in size. Right ventricular function appears reduced. Moderately dilated right ventricular cavity. Possible Mack sign present. Aortic valve is trileaflet and opens adequately. No significant valvular abnormalities.       Assessment:     1. V. fib cardiac arrestunknown downtime. ROSC achieved after around 4 minutes of ACLS. No rhythm strips available. 2. Anterior/anterolateral wall STEMI, plan for coronary angiography pending after improvement of neurological status. 3. CAD status post CABG x4 in 2001. 4. 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 5. Hypertension. 6. Hyperlipidemia. Treatment Plan:     1.  Plan noted for coronary angiography as per cardiology. 2. We will attempt to get rhythm strips from EMS. 3. Discussed with family. 4. Continue amiodarone at 0.5. Discussed with family and nursing. Chiqui Joyce MD       Cardiovascular Fellow PGY-4, EP Service  2/25/2022, 3:20 PM      I reviewed the patient history personally, and examined by me during the visit. I have reviewed the note as completed, and made appropriate changes to the patient exam and treatment plans. Patient treatment plan was explained to patient's wife, correction in notes was made as appropriate, and discussed final arrangement based on  my evaluation and exam.    Additional Recommendations:    VF arrest in the setting of STEMI  Plan for coronary angiogram  Continue Amiodarone for now.  Continue Coreg and uptitrate it as much as BP and HR tolerate  We will follow    Mima Campbell [id number]

## 2022-02-25 NOTE — PLAN OF CARE
Problem: OXYGENATION/RESPIRATORY FUNCTION  Goal: Patient will maintain patent airway  2/25/2022 0220 by Preeti Saleh RCP  Outcome: Ongoing     Problem: OXYGENATION/RESPIRATORY FUNCTION  Goal: Patient will achieve/maintain normal respiratory rate/effort  Description: Respiratory rate and effort will be within normal limits for the patient  2/25/2022 0220 by Preeti Saleh RCP  Outcome: Ongoing     Problem: MECHANICAL VENTILATION  Goal: Patient will maintain patent airway  2/25/2022 0220 by Preeti Saleh RCP  Outcome: Ongoing     Problem: MECHANICAL VENTILATION  Goal: Oral health is maintained or improved  2/25/2022 0220 by Preeti Saleh RCP  Outcome: Ongoing     Problem: MECHANICAL VENTILATION  Goal: ET tube will be managed safely  2/25/2022 0220 by Preeti Saleh RCP  Outcome: Ongoing     Problem: MECHANICAL VENTILATION  Goal: Ability to express needs and understand communication  2/25/2022 0220 by Preeti Saleh RCP  Outcome: Ongoing     Problem: MECHANICAL VENTILATION  Goal: Mobility/activity is maintained at optimum level for patient  2/25/2022 0220 by Preeti Saleh RCP  Outcome: Ongoing

## 2022-02-25 NOTE — FLOWSHEET NOTE
02/24/22 0845   Treatment Team Notification   Reason for Communication Evaluate; Review case   Team Member Name Dr. Amairani Hackett   Treatment Team Role Resident   Method of Communication Face to face   Response At bedside   Notification Time 0845     Cecelia Puri RN

## 2022-02-25 NOTE — PLAN OF CARE
Problem: OXYGENATION/RESPIRATORY FUNCTION  Goal: Patient will maintain patent airway  2/24/2022 1919 by Darwin Malik RN  Outcome: Ongoing  2/24/2022 0951 by Ariadne Miranda RCP  Outcome: Ongoing  Goal: Patient will achieve/maintain normal respiratory rate/effort  Description: Respiratory rate and effort will be within normal limits for the patient  2/24/2022 1919 by Darwin Malik RN  Outcome: Ongoing  2/24/2022 0951 by Ariadne Miranda RCP  Outcome: Ongoing     Problem: MECHANICAL VENTILATION  Goal: Patient will maintain patent airway  2/24/2022 0951 by Ariadne Miranda RCP  Outcome: Ongoing  Goal: Oral health is maintained or improved  2/24/2022 1919 by Darwin Malik RN  Outcome: Ongoing  2/24/2022 0951 by Ariadne Miranda RCP  Outcome: Ongoing  Goal: ET tube will be managed safely  2/24/2022 1919 by Darwin Malik RN  Outcome: Ongoing  2/24/2022 0951 by Ariadne Miranda RCP  Outcome: Ongoing  Goal: Ability to express needs and understand communication  2/24/2022 0951 by Ariadne Miranda RCP  Outcome: Ongoing  Goal: Mobility/activity is maintained at optimum level for patient  2/24/2022 0951 by Ariadne Miranda RCP  Outcome: Ongoing     Problem: SKIN INTEGRITY  Goal: Skin integrity is maintained or improved  2/24/2022 1919 by Darwin Malik RN  Outcome: Ongoing  2/24/2022 0951 by Ariadne Miranda RCP  Outcome: Ongoing     Problem: Injury - Risk of, Physical Injury:  Goal: Absence of physical injury  Description: Absence of physical injury  Outcome: Ongoing     Problem: Falls - Risk of:  Goal: Absence of physical injury  Description: Absence of physical injury  Outcome: Ongoing     Problem: Skin Integrity:  Goal: Will show no infection signs and symptoms  Description: Will show no infection signs and symptoms  Outcome: Ongoing  Goal: Absence of new skin breakdown  Description: Absence of new skin breakdown  Outcome: Ongoing    Patient will be rounded on hourly. Patient will be turned every 2 hours.  Skin will remain dry and intact.      Meg Currie RN
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
## 2022-02-25 NOTE — PLAN OF CARE
NO ACUTE NEUROSURGICAL INTERVENTION AT THIS TIME    NEUROSURGERY TO SIGN OFF     Please contact Neurosurgery with any questions or acute changes in neurological exam    PATIENT TO FOLLOW UP IN CLINIC:  Follow-up with Neurosurgery  [address]/[facility] (Medical Office Building 2)  [address]  Call [phone] for an appointment.     Patient can follow up in the clinic in 6-8 weeks       Electronically signed by PJ Hernandez NP on 2/25/2022 at 5:26 PM

## 2022-02-25 NOTE — PLAN OF CARE
Problem: OXYGENATION/RESPIRATORY FUNCTION  Goal: Patient will maintain patent airway  2/25/2022 0209 by Heladio Johnson RN  Outcome: Ongoing  2/24/2022 1919 by Kassandra Traore RN  Outcome: Ongoing  Goal: Patient will achieve/maintain normal respiratory rate/effort  Description: Respiratory rate and effort will be within normal limits for the patient  2/25/2022 0209 by Heladio Johnson RN  Outcome: Ongoing  2/24/2022 1919 by Kassandra Traore RN  Outcome: Ongoing     Problem: MECHANICAL VENTILATION  Goal: Patient will maintain patent airway  Outcome: Ongoing  Goal: Oral health is maintained or improved  2/25/2022 0209 by Heladio Johnson RN  Outcome: Ongoing  2/24/2022 1919 by Kassandra Traore RN  Outcome: Ongoing  Goal: ET tube will be managed safely  2/25/2022 0209 by Heladio Johnson RN  Outcome: Ongoing  2/24/2022 1919 by Kassandra Traore RN  Outcome: Ongoing  Goal: Ability to express needs and understand communication  Outcome: Ongoing  Goal: Mobility/activity is maintained at optimum level for patient  Outcome: Ongoing     Problem: SKIN INTEGRITY  Goal: Skin integrity is maintained or improved  2/25/2022 0209 by Heladio Johnson RN  Outcome: Ongoing  2/24/2022 1919 by Kassandra Traore RN  Outcome: Ongoing     Problem: Confusion - Acute:  Goal: Absence of continued neurological deterioration signs and symptoms  Description: Absence of continued neurological deterioration signs and symptoms  Outcome: Ongoing  Goal: Mental status will be restored to baseline  Description: Mental status will be restored to baseline  Outcome: Ongoing     Problem: Discharge Planning:  Goal: Ability to perform activities of daily living will improve  Description: Ability to perform activities of daily living will improve  Outcome: Ongoing  Goal: Participates in care planning  Description: Participates in care planning  Outcome: Ongoing     Problem: Injury - Risk of, Physical Injury:  Goal: Absence of physical injury  Description: Absence of physical injury  2/25/2022 0209 by Janette Echeverria RN  Outcome: Ongoing  2/24/2022 1919 by Jose Ferreira RN  Outcome: Ongoing  Goal: Will remain free from falls  Description: Will remain free from falls  Outcome: Ongoing     Problem: Mood - Altered:  Goal: Mood stable  Description: Mood stable  Outcome: Ongoing  Goal: Absence of abusive behavior  Description: Absence of abusive behavior  Outcome: Ongoing  Goal: Verbalizations of feeling emotionally comfortable while being cared for will increase  Description: Verbalizations of feeling emotionally comfortable while being cared for will increase  Outcome: Ongoing     Problem: Psychomotor Activity - Altered:  Goal: Absence of psychomotor disturbance signs and symptoms  Description: Absence of psychomotor disturbance signs and symptoms  Outcome: Ongoing     Problem: Sensory Perception - Impaired:  Goal: Demonstrations of improved sensory functioning will increase  Description: Demonstrations of improved sensory functioning will increase  Outcome: Ongoing  Goal: Decrease in sensory misperception frequency  Description: Decrease in sensory misperception frequency  Outcome: Ongoing  Goal: Able to refrain from responding to false sensory perceptions  Description: Able to refrain from responding to false sensory perceptions  Outcome: Ongoing  Goal: Demonstrates accurate environmental perceptions  Description: Demonstrates accurate environmental perceptions  Outcome: Ongoing  Goal: Able to distinguish between reality-based and nonreality-based thinking  Description: Able to distinguish between reality-based and nonreality-based thinking  Outcome: Ongoing  Goal: Able to interrupt nonreality-based thinking  Description: Able to interrupt nonreality-based thinking  Outcome: Ongoing     Problem: Sleep Pattern Disturbance:  Goal: Appears well-rested  Description: Appears well-rested  Outcome: Ongoing     Problem: Nutrition  Goal: Optimal nutrition therapy  Outcome: Ongoing Problem: Falls - Risk of:  Goal: Absence of physical injury  Description: Absence of physical injury  2/25/2022 0209 by Boris Flowers RN  Outcome: Ongoing  2/24/2022 1919 by Eugenia Schultz RN  Outcome: Ongoing  Goal: Will remain free from falls  Description: Will remain free from falls  Outcome: Ongoing     Problem: Skin Integrity:  Goal: Will show no infection signs and symptoms  Description: Will show no infection signs and symptoms  2/25/2022 0209 by Boris Flowers RN  Outcome: Ongoing  2/24/2022 1919 by Eugenia Schultz RN  Outcome: Ongoing  Goal: Absence of new skin breakdown  Description: Absence of new skin breakdown  2/25/2022 0209 by Boris Flowers RN  Outcome: Ongoing  2/24/2022 1919 by Eugenia Schultz RN  Outcome: Ongoing
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
## 2022-02-25 NOTE — ED PROVIDER NOTES
[facility]   Emergency Department  Emergency Medicine Attending Sign-out     Care of Yadi Hernandez was assumed from previous attending Dr. Thad Gotti and is being seen for Cardiac Arrest  .  The patient's initial evaluation and plan have been discussed with the prior provider who initially evaluated the patient. Attestation  I was available and discussed any additional care issues that arose and coordinated the management plans with the resident(s) caring for the patient during my duty period. Any areas of disagreement with resident's documentation of care or procedures are noted on the chart. I was personally present for the key portions of any/all procedures, during my duty period. I have documented in the chart those procedures where I was not present during the key portions. BRIEF PATIENT SUMMARY/MDM COURSE PER INITIAL PROVIDER:   RECENT VITALS:     Temp: 99.1 °F (37.3 °C),  Pulse: 73, Resp: 16, BP: 103/66, SpO2: 100 %    This patient is a 60 y.o. Male with cardiac arrest.  Was at home and his significant other heard a thud, she was unable to get up the stairs but did call 911. He was found to be in vfib arrest, shocked and then in PEA arrest.  Receiving amio and magnesium. Initially planned for cath lab, however due to lack of response on neuro exam Cardiology decided to hold on cath at this time. OUTSTANDING TASKS / ADDITIONAL COMMENTS   1. Admit to ICU    Quattro cooling catheter placed by resident, see separate procedure note.      Molly Fabian MD  Emergency Medicine Attending  [facility]          Aguilar Driscoll MD  02/25/22 2839
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
## 2022-02-25 NOTE — PROGRESS NOTES
Patient's daughter, Michael Richard, updated on MRI findings at bedside. She was informed that the neurocritical care team would like to observe patient with daily neurological exams for three more days, making 7 days total, before further commenting on prognosis.

## 2022-02-25 NOTE — FLOWSHEET NOTE
02/24/22 1133   Treatment Team Notification   Reason for Communication Evaluate; Review case   Team Member Name Dr. Princess Gibson Attending Provider   Method of Communication Face to face   Response At bedside   Notification Time 150 [facility], RN

## 2022-02-25 NOTE — PROGRESS NOTES
Neurosurgery CELESTINE/Resident    Daily Progress Note   CC:  Chief Complaint   Patient presents with    Cardiac Arrest     2/25/2022  3:19 PM    Patient seen and examined this afternoon, patient had MRI cervical spine completed showing moderate spinal canal stenosis C3-4 and C5-6 with mild to moderate stenosis C4-5, multilevel foraminal narrowing. Patient is no off paralytic and sedating medications     Vitals:    02/25/22 1330 02/25/22 1400 02/25/22 1430 02/25/22 1500   BP:       Pulse: 68 63 66 66   Resp:       Temp:       TempSrc:       SpO2: 100% 100% 100% 100%   Weight:       Height:           PE:   E3 VqT M4  Opens eyes to voice  Intubated with no sedation  Very minimal movement to bilateral upper extremities to central and peripheral stimulation  Minimal withdraw to painful stimulation to bilateral lower extremities   Facial grimace to painful stimulation  PERRL      Lab Results   Component Value Date    WBC 9.3 02/25/2022    HGB 10.9 (L) 02/25/2022    HCT 33.0 (L) 02/25/2022    PLT See Reflexed IPF Result 02/25/2022    CHOL 188 11/07/2020    TRIG 235 (H) 11/07/2020    HDL 52 11/07/2020    ALT 20 02/25/2022    AST 46 (H) 02/25/2022     02/25/2022    K 4.3 02/25/2022     (H) 02/25/2022    CREATININE 1.65 (H) 02/25/2022    BUN 14 02/25/2022    CO2 18 (L) 02/25/2022    TSH 2.00 02/22/2022    PSA 0.22 01/13/2016    INR 1.2 02/22/2022    LABA1C 5.1 04/21/2021    CRP 9.4 (H) 12/18/2020       Radiology     MRI CERVICAL SPINE WO CONTRAST    Result Date: 2/25/2022  EXAMINATION: MRI OF THE CERVICAL SPINE WITHOUT CONTRAST 2/25/2022 10:44 am TECHNIQUE: Multiplanar multisequence MRI of the cervical spine was performed without the administration of intravenous contrast. COMPARISON: None. HISTORY: ORDERING SYSTEM PROVIDED HISTORY: cord flattening on cervical ct. TECHNOLOGIST PROVIDED HISTORY: cord flattening on cervical ct. What is the sedation requirement?->None Reason for Exam: cord flattening on cervical ct. Subsequent evaluation. FINDINGS: BONES/ALIGNMENT: Postsurgical changes with anterior fusion hardware involving the C4 through C7 levels with partial corpectomy at C5 and C6. Otherwise, the vertebral body heights appear maintained. No convincing evidence of spondylolisthesis. The bone marrow signal demonstrates no acute abnormality. SPINAL CORD: There is T2 hyperintensity within the cord at C6 with perhaps slight volume loss, which likely represents myelomalacia. SOFT TISSUES: No paraspinal mass identified. C2-C3: There is a disc bulge with buckling of the ligamentum flavum contributing to minimal spinal canal stenosis. No significant neural foraminal narrowing. C3-C4: There is a disc bulge with a central disc protrusion indenting on the ventral thecal sac. Moderate spinal canal stenosis. Uncovertebral joint and facet arthrosis contributes to mild right and moderate left neural foraminal narrowing. C4-C5: There is a posterior osteophyte indenting on the ventral thecal sac contributing to mild-to-moderate spinal canal stenosis. Uncovertebral joint and facet arthrosis contributes to moderate bilateral neural from narrowing. C5-C6: There appears to be a posterior osteophyte contributing to moderate spinal canal stenosis. Uncovertebral joint and facet arthrosis contribute to severe bilateral neural foraminal narrowing. C6-C7: There appear to be posterior osteophytes contribute to minimal spinal canal stenosis. Uncovertebral joint and facet arthrosis contribute to moderate right and mild left neural foraminal narrowing. C7-T1: There is a disc bulge without significant spinal canal stenosis. Uncovertebral joint and facet arthrosis contribute to minimal bilateral neural from narrowing. 1. Postsurgical changes are seen with anterior fusion hardware at C4 through C7 with partial corpectomy of C5 and C6. 2. Moderate spinal canal stenosis seen at C3-C4 and C5-C6 with mild-to-moderate stenosis at C4-C5.   Minimal spinal canal stenosis at C2-C3 and C6-C7. 3. Multilevel neural foraminal narrowing as above. 4. Presumed myelomalacia within the cord at the C6 level. A/P  60 y.o. male who presents with cervical spine stenosis, cardiac arrest    - no neurosurgical interventions planned  - Ok to begin prophylactic anticoagulation from neurosurgery stand point.   - Neuro checks per floor protocol  - patient can follow up in 6-8 weeks outpatient       Please contact neurosurgery with any changes in patients neurologic status.        Betty Avila, CNP  2/25/22  3:19 PM

## 2022-02-25 NOTE — FLOWSHEET NOTE
02/24/22 0951   Treatment Team Notification   Reason for Communication Evaluate; Review case   Team Member Name Dr. Sena Roberts   Treatment Team Role Resident   Method of Communication Face to face   Response At bedside   Notification Time 0951     Ritu Guzman RN

## 2022-02-25 NOTE — FLOWSHEET NOTE
02/24/22 1004   Treatment Team Notification   Reason for Communication Evaluate; Review case   Team Member Name Dr. Elvira Knight Team Role Attending Provider   Method of Communication Face to face   Response At bedside   Notification Time 1004     Subhash Martinez RN

## 2022-02-26 LAB
ABSOLUTE EOS #: 0.3 K/UL (ref 0–0.44)
ABSOLUTE IMMATURE GRANULOCYTE: 0.03 K/UL (ref 0–0.3)
ABSOLUTE LYMPH #: 1.07 K/UL (ref 1.1–3.7)
ABSOLUTE MONO #: 0.38 K/UL (ref 0.1–1.2)
ALBUMIN SERPL-MCNC: 2.6 G/DL (ref 3.5–5.2)
ALBUMIN/GLOBULIN RATIO: 1.1 (ref 1–2.5)
ALP BLD-CCNC: 146 U/L (ref 40–129)
ALT SERPL-CCNC: 17 U/L (ref 5–41)
ANION GAP SERPL CALCULATED.3IONS-SCNC: 12 MMOL/L (ref 9–17)
AST SERPL-CCNC: 34 U/L
BASOPHILS # BLD: 1 % (ref 0–2)
BASOPHILS ABSOLUTE: 0.07 K/UL (ref 0–0.2)
BILIRUB SERPL-MCNC: 0.9 MG/DL (ref 0.3–1.2)
BUN BLDV-MCNC: 15 MG/DL (ref 6–20)
CALCIUM SERPL-MCNC: 7.8 MG/DL (ref 8.6–10.4)
CHLORIDE BLD-SCNC: 107 MMOL/L (ref 98–107)
CO2: 19 MMOL/L (ref 20–31)
CREAT SERPL-MCNC: 1.36 MG/DL (ref 0.7–1.2)
EKG ATRIAL RATE: 93 BPM
EKG P AXIS: 63 DEGREES
EKG P-R INTERVAL: 178 MS
EKG Q-T INTERVAL: 342 MS
EKG QRS DURATION: 90 MS
EKG QTC CALCULATION (BAZETT): 425 MS
EKG R AXIS: -42 DEGREES
EKG T AXIS: 91 DEGREES
EKG VENTRICULAR RATE: 93 BPM
EOSINOPHILS RELATIVE PERCENT: 4 % (ref 1–4)
FIO2: 35
GFR AFRICAN AMERICAN: >60 ML/MIN
GFR NON-AFRICAN AMERICAN: 54 ML/MIN
GFR SERPL CREATININE-BSD FRML MDRD: ABNORMAL ML/MIN/{1.73_M2}
GLUCOSE BLD-MCNC: 101 MG/DL (ref 75–110)
GLUCOSE BLD-MCNC: 103 MG/DL (ref 75–110)
GLUCOSE BLD-MCNC: 105 MG/DL (ref 75–110)
GLUCOSE BLD-MCNC: 112 MG/DL (ref 70–99)
GLUCOSE BLD-MCNC: 114 MG/DL (ref 74–100)
HCT VFR BLD CALC: 31.2 % (ref 40.7–50.3)
HEMOGLOBIN: 10.3 G/DL (ref 13–17)
IMMATURE GRANULOCYTES: 0 %
LYMPHOCYTES # BLD: 15 % (ref 24–43)
MCH RBC QN AUTO: 30.4 PG (ref 25.2–33.5)
MCHC RBC AUTO-ENTMCNC: 33 G/DL (ref 28.4–34.8)
MCV RBC AUTO: 92 FL (ref 82.6–102.9)
MONOCYTES # BLD: 5 % (ref 3–12)
NEGATIVE BASE EXCESS, ART: 2 (ref 0–2)
NRBC AUTOMATED: 0 PER 100 WBC
PARTIAL THROMBOPLASTIN TIME: 40.6 SEC (ref 20.5–30.5)
PARTIAL THROMBOPLASTIN TIME: 45.5 SEC (ref 20.5–30.5)
PARTIAL THROMBOPLASTIN TIME: 72.5 SEC (ref 20.5–30.5)
PDW BLD-RTO: 17.7 % (ref 11.8–14.4)
PLATELET # BLD: ABNORMAL K/UL (ref 138–453)
PLATELET, FLUORESCENCE: 103 K/UL (ref 138–453)
PLATELET, IMMATURE FRACTION: 3.9 % (ref 1.1–10.3)
POC HCO3: 23.2 MMOL/L (ref 21–28)
POC O2 SATURATION: 97 % (ref 94–98)
POC PCO2: 40.7 MM HG (ref 35–48)
POC PH: 7.36 (ref 7.35–7.45)
POC PO2: 91.3 MM HG (ref 83–108)
POTASSIUM SERPL-SCNC: 3.9 MMOL/L (ref 3.7–5.3)
RBC # BLD: 3.39 M/UL (ref 4.21–5.77)
RBC # BLD: ABNORMAL 10*6/UL
SEG NEUTROPHILS: 74 % (ref 36–65)
SEGMENTED NEUTROPHILS ABSOLUTE COUNT: 5.2 K/UL (ref 1.5–8.1)
SODIUM BLD-SCNC: 138 MMOL/L (ref 135–144)
TOTAL PROTEIN: 4.9 G/DL (ref 6.4–8.3)
WBC # BLD: 7.1 K/UL (ref 3.5–11.3)

## 2022-02-26 PROCEDURE — 51701 INSERT BLADDER CATHETER: CPT

## 2022-02-26 PROCEDURE — 82803 BLOOD GASES ANY COMBINATION: CPT

## 2022-02-26 PROCEDURE — 6360000002 HC RX W HCPCS: Performed by: STUDENT IN AN ORGANIZED HEALTH CARE EDUCATION/TRAINING PROGRAM

## 2022-02-26 PROCEDURE — 94761 N-INVAS EAR/PLS OXIMETRY MLT: CPT

## 2022-02-26 PROCEDURE — 37799 UNLISTED PX VASCULAR SURGERY: CPT

## 2022-02-26 PROCEDURE — 2580000003 HC RX 258: Performed by: STUDENT IN AN ORGANIZED HEALTH CARE EDUCATION/TRAINING PROGRAM

## 2022-02-26 PROCEDURE — 51702 INSERT TEMP BLADDER CATH: CPT

## 2022-02-26 PROCEDURE — C9113 INJ PANTOPRAZOLE SODIUM, VIA: HCPCS | Performed by: STUDENT IN AN ORGANIZED HEALTH CARE EDUCATION/TRAINING PROGRAM

## 2022-02-26 PROCEDURE — 85730 THROMBOPLASTIN TIME PARTIAL: CPT

## 2022-02-26 PROCEDURE — 85025 COMPLETE CBC W/AUTO DIFF WBC: CPT

## 2022-02-26 PROCEDURE — 6370000000 HC RX 637 (ALT 250 FOR IP): Performed by: STUDENT IN AN ORGANIZED HEALTH CARE EDUCATION/TRAINING PROGRAM

## 2022-02-26 PROCEDURE — 82947 ASSAY GLUCOSE BLOOD QUANT: CPT

## 2022-02-26 PROCEDURE — 85055 RETICULATED PLATELET ASSAY: CPT

## 2022-02-26 PROCEDURE — 99232 SBSQ HOSP IP/OBS MODERATE 35: CPT | Performed by: INTERNAL MEDICINE

## 2022-02-26 PROCEDURE — 2000000000 HC ICU R&B

## 2022-02-26 PROCEDURE — 51798 US URINE CAPACITY MEASURE: CPT

## 2022-02-26 PROCEDURE — 2700000000 HC OXYGEN THERAPY PER DAY

## 2022-02-26 PROCEDURE — 6360000002 HC RX W HCPCS: Performed by: INTERNAL MEDICINE

## 2022-02-26 PROCEDURE — 2580000003 HC RX 258: Performed by: INTERNAL MEDICINE

## 2022-02-26 PROCEDURE — 80053 COMPREHEN METABOLIC PANEL: CPT

## 2022-02-26 PROCEDURE — 2500000003 HC RX 250 WO HCPCS: Performed by: INTERNAL MEDICINE

## 2022-02-26 PROCEDURE — 99291 CRITICAL CARE FIRST HOUR: CPT | Performed by: INTERNAL MEDICINE

## 2022-02-26 PROCEDURE — 94003 VENT MGMT INPAT SUBQ DAY: CPT

## 2022-02-26 RX ORDER — HYDRALAZINE HYDROCHLORIDE 20 MG/ML
10 INJECTION INTRAMUSCULAR; INTRAVENOUS
Status: COMPLETED | OUTPATIENT
Start: 2022-02-26 | End: 2022-02-26

## 2022-02-26 RX ORDER — CARVEDILOL 25 MG/1
25 TABLET ORAL 2 TIMES DAILY WITH MEALS
Status: DISCONTINUED | OUTPATIENT
Start: 2022-02-26 | End: 2022-03-17 | Stop reason: HOSPADM

## 2022-02-26 RX ORDER — FUROSEMIDE 10 MG/ML
40 INJECTION INTRAMUSCULAR; INTRAVENOUS ONCE
Status: COMPLETED | OUTPATIENT
Start: 2022-02-26 | End: 2022-02-26

## 2022-02-26 RX ORDER — HYDRALAZINE HYDROCHLORIDE 20 MG/ML
10 INJECTION INTRAMUSCULAR; INTRAVENOUS EVERY 6 HOURS PRN
Status: DISCONTINUED | OUTPATIENT
Start: 2022-02-26 | End: 2022-02-28

## 2022-02-26 RX ADMIN — PANTOPRAZOLE SODIUM 40 MG: 40 INJECTION, POWDER, FOR SOLUTION INTRAVENOUS at 09:20

## 2022-02-26 RX ADMIN — HEPARIN SODIUM 2000 UNITS: 1000 INJECTION INTRAVENOUS; SUBCUTANEOUS at 15:26

## 2022-02-26 RX ADMIN — HYDRALAZINE HYDROCHLORIDE 10 MG: 20 INJECTION INTRAMUSCULAR; INTRAVENOUS at 15:31

## 2022-02-26 RX ADMIN — AMPICILLIN AND SULBACTAM 3000 MG: 1; 2 INJECTION, POWDER, FOR SOLUTION INTRAMUSCULAR; INTRAVENOUS at 17:39

## 2022-02-26 RX ADMIN — HEPARIN SODIUM 2000 UNITS: 1000 INJECTION INTRAVENOUS; SUBCUTANEOUS at 22:10

## 2022-02-26 RX ADMIN — AMPICILLIN AND SULBACTAM 3000 MG: 1; 2 INJECTION, POWDER, FOR SOLUTION INTRAMUSCULAR; INTRAVENOUS at 00:37

## 2022-02-26 RX ADMIN — POLYVINYL ALCOHOL 1 DROP: 14 SOLUTION/ DROPS OPHTHALMIC at 21:37

## 2022-02-26 RX ADMIN — HYDRALAZINE HYDROCHLORIDE 10 MG: 20 INJECTION INTRAMUSCULAR; INTRAVENOUS at 23:58

## 2022-02-26 RX ADMIN — AMIODARONE HYDROCHLORIDE 0.5 MG/MIN: 50 INJECTION, SOLUTION INTRAVENOUS at 12:02

## 2022-02-26 RX ADMIN — ATORVASTATIN CALCIUM 80 MG: 80 TABLET, FILM COATED ORAL at 09:20

## 2022-02-26 RX ADMIN — ASPIRIN 81 MG: 81 TABLET, CHEWABLE ORAL at 09:19

## 2022-02-26 RX ADMIN — SODIUM BICARBONATE: 84 INJECTION, SOLUTION INTRAVENOUS at 12:03

## 2022-02-26 RX ADMIN — SODIUM CHLORIDE, PRESERVATIVE FREE 10 ML: 5 INJECTION INTRAVENOUS at 09:20

## 2022-02-26 RX ADMIN — Medication 12.1 UNITS/KG/HR: at 04:31

## 2022-02-26 RX ADMIN — POLYVINYL ALCOHOL 1 DROP: 14 SOLUTION/ DROPS OPHTHALMIC at 02:15

## 2022-02-26 RX ADMIN — AMPICILLIN AND SULBACTAM 3000 MG: 1; 2 INJECTION, POWDER, FOR SOLUTION INTRAMUSCULAR; INTRAVENOUS at 06:43

## 2022-02-26 RX ADMIN — FUROSEMIDE 40 MG: 10 INJECTION, SOLUTION INTRAVENOUS at 15:00

## 2022-02-26 RX ADMIN — AMPICILLIN AND SULBACTAM 3000 MG: 1; 2 INJECTION, POWDER, FOR SOLUTION INTRAMUSCULAR; INTRAVENOUS at 13:43

## 2022-02-26 RX ADMIN — Medication 10 MG: at 00:23

## 2022-02-26 RX ADMIN — HYDRALAZINE HYDROCHLORIDE 10 MG: 20 INJECTION INTRAMUSCULAR; INTRAVENOUS at 05:36

## 2022-02-26 RX ADMIN — CARVEDILOL 25 MG: 25 TABLET, FILM COATED ORAL at 09:20

## 2022-02-26 RX ADMIN — AZATHIOPRINE 75 MG: 50 TABLET ORAL at 09:53

## 2022-02-26 RX ADMIN — POLYVINYL ALCOHOL 1 DROP: 14 SOLUTION/ DROPS OPHTHALMIC at 06:43

## 2022-02-26 RX ADMIN — CARVEDILOL 25 MG: 25 TABLET, FILM COATED ORAL at 16:45

## 2022-02-26 RX ADMIN — Medication 25 MCG/HR: at 00:43

## 2022-02-26 ASSESSMENT — PULMONARY FUNCTION TESTS
PIF_VALUE: 26
PIF_VALUE: 35
PIF_VALUE: 26
PIF_VALUE: 25
PIF_VALUE: 25
PIF_VALUE: 26
PIF_VALUE: 29
PIF_VALUE: 25
PIF_VALUE: 27
PIF_VALUE: 25
PIF_VALUE: 27

## 2022-02-26 NOTE — FLOWSHEET NOTE
02/25/22 1515   Treatment Team Notification   Reason for Communication Evaluate; Review case   Team Member Name Pily Kay   Treatment Team Role Advanced Practice Nurse   Method of Communication Face to face   Response No new orders   Notification Time NOAH López

## 2022-02-26 NOTE — FLOWSHEET NOTE
02/25/22 1400   Treatment Team Notification   Reason for Communication Evaluate; Review case   Team Member Name Dr. Sparkle De La Fuente Team Role Attending Provider   Method of Communication Face to face   Response At bedside   Notification Time 1400     Michel Mares RN

## 2022-02-26 NOTE — PROGRESS NOTES
[facility]   Progress Note         Date:   2/26/2022  Patient name: Daryl Chris  Date of admission:  2/21/2022  9:13 PM  MRN:   [id number]  YOB: 1965  PCP: Ranulfo Benítez MD    Reason for Admission:  Cardiac arrest    Subjective:       No acute events overnight  Hypertensive  On heparin/amiodarone gtt  MRI brain suggesting early changes of hypoxic ischemic injury.        Medications:   Scheduled Meds:   carvedilol  25 mg Oral BID WC    fentanNYL  100 mcg IntraVENous Once    azaTHIOprine  75 mg Oral Daily    sodium chloride  500 mL IntraVENous Once    insulin lispro  0-3 Units SubCUTAneous Q6H    aspirin  81 mg Oral Daily    atorvastatin  80 mg Oral Daily    sodium chloride flush  5-40 mL IntraVENous 2 times per day    chlorhexidine  15 mL Mouth/Throat BID    polyvinyl alcohol  1 drop Both Eyes Q4H    ampicillin-sulbactam  3,000 mg IntraVENous Q6H    pantoprazole  40 mg IntraVENous Daily       Continuous Infusions:   sodium bicarbonate infusion 50 mL/hr at 02/26/22 0000    dexmedetomidine      heparin (PORCINE) Infusion 10.1 Units/kg/hr (02/26/22 0729)    sodium chloride 100 mL/hr at 02/25/22 1720    fentaNYL 100 mcg/hr (02/26/22 0556)    [Held by provider] propofol Stopped (02/24/22 0906)    dextrose      amiodarone 0.5 mg/min (02/26/22 0000)       CBC:   Recent Labs     02/24/22 0425 02/25/22 0421 02/26/22 0426   WBC 11.1 9.3 7.1   HGB 11.9* 10.9* 10.3*   PLT See Reflexed IPF Result See Reflexed IPF Result See Reflexed IPF Result     BMP:    Recent Labs     02/24/22 0425 02/25/22 0421 02/26/22 0426    140 138   K 4.1 4.3 3.9   * 111* 107   CO2 18* 18* 19*   BUN 14 14 15   CREATININE 1.48* 1.65* 1.36*   GLUCOSE 85 99 112*     Hepatic:   Recent Labs     02/24/22 0425 02/25/22 0421 02/26/22 0426   AST 43* 46* 34   ALT 21 20 17   BILITOT 0.66 1.22* 0.90   ALKPHOS 140* 152* 146*     Troponin: No results for input(s): TROPONINI in the last 72 hours.  BNP: No results for input(s): BNP in the last 72 hours. Lipids: No results for input(s): CHOL, HDL in the last 72 hours. Invalid input(s): LDLCALCU  INR:   No results for input(s): INR in the last 72 hours. Objective:     Vitals: BP (!) 148/86   Pulse 69   Temp 98.7 °F (37.1 °C) (Oral)   Resp 16   Ht 5' 10\" (1.778 m)   Wt 214 lb 8.1 oz (97.3 kg)   SpO2 100%   BMI 30.78 kg/m²       Constitutional and General Appearance: Intubated, sedated  Respiratory:  · On mechanical ventilation  Cardiovascular:  · Regular rate and rhythm. No murmurs. Abdomen:   · Soft  Extremities:  · No lower extremity edema  Neurologic:  · Sedated  · Patient is intubated. Assessment:     1. V. fib cardiac arrestunknown downtime. ROSC achieved after around 4 minutes of ACLS. 2. Acute coronary syndrome. 3. Severe bradycardia likely secondary sedation/paralytic/hypothermia -Resolved   4. Ischemic cardiomyopathy with EF 45 %  5. Anoxic brain injury  6. Acute hypoxic hypercarbic respiratory failure secondary to cardiac arrest.  7. CAD s/p CABG x4 in 2011  8. CKD stage IIIb   9. COPD  10. Current daily smoker  11. Essential hypertension          Treatment Plan:       V fib arrest in the setting of acute coronary syndrome  Continue heparin and amiodarone gtt  Replace electrolytes  Coronary angiogram depending on neurological recovery   Increase coreg to 25 mg BID. Continue aspirin and high intensity statin. Keven Cannon MD  [address] [place] [address] [facility]   [facility]       I performed a history and physical examination of the patient and discussed management with the resident. I reviewed the residents note and agree with the documented findings and plan of care. Any areas of disagreement are noted on the chart. I was personally present for the key portions of any procedures.  I have documented in the chart those procedures where I was not present during the johnson portions. I have personally evaluated this patient and have completed at least one if not all key elements of the E/M (history, physical exam, and MDM). Additional findings are as noted. Patient is not on sedation. MRI showing hypoxic brain injury. BP elevated. Coreg increased to 25mg po BID. Continue ASA, IV heparin. ON IV amiodarone drip. Will consider cardiac cath if there is meaningful neurological recovery.     Sandra Peck MD

## 2022-02-26 NOTE — FLOWSHEET NOTE
02/25/22 1305   Treatment Team Notification   Reason for Communication Evaluate; Review case   Team Member Name Dr. Cathy Severino Attending Provider   Method of Communication Face to face   Response At bedside   Notification Time [id number], RN

## 2022-02-26 NOTE — FLOWSHEET NOTE
02/25/22 1010   Treatment Team Notification   Reason for Communication Evaluate; Review case   Team Member Name Dr. Reji Lowe Team Role Attending Provider   Method of Communication Face to face   Response At bedside   Notification Time Jose A Stack RN
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
## 2022-02-26 NOTE — PROCEDURES
Referring physician: Dr. Gaylan Nyhan  Date: 2/25/2022  Start Time: 2/25/2022 @ 0730  End Time: 2/25/2022 @ 1024  3 hrs    Indication  Patient with encephalopathy, EEG done to rule out subclinical seizures. Introduction  This continuous video-EEG was acquired using a [employer] workstation at 256 samples/s. Electrodes were placed according to the International 10-20 system. Automated spike and seizure detection algorithms were applied. Video was recorded during this study. Description  The background consistent of diffuse none reactive polymorphic delta and theta slowing without brief periods of attenuation. . No consistent focal slowing or interhemispheric asymmetry was noted. Normal sleep structures were not observed. There were no interictal epileptiform discharges or electrographic seizures. Events  No events reported or recorded. Impression  Abnormal continuous vEEG recording, the slowing mentioned above suggests moderate-severe non specific encephalopathy. No epileptiform discharges were identified. Please note the absence of   such activity in this record cannot conclusively rule out an epileptic   disorder. If such is still clinically suspected, a repeat study with   prolonged sampling may be helpful. Ksenia Hairston MD  Epilepsy Board Certified. Neurology Board Certified.     Electronically Signed

## 2022-02-26 NOTE — PLAN OF CARE
Problem: OXYGENATION/RESPIRATORY FUNCTION  Goal: Patient will maintain patent airway  2/25/2022 2055 by Genet Macdonald RCP  Outcome: Ongoing     Problem: OXYGENATION/RESPIRATORY FUNCTION  Goal: Patient will achieve/maintain normal respiratory rate/effort  Description: Respiratory rate and effort will be within normal limits for the patient  2/25/2022 2055 by Genet Macdonald RCP  Outcome: Ongoing     Problem: MECHANICAL VENTILATION  Goal: Patient will maintain patent airway  2/25/2022 2055 by Genet Macdonald RCP  Outcome: Ongoing     Problem: MECHANICAL VENTILATION  Goal: Oral health is maintained or improved  2/25/2022 2055 by Genet Macdonald RCP  Outcome: Ongoing     Problem: MECHANICAL VENTILATION  Goal: ET tube will be managed safely  2/25/2022 2055 by Genet Macdonald RCP  Outcome: Ongoing     Problem: MECHANICAL VENTILATION  Goal: Ability to express needs and understand communication  2/25/2022 2055 by Genet Macdonald RCP  Outcome: Ongoing     Problem: MECHANICAL VENTILATION  Goal: Mobility/activity is maintained at optimum level for patient  2/25/2022 2055 by Genet Macdonald RCP  Outcome: Ongoing     Problem: SKIN INTEGRITY  Goal: Skin integrity is maintained or improved  2/25/2022 2055 by Genet Macdonald RCP  Outcome: Ongoing

## 2022-02-26 NOTE — PLAN OF CARE
Problem: OXYGENATION/RESPIRATORY FUNCTION  Goal: Patient will maintain patent airway  2/26/2022 1024 by Chang Morales RN  Outcome: Ongoing  2/26/2022 0825 by Aimee Crowder RCP  Outcome: Ongoing  2/26/2022 0143 by Susan Arias RN  Outcome: Ongoing  2/25/2022 2055 by Allen Henson RCP  Outcome: Ongoing  Goal: Patient will achieve/maintain normal respiratory rate/effort  Description: Respiratory rate and effort will be within normal limits for the patient  2/26/2022 1024 by Chang Morales RN  Outcome: Ongoing  2/26/2022 0825 by Aimee Crowder RCP  Outcome: Ongoing  2/26/2022 0143 by Susan Arias RN  Outcome: Ongoing  2/25/2022 2055 by Allen Henson RCP  Outcome: Ongoing     Problem: MECHANICAL VENTILATION  Goal: Patient will maintain patent airway  2/26/2022 1024 by Chang Morales RN  Outcome: Ongoing  2/26/2022 0825 by Aimee Crowder RCP  Outcome: Ongoing  2/26/2022 0143 by Susan Arias RN  Outcome: Ongoing  2/25/2022 2055 by Allen Henson RCP  Outcome: Ongoing  Goal: Oral health is maintained or improved  2/26/2022 1024 by Chang Morales RN  Outcome: Ongoing  2/26/2022 0825 by Aimee Crowder RCP  Outcome: Ongoing  2/26/2022 0143 by Susan Arias RN  Outcome: Ongoing  2/25/2022 2055 by Allen Henson RCP  Outcome: Ongoing  Goal: ET tube will be managed safely  2/26/2022 1024 by Chang Morales RN  Outcome: Ongoing  2/26/2022 0825 by Aimee Crowder RCP  Outcome: Ongoing  2/26/2022 0143 by Susan Arias RN  Outcome: Ongoing  2/25/2022 2055 by Allen Henson RCP  Outcome: Ongoing  Goal: Ability to express needs and understand communication  2/26/2022 1024 by Chang Morales RN  Outcome: Ongoing  2/26/2022 0825 by Aimee Crowder RCP  Outcome: Ongoing  2/26/2022 0143 by Susan Arias RN  Outcome: Ongoing  2/25/2022 2055 by Allen Henson RCP  Outcome: Ongoing  Goal: Mobility/activity is maintained at optimum level for patient  2/26/2022 1024 by Chang Morales RN  Outcome: Ongoing  2/26/2022 0825 by Guilherme Lenz Swati Loco RN  Outcome: Ongoing  Goal: Absence of abusive behavior  Description: Absence of abusive behavior  2/26/2022 1024 by Yudy Coon RN  Outcome: Ongoing  2/26/2022 0143 by Praveen Hunt RN  Outcome: Ongoing  Goal: Verbalizations of feeling emotionally comfortable while being cared for will increase  Description: Verbalizations of feeling emotionally comfortable while being cared for will increase  2/26/2022 1024 by Yudy Coon RN  Outcome: Ongoing  2/26/2022 0143 by Praveen Hunt RN  Outcome: Ongoing     Problem: Psychomotor Activity - Altered:  Goal: Absence of psychomotor disturbance signs and symptoms  Description: Absence of psychomotor disturbance signs and symptoms  2/26/2022 1024 by Yudy Coon RN  Outcome: Ongoing  2/26/2022 0143 by Praveen Hunt RN  Outcome: Ongoing     Problem: Sensory Perception - Impaired:  Goal: Demonstrations of improved sensory functioning will increase  Description: Demonstrations of improved sensory functioning will increase  2/26/2022 1024 by Yudy Coon RN  Outcome: Ongoing  2/26/2022 0143 by Praveen Hunt RN  Outcome: Ongoing  Goal: Decrease in sensory misperception frequency  Description: Decrease in sensory misperception frequency  2/26/2022 1024 by Yudy Coon RN  Outcome: Ongoing  2/26/2022 0143 by Praveen Hunt RN  Outcome: Ongoing  Goal: Able to refrain from responding to false sensory perceptions  Description: Able to refrain from responding to false sensory perceptions  2/26/2022 1024 by Yudy Coon RN  Outcome: Ongoing  2/26/2022 0143 by Praveen Hunt RN  Outcome: Ongoing  Goal: Demonstrates accurate environmental perceptions  Description: Demonstrates accurate environmental perceptions  2/26/2022 1024 by Yudy Coon RN  Outcome: Ongoing  2/26/2022 0143 by Praveen Hunt RN  Outcome: Ongoing  Goal: Able to distinguish between reality-based and nonreality-based thinking  Description: Able to distinguish between reality-based and nonreality-based thinking  2/26/2022 1024 by aBkari Balderas RN  Outcome: Ongoing  2/26/2022 0143 by Magdalena Buchanan RN  Outcome: Ongoing  Goal: Able to interrupt nonreality-based thinking  Description: Able to interrupt nonreality-based thinking  2/26/2022 1024 by Bakari Balderas RN  Outcome: Ongoing  2/26/2022 0143 by Magdalena Buchanan RN  Outcome: Ongoing     Problem: Sleep Pattern Disturbance:  Goal: Appears well-rested  Description: Appears well-rested  2/26/2022 1024 by Bakari Balderas RN  Outcome: Ongoing  2/26/2022 0143 by Magdalena Buchanan RN  Outcome: Ongoing     Problem: Nutrition  Goal: Optimal nutrition therapy  2/26/2022 1024 by Bakari Balderas RN  Outcome: Ongoing  2/26/2022 0143 by Magdalena Buchanan RN  Outcome: Ongoing     Problem: Falls - Risk of:  Goal: Absence of physical injury  Description: Absence of physical injury  2/26/2022 1024 by Bakari Balderas RN  Outcome: Ongoing  2/26/2022 0143 by Magdalena Buchanan RN  Outcome: Ongoing  Goal: Will remain free from falls  Description: Will remain free from falls  2/26/2022 1024 by Bakari Balderas RN  Outcome: Ongoing  2/26/2022 0143 by Magdalena Buchanan RN  Outcome: Ongoing     Problem: Skin Integrity:  Goal: Will show no infection signs and symptoms  Description: Will show no infection signs and symptoms  2/26/2022 1024 by Bakari Balderas RN  Outcome: Ongoing  2/26/2022 0143 by Magdalena Buchanan RN  Outcome: Ongoing  Goal: Absence of new skin breakdown  Description: Absence of new skin breakdown  2/26/2022 1024 by Bakari Balderas RN  Outcome: Ongoing  2/26/2022 0143 by Magdalena Buchanan RN  Outcome: Ongoing

## 2022-02-26 NOTE — FLOWSHEET NOTE
02/25/22 0900   Treatment Team Notification   Reason for Communication Evaluate; Review case   Team Member Name Dr. Mandy Horowitz Team Role Resident   Method of Communication Face to face   Response At bedside   Notification Time 0900     Jose Ferreira RN

## 2022-02-26 NOTE — PROGRESS NOTES
Critical Care Team - Daily Progress Note      Date and time: 2022 7:43 AM  Patient's name:  Minnie Robison  Medical Record Number: [id number]  Patient's account/billing number: [de-identified]  Patient's YOB: 1965  Age: 60 y.o. Date of Admission: 2022  9:13 PM  Length of stay during current admission: 4      Primary Care Physician: Amanuel Leon MD  ICU Attending Physician: Dr. Beena Ortiz Status: Full Code    Reason for ICU admission:   Chief Complaint   Patient presents with    Cardiac Arrest         SUBJECTIVE:     OVERNIGHT EVENTS:           FLUIDS:Na bicarb 75 meq in D5 and 0.45NS @50/hr   FEEDING: TF   FAMILY UPDATE: yes   ANALGESICS:Fentanyl   SEDATION: No Sedation/Analgesia and no sedation   THROMBO- PROPHYLAXIS: Heparin   MOBILIZATION:intubated and sedated   HEADS UP:45 degrees   HEMODYNAMICS: off pressor support   ULCER PROPHYLAXIS: Protonix    GLYCEMIC CONTROL:controlled with LDSSI   SPONTANEOUS BREATHING TRIAL: did no do yesterday   BOWEL MANAGEMENT: no diarrhea, glycolax as needed   INDWELLING CATHETER:umaña removed yesterday   DE-ESCALATION: Unasyn. -VSS, afebrile. UOP- 260, opens eyes to name, does not follows commands  -Vent settings: PRVC/16/580/5/35%  -AB.36/40.7/91.3  -Continues to be amiodarone drip and heparin drip. Started on coreg 25 BID by cardiology  -Cr trending down 1.65>1.35. Hb stable  -Neurosurgery-no acute intervention at this time they recommend to follow-up in the clinic in 6 to 8 weeks. For cervical stenosis.  -Neuro critical care following, they updated the daughter on the MRI findings. Neuro critical care wants to observe for 3 more days.  -Cardiology following, they recommend to continue amiodarone and plan for coronary angiography as neurological status gets better.   -Nephrology consulted yesterday for elevated creatinine, they changed the intravenous fluids to sodium bicarb 75 with half-normal saline and D5W    CURRENT VENTILATION STATUS: Ventilator . IF INTUBATED, ET TUBE MARKING AT LOWER LIP:       cms    SECRETIONS Amount:  [] Small [] Moderate  [] Large  [x] None  Color:     [] White [] Colored  [] Bloody    SEDATION: Fentanyl                       RAAS Score:                         PARALYZED:  [x] No    [] Yes    DIARRHEA:                [x] No                [] Yes  (C. Difficile status: [] positive                                                                                                                       [] negative                                                                                                                     [] pending)  VASOPRESSORS:  [x] No    [] Yes    If yes -   [] Levophed       [] Dopamine     [] Vasopressin       [] Dobutamine  [] Phenylephrine         [] Epinephrine    CENTRAL LINES:     [x] No   [] Yes   (Date of Insertion:   )           If yes -     [] Right IJ     [] Left IJ [] Right Femoral [] Left Femoral                   [] Right Subclavian [] Left Subclavian       ODELL'S CATHETER:   [x] No   [] Yes  (Date of Insertion:   )     URINE OUTPUT:            [] Good   [x] Low              [] Anuric    REVIEW OF SYSTEMS:  -  UNABLE TO OBTAIN DUE TO PATIENT'S CONDITION. HISTORY OF PRESENT ILLNESS:   Lorenzo Magallanes a 60 y. o. with PMH of CAD s/p CABG x 3 in  2011 and Cath 10/2018 with patent LIMA to LAD and SVG to RCA but occluded SVG to OM1 who presented to the emergency department with cardiac arrest. Patient was at home when a family member heard the patient fall upstairs however was unable to check on the patient. EMS was called and patient was found to be in V. Fib arrest. ACLS was initiated and patient received two shocks and ROSC was achieved. On the way out of the patient's home patient went into PEA arrest. Compressions were resumed and epinephrine was given and ROSC was achieved.  Unknown down time.      In the emergency department patient was intubated and sedated on propofol. Hemodynamically stable off pressors. Labs demonstrated elevated creatinine (1.67), lactic acidosis (3.0), leukocytosis (20.2), EKG was concerning for STEMI with ST elevations in V3, V4 and V5. Cardiology was consulted however patient did not meet STEMI criteria. Cardiology initially planned to Cath patient however he was taken off propofol with only sluggish pupils, but no gag or corneal reflexes so Cath was canceled due to poor neurological prognosis. CT head was negative. CT C spine demonstrated remote anterior fusion from C4 through C7 with C5-C6 corpectomy and bone strut placement as well as prominent/recurrent spondylosis with moderate cord flattening at C4-C5 and C5-C6. Patient will be admitted to medical ICU. OBJECTIVE:     VITAL SIGNS:  BP (!) 148/86   Pulse 75   Temp 98.7 °F (37.1 °C) (Oral)   Resp 16   Ht 5' 10\" (1.778 m)   Wt 214 lb 8.1 oz (97.3 kg)   SpO2 100%   BMI 30.78 kg/m²   Tmax over 24 hours:  Temp (24hrs), Av.8 °F (37.1 °C), Min:98.7 °F (37.1 °C), Max:99 °F (37.2 °C)      Patient Vitals for the past 8 hrs:   BP Pulse Resp SpO2 Weight   22 0700 (!) 148/86 75  100 %    22 0600 (!) 172/92 73  100 % 214 lb 8.1 oz (97.3 kg)   22 0524    100 %    22 0500 139/84 63 16 100 %    22 0438  65  100 %    22 0400  70 16 98 %    22 0350  58  100 %    22 0300  63 16 100 %    22 0200  61 16 100 %    22 0100  58 16 100 %    22 0000  63 16 100 %    22 2359  63  100 %          Intake/Output Summary (Last 24 hours) at 2022 0743  Last data filed at 2022 0400  Gross per 24 hour   Intake 2389.16 ml   Output 860 ml   Net 1529.16 ml     Date 22 0000 - 22 2359   Shift 1939-0150 9249-0160 2560-1408 24 Hour Total   INTAKE   I.V.(mL/kg) 620. 1(6.4)   620. 1(6.4)   IV Piggyback(mL/kg) 100(1)   100(1)   Shift Total(mL/kg) 720.1(7.4)   720.1(7.4)   OUTPUT   Emesis/NG output(mL/kg) 600(6.2)   600(6.2)   Shift Total(mL/kg) 600(6.2)   600(6.2)   Weight (kg) 97.3 97.3 97.3 97.3     Wt Readings from Last 3 Encounters:   02/26/22 214 lb 8.1 oz (97.3 kg)   01/17/22 207 lb 6.4 oz (94.1 kg)   10/25/21 210 lb (95.3 kg)     Body mass index is 30.78 kg/m². PHYSICAL EXAM:  Constitutional: intubated and sedated  EENT: PERRLA, sclera clear, anicteric  Respiratory: clear to auscultation, no wheezes   Cardiovascular: regular rate and rhythm, normal S1, S2, no murmur noted and 2+ pulses throughout  Abdomen: soft, nontender, nondistended, no masses or organomegaly  NEUROLOGIC: intubated and sedated. Patient  Is opening eyes to his name, grimaces to pain, but does not follow commands.   Extremities:  peripheral pulses normal, no pedal edema, no clubbing or cyanosis  SKIN: normal coloration and turgor    Any additional physical findings:      MEDICATIONS:  Scheduled Meds:   fentanNYL  100 mcg IntraVENous Once    azaTHIOprine  75 mg Oral Daily    carvedilol  12.5 mg Oral BID WC    sodium chloride  500 mL IntraVENous Once    insulin lispro  0-3 Units SubCUTAneous Q6H    aspirin  81 mg Oral Daily    atorvastatin  80 mg Oral Daily    sodium chloride flush  5-40 mL IntraVENous 2 times per day    chlorhexidine  15 mL Mouth/Throat BID    polyvinyl alcohol  1 drop Both Eyes Q4H    ampicillin-sulbactam  3,000 mg IntraVENous Q6H    pantoprazole  40 mg IntraVENous Daily     Continuous Infusions:   sodium bicarbonate infusion 50 mL/hr at 02/26/22 0000    dexmedetomidine      heparin (PORCINE) Infusion 10.1 Units/kg/hr (02/26/22 0729)    sodium chloride 100 mL/hr at 02/25/22 1720    fentaNYL 100 mcg/hr (02/26/22 0556)    [Held by provider] propofol Stopped (02/24/22 0906)    dextrose      amiodarone 0.5 mg/min (02/26/22 0000)     PRN Meds:   labetalol, 10 mg, Q6H PRN  sodium chloride flush, 5-40 mL, PRN  sodium chloride, 25 mL, PRN  ondansetron, 4 mg, Q8H PRN   Or  ondansetron, 4 mg, Q6H PRN  polyethylene glycol, 17 g, Daily PRN  acetaminophen, 650 mg, Q6H PRN   Or  acetaminophen, 650 mg, Q6H PRN  glucose, 15 g, PRN  glucagon (rDNA), 1 mg, PRN  dextrose, 100 mL/hr, PRN  dextrose bolus (hypoglycemia), 125 mL, PRN   Or  dextrose bolus (hypoglycemia), 250 mL, PRN  ipratropium-albuterol, 1 ampule, Q6H PRN  heparin (porcine), 4,000 Units, PRN  heparin (porcine), 2,000 Units, PRN        SUPPORT DEVICES: [x] Ventilator [] BIPAP  [] Nasal Cannula [] Room Air    VENT SETTINGS (Comprehensive) (if applicable):  Vent settings: PRVC/16/580/5/35%  Vent Information  $Ventilation: $Subsequent Day  Skin Assessment: Clean, dry, & intact  Suction Catheter Diameter: 14  Equipment ID: [id number]  Equipment Changed: HME  Vent Type: Servo i  Vent Mode: PRVC  Vt Ordered: 580 mL  Rate Set: 16 bmp  FiO2 : 35 %  SpO2: 100 %  SpO2/FiO2 ratio: 285.71  Sensitivity: 5  PEEP/CPAP: (S) 5  I Time/ I Time %: 0.9 s  Humidification Source: HME  Nitric Oxide/Epoprostenol In Use?: No  Additional Respiratory  Assessments  Pulse: 75  Resp: 16  SpO2: 100 %  End Tidal CO2: 31 (%)  Position: Semi-Willis's  Humidification Source: HME  Oral Care Completed?: Yes  Oral Care: Mouthwash,Lip moisturizer applied,Mouth swabbed,Mouth moisturizer,Mouth suctioned,Suction toothette  Subglottic Suction Done?: No  Cuff Pressure (cm H2O):  (mov)    ABGs:   -AB.36/40.7/91.3  Lab Results   Component Value Date    PHART 7.430 2019    ODZ8QFI 32.7 2019    PO2ART 97.5 2019    KCL8UHK 21.7 2019    Y1NBBOMH 95.6 2019    FIO2 35.0 2022     Lactic Acid:   Lab Results   Component Value Date    LACTA 1.5 2020    LACTA 1.0 12/10/2019    LACTA 1.5 2019         DATA:  Complete Blood Count:   Recent Labs     22  04222  04222   WBC 11.1 9.3 7.1   HGB 11.9* 10.9* 10.3*   MCV 91.2 91.4 92.0   PLT See Reflexed IPF Result See Reflexed IPF Result See Reflexed IPF Result   RBC 3.88* 3.61* 3.39*   HCT 35.4* 33.0* 31.2*   MCH 30.7 30.2 30.4   MCHC 33.6 33.0 33.0   RDW 16.8* 17.5* 17.7*        PT/INR:    Lab Results   Component Value Date    PROTIME 12.3 02/22/2022    PROTIME 10.6 12/19/2011    INR 1.2 02/22/2022     PTT:    Lab Results   Component Value Date    APTT 72.5 02/26/2022       Basal Metabolic Profile:   Recent Labs     02/24/22  0425 02/25/22  0421 02/26/22  0426    140 138   K 4.1 4.3 3.9   BUN 14 14 15   CREATININE 1.48* 1.65* 1.36*   * 111* 107   CO2 18* 18* 19*      Magnesium:   Lab Results   Component Value Date    MG 2.1 02/24/2022    MG 1.9 02/23/2022    MG 1.9 02/23/2022     Phosphorus:   Lab Results   Component Value Date    PHOS 2.9 02/24/2022    PHOS 3.9 02/23/2022    PHOS 4.2 02/23/2022     S. Calcium:  Recent Labs     02/26/22 0426   CALCIUM 7.8*     S. Ionized Calcium:No results for input(s): IONCA in the last 72 hours. Urinalysis:   Lab Results   Component Value Date    NITRU NEGATIVE 02/25/2022    COLORU Yellow 02/25/2022    PHUR 5.5 02/25/2022    WBCUA 2 TO 5 02/25/2022    RBCUA 5 TO 10 02/25/2022    MUCUS NOT REPORTED 11/06/2020    TRICHOMONAS NOT REPORTED 11/06/2020    YEAST NOT REPORTED 11/06/2020    BACTERIA NOT REPORTED 11/06/2020    CLARITYU clear 09/24/2020    SPECGRAV 1.025 02/25/2022    LEUKOCYTESUR NEGATIVE 02/25/2022    UROBILINOGEN Normal 02/25/2022    BILIRUBINUR NEGATIVE 02/25/2022    BLOODU +++ 09/24/2020    GLUCOSEU NEGATIVE 02/25/2022    KETUA TRACE 02/25/2022    AMORPHOUS NOT REPORTED 11/06/2020       CARDIAC ENZYMES: No results for input(s): CKMB, CKMBINDEX, TROPONINI in the last 72 hours. Invalid input(s): CKTOTAL;3  BNP: No results for input(s): BNP in the last 72 hours.     LFTS  Recent Labs     02/24/22  0425 02/25/22  0421 02/26/22  0426   ALKPHOS 140* 152* 146*   ALT 21 20 17   AST 43* 46* 34   BILITOT 0.66 1.22* 0.90   LABALBU 2.7* 2.7* 2.6*       AMYLASE/LIPASE/AMMONIA  Recent Labs     02/24/22  0915   AMMONIA 24       Last 3 Blood Glucose: Recent Labs     02/23/22  0826 02/24/22  0425 02/25/22  0421 02/26/22  0426   GLUCOSE 133* 85 99 112*      HgBA1c:    Lab Results   Component Value Date    LABA1C 5.1 04/21/2021         TSH:    Lab Results   Component Value Date    TSH 2.00 02/22/2022     ANEMIA STUDIES  No results for input(s): LABIRON, TIBC, FERRITIN, LINBVQLO20, FOLATE, OCCULTBLD in the last 72 hours. Cultures during this admission:     Blood cultures:  No growth  Urine Culture:  None taken            Sputum Culture:  None taken                Endotracheal aspirate: normal resp jones growth          ASSESSMENT:     Principal Problem:    Cardiac arrest [facility])  Active Problems:    Cervical stenosis of spinal canal  Resolved Problems:    * No resolved hospital problems. *      PLAN:     NEURO  -currently intubated and sedated. Continues to be on fentanyl, will wean off as tolerated. Opens eyes to name, grimaces to pain but does not follow command.  -Neuro crit following, will give 3 more days and will comment on prognosis. -LTM E ongoing.  -Neurosurgery consulted for cervical stenosis, no acute intervention at this point. CARDIOVASCULAR  -Hemodynamically stable. Off pressor support. -S/p V. fib arrest with history of CAD s/p CABGx4. Cardiology following. Continue heparin drip, amiodarone drip, aspirin, Lipitor, Coreg. We will plan on doing cardiac cath once neurologically stable. -H/o HTN. Cont coreg  -H/o HLD. Cont lipitor    RESP  -Acute hypoxic respiratory failure likely secondary to aspiration pneumonia. Continue Unasyn. Maintain oxygen saturation above 92%. Continue mechanical ventilation, will wean as tolerated. Pulmonary toilet    GI   -Keep NPO. Cont TF. Protonix for GI ppx. Check for residuals. RENAL  -ARON on CKD Stage 4. Monitor UOP. Cr trending down. Blackman removed yesterday. Nephrology following, appreciate recommendations. Replace electrolytes as needed    HEME  -Hb stable    ENDOCRINE  -Glucose well controlled. On LDSSI. Hypoglycemia protocol in place. ID  -Aspiration pneumonia. Cont Unasyn for today.  Patient afebrile and WBC WNL    DVT PPX: heparin  GI PPX: Protonix  DIET: Tube feedings    DISPOSITION: Monitor in ICU     Ariadna Funes MD,  Internal Medicine, PGY-1            Department of Internal Medicine/ Critical care  [facility] ([place]             2/26/2022, 7:43 AM

## 2022-02-26 NOTE — PROGRESS NOTES
NEPHROLOGY PROGRESS NOTE      SUBJECTIVE     On fluids with bicarb  UO sluggish and in positive balance  BP stable and not on pressors  On MV  Renal function stable    OBJECTIVE     Vitals:    02/26/22 0744 02/26/22 1000 02/26/22 1100 02/26/22 1139   BP:  (!) 165/93 (!) 143/86    Pulse: 69 67 68 77   Resp:  17     Temp:  97.1 °F (36.2 °C)     TempSrc:  Tympanic     SpO2: 100% 100% 100% 100%   Weight:       Height:         24HR INTAKE/OUTPUT:      Intake/Output Summary (Last 24 hours) at 2/26/2022 1413  Last data filed at 2/26/2022 1225  Gross per 24 hour   Intake 2350.14 ml   Output 1250 ml   Net 1100.14 ml       General appearance:MV  Respiratory::vesicular breath sounds,no wheeze/crackles  Cardiovascular:S1 S2 normal,no gallop or organic murmur. Abdomen:Non tender/non distended. Bowel sounds present  Extremities: No Cyanosis or Clubbing,present Lower extremity edema  NeurologicalMV      MEDICATIONS     Scheduled Meds:    carvedilol  25 mg Oral BID WC    fentanNYL  100 mcg IntraVENous Once    azaTHIOprine  75 mg Oral Daily    sodium chloride  500 mL IntraVENous Once    insulin lispro  0-3 Units SubCUTAneous Q6H    aspirin  81 mg Oral Daily    atorvastatin  80 mg Oral Daily    sodium chloride flush  5-40 mL IntraVENous 2 times per day    chlorhexidine  15 mL Mouth/Throat BID    polyvinyl alcohol  1 drop Both Eyes Q4H    ampicillin-sulbactam  3,000 mg IntraVENous Q6H    pantoprazole  40 mg IntraVENous Daily     Continuous Infusions:    sodium bicarbonate infusion 50 mL/hr at 02/26/22 1203    dexmedetomidine      heparin (PORCINE) Infusion 10.1 Units/kg/hr (02/26/22 1203)    sodium chloride 100 mL/hr at 02/25/22 1720    fentaNYL Stopped (02/26/22 0945)    [Held by provider] propofol Stopped (02/24/22 0906)    dextrose      amiodarone 0.5 mg/min (02/26/22 1203)     PRN Meds:  hydrALAZINE, labetalol, sodium chloride flush, sodium chloride, ondansetron **OR** ondansetron, polyethylene glycol, acetaminophen **OR** acetaminophen, glucose, glucagon (rDNA), dextrose, dextrose bolus (hypoglycemia) **OR** dextrose bolus (hypoglycemia), ipratropium-albuterol, heparin (porcine), heparin (porcine)  Home Meds:                Medications Prior to Admission: magnesium oxide (MAG-OX) 400 (241.3 Mg) MG TABS tablet,   atorvastatin (LIPITOR) 40 MG tablet, TAKE 1 TABLET BY MOUTH DAILY  magnesium oxide (MAG-OX) 400 (240 Mg) MG tablet, TAKE 1 TABLET BY MOUTH 2 TIMES DAILY  traZODone (DESYREL) 100 MG tablet, Take 0.5 tablets by mouth nightly as needed for Sleep  DULoxetine (CYMBALTA) 30 MG extended release capsule, TAKE 1 CAPSULE BY MOUTH DAILY  lisinopril (PRINIVIL;ZESTRIL) 5 MG tablet, take 1 tablet by mouth once daily  spironolactone (ALDACTONE) 25 MG tablet, take 1 tablet by mouth once daily  metoclopramide (REGLAN) 10 MG tablet, Take 1 tablet by mouth 3 times daily  isosorbide mononitrate (IMDUR) 30 MG extended release tablet, Take 1 tablet by mouth daily  nitroGLYCERIN (NITROSTAT) 0.4 MG SL tablet, Place 1 tablet under the tongue every 5 minutes as needed for Chest pain up to max of 3 total doses. If no relief after 1 dose, call 911.  (Patient not taking: Reported on 1/17/2022)  cloNIDine (CATAPRES) 0.2 MG tablet, Take 1 tablet by mouth 2 times daily  metoprolol tartrate (LOPRESSOR) 25 MG tablet, Take 1 tablet by mouth 2 times daily  magnesium oxide (MAG-OX) 400 (240 Mg) MG tablet,   pantoprazole (PROTONIX) 40 MG tablet, Take 1 tablet by mouth daily  magnesium oxide (MAG-OX) 400 MG tablet, Take 1 tablet by mouth 2 times daily (Patient not taking: Reported on 10/25/2021)  aspirin EC 81 MG EC tablet, Take 1 tablet by mouth daily  nicotine (NICODERM CQ) 21 MG/24HR, Place 1 patch onto the skin daily  acetaminophen (TYLENOL) 325 MG tablet, Take 2 tablets by mouth every 6 hours as needed for Pain  Umeclidinium Bromide 62.5 MCG/INH AEPB, Inhale 1 puff into the lungs daily (Patient not taking: Reported on 1/17/2022)  vitamin D3 (CHOLECALCIFEROL) 10 MCG (400 UNIT) TABS tablet, take 2 tablets (800MG) by mouth once daily (Patient not taking: Reported on 4/21/2021)  vitamin D3 (CHOLECALCIFEROL) 10 MCG (400 UNIT) TABS tablet, take 2 tablets (800MG) by mouth once daily (Patient not taking: Reported on 1/17/2022)  Fluticasone furoate-vilanterol (BREO ELLIPTA) 200-25 MCG/INH AEPB inhaler, Inhale 1 puff into the lungs daily (Patient not taking: Reported on 1/17/2022)  calcium carbonate-vitamin D3 (CALCIUM 600-D) 600-400 MG-UNIT TABS per tab, Take 1 tablet by mouth 2 times daily  azaTHIOprine (IMURAN) 50 MG tablet, Take 1.5 tablets by mouth daily  albuterol sulfate  (90 Base) MCG/ACT inhaler, inhale 2 puffs by mouth every 6 hours if needed for wheezing  nicotine (NICODERM CQ) 14 MG/24HR, Place 1 patch onto the skin daily (Patient not taking: Reported on 1/17/2022)  traMADol (ULTRAM) 50 MG tablet, Take 50 mg by mouth every 8 hours as needed for Pain. (Patient not taking: Reported on 1/17/2022)  OLANZapine (ZYPREXA) 5 MG tablet, Take 1 tablet by mouth nightly    INVESTIGATIONS     Last 3 CMP:    Recent Labs     02/24/22 0425 02/25/22 0421 02/26/22 0426    140 138   K 4.1 4.3 3.9   * 111* 107   CO2 18* 18* 19*   BUN 14 14 15   CREATININE 1.48* 1.65* 1.36*   CALCIUM 8.1* 7.8* 7.8*   PROT 5.2* 4.9* 4.9*   LABALBU 2.7* 2.7* 2.6*   BILITOT 0.66 1.22* 0.90   ALKPHOS 140* 152* 146*   AST 43* 46* 34   ALT 21 20 17       Last 3 CBC:  Recent Labs     02/24/22 0425 02/25/22 0421 02/26/22 0426   WBC 11.1 9.3 7.1   RBC 3.88* 3.61* 3.39*   HGB 11.9* 10.9* 10.3*   HCT 35.4* 33.0* 31.2*   MCV 91.2 91.4 92.0   MCH 30.7 30.2 30.4   MCHC 33.6 33.0 33.0   RDW 16.8* 17.5* 17.7*   PLT See Reflexed IPF Result See Reflexed IPF Result See Reflexed IPF Result       ASSESSMENT     #1 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 fib/PEA cardiac arrest  #4 history of CABG  #5 anoxic encephalopathy  #6 ventilatory dependent respiratory failure  #7 history of hyperTN    PLAN     1. Dc Fluids  2. One dose of Lasix  3. Place Blackman in  4. Continue Imuran  5.  Will follow    Please do not hesitate to call with questions    This note is created with the assistance of a speech-recognition program. While intending to generate a document that actually reflects the content of the visit, no guarantees can be provided that every mistake has been identified and corrected by editing    Bear Luna MD MD, [facility] Elisha Etienne, Prabhjot Sky   2/26/2022 2:13 PM  [facility]

## 2022-02-26 NOTE — FLOWSHEET NOTE
02/25/22 1715   Treatment Team Notification   Reason for Communication Evaluate; Review case   Team Member Name Dr. Adriana Quintanilla Team Role Attending Provider   Method of Communication Face to face   Response At bedside   Notification Time Cesar Cazares 15., RN

## 2022-02-26 NOTE — FLOWSHEET NOTE
02/25/22 0820   Treatment Team Notification   Reason for Communication Evaluate; Review case   Team Member Name Dr. Magdalena Henderson   Treatment Team Role Resident   Method of Communication Face to face   Response At bedside   Notification Time 0820     Jose Ferreira RN

## 2022-02-26 NOTE — PLAN OF CARE
Problem: OXYGENATION/RESPIRATORY FUNCTION  Goal: Patient will maintain patent airway  2/26/2022 0143 by Heike Roberts RN  Outcome: Ongoing  2/25/2022 2055 by MAYRA OsullivanP  Outcome: Ongoing  2/25/2022 1733 by Toro Manuel RN  Outcome: Ongoing  2/25/2022 1638 by Ihsan Crowley RCP  Outcome: Ongoing  Goal: Patient will achieve/maintain normal respiratory rate/effort  Description: Respiratory rate and effort will be within normal limits for the patient  2/26/2022 0143 by Heike Roberts RN  Outcome: Ongoing  2/25/2022 2055 by Socrates Robb RCP  Outcome: Ongoing  2/25/2022 1733 by Toro Manuel RN  Outcome: Ongoing  2/25/2022 1638 by Ihsan Crowley RCP  Outcome: Ongoing     Problem: MECHANICAL VENTILATION  Goal: Patient will maintain patent airway  2/26/2022 0143 by Heike Roberts RN  Outcome: Ongoing  2/25/2022 2055 by Socrates Robb RCP  Outcome: Ongoing  2/25/2022 1638 by Ihsan Crowley RCP  Outcome: Ongoing  Goal: Oral health is maintained or improved  2/26/2022 0143 by Heike Roberts RN  Outcome: Ongoing  2/25/2022 2055 by Socrates Robb RCP  Outcome: Ongoing  2/25/2022 1733 by Toro Manuel RN  Outcome: Ongoing  2/25/2022 1638 by Ihsan Crowley RCP  Outcome: Ongoing  Goal: ET tube will be managed safely  2/26/2022 0143 by Heike Roberts RN  Outcome: Ongoing  2/25/2022 2055 by Socrates Robb RCP  Outcome: Ongoing  2/25/2022 1638 by Ihsan Crowley RCP  Outcome: Ongoing  Goal: Ability to express needs and understand communication  2/26/2022 0143 by Heike Roberts RN  Outcome: Ongoing  2/25/2022 2055 by MAYRA OsullivanP  Outcome: Ongoing  2/25/2022 1733 by Toro Manuel RN  Outcome: Ongoing  2/25/2022 1638 by Ihsan Crowley RCP  Outcome: Ongoing  Goal: Mobility/activity is maintained at optimum level for patient  2/26/2022 0143 by Heike Roberts RN  Outcome: Ongoing  2/25/2022 2055 by Socrates Robb RCP  Outcome: Ongoing  2/25/2022 1638 by Ihsan Crowley RCP  Outcome: Ongoing     Problem: SKIN INTEGRITY  Goal: Skin integrity is maintained or improved  2/26/2022 0143 by Heike Roberts RN  Outcome: Ongoing  2/25/2022 2055 by Socrates Robb RCP  Outcome: Ongoing  2/25/2022 1733 by Toro Manuel RN  Outcome: Ongoing  2/25/2022 1638 by Ihsan Crowley RCP  Outcome: Ongoing     Problem: Confusion - Acute:  Goal: Absence of continued neurological deterioration signs and symptoms  Description: Absence of continued neurological deterioration signs and symptoms  Outcome: Ongoing  Goal: Mental status will be restored to baseline  Description: Mental status will be restored to baseline  Outcome: Ongoing     Problem: Discharge Planning:  Goal: Ability to perform activities of daily living will improve  Description: Ability to perform activities of daily living will improve  Outcome: Ongoing  Goal: Participates in care planning  Description: Participates in care planning  Outcome: Ongoing     Problem: Injury - Risk of, Physical Injury:  Goal: Absence of physical injury  Description: Absence of physical injury  2/26/2022 0143 by Heike Roberts RN  Outcome: Ongoing  2/25/2022 1733 by Toro Manuel RN  Outcome: Ongoing  Goal: Will remain free from falls  Description: Will remain free from falls  Outcome: Ongoing     Problem: Mood - Altered:  Goal: Mood stable  Description: Mood stable  Outcome: Ongoing  Goal: Absence of abusive behavior  Description: Absence of abusive behavior  Outcome: Ongoing  Goal: Verbalizations of feeling emotionally comfortable while being cared for will increase  Description: Verbalizations of feeling emotionally comfortable while being cared for will increase  Outcome: Ongoing     Problem: Psychomotor Activity - Altered:  Goal: Absence of psychomotor disturbance signs and symptoms  Description: Absence of psychomotor disturbance signs and symptoms  Outcome: Ongoing     Problem: Sensory Perception - Impaired:  Goal: Demonstrations of improved sensory functioning will increase  Description: Demonstrations of improved sensory functioning will increase  Outcome: Ongoing  Goal: Decrease in sensory misperception frequency  Description: Decrease in sensory misperception frequency  Outcome: Ongoing  Goal: Able to refrain from responding to false sensory perceptions  Description: Able to refrain from responding to false sensory perceptions  Outcome: Ongoing  Goal: Demonstrates accurate environmental perceptions  Description: Demonstrates accurate environmental perceptions  Outcome: Ongoing  Goal: Able to distinguish between reality-based and nonreality-based thinking  Description: Able to distinguish between reality-based and nonreality-based thinking  Outcome: Ongoing  Goal: Able to interrupt nonreality-based thinking  Description: Able to interrupt nonreality-based thinking  Outcome: Ongoing     Problem: Sleep Pattern Disturbance:  Goal: Appears well-rested  Description: Appears well-rested  Outcome: Ongoing     Problem: Nutrition  Goal: Optimal nutrition therapy  Outcome: Ongoing     Problem: Falls - Risk of:  Goal: Absence of physical injury  Description: Absence of physical injury  2/26/2022 0143 by Susan Arias RN  Outcome: Ongoing  2/25/2022 1733 by Nadia Trejo RN  Outcome: Ongoing  Goal: Will remain free from falls  Description: Will remain free from falls  Outcome: Ongoing     Problem: Skin Integrity:  Goal: Will show no infection signs and symptoms  Description: Will show no infection signs and symptoms  Outcome: Ongoing  Goal: Absence of new skin breakdown  Description: Absence of new skin breakdown  Outcome: Ongoing

## 2022-02-26 NOTE — PLAN OF CARE
Problem: OXYGENATION/RESPIRATORY FUNCTION  Goal: Patient will maintain patent airway  2/26/2022 0825 by Richmond Gonzalez RCP  Outcome: Ongoing  2/26/2022 0143 by Nahed Sagastume RN  Outcome: Ongoing  2/25/2022 2055 by Mikayla Richter RCP  Outcome: Ongoing  Goal: Patient will achieve/maintain normal respiratory rate/effort  Description: Respiratory rate and effort will be within normal limits for the patient  2/26/2022 0825 by Richmond Gonzalez, MAYRAP  Outcome: Ongoing  2/26/2022 0143 by Nahed Sagastume RN  Outcome: Ongoing  2/25/2022 2055 by Mikayla Richter RCP  Outcome: Ongoing     Problem: MECHANICAL VENTILATION  Goal: Patient will maintain patent airway  2/26/2022 0825 by MAYRA DonohueP  Outcome: Ongoing  2/26/2022 0143 by Nahed Sagastume RN  Outcome: Ongoing  2/25/2022 2055 by MAYRA LauraP  Outcome: Ongoing  Goal: Oral health is maintained or improved  2/26/2022 0825 by MAYRA DonohueP  Outcome: Ongoing  2/26/2022 0143 by Nahed Sagastume RN  Outcome: Ongoing  2/25/2022 2055 by Mikayla Richter RCP  Outcome: Ongoing  Goal: ET tube will be managed safely  2/26/2022 0825 by MAYRA DonohueP  Outcome: Ongoing  2/26/2022 0143 by Nahed Sagastume RN  Outcome: Ongoing  2/25/2022 2055 by Mikayla Richter RCP  Outcome: Ongoing  Goal: Ability to express needs and understand communication  2/26/2022 0825 by MAYRA DonohueP  Outcome: Ongoing  2/26/2022 0143 by Nahed Sagastume RN  Outcome: Ongoing  2/25/2022 2055 by Mikayla Richter RCP  Outcome: Ongoing  Goal: Mobility/activity is maintained at optimum level for patient  2/26/2022 0825 by MAYRA DonohueP  Outcome: Ongoing  2/26/2022 0143 by Nahed Sagastume RN  Outcome: Ongoing  2/25/2022 2055 by MAYRA LauraP  Outcome: Ongoing

## 2022-02-27 LAB
ABSOLUTE EOS #: 0.29 K/UL (ref 0–0.44)
ABSOLUTE IMMATURE GRANULOCYTE: 0.05 K/UL (ref 0–0.3)
ABSOLUTE LYMPH #: 0.77 K/UL (ref 1.1–3.7)
ABSOLUTE MONO #: 0.43 K/UL (ref 0.1–1.2)
ALBUMIN SERPL-MCNC: 2.7 G/DL (ref 3.5–5.2)
ALBUMIN/GLOBULIN RATIO: 1.1 (ref 1–2.5)
ALLEN TEST: NORMAL
ALP BLD-CCNC: 160 U/L (ref 40–129)
ALT SERPL-CCNC: 16 U/L (ref 5–41)
ANION GAP SERPL CALCULATED.3IONS-SCNC: 15 MMOL/L (ref 9–17)
AST SERPL-CCNC: 25 U/L
BASOPHILS # BLD: 1 % (ref 0–2)
BASOPHILS ABSOLUTE: 0.06 K/UL (ref 0–0.2)
BILIRUB SERPL-MCNC: 1.12 MG/DL (ref 0.3–1.2)
BUN BLDV-MCNC: 16 MG/DL (ref 6–20)
CALCIUM SERPL-MCNC: 8.1 MG/DL (ref 8.6–10.4)
CHLORIDE BLD-SCNC: 103 MMOL/L (ref 98–107)
CO2: 22 MMOL/L (ref 20–31)
CREAT SERPL-MCNC: 1.39 MG/DL (ref 0.7–1.2)
CULTURE: NORMAL
EOSINOPHILS RELATIVE PERCENT: 4 % (ref 1–4)
FIO2: 35
GFR AFRICAN AMERICAN: >60 ML/MIN
GFR NON-AFRICAN AMERICAN: 52 ML/MIN
GFR SERPL CREATININE-BSD FRML MDRD: ABNORMAL ML/MIN/{1.73_M2}
GLUCOSE BLD-MCNC: 101 MG/DL (ref 75–110)
GLUCOSE BLD-MCNC: 88 MG/DL (ref 75–110)
GLUCOSE BLD-MCNC: 93 MG/DL (ref 74–100)
GLUCOSE BLD-MCNC: 96 MG/DL (ref 70–99)
GLUCOSE BLD-MCNC: 96 MG/DL (ref 75–110)
HCT VFR BLD CALC: 32.4 % (ref 40.7–50.3)
HEMOGLOBIN: 10.7 G/DL (ref 13–17)
IMMATURE GRANULOCYTES: 1 %
LYMPHOCYTES # BLD: 11 % (ref 24–43)
MCH RBC QN AUTO: 30.1 PG (ref 25.2–33.5)
MCHC RBC AUTO-ENTMCNC: 33 G/DL (ref 28.4–34.8)
MCV RBC AUTO: 91.3 FL (ref 82.6–102.9)
MODE: NORMAL
MONOCYTES # BLD: 6 % (ref 3–12)
NEURON SPEC ENOLASE: 13.2 NG/ML
NRBC AUTOMATED: 0 PER 100 WBC
O2 DEVICE/FLOW/%: NORMAL
PARTIAL THROMBOPLASTIN TIME: 55.7 SEC (ref 20.5–30.5)
PARTIAL THROMBOPLASTIN TIME: 63.7 SEC (ref 20.5–30.5)
PDW BLD-RTO: 17.6 % (ref 11.8–14.4)
PLATELET # BLD: 105 K/UL (ref 138–453)
PMV BLD AUTO: 10.7 FL (ref 8.1–13.5)
POC HCO3: 24.9 MMOL/L (ref 21–28)
POC O2 SATURATION: 97 % (ref 94–98)
POC PCO2: 38.6 MM HG (ref 35–48)
POC PH: 7.42 (ref 7.35–7.45)
POC PO2: 87.4 MM HG (ref 83–108)
POSITIVE BASE EXCESS, ART: 0 (ref 0–3)
POTASSIUM SERPL-SCNC: 3.7 MMOL/L (ref 3.7–5.3)
RBC # BLD: 3.55 M/UL (ref 4.21–5.77)
RBC # BLD: ABNORMAL 10*6/UL
SAMPLE SITE: NORMAL
SEG NEUTROPHILS: 77 % (ref 36–65)
SEGMENTED NEUTROPHILS ABSOLUTE COUNT: 5.2 K/UL (ref 1.5–8.1)
SODIUM BLD-SCNC: 140 MMOL/L (ref 135–144)
SPECIMEN DESCRIPTION: NORMAL
TOTAL PROTEIN: 5.1 G/DL (ref 6.4–8.3)
WBC # BLD: 6.8 K/UL (ref 3.5–11.3)

## 2022-02-27 PROCEDURE — 85025 COMPLETE CBC W/AUTO DIFF WBC: CPT

## 2022-02-27 PROCEDURE — C9113 INJ PANTOPRAZOLE SODIUM, VIA: HCPCS | Performed by: STUDENT IN AN ORGANIZED HEALTH CARE EDUCATION/TRAINING PROGRAM

## 2022-02-27 PROCEDURE — 6360000002 HC RX W HCPCS: Performed by: STUDENT IN AN ORGANIZED HEALTH CARE EDUCATION/TRAINING PROGRAM

## 2022-02-27 PROCEDURE — 80053 COMPREHEN METABOLIC PANEL: CPT

## 2022-02-27 PROCEDURE — 82803 BLOOD GASES ANY COMBINATION: CPT

## 2022-02-27 PROCEDURE — 94761 N-INVAS EAR/PLS OXIMETRY MLT: CPT

## 2022-02-27 PROCEDURE — 6370000000 HC RX 637 (ALT 250 FOR IP): Performed by: STUDENT IN AN ORGANIZED HEALTH CARE EDUCATION/TRAINING PROGRAM

## 2022-02-27 PROCEDURE — 6360000002 HC RX W HCPCS: Performed by: INTERNAL MEDICINE

## 2022-02-27 PROCEDURE — 99291 CRITICAL CARE FIRST HOUR: CPT | Performed by: INTERNAL MEDICINE

## 2022-02-27 PROCEDURE — 2700000000 HC OXYGEN THERAPY PER DAY

## 2022-02-27 PROCEDURE — 85730 THROMBOPLASTIN TIME PARTIAL: CPT

## 2022-02-27 PROCEDURE — 2580000003 HC RX 258: Performed by: STUDENT IN AN ORGANIZED HEALTH CARE EDUCATION/TRAINING PROGRAM

## 2022-02-27 PROCEDURE — 37799 UNLISTED PX VASCULAR SURGERY: CPT

## 2022-02-27 PROCEDURE — 2000000000 HC ICU R&B

## 2022-02-27 PROCEDURE — 82947 ASSAY GLUCOSE BLOOD QUANT: CPT

## 2022-02-27 PROCEDURE — 99232 SBSQ HOSP IP/OBS MODERATE 35: CPT | Performed by: INTERNAL MEDICINE

## 2022-02-27 PROCEDURE — 6360000002 HC RX W HCPCS: Performed by: GENERAL PRACTICE

## 2022-02-27 PROCEDURE — 94003 VENT MGMT INPAT SUBQ DAY: CPT

## 2022-02-27 PROCEDURE — 2500000003 HC RX 250 WO HCPCS: Performed by: STUDENT IN AN ORGANIZED HEALTH CARE EDUCATION/TRAINING PROGRAM

## 2022-02-27 RX ORDER — AMLODIPINE BESYLATE 10 MG/1
10 TABLET ORAL DAILY
Status: DISCONTINUED | OUTPATIENT
Start: 2022-02-27 | End: 2022-03-01

## 2022-02-27 RX ORDER — PROPOFOL 10 MG/ML
5-50 INJECTION, EMULSION INTRAVENOUS
Status: DISCONTINUED | OUTPATIENT
Start: 2022-02-27 | End: 2022-03-03

## 2022-02-27 RX ORDER — FUROSEMIDE 10 MG/ML
40 INJECTION INTRAMUSCULAR; INTRAVENOUS DAILY
Status: DISCONTINUED | OUTPATIENT
Start: 2022-02-27 | End: 2022-03-12

## 2022-02-27 RX ORDER — METOCLOPRAMIDE HYDROCHLORIDE 5 MG/ML
5 INJECTION INTRAMUSCULAR; INTRAVENOUS EVERY 6 HOURS PRN
Status: DISCONTINUED | OUTPATIENT
Start: 2022-02-27 | End: 2022-03-17 | Stop reason: HOSPADM

## 2022-02-27 RX ADMIN — AMPICILLIN AND SULBACTAM 3000 MG: 1; 2 INJECTION, POWDER, FOR SOLUTION INTRAMUSCULAR; INTRAVENOUS at 12:01

## 2022-02-27 RX ADMIN — POLYVINYL ALCOHOL 1 DROP: 14 SOLUTION/ DROPS OPHTHALMIC at 21:56

## 2022-02-27 RX ADMIN — Medication 14.1 UNITS/KG/HR: at 01:50

## 2022-02-27 RX ADMIN — HYDRALAZINE HYDROCHLORIDE 10 MG: 20 INJECTION INTRAMUSCULAR; INTRAVENOUS at 06:49

## 2022-02-27 RX ADMIN — Medication 14.1 UNITS/KG/HR: at 20:59

## 2022-02-27 RX ADMIN — ATORVASTATIN CALCIUM 80 MG: 80 TABLET, FILM COATED ORAL at 07:53

## 2022-02-27 RX ADMIN — CHLORHEXIDINE GLUCONATE 0.12% ORAL RINSE 15 ML: 1.2 LIQUID ORAL at 07:53

## 2022-02-27 RX ADMIN — AMPICILLIN AND SULBACTAM 3000 MG: 1; 2 INJECTION, POWDER, FOR SOLUTION INTRAMUSCULAR; INTRAVENOUS at 18:06

## 2022-02-27 RX ADMIN — AZATHIOPRINE 75 MG: 50 TABLET ORAL at 12:56

## 2022-02-27 RX ADMIN — DEXMEDETOMIDINE HYDROCHLORIDE 0.2 MCG/KG/HR: 4 INJECTION, SOLUTION INTRAVENOUS at 07:35

## 2022-02-27 RX ADMIN — SODIUM CHLORIDE, PRESERVATIVE FREE 10 ML: 5 INJECTION INTRAVENOUS at 08:00

## 2022-02-27 RX ADMIN — FUROSEMIDE 40 MG: 10 INJECTION, SOLUTION INTRAMUSCULAR; INTRAVENOUS at 09:40

## 2022-02-27 RX ADMIN — POLYVINYL ALCOHOL 1 DROP: 14 SOLUTION/ DROPS OPHTHALMIC at 05:47

## 2022-02-27 RX ADMIN — ASPIRIN 81 MG: 81 TABLET, CHEWABLE ORAL at 07:53

## 2022-02-27 RX ADMIN — AMPICILLIN AND SULBACTAM 3000 MG: 1; 2 INJECTION, POWDER, FOR SOLUTION INTRAMUSCULAR; INTRAVENOUS at 00:08

## 2022-02-27 RX ADMIN — PROPOFOL 5 MCG/KG/MIN: 10 INJECTION, EMULSION INTRAVENOUS at 21:54

## 2022-02-27 RX ADMIN — AMIODARONE HYDROCHLORIDE 0.5 MG/MIN: 50 INJECTION, SOLUTION INTRAVENOUS at 20:45

## 2022-02-27 RX ADMIN — POLYVINYL ALCOHOL 1 DROP: 14 SOLUTION/ DROPS OPHTHALMIC at 02:23

## 2022-02-27 RX ADMIN — CARVEDILOL 25 MG: 25 TABLET, FILM COATED ORAL at 07:53

## 2022-02-27 RX ADMIN — AMPICILLIN AND SULBACTAM 3000 MG: 1; 2 INJECTION, POWDER, FOR SOLUTION INTRAMUSCULAR; INTRAVENOUS at 05:46

## 2022-02-27 RX ADMIN — AMIODARONE HYDROCHLORIDE 0.5 MG/MIN: 50 INJECTION, SOLUTION INTRAVENOUS at 04:12

## 2022-02-27 RX ADMIN — PANTOPRAZOLE SODIUM 40 MG: 40 INJECTION, POWDER, FOR SOLUTION INTRAVENOUS at 07:53

## 2022-02-27 ASSESSMENT — PULMONARY FUNCTION TESTS
PIF_VALUE: 26
PIF_VALUE: 22
PIF_VALUE: 22
PIF_VALUE: 18
PIF_VALUE: 25
PIF_VALUE: 22
PIF_VALUE: 20
PIF_VALUE: 27

## 2022-02-27 NOTE — PLAN OF CARE
Problem: OXYGENATION/RESPIRATORY FUNCTION  Goal: Patient will maintain patent airway  2/26/2022 2248 by Ade Allen RCP  Outcome: Ongoing     Problem: OXYGENATION/RESPIRATORY FUNCTION  Goal: Patient will achieve/maintain normal respiratory rate/effort  Description: Respiratory rate and effort will be within normal limits for the patient  2/26/2022 2248 by Ade Allen RCP  Outcome: Ongoing     Problem: MECHANICAL VENTILATION  Goal: Patient will maintain patent airway  2/26/2022 2248 by Ade Allen RCP  Outcome: Ongoing     Problem: MECHANICAL VENTILATION  Goal: Oral health is maintained or improved  2/26/2022 2248 by Ade Allen RCP  Outcome: Ongoing     Problem: MECHANICAL VENTILATION  Goal: ET tube will be managed safely  2/26/2022 2248 by Ade Allen RCP  Outcome: Ongoing     Problem: MECHANICAL VENTILATION  Goal: Ability to express needs and understand communication  2/26/2022 2248 by Ade Allen RCP  Outcome: Ongoing     Problem: MECHANICAL VENTILATION  Goal: Mobility/activity is maintained at optimum level for patient  2/26/2022 2248 by Ade Allen RCP  Outcome: Ongoing     Problem: SKIN INTEGRITY  Goal: Skin integrity is maintained or improved  2/26/2022 2248 by Ade Allen RCP  Outcome: Ongoing

## 2022-02-27 NOTE — PLAN OF CARE
continued neurological deterioration signs and symptoms  Description: Absence of continued neurological deterioration signs and symptoms  2/27/2022 0555 by Angelito Benz  Outcome: Ongoing  2/27/2022 0346 by Jose Bella RN  Outcome: Ongoing  Goal: Mental status will be restored to baseline  Description: Mental status will be restored to baseline  2/27/2022 0555 by [facility]  Outcome: Ongoing  2/27/2022 0346 by Jose Bella RN  Outcome: Ongoing     Problem: Discharge Planning:  Goal: Ability to perform activities of daily living will improve  Description: Ability to perform activities of daily living will improve  2/27/2022 0555 by [facility]  Outcome: Ongoing  2/27/2022 0346 by Jose Bella RN  Outcome: Ongoing  Goal: Participates in care planning  Description: Participates in care planning  2/27/2022 0555 by [facility]  Outcome: Ongoing  2/27/2022 0346 by Jose Bella RN  Outcome: Ongoing     Problem: Injury - Risk of, Physical Injury:  Goal: Absence of physical injury  Description: Absence of physical injury  2/27/2022 0555 by Angelito Benz  Outcome: Ongoing  2/27/2022 0346 by Jose Bella RN  Outcome: Ongoing  Goal: Will remain free from falls  Description: Will remain free from falls  2/27/2022 0555 by [facility]  Outcome: Ongoing  2/27/2022 0346 by Jose Bella RN  Outcome: Ongoing     Problem: Mood - Altered:  Goal: Mood stable  Description: Mood stable  2/27/2022 0555 by Angelito Benz  Outcome: Ongoing  2/27/2022 0346 by Jose Bella RN  Outcome: Ongoing  Goal: Absence of abusive behavior  Description: Absence of abusive behavior  2/27/2022 0555 by Angelito Benz  Outcome: Ongoing  2/27/2022 0346 by Jose Bella RN  Outcome: Ongoing  Goal: Verbalizations of feeling emotionally comfortable while being cared for will increase  Description: Verbalizations of feeling emotionally comfortable while being cared for will increase  2/27/2022 0555 by [facility]  Outcome: Ongoing  2/27/2022 0346 by Garcia Loving RN  Outcome: Ongoing     Problem: Psychomotor Activity - Altered:  Goal: Absence of psychomotor disturbance signs and symptoms  Description: Absence of psychomotor disturbance signs and symptoms  2/27/2022 0555 by Shakeel Oliveira  Outcome: Ongoing  2/27/2022 0346 by Garcia Loving RN  Outcome: Ongoing     Problem: Sensory Perception - Impaired:  Goal: Demonstrations of improved sensory functioning will increase  Description: Demonstrations of improved sensory functioning will increase  2/27/2022 0555 by [facility]  Outcome: Ongoing  2/27/2022 0346 by Garcia Loving RN  Outcome: Ongoing  Goal: Decrease in sensory misperception frequency  Description: Decrease in sensory misperception frequency  2/27/2022 0555 by Shakeel Oliveira  Outcome: Ongoing  2/27/2022 0346 by Garcia Loving RN  Outcome: Ongoing  Goal: Able to refrain from responding to false sensory perceptions  Description: Able to refrain from responding to false sensory perceptions  2/27/2022 0555 by Shakeel Oliveira  Outcome: Ongoing  2/27/2022 0346 by Garcia Loving RN  Outcome: Ongoing  Goal: Demonstrates accurate environmental perceptions  Description: Demonstrates accurate environmental perceptions  2/27/2022 0555 by Shakeel Oliveira  Outcome: Ongoing  2/27/2022 0346 by Garcia Loving RN  Outcome: Ongoing  Goal: Able to distinguish between reality-based and nonreality-based thinking  Description: Able to distinguish between reality-based and nonreality-based thinking  2/27/2022 0555 by [facility]  Outcome: Ongoing  2/27/2022 0346 by Garcia Loving RN  Outcome: Ongoing  Goal: Able to interrupt nonreality-based thinking  Description: Able to interrupt nonreality-based thinking  2/27/2022 0555 by [facility]  Outcome: Ongoing  2/27/2022 0346 by Garcia Loving RN  Outcome: Ongoing     Problem: Sleep Pattern Disturbance:  Goal: Appears well-rested  Description: Appears well-rested  2/27/2022 0555 by Shakeel Oliveira  Outcome: Ongoing  2/27/2022 0346 by Rosalie Bright RN  Outcome: Ongoing     Problem: Nutrition  Goal: Optimal nutrition therapy  2/27/2022 0555 by Lennox Pais  Outcome: Ongoing  2/27/2022 0346 by Rosalie Bright RN  Outcome: Ongoing     Problem: Falls - Risk of:  Goal: Absence of physical injury  Description: Absence of physical injury  2/27/2022 0555 by Lennox Pais  Outcome: Ongoing  2/27/2022 0346 by Rosalie Bright RN  Outcome: Ongoing  Goal: Will remain free from falls  Description: Will remain free from falls  2/27/2022 0555 by [facility]  Outcome: Ongoing  2/27/2022 0346 by Rosalie Bright RN  Outcome: Ongoing     Problem: Skin Integrity:  Goal: Will show no infection signs and symptoms  Description: Will show no infection signs and symptoms  2/27/2022 0555 by Lennox Pais  Outcome: Ongoing  2/27/2022 0346 by Rosalie Bright RN  Outcome: Ongoing  Goal: Absence of new skin breakdown  Description: Absence of new skin breakdown  2/27/2022 0555 by Lennox Pais  Outcome: Ongoing  2/27/2022 0346 by Rosalie Bright RN  Outcome: Ongoing

## 2022-02-27 NOTE — PLAN OF CARE
Problem: OXYGENATION/RESPIRATORY FUNCTION  Goal: Patient will maintain patent airway  2/27/2022 0346 by Chyna Hinkle RN  Outcome: Ongoing  2/26/2022 2248 by Krista Packer RCP  Outcome: Ongoing  Goal: Patient will achieve/maintain normal respiratory rate/effort  Description: Respiratory rate and effort will be within normal limits for the patient  2/27/2022 0346 by Chyna Hinkle RN  Outcome: Ongoing  2/26/2022 2248 by Krista Packer RCP  Outcome: Ongoing     Problem: MECHANICAL VENTILATION  Goal: Patient will maintain patent airway  2/27/2022 0346 by Chyna Hinkle RN  Outcome: Ongoing  2/26/2022 2248 by Krista Packer RCP  Outcome: Ongoing  Goal: Oral health is maintained or improved  2/27/2022 0346 by Chyna Hinkle RN  Outcome: Ongoing  2/26/2022 2248 by Krista Packer RCP  Outcome: Ongoing  Goal: ET tube will be managed safely  2/27/2022 0346 by Chyna Hinkle RN  Outcome: Ongoing  2/26/2022 2248 by Krista Packer RCP  Outcome: Ongoing  Goal: Ability to express needs and understand communication  2/27/2022 0346 by Chyna Hinkle RN  Outcome: Ongoing  2/26/2022 2248 by Krista Packer RCP  Outcome: Ongoing  Goal: Mobility/activity is maintained at optimum level for patient  2/27/2022 0346 by Chyna Hinkle RN  Outcome: Ongoing  2/26/2022 2248 by Krista Packer RCP  Outcome: Ongoing     Problem: SKIN INTEGRITY  Goal: Skin integrity is maintained or improved  2/27/2022 0346 by Chyna Hinkle RN  Outcome: Ongoing  2/26/2022 2248 by Krista Packer RCP  Outcome: Ongoing     Problem: Confusion - Acute:  Goal: Absence of continued neurological deterioration signs and symptoms  Description: Absence of continued neurological deterioration signs and symptoms  Outcome: Ongoing  Goal: Mental status will be restored to baseline  Description: Mental status will be restored to baseline  Outcome: Ongoing     Problem: Discharge Planning:  Goal: Ability to perform activities of daily living will improve  Description: Ability to perform activities of daily living will improve  Outcome: Ongoing  Goal: Participates in care planning  Description: Participates in care planning  Outcome: Ongoing     Problem: Injury - Risk of, Physical Injury:  Goal: Absence of physical injury  Description: Absence of physical injury  Outcome: Ongoing  Goal: Will remain free from falls  Description: Will remain free from falls  Outcome: Ongoing     Problem: Mood - Altered:  Goal: Mood stable  Description: Mood stable  Outcome: Ongoing  Goal: Absence of abusive behavior  Description: Absence of abusive behavior  Outcome: Ongoing  Goal: Verbalizations of feeling emotionally comfortable while being cared for will increase  Description: Verbalizations of feeling emotionally comfortable while being cared for will increase  Outcome: Ongoing     Problem: Psychomotor Activity - Altered:  Goal: Absence of psychomotor disturbance signs and symptoms  Description: Absence of psychomotor disturbance signs and symptoms  Outcome: Ongoing     Problem: Sensory Perception - Impaired:  Goal: Demonstrations of improved sensory functioning will increase  Description: Demonstrations of improved sensory functioning will increase  Outcome: Ongoing  Goal: Decrease in sensory misperception frequency  Description: Decrease in sensory misperception frequency  Outcome: Ongoing  Goal: Able to refrain from responding to false sensory perceptions  Description: Able to refrain from responding to false sensory perceptions  Outcome: Ongoing  Goal: Demonstrates accurate environmental perceptions  Description: Demonstrates accurate environmental perceptions  Outcome: Ongoing  Goal: Able to distinguish between reality-based and nonreality-based thinking  Description: Able to distinguish between reality-based and nonreality-based thinking  Outcome: Ongoing  Goal: Able to interrupt nonreality-based thinking  Description: Able to interrupt nonreality-based thinking  Outcome: Ongoing     Problem: Sleep Pattern Disturbance:  Goal: Appears well-rested  Description: Appears well-rested  Outcome: Ongoing     Problem: Nutrition  Goal: Optimal nutrition therapy  Outcome: Ongoing     Problem: Falls - Risk of:  Goal: Absence of physical injury  Description: Absence of physical injury  Outcome: Ongoing  Goal: Will remain free from falls  Description: Will remain free from falls  Outcome: Ongoing     Problem: Skin Integrity:  Goal: Will show no infection signs and symptoms  Description: Will show no infection signs and symptoms  Outcome: Ongoing  Goal: Absence of new skin breakdown  Description: Absence of new skin breakdown  Outcome: Ongoing

## 2022-02-27 NOTE — PLAN OF CARE
Problem: OXYGENATION/RESPIRATORY FUNCTION  Goal: Patient will maintain patent airway  2/27/2022 0755 by Holley Cano, RCP  Outcome: Ongoing  2/27/2022 0555 by Latonia Mahajan  Outcome: Ongoing  2/27/2022 0346 by Magdalena Buchanan RN  Outcome: Ongoing  2/26/2022 2248 by MAYRA DiegoP  Outcome: Ongoing  Goal: Patient will achieve/maintain normal respiratory rate/effort  Description: Respiratory rate and effort will be within normal limits for the patient  2/27/2022 0755 by Holley Cano, RCP  Outcome: Ongoing  2/27/2022 0555 by Latonia Mahajan  Outcome: Ongoing  2/27/2022 0346 by Magdalena Buchanan RN  Outcome: Ongoing  2/26/2022 2248 by Brain Rebolledo RCP  Outcome: Ongoing     Problem: MECHANICAL VENTILATION  Goal: Patient will maintain patent airway  2/27/2022 0755 by Holley Cano, RCP  Outcome: Ongoing  2/27/2022 0555 by Latonia Mahajan  Outcome: Ongoing  2/27/2022 0346 by Magdalena Buchanan RN  Outcome: Ongoing  2/26/2022 2248 by MAYRA DiegoP  Outcome: Ongoing  Goal: Oral health is maintained or improved  2/27/2022 0755 by Holley Cano, RCP  Outcome: Ongoing  2/27/2022 0555 by Latonia Mahajan  Outcome: Ongoing  2/27/2022 0346 by Magdalena Buchanan RN  Outcome: Ongoing  2/26/2022 2248 by MAYRA DiegoP  Outcome: Ongoing  Goal: ET tube will be managed safely  2/27/2022 0755 by Holley Cano, RCP  Outcome: Ongoing  2/27/2022 0555 by Latonia Mahajan  Outcome: Ongoing  2/27/2022 0346 by Magdalena Buchanan RN  Outcome: Ongoing  2/26/2022 2248 by MAYRA DiegoP  Outcome: Ongoing  Goal: Ability to express needs and understand communication  2/27/2022 0755 by Holley Cano, RCP  Outcome: Ongoing  2/27/2022 0555 by Latonia Mahajan  Outcome: Ongoing  2/27/2022 0346 by Magdalena Buchanan RN  Outcome: Ongoing  2/26/2022 2248 by Brain Rebolledo RCP  Outcome: Ongoing  Goal: Mobility/activity is maintained at optimum level for patient  2/27/2022 0755 by Holley Cano RCP  Outcome: Ongoing  2/27/2022 0555 by Latonia Mahajan  Outcome: Ongoing  2/27/2022 0346 by Israel Cruz RN  Outcome: Ongoing  2/26/2022 2248 by [place] RCP  Outcome: Ongoing

## 2022-02-27 NOTE — PROGRESS NOTES
NEPHROLOGY PROGRESS NOTE      SUBJECTIVE     IV fluids were discontinued yesterday. Received 1 dose of Lasix with excellent diuretic response. Still overall in positive cumulative balance. On mechanical ventilation PRVC rate 30% FiO2 and 5 of PEEP  Tube feedings on hold because of high gastric residue. Blood pressure suboptimally controlled being managed by primary  Renal function at baseline    OBJECTIVE     Vitals:    02/27/22 0745 02/27/22 0753 02/27/22 0800 02/27/22 0900   BP:  (!) 176/81 (!) 164/102 (!) 91/58   Pulse: 92 85 90 65   Resp:  21     Temp:  98.8 °F (37.1 °C)     TempSrc:  Esophageal     SpO2: 100% 100% 100% 100%   Weight:       Height:         24HR INTAKE/OUTPUT:      Intake/Output Summary (Last 24 hours) at 2/27/2022 4971  Last data filed at 2/27/2022 7949  Gross per 24 hour   Intake 1525.36 ml   Output 3920 ml   Net -2394.64 ml       General appearance:MV  Respiratory::vesicular breath sounds,no wheeze/crackles  Cardiovascular:S1 S2 normal,no gallop or organic murmur. Abdomen:Non tender/non distended. Bowel sounds present  Extremities: No Cyanosis or Clubbing,present Lower extremity edema  NeurologicalMV      MEDICATIONS     Scheduled Meds:    [Held by provider] amLODIPine  10 mg Oral Daily    furosemide  40 mg IntraVENous Daily    carvedilol  25 mg Oral BID WC    fentanNYL  100 mcg IntraVENous Once    azaTHIOprine  75 mg Oral Daily    sodium chloride  500 mL IntraVENous Once    insulin lispro  0-3 Units SubCUTAneous Q6H    aspirin  81 mg Oral Daily    atorvastatin  80 mg Oral Daily    sodium chloride flush  5-40 mL IntraVENous 2 times per day    chlorhexidine  15 mL Mouth/Throat BID    polyvinyl alcohol  1 drop Both Eyes Q4H    ampicillin-sulbactam  3,000 mg IntraVENous Q6H    pantoprazole  40 mg IntraVENous Daily     Continuous Infusions:    dexmedetomidine Stopped (02/27/22 0909)    heparin (PORCINE) Infusion 14.1 Units/kg/hr (02/27/22 0813)    sodium chloride 100 mL/hr at 02/25/22 1720    fentaNYL Stopped (02/26/22 0945)    [Held by provider] propofol Stopped (02/24/22 0906)    dextrose      amiodarone 0.5 mg/min (02/27/22 0813)     PRN Meds:  metoclopramide, hydrALAZINE, labetalol, sodium chloride flush, sodium chloride, ondansetron **OR** ondansetron, polyethylene glycol, acetaminophen **OR** acetaminophen, glucose, glucagon (rDNA), dextrose, dextrose bolus (hypoglycemia) **OR** dextrose bolus (hypoglycemia), ipratropium-albuterol, heparin (porcine), heparin (porcine)  Home Meds:                Medications Prior to Admission: magnesium oxide (MAG-OX) 400 (241.3 Mg) MG TABS tablet,   atorvastatin (LIPITOR) 40 MG tablet, TAKE 1 TABLET BY MOUTH DAILY  magnesium oxide (MAG-OX) 400 (240 Mg) MG tablet, TAKE 1 TABLET BY MOUTH 2 TIMES DAILY  traZODone (DESYREL) 100 MG tablet, Take 0.5 tablets by mouth nightly as needed for Sleep  DULoxetine (CYMBALTA) 30 MG extended release capsule, TAKE 1 CAPSULE BY MOUTH DAILY  lisinopril (PRINIVIL;ZESTRIL) 5 MG tablet, take 1 tablet by mouth once daily  spironolactone (ALDACTONE) 25 MG tablet, take 1 tablet by mouth once daily  metoclopramide (REGLAN) 10 MG tablet, Take 1 tablet by mouth 3 times daily  isosorbide mononitrate (IMDUR) 30 MG extended release tablet, Take 1 tablet by mouth daily  nitroGLYCERIN (NITROSTAT) 0.4 MG SL tablet, Place 1 tablet under the tongue every 5 minutes as needed for Chest pain up to max of 3 total doses. If no relief after 1 dose, call 911.  (Patient not taking: Reported on 1/17/2022)  cloNIDine (CATAPRES) 0.2 MG tablet, Take 1 tablet by mouth 2 times daily  metoprolol tartrate (LOPRESSOR) 25 MG tablet, Take 1 tablet by mouth 2 times daily  magnesium oxide (MAG-OX) 400 (240 Mg) MG tablet,   pantoprazole (PROTONIX) 40 MG tablet, Take 1 tablet by mouth daily  magnesium oxide (MAG-OX) 400 MG tablet, Take 1 tablet by mouth 2 times daily (Patient not taking: Reported on 10/25/2021)  aspirin EC 81 MG EC tablet, Take 1 tablet by mouth daily  nicotine (NICODERM CQ) 21 MG/24HR, Place 1 patch onto the skin daily  acetaminophen (TYLENOL) 325 MG tablet, Take 2 tablets by mouth every 6 hours as needed for Pain  Umeclidinium Bromide 62.5 MCG/INH AEPB, Inhale 1 puff into the lungs daily (Patient not taking: Reported on 1/17/2022)  vitamin D3 (CHOLECALCIFEROL) 10 MCG (400 UNIT) TABS tablet, take 2 tablets (800MG) by mouth once daily (Patient not taking: Reported on 4/21/2021)  vitamin D3 (CHOLECALCIFEROL) 10 MCG (400 UNIT) TABS tablet, take 2 tablets (800MG) by mouth once daily (Patient not taking: Reported on 1/17/2022)  Fluticasone furoate-vilanterol (BREO ELLIPTA) 200-25 MCG/INH AEPB inhaler, Inhale 1 puff into the lungs daily (Patient not taking: Reported on 1/17/2022)  calcium carbonate-vitamin D3 (CALCIUM 600-D) 600-400 MG-UNIT TABS per tab, Take 1 tablet by mouth 2 times daily  azaTHIOprine (IMURAN) 50 MG tablet, Take 1.5 tablets by mouth daily  albuterol sulfate  (90 Base) MCG/ACT inhaler, inhale 2 puffs by mouth every 6 hours if needed for wheezing  nicotine (NICODERM CQ) 14 MG/24HR, Place 1 patch onto the skin daily (Patient not taking: Reported on 1/17/2022)  traMADol (ULTRAM) 50 MG tablet, Take 50 mg by mouth every 8 hours as needed for Pain.   (Patient not taking: Reported on 1/17/2022)  OLANZapine (ZYPREXA) 5 MG tablet, Take 1 tablet by mouth nightly    INVESTIGATIONS     Last 3 CMP:    Recent Labs     02/25/22  0421 02/26/22  0426 02/27/22  0425    138 140   K 4.3 3.9 3.7   * 107 103   CO2 18* 19* 22   BUN 14 15 16   CREATININE 1.65* 1.36* 1.39*   CALCIUM 7.8* 7.8* 8.1*   PROT 4.9* 4.9* 5.1*   LABALBU 2.7* 2.6* 2.7*   BILITOT 1.22* 0.90 1.12   ALKPHOS 152* 146* 160*   AST 46* 34 25   ALT 20 17 16       Last 3 CBC:  Recent Labs     02/25/22  0421 02/26/22  0426 02/27/22 0422   WBC 9.3 7.1 6.8   RBC 3.61* 3.39* 3.55*   HGB 10.9* 10.3* 10.7*   HCT 33.0* 31.2* 32.4*   MCV 91.4 92.0 91.3   MCH 30.2 30.4 30.1   MCHC 33.0 33.0 33.0   RDW 17.5* 17.7* 17.6*   PLT See Reflexed IPF Result See Reflexed IPF Result 105*   MPV  --   --  10.7       ASSESSMENT     #1 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 fib/PEA cardiac arrest  #4 history of CABG  #5 anoxic encephalopathy. LTM shows evidence of moderate to severe encephalopathy with MRI unremarkable.   #6 ventilatory dependent respiratory failure  #7 history of hyperTN    PLAN     #1 continue IV Lasix daily  #2 continue Imuran  #3 recommend enteral feedings  #4 we will follow    Please do not hesitate to call with questions    This note is created with the assistance of a speech-recognition program. While intending to generate a document that actually reflects the content of the visit, no guarantees can be provided that every mistake has been identified and corrected by editing    Roula Jorge MD MD, [facility] Le[facility]as, [address]   2/27/2022 9:23 AM  [address]

## 2022-02-27 NOTE — PLAN OF CARE
Problem: OXYGENATION/RESPIRATORY FUNCTION  Goal: Patient will maintain patent airway  2/27/2022 1509 by Rosalinda Diallo RN  Outcome: Ongoing  2/27/2022 0755 by Gloria Gee, RCP  Outcome: Ongoing  2/27/2022 0555 by Mario Grady  Outcome: Ongoing  2/27/2022 0346 by Melissa Pepper RN  Outcome: Ongoing  Goal: Patient will achieve/maintain normal respiratory rate/effort  Description: Respiratory rate and effort will be within normal limits for the patient  2/27/2022 1509 by Rosalinda Diallo RN  Outcome: Ongoing  2/27/2022 0755 by Gloria Gee, RCP  Outcome: Ongoing  2/27/2022 0555 by Mario Grady  Outcome: Ongoing  2/27/2022 0346 by Melissa Pepper RN  Outcome: Ongoing     Problem: MECHANICAL VENTILATION  Goal: Patient will maintain patent airway  2/27/2022 1509 by Rosalinda Diallo RN  Outcome: Ongoing  2/27/2022 0755 by Gloria Gee, RCP  Outcome: Ongoing  2/27/2022 0555 by Mario Grady  Outcome: Ongoing  2/27/2022 0346 by Melissa Pepper RN  Outcome: Ongoing  Goal: Oral health is maintained or improved  2/27/2022 1509 by Rosalinda Diallo RN  Outcome: Ongoing  2/27/2022 0755 by Gloria Gee, RCP  Outcome: Ongoing  2/27/2022 0555 by Mario Grady  Outcome: Ongoing  2/27/2022 0346 by Melissa Pepper RN  Outcome: Ongoing  Goal: ET tube will be managed safely  2/27/2022 1509 by Rosalinda Diallo RN  Outcome: Ongoing  2/27/2022 0755 by Gloria Gee, RCP  Outcome: Ongoing  2/27/2022 0555 by Mario Grady  Outcome: Ongoing  2/27/2022 0346 by Melissa Pepper RN  Outcome: Ongoing  Goal: Ability to express needs and understand communication  2/27/2022 1509 by Rosalinda Diallo RN  Outcome: Ongoing  2/27/2022 0755 by Gloria Gee, RCP  Outcome: Ongoing  2/27/2022 0555 by Mario Grady  Outcome: Ongoing  2/27/2022 0346 by Melissa Pepper RN  Outcome: Ongoing  Goal: Mobility/activity is maintained at optimum level for patient  2/27/2022 1509 by Rosalinda Diallo RN  Outcome: Ongoing  2/27/2022 0755 by Gloria Gee RCP  Outcome: Ongoing  2/27/2022 0555 by Adam Brown  Outcome: Ongoing  2/27/2022 0346 by Mounika Burden RN  Outcome: Ongoing     Problem: SKIN INTEGRITY  Goal: Skin integrity is maintained or improved  2/27/2022 1509 by Niels Vang RN  Outcome: Ongoing  2/27/2022 0555 by Adam Brown  Outcome: Ongoing  2/27/2022 0346 by Mounika Burden RN  Outcome: Ongoing     Problem: Confusion - Acute:  Goal: Absence of continued neurological deterioration signs and symptoms  Description: Absence of continued neurological deterioration signs and symptoms  2/27/2022 1509 by Niels Vang RN  Outcome: Ongoing  2/27/2022 0555 by Adam Brown  Outcome: Ongoing  2/27/2022 0346 by Mounika Burden RN  Outcome: Ongoing  Goal: Mental status will be restored to baseline  Description: Mental status will be restored to baseline  2/27/2022 1509 by Niels Vang RN  Outcome: Ongoing  2/27/2022 0555 by Adam Brown  Outcome: Ongoing  2/27/2022 0346 by Mounika Burden RN  Outcome: Ongoing     Problem: Discharge Planning:  Goal: Ability to perform activities of daily living will improve  Description: Ability to perform activities of daily living will improve  2/27/2022 1509 by Niels Vang RN  Outcome: Ongoing  2/27/2022 0555 by Adam Brown  Outcome: Ongoing  2/27/2022 0346 by Mounika Burden RN  Outcome: Ongoing  Goal: Participates in care planning  Description: Participates in care planning  2/27/2022 1509 by Niels Vang RN  Outcome: Ongoing  2/27/2022 0555 by Adam Brown  Outcome: Ongoing  2/27/2022 0346 by Mounika Burden RN  Outcome: Ongoing     Problem: Injury - Risk of, Physical Injury:  Goal: Absence of physical injury  Description: Absence of physical injury  2/27/2022 1509 by Niels Vang RN  Outcome: Ongoing  2/27/2022 0555 by Adam Brown  Outcome: Ongoing  2/27/2022 0346 by Mounika Burden RN  Outcome: Ongoing  Goal: Will remain free from falls  Description: Will remain free from falls  2/27/2022 1509 by Kali Lamas RN  Outcome: Ongoing  2/27/2022 0555 by Kim Cortez  Outcome: Ongoing  2/27/2022 0346 by Leanna Pacheco RN  Outcome: Ongoing     Problem: Mood - Altered:  Goal: Mood stable  Description: Mood stable  2/27/2022 1509 by Kali Lamas RN  Outcome: Ongoing  2/27/2022 0555 by Kim Cortez  Outcome: Ongoing  2/27/2022 0346 by Leanna Pacheco RN  Outcome: Ongoing  Goal: Absence of abusive behavior  Description: Absence of abusive behavior  2/27/2022 1509 by Kali Lamas RN  Outcome: Ongoing  2/27/2022 0555 by Kim Cortez  Outcome: Ongoing  2/27/2022 0346 by Leanna Pacheco RN  Outcome: Ongoing  Goal: Verbalizations of feeling emotionally comfortable while being cared for will increase  Description: Verbalizations of feeling emotionally comfortable while being cared for will increase  2/27/2022 1509 by Kali Lamas RN  Outcome: Ongoing  2/27/2022 0555 by Kim Cortez  Outcome: Ongoing  2/27/2022 0346 by Leanna Pacheco RN  Outcome: Ongoing     Problem: Psychomotor Activity - Altered:  Goal: Absence of psychomotor disturbance signs and symptoms  Description: Absence of psychomotor disturbance signs and symptoms  2/27/2022 1509 by Kali Lamas RN  Outcome: Ongoing  2/27/2022 0555 by Kim Cortez  Outcome: Ongoing  2/27/2022 0346 by Leanna Pacheco RN  Outcome: Ongoing     Problem: Sensory Perception - Impaired:  Goal: Demonstrations of improved sensory functioning will increase  Description: Demonstrations of improved sensory functioning will increase  2/27/2022 1509 by Kali Lamas RN  Outcome: Ongoing  2/27/2022 0555 by Kim Cortez  Outcome: Ongoing  2/27/2022 0346 by Leanna Pacheco RN  Outcome: Ongoing  Goal: Decrease in sensory misperception frequency  Description: Decrease in sensory misperception frequency  2/27/2022 1509 by Kali Lamas RN  Outcome: Ongoing  2/27/2022 0555 by Kim Cortez  Outcome: Ongoing  2/27/2022 0346 by Leanna Pacheco RN  Outcome: Ongoing  Goal: Able to refrain from responding to false sensory perceptions  Description: Able to refrain from responding to false sensory perceptions  2/27/2022 1509 by Mateo Singletary RN  Outcome: Ongoing  2/27/2022 0555 by Ana Lozano  Outcome: Ongoing  2/27/2022 0346 by Mona Pryor RN  Outcome: Ongoing  Goal: Demonstrates accurate environmental perceptions  Description: Demonstrates accurate environmental perceptions  2/27/2022 1509 by Mateo Singletary RN  Outcome: Ongoing  2/27/2022 0555 by Ana Lozano  Outcome: Ongoing  2/27/2022 0346 by Mona Pryor RN  Outcome: Ongoing  Goal: Able to distinguish between reality-based and nonreality-based thinking  Description: Able to distinguish between reality-based and nonreality-based thinking  2/27/2022 1509 by Mateo Singletary RN  Outcome: Ongoing  2/27/2022 0555 by Ana Lozano  Outcome: Ongoing  2/27/2022 0346 by Mona Pryor RN  Outcome: Ongoing  Goal: Able to interrupt nonreality-based thinking  Description: Able to interrupt nonreality-based thinking  2/27/2022 1509 by Mateo Singletary RN  Outcome: Ongoing  2/27/2022 0555 by Ana Lozano  Outcome: Ongoing  2/27/2022 0346 by Mona Pryor RN  Outcome: Ongoing     Problem: Sleep Pattern Disturbance:  Goal: Appears well-rested  Description: Appears well-rested  2/27/2022 1509 by Mateo Singletary RN  Outcome: Ongoing  2/27/2022 0555 by Ana Lozano  Outcome: Ongoing  2/27/2022 0346 by Mona Pryor RN  Outcome: Ongoing     Problem: Nutrition  Goal: Optimal nutrition therapy  2/27/2022 1509 by Mateo Singletary RN  Outcome: Ongoing  2/27/2022 0555 by Ana Lozano  Outcome: Ongoing  2/27/2022 0346 by Mona Pryor RN  Outcome: Ongoing     Problem: Falls - Risk of:  Goal: Absence of physical injury  Description: Absence of physical injury  2/27/2022 1509 by Mateo Singletary RN  Outcome: Ongoing  2/27/2022 0555 by Ana Lozano  Outcome: Ongoing  2/27/2022 0346 by Mona Pryor RN  Outcome: Ongoing  Goal: Will remain free from falls  Description: Will remain free from falls  2/27/2022 1509 by Kenneth Silver RN  Outcome: Ongoing  2/27/2022 0555 by Lee Moore  Outcome: Ongoing  2/27/2022 0346 by Naga Golden RN  Outcome: Ongoing     Problem: Skin Integrity:  Goal: Will show no infection signs and symptoms  Description: Will show no infection signs and symptoms  2/27/2022 1509 by Kenneth Silver RN  Outcome: Ongoing  2/27/2022 0555 by Lee Moore  Outcome: Ongoing  2/27/2022 0346 by Naga Golden RN  Outcome: Ongoing  Goal: Absence of new skin breakdown  Description: Absence of new skin breakdown  2/27/2022 1509 by Kenneth Silver RN  Outcome: Ongoing  2/27/2022 0555 by Lee Moore  Outcome: Ongoing  2/27/2022 0346 by Naga Golden RN  Outcome: Ongoing     Problem: Non-Violent Restraints  Goal: Removal from restraints as soon as assessed to be safe  Outcome: Ongoing  Goal: No harm/injury to patient while restraints in use  Outcome: Ongoing  Goal: Patient's dignity will be maintained  Outcome: Ongoing

## 2022-02-27 NOTE — PROGRESS NOTES
Critical Care Team - Daily Progress Note      Date and time: 2/27/2022 7:40 AM  Patient's name:  Gage Vasquez  Medical Record Number: [id number]  Patient's account/billing number: [de-identified]  Patient's YOB: 1965  Age: 60 y.o. Date of Admission: 2/21/2022  9:13 PM  Length of stay during current admission: 5      Primary Care Physician: Jamari Bocanegra MD  ICU Attending Physician: Dr. Nati Xiao Status: Full Code    Reason for ICU admission:   Chief Complaint   Patient presents with    Cardiac Arrest         SUBJECTIVE:     OVERNIGHT EVENTS:           FLUIDS:none   FEEDING:tube feeding held due to high residuals   FAMILY UPDATE: will do today   ANALGESICS:Precedex    SEDATION: Precedex    THROMBO- PROPHYLAXIS: Heparin   MOBILIZATION:bedrest, intubated and sedated    HEADS UP:45 degrees   HEMODYNAMICS: off pressor support, BP on the higher side   ULCER PROPHYLAXIS: Protonix    GLYCEMIC CONTROL:LDSSI, glucose well controlled   SPONTANEOUS BREATHING TRIAL:not done   BOWEL MANAGEMENT:glycolax as needed   INDWELLING CATHETER: Blackman's catheter, OG tube, arterial line in the right femoral.   DRUG DE-ESCALATION: none    -BP high continues to be on Coreg 25 twice daily, afebrile, other vital signs stable. -Continues to be on heparin and amiodarone drip  -high residuals overnight, tube feeding was held  -PRVC/16/580/5/30 percent  -Nephrology following, recommended to discontinue fluids and place Blackman back in. Recieved one dose of lasix yesterday.   -Neuro critical care following. LTME showing background of 3 to 4 Hz consistent with moderate to severe encephalopathy, MRI unremarkable. Patient needs more time for neurological recovery. Minimum of 7 days. Trach and PEG?  -Cardiology following.   They want to continue heparin and amiodarone drip.  -Continues to be on Unasyn.   -Cr stable, Hb stable      AWAKE & FOLLOWING COMMANDS: [] No   [x] Yes    CURRENT VENTILATION STATUS: Ventilator .    IF INTUBATED, ET TUBE MARKING AT LOWER LIP:       cms    SECRETIONS Amount:  [] Small [] Moderate  [] Large  [x] None  Color:     [] White [] Colored  [] Bloody     SEDATION: Precedex                        RAAS Score:                         PARALYZED:  [x] No    [] Yes    DIARRHEA:                [x] No                [] Yes  (C. Difficile status: [] positive                                                                                                                       [] negative                                                                                                                     [] pending)  VASOPRESSORS:  [x] No    [] Yes    If yes -   [] Levophed       [] Dopamine     [] Vasopressin       [] Dobutamine  [] Phenylephrine         [] Epinephrine    CENTRAL LINES:     [x] No   [] Yes   (Date of Insertion:   )           If yes -     [] Right IJ     [] Left IJ [] Right Femoral [] Left Femoral                   [] Right Subclavian [] Left Subclavian       ODELL'S CATHETER:   [] No   [x] Yes  (Date of Insertion:   )     URINE OUTPUT:            [] Good   [x] Low              [] Anuric    REVIEW OF SYSTEMS:  -  UNABLE TO OBTAIN DUE TO PATIENT'S CONDITION. HISTORY OF PRESENT ILLNESS:   Ruby Calle a 60 y. o. with PMH of CAD s/p CABG x 3 in  2011 and Cath 10/2018 with patent LIMA to LAD and SVG to RCA but occluded SVG to OM1 who presented to the emergency department with cardiac arrest. Patient was at home when a family member heard the patient fall upstairs however was unable to check on the patient. EMS was called and patient was found to be in V. Fib arrest. ACLS was initiated and patient received two shocks and ROSC was achieved. On the way out of the patient's home patient went into PEA arrest. Compressions were resumed and epinephrine was given and ROSC was achieved. Unknown down time.      In the emergency department patient was intubated and sedated on propofol.  Hemodynamically stable off pressors. Labs demonstrated elevated creatinine (1.67), lactic acidosis (3.0), leukocytosis (20.2), EKG was concerning for STEMI with ST elevations in V3, V4 and V5. Cardiology was consulted however patient did not meet STEMI criteria. Cardiology initially planned to Cath patient however he was taken off propofol with only sluggish pupils, but no gag or corneal reflexes so Cath was canceled due to poor neurological prognosis. CT head was negative. CT C spine demonstrated remote anterior fusion from C4 through C7 with C5-C6 corpectomy and bone strut placement as well as prominent/recurrent spondylosis with moderate cord flattening at C4-C5 and C5-C6. Patient will be admitted to medical ICU.        OBJECTIVE:     VITAL SIGNS:  BP (!) 178/96   Pulse 74   Temp 99 °F (37.2 °C) (Esophageal)   Resp 17   Ht 5' 10\" (1.778 m)   Wt 214 lb 8.1 oz (97.3 kg)   SpO2 99%   BMI 30.78 kg/m²   Tmax over 24 hours:  Temp (24hrs), Av.3 °F (36.8 °C), Min:97 °F (36.1 °C), Max:99 °F (37.2 °C)      Patient Vitals for the past 8 hrs:   BP Temp Temp src Pulse SpO2 Weight   22 0700 (!) 178/96   74 99 %    22 0600      214 lb 8.1 oz (97.3 kg)   22 0500 (!) 161/92   71 100 %    22 0400 (!) 169/94 99 °F (37.2 °C) Esophageal 70 100 %    22 0359    69 100 %    22 0300 (!) 169/90   73 100 %    22 0205    76     22 0200 (!) 158/88   77 99 %    22 0100 (!) 141/85   76 100 %    22 0021    75 100 %    22 0000 (!) 168/92 99 °F (37.2 °C) Esophageal 72 99 %          Intake/Output Summary (Last 24 hours) at 2022 0740  Last data filed at 2022 0600  Gross per 24 hour   Intake 1737.25 ml   Output 3830 ml   Net -2092.75 ml     Date 22 0000 - 22 2359   Shift 9334-3372 3541-3135 1987-9228 24 Hour Total   INTAKE   I.V.(mL/kg) 231(2.4)   231(2.4)   IV Piggyback(mL/kg) 100(1)   100(1)   Shift Total(mL/kg) 331(3.4)   331(3.4) 4 mg, Q6H PRN  polyethylene glycol, 17 g, Daily PRN  acetaminophen, 650 mg, Q6H PRN   Or  acetaminophen, 650 mg, Q6H PRN  glucose, 15 g, PRN  glucagon (rDNA), 1 mg, PRN  dextrose, 100 mL/hr, PRN  dextrose bolus (hypoglycemia), 125 mL, PRN   Or  dextrose bolus (hypoglycemia), 250 mL, PRN  ipratropium-albuterol, 1 ampule, Q6H PRN  heparin (porcine), 4,000 Units, PRN  heparin (porcine), 2,000 Units, PRN        SUPPORT DEVICES: [x] Ventilator [] BIPAP  [] Nasal Cannula [] Room Air    VENT SETTINGS (Comprehensive) (if applicable):  NZKO/34/411/5/52 percent  Vent Information  $Ventilation: $Subsequent Day  Skin Assessment: Clean, dry, & intact  Suction Catheter Diameter: 14  Equipment ID: [id number]  Equipment Changed: HME  Vent Type: Servo i  Vent Mode: PRVC  Vt Ordered: 580 mL  Rate Set: 16 bmp  FiO2 : (S) 30 % (Post ABG)  SpO2: 99 %  SpO2/FiO2 ratio: 285.71  Sensitivity: 5  PEEP/CPAP: 5  I Time/ I Time %: 0.8 s  Humidification Source: HME  Nitric Oxide/Epoprostenol In Use?: No  Additional Respiratory  Assessments  Pulse: 74  Resp: 17  SpO2: 99 %  End Tidal CO2: 27 (%)  Position: Semi-Willis's  Humidification Source: HME  Oral Care Completed?: Yes  Oral Care: Mouth suctioned,Mouth moisturizer,Mouth swabbed  Subglottic Suction Done?: No  Cuff Pressure (cm H2O):  (mov)    ABGs:   AB.41/38.6/87.4  Lab Results   Component Value Date    PHART 7.430 2019    CLO6GPS 32.7 2019    PO2ART 97.5 2019    WDA8OBJ 21.7 2019    M9EGSWTF 95.6 2019    FIO2 35.0 2022     Lactic Acid:   Lab Results   Component Value Date    LACTA 1.5 2020    LACTA 1.0 12/10/2019    LACTA 1.5 2019         DATA:  Complete Blood Count:   Recent Labs     22  0421 22  0426 22  0422   WBC 9.3 7.1 6.8   HGB 10.9* 10.3* 10.7*   MCV 91.4 92.0 91.3   PLT See Reflexed IPF Result See Reflexed IPF Result 105*   RBC 3.61* 3.39* 3.55*   HCT 33.0* 31.2* 32.4*   MCH 30.2 30.4 30.1   MCHC 33.0 33.0 33.0 RDW 17.5* 17.7* 17.6*   MPV  --   --  10.7        PT/INR:    Lab Results   Component Value Date    PROTIME 12.3 02/22/2022    PROTIME 10.6 12/19/2011    INR 1.2 02/22/2022     PTT:    Lab Results   Component Value Date    APTT 55.7 02/27/2022       Basal Metabolic Profile:   Recent Labs     02/25/22 0421 02/26/22 0426 02/27/22 0425    138 140   K 4.3 3.9 3.7   BUN 14 15 16   CREATININE 1.65* 1.36* 1.39*   * 107 103   CO2 18* 19* 22      Magnesium:   Lab Results   Component Value Date    MG 2.1 02/24/2022    MG 1.9 02/23/2022    MG 1.9 02/23/2022     Phosphorus:   Lab Results   Component Value Date    PHOS 2.9 02/24/2022    PHOS 3.9 02/23/2022    PHOS 4.2 02/23/2022     S. Calcium:  Recent Labs     02/27/22 0425   CALCIUM 8.1*     S. Ionized Calcium:No results for input(s): IONCA in the last 72 hours. Urinalysis:   Lab Results   Component Value Date    NITRU NEGATIVE 02/25/2022    COLORU Yellow 02/25/2022    PHUR 5.5 02/25/2022    WBCUA 2 TO 5 02/25/2022    RBCUA 5 TO 10 02/25/2022    MUCUS NOT REPORTED 11/06/2020    TRICHOMONAS NOT REPORTED 11/06/2020    YEAST NOT REPORTED 11/06/2020    BACTERIA NOT REPORTED 11/06/2020    CLARITYU clear 09/24/2020    SPECGRAV 1.025 02/25/2022    LEUKOCYTESUR NEGATIVE 02/25/2022    UROBILINOGEN Normal 02/25/2022    BILIRUBINUR NEGATIVE 02/25/2022    BLOODU +++ 09/24/2020    GLUCOSEU NEGATIVE 02/25/2022    KETUA TRACE 02/25/2022    AMORPHOUS NOT REPORTED 11/06/2020       CARDIAC ENZYMES: No results for input(s): CKMB, CKMBINDEX, TROPONINI in the last 72 hours. Invalid input(s): CKTOTAL;3  BNP: No results for input(s): BNP in the last 72 hours.     LFTS  Recent Labs     02/25/22 0421 02/26/22  0426 02/27/22  0425   ALKPHOS 152* 146* 160*   ALT 20 17 16   AST 46* 34 25   BILITOT 1.22* 0.90 1.12   LABALBU 2.7* 2.6* 2.7*       AMYLASE/LIPASE/AMMONIA  Recent Labs     02/24/22  0915   AMMONIA 24       Last 3 Blood Glucose:   Recent Labs     02/25/22  0421 02/26/22  0426 02/27/22  0425   GLUCOSE 99 112* 96      HgBA1c:    Lab Results   Component Value Date    LABA1C 5.1 04/21/2021         TSH:    Lab Results   Component Value Date    TSH 2.00 02/22/2022     ANEMIA STUDIES  No results for input(s): LABIRON, TIBC, FERRITIN, HEKRVUIS55, FOLATE, OCCULTBLD in the last 72 hours. Cultures during this admission:     Blood cultures:  No growth  Urine Culture:  None taken            Sputum Culture:  None taken                Endotracheal aspirate: normal resp jones growth          ASSESSMENT:     Principal Problem:    Cardiac arrest [facility])  Active Problems:    Cervical stenosis of spinal canal  Resolved Problems:    * No resolved hospital problems. *          PLAN:     NEURO  -currently intubated and sedated. On precedex drip as per neuro crit care  Opens eyes to name, and follows commands  -Neuro crit following, will give 3 more days and will comment on prognosis. -LTM E ongoing.  -Neurosurgery consulted for cervical stenosis, no acute intervention at this point.     CARDIOVASCULAR  -Hemodynamically stable. Off pressor support. -S/p V. fib arrest with history of CAD s/p CABGx4. Cardiology following. Continue heparin drip, amiodarone drip, aspirin, Lipitor, Coreg. We will plan on doing cardiac cath once neurologically stable. -H/o HTN. Cont coreg. -H/o HLD. Cont lipitor     RESP  -Acute hypoxic respiratory failure likely secondary to aspiration pneumonia. Continue Unasyn. Maintain oxygen saturation above 92%. Continue mechanical ventilation, will wean as tolerated. Pulmonary toilet     GI   -Keep NPO. Cont TF. Protonix for GI ppx. Check for residuals. Reglan as needed for high residuals     RENAL  -ARON on CKD Stage 4. Monitor UOP. Cr trending down. Blackman in place. Nephrology following, appreciate recommendations. Replace electrolytes as needed     HEME  -Hb stable     ENDOCRINE  -Glucose well controlled. On LDSSI. Hypoglycemia protocol in place.    ID  -Aspiration pneumonia. Cont Unasyn for a total of 7 days.  Patient afebrile and WBC WNL     DVT PPX: heparin  GI PPX: Protonix  DIET: Tube feedings could be resumed if low residuals.      DISPOSITION: Monitor in ICU     Carlo Altamirano MD,  Internal Medicine, PGY-1            Department of Internal Medicine/ Critical care  [facility])             2/27/2022, 7:40 AM
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
## 2022-02-28 LAB
ABSOLUTE EOS #: 0.27 K/UL (ref 0–0.44)
ABSOLUTE IMMATURE GRANULOCYTE: 0.05 K/UL (ref 0–0.3)
ABSOLUTE LYMPH #: 1.09 K/UL (ref 1.1–3.7)
ABSOLUTE MONO #: 0.45 K/UL (ref 0.1–1.2)
ALBUMIN SERPL-MCNC: 2.7 G/DL (ref 3.5–5.2)
ALBUMIN/GLOBULIN RATIO: 1.1 (ref 1–2.5)
ALP BLD-CCNC: 167 U/L (ref 40–129)
ALT SERPL-CCNC: 13 U/L (ref 5–41)
ANION GAP SERPL CALCULATED.3IONS-SCNC: 11 MMOL/L (ref 9–17)
AST SERPL-CCNC: 25 U/L
BASOPHILS # BLD: 1 % (ref 0–2)
BASOPHILS ABSOLUTE: 0.04 K/UL (ref 0–0.2)
BILIRUB SERPL-MCNC: 0.89 MG/DL (ref 0.3–1.2)
BUN BLDV-MCNC: 23 MG/DL (ref 6–20)
CALCIUM SERPL-MCNC: 7.8 MG/DL (ref 8.6–10.4)
CHLORIDE BLD-SCNC: 106 MMOL/L (ref 98–107)
CO2: 24 MMOL/L (ref 20–31)
CREAT SERPL-MCNC: 1.51 MG/DL (ref 0.7–1.2)
EOSINOPHILS RELATIVE PERCENT: 6 % (ref 1–4)
FIO2: 30
GFR AFRICAN AMERICAN: 58 ML/MIN
GFR NON-AFRICAN AMERICAN: 48 ML/MIN
GFR SERPL CREATININE-BSD FRML MDRD: ABNORMAL ML/MIN/{1.73_M2}
GLUCOSE BLD-MCNC: 105 MG/DL (ref 75–110)
GLUCOSE BLD-MCNC: 113 MG/DL (ref 70–99)
GLUCOSE BLD-MCNC: 114 MG/DL (ref 74–100)
GLUCOSE BLD-MCNC: 134 MG/DL (ref 75–110)
GLUCOSE BLD-MCNC: 147 MG/DL (ref 75–110)
HCT VFR BLD CALC: 30.4 % (ref 40.7–50.3)
HEMOGLOBIN: 9.8 G/DL (ref 13–17)
IMMATURE GRANULOCYTES: 1 %
LYMPHOCYTES # BLD: 24 % (ref 24–43)
MAGNESIUM: 2 MG/DL (ref 1.6–2.6)
MCH RBC QN AUTO: 29.4 PG (ref 25.2–33.5)
MCHC RBC AUTO-ENTMCNC: 32.2 G/DL (ref 28.4–34.8)
MCV RBC AUTO: 91.3 FL (ref 82.6–102.9)
MONOCYTES # BLD: 10 % (ref 3–12)
NEURON SPEC ENOLASE: 10.4 NG/ML
NRBC AUTOMATED: 0 PER 100 WBC
PARTIAL THROMBOPLASTIN TIME: 66.7 SEC (ref 20.5–30.5)
PDW BLD-RTO: 17.7 % (ref 11.8–14.4)
PLATELET # BLD: 97 K/UL (ref 138–453)
PMV BLD AUTO: 11 FL (ref 8.1–13.5)
POC HCO3: 26.8 MMOL/L (ref 21–28)
POC O2 SATURATION: 96 % (ref 94–98)
POC PCO2: 42.5 MM HG (ref 35–48)
POC PH: 7.41 (ref 7.35–7.45)
POC PO2: 80.6 MM HG (ref 83–108)
POSITIVE BASE EXCESS, ART: 2 (ref 0–3)
POTASSIUM SERPL-SCNC: 3.5 MMOL/L (ref 3.7–5.3)
RBC # BLD: 3.33 M/UL (ref 4.21–5.77)
RBC # BLD: ABNORMAL 10*6/UL
SAMPLE SITE: ABNORMAL
SEG NEUTROPHILS: 58 % (ref 36–65)
SEGMENTED NEUTROPHILS ABSOLUTE COUNT: 2.73 K/UL (ref 1.5–8.1)
SODIUM BLD-SCNC: 141 MMOL/L (ref 135–144)
TOTAL PROTEIN: 5.1 G/DL (ref 6.4–8.3)
WBC # BLD: 4.6 K/UL (ref 3.5–11.3)

## 2022-02-28 PROCEDURE — 80053 COMPREHEN METABOLIC PANEL: CPT

## 2022-02-28 PROCEDURE — 99291 CRITICAL CARE FIRST HOUR: CPT | Performed by: INTERNAL MEDICINE

## 2022-02-28 PROCEDURE — 6360000002 HC RX W HCPCS: Performed by: GENERAL PRACTICE

## 2022-02-28 PROCEDURE — 6370000000 HC RX 637 (ALT 250 FOR IP): Performed by: STUDENT IN AN ORGANIZED HEALTH CARE EDUCATION/TRAINING PROGRAM

## 2022-02-28 PROCEDURE — 6360000002 HC RX W HCPCS: Performed by: STUDENT IN AN ORGANIZED HEALTH CARE EDUCATION/TRAINING PROGRAM

## 2022-02-28 PROCEDURE — 2000000000 HC ICU R&B

## 2022-02-28 PROCEDURE — 82947 ASSAY GLUCOSE BLOOD QUANT: CPT

## 2022-02-28 PROCEDURE — 6370000000 HC RX 637 (ALT 250 FOR IP)

## 2022-02-28 PROCEDURE — 85730 THROMBOPLASTIN TIME PARTIAL: CPT

## 2022-02-28 PROCEDURE — 6360000002 HC RX W HCPCS

## 2022-02-28 PROCEDURE — 2580000003 HC RX 258: Performed by: STUDENT IN AN ORGANIZED HEALTH CARE EDUCATION/TRAINING PROGRAM

## 2022-02-28 PROCEDURE — 85025 COMPLETE CBC W/AUTO DIFF WBC: CPT

## 2022-02-28 PROCEDURE — 99232 SBSQ HOSP IP/OBS MODERATE 35: CPT | Performed by: INTERNAL MEDICINE

## 2022-02-28 PROCEDURE — C9113 INJ PANTOPRAZOLE SODIUM, VIA: HCPCS | Performed by: STUDENT IN AN ORGANIZED HEALTH CARE EDUCATION/TRAINING PROGRAM

## 2022-02-28 PROCEDURE — 83735 ASSAY OF MAGNESIUM: CPT

## 2022-02-28 PROCEDURE — 2700000000 HC OXYGEN THERAPY PER DAY

## 2022-02-28 PROCEDURE — 82803 BLOOD GASES ANY COMBINATION: CPT

## 2022-02-28 PROCEDURE — 37799 UNLISTED PX VASCULAR SURGERY: CPT

## 2022-02-28 PROCEDURE — 94761 N-INVAS EAR/PLS OXIMETRY MLT: CPT

## 2022-02-28 PROCEDURE — 6360000002 HC RX W HCPCS: Performed by: INTERNAL MEDICINE

## 2022-02-28 PROCEDURE — 94003 VENT MGMT INPAT SUBQ DAY: CPT

## 2022-02-28 RX ORDER — POTASSIUM BICARBONATE 25 MEQ/1
50 TABLET, EFFERVESCENT ORAL ONCE
Status: DISCONTINUED | OUTPATIENT
Start: 2022-02-28 | End: 2022-02-28

## 2022-02-28 RX ORDER — HEPARIN SODIUM AND DEXTROSE 10000; 5 [USP'U]/100ML; G/100ML
5-30 INJECTION INTRAVENOUS CONTINUOUS
Status: DISCONTINUED | OUTPATIENT
Start: 2022-02-28 | End: 2022-03-04

## 2022-02-28 RX ADMIN — PROPOFOL 15 MCG/KG/MIN: 10 INJECTION, EMULSION INTRAVENOUS at 05:22

## 2022-02-28 RX ADMIN — METOCLOPRAMIDE 5 MG: 5 INJECTION, SOLUTION INTRAMUSCULAR; INTRAVENOUS at 09:26

## 2022-02-28 RX ADMIN — POTASSIUM BICARBONATE 40 MEQ: 782 TABLET, EFFERVESCENT ORAL at 09:08

## 2022-02-28 RX ADMIN — POLYVINYL ALCOHOL 1 DROP: 14 SOLUTION/ DROPS OPHTHALMIC at 21:32

## 2022-02-28 RX ADMIN — POLYVINYL ALCOHOL 1 DROP: 14 SOLUTION/ DROPS OPHTHALMIC at 02:59

## 2022-02-28 RX ADMIN — INSULIN LISPRO 1 UNITS: 100 INJECTION, SOLUTION INTRAVENOUS; SUBCUTANEOUS at 18:36

## 2022-02-28 RX ADMIN — PANTOPRAZOLE SODIUM 40 MG: 40 INJECTION, POWDER, FOR SOLUTION INTRAVENOUS at 09:02

## 2022-02-28 RX ADMIN — CARVEDILOL 25 MG: 25 TABLET, FILM COATED ORAL at 09:02

## 2022-02-28 RX ADMIN — Medication 14.1 UNITS/KG/HR: at 17:30

## 2022-02-28 RX ADMIN — POLYVINYL ALCOHOL 1 DROP: 14 SOLUTION/ DROPS OPHTHALMIC at 09:40

## 2022-02-28 RX ADMIN — ATORVASTATIN CALCIUM 80 MG: 80 TABLET, FILM COATED ORAL at 09:02

## 2022-02-28 RX ADMIN — FUROSEMIDE 40 MG: 10 INJECTION, SOLUTION INTRAMUSCULAR; INTRAVENOUS at 09:40

## 2022-02-28 RX ADMIN — METOCLOPRAMIDE 5 MG: 5 INJECTION, SOLUTION INTRAMUSCULAR; INTRAVENOUS at 16:51

## 2022-02-28 RX ADMIN — Medication 14.1 UNITS/KG/HR: at 19:15

## 2022-02-28 RX ADMIN — SODIUM CHLORIDE, PRESERVATIVE FREE 10 ML: 5 INJECTION INTRAVENOUS at 21:32

## 2022-02-28 RX ADMIN — SODIUM CHLORIDE, PRESERVATIVE FREE 10 ML: 5 INJECTION INTRAVENOUS at 09:02

## 2022-02-28 RX ADMIN — AMPICILLIN AND SULBACTAM 3000 MG: 1; 2 INJECTION, POWDER, FOR SOLUTION INTRAMUSCULAR; INTRAVENOUS at 06:13

## 2022-02-28 RX ADMIN — POLYVINYL ALCOHOL 1 DROP: 14 SOLUTION/ DROPS OPHTHALMIC at 18:38

## 2022-02-28 RX ADMIN — AMIODARONE HYDROCHLORIDE 0.5 MG/MIN: 50 INJECTION, SOLUTION INTRAVENOUS at 12:51

## 2022-02-28 RX ADMIN — CHLORHEXIDINE GLUCONATE 0.12% ORAL RINSE 15 ML: 1.2 LIQUID ORAL at 09:02

## 2022-02-28 RX ADMIN — HYDRALAZINE HYDROCHLORIDE 10 MG: 20 INJECTION INTRAMUSCULAR; INTRAVENOUS at 06:18

## 2022-02-28 RX ADMIN — POLYVINYL ALCOHOL 1 DROP: 14 SOLUTION/ DROPS OPHTHALMIC at 06:15

## 2022-02-28 RX ADMIN — AMPICILLIN AND SULBACTAM 3000 MG: 1; 2 INJECTION, POWDER, FOR SOLUTION INTRAMUSCULAR; INTRAVENOUS at 00:14

## 2022-02-28 RX ADMIN — ASPIRIN 81 MG: 81 TABLET, CHEWABLE ORAL at 09:02

## 2022-02-28 RX ADMIN — PROPOFOL 20 MCG/KG/MIN: 10 INJECTION, EMULSION INTRAVENOUS at 14:28

## 2022-02-28 RX ADMIN — CARVEDILOL 25 MG: 25 TABLET, FILM COATED ORAL at 16:51

## 2022-02-28 RX ADMIN — CHLORHEXIDINE GLUCONATE 0.12% ORAL RINSE 15 ML: 1.2 LIQUID ORAL at 22:27

## 2022-02-28 RX ADMIN — POLYVINYL ALCOHOL 1 DROP: 14 SOLUTION/ DROPS OPHTHALMIC at 14:29

## 2022-02-28 RX ADMIN — PROPOFOL 25 MCG/KG/MIN: 10 INJECTION, EMULSION INTRAVENOUS at 19:58

## 2022-02-28 ASSESSMENT — PULMONARY FUNCTION TESTS
PIF_VALUE: 22
PIF_VALUE: 25
PIF_VALUE: 24
PIF_VALUE: 24
PIF_VALUE: 23
PIF_VALUE: 25
PIF_VALUE: 24
PIF_VALUE: 23
PIF_VALUE: 24
PIF_VALUE: 27
PIF_VALUE: 25
PIF_VALUE: 26
PIF_VALUE: 24
PIF_VALUE: 26
PIF_VALUE: 26
PIF_VALUE: 15
PIF_VALUE: 24
PIF_VALUE: 25
PIF_VALUE: 24
PIF_VALUE: 28
PIF_VALUE: 15
PIF_VALUE: 23
PIF_VALUE: 28
PIF_VALUE: 16
PIF_VALUE: 27
PIF_VALUE: 23
PIF_VALUE: 23
PIF_VALUE: 25
PIF_VALUE: 22
PIF_VALUE: 26
PIF_VALUE: 31
PIF_VALUE: 16
PIF_VALUE: 22
PIF_VALUE: 15
PIF_VALUE: 23
PIF_VALUE: 27
PIF_VALUE: 25
PIF_VALUE: 28
PIF_VALUE: 27
PIF_VALUE: 29
PIF_VALUE: 22
PIF_VALUE: 22
PIF_VALUE: 24
PIF_VALUE: 23
PIF_VALUE: 22
PIF_VALUE: 22
PIF_VALUE: 15
PIF_VALUE: 24

## 2022-02-28 NOTE — PLAN OF CARE
Nutrition Problem #1: Inadequate oral intake  Intervention: Food and/or Nutrient Delivery: Modify Tube Feeding (decrease TF rate to 60 mL/hr while on propofol at current rate)  Nutritional Goals: meet % of estimated nutrient needs

## 2022-02-28 NOTE — PROGRESS NOTES
Comprehensive Nutrition Assessment    Type and Reason for Visit:  Reassess    Nutrition Recommendations/Plan: Modify Tube Feeding; decrease TF rate to 60 mL/hr while on propofol at current rate. Will continue to monitor TF tolerance/adequayc. ? Consider scheduled bowel meds d/t no BM since admission. Nutrition Assessment:  Chart reviewed. Pt remains on vent. Standard without Fiber TF at goal of 70 mL/hr with 450 mL residual at 1200 check (was up to 600 mLs at 0800 per RN). On Reglan PRN. No BM yet. Glycolax ordered PRN; no scheduled bowel meds. Labs reviewed. Malnutrition Assessment:  Malnutrition Status:   At risk for malnutrition  Context:  Acute Illness     Findings of the 6 clinical characteristics of malnutrition:  Energy Intake:  Unable to assess  Weight Loss:  No significant weight loss (over past year, per EHR)     Body Fat Loss:  No significant body fat loss     Muscle Mass Loss:  No significant muscle mass loss    Fluid Accumulation:  No significant fluid accumulation     Strength:  Not Performed    Estimated Daily Nutrient Needs:  Energy (kcal):  1900 kcal/day; Weight Used for Energy Requirements:  Current     Protein (g):   g pro/day; Weight Used for Protein Requirements:  Ideal (1.2-1.5)        Fluid (ml/day):  2500 mL/day; Method Used for Fluid Requirements:  ml/Kg (28)      Nutrition Related Findings:  meds/labs reviewed      Wounds:  None       Current Nutrition Therapies:    Diet NPO  ADULT TUBE FEEDING; Orogastric; Standard without Fiber; Continuous; 20; Yes; 20; Q 4 hours; 70; 30; Q 4 hours  Current Tube Feeding (TF) Orders:  · Feeding Route: Orogastric  · Formula: Standard without Fiber  · Schedule: Continuous at 70 mL/hr  · Current TF & Flush Orders Provides: 70 mL/hr =2016 kcal and 93 g pro/day  · Goal TF & Flush Orders Provides: 60 mL/hr =1728 kcal and 80 g pro/day    Additional Calorie Sources:   propofol at 11.7 ml/ht=434 kcal/day    Anthropometric Measures:  · Height: 5' 10\" (177.8 cm)  · Current Body Weight: 214 lb 11.7 oz (97.4 kg)   · Admission Body Weight: 198 lb 6.6 oz (90 kg)    · Usual Body Weight: 220 lb (99.8 kg) (approx 1 yr ago)     · Ideal Body Weight: 166 lbs; % Ideal Body Weight 129.4 %   · BMI: 30.8  · BMI Categories: Overweight (BMI 25.0-29. 9)       Nutrition Diagnosis:   · Inadequate oral intake related to impaired respiratory function as evidenced by NPO or clear liquid status due to medical condition,need for enteral nutrition support    Nutrition Interventions:   Food and/or Nutrient Delivery:  Modify Tube Feeding; decrease TF rate to 60 mL/hr while on propofol at current rate. Nutrition Education/Counseling:  No recommendation at this time   Coordination of Nutrition Care:  Continue to monitor while inpatient    Goals:  meet % of estimated nutrient needs -goal achieved        Nutrition Monitoring and Evaluation:   Behavioral-Environmental Outcomes:  None Identified   Food/Nutrient Intake Outcomes:  Enteral Nutrition Intake/Tolerance  Physical Signs/Symptoms Outcomes:  Biochemical Data,Constipation,Nutrition Focused Physical Findings,Skin,Weight     Discharge Planning:     Too soon to determine     Electronically signed by Pauline Carbajal RD, LD on 2/28/22 at 5:09 PM EST    Contact: [phone]

## 2022-02-28 NOTE — PROGRESS NOTES
NEPHROLOGY PROGRESS NOTE      SUBJECTIVE     61 y/o male patient with past medical history of CAD s/p CABG x 3 presented to the ED with V Fib cardiac arrest, s/p defib x 2 with ROSC. Patient again went into PEA cardiac arrest and ROSC was achieved, unknown down time. EKG concerning for NSTEMI, was started on heparin and amiodarone and did not undergo cath due to concern for anoxic brain injury. MRI revealed early changes of hypoxic brain injury, but Neurological exam improving.     2/28/22 - Patient was seen and evaluated at bedside, continues to be intubated and sedated, PRVC 16/580/30/5, on propofol sedation. Hemodynamically, was hypertensive overnight and received hydralazine, currently /55 mm Hg. HR 67/min. Labs and imaging reviewed, K 3.5, Cr 1.51, Hb 9.8. I/O -about 2L UO in last 24 hours. OBJECTIVE     Vitals:    02/28/22 0730 02/28/22 0800 02/28/22 0817 02/28/22 0845   BP:  (!) 105/59     Pulse: 69 67 66    Resp:   16    Temp:       TempSrc:       SpO2: 93% 93% 92% 92%   Weight:       Height:         24HR INTAKE/OUTPUT:      Intake/Output Summary (Last 24 hours) at 2/28/2022 0948  Last data filed at 2/28/2022 0650  Gross per 24 hour   Intake 2021.67 ml   Output 2010 ml   Net 11.67 ml       General appearance: Intubated and sedated, neuro exam improving  HEENT: Unremarkable. Respiratory::vesicular breath sounds,no wheeze/crackles  Cardiovascular:S1 S2 normal,no gallop or organic murmur. Abdomen:Non tender/non distended. Bowel sounds present  Extremities: No Cyanosis or Clubbing,Lower extremity edema  Neurological: Intubated and sedated.      MEDICATIONS     Scheduled Meds:    [Held by provider] amLODIPine  10 mg Oral Daily    furosemide  40 mg IntraVENous Daily    carvedilol  25 mg Oral BID WC    fentanNYL  100 mcg IntraVENous Once    azaTHIOprine  75 mg Oral Daily    sodium chloride  500 mL IntraVENous Once    insulin lispro  0-3 Units SubCUTAneous Q6H    aspirin  81 mg Oral Daily    atorvastatin  80 mg Oral Daily    sodium chloride flush  5-40 mL IntraVENous 2 times per day    chlorhexidine  15 mL Mouth/Throat BID    polyvinyl alcohol  1 drop Both Eyes Q4H    ampicillin-sulbactam  3,000 mg IntraVENous Q6H    pantoprazole  40 mg IntraVENous Daily     Continuous Infusions:    propofol 30 mcg/kg/min (02/28/22 0618)    heparin (PORCINE) Infusion 14.1 Units/kg/hr (02/28/22 0618)    sodium chloride 100 mL/hr at 02/25/22 1720    fentaNYL Stopped (02/26/22 0945)    dextrose      amiodarone 0.5 mg/min (02/28/22 0618)     PRN Meds:  metoclopramide, hydrALAZINE, labetalol, sodium chloride flush, sodium chloride, ondansetron **OR** ondansetron, polyethylene glycol, acetaminophen **OR** acetaminophen, glucose, glucagon (rDNA), dextrose, dextrose bolus (hypoglycemia) **OR** dextrose bolus (hypoglycemia), ipratropium-albuterol, heparin (porcine), heparin (porcine)  Home Meds:                Medications Prior to Admission: magnesium oxide (MAG-OX) 400 (241.3 Mg) MG TABS tablet,   atorvastatin (LIPITOR) 40 MG tablet, TAKE 1 TABLET BY MOUTH DAILY  magnesium oxide (MAG-OX) 400 (240 Mg) MG tablet, TAKE 1 TABLET BY MOUTH 2 TIMES DAILY  traZODone (DESYREL) 100 MG tablet, Take 0.5 tablets by mouth nightly as needed for Sleep  DULoxetine (CYMBALTA) 30 MG extended release capsule, TAKE 1 CAPSULE BY MOUTH DAILY  lisinopril (PRINIVIL;ZESTRIL) 5 MG tablet, take 1 tablet by mouth once daily  spironolactone (ALDACTONE) 25 MG tablet, take 1 tablet by mouth once daily  metoclopramide (REGLAN) 10 MG tablet, Take 1 tablet by mouth 3 times daily  isosorbide mononitrate (IMDUR) 30 MG extended release tablet, Take 1 tablet by mouth daily  nitroGLYCERIN (NITROSTAT) 0.4 MG SL tablet, Place 1 tablet under the tongue every 5 minutes as needed for Chest pain up to max of 3 total doses. If no relief after 1 dose, call 911.  (Patient not taking: Reported on 1/17/2022)  cloNIDine (CATAPRES) 0.2 MG tablet, Take 1 tablet by mouth 2 times daily  metoprolol tartrate (LOPRESSOR) 25 MG tablet, Take 1 tablet by mouth 2 times daily  magnesium oxide (MAG-OX) 400 (240 Mg) MG tablet,   pantoprazole (PROTONIX) 40 MG tablet, Take 1 tablet by mouth daily  magnesium oxide (MAG-OX) 400 MG tablet, Take 1 tablet by mouth 2 times daily (Patient not taking: Reported on 10/25/2021)  aspirin EC 81 MG EC tablet, Take 1 tablet by mouth daily  nicotine (NICODERM CQ) 21 MG/24HR, Place 1 patch onto the skin daily  acetaminophen (TYLENOL) 325 MG tablet, Take 2 tablets by mouth every 6 hours as needed for Pain  Umeclidinium Bromide 62.5 MCG/INH AEPB, Inhale 1 puff into the lungs daily (Patient not taking: Reported on 1/17/2022)  vitamin D3 (CHOLECALCIFEROL) 10 MCG (400 UNIT) TABS tablet, take 2 tablets (800MG) by mouth once daily (Patient not taking: Reported on 4/21/2021)  vitamin D3 (CHOLECALCIFEROL) 10 MCG (400 UNIT) TABS tablet, take 2 tablets (800MG) by mouth once daily (Patient not taking: Reported on 1/17/2022)  Fluticasone furoate-vilanterol (BREO ELLIPTA) 200-25 MCG/INH AEPB inhaler, Inhale 1 puff into the lungs daily (Patient not taking: Reported on 1/17/2022)  calcium carbonate-vitamin D3 (CALCIUM 600-D) 600-400 MG-UNIT TABS per tab, Take 1 tablet by mouth 2 times daily  azaTHIOprine (IMURAN) 50 MG tablet, Take 1.5 tablets by mouth daily  albuterol sulfate  (90 Base) MCG/ACT inhaler, inhale 2 puffs by mouth every 6 hours if needed for wheezing  nicotine (NICODERM CQ) 14 MG/24HR, Place 1 patch onto the skin daily (Patient not taking: Reported on 1/17/2022)  traMADol (ULTRAM) 50 MG tablet, Take 50 mg by mouth every 8 hours as needed for Pain.   (Patient not taking: Reported on 1/17/2022)  OLANZapine (ZYPREXA) 5 MG tablet, Take 1 tablet by mouth nightly    INVESTIGATIONS     Last 3 CMP:    Recent Labs     02/26/22  0426 02/27/22  0425 02/28/22  0454    140 141   K 3.9 3.7 3.5*    103 106   CO2 19* 22 24   BUN 15 16 23*   CREATININE 1.36* 1.39* 1.51*   CALCIUM 7.8* 8.1* 7.8*   PROT 4.9* 5.1* 5.1*   LABALBU 2.6* 2.7* 2.7*   BILITOT 0.90 1.12 0.89   ALKPHOS 146* 160* 167*   AST 34 25 25   ALT 17 16 13       Last 3 CBC:  Recent Labs     02/26/22  0426 02/27/22  0422 02/28/22  0454   WBC 7.1 6.8 4.6   RBC 3.39* 3.55* 3.33*   HGB 10.3* 10.7* 9.8*   HCT 31.2* 32.4* 30.4*   MCV 92.0 91.3 91.3   MCH 30.4 30.1 29.4   MCHC 33.0 33.0 32.2   RDW 17.7* 17.6* 17.7*   PLT See Reflexed IPF Result 105* 97*   MPV  --  10.7 11.0       ASSESSMENT     1. 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 1.7-2, follows up with Dr. Mayelin Dumont, on treatment with Imuran. 2. 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 V Fib and PEA cardiac arrest, unknown down timw  4. ? Hypoxic brain injury   5. H/O CABG  6. Respiratory failure, mechanical ventilation dependant  7. H/O HTN    PLAN     1. Continue I.V. lasix 40 mg daily. 2. Continue Imuran  3. Avoid hydralazine use due to concern for ANCA vasculitis related to Hydralazine use. 4. Cr is back to baseline and UO is improving, continue to monitor BMP. 5. Recommend enteral feeding. 6. Replace electrolytes to keep K >4 and Mg > 2 due to concern for V Fib cardiac arrest.  Agree with potassium replacements. 7.  BMP in AM.  8.  Will follow. Please do not hesitate to call with questions    This note is created with the assistance of a speech-recognition program. While intending to generate a document that actually reflects the content of the visit, no guarantees can be provided that every mistake has been identified and corrected by editing    Lynn Sorto MD      Department of Internal Medicine  [facility]         2/28/2022, 9:48 AM    Attending Physician Statement  I have discussed the care of this patient, including pertinent history and exam findings, with the Resident/CNP.  I have reviewed and edited the key elements of all parts of the encounter with the Resident/CNP. I agree with the assessment, plan and orders as documented by the Resident/CNP. Yohannes Randall MD   Nephrology [address] [place] [address] Drive    This note is created with the assistance of a speech-recognition program. While intending to generate a document that actually reflects the content of the visit, no guarantees can be provided that every mistake has been identified and corrected by editing.

## 2022-02-28 NOTE — PLAN OF CARE
Problem: OXYGENATION/RESPIRATORY FUNCTION  Goal: Patient will maintain patent airway  2/28/2022 0137 by Justus Powell RN  Outcome: Ongoing  2/27/2022 2201 by Denzel Moreira RCP  Outcome: Ongoing  2/27/2022 1509 by Lizeth Goncalves RN  Outcome: Ongoing  Goal: Patient will achieve/maintain normal respiratory rate/effort  Description: Respiratory rate and effort will be within normal limits for the patient  2/28/2022 0137 by Justus Powell RN  Outcome: Ongoing  2/27/2022 2201 by Denzel Moreira RCP  Outcome: Ongoing  2/27/2022 1509 by Lizeth Goncalves RN  Outcome: Ongoing     Problem: MECHANICAL VENTILATION  Goal: Patient will maintain patent airway  2/28/2022 0137 by Justus Powell RN  Outcome: Ongoing  2/27/2022 2201 by Denzel Moreira RCP  Outcome: Ongoing  2/27/2022 1509 by Lizeth Goncalves RN  Outcome: Ongoing  Goal: Oral health is maintained or improved  2/28/2022 0137 by Justus Powell RN  Outcome: Ongoing  2/27/2022 2201 by MAYRA DarbyP  Outcome: Ongoing  2/27/2022 1509 by Lizeth Goncalves RN  Outcome: Ongoing  Goal: ET tube will be managed safely  2/28/2022 0137 by Justus Powell RN  Outcome: Ongoing  2/27/2022 2201 by Denzel Moreira RCP  Outcome: Ongoing  2/27/2022 1509 by Lizeth Goncalves RN  Outcome: Ongoing  Goal: Ability to express needs and understand communication  2/28/2022 0137 by Justus Powell RN  Outcome: Ongoing  2/27/2022 2201 by MAYRA DarbyP  Outcome: Ongoing  2/27/2022 1509 by Lizeth Goncalves RN  Outcome: Ongoing  Goal: Mobility/activity is maintained at optimum level for patient  2/28/2022 0137 by Justus Powell RN  Outcome: Ongoing  2/27/2022 2201 by Denzel Moreira RCP  Outcome: Ongoing  2/27/2022 1509 by Lizeth Goncalves RN  Outcome: Ongoing     Problem: SKIN INTEGRITY  Goal: Skin integrity is maintained or improved  2/28/2022 0137 by Justus Powell RN  Outcome: Ongoing  2/27/2022 2201 by Denzel Moreira RCP  Outcome: Ongoing  2/27/2022 1509 by Amber Boyd Sam Dangelo RN  Outcome: Ongoing     Problem: Confusion - Acute:  Goal: Absence of continued neurological deterioration signs and symptoms  Description: Absence of continued neurological deterioration signs and symptoms  2/28/2022 0137 by Gideon Ruvalcaba RN  Outcome: Ongoing  2/27/2022 1509 by Kahlil Moon RN  Outcome: Ongoing  Goal: Mental status will be restored to baseline  Description: Mental status will be restored to baseline  2/28/2022 0137 by Gideon Ruvalcaba RN  Outcome: Ongoing  2/27/2022 1509 by Kahlil Moon RN  Outcome: Ongoing     Problem: Discharge Planning:  Goal: Ability to perform activities of daily living will improve  Description: Ability to perform activities of daily living will improve  2/28/2022 0137 by Gideon Ruvalcaba RN  Outcome: Ongoing  2/27/2022 1509 by Kahlil Moon RN  Outcome: Ongoing  Goal: Participates in care planning  Description: Participates in care planning  2/28/2022 0137 by Gideon Ruvalcaba RN  Outcome: Ongoing  2/27/2022 1509 by Kahlil Moon RN  Outcome: Ongoing     Problem: Injury - Risk of, Physical Injury:  Goal: Absence of physical injury  Description: Absence of physical injury  2/28/2022 0137 by Gideon Ruvalcaba RN  Outcome: Ongoing  2/27/2022 1509 by Kahlil Moon RN  Outcome: Ongoing  Goal: Will remain free from falls  Description: Will remain free from falls  2/28/2022 0137 by Gideon Ruvalcaba RN  Outcome: Ongoing  2/27/2022 1509 by Kahlli Moon RN  Outcome: Ongoing     Problem: Mood - Altered:  Goal: Mood stable  Description: Mood stable  2/28/2022 0137 by Gideon Ruvalcaba RN  Outcome: Ongoing  2/27/2022 1509 by Kahlil Moon RN  Outcome: Ongoing  Goal: Absence of abusive behavior  Description: Absence of abusive behavior  2/28/2022 0137 by Gideon Ruvalcaba RN  Outcome: Ongoing  2/27/2022 1509 by Kahlil Moon RN  Outcome: Ongoing  Goal: Verbalizations of feeling emotionally comfortable while being cared for will increase  Description: Verbalizations of feeling emotionally comfortable while being cared for will increase  2/28/2022 0137 by Thao Velarde RN  Outcome: Ongoing  2/27/2022 1509 by Alli Duran RN  Outcome: Ongoing     Problem: Psychomotor Activity - Altered:  Goal: Absence of psychomotor disturbance signs and symptoms  Description: Absence of psychomotor disturbance signs and symptoms  2/28/2022 0137 by Thao Velarde RN  Outcome: Ongoing  2/27/2022 1509 by Alli Duran RN  Outcome: Ongoing     Problem: Sensory Perception - Impaired:  Goal: Demonstrations of improved sensory functioning will increase  Description: Demonstrations of improved sensory functioning will increase  2/28/2022 0137 by Thao Velarde RN  Outcome: Ongoing  2/27/2022 1509 by Alli Duran RN  Outcome: Ongoing  Goal: Decrease in sensory misperception frequency  Description: Decrease in sensory misperception frequency  2/28/2022 0137 by Thao Velarde RN  Outcome: Ongoing  2/27/2022 1509 by Alli Duran RN  Outcome: Ongoing  Goal: Able to refrain from responding to false sensory perceptions  Description: Able to refrain from responding to false sensory perceptions  2/28/2022 0137 by Thao Velarde RN  Outcome: Ongoing  2/27/2022 1509 by Alli Duran RN  Outcome: Ongoing  Goal: Demonstrates accurate environmental perceptions  Description: Demonstrates accurate environmental perceptions  2/28/2022 0137 by Thao Velarde RN  Outcome: Ongoing  2/27/2022 1509 by Alli Duran RN  Outcome: Ongoing  Goal: Able to distinguish between reality-based and nonreality-based thinking  Description: Able to distinguish between reality-based and nonreality-based thinking  2/28/2022 0137 by Thao Velarde RN  Outcome: Ongoing  2/27/2022 1509 by Alli Duran RN  Outcome: Ongoing  Goal: Able to interrupt nonreality-based thinking  Description: Able to interrupt nonreality-based thinking  2/28/2022 0137 by [place] Wyatt Ling RN  Outcome: Ongoing  2/27/2022 1509 by Dena Taylor RN  Outcome: Ongoing     Problem: Sleep Pattern Disturbance:  Goal: Appears well-rested  Description: Appears well-rested  2/28/2022 0137 by Lamberto Goddard RN  Outcome: Ongoing  2/27/2022 1509 by Dena Taylor RN  Outcome: Ongoing     Problem: Nutrition  Goal: Optimal nutrition therapy  2/28/2022 0137 by Lamberto Goddard RN  Outcome: Ongoing  2/27/2022 1509 by Dena Taylor RN  Outcome: Ongoing     Problem: Falls - Risk of:  Goal: Absence of physical injury  Description: Absence of physical injury  2/28/2022 0137 by Lamberto Goddard RN  Outcome: Ongoing  2/27/2022 1509 by Dena Taylor RN  Outcome: Ongoing  Goal: Will remain free from falls  Description: Will remain free from falls  2/28/2022 0137 by Lamberto Goddard RN  Outcome: Ongoing  2/27/2022 1509 by Dena Taylor RN  Outcome: Ongoing     Problem: Skin Integrity:  Goal: Will show no infection signs and symptoms  Description: Will show no infection signs and symptoms  2/28/2022 0137 by Lamberto Goddard RN  Outcome: Ongoing  2/27/2022 1509 by Dena Taylor RN  Outcome: Ongoing  Goal: Absence of new skin breakdown  Description: Absence of new skin breakdown  2/28/2022 0137 by Lamberto Goddard RN  Outcome: Ongoing  2/27/2022 1509 by Dena Taylor RN  Outcome: Ongoing     Problem: Non-Violent Restraints  Goal: No harm/injury to patient while restraints in use  2/28/2022 0137 by Lamberto Goddard RN  Outcome: Ongoing  2/27/2022 1509 by Dena Taylor RN  Outcome: Ongoing  Goal: Patient's dignity will be maintained  2/28/2022 0137 by Lamberto Goddard RN  Outcome: Ongoing  2/27/2022 1509 by Dena Taylor RN  Outcome: Ongoing     Problem: Non-Violent Restraints  Goal: Removal from restraints as soon as assessed to be safe  2/28/2022 0137 by Lamberto Goddard RN  Outcome: Not Met This Shift  2/27/2022 1509 by Dena Taylor RN  Outcome: Ongoing

## 2022-02-28 NOTE — PLAN OF CARE
Problem: OXYGENATION/RESPIRATORY FUNCTION  Goal: Patient will maintain patent airway  2/28/2022 0844 by Lakisha Ni RCP  Outcome: Ongoing  2/28/2022 0137 by Kenzie Quintero RN  Outcome: Ongoing  2/27/2022 2201 by Denis Pacheco RCP  Outcome: Ongoing  Goal: Patient will achieve/maintain normal respiratory rate/effort  Description: Respiratory rate and effort will be within normal limits for the patient  2/28/2022 0844 by Lakisha Ni RCP  Outcome: Ongoing  2/28/2022 0137 by Kenzie Quintero RN  Outcome: Ongoing  2/27/2022 2201 by Denis Pacheco RCP  Outcome: Ongoing     Problem: OXYGENATION/RESPIRATORY FUNCTION  Goal: Patient will maintain patent airway  2/28/2022 0844 by Lakisha Ni RCP  Outcome: Ongoing  2/28/2022 0137 by Kenzie Quintero RN  Outcome: Ongoing  2/27/2022 2201 by Denis Pacheco RCP  Outcome: Ongoing  Goal: Patient will achieve/maintain normal respiratory rate/effort  Description: Respiratory rate and effort will be within normal limits for the patient  2/28/2022 0844 by Lakisha Ni RCP  Outcome: Ongoing  2/28/2022 0137 by Kenzie Quintero RN  Outcome: Ongoing  2/27/2022 2201 by Denis Pacheco RCP  Outcome: Ongoing     Problem: MECHANICAL VENTILATION  Goal: Patient will maintain patent airway  2/28/2022 0844 by Lakisha iN RCP  Outcome: Ongoing  2/28/2022 0137 by Kenzie Quintero RN  Outcome: Ongoing  2/27/2022 2201 by Denis Pacheco RCP  Outcome: Ongoing  Goal: Oral health is maintained or improved  2/28/2022 0844 by Lakisha Ni RCP  Outcome: Ongoing  2/28/2022 0137 by Kenzie Quintero RN  Outcome: Ongoing  2/27/2022 2201 by Denis Pacheco RCP  Outcome: Ongoing  Goal: ET tube will be managed safely  2/28/2022 0844 by Lakisha Ni RCP  Outcome: Ongoing  2/28/2022 0137 by Kenzie Quintero RN  Outcome: Ongoing  2/27/2022 2201 by Denis Tara, RCP  Outcome: Ongoing  Goal: Ability to express needs and understand communication  2/28/2022 0844 by Lakisha Ni, RCP  Outcome: Ongoing  2/28/2022 0137 by Cecy Chou RN  Outcome: Ongoing  2/27/2022 2201 by Mina Oliveira RCP  Outcome: Ongoing  Goal: Mobility/activity is maintained at optimum level for patient  2/28/2022 0844 by Jillian Altman RCP  Outcome: Ongoing  2/28/2022 0137 by Cecy Chou RN  Outcome: Ongoing  2/27/2022 2201 by Mina Oliveira RCP  Outcome: Ongoing     Problem: SKIN INTEGRITY  Goal: Skin integrity is maintained or improved  2/28/2022 0844 by Jillian Altman RCP  Outcome: Ongoing  2/28/2022 0137 by Cecy Chou RN  Outcome: Ongoing  2/27/2022 2201 by Mina Oliveira RCP  Outcome: Ongoing

## 2022-02-28 NOTE — PROGRESS NOTES
Critical Care Team - Daily Progress Note      Date and time: 2022 7:21 AM  Patient's name:  Reece Gallo  Medical Record Number: [id number]  Patient's account/billing number: [de-identified]  Patient's YOB: 1965  Age: 60 y.o. Date of Admission: 2022  9:13 PM  Length of stay during current admission: 6      Primary Care Physician: Modesto De La Garza MD  ICU Attending Physician: Dr. Fernandez Otero Status: Full Code    Reason for ICU admission:   Chief Complaint   Patient presents with    Cardiac Arrest         SUBJECTIVE:     OVERNIGHT EVENTS:           FLUIDS: None   FEEDING: Tube feedings   FAMILY UPDATE: will call today   ANALGESICS:Propofol    SEDATION: Propofol    THROMBO- PROPHYLAXIS: Heparin drip   MOBILIZATION:intubated and sedated   HEADS UP:45 degrees    HEMODYNAMICS: off pressor support   ULCER PROPHYLAXIS: Protonix    GLYCEMIC CONTROL:LDSSI, glucose well controlled   SPONTANEOUS BREATHING TRIAL: Failed weaning trial yesterday   BOWEL MANAGEMENT:no diarrhea   INDWELLING CATHETER:umaña, ETT, OG tube in place   DE-ESCALATION: continue weaning trial, Unasyn will complete 7-day course    -VSS, afebrile. -BP on the higher side overnight, required hydralazine.   -Ventilator settings: PRVC/16/580/5/30 percent  -AB.40/42.5/30.6  -Continues to be on amiodarone and heparin drip  -Potassium 3.5, creatinine trending up 1.39> 1.51  -Neurological examination continues to improve. MRI unremarkable LTM me showing moderate to severe encephalopathy. Neuro critical care signed off.  -Nephrology following, they recommended to continue IV Lasix daily.  -Cardiology following,     AWAKE & FOLLOWING COMMANDS: [] No   [x] Yes    CURRENT VENTILATION STATUS: Ventilator .     IF INTUBATED, ET TUBE MARKING AT LOWER LIP:       cms    SECRETIONS Amount:  [x] Small [] Moderate  [] Large  [] None  Color:     [] White [] Colored  [] Bloody    SEDATION: Propofol                        RAAS Score: PARALYZED:  [] No    [] Yes    DIARRHEA:                [x] No                [] Yes  (C. Difficile status: [] positive                                                                                                                       [] negative                                                                                                                     [] pending)  VASOPRESSORS:  [x] No    [] Yes    If yes -   [] Levophed       [] Dopamine     [] Vasopressin       [] Dobutamine  [] Phenylephrine         [] Epinephrine    CENTRAL LINES:     [x] No   [] Yes   (Date of Insertion:   )           If yes -     [] Right IJ     [] Left IJ [] Right Femoral [] Left Femoral                   [] Right Subclavian [] Left Subclavian       ODELL'S CATHETER:   [] No   [x] Yes  (Date of Insertion:   )     URINE OUTPUT:            [] Good   [x] Low              [] Anuric    REVIEW OF SYSTEMS:  - UNABLE TO OBTAIN DUE TO PATIENT'S CONDITION. HISTORY OF PRESENT ILLNESS:   Pelon Pitt a 60 y. o. with PMH of CAD s/p CABG x 3 in  2011 and Cath 10/2018 with patent LIMA to LAD and SVG to RCA but occluded SVG to OM1 who presented to the emergency department with cardiac arrest. Patient was at home when a family member heard the patient fall upstairs however was unable to check on the patient. EMS was called and patient was found to be in V. Fib arrest. ACLS was initiated and patient received two shocks and ROSC was achieved. On the way out of the patient's home patient went into PEA arrest. Compressions were resumed and epinephrine was given and ROSC was achieved. Unknown down time.      In the emergency department patient was intubated and sedated on propofol. Hemodynamically stable off pressors. Labs demonstrated elevated creatinine (1.67), lactic acidosis (3.0), leukocytosis (20.2), EKG was concerning for STEMI with ST elevations in V3, V4 and V5.  Cardiology was consulted however patient did not meet STEMI criteria. Cardiology initially planned to Cath patient however he was taken off propofol with only sluggish pupils, but no gag or corneal reflexes so Cath was canceled due to poor neurological prognosis. CT head was negative. CT C spine demonstrated remote anterior fusion from C4 through C7 with C5-C6 corpectomy and bone strut placement as well as prominent/recurrent spondylosis with moderate cord flattening at C4-C5 and C5-C6. Patient will be admitted to medical ICU.       OBJECTIVE:     VITAL SIGNS:  BP 96/77   Pulse 65   Temp 98.1 °F (36.7 °C)   Resp 18   Ht 5' 10\" (1.778 m)   Wt 214 lb 11.7 oz (97.4 kg)   SpO2 94%   BMI 30.81 kg/m²   Tmax over 24 hours:  Temp (24hrs), Av.1 °F (36.7 °C), Min:97.5 °F (36.4 °C), Max:98.8 °F (37.1 °C)      Patient Vitals for the past 8 hrs:   BP Temp Temp src Pulse Resp SpO2 Weight   22 0600 96/77 98.1 °F (36.7 °C)  65  94 %    22 0556       214 lb 11.7 oz (97.4 kg)   22 0500 104/61   59  96 %    22 0400 119/68 97.5 °F (36.4 °C) Bladder 61 18 95 %    22 0352    55  97 %    22 0300 (!) 97/56   55  96 %    22 0200 (!) 91/51 97.7 °F (36.5 °C)  54  94 %    22 0100 (!) 85/50   56  94 %    22 0000 95/67 98.1 °F (36.7 °C) Bladder 54 18 94 %          Intake/Output Summary (Last 24 hours) at 2022 0721  Last data filed at 2022 0650  Gross per 24 hour   Intake 2448.82 ml   Output 2100 ml   Net 348.82 ml     Date 22 0000 - 22 2359   Shift 9936-5406 3042-3231 2893-7000 24 Hour Total   INTAKE   I.V.(mL/kg) 367.8(3.8)   367.8(3.8)   NG/GT(mL/kg) 564(5.8)   564(5.8)   IV Piggyback(mL/kg) 113.7(1.2)   113.7(1.2)   Shift Total(mL/kg) 1045. 5(10.7)   1045. 5(10.7)   OUTPUT   Urine(mL/kg/hr) 210   210   Shift Total(mL/kg) 210(2.2)   210(2.2)   Weight (kg) 97.4 97.4 97.4 97.4     Wt Readings from Last 3 Encounters:   22 214 lb 11.7 oz (97.4 kg)   22 207 lb 6.4 oz (94.1 kg)   10/25/21 210 lb (95.3 kg)     Body mass index is 30.81 kg/m². PHYSICAL EXAM:  Constitutional: intubated and sedated  EENT: PERRLA, sclera clear, anicteric  Respiratory: clear to auscultation, no wheezes   Cardiovascular: regular rate and rhythm, normal S1, S2, no murmur noted and 2+ pulses throughout  Abdomen: soft, nontender, nondistended, no masses or organomegaly  NEUROLOGIC: intubated and sedated. Patient is opening eyes to his name.   Extremities:  peripheral pulses normal, no pedal edema, no clubbing or cyanosis  SKIN: normal coloration and turgor    Any additional physical findings:      MEDICATIONS:  Scheduled Meds:   [Held by provider] amLODIPine  10 mg Oral Daily    furosemide  40 mg IntraVENous Daily    carvedilol  25 mg Oral BID WC    fentanNYL  100 mcg IntraVENous Once    azaTHIOprine  75 mg Oral Daily    sodium chloride  500 mL IntraVENous Once    insulin lispro  0-3 Units SubCUTAneous Q6H    aspirin  81 mg Oral Daily    atorvastatin  80 mg Oral Daily    sodium chloride flush  5-40 mL IntraVENous 2 times per day    chlorhexidine  15 mL Mouth/Throat BID    polyvinyl alcohol  1 drop Both Eyes Q4H    ampicillin-sulbactam  3,000 mg IntraVENous Q6H    pantoprazole  40 mg IntraVENous Daily     Continuous Infusions:   propofol 30 mcg/kg/min (02/28/22 0618)    heparin (PORCINE) Infusion 14.1 Units/kg/hr (02/28/22 0618)    sodium chloride 100 mL/hr at 02/25/22 1720    fentaNYL Stopped (02/26/22 0945)    dextrose      amiodarone 0.5 mg/min (02/28/22 0618)     PRN Meds:   metoclopramide, 5 mg, Q6H PRN  hydrALAZINE, 10 mg, Q6H PRN  labetalol, 10 mg, Q6H PRN  sodium chloride flush, 5-40 mL, PRN  sodium chloride, 25 mL, PRN  ondansetron, 4 mg, Q8H PRN   Or  ondansetron, 4 mg, Q6H PRN  polyethylene glycol, 17 g, Daily PRN  acetaminophen, 650 mg, Q6H PRN   Or  acetaminophen, 650 mg, Q6H PRN  glucose, 15 g, PRN  glucagon (rDNA), 1 mg, PRN  dextrose, 100 mL/hr, PRN  dextrose bolus (hypoglycemia), 125 mL, PRN   Or  dextrose bolus (hypoglycemia), 250 mL, PRN  ipratropium-albuterol, 1 ampule, Q6H PRN  heparin (porcine), 4,000 Units, PRN  heparin (porcine), 2,000 Units, PRN        SUPPORT DEVICES: [x] Ventilator [] BIPAP  [] Nasal Cannula [] Room Air    VENT SETTINGS (Comprehensive) (if applicable):  TCOA/89/579/7/16 percent  Vent Information  $Ventilation: $Subsequent Day  Skin Assessment: Clean, dry, & intact  Suction Catheter Diameter: 14  Equipment ID: [id number]  Equipment Changed: HME  Vent Type: Servo i  Vent Mode: PRVC  Vt Ordered: 580 mL  Rate Set: 16 bmp  FiO2 : 30 %  SpO2: 94 %  SpO2/FiO2 ratio: 323.33  Sensitivity: 3  PEEP/CPAP: 5  I Time/ I Time %: 0.8 s  Humidification Source: HME  Nitric Oxide/Epoprostenol In Use?: No  Additional Respiratory  Assessments  Pulse: 65  Resp: 18  SpO2: 94 %  End Tidal CO2: 30 (%)  Position: Semi-Willis's  Humidification Source: HME  Oral Care Completed?: Yes  Oral Care: Mouthwash,Lip moisturizer applied,Mouth swabbed,Mouth moisturizer,Mouth suctioned,Suction toothette  Subglottic Suction Done?: No  Cuff Pressure (cm H2O):  (mov)    ABGs:   -AB.40/42.5/30.6   Lab Results   Component Value Date    PHART 7.430 2019    TRO8TIX 32.7 2019    PO2ART 97.5 2019    TIF9ANS 21.7 2019    B9NPGNQD 95.6 2019    FIO2 30.0 2022     Lactic Acid:   Lab Results   Component Value Date    LACTA 1.5 2020    LACTA 1.0 12/10/2019    LACTA 1.5 2019         DATA:  Complete Blood Count:   Recent Labs     22  0426 22  0422 22  0454   WBC 7.1 6.8 4.6   HGB 10.3* 10.7* 9.8*   MCV 92.0 91.3 91.3   PLT See Reflexed IPF Result 105* 97*   RBC 3.39* 3.55* 3.33*   HCT 31.2* 32.4* 30.4*   MCH 30.4 30.1 29.4   MCHC 33.0 33.0 32.2   RDW 17.7* 17.6* 17.7*   MPV  --  10.7 11.0        PT/INR:    Lab Results   Component Value Date    PROTIME 12.3 2022    PROTIME 10.6 2011    INR 1.2 2022     PTT: Lab Results   Component Value Date    APTT 66.7 02/28/2022       Basal Metabolic Profile:   Recent Labs     02/26/22 0426 02/27/22 0425 02/28/22 0454    140 141   K 3.9 3.7 3.5*   BUN 15 16 23*   CREATININE 1.36* 1.39* 1.51*    103 106   CO2 19* 22 24      Magnesium:   Lab Results   Component Value Date    MG 2.0 02/28/2022    MG 2.1 02/24/2022    MG 1.9 02/23/2022     Phosphorus:   Lab Results   Component Value Date    PHOS 2.9 02/24/2022    PHOS 3.9 02/23/2022    PHOS 4.2 02/23/2022     S. Calcium:  Recent Labs     02/28/22 0454   CALCIUM 7.8*     S. Ionized Calcium:No results for input(s): IONCA in the last 72 hours. Urinalysis:   Lab Results   Component Value Date    NITRU NEGATIVE 02/25/2022    COLORU Yellow 02/25/2022    PHUR 5.5 02/25/2022    WBCUA 2 TO 5 02/25/2022    RBCUA 5 TO 10 02/25/2022    MUCUS NOT REPORTED 11/06/2020    TRICHOMONAS NOT REPORTED 11/06/2020    YEAST NOT REPORTED 11/06/2020    BACTERIA NOT REPORTED 11/06/2020    CLARITYU clear 09/24/2020    SPECGRAV 1.025 02/25/2022    LEUKOCYTESUR NEGATIVE 02/25/2022    UROBILINOGEN Normal 02/25/2022    BILIRUBINUR NEGATIVE 02/25/2022    BLOODU +++ 09/24/2020    GLUCOSEU NEGATIVE 02/25/2022    KETUA TRACE 02/25/2022    AMORPHOUS NOT REPORTED 11/06/2020       CARDIAC ENZYMES: No results for input(s): CKMB, CKMBINDEX, TROPONINI in the last 72 hours. Invalid input(s): CKTOTAL;3  BNP: No results for input(s): BNP in the last 72 hours. LFTS  Recent Labs     02/26/22 0426 02/27/22 0425 02/28/22 0454   ALKPHOS 146* 160* 167*   ALT 17 16 13   AST 34 25 25   BILITOT 0.90 1.12 0.89   LABALBU 2.6* 2.7* 2.7*       AMYLASE/LIPASE/AMMONIA  No results for input(s): AMYLASE, LIPASE, AMMONIA in the last 72 hours.     Last 3 Blood Glucose:   Recent Labs     02/26/22  0426 02/27/22  0425 02/28/22  0454   GLUCOSE 112* 96 113*      HgBA1c:    Lab Results   Component Value Date    LABA1C 5.1 04/21/2021         TSH:    Lab Results   Component Value Date    TSH 2.00 02/22/2022     ANEMIA STUDIES  No results for input(s): LABIRON, TIBC, FERRITIN, [id number], FOLATE, OCCULTBLD in the last 72 hours. Cultures during this admission:     Blood cultures:  No growth  Urine Culture:  None taken            Sputum Culture:  None taken                Endotracheal aspirate: normal resp jones growth          ASSESSMENT:     Principal Problem:    Cardiac arrest [facility])  Active Problems:    Cervical stenosis of spinal canal  Resolved Problems:    * No resolved hospital problems. *          PLAN:     NEURO  -currently intubated and sedated.  Will wean off sedation and will try to wean off the ventilator as tolerated  -Neuro crit signed off, mentation improving. -LTM E shows moderate to severe encephalopathy  -Neurosurgery was consulted for cervical stenosis, no acute intervention at this point.     CARDIOVASCULAR  -Hemodynamically stable.  Off pressor support. -S/p V. fib arrest with history of CAD s/p CABGx4.  Cardiology following.  Continue heparin drip, amiodarone drip, aspirin, Lipitor, Coreg.  We will plan on doing cardiac cath once neurologically stable. -H/o HTN. Cont coreg. -H/o HLD. Cont lipitor     RESP  -Acute hypoxic respiratory failure likely secondary to aspiration pneumonia.  Continue Unasyn for today.  Maintain oxygen saturation above 92%.  Continue mechanical ventilation, will wean as tolerated.  Pulmonary toilet     GI   -Keep NPO. Cont TF. Protonix for GI ppx. Check for residuals. Reglan as needed for high residuals     RENAL  -ARON on CKD Stage 4. Monitor UOP. Cr trending up. Blackman in place. Nephrology following, appreciate recommendations. Replace electrolytes as needed     HEME  -Hb stable     ENDOCRINE  -Glucose well controlled. On LDSSI. Hypoglycemia protocol in place.      ID  -Aspiration pneumonia. Cont Unasyn for a total of 7 days.  Patient afebrile and WBC WNL     DVT PPX: Heparin drip  GI PPX: Protonix  DIET: Tube feedings could be resumed if low residuals.      DISPOSITION: Monitor in Debi Kearns MD,  Internal Medicine, PGY-1            Department of Internal Medicine/ Critical care  [facility])             2/28/2022, 7:21 AM

## 2022-02-28 NOTE — PROGRESS NOTES
[facility]   Progress Note         Date:   2/28/2022  Patient name: Cristopher Avendaño  Date of admission:  2/21/2022  9:13 PM  MRN:   [id number]  YOB: 1965  PCP: Jero Alaniz MD    Reason for Admission:  Cardiac arrest    Subjective:       No acute issues overnnight  Patient afebrile and hemodynamically stable  On heparin/amiodarone gtt  MRI brain suggesting early changes of hypoxic ischemic injury. Medications:   Scheduled Meds:   [Held by provider] amLODIPine  10 mg Oral Daily    furosemide  40 mg IntraVENous Daily    carvedilol  25 mg Oral BID WC    fentanNYL  100 mcg IntraVENous Once    azaTHIOprine  75 mg Oral Daily    sodium chloride  500 mL IntraVENous Once    insulin lispro  0-3 Units SubCUTAneous Q6H    aspirin  81 mg Oral Daily    atorvastatin  80 mg Oral Daily    sodium chloride flush  5-40 mL IntraVENous 2 times per day    chlorhexidine  15 mL Mouth/Throat BID    polyvinyl alcohol  1 drop Both Eyes Q4H    pantoprazole  40 mg IntraVENous Daily       Continuous Infusions:   propofol 30 mcg/kg/min (02/28/22 0618)    heparin (PORCINE) Infusion 14.1 Units/kg/hr (02/28/22 0618)    sodium chloride 100 mL/hr at 02/25/22 1720    fentaNYL Stopped (02/26/22 0945)    dextrose      amiodarone 0.5 mg/min (02/28/22 0618)       CBC:   Recent Labs     02/26/22 0426 02/27/22 0422 02/28/22  0454   WBC 7.1 6.8 4.6   HGB 10.3* 10.7* 9.8*   PLT See Reflexed IPF Result 105* 97*     BMP:    Recent Labs     02/26/22 0426 02/27/22 0425 02/28/22  0454    140 141   K 3.9 3.7 3.5*    103 106   CO2 19* 22 24   BUN 15 16 23*   CREATININE 1.36* 1.39* 1.51*   GLUCOSE 112* 96 113*     Hepatic:   Recent Labs     02/26/22 0426 02/27/22 0425 02/28/22  0454   AST 34 25 25   ALT 17 16 13   BILITOT 0.90 1.12 0.89   ALKPHOS 146* 160* 167*     Troponin: No results for input(s): TROPONINI in the last 72 hours.   BNP: No results for input(s): BNP in the last 72 hours. Lipids: No results for input(s): CHOL, HDL in the last 72 hours. Invalid input(s): LDLCALCU  INR:   No results for input(s): INR in the last 72 hours. Objective:     Vitals: /79   Pulse 67   Temp 98.1 °F (36.7 °C)   Resp 16   Ht 5' 10\" (1.778 m)   Wt 214 lb 11.7 oz (97.4 kg)   SpO2 93%   BMI 30.81 kg/m²      General:  Intubated and sedated  HEENT: Atraumatic, normocephalic, no throat congestion, moist mucosa. Eyes:   Pupils equal, round and reactive to light  Chest:   Clear on auscultation. no rales or ronchi  Cardiac:  S1 S2 RR, no gallops, murmur or rubs. Abdomen: Soft, non-tender, no masses or organomegaly, BS present. :   No suprapubic or flank tenderness. Neuro:  Unable to assess  SKIN:  No rashes, good skin turgor. Extremities:  No edema. Assessment:     1. V. fib cardiac arrestunknown downtime. ROSC achieved after around 4 minutes of ACLS. 2. Acute coronary syndrome. 3. Severe bradycardia likely secondary sedation/paralytic/hypothermia -Resolved   4. Ischemic cardiomyopathy with EF 45 %  5. Anoxic brain injury  6. Acute hypoxic hypercarbic respiratory failure secondary to cardiac arrest.  7. CAD s/p CABG x4 in 2011  8. CKD stage IIIb   9. COPD  10. Current daily smoker  11. Essential hypertension          Treatment Plan:     · Continue heparin and amiodarone gtt  · Replace electrolytes, keep K>4 and Mg>2  · Coronary angiogram depending on neurological recovery   · Continue coreg to 25 mg BID. Continue aspirin and high intensity statin. · Agree with holding Norvasc for now. · Agree with iv lasix 40 daily  ·       Annamarie Watson  Internal Medicine Resident  [address] [facility]  2/28/2022 10:13 AM      I reviewed the patient history personally, and examined by me during the visit. I have reviewed the note as completed, and made appropriate changes to the patient exam and treatment plans.        Additional Recommendations:  Pastient with VF cardiac arrest  Plan for coronary angiogram once neuro status improves  Increase coreg to 25 mg PO BID  Continue Amiodarone and heparin for now    Mima Calabrese [id number]

## 2022-02-28 NOTE — PLAN OF CARE
Problem: OXYGENATION/RESPIRATORY FUNCTION  Goal: Patient will maintain patent airway  2/27/2022 2201 by Denzel Moreira RCP  Outcome: Ongoing     Problem: OXYGENATION/RESPIRATORY FUNCTION  Goal: Patient will achieve/maintain normal respiratory rate/effort  Description: Respiratory rate and effort will be within normal limits for the patient  2/27/2022 2201 by Denzel Moreira RCP  Outcome: Ongoing     Problem: MECHANICAL VENTILATION  Goal: Patient will maintain patent airway  2/27/2022 2201 by Denzel Moreira RCP  Outcome: Ongoing     Problem: MECHANICAL VENTILATION  Goal: Oral health is maintained or improved  2/27/2022 2201 by Denzel Moreira RCP  Outcome: Ongoing     Problem: MECHANICAL VENTILATION  Goal: ET tube will be managed safely  2/27/2022 2201 by Denzel Moreira RCP  Outcome: Ongoing     Problem: MECHANICAL VENTILATION  Goal: Ability to express needs and understand communication  2/27/2022 2201 by Denzel Moreira RCP  Outcome: Ongoing     Problem: MECHANICAL VENTILATION  Goal: Mobility/activity is maintained at optimum level for patient  2/27/2022 2201 by Denzel Moreira RCP  Outcome: Ongoing     Problem: SKIN INTEGRITY  Goal: Skin integrity is maintained or improved  2/27/2022 2201 by Denzel Moreira RCP  Outcome: Ongoing

## 2022-02-28 NOTE — PLAN OF CARE
Problem: OXYGENATION/RESPIRATORY FUNCTION  Goal: Patient will maintain patent airway  2/28/2022 1719 by Laurita Ibrahim RN  Outcome: Ongoing  2/28/2022 0844 by Tray Martinez RCP  Outcome: Ongoing  Goal: Patient will achieve/maintain normal respiratory rate/effort  Description: Respiratory rate and effort will be within normal limits for the patient  2/28/2022 1719 by Laurita Ibrahim RN  Outcome: Ongoing  2/28/2022 0844 by Tray Martinez RCP  Outcome: Ongoing     Problem: MECHANICAL VENTILATION  Goal: Patient will maintain patent airway  2/28/2022 1719 by Laurita Ibrahim RN  Outcome: Ongoing  2/28/2022 0844 by Tray Martinez RCP  Outcome: Ongoing  Goal: Oral health is maintained or improved  2/28/2022 1719 by Laurita Ibrahim RN  Outcome: Ongoing  2/28/2022 0844 by Tray Martinez RCP  Outcome: Ongoing  Goal: ET tube will be managed safely  2/28/2022 1719 by Laurita Ibrahim RN  Outcome: Ongoing  2/28/2022 0844 by Tray Martinez RCP  Outcome: Ongoing  Goal: Ability to express needs and understand communication  2/28/2022 1719 by Laurita Ibrahim RN  Outcome: Ongoing  2/28/2022 0844 by Tray Martinez RCP  Outcome: Ongoing  Goal: Mobility/activity is maintained at optimum level for patient  2/28/2022 1719 by Laurita Ibrahim RN  Outcome: Ongoing  2/28/2022 0844 by Tray Martinez RCP  Outcome: Ongoing     Problem: SKIN INTEGRITY  Goal: Skin integrity is maintained or improved  2/28/2022 1719 by Laurita Ibrahim RN  Outcome: Ongoing  2/28/2022 0844 by Tray Martinez RCP  Outcome: Ongoing     Problem: Confusion - Acute:  Goal: Absence of continued neurological deterioration signs and symptoms  Description: Absence of continued neurological deterioration signs and symptoms  Outcome: Ongoing  Goal: Mental status will be restored to baseline  Description: Mental status will be restored to baseline  Outcome: Ongoing     Problem: Discharge Planning:  Goal: Ability to perform activities of daily living will improve  Description: Ability to perform activities of daily living will improve  Outcome: Ongoing  Goal: Participates in care planning  Description: Participates in care planning  Outcome: Ongoing     Problem: Injury - Risk of, Physical Injury:  Goal: Absence of physical injury  Description: Absence of physical injury  Outcome: Ongoing  Goal: Will remain free from falls  Description: Will remain free from falls  Outcome: Ongoing     Problem: Mood - Altered:  Goal: Mood stable  Description: Mood stable  Outcome: Ongoing  Goal: Absence of abusive behavior  Description: Absence of abusive behavior  Outcome: Ongoing  Goal: Verbalizations of feeling emotionally comfortable while being cared for will increase  Description: Verbalizations of feeling emotionally comfortable while being cared for will increase  Outcome: Ongoing     Problem: Psychomotor Activity - Altered:  Goal: Absence of psychomotor disturbance signs and symptoms  Description: Absence of psychomotor disturbance signs and symptoms  Outcome: Ongoing     Problem: Sensory Perception - Impaired:  Goal: Demonstrations of improved sensory functioning will increase  Description: Demonstrations of improved sensory functioning will increase  Outcome: Ongoing  Goal: Decrease in sensory misperception frequency  Description: Decrease in sensory misperception frequency  Outcome: Ongoing  Goal: Able to refrain from responding to false sensory perceptions  Description: Able to refrain from responding to false sensory perceptions  Outcome: Ongoing  Goal: Demonstrates accurate environmental perceptions  Description: Demonstrates accurate environmental perceptions  Outcome: Ongoing  Goal: Able to distinguish between reality-based and nonreality-based thinking  Description: Able to distinguish between reality-based and nonreality-based thinking  Outcome: Ongoing  Goal: Able to interrupt nonreality-based thinking  Description: Able to interrupt nonreality-based thinking  Outcome: Ongoing     Problem: Sleep Pattern Disturbance:  Goal: Appears well-rested  Description: Appears well-rested  Outcome: Ongoing     Problem: Nutrition  Goal: Optimal nutrition therapy  2/28/2022 1719 by Hector Wilhelm RN  Outcome: Ongoing  2/28/2022 1711 by Sara Escudero RD, LD  Outcome: Ongoing  Note: Nutrition Problem #1: Inadequate oral intake  Intervention: Food and/or Nutrient Delivery: Modify Tube Feeding (decrease TF rate to 60 mL/hr while on propofol at current rate)  Nutritional Goals: meet % of estimated nutrient needs       Problem: Falls - Risk of:  Goal: Absence of physical injury  Description: Absence of physical injury  Outcome: Ongoing  Goal: Will remain free from falls  Description: Will remain free from falls  Outcome: Ongoing     Problem: Skin Integrity:  Goal: Will show no infection signs and symptoms  Description: Will show no infection signs and symptoms  Outcome: Ongoing  Goal: Absence of new skin breakdown  Description: Absence of new skin breakdown  Outcome: Ongoing     Problem: Non-Violent Restraints  Goal: Removal from restraints as soon as assessed to be safe  Outcome: Ongoing  Goal: No harm/injury to patient while restraints in use  Outcome: Ongoing  Goal: Patient's dignity will be maintained  Outcome: Ongoing

## 2022-03-01 LAB
ABSOLUTE EOS #: 0.35 K/UL (ref 0–0.44)
ABSOLUTE IMMATURE GRANULOCYTE: 0.14 K/UL (ref 0–0.3)
ABSOLUTE LYMPH #: 1.14 K/UL (ref 1.1–3.7)
ABSOLUTE MONO #: 0.65 K/UL (ref 0.1–1.2)
ALBUMIN SERPL-MCNC: 2.6 G/DL (ref 3.5–5.2)
ALBUMIN/GLOBULIN RATIO: 1 (ref 1–2.5)
ALLEN TEST: ABNORMAL
ALP BLD-CCNC: 163 U/L (ref 40–129)
ALT SERPL-CCNC: 13 U/L (ref 5–41)
ANION GAP SERPL CALCULATED.3IONS-SCNC: 10 MMOL/L (ref 9–17)
AST SERPL-CCNC: 26 U/L
BASOPHILS # BLD: 1 % (ref 0–2)
BASOPHILS ABSOLUTE: 0.05 K/UL (ref 0–0.2)
BILIRUB SERPL-MCNC: 0.73 MG/DL (ref 0.3–1.2)
BUN BLDV-MCNC: 23 MG/DL (ref 6–20)
CALCIUM SERPL-MCNC: 7.7 MG/DL (ref 8.6–10.4)
CHLORIDE BLD-SCNC: 102 MMOL/L (ref 98–107)
CO2: 27 MMOL/L (ref 20–31)
CREAT SERPL-MCNC: 1.53 MG/DL (ref 0.7–1.2)
EOSINOPHILS RELATIVE PERCENT: 7 % (ref 1–4)
FIO2: 30
GFR AFRICAN AMERICAN: 57 ML/MIN
GFR NON-AFRICAN AMERICAN: 47 ML/MIN
GFR SERPL CREATININE-BSD FRML MDRD: ABNORMAL ML/MIN/{1.73_M2}
GLUCOSE BLD-MCNC: 100 MG/DL (ref 75–110)
GLUCOSE BLD-MCNC: 109 MG/DL (ref 70–99)
GLUCOSE BLD-MCNC: 110 MG/DL (ref 74–100)
GLUCOSE BLD-MCNC: 124 MG/DL (ref 75–110)
GLUCOSE BLD-MCNC: 132 MG/DL (ref 75–110)
GLUCOSE BLD-MCNC: 134 MG/DL (ref 75–110)
GLUCOSE BLD-MCNC: 135 MG/DL (ref 75–110)
HCT VFR BLD CALC: 28.5 % (ref 40.7–50.3)
HEMOGLOBIN: 9.5 G/DL (ref 13–17)
IMMATURE GRANULOCYTES: 3 %
LYMPHOCYTES # BLD: 24 % (ref 24–43)
MCH RBC QN AUTO: 30.3 PG (ref 25.2–33.5)
MCHC RBC AUTO-ENTMCNC: 33.3 G/DL (ref 28.4–34.8)
MCV RBC AUTO: 90.8 FL (ref 82.6–102.9)
MODE: ABNORMAL
MONOCYTES # BLD: 13 % (ref 3–12)
NRBC AUTOMATED: 0.4 PER 100 WBC
O2 DEVICE/FLOW/%: ABNORMAL
PARTIAL THROMBOPLASTIN TIME: 66.5 SEC (ref 20.5–30.5)
PATIENT TEMP: 38
PDW BLD-RTO: 17.9 % (ref 11.8–14.4)
PLATELET # BLD: 103 K/UL (ref 138–453)
PMV BLD AUTO: 11 FL (ref 8.1–13.5)
POC HCO3: 29.6 MMOL/L (ref 21–28)
POC LACTIC ACID: 0.95 MMOL/L (ref 0.56–1.39)
POC O2 SATURATION: 92 % (ref 94–98)
POC PCO2 TEMP: 49 MM HG
POC PCO2: 47.3 MM HG (ref 35–48)
POC PH TEMP: 7.39
POC PH: 7.4 (ref 7.35–7.45)
POC PO2 TEMP: 69 MM HG
POC PO2: 64.5 MM HG (ref 83–108)
POSITIVE BASE EXCESS, ART: 4 (ref 0–3)
POTASSIUM SERPL-SCNC: 3.8 MMOL/L (ref 3.7–5.3)
RBC # BLD: 3.14 M/UL (ref 4.21–5.77)
RBC # BLD: ABNORMAL 10*6/UL
SAMPLE SITE: ABNORMAL
SEG NEUTROPHILS: 52 % (ref 36–65)
SEGMENTED NEUTROPHILS ABSOLUTE COUNT: 2.51 K/UL (ref 1.5–8.1)
SODIUM BLD-SCNC: 139 MMOL/L (ref 135–144)
TOTAL PROTEIN: 5.1 G/DL (ref 6.4–8.3)
WBC # BLD: 4.8 K/UL (ref 3.5–11.3)

## 2022-03-01 PROCEDURE — 6360000002 HC RX W HCPCS: Performed by: GENERAL PRACTICE

## 2022-03-01 PROCEDURE — 37799 UNLISTED PX VASCULAR SURGERY: CPT

## 2022-03-01 PROCEDURE — 2500000003 HC RX 250 WO HCPCS: Performed by: INTERNAL MEDICINE

## 2022-03-01 PROCEDURE — 6370000000 HC RX 637 (ALT 250 FOR IP): Performed by: STUDENT IN AN ORGANIZED HEALTH CARE EDUCATION/TRAINING PROGRAM

## 2022-03-01 PROCEDURE — C9113 INJ PANTOPRAZOLE SODIUM, VIA: HCPCS | Performed by: STUDENT IN AN ORGANIZED HEALTH CARE EDUCATION/TRAINING PROGRAM

## 2022-03-01 PROCEDURE — 85025 COMPLETE CBC W/AUTO DIFF WBC: CPT

## 2022-03-01 PROCEDURE — 83605 ASSAY OF LACTIC ACID: CPT

## 2022-03-01 PROCEDURE — 82947 ASSAY GLUCOSE BLOOD QUANT: CPT

## 2022-03-01 PROCEDURE — 94003 VENT MGMT INPAT SUBQ DAY: CPT

## 2022-03-01 PROCEDURE — 6370000000 HC RX 637 (ALT 250 FOR IP)

## 2022-03-01 PROCEDURE — 6360000002 HC RX W HCPCS: Performed by: STUDENT IN AN ORGANIZED HEALTH CARE EDUCATION/TRAINING PROGRAM

## 2022-03-01 PROCEDURE — 82803 BLOOD GASES ANY COMBINATION: CPT

## 2022-03-01 PROCEDURE — 80053 COMPREHEN METABOLIC PANEL: CPT

## 2022-03-01 PROCEDURE — 99291 CRITICAL CARE FIRST HOUR: CPT | Performed by: INTERNAL MEDICINE

## 2022-03-01 PROCEDURE — 85730 THROMBOPLASTIN TIME PARTIAL: CPT

## 2022-03-01 PROCEDURE — 2580000003 HC RX 258: Performed by: STUDENT IN AN ORGANIZED HEALTH CARE EDUCATION/TRAINING PROGRAM

## 2022-03-01 PROCEDURE — 6360000002 HC RX W HCPCS: Performed by: INTERNAL MEDICINE

## 2022-03-01 PROCEDURE — 2700000000 HC OXYGEN THERAPY PER DAY

## 2022-03-01 PROCEDURE — 99232 SBSQ HOSP IP/OBS MODERATE 35: CPT | Performed by: INTERNAL MEDICINE

## 2022-03-01 PROCEDURE — 2000000000 HC ICU R&B

## 2022-03-01 PROCEDURE — 6360000002 HC RX W HCPCS

## 2022-03-01 PROCEDURE — 93005 ELECTROCARDIOGRAM TRACING: CPT | Performed by: INTERNAL MEDICINE

## 2022-03-01 PROCEDURE — 94761 N-INVAS EAR/PLS OXIMETRY MLT: CPT

## 2022-03-01 RX ORDER — FUROSEMIDE 10 MG/ML
40 INJECTION INTRAMUSCULAR; INTRAVENOUS ONCE
Status: COMPLETED | OUTPATIENT
Start: 2022-03-01 | End: 2022-03-01

## 2022-03-01 RX ORDER — AMLODIPINE BESYLATE 10 MG/1
10 TABLET ORAL DAILY
Status: DISCONTINUED | OUTPATIENT
Start: 2022-03-01 | End: 2022-03-10

## 2022-03-01 RX ADMIN — POLYVINYL ALCOHOL 1 DROP: 14 SOLUTION/ DROPS OPHTHALMIC at 05:50

## 2022-03-01 RX ADMIN — POLYETHYLENE GLYCOL 3350 17 G: 17 POWDER, FOR SOLUTION ORAL at 08:32

## 2022-03-01 RX ADMIN — PANTOPRAZOLE SODIUM 40 MG: 40 INJECTION, POWDER, FOR SOLUTION INTRAVENOUS at 08:27

## 2022-03-01 RX ADMIN — AZATHIOPRINE 75 MG: 50 TABLET ORAL at 08:24

## 2022-03-01 RX ADMIN — AMIODARONE HYDROCHLORIDE 0.5 MG/MIN: 50 INJECTION, SOLUTION INTRAVENOUS at 03:20

## 2022-03-01 RX ADMIN — ATORVASTATIN CALCIUM 80 MG: 80 TABLET, FILM COATED ORAL at 08:23

## 2022-03-01 RX ADMIN — PROPOFOL 20 MCG/KG/MIN: 10 INJECTION, EMULSION INTRAVENOUS at 02:08

## 2022-03-01 RX ADMIN — ASPIRIN 81 MG: 81 TABLET, CHEWABLE ORAL at 08:24

## 2022-03-01 RX ADMIN — CARVEDILOL 25 MG: 25 TABLET, FILM COATED ORAL at 08:25

## 2022-03-01 RX ADMIN — POLYVINYL ALCOHOL 1 DROP: 14 SOLUTION/ DROPS OPHTHALMIC at 02:45

## 2022-03-01 RX ADMIN — POLYVINYL ALCOHOL 1 DROP: 14 SOLUTION/ DROPS OPHTHALMIC at 17:54

## 2022-03-01 RX ADMIN — SODIUM CHLORIDE, PRESERVATIVE FREE 10 ML: 5 INJECTION INTRAVENOUS at 20:58

## 2022-03-01 RX ADMIN — PROPOFOL 15 MCG/KG/MIN: 10 INJECTION, EMULSION INTRAVENOUS at 08:50

## 2022-03-01 RX ADMIN — POLYVINYL ALCOHOL 1 DROP: 14 SOLUTION/ DROPS OPHTHALMIC at 11:03

## 2022-03-01 RX ADMIN — POLYVINYL ALCOHOL 1 DROP: 14 SOLUTION/ DROPS OPHTHALMIC at 21:27

## 2022-03-01 RX ADMIN — METOCLOPRAMIDE 5 MG: 5 INJECTION, SOLUTION INTRAMUSCULAR; INTRAVENOUS at 08:28

## 2022-03-01 RX ADMIN — AMLODIPINE BESYLATE 10 MG: 10 TABLET ORAL at 13:12

## 2022-03-01 RX ADMIN — Medication 14.1 UNITS/KG/HR: at 14:59

## 2022-03-01 RX ADMIN — CARVEDILOL 25 MG: 25 TABLET, FILM COATED ORAL at 17:03

## 2022-03-01 RX ADMIN — PROPOFOL 25 MCG/KG/MIN: 10 INJECTION, EMULSION INTRAVENOUS at 22:24

## 2022-03-01 RX ADMIN — PROPOFOL 25 MCG/KG/MIN: 10 INJECTION, EMULSION INTRAVENOUS at 15:57

## 2022-03-01 RX ADMIN — CHLORHEXIDINE GLUCONATE 0.12% ORAL RINSE 15 ML: 1.2 LIQUID ORAL at 08:26

## 2022-03-01 RX ADMIN — Medication 10 MG: at 00:43

## 2022-03-01 RX ADMIN — DOCUSATE SODIUM LIQUID 100 MG: 100 LIQUID ORAL at 11:03

## 2022-03-01 RX ADMIN — POLYVINYL ALCOHOL 1 DROP: 14 SOLUTION/ DROPS OPHTHALMIC at 14:30

## 2022-03-01 RX ADMIN — FUROSEMIDE 40 MG: 10 INJECTION, SOLUTION INTRAMUSCULAR; INTRAVENOUS at 08:27

## 2022-03-01 RX ADMIN — SODIUM CHLORIDE, PRESERVATIVE FREE 10 ML: 5 INJECTION INTRAVENOUS at 08:28

## 2022-03-01 RX ADMIN — FUROSEMIDE 40 MG: 10 INJECTION, SOLUTION INTRAMUSCULAR; INTRAVENOUS at 17:53

## 2022-03-01 RX ADMIN — Medication 10 MG: at 14:24

## 2022-03-01 ASSESSMENT — PULMONARY FUNCTION TESTS
PIF_VALUE: 22
PIF_VALUE: 24
PIF_VALUE: 23
PIF_VALUE: 21
PIF_VALUE: 29
PIF_VALUE: 24
PIF_VALUE: 24
PIF_VALUE: 19
PIF_VALUE: 20
PIF_VALUE: 21
PIF_VALUE: 21
PIF_VALUE: 22
PIF_VALUE: 21
PIF_VALUE: 27
PIF_VALUE: 22
PIF_VALUE: 29
PIF_VALUE: 22
PIF_VALUE: 19
PIF_VALUE: 22
PIF_VALUE: 22
PIF_VALUE: 25
PIF_VALUE: 21
PIF_VALUE: 22
PIF_VALUE: 23
PIF_VALUE: 36
PIF_VALUE: 17
PIF_VALUE: 19
PIF_VALUE: 23
PIF_VALUE: 24
PIF_VALUE: 24
PIF_VALUE: 23
PIF_VALUE: 28
PIF_VALUE: 21
PIF_VALUE: 12
PIF_VALUE: 22
PIF_VALUE: 23
PIF_VALUE: 25
PIF_VALUE: 15
PIF_VALUE: 27
PIF_VALUE: 22
PIF_VALUE: 25
PIF_VALUE: 22
PIF_VALUE: 20
PIF_VALUE: 21
PIF_VALUE: 20
PIF_VALUE: 23
PIF_VALUE: 21
PIF_VALUE: 23
PIF_VALUE: 20
PIF_VALUE: 24
PIF_VALUE: 23
PIF_VALUE: 27
PIF_VALUE: 15
PIF_VALUE: 23
PIF_VALUE: 24
PIF_VALUE: 24

## 2022-03-01 NOTE — PROGRESS NOTES
[facility]   Progress Note         Date:   3/1/2022  Patient name: Cristopher Avendaño  Date of admission:  2/21/2022  9:13 PM  MRN:   [id number]  YOB: 1965  PCP: Jero Alaniz MD    Reason for Admission:  Cardiac arrest    Subjective:       Patient afebrile, hemodynamically stable. Patient is currently on propofol for sedation. Patient is on amiodarone and heparin drips. Labs reviewed  Creatinine 1.53, potassium 3.8. Hemoglobin stable  Platelets 014. Intake/output2.6/2.5  Net since admission 7 L positive. Nephrology on board. Neuro critical caresignificant improvement in neurological status. Brief history  C/C-V. fib cardiac arrest received 2 shocks, unknown downtime. Went into PEA cardiac arrest and received epi 1 time. Per EMS, initial EKG concerning for STEMI. However repeat EKG in the ED showed STEMI resolved. Interventional cardiology was involved and recommended no due to poor neurological status. EKG: Severe sinus bradycardia with rate 38. Mild ST elevations and T wave inversions in V2, V3, V4 (anterolateral leads). Previous cardiac history:  · CAD s/p CABG x4 in 2011  · Stress test 10/30/2018: Negative Lexiscan stress test  · Coronary angiography 10/30/2018: Patent LIMALAD and SVGRCA grafts. Occluded SVGOM1. · Echo 11/7/2020: EF 55%. Grade 1 DD.   Moderate LVH.     Past medical history:  · CKD stage IIIb with baseline creatinine 1.6-1.7  2/2 biopsy proven ANCA renal vasculitis  · Severe depression with history of I admission 7/2020  · COPD  · Current daily smoker  · Essential hypertension  · Gastroparesis    Medications:   Scheduled Meds:   [Held by provider] amLODIPine  10 mg Oral Daily    furosemide  40 mg IntraVENous Daily    carvedilol  25 mg Oral BID WC    fentanNYL  100 mcg IntraVENous Once    azaTHIOprine  75 mg Oral Daily    sodium chloride  500 mL IntraVENous Once    insulin lispro  0-3 Units SubCUTAneous Q6H    aspirin  81 mg Oral Daily    atorvastatin  80 mg Oral Daily    sodium chloride flush  5-40 mL IntraVENous 2 times per day    chlorhexidine  15 mL Mouth/Throat BID    polyvinyl alcohol  1 drop Both Eyes Q4H    pantoprazole  40 mg IntraVENous Daily       Continuous Infusions:   heparin (PORCINE) Infusion 14.1 Units/kg/hr (03/01/22 0700)    propofol 20 mcg/kg/min (03/01/22 0815)    sodium chloride 100 mL/hr at 02/25/22 1720    dextrose      amiodarone 0.5 mg/min (03/01/22 0700)       CBC:   Recent Labs     02/27/22 0422 02/28/22 0454 03/01/22 0615   WBC 6.8 4.6 4.8   HGB 10.7* 9.8* 9.5*   * 97* 103*     BMP:    Recent Labs     02/27/22 0425 02/28/22 0454 03/01/22  0615    141 139   K 3.7 3.5* 3.8    106 102   CO2 22 24 27   BUN 16 23* 23*   CREATININE 1.39* 1.51* 1.53*   GLUCOSE 96 113* 109*     Hepatic:   Recent Labs     02/27/22 0425 02/28/22 0454 03/01/22  0615   AST 25 25 26   ALT 16 13 13   BILITOT 1.12 0.89 0.73   ALKPHOS 160* 167* 163*       Objective:     Vitals: BP (!) 141/74   Pulse 64   Temp 99.3 °F (37.4 °C) (Bladder)   Resp 15   Ht 5' 10\" (1.778 m)   Wt 211 lb 10.3 oz (96 kg)   SpO2 96%   BMI 30.37 kg/m²      General:  Intubated and sedated, intermittently follows commands when off sedation. HEENT: Atraumatic, normocephalic, no throat congestion, moist mucosa.  light  Chest:   Ventilated breath sounds  Cardiac:  S1 S2 RR, no gallops, murmur or rubs. Abdomen: Soft, non-tender, no masses or organomegaly, BS present. :   No suprapubic or flank tenderness. Neuro:  Unable to assess  SKIN:  No rashes, good skin turgor. Extremities:  No edema. Assessment:     1. V. fib cardiac arrestunknown downtime. ROSC achieved after around 4 minutes of ACLS. 2. Acute coronary syndrome. 3. Severe bradycardia likely secondary sedation/paralytic/hypothermia -Resolved   4. Ischemic cardiomyopathy with EF 45 %  5. Improved neurological status.   6. Acute hypoxic hypercarbic respiratory failure secondary to cardiac arrest.  7. CAD s/p CABG x4 in 2011  8. CKD stage IIIb   9. COPD  10. Current daily smoker  11. Essential hypertension    Treatment Plan:     · Continue heparin. · EKG showed prolonged Qtc- will discontinue amiodarone drip  · Replace electrolytes, keep K>4 and Mg>2  · We will plan cardiac cath once okay with nephrology and family   · D/w nephro- if cath not urgent prefer to discuss risks of ULICES with patient and family  · At this time cath is not urgent as he is hemodynamically stable without any significant hypotension or arrhythmias  · Will monitor closely in case any change in status. · Continue coreg to 25 mg BID. Continue aspirin and high intensity statin. · Diuretics per nephrology. · Will need AICD prior to d/c for secondary prevention (after cardiac cath    [facility]  Internal Medicine Resident  [phone]   3/1/2022 8:29 AM     Attending Cardiologist Addendum: I have reviewed and performed the history, physical, subjective, objective, assessment, and plan with the student/resident/fellow/APN and agree with the note. I performed the history and physical personally. I have made changes to the note above as needed. D/w nephro- plan to allow patient to get extubated to discuss risks of ULICES with him and family, unless cath is deemed urgent. At this time cath is not urgent as he is hemodynamically stable without any significant hypotension or arrhythmias. Will monitor closely in case any change in status. Will need AICD eval prior to d/c for secondary prevention    Thank you for allowing me to participate in the care of this patient, please do not hesitate to call if you have any questions. Christina Fenton DO, [facility] [place], [address], [address], [place] [address] [facility]  [facility] [place]  [phone]

## 2022-03-01 NOTE — PLAN OF CARE
Problem: OXYGENATION/RESPIRATORY FUNCTION  Goal: Patient will maintain patent airway  2/28/2022 2328 by Law Rueda RN  Outcome: Ongoing  2/28/2022 1719 by Colby Arita RN  Outcome: Ongoing  Goal: Patient will achieve/maintain normal respiratory rate/effort  Description: Respiratory rate and effort will be within normal limits for the patient  2/28/2022 2328 by Law Rueda RN  Outcome: Ongoing  2/28/2022 1719 by Colby Arita RN  Outcome: Ongoing     Problem: MECHANICAL VENTILATION  Goal: Patient will maintain patent airway  2/28/2022 2328 by Law Rueda RN  Outcome: Ongoing  2/28/2022 1719 by Colby Arita RN  Outcome: Ongoing  Goal: Oral health is maintained or improved  2/28/2022 2328 by Law Rueda RN  Outcome: Ongoing  2/28/2022 1719 by Colby Arita RN  Outcome: Ongoing  Goal: ET tube will be managed safely  2/28/2022 2328 by Law Rueda RN  Outcome: Ongoing  2/28/2022 1719 by Colby Arita RN  Outcome: Ongoing  Goal: Ability to express needs and understand communication  2/28/2022 2328 by Law Rueda RN  Outcome: Ongoing  2/28/2022 1719 by Colby Arita RN  Outcome: Ongoing  Goal: Mobility/activity is maintained at optimum level for patient  2/28/2022 2328 by Law Rueda RN  Outcome: Ongoing  2/28/2022 1719 by Colby Arita RN  Outcome: Ongoing     Problem: SKIN INTEGRITY  Goal: Skin integrity is maintained or improved  2/28/2022 2328 by Law Rueda RN  Outcome: Ongoing  2/28/2022 1719 by Colby Arita RN  Outcome: Ongoing     Problem: Confusion - Acute:  Goal: Absence of continued neurological deterioration signs and symptoms  Description: Absence of continued neurological deterioration signs and symptoms  2/28/2022 2328 by Law Rueda RN  Outcome: Ongoing  2/28/2022 1719 by Colby Arita RN  Outcome: Ongoing  Goal: Mental status will be restored to baseline  Description: Mental status will be restored to baseline  2/28/2022 2328 by Law Rueda RN  Outcome: Ongoing  2/28/2022 1719 by Laurita Ibrahim RN  Outcome: Ongoing     Problem: Discharge Planning:  Goal: Ability to perform activities of daily living will improve  Description: Ability to perform activities of daily living will improve  2/28/2022 2328 by Ruth Cook RN  Outcome: Ongoing  2/28/2022 1719 by Laurita Ibrahim RN  Outcome: Ongoing  Goal: Participates in care planning  Description: Participates in care planning  2/28/2022 2328 by Ruth Cook RN  Outcome: Ongoing  2/28/2022 1719 by Laurita Ibrahim RN  Outcome: Ongoing     Problem: Injury - Risk of, Physical Injury:  Goal: Absence of physical injury  Description: Absence of physical injury  2/28/2022 2328 by Ruth Cook RN  Outcome: Ongoing  2/28/2022 1719 by Laurita Ibrahim RN  Outcome: Ongoing  Goal: Will remain free from falls  Description: Will remain free from falls  2/28/2022 2328 by Ruth Cook RN  Outcome: Ongoing  2/28/2022 1719 by Laurita Ibrahim RN  Outcome: Ongoing     Problem: Mood - Altered:  Goal: Mood stable  Description: Mood stable  2/28/2022 2328 by Ruth Cook RN  Outcome: Ongoing  2/28/2022 1719 by Laurita Ibrahim RN  Outcome: Ongoing  Goal: Absence of abusive behavior  Description: Absence of abusive behavior  2/28/2022 2328 by Ruth Cook RN  Outcome: Ongoing  2/28/2022 1719 by Laurita Ibrahim RN  Outcome: Ongoing  Goal: Verbalizations of feeling emotionally comfortable while being cared for will increase  Description: Verbalizations of feeling emotionally comfortable while being cared for will increase  2/28/2022 2328 by Ruth Cook RN  Outcome: Ongoing  2/28/2022 1719 by Laurita Ibrahim RN  Outcome: Ongoing     Problem: Psychomotor Activity - Altered:  Goal: Absence of psychomotor disturbance signs and symptoms  Description: Absence of psychomotor disturbance signs and symptoms  2/28/2022 2328 by Ruth Cook RN  Outcome: Ongoing  2/28/2022 1719 by Laurita Ibrahim RN  Outcome: Ongoing     Problem: Sensory Perception - Impaired:  Goal: Demonstrations of improved sensory functioning will increase  Description: Demonstrations of improved sensory functioning will increase  2/28/2022 2328 by Shun John RN  Outcome: Ongoing  2/28/2022 1719 by Luis Stallworth RN  Outcome: Ongoing  Goal: Decrease in sensory misperception frequency  Description: Decrease in sensory misperception frequency  2/28/2022 2328 by Shun John RN  Outcome: Ongoing  2/28/2022 1719 by Luis Stallworth RN  Outcome: Ongoing  Goal: Able to refrain from responding to false sensory perceptions  Description: Able to refrain from responding to false sensory perceptions  2/28/2022 2328 by Shun John RN  Outcome: Ongoing  2/28/2022 1719 by Luis Stallworth RN  Outcome: Ongoing  Goal: Demonstrates accurate environmental perceptions  Description: Demonstrates accurate environmental perceptions  2/28/2022 2328 by Shun John RN  Outcome: Ongoing  2/28/2022 1719 by Luis Stallworth RN  Outcome: Ongoing  Goal: Able to distinguish between reality-based and nonreality-based thinking  Description: Able to distinguish between reality-based and nonreality-based thinking  2/28/2022 2328 by Shun John RN  Outcome: Ongoing  2/28/2022 1719 by Luis Stallworth RN  Outcome: Ongoing  Goal: Able to interrupt nonreality-based thinking  Description: Able to interrupt nonreality-based thinking  2/28/2022 2328 by Shun John RN  Outcome: Ongoing  2/28/2022 1719 by Luis Stallworth RN  Outcome: Ongoing     Problem: Sleep Pattern Disturbance:  Goal: Appears well-rested  Description: Appears well-rested  2/28/2022 2328 by Shun John, RN  Outcome: Ongoing  2/28/2022 1719 by Luis Stallworth RN  Outcome: Ongoing     Problem: Nutrition  Goal: Optimal nutrition therapy  2/28/2022 2328 by Shun John RN  Outcome: Ongoing  2/28/2022 1719 by Luis Stallworth RN  Outcome: Ongoing  2/28/2022 1711 by Dorothy Galeano RD, LD  Outcome: Ongoing  Note: Nutrition Problem #1: Inadequate oral intake  Intervention: Food and/or Nutrient Delivery: Modify Tube Feeding (decrease TF rate to 60 mL/hr while on propofol at current rate)  Nutritional Goals: meet % of estimated nutrient needs       Problem: Falls - Risk of:  Goal: Absence of physical injury  Description: Absence of physical injury  2/28/2022 2328 by Madhavi Buckner RN  Outcome: Ongoing  2/28/2022 1719 by Flakita Osorio RN  Outcome: Ongoing  Goal: Will remain free from falls  Description: Will remain free from falls  2/28/2022 2328 by Madhavi Buckner RN  Outcome: Ongoing  2/28/2022 1719 by Flakita Osorio RN  Outcome: Ongoing     Problem: Skin Integrity:  Goal: Will show no infection signs and symptoms  Description: Will show no infection signs and symptoms  2/28/2022 2328 by Madhavi Buckner RN  Outcome: Ongoing  2/28/2022 1719 by Flakita Osorio RN  Outcome: Ongoing  Goal: Absence of new skin breakdown  Description: Absence of new skin breakdown  2/28/2022 2328 by Madhavi Buckner RN  Outcome: Ongoing  2/28/2022 1719 by Flakita Osorio RN  Outcome: Ongoing     Problem: Non-Violent Restraints  Goal: No harm/injury to patient while restraints in use  2/28/2022 2328 by Madhavi Buckner RN  Outcome: Ongoing  2/28/2022 1719 by Flakita Osorio RN  Outcome: Ongoing  Goal: Patient's dignity will be maintained  2/28/2022 2328 by Madhavi Buckner RN  Outcome: Ongoing  2/28/2022 1719 by Flakita Osorio RN  Outcome: Ongoing     Problem: Non-Violent Restraints  Goal: Removal from restraints as soon as assessed to be safe  2/28/2022 2328 by Madhavi Buckner RN  Outcome: Not Met This Shift  2/28/2022 1719 by Flakita Osorio RN  Outcome: Ongoing

## 2022-03-01 NOTE — CARE COORDINATION
Stephan Aguilar Quality Flow/Interdisciplinary Rounds Progress Note    Quality Flow Rounds held on March 1, 2022 at [address] Attending:  Bedside Nurse, ,  and Nursing Unit Leadership    Barriers to Discharge: Intubated    Anticipated Discharge Date:       Anticipated Discharge Disposition: SNF vs LTACH    Readmission Risk              Risk of Unplanned Readmission:  34           Discussed patient goal for the day, patient clinical progression, and barriers to discharge. The following Goal(s) of the Day/Commitment(s) have been identified:  Activity Progression  Transitional Care Plan and Choice - Obtain Post-Acute Preference(s)    Patient remains intubated, is weaning but not yet ready for extubation. Family reviewing SNF and [facility] lists. Contacted patient's daughter Saira Guevara ([phone]) to discuss options. She would like a referral placed to [facility] and also to [facility]. She will continue to review lists to provide additional choices if needed.        Lieutenant Cipriano RN  March 1, 2022

## 2022-03-01 NOTE — PROGRESS NOTES
Critical Care Team - Daily Progress Note      Date and time: 3/1/2022 7:10 AM  Patient's name:  Lizbeth New Dr Record Number: [id number]  Patient's account/billing number: [de-identified]  Patient's YOB: 1965  Age: 60 y.o. Date of Admission: 2/21/2022  9:13 PM  Length of stay during current admission: 7      Primary Care Physician: Kashmir Boyd MD  ICU Attending Physician: Dr. Elise Knowles Status: Full Code    Reason for ICU admission:   Chief Complaint   Patient presents with    Cardiac Arrest       Subjective:     OVERNIGHT EVENTS:       VSS, febrile with T max of 100.2 overnight. BP- 141/74, HR-67 normal sinus rhythm. Patient became hypertensive overnight and received a dose of labetalol 10 mg. Ventilator settings: PRVC/15/580/5/35  Patient is off Precedex. Patient is on propofol. ABG: ph: 7.40, PCO2 47.3, PO2 64.5, hCO3-29.6  Continues to be on amiodarone and heparin drip. Potassium-3.8  Creatinine trending up TO 1.53, glucose- 109, platelet ROSEV-113. Patient continues to improve Neurologically. On sedation holiday patient intermittently follows commands. Patient also becomes hypertensive when trying sedation holiday. MRI unremarkable. LTME showing moderate to severe encephalopathy. Neuro critical care signed off. Patient has failed his weaning due to excessive cough. Nephrology following, recommended to continue IV lasix daily. Cardiology following    Patient has been having urine output at the rate of 50-60 ml/hour. Patient has been having residuals - 320. Tube feeds has been decreased to 40. Colace liquid ordered.            AWAKE & FOLLOWING COMMANDS:  [] No   [x] Yes    CURRENT VENTILATION STATUS:     [x] Ventilator  [] BIPAP  [] Nasal Cannula [] Room Air      IF INTUBATED, ET TUBE MARKING AT LOWER LIP:       cms    SECRETIONS Amount:  [] Small [x] Moderate  [] Large  [] None  Color:     [] White [] Colored  [] Bloody    SEDATION:  RAAS Score:  [x] Propofol gtt  [] Versed gtt  [] Ativan gtt   [] No Sedation    PARALYZED:  [x] No    [] Yes    DIARRHEA:                [x] No                [] Yes  (C. Difficile status: [] positive                                                                                                                       [] negative                                                                                                                     [] pending)    VASOPRESSORS:  [x] No    [] Yes    If yes -   [] Levophed       [] Dopamine     [] Vasopressin       [] Dobutamine  [] Phenylephrine         [] Epinephrine    CENTRAL LINES:     [x] No   [] Yes   (Date of Insertion:   )           If yes -     [] Right IJ     [] Left IJ [] Right Femoral [] Left Femoral                   [] Right Subclavian [] Left Subclavian       ODELL'S CATHETER:   [] No   [x] Yes  (Date of Insertion:   )     URINE OUTPUT:            [x] Good   [] Low              [] Anuric    REVIEW OF SYSTEMS:  Unable to obtain due to patient's condition. HISTORY OF PRESENT ILLNESS:   Nirali See a 60 y. o. with PMH of CAD s/p CABG x 3 in  2011 and Cath 10/2018 with patent LIMA to LAD and SVG to RCA but occluded SVG to OM1 who presented to the emergency department with cardiac arrest. Patient was at home when a family member heard the patient fall upstairs however was unable to check on the patient. EMS was called and patient was found to be in V. Fib arrest. ACLS was initiated and patient received two shocks and ROSC was achieved. On the way out of the patient's home patient went into PEA arrest. Compressions were resumed and epinephrine was given and ROSC was achieved. Unknown down time.      In the emergency department patient was intubated and sedated on propofol. Hemodynamically stable off pressors. Labs demonstrated elevated creatinine (1.67), lactic acidosis (3.0), leukocytosis (20.2), EKG was concerning for STEMI with ST elevations in V3, V4 and V5.  Cardiology was consulted Intake 2648.31 ml   Output 2595 ml   Net 53.31 ml     Date 03/01/22 0000 - 03/01/22 2359   Shift 1559-6082 7964-1404 2559-3742 24 Hour Total   INTAKE   I.V.(mL/kg) 331.2(3.4)   331.2(3.4)   NG/GT(mL/kg) 478(5)   478(5)   Shift Total(mL/kg) 809.2(8.4)   809.2(8.4)   OUTPUT   Urine(mL/kg/hr) 425   425   Shift Total(mL/kg) 425(4.4)   425(4.4)   Weight (kg) 96 96 96 96     Wt Readings from Last 3 Encounters:   03/01/22 211 lb 10.3 oz (96 kg)   01/17/22 207 lb 6.4 oz (94.1 kg)   10/25/21 210 lb (95.3 kg)     Body mass index is 30.37 kg/m². PHYSICAL EXAM:  Constitutional: intubated, sedated  HEENT: PERRLA, sclera clear, anicteric  Respiratory: clear to auscultation, no wheezes. Cardiovascular: regular rate and rhythm, normal S1, S2, no murmur noted and 2+ pulses throughout  Abdomen: soft, nontender, nondistended, no masses or organomegaly  NEUROLOGIC: Patient is intubated and sedated. On sedation holiday, patient intermittently follows commands. .  Extremities:  peripheral pulses normal, no pedal edema,.       Any additional physical findings:      MEDICATIONS:  Scheduled Meds:   [Held by provider] amLODIPine  10 mg Oral Daily    furosemide  40 mg IntraVENous Daily    carvedilol  25 mg Oral BID     fentanNYL  100 mcg IntraVENous Once    azaTHIOprine  75 mg Oral Daily    sodium chloride  500 mL IntraVENous Once    insulin lispro  0-3 Units SubCUTAneous Q6H    aspirin  81 mg Oral Daily    atorvastatin  80 mg Oral Daily    sodium chloride flush  5-40 mL IntraVENous 2 times per day    chlorhexidine  15 mL Mouth/Throat BID    polyvinyl alcohol  1 drop Both Eyes Q4H    pantoprazole  40 mg IntraVENous Daily     Continuous Infusions:   heparin (PORCINE) Infusion 14.1 Units/kg/hr (03/01/22 0630)    propofol 25 mcg/kg/min (03/01/22 0630)    sodium chloride 100 mL/hr at 02/25/22 1720    dextrose      amiodarone 0.5 mg/min (03/01/22 0630)     PRN Meds:   metoclopramide, 5 mg, Q6H PRN  labetalol, 10 mg, Q6H PRN  sodium chloride flush, 5-40 mL, PRN  sodium chloride, 25 mL, PRN  ondansetron, 4 mg, Q8H PRN   Or  ondansetron, 4 mg, Q6H PRN  polyethylene glycol, 17 g, Daily PRN  acetaminophen, 650 mg, Q6H PRN   Or  acetaminophen, 650 mg, Q6H PRN  glucose, 15 g, PRN  glucagon (rDNA), 1 mg, PRN  dextrose, 100 mL/hr, PRN  dextrose bolus (hypoglycemia), 125 mL, PRN   Or  dextrose bolus (hypoglycemia), 250 mL, PRN  ipratropium-albuterol, 1 ampule, Q6H PRN  heparin (porcine), 4,000 Units, PRN  heparin (porcine), 2,000 Units, PRN        SUPPORT DEVICES: [] Ventilator [] BIPAP  [] Nasal Cannula [] Room Air    VENT SETTINGS (Comprehensive) (if applicable):  Vent Information  $Ventilation: $Subsequent Day  Skin Assessment: Clean, dry, & intact  Suction Catheter Diameter: 14  Equipment ID: [id number]  Equipment Changed: HME  Vent Type: Servo i  Vent Mode: PRVC  Vt Ordered: 580 mL  Rate Set: 15 bmp  Pressure Support: 10 cmH20  FiO2 : 35 %  SpO2: 95 %  SpO2/FiO2 ratio: 271.43  Sensitivity: 3  PEEP/CPAP: 5  I Time/ I Time %: 0.9 s  Humidification Source: HME  Nitric Oxide/Epoprostenol In Use?: No  Additional Respiratory  Assessments  Pulse: 64  Resp: 15  SpO2: 95 %  End Tidal CO2: 33 (%)  Position: Semi-Willis's  Humidification Source: HME  Oral Care Completed?: Yes  Oral Care: Mouthwash,Lip moisturizer applied,Mouth moisturizer,Mouth suctioned  Subglottic Suction Done?: Yes  Cuff Pressure (cm H2O):  (mov)    ABGs:     Lab Results   Component Value Date    PHART 7.430 12/09/2019    SSP1HCW 32.7 12/09/2019    PO2ART 97.5 12/09/2019    XKW9NSM 21.7 12/09/2019    A8EPKPMD 95.6 12/09/2019    FIO2 30.0 03/01/2022     Lactic Acid:   Lab Results   Component Value Date    LACTA 1.5 03/02/2020    LACTA 1.0 12/10/2019    LACTA 1.5 11/04/2019         DATA:  Complete Blood Count:   Recent Labs     02/27/22  0422 02/28/22  0454 03/01/22  0615   WBC 6.8 4.6 4.8   HGB 10.7* 9.8* 9.5*   MCV 91.3 91.3 90.8   * 97* 103*   RBC 3.55* 3.33* 3.14* HCT 32.4* 30.4* 28.5*   MCH 30.1 29.4 30.3   MCHC 33.0 32.2 33.3   RDW 17.6* 17.7* 17.9*   MPV 10.7 11.0 11.0        PT/INR:    Lab Results   Component Value Date    PROTIME 12.3 02/22/2022    PROTIME 10.6 12/19/2011    INR 1.2 02/22/2022     PTT:    Lab Results   Component Value Date    APTT 66.5 03/01/2022       Basal Metabolic Profile:   Recent Labs     02/27/22  0425 02/28/22  0454 03/01/22  0615    141 139   K 3.7 3.5* 3.8   BUN 16 23* 23*   CREATININE 1.39* 1.51* 1.53*    106 102   CO2 22 24 27      Magnesium:   Lab Results   Component Value Date    MG 2.0 02/28/2022    MG 2.1 02/24/2022    MG 1.9 02/23/2022     Phosphorus:   Lab Results   Component Value Date    PHOS 2.9 02/24/2022    PHOS 3.9 02/23/2022    PHOS 4.2 02/23/2022     S. Calcium:  Recent Labs     03/01/22  0615   CALCIUM 7.7*     S. Ionized Calcium:No results for input(s): IONCA in the last 72 hours. Urinalysis:   Lab Results   Component Value Date    NITRU NEGATIVE 02/25/2022    COLORU Yellow 02/25/2022    PHUR 5.5 02/25/2022    WBCUA 2 TO 5 02/25/2022    RBCUA 5 TO 10 02/25/2022    MUCUS NOT REPORTED 11/06/2020    TRICHOMONAS NOT REPORTED 11/06/2020    YEAST NOT REPORTED 11/06/2020    BACTERIA NOT REPORTED 11/06/2020    CLARITYU clear 09/24/2020    SPECGRAV 1.025 02/25/2022    LEUKOCYTESUR NEGATIVE 02/25/2022    UROBILINOGEN Normal 02/25/2022    BILIRUBINUR NEGATIVE 02/25/2022    BLOODU +++ 09/24/2020    GLUCOSEU NEGATIVE 02/25/2022    KETUA TRACE 02/25/2022    AMORPHOUS NOT REPORTED 11/06/2020       CARDIAC ENZYMES: No results for input(s): CKMB, CKMBINDEX, TROPONINI in the last 72 hours. Invalid input(s): CKTOTAL;3  BNP: No results for input(s): BNP in the last 72 hours. LFTS  Recent Labs     02/27/22  0425 02/28/22  0454 03/01/22  0615   ALKPHOS 160* 167* 163*   ALT 16 13 13   AST 25 25 26   BILITOT 1.12 0.89 0.73   LABALBU 2.7* 2.7* 2.6*       AMYLASE/LIPASE/AMMONIA  No results for input(s):  AMYLASE, LIPASE, AMMONIA in the last 72 hours. Last 3 Blood Glucose:   Recent Labs     02/27/22  0425 02/28/22  0454 03/01/22  0615   GLUCOSE 96 113* 109*      HgBA1c:    Lab Results   Component Value Date    LABA1C 5.1 04/21/2021         TSH:    Lab Results   Component Value Date    TSH 2.00 02/22/2022     ANEMIA STUDIES  No results for input(s): LABIRON, TIBC, FERRITIN, NADACNWN40, FOLATE, OCCULTBLD in the last 72 hours. Cultures during this admission:     Blood cultures:                 [] None drawn      [] Negative             []  Positive (Details:  )  Urine Culture:                   [] None drawn      [] Negative             []  Positive (Details:  )  Sputum Culture:               [] None drawn       [] Negative             []  Positive (Details:  )   Endotracheal aspirate:     [] None drawn       [] Negative             []  Positive (Details:  )        ASSESSMENT:     Principal Problem:    Cardiac arrest ([facility] [address]  Active Problems:    Cervical stenosis of spinal canal  Resolved Problems:    * No resolved hospital problems. *        PLAN:     NEURO  -currently intubated and sedated.  Will wean off sedation and will try to wean off the ventilator as tolerated  -Neuro crit signed off, mentation improving. -LTM E shows moderate to severe encephalopathy  -Neurosurgery was consulted for cervical stenosis, no acute intervention at this point.     CARDIOVASCULAR  -Hemodynamically stable.  Off pressor support. -S/p V. fib arrest with history of CAD s/p CABGx4 with EF -45% Cardiology following.  Continue heparin drip, amiodarone drip, aspirin, Lipitor, Coreg 25 mg twice daily. Patient is also receiving furosemide injection 40 mg daily IV by nephrology. Cardiology will plan on doing cardiac cath once neurologically stable. -H/o HTN. Cont coreg.   -H/o HLD.  Cont lipitor     RESP  -Acute hypoxic respiratory failure likely secondary to aspiration pneumonia.   Unasyn stopped yesterday.  Maintain oxygen saturation above 92%.  Continue mechanical ventilation, will wean as tolerated.  Pulmonary toilet     GI   -Keep NPO. Cont TF. Protonix for GI ppx. Check for residuals. Reglan as needed for high residuals     RENAL  -ARON on CKD Stage 4. Monitor UOP. Cr trending up. [facility]. Nephrology following, appreciate recommendations. Replace electrolytes as needed     HEME  -Hb stable     ENDOCRINE  -Glucose well controlled. On LDSSI. Hypoglycemia protocol in place.      ID  -Aspiration pneumonia. Patient received Unasyn from 2/22/2022 to 2/28/2022. Patient afebrile and WBC WNL     DVT PPX: Heparin drip  GI PPX: Protonix  DIET: Tube feedings could be resumed if low residuals.      DISPOSITION: Monitor in Michelle Adkins MD, MRazaD.              Department of Internal Medicine/ Critical care  [address] [facility] ([place])             3/1/2022, 7:10 AM

## 2022-03-01 NOTE — PROGRESS NOTES
NEPHROLOGY PROGRESS NOTE      SUBJECTIVE     61 y/o male patient with past medical history of CAD s/p CABG x 3 presented to the ED with V Fib cardiac arrest, s/p defib x 2 with ROSC. Patient again went into PEA cardiac arrest and ROSC was achieved, unknown down time. EKG concerning for NSTEMI, was started on heparin and amiodarone and did not undergo cath due to concern for anoxic brain injury. MRI revealed early changes of hypoxic brain injury, but Neurological exam improving. Seen and examined. Continues on mechanical ventilation. Settings noted. Patient is currently on propofol for sedation. Continues to be on amiodarone and heparin drips. Tolerating enteral feedings. No overnight issues reported by nursing staff  Blood pressure stable. Urine output excellent with once a day dose of Lasix. Still in positive cumulative balance. Renal function at baseline. OBJECTIVE     Vitals:    03/01/22 0751 03/01/22 0800 03/01/22 0805 03/01/22 0806   BP:       Pulse:  65 65 64   Resp:       Temp:  99.3 °F (37.4 °C)     TempSrc:  Bladder     SpO2: 98%  96% 96%   Weight:       Height:         24HR INTAKE/OUTPUT:      Intake/Output Summary (Last 24 hours) at 3/1/2022 0925  Last data filed at 3/1/2022 0811  Gross per 24 hour   Intake 2252.52 ml   Output 2575 ml   Net -322.48 ml       General appearance: Intubated and sedated, neuro exam improving  HEENT: Unremarkable. Respiratory::vesicular breath sounds,no wheeze/crackles  Cardiovascular:S1 S2 normal,no gallop or organic murmur. Abdomen:Non tender/non distended. Bowel sounds present  Extremities: No Cyanosis or Clubbing, present lower extremity edema  Neurological: Intubated and sedated.      MEDICATIONS     Scheduled Meds:    docusate  100 mg Oral Daily    furosemide  40 mg IntraVENous Once    [Held by provider] amLODIPine  10 mg Oral Daily    furosemide  40 mg IntraVENous Daily    carvedilol  25 mg Oral BID WC    fentanNYL  100 mcg IntraVENous Once    azaTHIOprine  75 mg Oral Daily    sodium chloride  500 mL IntraVENous Once    insulin lispro  0-3 Units SubCUTAneous Q6H    aspirin  81 mg Oral Daily    atorvastatin  80 mg Oral Daily    sodium chloride flush  5-40 mL IntraVENous 2 times per day    chlorhexidine  15 mL Mouth/Throat BID    polyvinyl alcohol  1 drop Both Eyes Q4H    pantoprazole  40 mg IntraVENous Daily     Continuous Infusions:    heparin (PORCINE) Infusion 14.1 Units/kg/hr (03/01/22 0700)    propofol 15 mcg/kg/min (03/01/22 0850)    sodium chloride 100 mL/hr at 02/25/22 1720    dextrose      amiodarone 0.5 mg/min (03/01/22 0700)     PRN Meds:  metoclopramide, labetalol, sodium chloride flush, sodium chloride, ondansetron **OR** ondansetron, polyethylene glycol, acetaminophen **OR** acetaminophen, glucose, glucagon (rDNA), dextrose, dextrose bolus (hypoglycemia) **OR** dextrose bolus (hypoglycemia), ipratropium-albuterol, heparin (porcine), heparin (porcine)  Home Meds:                Medications Prior to Admission: magnesium oxide (MAG-OX) 400 (241.3 Mg) MG TABS tablet,   atorvastatin (LIPITOR) 40 MG tablet, TAKE 1 TABLET BY MOUTH DAILY  magnesium oxide (MAG-OX) 400 (240 Mg) MG tablet, TAKE 1 TABLET BY MOUTH 2 TIMES DAILY  traZODone (DESYREL) 100 MG tablet, Take 0.5 tablets by mouth nightly as needed for Sleep  DULoxetine (CYMBALTA) 30 MG extended release capsule, TAKE 1 CAPSULE BY MOUTH DAILY  lisinopril (PRINIVIL;ZESTRIL) 5 MG tablet, take 1 tablet by mouth once daily  spironolactone (ALDACTONE) 25 MG tablet, take 1 tablet by mouth once daily  metoclopramide (REGLAN) 10 MG tablet, Take 1 tablet by mouth 3 times daily  isosorbide mononitrate (IMDUR) 30 MG extended release tablet, Take 1 tablet by mouth daily  nitroGLYCERIN (NITROSTAT) 0.4 MG SL tablet, Place 1 tablet under the tongue every 5 minutes as needed for Chest pain up to max of 3 total doses. If no relief after 1 dose, call 911.  (Patient not taking: Reported on 1/17/2022)  cloNIDine (CATAPRES) 0.2 MG tablet, Take 1 tablet by mouth 2 times daily  metoprolol tartrate (LOPRESSOR) 25 MG tablet, Take 1 tablet by mouth 2 times daily  magnesium oxide (MAG-OX) 400 (240 Mg) MG tablet,   pantoprazole (PROTONIX) 40 MG tablet, Take 1 tablet by mouth daily  magnesium oxide (MAG-OX) 400 MG tablet, Take 1 tablet by mouth 2 times daily (Patient not taking: Reported on 10/25/2021)  aspirin EC 81 MG EC tablet, Take 1 tablet by mouth daily  nicotine (NICODERM CQ) 21 MG/24HR, Place 1 patch onto the skin daily  acetaminophen (TYLENOL) 325 MG tablet, Take 2 tablets by mouth every 6 hours as needed for Pain  Umeclidinium Bromide 62.5 MCG/INH AEPB, Inhale 1 puff into the lungs daily (Patient not taking: Reported on 1/17/2022)  vitamin D3 (CHOLECALCIFEROL) 10 MCG (400 UNIT) TABS tablet, take 2 tablets (800MG) by mouth once daily (Patient not taking: Reported on 4/21/2021)  vitamin D3 (CHOLECALCIFEROL) 10 MCG (400 UNIT) TABS tablet, take 2 tablets (800MG) by mouth once daily (Patient not taking: Reported on 1/17/2022)  Fluticasone furoate-vilanterol (BREO ELLIPTA) 200-25 MCG/INH AEPB inhaler, Inhale 1 puff into the lungs daily (Patient not taking: Reported on 1/17/2022)  calcium carbonate-vitamin D3 (CALCIUM 600-D) 600-400 MG-UNIT TABS per tab, Take 1 tablet by mouth 2 times daily  azaTHIOprine (IMURAN) 50 MG tablet, Take 1.5 tablets by mouth daily  albuterol sulfate  (90 Base) MCG/ACT inhaler, inhale 2 puffs by mouth every 6 hours if needed for wheezing  nicotine (NICODERM CQ) 14 MG/24HR, Place 1 patch onto the skin daily (Patient not taking: Reported on 1/17/2022)  traMADol (ULTRAM) 50 MG tablet, Take 50 mg by mouth every 8 hours as needed for Pain.   (Patient not taking: Reported on 1/17/2022)  OLANZapine (ZYPREXA) 5 MG tablet, Take 1 tablet by mouth nightly    INVESTIGATIONS     Last 3 CMP:    Recent Labs     02/27/22  0425 02/28/22  0454 03/01/22  0615    141 139   K 3.7 3.5* 3.8  106 102   CO2 22 24 27   BUN 16 23* 23*   CREATININE 1.39* 1.51* 1.53*   CALCIUM 8.1* 7.8* 7.7*   PROT 5.1* 5.1* 5.1*   LABALBU 2.7* 2.7* 2.6*   BILITOT 1.12 0.89 0.73   ALKPHOS 160* 167* 163*   AST 25 25 26   ALT 16 13 13       Last 3 CBC:  Recent Labs     02/27/22  0422 02/28/22  0454 03/01/22  0615   WBC 6.8 4.6 4.8   RBC 3.55* 3.33* 3.14*   HGB 10.7* 9.8* 9.5*   HCT 32.4* 30.4* 28.5*   MCV 91.3 91.3 90.8   MCH 30.1 29.4 30.3   MCHC 33.0 32.2 33.3   RDW 17.6* 17.7* 17.9*   * 97* 103*   MPV 10.7 11.0 11.0       ASSESSMENT     1. 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 1.7-2, follows up with Dr. Vanessa Ledesma, on treatment with Imuran. 2. 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 V Fib and PEA cardiac arrest, unknown down timw  4. ? Hypoxic brain injury   5. H/O CABG  6. Respiratory failure, mechanical ventilation dependant  7. H/O HTN    PLAN     1. Continue I.V. lasix 40 mg daily. 1 additional dose of Lasix today  2. Continue Imuran  3. Avoid hydralazine use due to concern for ANCA vasculitis related to Hydralazine use. 4. Cr is back to baseline and UO is improving, continue to monitor BMP.    5.  We will follow    Please do not hesitate to call with questions    This note is created with the assistance of a speech-recognition program. While intending to generate a document that actually reflects the content of the visit, no guarantees can be provided that every mistake has been identified and corrected by editing    Shilpi Danielson MD

## 2022-03-01 NOTE — PLAN OF CARE
Problem: Non-Violent Restraints  Goal: Removal from restraints as soon as assessed to be safe  3/1/2022 4251 by Meg Echeverria RN  Outcome: Ongoing  2/28/2022 2328 by Karin Tadeo RN  Outcome: Not Met This Shift  Goal: No harm/injury to patient while restraints in use  3/1/2022 0812 by Meg Echeverria RN  Outcome: Ongoing  2/28/2022 2328 by Karin Tadeo RN  Outcome: Ongoing  Goal: Patient's dignity will be maintained  3/1/2022 0812 by Meg Echeverria RN  Outcome: Ongoing  2/28/2022 2328 by Karin Tadeo RN  Outcome: Ongoing

## 2022-03-01 NOTE — PLAN OF CARE
Problem: OXYGENATION/RESPIRATORY FUNCTION  Goal: Patient will maintain patent airway  3/1/2022 0423 by Homer Varghese RCP  Outcome: Ongoing     Problem: OXYGENATION/RESPIRATORY FUNCTION  Goal: Patient will achieve/maintain normal respiratory rate/effort  Description: Respiratory rate and effort will be within normal limits for the patient  3/1/2022 0423 by Yasmeen Varghese RCP  Outcome: Ongoing     Problem: MECHANICAL VENTILATION  Goal: Patient will maintain patent airway  3/1/2022 0423 by Homer Varghese RCP  Outcome: Ongoing     Problem: MECHANICAL VENTILATION  Goal: Oral health is maintained or improved  3/1/2022 0423 by Homer Varghese RCP  Outcome: Ongoing     Problem: MECHANICAL VENTILATION  Goal: ET tube will be managed safely  3/1/2022 0423 by Yasmeen Varghese RCP  Outcome: Ongoing     Problem: MECHANICAL VENTILATION  Goal: Ability to express needs and understand communication  3/1/2022 0423 by Yasmeen Varghese RCMARY  Outcome: Ongoing     Problem: MECHANICAL VENTILATION  Goal: Mobility/activity is maintained at optimum level for patient  3/1/2022 0423 by Homer Varghese RCP  Outcome: Ongoing     Problem: SKIN INTEGRITY  Goal: Skin integrity is maintained or improved  3/1/2022 0423 by Homer Varghese RCP  Outcome: Ongoing

## 2022-03-02 LAB
ABSOLUTE EOS #: 0.47 K/UL (ref 0–0.44)
ABSOLUTE IMMATURE GRANULOCYTE: 0.21 K/UL (ref 0–0.3)
ABSOLUTE LYMPH #: 1.47 K/UL (ref 1.1–3.7)
ABSOLUTE MONO #: 0.63 K/UL (ref 0.1–1.2)
ALBUMIN SERPL-MCNC: 2.9 G/DL (ref 3.5–5.2)
ALBUMIN/GLOBULIN RATIO: 1 (ref 1–2.5)
ALP BLD-CCNC: 169 U/L (ref 40–129)
ALT SERPL-CCNC: 16 U/L (ref 5–41)
ANION GAP SERPL CALCULATED.3IONS-SCNC: 11 MMOL/L (ref 9–17)
AST SERPL-CCNC: 31 U/L
BASOPHILS # BLD: 1 % (ref 0–2)
BASOPHILS ABSOLUTE: 0.08 K/UL (ref 0–0.2)
BILIRUB SERPL-MCNC: 0.74 MG/DL (ref 0.3–1.2)
BUN BLDV-MCNC: 22 MG/DL (ref 6–20)
CALCIUM SERPL-MCNC: 8.3 MG/DL (ref 8.6–10.4)
CHLORIDE BLD-SCNC: 96 MMOL/L (ref 98–107)
CO2: 30 MMOL/L (ref 20–31)
CREAT SERPL-MCNC: 1.43 MG/DL (ref 0.7–1.2)
EKG ATRIAL RATE: 63 BPM
EKG P AXIS: 49 DEGREES
EKG P-R INTERVAL: 168 MS
EKG Q-T INTERVAL: 524 MS
EKG QRS DURATION: 94 MS
EKG QTC CALCULATION (BAZETT): 536 MS
EKG R AXIS: -26 DEGREES
EKG T AXIS: 78 DEGREES
EKG VENTRICULAR RATE: 63 BPM
EOSINOPHILS RELATIVE PERCENT: 7 % (ref 1–4)
FIO2: 30
GFR AFRICAN AMERICAN: >60 ML/MIN
GFR NON-AFRICAN AMERICAN: 51 ML/MIN
GFR SERPL CREATININE-BSD FRML MDRD: ABNORMAL ML/MIN/{1.73_M2}
GLUCOSE BLD-MCNC: 116 MG/DL (ref 75–110)
GLUCOSE BLD-MCNC: 124 MG/DL (ref 70–99)
GLUCOSE BLD-MCNC: 126 MG/DL (ref 74–100)
GLUCOSE BLD-MCNC: 131 MG/DL (ref 75–110)
GLUCOSE BLD-MCNC: 158 MG/DL (ref 75–110)
HCT VFR BLD CALC: 32.9 % (ref 40.7–50.3)
HEMOGLOBIN: 10.8 G/DL (ref 13–17)
IMMATURE GRANULOCYTES: 3 %
LYMPHOCYTES # BLD: 23 % (ref 24–43)
MAGNESIUM: 2 MG/DL (ref 1.6–2.6)
MCH RBC QN AUTO: 29.9 PG (ref 25.2–33.5)
MCHC RBC AUTO-ENTMCNC: 32.8 G/DL (ref 28.4–34.8)
MCV RBC AUTO: 91.1 FL (ref 82.6–102.9)
MONOCYTES # BLD: 10 % (ref 3–12)
NRBC AUTOMATED: 0.3 PER 100 WBC
PARTIAL THROMBOPLASTIN TIME: 66.3 SEC (ref 20.5–30.5)
PDW BLD-RTO: 17.6 % (ref 11.8–14.4)
PLATELET # BLD: 116 K/UL (ref 138–453)
PMV BLD AUTO: 10.4 FL (ref 8.1–13.5)
POC HCO3: 33.7 MMOL/L (ref 21–28)
POC O2 SATURATION: 90 % (ref 94–98)
POC PCO2: 50.5 MM HG (ref 35–48)
POC PH: 7.43 (ref 7.35–7.45)
POC PO2: 57.3 MM HG (ref 83–108)
POSITIVE BASE EXCESS, ART: 8 (ref 0–3)
POTASSIUM SERPL-SCNC: 4 MMOL/L (ref 3.7–5.3)
RBC # BLD: 3.61 M/UL (ref 4.21–5.77)
RBC # BLD: ABNORMAL 10*6/UL
SAMPLE SITE: ABNORMAL
SEG NEUTROPHILS: 56 % (ref 36–65)
SEGMENTED NEUTROPHILS ABSOLUTE COUNT: 3.61 K/UL (ref 1.5–8.1)
SODIUM BLD-SCNC: 137 MMOL/L (ref 135–144)
TOTAL PROTEIN: 5.8 G/DL (ref 6.4–8.3)
WBC # BLD: 6.5 K/UL (ref 3.5–11.3)

## 2022-03-02 PROCEDURE — 6360000002 HC RX W HCPCS: Performed by: STUDENT IN AN ORGANIZED HEALTH CARE EDUCATION/TRAINING PROGRAM

## 2022-03-02 PROCEDURE — 6370000000 HC RX 637 (ALT 250 FOR IP): Performed by: STUDENT IN AN ORGANIZED HEALTH CARE EDUCATION/TRAINING PROGRAM

## 2022-03-02 PROCEDURE — 82803 BLOOD GASES ANY COMBINATION: CPT

## 2022-03-02 PROCEDURE — 2580000003 HC RX 258: Performed by: STUDENT IN AN ORGANIZED HEALTH CARE EDUCATION/TRAINING PROGRAM

## 2022-03-02 PROCEDURE — 94761 N-INVAS EAR/PLS OXIMETRY MLT: CPT

## 2022-03-02 PROCEDURE — 2000000000 HC ICU R&B

## 2022-03-02 PROCEDURE — 6360000002 HC RX W HCPCS: Performed by: INTERNAL MEDICINE

## 2022-03-02 PROCEDURE — 94003 VENT MGMT INPAT SUBQ DAY: CPT

## 2022-03-02 PROCEDURE — 37799 UNLISTED PX VASCULAR SURGERY: CPT

## 2022-03-02 PROCEDURE — C9113 INJ PANTOPRAZOLE SODIUM, VIA: HCPCS | Performed by: STUDENT IN AN ORGANIZED HEALTH CARE EDUCATION/TRAINING PROGRAM

## 2022-03-02 PROCEDURE — 2700000000 HC OXYGEN THERAPY PER DAY

## 2022-03-02 PROCEDURE — 80053 COMPREHEN METABOLIC PANEL: CPT

## 2022-03-02 PROCEDURE — 6360000002 HC RX W HCPCS: Performed by: GENERAL PRACTICE

## 2022-03-02 PROCEDURE — 6370000000 HC RX 637 (ALT 250 FOR IP)

## 2022-03-02 PROCEDURE — 2500000003 HC RX 250 WO HCPCS: Performed by: STUDENT IN AN ORGANIZED HEALTH CARE EDUCATION/TRAINING PROGRAM

## 2022-03-02 PROCEDURE — 85025 COMPLETE CBC W/AUTO DIFF WBC: CPT

## 2022-03-02 PROCEDURE — 99232 SBSQ HOSP IP/OBS MODERATE 35: CPT | Performed by: INTERNAL MEDICINE

## 2022-03-02 PROCEDURE — 85730 THROMBOPLASTIN TIME PARTIAL: CPT

## 2022-03-02 PROCEDURE — 93010 ELECTROCARDIOGRAM REPORT: CPT | Performed by: INTERNAL MEDICINE

## 2022-03-02 PROCEDURE — 99291 CRITICAL CARE FIRST HOUR: CPT | Performed by: INTERNAL MEDICINE

## 2022-03-02 PROCEDURE — 83735 ASSAY OF MAGNESIUM: CPT

## 2022-03-02 RX ORDER — BISACODYL 10 MG
10 SUPPOSITORY, RECTAL RECTAL DAILY PRN
Status: DISCONTINUED | OUTPATIENT
Start: 2022-03-02 | End: 2022-03-17 | Stop reason: HOSPADM

## 2022-03-02 RX ORDER — DEXMEDETOMIDINE HYDROCHLORIDE 4 UG/ML
.1-1.5 INJECTION, SOLUTION INTRAVENOUS CONTINUOUS
Status: DISCONTINUED | OUTPATIENT
Start: 2022-03-02 | End: 2022-03-04

## 2022-03-02 RX ORDER — CLONIDINE HYDROCHLORIDE 0.1 MG/1
0.1 TABLET ORAL 2 TIMES DAILY
Status: DISCONTINUED | OUTPATIENT
Start: 2022-03-02 | End: 2022-03-03

## 2022-03-02 RX ORDER — CLONIDINE HYDROCHLORIDE 0.1 MG/1
0.1 TABLET ORAL 2 TIMES DAILY
Status: DISCONTINUED | OUTPATIENT
Start: 2022-03-02 | End: 2022-03-02

## 2022-03-02 RX ORDER — FUROSEMIDE 10 MG/ML
INJECTION INTRAMUSCULAR; INTRAVENOUS
Status: DISPENSED
Start: 2022-03-02 | End: 2022-03-02

## 2022-03-02 RX ORDER — CLONIDINE HYDROCHLORIDE 0.2 MG/1
0.2 TABLET ORAL 2 TIMES DAILY
Status: DISCONTINUED | OUTPATIENT
Start: 2022-03-02 | End: 2022-03-02

## 2022-03-02 RX ADMIN — AMLODIPINE BESYLATE 10 MG: 10 TABLET ORAL at 08:50

## 2022-03-02 RX ADMIN — SODIUM CHLORIDE, PRESERVATIVE FREE 10 ML: 5 INJECTION INTRAVENOUS at 08:59

## 2022-03-02 RX ADMIN — POLYVINYL ALCOHOL 1 DROP: 14 SOLUTION/ DROPS OPHTHALMIC at 10:04

## 2022-03-02 RX ADMIN — POLYVINYL ALCOHOL 1 DROP: 14 SOLUTION/ DROPS OPHTHALMIC at 13:28

## 2022-03-02 RX ADMIN — CARVEDILOL 25 MG: 25 TABLET, FILM COATED ORAL at 08:53

## 2022-03-02 RX ADMIN — Medication 14.1 UNITS/KG/HR: at 11:06

## 2022-03-02 RX ADMIN — SODIUM CHLORIDE, PRESERVATIVE FREE 10 ML: 5 INJECTION INTRAVENOUS at 22:00

## 2022-03-02 RX ADMIN — CLONIDINE HYDROCHLORIDE 0.1 MG: 0.1 TABLET ORAL at 12:29

## 2022-03-02 RX ADMIN — Medication 14.1 UNITS/KG/HR: at 22:49

## 2022-03-02 RX ADMIN — AZATHIOPRINE 75 MG: 50 TABLET ORAL at 08:52

## 2022-03-02 RX ADMIN — CHLORHEXIDINE GLUCONATE 0.12% ORAL RINSE 15 ML: 1.2 LIQUID ORAL at 08:58

## 2022-03-02 RX ADMIN — ASPIRIN 81 MG: 81 TABLET, CHEWABLE ORAL at 08:51

## 2022-03-02 RX ADMIN — POLYVINYL ALCOHOL 1 DROP: 14 SOLUTION/ DROPS OPHTHALMIC at 17:28

## 2022-03-02 RX ADMIN — PROPOFOL 25 MCG/KG/MIN: 10 INJECTION, EMULSION INTRAVENOUS at 04:42

## 2022-03-02 RX ADMIN — CHLORHEXIDINE GLUCONATE 0.12% ORAL RINSE 15 ML: 1.2 LIQUID ORAL at 22:00

## 2022-03-02 RX ADMIN — POLYVINYL ALCOHOL 1 DROP: 14 SOLUTION/ DROPS OPHTHALMIC at 22:02

## 2022-03-02 RX ADMIN — INSULIN LISPRO 1 UNITS: 100 INJECTION, SOLUTION INTRAVENOUS; SUBCUTANEOUS at 13:13

## 2022-03-02 RX ADMIN — POLYVINYL ALCOHOL 1 DROP: 14 SOLUTION/ DROPS OPHTHALMIC at 06:16

## 2022-03-02 RX ADMIN — PROPOFOL 25 MCG/KG/MIN: 10 INJECTION, EMULSION INTRAVENOUS at 10:07

## 2022-03-02 RX ADMIN — FUROSEMIDE 40 MG: 10 INJECTION, SOLUTION INTRAMUSCULAR; INTRAVENOUS at 08:57

## 2022-03-02 RX ADMIN — PROPOFOL 10 MCG/KG/MIN: 10 INJECTION, EMULSION INTRAVENOUS at 20:11

## 2022-03-02 RX ADMIN — ATORVASTATIN CALCIUM 80 MG: 80 TABLET, FILM COATED ORAL at 08:51

## 2022-03-02 RX ADMIN — DOCUSATE SODIUM LIQUID 100 MG: 100 LIQUID ORAL at 08:54

## 2022-03-02 RX ADMIN — POLYVINYL ALCOHOL 1 DROP: 14 SOLUTION/ DROPS OPHTHALMIC at 02:23

## 2022-03-02 RX ADMIN — DEXMEDETOMIDINE HYDROCHLORIDE 0.2 MCG/KG/HR: 4 INJECTION, SOLUTION INTRAVENOUS at 12:37

## 2022-03-02 RX ADMIN — PANTOPRAZOLE SODIUM 40 MG: 40 INJECTION, POWDER, FOR SOLUTION INTRAVENOUS at 08:54

## 2022-03-02 ASSESSMENT — PULMONARY FUNCTION TESTS
PIF_VALUE: 26
PIF_VALUE: 23
PIF_VALUE: 25
PIF_VALUE: 26
PIF_VALUE: 33
PIF_VALUE: 31
PIF_VALUE: 27
PIF_VALUE: 22
PIF_VALUE: 21
PIF_VALUE: 21
PIF_VALUE: 27
PIF_VALUE: 24
PIF_VALUE: 36
PIF_VALUE: 14
PIF_VALUE: 28
PIF_VALUE: 25
PIF_VALUE: 22
PIF_VALUE: 29
PIF_VALUE: 27
PIF_VALUE: 26
PIF_VALUE: 23
PIF_VALUE: 58
PIF_VALUE: 22
PIF_VALUE: 22
PIF_VALUE: 27
PIF_VALUE: 40
PIF_VALUE: 22
PIF_VALUE: 29
PIF_VALUE: 28
PIF_VALUE: 27
PIF_VALUE: 25
PIF_VALUE: 23
PIF_VALUE: 13
PIF_VALUE: 23
PIF_VALUE: 22
PIF_VALUE: 26
PIF_VALUE: 24
PIF_VALUE: 24
PIF_VALUE: 27
PIF_VALUE: 22
PIF_VALUE: 24
PIF_VALUE: 21
PIF_VALUE: 22
PIF_VALUE: 27
PIF_VALUE: 23
PIF_VALUE: 25
PIF_VALUE: 23
PIF_VALUE: 23
PIF_VALUE: 21
PIF_VALUE: 24
PIF_VALUE: 4
PIF_VALUE: 26
PIF_VALUE: 27
PIF_VALUE: 23
PIF_VALUE: 22
PIF_VALUE: 23

## 2022-03-02 NOTE — PLAN OF CARE
Problem: OXYGENATION/RESPIRATORY FUNCTION  Goal: Patient will maintain patent airway  3/1/2022 2034 by Mayra Maurice RCP  Outcome: Ongoing     Problem: OXYGENATION/RESPIRATORY FUNCTION  Goal: Patient will achieve/maintain normal respiratory rate/effort  Description: Respiratory rate and effort will be within normal limits for the patient  3/1/2022 2034 by Mayra Maurice RCP  Outcome: Ongoing     Problem: MECHANICAL VENTILATION  Goal: Patient will maintain patent airway  3/1/2022 2034 by Mayra Maurice RCP  Outcome: Ongoing     Problem: MECHANICAL VENTILATION  Goal: Oral health is maintained or improved  3/1/2022 2034 by Mayra Maurice RCP  Outcome: Ongoing     Problem: MECHANICAL VENTILATION  Goal: ET tube will be managed safely  3/1/2022 2034 by Mayra Maurice RCP  Outcome: Ongoing     Problem: MECHANICAL VENTILATION  Goal: Ability to express needs and understand communication  3/1/2022 2034 by Mayra Maurice RCP  Outcome: Ongoing     Problem: MECHANICAL VENTILATION  Goal: Mobility/activity is maintained at optimum level for patient  3/1/2022 2034 by Mayra Maurice RCP  Outcome: Ongoing     Problem: SKIN INTEGRITY  Goal: Skin integrity is maintained or improved  3/1/2022 2034 by Mayra Maurice RCP  Outcome: Ongoing Pt called regarding 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

## 2022-03-02 NOTE — PROGRESS NOTES
Critical Care Team - Daily Progress Note      Date and time: 3/2/2022 7:22 AM  Patient's name:  Lizbeth eNw Dr Record Number: [id number]  Patient's account/billing number: [de-identified]  Patient's YOB: 1965  Age: 60 y.o. Date of Admission: 2/21/2022  9:13 PM  Length of stay during current admission: 8      Primary Care Physician: Jero Alaniz MD  ICU Attending Physician: Dr. Adis Bean Status: Full Code    Reason for ICU admission:   Chief Complaint   Patient presents with    Cardiac Arrest       Subjective:     OVERNIGHT EVENTS:    VSS, afebrile. Patient remains hypertensive with highest BP at 164/86. Patient also becomes hypertensive when trying to give him sedation holiday. On sedation holiday patient intermittently follows commands. Ventilator settings: PRVC: Rate:15, Tidal volume:580, PEEP:5, PIP:35  Patient is off Precedex. Patient is on propofol. ABG: ph: 7.43, PCO2 50.5, PO2 57.3, hCO3-33.7, Oxygen saturation-90  Continues to be on heparin drip. Labs reviewed: WBC-6.5, Hb- 10.8, platelet BFTUZ-539, glucose- 126. Cr-1.43    Patient had 4.1 L output. Patient did not have a bowel movement- dulcolax suppository ordered. Nephrology following, recommended to continue IV lasix daily. Cardiology following    Patient started on precedex again from 3/2/2022. Will wean propofol. Home dose clonidine 0.1 mg twice daily added for better BP control.      AWAKE & FOLLOWING COMMANDS:  [] No   [x] Yes    CURRENT VENTILATION STATUS:     [x] Ventilator  [] BIPAP  [] Nasal Cannula [] Room Air      IF INTUBATED, ET TUBE MARKING AT LOWER LIP:       cms    SECRETIONS Amount:  [] Small [x] Moderate  [] Large  [] None  Color:     [] White [] Colored  [] Bloody    SEDATION:  RAAS Score:  [x] Propofol gtt  [] Versed gtt  [] Ativan gtt   [] No Sedation    PARALYZED:  [x] No    [] Yes    DIARRHEA:                [x] No                [] Yes  (C. Difficile status: [] positive [] negative                                                                                                                     [] pending)    VASOPRESSORS:  [x] No    [] Yes    If yes -   [] Levophed       [] Dopamine     [] Vasopressin       [] Dobutamine  [] Phenylephrine         [] Epinephrine    CENTRAL LINES:     [x] No   [] Yes   (Date of Insertion:   )           If yes -     [] Right IJ     [] Left IJ [] Right Femoral [] Left Femoral                   [] Right Subclavian [] Left Subclavian       ODELL'S CATHETER:   [] No   [x] Yes  (Date of Insertion:   )     URINE OUTPUT:            [x] Good   [] Low              [] Anuric        REVIEW OF SYSTEMS:  Unable to obtain due to patient's condition.     HISTORY OF PRESENT ILLNESS:   Tamanna Wilson a 60 y. o. with PMH of CAD s/p CABG x 3 in  2011 and Cath 10/2018 with patent LIMA to LAD and SVG to RCA but occluded SVG to OM1 who presented to the emergency department with cardiac arrest. Patient was at home when a family member heard the patient fall upstairs however was unable to check on the patient. EMS was called and patient was found to be in V. Fib arrest. ACLS was initiated and patient received two shocks and ROSC was achieved. On the way out of the patient's home patient went into PEA arrest. Compressions were resumed and epinephrine was given and ROSC was achieved. Unknown down time.      In the emergency department patient was intubated and sedated on propofol. Hemodynamically stable off pressors. Labs demonstrated elevated creatinine (1.67), lactic acidosis (3.0), leukocytosis (20.2), EKG was concerning for STEMI with ST elevations in V3, V4 and V5. Cardiology was consulted however patient did not meet STEMI criteria.  Cardiology initially planned to Cath patient however he was taken off propofol with only sluggish pupils, but no gag or corneal reflexes so Cath was canceled due to poor neurological prognosis. CT head was negative. CT C spine demonstrated remote anterior fusion from C4 through C7 with C5-C6 corpectomy and bone strut placement as well as prominent/recurrent spondylosis with moderate cord flattening at C4-C5 and C5-C6. Patient will be admitted to medical ICU.         OBJECTIVE:     VITAL SIGNS:  /78   Pulse 68   Temp 99 °F (37.2 °C) (Bladder)   Resp 15   Ht 5' 10\" (1.778 m)   Wt 209 lb 7 oz (95 kg)   SpO2 98%   BMI 30.05 kg/m²   Tmax over 24 hours:  Temp (24hrs), Av.2 °F (37.3 °C), Min:99 °F (37.2 °C), Max:99.3 °F (37.4 °C)      Patient Vitals for the past 8 hrs:   Pulse Resp SpO2 Weight   22 0700 68  98 %    22 0645 68  98 %    22 0630 69  98 %    22 0615 74  99 %    22 0600 69  98 % 209 lb 7 oz (95 kg)   22 0545 72  97 %    22 0530 68  96 %    22 0515 72  92 %    22 0500 71  91 %    22 0445 75  95 %    22 0430 71  91 %    22 0415 70  92 %    22 0400 69  94 %    22 0345 78  92 %    22 0339 69 15 94 %    22 0330 69  94 %    22 0315 70  95 %    22 0300 72  94 %    22 0245 73  95 %    22 0230 76  93 %    22 0215 76  93 %    22 0200 71  92 %    22 0145 75  94 %    22 0130 73  91 %    22 0115 69  92 %    22 0100 75  95 %    22 0045 77  95 %    22 0030 73  96 %    22 0015 67  96 %    22 0000 68  96 %    22 2345 69  96 %    22 2330 73  96 %    22 2326 71 15 96 %          Intake/Output Summary (Last 24 hours) at 3/2/2022 0722  Last data filed at 3/2/2022 0700  Gross per 24 hour   Intake 1828.06 ml   Output 4170 ml   Net -2341.94 ml     Date 22 0000 - 22 2359   Shift 5459-1923 7120-1689 9161-0704 24 Hour Total   INTAKE   I.V.(mL/kg) 283. 8(3)   283. 8(3)   NG/GT(mL/kg) 418(4.4) 418(4.4)   Shift Total(mL/kg) 701.8(7.4)   701.8(7.4)   OUTPUT   Urine(mL/kg/hr) 300   300   Shift Total(mL/kg) 300(3.2)   300(3.2)   Weight (kg) 95 95 95 95     Wt Readings from Last 3 Encounters:   03/02/22 209 lb 7 oz (95 kg)   01/17/22 207 lb 6.4 oz (94.1 kg)   10/25/21 210 lb (95.3 kg)     Body mass index is 30.05 kg/m². PHYSICAL EXAM:  Constitutional: intubated, sedated  HEENT: PERRLA, sclera clear, anicteric  Respiratory: clear to auscultation, no wheezes or rales and unlabored breathing. Cardiovascular: regular rate and rhythm, normal S1, S2, no murmur noted and 2+ pulses throughout  Abdomen: soft, nontender, nondistended, no masses or organomegaly  NEUROLOGIC: Patient is intubated and sedated. On sedation holiday, patient intermittently follows commands. Extremities:  peripheral pulses normal, no pedal edema,.       Any additional physical findings:      MEDICATIONS:  Scheduled Meds:   docusate  100 mg Oral Daily    amLODIPine  10 mg Oral Daily    furosemide  40 mg IntraVENous Daily    carvedilol  25 mg Oral BID WC    fentanNYL  100 mcg IntraVENous Once    azaTHIOprine  75 mg Oral Daily    sodium chloride  500 mL IntraVENous Once    insulin lispro  0-3 Units SubCUTAneous Q6H    aspirin  81 mg Oral Daily    atorvastatin  80 mg Oral Daily    sodium chloride flush  5-40 mL IntraVENous 2 times per day    chlorhexidine  15 mL Mouth/Throat BID    polyvinyl alcohol  1 drop Both Eyes Q4H    pantoprazole  40 mg IntraVENous Daily     Continuous Infusions:   heparin (PORCINE) Infusion 14.1 Units/kg/hr (03/02/22 0700)    propofol 25 mcg/kg/min (03/02/22 0700)    sodium chloride 100 mL/hr at 02/25/22 1720    dextrose       PRN Meds:   metoclopramide, 5 mg, Q6H PRN  labetalol, 10 mg, Q6H PRN  sodium chloride flush, 5-40 mL, PRN  sodium chloride, 25 mL, PRN  ondansetron, 4 mg, Q8H PRN   Or  ondansetron, 4 mg, Q6H PRN  polyethylene glycol, 17 g, Daily PRN  acetaminophen, 650 mg, Q6H PRN Or  acetaminophen, 650 mg, Q6H PRN  glucose, 15 g, PRN  glucagon (rDNA), 1 mg, PRN  dextrose, 100 mL/hr, PRN  dextrose bolus (hypoglycemia), 125 mL, PRN   Or  dextrose bolus (hypoglycemia), 250 mL, PRN  ipratropium-albuterol, 1 ampule, Q6H PRN  heparin (porcine), 4,000 Units, PRN  heparin (porcine), 2,000 Units, PRN        SUPPORT DEVICES: [x] Ventilator [] BIPAP  [] Nasal Cannula [] Room Air    VENT SETTINGS (Comprehensive) (if applicable):  Vent Information  $Ventilation: $Subsequent Day  Skin Assessment: Clean, dry, & intact  Suction Catheter Diameter: 14  Equipment ID: [id number]  Equipment Changed: Suction catheter  Vent Type: Servo i  Vent Mode: PRVC  Vt Ordered: 580 mL  Rate Set: 15 bmp  Pressure Support: 10 cmH20  FiO2 : 40 %  SpO2: 98 %  SpO2/FiO2 ratio: 245  Sensitivity: 3  PEEP/CPAP: 5  I Time/ I Time %: 0.9 s  Humidification Source: HME  Nitric Oxide/Epoprostenol In Use?: No  Additional Respiratory  Assessments  Pulse: 68  Resp: 15  SpO2: 98 %  End Tidal CO2: 32 (%)  Position: Semi-Willis's  Humidification Source: HME  Oral Care Completed?: Yes  Oral Care: Mouthwash,Mouth swabbed,Mouth suctioned  Subglottic Suction Done?: Yes  Cuff Pressure (cm H2O):  (MLT)    ABGs:     Lab Results   Component Value Date    PHART 7.430 12/09/2019    CBK1FOT 32.7 12/09/2019    PO2ART 97.5 12/09/2019    LKN6SIM 21.7 12/09/2019    J2OSPJNE 95.6 12/09/2019    FIO2 30.0 03/02/2022     Lactic Acid:   Lab Results   Component Value Date    LACTA 1.5 03/02/2020    LACTA 1.0 12/10/2019    LACTA 1.5 11/04/2019         DATA:  Complete Blood Count:   Recent Labs     02/28/22  0454 03/01/22  0615 03/02/22  0437   WBC 4.6 4.8 6.5   HGB 9.8* 9.5* 10.8*   MCV 91.3 90.8 91.1   PLT 97* 103* 116*   RBC 3.33* 3.14* 3.61*   HCT 30.4* 28.5* 32.9*   MCH 29.4 30.3 29.9   MCHC 32.2 33.3 32.8   RDW 17.7* 17.9* 17.6*   MPV 11.0 11.0 10.4        PT/INR:    Lab Results   Component Value Date    PROTIME 12.3 02/22/2022    PROTIME 10.6 12/19/2011 INR 1.2 02/22/2022     PTT:    Lab Results   Component Value Date    APTT 66.3 03/02/2022       Basal Metabolic Profile:   Recent Labs     02/28/22  0454 03/01/22  0615 03/02/22 0437    139 137   K 3.5* 3.8 4.0   BUN 23* 23* 22*   CREATININE 1.51* 1.53* 1.43*    102 96*   CO2 24 27 30      Magnesium:   Lab Results   Component Value Date    MG 2.0 02/28/2022    MG 2.1 02/24/2022    MG 1.9 02/23/2022     Phosphorus:   Lab Results   Component Value Date    PHOS 2.9 02/24/2022    PHOS 3.9 02/23/2022    PHOS 4.2 02/23/2022     S. Calcium:  Recent Labs     03/02/22 0437   CALCIUM 8.3*     S. Ionized Calcium:No results for input(s): IONCA in the last 72 hours. Urinalysis:   Lab Results   Component Value Date    NITRU NEGATIVE 02/25/2022    COLORU Yellow 02/25/2022    PHUR 5.5 02/25/2022    WBCUA 2 TO 5 02/25/2022    RBCUA 5 TO 10 02/25/2022    MUCUS NOT REPORTED 11/06/2020    TRICHOMONAS NOT REPORTED 11/06/2020    YEAST NOT REPORTED 11/06/2020    BACTERIA NOT REPORTED 11/06/2020    CLARITYU clear 09/24/2020    SPECGRAV 1.025 02/25/2022    LEUKOCYTESUR NEGATIVE 02/25/2022    UROBILINOGEN Normal 02/25/2022    BILIRUBINUR NEGATIVE 02/25/2022    BLOODU +++ 09/24/2020    GLUCOSEU NEGATIVE 02/25/2022    KETUA TRACE 02/25/2022    AMORPHOUS NOT REPORTED 11/06/2020       CARDIAC ENZYMES: No results for input(s): CKMB, CKMBINDEX, TROPONINI in the last 72 hours. Invalid input(s): CKTOTAL;3  BNP: No results for input(s): BNP in the last 72 hours. LFTS  Recent Labs     02/28/22  0454 03/01/22  0615 03/02/22 0437   ALKPHOS 167* 163* 169*   ALT 13 13 16   AST 25 26 31   BILITOT 0.89 0.73 0.74   LABALBU 2.7* 2.6* 2.9*       AMYLASE/LIPASE/AMMONIA  No results for input(s): AMYLASE, LIPASE, AMMONIA in the last 72 hours.     Last 3 Blood Glucose:   Recent Labs     02/28/22  0454 03/01/22  0615 03/02/22  0437   GLUCOSE 113* 109* 124*      HgBA1c:    Lab Results   Component Value Date    LABA1C 5.1 04/21/2021 TSH:    Lab Results   Component Value Date    TSH 2.00 02/22/2022     ANEMIA STUDIES  No results for input(s): LABIRON, TIBC, FERRITIN, [id number], FOLATE, OCCULTBLD in the last 72 hours. Cultures during this admission:     Blood cultures:                 [] None drawn      [] Negative             []  Positive (Details:  )  Urine Culture:                   [] None drawn      [] Negative             []  Positive (Details:  )  Sputum Culture:               [] None drawn       [] Negative             []  Positive (Details:  )   Endotracheal aspirate:     [] None drawn       [] Negative             []  Positive (Details:  )        ASSESSMENT:     Principal Problem:    Cardiac arrest ([facility] [address]  Active Problems:    Cervical stenosis of spinal canal  Resolved Problems:    * No resolved hospital problems. *        PLAN:       NEURO  -currently intubated and sedated.  Will wean off sedation and will try to wean off the ventilator as tolerated  -Neuro crit signed off, mentation improving.   -LTM E shows moderate to severe encephalopathy  -Neurosurgery was consulted for cervical stenosis, no acute intervention at this point.     CARDIOVASCULAR  -Hemodynamically stable.  Off pressor support. -S/p V. fib arrest with history of CAD s/p CABGx4 with EF -45% Cardiology following.  Continue heparin drip, aspirin, Lipitor, Coreg 25 mg twice daily. Patient is also receiving furosemide injection 40 mg daily IV by nephrology.  Cardiology discontinued amiodarone drip since 3/1/2022. Cardiology states that cath is not urgent at this time as he is hemodynamically stable without any significant hypotension or arrhythmias and will discuss risks of ULICES with patient and family before CATH. and recommends AICD eval prior to discharge. -H/o HTN. Cont coreg 25 mg BD. Cont Norvasc 10 mg daily. Started on clonidine 0.1 mg BD.   -H/o HLD. Cont lipitor     RESP  -Acute hypoxic respiratory failure likely secondary to aspiration pneumonia.   Unasyn stopped 2/28/2022.  Maintain oxygen saturation above 92%.  Continue mechanical ventilation, will wean as tolerated.  Pulmonary toilet     GI   -Keep NPO. Cont TF. Protonix for GI ppx. Check for residuals. Reglan as needed for high residuals     RENAL  -ARON on CKD Stage 4. Monitor UOP. Cr trending up. [facility] place. Nephrology following, appreciate recommendations. Replace electrolytes as needed     HEME  -Hb stable     ENDOCRINE  -Glucose well controlled. On LDSSI. Hypoglycemia protocol in place.      ID  -Aspiration pneumonia. Patient received Unasyn from 2/22/2022 to 2/28/2022. Patient afebrile and WBC WNL     DVT PPX: Heparin drip  GI PPX: Protonix  DIET: Tube feedings could be resumed if low residuals.      DISPOSITION: Monitor in Darlene Matthew MD, M.D.              Department of Internal Medicine/ Critical care  [facility], [facility])             3/2/2022, 7:22 AM

## 2022-03-02 NOTE — PLAN OF CARE
Problem: Non-Violent Restraints  Goal: Removal from restraints as soon as assessed to be safe  3/2/2022 0810 by Yokasta Chamorro RN  Outcome: Ongoing  3/1/2022 1925 by Shanda Oneill RN  Outcome: Not Met This Shift  Goal: No harm/injury to patient while restraints in use  3/2/2022 0810 by Yokasta Chamorro RN  Outcome: Ongoing  3/1/2022 1925 by Shanda Oneill RN  Outcome: Ongoing  Goal: Patient's dignity will be maintained  3/2/2022 0810 by Yokatsa Chamorro RN  Outcome: Ongoing  3/1/2022 1925 by Shanda Oneill RN  Outcome: Ongoing

## 2022-03-02 NOTE — CARE COORDINATION
F/U call placed [facility]- referral received & will continue to follow. VM message left with [facility] to verify receipt of referral, await call back. Noted plan to extubate, need clarification on whether cardiac cath will be done while in hospital or at a later date.

## 2022-03-02 NOTE — PLAN OF CARE
Problem: OXYGENATION/RESPIRATORY FUNCTION  Goal: Patient will maintain patent airway  3/1/2022 1925 by Shun John RN  Outcome: Ongoing  3/1/2022 0812 by Jaret Harman RN  Outcome: Ongoing  3/1/2022 0750 by Lakisha Ni RCP  Outcome: Ongoing  Goal: Patient will achieve/maintain normal respiratory rate/effort  Description: Respiratory rate and effort will be within normal limits for the patient  3/1/2022 1925 by Shun John, RN  Outcome: Ongoing  3/1/2022 0812 by Jaret Harman RN  Outcome: Ongoing  3/1/2022 0750 by Lakisha Ni RCP  Outcome: Ongoing     Problem: MECHANICAL VENTILATION  Goal: Patient will maintain patent airway  3/1/2022 1925 by Shun John, RN  Outcome: Ongoing  3/1/2022 0812 by Jaret Harman RN  Outcome: Ongoing  3/1/2022 0750 by Lakisha iN RCP  Outcome: Ongoing  Goal: Oral health is maintained or improved  3/1/2022 1925 by Shun John RN  Outcome: Ongoing  3/1/2022 0812 by Jaret Harman RN  Outcome: Ongoing  3/1/2022 0750 by Lakisha Ni RCP  Outcome: Ongoing  Goal: ET tube will be managed safely  3/1/2022 1925 by Shun John, RN  Outcome: Ongoing  3/1/2022 0812 by Jaret Harman RN  Outcome: Ongoing  3/1/2022 0750 by Lakisha Ni RCP  Outcome: Ongoing  Goal: Ability to express needs and understand communication  3/1/2022 1925 by Shun John RN  Outcome: Ongoing  3/1/2022 0812 by Jaret Harman RN  Outcome: Ongoing  3/1/2022 0750 by Lakisha Ni RCP  Outcome: Ongoing  Goal: Mobility/activity is maintained at optimum level for patient  3/1/2022 1925 by Shun John, RN  Outcome: Ongoing  3/1/2022 0812 by Jaret Harman RN  Outcome: Ongoing  3/1/2022 0750 by Lakisha Ni RCP  Outcome: Ongoing     Problem: SKIN INTEGRITY  Goal: Skin integrity is maintained or improved  3/1/2022 1925 by Shun John, RN  Outcome: Ongoing  3/1/2022 0812 by Jaret Harman RN  Outcome: Ongoing  3/1/2022 0750 by Lakisha Ni RCP  Outcome: Ongoing     Problem: Confusion - Acute:  Goal: Absence of continued neurological deterioration signs and symptoms  Description: Absence of continued neurological deterioration signs and symptoms  3/1/2022 1925 by Stefani Velazco RN  Outcome: Ongoing  3/1/2022 0812 by Latoya Fuller RN  Outcome: Ongoing  Goal: Mental status will be restored to baseline  Description: Mental status will be restored to baseline  3/1/2022 1925 by Stefani Velazco RN  Outcome: Ongoing  3/1/2022 0812 by Latoya Fuller RN  Outcome: Ongoing     Problem: Discharge Planning:  Goal: Ability to perform activities of daily living will improve  Description: Ability to perform activities of daily living will improve  3/1/2022 1925 by Stefani Velazco RN  Outcome: Ongoing  3/1/2022 0812 by Latoya Fuller RN  Outcome: Ongoing  Goal: Participates in care planning  Description: Participates in care planning  3/1/2022 1925 by Stefani Velazco RN  Outcome: Ongoing  3/1/2022 0812 by Latoya Fuller RN  Outcome: Ongoing     Problem: Injury - Risk of, Physical Injury:  Goal: Absence of physical injury  Description: Absence of physical injury  3/1/2022 1925 by Stefani Velazco RN  Outcome: Ongoing  3/1/2022 0812 by Latoya Fuller RN  Outcome: Ongoing  Goal: Will remain free from falls  Description: Will remain free from falls  3/1/2022 1925 by Stefani Velazco RN  Outcome: Ongoing  3/1/2022 0812 by Latoya Fuller RN  Outcome: Ongoing     Problem: Mood - Altered:  Goal: Mood stable  Description: Mood stable  3/1/2022 1925 by Stefani Velazco RN  Outcome: Ongoing  3/1/2022 0812 by Latoya Fuller RN  Outcome: Ongoing  Goal: Absence of abusive behavior  Description: Absence of abusive behavior  3/1/2022 1925 by Stefani Velazco RN  Outcome: Ongoing  3/1/2022 0812 by Latoya Fuller RN  Outcome: Ongoing  Goal: Verbalizations of feeling emotionally comfortable while being cared for will increase  Description: Verbalizations of feeling emotionally comfortable while being cared for will increase  3/1/2022 1925 by Staci Vega RN  Outcome: Ongoing  3/1/2022 0812 by Joshua Hensley RN  Outcome: Ongoing     Problem: Psychomotor Activity - Altered:  Goal: Absence of psychomotor disturbance signs and symptoms  Description: Absence of psychomotor disturbance signs and symptoms  3/1/2022 1925 by Staci Vega RN  Outcome: Ongoing  3/1/2022 0812 by Joshua Hensley RN  Outcome: Ongoing     Problem: Sensory Perception - Impaired:  Goal: Demonstrations of improved sensory functioning will increase  Description: Demonstrations of improved sensory functioning will increase  3/1/2022 1925 by Staci Vega RN  Outcome: Ongoing  3/1/2022 0812 by Joshua Hensley RN  Outcome: Ongoing  Goal: Decrease in sensory misperception frequency  Description: Decrease in sensory misperception frequency  3/1/2022 1925 by Staci Vega RN  Outcome: Ongoing  3/1/2022 0812 by Joshua Hensley RN  Outcome: Ongoing  Goal: Able to refrain from responding to false sensory perceptions  Description: Able to refrain from responding to false sensory perceptions  3/1/2022 1925 by Staci Vega RN  Outcome: Ongoing  3/1/2022 0812 by Joshua Hensley RN  Outcome: Ongoing  Goal: Demonstrates accurate environmental perceptions  Description: Demonstrates accurate environmental perceptions  3/1/2022 1925 by Staci Vega RN  Outcome: Ongoing  3/1/2022 0812 by Joshua Hensley RN  Outcome: Ongoing  Goal: Able to distinguish between reality-based and nonreality-based thinking  Description: Able to distinguish between reality-based and nonreality-based thinking  3/1/2022 1925 by Staci Vega RN  Outcome: Ongoing  3/1/2022 0812 by Joshua Hensley RN  Outcome: Ongoing  Goal: Able to interrupt nonreality-based thinking  Description: Able to interrupt nonreality-based thinking  3/1/2022 1925 by Staci Vega RN  Outcome: Ongoing  3/1/2022 0812 by Joshua Hensley RN  Outcome: Ongoing Problem: Sleep Pattern Disturbance:  Goal: Appears well-rested  Description: Appears well-rested  3/1/2022 1925 by Maura Ruiz RN  Outcome: Ongoing  3/1/2022 0812 by Nick Waddell RN  Outcome: Ongoing     Problem: Nutrition  Goal: Optimal nutrition therapy  3/1/2022 1925 by Maura Ruiz RN  Outcome: Ongoing  3/1/2022 0812 by Nick Waddell RN  Outcome: Ongoing     Problem: Falls - Risk of:  Goal: Absence of physical injury  Description: Absence of physical injury  3/1/2022 1925 by Maura Ruiz RN  Outcome: Ongoing  3/1/2022 0812 by Nick Waddell RN  Outcome: Ongoing  Goal: Will remain free from falls  Description: Will remain free from falls  3/1/2022 1925 by Maura Ruiz RN  Outcome: Ongoing  3/1/2022 0812 by Nick Waddell RN  Outcome: Ongoing     Problem: Skin Integrity:  Goal: Will show no infection signs and symptoms  Description: Will show no infection signs and symptoms  3/1/2022 1925 by Maura Ruiz RN  Outcome: Ongoing  3/1/2022 0812 by Nick Waddell RN  Outcome: Ongoing  Goal: Absence of new skin breakdown  Description: Absence of new skin breakdown  3/1/2022 1925 by Maura Ruiz RN  Outcome: Ongoing  3/1/2022 0812 by Nick Waddell RN  Outcome: Ongoing     Problem: Non-Violent Restraints  Goal: No harm/injury to patient while restraints in use  3/1/2022 1925 by Maura Ruiz RN  Outcome: Ongoing  3/1/2022 0812 by Nick Waddell RN  Outcome: Ongoing  Goal: Patient's dignity will be maintained  3/1/2022 1925 by Maura Ruiz RN  Outcome: Ongoing  3/1/2022 0812 by Nick Waddell RN  Outcome: Ongoing     Problem: Non-Violent Restraints  Goal: Removal from restraints as soon as assessed to be safe  3/1/2022 1925 by Maura Ruiz RN  Outcome: Not Met This Shift  3/1/2022 8753 by Nick Waddell RN  Outcome: Ongoing

## 2022-03-02 NOTE — PROGRESS NOTES
NEPHROLOGY PROGRESS NOTE      SUBJECTIVE     59 y/o male patient with past medical history of CAD s/p CABG x 3 presented to the ED with V Fib cardiac arrest, s/p defib x 2 with ROSC. Patient again went into PEA cardiac arrest and ROSC was achieved, unknown down time. EKG concerning for NSTEMI, was started on heparin and amiodarone and did not undergo cath due to concern for anoxic brain injury. MRI revealed early changes of hypoxic brain injury, but Neurological exam improving. Patient was seen and evaluated at bedside, noacute events overnight, intubated and sedated, on 40% FiO2 and propofol for sedation, on amiodarone and heparin gtt. Labs and imaging reviewed, Cr. Improving 1.43 today, Cl 96, BUN 22. . Hb 10.8. She received lasix 40 mg iv x 2 yesterday and diuresed 4.1L in last 24 hours. She is on once daily lasix currently. Plan is for the patient to get extubated and discuss with the family regarding risk of ULICES and need for dialysis and then proceed with cardiac cath. OBJECTIVE     Vitals:    03/02/22 0753 03/02/22 0800 03/02/22 0850 03/02/22 0853   BP:   (!) 115/59    Pulse:    69   Resp:       Temp:  99.3 °F (37.4 °C)     TempSrc:  Bladder     SpO2: 100%      Weight:       Height:         24HR INTAKE/OUTPUT:      Intake/Output Summary (Last 24 hours) at 3/2/2022 0919  Last data filed at 3/2/2022 0800  Gross per 24 hour   Intake 1828.06 ml   Output 4170 ml   Net -2341.94 ml       General appearance: Intubated and sedated, neuro exam improving  HEENT: Unremarkable. Respiratory::vesicular breath sounds,no wheeze/crackles  Cardiovascular:S1 S2 normal,no gallop or organic murmur. Abdomen:Non tender/non distended. Bowel sounds present  Extremities: No Cyanosis or Clubbing, present lower extremity edema  Neurological: Intubated and sedated.      MEDICATIONS     Scheduled Meds:    furosemide        docusate  100 mg Oral Daily    amLODIPine  10 mg Oral Daily    furosemide  40 mg dose, call 911. (Patient not taking: Reported on 1/17/2022)  cloNIDine (CATAPRES) 0.2 MG tablet, Take 1 tablet by mouth 2 times daily  metoprolol tartrate (LOPRESSOR) 25 MG tablet, Take 1 tablet by mouth 2 times daily  magnesium oxide (MAG-OX) 400 (240 Mg) MG tablet,   pantoprazole (PROTONIX) 40 MG tablet, Take 1 tablet by mouth daily  magnesium oxide (MAG-OX) 400 MG tablet, Take 1 tablet by mouth 2 times daily (Patient not taking: Reported on 10/25/2021)  aspirin EC 81 MG EC tablet, Take 1 tablet by mouth daily  nicotine (NICODERM CQ) 21 MG/24HR, Place 1 patch onto the skin daily  acetaminophen (TYLENOL) 325 MG tablet, Take 2 tablets by mouth every 6 hours as needed for Pain  Umeclidinium Bromide 62.5 MCG/INH AEPB, Inhale 1 puff into the lungs daily (Patient not taking: Reported on 1/17/2022)  vitamin D3 (CHOLECALCIFEROL) 10 MCG (400 UNIT) TABS tablet, take 2 tablets (800MG) by mouth once daily (Patient not taking: Reported on 4/21/2021)  vitamin D3 (CHOLECALCIFEROL) 10 MCG (400 UNIT) TABS tablet, take 2 tablets (800MG) by mouth once daily (Patient not taking: Reported on 1/17/2022)  Fluticasone furoate-vilanterol (BREO ELLIPTA) 200-25 MCG/INH AEPB inhaler, Inhale 1 puff into the lungs daily (Patient not taking: Reported on 1/17/2022)  calcium carbonate-vitamin D3 (CALCIUM 600-D) 600-400 MG-UNIT TABS per tab, Take 1 tablet by mouth 2 times daily  azaTHIOprine (IMURAN) 50 MG tablet, Take 1.5 tablets by mouth daily  albuterol sulfate  (90 Base) MCG/ACT inhaler, inhale 2 puffs by mouth every 6 hours if needed for wheezing  nicotine (NICODERM CQ) 14 MG/24HR, Place 1 patch onto the skin daily (Patient not taking: Reported on 1/17/2022)  traMADol (ULTRAM) 50 MG tablet, Take 50 mg by mouth every 8 hours as needed for Pain.   (Patient not taking: Reported on 1/17/2022)  OLANZapine (ZYPREXA) 5 MG tablet, Take 1 tablet by mouth nightly    INVESTIGATIONS     Last 3 CMP:    Recent Labs     02/28/22  0454 03/01/22  8993 03/02/22 0437    139 137   K 3.5* 3.8 4.0    102 96*   CO2 24 27 30   BUN 23* 23* 22*   CREATININE 1.51* 1.53* 1.43*   CALCIUM 7.8* 7.7* 8.3*   PROT 5.1* 5.1* 5.8*   LABALBU 2.7* 2.6* 2.9*   BILITOT 0.89 0.73 0.74   ALKPHOS 167* 163* 169*   AST 25 26 31   ALT 13 13 16       Last 3 CBC:  Recent Labs     02/28/22  0454 03/01/22  0615 03/02/22 0437   WBC 4.6 4.8 6.5   RBC 3.33* 3.14* 3.61*   HGB 9.8* 9.5* 10.8*   HCT 30.4* 28.5* 32.9*   MCV 91.3 90.8 91.1   MCH 29.4 30.3 29.9   MCHC 32.2 33.3 32.8   RDW 17.7* 17.9* 17.6*   PLT 97* 103* 116*   MPV 11.0 11.0 10.4       ASSESSMENT     1. 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 1.7-2, follows up with Dr. Renee Meier, on treatment with Imuran. 2. 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 V Fib and PEA cardiac arrest, unknown down time  4. ? Hypoxic brain injury   5. H/O CABG  6. Respiratory failure, mechanical ventilation dependant  7. H/O HTN    PLAN     1. Continue I.V. lasix 40 mg daily and follow strict I/O.   2. Continue Imuran  3. Avoid hydralazine use due to concern for ANCA vasculitis related to Hydralazine use. 4. Cr is back to baseline and UO is improving, continue to monitor BMP. 5. Plan to wait until the patient is extubated and discuss with the patient and family regarding the risk of ULICES and need for dialysis if the patient gets contrast for cardiac cath. Discussed with Cardiology as well, cardiac cath is not urgent at this time, but they will closely monitor for any arrhythmias and hypotension.    5.  We will follow    Please do not hesitate to call with questions    aDkota Austin MD      Department of Internal Medicine  [facility]         3/2/2022, 9:26 AM     Attending Physician Statement  I have discussed the care of Cosme Dennis, including pertinent history and exam findings with the resident/fellow. I have reviewed the key elements of all parts of the encounter with the resident/fellow. I have seen and examined the patient with the resident/fellow. I agree with the assessment and plan and status of the problem list as documented. Addiitionally I recommend to continue the stable daily IV loop diuretic dose and monitor intake and output.     Daniela Schulte MD   Nephrology Attending Physician  [facility]  3/2/2022

## 2022-03-02 NOTE — PROGRESS NOTES
[facility]   Progress Note         Date:   3/2/2022  Patient name: Shalini Mijares  Date of admission:  2/21/2022  9:13 PM  MRN:   [id number]  YOB: 1965  PCP: Chaim Liz MD    Reason for Admission:  Cardiac arrest    Subjective:       Patient afebrile, hemodynamically stable. Patient continues to be on heparin drip. EKG done on 3/1/2022 showed prolonged QTCamiodarone was discontinued. Labs reviewed  Creatinine 1.43, potassium 4.0. Hemoglobin stable  Platelets 164  LAOTRF/UDCHYA4.9/2.3  Net since admission 4.5 L positive  Nephrology on board. Neuro critical caresignificant improvement in neurological status. Brief history  C/C-V. fib cardiac arrest received 2 shocks, unknown downtime. Went into PEA cardiac arrest and received epi 1 time. Per EMS, initial EKG concerning for STEMI. However repeat EKG in the ED showed STEMI resolved. Interventional cardiology was involved and recommended no due to poor neurological status. EKG: Severe sinus bradycardia with rate 38. Mild ST elevations and T wave inversions in V2, V3, V4 (anterolateral leads). Previous cardiac history:  · CAD s/p CABG x4 in 2011  · Stress test 10/30/2018: Negative Lexiscan stress test  · Coronary angiography 10/30/2018: Patent LIMALAD and SVGRCA grafts. Occluded SVGOM1. · Echo 11/7/2020: EF 55%. Grade 1 DD.   Moderate LVH.     Past medical history:  · CKD stage IIIb with baseline creatinine 1.6-1.7  2/2 biopsy proven ANCA renal vasculitis  · Severe depression with history of I admission 7/2020  · COPD  · Current daily smoker  · Essential hypertension  · Gastroparesis    Medications:   Scheduled Meds:   docusate  100 mg Oral Daily    amLODIPine  10 mg Oral Daily    furosemide  40 mg IntraVENous Daily    carvedilol  25 mg Oral BID WC    fentanNYL  100 mcg IntraVENous Once    azaTHIOprine  75 mg Oral Daily    sodium chloride  500 mL IntraVENous Once    insulin lispro  0-3 Units SubCUTAneous Q6H    aspirin  81 mg Oral Daily    atorvastatin  80 mg Oral Daily    sodium chloride flush  5-40 mL IntraVENous 2 times per day    chlorhexidine  15 mL Mouth/Throat BID    polyvinyl alcohol  1 drop Both Eyes Q4H    pantoprazole  40 mg IntraVENous Daily       Continuous Infusions:   heparin (PORCINE) Infusion 14.1 Units/kg/hr (03/02/22 0700)    propofol 25 mcg/kg/min (03/02/22 0700)    sodium chloride 100 mL/hr at 02/25/22 1720    dextrose         CBC:   Recent Labs     02/28/22 0454 03/01/22 0615 03/02/22 0437   WBC 4.6 4.8 6.5   HGB 9.8* 9.5* 10.8*   PLT 97* 103* 116*     BMP:    Recent Labs     02/28/22 0454 03/01/22 0615 03/02/22 0437    139 137   K 3.5* 3.8 4.0    102 96*   CO2 24 27 30   BUN 23* 23* 22*   CREATININE 1.51* 1.53* 1.43*   GLUCOSE 113* 109* 124*     Hepatic:   Recent Labs     02/28/22 0454 03/01/22 0615 03/02/22 0437   AST 25 26 31   ALT 13 13 16   BILITOT 0.89 0.73 0.74   ALKPHOS 167* 163* 169*       Objective:     Vitals: /78   Pulse 68   Temp 99 °F (37.2 °C) (Bladder)   Resp 15   Ht 5' 10\" (1.778 m)   Wt 209 lb 7 oz (95 kg)   SpO2 98%   BMI 30.05 kg/m²      General:  Intubated and sedated, intermittently follows commands. HEENT: Atraumatic, normocephalic, no throat congestion, moist mucosa.  light  Chest:   Ventilated breath sounds, clear and equal bilateral breath sounds  Cardiac:  S1 S2 RR, no gallops, murmur or rubs. Abdomen: Soft, non-tender, no masses or organomegaly, BS present. :   No suprapubic or flank tenderness. Neuro:  Unable to assess  SKIN:  No rashes, good skin turgor. Extremities:  No pedal edema        Assessment:     1. V. fib cardiac arrestunknown downtime. ROSC achieved after around 4 minutes of ACLS. 2. Acute coronary syndrome. 3. Severe bradycardia likely secondary sedation/paralytic/hypothermia -Resolved   4. Ischemic cardiomyopathy with EF 45 %  5. Improved neurological status.   6. Acute hypoxic hypercarbic respiratory failure secondary to cardiac arrest.  7. CAD s/p CABG x4 in 2011  8. CKD stage IIIb   9. COPD  10. Current daily smoker  11. Essential hypertension    Treatment Plan:     · Continue heparin  · Replace electrolytes, keep K>4 and Mg>2  · D/w nephro- if cath not urgent prefer to discuss risks of ULICES with patient and family  · At this time cath is not urgent as he is hemodynamically stable without any significant hypotension or arrhythmias  · Will monitor closely in case any change in status. · Continue coreg to 25 mg BID. Continue aspirin and high intensity statin. · Diuretics per nephrology. · Patient will need AICD prior to d/c for secondary prevention (after cardiac cath)  · Will follow from John. Please call if needed. Tim Jiménez  Internal Medicine Resident  [facility]  3/2/2022 7:40 AM     Attending Cardiologist Addendum: I have reviewed and performed the history, physical, subjective, objective, assessment, and plan with the student/resident/fellow/APN and agree with the note. I performed the history and physical personally. I have made changes to the note above as needed. Awaiting neuro recovery and extubation with then plan to discuss risks of ULICES with him and family per nephro, unless cath is deemed urgent. At this time cath is not urgent as he is hemodynamically stable without any significant hypotension or arrhythmias. Will monitor closely in case any change in status. Will need AICD eval prior to d/c for secondary prevention    Will follow from John, please call back once ready for cardiac cath    Thank you for allowing me to participate in the care of this patient, please do not hesitate to call if you have any questions. Akila Pacheco DO, [facility] [place], [address], [address] [place], [place] [address] [facility]  [facility]  [phone]

## 2022-03-03 ENCOUNTER — APPOINTMENT (OUTPATIENT)
Dept: GENERAL RADIOLOGY | Age: 57
DRG: 192 | End: 2022-03-03
Payer: COMMERCIAL

## 2022-03-03 LAB
ABSOLUTE EOS #: 0.34 K/UL (ref 0–0.44)
ABSOLUTE IMMATURE GRANULOCYTE: 0.2 K/UL (ref 0–0.3)
ABSOLUTE LYMPH #: 1.24 K/UL (ref 1.1–3.7)
ABSOLUTE MONO #: 0.57 K/UL (ref 0.1–1.2)
ALBUMIN SERPL-MCNC: 2.9 G/DL (ref 3.5–5.2)
ALBUMIN/GLOBULIN RATIO: 1.1 (ref 1–2.5)
ALP BLD-CCNC: 140 U/L (ref 40–129)
ALT SERPL-CCNC: 17 U/L (ref 5–41)
ANION GAP SERPL CALCULATED.3IONS-SCNC: 10 MMOL/L (ref 9–17)
AST SERPL-CCNC: 32 U/L
BASOPHILS # BLD: 1 % (ref 0–2)
BASOPHILS ABSOLUTE: 0.04 K/UL (ref 0–0.2)
BILIRUB SERPL-MCNC: 0.62 MG/DL (ref 0.3–1.2)
BUN BLDV-MCNC: 29 MG/DL (ref 6–20)
CALCIUM SERPL-MCNC: 8.2 MG/DL (ref 8.6–10.4)
CHLORIDE BLD-SCNC: 98 MMOL/L (ref 98–107)
CO2: 29 MMOL/L (ref 20–31)
CREAT SERPL-MCNC: 1.43 MG/DL (ref 0.7–1.2)
EOSINOPHILS RELATIVE PERCENT: 6 % (ref 1–4)
FIO2: 40
GFR AFRICAN AMERICAN: >60 ML/MIN
GFR NON-AFRICAN AMERICAN: 51 ML/MIN
GFR SERPL CREATININE-BSD FRML MDRD: ABNORMAL ML/MIN/{1.73_M2}
GLUCOSE BLD-MCNC: 102 MG/DL (ref 75–110)
GLUCOSE BLD-MCNC: 117 MG/DL (ref 75–110)
GLUCOSE BLD-MCNC: 123 MG/DL (ref 74–100)
GLUCOSE BLD-MCNC: 123 MG/DL (ref 75–110)
GLUCOSE BLD-MCNC: 125 MG/DL (ref 70–99)
GLUCOSE BLD-MCNC: 125 MG/DL (ref 75–110)
HCT VFR BLD CALC: 30.1 % (ref 40.7–50.3)
HEMOGLOBIN: 9.7 G/DL (ref 13–17)
IMMATURE GRANULOCYTES: 3 %
LYMPHOCYTES # BLD: 20 % (ref 24–43)
MAGNESIUM: 2 MG/DL (ref 1.6–2.6)
MCH RBC QN AUTO: 29.7 PG (ref 25.2–33.5)
MCHC RBC AUTO-ENTMCNC: 32.2 G/DL (ref 28.4–34.8)
MCV RBC AUTO: 92 FL (ref 82.6–102.9)
MONOCYTES # BLD: 9 % (ref 3–12)
NRBC AUTOMATED: 0 PER 100 WBC
PARTIAL THROMBOPLASTIN TIME: 54 SEC (ref 20.5–30.5)
PARTIAL THROMBOPLASTIN TIME: 58.5 SEC (ref 20.5–30.5)
PARTIAL THROMBOPLASTIN TIME: 76.5 SEC (ref 20.5–30.5)
PDW BLD-RTO: 17.3 % (ref 11.8–14.4)
PLATELET # BLD: 109 K/UL (ref 138–453)
PMV BLD AUTO: 10.4 FL (ref 8.1–13.5)
POC HCO3: 32.3 MMOL/L (ref 21–28)
POC O2 SATURATION: 97 % (ref 94–98)
POC PCO2: 37 MM HG (ref 35–48)
POC PH: 7.55 (ref 7.35–7.45)
POC PO2: 77.5 MM HG (ref 83–108)
POSITIVE BASE EXCESS, ART: 9 (ref 0–3)
POTASSIUM SERPL-SCNC: 3.8 MMOL/L (ref 3.7–5.3)
RBC # BLD: 3.27 M/UL (ref 4.21–5.77)
RBC # BLD: ABNORMAL 10*6/UL
SEG NEUTROPHILS: 61 % (ref 36–65)
SEGMENTED NEUTROPHILS ABSOLUTE COUNT: 3.78 K/UL (ref 1.5–8.1)
SODIUM BLD-SCNC: 137 MMOL/L (ref 135–144)
TOTAL PROTEIN: 5.5 G/DL (ref 6.4–8.3)
WBC # BLD: 6.2 K/UL (ref 3.5–11.3)

## 2022-03-03 PROCEDURE — 6370000000 HC RX 637 (ALT 250 FOR IP): Performed by: STUDENT IN AN ORGANIZED HEALTH CARE EDUCATION/TRAINING PROGRAM

## 2022-03-03 PROCEDURE — 37799 UNLISTED PX VASCULAR SURGERY: CPT

## 2022-03-03 PROCEDURE — 74018 RADEX ABDOMEN 1 VIEW: CPT

## 2022-03-03 PROCEDURE — 6360000002 HC RX W HCPCS: Performed by: INTERNAL MEDICINE

## 2022-03-03 PROCEDURE — 2700000000 HC OXYGEN THERAPY PER DAY

## 2022-03-03 PROCEDURE — 83735 ASSAY OF MAGNESIUM: CPT

## 2022-03-03 PROCEDURE — 99291 CRITICAL CARE FIRST HOUR: CPT | Performed by: INTERNAL MEDICINE

## 2022-03-03 PROCEDURE — 2580000003 HC RX 258: Performed by: STUDENT IN AN ORGANIZED HEALTH CARE EDUCATION/TRAINING PROGRAM

## 2022-03-03 PROCEDURE — 85730 THROMBOPLASTIN TIME PARTIAL: CPT

## 2022-03-03 PROCEDURE — 6360000002 HC RX W HCPCS: Performed by: STUDENT IN AN ORGANIZED HEALTH CARE EDUCATION/TRAINING PROGRAM

## 2022-03-03 PROCEDURE — 99232 SBSQ HOSP IP/OBS MODERATE 35: CPT | Performed by: INTERNAL MEDICINE

## 2022-03-03 PROCEDURE — 82947 ASSAY GLUCOSE BLOOD QUANT: CPT

## 2022-03-03 PROCEDURE — 80053 COMPREHEN METABOLIC PANEL: CPT

## 2022-03-03 PROCEDURE — 82803 BLOOD GASES ANY COMBINATION: CPT

## 2022-03-03 PROCEDURE — 2500000003 HC RX 250 WO HCPCS: Performed by: INTERNAL MEDICINE

## 2022-03-03 PROCEDURE — 2500000003 HC RX 250 WO HCPCS: Performed by: STUDENT IN AN ORGANIZED HEALTH CARE EDUCATION/TRAINING PROGRAM

## 2022-03-03 PROCEDURE — 6370000000 HC RX 637 (ALT 250 FOR IP)

## 2022-03-03 PROCEDURE — C9113 INJ PANTOPRAZOLE SODIUM, VIA: HCPCS | Performed by: STUDENT IN AN ORGANIZED HEALTH CARE EDUCATION/TRAINING PROGRAM

## 2022-03-03 PROCEDURE — 2000000000 HC ICU R&B

## 2022-03-03 PROCEDURE — 94003 VENT MGMT INPAT SUBQ DAY: CPT

## 2022-03-03 PROCEDURE — 94761 N-INVAS EAR/PLS OXIMETRY MLT: CPT

## 2022-03-03 PROCEDURE — 85025 COMPLETE CBC W/AUTO DIFF WBC: CPT

## 2022-03-03 RX ORDER — 0.9 % SODIUM CHLORIDE 0.9 %
250 INTRAVENOUS SOLUTION INTRAVENOUS ONCE
Status: COMPLETED | OUTPATIENT
Start: 2022-03-03 | End: 2022-03-03

## 2022-03-03 RX ORDER — CLONIDINE HYDROCHLORIDE 0.2 MG/1
0.2 TABLET ORAL 2 TIMES DAILY
Status: DISCONTINUED | OUTPATIENT
Start: 2022-03-03 | End: 2022-03-12

## 2022-03-03 RX ORDER — PANTOPRAZOLE SODIUM 40 MG/1
40 TABLET, DELAYED RELEASE ORAL
Status: DISCONTINUED | OUTPATIENT
Start: 2022-03-04 | End: 2022-03-04

## 2022-03-03 RX ADMIN — CARVEDILOL 25 MG: 25 TABLET, FILM COATED ORAL at 18:03

## 2022-03-03 RX ADMIN — ACETAMINOPHEN 650 MG: 325 TABLET ORAL at 01:30

## 2022-03-03 RX ADMIN — POLYVINYL ALCOHOL 1 DROP: 14 SOLUTION/ DROPS OPHTHALMIC at 02:26

## 2022-03-03 RX ADMIN — ASPIRIN 81 MG: 81 TABLET, CHEWABLE ORAL at 08:27

## 2022-03-03 RX ADMIN — SODIUM CHLORIDE 250 ML: 9 INJECTION, SOLUTION INTRAVENOUS at 02:42

## 2022-03-03 RX ADMIN — POLYVINYL ALCOHOL 1 DROP: 14 SOLUTION/ DROPS OPHTHALMIC at 05:57

## 2022-03-03 RX ADMIN — DOCUSATE SODIUM LIQUID 100 MG: 100 LIQUID ORAL at 08:28

## 2022-03-03 RX ADMIN — POLYETHYLENE GLYCOL 3350 17 G: 17 POWDER, FOR SOLUTION ORAL at 08:28

## 2022-03-03 RX ADMIN — AZATHIOPRINE 75 MG: 50 TABLET ORAL at 08:57

## 2022-03-03 RX ADMIN — POLYVINYL ALCOHOL 1 DROP: 14 SOLUTION/ DROPS OPHTHALMIC at 10:19

## 2022-03-03 RX ADMIN — SODIUM CHLORIDE, PRESERVATIVE FREE 10 ML: 5 INJECTION INTRAVENOUS at 20:23

## 2022-03-03 RX ADMIN — Medication 10 MG: at 03:50

## 2022-03-03 RX ADMIN — FUROSEMIDE 40 MG: 10 INJECTION, SOLUTION INTRAMUSCULAR; INTRAVENOUS at 08:31

## 2022-03-03 RX ADMIN — DEXMEDETOMIDINE HYDROCHLORIDE 0.1 MCG/KG/HR: 4 INJECTION, SOLUTION INTRAVENOUS at 02:50

## 2022-03-03 RX ADMIN — CHLORHEXIDINE GLUCONATE 0.12% ORAL RINSE 15 ML: 1.2 LIQUID ORAL at 08:29

## 2022-03-03 RX ADMIN — SODIUM CHLORIDE, PRESERVATIVE FREE 10 ML: 5 INJECTION INTRAVENOUS at 08:30

## 2022-03-03 RX ADMIN — CLONIDINE HYDROCHLORIDE 0.2 MG: 0.1 TABLET ORAL at 20:23

## 2022-03-03 RX ADMIN — ATORVASTATIN CALCIUM 80 MG: 80 TABLET, FILM COATED ORAL at 08:27

## 2022-03-03 RX ADMIN — PANTOPRAZOLE SODIUM 40 MG: 40 INJECTION, POWDER, FOR SOLUTION INTRAVENOUS at 08:28

## 2022-03-03 RX ADMIN — Medication 12.1 UNITS/KG/HR: at 18:45

## 2022-03-03 ASSESSMENT — PULMONARY FUNCTION TESTS
PIF_VALUE: 22
PIF_VALUE: 52
PIF_VALUE: 20
PIF_VALUE: 20
PIF_VALUE: 21
PIF_VALUE: 13
PIF_VALUE: 21
PIF_VALUE: 14
PIF_VALUE: 22
PIF_VALUE: 23
PIF_VALUE: 9
PIF_VALUE: 20
PIF_VALUE: 21
PIF_VALUE: 23
PIF_VALUE: 20
PIF_VALUE: 20
PIF_VALUE: 13
PIF_VALUE: 19
PIF_VALUE: 21
PIF_VALUE: 12
PIF_VALUE: 21
PIF_VALUE: 20
PIF_VALUE: 21
PIF_VALUE: 25
PIF_VALUE: 19
PIF_VALUE: 20
PIF_VALUE: 21
PIF_VALUE: 20
PIF_VALUE: 21
PIF_VALUE: 20
PIF_VALUE: 22
PIF_VALUE: 20
PIF_VALUE: 20
PIF_VALUE: 23
PIF_VALUE: 13

## 2022-03-03 ASSESSMENT — PAIN SCALES - GENERAL: PAINLEVEL_OUTOF10: 0

## 2022-03-03 NOTE — PROGRESS NOTES
Critical Care Team - Daily Progress Note      Date and time: 3/3/2022 1:25 PM  Patient's name:  Refugio Urrutia  Medical Record Number: [id number]  Patient's account/billing number: [de-identified]  Patient's YOB: 1965  Age: 60 y.o. Date of Admission: 2022  9:13 PM  Length of stay during current admission: 9      Primary Care Physician: Veronika Dubois MD  ICU Attending Physician: Dr. Alan West Status: Full Code    Reason for ICU admission:   Chief Complaint   Patient presents with    Cardiac Arrest       Subjective:     OVERNIGHT EVENTS:      No acute events. VSS, afebrile.   Patient was seen and examined at bedside this AM.  Patient is intubated and sedated on Precedex, resting comfortably  Currently doing SBT, responds to name and commands    Ventilator settings: PRVC: Rate14, Tidal volume 580, PEEP 5; FiO2 40  AB.549/37.0/77.5/32.3/9/97    Morning labs reviewed and remain stable    · FLUIDS: None  · FEEDING: Tube feedings  · FAMILY UPDATE: will update   · ANALGESICS: none  · SEDATION: Precedex  · THROMBO- PROPHYLAXIS: Heparin drip  · MOBILIZATION:intubated and sedated  · HEADS UP:45 degrees   · HEMODYNAMICS: off pressor support  · ULCER PROPHYLAXIS: Protonix   · GLYCEMIC CONTROL:LDSSI, glucose well controlled  · SPONTANEOUS BREATHING TRIAL: Failed yesterday, attempting again today  · BOWEL MANAGEMENT: Colace  · INDWELLING CATHETER:umaña, ETT, OG tube in place  · DE-ESCALATION: continue weaning trial        AWAKE & FOLLOWING COMMANDS:  [] No   [x] Yes    CURRENT VENTILATION STATUS:     [x] Ventilator  [] BIPAP  [] Nasal Cannula [] Room Air        SECRETIONS Amount:  [x] Small [] Moderate  [] Large  [] None  Color:     [] White [] Colored  [] Bloody     SEDATION:  RAAS Score:  [] Propofol gtt  [] Versed gtt  [] Ativan gtt   [x] No Sedation    PARALYZED:  [x] No    [] Yes    DIARRHEA:                [x] No                [] Yes  (C. Difficile status: [] positive [] negative                                                                                                                     [] pending)    VASOPRESSORS:  [x] No    [] Yes    If yes -   [] Levophed       [] Dopamine     [] Vasopressin       [] Dobutamine  [] Phenylephrine         [] Epinephrine    CENTRAL LINES:     [x] No   [] Yes           If yes -     [] Right IJ     [] Left IJ [] Right Femoral [] Left Femoral                   [] Right Subclavian [] Left Subclavian       ODELL'S CATHETER:   [] No   [x] Yes     URINE OUTPUT:            [x] Good   [] Low              [] Anuric        REVIEW OF SYSTEMS:  Unable to obtain due to patient's condition.     HISTORY OF PRESENT ILLNESS:   Tosha Cotto a 60 y. o. with PMH of CAD s/p CABG x 3 in  2011 and Cath 10/2018 with patent LIMA to LAD and SVG to RCA but occluded SVG to OM1 who presented to the emergency department with cardiac arrest. Patient was at home when a family member heard the patient fall upstairs however was unable to check on the patient. EMS was called and patient was found to be in V. Fib arrest. ACLS was initiated and patient received two shocks and ROSC was achieved. On the way out of the patient's home patient went into PEA arrest. Compressions were resumed and epinephrine was given and ROSC was achieved. Unknown down time.      In the emergency department patient was intubated and sedated on propofol. Hemodynamically stable off pressors. Labs demonstrated elevated creatinine (1.67), lactic acidosis (3.0), leukocytosis (20.2), EKG was concerning for STEMI with ST elevations in V3, V4 and V5. Cardiology was consulted however patient did not meet STEMI criteria. Cardiology initially planned to Cath patient however he was taken off propofol with only sluggish pupils, but no gag or corneal reflexes so Cath was canceled due to poor neurological prognosis.  CT head was negative. CT C spine demonstrated remote anterior fusion from C4 through C7 with C5-C6 corpectomy and bone strut placement as well as prominent/recurrent spondylosis with moderate cord flattening at C4-C5 and C5-C6.  Patient will be admitted to medical ICU.         OBJECTIVE:     VITAL SIGNS:  /60   Pulse 66   Temp 98.1 °F (36.7 °C) (Bladder)   Resp (!) 32   Ht 5' 10\" (1.778 m)   Wt 207 lb 3.7 oz (94 kg)   SpO2 95%   BMI 29.73 kg/m²   Tmax over 24 hours:  Temp (24hrs), Av.8 °F (37.1 °C), Min:98.1 °F (36.7 °C), Max:99.3 °F (37.4 °C)      Patient Vitals for the past 8 hrs:   BP Temp Temp src Pulse Resp SpO2   22 1200 101/60 98.1 °F (36.7 °C) Bladder 66 (!) 32 95 %   22 1113    77 17 93 %   22 1112    75 17 94 %   22 1100    68 25 94 %   22 1000 94/64 98.2 °F (36.8 °C) Bladder 65 25 96 %   22 0900    70 28 95 %   22 0800 (!) 154/75 99.1 °F (37.3 °C) Bladder 74 25 96 %   22 0752    71  97 %   22 0734    67  100 %   22 0700    66  96 %   22 0645    66  95 %   22 0630    66  93 %   22 0615    69  94 %   22 0600    69  95 %   22 0545    72  95 %   22 0530    78  98 %         Intake/Output Summary (Last 24 hours) at 3/3/2022 1325  Last data filed at 3/3/2022 1200  Gross per 24 hour   Intake 2262.89 ml   Output 2420 ml   Net -157.11 ml     Date 22 0000 - 22 2359   Shift 8081-0265 6784-0758 8167-5375 24 Hour Total   INTAKE   I.V.(mL/kg) 150.3(1.6) 23(0.2)  173.3(1.8)   NG/GT(mL/kg) 429(4.6) 309(3.3)  738(7.9)   IV Piggyback(mL/kg) 250.5(2.7) 0(0)  250.5(2.7)   Shift Total(mL/kg) 829.8(8.8) 332(3.5)  1161.8(12.4)   OUTPUT   Urine(mL/kg/hr) 435(0.6) 1240  1675   Shift Total(mL/kg) 435(4.6) 1181(23.8)  4763(49.4)   Weight (kg) 94 94 94 94     Wt Readings from Last 3 Encounters:   22 207 lb 3.7 oz (94 kg)   22 207 lb 6.4 oz (94.1 kg)   10/25/21 210 lb (95.3 kg)     Body mass index is 29.73 kg/m². PHYSICAL EXAM:  Constitutional: intubated, sedated  HEENT: EOMI, sclera clear, anicteric  Respiratory: clear to auscultation, no wheezes or rales, ET in place  Cardiovascular: regular rate and rhythm, normal S1, S2, no murmur noted and 2+ pulses throughout  Abdomen: soft, nontender, nondistended, no masses  NEUROLOGIC: Patient is intubated and sedated.  On sedation holiday, patient follows commands  Extremities:  Warm, well-perfused, peripheral pulses normal, no pedal edema      MEDICATIONS:  Scheduled Meds:   [START ON 3/4/2022] pantoprazole  40 mg Oral QAM AC    cloNIDine  0.1 mg Oral BID    docusate  100 mg Oral Daily    amLODIPine  10 mg Oral Daily    furosemide  40 mg IntraVENous Daily    carvedilol  25 mg Oral BID WC    azaTHIOprine  75 mg Oral Daily    insulin lispro  0-3 Units SubCUTAneous Q6H    aspirin  81 mg Oral Daily    atorvastatin  80 mg Oral Daily    sodium chloride flush  5-40 mL IntraVENous 2 times per day    chlorhexidine  15 mL Mouth/Throat BID    polyvinyl alcohol  1 drop Both Eyes Q4H     Continuous Infusions:   dexmedetomidine 0.2 mcg/kg/hr (03/03/22 0914)    heparin (PORCINE) Infusion 12.1 Units/kg/hr (03/03/22 0800)    sodium chloride 100 mL/hr at 02/25/22 1720    dextrose       PRN Meds:   bisacodyl, 10 mg, Daily PRN  metoclopramide, 5 mg, Q6H PRN  labetalol, 10 mg, Q6H PRN  sodium chloride flush, 5-40 mL, PRN  sodium chloride, 25 mL, PRN  ondansetron, 4 mg, Q8H PRN   Or  ondansetron, 4 mg, Q6H PRN  polyethylene glycol, 17 g, Daily PRN  acetaminophen, 650 mg, Q6H PRN   Or  acetaminophen, 650 mg, Q6H PRN  glucose, 15 g, PRN  glucagon (rDNA), 1 mg, PRN  dextrose, 100 mL/hr, PRN  dextrose bolus (hypoglycemia), 125 mL, PRN   Or  dextrose bolus (hypoglycemia), 250 mL, PRN  ipratropium-albuterol, 1 ampule, Q6H PRN  heparin (porcine), 4,000 Units, PRN  heparin (porcine), 2,000 Units, PRN        SUPPORT DEVICES: [x] Ventilator [] BIPAP  [] Nasal Cannula [] Room Air    VENT SETTINGS (Comprehensive) (if applicable):  Vent Information  $Ventilation: Off Vent  Skin Assessment: Clean, dry, & intact  Suction Catheter Diameter: 14  Equipment ID: [id number]  Equipment Changed: Expiratory Filter  Vent Type: Servo i  Vent Mode: PRVC  Vt Ordered: 580 mL  Rate Set: 14 bmp  Pressure Support: 8 cmH20  FiO2 : 40 %  SpO2: 95 %  SpO2/FiO2 ratio: 235  Sensitivity: 3  PEEP/CPAP: 5  I Time/ I Time %: 0.9 s  Humidification Source: Heated wire  Humidification Temp: 37  Humidification Temp Measured: 37.2  Circuit Condensation: Not drained  Nitric Oxide/Epoprostenol In Use?: No  Additional Respiratory  Assessments  Pulse: 66  Resp: (!) 32  SpO2: 95 %  End Tidal CO2: 38 (%)  Position: Semi-Willis's  Humidification Source: Heated wire  Humidification Temp: 37  Circuit Condensation: Not drained  Oral Care Completed?: Yes  Oral Care: Mouthwash,Mouthwash with chlorhexidine,Mouth swabbed,Mouth suctioned  Subglottic Suction Done?: Yes  Cuff Pressure (cm H2O):  (MLT)    ABGs: 7.549/37.0/77.5/32.3/9/97    Lab Results   Component Value Date    PHART 7.430 12/09/2019    ZOE2VUD 32.7 12/09/2019    PO2ART 97.5 12/09/2019    OBD5UNB 21.7 12/09/2019    L2COGWHL 95.6 12/09/2019    FIO2 40.0 03/03/2022     Lactic Acid:   Lab Results   Component Value Date    LACTA 1.5 03/02/2020    LACTA 1.0 12/10/2019    LACTA 1.5 11/04/2019         DATA:  Complete Blood Count:   Recent Labs     03/01/22  0615 03/02/22  0437 03/03/22  0445   WBC 4.8 6.5 6.2   HGB 9.5* 10.8* 9.7*   MCV 90.8 91.1 92.0   * 116* 109*   RBC 3.14* 3.61* 3.27*   HCT 28.5* 32.9* 30.1*   MCH 30.3 29.9 29.7   MCHC 33.3 32.8 32.2   RDW 17.9* 17.6* 17.3*   MPV 11.0 10.4 10.4        PT/INR:    Lab Results   Component Value Date    PROTIME 12.3 02/22/2022    PROTIME 10.6 12/19/2011    INR 1.2 02/22/2022     PTT:    Lab Results   Component Value Date    APTT 54.0 03/03/2022       Basal Metabolic Profile: Recent Labs     03/01/22  0615 03/02/22  0437 03/03/22  0445    137 137   K 3.8 4.0 3.8   BUN 23* 22* 29*   CREATININE 1.53* 1.43* 1.43*    96* 98   CO2 27 30 29      Magnesium:   Lab Results   Component Value Date    MG 2.0 03/03/2022    MG 2.0 03/02/2022    MG 2.0 02/28/2022     Phosphorus:   Lab Results   Component Value Date    PHOS 2.9 02/24/2022    PHOS 3.9 02/23/2022    PHOS 4.2 02/23/2022     S. Calcium:  Recent Labs     03/03/22  0445   CALCIUM 8.2*     S. Ionized Calcium:No results for input(s): IONCA in the last 72 hours. Urinalysis:   Lab Results   Component Value Date    NITRU NEGATIVE 02/25/2022    COLORU Yellow 02/25/2022    PHUR 5.5 02/25/2022    WBCUA 2 TO 5 02/25/2022    RBCUA 5 TO 10 02/25/2022    MUCUS NOT REPORTED 11/06/2020    TRICHOMONAS NOT REPORTED 11/06/2020    YEAST NOT REPORTED 11/06/2020    BACTERIA NOT REPORTED 11/06/2020    CLARITYU clear 09/24/2020    SPECGRAV 1.025 02/25/2022    LEUKOCYTESUR NEGATIVE 02/25/2022    UROBILINOGEN Normal 02/25/2022    BILIRUBINUR NEGATIVE 02/25/2022    BLOODU +++ 09/24/2020    GLUCOSEU NEGATIVE 02/25/2022    KETUA TRACE 02/25/2022    AMORPHOUS NOT REPORTED 11/06/2020       LFTS  Recent Labs     03/01/22  0615 03/02/22  0437 03/03/22  0445   ALKPHOS 163* 169* 140*   ALT 13 16 17   AST 26 31 32   BILITOT 0.73 0.74 0.62   LABALBU 2.6* 2.9* 2.9*       Last 3 Blood Glucose:   Recent Labs     03/01/22  0615 03/02/22  0437 03/03/22  0445   GLUCOSE 109* 124* 125*      HgBA1c:    Lab Results   Component Value Date    LABA1C 5.1 04/21/2021         TSH:    Lab Results   Component Value Date    TSH 2.00 02/22/2022         ASSESSMENT:     Principal Problem:    Cardiac arrest [facility])  Active Problems:    Cervical stenosis of spinal canal  Resolved Problems:    * No resolved hospital problems.  *        PLAN:       NEURO  -Currently intubated and sedated  - Will wean off sedation and  the ventilator as tolerated  - LTME shows moderate to severe encephalopathy  - Neuro crit signed off, mentation improving.   - Neurosurgery was consulted for cervical stenosis, no acute intervention at this point, signed off     CARDIOVASCULAR  -Hemodynamically stable off pressor support s/p Dobutamine and Levophed  -S/p V. fib arrest with history of CAD s/p CABGx4 with EF -45%   - Cardiology following  - Continue heparin drip, Aspirin, Lipitor, Coreg.  - Patient is also receiving furosemide  40 mg daily IV by nephrology. - Cardiology discontinued amiodarone drip since 3/1/2022  - Cardiology states that cath is not urgent at this time as he is hemodynamically stable without any significant hypotension or arrhythmias and will discuss risks of ULICES with patient and family before CATH; recommends AICD eval prior to discharge. -H/o HTN  - Continue Coreg 25 mg BID, Norvasc 10 mg QD  - Started on clonidine 0.1 mg BID  -H/o HLD  - Continue Lipitor 80 mg QD     RESPIRATORY  -Acute hypoxic respiratory failure likely secondary to aspiration pneumonia.     - Unasyn stopped 2/28/2022.    - Maintain oxygen saturation above 92%. - Continue mechanical ventilation, will wean as tolerated  - Plan for SBT today with possible extubation     GI   -Continue TF  - Protonix for GI ppx   - Check for residuals. Reglan PRN  - Colace daily for bowel regimen     RENAL  -ARON on CKD Stage 4.   - Monitor UOP;  Strict I/O  - Blackman in place  - Lasix 40 mg QD  - Replace electrolytes as needed  - Daily BMP  - Nephrology following     HEME  -Hgb and Hct stable  - Plt stable but low at 97>103>116>109, continue to monitor closely  - Heparin gtt for DVT ppx  - Daily CBC     ENDOCRINE  -Glucose well controlled on LDSSI  -Hypoglycemia protocol in place  - POCT glucose AC/HS      ID  -Aspiration pneumonia.    - S/p Unasyn  2/22- 2/28  - VSS, afebrile  - WBC wnl        DISPOSITION: Monitor in [facility], [address] [place] [address] [address] 3/3/2022, 1:25 PM

## 2022-03-03 NOTE — PROGRESS NOTES
Order obtained for extubation. SpO2 of 97 on 40% FiO2. Patient extubated and placed on 4 liters/min via nasal cannula. Post extubation SpO2 is 95% with HR  76 bpm and RR 25 breaths/min. Patient had strong cough that was productive of white sputum. Extubation Well tolerated by patient. .   Breath Sounds: rhonchi.     Fidel Reyes RCP   11:16 AM

## 2022-03-03 NOTE — PLAN OF CARE
Problem: OXYGENATION/RESPIRATORY FUNCTION  Goal: Patient will maintain patent airway  3/2/2022 2045 by Maura Ruiz RN  Outcome: Ongoing  3/2/2022 2007 by Destiny Dorantes RCP  Outcome: Ongoing  3/2/2022 0810 by Nick Waddell RN  Outcome: Ongoing  Goal: Patient will achieve/maintain normal respiratory rate/effort  Description: Respiratory rate and effort will be within normal limits for the patient  3/2/2022 2045 by Maura Ruiz RN  Outcome: Ongoing  3/2/2022 2007 by Destiny Dorantes RCP  Outcome: Ongoing  3/2/2022 0810 by Nick Waddell RN  Outcome: Ongoing     Problem: MECHANICAL VENTILATION  Goal: Patient will maintain patent airway  3/2/2022 2045 by Maura Ruiz RN  Outcome: Ongoing  3/2/2022 2007 by Destiny Dorantes RCP  Outcome: Ongoing  3/2/2022 0810 by Nick Waddell RN  Outcome: Ongoing  Goal: Oral health is maintained or improved  3/2/2022 2045 by Maura Ruiz RN  Outcome: Ongoing  3/2/2022 2007 by Destiny Dorantes RCP  Outcome: Ongoing  3/2/2022 0810 by Nick Waddell RN  Outcome: Ongoing  Goal: ET tube will be managed safely  3/2/2022 2045 by Maura Ruiz RN  Outcome: Ongoing  3/2/2022 2007 by Destiny Dorantes RCP  Outcome: Ongoing  3/2/2022 0810 by Nick Waddell RN  Outcome: Ongoing  Goal: Ability to express needs and understand communication  3/2/2022 2045 by Maura Ruiz RN  Outcome: Ongoing  3/2/2022 2007 by Destiny Dorantes RCP  Outcome: Ongoing  3/2/2022 0810 by Nick Waddell RN  Outcome: Ongoing  Goal: Mobility/activity is maintained at optimum level for patient  3/2/2022 2045 by Maura Ruiz RN  Outcome: Ongoing  3/2/2022 2007 by Destiny Dorantes RCP  Outcome: Ongoing  3/2/2022 0810 by Nick Waddell RN  Outcome: Ongoing     Problem: SKIN INTEGRITY  Goal: Skin integrity is maintained or improved  3/2/2022 2045 by Maura Ruiz RN  Outcome: Ongoing  3/2/2022 2007 by Destiny Dorantes RCP  Outcome: Ongoing  3/2/2022 0810 by Nick Waddell RN  Outcome: Ongoing     Problem: Confusion - Acute:  Goal: Absence of continued neurological deterioration signs and symptoms  Description: Absence of continued neurological deterioration signs and symptoms  3/2/2022 2045 by Chaya Thurman RN  Outcome: Ongoing  3/2/2022 0810 by Chris Orr RN  Outcome: Ongoing  Goal: Mental status will be restored to baseline  Description: Mental status will be restored to baseline  3/2/2022 2045 by Chaya Thurman RN  Outcome: Ongoing  3/2/2022 0810 by Chris Orr RN  Outcome: Ongoing     Problem: Discharge Planning:  Goal: Ability to perform activities of daily living will improve  Description: Ability to perform activities of daily living will improve  3/2/2022 2045 by Chaya Thurman RN  Outcome: Ongoing  3/2/2022 0810 by Chris Orr RN  Outcome: Ongoing  Goal: Participates in care planning  Description: Participates in care planning  3/2/2022 2045 by Chaya Thurman RN  Outcome: Ongoing  3/2/2022 0810 by Chris Orr RN  Outcome: Ongoing     Problem: Injury - Risk of, Physical Injury:  Goal: Absence of physical injury  Description: Absence of physical injury  3/2/2022 2045 by Chaya Thurman RN  Outcome: Ongoing  3/2/2022 0810 by Chris Orr RN  Outcome: Ongoing  Goal: Will remain free from falls  Description: Will remain free from falls  3/2/2022 2045 by Chaya Thurman RN  Outcome: Ongoing  3/2/2022 0810 by Chris Orr RN  Outcome: Ongoing     Problem: Mood - Altered:  Goal: Mood stable  Description: Mood stable  3/2/2022 2045 by Chaya Thurman RN  Outcome: Ongoing  3/2/2022 0810 by Chris Orr RN  Outcome: Ongoing  Goal: Absence of abusive behavior  Description: Absence of abusive behavior  3/2/2022 2045 by Chaya Thurman RN  Outcome: Ongoing  3/2/2022 0810 by Chris Orr RN  Outcome: Ongoing  Goal: Verbalizations of feeling emotionally comfortable while being cared for will increase  Description: Verbalizations of feeling emotionally comfortable while being cared for will increase  3/2/2022 2045 by Chaya Thurman RN  Outcome: Ongoing  3/2/2022 0810 by Chris Orr RN  Outcome: Ongoing     Problem: Psychomotor Activity - Altered:  Goal: Absence of psychomotor disturbance signs and symptoms  Description: Absence of psychomotor disturbance signs and symptoms  3/2/2022 2045 by Chaya Thurman RN  Outcome: Ongoing  3/2/2022 0810 by Chris Orr RN  Outcome: Ongoing     Problem: Sensory Perception - Impaired:  Goal: Demonstrations of improved sensory functioning will increase  Description: Demonstrations of improved sensory functioning will increase  3/2/2022 2045 by Chaya Thurman RN  Outcome: Ongoing  3/2/2022 0810 by Chris Orr RN  Outcome: Ongoing  Goal: Decrease in sensory misperception frequency  Description: Decrease in sensory misperception frequency  3/2/2022 2045 by Chaya Thurman RN  Outcome: Ongoing  3/2/2022 0810 by Chris Orr RN  Outcome: Ongoing  Goal: Able to refrain from responding to false sensory perceptions  Description: Able to refrain from responding to false sensory perceptions  3/2/2022 2045 by Chaya Thuramn RN  Outcome: Ongoing  3/2/2022 0810 by Chris Orr RN  Outcome: Ongoing  Goal: Demonstrates accurate environmental perceptions  Description: Demonstrates accurate environmental perceptions  3/2/2022 2045 by Chaya Thurman RN  Outcome: Ongoing  3/2/2022 0810 by Chris Orr RN  Outcome: Ongoing  Goal: Able to distinguish between reality-based and nonreality-based thinking  Description: Able to distinguish between reality-based and nonreality-based thinking  3/2/2022 2045 by Chaya Thurman RN  Outcome: Ongoing  3/2/2022 0810 by Chris Orr RN  Outcome: Ongoing  Goal: Able to interrupt nonreality-based thinking  Description: Able to interrupt nonreality-based thinking  3/2/2022 2045 by Chaya Thurman RN  Outcome: Ongoing  3/2/2022 0810 by Chris Orr RN  Outcome: Ongoing     Problem: Sleep Pattern Disturbance:  Goal: Appears well-rested  Description: Appears well-rested  3/2/2022 2045 by Ana Sierra RN  Outcome: Ongoing  3/2/2022 0810 by Carmen Melendez RN  Outcome: Ongoing     Problem: Nutrition  Goal: Optimal nutrition therapy  3/2/2022 2045 by Ana Sierra RN  Outcome: Ongoing  3/2/2022 0810 by Carmen Melendez RN  Outcome: Ongoing     Problem: Falls - Risk of:  Goal: Absence of physical injury  Description: Absence of physical injury  3/2/2022 2045 by Ana Sierra RN  Outcome: Ongoing  3/2/2022 0810 by Carmen Melendez RN  Outcome: Ongoing  Goal: Will remain free from falls  Description: Will remain free from falls  3/2/2022 2045 by Ana Sierra RN  Outcome: Ongoing  3/2/2022 0810 by Carmen Melendez RN  Outcome: Ongoing     Problem: Skin Integrity:  Goal: Will show no infection signs and symptoms  Description: Will show no infection signs and symptoms  3/2/2022 2045 by Ana Sierra RN  Outcome: Ongoing  3/2/2022 0810 by Carmen Melendez RN  Outcome: Ongoing  Goal: Absence of new skin breakdown  Description: Absence of new skin breakdown  3/2/2022 2045 by Ana Sierra RN  Outcome: Ongoing  3/2/2022 0810 by Carmen Melendez RN  Outcome: Ongoing     Problem: Non-Violent Restraints  Goal: No harm/injury to patient while restraints in use  3/2/2022 2045 by Ana Sierra RN  Outcome: Ongoing  3/2/2022 0810 by Carmen Melendez RN  Outcome: Ongoing  Goal: Patient's dignity will be maintained  3/2/2022 2045 by Ana Sierra RN  Outcome: Ongoing  3/2/2022 0810 by Carmen Melendez RN  Outcome: Ongoing     Problem: Non-Violent Restraints  Goal: Removal from restraints as soon as assessed to be safe  3/2/2022 2045 by Ana Sierra RN  Outcome: Not Met This Shift  3/2/2022 0810 by Carmen Melendez RN  Outcome: Ongoing

## 2022-03-03 NOTE — PROGRESS NOTES
Comprehensive Nutrition Assessment    Type and Reason for Visit:  Reassess    Nutrition Recommendations/Plan: Start oral diet as able. Will continue to follow/monitor care plans. Nutrition Assessment:  Chart reviewed. Pt was extubated today. TF stopped with extubation. Pt remains NPO at this time. Meds/labs reviewed. Malnutrition Assessment:  Malnutrition Status: At risk for malnutrition     Context:  Acute Illness     Findings of the 6 clinical characteristics of malnutrition:  Energy Intake:  Unable to assess  Weight Loss:  No significant weight loss (over past year, per EHR)     Body Fat Loss:  No significant body fat loss     Muscle Mass Loss:  No significant muscle mass loss    Fluid Accumulation:  No significant fluid accumulation     Strength:  Not Performed    Estimated Daily Nutrient Needs:  Energy (kcal):  3248-5743 kcal/day; Weight Used for Energy Requirements:  Current     Protein (g):   g pro/day; Weight Used for Protein Requirements:  Ideal (1.2-1.5)        Fluid (ml/day):  2500 mL/day; Method Used for Fluid Requirements:  ml/Kg (28)      Nutrition Related Findings:  meds/labs reviewed      Wounds:  None       Current Nutrition Therapies:    Diet NPO  Current Tube Feeding (TF) Orders:  · Feeding Route: Orogastric  · Formula:  (discontinued)    Additional Calorie Sources:   none    Anthropometric Measures:  · Height: 5' 10\" (177.8 cm)  · Current Body Weight: 207 lb 3.7 oz (94 kg)   · Admission Body Weight: 198 lb 6.6 oz (90 kg)    · Usual Body Weight: 220 lb (99.8 kg) (approx 1 yr ago)     · Ideal Body Weight: 166 lbs; % Ideal Body Weight 124.8 %   · BMI: 29.7  · BMI Categories: Overweight (BMI 25.0-29. 9)       Nutrition Diagnosis:   · Inadequate oral intake related to recent extubation as evidenced by NPO or clear liquid status due to medical condition    Nutrition Interventions:   Food and/or Nutrient Delivery:  Start Oral Diet as able  Nutrition Education/Counseling:  No recommendation at this time   Coordination of Nutrition Care:  Continue to monitor while inpatient    Goals:  meet % of estimated nutrient needs -progressing towards goal     Nutrition Monitoring and Evaluation:   Behavioral-Environmental Outcomes:  None Identified   Food/Nutrient Intake Outcomes:  Diet Advancement/Tolerance,Food and Nutrient Intake,Supplement Intake  Physical Signs/Symptoms Outcomes:  Biochemical Data,Nutrition Focused Physical Findings,Skin,Weight     Discharge Planning:     Too soon to determine     Electronically signed by Myranda Manuel RD, LD on 3/3/22 at 4:08 PM EST    Contact: [phone]

## 2022-03-03 NOTE — PLAN OF CARE
Problem: OXYGENATION/RESPIRATORY FUNCTION  Goal: Patient will maintain patent airway  3/2/2022 2007 by Jake Bacon RCP  Outcome: Ongoing     Problem: OXYGENATION/RESPIRATORY FUNCTION  Goal: Patient will achieve/maintain normal respiratory rate/effort  Description: Respiratory rate and effort will be within normal limits for the patient  3/2/2022 2007 by Jake Bacon RCP  Outcome: Ongoing     Problem: MECHANICAL VENTILATION  Goal: Patient will maintain patent airway  3/2/2022 2007 by Jake Bacon RCP  Outcome: Ongoing     Problem: MECHANICAL VENTILATION  Goal: Oral health is maintained or improved  3/2/2022 2007 by Jake Bacon RCP  Outcome: Ongoing     Problem: MECHANICAL VENTILATION  Goal: ET tube will be managed safely  3/2/2022 2007 by Jake Bacon RCP  Outcome: Ongoing     Problem: MECHANICAL VENTILATION  Goal: Ability to express needs and understand communication  3/2/2022 2007 by Jake Bacon RCP  Outcome: Ongoing     Problem: MECHANICAL VENTILATION  Goal: Mobility/activity is maintained at optimum level for patient  3/2/2022 2007 by Jake Bacon RCP  Outcome: Ongoing     Problem: SKIN INTEGRITY  Goal: Skin integrity is maintained or improved  3/2/2022 2007 by Jake Bacon RCP  Outcome: Ongoing

## 2022-03-03 NOTE — PROGRESS NOTES
Order placed to extubate pt. RN and two RT's to bedside. Extubated at [phone] am. Placed on 4L NC. Tolerated well.

## 2022-03-03 NOTE — PLAN OF CARE
Problem: OXYGENATION/RESPIRATORY FUNCTION  Goal: Patient will maintain patent airway  3/3/2022 1055 by Ronnell Alejandra  Outcome: Ongoing  3/3/2022 0930 by Judy Galindo RCP  Outcome: Ongoing  Goal: Patient will achieve/maintain normal respiratory rate/effort  Description: Respiratory rate and effort will be within normal limits for the patient  3/3/2022 1055 by Ronnell Alejandra  Outcome: Ongoing  3/3/2022 0930 by Judy Galindo RCP  Outcome: Ongoing     Problem: MECHANICAL VENTILATION  Goal: Patient will maintain patent airway  3/3/2022 1055 by Ronnell Alejandra  Outcome: Ongoing  3/3/2022 0930 by Judy Galindo RCP  Outcome: Ongoing  Goal: Oral health is maintained or improved  3/3/2022 1055 by Ronnell Alejandra  Outcome: Ongoing  3/3/2022 0930 by Judy Galindo RCP  Outcome: Ongoing  Goal: ET tube will be managed safely  3/3/2022 1055 by Ronnell Alejandra  Outcome: Ongoing  3/3/2022 0930 by Judy Galindo RCP  Outcome: Ongoing  Goal: Ability to express needs and understand communication  3/3/2022 1055 by Ronnell Alejandra  Outcome: Ongoing  3/3/2022 0930 by Judy Galindo RCP  Outcome: Ongoing  Goal: Mobility/activity is maintained at optimum level for patient  3/3/2022 1055 by Ronnell Alejandra  Outcome: Ongoing  3/3/2022 0930 by Judy Galindo RCP  Outcome: Ongoing     Problem: SKIN INTEGRITY  Goal: Skin integrity is maintained or improved  3/3/2022 1055 by Ronnell Alejandra  Outcome: Ongoing  3/3/2022 0930 by Judy Galindo RCP  Outcome: Ongoing     Problem: Confusion - Acute:  Goal: Absence of continued neurological deterioration signs and symptoms  Description: Absence of continued neurological deterioration signs and symptoms  Outcome: Ongoing  Goal: Mental status will be restored to baseline  Description: Mental status will be restored to baseline  Outcome: Ongoing     Problem: Discharge Planning:  Goal: Ability to perform activities of daily living will improve  Description: Ability to perform activities of daily living will improve  Outcome: Ongoing  Goal: Participates in care planning  Description: Participates in care planning  Outcome: Ongoing     Problem: Injury - Risk of, Physical Injury:  Goal: Absence of physical injury  Description: Absence of physical injury  Outcome: Ongoing  Goal: Will remain free from falls  Description: Will remain free from falls  Outcome: Ongoing     Problem: Mood - Altered:  Goal: Mood stable  Description: Mood stable  Outcome: Ongoing  Goal: Absence of abusive behavior  Description: Absence of abusive behavior  Outcome: Ongoing  Goal: Verbalizations of feeling emotionally comfortable while being cared for will increase  Description: Verbalizations of feeling emotionally comfortable while being cared for will increase  Outcome: Ongoing     Problem: Psychomotor Activity - Altered:  Goal: Absence of psychomotor disturbance signs and symptoms  Description: Absence of psychomotor disturbance signs and symptoms  Outcome: Ongoing     Problem: Sensory Perception - Impaired:  Goal: Demonstrations of improved sensory functioning will increase  Description: Demonstrations of improved sensory functioning will increase  Outcome: Ongoing  Goal: Decrease in sensory misperception frequency  Description: Decrease in sensory misperception frequency  Outcome: Ongoing  Goal: Able to refrain from responding to false sensory perceptions  Description: Able to refrain from responding to false sensory perceptions  Outcome: Ongoing  Goal: Demonstrates accurate environmental perceptions  Description: Demonstrates accurate environmental perceptions  Outcome: Ongoing  Goal: Able to distinguish between reality-based and nonreality-based thinking  Description: Able to distinguish between reality-based and nonreality-based thinking  Outcome: Ongoing  Goal: Able to interrupt nonreality-based thinking  Description: Able to interrupt nonreality-based thinking  Outcome: Ongoing     Problem: Sleep Pattern Disturbance:  Goal: Appears well-rested  Description: Appears well-rested  Outcome: Ongoing     Problem: Nutrition  Goal: Optimal nutrition therapy  Outcome: Ongoing     Problem: Falls - Risk of:  Goal: Absence of physical injury  Description: Absence of physical injury  Outcome: Ongoing  Goal: Will remain free from falls  Description: Will remain free from falls  Outcome: Ongoing     Problem: Skin Integrity:  Goal: Will show no infection signs and symptoms  Description: Will show no infection signs and symptoms  Outcome: Ongoing  Goal: Absence of new skin breakdown  Description: Absence of new skin breakdown  Outcome: Ongoing     Problem: Non-Violent Restraints  Goal: Removal from restraints as soon as assessed to be safe  Outcome: Ongoing  Goal: No harm/injury to patient while restraints in use  Outcome: Ongoing  Goal: Patient's dignity will be maintained  Outcome: Ongoing

## 2022-03-03 NOTE — PROGRESS NOTES
NEPHROLOGY PROGRESS NOTE      SUBJECTIVE     59 y/o male patient with past medical history of CAD s/p CABG x 3 presented to the ED with V Fib cardiac arrest, s/p defib x 2 with ROSC. Patient again went into PEA cardiac arrest and ROSC was achieved, unknown down time. EKG concerning for NSTEMI, was started on heparin and amiodarone and did not undergo cath due to concern for anoxic brain injury. MRI revealed early changes of hypoxic brain injury, but Neurological exam improving. Patient was seen and evaluated at bedside, noacute events overnight, intubated and sedated, on 40% FiO2   Excellent diuresis with IV Lasix once a day. Volume status definitely improving. Blood pressure stable. Plans to extubate soon  Renal function at baseline    OBJECTIVE     Vitals:    03/03/22 0645 03/03/22 0700 03/03/22 0734 03/03/22 0752   BP:       Pulse: 66 66 67 71   Resp:       Temp:       TempSrc:       SpO2: 95% 96% 100% 97%   Weight:       Height:         24HR INTAKE/OUTPUT:      Intake/Output Summary (Last 24 hours) at 3/3/2022 0852  Last data filed at 3/3/2022 0700  Gross per 24 hour   Intake 1930.9 ml   Output 1770 ml   Net 160.9 ml       General appearance: Intubated and sedated, neuro exam improving  HEENT: Unremarkable. Respiratory::vesicular breath sounds,no wheeze/crackles  Cardiovascular:S1 S2 normal,no gallop or organic murmur. Abdomen:Non tender/non distended. Bowel sounds present  Extremities: No Cyanosis or Clubbing, present lower extremity edema  Neurological: Intubated and sedated.      MEDICATIONS     Scheduled Meds:    cloNIDine  0.1 mg Oral BID    docusate  100 mg Oral Daily    amLODIPine  10 mg Oral Daily    furosemide  40 mg IntraVENous Daily    carvedilol  25 mg Oral BID WC    fentanNYL  100 mcg IntraVENous Once    azaTHIOprine  75 mg Oral Daily    sodium chloride  500 mL IntraVENous Once    insulin lispro  0-3 Units SubCUTAneous Q6H    aspirin  81 mg Oral Daily    atorvastatin  80 mg Oral Daily    sodium chloride flush  5-40 mL IntraVENous 2 times per day    chlorhexidine  15 mL Mouth/Throat BID    polyvinyl alcohol  1 drop Both Eyes Q4H    pantoprazole  40 mg IntraVENous Daily     Continuous Infusions:    dexmedetomidine 0.2 mcg/kg/hr (03/03/22 0700)    heparin (PORCINE) Infusion 12.1 Units/kg/hr (03/03/22 0700)    propofol Stopped (03/03/22 0629)    sodium chloride 100 mL/hr at 02/25/22 1720    dextrose       PRN Meds:  bisacodyl, metoclopramide, labetalol, sodium chloride flush, sodium chloride, ondansetron **OR** ondansetron, polyethylene glycol, acetaminophen **OR** acetaminophen, glucose, glucagon (rDNA), dextrose, dextrose bolus (hypoglycemia) **OR** dextrose bolus (hypoglycemia), ipratropium-albuterol, heparin (porcine), heparin (porcine)  Home Meds:                Medications Prior to Admission: magnesium oxide (MAG-OX) 400 (241.3 Mg) MG TABS tablet,   atorvastatin (LIPITOR) 40 MG tablet, TAKE 1 TABLET BY MOUTH DAILY  magnesium oxide (MAG-OX) 400 (240 Mg) MG tablet, TAKE 1 TABLET BY MOUTH 2 TIMES DAILY  traZODone (DESYREL) 100 MG tablet, Take 0.5 tablets by mouth nightly as needed for Sleep  DULoxetine (CYMBALTA) 30 MG extended release capsule, TAKE 1 CAPSULE BY MOUTH DAILY  lisinopril (PRINIVIL;ZESTRIL) 5 MG tablet, take 1 tablet by mouth once daily  spironolactone (ALDACTONE) 25 MG tablet, take 1 tablet by mouth once daily  metoclopramide (REGLAN) 10 MG tablet, Take 1 tablet by mouth 3 times daily  isosorbide mononitrate (IMDUR) 30 MG extended release tablet, Take 1 tablet by mouth daily  nitroGLYCERIN (NITROSTAT) 0.4 MG SL tablet, Place 1 tablet under the tongue every 5 minutes as needed for Chest pain up to max of 3 total doses. If no relief after 1 dose, call 911.  (Patient not taking: Reported on 1/17/2022)  cloNIDine (CATAPRES) 0.2 MG tablet, Take 1 tablet by mouth 2 times daily  metoprolol tartrate (LOPRESSOR) 25 MG tablet, Take 1 tablet by mouth 2 times daily  magnesium oxide (MAG-OX) 400 (240 Mg) MG tablet,   pantoprazole (PROTONIX) 40 MG tablet, Take 1 tablet by mouth daily  magnesium oxide (MAG-OX) 400 MG tablet, Take 1 tablet by mouth 2 times daily (Patient not taking: Reported on 10/25/2021)  aspirin EC 81 MG EC tablet, Take 1 tablet by mouth daily  nicotine (NICODERM CQ) 21 MG/24HR, Place 1 patch onto the skin daily  acetaminophen (TYLENOL) 325 MG tablet, Take 2 tablets by mouth every 6 hours as needed for Pain  Umeclidinium Bromide 62.5 MCG/INH AEPB, Inhale 1 puff into the lungs daily (Patient not taking: Reported on 1/17/2022)  vitamin D3 (CHOLECALCIFEROL) 10 MCG (400 UNIT) TABS tablet, take 2 tablets (800MG) by mouth once daily (Patient not taking: Reported on 4/21/2021)  vitamin D3 (CHOLECALCIFEROL) 10 MCG (400 UNIT) TABS tablet, take 2 tablets (800MG) by mouth once daily (Patient not taking: Reported on 1/17/2022)  Fluticasone furoate-vilanterol (BREO ELLIPTA) 200-25 MCG/INH AEPB inhaler, Inhale 1 puff into the lungs daily (Patient not taking: Reported on 1/17/2022)  calcium carbonate-vitamin D3 (CALCIUM 600-D) 600-400 MG-UNIT TABS per tab, Take 1 tablet by mouth 2 times daily  azaTHIOprine (IMURAN) 50 MG tablet, Take 1.5 tablets by mouth daily  albuterol sulfate  (90 Base) MCG/ACT inhaler, inhale 2 puffs by mouth every 6 hours if needed for wheezing  nicotine (NICODERM CQ) 14 MG/24HR, Place 1 patch onto the skin daily (Patient not taking: Reported on 1/17/2022)  traMADol (ULTRAM) 50 MG tablet, Take 50 mg by mouth every 8 hours as needed for Pain.   (Patient not taking: Reported on 1/17/2022)  OLANZapine (ZYPREXA) 5 MG tablet, Take 1 tablet by mouth nightly    INVESTIGATIONS     Last 3 CMP:    Recent Labs     03/01/22  0615 03/02/22  0437 03/03/22  0445    137 137   K 3.8 4.0 3.8    96* 98   CO2 27 30 29   BUN 23* 22* 29*   CREATININE 1.53* 1.43* 1.43*   CALCIUM 7.7* 8.3* 8.2*   PROT 5.1* 5.8* 5.5*   LABALBU 2.6* 2.9* 2.9*   BILITOT 0.73 0.74 0.62 ALKPHOS 163* 169* 140*   AST 26 31 32   ALT 13 16 17       Last 3 CBC:  Recent Labs     03/01/22  0615 03/02/22  0437 03/03/22  0445   WBC 4.8 6.5 6.2   RBC 3.14* 3.61* 3.27*   HGB 9.5* 10.8* 9.7*   HCT 28.5* 32.9* 30.1*   MCV 90.8 91.1 92.0   MCH 30.3 29.9 29.7   MCHC 33.3 32.8 32.2   RDW 17.9* 17.6* 17.3*   * 116* 109*   MPV 11.0 10.4 10.4       ASSESSMENT     1. 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 1.7-2, follows up with Dr. Martha Martin, on treatment with Imuran. 2. 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 V Fib and PEA cardiac arrest, unknown down time  4. ? Hypoxic brain injury   5. H/O CABG  6. Respiratory failure, mechanical ventilation dependant  7. H/O HTN    PLAN     1. Continue I.V. lasix 40 mg daily and follow strict I/O.   2. Continue Imuran  3. Avoid hydralazine use due to concern for ANCA vasculitis related to Hydralazine use. 4. Cr is back to baseline and UO is improving, continue to monitor BMP. 5. Plan to wait until the patient is extubated and discuss with the patient and family regarding the risk of ULICES and need for dialysis if the patient gets contrast for cardiac cath. Discussed with Cardiology as well yesterday, cardiac cath is not urgent at this time, but they will closely monitor for any arrhythmias and hypotension.    5.  We will follow    Please do not hesitate to call with questions        Nadia Yoon MD   Nephrology Attending Physician  [facility]  3/2/2022

## 2022-03-03 NOTE — PLAN OF CARE
Problem: Non-Violent Restraints  Goal: Removal from restraints as soon as assessed to be safe  3/3/2022 1252 by Alcon Dorsey  Outcome: Completed  3/3/2022 1055 by Alcon Dorsey  Outcome: Ongoing  Goal: No harm/injury to patient while restraints in use  3/3/2022 1252 by Alcon Dorsey  Outcome: Completed  3/3/2022 1055 by Alcon Dorsey  Outcome: Ongoing  Goal: Patient's dignity will be maintained  3/3/2022 1252 by Alcon Dorsey  Outcome: Completed  3/3/2022 1055 by Alcon Dorsey  Outcome: Ongoing

## 2022-03-03 NOTE — PLAN OF CARE
Problem: OXYGENATION/RESPIRATORY FUNCTION  Goal: Patient will maintain patent airway  3/3/2022 0930 by Jamel Donnelly RCP  Outcome: Ongoing     Problem: OXYGENATION/RESPIRATORY FUNCTION  Goal: Patient will achieve/maintain normal respiratory rate/effort  Description: Respiratory rate and effort will be within normal limits for the patient  3/3/2022 0930 by Jamel Donnelly RCP  Outcome: Ongoing     Problem: MECHANICAL VENTILATION  Goal: Patient will maintain patent airway  3/3/2022 0930 by Jamel Donnelly RCP  Outcome: Ongoing     Problem: MECHANICAL VENTILATION  Goal: Oral health is maintained or improved  3/3/2022 0930 by Jamel Donnelly RCP  Outcome: Ongoing     Problem: MECHANICAL VENTILATION  Goal: ET tube will be managed safely  3/3/2022 0930 by Jamel Donnelly RCP  Outcome: Ongoing     Problem: MECHANICAL VENTILATION  Goal: Ability to express needs and understand communication  3/3/2022 0930 by Jamel Donnelly RCP  Outcome: Ongoing     Problem: MECHANICAL VENTILATION  Goal: Mobility/activity is maintained at optimum level for patient  3/3/2022 0930 by Jamel Donnelly RCP  Outcome: Ongoing     Problem: SKIN INTEGRITY  Goal: Skin integrity is maintained or improved  3/3/2022 0930 by Jamel Donnelly RCP  Outcome: Ongoing

## 2022-03-03 NOTE — PLAN OF CARE
Nutrition Problem #1: Inadequate oral intake  Intervention: Food and/or Nutrient Delivery: Start Oral Diet (as able)  Nutritional Goals: meet % of estimated nutrient needs

## 2022-03-03 NOTE — PLAN OF CARE
Problem: OXYGENATION/RESPIRATORY FUNCTION  Goal: Patient will maintain patent airway  3/3/2022 1154 by Alpa Sanchez RCP  Outcome: Ongoing     Problem: OXYGENATION/RESPIRATORY FUNCTION  Goal: Patient will achieve/maintain normal respiratory rate/effort  Description: Respiratory rate and effort will be within normal limits for the patient  3/3/2022 1154 by Alpa Sanchez RCP  Outcome: Ongoing

## 2022-03-04 LAB
ABSOLUTE EOS #: 0.35 K/UL (ref 0–0.44)
ABSOLUTE IMMATURE GRANULOCYTE: 0.16 K/UL (ref 0–0.3)
ABSOLUTE LYMPH #: 1.04 K/UL (ref 1.1–3.7)
ABSOLUTE MONO #: 0.58 K/UL (ref 0.1–1.2)
ALBUMIN SERPL-MCNC: 3 G/DL (ref 3.5–5.2)
ALBUMIN/GLOBULIN RATIO: 1 (ref 1–2.5)
ALP BLD-CCNC: 167 U/L (ref 40–129)
ALT SERPL-CCNC: 23 U/L (ref 5–41)
ANION GAP SERPL CALCULATED.3IONS-SCNC: 13 MMOL/L (ref 9–17)
AST SERPL-CCNC: 42 U/L
BASOPHILS # BLD: 1 % (ref 0–2)
BASOPHILS ABSOLUTE: 0.06 K/UL (ref 0–0.2)
BILIRUB SERPL-MCNC: 0.9 MG/DL (ref 0.3–1.2)
BUN BLDV-MCNC: 28 MG/DL (ref 6–20)
CALCIUM SERPL-MCNC: 8.7 MG/DL (ref 8.6–10.4)
CHLORIDE BLD-SCNC: 98 MMOL/L (ref 98–107)
CO2: 27 MMOL/L (ref 20–31)
CREAT SERPL-MCNC: 1.27 MG/DL (ref 0.7–1.2)
EOSINOPHILS RELATIVE PERCENT: 5 % (ref 1–4)
GFR AFRICAN AMERICAN: >60 ML/MIN
GFR NON-AFRICAN AMERICAN: 58 ML/MIN
GFR SERPL CREATININE-BSD FRML MDRD: ABNORMAL ML/MIN/{1.73_M2}
GLUCOSE BLD-MCNC: 122 MG/DL (ref 75–110)
GLUCOSE BLD-MCNC: 135 MG/DL (ref 75–110)
GLUCOSE BLD-MCNC: 81 MG/DL (ref 75–110)
GLUCOSE BLD-MCNC: 85 MG/DL (ref 70–99)
GLUCOSE BLD-MCNC: 88 MG/DL (ref 75–110)
GLUCOSE BLD-MCNC: 97 MG/DL (ref 75–110)
HCT VFR BLD CALC: 31.9 % (ref 40.7–50.3)
HEMOGLOBIN: 10.2 G/DL (ref 13–17)
IMMATURE GRANULOCYTES: 2 %
LYMPHOCYTES # BLD: 15 % (ref 24–43)
MAGNESIUM: 2.2 MG/DL (ref 1.6–2.6)
MCH RBC QN AUTO: 30.1 PG (ref 25.2–33.5)
MCHC RBC AUTO-ENTMCNC: 32 G/DL (ref 28.4–34.8)
MCV RBC AUTO: 94.1 FL (ref 82.6–102.9)
MONOCYTES # BLD: 8 % (ref 3–12)
NRBC AUTOMATED: 0 PER 100 WBC
PARTIAL THROMBOPLASTIN TIME: 66.8 SEC (ref 20.5–30.5)
PDW BLD-RTO: 17.1 % (ref 11.8–14.4)
PLATELET # BLD: 117 K/UL (ref 138–453)
PMV BLD AUTO: 10.6 FL (ref 8.1–13.5)
POTASSIUM SERPL-SCNC: 4.3 MMOL/L (ref 3.7–5.3)
RBC # BLD: 3.39 M/UL (ref 4.21–5.77)
RBC # BLD: ABNORMAL 10*6/UL
SEG NEUTROPHILS: 69 % (ref 36–65)
SEGMENTED NEUTROPHILS ABSOLUTE COUNT: 4.94 K/UL (ref 1.5–8.1)
SODIUM BLD-SCNC: 138 MMOL/L (ref 135–144)
TOTAL PROTEIN: 5.9 G/DL (ref 6.4–8.3)
WBC # BLD: 7.1 K/UL (ref 3.5–11.3)

## 2022-03-04 PROCEDURE — 2580000003 HC RX 258: Performed by: STUDENT IN AN ORGANIZED HEALTH CARE EDUCATION/TRAINING PROGRAM

## 2022-03-04 PROCEDURE — 6370000000 HC RX 637 (ALT 250 FOR IP): Performed by: STUDENT IN AN ORGANIZED HEALTH CARE EDUCATION/TRAINING PROGRAM

## 2022-03-04 PROCEDURE — 85730 THROMBOPLASTIN TIME PARTIAL: CPT

## 2022-03-04 PROCEDURE — 83735 ASSAY OF MAGNESIUM: CPT

## 2022-03-04 PROCEDURE — 6360000002 HC RX W HCPCS: Performed by: INTERNAL MEDICINE

## 2022-03-04 PROCEDURE — 2580000003 HC RX 258: Performed by: INTERNAL MEDICINE

## 2022-03-04 PROCEDURE — 6360000002 HC RX W HCPCS: Performed by: STUDENT IN AN ORGANIZED HEALTH CARE EDUCATION/TRAINING PROGRAM

## 2022-03-04 PROCEDURE — 85025 COMPLETE CBC W/AUTO DIFF WBC: CPT

## 2022-03-04 PROCEDURE — 97110 THERAPEUTIC EXERCISES: CPT

## 2022-03-04 PROCEDURE — 99232 SBSQ HOSP IP/OBS MODERATE 35: CPT | Performed by: INTERNAL MEDICINE

## 2022-03-04 PROCEDURE — 6360000002 HC RX W HCPCS

## 2022-03-04 PROCEDURE — 6370000000 HC RX 637 (ALT 250 FOR IP)

## 2022-03-04 PROCEDURE — 94761 N-INVAS EAR/PLS OXIMETRY MLT: CPT

## 2022-03-04 PROCEDURE — 97163 PT EVAL HIGH COMPLEX 45 MIN: CPT

## 2022-03-04 PROCEDURE — 99291 CRITICAL CARE FIRST HOUR: CPT | Performed by: INTERNAL MEDICINE

## 2022-03-04 PROCEDURE — 97530 THERAPEUTIC ACTIVITIES: CPT

## 2022-03-04 PROCEDURE — C9113 INJ PANTOPRAZOLE SODIUM, VIA: HCPCS | Performed by: INTERNAL MEDICINE

## 2022-03-04 PROCEDURE — 2060000000 HC ICU INTERMEDIATE R&B

## 2022-03-04 PROCEDURE — 80053 COMPREHEN METABOLIC PANEL: CPT

## 2022-03-04 PROCEDURE — 82947 ASSAY GLUCOSE BLOOD QUANT: CPT

## 2022-03-04 PROCEDURE — 36415 COLL VENOUS BLD VENIPUNCTURE: CPT

## 2022-03-04 PROCEDURE — 2700000000 HC OXYGEN THERAPY PER DAY

## 2022-03-04 RX ORDER — SODIUM CHLORIDE 9 MG/ML
10 INJECTION INTRAVENOUS DAILY
Status: DISCONTINUED | OUTPATIENT
Start: 2022-03-04 | End: 2022-03-04

## 2022-03-04 RX ORDER — PANTOPRAZOLE SODIUM 40 MG/10ML
40 INJECTION, POWDER, LYOPHILIZED, FOR SOLUTION INTRAVENOUS DAILY
Status: DISCONTINUED | OUTPATIENT
Start: 2022-03-04 | End: 2022-03-04

## 2022-03-04 RX ORDER — HEPARIN SODIUM 5000 [USP'U]/ML
5000 INJECTION, SOLUTION INTRAVENOUS; SUBCUTANEOUS EVERY 8 HOURS SCHEDULED
Status: DISPENSED | OUTPATIENT
Start: 2022-03-04 | End: 2022-03-13

## 2022-03-04 RX ORDER — LORAZEPAM 2 MG/ML
0.5 INJECTION INTRAMUSCULAR ONCE
Status: COMPLETED | OUTPATIENT
Start: 2022-03-04 | End: 2022-03-04

## 2022-03-04 RX ORDER — HEPARIN SODIUM 5000 [USP'U]/ML
5000 INJECTION, SOLUTION INTRAVENOUS; SUBCUTANEOUS EVERY 8 HOURS SCHEDULED
Status: CANCELLED | OUTPATIENT
Start: 2022-03-04

## 2022-03-04 RX ORDER — LANSOPRAZOLE
30 KIT
Status: DISCONTINUED | OUTPATIENT
Start: 2022-03-05 | End: 2022-03-10

## 2022-03-04 RX ADMIN — CLONIDINE HYDROCHLORIDE 0.2 MG: 0.1 TABLET ORAL at 21:28

## 2022-03-04 RX ADMIN — PANTOPRAZOLE SODIUM 40 MG: 40 INJECTION, POWDER, FOR SOLUTION INTRAVENOUS at 08:31

## 2022-03-04 RX ADMIN — SODIUM CHLORIDE, PRESERVATIVE FREE 10 ML: 5 INJECTION INTRAVENOUS at 21:29

## 2022-03-04 RX ADMIN — HEPARIN SODIUM 12.1 UNITS/KG/HR: 5000 INJECTION INTRAVENOUS; SUBCUTANEOUS at 09:14

## 2022-03-04 RX ADMIN — DOCUSATE SODIUM LIQUID 100 MG: 100 LIQUID ORAL at 08:31

## 2022-03-04 RX ADMIN — AZATHIOPRINE 75 MG: 50 TABLET ORAL at 08:30

## 2022-03-04 RX ADMIN — LORAZEPAM 0.5 MG: 2 INJECTION INTRAMUSCULAR; INTRAVENOUS at 12:31

## 2022-03-04 RX ADMIN — AMLODIPINE BESYLATE 10 MG: 10 TABLET ORAL at 08:31

## 2022-03-04 RX ADMIN — HEPARIN SODIUM 5000 UNITS: 5000 INJECTION INTRAVENOUS; SUBCUTANEOUS at 21:29

## 2022-03-04 RX ADMIN — CLONIDINE HYDROCHLORIDE 0.2 MG: 0.1 TABLET ORAL at 08:30

## 2022-03-04 RX ADMIN — ATORVASTATIN CALCIUM 80 MG: 80 TABLET, FILM COATED ORAL at 08:30

## 2022-03-04 RX ADMIN — CARVEDILOL 25 MG: 25 TABLET, FILM COATED ORAL at 16:55

## 2022-03-04 RX ADMIN — ASPIRIN 81 MG: 81 TABLET, CHEWABLE ORAL at 08:30

## 2022-03-04 RX ADMIN — SODIUM CHLORIDE, PRESERVATIVE FREE 10 ML: 5 INJECTION INTRAVENOUS at 08:50

## 2022-03-04 RX ADMIN — FUROSEMIDE 40 MG: 10 INJECTION, SOLUTION INTRAMUSCULAR; INTRAVENOUS at 08:31

## 2022-03-04 RX ADMIN — Medication 10 ML: at 08:49

## 2022-03-04 RX ADMIN — CARVEDILOL 25 MG: 25 TABLET, FILM COATED ORAL at 08:31

## 2022-03-04 RX ADMIN — HEPARIN SODIUM 5000 UNITS: 5000 INJECTION INTRAVENOUS; SUBCUTANEOUS at 13:43

## 2022-03-04 ASSESSMENT — ENCOUNTER SYMPTOMS
COUGH: 0
DIARRHEA: 0
ABDOMINAL PAIN: 0
VOMITING: 0
WHEEZING: 0
NAUSEA: 0
BACK PAIN: 0

## 2022-03-04 ASSESSMENT — PAIN SCALES - GENERAL
PAINLEVEL_OUTOF10: 0

## 2022-03-04 NOTE — PLAN OF CARE
Problem: Confusion - Acute:  Goal: Absence of continued neurological deterioration signs and symptoms  Description: Absence of continued neurological deterioration signs and symptoms  3/4/2022 1054 by Kimi Smith RN  Outcome: Ongoing  3/3/2022 2122 by José Miguel Freeman RN  Outcome: Ongoing  Goal: Mental status will be restored to baseline  Description: Mental status will be restored to baseline  3/4/2022 1054 by Kimi Smith RN  Outcome: Ongoing  3/3/2022 2122 by José Miguel Freeman RN  Outcome: Ongoing     Problem: Discharge Planning:  Goal: Ability to perform activities of daily living will improve  Description: Ability to perform activities of daily living will improve  3/4/2022 1054 by Kimi Smith RN  Outcome: Ongoing  3/3/2022 2122 by José Miguel Freeman RN  Outcome: Ongoing  Goal: Participates in care planning  Description: Participates in care planning  3/4/2022 1054 by Kimi Smith RN  Outcome: Ongoing  3/3/2022 2122 by José Miguel Freeman RN  Outcome: Ongoing     Problem: Injury - Risk of, Physical Injury:  Goal: Absence of physical injury  Description: Absence of physical injury  3/4/2022 1054 by Kimi Smith RN  Outcome: Ongoing  3/3/2022 2122 by José Miguel Freeman RN  Outcome: Ongoing  Goal: Will remain free from falls  Description: Will remain free from falls  3/4/2022 1054 by Kimi Smith RN  Outcome: Ongoing  3/3/2022 2122 by José Miguel Freeman RN  Outcome: Ongoing     Problem: Mood - Altered:  Goal: Mood stable  Description: Mood stable  3/4/2022 1054 by Kimi Smith RN  Outcome: Ongoing  3/3/2022 2122 by José Miguel Freeman RN  Outcome: Ongoing  Goal: Absence of abusive behavior  Description: Absence of abusive behavior  3/4/2022 1054 by Kimi Smith RN  Outcome: Ongoing  3/3/2022 2122 by José Miguel Freeman RN  Outcome: Ongoing  Goal: Verbalizations of feeling emotionally comfortable while being cared for will increase  Description: Verbalizations of feeling emotionally comfortable while being cared for will increase  3/4/2022 1054 by Janie Barrera RN  Outcome: Ongoing  3/3/2022 2122 by Nimo Kitchen RN  Outcome: Ongoing     Problem: Psychomotor Activity - Altered:  Goal: Absence of psychomotor disturbance signs and symptoms  Description: Absence of psychomotor disturbance signs and symptoms  3/4/2022 1054 by Janie Barrera RN  Outcome: Ongoing  3/3/2022 2122 by Nimo Kitchen RN  Outcome: Ongoing     Problem: Sensory Perception - Impaired:  Goal: Demonstrations of improved sensory functioning will increase  Description: Demonstrations of improved sensory functioning will increase  3/4/2022 1054 by Janie Barrera RN  Outcome: Ongoing  3/3/2022 2122 by Nimo Kitchen RN  Outcome: Ongoing  Goal: Decrease in sensory misperception frequency  Description: Decrease in sensory misperception frequency  3/4/2022 1054 by Janie Barrera RN  Outcome: Ongoing  3/3/2022 2122 by Nimo Kitchen RN  Outcome: Ongoing  Goal: Able to refrain from responding to false sensory perceptions  Description: Able to refrain from responding to false sensory perceptions  3/4/2022 1054 by Janie Barrera RN  Outcome: Ongoing  3/3/2022 2122 by Nimo Kitchen RN  Outcome: Ongoing  Goal: Demonstrates accurate environmental perceptions  Description: Demonstrates accurate environmental perceptions  3/4/2022 1054 by Janie Barrera RN  Outcome: Ongoing  3/3/2022 2122 by Nimo Kitchen RN  Outcome: Ongoing  Goal: Able to distinguish between reality-based and nonreality-based thinking  Description: Able to distinguish between reality-based and nonreality-based thinking  Outcome: Ongoing  Goal: Able to interrupt nonreality-based thinking  Description: Able to interrupt nonreality-based thinking  Outcome: Ongoing     Problem: Sleep Pattern Disturbance:  Goal: Appears well-rested  Description: Appears well-rested  3/4/2022 1054 by Janie Barrera RN  Outcome: Ongoing  3/3/2022 2122 by Nimo Kitchen RN  Outcome: Ongoing     Problem: Nutrition  Goal: Optimal nutrition therapy  3/4/2022 1054 by Janet Keith RN  Outcome: Ongoing  3/3/2022 2122 by Miguel Valle RN  Outcome: Ongoing     Problem: Falls - Risk of:  Goal: Absence of physical injury  Description: Absence of physical injury  3/4/2022 1054 by Janet Keith RN  Outcome: Ongoing  3/3/2022 2122 by Miguel Valle RN  Outcome: Ongoing  Goal: Will remain free from falls  Description: Will remain free from falls  3/4/2022 1054 by Janet Keith RN  Outcome: Ongoing  3/3/2022 2122 by Miguel Valle RN  Outcome: Ongoing     Problem: Skin Integrity:  Goal: Will show no infection signs and symptoms  Description: Will show no infection signs and symptoms  3/4/2022 1054 by Janet Keith RN  Outcome: Ongoing  3/3/2022 2122 by Miguel Valle RN  Outcome: Ongoing  Goal: Absence of new skin breakdown  Description: Absence of new skin breakdown  3/4/2022 1054 by Janet Keith RN  Outcome: Ongoing  3/3/2022 2122 by Miguel Valle RN  Outcome: Ongoing
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
## 2022-03-04 NOTE — PROGRESS NOTES
Critical Care Team - Daily Progress Note      Date and time: 3/4/2022 8:56 AM  Patient's name:  Teofilo Xiao  Medical Record Number: [id number]  Patient's account/billing number: [de-identified]  Patient's YOB: 1965  Age: 60 y.o. Date of Admission: 2/21/2022  9:13 PM  Length of stay during current admission: 10      Primary Care Physician: Favian Mcleod MD  ICU Attending Physician: Dr. Germaine Burden Status: Full Code    Reason for ICU admission:   Chief Complaint   Patient presents with    Cardiac Arrest       Subjective:     OVERNIGHT EVENTS:    VSS, afebrile. Patient remains hypertensive with highest BP at 150/134. Heart rate is at 72 normal sinus rhythm. Patient is on 2 L of NC with SpO 2: 96% Patient was extubated yesterday on 3/3 and has been tolerating well without any complications. Patient is on 0.1 of Precedex. Patient is off propofol since 3/3     Continues to be on heparin drip. Labs reviewed: WBC-7.1, hemoglobin 10.2, platelet count 686, creatinine is at 1.27    Patient failed bedside swallow yesterday. Restarted on tube feeds. Patient had 2.5 L output. Nephrology recommended to continue IV lasix daily. Plan for today:    Patient to wean off precedex. Patient to try Bedside swallow study again today. Heparin drip discontinued and patient started on heparin subcutaneous after discussion at rounds with the attending physician. Blackman catheter and arterial line will be removed today. Plan to transfer patient once completely weaned off precedex. Fluids: None  Feeding: Tube feeds  Family update: contacted family but did not answer my call. Analgesics: None  Sedation: Precedex  Thromboprophylaxis: Heparin   Mobilization: moving from side to side. Heads-up: 45 degrees. Hemodynamics: Off pressor support  Prophylaxis: Protonix  Glycemic control: On low-dose sliding scale currently held because of low blood sugars.   Bowel management: Colace  Indwelling catheter: Odell, NG tube in place  De-escalation: discontinued blood sugar checks. AWAKE & FOLLOWING COMMANDS:  [] No   [x] Yes    CURRENT VENTILATION STATUS:     [] Ventilator  [] BIPAP  [x] Nasal Cannula [] Room Air      IF INTUBATED, ET TUBE MARKING AT LOWER LIP:       cms    SECRETIONS Amount:  [] Small [] Moderate  [] Large  [] None  Color:     [] White [] Colored  [] Bloody    SEDATION:  RAAS Score:  [] Propofol gtt  [] Versed gtt  [] Ativan gtt   [x] No Sedation    PARALYZED:  [x] No    [] Yes    DIARRHEA:                [x] No                [] Yes  (C. Difficile status: [] positive                                                                                                                       [] negative                                                                                                                     [] pending)    VASOPRESSORS:  [x] No    [] Yes    If yes -   [] Levophed       [] Dopamine     [] Vasopressin       [] Dobutamine  [] Phenylephrine         [] Epinephrine    CENTRAL LINES:     [x] No   [] Yes   (Date of Insertion:   )           If yes -     [] Right IJ     [] Left IJ [] Right Femoral [] Left Femoral                   [] Right Subclavian [] Left Subclavian       ODELL'S CATHETER:   [] No   [x] Yes  (Date of Insertion:   )     URINE OUTPUT:            [x] Good   [] Low              [] Anuric      REVIEW OF SYSTEMS:  Review of Systems   Respiratory: Negative for cough and wheezing. Cardiovascular: Negative for chest pain and leg swelling. Gastrointestinal: Negative for abdominal pain, diarrhea, nausea and vomiting. Genitourinary: Negative for difficulty urinating. Musculoskeletal: Negative for back pain. Neurological: Negative for syncope, light-headedness and headaches. Psychiatric/Behavioral: The patient is not nervous/anxious.         HISTORY OF PRESENT ILLNESS:   Josef Ojeda a 60 y. o. with PMH of CAD s/p CABG x 3 in  2011 and Cath 10/2018 with patent LIMA to LAD and SVG to RCA but occluded SVG to OM1 who presented to the emergency department with cardiac arrest. Patient was at home when a family member heard the patient fall upstairs however was unable to check on the patient. EMS was called and patient was found to be in V. Fib arrest. ACLS was initiated and patient received two shocks and ROSC was achieved. On the way out of the patient's home patient went into PEA arrest. Compressions were resumed and epinephrine was given and ROSC was achieved. Unknown down time.      In the emergency department patient was intubated and sedated on propofol. Hemodynamically stable off pressors. Labs demonstrated elevated creatinine (1.67), lactic acidosis (3.0), leukocytosis (20.2), EKG was concerning for STEMI with ST elevations in V3, V4 and V5. Cardiology was consulted however patient did not meet STEMI criteria. Cardiology initially planned to Cath patient however he was taken off propofol with only sluggish pupils, but no gag or corneal reflexes so Cath was canceled due to poor neurological prognosis. CT head was negative. CT C spine demonstrated remote anterior fusion from C4 through C7 with C5-C6 corpectomy and bone strut placement as well as prominent/recurrent spondylosis with moderate cord flattening at C4-C5 and C5-C6. Patient will be admitted to medical ICU.         OBJECTIVE:     VITAL SIGNS:  BP (!) 150/134   Pulse 76   Temp 98.1 °F (36.7 °C) (Core) Comment (Src):  Blackman probe  Resp 24   Ht 5' 10\" (1.778 m)   Wt 207 lb 3.7 oz (94 kg)   SpO2 92%   BMI 29.73 kg/m²   Tmax over 24 hours:  Temp (24hrs), Av.3 °F (36.8 °C), Min:98.1 °F (36.7 °C), Max:98.6 °F (37 °C)      Patient Vitals for the past 8 hrs:   BP Temp Temp src Pulse Resp SpO2   22 0700    76 24 92 %   22 0600 (!) 150/134   78 14 95 %   22 0500    61 23 99 %   22 0400 (!) 100/59 98.1 °F (36.7 °C) CORE 63 25 98 %   22 0300    69 (!) 36 97 %   03/04/22 020 100/61   62 24 99 %   03/04/22 0100    69 17 97 %         Intake/Output Summary (Last 24 hours) at 3/4/2022 0856  Last data filed at 3/4/2022 0700  Gross per 24 hour   Intake 403.41 ml   Output 2470 ml   Net -2066.59 ml     Date 03/04/22 0000 - 03/04/22 2359   Shift 2417-3996 8297-4150 8675-7051 24 Hour Total   INTAKE   I.V.(mL/kg) 144.6(1.5)   144.6(1.5)   IV Piggyback(mL/kg) 0(0)   0(0)   Shift Total(mL/kg) 144.6(1.5)   144.6(1.5)   OUTPUT   Urine(mL/kg/hr) 455(0.6)   455   Shift Total(mL/kg) 455(4.8)   455(4.8)   Weight (kg) 94 94 94 94     Wt Readings from Last 3 Encounters:   03/03/22 207 lb 3.7 oz (94 kg)   01/17/22 207 lb 6.4 oz (94.1 kg)   10/25/21 210 lb (95.3 kg)     Body mass index is 29.73 kg/m². PHYSICAL EXAM:    Physical Exam -  Constitutional:  Alert, cooperative and no distress. Mental Status:  Oriented to person, place and time and normal affect. Lungs:  Bilateral air entry present, lung fields clear. Normal effort. Heart:  Regular rate and rhythm, no murmur. Abdomen:  Soft, nontender, nondistended, normal bowel sounds. Extremities:  No edema, redness, tenderness in the calves. Skin:  Warm, dry, no gross lesions or rashes.     Any additional physical findings:      MEDICATIONS:  Scheduled Meds:   pantoprazole  40 mg IntraVENous Daily    And    sodium chloride (PF)  10 mL IntraVENous Daily    cloNIDine  0.2 mg Oral BID    docusate  100 mg Oral Daily    amLODIPine  10 mg Oral Daily    furosemide  40 mg IntraVENous Daily    carvedilol  25 mg Oral BID WC    azaTHIOprine  75 mg Oral Daily    insulin lispro  0-3 Units SubCUTAneous Q6H    aspirin  81 mg Oral Daily    atorvastatin  80 mg Oral Daily    sodium chloride flush  5-40 mL IntraVENous 2 times per day     Continuous Infusions:   heparin (PORCINE) Infusion      dexmedetomidine 0.1 mcg/kg/hr (03/04/22 0700)    sodium chloride 100 mL/hr at 02/25/22 1720    dextrose       PRN Meds:   bisacodyl, 10 mg, Daily PRN  metoclopramide, 5 mg, Q6H PRN  labetalol, 10 mg, Q6H PRN  sodium chloride flush, 5-40 mL, PRN  sodium chloride, 25 mL, PRN  ondansetron, 4 mg, Q8H PRN   Or  ondansetron, 4 mg, Q6H PRN  polyethylene glycol, 17 g, Daily PRN  acetaminophen, 650 mg, Q6H PRN   Or  acetaminophen, 650 mg, Q6H PRN  glucose, 15 g, PRN  glucagon (rDNA), 1 mg, PRN  dextrose, 100 mL/hr, PRN  dextrose bolus (hypoglycemia), 125 mL, PRN   Or  dextrose bolus (hypoglycemia), 250 mL, PRN  ipratropium-albuterol, 1 ampule, Q6H PRN  heparin (porcine), 4,000 Units, PRN  heparin (porcine), 2,000 Units, PRN        SUPPORT DEVICES: [] Ventilator [] BIPAP  [x] Nasal Cannula [] Room Air    VENT SETTINGS (Comprehensive) (if applicable):  Vent Information  $Ventilation: Off Vent  Skin Assessment: Clean, dry, & intact  Suction Catheter Diameter: 14  Equipment ID: [id number]  Equipment Changed: Expiratory Filter  Vent Type: Servo i  Vent Mode: PRVC  Vt Ordered: 580 mL  Rate Set: 14 bmp  Pressure Support: 8 cmH20  FiO2 : 40 %  SpO2: 92 %  SpO2/FiO2 ratio: 235  Sensitivity: 3  PEEP/CPAP: 5  I Time/ I Time %: 0.9 s  Humidification Source: Heated wire  Humidification Temp: 37  Humidification Temp Measured: 37.2  Circuit Condensation: Not drained  Nitric Oxide/Epoprostenol In Use?: No  Additional Respiratory  Assessments  Pulse: 76  Resp: 24  SpO2: 92 %  End Tidal CO2: 38 (%)  Position: Semi-Willis's  Humidification Source: Heated wire  Humidification Temp: 37  Circuit Condensation: Not drained  Oral Care Completed?: Yes  Oral Care: Mouthwash,Mouthwash with chlorhexidine,Mouth swabbed,Mouth suctioned  Subglottic Suction Done?: Yes  Cuff Pressure (cm H2O):  (MLT)    ABGs:     Lab Results   Component Value Date    PHART 7.430 12/09/2019    NVJ2YNL 32.7 12/09/2019    PO2ART 97.5 12/09/2019    EXS4CZL 21.7 12/09/2019    J8IRVKYA 95.6 12/09/2019    FIO2 40.0 03/03/2022     Lactic Acid:   Lab Results   Component Value Date    LACTA 1.5 03/02/2020    LACTA 1.0 12/10/2019    LACTA 1.5 11/04/2019         DATA:  Complete Blood Count:   Recent Labs     03/02/22 0437 03/03/22 0445 03/04/22  0517   WBC 6.5 6.2 7.1   HGB 10.8* 9.7* 10.2*   MCV 91.1 92.0 94.1   * 109* 117*   RBC 3.61* 3.27* 3.39*   HCT 32.9* 30.1* 31.9*   MCH 29.9 29.7 30.1   MCHC 32.8 32.2 32.0   RDW 17.6* 17.3* 17.1*   MPV 10.4 10.4 10.6        PT/INR:    Lab Results   Component Value Date    PROTIME 12.3 02/22/2022    PROTIME 10.6 12/19/2011    INR 1.2 02/22/2022     PTT:    Lab Results   Component Value Date    APTT 66.8 03/04/2022       Basal Metabolic Profile:   Recent Labs     03/02/22 0437 03/03/22 0445 03/04/22 0517    137 138   K 4.0 3.8 4.3   BUN 22* 29* 28*   CREATININE 1.43* 1.43* 1.27*   CL 96* 98 98   CO2 30 29 27      Magnesium:   Lab Results   Component Value Date    MG 2.2 03/04/2022    MG 2.0 03/03/2022    MG 2.0 03/02/2022     Phosphorus:   Lab Results   Component Value Date    PHOS 2.9 02/24/2022    PHOS 3.9 02/23/2022    PHOS 4.2 02/23/2022     S. Calcium:  Recent Labs     03/04/22 0517   CALCIUM 8.7     S. Ionized Calcium:No results for input(s): IONCA in the last 72 hours. Urinalysis:   Lab Results   Component Value Date    NITRU NEGATIVE 02/25/2022    COLORU Yellow 02/25/2022    PHUR 5.5 02/25/2022    WBCUA 2 TO 5 02/25/2022    RBCUA 5 TO 10 02/25/2022    MUCUS NOT REPORTED 11/06/2020    TRICHOMONAS NOT REPORTED 11/06/2020    YEAST NOT REPORTED 11/06/2020    BACTERIA NOT REPORTED 11/06/2020    CLARITYU clear 09/24/2020    SPECGRAV 1.025 02/25/2022    LEUKOCYTESUR NEGATIVE 02/25/2022    UROBILINOGEN Normal 02/25/2022    BILIRUBINUR NEGATIVE 02/25/2022    BLOODU +++ 09/24/2020    GLUCOSEU NEGATIVE 02/25/2022    KETUA TRACE 02/25/2022    AMORPHOUS NOT REPORTED 11/06/2020       CARDIAC ENZYMES: No results for input(s): CKMB, CKMBINDEX, TROPONINI in the last 72 hours. Invalid input(s): CKTOTAL;3  BNP: No results for input(s): BNP in the last 72 hours.     LFTS  Recent Labs     03/02/22  0437 03/03/22  0445 03/04/22  0517   ALKPHOS 169* 140* 167*   ALT 16 17 23   AST 31 32 42*   BILITOT 0.74 0.62 0.90   LABALBU 2.9* 2.9* 3.0*       AMYLASE/LIPASE/AMMONIA  No results for input(s): AMYLASE, LIPASE, AMMONIA in the last 72 hours. Last 3 Blood Glucose:   Recent Labs     03/02/22 0437 03/03/22  0445 03/04/22  0517   GLUCOSE 124* 125* 85      HgBA1c:    Lab Results   Component Value Date    LABA1C 5.1 04/21/2021         TSH:    Lab Results   Component Value Date    TSH 2.00 02/22/2022     ANEMIA STUDIES  No results for input(s): LABIRON, TIBC, FERRITIN, LNKOKOBG29, FOLATE, OCCULTBLD in the last 72 hours. Cultures during this admission:     Blood cultures:                 [] None drawn      [] Negative             []  Positive (Details:  )  Urine Culture:                   [] None drawn      [] Negative             []  Positive (Details:  )  Sputum Culture:               [] None drawn       [] Negative             []  Positive (Details:  )   Endotracheal aspirate:     [] None drawn       [] Negative             []  Positive (Details:  )        ASSESSMENT:     Principal Problem:    Cardiac arrest ([facility] [address]  Active Problems:    Cervical stenosis of spinal canal  Resolved Problems:    * No resolved hospital problems. *        PLAN:       NEURO  -Patient is currently on 2 L of NC with Sp02 at 96%. Patient was extubated on 3/3/2022 and has been tolerating well without any complications.  -Neuro crit signed off, mentation improving.   -LTM E shows moderate to severe encephalopathy  -Neurosurgery was consulted for cervical stenosis, no acute intervention at this point.     CARDIOVASCULAR  -Hemodynamically stable.  Off pressor support. -S/p V. fib arrest with history of CAD s/p CABGx4 with EF -45%    Continue heparin drip, aspirin, Lipitor, Coreg 25 mg twice daily.     Patient is also receiving furosemide injection 40 mg daily IV by nephrology.    Cardiology discontinued amiodarone drip since 3/1/2022. Cardiology states that cath is not urgent at this time as he is hemodynamically stable without any significant hypotension or arrhythmias and will discuss risks of ULICES with patient and family before CATH. and recommends AICD eval prior to discharge. -H/o HTN. Cont coreg 25 mg BD. Cont Norvasc 10 mg daily. on clonidine 0.1 mg BD.   -H/o HLD. Cont lipitor 80 mg daily.     RESP  -Acute hypoxic respiratory failure likely secondary to aspiration pneumonia: Resolved. Patient is currently on 2 L of nasal cannula with SPO2 96%. ID  -Aspiration pneumonia. Resolved. Patient received Unasyn from 2/22/2022 to 2/28/2022. Patient afebrile and WBC WNL      GI   - Cont TF. Protonix for GI ppx. Check for residuals. Reglan as needed for high residuals     RENAL  -ARON on CKD Stage 4:  Monitor UOP. Nephrology following, appreciate recommendations. Replace electrolytes as needed. Blackman catheter to be removed on 3/4/2022. 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        HEME  -Hb stable     ENDOCRINE  -Glucose well controlled. On LDSSI. Hypoglycemia protocol in place.  Patient did not receive his dose of insulin because his blood sugars have been low.            DVT PPX: Heparin drip  GI PPX: Protonix  DIET: Tube feedings      DISPOSITION: Monitor in Clotilde Patel MD, MNEYMAR.              Department of Internal Medicine/ Critical care  [address] [place] [address] ([address] [place] [address])             3/4/2022, 8:56 AM
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
## 2022-03-04 NOTE — PLAN OF CARE
while being cared for will increase  Description: Verbalizations of feeling emotionally comfortable while being cared for will increase  3/3/2022 2122 by Jill Mera RN  Outcome: Ongoing  3/3/2022 1055 by Maria Esther Wheeler  Outcome: Ongoing     Problem: Psychomotor Activity - Altered:  Goal: Absence of psychomotor disturbance signs and symptoms  Description: Absence of psychomotor disturbance signs and symptoms  3/3/2022 2122 by Jill Mera RN  Outcome: Ongoing  3/3/2022 1055 by Maria Esther Wheeler  Outcome: Ongoing     Problem: Sensory Perception - Impaired:  Goal: Demonstrations of improved sensory functioning will increase  Description: Demonstrations of improved sensory functioning will increase  3/3/2022 2122 by Jill Mera RN  Outcome: Ongoing  3/3/2022 1055 by Maria Esther Wheeler  Outcome: Ongoing     Problem: Sensory Perception - Impaired:  Goal: Decrease in sensory misperception frequency  Description: Decrease in sensory misperception frequency  3/3/2022 2122 by Jill Mera RN  Outcome: Ongoing  3/3/2022 1055 by Maria Esther Wheeler  Outcome: Ongoing     Problem: Sensory Perception - Impaired:  Goal: Able to refrain from responding to false sensory perceptions  Description: Able to refrain from responding to false sensory perceptions  3/3/2022 2122 by Jill Mera RN  Outcome: Ongoing  3/3/2022 1055 by Maria Esther Wheeler  Outcome: Ongoing     Problem: Sensory Perception - Impaired:  Goal: Demonstrates accurate environmental perceptions  Description: Demonstrates accurate environmental perceptions  3/3/2022 2122 by Jill Mera RN  Outcome: Ongoing  3/3/2022 1055 by Maria Esther Wheeler  Outcome: Ongoing     Problem: Sensory Perception - Impaired:  Goal: Able to distinguish between reality-based and nonreality-based thinking  Description: Able to distinguish between reality-based and nonreality-based thinking  3/3/2022 1055 by Maria Esther Wheeler  Outcome: Ongoing     Problem: Sensory Perception - Impaired:  Goal: Able to interrupt nonreality-based thinking  Description: Able to interrupt nonreality-based thinking  3/3/2022 1055 by Aj Rosales  Outcome: Ongoing     Problem: Sleep Pattern Disturbance:  Goal: Appears well-rested  Description: Appears well-rested  3/3/2022 2122 by Jolie Triplett RN  Outcome: Ongoing  3/3/2022 1055 by Aj Rosales  Outcome: Ongoing     Problem: Nutrition  Goal: Optimal nutrition therapy  3/3/2022 2122 by Jolie Triplett RN  Outcome: Ongoing  3/3/2022 1609 by Babak Lopez RD, LD  Outcome: Ongoing  Note: Nutrition Problem #1: Inadequate oral intake  Intervention: Food and/or Nutrient Delivery: Start Oral Diet as able  Nutritional Goals: meet % of estimated nutrient needs    3/3/2022 1055 by Aj Rosales  Outcome: Ongoing     Problem: Falls - Risk of:  Goal: Absence of physical injury  Description: Absence of physical injury  3/3/2022 2122 by Jloie Triplett RN  Outcome: Ongoing  3/3/2022 1055 by Aj Rosales  Outcome: Ongoing     Problem: Falls - Risk of:  Goal: Will remain free from falls  Description: Will remain free from falls  3/3/2022 2122 by Jolie Triplett RN  Outcome: Ongoing  3/3/2022 1055 by Aj Rosales  Outcome: Ongoing     Problem: Skin Integrity:  Goal: Will show no infection signs and symptoms  Description: Will show no infection signs and symptoms  3/3/2022 2122 by Jolie Triplett RN  Outcome: Ongoing  3/3/2022 1055 by Aj Rosales  Outcome: Ongoing     Problem: Skin Integrity:  Goal: Absence of new skin breakdown  Description: Absence of new skin breakdown  3/3/2022 2122 by Jolie Triplett RN  Outcome: Ongoing  3/3/2022 1055 by Aj Rosales  Outcome: Ongoing

## 2022-03-04 NOTE — PROGRESS NOTES
NEPHROLOGY PROGRESS NOTE      SUBJECTIVE     59 y/o male patient with past medical history of CAD s/p CABG x 3 presented to the ED with V Fib cardiac arrest, s/p defib x 2 with ROSC. Patient again went into PEA cardiac arrest and ROSC was achieved, unknown down time. EKG concerning for NSTEMI, was started on heparin and amiodarone and did not undergo cath due to concern for anoxic brain injury. MRI revealed early changes of hypoxic brain injury, but Neurological exam improving. Patient was seen and evaluated at bedside, no acute events overnight, was extubated yesterday and tolerated the same well without any complications. Hemodynamically, /134 mm Hg, HR 78/min, on 4L O2 through nasal cannula. Labs and imaging reviewed, electrolytes WNL, Cr improved to 1.27. OBJECTIVE     Vitals:    03/04/22 0400 03/04/22 0500 03/04/22 0600 03/04/22 0700   BP: (!) 100/59  (!) 150/134    Pulse: 63 61 78 76   Resp: 25 23 14 24   Temp: 98.1 °F (36.7 °C)      TempSrc: Core      SpO2: 98% 99% 95% 92%   Weight:       Height:         24HR INTAKE/OUTPUT:      Intake/Output Summary (Last 24 hours) at 3/4/2022 0935  Last data filed at 3/4/2022 0700  Gross per 24 hour   Intake 336.09 ml   Output 2220 ml   Net -1883.91 ml       General appearance: Awake, alert, on 4L O2  HEENT: Unremarkable. Respiratory::vesicular breath sounds,no wheeze/crackles  Cardiovascular:S1 S2 normal,no gallop or organic murmur. Abdomen:Non tender/non distended. Bowel sounds present  Extremities: No Cyanosis or Clubbing, present lower extremity edema  Neurological: Awake, alert and oriented.      MEDICATIONS     Scheduled Meds:    pantoprazole  40 mg IntraVENous Daily    And    sodium chloride (PF)  10 mL IntraVENous Daily    cloNIDine  0.2 mg Oral BID    docusate  100 mg Oral Daily    amLODIPine  10 mg Oral Daily    furosemide  40 mg IntraVENous Daily    carvedilol  25 mg Oral BID WC    azaTHIOprine  75 mg Oral Daily    insulin lispro  0-3 Units SubCUTAneous Q6H    aspirin  81 mg Oral Daily    atorvastatin  80 mg Oral Daily    sodium chloride flush  5-40 mL IntraVENous 2 times per day     Continuous Infusions:    heparin (PORCINE) Infusion 12.1 Units/kg/hr (03/04/22 0914)    dexmedetomidine 0.1 mcg/kg/hr (03/04/22 0700)    sodium chloride 100 mL/hr at 02/25/22 1720    dextrose       PRN Meds:  bisacodyl, metoclopramide, labetalol, sodium chloride flush, sodium chloride, ondansetron **OR** ondansetron, polyethylene glycol, acetaminophen **OR** acetaminophen, glucose, glucagon (rDNA), dextrose, dextrose bolus (hypoglycemia) **OR** dextrose bolus (hypoglycemia), ipratropium-albuterol, heparin (porcine), heparin (porcine)  Home Meds:                Medications Prior to Admission: magnesium oxide (MAG-OX) 400 (241.3 Mg) MG TABS tablet,   atorvastatin (LIPITOR) 40 MG tablet, TAKE 1 TABLET BY MOUTH DAILY  magnesium oxide (MAG-OX) 400 (240 Mg) MG tablet, TAKE 1 TABLET BY MOUTH 2 TIMES DAILY  traZODone (DESYREL) 100 MG tablet, Take 0.5 tablets by mouth nightly as needed for Sleep  DULoxetine (CYMBALTA) 30 MG extended release capsule, TAKE 1 CAPSULE BY MOUTH DAILY  lisinopril (PRINIVIL;ZESTRIL) 5 MG tablet, take 1 tablet by mouth once daily  spironolactone (ALDACTONE) 25 MG tablet, take 1 tablet by mouth once daily  metoclopramide (REGLAN) 10 MG tablet, Take 1 tablet by mouth 3 times daily  isosorbide mononitrate (IMDUR) 30 MG extended release tablet, Take 1 tablet by mouth daily  nitroGLYCERIN (NITROSTAT) 0.4 MG SL tablet, Place 1 tablet under the tongue every 5 minutes as needed for Chest pain up to max of 3 total doses. If no relief after 1 dose, call 911.  (Patient not taking: Reported on 1/17/2022)  cloNIDine (CATAPRES) 0.2 MG tablet, Take 1 tablet by mouth 2 times daily  metoprolol tartrate (LOPRESSOR) 25 MG tablet, Take 1 tablet by mouth 2 times daily  magnesium oxide (MAG-OX) 400 (240 Mg) MG tablet,   pantoprazole (PROTONIX) 40 MG tablet, Take 1 tablet by mouth daily  magnesium oxide (MAG-OX) 400 MG tablet, Take 1 tablet by mouth 2 times daily (Patient not taking: Reported on 10/25/2021)  aspirin EC 81 MG EC tablet, Take 1 tablet by mouth daily  nicotine (NICODERM CQ) 21 MG/24HR, Place 1 patch onto the skin daily  acetaminophen (TYLENOL) 325 MG tablet, Take 2 tablets by mouth every 6 hours as needed for Pain  Umeclidinium Bromide 62.5 MCG/INH AEPB, Inhale 1 puff into the lungs daily (Patient not taking: Reported on 1/17/2022)  vitamin D3 (CHOLECALCIFEROL) 10 MCG (400 UNIT) TABS tablet, take 2 tablets (800MG) by mouth once daily (Patient not taking: Reported on 4/21/2021)  vitamin D3 (CHOLECALCIFEROL) 10 MCG (400 UNIT) TABS tablet, take 2 tablets (800MG) by mouth once daily (Patient not taking: Reported on 1/17/2022)  Fluticasone furoate-vilanterol (BREO ELLIPTA) 200-25 MCG/INH AEPB inhaler, Inhale 1 puff into the lungs daily (Patient not taking: Reported on 1/17/2022)  calcium carbonate-vitamin D3 (CALCIUM 600-D) 600-400 MG-UNIT TABS per tab, Take 1 tablet by mouth 2 times daily  azaTHIOprine (IMURAN) 50 MG tablet, Take 1.5 tablets by mouth daily  albuterol sulfate  (90 Base) MCG/ACT inhaler, inhale 2 puffs by mouth every 6 hours if needed for wheezing  nicotine (NICODERM CQ) 14 MG/24HR, Place 1 patch onto the skin daily (Patient not taking: Reported on 1/17/2022)  traMADol (ULTRAM) 50 MG tablet, Take 50 mg by mouth every 8 hours as needed for Pain.   (Patient not taking: Reported on 1/17/2022)  OLANZapine (ZYPREXA) 5 MG tablet, Take 1 tablet by mouth nightly    INVESTIGATIONS     Last 3 CMP:    Recent Labs     03/02/22  0437 03/03/22  0445 03/04/22  0517    137 138   K 4.0 3.8 4.3   CL 96* 98 98   CO2 30 29 27   BUN 22* 29* 28*   CREATININE 1.43* 1.43* 1.27*   CALCIUM 8.3* 8.2* 8.7   PROT 5.8* 5.5* 5.9*   LABALBU 2.9* 2.9* 3.0*   BILITOT 0.74 0.62 0.90   ALKPHOS 169* 140* 167*   AST 31 32 42*   ALT 16 17 23       Last 3 CBC:  Recent Labs 03/02/22  0437 03/03/22  0445 03/04/22  0517   WBC 6.5 6.2 7.1   RBC 3.61* 3.27* 3.39*   HGB 10.8* 9.7* 10.2*   HCT 32.9* 30.1* 31.9*   MCV 91.1 92.0 94.1   MCH 29.9 29.7 30.1   MCHC 32.8 32.2 32.0   RDW 17.6* 17.3* 17.1*   * 109* 117*   MPV 10.4 10.4 10.6       ASSESSMENT     1. 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 1.7-2, follows up with Dr. Renee Meier, on treatment with Imuran. 2. 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 V Fib and PEA cardiac arrest, unknown down time  4. ? Hypoxic brain injury   5. H/O CABG  6. Respiratory failure, mechanical ventilation dependant  7. H/O HTN    PLAN     1. Continue I.V. lasix 40 mg daily and follow strict I/O.   2. Continue Imuran  3. Avoid hydralazine use due to concern for ANCA vasculitis related to Hydralazine use. 4. Creatinine is back to baseline and UO is improving, continue to monitor BMP. Will follow up on Cardiology plan regarding cardiac cath. 5. We will follow    Please do not hesitate to call with questions    Dakota Austin MD      Department of Internal Medicine  [facility]         3/4/2022, 9:39 AM     Attending Physician Statement  I have discussed the care of Cosme Dennis, including pertinent history and exam findings with the resident/fellow. I have reviewed the key elements of all parts of the encounter with the resident/fellow. I have seen and examined the patient with the resident/fellow. I agree with the assessment and plan and status of the problem list as documented.        Clair Hennessy MD   Nephrology Attending Physician  [facility]  3/4/2022

## 2022-03-05 LAB
ABSOLUTE EOS #: 0.26 K/UL (ref 0–0.44)
ABSOLUTE IMMATURE GRANULOCYTE: 0.11 K/UL (ref 0–0.3)
ABSOLUTE LYMPH #: 1.12 K/UL (ref 1.1–3.7)
ABSOLUTE MONO #: 0.74 K/UL (ref 0.1–1.2)
ALBUMIN SERPL-MCNC: 3.7 G/DL (ref 3.5–5.2)
ALBUMIN/GLOBULIN RATIO: 1.3 (ref 1–2.5)
ALP BLD-CCNC: 368 U/L (ref 40–129)
ALT SERPL-CCNC: 35 U/L (ref 5–41)
ANION GAP SERPL CALCULATED.3IONS-SCNC: 16 MMOL/L (ref 9–17)
AST SERPL-CCNC: 63 U/L
BASOPHILS # BLD: 1 % (ref 0–2)
BASOPHILS ABSOLUTE: 0.08 K/UL (ref 0–0.2)
BILIRUB SERPL-MCNC: 1.31 MG/DL (ref 0.3–1.2)
BUN BLDV-MCNC: 35 MG/DL (ref 6–20)
CALCIUM SERPL-MCNC: 8.9 MG/DL (ref 8.6–10.4)
CHLORIDE BLD-SCNC: 96 MMOL/L (ref 98–107)
CO2: 24 MMOL/L (ref 20–31)
CREAT SERPL-MCNC: 1.42 MG/DL (ref 0.7–1.2)
EOSINOPHILS RELATIVE PERCENT: 3 % (ref 1–4)
GFR AFRICAN AMERICAN: >60 ML/MIN
GFR NON-AFRICAN AMERICAN: 51 ML/MIN
GFR SERPL CREATININE-BSD FRML MDRD: ABNORMAL ML/MIN/{1.73_M2}
GLUCOSE BLD-MCNC: 104 MG/DL (ref 70–99)
GLUCOSE BLD-MCNC: 106 MG/DL (ref 75–110)
GLUCOSE BLD-MCNC: 123 MG/DL (ref 75–110)
GLUCOSE BLD-MCNC: 126 MG/DL (ref 75–110)
HCT VFR BLD CALC: 36.4 % (ref 40.7–50.3)
HEMOGLOBIN: 11.7 G/DL (ref 13–17)
IMMATURE GRANULOCYTES: 1 %
LYMPHOCYTES # BLD: 13 % (ref 24–43)
MAGNESIUM: 2.4 MG/DL (ref 1.6–2.6)
MCH RBC QN AUTO: 30.7 PG (ref 25.2–33.5)
MCHC RBC AUTO-ENTMCNC: 32.1 G/DL (ref 28.4–34.8)
MCV RBC AUTO: 95.5 FL (ref 82.6–102.9)
MONOCYTES # BLD: 9 % (ref 3–12)
NRBC AUTOMATED: 0 PER 100 WBC
PARTIAL THROMBOPLASTIN TIME: 27.6 SEC (ref 20.5–30.5)
PDW BLD-RTO: 17.2 % (ref 11.8–14.4)
PLATELET # BLD: 187 K/UL (ref 138–453)
PMV BLD AUTO: 11.5 FL (ref 8.1–13.5)
POTASSIUM SERPL-SCNC: 4.6 MMOL/L (ref 3.7–5.3)
RBC # BLD: 3.81 M/UL (ref 4.21–5.77)
RBC # BLD: ABNORMAL 10*6/UL
SEG NEUTROPHILS: 73 % (ref 36–65)
SEGMENTED NEUTROPHILS ABSOLUTE COUNT: 6.21 K/UL (ref 1.5–8.1)
SODIUM BLD-SCNC: 136 MMOL/L (ref 135–144)
TOTAL PROTEIN: 6.5 G/DL (ref 6.4–8.3)
WBC # BLD: 8.5 K/UL (ref 3.5–11.3)

## 2022-03-05 PROCEDURE — 2580000003 HC RX 258: Performed by: STUDENT IN AN ORGANIZED HEALTH CARE EDUCATION/TRAINING PROGRAM

## 2022-03-05 PROCEDURE — 6370000000 HC RX 637 (ALT 250 FOR IP): Performed by: STUDENT IN AN ORGANIZED HEALTH CARE EDUCATION/TRAINING PROGRAM

## 2022-03-05 PROCEDURE — 82947 ASSAY GLUCOSE BLOOD QUANT: CPT

## 2022-03-05 PROCEDURE — 36415 COLL VENOUS BLD VENIPUNCTURE: CPT

## 2022-03-05 PROCEDURE — 85025 COMPLETE CBC W/AUTO DIFF WBC: CPT

## 2022-03-05 PROCEDURE — 6360000002 HC RX W HCPCS: Performed by: STUDENT IN AN ORGANIZED HEALTH CARE EDUCATION/TRAINING PROGRAM

## 2022-03-05 PROCEDURE — 85730 THROMBOPLASTIN TIME PARTIAL: CPT

## 2022-03-05 PROCEDURE — 83735 ASSAY OF MAGNESIUM: CPT

## 2022-03-05 PROCEDURE — 97167 OT EVAL HIGH COMPLEX 60 MIN: CPT

## 2022-03-05 PROCEDURE — 97535 SELF CARE MNGMENT TRAINING: CPT

## 2022-03-05 PROCEDURE — 97530 THERAPEUTIC ACTIVITIES: CPT

## 2022-03-05 PROCEDURE — 2060000000 HC ICU INTERMEDIATE R&B

## 2022-03-05 PROCEDURE — 6360000002 HC RX W HCPCS

## 2022-03-05 PROCEDURE — 94761 N-INVAS EAR/PLS OXIMETRY MLT: CPT

## 2022-03-05 PROCEDURE — 99232 SBSQ HOSP IP/OBS MODERATE 35: CPT | Performed by: INTERNAL MEDICINE

## 2022-03-05 PROCEDURE — 6360000002 HC RX W HCPCS: Performed by: INTERNAL MEDICINE

## 2022-03-05 PROCEDURE — 6370000000 HC RX 637 (ALT 250 FOR IP)

## 2022-03-05 PROCEDURE — 6370000000 HC RX 637 (ALT 250 FOR IP): Performed by: NURSE PRACTITIONER

## 2022-03-05 PROCEDURE — 80053 COMPREHEN METABOLIC PANEL: CPT

## 2022-03-05 PROCEDURE — 99233 SBSQ HOSP IP/OBS HIGH 50: CPT | Performed by: INTERNAL MEDICINE

## 2022-03-05 RX ORDER — TAMSULOSIN HYDROCHLORIDE 0.4 MG/1
0.4 CAPSULE ORAL DAILY
Status: DISCONTINUED | OUTPATIENT
Start: 2022-03-05 | End: 2022-03-17 | Stop reason: HOSPADM

## 2022-03-05 RX ADMIN — AMLODIPINE BESYLATE 10 MG: 10 TABLET ORAL at 08:01

## 2022-03-05 RX ADMIN — CARVEDILOL 25 MG: 25 TABLET, FILM COATED ORAL at 17:00

## 2022-03-05 RX ADMIN — ASPIRIN 81 MG: 81 TABLET, CHEWABLE ORAL at 08:01

## 2022-03-05 RX ADMIN — TAMSULOSIN HYDROCHLORIDE 0.4 MG: 0.4 CAPSULE ORAL at 14:29

## 2022-03-05 RX ADMIN — CARVEDILOL 25 MG: 25 TABLET, FILM COATED ORAL at 08:01

## 2022-03-05 RX ADMIN — SODIUM CHLORIDE, PRESERVATIVE FREE 10 ML: 5 INJECTION INTRAVENOUS at 20:38

## 2022-03-05 RX ADMIN — SODIUM CHLORIDE, PRESERVATIVE FREE 10 ML: 5 INJECTION INTRAVENOUS at 08:01

## 2022-03-05 RX ADMIN — CLONIDINE HYDROCHLORIDE 0.2 MG: 0.1 TABLET ORAL at 20:39

## 2022-03-05 RX ADMIN — CLONIDINE HYDROCHLORIDE 0.2 MG: 0.1 TABLET ORAL at 08:01

## 2022-03-05 RX ADMIN — DOCUSATE SODIUM LIQUID 100 MG: 100 LIQUID ORAL at 08:01

## 2022-03-05 RX ADMIN — ATORVASTATIN CALCIUM 80 MG: 80 TABLET, FILM COATED ORAL at 08:01

## 2022-03-05 RX ADMIN — FUROSEMIDE 40 MG: 10 INJECTION, SOLUTION INTRAMUSCULAR; INTRAVENOUS at 08:00

## 2022-03-05 RX ADMIN — AZATHIOPRINE 75 MG: 50 TABLET ORAL at 08:00

## 2022-03-05 RX ADMIN — HEPARIN SODIUM 5000 UNITS: 5000 INJECTION INTRAVENOUS; SUBCUTANEOUS at 20:39

## 2022-03-05 RX ADMIN — HEPARIN SODIUM 5000 UNITS: 5000 INJECTION INTRAVENOUS; SUBCUTANEOUS at 14:29

## 2022-03-05 ASSESSMENT — PAIN SCALES - GENERAL
PAINLEVEL_OUTOF10: 0
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
## 2022-03-05 NOTE — PROGRESS NOTES
Renal Progress Note    Patient :  Norma Delgado; 60 y.o. MRN# [id number]  Location:  1064/2663-74  Attending:  Camille Stokes MD  Admit Date:  2/21/2022   Hospital Day: 11      Subjective: Following for ARON on CKD III due to ANCA vasculitis admitted after V Fib cardiac arrest.  Has new CMP. Cardiology considering cardiac cath early next week prior to discharge. Creatinine increased to 1.42, from 1.27. Bladder scan showed >200 urine retention. Patient stating he needs to urinate but unable. On Lasix 40mg IV daily. Urine output last 24 hours 2075  More appropriate this morning. Blood pressure stable without pressors. On room air. Outpatient Medications:     Medications Prior to Admission: magnesium oxide (MAG-OX) 400 (241.3 Mg) MG TABS tablet,   atorvastatin (LIPITOR) 40 MG tablet, TAKE 1 TABLET BY MOUTH DAILY  magnesium oxide (MAG-OX) 400 (240 Mg) MG tablet, TAKE 1 TABLET BY MOUTH 2 TIMES DAILY  traZODone (DESYREL) 100 MG tablet, Take 0.5 tablets by mouth nightly as needed for Sleep  DULoxetine (CYMBALTA) 30 MG extended release capsule, TAKE 1 CAPSULE BY MOUTH DAILY  lisinopril (PRINIVIL;ZESTRIL) 5 MG tablet, take 1 tablet by mouth once daily  spironolactone (ALDACTONE) 25 MG tablet, take 1 tablet by mouth once daily  metoclopramide (REGLAN) 10 MG tablet, Take 1 tablet by mouth 3 times daily  isosorbide mononitrate (IMDUR) 30 MG extended release tablet, Take 1 tablet by mouth daily  nitroGLYCERIN (NITROSTAT) 0.4 MG SL tablet, Place 1 tablet under the tongue every 5 minutes as needed for Chest pain up to max of 3 total doses. If no relief after 1 dose, call 911.  (Patient not taking: Reported on 1/17/2022)  cloNIDine (CATAPRES) 0.2 MG tablet, Take 1 tablet by mouth 2 times daily  metoprolol tartrate (LOPRESSOR) 25 MG tablet, Take 1 tablet by mouth 2 times daily  magnesium oxide (MAG-OX) 400 (240 Mg) MG tablet,   pantoprazole (PROTONIX) 40 MG tablet, Take 1 tablet by mouth daily  magnesium oxide (MAG-OX) 400 MG tablet, Take 1 tablet by mouth 2 times daily (Patient not taking: Reported on 10/25/2021)  aspirin EC 81 MG EC tablet, Take 1 tablet by mouth daily  nicotine (NICODERM CQ) 21 MG/24HR, Place 1 patch onto the skin daily  acetaminophen (TYLENOL) 325 MG tablet, Take 2 tablets by mouth every 6 hours as needed for Pain  Umeclidinium Bromide 62.5 MCG/INH AEPB, Inhale 1 puff into the lungs daily (Patient not taking: Reported on 1/17/2022)  vitamin D3 (CHOLECALCIFEROL) 10 MCG (400 UNIT) TABS tablet, take 2 tablets (800MG) by mouth once daily (Patient not taking: Reported on 4/21/2021)  vitamin D3 (CHOLECALCIFEROL) 10 MCG (400 UNIT) TABS tablet, take 2 tablets (800MG) by mouth once daily (Patient not taking: Reported on 1/17/2022)  Fluticasone furoate-vilanterol (BREO ELLIPTA) 200-25 MCG/INH AEPB inhaler, Inhale 1 puff into the lungs daily (Patient not taking: Reported on 1/17/2022)  calcium carbonate-vitamin D3 (CALCIUM 600-D) 600-400 MG-UNIT TABS per tab, Take 1 tablet by mouth 2 times daily  azaTHIOprine (IMURAN) 50 MG tablet, Take 1.5 tablets by mouth daily  albuterol sulfate  (90 Base) MCG/ACT inhaler, inhale 2 puffs by mouth every 6 hours if needed for wheezing  nicotine (NICODERM CQ) 14 MG/24HR, Place 1 patch onto the skin daily (Patient not taking: Reported on 1/17/2022)  traMADol (ULTRAM) 50 MG tablet, Take 50 mg by mouth every 8 hours as needed for Pain.   (Patient not taking: Reported on 1/17/2022)  OLANZapine (ZYPREXA) 5 MG tablet, Take 1 tablet by mouth nightly    Current Medications:     Scheduled Meds:    lansoprazole  30 mg Per NG tube QAM AC    heparin (porcine)  5,000 Units SubCUTAneous 3 times per day    cloNIDine  0.2 mg Oral BID    docusate  100 mg Oral Daily    amLODIPine  10 mg Oral Daily    furosemide  40 mg IntraVENous Daily    carvedilol  25 mg Oral BID WC    azaTHIOprine  75 mg Oral Daily    insulin lispro  0-3 Units SubCUTAneous Q6H    aspirin  81 mg Oral Daily    atorvastatin  80 mg Oral Daily    sodium chloride flush  5-40 mL IntraVENous 2 times per day     Continuous Infusions:    sodium chloride 100 mL/hr at 22 1720    dextrose       PRN Meds:  bisacodyl, metoclopramide, labetalol, sodium chloride flush, sodium chloride, ondansetron **OR** ondansetron, polyethylene glycol, acetaminophen **OR** acetaminophen, glucose, glucagon (rDNA), dextrose, dextrose bolus (hypoglycemia) **OR** dextrose bolus (hypoglycemia), ipratropium-albuterol    Input/Output:       I/O last 3 completed shifts: In: 406.5 [P.O.:120; I.V.:226.5; NG/GT:60]  Out: 2705 [Urine:2705]. Patient Vitals for the past 96 hrs (Last 3 readings):   Weight   22 0431 207 lb 3.7 oz (94 kg)   22 0600 209 lb 7 oz (95 kg)       Vital Signs:   Temperature:  Temp: 97.3 °F (36.3 °C)  TMax:   Temp (24hrs), Av.5 °F (36.4 °C), Min:97.3 °F (36.3 °C), Max:97.8 °F (36.6 °C)    Respirations:  Resp: 27  Pulse:   Pulse: 82  BP:    BP: (!) 128/99  BP Range: Systolic (52ENO), YBL:677 , Min:80 , RBH:320       Diastolic (77DXB), OBB:23, Min:53, Max:99      Physical Examination:     General:  Alert and appropriate  HEENT: Atraumatic, normocephalic, no throat congestion, moist mucosa. Eyes:   Pupils equal, round and reactive to light, EOMI. Neck:   No JVD, no thyromegaly, no lymphadenopathy. Chest:              Bilateral vesicular breath sounds, no rales or wheezes. Cardiac:  S1 S2 RR, no murmurs, gallops or rubs, JVP not raised. Abdomen: Soft, non-tender, no masses or organomegaly, BS audible. :   No suprapubic or flank tenderness. Neuro:  AAO x 3, No FND. SKIN:  No rashes, good skin turgor. Extremities:  No edema, palpable peripheral pulses, no calf tenderness.     Labs:       Recent Labs     22  0445 22  0517 22  0619   WBC 6.2 7.1 8.5   RBC 3.27* 3.39* 3.81*   HGB 9.7* 10.2* 11.7*   HCT 30.1* 31.9* 36.4*   MCV 92.0 94.1 95.5   MCH 29.7 30.1 30.7   MCHC 32.2 32.0 32.1   RDW 17.3* 17.1* 17.2*   * 117* 187   MPV 10.4 10.6 11.5      BMP:   Recent Labs     03/03/22  0445 03/04/22  0517 03/05/22  0619    138 136   K 3.8 4.3 4.6   CL 98 98 96*   CO2 29 27 24   BUN 29* 28* 35*   CREATININE 1.43* 1.27* 1.42*   GLUCOSE 125* 85 104*   CALCIUM 8.2* 8.7 8.9      Phosphorus:   No results for input(s): PHOS in the last 72 hours.   Magnesium:    Recent Labs     03/03/22 0445 03/04/22  0418 03/05/22  0619   MG 2.0 2.2 2.4     Albumin:    Recent Labs     03/03/22 0445 03/04/22 0517 03/05/22  0619   LABALBU 2.9* 3.0* 3.7     BNP:      Lab Results   Component Value Date    BNP 51 07/26/2012     JANAE:      Lab Results   Component Value Date    JANAE NEGATIVE 07/11/2020     SPEP:  Lab Results   Component Value Date    PROT 6.5 03/05/2022    ALBCAL 2.9 07/11/2020    ALBPCT 50 07/11/2020    LABALPH 0.2 07/11/2020    LABALPH 0.7 07/11/2020    A1PCT 3 07/11/2020    A2PCT 11 07/11/2020    LABBETA 0.5 07/11/2020    BETAPCT 9 07/11/2020    GAMGLOB 1.5 07/11/2020    GGPCT 26 07/11/2020    MERCY Yousif M.D. 07/11/2020     MPO ANCA:     Lab Results   Component Value Date     07/12/2020     PR3 ANCA:     Lab Results   Component Value Date    PR3 378 07/12/2020     Anti-GBM:     Lab Results   Component Value Date    GBMABIGG 16 07/12/2020     Hep BsAg:         Lab Results   Component Value Date    HEPBSAG NONREACTIVE 03/05/2020     Hep C AB:          Lab Results   Component Value Date    HEPCAB NONREACTIVE 03/05/2020       Urinalysis/Chemistries:      Lab Results   Component Value Date    NITRU NEGATIVE 02/25/2022    COLORU Yellow 02/25/2022    PHUR 5.5 02/25/2022    WBCUA 2 TO 5 02/25/2022    RBCUA 5 TO 10 02/25/2022    MUCUS NOT REPORTED 11/06/2020    TRICHOMONAS NOT REPORTED 11/06/2020    YEAST NOT REPORTED 11/06/2020    BACTERIA NOT REPORTED 11/06/2020    CLARITYU clear 09/24/2020    SPECGRAV 1.025 02/25/2022    LEUKOCYTESUR NEGATIVE 02/25/2022    UROBILINOGEN Normal 02/25/2022 BILIRUBINUR NEGATIVE 02/25/2022    BLOODU +++ 09/24/2020    GLUCOSEU NEGATIVE 02/25/2022    KETUA TRACE 02/25/2022    AMORPHOUS NOT REPORTED 11/06/2020     Urine Sodium:     Lab Results   Component Value Date    GURU 99 02/25/2022     Urine Potassium:    Lab Results   Component Value Date    KUR 12.5 07/12/2020     Urine Chloride:    Lab Results   Component Value Date    CLUR 80 02/25/2022       Urine Creatinine:     Lab Results   Component Value Date    LABCREA 126.5 02/25/2022       Radiology:     CXR:     Assessment:     1. 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 1.7-2, follows up with Dr. Linn Flynn: Treatment with Imuran. 2. 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 V Fib and PEA cardiac arrest, unknown down time  4. ? Hypoxic brain injury   5. H/O CABG. EF now 45%  6. Respiratory failure, mechanical ventilation dependant  7. H/O HTN    Plan:   1. Place umaña for urinary retention  2. Start Flomax  3. Cardiology may proceed with cardiac cath next week as long as creatinine remains within baseline  4. Continue Lasix IV 40mg daily  5. Will continue to follow labs and I and O    Nutrition   Please ensure that patient is on a renal diet/TF. Avoid nephrotoxic drugs/contrast exposure. We will continue to follow along with you. Kary Burgess CNP    Attending Physician Statement  I have discussed the care of Marilynn Lew, including pertinent history and exam findings with theCNP. I have reviewed the key elements of all parts of the encounter with the CNP. I have seen and examined the patient. I agree with the assessment and plan and status of the problem list as documented.       Sarah Kamara MD   Nephrology Attending Physician  [facility]  3/5/2022

## 2022-03-05 NOTE — PROGRESS NOTES
[facility]  Internal Medicine Teaching Residency Program  Inpatient Daily Progress Note  ______________________________________________________________________________    Patient: Gage Vasquez  YOB: 1965   SANDRO:[id number]    Acct: [de-identified]     Room: 2638/7623-61  Admit date: 2/21/2022  Today's date: 03/05/22  Number of days in the hospital: 11    SUBJECTIVE   Admitting Diagnosis: Cardiac arrest ([facility] [address]  CC: V. Fib arrest    Pt seen and examined. No acute events overnight. Labs and charts reviewed. Afebrile, hemodynamically stable, saturating well on room air. He appears drowsy, opens eyes, responds and then again dozes back. As per RN, he tries to wake up repeatedly with the urge of urination, but is not able to urinate. Has urinary retention, 400 cc was removed by straight cath this morning. Nephro recommended to continue Lasix IV 40 mg OD, Flomax and replace Blackman's catheter for urinary retention. Nephro cleared for cardiac cath as long as creatinine remains within baseline. Cardio recommends to monitor neurological recovery over the weekend. We will repeat bedside swallow eval.  On tube feeds currently. ROS:  Constitutional:  negative for chills, fevers, sweats  Respiratory:  negative for cough, dyspnea on exertion, hemoptysis, shortness of breath, wheezing  Cardiovascular:  negative for chest pain, chest pressure/discomfort, lower extremity edema, palpitations  Gastrointestinal:  negative for abdominal pain, constipation, diarrhea, nausea, vomiting  Neurological:  negative for dizziness, headache  BRIEF HISTORY       A 61-year-old male who was admitted to ICU after cardiac arrest.  Family called EMS after he was found down, total time unknown.   He was found to be in V. fib arrest, ROSC achieved after 2 shocks but subsequently he went into PEA arrest on the way to hospital, responded to epinephrine.       There was concern for STEMI, cardiology was consulted, they did not meet the criteria for STEMI and cath was postponed until neurological prognosis is clear. He was placed on hypothermia, protocol started on heparin and amiodarone. He required dobutamine transiently for bradycardia.     He also had acute hypoxemic and hypercarbic respiratory failure with possible aspiration bibasilar pneumonia. He was started on Unasyn. He got intubated in ER, and started on mechanical ventilation.       Had thrombocytopenia, anticoagulation was changed to argatroban. HIT was ruled out and heparin was restarted. Electrolytes were replaced timely.     CT head was unremarkable. She underwent MRI of the brain, which revealed early changes of early hypoxic brain injury.       Over the course of a week, her neurological exam is gradually improving. .  Nephrology was consulted for ARON on CKD. Started him on IV Lasix.     Gradually, he tolerated spontaneous breathing trial well, got extubated without any immediate complications. Completed course of antibiotics for possible aspiration pneumonia.     He remained hemodynamically stable without any significant hypotension, or arrhythmias, therefore cardiology discontinued amiodarone drip and heparin drip. Cardiology stated that cath is not urgent at this time but will discuss the risk of contrast-induced nephropathy with the patient and family before cath and recommend AICD evaluation prior to discharge. He is also on azathioprine 70 mg daily for ANCA vasculitis.     He is transferred to the floor for the further evaluation and management.      OBJECTIVE     Vital Signs:  BP (!) 128/95   Pulse 81   Temp 97.5 °F (36.4 °C) (Oral)   Resp 26   Ht 5' 10\" (1.778 m)   Wt 207 lb 3.7 oz (94 kg)   SpO2 90%   BMI 29.73 kg/m²     Temp (24hrs), Av.7 °F (36.5 °C), Min:97.3 °F (36.3 °C), Max:98.4 °F (36.9 °C)    In: 406.5   Out: 4630 [Urine:2705]    Physical Exam:  Constitutional: This is a well developed, well nourished, 25-29.9 - Overweight 60y.o. year old male who is alert, oriented, cooperative and in no apparent distress. Head:normocephalic and atraumatic. EENT:  PERRLA. No conjunctival injections. Septum was midline, mucosa was without erythema, exudates or cobblestoning. No thrush was noted. Neck: Supple without thyromegaly. No elevated JVP. Trachea was midline. Respiratory: Chest was symmetrical without dullness to percussion. Breath sounds bilaterally were clear to auscultation. There were no wheezes, rhonchi or rales. There is no intercostal retraction or use of accessory muscles. No egophony noted. Cardiovascular: Regular without murmur, clicks, gallops or rubs. Abdomen: Slightly rounded and soft without organomegaly. No rebound, rigidity or guarding was appreciated. Lymphatic: No lymphadenopathy. Musculoskeletal: Normal curvature of the spine. No gross muscle weakness. Extremities:  No lower extremity edema, ulcerations, tenderness, varicosities or erythema. Muscle size, tone and strength are normal.  No involuntary movements are noted. Skin:  Warm and dry. Good color, turgor and pigmentation. No lesions or scars. No cyanosis or clubbing  Neurological/Psychiatric: Could not be assessed because of patient's mental condition.   Medications:  Scheduled Medications:    lansoprazole  30 mg Per NG tube QAM AC    heparin (porcine)  5,000 Units SubCUTAneous 3 times per day    cloNIDine  0.2 mg Oral BID    docusate  100 mg Oral Daily    amLODIPine  10 mg Oral Daily    furosemide  40 mg IntraVENous Daily    carvedilol  25 mg Oral BID WC    azaTHIOprine  75 mg Oral Daily    insulin lispro  0-3 Units SubCUTAneous Q6H    aspirin  81 mg Oral Daily    atorvastatin  80 mg Oral Daily    sodium chloride flush  5-40 mL IntraVENous 2 times per day     Continuous Infusions:    sodium chloride 100 mL/hr at 02/25/22 1720    dextrose       PRN Medicationsbisacodyl, 10 mg, Daily PRN  metoclopramide, 5 mg, Q6H PRN  labetalol, 10 mg, Q6H PRN  sodium chloride flush, 5-40 mL, PRN  sodium chloride, 25 mL, PRN  ondansetron, 4 mg, Q8H PRN   Or  ondansetron, 4 mg, Q6H PRN  polyethylene glycol, 17 g, Daily PRN  acetaminophen, 650 mg, Q6H PRN   Or  acetaminophen, 650 mg, Q6H PRN  glucose, 15 g, PRN  glucagon (rDNA), 1 mg, PRN  dextrose, 100 mL/hr, PRN  dextrose bolus (hypoglycemia), 125 mL, PRN   Or  dextrose bolus (hypoglycemia), 250 mL, PRN  ipratropium-albuterol, 1 ampule, Q6H PRN        Diagnostic Labs:  CBC:   Recent Labs     03/03/22 0445 03/04/22 0517   WBC 6.2 7.1   RBC 3.27* 3.39*   HGB 9.7* 10.2*   HCT 30.1* 31.9*   MCV 92.0 94.1   RDW 17.3* 17.1*   * 117*     BMP:   Recent Labs     03/03/22 0445 03/04/22  0517    138   K 3.8 4.3   CL 98 98   CO2 29 27   BUN 29* 28*   CREATININE 1.43* 1.27*     BNP: No results for input(s): BNP in the last 72 hours. PT/INR: No results for input(s): PROTIME, INR in the last 72 hours. APTT:   Recent Labs     03/03/22  1248 03/03/22  1842 03/04/22  0517   APTT 54.0* 58.5* 66.8*     CARDIAC ENZYMES: No results for input(s): CKMB, CKMBINDEX, TROPONINI in the last 72 hours. Invalid input(s): CKTOTAL;3  FASTING LIPID PANEL:  Lab Results   Component Value Date    CHOL 188 11/07/2020    HDL 52 11/07/2020    TRIG 235 (H) 11/07/2020     LIVER PROFILE:   Recent Labs     03/03/22  0445 03/04/22  0517   AST 32 42*   ALT 17 23   BILITOT 0.62 0.90   ALKPHOS 140* 167*      MICROBIOLOGY:   Lab Results   Component Value Date/Time    CULTURE NORMAL RESPIRATORY DOUGIE MODERATE GROWTH 02/22/2022 01:58 PM       Imaging:    XR ABDOMEN FOR NG/OG/NE TUBE PLACEMENT    Result Date: 3/3/2022  Enteric tube with tip and side-port in the stomach.   Nonobstructive bowel gas pattern       ASSESSMENT & PLAN     A 78-year-old male who presented post V. fib arrest, intubated for acute hypoxic respiratory failure, was admitted inpatient for the evaluation and management post V. fib arrest.     V. fib arrest   S/p hypothermia amiodarone and heparin drip. AICD prior to discharge  As per cardiology, will monitor for neurological recovery over the weekend prior to going for cath. CAD   s/p CABG x4  Continue aspirin, Lipitor, Coreg and Norvasc     Metabolic encephalopathy  Improving  EEG showed no epileptiform discharges, encephalopathy features. MRI showed signs of early hypoxic ischemia     Acute hypoxic and hypercapnic respiratory failure  Likely secondary to aspiration pneumonia  Improving  S/p Extubation  Currently on 2L O2 via NC. Wean as tolerated. Completed Unasyn for likely aspiration pneumonia.      ARON on CKD stage IV  Baseline  Nephro on board  On Iv lasix 40 mg OD     ANCA Vasculitis  On Azathioprine     HTN  On Coreg 25 twice daily  On Norvasc 10 mg OD  On clonidine 0.2 mg twice daily     HLD  On Lipitor 80 mg     COPD  Bronchodilators as needed     Depression/Mood disorder  Will resume trazodone, duloxetine, and olanzapine as appropriate.     DVT ppx  On heparin     GI ppx  Was on lansoprazole. Will change to PO Protonix     PT/OT  On board     Discharge Planning:   to assist in discharge planning      Jorje Chahal  Internal Medicine Resident, PGY-1  [facility];  [place], [place]  3/5/2022, 5:59 AM

## 2022-03-05 NOTE — FLOWSHEET NOTE
SPIRITUAL CARE DEPARTMENT - [address]  PROGRESS NOTE    Shift date: 3/4/22  Shift day: Friday   Shift # 2    Room # 8006/7543-28   Name: Terence Sargent            Age: 60 y.o. Gender: male          Lutheran:    Place of Spiritism:     Referral: Routine Visit    Admit Date & Time: 2/21/2022  9:13 PM    PATIENT/EVENT DESCRIPTION:  Terence Sargent is a 60 y.o. male        SPIRITUAL ASSESSMENT/INTERVENTION:  Patient appeared to welcome  in room. Patient appeared to be tired and sleepy.  offered prayer for spiritual support/comfort which was accepted. Patient expressed gratitude for  visit. SPIRITUAL CARE FOLLOW-UP PLAN:  Chaplains will remain available to offer spiritual and emotional support as needed.       Electronically signed by Bran Chaves Resident, on 3/4/2022 at 9:08 PM.  [address]  [phone]       03/04/22 2101   Encounter Summary   Services provided to: Patient   Referral/Consult From: Rounding   Continue Visiting   (3/4/22)   Complexity of Encounter Low   Length of Encounter 15 minutes   Spiritual/Baptism   Type Spiritual support   Assessment Passive  (Resting)   Intervention Prayer;Nurtured hope;Sustaining presence/ Ministry of presence   Outcome Expressed gratitude

## 2022-03-05 NOTE — PLAN OF CARE
Problem: Confusion - Acute:  Goal: Absence of continued neurological deterioration signs and symptoms  Description: Absence of continued neurological deterioration signs and symptoms  Outcome: Met This Shift     Problem: Injury - Risk of, Physical Injury:  Goal: Absence of physical injury  Description: Absence of physical injury  Outcome: Met This Shift  Goal: Will remain free from falls  Description: Will remain free from falls  Outcome: Met This Shift     Problem: Falls - Risk of:  Goal: Absence of physical injury  Description: Absence of physical injury  Outcome: Met This Shift  Goal: Will remain free from falls  Description: Will remain free from falls  Outcome: Met This Shift     Problem: Confusion - Acute:  Goal: Mental status will be restored to baseline  Description: Mental status will be restored to baseline  Outcome: Ongoing     Problem: Discharge Planning:  Goal: Ability to perform activities of daily living will improve  Description: Ability to perform activities of daily living will improve  Outcome: Ongoing  Goal: Participates in care planning  Description: Participates in care planning  Outcome: Ongoing
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
## 2022-03-05 NOTE — PLAN OF CARE
Absence of psychomotor disturbance signs and symptoms  Outcome: Ongoing     Problem: Sensory Perception - Impaired:  Goal: Demonstrations of improved sensory functioning will increase  Description: Demonstrations of improved sensory functioning will increase  Outcome: Ongoing  Goal: Decrease in sensory misperception frequency  Description: Decrease in sensory misperception frequency  Outcome: Ongoing  Goal: Able to refrain from responding to false sensory perceptions  Description: Able to refrain from responding to false sensory perceptions  Outcome: Ongoing  Goal: Demonstrates accurate environmental perceptions  Description: Demonstrates accurate environmental perceptions  Outcome: Ongoing  Goal: Able to distinguish between reality-based and nonreality-based thinking  Description: Able to distinguish between reality-based and nonreality-based thinking  Outcome: Ongoing  Goal: Able to interrupt nonreality-based thinking  Description: Able to interrupt nonreality-based thinking  Outcome: Ongoing     Problem: Sleep Pattern Disturbance:  Goal: Appears well-rested  Description: Appears well-rested  Outcome: Ongoing     Problem: Nutrition  Goal: Optimal nutrition therapy  Outcome: Ongoing     Problem: Falls - Risk of:  Goal: Absence of physical injury  Description: Absence of physical injury  3/5/2022 0734 by Bettye Stevens RN  Outcome: Ongoing  3/5/2022 0425 by Juni Carlin RN  Outcome: Met This Shift  Goal: Will remain free from falls  Description: Will remain free from falls  3/5/2022 0734 by Bettye Stevens RN  Outcome: Ongoing  3/5/2022 0425 by Juni Carlin RN  Outcome: Met This Shift     Problem: Skin Integrity:  Goal: Will show no infection signs and symptoms  Description: Will show no infection signs and symptoms  Outcome: Ongoing  Goal: Absence of new skin breakdown  Description: Absence of new skin breakdown  Outcome: Ongoing
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
## 2022-03-05 NOTE — PROGRESS NOTES
INTERNAL MEDICINE                                                                             ICU PATIENT TRANSFER NOTE        Patient:  Lenoila Lucio  YOB: 1965  MRN: [id number]     Acct: [de-identified]     Admit date: 2/21/2022    Code Status:-  Full code     Reason for ICU Admission:-    S/p V fib arrest with acute hypoxemic and hypercarbic respiratory failure. SUPPORT DEVICES: [] Ventilator [] BIPAP  [x] Nasal Cannula [] Room Air    Consultations:- [] Cardiology [] Nephrology  [] Hemo onco  [] GI                               [] ID [] ENT  [] Rheum [] Endo   []Physiotherapy                                 Others:-     NUTRITION:  [] NPO [] Tube Feeding (Specify: ) [] TPN  [x] PO    Central Lines:- [x] No   [] Yes           If yes - Days/Date of Insertion. Pt seen,examined and Chart reviewed. ICU COURSE :-     A 57-year-old male who was admitted to ICU after cardiac arrest.  Family called EMS after he was found down, total time unknown. He was found to be in V. fib arrest, ROSC achieved after 2 shocks but subsequently he went into PEA arrest on the way to hospital, responded to epinephrine. There was concern for STEMI, cardiology was consulted, they did not meet the criteria for STEMI and cath was postponed until neurological prognosis is clear. He was placed on hypothermia, protocol started on heparin and amiodarone. He required dobutamine transiently for bradycardia. He also had acute hypoxemic and hypercarbic respiratory failure with possible aspiration bibasilar pneumonia. He was started on Unasyn. He got intubated in ER, and started on mechanical ventilation. Had thrombocytopenia, anticoagulation was changed to argatroban. HIT was ruled out and heparin was restarted. Electrolytes were replaced timely.     CT head was unremarkable. She underwent MRI of the brain, which revealed early changes of early hypoxic brain injury. Over the course of a week, her neurological exam is gradually improving. .  Nephrology was consulted for ARON on CKD. Started him on IV Lasix. Gradually, he tolerated spontaneous breathing trial well, got extubated without any immediate complications. Completed course of antibiotics for possible aspiration pneumonia. He remained hemodynamically stable without any significant hypotension, or arrhythmias, therefore cardiology discontinued amiodarone drip and heparin drip. Cardiology stated that cath is not urgent at this time but will discuss the risk of contrast-induced nephropathy with the patient and family before cath and recommend AICD evaluation prior to discharge. He is also on azathioprine 70 mg daily for ANCA vasculitis. He is transferred to the floor for the further evaluation and management. Physical Exam:   Vitals: /76   Pulse 73   Temp 97.8 °F (36.6 °C) (Tympanic)   Resp 27   Ht 5' 10\" (1.778 m)   Wt 207 lb 3.7 oz (94 kg)   SpO2 95%   BMI 29.73 kg/m²   24 hour intake/output:  Intake/Output Summary (Last 24 hours) at 3/4/2022 1936  Last data filed at 3/4/2022 1600  Gross per 24 hour   Intake 286.47 ml   Output 2305 ml   Net -2018.53 ml     Last 3 weights: Wt Readings from Last 3 Encounters:   03/03/22 207 lb 3.7 oz (94 kg)   01/17/22 207 lb 6.4 oz (94.1 kg)   10/25/21 210 lb (95.3 kg)     · General appearance: awake, alert, cooperative  · HEENT: Head: Normocephalic, no lesions, without obvious abnormality. · Lungs: clear to auscultation bilaterally  · Heart: regular rate and rhythm, S1, S2 normal, no murmur  · Abdomen: soft, non-tender; bowel sounds normal; no masses,  no organomegaly  · Extremities: extremities normal, atraumatic, no cyanosis or edema  · Neurological:  Awake, alert, oriented to name, place and time. Cranial nerves II-XII are grossly intact. Reflexes normal and symmetric. Sensation grossly normal    Medications:Current Inpatient  Scheduled Meds:   [START ON 3/5/2022] lansoprazole  30 mg Per NG tube QAM AC    heparin (porcine)  5,000 Units SubCUTAneous 3 times per day    cloNIDine  0.2 mg Oral BID    docusate  100 mg Oral Daily    amLODIPine  10 mg Oral Daily    furosemide  40 mg IntraVENous Daily    carvedilol  25 mg Oral BID WC    azaTHIOprine  75 mg Oral Daily    insulin lispro  0-3 Units SubCUTAneous Q6H    aspirin  81 mg Oral Daily    atorvastatin  80 mg Oral Daily    sodium chloride flush  5-40 mL IntraVENous 2 times per day     Continuous Infusions:   sodium chloride 100 mL/hr at 02/25/22 1720    dextrose       PRN Meds:bisacodyl, metoclopramide, labetalol, sodium chloride flush, sodium chloride, ondansetron **OR** ondansetron, polyethylene glycol, acetaminophen **OR** acetaminophen, glucose, glucagon (rDNA), dextrose, dextrose bolus (hypoglycemia) **OR** dextrose bolus (hypoglycemia), ipratropium-albuterol    Objective:    CBC:   Recent Labs     03/02/22 0437 03/03/22 0445 03/04/22  0517   WBC 6.5 6.2 7.1   HGB 10.8* 9.7* 10.2*   * 109* 117*     BMP:    Recent Labs     03/02/22  0437 03/03/22  0445 03/04/22  0517    137 138   K 4.0 3.8 4.3   CL 96* 98 98   CO2 30 29 27   BUN 22* 29* 28*   CREATININE 1.43* 1.43* 1.27*   GLUCOSE 124* 125* 85     Calcium:  Recent Labs     03/04/22  0517   CALCIUM 8.7     Ionized Calcium:No results for input(s): IONCA in the last 72 hours. Magnesium:  Recent Labs     03/04/22  0418   MG 2.2     Phosphorus:No results for input(s): PHOS in the last 72 hours. BNP:No results for input(s): BNP in the last 72 hours. Glucose:  Recent Labs     03/04/22  0725 03/04/22  1127 03/04/22  1701   POCGLU 88 97 135*     HgbA1C: No results for input(s): LABA1C in the last 72 hours. INR: No results for input(s): INR in the last 72 hours.   Hepatic:   Recent Labs     03/04/22  0517   ALKPHOS 167*   ALT 23 AST 42*   PROT 5.9*   BILITOT 0.90   LABALBU 3.0*     Amylase and Lipase:No results for input(s): LACTA, AMYLASE in the last 72 hours. Lactic Acid: No results for input(s): LACTA in the last 72 hours. CARDIAC ENZYMES:No results for input(s): CKTOTAL, CKMB, CKMBINDEX, TROPONINI in the last 72 hours. BNP: No results for input(s): BNP in the last 72 hours. Lipids: No results for input(s): CHOL, TRIG, HDL, LDL, LDLCALC in the last 72 hours. ABGs: No results found for: PH, PCO2, PO2, HCO3, O2SAT  Thyroid:   Lab Results   Component Value Date    TSH 2.00 02/22/2022        Urinalysis: No results for input(s): BACTERIA, BLOODU, CLARITYU, COLORU, PHUR, PROTEINU, RBCUA, SPECGRAV, BILIRUBINUR, NITRU, WBCUA, LEUKOCYTESUR, GLUCOSEU in the last 72 hours. CULTURES:         Assessment:  Principal Problem:    Cardiac arrest [facility])  Active Problems:    Cervical stenosis of spinal canal  Resolved Problems:    * No resolved hospital problems. *          Plan:    A 25-year-old male who presented post V. fib arrest, intubated for acute hypoxic respiratory failure, was admitted inpatient for the evaluation and management post V. fib arrest.    V. fib arrest   S/p hypothermia amiodarone and heparin drip. AICD prior to discharge  As per cardiology, will need cardiac cath, though not urgent as he has been hemodynamically stable without significant hypotension or arrhythmias. CAD   s/p CABG x4  Continue aspirin, Lipitor, Coreg    Metabolic encephalopathy  Improving  EEG showed no epileptiform discharges, encephalopathy features. MRI showed signs of early hypoxic ischemia    Acute hypoxic and hypercapnic respiratory failure  Likely secondary to aspiration pneumonia  Improving  S/p Extubation  Currently on 2L O2 via NC. Wean as tolerated. Completed Unasyn for likely aspiration pneumonia.      ARON on CKD stage IV  Baseline  Nephro on board  On Iv lasix 40 mg IV     ANCA Vasculitis  On Azathioprine    HTN  On Coreg 25 twice daily  On Norvasc 10 mg OD  On clonidine 0.2 mg twice daily    HLD  On Lipitor 80 mg    COPD  Bronchodilators as needed    Depression/Mood disorder  Will resume trazodone, duloxetine, and olanzapine as appropriate. DVT ppx  On heparin     GI ppx  Was on lansoprazole.  Will change to PO Protonix     PT/OT  Consulted     Discharge Planning:   to assist in discharge planning    Ned Avery MD  PGY-1, Internal medicine resident  [facility]

## 2022-03-06 LAB
ANION GAP SERPL CALCULATED.3IONS-SCNC: 15 MMOL/L (ref 9–17)
BUN BLDV-MCNC: 35 MG/DL (ref 6–20)
CALCIUM SERPL-MCNC: 8.8 MG/DL (ref 8.6–10.4)
CHLORIDE BLD-SCNC: 94 MMOL/L (ref 98–107)
CO2: 24 MMOL/L (ref 20–31)
CREAT SERPL-MCNC: 1.31 MG/DL (ref 0.7–1.2)
GFR AFRICAN AMERICAN: >60 ML/MIN
GFR NON-AFRICAN AMERICAN: 56 ML/MIN
GFR SERPL CREATININE-BSD FRML MDRD: ABNORMAL ML/MIN/{1.73_M2}
GLUCOSE BLD-MCNC: 100 MG/DL (ref 75–110)
GLUCOSE BLD-MCNC: 111 MG/DL (ref 75–110)
GLUCOSE BLD-MCNC: 97 MG/DL (ref 70–99)
HCT VFR BLD CALC: 35.2 % (ref 40.7–50.3)
HEMOGLOBIN: 11.1 G/DL (ref 13–17)
MAGNESIUM: 2.2 MG/DL (ref 1.6–2.6)
MCH RBC QN AUTO: 31.5 PG (ref 25.2–33.5)
MCHC RBC AUTO-ENTMCNC: 31.5 G/DL (ref 28.4–34.8)
MCV RBC AUTO: 100 FL (ref 82.6–102.9)
NRBC AUTOMATED: 0 PER 100 WBC
PDW BLD-RTO: 17.1 % (ref 11.8–14.4)
PLATELET # BLD: 181 K/UL (ref 138–453)
PMV BLD AUTO: 10.5 FL (ref 8.1–13.5)
POTASSIUM SERPL-SCNC: 4.1 MMOL/L (ref 3.7–5.3)
RBC # BLD: 3.52 M/UL (ref 4.21–5.77)
SODIUM BLD-SCNC: 133 MMOL/L (ref 135–144)
WBC # BLD: 7.7 K/UL (ref 3.5–11.3)

## 2022-03-06 PROCEDURE — 6360000002 HC RX W HCPCS

## 2022-03-06 PROCEDURE — 85027 COMPLETE CBC AUTOMATED: CPT

## 2022-03-06 PROCEDURE — 6360000002 HC RX W HCPCS: Performed by: STUDENT IN AN ORGANIZED HEALTH CARE EDUCATION/TRAINING PROGRAM

## 2022-03-06 PROCEDURE — 6370000000 HC RX 637 (ALT 250 FOR IP): Performed by: STUDENT IN AN ORGANIZED HEALTH CARE EDUCATION/TRAINING PROGRAM

## 2022-03-06 PROCEDURE — 6360000002 HC RX W HCPCS: Performed by: INTERNAL MEDICINE

## 2022-03-06 PROCEDURE — 82947 ASSAY GLUCOSE BLOOD QUANT: CPT

## 2022-03-06 PROCEDURE — 2060000000 HC ICU INTERMEDIATE R&B

## 2022-03-06 PROCEDURE — 80048 BASIC METABOLIC PNL TOTAL CA: CPT

## 2022-03-06 PROCEDURE — 99233 SBSQ HOSP IP/OBS HIGH 50: CPT | Performed by: INTERNAL MEDICINE

## 2022-03-06 PROCEDURE — 36415 COLL VENOUS BLD VENIPUNCTURE: CPT

## 2022-03-06 PROCEDURE — 6370000000 HC RX 637 (ALT 250 FOR IP): Performed by: INTERNAL MEDICINE

## 2022-03-06 PROCEDURE — 83735 ASSAY OF MAGNESIUM: CPT

## 2022-03-06 PROCEDURE — 2580000003 HC RX 258: Performed by: STUDENT IN AN ORGANIZED HEALTH CARE EDUCATION/TRAINING PROGRAM

## 2022-03-06 PROCEDURE — 6370000000 HC RX 637 (ALT 250 FOR IP): Performed by: NURSE PRACTITIONER

## 2022-03-06 PROCEDURE — 99232 SBSQ HOSP IP/OBS MODERATE 35: CPT | Performed by: INTERNAL MEDICINE

## 2022-03-06 PROCEDURE — 6370000000 HC RX 637 (ALT 250 FOR IP)

## 2022-03-06 RX ADMIN — CLONIDINE HYDROCHLORIDE 0.2 MG: 0.1 TABLET ORAL at 08:20

## 2022-03-06 RX ADMIN — FUROSEMIDE 40 MG: 10 INJECTION, SOLUTION INTRAMUSCULAR; INTRAVENOUS at 08:20

## 2022-03-06 RX ADMIN — CLONIDINE HYDROCHLORIDE 0.2 MG: 0.1 TABLET ORAL at 20:22

## 2022-03-06 RX ADMIN — AMLODIPINE BESYLATE 10 MG: 10 TABLET ORAL at 08:20

## 2022-03-06 RX ADMIN — DOCUSATE SODIUM LIQUID 100 MG: 100 LIQUID ORAL at 08:21

## 2022-03-06 RX ADMIN — SODIUM CHLORIDE, PRESERVATIVE FREE 10 ML: 5 INJECTION INTRAVENOUS at 20:24

## 2022-03-06 RX ADMIN — CARVEDILOL 25 MG: 25 TABLET, FILM COATED ORAL at 08:20

## 2022-03-06 RX ADMIN — ASPIRIN 81 MG: 81 TABLET, CHEWABLE ORAL at 08:20

## 2022-03-06 RX ADMIN — HEPARIN SODIUM 5000 UNITS: 5000 INJECTION INTRAVENOUS; SUBCUTANEOUS at 06:18

## 2022-03-06 RX ADMIN — HEPARIN SODIUM 5000 UNITS: 5000 INJECTION INTRAVENOUS; SUBCUTANEOUS at 13:59

## 2022-03-06 RX ADMIN — AZATHIOPRINE 75 MG: 50 TABLET ORAL at 09:01

## 2022-03-06 RX ADMIN — SODIUM CHLORIDE, PRESERVATIVE FREE 10 ML: 5 INJECTION INTRAVENOUS at 08:20

## 2022-03-06 RX ADMIN — ATORVASTATIN CALCIUM 80 MG: 80 TABLET, FILM COATED ORAL at 08:20

## 2022-03-06 RX ADMIN — Medication 30 MG: at 06:18

## 2022-03-06 RX ADMIN — HEPARIN SODIUM 5000 UNITS: 5000 INJECTION INTRAVENOUS; SUBCUTANEOUS at 20:22

## 2022-03-06 RX ADMIN — TAMSULOSIN HYDROCHLORIDE 0.4 MG: 0.4 CAPSULE ORAL at 08:20

## 2022-03-06 ASSESSMENT — PAIN SCALES - GENERAL
PAINLEVEL_OUTOF10: 0
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
## 2022-03-06 NOTE — PROGRESS NOTES
Renal Progress Note    Patient :  Elana Martinez; 60 y.o. MRN# [id number]  Location:  7985/5429-51  Attending:  Marcello Severino MD  Admit Date:  2/21/2022   Hospital Day: 12      Subjective: Following for ARON on CKD III due to ANCA vasculitis managed by DR Tommie Ceballos with baseline 1.7-2.0, admitted after V Fib cardiac arrest.  Has new CMP. Cardiology considering cardiac cath, but nothing planned as of yet. Yesterday a umaña catheter was placed due to urinary retention and bump up in creatinine. Creatinine down to 1.31 this morning. Urine output 1600  On Lasix 40mg IV daily. Blood pressure stable without pressors. On room air. Still has NG tube but taking oral diet    Outpatient Medications:     Medications Prior to Admission: magnesium oxide (MAG-OX) 400 (241.3 Mg) MG TABS tablet,   atorvastatin (LIPITOR) 40 MG tablet, TAKE 1 TABLET BY MOUTH DAILY  magnesium oxide (MAG-OX) 400 (240 Mg) MG tablet, TAKE 1 TABLET BY MOUTH 2 TIMES DAILY  traZODone (DESYREL) 100 MG tablet, Take 0.5 tablets by mouth nightly as needed for Sleep  DULoxetine (CYMBALTA) 30 MG extended release capsule, TAKE 1 CAPSULE BY MOUTH DAILY  lisinopril (PRINIVIL;ZESTRIL) 5 MG tablet, take 1 tablet by mouth once daily  spironolactone (ALDACTONE) 25 MG tablet, take 1 tablet by mouth once daily  metoclopramide (REGLAN) 10 MG tablet, Take 1 tablet by mouth 3 times daily  isosorbide mononitrate (IMDUR) 30 MG extended release tablet, Take 1 tablet by mouth daily  nitroGLYCERIN (NITROSTAT) 0.4 MG SL tablet, Place 1 tablet under the tongue every 5 minutes as needed for Chest pain up to max of 3 total doses. If no relief after 1 dose, call 911.  (Patient not taking: Reported on 1/17/2022)  cloNIDine (CATAPRES) 0.2 MG tablet, Take 1 tablet by mouth 2 times daily  metoprolol tartrate (LOPRESSOR) 25 MG tablet, Take 1 tablet by mouth 2 times daily  magnesium oxide (MAG-OX) 400 (240 Mg) MG tablet,   pantoprazole (PROTONIX) 40 MG tablet, Take 1 tablet by mouth daily  magnesium oxide (MAG-OX) 400 MG tablet, Take 1 tablet by mouth 2 times daily (Patient not taking: Reported on 10/25/2021)  aspirin EC 81 MG EC tablet, Take 1 tablet by mouth daily  nicotine (NICODERM CQ) 21 MG/24HR, Place 1 patch onto the skin daily  acetaminophen (TYLENOL) 325 MG tablet, Take 2 tablets by mouth every 6 hours as needed for Pain  Umeclidinium Bromide 62.5 MCG/INH AEPB, Inhale 1 puff into the lungs daily (Patient not taking: Reported on 1/17/2022)  vitamin D3 (CHOLECALCIFEROL) 10 MCG (400 UNIT) TABS tablet, take 2 tablets (800MG) by mouth once daily (Patient not taking: Reported on 4/21/2021)  vitamin D3 (CHOLECALCIFEROL) 10 MCG (400 UNIT) TABS tablet, take 2 tablets (800MG) by mouth once daily (Patient not taking: Reported on 1/17/2022)  Fluticasone furoate-vilanterol (BREO ELLIPTA) 200-25 MCG/INH AEPB inhaler, Inhale 1 puff into the lungs daily (Patient not taking: Reported on 1/17/2022)  calcium carbonate-vitamin D3 (CALCIUM 600-D) 600-400 MG-UNIT TABS per tab, Take 1 tablet by mouth 2 times daily  azaTHIOprine (IMURAN) 50 MG tablet, Take 1.5 tablets by mouth daily  albuterol sulfate  (90 Base) MCG/ACT inhaler, inhale 2 puffs by mouth every 6 hours if needed for wheezing  nicotine (NICODERM CQ) 14 MG/24HR, Place 1 patch onto the skin daily (Patient not taking: Reported on 1/17/2022)  traMADol (ULTRAM) 50 MG tablet, Take 50 mg by mouth every 8 hours as needed for Pain.   (Patient not taking: Reported on 1/17/2022)  OLANZapine (ZYPREXA) 5 MG tablet, Take 1 tablet by mouth nightly    Current Medications:     Scheduled Meds:    tamsulosin  0.4 mg Oral Daily    lansoprazole  30 mg Per NG tube QAM AC    heparin (porcine)  5,000 Units SubCUTAneous 3 times per day    cloNIDine  0.2 mg Oral BID    docusate  100 mg Oral Daily    amLODIPine  10 mg Oral Daily    furosemide  40 mg IntraVENous Daily    carvedilol  25 mg Oral BID WC    azaTHIOprine  75 mg Oral Daily    insulin lispro 0-3 Units SubCUTAneous Q6H    aspirin  81 mg Oral Daily    atorvastatin  80 mg Oral Daily    sodium chloride flush  5-40 mL IntraVENous 2 times per day     Continuous Infusions:    sodium chloride 100 mL/hr at 22 1720    dextrose       PRN Meds:  bisacodyl, metoclopramide, labetalol, sodium chloride flush, sodium chloride, ondansetron **OR** ondansetron, polyethylene glycol, acetaminophen **OR** acetaminophen, glucose, glucagon (rDNA), dextrose, dextrose bolus (hypoglycemia) **OR** dextrose bolus (hypoglycemia), ipratropium-albuterol    Input/Output:       I/O last 3 completed shifts: In: 270 [P.O.:270]  Out:  [Urine:]. Patient Vitals for the past 96 hrs (Last 3 readings):   Weight   22 0514 207 lb 3.7 oz (94 kg)   22 0431 207 lb 3.7 oz (94 kg)       Vital Signs:   Temperature:  Temp: 97.7 °F (36.5 °C)  TMax:   Temp (24hrs), Av.5 °F (36.4 °C), Min:96.6 °F (35.9 °C), Max:98.4 °F (36.9 °C)    Respirations:  Resp: 27  Pulse:   Pulse: 77  BP:    BP: 129/75  BP Range: Systolic (47EUU), RCF:123 , Min:110 , JENSEN:979       Diastolic (95TDB), PR, Min:64, Max:76      Physical Examination:     General:  Alert and appropriate  HEENT: Atraumatic, normocephalic, no throat congestion, moist mucosa. Eyes:   Pupils equal, round and reactive to light, EOMI. Neck:   No JVD, no thyromegaly, no lymphadenopathy. Chest:              Bilateral vesicular breath sounds, no rales or wheezes. Cardiac:  S1 S2 RR, no murmurs, gallops or rubs, JVP not raised. Abdomen: Soft, non-tender, no masses or organomegaly, BS audible. :   No suprapubic or flank tenderness. Neuro:  AAO x 3, No FND. SKIN:  No rashes, good skin turgor. Extremities:  No edema, palpable peripheral pulses, no calf tenderness.     Labs:       Recent Labs     22  0517 22  0619 22  0830   WBC 7.1 8.5 7.7   RBC 3.39* 3.81* 3.52*   HGB 10.2* 11.7* 11.1*   HCT 31.9* 36.4* 35.2*   MCV 94.1 95.5 100.0   MCH 30.1 30.7 31.5   MCHC 32.0 32.1 31.5   RDW 17.1* 17.2* 17.1*   * 187 181   MPV 10.6 11.5 10.5      BMP:   Recent Labs     03/04/22  0517 03/05/22  0619 03/06/22  0830    136 133*   K 4.3 4.6 4.1   CL 98 96* 94*   CO2 27 24 24   BUN 28* 35* 35*   CREATININE 1.27* 1.42* 1.31*   GLUCOSE 85 104* 97   CALCIUM 8.7 8.9 8.8      Phosphorus:   No results for input(s): PHOS in the last 72 hours.   Magnesium:    Recent Labs     03/04/22  0418 03/05/22  0619 03/06/22  0830   MG 2.2 2.4 2.2     Albumin:    Recent Labs     03/04/22  0517 03/05/22  0619   LABALBU 3.0* 3.7     BNP:      Lab Results   Component Value Date    BNP 51 07/26/2012     JANAE:      Lab Results   Component Value Date    JANAE NEGATIVE 07/11/2020     SPEP:  Lab Results   Component Value Date    PROT 6.5 03/05/2022    ALBCAL 2.9 07/11/2020    ALBPCT 50 07/11/2020    LABALPH 0.2 07/11/2020    LABALPH 0.7 07/11/2020    A1PCT 3 07/11/2020    A2PCT 11 07/11/2020    LABBETA 0.5 07/11/2020    BETAPCT 9 07/11/2020    GAMGLOB 1.5 07/11/2020    GGPCT 26 07/11/2020    PATH Adryan Pineda M.D. 07/11/2020     MPO ANCA:     Lab Results   Component Value Date     07/12/2020     PR3 ANCA:     Lab Results   Component Value Date    PR3 378 07/12/2020     Anti-GBM:     Lab Results   Component Value Date    GBMABIGG 16 07/12/2020     Hep BsAg:         Lab Results   Component Value Date    HEPBSAG NONREACTIVE 03/05/2020     Hep C AB:          Lab Results   Component Value Date    HEPCAB NONREACTIVE 03/05/2020       Urinalysis/Chemistries:      Lab Results   Component Value Date    NITRU NEGATIVE 02/25/2022    COLORU Yellow 02/25/2022    PHUR 5.5 02/25/2022    WBCUA 2 TO 5 02/25/2022    RBCUA 5 TO 10 02/25/2022    MUCUS NOT REPORTED 11/06/2020    TRICHOMONAS NOT REPORTED 11/06/2020    YEAST NOT REPORTED 11/06/2020    BACTERIA NOT REPORTED 11/06/2020    CLARITYU clear 09/24/2020    SPECGRAV 1.025 02/25/2022    LEUKOCYTESUR NEGATIVE 02/25/2022    UROBILINOGEN Normal 02/25/2022    BILIRUBINUR NEGATIVE 02/25/2022    BLOODU +++ 09/24/2020    GLUCOSEU NEGATIVE 02/25/2022    KETUA TRACE 02/25/2022    AMORPHOUS NOT REPORTED 11/06/2020     Urine Sodium:     Lab Results   Component Value Date    GURU 99 02/25/2022     Urine Potassium:    Lab Results   Component Value Date    KUR 12.5 07/12/2020     Urine Chloride:    Lab Results   Component Value Date    CLUR 80 02/25/2022       Urine Creatinine:     Lab Results   Component Value Date    LABCREA 126.5 02/25/2022       Radiology:     CXR: No recent    Assessment:     1. 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 1.7-2, follows up with Dr. Torie Simon: Treatment with Imuran. 2. 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 V Fib and PEA cardiac arrest, unknown down time  4. Hypoxic brain injury   5. H/O CABG. EF now 45%  6. Respiratory failure, mechanical ventilation dependant  7. H/O HTN  8. Urinary retention requiring umaña catheter placement    Plan:   1. Consider removing NG tube  3. Cardiology may proceed with cardiac cath next week as long as creatinine remains within baseline  4. Continue Lasix IV 40mg daily  5. Will continue to follow labs and I and O  6. Keep umaña in place  7. Continue tamsulosin    Nutrition   Please ensure that patient is on a renal diet/TF. Avoid nephrotoxic drugs/contrast exposure. We will continue to follow along with you. Eugenie Oliveira CNP    Attending Physician Statement  I have discussed the care of Indra Friend, including pertinent history and exam findings with the CNP. I have reviewed the key elements of all parts of the encounter with the CNP and have edited the documentation as appropriate. I have seen and examined the patient. I agree with the assessment and plan and status of the problem list as documented and have edited the documentation as appropriate.         Gurdeep Barajas Venecia Merritt MD   Nephrology Attending Physician  [facility]  3/6/2022

## 2022-03-06 NOTE — PLAN OF CARE
Problem: Confusion - Acute:  Goal: Absence of continued neurological deterioration signs and symptoms  Description: Absence of continued neurological deterioration signs and symptoms  Outcome: Ongoing  Note: Patient alert, orientation assessed and reoriented as needed this shift   Goal: Mental status will be restored to baseline  Description: Mental status will be restored to baseline  Outcome: Ongoing  Note: Patient alert, orientation assessed and reoriented as needed this shift      Problem: Discharge Planning:  Goal: Ability to perform activities of daily living will improve  Description: Ability to perform activities of daily living will improve  Outcome: Ongoing  Note: Discharge planning is in progress at this time to [facility]  Goal: Participates in care planning  Description: Participates in care planning  Outcome: Ongoing     Problem: Injury - Risk of, Physical Injury:  Goal: Absence of physical injury  Description: Absence of physical injury  Outcome: Ongoing  Note: Patient free from physical injury during this shift. Patient assessed per Hermenia Cooler fall risk scale. Appropriate interventions in place as indicated. Patient reassessed appropriately. Goal: Will remain free from falls  Description: Will remain free from falls  Outcome: Ongoing  Note: Patient free from falls during this shift. Patient assessed per Hermenia Cooler fall risk scale. Appropriate interventions in place as indicated. Patient reassessed appropriately.       Problem: Mood - Altered:  Goal: Mood stable  Description: Mood stable  Outcome: Ongoing  Goal: Absence of abusive behavior  Description: Absence of abusive behavior  Outcome: Ongoing  Goal: Verbalizations of feeling emotionally comfortable while being cared for will increase  Description: Verbalizations of feeling emotionally comfortable while being cared for will increase  Outcome: Ongoing     Problem: Psychomotor Activity - Altered:  Goal: Absence of psychomotor disturbance signs and symptoms  Description: Absence of psychomotor disturbance signs and symptoms  Outcome: Ongoing     Problem: Sensory Perception - Impaired:  Goal: Demonstrations of improved sensory functioning will increase  Description: Demonstrations of improved sensory functioning will increase  Outcome: Ongoing  Goal: Decrease in sensory misperception frequency  Description: Decrease in sensory misperception frequency  Outcome: Ongoing  Goal: Able to refrain from responding to false sensory perceptions  Description: Able to refrain from responding to false sensory perceptions  Outcome: Ongoing  Goal: Demonstrates accurate environmental perceptions  Description: Demonstrates accurate environmental perceptions  Outcome: Ongoing  Goal: Able to distinguish between reality-based and nonreality-based thinking  Description: Able to distinguish between reality-based and nonreality-based thinking  Outcome: Ongoing  Goal: Able to interrupt nonreality-based thinking  Description: Able to interrupt nonreality-based thinking  Outcome: Ongoing     Problem: Sleep Pattern Disturbance:  Goal: Appears well-rested  Description: Appears well-rested  Outcome: Ongoing     Problem: Nutrition  Goal: Optimal nutrition therapy  Outcome: Ongoing     Problem: Falls - Risk of:  Goal: Absence of physical injury  Description: Absence of physical injury  Outcome: Ongoing  Note: Patient free from physical injury during this shift. Patient assessed per Arleene Millin fall risk scale. Appropriate interventions in place as indicated. Patient reassessed appropriately. Goal: Will remain free from falls  Description: Will remain free from falls  Outcome: Ongoing  Note: Patient free from falls during this shift. Patient assessed per Arleene Millin fall risk scale. Appropriate interventions in place as indicated. Patient reassessed appropriately.       Problem: Skin Integrity:  Goal: Will show no infection signs and symptoms  Description: Will show no infection signs and symptoms  Outcome: Ongoing  Note: Patient assessed for S&S of infection during this shift. Vitals and labs monitored. Will continue to assess. Goal: Absence of new skin breakdown  Description: Absence of new skin breakdown  Outcome: Ongoing  Note: No evidence of new skin breakdown this shift. Patient's skin is assessed and treated this shift. Patient assessed per Azeem scale. Appropriate measures in place to maintain skin integrity.

## 2022-03-06 NOTE — PROGRESS NOTES
[facility]  Internal Medicine Teaching Residency Program  Inpatient Daily Progress Note  ______________________________________________________________________________    Patient: Minnie Robison  YOB: 1965   NRI:[id number]    Acct: [de-identified]     Room: [address] [place] [address]  Admit date: 2/21/2022  Today's date: 03/06/22  Number of days in the hospital: 12    SUBJECTIVE   Admitting Diagnosis: Cardiac arrest ([address]  CC: V. Fib arrest    Pt seen and examined. No acute events overnight. Labs and charts reviewed. Afebrile, hemodynamically stable, saturating well on room air. He is much more alert, awake and interactive. Needs any active complaints. Working with PT/OT. Cardiology to go for cardiac cath likely tomorrow. Stable kidney function. Past swallow study, will remove NG tube. Tolerating oral intake well. Has constipation, bowel regimen given. Creat improving 1.31 today. ROS:  Constitutional:  negative for chills, fevers, sweats  Respiratory:  negative for cough, dyspnea on exertion, hemoptysis, shortness of breath, wheezing  Cardiovascular:  negative for chest pain, chest pressure/discomfort, lower extremity edema, palpitations  Gastrointestinal:  negative for abdominal pain, constipation, diarrhea, nausea, vomiting  Neurological:  negative for dizziness, headache  BRIEF HISTORY       A 23-year-old male who was admitted to ICU after cardiac arrest.  Family called EMS after he was found down, total time unknown. He was found to be in V. fib arrest, ROSC achieved after 2 shocks but subsequently he went into PEA arrest on the way to hospital, responded to epinephrine.       There was concern for STEMI, cardiology was consulted, they did not meet the criteria for STEMI and cath was postponed until neurological prognosis is clear. He was placed on hypothermia, protocol started on heparin and amiodarone.    He required dobutamine transiently for bradycardia.     He also had acute hypoxemic and hypercarbic respiratory failure with possible aspiration bibasilar pneumonia. He was started on Unasyn. He got intubated in ER, and started on mechanical ventilation.       Had thrombocytopenia, anticoagulation was changed to argatroban. HIT was ruled out and heparin was restarted. Electrolytes were replaced timely.     CT head was unremarkable. She underwent MRI of the brain, which revealed early changes of early hypoxic brain injury.       Over the course of a week, her neurological exam is gradually improving. .  Nephrology was consulted for ARON on CKD. Started him on IV Lasix.     Gradually, he tolerated spontaneous breathing trial well, got extubated without any immediate complications. Completed course of antibiotics for possible aspiration pneumonia.     He remained hemodynamically stable without any significant hypotension, or arrhythmias, therefore cardiology discontinued amiodarone drip and heparin drip. Cardiology stated that cath is not urgent at this time but will discuss the risk of contrast-induced nephropathy with the patient and family before cath and recommend AICD evaluation prior to discharge. He is also on azathioprine 70 mg daily for ANCA vasculitis.     He is transferred to the floor for the further evaluation and management. OBJECTIVE     Vital Signs:  /75   Pulse 77   Temp 97.7 °F (36.5 °C) (Oral)   Resp 27   Ht 5' 10\" (1.778 m)   Wt 207 lb 3.7 oz (94 kg)   SpO2 90%   BMI 29.73 kg/m²     Temp (24hrs), Av.5 °F (36.4 °C), Min:96.6 °F (35.9 °C), Max:98.4 °F (36.9 °C)    In: 390   Out: 1720 [Urine:1720]    Physical Exam:  Constitutional: This is a well developed, well nourished, 25-29.9 - Overweight 60y.o. year old male who is alert, oriented, cooperative and in no apparent distress. Head:normocephalic and atraumatic. EENT:  PERRLA. No conjunctival injections.    Septum was midline, mucosa was without erythema, exudates or cobblestoning. No thrush was noted. Neck: Supple without thyromegaly. No elevated JVP. Trachea was midline. Respiratory: Chest was symmetrical without dullness to percussion. Breath sounds bilaterally were clear to auscultation. There were no wheezes, rhonchi or rales. There is no intercostal retraction or use of accessory muscles. No egophony noted. Cardiovascular: Regular without murmur, clicks, gallops or rubs. Abdomen: Slightly rounded and soft without organomegaly. No rebound, rigidity or guarding was appreciated. Lymphatic: No lymphadenopathy. Musculoskeletal: Normal curvature of the spine. No gross muscle weakness. Extremities:  No lower extremity edema, ulcerations, tenderness, varicosities or erythema. Muscle size, tone and strength are normal.  No involuntary movements are noted. Skin:  Warm and dry. Good color, turgor and pigmentation. No lesions or scars. No cyanosis or clubbing  Neurological/Psychiatric: Could not be assessed because of patient's mental condition.   Medications:  Scheduled Medications:    tamsulosin  0.4 mg Oral Daily    lansoprazole  30 mg Per NG tube QAM AC    heparin (porcine)  5,000 Units SubCUTAneous 3 times per day    cloNIDine  0.2 mg Oral BID    docusate  100 mg Oral Daily    amLODIPine  10 mg Oral Daily    furosemide  40 mg IntraVENous Daily    carvedilol  25 mg Oral BID WC    azaTHIOprine  75 mg Oral Daily    insulin lispro  0-3 Units SubCUTAneous Q6H    aspirin  81 mg Oral Daily    atorvastatin  80 mg Oral Daily    sodium chloride flush  5-40 mL IntraVENous 2 times per day     Continuous Infusions:    sodium chloride 100 mL/hr at 02/25/22 1720    dextrose       PRN Medicationsbisacodyl, 10 mg, Daily PRN  metoclopramide, 5 mg, Q6H PRN  labetalol, 10 mg, Q6H PRN  sodium chloride flush, 5-40 mL, PRN  sodium chloride, 25 mL, PRN  ondansetron, 4 mg, Q8H PRN   Or  ondansetron, 4 mg, Q6H PRN  polyethylene glycol, 17 g, Daily PRN  acetaminophen, 650 mg, Q6H PRN   Or  acetaminophen, 650 mg, Q6H PRN  glucose, 15 g, PRN  glucagon (rDNA), 1 mg, PRN  dextrose, 100 mL/hr, PRN  dextrose bolus (hypoglycemia), 125 mL, PRN   Or  dextrose bolus (hypoglycemia), 250 mL, PRN  ipratropium-albuterol, 1 ampule, Q6H PRN        Diagnostic Labs:  CBC:   Recent Labs     03/04/22  0517 03/05/22  0619 03/06/22  0830   WBC 7.1 8.5 7.7   RBC 3.39* 3.81* 3.52*   HGB 10.2* 11.7* 11.1*   HCT 31.9* 36.4* 35.2*   MCV 94.1 95.5 100.0   RDW 17.1* 17.2* 17.1*   * 187 181     BMP:   Recent Labs     03/04/22 0517 03/05/22  0619 03/06/22  0830    136 133*   K 4.3 4.6 4.1   CL 98 96* 94*   CO2 27 24 24   BUN 28* 35* 35*   CREATININE 1.27* 1.42* 1.31*     BNP: No results for input(s): BNP in the last 72 hours. PT/INR: No results for input(s): PROTIME, INR in the last 72 hours. APTT:   Recent Labs     03/03/22  1842 03/04/22  0517 03/05/22  0645   APTT 58.5* 66.8* 27.6     CARDIAC ENZYMES: No results for input(s): CKMB, CKMBINDEX, TROPONINI in the last 72 hours. Invalid input(s): CKTOTAL;3  FASTING LIPID PANEL:  Lab Results   Component Value Date    CHOL 188 11/07/2020    HDL 52 11/07/2020    TRIG 235 (H) 11/07/2020     LIVER PROFILE:   Recent Labs     03/04/22 0517 03/05/22  0619   AST 42* 63*   ALT 23 35   BILITOT 0.90 1.31*   ALKPHOS 167* 368*      MICROBIOLOGY:   Lab Results   Component Value Date/Time    CULTURE NORMAL RESPIRATORY DOUGIE MODERATE GROWTH 02/22/2022 01:58 PM       Imaging:    XR ABDOMEN FOR NG/OG/NE TUBE PLACEMENT    Result Date: 3/3/2022  Enteric tube with tip and side-port in the stomach. Nonobstructive bowel gas pattern       ASSESSMENT & PLAN     A 77-year-old male who presented post V. fib arrest, intubated for acute hypoxic respiratory failure, was admitted inpatient for the evaluation and management post V. fib arrest.     V. fib arrest   S/p hypothermia amiodarone and heparin drip.   AICD prior to discharge  As per cardiology, will monitor for neurological recovery over the weekend prior to going for cath. CAD   s/p CABG x4  Continue aspirin, Lipitor, Coreg and Norvasc     Metabolic encephalopathy  Improving  EEG showed no epileptiform discharges, encephalopathy features. MRI showed signs of early hypoxic ischemia     Acute hypoxic and hypercapnic respiratory failure  Likely secondary to aspiration pneumonia  Improving  S/p Extubation  Currently on room air  Wean as tolerated. Completed Unasyn for likely aspiration pneumonia.      ARNO on CKD stage IV  Improving, 1.31 today  Nephro on board  On Iv lasix 40 mg OD     ANCA Vasculitis  On Azathioprine     HTN  On Coreg 25 twice daily  On Norvasc 10 mg OD  On clonidine 0.2 mg twice daily     HLD  On Lipitor 80 mg     COPD  Bronchodilators as needed     Depression/Mood disorder  On trazodone, duloxetine, and olanzapine as appropriate.     DVT ppx  On heparin     GI ppx  Protonix     PT/OT  On board     Discharge Planning:   to assist in discharge planning      Tea Guzman  Internal Medicine Resident, PGY-1  [facility];  [place], [place]  3/6/2022, 7:20 AM

## 2022-03-07 LAB
ANION GAP SERPL CALCULATED.3IONS-SCNC: 14 MMOL/L (ref 9–17)
BUN BLDV-MCNC: 37 MG/DL (ref 6–20)
CALCIUM SERPL-MCNC: 8.5 MG/DL (ref 8.6–10.4)
CHLORIDE BLD-SCNC: 94 MMOL/L (ref 98–107)
CO2: 24 MMOL/L (ref 20–31)
CREAT SERPL-MCNC: 1.5 MG/DL (ref 0.7–1.2)
GFR AFRICAN AMERICAN: 58 ML/MIN
GFR NON-AFRICAN AMERICAN: 48 ML/MIN
GFR SERPL CREATININE-BSD FRML MDRD: ABNORMAL ML/MIN/{1.73_M2}
GLUCOSE BLD-MCNC: 111 MG/DL (ref 70–99)
HCT VFR BLD CALC: 34.4 % (ref 40.7–50.3)
HEMOGLOBIN: 11.1 G/DL (ref 13–17)
MAGNESIUM: 2.2 MG/DL (ref 1.6–2.6)
MCH RBC QN AUTO: 30.5 PG (ref 25.2–33.5)
MCHC RBC AUTO-ENTMCNC: 32.3 G/DL (ref 28.4–34.8)
MCV RBC AUTO: 94.5 FL (ref 82.6–102.9)
NRBC AUTOMATED: 0 PER 100 WBC
PDW BLD-RTO: 17 % (ref 11.8–14.4)
PLATELET # BLD: 207 K/UL (ref 138–453)
PMV BLD AUTO: 10.7 FL (ref 8.1–13.5)
POTASSIUM SERPL-SCNC: 4.2 MMOL/L (ref 3.7–5.3)
RBC # BLD: 3.64 M/UL (ref 4.21–5.77)
SODIUM BLD-SCNC: 132 MMOL/L (ref 135–144)
WBC # BLD: 11 K/UL (ref 3.5–11.3)

## 2022-03-07 PROCEDURE — 6360000002 HC RX W HCPCS: Performed by: INTERNAL MEDICINE

## 2022-03-07 PROCEDURE — 6360000002 HC RX W HCPCS

## 2022-03-07 PROCEDURE — 97530 THERAPEUTIC ACTIVITIES: CPT

## 2022-03-07 PROCEDURE — 97116 GAIT TRAINING THERAPY: CPT

## 2022-03-07 PROCEDURE — 6370000000 HC RX 637 (ALT 250 FOR IP): Performed by: STUDENT IN AN ORGANIZED HEALTH CARE EDUCATION/TRAINING PROGRAM

## 2022-03-07 PROCEDURE — 83735 ASSAY OF MAGNESIUM: CPT

## 2022-03-07 PROCEDURE — 99233 SBSQ HOSP IP/OBS HIGH 50: CPT | Performed by: INTERNAL MEDICINE

## 2022-03-07 PROCEDURE — 99232 SBSQ HOSP IP/OBS MODERATE 35: CPT | Performed by: INTERNAL MEDICINE

## 2022-03-07 PROCEDURE — 6370000000 HC RX 637 (ALT 250 FOR IP): Performed by: NURSE PRACTITIONER

## 2022-03-07 PROCEDURE — 2580000003 HC RX 258: Performed by: INTERNAL MEDICINE

## 2022-03-07 PROCEDURE — 85027 COMPLETE CBC AUTOMATED: CPT

## 2022-03-07 PROCEDURE — 36415 COLL VENOUS BLD VENIPUNCTURE: CPT

## 2022-03-07 PROCEDURE — 97110 THERAPEUTIC EXERCISES: CPT

## 2022-03-07 PROCEDURE — 6360000002 HC RX W HCPCS: Performed by: STUDENT IN AN ORGANIZED HEALTH CARE EDUCATION/TRAINING PROGRAM

## 2022-03-07 PROCEDURE — 6370000000 HC RX 637 (ALT 250 FOR IP)

## 2022-03-07 PROCEDURE — 2060000000 HC ICU INTERMEDIATE R&B

## 2022-03-07 PROCEDURE — 6370000000 HC RX 637 (ALT 250 FOR IP): Performed by: INTERNAL MEDICINE

## 2022-03-07 PROCEDURE — 2580000003 HC RX 258: Performed by: STUDENT IN AN ORGANIZED HEALTH CARE EDUCATION/TRAINING PROGRAM

## 2022-03-07 PROCEDURE — 80048 BASIC METABOLIC PNL TOTAL CA: CPT

## 2022-03-07 RX ORDER — ACETYLCYSTEINE 200 MG/ML
600 SOLUTION ORAL; RESPIRATORY (INHALATION) EVERY 8 HOURS
Status: DISCONTINUED | OUTPATIENT
Start: 2022-03-07 | End: 2022-03-08

## 2022-03-07 RX ORDER — SODIUM CHLORIDE 9 MG/ML
INJECTION, SOLUTION INTRAVENOUS CONTINUOUS
Status: DISCONTINUED | OUTPATIENT
Start: 2022-03-08 | End: 2022-03-08

## 2022-03-07 RX ORDER — SODIUM CHLORIDE 9 MG/ML
INJECTION, SOLUTION INTRAVENOUS CONTINUOUS
Status: DISCONTINUED | OUTPATIENT
Start: 2022-03-07 | End: 2022-03-07

## 2022-03-07 RX ADMIN — SODIUM CHLORIDE: 9 INJECTION, SOLUTION INTRAVENOUS at 11:50

## 2022-03-07 RX ADMIN — HEPARIN SODIUM 5000 UNITS: 5000 INJECTION INTRAVENOUS; SUBCUTANEOUS at 14:44

## 2022-03-07 RX ADMIN — AZATHIOPRINE 75 MG: 50 TABLET ORAL at 10:31

## 2022-03-07 RX ADMIN — AMLODIPINE BESYLATE 10 MG: 10 TABLET ORAL at 10:27

## 2022-03-07 RX ADMIN — CARVEDILOL 25 MG: 25 TABLET, FILM COATED ORAL at 10:29

## 2022-03-07 RX ADMIN — ATORVASTATIN CALCIUM 80 MG: 80 TABLET, FILM COATED ORAL at 10:27

## 2022-03-07 RX ADMIN — Medication 30 MG: at 05:22

## 2022-03-07 RX ADMIN — SODIUM CHLORIDE, PRESERVATIVE FREE 10 ML: 5 INJECTION INTRAVENOUS at 10:31

## 2022-03-07 RX ADMIN — FUROSEMIDE 40 MG: 10 INJECTION, SOLUTION INTRAMUSCULAR; INTRAVENOUS at 10:48

## 2022-03-07 RX ADMIN — ASPIRIN 81 MG: 81 TABLET, CHEWABLE ORAL at 10:27

## 2022-03-07 RX ADMIN — HEPARIN SODIUM 5000 UNITS: 5000 INJECTION INTRAVENOUS; SUBCUTANEOUS at 21:19

## 2022-03-07 RX ADMIN — DOCUSATE SODIUM LIQUID 100 MG: 100 LIQUID ORAL at 10:27

## 2022-03-07 RX ADMIN — ONDANSETRON 4 MG: 2 INJECTION INTRAMUSCULAR; INTRAVENOUS at 03:26

## 2022-03-07 RX ADMIN — SODIUM CHLORIDE, PRESERVATIVE FREE 10 ML: 5 INJECTION INTRAVENOUS at 21:03

## 2022-03-07 RX ADMIN — CLONIDINE HYDROCHLORIDE 0.2 MG: 0.1 TABLET ORAL at 10:27

## 2022-03-07 RX ADMIN — TAMSULOSIN HYDROCHLORIDE 0.4 MG: 0.4 CAPSULE ORAL at 10:27

## 2022-03-07 RX ADMIN — HEPARIN SODIUM 5000 UNITS: 5000 INJECTION INTRAVENOUS; SUBCUTANEOUS at 05:22

## 2022-03-07 ASSESSMENT — PAIN SCALES - GENERAL
PAINLEVEL_OUTOF10: 0

## 2022-03-07 NOTE — PROGRESS NOTES
medical condition    Nutrition Interventions:   Food and/or Nutrient Delivery:   (Continue Soft & Bite-sized when able. Recommend High kcal/High PRO ONS BID when able.)  Nutrition Education/Counseling:  No recommendation at this time   Coordination of Nutrition Care:  Continue to monitor while inpatient    Goals:  meet % of estimated nutrient needs       Nutrition Monitoring and Evaluation:   Behavioral-Environmental Outcomes:  None Identified   Food/Nutrient Intake Outcomes:  Diet Advancement/Tolerance,Food and Nutrient Intake,Supplement Intake  Physical Signs/Symptoms Outcomes:  Biochemical Data,Nutrition Focused Physical Findings,Skin,Weight     Discharge Planning:     Too soon to determine     Electronically signed by John Colmenares MS, RD, LD on 3/7/22 at 11:32 AM EST    Contact: [phone]
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
## 2022-03-07 NOTE — PLAN OF CARE
Problem: Confusion - Acute:  Goal: Absence of continued neurological deterioration signs and symptoms  Description: Absence of continued neurological deterioration signs and symptoms  Outcome: Not Met This Shift  Note: Pt alert, oriented to self. Unable to state year, wrong location stated. Will continue to reorient patient during this shift. Goal: Mental status will be restored to baseline  Description: Mental status will be restored to baseline  Outcome: Not Met This Shift     Problem: Discharge Planning:  Goal: Ability to perform activities of daily living will improve  Description: Ability to perform activities of daily living will improve  Outcome: Not Met This Shift  Goal: Participates in care planning  Description: Participates in care planning  Outcome: Not Met This Shift     Problem: Injury - Risk of, Physical Injury:  Goal: Absence of physical injury  Description: Absence of physical injury  Outcome: Not Met This Shift  Note: Patient free from physical injury during this shift. Patient assessed per [place] fall risk scale. Appropriate interventions in place as indicated. Patient reassessed appropriately. Goal: Will remain free from falls  Description: Will remain free from falls  Outcome: Not Met This Shift  Note: Patient free from falls during this shift. Patient assessed per [place] fall risk scale. Appropriate interventions in place as indicated. Patient reassessed appropriately.         Problem: Mood - Altered:  Goal: Mood stable  Description: Mood stable  Outcome: Not Met This Shift  Goal: Absence of abusive behavior  Description: Absence of abusive behavior  Outcome: Not Met This Shift  Goal: Verbalizations of feeling emotionally comfortable while being cared for will increase  Description: Verbalizations of feeling emotionally comfortable while being cared for will increase  Outcome: Not Met This Shift     Problem: Psychomotor Activity - Altered:  Goal: Absence of psychomotor disturbance signs and symptoms  Description: Absence of psychomotor disturbance signs and symptoms  Outcome: Not Met This Shift     Problem: Sensory Perception - Impaired:  Goal: Demonstrations of improved sensory functioning will increase  Description: Demonstrations of improved sensory functioning will increase  Outcome: Not Met This Shift  Goal: Decrease in sensory misperception frequency  Description: Decrease in sensory misperception frequency  Outcome: Not Met This Shift  Goal: Able to refrain from responding to false sensory perceptions  Description: Able to refrain from responding to false sensory perceptions  Outcome: Not Met This Shift  Goal: Demonstrates accurate environmental perceptions  Description: Demonstrates accurate environmental perceptions  Outcome: Not Met This Shift  Goal: Able to distinguish between reality-based and nonreality-based thinking  Description: Able to distinguish between reality-based and nonreality-based thinking  Outcome: Not Met This Shift  Goal: Able to interrupt nonreality-based thinking  Description: Able to interrupt nonreality-based thinking  Outcome: Not Met This Shift     Problem: Sleep Pattern Disturbance:  Goal: Appears well-rested  Description: Appears well-rested  Outcome: Not Met This Shift     Problem: Nutrition  Goal: Optimal nutrition therapy  Outcome: Not Met This Shift     Problem: Falls - Risk of:  Goal: Absence of physical injury  Description: Absence of physical injury  Outcome: Not Met This Shift  Note: Patient free from physical injury during this shift. Patient assessed per [place] fall risk scale. Appropriate interventions in place as indicated. Patient reassessed appropriately. Goal: Will remain free from falls  Description: Will remain free from falls  Outcome: Not Met This Shift  Note: Patient free from falls during this shift. Patient assessed per [place] fall risk scale. Appropriate interventions in place as indicated. Patient reassessed appropriately. Problem: Skin Integrity:  Goal: Will show no infection signs and symptoms  Description: Will show no infection signs and symptoms  Outcome: Not Met This Shift  Note: Patient assessed for S&S of infection during this shift. Vitals and labs monitored. Will continue to assess. Goal: Absence of new skin breakdown  Description: Absence of new skin breakdown  Outcome: Not Met This Shift  Note: No evidence of new skin breakdown this shift. Patient's skin is assessed and treated this shift. Patient assessed per [place] scale. Appropriate measures in place to maintain skin integrity.

## 2022-03-07 NOTE — PROGRESS NOTES
[facility]  Internal Medicine Teaching Residency Program  Inpatient Daily Progress Note  ______________________________________________________________________________    Patient: Esther De La Rosa  YOB: 1965   GTI:[id number]    Acct: [de-identified]     Room: 6031/7231-26  Admit date: 2/21/2022  Today's date: 03/07/22  Number of days in the hospital: 13    SUBJECTIVE   Admitting Diagnosis: Cardiac arrest ([facility]  CC: V. Fib arrest    Pt seen and examined. No acute events overnight. Labs and charts reviewed. Afebrile, hemodynamically stable, saturating well on  2L O2 via NC  Mentation improving, denies any active complaints. Working with PT/OT. Cardiology to go for cardiac cath likely tomorrow. Stable kidney function. Has constipation, on bowel regimen. Creatinine stable, 1.50 today within the baseline of 1.2 to 1.7. Will give IV fluids and mucomyst before and after cath. ROS:  Constitutional:  negative for chills, fevers, sweats  Respiratory:  negative for cough, dyspnea on exertion, hemoptysis, shortness of breath, wheezing  Cardiovascular:  negative for chest pain, chest pressure/discomfort, lower extremity edema, palpitations  Gastrointestinal:  negative for abdominal pain, constipation, diarrhea, nausea, vomiting  Neurological:  negative for dizziness, headache  BRIEF HISTORY       A 49-year-old male who was admitted to ICU after cardiac arrest.  Family called EMS after he was found down, total time unknown. He was found to be in V. fib arrest, ROSC achieved after 2 shocks but subsequently he went into PEA arrest on the way to hospital, responded to epinephrine.       There was concern for STEMI, cardiology was consulted, they did not meet the criteria for STEMI and cath was postponed until neurological prognosis is clear. He was placed on hypothermia, protocol started on heparin and amiodarone.    He required dobutamine transiently for bradycardia.     He also had acute hypoxemic and hypercarbic respiratory failure with possible aspiration bibasilar pneumonia. He was started on Unasyn. He got intubated in ER, and started on mechanical ventilation.       Had thrombocytopenia, anticoagulation was changed to argatroban. HIT was ruled out and heparin was restarted. Electrolytes were replaced timely.     CT head was unremarkable. She underwent MRI of the brain, which revealed early changes of early hypoxic brain injury.       Over the course of a week, her neurological exam is gradually improving. .  Nephrology was consulted for ARON on CKD. Started him on IV Lasix.     Gradually, he tolerated spontaneous breathing trial well, got extubated without any immediate complications. Completed course of antibiotics for possible aspiration pneumonia.     He remained hemodynamically stable without any significant hypotension, or arrhythmias, therefore cardiology discontinued amiodarone drip and heparin drip. Cardiology stated that cath is not urgent at this time but will discuss the risk of contrast-induced nephropathy with the patient and family before cath and recommend AICD evaluation prior to discharge. He is also on azathioprine 70 mg daily for ANCA vasculitis.     He is transferred to the floor for the further evaluation and management. OBJECTIVE     Vital Signs:  BP (!) 140/95   Pulse 86   Temp 98.8 °F (37.1 °C) (Oral)   Resp (!) 31   Ht 5' 10\" (1.778 m)   Wt 170 lb 3.1 oz (77.2 kg)   SpO2 96%   BMI 24.42 kg/m²     Temp (24hrs), Av.3 °F (36.8 °C), Min:97.9 °F (36.6 °C), Max:98.8 °F (37.1 °C)    In: 730   Out: 1560 [Urine:1560]    Physical Exam:  Constitutional: This is a well developed, well nourished, 25-29.9 - Overweight 60y.o. year old male who is alert, oriented, cooperative and in no apparent distress. Head:normocephalic and atraumatic. EENT:  PERRLA. No conjunctival injections.    Septum was midline, mucosa was without erythema, exudates or cobblestoning. No thrush was noted. Neck: Supple without thyromegaly. No elevated JVP. Trachea was midline. Respiratory: Chest was symmetrical without dullness to percussion. Breath sounds bilaterally were clear to auscultation. There were no wheezes, rhonchi or rales. There is no intercostal retraction or use of accessory muscles. No egophony noted. Cardiovascular: Regular without murmur, clicks, gallops or rubs. Abdomen: Slightly rounded and soft without organomegaly. No rebound, rigidity or guarding was appreciated. Lymphatic: No lymphadenopathy. Musculoskeletal: Normal curvature of the spine. No gross muscle weakness. Extremities:  No lower extremity edema, ulcerations, tenderness, varicosities or erythema. Muscle size, tone and strength are normal.  No involuntary movements are noted. Skin:  Warm and dry. Good color, turgor and pigmentation. No lesions or scars. No cyanosis or clubbing  Neurological/Psychiatric: Could not be assessed because of patient's mental condition.   Medications:  Scheduled Medications:    tamsulosin  0.4 mg Oral Daily    lansoprazole  30 mg Per NG tube QAM AC    heparin (porcine)  5,000 Units SubCUTAneous 3 times per day    cloNIDine  0.2 mg Oral BID    docusate  100 mg Oral Daily    amLODIPine  10 mg Oral Daily    furosemide  40 mg IntraVENous Daily    carvedilol  25 mg Oral BID WC    azaTHIOprine  75 mg Oral Daily    aspirin  81 mg Oral Daily    atorvastatin  80 mg Oral Daily    sodium chloride flush  5-40 mL IntraVENous 2 times per day     Continuous Infusions:    sodium chloride 100 mL/hr at 02/25/22 1720    dextrose       PRN Medicationsbisacodyl, 10 mg, Daily PRN  metoclopramide, 5 mg, Q6H PRN  labetalol, 10 mg, Q6H PRN  sodium chloride flush, 5-40 mL, PRN  sodium chloride, 25 mL, PRN  ondansetron, 4 mg, Q8H PRN   Or  ondansetron, 4 mg, Q6H PRN  polyethylene glycol, 17 g, Daily PRN  acetaminophen, 650 mg, Q6H PRN Or  acetaminophen, 650 mg, Q6H PRN  glucose, 15 g, PRN  glucagon (rDNA), 1 mg, PRN  dextrose, 100 mL/hr, PRN  dextrose bolus (hypoglycemia), 125 mL, PRN   Or  dextrose bolus (hypoglycemia), 250 mL, PRN  ipratropium-albuterol, 1 ampule, Q6H PRN        Diagnostic Labs:  CBC:   Recent Labs     03/05/22 0619 03/06/22  0830 03/07/22 0522   WBC 8.5 7.7 11.0   RBC 3.81* 3.52* 3.64*   HGB 11.7* 11.1* 11.1*   HCT 36.4* 35.2* 34.4*   MCV 95.5 100.0 94.5   RDW 17.2* 17.1* 17.0*    181 207     BMP:   Recent Labs     03/05/22  0619 03/06/22  0830 03/07/22 0522    133* 132*   K 4.6 4.1 4.2   CL 96* 94* 94*   CO2 24 24 24   BUN 35* 35* 37*   CREATININE 1.42* 1.31* 1.50*     BNP: No results for input(s): BNP in the last 72 hours. PT/INR: No results for input(s): PROTIME, INR in the last 72 hours. APTT:   Recent Labs     03/05/22  0645   APTT 27.6     CARDIAC ENZYMES: No results for input(s): CKMB, CKMBINDEX, TROPONINI in the last 72 hours. Invalid input(s): CKTOTAL;3  FASTING LIPID PANEL:  Lab Results   Component Value Date    CHOL 188 11/07/2020    HDL 52 11/07/2020    TRIG 235 (H) 11/07/2020     LIVER PROFILE:   Recent Labs     03/05/22 0619   AST 63*   ALT 35   BILITOT 1.31*   ALKPHOS 368*      MICROBIOLOGY:   Lab Results   Component Value Date/Time    CULTURE NORMAL RESPIRATORY DOUGIE MODERATE GROWTH 02/22/2022 01:58 PM       Imaging:    XR ABDOMEN FOR NG/OG/NE TUBE PLACEMENT    Result Date: 3/3/2022  Enteric tube with tip and side-port in the stomach. Nonobstructive bowel gas pattern       ASSESSMENT & PLAN     A 55-year-old male who presented post V. fib arrest, intubated for acute hypoxic respiratory failure, was admitted inpatient for the evaluation and management post V. fib arrest.     V. fib arrest   S/p hypothermia amiodarone and heparin drip. AICD prior to discharge  As per cardiology, cath tomorrow.      CAD   s/p CABG x4  Continue aspirin, Lipitor, Coreg and Norvasc     Metabolic encephalopathy  Improving  EEG showed no epileptiform discharges, encephalopathy features. MRI showed signs of early hypoxic ischemia     Acute hypoxic and hypercapnic respiratory failure  Likely secondary to aspiration pneumonia  Improving  S/p Extubation  Currently on  2L O2 via NC   Wean as tolerated. Completed Unasyn for likely aspiration pneumonia.      ARON on CKD stage IV  Improving, 1.31 today  Nephro on board  On Iv lasix 40 mg OD     ANCA Vasculitis  On Azathioprine     HTN  On Coreg 25 twice daily  On Norvasc 10 mg OD  On clonidine 0.2 mg twice daily     HLD  On Lipitor 80 mg     COPD  Bronchodilators as needed     Depression/Mood disorder  On trazodone, duloxetine, and olanzapine as appropriate.     DVT ppx  On heparin     GI ppx  Protonix     PT/OT  On board     Discharge Planning:   to assist in discharge planning      Carolann Stokes  Internal Medicine Resident, PGY-1  [facility]; [place], [place]  3/7/2022, 7:24 AM      Attending Physician Statement  I have discussed the care of Tere Wheeler, including pertinent history and exam findings with the resident. I have reviewed the key elements of all parts of the encounter with the resident. I have seen and examined the patient with the resident. I agree with the assessment and plan and status of the problem list as documented. Patient was seen this morning, chart reviewed, labs reviewed. Patient is gradually improving mentation. Creatinine is stable slightly improved no aspiration was reported he is on 2 L nasal cannula and maintaining saturation. He was able to get out of bed to bedside commode with help of physical therapy. Cough is weak but improving. Blood pressure is controlled on currently clonidine carvedilol and amlodipine. Continue with DVT prophylaxis.   Aspiration precaution encourage incentive spirometry deep breathing and cough and continue with physical therapy  Patient is seen by nephrology and cardiology and to decide about the timing of cardiac catheterization  Discussed with nursing staff, treatment and plan discussed. Please note that this chart was generated using voice recognition Dragon dictation software. Although every effort was made to ensure the accuracy of this automated transcription, some errors in transcription may have occurred.      Chetan Braswell MD  3/7/2022 3:45 PM

## 2022-03-07 NOTE — PROGRESS NOTES
Renal Progress Note    Patient :  Marilynn Lew; 60 y.o. MRN# [id number]  Location:  6244/7359-52  Attending:  Sindy Gonsalves MD  Admit Date:  2/21/2022   Hospital Day: 15      Subjective:     61 y/o male patient with past medical history of CAD s/p CABG x 3 presented to the ED with V Fib cardiac arrest, s/p defib x 2 with ROSC. Patient again went into PEA cardiac arrest and ROSC was achieved, unknown down time. EKG concerning for NSTEMI, was started on heparin and amiodarone and did not undergo cath due to concern for anoxic brain injury. MRI revealed early changes of hypoxic brain injury, but Neurological exam improving. Place for cardiac cath per cardiology. Patient was seen and examined. No new issues overnight. Currently on Lasix 40 mg IV once a day. Urine output documented as 1.4 L in the last 24 hours. Cardiology yet to decide about cardiac catheterization. Serum creatinine 1.50 mg/DL seems to be stable, baseline creatinine is around 1.7-2 mg/dl. Potassium 4.2, bicarb 24. Outpatient Medications:     Medications Prior to Admission: magnesium oxide (MAG-OX) 400 (241.3 Mg) MG TABS tablet,   atorvastatin (LIPITOR) 40 MG tablet, TAKE 1 TABLET BY MOUTH DAILY  magnesium oxide (MAG-OX) 400 (240 Mg) MG tablet, TAKE 1 TABLET BY MOUTH 2 TIMES DAILY  traZODone (DESYREL) 100 MG tablet, Take 0.5 tablets by mouth nightly as needed for Sleep  DULoxetine (CYMBALTA) 30 MG extended release capsule, TAKE 1 CAPSULE BY MOUTH DAILY  lisinopril (PRINIVIL;ZESTRIL) 5 MG tablet, take 1 tablet by mouth once daily  spironolactone (ALDACTONE) 25 MG tablet, take 1 tablet by mouth once daily  metoclopramide (REGLAN) 10 MG tablet, Take 1 tablet by mouth 3 times daily  isosorbide mononitrate (IMDUR) 30 MG extended release tablet, Take 1 tablet by mouth daily  nitroGLYCERIN (NITROSTAT) 0.4 MG SL tablet, Place 1 tablet under the tongue every 5 minutes as needed for Chest pain up to max of 3 total doses.  If no relief after 1 dose, call 911. (Patient not taking: Reported on 1/17/2022)  cloNIDine (CATAPRES) 0.2 MG tablet, Take 1 tablet by mouth 2 times daily  metoprolol tartrate (LOPRESSOR) 25 MG tablet, Take 1 tablet by mouth 2 times daily  magnesium oxide (MAG-OX) 400 (240 Mg) MG tablet,   pantoprazole (PROTONIX) 40 MG tablet, Take 1 tablet by mouth daily  magnesium oxide (MAG-OX) 400 MG tablet, Take 1 tablet by mouth 2 times daily (Patient not taking: Reported on 10/25/2021)  aspirin EC 81 MG EC tablet, Take 1 tablet by mouth daily  nicotine (NICODERM CQ) 21 MG/24HR, Place 1 patch onto the skin daily  acetaminophen (TYLENOL) 325 MG tablet, Take 2 tablets by mouth every 6 hours as needed for Pain  Umeclidinium Bromide 62.5 MCG/INH AEPB, Inhale 1 puff into the lungs daily (Patient not taking: Reported on 1/17/2022)  vitamin D3 (CHOLECALCIFEROL) 10 MCG (400 UNIT) TABS tablet, take 2 tablets (800MG) by mouth once daily (Patient not taking: Reported on 4/21/2021)  vitamin D3 (CHOLECALCIFEROL) 10 MCG (400 UNIT) TABS tablet, take 2 tablets (800MG) by mouth once daily (Patient not taking: Reported on 1/17/2022)  Fluticasone furoate-vilanterol (BREO ELLIPTA) 200-25 MCG/INH AEPB inhaler, Inhale 1 puff into the lungs daily (Patient not taking: Reported on 1/17/2022)  calcium carbonate-vitamin D3 (CALCIUM 600-D) 600-400 MG-UNIT TABS per tab, Take 1 tablet by mouth 2 times daily  azaTHIOprine (IMURAN) 50 MG tablet, Take 1.5 tablets by mouth daily  albuterol sulfate  (90 Base) MCG/ACT inhaler, inhale 2 puffs by mouth every 6 hours if needed for wheezing  nicotine (NICODERM CQ) 14 MG/24HR, Place 1 patch onto the skin daily (Patient not taking: Reported on 1/17/2022)  traMADol (ULTRAM) 50 MG tablet, Take 50 mg by mouth every 8 hours as needed for Pain.   (Patient not taking: Reported on 1/17/2022)  OLANZapine (ZYPREXA) 5 MG tablet, Take 1 tablet by mouth nightly    Current Medications:     Scheduled Meds:    tamsulosin  0.4 mg Oral Daily    lansoprazole  30 mg Per NG tube QAM AC    heparin (porcine)  5,000 Units SubCUTAneous 3 times per day    cloNIDine  0.2 mg Oral BID    docusate  100 mg Oral Daily    amLODIPine  10 mg Oral Daily    furosemide  40 mg IntraVENous Daily    carvedilol  25 mg Oral BID WC    azaTHIOprine  75 mg Oral Daily    aspirin  81 mg Oral Daily    atorvastatin  80 mg Oral Daily    sodium chloride flush  5-40 mL IntraVENous 2 times per day     Continuous Infusions:    sodium chloride 100 mL/hr at 22 1720    dextrose       PRN Meds:  bisacodyl, metoclopramide, labetalol, sodium chloride flush, sodium chloride, ondansetron **OR** ondansetron, polyethylene glycol, acetaminophen **OR** acetaminophen, glucose, glucagon (rDNA), dextrose, dextrose bolus (hypoglycemia) **OR** dextrose bolus (hypoglycemia), ipratropium-albuterol    Input/Output:       I/O last 3 completed shifts: In: 720 [P.O.:720]  Out: 2120 [Urine:2120]. Patient Vitals for the past 96 hrs (Last 3 readings):   Weight   22 0511 170 lb 3.1 oz (77.2 kg)   22 0514 207 lb 3.7 oz (94 kg)       Vital Signs:   Temperature:  Temp: 98.8 °F (37.1 °C)  TMax:   Temp (24hrs), Av.3 °F (36.8 °C), Min:97.9 °F (36.6 °C), Max:98.8 °F (37.1 °C)    Respirations:  Resp: (!) 31  Pulse:   Pulse: 86  BP:    BP: (!) 140/95  BP Range: Systolic (12TGB), ZRP:991 , Min:90 , CNX:908       Diastolic (12KNM), KLZ:38, Min:60, Max:95      Physical Examination:     General:  Alert but has some positive confusion off/on, no accessory muscle use. HEENT: Atraumatic, normocephalic, no throat congestion, moist mucosa. Eyes:   Pupils equal, round and reactive to light, EOMI. Neck:   Supple  Chest:   Bilateral vesicular breath sounds, no rales or wheezes. Cardiac:  S1 S2 RR, no murmurs, gallops or rubs. Abdomen: Soft, non-tender, no masses or organomegaly, BS audible. :   No suprapubic or flank tenderness.   Neuro:  Alert but has some positive confusion off/on, NAD.  SKIN:  No rashes, good skin turgor. Extremities:  +ve 1+ bilateral lower extremity edema.     Labs:       Recent Labs     03/05/22  0619 03/06/22  0830 03/07/22 0522   WBC 8.5 7.7 11.0   RBC 3.81* 3.52* 3.64*   HGB 11.7* 11.1* 11.1*   HCT 36.4* 35.2* 34.4*   MCV 95.5 100.0 94.5   MCH 30.7 31.5 30.5   MCHC 32.1 31.5 32.3   RDW 17.2* 17.1* 17.0*    181 207   MPV 11.5 10.5 10.7      BMP:   Recent Labs     03/05/22 0619 03/06/22 0830 03/07/22 0522    133* 132*   K 4.6 4.1 4.2   CL 96* 94* 94*   CO2 24 24 24   BUN 35* 35* 37*   CREATININE 1.42* 1.31* 1.50*   GLUCOSE 104* 97 111*   CALCIUM 8.9 8.8 8.5*      Magnesium:    Recent Labs     03/05/22 0619 03/06/22  0830 03/07/22 0522   MG 2.4 2.2 2.2     Albumin:    Recent Labs     03/05/22 0619   LABALBU 3.7     BNP:      Lab Results   Component Value Date    BNP 51 07/26/2012     JANAE:      Lab Results   Component Value Date    JANAE NEGATIVE 07/11/2020     SPEP:  Lab Results   Component Value Date    PROT 6.5 03/05/2022    ALBCAL 2.9 07/11/2020    ALBPCT 50 07/11/2020    LABALPH 0.2 07/11/2020    LABALPH 0.7 07/11/2020    A1PCT 3 07/11/2020    A2PCT 11 07/11/2020    LABBETA 0.5 07/11/2020    BETAPCT 9 07/11/2020    GAMGLOB 1.5 07/11/2020    GGPCT 26 07/11/2020    Darby Johnson M.D. 07/11/2020     MPO ANCA:     Lab Results   Component Value Date     07/12/2020     PR3 ANCA:     Lab Results   Component Value Date    PR3 378 07/12/2020     Anti-GBM:     Lab Results   Component Value Date    GBMABIGG 16 07/12/2020     Hep BsAg:         Lab Results   Component Value Date    HEPBSAG NONREACTIVE 03/05/2020     Hep C AB:          Lab Results   Component Value Date    HEPCAB NONREACTIVE 03/05/2020     Urinalysis/Chemistries:      Lab Results   Component Value Date    NITRU NEGATIVE 02/25/2022    COLORU Yellow 02/25/2022    PHUR 5.5 02/25/2022    WBCUA 2 TO 5 02/25/2022    RBCUA 5 TO 10 02/25/2022    MUCUS NOT REPORTED 11/06/2020    TRICHOMONAS NOT REPORTED 11/06/2020    YEAST NOT REPORTED 11/06/2020    BACTERIA NOT REPORTED 11/06/2020    CLARITYU clear 09/24/2020    SPECGRAV 1.025 02/25/2022    LEUKOCYTESUR NEGATIVE 02/25/2022    UROBILINOGEN Normal 02/25/2022    BILIRUBINUR NEGATIVE 02/25/2022    BLOODU +++ 09/24/2020    GLUCOSEU NEGATIVE 02/25/2022    KETUA TRACE 02/25/2022    AMORPHOUS NOT REPORTED 11/06/2020     Urine Sodium:     Lab Results   Component Value Date    GURU 99 02/25/2022     Urine Potassium:    Lab Results   Component Value Date    KUR 12.5 07/12/2020     Urine Chloride:    Lab Results   Component Value Date    CLUR 80 02/25/2022      Urine Creatinine:     Lab Results   Component Value Date    LABCREA 126.5 02/25/2022     Radiology:     Reviewed. Assessment:     1. 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 1.7-2, follows up with Dr. Shayy Calderón, on treatment with Imuran. 2. 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 V Fib and PEA cardiac arrest, unknown down timw  4. ? Hypoxic brain injury   5. H/O CABG  6. Respiratory failure, mechanical ventilation dependant  7. H/O HTN  8. Urine retention. Plan:   1. Currently on Lasix 40 mg IV daily. 2.  Continue Imuran home dose. 3.  Cardiac catheterization per cardiology. 4.  Has a Blackman catheter in place due to urinary retention. Recommend getting urology on board. 5.  If cardiac catheterization is planned, hold Lasix and start IV fluids 50 cc an hour normal saline bolus 6 hours before and 6 hours after cardiac catheterization and also Mucomyst to be given before and after cardiac evaluation. Cardiology yet to discuss consent including contrast related renal injury with family before proceeding with cath. 6.  BMP in AM.  7.  Will follow. Nutrition   Please ensure that patient is on a renal diet/TF. Avoid nephrotoxic drugs/contrast exposure.     We will continue to follow along with you. Yohannes Murphy MD  [facility]     This note is created with the assistance of a speech-recognition program. While intending to generate a document that actually reflects the content of the visit, no guarantees can be provided that every mistake has been identified and corrected by editing. Addendum 3/7/2022 4:41 PM:  I called and spoke with patient's daughter Teresa Flores over the phone and discussed cardiac catheterization as recommended by cardiology and explained contrast related renal risk all the way up to the possibility of requiring dialysis. She verbalized understanding and is okay with getting the procedure done as required. Patient's nurse was also on the phone during the conversation. IV fluids and Mucomyst ordered for renal protective measures. Yohannes Murphy MD  [facility]
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
## 2022-03-08 LAB
ANION GAP SERPL CALCULATED.3IONS-SCNC: 14 MMOL/L (ref 9–17)
BUN BLDV-MCNC: 40 MG/DL (ref 6–20)
CALCIUM SERPL-MCNC: 8.7 MG/DL (ref 8.6–10.4)
CHLORIDE BLD-SCNC: 92 MMOL/L (ref 98–107)
CO2: 23 MMOL/L (ref 20–31)
CREAT SERPL-MCNC: 1.65 MG/DL (ref 0.7–1.2)
GFR AFRICAN AMERICAN: 52 ML/MIN
GFR NON-AFRICAN AMERICAN: 43 ML/MIN
GFR SERPL CREATININE-BSD FRML MDRD: ABNORMAL ML/MIN/{1.73_M2}
GLUCOSE BLD-MCNC: 98 MG/DL (ref 70–99)
HCT VFR BLD CALC: 31.9 % (ref 40.7–50.3)
HEMOGLOBIN: 10.2 G/DL (ref 13–17)
MAGNESIUM: 2.2 MG/DL (ref 1.6–2.6)
MCH RBC QN AUTO: 31 PG (ref 25.2–33.5)
MCHC RBC AUTO-ENTMCNC: 32 G/DL (ref 28.4–34.8)
MCV RBC AUTO: 97 FL (ref 82.6–102.9)
NRBC AUTOMATED: 0 PER 100 WBC
PDW BLD-RTO: 17.4 % (ref 11.8–14.4)
PLATELET # BLD: 211 K/UL (ref 138–453)
PMV BLD AUTO: 11.2 FL (ref 8.1–13.5)
POTASSIUM SERPL-SCNC: 4.1 MMOL/L (ref 3.7–5.3)
RBC # BLD: 3.29 M/UL (ref 4.21–5.77)
SODIUM BLD-SCNC: 129 MMOL/L (ref 135–144)
WBC # BLD: 13.4 K/UL (ref 3.5–11.3)

## 2022-03-08 PROCEDURE — 6360000002 HC RX W HCPCS: Performed by: STUDENT IN AN ORGANIZED HEALTH CARE EDUCATION/TRAINING PROGRAM

## 2022-03-08 PROCEDURE — 97535 SELF CARE MNGMENT TRAINING: CPT

## 2022-03-08 PROCEDURE — 6370000000 HC RX 637 (ALT 250 FOR IP): Performed by: STUDENT IN AN ORGANIZED HEALTH CARE EDUCATION/TRAINING PROGRAM

## 2022-03-08 PROCEDURE — 6360000002 HC RX W HCPCS

## 2022-03-08 PROCEDURE — 2580000003 HC RX 258: Performed by: STUDENT IN AN ORGANIZED HEALTH CARE EDUCATION/TRAINING PROGRAM

## 2022-03-08 PROCEDURE — APPNB15 APP NON BILLABLE TIME 0-15 MINS: Performed by: NURSE PRACTITIONER

## 2022-03-08 PROCEDURE — 99233 SBSQ HOSP IP/OBS HIGH 50: CPT | Performed by: INTERNAL MEDICINE

## 2022-03-08 PROCEDURE — 97116 GAIT TRAINING THERAPY: CPT

## 2022-03-08 PROCEDURE — 36415 COLL VENOUS BLD VENIPUNCTURE: CPT

## 2022-03-08 PROCEDURE — 6370000000 HC RX 637 (ALT 250 FOR IP): Performed by: INTERNAL MEDICINE

## 2022-03-08 PROCEDURE — 97110 THERAPEUTIC EXERCISES: CPT

## 2022-03-08 PROCEDURE — 99232 SBSQ HOSP IP/OBS MODERATE 35: CPT | Performed by: INTERNAL MEDICINE

## 2022-03-08 PROCEDURE — 6360000002 HC RX W HCPCS: Performed by: INTERNAL MEDICINE

## 2022-03-08 PROCEDURE — 6370000000 HC RX 637 (ALT 250 FOR IP): Performed by: NURSE PRACTITIONER

## 2022-03-08 PROCEDURE — 80048 BASIC METABOLIC PNL TOTAL CA: CPT

## 2022-03-08 PROCEDURE — 97530 THERAPEUTIC ACTIVITIES: CPT

## 2022-03-08 PROCEDURE — 2580000003 HC RX 258: Performed by: INTERNAL MEDICINE

## 2022-03-08 PROCEDURE — 6370000000 HC RX 637 (ALT 250 FOR IP)

## 2022-03-08 PROCEDURE — 2060000000 HC ICU INTERMEDIATE R&B

## 2022-03-08 PROCEDURE — 83735 ASSAY OF MAGNESIUM: CPT

## 2022-03-08 PROCEDURE — 51702 INSERT TEMP BLADDER CATH: CPT

## 2022-03-08 PROCEDURE — 85027 COMPLETE CBC AUTOMATED: CPT

## 2022-03-08 RX ORDER — ACETYLCYSTEINE 200 MG/ML
600 SOLUTION ORAL; RESPIRATORY (INHALATION) EVERY 8 HOURS
Status: COMPLETED | OUTPATIENT
Start: 2022-03-09 | End: 2022-03-10

## 2022-03-08 RX ORDER — FUROSEMIDE 10 MG/ML
40 INJECTION INTRAMUSCULAR; INTRAVENOUS ONCE
Status: COMPLETED | OUTPATIENT
Start: 2022-03-08 | End: 2022-03-08

## 2022-03-08 RX ADMIN — SODIUM CHLORIDE: 9 INJECTION, SOLUTION INTRAVENOUS at 00:26

## 2022-03-08 RX ADMIN — SODIUM CHLORIDE, PRESERVATIVE FREE 10 ML: 5 INJECTION INTRAVENOUS at 11:26

## 2022-03-08 RX ADMIN — FUROSEMIDE 40 MG: 10 INJECTION, SOLUTION INTRAMUSCULAR; INTRAVENOUS at 20:29

## 2022-03-08 RX ADMIN — AZATHIOPRINE 75 MG: 50 TABLET ORAL at 11:25

## 2022-03-08 RX ADMIN — ATORVASTATIN CALCIUM 80 MG: 80 TABLET, FILM COATED ORAL at 11:25

## 2022-03-08 RX ADMIN — ASPIRIN 81 MG: 81 TABLET, CHEWABLE ORAL at 11:25

## 2022-03-08 RX ADMIN — DOCUSATE SODIUM LIQUID 100 MG: 100 LIQUID ORAL at 11:26

## 2022-03-08 RX ADMIN — Medication 600 MG: at 00:28

## 2022-03-08 RX ADMIN — SODIUM CHLORIDE, PRESERVATIVE FREE 10 ML: 5 INJECTION INTRAVENOUS at 20:29

## 2022-03-08 RX ADMIN — HEPARIN SODIUM 5000 UNITS: 5000 INJECTION INTRAVENOUS; SUBCUTANEOUS at 05:56

## 2022-03-08 RX ADMIN — Medication 30 MG: at 11:26

## 2022-03-08 RX ADMIN — TAMSULOSIN HYDROCHLORIDE 0.4 MG: 0.4 CAPSULE ORAL at 11:25

## 2022-03-08 RX ADMIN — Medication 600 MG: at 11:26

## 2022-03-08 RX ADMIN — HEPARIN SODIUM 5000 UNITS: 5000 INJECTION INTRAVENOUS; SUBCUTANEOUS at 20:29

## 2022-03-08 ASSESSMENT — PAIN SCALES - GENERAL
PAINLEVEL_OUTOF10: 0
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
## 2022-03-08 NOTE — PROGRESS NOTES
Neurosurgery CELESTINE/Resident    Daily Progress Note   CC:  Chief Complaint   Patient presents with    Cardiac Arrest     3/8/2022  9:59 AM    Neurosurgery was previously consulted on this patient 2/22 due to CT cervical showing cord flattening, MRI was completed on 2/25. Patient was post cardiac arrest, cooling, intubated. He has since been extubated and transferred to stepdown unit.  He is resting in chair this morning  He denies neck pain, denies paresthesias to BUE and BLE, denies issues with grasping items or buttoning buttons prior to admission, denies mobility problems or the use of a cane/walker     Vitals:    03/08/22 0400 03/08/22 0557 03/08/22 0600 03/08/22 0615   BP: 100/68      Pulse: 70  75 72   Resp: 20      Temp: 97.9 °F (36.6 °C)      TempSrc: Oral      SpO2:       Weight:  173 lb 11.6 oz (78.8 kg)     Height:           PE:   E4 V5 M6   Alert and oriented x3    Motor   L deltoid 5/5; R deltoid 5/5  L biceps 5/5; R biceps 5/5  L triceps 5/5; R triceps 5/5  L wrist extension 5/5; R wrist extension 5/5  L intrinsics 5/5; R intrinsics 5/5      L iliopsoas 5/5 , R iliopsoas 5/5  L quadriceps 5/5; R quadriceps 5/5  L Dorsiflexion 5/5; R dorsiflexion 5/5  L Plantarflexion 5/5; R plantarflexion 5/5  L EHL 5/5; R EHL 5/5    Drift:  absent  Tone:  normal    Sensation: intact     Absent hoffmans  +2 DTR       Lab Results   Component Value Date    WBC 13.4 (H) 03/08/2022    HGB 10.2 (L) 03/08/2022    HCT 31.9 (L) 03/08/2022     03/08/2022    CHOL 188 11/07/2020    TRIG 235 (H) 11/07/2020    HDL 52 11/07/2020    ALT 35 03/05/2022    AST 63 (H) 03/05/2022     (L) 03/08/2022    K 4.1 03/08/2022    CL 92 (L) 03/08/2022    CREATININE 1.65 (H) 03/08/2022    BUN 40 (H) 03/08/2022    CO2 23 03/08/2022    TSH 2.00 02/22/2022    PSA 0.22 01/13/2016    INR 1.2 02/22/2022    LABA1C 5.1 04/21/2021    CRP 9.4 (H) 12/18/2020       Radiology     MRI CERVICAL SPINE WO CONTRAST    Result Date: 2/25/2022  EXAMINATION: MRI OF THE CERVICAL SPINE WITHOUT CONTRAST 2/25/2022 10:44 am TECHNIQUE: Multiplanar multisequence MRI of the cervical spine was performed without the administration of intravenous contrast. COMPARISON: None. HISTORY: ORDERING SYSTEM PROVIDED HISTORY: cord flattening on cervical ct. TECHNOLOGIST PROVIDED HISTORY: cord flattening on cervical ct. What is the sedation requirement?->None Reason for Exam: cord flattening on cervical ct. Subsequent evaluation. FINDINGS: BONES/ALIGNMENT: Postsurgical changes with anterior fusion hardware involving the C4 through C7 levels with partial corpectomy at C5 and C6. Otherwise, the vertebral body heights appear maintained. No convincing evidence of spondylolisthesis. The bone marrow signal demonstrates no acute abnormality. SPINAL CORD: There is T2 hyperintensity within the cord at C6 with perhaps slight volume loss, which likely represents myelomalacia. SOFT TISSUES: No paraspinal mass identified. C2-C3: There is a disc bulge with buckling of the ligamentum flavum contributing to minimal spinal canal stenosis. No significant neural foraminal narrowing. C3-C4: There is a disc bulge with a central disc protrusion indenting on the ventral thecal sac. Moderate spinal canal stenosis. Uncovertebral joint and facet arthrosis contributes to mild right and moderate left neural foraminal narrowing. C4-C5: There is a posterior osteophyte indenting on the ventral thecal sac contributing to mild-to-moderate spinal canal stenosis. Uncovertebral joint and facet arthrosis contributes to moderate bilateral neural from narrowing. C5-C6: There appears to be a posterior osteophyte contributing to moderate spinal canal stenosis. Uncovertebral joint and facet arthrosis contribute to severe bilateral neural foraminal narrowing. C6-C7: There appear to be posterior osteophytes contribute to minimal spinal canal stenosis.   Uncovertebral joint and facet arthrosis contribute to moderate right and mild left neural foraminal narrowing. C7-T1: There is a disc bulge without significant spinal canal stenosis. Uncovertebral joint and facet arthrosis contribute to minimal bilateral neural from narrowing. 1. Postsurgical changes are seen with anterior fusion hardware at C4 through C7 with partial corpectomy of C5 and C6. 2. Moderate spinal canal stenosis seen at C3-C4 and C5-C6 with mild-to-moderate stenosis at C4-C5. Minimal spinal canal stenosis at C2-C3 and C6-C7. 3. Multilevel neural foraminal narrowing as above. 4. Presumed myelomalacia within the cord at the C6 level. A/P  60 y.o. male who presents with cervical stenosis   . - Neuro checks per floor protocol  - will have DR Rincon Ask review imaging in the morning   - likely no neurosurgical interventions as the cervical stenosis does not seem to be causing any issues for patient and patient does not seem to be myelopathic       Please contact neurosurgery with any changes in patients neurologic status.        Calvin Husain, CNP  3/8/22  9:59 AM
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
## 2022-03-08 NOTE — PROGRESS NOTES
Renal Progress Note    Patient :  Padmini Swain; 60 y.o. MRN# [id number]  Location:  [facility]5/5146-70  Attending:  Adama Fernandez MD  Admit Date:  2/21/2022   Hospital Day: 15      Subjective:     59 y/o male patient with past medical history of CAD s/p CABG x 3 presented to the ED with V Fib cardiac arrest, s/p defib x 2 with ROSC. Patient again went into PEA cardiac arrest and ROSC was achieved, unknown down time. EKG concerning for NSTEMI, was started on heparin and amiodarone and did not undergo cath due to concern for anoxic brain injury. MRI revealed early changes of hypoxic brain injury, but Neurological exam improving. Place for cardiac cath per cardiology. Patient was seen and examined at bed side. No new issues overnight. Currently on Lasix 40 mg IV once a day was held today in anticipation of cardiac catheterization, NPO since midnight. Urine output documented as 1.4-->1.1  L in the last 24 hours. Cardiac cath planned per cardiology. Serum creatinine 1.50 -->1.65 mg/DL seems to be stable, baseline creatinine is around 1.7-2 mg/dl. Sodium 136-->133->132-->129  On NS @ 50 ml /hr. Received acetylcysteine 1 dose ovenight  Potassium 4.2-->4.1, bicarb 24-->23  Denied any new complaints.     Outpatient Medications:     Medications Prior to Admission: magnesium oxide (MAG-OX) 400 (241.3 Mg) MG TABS tablet,   atorvastatin (LIPITOR) 40 MG tablet, TAKE 1 TABLET BY MOUTH DAILY  magnesium oxide (MAG-OX) 400 (240 Mg) MG tablet, TAKE 1 TABLET BY MOUTH 2 TIMES DAILY  traZODone (DESYREL) 100 MG tablet, Take 0.5 tablets by mouth nightly as needed for Sleep  DULoxetine (CYMBALTA) 30 MG extended release capsule, TAKE 1 CAPSULE BY MOUTH DAILY  lisinopril (PRINIVIL;ZESTRIL) 5 MG tablet, take 1 tablet by mouth once daily  spironolactone (ALDACTONE) 25 MG tablet, take 1 tablet by mouth once daily  metoclopramide (REGLAN) 10 MG tablet, Take 1 tablet by mouth 3 times daily  isosorbide mononitrate (IMDUR) 30 MG extended release tablet, Take 1 tablet by mouth daily  nitroGLYCERIN (NITROSTAT) 0.4 MG SL tablet, Place 1 tablet under the tongue every 5 minutes as needed for Chest pain up to max of 3 total doses. If no relief after 1 dose, call 911.  (Patient not taking: Reported on 1/17/2022)  cloNIDine (CATAPRES) 0.2 MG tablet, Take 1 tablet by mouth 2 times daily  metoprolol tartrate (LOPRESSOR) 25 MG tablet, Take 1 tablet by mouth 2 times daily  magnesium oxide (MAG-OX) 400 (240 Mg) MG tablet,   pantoprazole (PROTONIX) 40 MG tablet, Take 1 tablet by mouth daily  magnesium oxide (MAG-OX) 400 MG tablet, Take 1 tablet by mouth 2 times daily (Patient not taking: Reported on 10/25/2021)  aspirin EC 81 MG EC tablet, Take 1 tablet by mouth daily  nicotine (NICODERM CQ) 21 MG/24HR, Place 1 patch onto the skin daily  acetaminophen (TYLENOL) 325 MG tablet, Take 2 tablets by mouth every 6 hours as needed for Pain  Umeclidinium Bromide 62.5 MCG/INH AEPB, Inhale 1 puff into the lungs daily (Patient not taking: Reported on 1/17/2022)  vitamin D3 (CHOLECALCIFEROL) 10 MCG (400 UNIT) TABS tablet, take 2 tablets (800MG) by mouth once daily (Patient not taking: Reported on 4/21/2021)  vitamin D3 (CHOLECALCIFEROL) 10 MCG (400 UNIT) TABS tablet, take 2 tablets (800MG) by mouth once daily (Patient not taking: Reported on 1/17/2022)  Fluticasone furoate-vilanterol (BREO ELLIPTA) 200-25 MCG/INH AEPB inhaler, Inhale 1 puff into the lungs daily (Patient not taking: Reported on 1/17/2022)  calcium carbonate-vitamin D3 (CALCIUM 600-D) 600-400 MG-UNIT TABS per tab, Take 1 tablet by mouth 2 times daily  azaTHIOprine (IMURAN) 50 MG tablet, Take 1.5 tablets by mouth daily  albuterol sulfate  (90 Base) MCG/ACT inhaler, inhale 2 puffs by mouth every 6 hours if needed for wheezing  nicotine (NICODERM CQ) 14 MG/24HR, Place 1 patch onto the skin daily (Patient not taking: Reported on 1/17/2022)  traMADol (ULTRAM) 50 MG tablet, Take 50 mg by mouth every 8 hours as needed for Pain. (Patient not taking: Reported on 2022)  OLANZapine (ZYPREXA) 5 MG tablet, Take 1 tablet by mouth nightly    Current Medications:     Scheduled Meds:    [START ON 3/9/2022] acetylcysteine  600 mg Oral Q8H    tamsulosin  0.4 mg Oral Daily    lansoprazole  30 mg Per NG tube QAM AC    heparin (porcine)  5,000 Units SubCUTAneous 3 times per day    cloNIDine  0.2 mg Oral BID    docusate  100 mg Oral Daily    amLODIPine  10 mg Oral Daily    [Held by provider] furosemide  40 mg IntraVENous Daily    carvedilol  25 mg Oral BID WC    azaTHIOprine  75 mg Oral Daily    aspirin  81 mg Oral Daily    atorvastatin  80 mg Oral Daily    sodium chloride flush  5-40 mL IntraVENous 2 times per day     Continuous Infusions:    sodium chloride 100 mL/hr at 22 1720    dextrose       PRN Meds:  bisacodyl, metoclopramide, labetalol, sodium chloride flush, sodium chloride, ondansetron **OR** ondansetron, polyethylene glycol, acetaminophen **OR** acetaminophen, glucose, glucagon (rDNA), dextrose, dextrose bolus (hypoglycemia) **OR** dextrose bolus (hypoglycemia), ipratropium-albuterol    Input/Output:       I/O last 3 completed shifts: In: 599.9 [P.O.:480; I.V.:119.9]  Out: 1740 [[facility]:0653]. Patient Vitals for the past 96 hrs (Last 3 readings):   Weight   22 0557 173 lb 11.6 oz (78.8 kg)   22 0511 170 lb 3.1 oz (77.2 kg)   22 0514 207 lb 3.7 oz (94 kg)       Vital Signs:   Temperature:  Temp: 98.1 °F (36.7 °C)  TMax:   Temp (24hrs), Av.6 °F (37 °C), Min:97.9 °F (36.6 °C), Max:99.5 °F (37.5 °C)    Respirations:  Resp: 26  Pulse:   Pulse: 61  BP:    BP: (!) 94/56  BP Range: Systolic (69QEE), PPT:33 , Min:90 , BVC:740       Diastolic (13LYX), MBI:11, Min:56, Max:68      Physical Examination:     General:  Alert but has some positive confusion off/on, no accessory muscle use. HEENT: Atraumatic, normocephalic, no throat congestion, moist mucosa.   Eyes:   Pupils equal, round and reactive to light, EOMI. Neck:   Supple  Chest:   Bilateral vesicular breath sounds, no rales or wheezes. Cardiac:  S1 S2 RR, no murmurs, gallops or rubs. Abdomen: Soft, non-tender, no masses or organomegaly, BS audible. :   No suprapubic or flank tenderness. Neuro:  Alert but has some positive confusion off/on, NAD. SKIN:  No rashes, good skin turgor. Extremities:  +ve 1+ bilateral lower extremity edema. Labs:       Recent Labs     03/06/22 0830 03/07/22 0522 03/08/22 0312   WBC 7.7 11.0 13.4*   RBC 3.52* 3.64* 3.29*   HGB 11.1* 11.1* 10.2*   HCT 35.2* 34.4* 31.9*   .0 94.5 97.0   MCH 31.5 30.5 31.0   MCHC 31.5 32.3 32.0   RDW 17.1* 17.0* 17.4*    207 211   MPV 10.5 10.7 11.2      BMP:   Recent Labs     03/06/22  0830 03/07/22 0522 03/08/22 0312   * 132* 129*   K 4.1 4.2 4.1   CL 94* 94* 92*   CO2 24 24 23   BUN 35* 37* 40*   CREATININE 1.31* 1.50* 1.65*   GLUCOSE 97 111* 98   CALCIUM 8.8 8.5* 8.7      Magnesium:    Recent Labs     03/06/22 0830 03/07/22 0522 03/08/22 0312   MG 2.2 2.2 2.2     Albumin:    No results for input(s): LABALBU in the last 72 hours.   BNP:      Lab Results   Component Value Date    BNP 51 07/26/2012     JANAE:      Lab Results   Component Value Date    JANAE NEGATIVE 07/11/2020     SPEP:  Lab Results   Component Value Date    PROT 6.5 03/05/2022    ALBCAL 2.9 07/11/2020    ALBPCT 50 07/11/2020    LABALPH 0.2 07/11/2020    LABALPH 0.7 07/11/2020    A1PCT 3 07/11/2020    A2PCT 11 07/11/2020    LABBETA 0.5 07/11/2020    BETAPCT 9 07/11/2020    GAMGLOB 1.5 07/11/2020    GGPCT 26 07/11/2020    PATH Kamaljit Gar M.D. 07/11/2020     MPO ANCA:     Lab Results   Component Value Date     07/12/2020     PR3 ANCA:     Lab Results   Component Value Date    PR3 378 07/12/2020     Anti-GBM:     Lab Results   Component Value Date    GBMABIGG 16 07/12/2020     Hep BsAg:         Lab Results   Component Value Date    HEPBSAG NONREACTIVE 03/05/2020     Hep C AB:          Lab Results   Component Value Date    HEPCAB NONREACTIVE 03/05/2020     Urinalysis/Chemistries:      Lab Results   Component Value Date    NITRU NEGATIVE 02/25/2022    COLORU Yellow 02/25/2022    PHUR 5.5 02/25/2022    WBCUA 2 TO 5 02/25/2022    RBCUA 5 TO 10 02/25/2022    MUCUS NOT REPORTED 11/06/2020    TRICHOMONAS NOT REPORTED 11/06/2020    YEAST NOT REPORTED 11/06/2020    BACTERIA NOT REPORTED 11/06/2020    CLARITYU clear 09/24/2020    SPECGRAV 1.025 02/25/2022    LEUKOCYTESUR NEGATIVE 02/25/2022    UROBILINOGEN Normal 02/25/2022    BILIRUBINUR NEGATIVE 02/25/2022    BLOODU +++ 09/24/2020    GLUCOSEU NEGATIVE 02/25/2022    KETUA TRACE 02/25/2022    AMORPHOUS NOT REPORTED 11/06/2020     Urine Sodium:     Lab Results   Component Value Date    GURU 99 02/25/2022     Urine Potassium:    Lab Results   Component Value Date    KUR 12.5 07/12/2020     Urine Chloride:    Lab Results   Component Value Date    CLUR 80 02/25/2022      Urine Creatinine:     Lab Results   Component Value Date    LABCREA 126.5 02/25/2022     Radiology:     Reviewed. Assessment:     1. 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 1.7-2, follows up with Dr. Baljeet Flannery, on treatment with Imuran. 2. 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 V Fib and PEA cardiac arrest, unknown down timw  4. ? Hypoxic brain injury  5. H/O CABG  6. Respiratory failure, mechanical ventilation dependant  7. H/O HTN  8. Urine retention. 9. Hyponatremia likely secondary to decreased solute intake    Plan:   1. Hold 40 mg furosemide IV for cardiac cath in a.m. Will give 1 dose of Lasix today as patient did not go for cardiac catheterization. 2.  Continue Imuran home dose. 3.  Cardiac catheterization per cardiology. 4.  Has a Blackman catheter in place due to urinary retention , Urology following.    5. Discontinue IV fluids and Continue Mucomyst to be given before and after cardiac catheterization. I did speak with patient's daughter Mariia Parra over the phone yesterday and discussed cardiac catheterization as recommended by cardiology and explained contrast related renal risk all the way up to the possibility of requiring dialysis. She verbalized understanding and is okay with getting the procedure done as required. 6.  Will restart IV fluids from midnight in patient for cardiac cath tomorrow along with Mucomyst.  7.  BMP in AM.  8.  Will follow. Nutrition   Please ensure that patient is on a renal diet/TF. Avoid nephrotoxic drugs/contrast exposure. We will continue to follow along with you. Tony Denton MD  Internal Medicine Resident, PGY- [facility]; [place], [place]  3/8/2022, 4:13 PM    Attending Physician Statement  I have discussed the care of this patient, including pertinent history and exam findings, with the Resident/CNP. I have reviewed and edited the key elements of all parts of the encounter with the Resident/CNP. I agree with the assessment, plan and orders as documented by the Resident/CNP. Yohannes Verde MD   Nephrology [address] [place] [address] Drive    This note is created with the assistance of a speech-recognition program. While intending to generate a document that actually reflects the content of the visit, no guarantees can be provided that every mistake has been identified and corrected by editing.

## 2022-03-08 NOTE — PROGRESS NOTES
[facility]  Internal Medicine Teaching Residency Program  Inpatient Daily Progress Note  ______________________________________________________________________________    Patient: Charmaine Decker  YOB: 1965   PTC:[id number]    Acct: [de-identified]     Room: [facility]/8092-96  Admit date: 2/21/2022  Today's date: 03/08/22  Number of days in the hospital: 14    SUBJECTIVE   Admitting Diagnosis: Cardiac arrest ([facility] [address]  CC: V. Fib arrest    Pt seen and examined. No acute events overnight. Labs and charts reviewed. Afebrile, hemodynamically stable, saturating well on  2L O2 via NC  Mentation waxing and waning, appears slightly confused. Denies any active complaints. Working with PT/OT, needs   Cardiology to go for cardiac cath likely today. Has constipation, on bowel regimen. Creatinine stable, 1.65 today within the baseline of 1.2 to 1.7. Planned for IV fluids and mucomyst before and after cath. Leukocytosis, 13.4 today. No fever spikes. Denies cough, breathing difficulty or  Urinary complains. IV sites appear clear. ROS:  Constitutional:  negative for chills, fevers, sweats  Respiratory:  negative for cough, dyspnea on exertion, hemoptysis, shortness of breath, wheezing  Cardiovascular:  negative for chest pain, chest pressure/discomfort, lower extremity edema, palpitations  Gastrointestinal:  negative for abdominal pain, constipation, diarrhea, nausea, vomiting  Neurological:  negative for dizziness, headache  BRIEF HISTORY       A 42-year-old male who was admitted to ICU after cardiac arrest.  Family called EMS after he was found down, total time unknown.   He was found to be in V. fib arrest, ROSC achieved after 2 shocks but subsequently he went into PEA arrest on the way to hospital, responded to epinephrine.       There was concern for STEMI, cardiology was consulted, they did not meet the criteria for STEMI and cath was postponed until neurological prognosis is clear. He was placed on hypothermia, protocol started on heparin and amiodarone. He required dobutamine transiently for bradycardia.     He also had acute hypoxemic and hypercarbic respiratory failure with possible aspiration bibasilar pneumonia. He was started on Unasyn. He got intubated in ER, and started on mechanical ventilation.       Had thrombocytopenia, anticoagulation was changed to argatroban. HIT was ruled out and heparin was restarted. Electrolytes were replaced timely.     CT head was unremarkable. She underwent MRI of the brain, which revealed early changes of early hypoxic brain injury.       Over the course of a week, her neurological exam is gradually improving. .  Nephrology was consulted for ARON on CKD. Started him on IV Lasix.     Gradually, he tolerated spontaneous breathing trial well, got extubated without any immediate complications. Completed course of antibiotics for possible aspiration pneumonia.     He remained hemodynamically stable without any significant hypotension, or arrhythmias, therefore cardiology discontinued amiodarone drip and heparin drip. Cardiology stated that cath is not urgent at this time but will discuss the risk of contrast-induced nephropathy with the patient and family before cath and recommend AICD evaluation prior to discharge. He is also on azathioprine 70 mg daily for ANCA vasculitis.     He is transferred to the floor for the further evaluation and management.      OBJECTIVE     Vital Signs:  /68   Pulse 72   Temp 97.9 °F (36.6 °C) (Oral)   Resp 20   Ht 5' 10\" (1.778 m)   Wt 173 lb 11.6 oz (78.8 kg)   SpO2 95%   BMI 24.93 kg/m²     Temp (24hrs), Av.9 °F (37.2 °C), Min:97.9 °F (36.6 °C), Max:99.5 °F (37.5 °C)    In: 470.9   Out: 1190 [Urine:1190]    Physical Exam:  Constitutional: This is a well developed, well nourished, 25-29.9 - Overweight 60y.o. year old male who is alert, oriented, cooperative and in no apparent distress. Head:normocephalic and atraumatic. EENT:  PERRLA. No conjunctival injections. Septum was midline, mucosa was without erythema, exudates or cobblestoning. No thrush was noted. Neck: Supple without thyromegaly. No elevated JVP. Trachea was midline. Respiratory: Chest was symmetrical without dullness to percussion. Breath sounds bilaterally were clear to auscultation. There were no wheezes, rhonchi or rales. There is no intercostal retraction or use of accessory muscles. No egophony noted. Cardiovascular: Regular without murmur, clicks, gallops or rubs. Abdomen: Slightly rounded and soft without organomegaly. No rebound, rigidity or guarding was appreciated. Lymphatic: No lymphadenopathy. Musculoskeletal: Normal curvature of the spine. No gross muscle weakness. Extremities:  No lower extremity edema, ulcerations, tenderness, varicosities or erythema. Muscle size, tone and strength are normal.  No involuntary movements are noted. Skin:  Warm and dry. Good color, turgor and pigmentation. No lesions or scars. No cyanosis or clubbing  Neurological/Psychiatric: Could not be assessed because of patient's mental condition.   Medications:  Scheduled Medications:    acetylcysteine  600 mg Oral Q8H    tamsulosin  0.4 mg Oral Daily    lansoprazole  30 mg Per NG tube QAM AC    heparin (porcine)  5,000 Units SubCUTAneous 3 times per day    cloNIDine  0.2 mg Oral BID    docusate  100 mg Oral Daily    amLODIPine  10 mg Oral Daily    furosemide  40 mg IntraVENous Daily    carvedilol  25 mg Oral BID WC    azaTHIOprine  75 mg Oral Daily    aspirin  81 mg Oral Daily    atorvastatin  80 mg Oral Daily    sodium chloride flush  5-40 mL IntraVENous 2 times per day     Continuous Infusions:    sodium chloride 50 mL/hr at 03/08/22 0728    sodium chloride 100 mL/hr at 02/25/22 1720    dextrose       PRN Medicationsbisacodyl, 10 mg, Daily PRN  metoclopramide, 5 mg, Q6H PRN  labetalol, 10 mg, Q6H PRN  sodium chloride flush, 5-40 mL, PRN  sodium chloride, 25 mL, PRN  ondansetron, 4 mg, Q8H PRN   Or  ondansetron, 4 mg, Q6H PRN  polyethylene glycol, 17 g, Daily PRN  acetaminophen, 650 mg, Q6H PRN   Or  acetaminophen, 650 mg, Q6H PRN  glucose, 15 g, PRN  glucagon (rDNA), 1 mg, PRN  dextrose, 100 mL/hr, PRN  dextrose bolus (hypoglycemia), 125 mL, PRN   Or  dextrose bolus (hypoglycemia), 250 mL, PRN  ipratropium-albuterol, 1 ampule, Q6H PRN        Diagnostic Labs:  CBC:   Recent Labs     03/06/22 0830 03/07/22 0522 03/08/22 0312   WBC 7.7 11.0 13.4*   RBC 3.52* 3.64* 3.29*   HGB 11.1* 11.1* 10.2*   HCT 35.2* 34.4* 31.9*   .0 94.5 97.0   RDW 17.1* 17.0* 17.4*    207 211     BMP:   Recent Labs     03/06/22  0830 03/07/22 0522 03/08/22 0312   * 132* 129*   K 4.1 4.2 4.1   CL 94* 94* 92*   CO2 24 24 23   BUN 35* 37* 40*   CREATININE 1.31* 1.50* 1.65*     BNP: No results for input(s): BNP in the last 72 hours. PT/INR: No results for input(s): PROTIME, INR in the last 72 hours. APTT:   No results for input(s): APTT in the last 72 hours. CARDIAC ENZYMES: No results for input(s): CKMB, CKMBINDEX, TROPONINI in the last 72 hours. Invalid input(s): CKTOTAL;3  FASTING LIPID PANEL:  Lab Results   Component Value Date    CHOL 188 11/07/2020    HDL 52 11/07/2020    TRIG 235 (H) 11/07/2020     LIVER PROFILE:   No results for input(s): AST, ALT, ALB, BILIDIR, BILITOT, ALKPHOS in the last 72 hours. MICROBIOLOGY:   Lab Results   Component Value Date/Time    CULTURE NORMAL RESPIRATORY DOUGIE MODERATE GROWTH 02/22/2022 01:58 PM       Imaging:    XR ABDOMEN FOR NG/OG/NE TUBE PLACEMENT    Result Date: 3/3/2022  Enteric tube with tip and side-port in the stomach.   Nonobstructive bowel gas pattern       ASSESSMENT & PLAN     A 25-year-old male who presented post V. fib arrest, intubated for acute hypoxic respiratory failure, was admitted inpatient for the evaluation and management post V. fib arrest.     V. fib arrest   S/p hypothermia amiodarone and heparin drip. AICD prior to discharge  As per cardiology, cath today. CAD   s/p CABG x4  Continue aspirin, Lipitor, Coreg and Norvasc    Ischemic cardiomyopathy  EF 45%  Cardiac cath today  Will decide for AICD placement accordingly.      Metabolic encephalopathy  Improving  EEG showed no epileptiform discharges, encephalopathy features. MRI showed signs of early hypoxic ischemia     Acute hypoxic and hypercapnic respiratory failure  Likely secondary to aspiration pneumonia  Improving  S/p Extubation  Currently on  2L O2 via NC   Wean as tolerated. Completed Unasyn for likely aspiration pneumonia.      ARON on CKD stage IV  Improved, 1.65  Today- within baseline. Nephro on board  On Iv lasix 40 mg OD- held today prior to cath.     ANCA Vasculitis  On Azathioprine     HTN  On Coreg 25 twice daily  On Norvasc 10 mg OD  On clonidine 0.2 mg twice daily     HLD  On Lipitor 80 mg     COPD  Bronchodilators as needed     Depression/Mood disorder  On trazodone, duloxetine, and olanzapine.      DVT ppx  On heparin     GI ppx  Protonix     PT/OT  On board     Discharge Planning:   to assist in discharge planning, likely discharge to SNF- [address] [place]  Internal Medicine Resident, PGY-1  [facility];  [place], [place]  3/8/2022, 7:11 AM
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
## 2022-03-08 NOTE — PLAN OF CARE
Problem: Confusion - Acute:  Goal: Absence of continued neurological deterioration signs and symptoms  Description: Absence of continued neurological deterioration signs and symptoms  Outcome: Ongoing  Note: Confusion and agitation noted     Problem: Injury - Risk of, Physical Injury:  Goal: Absence of physical injury  Description: Absence of physical injury  Outcome: Ongoing  Note: No fall or injury in this shift     Problem: Falls - Risk of:  Goal: Absence of physical injury  Description: Absence of physical injury  Outcome: Ongoing  Note: No fall or injury in this shift

## 2022-03-09 ENCOUNTER — APPOINTMENT (OUTPATIENT)
Dept: CARDIAC CATH/INVASIVE PROCEDURES | Age: 57
DRG: 192 | End: 2022-03-09
Payer: COMMERCIAL

## 2022-03-09 LAB
ANION GAP SERPL CALCULATED.3IONS-SCNC: 16 MMOL/L (ref 9–17)
BUN BLDV-MCNC: 43 MG/DL (ref 6–20)
CALCIUM SERPL-MCNC: 8.3 MG/DL (ref 8.6–10.4)
CHLORIDE BLD-SCNC: 94 MMOL/L (ref 98–107)
CO2: 23 MMOL/L (ref 20–31)
CREAT SERPL-MCNC: 1.73 MG/DL (ref 0.7–1.2)
GFR AFRICAN AMERICAN: 49 ML/MIN
GFR NON-AFRICAN AMERICAN: 41 ML/MIN
GFR SERPL CREATININE-BSD FRML MDRD: ABNORMAL ML/MIN/{1.73_M2}
GLUCOSE BLD-MCNC: 96 MG/DL (ref 70–99)
HCT VFR BLD CALC: 31.1 % (ref 40.7–50.3)
HEMOGLOBIN: 9.8 G/DL (ref 13–17)
MAGNESIUM: 2.1 MG/DL (ref 1.6–2.6)
MCH RBC QN AUTO: 30.3 PG (ref 25.2–33.5)
MCHC RBC AUTO-ENTMCNC: 31.5 G/DL (ref 28.4–34.8)
MCV RBC AUTO: 96.3 FL (ref 82.6–102.9)
NRBC AUTOMATED: 0 PER 100 WBC
PDW BLD-RTO: 17.4 % (ref 11.8–14.4)
PLATELET # BLD: 292 K/UL (ref 138–453)
PMV BLD AUTO: 11 FL (ref 8.1–13.5)
POTASSIUM SERPL-SCNC: 3.9 MMOL/L (ref 3.7–5.3)
RBC # BLD: 3.23 M/UL (ref 4.21–5.77)
SODIUM BLD-SCNC: 133 MMOL/L (ref 135–144)
WBC # BLD: 11.4 K/UL (ref 3.5–11.3)

## 2022-03-09 PROCEDURE — 2580000003 HC RX 258: Performed by: INTERNAL MEDICINE

## 2022-03-09 PROCEDURE — B2131ZZ FLUOROSCOPY OF MULTIPLE CORONARY ARTERY BYPASS GRAFTS USING LOW OSMOLAR CONTRAST: ICD-10-PCS | Performed by: INTERNAL MEDICINE

## 2022-03-09 PROCEDURE — 99233 SBSQ HOSP IP/OBS HIGH 50: CPT | Performed by: INTERNAL MEDICINE

## 2022-03-09 PROCEDURE — 51702 INSERT TEMP BLADDER CATH: CPT

## 2022-03-09 PROCEDURE — 2060000000 HC ICU INTERMEDIATE R&B

## 2022-03-09 PROCEDURE — 93455 CORONARY ART/GRFT ANGIO S&I: CPT

## 2022-03-09 PROCEDURE — 2580000003 HC RX 258: Performed by: STUDENT IN AN ORGANIZED HEALTH CARE EDUCATION/TRAINING PROGRAM

## 2022-03-09 PROCEDURE — 6360000002 HC RX W HCPCS

## 2022-03-09 PROCEDURE — 2500000003 HC RX 250 WO HCPCS

## 2022-03-09 PROCEDURE — 6370000000 HC RX 637 (ALT 250 FOR IP): Performed by: NURSE PRACTITIONER

## 2022-03-09 PROCEDURE — 2709999900 HC NON-CHARGEABLE SUPPLY

## 2022-03-09 PROCEDURE — B2111ZZ FLUOROSCOPY OF MULTIPLE CORONARY ARTERIES USING LOW OSMOLAR CONTRAST: ICD-10-PCS | Performed by: INTERNAL MEDICINE

## 2022-03-09 PROCEDURE — 36415 COLL VENOUS BLD VENIPUNCTURE: CPT

## 2022-03-09 PROCEDURE — C1769 GUIDE WIRE: HCPCS

## 2022-03-09 PROCEDURE — 6360000004 HC RX CONTRAST MEDICATION

## 2022-03-09 PROCEDURE — 6370000000 HC RX 637 (ALT 250 FOR IP): Performed by: STUDENT IN AN ORGANIZED HEALTH CARE EDUCATION/TRAINING PROGRAM

## 2022-03-09 PROCEDURE — APPNB15 APP NON BILLABLE TIME 0-15 MINS: Performed by: NURSE PRACTITIONER

## 2022-03-09 PROCEDURE — 83735 ASSAY OF MAGNESIUM: CPT

## 2022-03-09 PROCEDURE — C1894 INTRO/SHEATH, NON-LASER: HCPCS

## 2022-03-09 PROCEDURE — 85027 COMPLETE CBC AUTOMATED: CPT

## 2022-03-09 PROCEDURE — 80048 BASIC METABOLIC PNL TOTAL CA: CPT

## 2022-03-09 PROCEDURE — 6360000002 HC RX W HCPCS: Performed by: STUDENT IN AN ORGANIZED HEALTH CARE EDUCATION/TRAINING PROGRAM

## 2022-03-09 PROCEDURE — 6370000000 HC RX 637 (ALT 250 FOR IP)

## 2022-03-09 PROCEDURE — 99232 SBSQ HOSP IP/OBS MODERATE 35: CPT | Performed by: INTERNAL MEDICINE

## 2022-03-09 RX ORDER — SODIUM CHLORIDE 9 MG/ML
INJECTION, SOLUTION INTRAVENOUS CONTINUOUS
Status: ACTIVE | OUTPATIENT
Start: 2022-03-09 | End: 2022-03-09

## 2022-03-09 RX ADMIN — ASPIRIN 81 MG: 81 TABLET, CHEWABLE ORAL at 09:20

## 2022-03-09 RX ADMIN — CARVEDILOL 25 MG: 25 TABLET, FILM COATED ORAL at 09:19

## 2022-03-09 RX ADMIN — HEPARIN SODIUM 5000 UNITS: 5000 INJECTION INTRAVENOUS; SUBCUTANEOUS at 06:13

## 2022-03-09 RX ADMIN — AZATHIOPRINE 75 MG: 50 TABLET ORAL at 09:20

## 2022-03-09 RX ADMIN — SODIUM CHLORIDE, PRESERVATIVE FREE 10 ML: 5 INJECTION INTRAVENOUS at 09:22

## 2022-03-09 RX ADMIN — ATORVASTATIN CALCIUM 80 MG: 80 TABLET, FILM COATED ORAL at 09:20

## 2022-03-09 RX ADMIN — Medication 600 MG: at 09:20

## 2022-03-09 RX ADMIN — DOCUSATE SODIUM LIQUID 100 MG: 100 LIQUID ORAL at 09:22

## 2022-03-09 RX ADMIN — Medication 600 MG: at 17:07

## 2022-03-09 RX ADMIN — SODIUM CHLORIDE: 9 INJECTION, SOLUTION INTRAVENOUS at 09:47

## 2022-03-09 RX ADMIN — Medication 600 MG: at 00:48

## 2022-03-09 RX ADMIN — TAMSULOSIN HYDROCHLORIDE 0.4 MG: 0.4 CAPSULE ORAL at 09:20

## 2022-03-09 RX ADMIN — SODIUM CHLORIDE: 9 INJECTION, SOLUTION INTRAVENOUS at 14:53

## 2022-03-09 RX ADMIN — SODIUM CHLORIDE, PRESERVATIVE FREE 10 ML: 5 INJECTION INTRAVENOUS at 20:43

## 2022-03-09 ASSESSMENT — PAIN SCALES - GENERAL
PAINLEVEL_OUTOF10: 0

## 2022-03-09 NOTE — PROGRESS NOTES
[facility]  Internal Medicine Teaching Residency Program  Inpatient Daily Progress Note  ______________________________________________________________________________    Patient: Daryl Chris  YOB: 1965   HBI:[id number]    Acct: [de-identified]     Room: 73/1363-05  Admit date: 2/21/2022  Today's date: 03/09/22  Number of days in the hospital: 15    SUBJECTIVE   Admitting Diagnosis: Cardiac arrest ([facility] [address]  CC: V. Fib arrest    Pt seen and examined bedside. No acute events overnight. Labs and charts reviewed. Afebrile, hemodynamically stable, saturating well on  1L O2 via NC    Cardiology to go for cardiac cath today. Creatinine stable, 1.73 today, within the baseline of 1.2 to 1.7. On IV fluids and mucomyst before and after cath. Leukocytosis, 11.4 today. No fever spikes. Denies cough, breathing difficulty or  Urinary complains. IV sites appear clear. ROS:  Constitutional:  negative for chills, fevers, sweats  Respiratory:  negative for cough, dyspnea on exertion, hemoptysis, shortness of breath, wheezing  Cardiovascular:  negative for chest pain, chest pressure/discomfort, lower extremity edema, palpitations  Gastrointestinal:  negative for abdominal pain, constipation, diarrhea, nausea, vomiting  Neurological:  negative for dizziness, headache  BRIEF HISTORY       A 69-year-old male who was admitted to ICU after cardiac arrest.  Family called EMS after he was found down, total time unknown. He was found to be in V. fib arrest, ROSC achieved after 2 shocks but subsequently he went into PEA arrest on the way to hospital, responded to epinephrine.       There was concern for STEMI, cardiology was consulted, they did not meet the criteria for STEMI and cath was postponed until neurological prognosis is clear. He was placed on hypothermia, protocol started on heparin and amiodarone.    He required dobutamine transiently for bradycardia.     He also had acute hypoxemic and hypercarbic respiratory failure with possible aspiration bibasilar pneumonia. He was started on Unasyn. He got intubated in ER, and started on mechanical ventilation.       Had thrombocytopenia, anticoagulation was changed to argatroban. HIT was ruled out and heparin was restarted. Electrolytes were replaced timely.     CT head was unremarkable. She underwent MRI of the brain, which revealed early changes of early hypoxic brain injury.       Over the course of a week, her neurological exam is gradually improving. .  Nephrology was consulted for ARON on CKD. Started him on IV Lasix.     Gradually, he tolerated spontaneous breathing trial well, got extubated without any immediate complications. Completed course of antibiotics for possible aspiration pneumonia.     He remained hemodynamically stable without any significant hypotension, or arrhythmias, therefore cardiology discontinued amiodarone drip and heparin drip. Cardiology stated that cath is not urgent at this time but will discuss the risk of contrast-induced nephropathy with the patient and family before cath and recommend AICD evaluation prior to discharge. He is also on azathioprine 70 mg daily for ANCA vasculitis.     He is transferred to the floor for the further evaluation and management. OBJECTIVE     Vital Signs:  /67   Pulse 81   Temp 98.1 °F (36.7 °C) (Oral)   Resp 27   Ht 5' 10\" (1.778 m)   Wt 173 lb (78.5 kg)   SpO2 100%   BMI 24.82 kg/m²     Temp (24hrs), Av.4 °F (36.9 °C), Min:98 °F (36.7 °C), Max:99 °F (37.2 °C)    In: 1551.1   Out: 2100 [Urine:2100]    Physical Exam:  Constitutional: This is a well developed, well nourished, 25-29.9 - Overweight 60y.o. year old male who is alert, oriented, cooperative and in no apparent distress. Head:normocephalic and atraumatic. EENT:  PERRLA. No conjunctival injections. Septum was midline, mucosa was without erythema, exudates or cobblestoning. No thrush was noted. Neck: Supple without thyromegaly. No elevated JVP. Trachea was midline. Respiratory: Chest was symmetrical without dullness to percussion. Breath sounds bilaterally were clear to auscultation. There were no wheezes, rhonchi or rales. There is no intercostal retraction or use of accessory muscles. No egophony noted. Cardiovascular: Regular without murmur, clicks, gallops or rubs. Abdomen: Slightly rounded and soft without organomegaly. No rebound, rigidity or guarding was appreciated. Lymphatic: No lymphadenopathy. Musculoskeletal: Normal curvature of the spine. No gross muscle weakness. Extremities:  No lower extremity edema, ulcerations, tenderness, varicosities or erythema. Muscle size, tone and strength are normal.  No involuntary movements are noted. Skin:  Warm and dry. Good color, turgor and pigmentation. No lesions or scars. No cyanosis or clubbing  Neurological/Psychiatric: Could not be assessed because of patient's mental condition.   Medications:  Scheduled Medications:    acetylcysteine  600 mg Oral Q8H    tamsulosin  0.4 mg Oral Daily    lansoprazole  30 mg Per NG tube QAM AC    heparin (porcine)  5,000 Units SubCUTAneous 3 times per day    cloNIDine  0.2 mg Oral BID    docusate  100 mg Oral Daily    amLODIPine  10 mg Oral Daily    [Held by provider] furosemide  40 mg IntraVENous Daily    carvedilol  25 mg Oral BID WC    azaTHIOprine  75 mg Oral Daily    aspirin  81 mg Oral Daily    atorvastatin  80 mg Oral Daily    sodium chloride flush  5-40 mL IntraVENous 2 times per day     Continuous Infusions:    sodium chloride 50 mL/hr at 03/09/22 0947    sodium chloride 100 mL/hr at 02/25/22 1720    dextrose       PRN Medicationsbisacodyl, 10 mg, Daily PRN  metoclopramide, 5 mg, Q6H PRN  labetalol, 10 mg, Q6H PRN  sodium chloride flush, 5-40 mL, PRN  sodium chloride, 25 mL, PRN  ondansetron, 4 mg, Q8H PRN   Or  ondansetron, 4 mg, Q6H PRN  polyethylene glycol, 17 g, Daily PRN  acetaminophen, 650 mg, Q6H PRN   Or  acetaminophen, 650 mg, Q6H PRN  glucose, 15 g, PRN  glucagon (rDNA), 1 mg, PRN  dextrose, 100 mL/hr, PRN  dextrose bolus (hypoglycemia), 125 mL, PRN   Or  dextrose bolus (hypoglycemia), 250 mL, PRN  ipratropium-albuterol, 1 ampule, Q6H PRN        Diagnostic Labs:  CBC:   Recent Labs     03/07/22 0522 03/08/22 0312 03/09/22  0440   WBC 11.0 13.4* 11.4*   RBC 3.64* 3.29* 3.23*   HGB 11.1* 10.2* 9.8*   HCT 34.4* 31.9* 31.1*   MCV 94.5 97.0 96.3   RDW 17.0* 17.4* 17.4*    211 292     BMP:   Recent Labs     03/07/22 0522 03/08/22 0312 03/09/22  0440   * 129* 133*   K 4.2 4.1 3.9   CL 94* 92* 94*   CO2 24 23 23   BUN 37* 40* 43*   CREATININE 1.50* 1.65* 1.73*     BNP: No results for input(s): BNP in the last 72 hours. PT/INR: No results for input(s): PROTIME, INR in the last 72 hours. APTT:   No results for input(s): APTT in the last 72 hours. CARDIAC ENZYMES: No results for input(s): CKMB, CKMBINDEX, TROPONINI in the last 72 hours. Invalid input(s): CKTOTAL;3  FASTING LIPID PANEL:  Lab Results   Component Value Date    CHOL 188 11/07/2020    HDL 52 11/07/2020    TRIG 235 (H) 11/07/2020     LIVER PROFILE:   No results for input(s): AST, ALT, ALB, BILIDIR, BILITOT, ALKPHOS in the last 72 hours. MICROBIOLOGY:   Lab Results   Component Value Date/Time    CULTURE NORMAL RESPIRATORY DOUGIE MODERATE GROWTH 02/22/2022 01:58 PM       Imaging:    XR ABDOMEN FOR NG/OG/NE TUBE PLACEMENT    Result Date: 3/3/2022  Enteric tube with tip and side-port in the stomach. Nonobstructive bowel gas pattern       ASSESSMENT & PLAN     A 42-year-old male who presented post V. fib arrest, intubated for acute hypoxic respiratory failure, was admitted inpatient for the evaluation and management post V. fib arrest.     V. fib arrest   S/p hypothermia amiodarone and heparin drip. AICD prior to discharge  As per cardiology, cath today.      CAD   s/p CABG x4  Continue aspirin, Lipitor, Coreg and Norvasc    Ischemic cardiomyopathy  EF 45%  Cardiac cath today  Will decide for AICD placement accordingly.      Metabolic encephalopathy  Improving  EEG showed no epileptiform discharges, encephalopathy features. MRI showed signs of early hypoxic ischemia     Acute hypoxic and hypercapnic respiratory failure  Likely secondary to aspiration pneumonia  Improving  S/p Extubation  Currently on  2L O2 via NC   Wean as tolerated. Completed Unasyn for likely aspiration pneumonia.      ARON on CKD stage IV  Improved, 1.73  today- within baseline 1.2-1.7. Nephro on board  On Iv lasix 40 mg OD- held today prior to cath.     ANCA Vasculitis  On Azathioprine     HTN  On Coreg 25 twice daily  On Norvasc 10 mg OD  On clonidine 0.2 mg twice daily     HLD  On Lipitor 80 mg     COPD  Bronchodilators as needed     Depression/Mood disorder  On trazodone, duloxetine, and olanzapine.      DVT ppx  On heparin     GI ppx  Protonix     PT/OT  On board     Discharge Planning:   to assist in discharge planning, likely discharge to SNF- [address] Brianna Mann  Internal Medicine Resident, PGY-1  [address], [place]  3/9/2022, 9:19 AM
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
## 2022-03-09 NOTE — PROGRESS NOTES
Patient admitted from room 408, consent signed, all questions answered. Pt ready for procedure. Bed in low position, call light to reach with side rails up 2 of 2. Bilateral groin hair clipped. No family at bedside with patient.

## 2022-03-09 NOTE — PLAN OF CARE
Problem: Confusion - Acute:  Goal: Absence of continued neurological deterioration signs and symptoms  Description: Absence of continued neurological deterioration signs and symptoms  Outcome: Ongoing  Note: Shows improves in mentation  Goal: Mental status will be restored to baseline  Description: Mental status will be restored to baseline  Outcome: Met This Shift     Problem: Confusion - Acute:  Goal: Mental status will be restored to baseline  Description: Mental status will be restored to baseline  Outcome: Met This Shift     Problem: Injury - Risk of, Physical Injury:  Goal: Absence of physical injury  Description: Absence of physical injury  Outcome: Ongoing  Note: No fall or injury     Problem: Falls - Risk of:  Goal: Absence of physical injury  Description: Absence of physical injury  Outcome: Ongoing  Note: No fall or injury

## 2022-03-09 NOTE — PROGRESS NOTES
Renal Progress Note    Patient :  Morenita Fernandez; 60 y.o. MRN# [id number]  Location:  6718/0552-09  Attending:  Nicolasa Medina MD  Admit Date:  2/21/2022   Hospital Day: 13      Subjective:     59 y/o male patient with past medical history of CAD s/p CABG x 3 presented to the ED with V Fib cardiac arrest, s/p defib x 2 with ROSC. Patient again went into PEA cardiac arrest and ROSC was achieved, unknown down time. EKG concerning for NSTEMI, was started on heparin and amiodarone and did not undergo cath due to concern for anoxic brain injury. MRI revealed early changes of hypoxic brain injury, but Neurological exam improving. Will get cardiac cath per cardiology. Patient was seen and examined. No new issues reported overnight. His a.m. Lasix IV dosage is held today for possible cardiac catheterization today afternoon. Patient did receive Mucomyst.  Urine output documented as about 2.1 L in the last 24 hours. Serum creatinine stable at 1.73 mg/dl. Electrolytes okay potassium 3.9, bicarb 23, sodium improved to 133.     Outpatient Medications:     Medications Prior to Admission: magnesium oxide (MAG-OX) 400 (241.3 Mg) MG TABS tablet,   atorvastatin (LIPITOR) 40 MG tablet, TAKE 1 TABLET BY MOUTH DAILY  magnesium oxide (MAG-OX) 400 (240 Mg) MG tablet, TAKE 1 TABLET BY MOUTH 2 TIMES DAILY  traZODone (DESYREL) 100 MG tablet, Take 0.5 tablets by mouth nightly as needed for Sleep  DULoxetine (CYMBALTA) 30 MG extended release capsule, TAKE 1 CAPSULE BY MOUTH DAILY  lisinopril (PRINIVIL;ZESTRIL) 5 MG tablet, take 1 tablet by mouth once daily  spironolactone (ALDACTONE) 25 MG tablet, take 1 tablet by mouth once daily  metoclopramide (REGLAN) 10 MG tablet, Take 1 tablet by mouth 3 times daily  isosorbide mononitrate (IMDUR) 30 MG extended release tablet, Take 1 tablet by mouth daily  nitroGLYCERIN (NITROSTAT) 0.4 MG SL tablet, Place 1 tablet under the tongue every 5 minutes as needed for Chest pain up to max of 3 total doses. If no relief after 1 dose, call 911. (Patient not taking: Reported on 1/17/2022)  cloNIDine (CATAPRES) 0.2 MG tablet, Take 1 tablet by mouth 2 times daily  metoprolol tartrate (LOPRESSOR) 25 MG tablet, Take 1 tablet by mouth 2 times daily  magnesium oxide (MAG-OX) 400 (240 Mg) MG tablet,   pantoprazole (PROTONIX) 40 MG tablet, Take 1 tablet by mouth daily  magnesium oxide (MAG-OX) 400 MG tablet, Take 1 tablet by mouth 2 times daily (Patient not taking: Reported on 10/25/2021)  aspirin EC 81 MG EC tablet, Take 1 tablet by mouth daily  nicotine (NICODERM CQ) 21 MG/24HR, Place 1 patch onto the skin daily  acetaminophen (TYLENOL) 325 MG tablet, Take 2 tablets by mouth every 6 hours as needed for Pain  Umeclidinium Bromide 62.5 MCG/INH AEPB, Inhale 1 puff into the lungs daily (Patient not taking: Reported on 1/17/2022)  vitamin D3 (CHOLECALCIFEROL) 10 MCG (400 UNIT) TABS tablet, take 2 tablets (800MG) by mouth once daily (Patient not taking: Reported on 4/21/2021)  vitamin D3 (CHOLECALCIFEROL) 10 MCG (400 UNIT) TABS tablet, take 2 tablets (800MG) by mouth once daily (Patient not taking: Reported on 1/17/2022)  Fluticasone furoate-vilanterol (BREO ELLIPTA) 200-25 MCG/INH AEPB inhaler, Inhale 1 puff into the lungs daily (Patient not taking: Reported on 1/17/2022)  calcium carbonate-vitamin D3 (CALCIUM 600-D) 600-400 MG-UNIT TABS per tab, Take 1 tablet by mouth 2 times daily  azaTHIOprine (IMURAN) 50 MG tablet, Take 1.5 tablets by mouth daily  albuterol sulfate  (90 Base) MCG/ACT inhaler, inhale 2 puffs by mouth every 6 hours if needed for wheezing  nicotine (NICODERM CQ) 14 MG/24HR, Place 1 patch onto the skin daily (Patient not taking: Reported on 1/17/2022)  traMADol (ULTRAM) 50 MG tablet, Take 50 mg by mouth every 8 hours as needed for Pain.   (Patient not taking: Reported on 1/17/2022)  OLANZapine (ZYPREXA) 5 MG tablet, Take 1 tablet by mouth nightly    Current Medications:     Scheduled Meds:    acetylcysteine  600 mg Oral Q8H    tamsulosin  0.4 mg Oral Daily    lansoprazole  30 mg Per NG tube QAM AC    heparin (porcine)  5,000 Units SubCUTAneous 3 times per day    cloNIDine  0.2 mg Oral BID    docusate  100 mg Oral Daily    amLODIPine  10 mg Oral Daily    [Held by provider] furosemide  40 mg IntraVENous Daily    carvedilol  25 mg Oral BID WC    azaTHIOprine  75 mg Oral Daily    aspirin  81 mg Oral Daily    atorvastatin  80 mg Oral Daily    sodium chloride flush  5-40 mL IntraVENous 2 times per day     Continuous Infusions:    sodium chloride      sodium chloride 100 mL/hr at 22 1720    dextrose       PRN Meds:  bisacodyl, metoclopramide, labetalol, sodium chloride flush, sodium chloride, ondansetron **OR** ondansetron, polyethylene glycol, acetaminophen **OR** acetaminophen, glucose, glucagon (rDNA), dextrose, dextrose bolus (hypoglycemia) **OR** dextrose bolus (hypoglycemia), ipratropium-albuterol    Input/Output:       I/O last 3 completed shifts: In: 1551.1 [P.O.:1200; I.V.:351.1]  Out: 2600 [Urine:2600]. Patient Vitals for the past 96 hrs (Last 3 readings):   Weight   22 0600 173 lb (78.5 kg)   22 0557 173 lb 11.6 oz (78.8 kg)   22 0511 170 lb 3.1 oz (77.2 kg)       Vital Signs:   Temperature:  Temp: 98.1 °F (36.7 °C)  TMax:   Temp (24hrs), Av.4 °F (36.9 °C), Min:98 °F (36.7 °C), Max:99 °F (37.2 °C)    Respirations:  Resp: 27  Pulse:   Pulse: 81  BP:    BP: 108/67  BP Range: Systolic (68JWX), KWAME:731 , Min:93 , NJC:305       Diastolic (31CBL), QAO:95, Min:56, Max:69      Physical Examination:     General:  Alert, awake and oriented at least to person and place but has some positive confusion off/on, no accessory muscle use. HEENT: Atraumatic, normocephalic, no throat congestion, moist mucosa. Eyes:   Pupils equal, round and reactive to light, EOMI. Neck:   Supple  Chest:   Bilateral vesicular breath sounds, no rales or wheezes.   Cardiac:  S1 S2 RR, no murmurs, gallops or rubs. Abdomen: Soft, non-tender, no masses or organomegaly, BS audible. :   No suprapubic or flank tenderness. Neuro:  Alert, awake and oriented at least to person and place but has some positive confusion off/on, NAD. SKIN:  No rashes, good skin turgor. Extremities:  No edema.     Labs:       Recent Labs     03/07/22 0522 03/08/22 0312 03/09/22  0440   WBC 11.0 13.4* 11.4*   RBC 3.64* 3.29* 3.23*   HGB 11.1* 10.2* 9.8*   HCT 34.4* 31.9* 31.1*   MCV 94.5 97.0 96.3   MCH 30.5 31.0 30.3   MCHC 32.3 32.0 31.5   RDW 17.0* 17.4* 17.4*    211 292   MPV 10.7 11.2 11.0      BMP:   Recent Labs     03/07/22 0522 03/08/22 0312 03/09/22  0440   * 129* 133*   K 4.2 4.1 3.9   CL 94* 92* 94*   CO2 24 23 23   BUN 37* 40* 43*   CREATININE 1.50* 1.65* 1.73*   GLUCOSE 111* 98 96   CALCIUM 8.5* 8.7 8.3*      Magnesium:    Recent Labs     03/07/22 0522 03/08/22 0312 03/09/22  0440   MG 2.2 2.2 2.1     BNP:      Lab Results   Component Value Date    BNP 51 07/26/2012     JANAE:      Lab Results   Component Value Date    JANAE NEGATIVE 07/11/2020     SPEP:  Lab Results   Component Value Date    PROT 6.5 03/05/2022    ALBCAL 2.9 07/11/2020    ALBPCT 50 07/11/2020    LABALPH 0.2 07/11/2020    LABALPH 0.7 07/11/2020    A1PCT 3 07/11/2020    A2PCT 11 07/11/2020    LABBETA 0.5 07/11/2020    BETAPCT 9 07/11/2020    GAMGLOB 1.5 07/11/2020    GGPCT 26 07/11/2020    Nuria Phillips M.D. 07/11/2020     MPO ANCA:     Lab Results   Component Value Date     07/12/2020     PR3 ANCA:     Lab Results   Component Value Date    PR3 378 07/12/2020     Anti-GBM:     Lab Results   Component Value Date    GBMABIGG 16 07/12/2020     Hep BsAg:         Lab Results   Component Value Date    HEPBSAG NONREACTIVE 03/05/2020     Hep C AB:          Lab Results   Component Value Date    HEPCAB NONREACTIVE 03/05/2020     Urinalysis/Chemistries:      Lab Results   Component Value Date    NITRU NEGATIVE 02/25/2022 COLORU Yellow 02/25/2022    PHUR 5.5 02/25/2022    WBCUA 2 TO 5 02/25/2022    RBCUA 5 TO 10 02/25/2022    MUCUS NOT REPORTED 11/06/2020    TRICHOMONAS NOT REPORTED 11/06/2020    YEAST NOT REPORTED 11/06/2020    BACTERIA NOT REPORTED 11/06/2020    CLARITYU clear 09/24/2020    SPECGRAV 1.025 02/25/2022    LEUKOCYTESUR NEGATIVE 02/25/2022    UROBILINOGEN Normal 02/25/2022    BILIRUBINUR NEGATIVE 02/25/2022    BLOODU +++ 09/24/2020    GLUCOSEU NEGATIVE 02/25/2022    KETUA TRACE 02/25/2022    AMORPHOUS NOT REPORTED 11/06/2020     Urine Sodium:     Lab Results   Component Value Date    GURU 99 02/25/2022     Urine Potassium:    Lab Results   Component Value Date    KUR 12.5 07/12/2020     Urine Chloride:    Lab Results   Component Value Date    CLUR 80 02/25/2022      Urine Creatinine:     Lab Results   Component Value Date    LABCREA 126.5 02/25/2022     Radiology:     Reviewed. Assessment:     1. 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 1.7-2, follows up with Dr. Adal Moore, on treatment with Imuran. 2. 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 Now being maintained with Imuran. 3. V Fib and PEA cardiac arrest, unknown down timw  4. ? Hypoxic brain injury  5. H/O CABG  6. Respiratory failure, mechanical ventilation dependant  7. H/O HTN  8. Urine retention. 9. Hyponatremia likely secondary to decreased solute intake    Plan:   1. Agree with holding diuretics for now. 2.  Will start IV fluids 50 cc an hour normal saline for about 6 hours before and 6 hours after cardiac catheterization. 3.  Mucomyst also ordered for 2 doses before 2 doses after cardiac catheterization. 4.  Continue Imuran home dose. 5.  Has a Blackman catheter in place due to urinary retention, management per urology.   6. I did speak with patient's daughter Sujata Sanders over the phone this admission and discussed cardiac catheterization as recommended by cardiology and explained contrast related renal risk all the way up to the possibility of requiring dialysis. She verbalized understanding and is okay with getting the procedure done as required. Also I did discuss with patient as well but he seems to be confused off and on but is in agreement with the above plan. 7.  BMP in AM.  8.  Will follow. Nutrition   Please ensure that patient is on a renal diet/TF. Avoid nephrotoxic drugs/contrast exposure. We will continue to follow along with you. Yohannes Mann MD  [facility]     This note is created with the assistance of a speech-recognition program. While intending to generate a document that actually reflects the content of the visit, no guarantees can be provided that every mistake has been identified and corrected by editing.

## 2022-03-09 NOTE — OP NOTE
Aorta Left and attaches to       the 1st Ob Ai. known to be 100% occluded. Coronary Tree     Procedure Summary        Severe native vessel CAD. Patent LIMA-LAD. Patent SVG-RPDA. Known occluded SVG-OM. Recommendations        Medical therapy as needed. EP consult to evaluate for AICD. Patient presented with cardiac arrest.       Estimated Blood Loss: 10 mL      ____________________________________________________________________    History and Risk Factors    [x] Hypertension     [] Family history of CAD  [x] Hyperlipidemia     [] Cerebrovascular Disease   [] Prior MI       [] Peripheral Vascular disease   [x] Prior PCI              [] Diabetes Mellitus    [] Left Main PCI. [] Currently on Dialysis. [x] Prior CABG. [] Currently smoker. [] Cardiac Arrest outside of healthcare facility. [x] Yes    [] No        Witnessed     [] Yes   [] No     Arrest after arrival of EMS  [] Yes   [] No     [] Cardiac Arrest at other Facility. [x] Yes   [] No    Pre-Procedure Information. Heart Failure       [] Yes    [x] No        Class  [] I      [] II  [] III    [] IV. New Diagnosis    [] Yes  [] No    HF Type      [] Systolic   [] Diastolic          [] Unknown. Diagnostic Test:   EKG       [] Normal   [] Abnormal    New antiarrhythmia medications:    [] Yes   [] No   New onset atrial fibrillation / Flutter     [] Yes   [] No   ECG Abnormalities:      [] V. Fib   [] Therese V. Tach           [] NS V. T   [] New LBBB           [] T.  Inv  []  ST dev > 0.5 mm         [] PVC's freq  [] PVC's infrequent    Stress Test Performed:      [] Yes    [x] No     Type:     [] Stress Echo   [] Exercise Stress Test (no imaging)      [] Stress Nuclear  [] Stress Imaging     Results   [] Negative   [] Positive        [] Indeterminate  [] Unavailable     If Positive/ Risk / Extent of Ischemia:       [] Low  [] Intermediate         [] High  [] Unavailable      Cardiac CTA Performed:     [] Yes    [x] No      Results   [] CAD   [] Non obstructive CAD      [] No CAD   [] Uncertain      [] Unknown   [] Structural Disease. Pre Procedure Medications:   [x] Yes    [] No         [x] ASA   [] Beta Blockers      [] Nitrate   [] Ca Channel Blockers      [] Ranolazine   [] Statin       [] Plavix/Others antiplatelets      Electronically signed on 3/9/2022 at 4:49 PM by:    Mary Dumont MD  Fellow, [address] [place] [address]. Ariana Hunter MD  [facility].

## 2022-03-09 NOTE — PROGRESS NOTES
Neurosurgery CELESTINE/Resident    Daily Progress Note   CC:  Chief Complaint   Patient presents with    Cardiac Arrest     3/9/2022  6:24 AM    Chart reviewed. No acute events overnight. No new complaints. Resting in bed, seen and examined this morning with DR Eduardo Ivy, exam stable from yesterday     Vitals:    03/08/22 1720 03/08/22 2031 03/09/22 0045 03/09/22 0430   BP: (!) 93/59 113/69 105/67 94/61   Pulse: 64 72 90 93   Resp: 18 20 22 25   Temp: 99 °F (37.2 °C) 98 °F (36.7 °C) 99 °F (37.2 °C) 98 °F (36.7 °C)   TempSrc: Axillary Oral Oral Oral   SpO2: 97%   99%   Weight:       Height:           PE:   AOx3   Motor   L deltoid 5/5; R deltoid 5/5  L biceps 5/5; R biceps 5/5  L triceps 5/5; R triceps 5/5  L wrist extension 5/5; R wrist extension 5/5  L intrinsics 5/5; R intrinsics 5/5      L iliopsoas 5/5 , R iliopsoas 5/5  L quadriceps 5/5; R quadriceps 5/5  L Dorsiflexion 5/5; R dorsiflexion 5/5  L Plantarflexion 5/5; R plantarflexion 5/5  L EHL 5/5; R EHL 5/5    Sensation: intact       Lab Results   Component Value Date    WBC 11.4 (H) 03/09/2022    HGB 9.8 (L) 03/09/2022    HCT 31.1 (L) 03/09/2022     03/09/2022    CHOL 188 11/07/2020    TRIG 235 (H) 11/07/2020    HDL 52 11/07/2020    ALT 35 03/05/2022    AST 63 (H) 03/05/2022     (L) 03/09/2022    K 3.9 03/09/2022    CL 94 (L) 03/09/2022    CREATININE 1.73 (H) 03/09/2022    BUN 43 (H) 03/09/2022    CO2 23 03/09/2022    TSH 2.00 02/22/2022    PSA 0.22 01/13/2016    INR 1.2 02/22/2022    LABA1C 5.1 04/21/2021    CRP 9.4 (H) 12/18/2020       A/P  60 y.o. male who presents with cervical stenosis        - MRI cervical spine reviewed with Dr Eduardo Ivy  - No neurosurgical interventions planned  - Neuro checks per floor protocol  - Neurosurgery will sign off at this time and patient can follow up as needed       Please contact neurosurgery with any changes in patients neurologic status.        Gera Sanches CNP  3/9/22  6:24 AM

## 2022-03-09 NOTE — PROGRESS NOTES
[facility]  Occupational Therapy Not Seen Note    DATE: 3/9/2022    NAME: Laurel Yadav  MRN: [id number]   : 1965      Patient not seen this date for Occupational Therapy due to:    Surgery/Procedure: Pt off floor for procedure    Next Scheduled Treatment: Attempt at later date    Electronically signed by LAUREN Ansari on 3/9/2022 at 3:09 PM

## 2022-03-10 LAB
-: NORMAL
ANION GAP SERPL CALCULATED.3IONS-SCNC: 15 MMOL/L (ref 9–17)
BILIRUBIN URINE: NEGATIVE
BUN BLDV-MCNC: 38 MG/DL (ref 6–20)
CALCIUM SERPL-MCNC: 8.2 MG/DL (ref 8.6–10.4)
CASTS UA: NORMAL /LPF (ref 0–8)
CHLORIDE BLD-SCNC: 94 MMOL/L (ref 98–107)
CO2: 23 MMOL/L (ref 20–31)
COLOR: ABNORMAL
CREAT SERPL-MCNC: 1.3 MG/DL (ref 0.7–1.2)
EPITHELIAL CELLS UA: NORMAL /HPF (ref 0–5)
GFR AFRICAN AMERICAN: >60 ML/MIN
GFR NON-AFRICAN AMERICAN: 57 ML/MIN
GFR SERPL CREATININE-BSD FRML MDRD: ABNORMAL ML/MIN/{1.73_M2}
GLUCOSE BLD-MCNC: 92 MG/DL (ref 70–99)
GLUCOSE URINE: NEGATIVE
HCT VFR BLD CALC: 29.1 % (ref 40.7–50.3)
HEMOGLOBIN: 9.1 G/DL (ref 13–17)
KETONES, URINE: NEGATIVE
LACTIC ACID, WHOLE BLOOD: 3.2 MMOL/L (ref 0.7–2.1)
LEUKOCYTE ESTERASE, URINE: ABNORMAL
MAGNESIUM: 2.1 MG/DL (ref 1.6–2.6)
MCH RBC QN AUTO: 30.8 PG (ref 25.2–33.5)
MCHC RBC AUTO-ENTMCNC: 31.3 G/DL (ref 28.4–34.8)
MCV RBC AUTO: 98.6 FL (ref 82.6–102.9)
NITRITE, URINE: NEGATIVE
NRBC AUTOMATED: 0 PER 100 WBC
PDW BLD-RTO: 17 % (ref 11.8–14.4)
PH UA: 5 (ref 5–8)
PLATELET # BLD: 275 K/UL (ref 138–453)
PMV BLD AUTO: 10.5 FL (ref 8.1–13.5)
POTASSIUM SERPL-SCNC: 3.7 MMOL/L (ref 3.7–5.3)
PROTEIN UA: ABNORMAL
RBC # BLD: 2.95 M/UL (ref 4.21–5.77)
RBC UA: NORMAL /HPF (ref 0–4)
SODIUM BLD-SCNC: 132 MMOL/L (ref 135–144)
SPECIFIC GRAVITY UA: 1.02 (ref 1–1.03)
TURBIDITY: CLEAR
URINE HGB: ABNORMAL
UROBILINOGEN, URINE: ABNORMAL
WBC # BLD: 8.5 K/UL (ref 3.5–11.3)
WBC UA: NORMAL /HPF (ref 0–5)

## 2022-03-10 PROCEDURE — 80048 BASIC METABOLIC PNL TOTAL CA: CPT

## 2022-03-10 PROCEDURE — 2700000000 HC OXYGEN THERAPY PER DAY

## 2022-03-10 PROCEDURE — 6370000000 HC RX 637 (ALT 250 FOR IP): Performed by: STUDENT IN AN ORGANIZED HEALTH CARE EDUCATION/TRAINING PROGRAM

## 2022-03-10 PROCEDURE — 6370000000 HC RX 637 (ALT 250 FOR IP)

## 2022-03-10 PROCEDURE — 85027 COMPLETE CBC AUTOMATED: CPT

## 2022-03-10 PROCEDURE — 99232 SBSQ HOSP IP/OBS MODERATE 35: CPT | Performed by: INTERNAL MEDICINE

## 2022-03-10 PROCEDURE — 87086 URINE CULTURE/COLONY COUNT: CPT

## 2022-03-10 PROCEDURE — 6360000002 HC RX W HCPCS

## 2022-03-10 PROCEDURE — 6360000002 HC RX W HCPCS: Performed by: STUDENT IN AN ORGANIZED HEALTH CARE EDUCATION/TRAINING PROGRAM

## 2022-03-10 PROCEDURE — 83605 ASSAY OF LACTIC ACID: CPT

## 2022-03-10 PROCEDURE — 51702 INSERT TEMP BLADDER CATH: CPT

## 2022-03-10 PROCEDURE — 87040 BLOOD CULTURE FOR BACTERIA: CPT

## 2022-03-10 PROCEDURE — 94761 N-INVAS EAR/PLS OXIMETRY MLT: CPT

## 2022-03-10 PROCEDURE — 97530 THERAPEUTIC ACTIVITIES: CPT

## 2022-03-10 PROCEDURE — 81001 URINALYSIS AUTO W/SCOPE: CPT

## 2022-03-10 PROCEDURE — 2580000003 HC RX 258

## 2022-03-10 PROCEDURE — 2060000000 HC ICU INTERMEDIATE R&B

## 2022-03-10 PROCEDURE — 2580000003 HC RX 258: Performed by: STUDENT IN AN ORGANIZED HEALTH CARE EDUCATION/TRAINING PROGRAM

## 2022-03-10 PROCEDURE — 99233 SBSQ HOSP IP/OBS HIGH 50: CPT | Performed by: INTERNAL MEDICINE

## 2022-03-10 PROCEDURE — 6370000000 HC RX 637 (ALT 250 FOR IP): Performed by: NURSE PRACTITIONER

## 2022-03-10 PROCEDURE — 6370000000 HC RX 637 (ALT 250 FOR IP): Performed by: INTERNAL MEDICINE

## 2022-03-10 PROCEDURE — 83735 ASSAY OF MAGNESIUM: CPT

## 2022-03-10 PROCEDURE — 36415 COLL VENOUS BLD VENIPUNCTURE: CPT

## 2022-03-10 PROCEDURE — 97535 SELF CARE MNGMENT TRAINING: CPT

## 2022-03-10 RX ORDER — SODIUM CHLORIDE 9 MG/ML
INJECTION, SOLUTION INTRAVENOUS CONTINUOUS
Status: ACTIVE | OUTPATIENT
Start: 2022-03-10 | End: 2022-03-11

## 2022-03-10 RX ORDER — FUROSEMIDE 40 MG/1
40 TABLET ORAL DAILY
Status: DISCONTINUED | OUTPATIENT
Start: 2022-03-11 | End: 2022-03-17 | Stop reason: HOSPADM

## 2022-03-10 RX ORDER — HYDROXYZINE HYDROCHLORIDE 10 MG/1
10 TABLET, FILM COATED ORAL ONCE
Status: COMPLETED | OUTPATIENT
Start: 2022-03-10 | End: 2022-03-10

## 2022-03-10 RX ADMIN — ATORVASTATIN CALCIUM 80 MG: 80 TABLET, FILM COATED ORAL at 08:15

## 2022-03-10 RX ADMIN — HYDROXYZINE HYDROCHLORIDE 10 MG: 10 TABLET ORAL at 14:26

## 2022-03-10 RX ADMIN — HEPARIN SODIUM 5000 UNITS: 5000 INJECTION INTRAVENOUS; SUBCUTANEOUS at 14:26

## 2022-03-10 RX ADMIN — HEPARIN SODIUM 5000 UNITS: 5000 INJECTION INTRAVENOUS; SUBCUTANEOUS at 21:40

## 2022-03-10 RX ADMIN — SODIUM CHLORIDE: 9 INJECTION, SOLUTION INTRAVENOUS at 18:32

## 2022-03-10 RX ADMIN — CARVEDILOL 25 MG: 25 TABLET, FILM COATED ORAL at 18:03

## 2022-03-10 RX ADMIN — SODIUM CHLORIDE, PRESERVATIVE FREE 10 ML: 5 INJECTION INTRAVENOUS at 08:17

## 2022-03-10 RX ADMIN — HEPARIN SODIUM 5000 UNITS: 5000 INJECTION INTRAVENOUS; SUBCUTANEOUS at 06:32

## 2022-03-10 RX ADMIN — ASPIRIN 81 MG: 81 TABLET, CHEWABLE ORAL at 08:15

## 2022-03-10 RX ADMIN — Medication 600 MG: at 00:03

## 2022-03-10 RX ADMIN — AZATHIOPRINE 75 MG: 50 TABLET ORAL at 08:15

## 2022-03-10 RX ADMIN — ACETAMINOPHEN 650 MG: 325 TABLET ORAL at 17:56

## 2022-03-10 RX ADMIN — DOCUSATE SODIUM LIQUID 100 MG: 100 LIQUID ORAL at 08:15

## 2022-03-10 RX ADMIN — SODIUM CHLORIDE, PRESERVATIVE FREE 10 ML: 5 INJECTION INTRAVENOUS at 21:41

## 2022-03-10 RX ADMIN — CEFTRIAXONE SODIUM 1000 MG: 1 INJECTION, POWDER, FOR SOLUTION INTRAMUSCULAR; INTRAVENOUS at 19:55

## 2022-03-10 RX ADMIN — CARVEDILOL 25 MG: 25 TABLET, FILM COATED ORAL at 08:15

## 2022-03-10 RX ADMIN — TAMSULOSIN HYDROCHLORIDE 0.4 MG: 0.4 CAPSULE ORAL at 08:15

## 2022-03-10 RX ADMIN — Medication 30 MG: at 09:50

## 2022-03-10 ASSESSMENT — PAIN SCALES - GENERAL
PAINLEVEL_OUTOF10: 0

## 2022-03-10 NOTE — PLAN OF CARE
Problem: Confusion - Acute:  Goal: Mental status will be restored to baseline  Description: Mental status will be restored to baseline  Outcome: Met This Shift  Note: Alert and oriented times 3, improved mentation     Problem: Mood - Altered:  Goal: Mood stable  Description: Mood stable  Outcome: Met This Shift  Note: Stable mood, able to follow commands  Goal: Absence of abusive behavior  Description: Absence of abusive behavior  Outcome: Met This Shift
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
## 2022-03-10 NOTE — CARE COORDINATION
[facility], left voicemail for Erika Bourne. Resent referral incase they did not receive initial one. [address] left another voicemail. Will call daughter Jes Worthington [phone] for additional choices. [address] from [facility] called and denied admission. 1500 Called daughter Jes Worthington and informed her that her father has been denied admission to [facility]. I asked if she had any other facility in mind. She did not. I have emailed the CMS list along with the list from insurance to her @     [email or web address]  Awaiting choices
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
## 2022-03-10 NOTE — PROGRESS NOTES
Renal Progress Note    Patient :  Filipe Wang; 60 y.o. MRN# [id number]  Location:  1833/3838-00  Attending:  Simi Merritt MD  Admit Date:  2/21/2022   Hospital Day: 12      Subjective:     61 y/o male patient with past medical history of CAD s/p CABG x 3 presented to the ED with V Fib cardiac arrest, s/p defib x 2 with ROSC. Patient again went into PEA cardiac arrest and ROSC was achieved, unknown down time. EKG concerning for NSTEMI, was started on heparin and amiodarone and did not undergo cath due to concern for anoxic brain injury. MRI revealed early changes of hypoxic brain injury, but Neurological exam improving. Patient was seen and examined at bedside  No new issues reported overnight. Vitals stable  S/p cath 3/9/2022: Severe native vessel CAD. Patent bypass grafts. Known occluded SVG-OM. EP to evaluate for AICD. UO documented as 1.4 L in the last 24 hrs. His a.m. Lasix IV dosage is held  Patient did receive Mucomyst 4 doses  and IV NS stopped   Serum creatinine stable,  at 1.73-->1.30  mg/dl. Electrolytes okay potassium 3.9-->3.7, bicarb 23, sodium 133-->132  plan for single chamber AICD tomorrow.     Outpatient Medications:     Medications Prior to Admission: magnesium oxide (MAG-OX) 400 (241.3 Mg) MG TABS tablet,   atorvastatin (LIPITOR) 40 MG tablet, TAKE 1 TABLET BY MOUTH DAILY  magnesium oxide (MAG-OX) 400 (240 Mg) MG tablet, TAKE 1 TABLET BY MOUTH 2 TIMES DAILY  traZODone (DESYREL) 100 MG tablet, Take 0.5 tablets by mouth nightly as needed for Sleep  DULoxetine (CYMBALTA) 30 MG extended release capsule, TAKE 1 CAPSULE BY MOUTH DAILY  lisinopril (PRINIVIL;ZESTRIL) 5 MG tablet, take 1 tablet by mouth once daily  spironolactone (ALDACTONE) 25 MG tablet, take 1 tablet by mouth once daily  metoclopramide (REGLAN) 10 MG tablet, Take 1 tablet by mouth 3 times daily  isosorbide mononitrate (IMDUR) 30 MG extended release tablet, Take 1 tablet by mouth daily  nitroGLYCERIN (NITROSTAT) 0.4 MG SL tablet, Place 1 tablet under the tongue every 5 minutes as needed for Chest pain up to max of 3 total doses. If no relief after 1 dose, call 911. (Patient not taking: Reported on 1/17/2022)  cloNIDine (CATAPRES) 0.2 MG tablet, Take 1 tablet by mouth 2 times daily  metoprolol tartrate (LOPRESSOR) 25 MG tablet, Take 1 tablet by mouth 2 times daily  magnesium oxide (MAG-OX) 400 (240 Mg) MG tablet,   pantoprazole (PROTONIX) 40 MG tablet, Take 1 tablet by mouth daily  magnesium oxide (MAG-OX) 400 MG tablet, Take 1 tablet by mouth 2 times daily (Patient not taking: Reported on 10/25/2021)  aspirin EC 81 MG EC tablet, Take 1 tablet by mouth daily  nicotine (NICODERM CQ) 21 MG/24HR, Place 1 patch onto the skin daily  acetaminophen (TYLENOL) 325 MG tablet, Take 2 tablets by mouth every 6 hours as needed for Pain  Umeclidinium Bromide 62.5 MCG/INH AEPB, Inhale 1 puff into the lungs daily (Patient not taking: Reported on 1/17/2022)  vitamin D3 (CHOLECALCIFEROL) 10 MCG (400 UNIT) TABS tablet, take 2 tablets (800MG) by mouth once daily (Patient not taking: Reported on 4/21/2021)  vitamin D3 (CHOLECALCIFEROL) 10 MCG (400 UNIT) TABS tablet, take 2 tablets (800MG) by mouth once daily (Patient not taking: Reported on 1/17/2022)  Fluticasone furoate-vilanterol (BREO ELLIPTA) 200-25 MCG/INH AEPB inhaler, Inhale 1 puff into the lungs daily (Patient not taking: Reported on 1/17/2022)  calcium carbonate-vitamin D3 (CALCIUM 600-D) 600-400 MG-UNIT TABS per tab, Take 1 tablet by mouth 2 times daily  azaTHIOprine (IMURAN) 50 MG tablet, Take 1.5 tablets by mouth daily  albuterol sulfate  (90 Base) MCG/ACT inhaler, inhale 2 puffs by mouth every 6 hours if needed for wheezing  nicotine (NICODERM CQ) 14 MG/24HR, Place 1 patch onto the skin daily (Patient not taking: Reported on 1/17/2022)  traMADol (ULTRAM) 50 MG tablet, Take 50 mg by mouth every 8 hours as needed for Pain.   (Patient not taking: Reported on 1/17/2022)  OLANZapine (ZYPREXA) 5 MG tablet, Take 1 tablet by mouth nightly    Current Medications:     Scheduled Meds:    tamsulosin  0.4 mg Oral Daily    heparin (porcine)  5,000 Units SubCUTAneous 3 times per day    cloNIDine  0.2 mg Oral BID    docusate  100 mg Oral Daily    [Held by provider] furosemide  40 mg IntraVENous Daily    carvedilol  25 mg Oral BID WC    azaTHIOprine  75 mg Oral Daily    aspirin  81 mg Oral Daily    atorvastatin  80 mg Oral Daily    sodium chloride flush  5-40 mL IntraVENous 2 times per day     Continuous Infusions:    sodium chloride 100 mL/hr at 22 1720    dextrose       PRN Meds:  bisacodyl, metoclopramide, labetalol, sodium chloride flush, sodium chloride, ondansetron **OR** ondansetron, polyethylene glycol, acetaminophen **OR** acetaminophen, glucose, glucagon (rDNA), dextrose, dextrose bolus (hypoglycemia) **OR** dextrose bolus (hypoglycemia), ipratropium-albuterol    Input/Output:       I/O last 3 completed shifts: In: 0662 [P.O.:1770]  Out: 2700 [Urine:2700]. Patient Vitals for the past 96 hrs (Last 3 readings):   Weight   03/10/22 0600 174 lb 13.2 oz (79.3 kg)   22 0600 173 lb (78.5 kg)   22 0557 173 lb 11.6 oz (78.8 kg)       Vital Signs:   Temperature:  Temp: 98.4 °F (36.9 °C)  TMax:   Temp (24hrs), Av.2 °F (36.8 °C), Min:97.7 °F (36.5 °C), Max:98.7 °F (37.1 °C)    Respirations:  Resp: 22  Pulse:   Pulse: 58  BP:    BP: 108/66  BP Range: Systolic (50WYW), UWY:259 , Min:85 , WILLIE:171       Diastolic (82XFV), OBX:43, Min:55, Max:69      Physical Examination:     General:  Alert, awake and oriented at least to person and place but has some positive confusion off/on, no accessory muscle use. HEENT: Atraumatic, normocephalic, no throat congestion, moist mucosa. Eyes:   Pupils equal, round and reactive to light, EOMI. Neck:   Supple  Chest:   Bilateral vesicular breath sounds, no rales or wheezes.   Cardiac:  S1 S2 RR, no murmurs, gallops or rubs.  Abdomen: Soft, non-tender, no masses or organomegaly, BS audible. :   No suprapubic or flank tenderness. Neuro:  Alert, awake and oriented at least to person and place but has some positive confusion off/on, NAD. SKIN:  No rashes, good skin turgor. Extremities:  No edema.     Labs:       Recent Labs     03/08/22 0312 03/09/22  0440 03/10/22  0447   WBC 13.4* 11.4* 8.5   RBC 3.29* 3.23* 2.95*   HGB 10.2* 9.8* 9.1*   HCT 31.9* 31.1* 29.1*   MCV 97.0 96.3 98.6   MCH 31.0 30.3 30.8   MCHC 32.0 31.5 31.3   RDW 17.4* 17.4* 17.0*    292 275   MPV 11.2 11.0 10.5      BMP:   Recent Labs     03/08/22 0312 03/09/22 0440 03/10/22  0447   * 133* 132*   K 4.1 3.9 3.7   CL 92* 94* 94*   CO2 23 23 23   BUN 40* 43* 38*   CREATININE 1.65* 1.73* 1.30*   GLUCOSE 98 96 92   CALCIUM 8.7 8.3* 8.2*      Magnesium:    Recent Labs     03/08/22 0312 03/09/22 0440 03/10/22  0447   MG 2.2 2.1 2.1     BNP:      Lab Results   Component Value Date    BNP 51 07/26/2012     JANAE:      Lab Results   Component Value Date    JANAE NEGATIVE 07/11/2020     SPEP:  Lab Results   Component Value Date    PROT 6.5 03/05/2022    ALBCAL 2.9 07/11/2020    ALBPCT 50 07/11/2020    LABALPH 0.2 07/11/2020    LABALPH 0.7 07/11/2020    A1PCT 3 07/11/2020    A2PCT 11 07/11/2020    LABBETA 0.5 07/11/2020    BETAPCT 9 07/11/2020    GAMGLOB 1.5 07/11/2020    GGPCT 26 07/11/2020    Fredis Abernathy M.D. 07/11/2020     MPO ANCA:     Lab Results   Component Value Date     07/12/2020     PR3 ANCA:     Lab Results   Component Value Date    PR3 378 07/12/2020     Anti-GBM:     Lab Results   Component Value Date    GBMABIGG 16 07/12/2020     Hep BsAg:         Lab Results   Component Value Date    HEPBSAG NONREACTIVE 03/05/2020     Hep C AB:          Lab Results   Component Value Date    HEPCAB NONREACTIVE 03/05/2020     Urinalysis/Chemistries:      Lab Results   Component Value Date    NITRU NEGATIVE 02/25/2022    COLORU Yellow 02/25/2022 PHUR 5.5 02/25/2022    WBCUA 2 TO 5 02/25/2022    RBCUA 5 TO 10 02/25/2022    MUCUS NOT REPORTED 11/06/2020    TRICHOMONAS NOT REPORTED 11/06/2020    YEAST NOT REPORTED 11/06/2020    BACTERIA NOT REPORTED 11/06/2020    CLARITYU clear 09/24/2020    SPECGRAV 1.025 02/25/2022    LEUKOCYTESUR NEGATIVE 02/25/2022    UROBILINOGEN Normal 02/25/2022    BILIRUBINUR NEGATIVE 02/25/2022    BLOODU +++ 09/24/2020    GLUCOSEU NEGATIVE 02/25/2022    KETUA TRACE 02/25/2022    AMORPHOUS NOT REPORTED 11/06/2020     Urine Sodium:     Lab Results   Component Value Date    GURU 99 02/25/2022     Urine Potassium:    Lab Results   Component Value Date    KUR 12.5 07/12/2020     Urine Chloride:    Lab Results   Component Value Date    CLUR 80 02/25/2022      Urine Creatinine:     Lab Results   Component Value Date    LABCREA 126.5 02/25/2022     Radiology:     Reviewed. Assessment:     1. 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 1.7-2, follows up with Dr. Derick Alfonso, on treatment with Imuran. 2. 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 Now being maintained with Imuran. 3. V Fib and PEA cardiac arrest, unknown down timw  4. ? Hypoxic brain injury  5. H/O CABG  6. Respiratory failure, mechanical ventilation dependant  7. H/O HTN  8. Urine retention. 9. Hyponatremia likely secondary to decreased solute intake    Plan:   1. Continue to hold diuretics for today and start Lasix 40 mg p.o. from tomorrow morning. 2.  Continue Imuran home dose. 3.  Has a Blackman catheter in place due to urinary retention, management per urology. 4. Input output monitoring  5. Since renal function is stable and urine output okay, nephrology will sign off. Please call with any questions. 6.  BMP in 1 week post discharge and fax results to Dr. Derick Alfonso office. 7.  Follow up with Dr. Derick Alfonso in 3-4 weeks post d/c.      Nutrition   Please ensure that patient is on a renal diet/TF. Avoid nephrotoxic drugs/contrast exposure. We will continue to follow along with you. Derek Trujillo MD  Internal Medicine Resident, PGY- [address], [place]  3/10/2022, 2:54 PM    Attending Physician Statement  I have discussed the care of this patient, including pertinent history and exam findings, with the Resident/CNP. I have reviewed and edited the key elements of all parts of the encounter with the Resident/CNP. I agree with the assessment, plan and orders as documented by the Resident/CNP. Yohannes Thomas MD   Nephrology [address] [place] [address] Drive    This note is created with the assistance of a speech-recognition program. While intending to generate a document that actually reflects the content of the visit, no guarantees can be provided that every mistake has been identified and corrected by editing.
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
## 2022-03-10 NOTE — PROGRESS NOTES
Comprehensive Nutrition Assessment    Type and Reason for Visit:  Reassess    Nutrition Recommendations/Plan:   -Continue with current diet, monitor/encourage adequate intake. Add Ensure nutrition supplements to meal trays    Nutrition Assessment:  Noted cardiac cath yesterday. Pt reports eating 50% of lunch today and appetite being \"fair\". Would like nutrition supplements reordered on meal trays    Malnutrition Assessment:  Malnutrition Status: At risk for malnutrition (Comment)    Context:  Acute Illness         Estimated Daily Nutrient Needs:  Energy (kcal):  6440-1156 kcal/day; Weight Used for Energy Requirements:  Current     Protein (g):   g pro/day; Weight Used for Protein Requirements:  Ideal (1.2-1.5)        Fluid (ml/day):  Per MD; Method Used for Fluid Requirements:  Other (Comment)      Nutrition Related Findings:  labs/meds reviewed      Wounds:  None       Current Nutrition Therapies:    ADULT DIET; Regular; Low Fat/Low Chol/High Fiber/BENJAMIN  ADULT ORAL NUTRITION SUPPLEMENT; Breakfast, Lunch, Dinner; Standard High Calorie/High Protein Oral Supplement    Anthropometric Measures:  · Height: 5' 10\" (177.8 cm)  · Current Body Weight: 174 lb 13.2 oz (79.3 kg)   · Admission Body Weight: 198 lb 6.6 oz (90 kg)    · Usual Body Weight: 220 lb (99.8 kg) (approx 1 yr ago)     · Ideal Body Weight: 166 lbs; % Ideal Body Weight 124.8 %   · BMI: 25.1   · BMI Categories: Overweight (BMI 25.0-29. 9)       Nutrition Diagnosis:   · Inadequate oral intake related to cardiac dysfunction as evidenced by intake 26-50%,intake 51-75%      Nutrition Interventions:   Food and/or Nutrient Delivery:  Continue Current Diet,Start Oral Nutrition Supplement  Nutrition Education/Counseling:  No recommendation at this time   Coordination of Nutrition Care:  Continue to monitor while inpatient    Goals:  meet % of estimated nutrient needs       Nutrition Monitoring and Evaluation:   Behavioral-Environmental Outcomes:  None Identified   Food/Nutrient Intake Outcomes:  Food and Nutrient Intake,Supplement Intake  Physical Signs/Symptoms Outcomes:  Biochemical Data,Nutrition Focused Physical Findings,Skin,Weight,Meal Time Behavior     Discharge Planning:    Continue current diet,Continue Oral Nutrition Supplement     Electronically signed by Ricky Lima RD, LD on 3/10/22 at 3:06 PM EST    Contact: [phone]

## 2022-03-10 NOTE — PROGRESS NOTES
[facility]  Internal Medicine Teaching Residency Program  Inpatient Daily Progress Note  ______________________________________________________________________________    Patient: Filipe Wang  YOB: 1965   [facility]:[id number]    Acct: [de-identified]     Room: [id number]  Admit date: 2/21/2022  Today's date: 03/10/22  Number of days in the hospital: 16    SUBJECTIVE   Admitting Diagnosis: Cardiac arrest ([facility] [address]  CC: V. Fib arrest    Pt seen and examined bedside. No acute events overnight. Labs and charts reviewed. Afebrile, hemodynamically stable, saturating well on room air    Patient got cardiac cath yesterday, plan for AICD today/tomorrow per cardio  And received fluids and Mucomyst as per nephrology, creatinine improving    ROS:  Constitutional:  negative for chills, fevers, sweats  Respiratory:  negative for cough, dyspnea on exertion, hemoptysis, shortness of breath, wheezing  Cardiovascular:  negative for chest pain, chest pressure/discomfort, lower extremity edema, palpitations  Gastrointestinal:  negative for abdominal pain, constipation, diarrhea, nausea, vomiting  Neurological:  negative for dizziness, headache  BRIEF HISTORY       A 47-year-old male who was admitted to ICU after cardiac arrest.  Family called EMS after he was found down, total time unknown. He was found to be in V. fib arrest, ROSC achieved after 2 shocks but subsequently he went into PEA arrest on the way to hospital, responded to epinephrine.       There was concern for STEMI, cardiology was consulted, they did not meet the criteria for STEMI and cath was postponed until neurological prognosis is clear. He was placed on hypothermia, protocol started on heparin and amiodarone. He required dobutamine transiently for bradycardia.     He also had acute hypoxemic and hypercarbic respiratory failure with possible aspiration bibasilar pneumonia. He was started on Unasyn.   He got without dullness to percussion. Breath sounds bilaterally were clear to auscultation. There were no wheezes, rhonchi or rales. There is no intercostal retraction or use of accessory muscles. No egophony noted. Cardiovascular: Regular without murmur, clicks, gallops or rubs. Abdomen: Slightly rounded and soft without organomegaly. No rebound, rigidity or guarding was appreciated. Lymphatic: No lymphadenopathy. Musculoskeletal: Normal curvature of the spine. No gross muscle weakness. Extremities:  No lower extremity edema, ulcerations, tenderness, varicosities or erythema. Muscle size, tone and strength are normal.  No involuntary movements are noted. Skin:  Warm and dry. Good color, turgor and pigmentation. No lesions or scars. No cyanosis or clubbing  Neurological/Psychiatric: Could not be assessed because of patient's mental condition.   Medications:  Scheduled Medications:    tamsulosin  0.4 mg Oral Daily    lansoprazole  30 mg Per NG tube QAM AC    heparin (porcine)  5,000 Units SubCUTAneous 3 times per day    cloNIDine  0.2 mg Oral BID    docusate  100 mg Oral Daily    [Held by provider] furosemide  40 mg IntraVENous Daily    carvedilol  25 mg Oral BID WC    azaTHIOprine  75 mg Oral Daily    aspirin  81 mg Oral Daily    atorvastatin  80 mg Oral Daily    sodium chloride flush  5-40 mL IntraVENous 2 times per day     Continuous Infusions:    sodium chloride 100 mL/hr at 02/25/22 1720    dextrose       PRN Medicationsbisacodyl, 10 mg, Daily PRN  metoclopramide, 5 mg, Q6H PRN  labetalol, 10 mg, Q6H PRN  sodium chloride flush, 5-40 mL, PRN  sodium chloride, 25 mL, PRN  ondansetron, 4 mg, Q8H PRN   Or  ondansetron, 4 mg, Q6H PRN  polyethylene glycol, 17 g, Daily PRN  acetaminophen, 650 mg, Q6H PRN   Or  acetaminophen, 650 mg, Q6H PRN  glucose, 15 g, PRN  glucagon (rDNA), 1 mg, PRN  dextrose, 100 mL/hr, PRN  dextrose bolus (hypoglycemia), 125 mL, PRN   Or  dextrose bolus (hypoglycemia), 250 mL, PRN  ipratropium-albuterol, 1 ampule, Q6H PRN        Diagnostic Labs:  CBC:   Recent Labs     03/08/22  0312 03/09/22  0440 03/10/22  0447   WBC 13.4* 11.4* 8.5   RBC 3.29* 3.23* 2.95*   HGB 10.2* 9.8* 9.1*   HCT 31.9* 31.1* 29.1*   MCV 97.0 96.3 98.6   RDW 17.4* 17.4* 17.0*    292 275     BMP:   Recent Labs     03/08/22  0312 03/09/22  0440 03/10/22  0447   * 133* 132*   K 4.1 3.9 3.7   CL 92* 94* 94*   CO2 23 23 23   BUN 40* 43* 38*   CREATININE 1.65* 1.73* 1.30*     BNP: No results for input(s): BNP in the last 72 hours. PT/INR: No results for input(s): PROTIME, INR in the last 72 hours. APTT:   No results for input(s): APTT in the last 72 hours. CARDIAC ENZYMES: No results for input(s): CKMB, CKMBINDEX, TROPONINI in the last 72 hours. Invalid input(s): CKTOTAL;3  FASTING LIPID PANEL:  Lab Results   Component Value Date    CHOL 188 11/07/2020    HDL 52 11/07/2020    TRIG 235 (H) 11/07/2020     LIVER PROFILE:   No results for input(s): AST, ALT, ALB, BILIDIR, BILITOT, ALKPHOS in the last 72 hours. MICROBIOLOGY:   Lab Results   Component Value Date/Time    CULTURE NORMAL RESPIRATORY DOUGIE MODERATE GROWTH 02/22/2022 01:58 PM       Imaging:    XR ABDOMEN FOR NG/OG/NE TUBE PLACEMENT    Result Date: 3/3/2022  Enteric tube with tip and side-port in the stomach. Nonobstructive bowel gas pattern       ASSESSMENT & PLAN     A 42-year-old male who presented post V. fib arrest, intubated for acute hypoxic respiratory failure, was admitted inpatient for the evaluation and management post V. fib arrest.     V. fib arrest   CAD  Ischemic cardiomyopathy  S/p cardiac cath 3/9/2022 which showed severe native vessel CAD, Patent LIMA-LAD. Patent SVG-RPDA and Known occluded SVG-OM.    Plan for AICD prior to discharge  Continue aspirin Lipitor, Coreg    Metabolic encephalopathy  Resolved     Acute hypoxic and hypercapnic respiratory failure  Likely secondary to aspiration pneumonia, completed course of Unasyn  S/p Extubation  Currently on room air    ARON on CKD stage IV  Improving today- within baseline 1.2-1.7. Nephro on board  Lasix held for now     ANCA Vasculitis  On Azathioprine     HTN  On Coreg 25 twice daily  On clonidine 0.2 mg twice daily    COPD  Bronchodilators as needed     DVT ppx  On heparin     GI ppx  Not indicated     PT/OT  On board     Discharge Planning:   to assist in discharge planning, likely discharge to SNF- [address].        Jorje Chahal MD  Internal Medicine Resident, PGY-2  [facility] , [address] [place] [address]   8/55/7690,9:94 PM

## 2022-03-10 NOTE — PLAN OF CARE
Called to bedside by [facility] RN asking to assess the patient as patient had a heart rate of 110 bpm, temperature of 102.9F, respiration rate of 40 breaths per minute and blood pressure of 129/72. RN stated patient had shaking of all extremities as well which was new for him. RN stated pt's mentation has declined from when he saw the pt yesterday, stating he was alert and oriented x3. At bedside, patient was unable to answer most questions appropriately. He did state that the shaking is not abnormal for him. Patient was oriented to person (first name only and date of birth). Patient was able to follow commands such as squeezing my hands and wiggling his toes. There was no apparent focal neurological deficits apperciated upon my examination. Patient has had indwelling umaña catheter for 5 days. Decision was made to obtain sepsis work-up due to new onset of symptoms. This work-up included lactic acid, blood cultures x2, UA with reflex to culture and normal saline at 100mL/hr based on last Echo done at the end of February showing EF of 45%. It does not appear that patient has any other source of infection other than the Umaña. Pt does have an area of erythema and warmth on the lateral aspect of the left leg that may represent cellulitis but is unlikely the source of pt's symptoms in my clinical opinion. I personally examined the patient for any wounds as well as speaking with the RN regarding possible decubitus ulcers in the sacrum or on the heels. RN denied any wounds and none were appreciated upon my examination. Man Wilson MD  [facility];  [place], [place]
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
## 2022-03-10 NOTE — PLAN OF CARE
Spoke with [facility]. This patient has them for home health. Their phone number is [phone]. [phone] They are to be notified once patient is discharged.

## 2022-03-10 NOTE — VCC REMOTE MONITORING
Spoke with primary RN Espinoza Roberts regarding 3 hour  Sepsis bundle.     [address]  Callback# [phone]

## 2022-03-11 ENCOUNTER — APPOINTMENT (OUTPATIENT)
Dept: GENERAL RADIOLOGY | Age: 57
DRG: 192 | End: 2022-03-11
Payer: COMMERCIAL

## 2022-03-11 LAB
ABSOLUTE EOS #: 0.17 K/UL (ref 0–0.44)
ABSOLUTE IMMATURE GRANULOCYTE: 0.04 K/UL (ref 0–0.3)
ABSOLUTE LYMPH #: 0.93 K/UL (ref 1.1–3.7)
ABSOLUTE MONO #: 0.48 K/UL (ref 0.1–1.2)
ANION GAP SERPL CALCULATED.3IONS-SCNC: 10 MMOL/L (ref 9–17)
BASOPHILS # BLD: 1 % (ref 0–2)
BASOPHILS ABSOLUTE: 0.03 K/UL (ref 0–0.2)
BUN BLDV-MCNC: 25 MG/DL (ref 6–20)
CALCIUM SERPL-MCNC: 8.1 MG/DL (ref 8.6–10.4)
CHLORIDE BLD-SCNC: 100 MMOL/L (ref 98–107)
CO2: 25 MMOL/L (ref 20–31)
CREAT SERPL-MCNC: 1.13 MG/DL (ref 0.7–1.2)
CULTURE: NO GROWTH
EOSINOPHILS RELATIVE PERCENT: 3 % (ref 1–4)
GFR AFRICAN AMERICAN: >60 ML/MIN
GFR NON-AFRICAN AMERICAN: >60 ML/MIN
GFR SERPL CREATININE-BSD FRML MDRD: ABNORMAL ML/MIN/{1.73_M2}
GLUCOSE BLD-MCNC: 101 MG/DL (ref 70–99)
HCT VFR BLD CALC: 29.4 % (ref 40.7–50.3)
HEMOGLOBIN: 9 G/DL (ref 13–17)
IMMATURE GRANULOCYTES: 1 %
LYMPHOCYTES # BLD: 14 % (ref 24–43)
MAGNESIUM: 2.1 MG/DL (ref 1.6–2.6)
MCH RBC QN AUTO: 30.7 PG (ref 25.2–33.5)
MCHC RBC AUTO-ENTMCNC: 30.6 G/DL (ref 28.4–34.8)
MCV RBC AUTO: 100.3 FL (ref 82.6–102.9)
MONOCYTES # BLD: 7 % (ref 3–12)
NRBC AUTOMATED: 0 PER 100 WBC
PDW BLD-RTO: 17 % (ref 11.8–14.4)
PLATELET # BLD: 292 K/UL (ref 138–453)
PMV BLD AUTO: 10.4 FL (ref 8.1–13.5)
POTASSIUM SERPL-SCNC: 3.4 MMOL/L (ref 3.7–5.3)
RBC # BLD: 2.93 M/UL (ref 4.21–5.77)
RBC # BLD: ABNORMAL 10*6/UL
SEG NEUTROPHILS: 74 % (ref 36–65)
SEGMENTED NEUTROPHILS ABSOLUTE COUNT: 4.82 K/UL (ref 1.5–8.1)
SODIUM BLD-SCNC: 135 MMOL/L (ref 135–144)
SPECIMEN DESCRIPTION: NORMAL
WBC # BLD: 6.5 K/UL (ref 3.5–11.3)

## 2022-03-11 PROCEDURE — 99233 SBSQ HOSP IP/OBS HIGH 50: CPT | Performed by: INTERNAL MEDICINE

## 2022-03-11 PROCEDURE — 6360000002 HC RX W HCPCS: Performed by: STUDENT IN AN ORGANIZED HEALTH CARE EDUCATION/TRAINING PROGRAM

## 2022-03-11 PROCEDURE — 71045 X-RAY EXAM CHEST 1 VIEW: CPT

## 2022-03-11 PROCEDURE — 6360000002 HC RX W HCPCS: Performed by: NURSE PRACTITIONER

## 2022-03-11 PROCEDURE — 2580000003 HC RX 258: Performed by: STUDENT IN AN ORGANIZED HEALTH CARE EDUCATION/TRAINING PROGRAM

## 2022-03-11 PROCEDURE — 85025 COMPLETE CBC W/AUTO DIFF WBC: CPT

## 2022-03-11 PROCEDURE — 6370000000 HC RX 637 (ALT 250 FOR IP): Performed by: STUDENT IN AN ORGANIZED HEALTH CARE EDUCATION/TRAINING PROGRAM

## 2022-03-11 PROCEDURE — 6370000000 HC RX 637 (ALT 250 FOR IP): Performed by: INTERNAL MEDICINE

## 2022-03-11 PROCEDURE — 6360000002 HC RX W HCPCS

## 2022-03-11 PROCEDURE — 85027 COMPLETE CBC AUTOMATED: CPT

## 2022-03-11 PROCEDURE — 6370000000 HC RX 637 (ALT 250 FOR IP): Performed by: NURSE PRACTITIONER

## 2022-03-11 PROCEDURE — 2580000003 HC RX 258

## 2022-03-11 PROCEDURE — 36415 COLL VENOUS BLD VENIPUNCTURE: CPT

## 2022-03-11 PROCEDURE — 2060000000 HC ICU INTERMEDIATE R&B

## 2022-03-11 PROCEDURE — 6370000000 HC RX 637 (ALT 250 FOR IP)

## 2022-03-11 PROCEDURE — 83735 ASSAY OF MAGNESIUM: CPT

## 2022-03-11 PROCEDURE — 80048 BASIC METABOLIC PNL TOTAL CA: CPT

## 2022-03-11 RX ORDER — POTASSIUM CHLORIDE 20 MEQ/1
40 TABLET, EXTENDED RELEASE ORAL ONCE
Status: COMPLETED | OUTPATIENT
Start: 2022-03-11 | End: 2022-03-11

## 2022-03-11 RX ADMIN — CEFTRIAXONE SODIUM 1000 MG: 1 INJECTION, POWDER, FOR SOLUTION INTRAMUSCULAR; INTRAVENOUS at 19:50

## 2022-03-11 RX ADMIN — SODIUM CHLORIDE, PRESERVATIVE FREE 10 ML: 5 INJECTION INTRAVENOUS at 21:35

## 2022-03-11 RX ADMIN — POTASSIUM CHLORIDE 40 MEQ: 1500 TABLET, EXTENDED RELEASE ORAL at 08:36

## 2022-03-11 RX ADMIN — SODIUM CHLORIDE 25 ML: 9 INJECTION, SOLUTION INTRAVENOUS at 19:50

## 2022-03-11 RX ADMIN — SODIUM CHLORIDE, PRESERVATIVE FREE 10 ML: 5 INJECTION INTRAVENOUS at 08:31

## 2022-03-11 RX ADMIN — ATORVASTATIN CALCIUM 80 MG: 80 TABLET, FILM COATED ORAL at 08:31

## 2022-03-11 RX ADMIN — HEPARIN SODIUM 5000 UNITS: 5000 INJECTION INTRAVENOUS; SUBCUTANEOUS at 13:49

## 2022-03-11 RX ADMIN — ASPIRIN 81 MG: 81 TABLET, CHEWABLE ORAL at 08:31

## 2022-03-11 RX ADMIN — CARVEDILOL 25 MG: 25 TABLET, FILM COATED ORAL at 08:31

## 2022-03-11 RX ADMIN — AZATHIOPRINE 75 MG: 50 TABLET ORAL at 08:31

## 2022-03-11 RX ADMIN — FUROSEMIDE 40 MG: 40 TABLET ORAL at 08:31

## 2022-03-11 RX ADMIN — HEPARIN SODIUM 5000 UNITS: 5000 INJECTION INTRAVENOUS; SUBCUTANEOUS at 06:12

## 2022-03-11 RX ADMIN — CLONIDINE HYDROCHLORIDE 0.2 MG: 0.1 TABLET ORAL at 21:35

## 2022-03-11 RX ADMIN — HEPARIN SODIUM 5000 UNITS: 5000 INJECTION INTRAVENOUS; SUBCUTANEOUS at 21:35

## 2022-03-11 RX ADMIN — DOCUSATE SODIUM LIQUID 100 MG: 100 LIQUID ORAL at 08:31

## 2022-03-11 RX ADMIN — TAMSULOSIN HYDROCHLORIDE 0.4 MG: 0.4 CAPSULE ORAL at 08:31

## 2022-03-11 RX ADMIN — CLONIDINE HYDROCHLORIDE 0.2 MG: 0.1 TABLET ORAL at 08:31

## 2022-03-11 NOTE — PROGRESS NOTES
Physical Therapy        Physical Therapy Cancel Note      DATE: 3/11/2022    NAME: Leanna Morales  MRN: [id number]   : 1965      Patient not seen this date for Physical Therapy due to:    Patient Declined: Pt refusing to participate in PT at this time, Will check back as able.       Electronically signed by Gricel Brandt PTA on 3/11/2022 at 1:50 PM

## 2022-03-11 NOTE — PLAN OF CARE
Problem: Confusion - Acute:  Goal: Absence of continued neurological deterioration signs and symptoms  Description: Absence of continued neurological deterioration signs and symptoms  3/11/2022 1502 by Reuben Dover RN  Outcome: Ongoing  3/11/2022 0538 by Katharine Villegas RN  Outcome: Ongoing  Goal: Mental status will be restored to baseline  Description: Mental status will be restored to baseline  3/11/2022 1502 by Reuben Dover RN  Outcome: Ongoing  3/11/2022 0538 by Katharine Villegas RN  Outcome: Ongoing     Problem: Discharge Planning:  Goal: Ability to perform activities of daily living will improve  Description: Ability to perform activities of daily living will improve  3/11/2022 1502 by Reuben Dover RN  Outcome: Ongoing  3/11/2022 0538 by Katharine Villegas RN  Outcome: Ongoing  Goal: Participates in care planning  Description: Participates in care planning  3/11/2022 1502 by Reuben Dover RN  Outcome: Ongoing  3/11/2022 0538 by Katharine Villegas RN  Outcome: Ongoing     Problem: Injury - Risk of, Physical Injury:  Goal: Absence of physical injury  Description: Absence of physical injury  3/11/2022 1502 by Reuben Dover RN  Outcome: Ongoing  3/11/2022 0538 by Katharine Villegas RN  Outcome: Ongoing  Goal: Will remain free from falls  Description: Will remain free from falls  3/11/2022 1502 by Reuben Dover RN  Outcome: Ongoing  3/11/2022 0538 by Katharine Villegas RN  Outcome: Ongoing     Problem: Mood - Altered:  Goal: Mood stable  Description: Mood stable  3/11/2022 1502 by Reuben Dover RN  Outcome: Ongoing  3/11/2022 0538 by Katharine Villegas RN  Outcome: Ongoing  Goal: Absence of abusive behavior  Description: Absence of abusive behavior  3/11/2022 1502 by Reuben Dover RN  Outcome: Ongoing  3/11/2022 0538 by Katharine Villegas RN  Outcome: Ongoing  Goal: Verbalizations of feeling emotionally comfortable while being cared for will increase  Description: Verbalizations of feeling emotionally comfortable while being cared for will increase  3/11/2022 1502 by Kahlil Moon RN  Outcome: Ongoing  3/11/2022 0538 by Lise Sales RN  Outcome: Ongoing     Problem: Psychomotor Activity - Altered:  Goal: Absence of psychomotor disturbance signs and symptoms  Description: Absence of psychomotor disturbance signs and symptoms  3/11/2022 1502 by Kahlil Moon RN  Outcome: Ongoing  3/11/2022 0538 by Lise Sales RN  Outcome: Ongoing     Problem: Sensory Perception - Impaired:  Goal: Demonstrations of improved sensory functioning will increase  Description: Demonstrations of improved sensory functioning will increase  3/11/2022 1502 by Kahlil Moon RN  Outcome: Ongoing  3/11/2022 0538 by Lise Sales RN  Outcome: Ongoing  Goal: Decrease in sensory misperception frequency  Description: Decrease in sensory misperception frequency  3/11/2022 1502 by Kahlil Moon RN  Outcome: Ongoing  3/11/2022 0538 by Lise Sales RN  Outcome: Ongoing  Goal: Able to refrain from responding to false sensory perceptions  Description: Able to refrain from responding to false sensory perceptions  3/11/2022 1502 by Kahlil Moon RN  Outcome: Ongoing  3/11/2022 0538 by Lise Sales RN  Outcome: Ongoing  Goal: Demonstrates accurate environmental perceptions  Description: Demonstrates accurate environmental perceptions  3/11/2022 1502 by Kahlil Moon RN  Outcome: Ongoing  3/11/2022 0538 by Lise Sales RN  Outcome: Ongoing  Goal: Able to distinguish between reality-based and nonreality-based thinking  Description: Able to distinguish between reality-based and nonreality-based thinking  3/11/2022 1502 by Kahlil Moon RN  Outcome: Ongoing  3/11/2022 0538 by Lise Sales RN  Outcome: Ongoing  Goal: Able to interrupt nonreality-based thinking  Description: Able to interrupt nonreality-based thinking  3/11/2022 1502 by Kahlil Moon RN  Outcome: Ongoing  3/11/2022 0538 by Sandie Bello RN  Outcome: Ongoing     Problem: Sleep Pattern Disturbance:  Goal: Appears well-rested  Description: Appears well-rested  3/11/2022 1502 by Roseanna Flynn RN  Outcome: Ongoing  3/11/2022 0538 by Sandie Bello RN  Outcome: Ongoing     Problem: Nutrition  Goal: Optimal nutrition therapy  3/11/2022 1502 by Roseanna Flynn RN  Outcome: Ongoing  3/11/2022 0538 by Sandie Bello RN  Outcome: Ongoing     Problem: Falls - Risk of:  Goal: Absence of physical injury  Description: Absence of physical injury  3/11/2022 1502 by Roseanna Flynn RN  Outcome: Ongoing  3/11/2022 0538 by Sandie Bello RN  Outcome: Ongoing  Goal: Will remain free from falls  Description: Will remain free from falls  3/11/2022 1502 by Roseanna Flynn RN  Outcome: Ongoing  3/11/2022 0538 by Sandie Bello RN  Outcome: Ongoing     Problem: Skin Integrity:  Goal: Will show no infection signs and symptoms  Description: Will show no infection signs and symptoms  3/11/2022 1502 by Roseanna Flynn RN  Outcome: Ongoing  3/11/2022 0538 by Sandie Bello RN  Outcome: Ongoing  Goal: Absence of new skin breakdown  Description: Absence of new skin breakdown  3/11/2022 1502 by Roseanna Flynn RN  Outcome: Ongoing  3/11/2022 0538 by Sandie Bello RN  Outcome: Ongoing

## 2022-03-11 NOTE — PROGRESS NOTES
[facility]  Internal Medicine Teaching Residency Program  Inpatient Daily Progress Note  ______________________________________________________________________________    Patient: Agnieszka Calzada  YOB: 1965   SZT:[id number]    Acct: [de-identified]     Room: [facility]7160-  Admit date: 2/21/2022  Today's date: 03/11/22  Number of days in the hospital: 17    SUBJECTIVE   Admitting Diagnosis: Cardiac arrest ([address]  CC: V. Fib arrest    Pt seen and examined bedside. Overnight resident called to bedside by RN, patient was tachy in 110s and febrile twith remp of 102. 9. no obvious source of infection besides umaña catheter. Sepsis workup initiated. LA 3.2. Afebrile this morning, hemodynamically stable, saturating well on 1L NC  Labs reviewed. ARON resolved. Plan for single chamber ICD placement today      ROS:  Constitutional:  negative for chills, fevers, sweats  Respiratory:  negative for cough, dyspnea on exertion, hemoptysis, shortness of breath, wheezing  Cardiovascular:  negative for chest pain, chest pressure/discomfort, lower extremity edema, palpitations  Gastrointestinal:  negative for abdominal pain, constipation, diarrhea, nausea, vomiting  Neurological:  negative for dizziness, headache  BRIEF HISTORY       A 17-year-old male who was admitted to ICU after cardiac arrest.  Family called EMS after he was found down, total time unknown. He was found to be in V. fib arrest, ROSC achieved after 2 shocks but subsequently he went into PEA arrest on the way to hospital, responded to epinephrine.       There was concern for STEMI, cardiology was consulted, they did not meet the criteria for STEMI and cath was postponed until neurological prognosis is clear. He was placed on hypothermia, protocol started on heparin and amiodarone.    He required dobutamine transiently for bradycardia.     He also had acute hypoxemic and hypercarbic respiratory failure with possible aspiration bibasilar pneumonia. He was started on Unasyn. He got intubated in ER, and started on mechanical ventilation.       Had thrombocytopenia, anticoagulation was changed to argatroban. HIT was ruled out and heparin was restarted. Electrolytes were replaced timely.     CT head was unremarkable. She underwent MRI of the brain, which revealed early changes of early hypoxic brain injury.       Over the course of a week, her neurological exam is gradually improving. .  Nephrology was consulted for ARON on CKD. Started him on IV Lasix.     Gradually, he tolerated spontaneous breathing trial well, got extubated without any immediate complications. Completed course of antibiotics for possible aspiration pneumonia.     He remained hemodynamically stable without any significant hypotension, or arrhythmias, therefore cardiology discontinued amiodarone drip and heparin drip. Cardiology stated that cath is not urgent at this time but will discuss the risk of contrast-induced nephropathy with the patient and family before cath and recommend AICD evaluation prior to discharge. He is also on azathioprine 70 mg daily for ANCA vasculitis.     He is transferred to the floor for the further evaluation and management. OBJECTIVE     Vital Signs:  /72   Pulse 64   Temp 98.1 °F (36.7 °C) (Oral)   Resp 18   Ht 5' 10\" (1.778 m)   Wt 179 lb 3.7 oz (81.3 kg)   SpO2 94%   BMI 25.72 kg/m²     Temp (24hrs), Av.1 °F (37.3 °C), Min:97.9 °F (36.6 °C), Max:102.9 °F (39.4 °C)    In: 1527.9   Out: 1700 [Urine:1700]    Physical Exam:  Constitutional: This is a well developed, well nourished, 25-29.9 - Overweight 60y.o. year old male who is alert, oriented, cooperative and in no apparent distress. Head:normocephalic and atraumatic. EENT:  PERRLA. No conjunctival injections. Septum was midline, mucosa was without erythema, exudates or cobblestoning. No thrush was noted.    Neck: Supple without thyromegaly. No elevated JVP. Trachea was midline. Respiratory: Chest was symmetrical without dullness to percussion. Breath sounds bilaterally were clear to auscultation. There were no wheezes, rhonchi or rales. There is no intercostal retraction or use of accessory muscles. No egophony noted. Cardiovascular: Regular without murmur, clicks, gallops or rubs. Abdomen: Slightly rounded and soft without organomegaly. No rebound, rigidity or guarding was appreciated. Lymphatic: No lymphadenopathy. Musculoskeletal: Normal curvature of the spine. No gross muscle weakness. Extremities:  No lower extremity edema, ulcerations, tenderness, varicosities or erythema. Muscle size, tone and strength are normal.  No involuntary movements are noted. Skin:  Warm and dry. Good color, turgor and pigmentation. No lesions or scars. No cyanosis or clubbing  Neurological/Psychiatric: Could not be assessed because of patient's mental condition.   Medications:  Scheduled Medications:    furosemide  40 mg Oral Daily    cefTRIAXone (ROCEPHIN) IV  1,000 mg IntraVENous Q24H    tamsulosin  0.4 mg Oral Daily    heparin (porcine)  5,000 Units SubCUTAneous 3 times per day    cloNIDine  0.2 mg Oral BID    docusate  100 mg Oral Daily    [Held by provider] furosemide  40 mg IntraVENous Daily    carvedilol  25 mg Oral BID WC    azaTHIOprine  75 mg Oral Daily    aspirin  81 mg Oral Daily    atorvastatin  80 mg Oral Daily    sodium chloride flush  5-40 mL IntraVENous 2 times per day     Continuous Infusions:    sodium chloride 100 mL/hr at 02/25/22 1720    dextrose       PRN Medicationsbisacodyl, 10 mg, Daily PRN  metoclopramide, 5 mg, Q6H PRN  labetalol, 10 mg, Q6H PRN  sodium chloride flush, 5-40 mL, PRN  sodium chloride, 25 mL, PRN  ondansetron, 4 mg, Q8H PRN   Or  ondansetron, 4 mg, Q6H PRN  polyethylene glycol, 17 g, Daily PRN  acetaminophen, 650 mg, Q6H PRN   Or  acetaminophen, 650 mg, Q6H PRN  glucose, 15 g, PRN  glucagon (rDNA), 1 mg, PRN  dextrose, 100 mL/hr, PRN  dextrose bolus (hypoglycemia), 125 mL, PRN   Or  dextrose bolus (hypoglycemia), 250 mL, PRN  ipratropium-albuterol, 1 ampule, Q6H PRN        Diagnostic Labs:  CBC:   Recent Labs     03/09/22  0440 03/10/22  0447   WBC 11.4* 8.5   RBC 3.23* 2.95*   HGB 9.8* 9.1*   HCT 31.1* 29.1*   MCV 96.3 98.6   RDW 17.4* 17.0*    275     BMP:   Recent Labs     03/09/22  0440 03/10/22  0447 03/11/22  0534   * 132* 135   K 3.9 3.7 3.4*   CL 94* 94* 100   CO2 23 23 25   BUN 43* 38* 25*   CREATININE 1.73* 1.30* 1.13     BNP: No results for input(s): BNP in the last 72 hours. PT/INR: No results for input(s): PROTIME, INR in the last 72 hours. APTT:   No results for input(s): APTT in the last 72 hours. CARDIAC ENZYMES: No results for input(s): CKMB, CKMBINDEX, TROPONINI in the last 72 hours. Invalid input(s): CKTOTAL;3  FASTING LIPID PANEL:  Lab Results   Component Value Date    CHOL 188 11/07/2020    HDL 52 11/07/2020    TRIG 235 (H) 11/07/2020     LIVER PROFILE:   No results for input(s): AST, ALT, ALB, BILIDIR, BILITOT, ALKPHOS in the last 72 hours. MICROBIOLOGY:   Lab Results   Component Value Date/Time    CULTURE NO GROWTH <24 HRS 03/10/2022 07:06 PM       Imaging:    XR ABDOMEN FOR NG/OG/NE TUBE PLACEMENT    Result Date: 3/3/2022  Enteric tube with tip and side-port in the stomach. Nonobstructive bowel gas pattern       ASSESSMENT & PLAN     A 57-year-old male who presented post V. fib arrest, intubated for acute hypoxic respiratory failure, was admitted inpatient for the evaluation and management post V. fib arrest.     V. fib arrest   CAD  Ischemic cardiomyopathy  S/p cardiac cath 3/9/2022 which showed severe native vessel CAD, Patent LIMA-LAD. Patent SVG-RPDA and Known occluded SVG-OM.    Plan for single chamber ICD placement today  Continue aspirin Lipitor, Coreg    Metabolic encephalopathy  Resolved     Acute hypoxic and hypercapnic respiratory failure  Likely secondary to aspiration pneumonia, completed course of Unasyn  S/p Extubation  Currently on room air - 1 L NC    ARON on CKD stage IV  Resolved  Nephrology following  Lasix resumed today     ANCA Vasculitis  On Azathioprine     HTN  On Coreg 25 twice daily  On clonidine 0.2 mg twice daily    COPD  Bronchodilators as needed     DVT ppx  On heparin     GI ppx  Not indicated     PT/OT  On board     Discharge Planning:   to assist in discharge planning, likely discharge to [facility]. Camelia Osborne MD  Internal Medicine Resident, PGY-3  [address], [place]  3/11/2022, 7:44 AM

## 2022-03-11 NOTE — CARE COORDINATION
Transitional planning    Writer contacted daughter Bibi Pérez and discussed d/c plan for SNF and need for choices. She states she was unable to open the email that was sent by Darris Canavan, encouraged to look online at area SNF or come in and get list. She states she will work on it and call or stop in tomorrow.

## 2022-03-11 NOTE — PLAN OF CARE
Problem: Confusion - Acute:  Goal: Absence of continued neurological deterioration signs and symptoms  Description: Absence of continued neurological deterioration signs and symptoms  Outcome: Ongoing     Problem: Confusion - Acute:  Goal: Mental status will be restored to baseline  Description: Mental status will be restored to baseline  Outcome: Ongoing     Problem: Discharge Planning:  Goal: Ability to perform activities of daily living will improve  Description: Ability to perform activities of daily living will improve  Outcome: Ongoing     Problem: Discharge Planning:  Goal: Participates in care planning  Description: Participates in care planning  Outcome: Ongoing     Problem: Injury - Risk of, Physical Injury:  Goal: Absence of physical injury  Description: Absence of physical injury  Outcome: Ongoing     Problem: Injury - Risk of, Physical Injury:  Goal: Will remain free from falls  Description: Will remain free from falls  Outcome: Ongoing     Problem: Mood - Altered:  Goal: Mood stable  Description: Mood stable  Outcome: Ongoing     Problem: Mood - Altered:  Goal: Absence of abusive behavior  Description: Absence of abusive behavior  Outcome: Ongoing     Problem: Mood - Altered:  Goal: Verbalizations of feeling emotionally comfortable while being cared for will increase  Description: Verbalizations of feeling emotionally comfortable while being cared for will increase  Outcome: Ongoing     Problem: Psychomotor Activity - Altered:  Goal: Absence of psychomotor disturbance signs and symptoms  Description: Absence of psychomotor disturbance signs and symptoms  Outcome: Ongoing     Problem: Sensory Perception - Impaired:  Goal: Demonstrations of improved sensory functioning will increase  Description: Demonstrations of improved sensory functioning will increase  Outcome: Ongoing     Problem: Sensory Perception - Impaired:  Goal: Decrease in sensory misperception frequency  Description: Decrease in sensory misperception frequency  Outcome: Ongoing     Problem: Sensory Perception - Impaired:  Goal: Able to refrain from responding to false sensory perceptions  Description: Able to refrain from responding to false sensory perceptions  Outcome: Ongoing     Problem: Sensory Perception - Impaired:  Goal: Demonstrates accurate environmental perceptions  Description: Demonstrates accurate environmental perceptions  Outcome: Ongoing     Problem: Sensory Perception - Impaired:  Goal: Able to distinguish between reality-based and nonreality-based thinking  Description: Able to distinguish between reality-based and nonreality-based thinking  Outcome: Ongoing     Problem: Sensory Perception - Impaired:  Goal: Able to interrupt nonreality-based thinking  Description: Able to interrupt nonreality-based thinking  Outcome: Ongoing     Problem: Sleep Pattern Disturbance:  Goal: Appears well-rested  Description: Appears well-rested  Outcome: Ongoing     Problem: Nutrition  Goal: Optimal nutrition therapy  Outcome: Ongoing     Problem: Falls - Risk of:  Goal: Absence of physical injury  Description: Absence of physical injury  Outcome: Ongoing     Problem: Falls - Risk of:  Goal: Will remain free from falls  Description: Will remain free from falls  Outcome: Ongoing     Problem: Skin Integrity:  Goal: Will show no infection signs and symptoms  Description: Will show no infection signs and symptoms  Outcome: Ongoing     Problem: Skin Integrity:  Goal: Absence of new skin breakdown  Description: Absence of new skin breakdown  Outcome: Ongoing

## 2022-03-11 NOTE — PROGRESS NOTES
Occupational [address] [employer]  Occupational Therapy Not Seen Note    DATE: 3/11/2022    NAME: Mary Lozano  MRN: [id number]   : 1965      Patient not seen this date for Occupational Therapy due to: Unable to arouse pt. Pt sleeping soundly. RN notified. Next Scheduled Treatment: Check back when able.     Electronically signed by Felicia NAIR on 3/11/2022 at 11:45 AM   [address]

## 2022-03-11 NOTE — PROGRESS NOTES
Pulse 76   Temp 97.9 °F (36.6 °C) (Oral)   Resp 18   Ht 5' 10\" (1.778 m)   Wt 179 lb 3.7 oz (81.3 kg)   SpO2 94%   BMI 25.72 kg/m²   General appearance: alert but confused, follows commands at times. Does not know orientation of time or place  HEENT: Head: Normocephalic, no lesions, without obvious abnormality. Neck:no JVD, trachea midline, no adenopathy  Lungs: Clear to auscultation, dim throughout  Heart: Regular rate and rhythm, s1/s2 auscultated, no murmurs  Abdomen: soft, non-tender, bowel sounds active  Extremities: no edema  Neurologic: not done    · CAD s/p CABG x4 in 2011  · Stress test 10/30/2018: Negative Lexiscan stress test  · Coronary angiography 10/30/2018: Patent LIMALAD and SVGRCA grafts.  Occluded SVGOM1. · Echo 11/7/2020: EF 55%.  Grade 1 DD.  Moderate LVH. Echo 2/23/22  Summary  Left ventricle is normal in size. Global left ventricular systolic function  is mildly reduced. Calculated ejection fraction 45% by Tirado's method. Left atrium is normal in size. Right atrium is normal in size. Right ventricular function appears reduced. Moderately dilated right ventricular cavity. Possible Mack sign present. Aortic valve is trileaflet and opens adequately. No significant valvular abnormalities. Cardiac Cath 3/9/22  Findings:  Cardiac Arteries and Lesion Findings       LMCA: Mild irregularities 10-20%. LAD: 100% proximal occlusion. LCx: Mild irregularities 10-20%. 100% OM Occlusion     RCA: 100% mid occlusion. Cardiac Grafts        - Dianelys Ramirezo is a Free LIMA graft that originates at the LIMA and attaches to       the Mid LAD. Patent with mild 20-30% disease.        -  There is a Vein graft that originates at the Aorta Right and attaches       to the R PAV. patent with mild 20-30% disease.        -  There is a Vein graft that originates at the Aorta Left and attaches to       the 1st Ob Ai.  known to be 100% occluded.        Coronary Tree      Procedure Summary     Depression with suicidal ideation     Gastroesophageal reflux disease with esophagitis     Positive FIT (fecal immunochemical test)     Constipation     Degenerative disc disease, cervical     Chest pain     Tobacco abuse counseling     Polyp of transverse colon     Polyp of descending colon     Rectal polyp     Hypomagnesemia     Pleural effusion     COPD (chronic obstructive pulmonary disease) (HCC)     CAD (coronary artery disease)     Microscopic hematuria     Acute on chronic diastolic (congestive) heart failure (HCC)     CHF (congestive heart failure), NYHA class I, acute, diastolic (HCC)     Pneumonia     Liver lesion     Mild malnutrition (HCC)     Dysphagia     Gastroparesis     ARON (acute kidney injury) ([address]     Major depressive disorder, single episode     Unintentional weight loss of 10% body weight within 6 months     Esophageal dysphagia     Moderate malnutrition (HCC)     Anxiety     Closed fracture of fifth metatarsal bone     Interstitial lung disease (HCC)     NSTEMI (non-ST elevated myocardial infarction) ([address]     Type 2 diabetes mellitus without complication (HCC)     Elevated serum immunoglobulin free light chain level     Abnormal ANCA (antineutrophil cytoplasmic antibody)     Chronic kidney disease     Hypocalcemia     Anemia in stage 3 chronic kidney disease     Acute kidney failure with lesion of tubular necrosis (HCC)     Nephrotic syndrome with focal glomerulosclerosis     Rapid progressv nephritic syndrm, diffuse crescentc glomerulonephritis     Peripheral edema     Syncope and collapse     Primary pauci-immune necrotizing and crescentic glomerulonephritis     Cardiac arrest ([address]     Cervical stenosis of spinal canal      Plan of Treatment:   1. Stable from CV standpoint. . Cotninue PO BB, Norvasc, & Clonidine. Continue PO ASA, statin, BB. No ACE/ARB d/t CKD. Continue medical management. 2. EP consult for AICD evaluation given VF arrest. Pt. Is agreeable at this time to proceed.  He

## 2022-03-12 LAB
ANION GAP SERPL CALCULATED.3IONS-SCNC: 12 MMOL/L (ref 9–17)
BUN BLDV-MCNC: 20 MG/DL (ref 6–20)
CALCIUM SERPL-MCNC: 8 MG/DL (ref 8.6–10.4)
CHLORIDE BLD-SCNC: 97 MMOL/L (ref 98–107)
CO2: 23 MMOL/L (ref 20–31)
CREAT SERPL-MCNC: 1.09 MG/DL (ref 0.7–1.2)
GFR AFRICAN AMERICAN: >60 ML/MIN
GFR NON-AFRICAN AMERICAN: >60 ML/MIN
GFR SERPL CREATININE-BSD FRML MDRD: ABNORMAL ML/MIN/{1.73_M2}
GLUCOSE BLD-MCNC: 109 MG/DL (ref 70–99)
HCT VFR BLD CALC: 27.4 % (ref 40.7–50.3)
HEMOGLOBIN: 8.6 G/DL (ref 13–17)
MAGNESIUM: 1.9 MG/DL (ref 1.6–2.6)
MCH RBC QN AUTO: 30.2 PG (ref 25.2–33.5)
MCHC RBC AUTO-ENTMCNC: 31.4 G/DL (ref 28.4–34.8)
MCV RBC AUTO: 96.1 FL (ref 82.6–102.9)
NRBC AUTOMATED: 0 PER 100 WBC
PDW BLD-RTO: 17.2 % (ref 11.8–14.4)
PLATELET # BLD: 308 K/UL (ref 138–453)
PMV BLD AUTO: 10.8 FL (ref 8.1–13.5)
POTASSIUM SERPL-SCNC: 3.7 MMOL/L (ref 3.7–5.3)
RBC # BLD: 2.85 M/UL (ref 4.21–5.77)
SODIUM BLD-SCNC: 132 MMOL/L (ref 135–144)
WBC # BLD: 8 K/UL (ref 3.5–11.3)

## 2022-03-12 PROCEDURE — 6370000000 HC RX 637 (ALT 250 FOR IP): Performed by: STUDENT IN AN ORGANIZED HEALTH CARE EDUCATION/TRAINING PROGRAM

## 2022-03-12 PROCEDURE — 83735 ASSAY OF MAGNESIUM: CPT

## 2022-03-12 PROCEDURE — 2580000003 HC RX 258: Performed by: STUDENT IN AN ORGANIZED HEALTH CARE EDUCATION/TRAINING PROGRAM

## 2022-03-12 PROCEDURE — 6370000000 HC RX 637 (ALT 250 FOR IP)

## 2022-03-12 PROCEDURE — 6360000002 HC RX W HCPCS: Performed by: STUDENT IN AN ORGANIZED HEALTH CARE EDUCATION/TRAINING PROGRAM

## 2022-03-12 PROCEDURE — 2060000000 HC ICU INTERMEDIATE R&B

## 2022-03-12 PROCEDURE — 80048 BASIC METABOLIC PNL TOTAL CA: CPT

## 2022-03-12 PROCEDURE — 36415 COLL VENOUS BLD VENIPUNCTURE: CPT

## 2022-03-12 PROCEDURE — 94761 N-INVAS EAR/PLS OXIMETRY MLT: CPT

## 2022-03-12 PROCEDURE — 99233 SBSQ HOSP IP/OBS HIGH 50: CPT | Performed by: INTERNAL MEDICINE

## 2022-03-12 PROCEDURE — 6360000002 HC RX W HCPCS: Performed by: NURSE PRACTITIONER

## 2022-03-12 PROCEDURE — 6360000002 HC RX W HCPCS

## 2022-03-12 PROCEDURE — 92610 EVALUATE SWALLOWING FUNCTION: CPT

## 2022-03-12 PROCEDURE — 6370000000 HC RX 637 (ALT 250 FOR IP): Performed by: INTERNAL MEDICINE

## 2022-03-12 PROCEDURE — 85027 COMPLETE CBC AUTOMATED: CPT

## 2022-03-12 PROCEDURE — 2580000003 HC RX 258

## 2022-03-12 RX ORDER — MAGNESIUM SULFATE 1 G/100ML
1000 INJECTION INTRAVENOUS ONCE
Status: COMPLETED | OUTPATIENT
Start: 2022-03-12 | End: 2022-03-12

## 2022-03-12 RX ORDER — SODIUM CHLORIDE 9 MG/ML
25 INJECTION, SOLUTION INTRAVENOUS PRN
Status: DISCONTINUED | OUTPATIENT
Start: 2022-03-12 | End: 2022-03-17 | Stop reason: HOSPADM

## 2022-03-12 RX ORDER — LEVOFLOXACIN 5 MG/ML
750 INJECTION, SOLUTION INTRAVENOUS EVERY 24 HOURS
Status: DISCONTINUED | OUTPATIENT
Start: 2022-03-12 | End: 2022-03-15

## 2022-03-12 RX ORDER — CLONIDINE HYDROCHLORIDE 0.1 MG/1
0.1 TABLET ORAL 2 TIMES DAILY
Status: DISCONTINUED | OUTPATIENT
Start: 2022-03-12 | End: 2022-03-12

## 2022-03-12 RX ORDER — ACETAMINOPHEN 325 MG/1
650 TABLET ORAL EVERY 4 HOURS PRN
Status: DISCONTINUED | OUTPATIENT
Start: 2022-03-12 | End: 2022-03-17 | Stop reason: HOSPADM

## 2022-03-12 RX ORDER — SODIUM CHLORIDE 0.9 % (FLUSH) 0.9 %
5-40 SYRINGE (ML) INJECTION EVERY 12 HOURS SCHEDULED
Status: DISCONTINUED | OUTPATIENT
Start: 2022-03-12 | End: 2022-03-17 | Stop reason: HOSPADM

## 2022-03-12 RX ORDER — UREA 10 %
3 LOTION (ML) TOPICAL NIGHTLY PRN
Status: DISCONTINUED | OUTPATIENT
Start: 2022-03-12 | End: 2022-03-17 | Stop reason: HOSPADM

## 2022-03-12 RX ORDER — SODIUM CHLORIDE 0.9 % (FLUSH) 0.9 %
5-40 SYRINGE (ML) INJECTION PRN
Status: DISCONTINUED | OUTPATIENT
Start: 2022-03-12 | End: 2022-03-17 | Stop reason: HOSPADM

## 2022-03-12 RX ORDER — 0.9 % SODIUM CHLORIDE 0.9 %
500 INTRAVENOUS SOLUTION INTRAVENOUS ONCE
Status: COMPLETED | OUTPATIENT
Start: 2022-03-12 | End: 2022-03-12

## 2022-03-12 RX ADMIN — CARVEDILOL 25 MG: 25 TABLET, FILM COATED ORAL at 08:44

## 2022-03-12 RX ADMIN — TAMSULOSIN HYDROCHLORIDE 0.4 MG: 0.4 CAPSULE ORAL at 08:44

## 2022-03-12 RX ADMIN — HEPARIN SODIUM 5000 UNITS: 5000 INJECTION INTRAVENOUS; SUBCUTANEOUS at 14:13

## 2022-03-12 RX ADMIN — FUROSEMIDE 40 MG: 40 TABLET ORAL at 08:44

## 2022-03-12 RX ADMIN — Medication 3 MG: at 21:13

## 2022-03-12 RX ADMIN — HEPARIN SODIUM 5000 UNITS: 5000 INJECTION INTRAVENOUS; SUBCUTANEOUS at 06:25

## 2022-03-12 RX ADMIN — DOCUSATE SODIUM LIQUID 100 MG: 100 LIQUID ORAL at 08:44

## 2022-03-12 RX ADMIN — MAGNESIUM SULFATE HEPTAHYDRATE 1000 MG: 1 INJECTION, SOLUTION INTRAVENOUS at 08:42

## 2022-03-12 RX ADMIN — SODIUM CHLORIDE, PRESERVATIVE FREE 10 ML: 5 INJECTION INTRAVENOUS at 08:56

## 2022-03-12 RX ADMIN — SODIUM CHLORIDE 500 ML: 9 INJECTION, SOLUTION INTRAVENOUS at 14:41

## 2022-03-12 RX ADMIN — HEPARIN SODIUM 5000 UNITS: 5000 INJECTION INTRAVENOUS; SUBCUTANEOUS at 21:13

## 2022-03-12 RX ADMIN — LEVOFLOXACIN 750 MG: 5 INJECTION, SOLUTION INTRAVENOUS at 10:40

## 2022-03-12 RX ADMIN — SODIUM CHLORIDE, PRESERVATIVE FREE 10 ML: 5 INJECTION INTRAVENOUS at 21:13

## 2022-03-12 RX ADMIN — AZATHIOPRINE 75 MG: 50 TABLET ORAL at 08:44

## 2022-03-12 RX ADMIN — ASPIRIN 81 MG: 81 TABLET, CHEWABLE ORAL at 08:44

## 2022-03-12 RX ADMIN — ATORVASTATIN CALCIUM 80 MG: 80 TABLET, FILM COATED ORAL at 08:44

## 2022-03-12 RX ADMIN — CLONIDINE HYDROCHLORIDE 0.2 MG: 0.1 TABLET ORAL at 08:44

## 2022-03-12 NOTE — PROGRESS NOTES
Unintentional weight loss of 10% body weight within 6 months; Esophageal dysphagia; Moderate malnutrition ([address] Anxiety; Closed fracture of fifth metatarsal bone; Interstitial lung disease ([address] NSTEMI (non-ST elevated myocardial infarction) ([address] Type 2 diabetes mellitus without complication ([address] Elevated serum immunoglobulin free light chain level; Abnormal ANCA (antineutrophil cytoplasmic antibody); Chronic kidney disease; Hypocalcemia; Anemia in stage 3 chronic kidney disease; Acute kidney failure with lesion of tubular necrosis ([address] Nephrotic syndrome with focal glomerulosclerosis; Rapid progressv nephritic syndrm, diffuse crescentc glomerulonephritis; Peripheral edema; Syncope and collapse; Primary pauci-immune necrotizing and crescentic glomerulonephritis; Cardiac arrest ([address] and Cervical stenosis of spinal canal on their problem list.       ONSET DATE: 3/12/2022      Recent Chest Xray/CT of Chest:   XR Chest - 3/11/2022  Hazy airspace disease is seen in the left lower lobe.  This could represent   atelectasis or in the appropriate clinical setting pneumonia. Date of Eval: 3/12/2022  Evaluating Therapist: LORRAINE Gurrola    Current Diet level:   NPO    **Per RN on morning of 3/12/22, pt was coughing on his medication today and exhibited burping. His O2 did not desaturate. Primary Complaint   Radames Calderon is a 61 yo man admitted to [facility] on 2/21/2022 after a cardiac arrest.     Family called EMS after he was found down, total time unknown. Nithya Zaman was found to be in V. fib arrest, ROSC achieved after 2 shocks but subsequently he went into PEA arrest on the way to hospital, responded to epinephrine.      He was placed on hypothermia, protocol started on heparin and amiodarone. He required dobutamine transiently for bradycardia.     He also had acute hypoxemic and hypercarbic respiratory failure with possible aspiration bibasilar pneumonia.  He was started on Lavona Rein got intubated in ER, and started on mechanical ventilation    Current DX include: V Fib arrest, CAD, ischemic cardiomyopathy, metabolic encephalopathy, respiratory failure, ARON on CKD Stage IV, Vasculitis. IMPRESSIONS  1) Appears able to safely swallow dry solids and thin liquids via straw    2) Per pt and RN, when the coughing began, pt was taking his medications one at a time. 3) Pt exhibits slow speed of processing and responding d/t metabolic encephalopathy    4) suspect that pt has slow swallow response times and ingestions of single pills do not offer enough sensory input at this time given h/o intubation & medical condition        RECOMMENDATIONS  1) PO diet of Regular with low fat, low cholesterol, high fiber and BENJAMIN (per dietary note)    2) Crush pills in pureed as this should allow additional viscosity and increased sensory input and reduce coughing            Pain:  Pain Assessment  Pain Assessment: 0-10  Pain Level: 0 (recheck for temp-102.8)  Patient's Stated Pain Goal: No pain  Response to Pain Intervention: Other (Comment) ([id number])  RASS Score: Restless  Pain Assessment/FLACC  Pain Rating: FLACC (rest) - Face: no particular expression or smile  Pain Rating: FLACC (rest) - Legs: normal position or relaxed  Pain Rating: FLACC (rest) - Activity: lying quietly, normal position, moves easily  Pain Rating: FLACC (rest) - Cry: no cry (awake or asleep)  Pain Rating: FLACC (rest) - Consolability: content, relaxed  Score: FLACC (rest): 0  Pain Rating: FLACC (activity) - Face: no particular expression or smile  Pain Rating: FLACC (activity) - Legs: normal position or relaxed  Pain Rating: FLACC (activity): lying quietly, normal position, moves easily  Pain Rating: FLACC (activity) - Cry: no cry (awake or asleep)  Pain Rating: FLACC (activity) - Consolability: content, relaxed  Score: FLACC (activity): 0    Reason for Referral  Teofilo Xiao was referred for a bedside swallow evaluation to assess the efficiency of his swallow function, identify signs and symptoms of aspiration and make recommendations regarding safe dietary consistencies, effective compensatory strategies, and safe eating environment. Impression  Dysphagia Diagnosis: Swallow function appears grossly intact  Dysphagia Outcome Severity Scale: Level 6: Within functional limits/Modified independence     Treatment Plan        D/C Recommendations: To be determined       Recommended Diet and Intervention  Diet Solids Recommendation: Regular  Liquid Consistency Recommendation: Thin  Recommended Form of Meds: Crushed in puree as able          Compensatory Swallowing Strategies       Treatment/Goals  Dysphagia Goals:  The patient will tolerate recommended diet without observed clinical signs of aspiration    Oral Phase Dysfunction  Oral Phase  Oral Phase: WFL     Indicators of Pharyngeal Phase Dysfunction   Pharyngeal Phase  Pharyngeal Phase: WFL    Prognosis  Prognosis  Prognosis for safe diet advancement: good  Barriers to reach goals: behavior;fatigue;inconsistent alertness  Individuals consulted  Consulted and agree with results and recommendations: RN    Education  Patient Education Response: Demonstrated understanding;Needs reinforcement             Therapy Time  SLP Individual Minutes  Time In: 0268  Time Out: 1054  Minutes: [address] LORRAINE Saul  3/12/2022 11:11 AM

## 2022-03-12 NOTE — CARE COORDINATION
Left voicemail for daughter Radha Wang asking for SNF choices as her father will be discharged soon and we have no facility to send him to.

## 2022-03-12 NOTE — PLAN OF CARE
Problem: Confusion - Acute:  Goal: Absence of continued neurological deterioration signs and symptoms  Description: Absence of continued neurological deterioration signs and symptoms  3/12/2022 1519 by Gail Vazquez RN  Outcome: Ongoing  3/12/2022 0502 by Evan Saenz RN  Outcome: Ongoing  Goal: Mental status will be restored to baseline  Description: Mental status will be restored to baseline  3/12/2022 1519 by Gail Vazquez RN  Outcome: Ongoing  3/12/2022 0502 by Evan Saenz RN  Outcome: Ongoing     Problem: Discharge Planning:  Goal: Ability to perform activities of daily living will improve  Description: Ability to perform activities of daily living will improve  3/12/2022 1519 by Gail Vazquez RN  Outcome: Ongoing  3/12/2022 0502 by Evan Saenz RN  Outcome: Ongoing  Goal: Participates in care planning  Description: Participates in care planning  3/12/2022 1519 by Gail Vazquez RN  Outcome: Ongoing  3/12/2022 0502 by Evan Saenz RN  Outcome: Ongoing     Problem: Injury - Risk of, Physical Injury:  Goal: Absence of physical injury  Description: Absence of physical injury  3/12/2022 1519 by Gail Vazquez RN  Outcome: Ongoing  3/12/2022 0502 by Evan Saenz RN  Outcome: Ongoing  Goal: Will remain free from falls  Description: Will remain free from falls  3/12/2022 1519 by Gail Vazquez RN  Outcome: Ongoing  3/12/2022 0502 by Evan Saenz RN  Outcome: Ongoing     Problem: Mood - Altered:  Goal: Mood stable  Description: Mood stable  3/12/2022 1519 by Gail Vazquez RN  Outcome: Ongoing  3/12/2022 0502 by Evan Saenz RN  Outcome: Ongoing  Goal: Absence of abusive behavior  Description: Absence of abusive behavior  3/12/2022 1519 by Gail Vazquez RN  Outcome: Ongoing  3/12/2022 0502 by Evan Saenz RN  Outcome: Ongoing  Goal: Verbalizations of feeling emotionally comfortable while being cared for will increase  Description: Verbalizations of feeling emotionally comfortable while being cared for will increase  3/12/2022 1519 by Loren Samayoa RN  Outcome: Ongoing  3/12/2022 0502 by Sandie Bello RN  Outcome: Ongoing     Problem: Psychomotor Activity - Altered:  Goal: Absence of psychomotor disturbance signs and symptoms  Description: Absence of psychomotor disturbance signs and symptoms  3/12/2022 1519 by Loren Samayoa RN  Outcome: Ongoing  3/12/2022 0502 by Sandie Bello RN  Outcome: Ongoing     Problem: Sensory Perception - Impaired:  Goal: Demonstrations of improved sensory functioning will increase  Description: Demonstrations of improved sensory functioning will increase  3/12/2022 1519 by Loren Samayoa RN  Outcome: Ongoing  3/12/2022 0502 by Sandie Bello RN  Outcome: Ongoing  Goal: Decrease in sensory misperception frequency  Description: Decrease in sensory misperception frequency  3/12/2022 1519 by Loren Samayoa RN  Outcome: Ongoing  3/12/2022 0502 by Sandie Bello RN  Outcome: Ongoing  Goal: Able to refrain from responding to false sensory perceptions  Description: Able to refrain from responding to false sensory perceptions  3/12/2022 1519 by Loren Samayoa RN  Outcome: Ongoing  3/12/2022 0502 by Sandie Bello RN  Outcome: Ongoing  Goal: Demonstrates accurate environmental perceptions  Description: Demonstrates accurate environmental perceptions  3/12/2022 1519 by Loren Samayoa RN  Outcome: Ongoing  3/12/2022 0502 by Sandie Bello RN  Outcome: Ongoing  Goal: Able to distinguish between reality-based and nonreality-based thinking  Description: Able to distinguish between reality-based and nonreality-based thinking  3/12/2022 1519 by Loren Samayoa RN  Outcome: Ongoing  3/12/2022 0502 by Snadie Bello RN  Outcome: Ongoing  Goal: Able to interrupt nonreality-based thinking  Description: Able to interrupt nonreality-based thinking  3/12/2022 1519 by Loren Samayoa RN  Outcome: Ongoing  3/12/2022 0502 by Sandie Bello RN  Outcome: Ongoing     Problem: Sleep Pattern Disturbance:  Goal: Appears well-rested  Description: Appears well-rested  3/12/2022 1519 by Laquita Wan RN  Outcome: Ongoing  3/12/2022 0502 by Yue Rangel RN  Outcome: Ongoing     Problem: Nutrition  Goal: Optimal nutrition therapy  3/12/2022 1519 by Laquita Wan RN  Outcome: Ongoing  3/12/2022 0502 by Yue Rangel RN  Outcome: Ongoing     Problem: Falls - Risk of:  Goal: Absence of physical injury  Description: Absence of physical injury  3/12/2022 1519 by Laquita Wan RN  Outcome: Ongoing  3/12/2022 0502 by Yue Rangel RN  Outcome: Ongoing  Goal: Will remain free from falls  Description: Will remain free from falls  3/12/2022 1519 by Laquita Wan RN  Outcome: Ongoing  3/12/2022 0502 by Yue Rangel RN  Outcome: Ongoing     Problem: Skin Integrity:  Goal: Will show no infection signs and symptoms  Description: Will show no infection signs and symptoms  3/12/2022 1519 by Laquita Wan RN  Outcome: Ongoing  3/12/2022 0502 by Yue Rangel RN  Outcome: Ongoing  Goal: Absence of new skin breakdown  Description: Absence of new skin breakdown  3/12/2022 1519 by Laquita Wan RN  Outcome: Ongoing  3/12/2022 0502 by Yue Rangel RN  Outcome: Ongoing
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
## 2022-03-12 NOTE — PROGRESS NOTES
Notified Dr. Kirt Velazquez that patient's BP was 85/66 MAP of 72. Order received for 500 ml bolus x1 with notification that medications to be modified as well. Awaiting those order modifications.  BP cycle set for automatic recheck Q1 HR

## 2022-03-12 NOTE — PROGRESS NOTES
[facility]  Internal Medicine Teaching Residency Program  Inpatient Daily Progress Note  ______________________________________________________________________________    Patient: Inna Morelos  YOB: 1965   BLAYNE:[id number]    Acct: [de-identified]     Room: [facility]/3379-01  Admit date: 2/21/2022  Today's date: 03/12/22  Number of days in the hospital: 18    SUBJECTIVE   Admitting Diagnosis: Cardiac arrest ([facility] [address]  CC: V. Fib arrest    Pt seen and examined bedside. No acute events overnight. Labs and charts reviewed. Afebrile and hemodynamically stable,  saturating well on 1L NC  Mentation is waxing and waning. He is alert, awake and oriented x 2. Denies any active complaints. He is working with PT/OT. ARON resolved. Blackman's catheter was pulled out yesterday. No signs of urinary retention. On levaquin for UTI and LLL pneumonia. Plan for single chamber ICD placement on monday      ROS:  Constitutional:  negative for chills, fevers, sweats  Respiratory:  negative for cough, dyspnea on exertion, hemoptysis, shortness of breath, wheezing  Cardiovascular:  negative for chest pain, chest pressure/discomfort, lower extremity edema, palpitations  Gastrointestinal:  negative for abdominal pain, constipation, diarrhea, nausea, vomiting  Neurological:  negative for dizziness, headache  BRIEF HISTORY       A 42-year-old male who was admitted to ICU after cardiac arrest.  Family called EMS after he was found down, total time unknown. He was found to be in V. fib arrest, ROSC achieved after 2 shocks but subsequently he went into PEA arrest on the way to hospital, responded to epinephrine.       There was concern for STEMI, cardiology was consulted, they did not meet the criteria for STEMI and cath was postponed until neurological prognosis is clear. He was placed on hypothermia, protocol started on heparin and amiodarone.    He required dobutamine transiently for bradycardia.     He also had acute hypoxemic and hypercarbic respiratory failure with possible aspiration bibasilar pneumonia. He was started on Unasyn. He got intubated in ER, and started on mechanical ventilation.       Had thrombocytopenia, anticoagulation was changed to argatroban. HIT was ruled out and heparin was restarted. Electrolytes were replaced timely.     CT head was unremarkable. She underwent MRI of the brain, which revealed early changes of early hypoxic brain injury.       Over the course of a week, her neurological exam is gradually improving. .  Nephrology was consulted for ARON on CKD. Started him on IV Lasix.     Gradually, he tolerated spontaneous breathing trial well, got extubated without any immediate complications. Completed course of antibiotics for possible aspiration pneumonia.     He remained hemodynamically stable without any significant hypotension, or arrhythmias, therefore cardiology discontinued amiodarone drip and heparin drip. Cardiology stated that cath is not urgent at this time but will discuss the risk of contrast-induced nephropathy with the patient and family before cath and recommend AICD evaluation prior to discharge. He is also on azathioprine 70 mg daily for ANCA vasculitis.     He is transferred to the floor for the further evaluation and management. OBJECTIVE     Vital Signs:  BP 99/64   Pulse 84   Temp 97.8 °F (36.6 °C) (Oral)   Resp 26   Ht 5' 10\" (1.778 m)   Wt 175 lb 4.3 oz (79.5 kg)   SpO2 94%   BMI 25.15 kg/m²     Temp (24hrs), Av °F (36.7 °C), Min:97.8 °F (36.6 °C), Max:98.3 °F (36.8 °C)    In: 290   Out: 1475 [Urine:1475]    Physical Exam:  Constitutional: This is a well developed, well nourished, 25-29.9 - Overweight 60y.o. year old male who is alert, oriented, cooperative and in no apparent distress. Head:normocephalic and atraumatic. EENT:  PERRLA. No conjunctival injections.    Septum was midline, mucosa was without erythema, exudates or cobblestoning. No thrush was noted. Neck: Supple without thyromegaly. No elevated JVP. Trachea was midline. Respiratory: Chest was symmetrical without dullness to percussion. Breath sounds bilaterally were clear to auscultation. There were no wheezes, rhonchi or rales. There is no intercostal retraction or use of accessory muscles. No egophony noted. Cardiovascular: Regular without murmur, clicks, gallops or rubs. Abdomen: Slightly rounded and soft without organomegaly. No rebound, rigidity or guarding was appreciated. Lymphatic: No lymphadenopathy. Musculoskeletal: Normal curvature of the spine. No gross muscle weakness. Extremities:  No lower extremity edema, ulcerations, tenderness, varicosities or erythema. Muscle size, tone and strength are normal.  No involuntary movements are noted. Skin:  Warm and dry. Good color, turgor and pigmentation. No lesions or scars. No cyanosis or clubbing  Neurological/Psychiatric: Could not be assessed because of patient's mental condition.   Medications:  Scheduled Medications:    sodium chloride flush  5-40 mL IntraVENous 2 times per day    furosemide  40 mg Oral Daily    cefTRIAXone (ROCEPHIN) IV  1,000 mg IntraVENous Q24H    tamsulosin  0.4 mg Oral Daily    heparin (porcine)  5,000 Units SubCUTAneous 3 times per day    cloNIDine  0.2 mg Oral BID    docusate  100 mg Oral Daily    [Held by provider] furosemide  40 mg IntraVENous Daily    carvedilol  25 mg Oral BID WC    azaTHIOprine  75 mg Oral Daily    aspirin  81 mg Oral Daily    atorvastatin  80 mg Oral Daily    sodium chloride flush  5-40 mL IntraVENous 2 times per day     Continuous Infusions:    sodium chloride      sodium chloride 25 mL (03/11/22 1950)    dextrose       PRN Medicationssodium chloride flush, 5-40 mL, PRN  sodium chloride, 25 mL, PRN  acetaminophen, 650 mg, Q4H PRN  bisacodyl, 10 mg, Daily PRN  metoclopramide, 5 mg, Q6H PRN  labetalol, 10 mg, Q6H PRN  sodium chloride flush, 5-40 mL, PRN  sodium chloride, 25 mL, PRN  ondansetron, 4 mg, Q8H PRN   Or  ondansetron, 4 mg, Q6H PRN  polyethylene glycol, 17 g, Daily PRN  glucose, 15 g, PRN  glucagon (rDNA), 1 mg, PRN  dextrose, 100 mL/hr, PRN  dextrose bolus (hypoglycemia), 125 mL, PRN   Or  dextrose bolus (hypoglycemia), 250 mL, PRN  ipratropium-albuterol, 1 ampule, Q6H PRN        Diagnostic Labs:  CBC:   Recent Labs     03/10/22  0447 03/11/22  0534 03/12/22  0453   WBC 8.5 6.5 8.0   RBC 2.95* 2.93* 2.85*   HGB 9.1* 9.0* 8.6*   HCT 29.1* 29.4* 27.4*   MCV 98.6 100.3 96.1   RDW 17.0* 17.0* 17.2*    292 308     BMP:   Recent Labs     03/10/22  0447 03/11/22  0534 03/12/22  0453   * 135 132*   K 3.7 3.4* 3.7   CL 94* 100 97*   CO2 23 25 23   BUN 38* 25* 20   CREATININE 1.30* 1.13 1.09     BNP: No results for input(s): BNP in the last 72 hours. PT/INR: No results for input(s): PROTIME, INR in the last 72 hours. APTT:   No results for input(s): APTT in the last 72 hours. CARDIAC ENZYMES: No results for input(s): CKMB, CKMBINDEX, TROPONINI in the last 72 hours. Invalid input(s): CKTOTAL;3  FASTING LIPID PANEL:  Lab Results   Component Value Date    CHOL 188 11/07/2020    HDL 52 11/07/2020    TRIG 235 (H) 11/07/2020     LIVER PROFILE:   No results for input(s): AST, ALT, ALB, BILIDIR, BILITOT, ALKPHOS in the last 72 hours. MICROBIOLOGY:   Lab Results   Component Value Date/Time    CULTURE NO GROWTH 1 DAY 03/10/2022 07:06 PM       Imaging:    XR ABDOMEN FOR NG/OG/NE TUBE PLACEMENT    Result Date: 3/3/2022  Enteric tube with tip and side-port in the stomach.   Nonobstructive bowel gas pattern       ASSESSMENT & PLAN     A 59-year-old male who presented post V. fib arrest, intubated for acute hypoxic respiratory failure, was admitted inpatient for the evaluation and management post V. fib arrest.     V. fib arrest   CAD  Ischemic cardiomyopathy  S/p cardiac cath 3/9/2022 which showed severe native vessel CAD, Patent LIMA-LAD. Patent SVG-RPDA and Known occluded SVG-OM. Plan for single chamber ICD placement likely on Monday. Continue aspirin Lipitor, Coreg    Metabolic encephalopathy  Improving     Acute hypoxic and hypercapnic respiratory failure  Likely secondary to aspiration pneumonia, completed course of Unasyn  S/p Extubation  Currently on room air - 1 L NC    ARON on CKD stage IV  Resolved  Nephrology following  PO Lasix 40 mg resumed yesterday.      ANCA Vasculitis  On Azathioprine     HTN  On Coreg 25 twice daily  On clonidine 0.2 mg twice daily    COPD  Bronchodilators as needed     DVT ppx  On heparin     GI ppx  Not indicated     PT/OT  On board     Discharge Planning:   to assist in discharge planning, likely discharge to SNF. Daughter working on the [email or web address]. Shruthi Martinez MD  Internal Medicine Resident, PGY-1  [facility];  [place], [place]  3/12/2022, 7:00 AM

## 2022-03-13 LAB
ANION GAP SERPL CALCULATED.3IONS-SCNC: 11 MMOL/L (ref 9–17)
BUN BLDV-MCNC: 17 MG/DL (ref 6–20)
CALCIUM SERPL-MCNC: 8 MG/DL (ref 8.6–10.4)
CHLORIDE BLD-SCNC: 97 MMOL/L (ref 98–107)
CO2: 22 MMOL/L (ref 20–31)
CREAT SERPL-MCNC: 1 MG/DL (ref 0.7–1.2)
GFR AFRICAN AMERICAN: >60 ML/MIN
GFR NON-AFRICAN AMERICAN: >60 ML/MIN
GFR SERPL CREATININE-BSD FRML MDRD: ABNORMAL ML/MIN/{1.73_M2}
GLUCOSE BLD-MCNC: 122 MG/DL (ref 70–99)
MAGNESIUM: 1.7 MG/DL (ref 1.6–2.6)
POTASSIUM SERPL-SCNC: 3.8 MMOL/L (ref 3.7–5.3)
SODIUM BLD-SCNC: 130 MMOL/L (ref 135–144)

## 2022-03-13 PROCEDURE — 2060000000 HC ICU INTERMEDIATE R&B

## 2022-03-13 PROCEDURE — 6360000002 HC RX W HCPCS: Performed by: STUDENT IN AN ORGANIZED HEALTH CARE EDUCATION/TRAINING PROGRAM

## 2022-03-13 PROCEDURE — 94761 N-INVAS EAR/PLS OXIMETRY MLT: CPT

## 2022-03-13 PROCEDURE — 36415 COLL VENOUS BLD VENIPUNCTURE: CPT

## 2022-03-13 PROCEDURE — 2580000003 HC RX 258: Performed by: STUDENT IN AN ORGANIZED HEALTH CARE EDUCATION/TRAINING PROGRAM

## 2022-03-13 PROCEDURE — 6370000000 HC RX 637 (ALT 250 FOR IP): Performed by: INTERNAL MEDICINE

## 2022-03-13 PROCEDURE — 97530 THERAPEUTIC ACTIVITIES: CPT

## 2022-03-13 PROCEDURE — 80048 BASIC METABOLIC PNL TOTAL CA: CPT

## 2022-03-13 PROCEDURE — 6370000000 HC RX 637 (ALT 250 FOR IP): Performed by: STUDENT IN AN ORGANIZED HEALTH CARE EDUCATION/TRAINING PROGRAM

## 2022-03-13 PROCEDURE — 6370000000 HC RX 637 (ALT 250 FOR IP): Performed by: SURGERY

## 2022-03-13 PROCEDURE — 97535 SELF CARE MNGMENT TRAINING: CPT

## 2022-03-13 PROCEDURE — 83735 ASSAY OF MAGNESIUM: CPT

## 2022-03-13 PROCEDURE — 99233 SBSQ HOSP IP/OBS HIGH 50: CPT | Performed by: INTERNAL MEDICINE

## 2022-03-13 PROCEDURE — 51798 US URINE CAPACITY MEASURE: CPT

## 2022-03-13 RX ORDER — MAGNESIUM SULFATE IN WATER 40 MG/ML
2000 INJECTION, SOLUTION INTRAVENOUS ONCE
Status: COMPLETED | OUTPATIENT
Start: 2022-03-13 | End: 2022-03-13

## 2022-03-13 RX ORDER — MAGNESIUM SULFATE 1 G/100ML
1000 INJECTION INTRAVENOUS PRN
Status: DISCONTINUED | OUTPATIENT
Start: 2022-03-13 | End: 2022-03-17 | Stop reason: HOSPADM

## 2022-03-13 RX ORDER — MAGNESIUM SULFATE IN WATER 40 MG/ML
2000 INJECTION, SOLUTION INTRAVENOUS ONCE
Status: DISCONTINUED | OUTPATIENT
Start: 2022-03-13 | End: 2022-03-13 | Stop reason: SDUPTHER

## 2022-03-13 RX ADMIN — ONDANSETRON 4 MG: 4 TABLET, ORALLY DISINTEGRATING ORAL at 18:29

## 2022-03-13 RX ADMIN — LEVOFLOXACIN 750 MG: 5 INJECTION, SOLUTION INTRAVENOUS at 12:14

## 2022-03-13 RX ADMIN — ASPIRIN 81 MG: 81 TABLET, CHEWABLE ORAL at 07:47

## 2022-03-13 RX ADMIN — SODIUM CHLORIDE, PRESERVATIVE FREE 10 ML: 5 INJECTION INTRAVENOUS at 22:14

## 2022-03-13 RX ADMIN — HEPARIN SODIUM 5000 UNITS: 5000 INJECTION INTRAVENOUS; SUBCUTANEOUS at 22:14

## 2022-03-13 RX ADMIN — MAGNESIUM SULFATE 2000 MG: 2 INJECTION INTRAVENOUS at 09:17

## 2022-03-13 RX ADMIN — ACETAMINOPHEN 650 MG: 325 TABLET ORAL at 16:14

## 2022-03-13 RX ADMIN — CARVEDILOL 25 MG: 25 TABLET, FILM COATED ORAL at 17:33

## 2022-03-13 RX ADMIN — DOCUSATE SODIUM LIQUID 100 MG: 100 LIQUID ORAL at 07:46

## 2022-03-13 RX ADMIN — AZATHIOPRINE 75 MG: 50 TABLET ORAL at 07:47

## 2022-03-13 RX ADMIN — HEPARIN SODIUM 5000 UNITS: 5000 INJECTION INTRAVENOUS; SUBCUTANEOUS at 05:19

## 2022-03-13 RX ADMIN — CARVEDILOL 25 MG: 25 TABLET, FILM COATED ORAL at 07:47

## 2022-03-13 RX ADMIN — SODIUM CHLORIDE, PRESERVATIVE FREE 10 ML: 5 INJECTION INTRAVENOUS at 07:48

## 2022-03-13 RX ADMIN — HEPARIN SODIUM 5000 UNITS: 5000 INJECTION INTRAVENOUS; SUBCUTANEOUS at 14:13

## 2022-03-13 RX ADMIN — FUROSEMIDE 40 MG: 40 TABLET ORAL at 07:47

## 2022-03-13 RX ADMIN — ATORVASTATIN CALCIUM 80 MG: 80 TABLET, FILM COATED ORAL at 07:47

## 2022-03-13 RX ADMIN — TAMSULOSIN HYDROCHLORIDE 0.4 MG: 0.4 CAPSULE ORAL at 07:47

## 2022-03-13 ASSESSMENT — PAIN DESCRIPTION - LOCATION: LOCATION: LEG

## 2022-03-13 ASSESSMENT — PAIN SCALES - GENERAL
PAINLEVEL_OUTOF10: 4
PAINLEVEL_OUTOF10: 4
PAINLEVEL_OUTOF10: 0

## 2022-03-13 ASSESSMENT — PAIN DESCRIPTION - FREQUENCY: FREQUENCY: CONTINUOUS

## 2022-03-13 ASSESSMENT — PAIN DESCRIPTION - DESCRIPTORS: DESCRIPTORS: ACHING

## 2022-03-13 ASSESSMENT — PAIN DESCRIPTION - ORIENTATION: ORIENTATION: RIGHT;LEFT

## 2022-03-13 ASSESSMENT — PAIN DESCRIPTION - ONSET: ONSET: ON-GOING

## 2022-03-13 ASSESSMENT — PAIN DESCRIPTION - PAIN TYPE: TYPE: ACUTE PAIN

## 2022-03-13 NOTE — PROGRESS NOTES
[facility]  Internal Medicine Teaching Residency Program  Inpatient Daily Progress Note  ______________________________________________________________________________    Patient: Agnieszka Calzada  YOB: 1965   [facility]:[id number]    Acct: [de-identified]     Room: [facility]  Admit date: 2/21/2022  Today's date: 03/13/22  Number of days in the hospital: 23    SUBJECTIVE   Admitting Diagnosis: Cardiac arrest ([address]  CC: V. Fib arrest    Pt seen and examined bedside. .  Labs and charts reviewed. Afebrile and hemodynamically stable,  saturating well on 1L NC. He was slightly hypotensive, BP improved with 500 cc fluid bolus and weaning of clonidine. He couldn't sleep overnight, states he is feeling anxious to go back home. Mentation is stable. Alert, oriented to name, place and year. Denies any active complaints. He is working with PT/OT. Has very poor appetite. Will reconsult dietician. Blackman's catheter was pulled out day before yesterday. No signs of urinary retention. On levaquin for UTI and LLL pneumonia. Plan for single chamber ICD placement on monday      ROS:  Constitutional:  negative for chills, fevers, sweats  Respiratory:  negative for cough, dyspnea on exertion, hemoptysis, shortness of breath, wheezing  Cardiovascular:  negative for chest pain, chest pressure/discomfort, lower extremity edema, palpitations  Gastrointestinal:  negative for abdominal pain, constipation, diarrhea, nausea, vomiting  Neurological:  negative for dizziness, headache  BRIEF HISTORY       A 49-year-old male who was admitted to ICU after cardiac arrest.  Family called EMS after he was found down, total time unknown.   He was found to be in V. fib arrest, ROSC achieved after 2 shocks but subsequently he went into PEA arrest on the way to hospital, responded to epinephrine.       There was concern for STEMI, cardiology was consulted, they did not meet the criteria for STEMI and cath was postponed until neurological prognosis is clear. He was placed on hypothermia, protocol started on heparin and amiodarone. He required dobutamine transiently for bradycardia.     He also had acute hypoxemic and hypercarbic respiratory failure with possible aspiration bibasilar pneumonia. He was started on Unasyn. He got intubated in ER, and started on mechanical ventilation.       Had thrombocytopenia, anticoagulation was changed to argatroban. HIT was ruled out and heparin was restarted. Electrolytes were replaced timely.     CT head was unremarkable. She underwent MRI of the brain, which revealed early changes of early hypoxic brain injury.       Over the course of a week, her neurological exam is gradually improving. .  Nephrology was consulted for ARON on CKD. Started him on IV Lasix.     Gradually, he tolerated spontaneous breathing trial well, got extubated without any immediate complications. Completed course of antibiotics for possible aspiration pneumonia.     He remained hemodynamically stable without any significant hypotension, or arrhythmias, therefore cardiology discontinued amiodarone drip and heparin drip. Cardiology stated that cath is not urgent at this time but will discuss the risk of contrast-induced nephropathy with the patient and family before cath and recommend AICD evaluation prior to discharge. He is also on azathioprine 70 mg daily for ANCA vasculitis.     He is transferred to the floor for the further evaluation and management. OBJECTIVE     Vital Signs:  /77   Pulse 68   Temp 97.7 °F (36.5 °C) (Oral)   Resp 26   Ht 5' 10\" (1.778 m)   Wt 173 lb 15.1 oz (78.9 kg)   SpO2 95%   BMI 24.96 kg/m²     Temp (24hrs), Av.1 °F (36.7 °C), Min:97.4 °F (36.3 °C), Max:99 °F (37.2 °C)    In: . 3   Out: 1700 [Urine:1700]    Physical Exam:  Constitutional: This is a well developed, well nourished, 25-29.9 - Overweight 60y.o. year old male who is alert, oriented, cooperative and in no apparent distress. Head:normocephalic and atraumatic. EENT:  PERRLA. No conjunctival injections. Septum was midline, mucosa was without erythema, exudates or cobblestoning. No thrush was noted. Neck: Supple without thyromegaly. No elevated JVP. Trachea was midline. Respiratory: Chest was symmetrical without dullness to percussion. Breath sounds bilaterally were clear to auscultation. There were no wheezes, rhonchi or rales. There is no intercostal retraction or use of accessory muscles. No egophony noted. Cardiovascular: Regular without murmur, clicks, gallops or rubs. Abdomen: Slightly rounded and soft without organomegaly. No rebound, rigidity or guarding was appreciated. Lymphatic: No lymphadenopathy. Musculoskeletal: Normal curvature of the spine. No gross muscle weakness. Extremities:  No lower extremity edema, ulcerations, tenderness, varicosities or erythema. Muscle size, tone and strength are normal.  No involuntary movements are noted. Skin:  Warm and dry. Good color, turgor and pigmentation. No lesions or scars. No cyanosis or clubbing  Neurological/Psychiatric: Could not be assessed because of patient's mental condition.   Medications:  Scheduled Medications:    sodium chloride flush  5-40 mL IntraVENous 2 times per day    levofloxacin  750 mg IntraVENous Q24H    furosemide  40 mg Oral Daily    tamsulosin  0.4 mg Oral Daily    heparin (porcine)  5,000 Units SubCUTAneous 3 times per day    docusate  100 mg Oral Daily    carvedilol  25 mg Oral BID WC    azaTHIOprine  75 mg Oral Daily    aspirin  81 mg Oral Daily    atorvastatin  80 mg Oral Daily    sodium chloride flush  5-40 mL IntraVENous 2 times per day     Continuous Infusions:    sodium chloride      sodium chloride 25 mL (03/11/22 1950)    dextrose       PRN Medicationssodium chloride flush, 5-40 mL, PRN  sodium chloride, 25 mL, PRN  acetaminophen, 650 mg, Q4H PRN  melatonin, 3 mg, Nightly PRN  bisacodyl, 10 mg, Daily PRN  metoclopramide, 5 mg, Q6H PRN  labetalol, 10 mg, Q6H PRN  sodium chloride flush, 5-40 mL, PRN  sodium chloride, 25 mL, PRN  ondansetron, 4 mg, Q8H PRN   Or  ondansetron, 4 mg, Q6H PRN  polyethylene glycol, 17 g, Daily PRN  glucose, 15 g, PRN  glucagon (rDNA), 1 mg, PRN  dextrose, 100 mL/hr, PRN  dextrose bolus (hypoglycemia), 125 mL, PRN   Or  dextrose bolus (hypoglycemia), 250 mL, PRN  ipratropium-albuterol, 1 ampule, Q6H PRN        Diagnostic Labs:  CBC:   Recent Labs     03/11/22 0534 03/12/22 0453   WBC 6.5 8.0   RBC 2.93* 2.85*   HGB 9.0* 8.6*   HCT 29.4* 27.4*   .3 96.1   RDW 17.0* 17.2*    308     BMP:   Recent Labs     03/11/22 0534 03/12/22 0453    132*   K 3.4* 3.7    97*   CO2 25 23   BUN 25* 20   CREATININE 1.13 1.09     BNP: No results for input(s): BNP in the last 72 hours. PT/INR: No results for input(s): PROTIME, INR in the last 72 hours. APTT:   No results for input(s): APTT in the last 72 hours. CARDIAC ENZYMES: No results for input(s): CKMB, CKMBINDEX, TROPONINI in the last 72 hours. Invalid input(s): CKTOTAL;3  FASTING LIPID PANEL:  Lab Results   Component Value Date    CHOL 188 11/07/2020    HDL 52 11/07/2020    TRIG 235 (H) 11/07/2020     LIVER PROFILE:   No results for input(s): AST, ALT, ALB, BILIDIR, BILITOT, ALKPHOS in the last 72 hours. MICROBIOLOGY:   Lab Results   Component Value Date/Time    CULTURE NO GROWTH 3 DAYS 03/10/2022 07:06 PM       Imaging:    XR ABDOMEN FOR NG/OG/NE TUBE PLACEMENT    Result Date: 3/3/2022  Enteric tube with tip and side-port in the stomach.   Nonobstructive bowel gas pattern       ASSESSMENT & PLAN     A 77-year-old male who presented post V. fib arrest, intubated for acute hypoxic respiratory failure, was admitted inpatient for the evaluation and management post V. fib arrest.     V. fib arrest   CAD  Ischemic cardiomyopathy  S/p cardiac cath 3/9/2022 which showed severe native vessel CAD, Patent LIMA-LAD. Patent SVG-RPDA and Known occluded SVG-OM. Plan for single chamber ICD placement likely on Monday. Continue aspirin Lipitor, Coreg    Metabolic encephalopathy  Improving     Acute hypoxic and hypercapnic respiratory failure  Likely secondary to aspiration pneumonia, completed course of Unasyn  S/p Extubation  Currently on room air - 1 L NC    ARON on CKD stage IV  Resolved  Nephrology following  PO Lasix 40 mg      ANCA Vasculitis  On Azathioprine     HTN  On Coreg 25 twice daily  Clonidine discontinued. COPD  Bronchodilators as needed     DVT ppx  On heparin     GI ppx  Not indicated     PT/OT  On board     Discharge Planning:   to assist in discharge planning, likely discharge to SNF. Daughter working on the [employer]. Aubrie Simpson MD  Internal Medicine Resident, PGY-1  [address], [place]  3/13/2022, 7:00 AM

## 2022-03-13 NOTE — PROGRESS NOTES
[facility]  Progress Note                   Date:   3/13/2022  Patient name: Laurel Yadav  Date of admission:  2/21/2022  9:13 PM  MRN:   [id number]  YOB: 1965  PCP: Hero Zimmerman MD    Reason for Admission: Cardiac arrest [facility]) [I46.9]    Subjective:       Clinical Changes /Abnormalities:  Patient seen and examined in room after discussing with RN. Denies chest pain or shortness of breath. Tele/vitals/labs reviewed . continued to be SR on monitor with no cardiac events    Review of Systems    Medications:   Scheduled Meds:   magnesium sulfate  2,000 mg IntraVENous Once    sodium chloride flush  5-40 mL IntraVENous 2 times per day    levofloxacin  750 mg IntraVENous Q24H    furosemide  40 mg Oral Daily    tamsulosin  0.4 mg Oral Daily    heparin (porcine)  5,000 Units SubCUTAneous 3 times per day    docusate  100 mg Oral Daily    carvedilol  25 mg Oral BID WC    azaTHIOprine  75 mg Oral Daily    aspirin  81 mg Oral Daily    atorvastatin  80 mg Oral Daily    sodium chloride flush  5-40 mL IntraVENous 2 times per day     Continuous Infusions:   sodium chloride      sodium chloride 25 mL (03/11/22 1950)    dextrose       CBC:   Recent Labs     03/11/22  0534 03/12/22  0453   WBC 6.5 8.0   HGB 9.0* 8.6*    308     BMP:    Recent Labs     03/11/22  0534 03/12/22  0453 03/13/22  0555    132* 130*   K 3.4* 3.7 3.8    97* 97*   CO2 25 23 22   BUN 25* 20 17   CREATININE 1.13 1.09 1.00   GLUCOSE 101* 109* 122*     Hepatic:  No results for input(s): AST, ALT, ALB, BILITOT, ALKPHOS in the last 72 hours. Troponin: No results for input(s): TROPHS in the last 72 hours. BNP: No results for input(s): BNP in the last 72 hours. Lipids: No results for input(s): CHOL, HDL in the last 72 hours. Invalid input(s): LDLCALCU  INR: No results for input(s): INR in the last 72 hours.     Objective:   Vitals: /69   Pulse 68   Temp 97.7 °F (36.5 °C) (Oral) Resp 20   Ht 5' 10\" (1.778 m)   Wt 173 lb 15.1 oz (78.9 kg)   SpO2 91%   BMI 24.96 kg/m²   General appearance: alert but confused, follows commands at times. Does not know orientation of time or place  HEENT: Head: Normocephalic, no lesions, without obvious abnormality. Neck:no JVD, trachea midline, no adenopathy  Lungs: Clear to auscultation, dim throughout  Heart: Regular rate and rhythm, s1/s2 auscultated, no murmurs  Abdomen: soft, non-tender, bowel sounds active  Extremities: no edema  Neurologic: not done    · CAD s/p CABG x4 in 2011  · Stress test 10/30/2018: Negative Lexiscan stress test  · Coronary angiography 10/30/2018: Patent LIMALAD and SVGRCA grafts.  Occluded SVGOM1. · Echo 11/7/2020: EF 55%.  Grade 1 DD.  Moderate LVH. Echo 2/23/22  Summary  Left ventricle is normal in size. Global left ventricular systolic function  is mildly reduced. Calculated ejection fraction 45% by Tirado's method. Left atrium is normal in size. Right atrium is normal in size. Right ventricular function appears reduced. Moderately dilated right ventricular cavity. Possible Mack sign present. Aortic valve is trileaflet and opens adequately. No significant valvular abnormalities. Cardiac Cath 3/9/22  Findings:  Cardiac Arteries and Lesion Findings       LMCA: Mild irregularities 10-20%. LAD: 100% proximal occlusion. LCx: Mild irregularities 10-20%. 100% OM Occlusion     RCA: 100% mid occlusion. Cardiac Grafts        - Gildardo Mention is a Free LIMA graft that originates at the LIMA and attaches to       the Mid LAD. Patent with mild 20-30% disease.        -  There is a Vein graft that originates at the Aorta Right and attaches       to the R PAV. patent with mild 20-30% disease.        -  There is a Vein graft that originates at the Aorta Left and attaches to       the 1st Ob Ai.  known to be 100% occluded.        Coronary Tree      Procedure Summary        Severe native vessel CAD.    Patent Gastroesophageal reflux disease with esophagitis     Positive FIT (fecal immunochemical test)     Constipation     Degenerative disc disease, cervical     Chest pain     Tobacco abuse counseling     Polyp of transverse colon     Polyp of descending colon     Rectal polyp     Hypomagnesemia     Pleural effusion     COPD (chronic obstructive pulmonary disease) (HCC)     CAD (coronary artery disease)     Microscopic hematuria     Acute on chronic diastolic (congestive) heart failure (HCC)     CHF (congestive heart failure), NYHA class I, acute, diastolic (HCC)     Pneumonia     Liver lesion     Mild malnutrition (HCC)     Dysphagia     Gastroparesis     ARON (acute kidney injury) ([address]     Major depressive disorder, single episode     Unintentional weight loss of 10% body weight within 6 months     Esophageal dysphagia     Moderate malnutrition (HCC)     Anxiety     Closed fracture of fifth metatarsal bone     Interstitial lung disease (HCC)     NSTEMI (non-ST elevated myocardial infarction) ([address]     Type 2 diabetes mellitus without complication (HCC)     Elevated serum immunoglobulin free light chain level     Abnormal ANCA (antineutrophil cytoplasmic antibody)     Chronic kidney disease     Hypocalcemia     Anemia in stage 3 chronic kidney disease     Acute kidney failure with lesion of tubular necrosis (HCC)     Nephrotic syndrome with focal glomerulosclerosis     Rapid progressv nephritic syndrm, diffuse crescentc glomerulonephritis     Peripheral edema     Syncope and collapse     Primary pauci-immune necrotizing and crescentic glomerulonephritis     Cardiac arrest ([address]     Cervical stenosis of spinal canal      Plan of Treatment:    1. Hemodynamically stable- Cotninue PO BB with parameter , will discontinue Clonidine. Continue PO ASA, statin, BB. No ACE/ARB d/t CKD. Continue medical management. 2. Plan for AICD possible Monday for  VF arrest. Pt. Is agreeable at this time to proceed.  Keep pt npo after midnight       3.. Keep K >4 and Mg >2.       Electronically signed by PJ Nesbitt NP on 3/13/2022 at 10:36 AM  [address]  [phone]
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
## 2022-03-13 NOTE — PLAN OF CARE
Problem: Confusion - Acute:  Goal: Absence of continued neurological deterioration signs and symptoms  Description: Absence of continued neurological deterioration signs and symptoms  3/13/2022 0435 by Yue Rangel RN  Outcome: Ongoing     Problem: Confusion - Acute:  Goal: Mental status will be restored to baseline  Description: Mental status will be restored to baseline  3/13/2022 0435 by Yue Rangel RN  Outcome: Ongoing     Problem: Discharge Planning:  Goal: Ability to perform activities of daily living will improve  Description: Ability to perform activities of daily living will improve  3/13/2022 0435 by Yue Rangle RN  Outcome: Ongoing     Problem: Discharge Planning:  Goal: Participates in care planning  Description: Participates in care planning  3/13/2022 0435 by Yue Rangel RN  Outcome: Ongoing     Problem: Injury - Risk of, Physical Injury:  Goal: Absence of physical injury  Description: Absence of physical injury  3/13/2022 0435 by Yue Rangel RN  Outcome: Ongoing     Problem: Injury - Risk of, Physical Injury:  Goal: Will remain free from falls  Description: Will remain free from falls  3/13/2022 0435 by Yue Rangel RN  Outcome: Ongoing     Problem: Mood - Altered:  Goal: Mood stable  Description: Mood stable  3/13/2022 0435 by Yue Rangel RN  Outcome: Ongoing     Problem: Mood - Altered:  Goal: Absence of abusive behavior  Description: Absence of abusive behavior  3/13/2022 0435 by Yue Rangel RN  Outcome: Ongoing     Problem: Mood - Altered:  Goal: Verbalizations of feeling emotionally comfortable while being cared for will increase  Description: Verbalizations of feeling emotionally comfortable while being cared for will increase  3/13/2022 0435 by Yue Rangel RN  Outcome: Ongoing     Problem: Psychomotor Activity - Altered:  Goal: Absence of psychomotor disturbance signs and symptoms  Description: Absence of psychomotor disturbance signs and symptoms  3/13/2022 0435 by Virginia Daniels RN  Outcome: Ongoing     Problem: Sensory Perception - Impaired:  Goal: Demonstrations of improved sensory functioning will increase  Description: Demonstrations of improved sensory functioning will increase  3/13/2022 0435 by Virginia Daniels RN  Outcome: Ongoing     Problem: Sensory Perception - Impaired:  Goal: Decrease in sensory misperception frequency  Description: Decrease in sensory misperception frequency  3/13/2022 0435 by Virginia Daniels RN  Outcome: Ongoing     Problem: Sensory Perception - Impaired:  Goal: Able to refrain from responding to false sensory perceptions  Description: Able to refrain from responding to false sensory perceptions  3/13/2022 0435 by Virginia Daniels RN  Outcome: Ongoing     Problem: Sensory Perception - Impaired:  Goal: Demonstrates accurate environmental perceptions  Description: Demonstrates accurate environmental perceptions  3/13/2022 0435 by Virginia Daniels RN  Outcome: Ongoing     Problem: Sensory Perception - Impaired:  Goal: Able to distinguish between reality-based and nonreality-based thinking  Description: Able to distinguish between reality-based and nonreality-based thinking  3/13/2022 0435 by Virginia Daniels RN  Outcome: Ongoing     Problem: Sensory Perception - Impaired:  Goal: Able to interrupt nonreality-based thinking  Description: Able to interrupt nonreality-based thinking  3/13/2022 0435 by Virginia Daniels RN  Outcome: Ongoing     Problem: Sleep Pattern Disturbance:  Goal: Appears well-rested  Description: Appears well-rested  3/13/2022 0435 by Virginia Daniels RN  Outcome: Ongoing     Problem: Nutrition  Goal: Optimal nutrition therapy  3/13/2022 0435 by Virginia Daniels RN  Outcome: Ongoing     Problem: Falls - Risk of:  Goal: Absence of physical injury  Description: Absence of physical injury  3/13/2022 0435 by Virginia Daniels RN  Outcome: Ongoing     Problem: Falls - Risk of:  Goal: Will remain free from falls  Description: Will remain free from falls  3/13/2022 0435 by Lisa Churchill RN  Outcome: Ongoing     Problem: Skin Integrity:  Goal: Will show no infection signs and symptoms  Description: Will show no infection signs and symptoms  3/13/2022 0435 by Lisa Churchill RN  Outcome: Ongoing     Problem: Skin Integrity:  Goal: Absence of new skin breakdown  Description: Absence of new skin breakdown  3/13/2022 0435 by Lisa Churchill RN  Outcome: Ongoing

## 2022-03-14 LAB
ANION GAP SERPL CALCULATED.3IONS-SCNC: 13 MMOL/L (ref 9–17)
BUN BLDV-MCNC: 16 MG/DL (ref 6–20)
CALCIUM SERPL-MCNC: 8.2 MG/DL (ref 8.6–10.4)
CHLORIDE BLD-SCNC: 96 MMOL/L (ref 98–107)
CO2: 21 MMOL/L (ref 20–31)
CREAT SERPL-MCNC: 1.02 MG/DL (ref 0.7–1.2)
GFR AFRICAN AMERICAN: >60 ML/MIN
GFR NON-AFRICAN AMERICAN: >60 ML/MIN
GFR SERPL CREATININE-BSD FRML MDRD: ABNORMAL ML/MIN/{1.73_M2}
GLUCOSE BLD-MCNC: 109 MG/DL (ref 70–99)
HCT VFR BLD CALC: 29.6 % (ref 40.7–50.3)
HEMOGLOBIN: 9.2 G/DL (ref 13–17)
MAGNESIUM: 1.9 MG/DL (ref 1.6–2.6)
MCH RBC QN AUTO: 29.9 PG (ref 25.2–33.5)
MCHC RBC AUTO-ENTMCNC: 31.1 G/DL (ref 28.4–34.8)
MCV RBC AUTO: 96.1 FL (ref 82.6–102.9)
NRBC AUTOMATED: 0.4 PER 100 WBC
PDW BLD-RTO: 17.3 % (ref 11.8–14.4)
PLATELET # BLD: 322 K/UL (ref 138–453)
PMV BLD AUTO: 10.2 FL (ref 8.1–13.5)
POTASSIUM SERPL-SCNC: 3.8 MMOL/L (ref 3.7–5.3)
RBC # BLD: 3.08 M/UL (ref 4.21–5.77)
SODIUM BLD-SCNC: 130 MMOL/L (ref 135–144)
WBC # BLD: 8.5 K/UL (ref 3.5–11.3)

## 2022-03-14 PROCEDURE — 2580000003 HC RX 258: Performed by: STUDENT IN AN ORGANIZED HEALTH CARE EDUCATION/TRAINING PROGRAM

## 2022-03-14 PROCEDURE — 85027 COMPLETE CBC AUTOMATED: CPT

## 2022-03-14 PROCEDURE — 2060000000 HC ICU INTERMEDIATE R&B

## 2022-03-14 PROCEDURE — 36415 COLL VENOUS BLD VENIPUNCTURE: CPT

## 2022-03-14 PROCEDURE — 6360000002 HC RX W HCPCS

## 2022-03-14 PROCEDURE — 99233 SBSQ HOSP IP/OBS HIGH 50: CPT | Performed by: INTERNAL MEDICINE

## 2022-03-14 PROCEDURE — 6360000002 HC RX W HCPCS: Performed by: STUDENT IN AN ORGANIZED HEALTH CARE EDUCATION/TRAINING PROGRAM

## 2022-03-14 PROCEDURE — 80048 BASIC METABOLIC PNL TOTAL CA: CPT

## 2022-03-14 PROCEDURE — 6370000000 HC RX 637 (ALT 250 FOR IP): Performed by: SURGERY

## 2022-03-14 PROCEDURE — 97110 THERAPEUTIC EXERCISES: CPT

## 2022-03-14 PROCEDURE — 97530 THERAPEUTIC ACTIVITIES: CPT

## 2022-03-14 PROCEDURE — 83735 ASSAY OF MAGNESIUM: CPT

## 2022-03-14 RX ORDER — MAGNESIUM SULFATE 1 G/100ML
1000 INJECTION INTRAVENOUS ONCE
Status: COMPLETED | OUTPATIENT
Start: 2022-03-14 | End: 2022-03-14

## 2022-03-14 RX ADMIN — SODIUM CHLORIDE, PRESERVATIVE FREE 10 ML: 5 INJECTION INTRAVENOUS at 08:43

## 2022-03-14 RX ADMIN — CARVEDILOL 25 MG: 25 TABLET, FILM COATED ORAL at 16:22

## 2022-03-14 RX ADMIN — SODIUM CHLORIDE, PRESERVATIVE FREE 10 ML: 5 INJECTION INTRAVENOUS at 20:21

## 2022-03-14 RX ADMIN — MAGNESIUM SULFATE HEPTAHYDRATE 1000 MG: 1 INJECTION, SOLUTION INTRAVENOUS at 10:23

## 2022-03-14 RX ADMIN — LEVOFLOXACIN 750 MG: 5 INJECTION, SOLUTION INTRAVENOUS at 13:06

## 2022-03-14 ASSESSMENT — PAIN SCALES - GENERAL: PAINLEVEL_OUTOF10: 0

## 2022-03-14 NOTE — PROGRESS NOTES
[facility]  Internal Medicine Teaching Residency Program  Inpatient Daily Progress Note  ______________________________________________________________________________    Patient: Cosme Dennis  YOB: 1965   IMP:[id number]    Acct: [de-identified]     Room: [id number]  Admit date: 2/21/2022  Today's date: 03/14/22  Number of days in the hospital: 20    SUBJECTIVE   Admitting Diagnosis: Cardiac arrest ([facility] [address]  CC: V. Fib arrest    Pt seen and examined bedside. Labs and charts reviewed. Afebrile and hemodynamically stable,  saturating well on room air. Blood pressure better. Mentation is stable. Alert, oriented to name, place and year. Denies any active complaints. He is working with PT/OT. Has very poor appetite. No signs of urinary retention. On levaquin for UTI and LLL pneumonia. Plan for single chamber ICD placement today. ROS:  Constitutional:  negative for chills, fevers, sweats  Respiratory:  negative for cough, dyspnea on exertion, hemoptysis, shortness of breath, wheezing  Cardiovascular:  negative for chest pain, chest pressure/discomfort, lower extremity edema, palpitations  Gastrointestinal:  negative for abdominal pain, constipation, diarrhea, nausea, vomiting  Neurological:  negative for dizziness, headache  BRIEF HISTORY       A 25-year-old male who was admitted to ICU after cardiac arrest.  Family called EMS after he was found down, total time unknown. He was found to be in V. fib arrest, ROSC achieved after 2 shocks but subsequently he went into PEA arrest on the way to hospital, responded to epinephrine.       There was concern for STEMI, cardiology was consulted, they did not meet the criteria for STEMI and cath was postponed until neurological prognosis is clear. He was placed on hypothermia, protocol started on heparin and amiodarone.    He required dobutamine transiently for bradycardia.     He also had acute hypoxemic and hypercarbic respiratory failure with possible aspiration bibasilar pneumonia. He was started on Unasyn. He got intubated in ER, and started on mechanical ventilation.       Had thrombocytopenia, anticoagulation was changed to argatroban. HIT was ruled out and heparin was restarted. Electrolytes were replaced timely.     CT head was unremarkable. She underwent MRI of the brain, which revealed early changes of early hypoxic brain injury.       Over the course of a week, her neurological exam is gradually improving. .  Nephrology was consulted for ARON on CKD. Started him on IV Lasix.     Gradually, he tolerated spontaneous breathing trial well, got extubated without any immediate complications. Completed course of antibiotics for possible aspiration pneumonia.     He remained hemodynamically stable without any significant hypotension, or arrhythmias, therefore cardiology discontinued amiodarone drip and heparin drip. Cardiology stated that cath is not urgent at this time but will discuss the risk of contrast-induced nephropathy with the patient and family before cath and recommend AICD evaluation prior to discharge. He is also on azathioprine 70 mg daily for ANCA vasculitis.     He is transferred to the floor for the further evaluation and management. OBJECTIVE     Vital Signs:  /67   Pulse 77   Temp 98.2 °F (36.8 °C) (Oral)   Resp 26   Ht 5' 10\" (1.778 m)   Wt 173 lb 15.1 oz (78.9 kg)   SpO2 93%   BMI 24.96 kg/m²     Temp (24hrs), Av °F (36.7 °C), Min:97.2 °F (36.2 °C), Max:98.8 °F (37.1 °C)    In: 440   Out: 1700 [Urine:1700]    Physical Exam:  Constitutional: This is a well developed, well nourished, 25-29.9 - Overweight 60y.o. year old male who is alert, oriented, cooperative and in no apparent distress. Head:normocephalic and atraumatic. EENT:  PERRLA. No conjunctival injections. Septum was midline, mucosa was without erythema, exudates or cobblestoning.   No thrush was noted.   Neck: Supple without thyromegaly. No elevated JVP. Trachea was midline. Respiratory: Chest was symmetrical without dullness to percussion. Breath sounds bilaterally were clear to auscultation. There were no wheezes, rhonchi or rales. There is no intercostal retraction or use of accessory muscles. No egophony noted. Cardiovascular: Regular without murmur, clicks, gallops or rubs. Abdomen: Slightly rounded and soft without organomegaly. No rebound, rigidity or guarding was appreciated. Lymphatic: No lymphadenopathy. Musculoskeletal: Normal curvature of the spine. No gross muscle weakness. Extremities:  No lower extremity edema, ulcerations, tenderness, varicosities or erythema. Muscle size, tone and strength are normal.  No involuntary movements are noted. Skin:  Warm and dry. Good color, turgor and pigmentation. No lesions or scars. No cyanosis or clubbing  Neurological/Psychiatric: Could not be assessed because of patient's mental condition.   Medications:  Scheduled Medications:    magnesium sulfate  1,000 mg IntraVENous Once    sodium chloride flush  5-40 mL IntraVENous 2 times per day    levofloxacin  750 mg IntraVENous Q24H    furosemide  40 mg Oral Daily    tamsulosin  0.4 mg Oral Daily    docusate  100 mg Oral Daily    carvedilol  25 mg Oral BID WC    azaTHIOprine  75 mg Oral Daily    aspirin  81 mg Oral Daily    atorvastatin  80 mg Oral Daily    sodium chloride flush  5-40 mL IntraVENous 2 times per day     Continuous Infusions:    sodium chloride      sodium chloride 25 mL (03/11/22 1950)    dextrose       PRN Medicationsmagnesium sulfate, 1,000 mg, PRN  sodium chloride flush, 5-40 mL, PRN  sodium chloride, 25 mL, PRN  acetaminophen, 650 mg, Q4H PRN  melatonin, 3 mg, Nightly PRN  bisacodyl, 10 mg, Daily PRN  metoclopramide, 5 mg, Q6H PRN  labetalol, 10 mg, Q6H PRN  sodium chloride flush, 5-40 mL, PRN  sodium chloride, 25 mL, PRN  ondansetron, 4 mg, Q8H PRN Or  ondansetron, 4 mg, Q6H PRN  polyethylene glycol, 17 g, Daily PRN  glucose, 15 g, PRN  glucagon (rDNA), 1 mg, PRN  dextrose, 100 mL/hr, PRN  dextrose bolus (hypoglycemia), 125 mL, PRN   Or  dextrose bolus (hypoglycemia), 250 mL, PRN  ipratropium-albuterol, 1 ampule, Q6H PRN        Diagnostic Labs:  CBC:   Recent Labs     03/12/22 0453 03/14/22 0415   WBC 8.0 8.5   RBC 2.85* 3.08*   HGB 8.6* 9.2*   HCT 27.4* 29.6*   MCV 96.1 96.1   RDW 17.2* 17.3*    322     BMP:   Recent Labs     03/12/22 0453 03/13/22  0555 03/14/22 0415   * 130* 130*   K 3.7 3.8 3.8   CL 97* 97* 96*   CO2 23 22 21   BUN 20 17 16   CREATININE 1.09 1.00 1.02     BNP: No results for input(s): BNP in the last 72 hours. PT/INR: No results for input(s): PROTIME, INR in the last 72 hours. APTT:   No results for input(s): APTT in the last 72 hours. CARDIAC ENZYMES: No results for input(s): CKMB, CKMBINDEX, TROPONINI in the last 72 hours. Invalid input(s): CKTOTAL;3  FASTING LIPID PANEL:  Lab Results   Component Value Date    CHOL 188 11/07/2020    HDL 52 11/07/2020    TRIG 235 (H) 11/07/2020     LIVER PROFILE:   No results for input(s): AST, ALT, ALB, BILIDIR, BILITOT, ALKPHOS in the last 72 hours. MICROBIOLOGY:   Lab Results   Component Value Date/Time    CULTURE NO GROWTH 3 DAYS 03/10/2022 07:06 PM       Imaging:    XR ABDOMEN FOR NG/OG/NE TUBE PLACEMENT    Result Date: 3/3/2022  Enteric tube with tip and side-port in the stomach. Nonobstructive bowel gas pattern       ASSESSMENT & PLAN     A 66-year-old male who presented post V. fib arrest, intubated for acute hypoxic respiratory failure, was admitted inpatient for the evaluation and management post V. fib arrest.     V. fib arrest   CAD  Ischemic cardiomyopathy  S/p cardiac cath 3/9/2022 which showed severe native vessel CAD, Patent LIMA-LAD. Patent SVG-RPDA and Known occluded SVG-OM. Plan for single chamber ICD placement likely today.    Continue aspirin Lipitor, Coreg    Metabolic encephalopathy  Improving     Acute hypoxic and hypercapnic respiratory failure  Likely secondary to aspiration pneumonia, completed course of Unasyn  S/p Extubation  Currently on room air    ARON on CKD stage IV  Resolved  Nephrology following  PO Lasix 40 mg      ANCA Vasculitis  On Azathioprine     HTN  On Coreg 25 twice daily  Clonidine discontinued. COPD  Bronchodilators as needed     DVT ppx  On heparin     GI ppx  Not indicated     PT/OT  On board     Discharge Planning:   to assist in discharge planning, likely discharge to SNF. Daughter and Niece working on the SNF choices. Laureano Rowland MD  Internal Medicine Resident, PGY-1  [facility];  [facility]  3/14/2022, 7:00 AM
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
## 2022-03-14 NOTE — PLAN OF CARE
Unable to schedule AICD today. Discussed with Dr. Julisa Moran. Will plan to discharge with Arnaldo Calderon and return as OP for AICD placement.       PJ العراقي - CNP

## 2022-03-14 NOTE — CARE COORDINATION
Referrals sent to the following facilities   1. [facility]  2. [facility]  3.  [facility] Attending Only
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
## 2022-03-15 PROBLEM — I25.5 ISCHEMIC CARDIOMYOPATHY: Status: ACTIVE | Noted: 2022-03-15

## 2022-03-15 PROBLEM — I49.01 CARDIAC ARREST WITH VENTRICULAR FIBRILLATION (HCC): Status: ACTIVE | Noted: 2022-02-22

## 2022-03-15 LAB
ANION GAP SERPL CALCULATED.3IONS-SCNC: 13 MMOL/L (ref 9–17)
BUN BLDV-MCNC: 17 MG/DL (ref 6–20)
CALCIUM SERPL-MCNC: 8.3 MG/DL (ref 8.6–10.4)
CHLORIDE BLD-SCNC: 100 MMOL/L (ref 98–107)
CO2: 22 MMOL/L (ref 20–31)
CREAT SERPL-MCNC: 1.04 MG/DL (ref 0.7–1.2)
CULTURE: NORMAL
CULTURE: NORMAL
GFR AFRICAN AMERICAN: >60 ML/MIN
GFR NON-AFRICAN AMERICAN: >60 ML/MIN
GFR SERPL CREATININE-BSD FRML MDRD: ABNORMAL ML/MIN/{1.73_M2}
GLUCOSE BLD-MCNC: 84 MG/DL (ref 70–99)
Lab: NORMAL
Lab: NORMAL
MAGNESIUM: 1.9 MG/DL (ref 1.6–2.6)
POTASSIUM SERPL-SCNC: 4 MMOL/L (ref 3.7–5.3)
SODIUM BLD-SCNC: 135 MMOL/L (ref 135–144)
SPECIMEN DESCRIPTION: NORMAL
SPECIMEN DESCRIPTION: NORMAL

## 2022-03-15 PROCEDURE — 6370000000 HC RX 637 (ALT 250 FOR IP)

## 2022-03-15 PROCEDURE — 6370000000 HC RX 637 (ALT 250 FOR IP): Performed by: SURGERY

## 2022-03-15 PROCEDURE — 6360000002 HC RX W HCPCS: Performed by: STUDENT IN AN ORGANIZED HEALTH CARE EDUCATION/TRAINING PROGRAM

## 2022-03-15 PROCEDURE — 6370000000 HC RX 637 (ALT 250 FOR IP): Performed by: STUDENT IN AN ORGANIZED HEALTH CARE EDUCATION/TRAINING PROGRAM

## 2022-03-15 PROCEDURE — 2060000000 HC ICU INTERMEDIATE R&B

## 2022-03-15 PROCEDURE — 99233 SBSQ HOSP IP/OBS HIGH 50: CPT | Performed by: INTERNAL MEDICINE

## 2022-03-15 PROCEDURE — 6360000002 HC RX W HCPCS

## 2022-03-15 PROCEDURE — 2580000003 HC RX 258: Performed by: STUDENT IN AN ORGANIZED HEALTH CARE EDUCATION/TRAINING PROGRAM

## 2022-03-15 PROCEDURE — 36415 COLL VENOUS BLD VENIPUNCTURE: CPT

## 2022-03-15 PROCEDURE — 80048 BASIC METABOLIC PNL TOTAL CA: CPT

## 2022-03-15 PROCEDURE — 6370000000 HC RX 637 (ALT 250 FOR IP): Performed by: INTERNAL MEDICINE

## 2022-03-15 PROCEDURE — 83735 ASSAY OF MAGNESIUM: CPT

## 2022-03-15 PROCEDURE — 94761 N-INVAS EAR/PLS OXIMETRY MLT: CPT

## 2022-03-15 RX ORDER — LEVOFLOXACIN 750 MG/1
750 TABLET ORAL DAILY
Status: COMPLETED | OUTPATIENT
Start: 2022-03-15 | End: 2022-03-16

## 2022-03-15 RX ORDER — MAGNESIUM SULFATE 1 G/100ML
1000 INJECTION INTRAVENOUS ONCE
Status: COMPLETED | OUTPATIENT
Start: 2022-03-15 | End: 2022-03-15

## 2022-03-15 RX ADMIN — MAGNESIUM SULFATE HEPTAHYDRATE 1000 MG: 1 INJECTION, SOLUTION INTRAVENOUS at 08:19

## 2022-03-15 RX ADMIN — DOCUSATE SODIUM LIQUID 100 MG: 100 LIQUID ORAL at 08:09

## 2022-03-15 RX ADMIN — AZATHIOPRINE 75 MG: 50 TABLET ORAL at 08:09

## 2022-03-15 RX ADMIN — CARVEDILOL 25 MG: 25 TABLET, FILM COATED ORAL at 18:34

## 2022-03-15 RX ADMIN — SODIUM CHLORIDE 25 ML: 9 INJECTION, SOLUTION INTRAVENOUS at 13:02

## 2022-03-15 RX ADMIN — LEVOFLOXACIN 750 MG: 750 TABLET, FILM COATED ORAL at 18:33

## 2022-03-15 RX ADMIN — TAMSULOSIN HYDROCHLORIDE 0.4 MG: 0.4 CAPSULE ORAL at 08:09

## 2022-03-15 RX ADMIN — LEVOFLOXACIN 750 MG: 5 INJECTION, SOLUTION INTRAVENOUS at 13:03

## 2022-03-15 RX ADMIN — ENOXAPARIN SODIUM 40 MG: 40 INJECTION SUBCUTANEOUS at 18:34

## 2022-03-15 RX ADMIN — CARVEDILOL 25 MG: 25 TABLET, FILM COATED ORAL at 08:09

## 2022-03-15 RX ADMIN — SODIUM CHLORIDE 25 ML: 9 INJECTION, SOLUTION INTRAVENOUS at 08:16

## 2022-03-15 RX ADMIN — SODIUM CHLORIDE, PRESERVATIVE FREE 10 ML: 5 INJECTION INTRAVENOUS at 09:00

## 2022-03-15 RX ADMIN — ASPIRIN 81 MG: 81 TABLET, CHEWABLE ORAL at 08:09

## 2022-03-15 RX ADMIN — ATORVASTATIN CALCIUM 80 MG: 80 TABLET, FILM COATED ORAL at 08:09

## 2022-03-15 RX ADMIN — FUROSEMIDE 40 MG: 40 TABLET ORAL at 08:09

## 2022-03-15 RX ADMIN — SODIUM CHLORIDE, PRESERVATIVE FREE 10 ML: 5 INJECTION INTRAVENOUS at 20:04

## 2022-03-15 ASSESSMENT — PAIN SCALES - GENERAL
PAINLEVEL_OUTOF10: 0

## 2022-03-15 NOTE — PROGRESS NOTES
[facility]  Internal Medicine Teaching Residency Program  Inpatient Daily Progress Note  ______________________________________________________________________________    Patient: Susu Parsons  YOB: 1965   JJR:[id number]    Acct: [de-identified]     Room: 7335/2234-15  Admit date: 2/21/2022  Today's date: 03/15/22  Number of days in the hospital: 21    SUBJECTIVE   Admitting Diagnosis: Cardiac arrest ([address]  CC: V. Fib arrest    Pt seen and examined bedside. Labs and charts reviewed. Afebrile and hemodynamically stable,  saturating well on room air. Stable mentation. No significant change in the clinical condition. Denies any active complaints. Cardiology wants to go for AICD outpatient. Will discharge on lifevest. Awaiting placement. ROS:  Constitutional:  negative for chills, fevers, sweats  Respiratory:  negative for cough, dyspnea on exertion, hemoptysis, shortness of breath, wheezing  Cardiovascular:  negative for chest pain, chest pressure/discomfort, lower extremity edema, palpitations  Gastrointestinal:  negative for abdominal pain, constipation, diarrhea, nausea, vomiting  Neurological:  negative for dizziness, headache  BRIEF HISTORY       A 78-year-old male who was admitted to ICU after cardiac arrest.  Family called EMS after he was found down, total time unknown. He was found to be in V. fib arrest, ROSC achieved after 2 shocks but subsequently he went into PEA arrest on the way to hospital, responded to epinephrine.       There was concern for STEMI, cardiology was consulted, they did not meet the criteria for STEMI and cath was postponed until neurological prognosis is clear. He was placed on hypothermia, protocol started on heparin and amiodarone. He required dobutamine transiently for bradycardia.     He also had acute hypoxemic and hypercarbic respiratory failure with possible aspiration bibasilar pneumonia.   He was Chest was symmetrical without dullness to percussion. Breath sounds bilaterally were clear to auscultation. There were no wheezes, rhonchi or rales. There is no intercostal retraction or use of accessory muscles. No egophony noted. Cardiovascular: Regular without murmur, clicks, gallops or rubs. Abdomen: Slightly rounded and soft without organomegaly. No rebound, rigidity or guarding was appreciated. Lymphatic: No lymphadenopathy. Musculoskeletal: Normal curvature of the spine. No gross muscle weakness. Extremities:  No lower extremity edema, ulcerations, tenderness, varicosities or erythema. Muscle size, tone and strength are normal.  No involuntary movements are noted. Skin:  Warm and dry. Good color, turgor and pigmentation. No lesions or scars. No cyanosis or clubbing  Neurological/Psychiatric: Could not be assessed because of patient's mental condition.   Medications:  Scheduled Medications:    sodium chloride flush  5-40 mL IntraVENous 2 times per day    levofloxacin  750 mg IntraVENous Q24H    furosemide  40 mg Oral Daily    tamsulosin  0.4 mg Oral Daily    docusate  100 mg Oral Daily    carvedilol  25 mg Oral BID WC    azaTHIOprine  75 mg Oral Daily    aspirin  81 mg Oral Daily    atorvastatin  80 mg Oral Daily    sodium chloride flush  5-40 mL IntraVENous 2 times per day     Continuous Infusions:    sodium chloride      sodium chloride 25 mL (03/11/22 1950)    dextrose       PRN Medicationsmagnesium sulfate, 1,000 mg, PRN  sodium chloride flush, 5-40 mL, PRN  sodium chloride, 25 mL, PRN  acetaminophen, 650 mg, Q4H PRN  melatonin, 3 mg, Nightly PRN  bisacodyl, 10 mg, Daily PRN  metoclopramide, 5 mg, Q6H PRN  labetalol, 10 mg, Q6H PRN  sodium chloride flush, 5-40 mL, PRN  sodium chloride, 25 mL, PRN  ondansetron, 4 mg, Q8H PRN   Or  ondansetron, 4 mg, Q6H PRN  polyethylene glycol, 17 g, Daily PRN  glucose, 15 g, PRN  glucagon (rDNA), 1 mg, PRN  dextrose, 100 mL/hr, PRN  dextrose bolus (hypoglycemia), 125 mL, PRN   Or  dextrose bolus (hypoglycemia), 250 mL, PRN  ipratropium-albuterol, 1 ampule, Q6H PRN        Diagnostic Labs:  CBC:   Recent Labs     03/14/22  0415   WBC 8.5   RBC 3.08*   HGB 9.2*   HCT 29.6*   MCV 96.1   RDW 17.3*        BMP:   Recent Labs     03/13/22  0555 03/14/22  0415 03/15/22  0439   * 130* 135   K 3.8 3.8 4.0   CL 97* 96* 100   CO2 22 21 22   BUN 17 16 17   CREATININE 1.00 1.02 1.04     BNP: No results for input(s): BNP in the last 72 hours. PT/INR: No results for input(s): PROTIME, INR in the last 72 hours. APTT:   No results for input(s): APTT in the last 72 hours. CARDIAC ENZYMES: No results for input(s): CKMB, CKMBINDEX, TROPONINI in the last 72 hours. Invalid input(s): CKTOTAL;3  FASTING LIPID PANEL:  Lab Results   Component Value Date    CHOL 188 11/07/2020    HDL 52 11/07/2020    TRIG 235 (H) 11/07/2020     LIVER PROFILE:   No results for input(s): AST, ALT, ALB, BILIDIR, BILITOT, ALKPHOS in the last 72 hours. MICROBIOLOGY:   Lab Results   Component Value Date/Time    CULTURE NO GROWTH 4 DAYS 03/10/2022 07:06 PM       Imaging:    XR ABDOMEN FOR NG/OG/NE TUBE PLACEMENT    Result Date: 3/3/2022  Enteric tube with tip and side-port in the stomach. Nonobstructive bowel gas pattern       ASSESSMENT & PLAN     A 78-year-old male who presented post V. fib arrest, intubated for acute hypoxic respiratory failure, was admitted inpatient for the evaluation and management post V. fib arrest.     V. fib arrest   CAD  Ischemic cardiomyopathy  S/p cardiac cath 3/9/2022 which showed severe native vessel CAD, Patent LIMA-LAD. Patent SVG-RPDA and Known occluded SVG-OM.    Plan for single chamber ICD placement outpatient as per the cardiology  Continue aspirin Lipitor, Coreg    Metabolic encephalopathy  Improving     Acute hypoxic and hypercapnic respiratory failure  Likely secondary to aspiration pneumonia, completed course of Unasyn  S/p Extubation  Currently on room air    ARON on CKD stage IV  Resolved  Nephrology following  PO Lasix 40 mg      ANCA Vasculitis  On Azathioprine     HTN  On Coreg 25 twice daily  Clonidine discontinued. COPD  Bronchodilators as needed     DVT ppx  On lovenox     GI ppx  Not indicated     PT/OT  On board     Discharge Planning:   to assist in discharge planning, likely discharge to SNF. Daughter and Niece working on the SNF choices. Carmella Coburn MD  Internal Medicine Resident, PGY-1  [facility];  [address]  3/15/2022, 7:00 AM

## 2022-03-15 NOTE — PLAN OF CARE
Problem: Discharge Planning:  Goal: Ability to perform activities of daily living will improve  Description: Ability to perform activities of daily living will improve  Outcome: Met This Shift     Problem: Injury - Risk of, Physical Injury:  Goal: Absence of physical injury  Description: Absence of physical injury  Outcome: Met This Shift  Goal: Will remain free from falls  Description: Will remain free from falls  Outcome: Met This Shift     Problem: Sleep Pattern Disturbance:  Goal: Appears well-rested  Description: Appears well-rested  Outcome: Met This Shift     Problem: Falls - Risk of:  Goal: Absence of physical injury  Description: Absence of physical injury  Outcome: Met This Shift  Goal: Will remain free from falls  Description: Will remain free from falls  Outcome: Met This Shift     Problem: Pain:  Goal: Pain level will decrease  Description: Pain level will decrease  Outcome: Met This Shift     Problem: Confusion - Acute:  Goal: Absence of continued neurological deterioration signs and symptoms  Description: Absence of continued neurological deterioration signs and symptoms  Outcome: Ongoing  Goal: Mental status will be restored to baseline  Description: Mental status will be restored to baseline  Outcome: Ongoing     Problem: Discharge Planning:  Goal: Participates in care planning  Description: Participates in care planning  Outcome: Ongoing     Problem: Mood - Altered:  Goal: Mood stable  Description: Mood stable  Outcome: Ongoing  Goal: Absence of abusive behavior  Description: Absence of abusive behavior  Outcome: Ongoing  Goal: Verbalizations of feeling emotionally comfortable while being cared for will increase  Description: Verbalizations of feeling emotionally comfortable while being cared for will increase  Outcome: Ongoing     Problem: Psychomotor Activity - Altered:  Goal: Absence of psychomotor disturbance signs and symptoms  Description: Absence of psychomotor disturbance signs and symptoms  Outcome: Ongoing     Problem: Sensory Perception - Impaired:  Goal: Demonstrations of improved sensory functioning will increase  Description: Demonstrations of improved sensory functioning will increase  Outcome: Ongoing  Goal: Decrease in sensory misperception frequency  Description: Decrease in sensory misperception frequency  Outcome: Ongoing  Goal: Able to refrain from responding to false sensory perceptions  Description: Able to refrain from responding to false sensory perceptions  Outcome: Ongoing  Goal: Demonstrates accurate environmental perceptions  Description: Demonstrates accurate environmental perceptions  Outcome: Ongoing  Goal: Able to distinguish between reality-based and nonreality-based thinking  Description: Able to distinguish between reality-based and nonreality-based thinking  Outcome: Ongoing  Goal: Able to interrupt nonreality-based thinking  Description: Able to interrupt nonreality-based thinking  Outcome: Ongoing     Problem: Nutrition  Goal: Optimal nutrition therapy  Outcome: Ongoing     Problem: Skin Integrity:  Goal: Will show no infection signs and symptoms  Description: Will show no infection signs and symptoms  Outcome: Ongoing  Goal: Absence of new skin breakdown  Description: Absence of new skin breakdown  Outcome: Ongoing     Problem: Pain:  Goal: Control of acute pain  Description: Control of acute pain  Outcome: Ongoing  Goal: Control of chronic pain  Description: Control of chronic pain  Outcome: Ongoing
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
## 2022-03-15 NOTE — PROGRESS NOTES
Comprehensive Nutrition Assessment    Type and Reason for Visit:  Reassess    Nutrition Recommendations/Plan:   - When able, continue prior diet, Regular  - When able, send Pudding ONS TID  - Monitor/encourage PO intake    Nutrition Assessment:  Per pt, poor appetite d/t nausea (Zofran noted), believes he is eating ~50% of meals, per chart 51-75% intake. Pt does not like current ONS, agreeable to [employer] Pudding. Malnutrition Assessment:  Malnutrition Status: At risk for malnutrition (Comment)    Context:  Acute Illness     Findings of the 6 clinical characteristics of malnutrition:  Energy Intake:  Unable to assess  Weight Loss:  No significant weight loss (over past year, per EHR)     Body Fat Loss:  No significant body fat loss     Muscle Mass Loss:  No significant muscle mass loss    Fluid Accumulation:  1 - Mild     Strength:  Not Performed    Estimated Daily Nutrient Needs:  Energy (kcal):  3487-0405 kcal/day; Weight Used for Energy Requirements:  Current     Protein (g):   g pro/day; Weight Used for Protein Requirements:  Ideal (1.2-1.5)        Fluid (ml/day):  Per MD; Method Used for Fluid Requirements:  Other (Comment)      Nutrition Related Findings:  labs/meds reviewed      Wounds:  Surgical Incision       Current Nutrition Therapies:    Diet NPO Exceptions are: Sips of Water with Meds    Anthropometric Measures:  · Height: 5' 10\" (177.8 cm)  · Current Body Weight: 179 lb 10.8 oz (81.5 kg) (3/15, [facility])   · Admission Body Weight: 198 lb 6.6 oz (90 kg)    · Usual Body Weight: 220 lb (99.8 kg) (approx 1 yr ago)     · Ideal Body Weight: 166 lbs; % Ideal Body Weight 108.2 %   · BMI: 25.8  · BMI Categories: Overweight (BMI 25.0-29. 9)       Nutrition Diagnosis:   · Inadequate oral intake related to cardiac dysfunction as evidenced by intake 26-50%,intake 51-75%    Nutrition Interventions:   Food and/or Nutrient Delivery:  Continue Current Diet,Modify Oral Nutrition Supplement  Nutrition Education/Counseling:  No recommendation at this time   Coordination of Nutrition Care:  Continue to monitor while inpatient    Goals:  meet % of estimated nutrient needs       Nutrition Monitoring and Evaluation:   Behavioral-Environmental Outcomes:  None Identified   Food/Nutrient Intake Outcomes:  Food and Nutrient Intake,Supplement Intake  Physical Signs/Symptoms Outcomes:  Biochemical Data,Nutrition Focused Physical Findings,Skin,Weight,Meal Time Behavior     Discharge Planning:    Continue current diet,Continue Oral Nutrition Supplement     Electronically signed by Daryl Mustafa MS, RD, LD on 3/15/22 at 11:30 AM EDT    Contact: [phone]

## 2022-03-15 NOTE — PROGRESS NOTES
Physical Therapy        Physical Therapy Cancel Note      DATE: 3/15/2022    NAME: Agnieszka Calzada  MRN: [id number]   : 1965      Patient not seen this date for Physical Therapy due to:    Patient Declined: Pt refusing to participate in PT today even after mutiple attempts, will check back tomorrow.       Electronically signed by Bhaskar Adrian PTA on 3/15/2022 at 3:05 PM

## 2022-03-15 NOTE — PROGRESS NOTES
[employer]up[facility] [employer]  Occupational Therapy Not Seen Note    DATE: 3/15/2022    NAME: Joseph Sánchez  MRN: [id number]   : 1965      Patient not seen this date for Occupational Therapy due to: Pt refused d/t \"I have a terrible headache\"       Next Scheduled Treatment: Check back      Electronically signed by Angela NAIR on 3/15/2022 at 3:17 PM   Kely AGUILAR
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
## 2022-03-16 LAB
ANION GAP SERPL CALCULATED.3IONS-SCNC: 14 MMOL/L (ref 9–17)
BUN BLDV-MCNC: 16 MG/DL (ref 6–20)
CALCIUM SERPL-MCNC: 8.6 MG/DL (ref 8.6–10.4)
CHLORIDE BLD-SCNC: 100 MMOL/L (ref 98–107)
CO2: 22 MMOL/L (ref 20–31)
CREAT SERPL-MCNC: 1.14 MG/DL (ref 0.7–1.2)
GFR AFRICAN AMERICAN: >60 ML/MIN
GFR NON-AFRICAN AMERICAN: >60 ML/MIN
GFR SERPL CREATININE-BSD FRML MDRD: NORMAL ML/MIN/{1.73_M2}
GLUCOSE BLD-MCNC: 89 MG/DL (ref 70–99)
HCT VFR BLD CALC: 29.5 % (ref 40.7–50.3)
HEMOGLOBIN: 9.3 G/DL (ref 13–17)
MAGNESIUM: 1.9 MG/DL (ref 1.6–2.6)
MCH RBC QN AUTO: 30.1 PG (ref 25.2–33.5)
MCHC RBC AUTO-ENTMCNC: 31.5 G/DL (ref 28.4–34.8)
MCV RBC AUTO: 95.5 FL (ref 82.6–102.9)
NRBC AUTOMATED: 0 PER 100 WBC
PDW BLD-RTO: 17.6 % (ref 11.8–14.4)
PLATELET # BLD: 311 K/UL (ref 138–453)
PMV BLD AUTO: 9.9 FL (ref 8.1–13.5)
POTASSIUM SERPL-SCNC: 4.2 MMOL/L (ref 3.7–5.3)
RBC # BLD: 3.09 M/UL (ref 4.21–5.77)
SARS-COV-2, RAPID: NOT DETECTED
SODIUM BLD-SCNC: 136 MMOL/L (ref 135–144)
SPECIMEN DESCRIPTION: NORMAL
WBC # BLD: 9.8 K/UL (ref 3.5–11.3)

## 2022-03-16 PROCEDURE — 97530 THERAPEUTIC ACTIVITIES: CPT

## 2022-03-16 PROCEDURE — 6370000000 HC RX 637 (ALT 250 FOR IP): Performed by: SURGERY

## 2022-03-16 PROCEDURE — 87635 SARS-COV-2 COVID-19 AMP PRB: CPT

## 2022-03-16 PROCEDURE — 2700000000 HC OXYGEN THERAPY PER DAY

## 2022-03-16 PROCEDURE — 6370000000 HC RX 637 (ALT 250 FOR IP): Performed by: STUDENT IN AN ORGANIZED HEALTH CARE EDUCATION/TRAINING PROGRAM

## 2022-03-16 PROCEDURE — 97535 SELF CARE MNGMENT TRAINING: CPT

## 2022-03-16 PROCEDURE — 2580000003 HC RX 258: Performed by: STUDENT IN AN ORGANIZED HEALTH CARE EDUCATION/TRAINING PROGRAM

## 2022-03-16 PROCEDURE — 2060000000 HC ICU INTERMEDIATE R&B

## 2022-03-16 PROCEDURE — 51798 US URINE CAPACITY MEASURE: CPT

## 2022-03-16 PROCEDURE — 6360000002 HC RX W HCPCS

## 2022-03-16 PROCEDURE — 97110 THERAPEUTIC EXERCISES: CPT

## 2022-03-16 PROCEDURE — 36415 COLL VENOUS BLD VENIPUNCTURE: CPT

## 2022-03-16 PROCEDURE — 94761 N-INVAS EAR/PLS OXIMETRY MLT: CPT

## 2022-03-16 PROCEDURE — 83735 ASSAY OF MAGNESIUM: CPT

## 2022-03-16 PROCEDURE — 6360000002 HC RX W HCPCS: Performed by: STUDENT IN AN ORGANIZED HEALTH CARE EDUCATION/TRAINING PROGRAM

## 2022-03-16 PROCEDURE — 6370000000 HC RX 637 (ALT 250 FOR IP): Performed by: INTERNAL MEDICINE

## 2022-03-16 PROCEDURE — 99233 SBSQ HOSP IP/OBS HIGH 50: CPT | Performed by: INTERNAL MEDICINE

## 2022-03-16 PROCEDURE — 6370000000 HC RX 637 (ALT 250 FOR IP)

## 2022-03-16 PROCEDURE — 85027 COMPLETE CBC AUTOMATED: CPT

## 2022-03-16 PROCEDURE — 80048 BASIC METABOLIC PNL TOTAL CA: CPT

## 2022-03-16 RX ORDER — MAGNESIUM SULFATE 1 G/100ML
1000 INJECTION INTRAVENOUS ONCE
Status: COMPLETED | OUTPATIENT
Start: 2022-03-16 | End: 2022-03-16

## 2022-03-16 RX ADMIN — SODIUM CHLORIDE, PRESERVATIVE FREE 10 ML: 5 INJECTION INTRAVENOUS at 19:48

## 2022-03-16 RX ADMIN — FUROSEMIDE 40 MG: 40 TABLET ORAL at 09:06

## 2022-03-16 RX ADMIN — ENOXAPARIN SODIUM 40 MG: 40 INJECTION SUBCUTANEOUS at 09:09

## 2022-03-16 RX ADMIN — TAMSULOSIN HYDROCHLORIDE 0.4 MG: 0.4 CAPSULE ORAL at 09:07

## 2022-03-16 RX ADMIN — CARVEDILOL 25 MG: 25 TABLET, FILM COATED ORAL at 09:07

## 2022-03-16 RX ADMIN — DOCUSATE SODIUM LIQUID 100 MG: 100 LIQUID ORAL at 09:08

## 2022-03-16 RX ADMIN — LEVOFLOXACIN 750 MG: 750 TABLET, FILM COATED ORAL at 09:06

## 2022-03-16 RX ADMIN — CARVEDILOL 25 MG: 25 TABLET, FILM COATED ORAL at 18:28

## 2022-03-16 RX ADMIN — ASPIRIN 81 MG: 81 TABLET, CHEWABLE ORAL at 09:07

## 2022-03-16 RX ADMIN — SODIUM CHLORIDE, PRESERVATIVE FREE 10 ML: 5 INJECTION INTRAVENOUS at 09:09

## 2022-03-16 RX ADMIN — ATORVASTATIN CALCIUM 80 MG: 80 TABLET, FILM COATED ORAL at 09:07

## 2022-03-16 RX ADMIN — AZATHIOPRINE 75 MG: 50 TABLET ORAL at 09:11

## 2022-03-16 RX ADMIN — MAGNESIUM SULFATE HEPTAHYDRATE 1000 MG: 1 INJECTION, SOLUTION INTRAVENOUS at 09:14

## 2022-03-16 RX ADMIN — SODIUM CHLORIDE, PRESERVATIVE FREE 10 ML: 5 INJECTION INTRAVENOUS at 09:08

## 2022-03-16 ASSESSMENT — PAIN SCALES - GENERAL
PAINLEVEL_OUTOF10: 0
PAINLEVEL_OUTOF10: 0

## 2022-03-16 NOTE — CARE COORDINATION
Received a call from [facility] at [place], they have obtained precert and can accept patient today. I explained that he will most likely com tomorrow as it will be a 5-6 hour trip and that those usually do not happen same day. WILL NEED COVID TEST!
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
## 2022-03-16 NOTE — PROGRESS NOTES
hypercarbic respiratory failure with possible aspiration bibasilar pneumonia. He was started on Unasyn. He got intubated in ER, and started on mechanical ventilation.       Had thrombocytopenia, anticoagulation was changed to argatroban. HIT was ruled out and heparin was restarted. Electrolytes were replaced timely.     CT head was unremarkable. She underwent MRI of the brain, which revealed early changes of early hypoxic brain injury.       Over the course of a week, her neurological exam is gradually improving. .  Nephrology was consulted for ARON on CKD. Started him on IV Lasix.     Gradually, he tolerated spontaneous breathing trial well, got extubated without any immediate complications. Completed course of antibiotics for possible aspiration pneumonia.     He remained hemodynamically stable without any significant hypotension, or arrhythmias, therefore cardiology discontinued amiodarone drip and heparin drip. Cardiology stated that cath is not urgent at this time but will discuss the risk of contrast-induced nephropathy with the patient and family before cath and recommend AICD evaluation prior to discharge. He is also on azathioprine 70 mg daily for ANCA vasculitis.     He is transferred to the floor for the further evaluation and management. OBJECTIVE     Vital Signs:  /80   Pulse 65   Temp 97.4 °F (36.3 °C) (Oral)   Resp 22   Ht 5' 10\" (1.778 m)   Wt 187 lb 6.3 oz (85 kg)   SpO2 93%   BMI 26.89 kg/m²     Temp (24hrs), Av.4 °F (36.9 °C), Min:97.4 °F (36.3 °C), Max:99.7 °F (37.6 °C)    In: 1360   Out: 2100 [Urine:2100]    Physical Exam:  Constitutional: This is a well developed, well nourished, 25-29.9 - Overweight 60y.o. year old male who is alert, oriented, cooperative and in no apparent distress. Head:normocephalic and atraumatic. EENT:  PERRLA. No conjunctival injections. Septum was midline, mucosa was without erythema, exudates or cobblestoning. No thrush was noted. Neck: Supple without thyromegaly. No elevated JVP. Trachea was midline. Respiratory: Chest was symmetrical without dullness to percussion. Breath sounds bilaterally were clear to auscultation. There were no wheezes, rhonchi or rales. There is no intercostal retraction or use of accessory muscles. No egophony noted. Cardiovascular: Regular without murmur, clicks, gallops or rubs. Abdomen: Slightly rounded and soft without organomegaly. No rebound, rigidity or guarding was appreciated. Lymphatic: No lymphadenopathy. Musculoskeletal: Normal curvature of the spine. No gross muscle weakness. Extremities:  No lower extremity edema, ulcerations, tenderness, varicosities or erythema. Muscle size, tone and strength are normal.  No involuntary movements are noted. Skin:  Warm and dry. Good color, turgor and pigmentation. No lesions or scars. No cyanosis or clubbing  Neurological/Psychiatric: Could not be assessed because of patient's mental condition.   Medications:  Scheduled Medications:    enoxaparin  40 mg SubCUTAneous Daily    sodium chloride flush  5-40 mL IntraVENous 2 times per day    furosemide  40 mg Oral Daily    tamsulosin  0.4 mg Oral Daily    docusate  100 mg Oral Daily    carvedilol  25 mg Oral BID WC    azaTHIOprine  75 mg Oral Daily    aspirin  81 mg Oral Daily    atorvastatin  80 mg Oral Daily    sodium chloride flush  5-40 mL IntraVENous 2 times per day     Continuous Infusions:    sodium chloride 25 mL (03/15/22 1302)    sodium chloride 25 mL (03/11/22 1950)    dextrose       PRN Medicationsmagnesium sulfate, 1,000 mg, PRN  sodium chloride flush, 5-40 mL, PRN  sodium chloride, 25 mL, PRN  acetaminophen, 650 mg, Q4H PRN  melatonin, 3 mg, Nightly PRN  bisacodyl, 10 mg, Daily PRN  metoclopramide, 5 mg, Q6H PRN  labetalol, 10 mg, Q6H PRN  sodium chloride flush, 5-40 mL, PRN  sodium chloride, 25 mL, PRN  ondansetron, 4 mg, Q8H PRN   Or  ondansetron, 4 mg, Q6H PRN  polyethylene glycol, 17 g, Daily PRN  glucose, 15 g, PRN  glucagon (rDNA), 1 mg, PRN  dextrose, 100 mL/hr, PRN  dextrose bolus (hypoglycemia), 125 mL, PRN   Or  dextrose bolus (hypoglycemia), 250 mL, PRN  ipratropium-albuterol, 1 ampule, Q6H PRN        Diagnostic Labs:  CBC:   Recent Labs     03/14/22  0415 03/16/22  0605   WBC 8.5 9.8   RBC 3.08* 3.09*   HGB 9.2* 9.3*   HCT 29.6* 29.5*   MCV 96.1 95.5   RDW 17.3* 17.6*    311     BMP:   Recent Labs     03/14/22  0415 03/15/22  0439 03/16/22  0605   * 135 136   K 3.8 4.0 4.2   CL 96* 100 100   CO2 21 22 22   BUN 16 17 16   CREATININE 1.02 1.04 1.14     BNP: No results for input(s): BNP in the last 72 hours. PT/INR: No results for input(s): PROTIME, INR in the last 72 hours. APTT:   No results for input(s): APTT in the last 72 hours. CARDIAC ENZYMES: No results for input(s): CKMB, CKMBINDEX, TROPONINI in the last 72 hours. Invalid input(s): CKTOTAL;3  FASTING LIPID PANEL:  Lab Results   Component Value Date    CHOL 188 11/07/2020    HDL 52 11/07/2020    TRIG 235 (H) 11/07/2020     LIVER PROFILE:   No results for input(s): AST, ALT, ALB, BILIDIR, BILITOT, ALKPHOS in the last 72 hours. MICROBIOLOGY:   Lab Results   Component Value Date/Time    CULTURE NO GROWTH 5 DAYS 03/10/2022 07:06 PM       Imaging:    XR ABDOMEN FOR NG/OG/NE TUBE PLACEMENT    Result Date: 3/3/2022  Enteric tube with tip and side-port in the stomach. Nonobstructive bowel gas pattern       ASSESSMENT & PLAN     A 78-year-old male who presented post V. fib arrest, intubated for acute hypoxic respiratory failure, was admitted inpatient for the evaluation and management post V. fib arrest.     V. fib arrest   CAD  Ischemic cardiomyopathy  S/p cardiac cath 3/9/2022 which showed severe native vessel CAD, Patent LIMA-LAD. Patent SVG-RPDA and Known occluded SVG-OM.    Plan for single chamber ICD placement outpatient as per the cardiology  Continue aspirin Lipitor, Coreg  Will be discharged with LifeVest.    Metabolic encephalopathy  Improved     Acute hypoxic and hypercapnic respiratory failure  Likely secondary to aspiration pneumonia, completed course of Unasyn  S/p Extubation  Currently on room air    ARON on CKD stage IV  Resolved  Nephrology following  PO Lasix 40 mg      ANCA Vasculitis  On Azathioprine     HTN  On Coreg 25 twice daily  Clonidine discontinued. COPD  Bronchodilators as needed     DVT ppx  On lovenox     GI ppx  Not indicated     PT/OT  On board     Discharge Planning:   to assist in discharge planning, [facility] Tata Gaona MD  Internal Medicine Resident, PGY-1  [facility];  [place], [place]  3/16/2022, 7:35 AM
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
## 2022-03-16 NOTE — PLAN OF CARE
Problem: Confusion - Acute:  Goal: Absence of continued neurological deterioration signs and symptoms  Description: Absence of continued neurological deterioration signs and symptoms  3/16/2022 1418 by Priscilla Weaver RN  Outcome: Ongoing  3/16/2022 0112 by Tomer Delaney RN  Outcome: Ongoing  Goal: Mental status will be restored to baseline  Description: Mental status will be restored to baseline  3/16/2022 1418 by Priscilla Weaver RN  Outcome: Ongoing  3/16/2022 0112 by Tomer Delaney RN  Outcome: Ongoing     Problem: Confusion - Acute:  Goal: Absence of continued neurological deterioration signs and symptoms  Description: Absence of continued neurological deterioration signs and symptoms  3/16/2022 1418 by Priscilla Weaver RN  Outcome: Ongoing  3/16/2022 0112 by Tomer Delaney RN  Outcome: Ongoing     Problem: Confusion - Acute:  Goal: Mental status will be restored to baseline  Description: Mental status will be restored to baseline  3/16/2022 1418 by Priscilla Weaver RN  Outcome: Ongoing  3/16/2022 0112 by Tomer Delaney RN  Outcome: Ongoing     Problem: Discharge Planning:  Goal: Ability to perform activities of daily living will improve  Description: Ability to perform activities of daily living will improve  3/16/2022 1418 by Priscilla Weaver RN  Outcome: Ongoing  3/16/2022 0112 by Tomer Delaney RN  Outcome: Ongoing  Goal: Participates in care planning  Description: Participates in care planning  3/16/2022 1418 by Priscilla Weaver RN  Outcome: Ongoing  3/16/2022 0112 by Tomer Delaney RN  Outcome: Ongoing     Problem: Discharge Planning:  Goal: Ability to perform activities of daily living will improve  Description: Ability to perform activities of daily living will improve  3/16/2022 1418 by Priscilla Weaver RN  Outcome: Ongoing  3/16/2022 0112 by Tomer Delaney RN  Outcome: Ongoing     Problem: Discharge Planning:  Goal: Participates in care planning  Description: Participates in care planning  3/16/2022 1418 by Priscilla Weaver RN  Outcome: Ongoing  3/16/2022 0112 by Nilton Calderón RN  Outcome: Ongoing     Problem: Injury - Risk of, Physical Injury:  Goal: Absence of physical injury  Description: Absence of physical injury  3/16/2022 1418 by Mitra Nassar RN  Outcome: Ongoing  3/16/2022 0112 by Nilton Calderón RN  Outcome: Met This Shift  Goal: Will remain free from falls  Description: Will remain free from falls  3/16/2022 1418 by Mitra Nassar RN  Outcome: Ongoing  3/16/2022 0112 by Nilton Calderón RN  Outcome: Met This Shift     Problem: Injury - Risk of, Physical Injury:  Goal: Absence of physical injury  Description: Absence of physical injury  3/16/2022 1418 by Mitra Nassar RN  Outcome: Ongoing  3/16/2022 0112 by Nilton Calderón RN  Outcome: Met This Shift     Problem: Injury - Risk of, Physical Injury:  Goal: Will remain free from falls  Description: Will remain free from falls  3/16/2022 1418 by Mitra Nassar RN  Outcome: Ongoing  3/16/2022 0112 by Nilton Calderón RN  Outcome: Met This Shift     Problem: Mood - Altered:  Goal: Mood stable  Description: Mood stable  3/16/2022 1418 by Mitra Nassar RN  Outcome: Ongoing  3/16/2022 0112 by Nilton Calderón RN  Outcome: Ongoing  Goal: Absence of abusive behavior  Description: Absence of abusive behavior  3/16/2022 1418 by Mitra Nassar RN  Outcome: Ongoing  3/16/2022 0112 by Nilton Calderón RN  Outcome: Ongoing  Goal: Verbalizations of feeling emotionally comfortable while being cared for will increase  Description: Verbalizations of feeling emotionally comfortable while being cared for will increase  3/16/2022 1418 by Mitra Nassar RN  Outcome: Ongoing  3/16/2022 0112 by Nilton Calderón RN  Outcome: Ongoing     Problem: Mood - Altered:  Goal: Absence of abusive behavior  Description: Absence of abusive behavior  3/16/2022 1418 by Mitra Nassar RN  Outcome: Ongoing  3/16/2022 0112 by Nilton Calderón RN  Outcome: Ongoing     Problem: Mood - Altered:  Goal: Mood stable  Description: Mood stable  3/16/2022 1418 by Mitra Nassar RN  Outcome: Ongoing  3/16/2022 0112 by Hailey Simmons RN  Outcome: Ongoing
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
## 2022-03-16 NOTE — PLAN OF CARE
Sensory Perception - Impaired:  Goal: Demonstrations of improved sensory functioning will increase  Description: Demonstrations of improved sensory functioning will increase  Outcome: Ongoing  Goal: Decrease in sensory misperception frequency  Description: Decrease in sensory misperception frequency  Outcome: Ongoing  Goal: Able to refrain from responding to false sensory perceptions  Description: Able to refrain from responding to false sensory perceptions  Outcome: Ongoing  Goal: Demonstrates accurate environmental perceptions  Description: Demonstrates accurate environmental perceptions  Outcome: Ongoing  Goal: Able to distinguish between reality-based and nonreality-based thinking  Description: Able to distinguish between reality-based and nonreality-based thinking  Outcome: Ongoing  Goal: Able to interrupt nonreality-based thinking  Description: Able to interrupt nonreality-based thinking  Outcome: Ongoing     Problem: Nutrition  Goal: Optimal nutrition therapy  Outcome: Ongoing     Problem: Skin Integrity:  Goal: Will show no infection signs and symptoms  Description: Will show no infection signs and symptoms  Outcome: Ongoing  Goal: Absence of new skin breakdown  Description: Absence of new skin breakdown  Outcome: Ongoing     Problem: Pain:  Goal: Control of acute pain  Description: Control of acute pain  Outcome: Ongoing  Goal: Control of chronic pain  Description: Control of chronic pain  Outcome: Ongoing

## 2022-03-16 NOTE — CARE COORDINATION
PS Dr. Magi Pryor for DC order and for attending to sign ricardo, also requested paper scripts for any controlled substances    [phone] notified Val Hensley RN that patient needs covid swab    [phone] spoke with Brittany Barba at [facility], who relates patient is scheduled to be picked up at 0900 tomorrow morning (3/17/22). Notified Jeannie at [facility], faxed negative covid swab results to her at [phone] Updated Val Hensley, NOAH, asked her to do RICARDO. PS Dr. Magi Pryor to update. Patient updated, agreeable.      26  PS Dr. Magi Pryor to ask for DC order and for attending to sign ricardo    1806 PS Dr. Maria R Benito, on call for [facility], to ask to sign ricardo    2015 RICARDO done, PS Dr. Magi Pryor for DC order

## 2022-03-17 VITALS
SYSTOLIC BLOOD PRESSURE: 111 MMHG | HEIGHT: 70 IN | WEIGHT: 179.68 LBS | TEMPERATURE: 98 F | HEART RATE: 78 BPM | DIASTOLIC BLOOD PRESSURE: 72 MMHG | RESPIRATION RATE: 19 BRPM | BODY MASS INDEX: 25.72 KG/M2 | OXYGEN SATURATION: 96 %

## 2022-03-17 LAB
ANION GAP SERPL CALCULATED.3IONS-SCNC: 13 MMOL/L (ref 9–17)
BUN BLDV-MCNC: 21 MG/DL (ref 6–20)
CALCIUM SERPL-MCNC: 8.3 MG/DL (ref 8.6–10.4)
CHLORIDE BLD-SCNC: 100 MMOL/L (ref 98–107)
CO2: 22 MMOL/L (ref 20–31)
CREAT SERPL-MCNC: 1.37 MG/DL (ref 0.7–1.2)
GFR AFRICAN AMERICAN: >60 ML/MIN
GFR NON-AFRICAN AMERICAN: 53 ML/MIN
GFR SERPL CREATININE-BSD FRML MDRD: ABNORMAL ML/MIN/{1.73_M2}
GLUCOSE BLD-MCNC: 133 MG/DL (ref 70–99)
MAGNESIUM: 1.7 MG/DL (ref 1.6–2.6)
POTASSIUM SERPL-SCNC: 3.9 MMOL/L (ref 3.7–5.3)
SODIUM BLD-SCNC: 135 MMOL/L (ref 135–144)

## 2022-03-17 PROCEDURE — 6360000002 HC RX W HCPCS: Performed by: STUDENT IN AN ORGANIZED HEALTH CARE EDUCATION/TRAINING PROGRAM

## 2022-03-17 PROCEDURE — 6370000000 HC RX 637 (ALT 250 FOR IP): Performed by: SURGERY

## 2022-03-17 PROCEDURE — 6370000000 HC RX 637 (ALT 250 FOR IP): Performed by: INTERNAL MEDICINE

## 2022-03-17 PROCEDURE — 80048 BASIC METABOLIC PNL TOTAL CA: CPT

## 2022-03-17 PROCEDURE — 6370000000 HC RX 637 (ALT 250 FOR IP): Performed by: STUDENT IN AN ORGANIZED HEALTH CARE EDUCATION/TRAINING PROGRAM

## 2022-03-17 PROCEDURE — 2580000003 HC RX 258: Performed by: STUDENT IN AN ORGANIZED HEALTH CARE EDUCATION/TRAINING PROGRAM

## 2022-03-17 PROCEDURE — 2700000000 HC OXYGEN THERAPY PER DAY

## 2022-03-17 PROCEDURE — 83735 ASSAY OF MAGNESIUM: CPT

## 2022-03-17 PROCEDURE — 94664 DEMO&/EVAL PT USE INHALER: CPT

## 2022-03-17 PROCEDURE — 94761 N-INVAS EAR/PLS OXIMETRY MLT: CPT

## 2022-03-17 PROCEDURE — 99239 HOSP IP/OBS DSCHRG MGMT >30: CPT | Performed by: INTERNAL MEDICINE

## 2022-03-17 PROCEDURE — 36415 COLL VENOUS BLD VENIPUNCTURE: CPT

## 2022-03-17 RX ORDER — CARVEDILOL 25 MG/1
25 TABLET ORAL 2 TIMES DAILY WITH MEALS
Qty: 60 TABLET | Refills: 3 | Status: SHIPPED | OUTPATIENT
Start: 2022-03-17 | End: 2022-04-15 | Stop reason: ALTCHOICE

## 2022-03-17 RX ORDER — IPRATROPIUM BROMIDE AND ALBUTEROL SULFATE 2.5; .5 MG/3ML; MG/3ML
3 SOLUTION RESPIRATORY (INHALATION) EVERY 6 HOURS PRN
Qty: 360 ML | Refills: 0 | Status: ON HOLD | OUTPATIENT
Start: 2022-03-17 | End: 2022-04-16 | Stop reason: HOSPADM

## 2022-03-17 RX ORDER — ATORVASTATIN CALCIUM 80 MG/1
80 TABLET, FILM COATED ORAL DAILY
Qty: 30 TABLET | Refills: 3 | Status: SHIPPED | OUTPATIENT
Start: 2022-03-17 | End: 2022-06-01 | Stop reason: ALTCHOICE

## 2022-03-17 RX ORDER — TAMSULOSIN HYDROCHLORIDE 0.4 MG/1
0.4 CAPSULE ORAL DAILY
Qty: 30 CAPSULE | Refills: 3 | Status: SHIPPED | OUTPATIENT
Start: 2022-03-17 | End: 2022-04-15 | Stop reason: ALTCHOICE

## 2022-03-17 RX ORDER — PANTOPRAZOLE SODIUM 40 MG/1
40 TABLET, DELAYED RELEASE ORAL DAILY
Qty: 30 TABLET | Refills: 0 | Status: SHIPPED | OUTPATIENT
Start: 2022-03-17 | End: 2022-04-18

## 2022-03-17 RX ORDER — UREA 10 %
3 LOTION (ML) TOPICAL NIGHTLY PRN
Qty: 30 TABLET | Refills: 0 | Status: ON HOLD | OUTPATIENT
Start: 2022-03-17 | End: 2022-04-16 | Stop reason: HOSPADM

## 2022-03-17 RX ORDER — FUROSEMIDE 40 MG/1
40 TABLET ORAL DAILY
Qty: 60 TABLET | Refills: 3 | Status: ON HOLD | OUTPATIENT
Start: 2022-03-17 | End: 2022-04-16 | Stop reason: HOSPADM

## 2022-03-17 RX ADMIN — ACETAMINOPHEN 650 MG: 325 TABLET ORAL at 04:26

## 2022-03-17 RX ADMIN — SODIUM CHLORIDE, PRESERVATIVE FREE 10 ML: 5 INJECTION INTRAVENOUS at 09:36

## 2022-03-17 RX ADMIN — ASPIRIN 81 MG: 81 TABLET, CHEWABLE ORAL at 09:37

## 2022-03-17 RX ADMIN — DOCUSATE SODIUM LIQUID 100 MG: 100 LIQUID ORAL at 09:39

## 2022-03-17 RX ADMIN — TAMSULOSIN HYDROCHLORIDE 0.4 MG: 0.4 CAPSULE ORAL at 09:38

## 2022-03-17 RX ADMIN — ATORVASTATIN CALCIUM 80 MG: 80 TABLET, FILM COATED ORAL at 09:38

## 2022-03-17 RX ADMIN — FUROSEMIDE 40 MG: 40 TABLET ORAL at 09:37

## 2022-03-17 RX ADMIN — SODIUM CHLORIDE, PRESERVATIVE FREE 10 ML: 5 INJECTION INTRAVENOUS at 09:40

## 2022-03-17 RX ADMIN — AZATHIOPRINE 75 MG: 50 TABLET ORAL at 09:37

## 2022-03-17 ASSESSMENT — PAIN DESCRIPTION - DESCRIPTORS: DESCRIPTORS: ACHING

## 2022-03-17 ASSESSMENT — PAIN DESCRIPTION - PROGRESSION: CLINICAL_PROGRESSION: NOT CHANGED

## 2022-03-17 ASSESSMENT — PAIN DESCRIPTION - ONSET: ONSET: GRADUAL

## 2022-03-17 ASSESSMENT — PAIN DESCRIPTION - PAIN TYPE: TYPE: ACUTE PAIN

## 2022-03-17 ASSESSMENT — PAIN DESCRIPTION - LOCATION: LOCATION: HEAD

## 2022-03-17 ASSESSMENT — PAIN SCALES - GENERAL
PAINLEVEL_OUTOF10: 3
PAINLEVEL_OUTOF10: 3

## 2022-03-17 NOTE — PROGRESS NOTES
[facility]  Internal Medicine Teaching Residency Program  Inpatient Daily Progress Note  ______________________________________________________________________________    Patient: Charmaine Decker  YOB: 1965   WVD:[id number]    Acct: [de-identified]     Room: 88/5693-61  Admit date: 2/21/2022  Today's date: 03/17/22  Number of days in the hospital: 23    SUBJECTIVE   Admitting Diagnosis: Cardiac arrest ([facility] [address]  CC: V. Fib arrest    Pt seen and examined bedside. Labs and charts reviewed. No acute events overnight. Afebrile and hemodynamically stable,  saturating well on room air. He feels well overall. Appetite and fatigue improving. LifeVest education received yesterday  Transfer to the [facility] facility today. ROS:  Constitutional:  negative for chills, fevers, sweats  Respiratory:  negative for cough, dyspnea on exertion, hemoptysis, shortness of breath, wheezing  Cardiovascular:  negative for chest pain, chest pressure/discomfort, lower extremity edema, palpitations  Gastrointestinal:  negative for abdominal pain, constipation, diarrhea, nausea, vomiting  Neurological:  negative for dizziness, headache  BRIEF HISTORY       A 49-year-old male who was admitted to ICU after cardiac arrest.  Family called EMS after he was found down, total time unknown. He was found to be in V. fib arrest, ROSC achieved after 2 shocks but subsequently he went into PEA arrest on the way to hospital, responded to epinephrine.       There was concern for STEMI, cardiology was consulted, they did not meet the criteria for STEMI and cath was postponed until neurological prognosis is clear. He was placed on hypothermia, protocol started on heparin and amiodarone. He required dobutamine transiently for bradycardia.     He also had acute hypoxemic and hypercarbic respiratory failure with possible aspiration bibasilar pneumonia. He was started on Unasyn.   He got intubated in ER, and started on mechanical ventilation.       Had thrombocytopenia, anticoagulation was changed to argatroban. HIT was ruled out and heparin was restarted. Electrolytes were replaced timely.     CT head was unremarkable. She underwent MRI of the brain, which revealed early changes of early hypoxic brain injury.       Over the course of a week, her neurological exam is gradually improving. .  Nephrology was consulted for ARON on CKD. Started him on IV Lasix.     Gradually, he tolerated spontaneous breathing trial well, got extubated without any immediate complications. Completed course of antibiotics for possible aspiration pneumonia.     He remained hemodynamically stable without any significant hypotension, or arrhythmias, therefore cardiology discontinued amiodarone drip and heparin drip. Cardiology stated that cath is not urgent at this time but will discuss the risk of contrast-induced nephropathy with the patient and family before cath and recommend AICD evaluation prior to discharge. He is also on azathioprine 70 mg daily for ANCA vasculitis.     He is transferred to the floor for the further evaluation and management. OBJECTIVE     Vital Signs:  /72   Pulse 78   Temp 97.9 °F (36.6 °C) (Oral)   Resp 27   Ht 5' 10\" (1.778 m)   Wt 179 lb 10.8 oz (81.5 kg)   SpO2 97%   BMI 25.78 kg/m²     Temp (24hrs), Av.9 °F (36.6 °C), Min:97 °F (36.1 °C), Max:99.5 °F (37.5 °C)    In: 820   Out: 2200 [Urine:2200]    Physical Exam:  Constitutional: This is a well developed, well nourished, 25-29.9 - Overweight 60y.o. year old male who is alert, oriented, cooperative and in no apparent distress. Head:normocephalic and atraumatic. EENT:  PERRLA. No conjunctival injections. Septum was midline, mucosa was without erythema, exudates or cobblestoning. No thrush was noted. Neck: Supple without thyromegaly. No elevated JVP. Trachea was midline.   Respiratory: Chest was symmetrical without dullness to percussion. Breath sounds bilaterally were clear to auscultation. There were no wheezes, rhonchi or rales. There is no intercostal retraction or use of accessory muscles. No egophony noted. Cardiovascular: Regular without murmur, clicks, gallops or rubs. Abdomen: Slightly rounded and soft without organomegaly. No rebound, rigidity or guarding was appreciated. Lymphatic: No lymphadenopathy. Musculoskeletal: Normal curvature of the spine. No gross muscle weakness. Extremities:  No lower extremity edema, ulcerations, tenderness, varicosities or erythema. Muscle size, tone and strength are normal.  No involuntary movements are noted. Skin:  Warm and dry. Good color, turgor and pigmentation. No lesions or scars. No cyanosis or clubbing  Neurological/Psychiatric: Could not be assessed because of patient's mental condition.   Medications:  Scheduled Medications:    enoxaparin  40 mg SubCUTAneous Daily    sodium chloride flush  5-40 mL IntraVENous 2 times per day    furosemide  40 mg Oral Daily    tamsulosin  0.4 mg Oral Daily    docusate  100 mg Oral Daily    carvedilol  25 mg Oral BID WC    azaTHIOprine  75 mg Oral Daily    aspirin  81 mg Oral Daily    atorvastatin  80 mg Oral Daily    sodium chloride flush  5-40 mL IntraVENous 2 times per day     Continuous Infusions:    sodium chloride 25 mL (03/15/22 1302)    sodium chloride 25 mL (03/11/22 1950)    dextrose       PRN Medicationsmagnesium sulfate, 1,000 mg, PRN  sodium chloride flush, 5-40 mL, PRN  sodium chloride, 25 mL, PRN  acetaminophen, 650 mg, Q4H PRN  melatonin, 3 mg, Nightly PRN  bisacodyl, 10 mg, Daily PRN  metoclopramide, 5 mg, Q6H PRN  labetalol, 10 mg, Q6H PRN  sodium chloride flush, 5-40 mL, PRN  sodium chloride, 25 mL, PRN  ondansetron, 4 mg, Q8H PRN   Or  ondansetron, 4 mg, Q6H PRN  polyethylene glycol, 17 g, Daily PRN  glucose, 15 g, PRN  glucagon (rDNA), 1 mg, PRN  dextrose, 100 mL/hr, PRN  dextrose bolus (hypoglycemia), 125 mL, PRN   Or  dextrose bolus (hypoglycemia), 250 mL, PRN  ipratropium-albuterol, 1 ampule, Q6H PRN        Diagnostic Labs:  CBC:   Recent Labs     03/16/22  0605   WBC 9.8   RBC 3.09*   HGB 9.3*   HCT 29.5*   MCV 95.5   RDW 17.6*        BMP:   Recent Labs     03/15/22  0439 03/16/22  0605    136   K 4.0 4.2    100   CO2 22 22   BUN 17 16   CREATININE 1.04 1.14     BNP: No results for input(s): BNP in the last 72 hours. PT/INR: No results for input(s): PROTIME, INR in the last 72 hours. APTT:   No results for input(s): APTT in the last 72 hours. CARDIAC ENZYMES: No results for input(s): CKMB, CKMBINDEX, TROPONINI in the last 72 hours. Invalid input(s): CKTOTAL;3  FASTING LIPID PANEL:  Lab Results   Component Value Date    CHOL 188 11/07/2020    HDL 52 11/07/2020    TRIG 235 (H) 11/07/2020     LIVER PROFILE:   No results for input(s): AST, ALT, ALB, BILIDIR, BILITOT, ALKPHOS in the last 72 hours. MICROBIOLOGY:   Lab Results   Component Value Date/Time    CULTURE NO GROWTH 5 DAYS 03/10/2022 07:06 PM       Imaging:    XR ABDOMEN FOR NG/OG/NE TUBE PLACEMENT    Result Date: 3/3/2022  Enteric tube with tip and side-port in the stomach. Nonobstructive bowel gas pattern       ASSESSMENT & PLAN     A 69-year-old male who presented post V. fib arrest, intubated for acute hypoxic respiratory failure, was admitted inpatient for the evaluation and management post V. fib arrest.     V. fib arrest   CAD  Ischemic cardiomyopathy  S/p cardiac cath 3/9/2022 which showed severe native vessel CAD, Patent LIMA-LAD. Patent SVG-RPDA and Known occluded SVG-OM. Plan for single chamber ICD placement outpatient as per the cardiology  Continue aspirin Lipitor, Coreg  Will be discharged with LifeVest to the SNF at the [place].      Metabolic encephalopathy  Improved     Acute hypoxic and hypercapnic respiratory failure  Likely secondary to aspiration pneumonia, completed course of Unasyn  S/p Extubation  Currently on room air    ARON on CKD stage IV  Resolved  Nephrology signed off. PO Lasix 40 mg      ANCA Vasculitis  On Azathioprine     HTN  On Coreg 25 twice daily  Clonidine and lisinopril discontinued. COPD  Bronchodilators as needed     DVT ppx  On lovenox     GI ppx  Not indicated     PT/OT  On board     Discharge Planning:  Discharge to SNF in [place]. Brianna Mann MD  Internal Medicine Resident, PGY-1  [address], [place]  3/17/2022, 7:35 AM

## 2022-03-17 NOTE — CARE COORDINATION
Discharge [id number] [facility]  Clinical Case Management Department  Written by: Piña RN    Patient Name: Mary Lozano  Attending Provider: Michael Peters MD  Admit Date: 2022  9:13 PM  MRN: [id number]  Account: [de-identified]                     : 1965  Discharge Date:  3/17/2022    Disposition: SNF  Patient is aware of  time.   Patient is A&O     Piña RN

## 2022-03-17 NOTE — PLAN OF CARE
Problem: Confusion - Acute:  Goal: Absence of continued neurological deterioration signs and symptoms  Description: Absence of continued neurological deterioration signs and symptoms  3/17/2022 0415 by John Denson RN  Outcome: Ongoing     Problem: Confusion - Acute:  Goal: Mental status will be restored to baseline  Description: Mental status will be restored to baseline  3/17/2022 0415 by John Denson RN  Outcome: Ongoing     Problem: Discharge Planning:  Goal: Ability to perform activities of daily living will improve  Description: Ability to perform activities of daily living will improve  3/17/2022 0415 by John Denson RN  Outcome: Ongoing     Problem: Discharge Planning:  Goal: Participates in care planning  Description: Participates in care planning  3/17/2022 0415 by John Denson RN  Outcome: Ongoing     Problem: Injury - Risk of, Physical Injury:  Goal: Absence of physical injury  Description: Absence of physical injury  3/17/2022 0415 by John Denson RN  Outcome: Ongoing     Problem: Injury - Risk of, Physical Injury:  Goal: Will remain free from falls  Description: Will remain free from falls  3/17/2022 0415 by John Denson RN  Outcome: Ongoing     Problem: Mood - Altered:  Goal: Mood stable  Description: Mood stable  3/17/2022 0415 by John Denson RN  Outcome: Ongoing     Problem: Mood - Altered:  Goal: Absence of abusive behavior  Description: Absence of abusive behavior  3/17/2022 0415 by John Denson RN  Outcome: Ongoing     Problem: Mood - Altered:  Goal: Verbalizations of feeling emotionally comfortable while being cared for will increase  Description: Verbalizations of feeling emotionally comfortable while being cared for will increase  3/17/2022 0415 by John Denson RN  Outcome: Ongoing     Problem: Psychomotor Activity - Altered:  Goal: Absence of psychomotor disturbance signs and symptoms  Description: Absence of psychomotor disturbance signs and symptoms  3/17/2022 0415 by John Denson RN  Outcome: Ongoing     Problem: Sensory Perception - Impaired:  Goal: Demonstrations of improved sensory functioning will increase  Description: Demonstrations of improved sensory functioning will increase  3/17/2022 0415 by John Denson RN  Outcome: Ongoing     Problem: Sensory Perception - Impaired:  Goal: Decrease in sensory misperception frequency  Description: Decrease in sensory misperception frequency  3/17/2022 0415 by John Denson RN  Outcome: Ongoing     Problem: Sensory Perception - Impaired:  Goal: Able to refrain from responding to false sensory perceptions  Description: Able to refrain from responding to false sensory perceptions  3/17/2022 0415 by John Denson RN  Outcome: Ongoing     Problem: Sensory Perception - Impaired:  Goal: Demonstrates accurate environmental perceptions  Description: Demonstrates accurate environmental perceptions  3/17/2022 0415 by John Denson RN  Outcome: Ongoing     Problem: Sensory Perception - Impaired:  Goal: Able to distinguish between reality-based and nonreality-based thinking  Description: Able to distinguish between reality-based and nonreality-based thinking  3/17/2022 0415 by John Denson RN  Outcome: Ongoing     Problem: Sensory Perception - Impaired:  Goal: Able to interrupt nonreality-based thinking  Description: Able to interrupt nonreality-based thinking  3/17/2022 0415 by John Denson RN  Outcome: Ongoing     Problem: Sleep Pattern Disturbance:  Goal: Appears well-rested  Description: Appears well-rested  3/17/2022 0415 by John Denson RN  Outcome: Ongoing     Problem: Nutrition  Goal: Optimal nutrition therapy  3/17/2022 0415 by John Denson RN  Outcome: Ongoing     Problem: Falls - Risk of:  Goal: Absence of physical injury  Description: Absence of physical injury  3/17/2022 0415 by John Denson RN  Outcome: Ongoing     Problem: Falls - Risk of:  Goal: Will remain free from falls  Description: Will remain free from falls  3/17/2022 0415 by Jaydon Villagran RN  Outcome: Ongoing     Problem: Skin Integrity:  Goal: Will show no infection signs and symptoms  Description: Will show no infection signs and symptoms  3/17/2022 0415 by Jaydon Villagran RN  Outcome: Ongoing     Problem: Skin Integrity:  Goal: Absence of new skin breakdown  Description: Absence of new skin breakdown  3/17/2022 0415 by Jaydon Villagran RN  Outcome: Ongoing     Problem: Pain:  Goal: Pain level will decrease  Description: Pain level will decrease  3/17/2022 0415 by Jaydon Villagran RN  Outcome: Ongoing     Problem: Pain:  Goal: Control of acute pain  Description: Control of acute pain  3/17/2022 0415 by Jaydon Villagran RN  Outcome: Ongoing     Problem: Pain:  Goal: Control of chronic pain  Description: Control of chronic pain  3/17/2022 0415 by Jaydon Villagran RN  Outcome: Ongoing

## 2022-03-28 NOTE — DISCHARGE SUMMARY
[address] [facility]     Department of Internal Medicine - Staff Internal Medicine Teaching Service    INPATIENT DISCHARGE SUMMARY      Patient Identification:  Cristopher Avendaño is a 60 y.o. male. :  1965  MRN: [id number]     Acct: [de-identified]   PCP: Jero Alaniz MD  Admit Date:  2022  Discharge date and time: 3/17/2022 11:52 AM   Attending Provider: No att. providers found                                     [address] Problem Lists:  Principal Problem:    Cardiac arrest ([facility] [address]  Active Problems:    Tobacco abuse    Essential hypertension    Severe recurrent major depressive disorder with psychotic features ([facility] [address]    COPD (chronic obstructive pulmonary disease) ([facility] [address]    Cervical stenosis of spinal canal    Ischemic cardiomyopathy  Resolved Problems:    * No resolved hospital problems. *      HOSPITAL STAY     Brief Inpatient course:   Cristopher Avendaño is a 60 y.o. male who was admitted for the management of Cardiac arrest [facility]),  Was brought to the emergency department after being found down. He was found to be in V. fib arrest.  ROSC was achieved after 2 shocks but he subsequently went into PEA arrest on the way to the hospital.  There was concern for STEMI, cardiology was consulted, as the criteria for STEMI was not met, cardiac cath was postponed until neurological prognosis is clear. He was placed on hypothermia protocol and heparin drip and amiodarone were started. He also required dobutamine transiently for bradycardia. He got intubated in ER and was placed on mechanical ventilation. He had acute hypoxemic and hypercarbic respiratory failure with possible bibasilar pneumonia. Because of thrombocytopenia anticoagulation was changed to argatroban, eventually HIT was ruled out and heparin was restarted. Electrolytes were replaced timely. For neurological reevaluation, he underwent CT head which was unremarkable.   MRI of the brain revealed early changes of hypoxic/anoxic brain injury. Nephrology was on board for the management of ARON on CKD. He had CKD because of ANCA vasculitis. His azathioprine was resumed. He tolerated spontaneous breathing trial well and got extubated and was transferred to the floor eventually. Cardiology recommended nephrology clearance to prevent contrast-induced nephropathy. He remained hemodynamically stable. His home medications were resumed as appropriate. In the hospital stay the following conditions were managed. Once medically cleared, he was discharged with instructions as given below after optimization of medications. He was discharged to SNF in [facility] clinic. The following conditions were managed during the hospital stay/ Significant Interventions during this hospitalization were as follows:      V. fib arrest   CAD s/p CABG  Ischemic cardiomyopathy  S/p cardiac cath 3/9/2022 which showed severe native vessel CAD, Patent LIMA-LAD. Patent SVG-RPDA and Known occluded SVG-OM.    Plan for single chamber ICD placement outpatient as per the cardiology  Continue aspirin Lipitor, Coreg  Was discharged with LifeVest to [facility] SNF [facility].      Metabolic encephalopathy  Improved  CT head unremarkable, MRI brain showed early signs of hypoxic/anoxic brain injury.     Acute hypoxic and hypercapnic respiratory failure  Likely secondary to aspiration pneumonia, completed course of Unasyn  S/p Extubation  Currently on room air, needed nighttime oxygen sometimes     ARON on CKD stage IV  CKD secondary to ANCA vasculitis  Resolved  Nephrology signed off. PO Lasix 40 mg      ANCA Vasculitis  On Azathioprine     HTN  Continue Coreg 25 twice daily  Clonidine and lisinopril discontinued.      COPD  Bronchodilators as needed          Consults:     Consults:     Final Specialist Recommendations/Findings:   IP CONSULT TO CARDIOLOGY  IP CONSULT TO CRITICAL CARE  IP CONSULT TO NEUROCRITICAL CARE  IP CONSULT TO NEUROSURGERY  IP CONSULT TO DIETITIAN  IP CONSULT TO NEPHROLOGY  IP CONSULT TO CASE MANAGEMENT  IP CONSULT TO UROLOGY  IP CONSULT TO CASE MANAGEMENT      Any Hospital Acquired Infections: none    Discharge Functional Status:  stable    DISCHARGE PLAN     Disposition: [facility]    Patient Instructions:   Discharge Medication List as of 3/17/2022  8:33 AM      START taking these medications    Details   carvedilol (COREG) 25 MG tablet Take 1 tablet by mouth 2 times daily (with meals), Disp-60 tablet, R-3Normal      melatonin 1 MG tablet Take 3 tablets by mouth nightly as needed for Sleep, Disp-30 tablet, R-0Normal      ipratropium-albuterol (DUONEB) 0.5-2.5 (3) MG/3ML SOLN nebulizer solution Inhale 3 mLs into the lungs every 6 hours as needed for Shortness of Breath, Disp-360 mL, R-0Normal      furosemide (LASIX) 40 MG tablet Take 1 tablet by mouth daily, Disp-60 tablet, R-3Normal      tamsulosin (FLOMAX) 0.4 MG capsule Take 1 capsule by mouth daily, Disp-30 capsule, R-3Normal         CONTINUE these medications which have CHANGED    Details   atorvastatin (LIPITOR) 80 MG tablet Take 1 tablet by mouth daily, Disp-30 tablet, R-3Normal      pantoprazole (PROTONIX) 40 MG tablet Take 1 tablet by mouth daily, Disp-30 tablet, R-0Normal         CONTINUE these medications which have NOT CHANGED    Details   !! magnesium oxide (MAG-OX) 400 (241.3 Mg) MG TABS tablet Historical Med      !! magnesium oxide (MAG-OX) 400 (240 Mg) MG tablet TAKE 1 TABLET BY MOUTH 2 TIMES DAILY, Disp-60 tablet, R-0Normal      traZODone (DESYREL) 100 MG tablet Take 0.5 tablets by mouth nightly as needed for Sleep, Disp-30 tablet, R-1Normal      DULoxetine (CYMBALTA) 30 MG extended release capsule TAKE 1 CAPSULE BY MOUTH DAILY, Disp-30 capsule, R-2Normal      metoclopramide (REGLAN) 10 MG tablet Take 1 tablet by mouth 3 times daily, Disp-120 tablet, R-3Normal      isosorbide mononitrate (IMDUR) 30 MG extended release tablet Take 1 tablet by mouth daily, Disp-90 tablet, R-1Normal      nitroGLYCERIN (NITROSTAT) 0.4 MG SL tablet Place 1 tablet under the tongue every 5 minutes as needed for Chest pain up to max of 3 total doses. If no relief after 1 dose, call 911., Disp-25 tablet, R-3Normal      !! magnesium oxide (MAG-OX) 400 (240 Mg) MG tablet Historical Med      magnesium oxide (MAG-OX) 400 MG tablet Take 1 tablet by mouth 2 times daily, Disp-30 tablet, R-2Normal      aspirin EC 81 MG EC tablet Take 1 tablet by mouth daily, Disp-90 tablet, R-3Normal      nicotine (NICODERM CQ) 21 MG/24HR Place 1 patch onto the skin daily, Disp-42 patch, R-0Normal      acetaminophen (TYLENOL) 325 MG tablet Take 2 tablets by mouth every 6 hours as needed for Pain, Disp-60 tablet, R-0Print      Fluticasone furoate-vilanterol (BREO ELLIPTA) 200-25 MCG/INH AEPB inhaler Inhale 1 puff into the lungs daily, Disp-1 each, R-3Normal      calcium carbonate-vitamin D3 (CALCIUM 600-D) 600-400 MG-UNIT TABS per tab Take 1 tablet by mouth 2 times daily, Disp-180 tablet, R-3Normal      azaTHIOprine (IMURAN) 50 MG tablet Take 1.5 tablets by mouth daily, Disp-45 tablet, R-6Normal      albuterol sulfate  (90 Base) MCG/ACT inhaler inhale 2 puffs by mouth every 6 hours if needed for wheezing, Disp-18 g,R-5Normal      traMADol (ULTRAM) 50 MG tablet Take 50 mg by mouth every 8 hours as needed for Pain. Historical Med      OLANZapine (ZYPREXA) 5 MG tablet Take 1 tablet by mouth nightly, Disp-30 tablet,R-3Normal       !! - Potential duplicate medications found. Please discuss with provider.       STOP taking these medications       lisinopril (PRINIVIL;ZESTRIL) 5 MG tablet Comments:   Reason for Stopping:         spironolactone (ALDACTONE) 25 MG tablet Comments:   Reason for Stopping:         cloNIDine (CATAPRES) 0.2 MG tablet Comments:   Reason for Stopping:         metoprolol tartrate (LOPRESSOR) 25 MG tablet Comments:   Reason for Stopping:         Umeclidinium Bromide 62.5 MCG/INH AEPB Comments:   Reason for Stopping:         vitamin D3 (CHOLECALCIFEROL) 10 MCG (400 UNIT) TABS tablet Comments:   Reason for Stopping:         vitamin D3 (CHOLECALCIFEROL) 10 MCG (400 UNIT) TABS tablet Comments:   Reason for Stopping:         nicotine (NICODERM CQ) 14 MG/24HR Comments:   Reason for Stopping:               Activity: activity as tolerated    Diet: cardiac diet    Follow-up:    Eliza Galarza MD  [address] [facility] [address] [facility]  [phone]    Today  Neurosurgery please follow up as needed     [facility]  [address] [place] [address]  [phone]  Go on 3/30/2022  at 3:30 pm Follow [address] [facility], [address] [place] [address] [id number] [place] [address] [address]  [phone]            Patient Instructions:   Please continue to take all your medications as prescribed. There are some changes done to your blood pressure and other heart medications. Make sure to follow-up with the PCP and the cardiologist as soon as possible. Do the blood work up in 1 week and follow-up with the nephrologist in about 3 to 4 weeks post discharge. Stop taking lisinopril and Aldactone. You will be reassessed by the PCP and cardiologist for the adjustment in your medications. Follow-up with neurosurgery in 6 to 8 weeks after discharge for cervical stenosis    Please follow-up with Urologist. Sheryle Pao were started on Flomax as you were retaining urine. Please follow-up with the neurosurgeon for the cervical stenosis. Please come back to ER or seek medical attention soon if your symptoms worsen. Follow up labs: none  Follow up imaging: none    Note that over 30 minutes was spent in preparing discharge papers, discussing discharge with patient, medication review, etc.      Araceli Powers MD,  Internal Medicine Resident, PGY-1  [facility];  [place], [place]  3/20/2022, 10:32 PM

## 2022-04-15 ENCOUNTER — TELEPHONE (OUTPATIENT)
Dept: OTHER | Facility: CLINIC | Age: 57
End: 2022-04-15

## 2022-04-15 ENCOUNTER — OFFICE VISIT (OUTPATIENT)
Dept: INTERNAL MEDICINE CLINIC | Age: 57
End: 2022-04-15
Payer: COMMERCIAL

## 2022-04-15 ENCOUNTER — APPOINTMENT (OUTPATIENT)
Dept: CT IMAGING | Age: 57
DRG: 201 | End: 2022-04-15
Payer: COMMERCIAL

## 2022-04-15 ENCOUNTER — HOSPITAL ENCOUNTER (INPATIENT)
Age: 57
LOS: 1 days | Discharge: HOME | DRG: 201 | End: 2022-04-16
Attending: STUDENT IN AN ORGANIZED HEALTH CARE EDUCATION/TRAINING PROGRAM | Admitting: INTERNAL MEDICINE
Payer: COMMERCIAL

## 2022-04-15 ENCOUNTER — APPOINTMENT (OUTPATIENT)
Dept: GENERAL RADIOLOGY | Age: 57
DRG: 201 | End: 2022-04-15
Payer: COMMERCIAL

## 2022-04-15 VITALS
SYSTOLIC BLOOD PRESSURE: 138 MMHG | HEART RATE: 48 BPM | OXYGEN SATURATION: 99 % | DIASTOLIC BLOOD PRESSURE: 82 MMHG | BODY MASS INDEX: 28.37 KG/M2 | HEIGHT: 70 IN | WEIGHT: 198.2 LBS

## 2022-04-15 DIAGNOSIS — I25.810 CORONARY ARTERY DISEASE INVOLVING CORONARY BYPASS GRAFT OF NATIVE HEART WITHOUT ANGINA PECTORIS: ICD-10-CM

## 2022-04-15 DIAGNOSIS — I50.33 ACUTE ON CHRONIC DIASTOLIC (CONGESTIVE) HEART FAILURE (HCC): Primary | ICD-10-CM

## 2022-04-15 DIAGNOSIS — N17.9 AKI (ACUTE KIDNEY INJURY) (HCC): ICD-10-CM

## 2022-04-15 DIAGNOSIS — I21.4 NSTEMI (NON-ST ELEVATED MYOCARDIAL INFARCTION) (HCC): ICD-10-CM

## 2022-04-15 DIAGNOSIS — J44.0 CHRONIC OBSTRUCTIVE PULMONARY DISEASE WITH ACUTE LOWER RESPIRATORY INFECTION (HCC): ICD-10-CM

## 2022-04-15 DIAGNOSIS — R55 SYNCOPE AND COLLAPSE: Primary | ICD-10-CM

## 2022-04-15 DIAGNOSIS — E11.9 TYPE 2 DIABETES MELLITUS WITHOUT COMPLICATION, WITHOUT LONG-TERM CURRENT USE OF INSULIN (HCC): ICD-10-CM

## 2022-04-15 LAB
ABSOLUTE EOS #: 0.3 K/UL (ref 0–0.4)
ABSOLUTE LYMPH #: 1.9 K/UL (ref 1–4.8)
ABSOLUTE MONO #: 0.4 K/UL (ref 0.1–1.3)
ALBUMIN SERPL-MCNC: 3.2 G/DL (ref 3.5–5.2)
ALLEN TEST: ABNORMAL
ALP BLD-CCNC: 258 U/L (ref 40–129)
ALT SERPL-CCNC: <5 U/L (ref 5–41)
ANION GAP SERPL CALCULATED.3IONS-SCNC: 10 MMOL/L (ref 9–17)
AST SERPL-CCNC: 11 U/L
BASOPHILS # BLD: 2 % (ref 0–2)
BASOPHILS ABSOLUTE: 0.1 K/UL (ref 0–0.2)
BILIRUB SERPL-MCNC: 0.4 MG/DL (ref 0.3–1.2)
BUN BLDV-MCNC: 10 MG/DL (ref 6–20)
CALCIUM SERPL-MCNC: 8.8 MG/DL (ref 8.6–10.4)
CARBOXYHEMOGLOBIN: 7.6 % (ref 0–5)
CHLORIDE BLD-SCNC: 100 MMOL/L (ref 98–107)
CO2: 24 MMOL/L (ref 20–31)
CREAT SERPL-MCNC: 1.11 MG/DL (ref 0.7–1.2)
D-DIMER QUANTITATIVE: 0.78 MG/L FEU (ref 0–0.59)
EOSINOPHILS RELATIVE PERCENT: 6 % (ref 0–4)
GFR AFRICAN AMERICAN: >60 ML/MIN
GFR NON-AFRICAN AMERICAN: >60 ML/MIN
GFR SERPL CREATININE-BSD FRML MDRD: ABNORMAL ML/MIN/{1.73_M2}
GLUCOSE BLD-MCNC: 87 MG/DL (ref 70–99)
HCO3 VENOUS: 28.5 MMOL/L (ref 24–30)
HCT VFR BLD CALC: 33.3 % (ref 41–53)
HEMOGLOBIN: 11 G/DL (ref 13.5–17.5)
LYMPHOCYTES # BLD: 31 % (ref 24–44)
MAGNESIUM: 1.7 MG/DL (ref 1.6–2.6)
MCH RBC QN AUTO: 29 PG (ref 26–34)
MCHC RBC AUTO-ENTMCNC: 33.1 G/DL (ref 31–37)
MCV RBC AUTO: 87.5 FL (ref 80–100)
METHEMOGLOBIN: 0.6 % (ref 0–1.9)
MONOCYTES # BLD: 7 % (ref 1–7)
O2 SAT, VEN: 57.5 % (ref 60–85)
PATIENT TEMP: 37
PCO2, VEN: 46.3 (ref 39–55)
PDW BLD-RTO: 17.9 % (ref 11.5–14.9)
PH VENOUS: 7.4 (ref 7.32–7.42)
PLATELET # BLD: 203 K/UL (ref 150–450)
PMV BLD AUTO: 7.8 FL (ref 6–12)
PO2, VEN: 30.9 (ref 30–50)
POSITIVE BASE EXCESS, VEN: 3.7 MMOL/L (ref 0–2)
POTASSIUM SERPL-SCNC: 4.9 MMOL/L (ref 3.7–5.3)
PRO-BNP: 1322 PG/ML
RBC # BLD: 3.81 M/UL (ref 4.5–5.9)
SEG NEUTROPHILS: 54 % (ref 36–66)
SEGMENTED NEUTROPHILS ABSOLUTE COUNT: 3.3 K/UL (ref 1.3–9.1)
SODIUM BLD-SCNC: 134 MMOL/L (ref 135–144)
TOTAL PROTEIN: 6.3 G/DL (ref 6.4–8.3)
TROPONIN, HIGH SENSITIVITY: 20 NG/L (ref 0–22)
WBC # BLD: 6 K/UL (ref 3.5–11)

## 2022-04-15 PROCEDURE — 80053 COMPREHEN METABOLIC PANEL: CPT

## 2022-04-15 PROCEDURE — 85025 COMPLETE CBC W/AUTO DIFF WBC: CPT

## 2022-04-15 PROCEDURE — 4004F PT TOBACCO SCREEN RCVD TLK: CPT | Performed by: INTERNAL MEDICINE

## 2022-04-15 PROCEDURE — 71260 CT THORAX DX C+: CPT

## 2022-04-15 PROCEDURE — 83880 ASSAY OF NATRIURETIC PEPTIDE: CPT

## 2022-04-15 PROCEDURE — 93005 ELECTROCARDIOGRAM TRACING: CPT | Performed by: STUDENT IN AN ORGANIZED HEALTH CARE EDUCATION/TRAINING PROGRAM

## 2022-04-15 PROCEDURE — 2580000003 HC RX 258: Performed by: STUDENT IN AN ORGANIZED HEALTH CARE EDUCATION/TRAINING PROGRAM

## 2022-04-15 PROCEDURE — 2022F DILAT RTA XM EVC RTNOPTHY: CPT | Performed by: INTERNAL MEDICINE

## 2022-04-15 PROCEDURE — 83735 ASSAY OF MAGNESIUM: CPT

## 2022-04-15 PROCEDURE — 6360000004 HC RX CONTRAST MEDICATION: Performed by: STUDENT IN AN ORGANIZED HEALTH CARE EDUCATION/TRAINING PROGRAM

## 2022-04-15 PROCEDURE — 3046F HEMOGLOBIN A1C LEVEL >9.0%: CPT | Performed by: INTERNAL MEDICINE

## 2022-04-15 PROCEDURE — 96361 HYDRATE IV INFUSION ADD-ON: CPT

## 2022-04-15 PROCEDURE — 96360 HYDRATION IV INFUSION INIT: CPT

## 2022-04-15 PROCEDURE — 85379 FIBRIN DEGRADATION QUANT: CPT

## 2022-04-15 PROCEDURE — 84484 ASSAY OF TROPONIN QUANT: CPT

## 2022-04-15 PROCEDURE — 3023F SPIROM DOC REV: CPT | Performed by: INTERNAL MEDICINE

## 2022-04-15 PROCEDURE — 36415 COLL VENOUS BLD VENIPUNCTURE: CPT

## 2022-04-15 PROCEDURE — 99285 EMERGENCY DEPT VISIT HI MDM: CPT

## 2022-04-15 PROCEDURE — 82805 BLOOD GASES W/O2 SATURATION: CPT

## 2022-04-15 PROCEDURE — 2060000000 HC ICU INTERMEDIATE R&B

## 2022-04-15 PROCEDURE — 1111F DSCHRG MED/CURRENT MED MERGE: CPT | Performed by: INTERNAL MEDICINE

## 2022-04-15 PROCEDURE — G8427 DOCREV CUR MEDS BY ELIG CLIN: HCPCS | Performed by: INTERNAL MEDICINE

## 2022-04-15 PROCEDURE — 3017F COLORECTAL CA SCREEN DOC REV: CPT | Performed by: INTERNAL MEDICINE

## 2022-04-15 PROCEDURE — 99215 OFFICE O/P EST HI 40 MIN: CPT | Performed by: INTERNAL MEDICINE

## 2022-04-15 PROCEDURE — 82800 BLOOD PH: CPT

## 2022-04-15 PROCEDURE — 71045 X-RAY EXAM CHEST 1 VIEW: CPT

## 2022-04-15 PROCEDURE — G8417 CALC BMI ABV UP PARAM F/U: HCPCS | Performed by: INTERNAL MEDICINE

## 2022-04-15 RX ORDER — FLUTICASONE PROPIONATE 220 UG/1
AEROSOL, METERED RESPIRATORY (INHALATION)
Status: ON HOLD | COMMUNITY
Start: 2022-03-17 | End: 2022-04-16 | Stop reason: HOSPADM

## 2022-04-15 RX ORDER — SODIUM CHLORIDE 9 MG/ML
INJECTION, SOLUTION INTRAVENOUS PRN
Status: DISCONTINUED | OUTPATIENT
Start: 2022-04-15 | End: 2022-04-16 | Stop reason: HOSPADM

## 2022-04-15 RX ORDER — SODIUM CHLORIDE 0.9 % (FLUSH) 0.9 %
5-40 SYRINGE (ML) INJECTION PRN
Status: DISCONTINUED | OUTPATIENT
Start: 2022-04-15 | End: 2022-04-16 | Stop reason: HOSPADM

## 2022-04-15 RX ORDER — LISINOPRIL 5 MG/1
5 TABLET ORAL DAILY
COMMUNITY
End: 2022-06-02 | Stop reason: DRUGHIGH

## 2022-04-15 RX ORDER — METOPROLOL SUCCINATE 25 MG/1
25 TABLET, EXTENDED RELEASE ORAL DAILY
COMMUNITY
End: 2022-04-15 | Stop reason: SDUPTHER

## 2022-04-15 RX ORDER — METOPROLOL SUCCINATE 25 MG/1
12.5 TABLET, EXTENDED RELEASE ORAL DAILY
Qty: 30 TABLET | Refills: 0 | Status: SHIPPED | OUTPATIENT
Start: 2022-04-15 | End: 2022-06-01

## 2022-04-15 RX ORDER — METHOCARBAMOL 500 MG/1
500 TABLET, FILM COATED ORAL 4 TIMES DAILY
COMMUNITY
End: 2022-04-15 | Stop reason: ALTCHOICE

## 2022-04-15 RX ORDER — ONDANSETRON 2 MG/ML
4 INJECTION INTRAMUSCULAR; INTRAVENOUS EVERY 6 HOURS PRN
Status: DISCONTINUED | OUTPATIENT
Start: 2022-04-15 | End: 2022-04-16 | Stop reason: HOSPADM

## 2022-04-15 RX ORDER — SODIUM CHLORIDE 0.9 % (FLUSH) 0.9 %
10 SYRINGE (ML) INJECTION PRN
Status: DISCONTINUED | OUTPATIENT
Start: 2022-04-15 | End: 2022-04-16 | Stop reason: HOSPADM

## 2022-04-15 RX ORDER — ONDANSETRON 4 MG/1
4 TABLET, ORALLY DISINTEGRATING ORAL EVERY 8 HOURS PRN
Status: DISCONTINUED | OUTPATIENT
Start: 2022-04-15 | End: 2022-04-16 | Stop reason: HOSPADM

## 2022-04-15 RX ORDER — 0.9 % SODIUM CHLORIDE 0.9 %
80 INTRAVENOUS SOLUTION INTRAVENOUS ONCE
Status: COMPLETED | OUTPATIENT
Start: 2022-04-15 | End: 2022-04-15

## 2022-04-15 RX ORDER — CLONIDINE HYDROCHLORIDE 0.2 MG/1
0.2 TABLET ORAL 2 TIMES DAILY
Status: ON HOLD | COMMUNITY
End: 2022-04-16 | Stop reason: HOSPADM

## 2022-04-15 RX ORDER — SPIRONOLACTONE 25 MG/1
25 TABLET ORAL DAILY
Status: ON HOLD | COMMUNITY
End: 2022-07-21 | Stop reason: SDUPTHER

## 2022-04-15 RX ORDER — HYDROXYZINE 50 MG/1
50 TABLET, FILM COATED ORAL 3 TIMES DAILY PRN
Status: ON HOLD | COMMUNITY
End: 2022-07-22 | Stop reason: ALTCHOICE

## 2022-04-15 RX ORDER — SODIUM CHLORIDE 0.9 % (FLUSH) 0.9 %
5-40 SYRINGE (ML) INJECTION EVERY 12 HOURS SCHEDULED
Status: DISCONTINUED | OUTPATIENT
Start: 2022-04-15 | End: 2022-04-16 | Stop reason: HOSPADM

## 2022-04-15 RX ORDER — TRAMADOL HYDROCHLORIDE 50 MG/1
1 TABLET ORAL
COMMUNITY
Start: 2022-03-17 | End: 2022-04-15 | Stop reason: ALTCHOICE

## 2022-04-15 RX ORDER — ACETAMINOPHEN 325 MG/1
650 TABLET ORAL EVERY 4 HOURS PRN
Status: DISCONTINUED | OUTPATIENT
Start: 2022-04-15 | End: 2022-04-16 | Stop reason: HOSPADM

## 2022-04-15 RX ADMIN — SODIUM CHLORIDE 80 ML: 9 INJECTION, SOLUTION INTRAVENOUS at 18:06

## 2022-04-15 RX ADMIN — SODIUM CHLORIDE, PRESERVATIVE FREE 10 ML: 5 INJECTION INTRAVENOUS at 18:06

## 2022-04-15 RX ADMIN — IOPAMIDOL 75 ML: 755 INJECTION, SOLUTION INTRAVENOUS at 18:06

## 2022-04-15 RX ADMIN — SODIUM CHLORIDE, PRESERVATIVE FREE 10 ML: 5 INJECTION INTRAVENOUS at 23:33

## 2022-04-15 ASSESSMENT — ENCOUNTER SYMPTOMS
COUGH: 0
NAUSEA: 0
ABDOMINAL PAIN: 0
SHORTNESS OF BREATH: 0
VOMITING: 0

## 2022-04-15 ASSESSMENT — PAIN DESCRIPTION - PAIN TYPE: TYPE: ACUTE PAIN

## 2022-04-15 ASSESSMENT — PAIN DESCRIPTION - ORIENTATION: ORIENTATION: RIGHT;LEFT;OTHER (COMMENT)

## 2022-04-15 ASSESSMENT — PAIN DESCRIPTION - ONSET: ONSET: GRADUAL

## 2022-04-15 ASSESSMENT — PAIN - FUNCTIONAL ASSESSMENT: PAIN_FUNCTIONAL_ASSESSMENT: ACTIVITIES ARE NOT PREVENTED

## 2022-04-15 ASSESSMENT — PAIN DESCRIPTION - FREQUENCY: FREQUENCY: INTERMITTENT

## 2022-04-15 ASSESSMENT — PAIN DESCRIPTION - PROGRESSION: CLINICAL_PROGRESSION: NOT CHANGED

## 2022-04-15 ASSESSMENT — PAIN DESCRIPTION - DESCRIPTORS: DESCRIPTORS: ACHING

## 2022-04-15 ASSESSMENT — PAIN SCALES - GENERAL: PAINLEVEL_OUTOF10: 3

## 2022-04-15 ASSESSMENT — PAIN DESCRIPTION - LOCATION: LOCATION: HEAD

## 2022-04-15 NOTE — PROGRESS NOTES
Medication History completed:    New medications: none    Medications discontinued: none    Changes to dosing: none    Stated allergies: As listed    Other pertinent information: Medications were confirmed with an office visit note from today. Patient states that his medications are set up by a nurse and he does not know what he takes. Unable to verify medications with [address] [place] [address] as they are closed at this time.      Thank you,   Garrett ParrishD Candidate 2022

## 2022-04-15 NOTE — ED PROVIDER NOTES
EMERGENCY DEPARTMENT ENCOUNTER   ATTENDING ATTESTATION     Pt Name: Tommi Skiff  MRN: [id number]  [place] 1965  Date of evaluation: 4/15/22       Tommi Skiff is a 59 y.o. male who presents with Loss of Consciousness and Chest Pain    Was sent over from Dr. Greer Mahoney office. Patient has a significant history of coronary artery disease, COPD, bypass graft, and had a V. fib cardiac arrest in February 2022. Has had worsening weakness, lethargy, 8 episodes of syncope over the past several days. Was bradycardic at the office down with a heart rate of 40s. Was having chest pain while lying down. Also concern for polypharmacy per PCP, recent sedatives and muscle relaxers were discontinued. Patient complaining of some weakness, shortness of breath. Plan for cardiac work-up, likely admission. EKG Interpretation    Interpreted by me    Rhythm: Sinus bradycardia  Rate: Bradycardic, 49  Axis: normal  Ectopy: none  Conduction: normal  ST Segments: no acute change  T Waves: no acute change  Q Waves: none    Clinical Impression: no acute changes and normal EKG    MDM:   Patient presents with a significant past medical history but had worsening symptoms over the past several weeks including weakness, lethargy, multiple episodes of syncope. Cardiac work-up demonstrated elevated D-dimer so CT PE study was ordered which was thankfully negative for pulmonary embolism. Troponin not significantly elevated, BNP is elevated at 1300. Given patient's extensive cardiac history along with his symptoms, plan for admission. Attempting to interrogate patient's LifeVest.    Vitals:   Vitals:    04/15/22 1651   BP: 135/89   Pulse: 50   Resp: 24   Temp: 97.7 °F (36.5 °C)   TempSrc: Oral   SpO2: 99%   Weight: 183 lb (83 kg)   Height: 5' 9\" (1.753 m)         I personally saw and examined the patient. I have reviewed and agree with the resident's findings, including all diagnostic interpretations and treatment plan as written.  I was present for the key portions of any procedures performed and the inclusive time noted for any critical care statement. The care is provided during an unprecedented national emergency due to the novel coronavirus, COVID 19.   Nicolette Jaeger DO  Attending Emergency Physician            Nicolette Jaeger DO  04/15/22 1301

## 2022-04-15 NOTE — PROGRESS NOTES
Subjective:      Patient ID: Karla Mcbride is a 59 y.o. male. HPI Patient has multiple medical problem which include diabetes, heart failure with preserved ejection fraction, CAD s/p CABG , COPD, CKD   He still smokes 0.5 PPD   Found positive for ANCA serologies. ANCA myeloperoxidase was 544 and ANCA proteinase 3 was 378. Anti-GBM was negative  He underwent Kidney Biopsy in past   Follows with Nephrologist   He was recently in [address] [place] [address] after Cardiac Arrest , required Hypothermia Protocol and Ventilatory Support , later  Dc ed to SNF , had MRI BRAIN, CONCERNING , for ischemic changes   Has Chronic shakes both hands , all the time , Does not stop   Has Passed out 7-8 times in last 2 weeks   Has Life vest , No shocks recently     . There is question of perhaps minimal scattered restricted diffusion   involving the cortex of the cerebral hemispheres bilaterally as well as   possibly the cerebellar hemispheres.  Findings could represent early changes   of hypoxic ischemic injury. 2. Otherwise, no acute intracranial abnormality. 3. Mild global parenchymal volume loss with minimal chronic microvascular   ischemic changes. On Multiple medication   Will DC Reglan, TRazodone ,  Methacarbamol   Review of Systems   Constitutional: Positive for fatigue. Negative for activity change, appetite change, chills and diaphoresis. HENT: Negative for congestion, dental problem, ear discharge, facial swelling and hearing loss. Respiratory: Positive for chest tightness and shortness of breath. Negative for apnea, cough and wheezing. Cardiovascular: Negative for chest pain and leg swelling. Gastrointestinal: Negative for abdominal distention, abdominal pain, constipation and diarrhea. Genitourinary: Negative for difficulty urinating, dysuria, enuresis, flank pain and frequency. Musculoskeletal: Negative for arthralgias, back pain, gait problem and joint swelling.    Skin: Negative for color change, pallor and rash.   Neurological: Positive for syncope (Multiple syncopal episodes). Negative for dizziness, seizures, facial asymmetry, light-headedness, numbness and headaches. Psychiatric/Behavioral: Negative for agitation, behavioral problems, confusion, decreased concentration and dysphoric mood. Objective:   Physical Exam  Vitals and nursing note reviewed. Constitutional:       General: He is not in acute distress. Appearance: He is well-developed. He is not diaphoretic. Comments: Very weak, lethargic   HENT:      Head: Normocephalic and atraumatic. Mouth/Throat:      Pharynx: No oropharyngeal exudate. Eyes:      General: No scleral icterus. Right eye: No discharge. Left eye: No discharge. Conjunctiva/sclera: Conjunctivae normal.      Pupils: Pupils are equal, round, and reactive to light. Neck:      Thyroid: No thyromegaly. Vascular: No JVD. Trachea: No tracheal deviation. Cardiovascular:      Rate and Rhythm: Normal rate. Heart sounds: Normal heart sounds. No murmur heard. No gallop. Pulmonary:      Effort: Pulmonary effort is normal. No respiratory distress. Breath sounds: Normal breath sounds. No stridor. No wheezing or rales. Abdominal:      General: Bowel sounds are normal. There is no distension. Palpations: Abdomen is soft. Tenderness: There is no abdominal tenderness. There is no guarding or rebound. Musculoskeletal:         General: No tenderness. Normal range of motion. Cervical back: Normal range of motion and neck supple. Skin:     General: Skin is warm and dry. Findings: No erythema or rash. Neurological:      Mental Status: He is alert and oriented to person, place, and time. Assessment / Plan:   1. Acute on chronic diastolic (congestive) heart failure ([address]    2. NSTEMI (non-ST elevated myocardial infarction) (HCC)  - metoprolol succinate (TOPROL XL) 25 MG extended release tablet;  Take 0.5 tablets by mouth daily  Dispense: 30 tablet; Refill: 0    3. Chronic obstructive pulmonary disease with acute lower respiratory infection ([facility]    4. Type 2 diabetes mellitus without complication, without long-term current use of insulin (Holli Guardado MD, Cardiology, [facility] External Referral To Home Health    5. ARON (acute kidney injury) ([facility] [address] [facility]      6. Coronary artery disease involving coronary bypass graft of native heart without angina pectoris  - Teresa Ahmadi MD, Cardiology, [facility] External Referral To Home Health    Patient, has multiple episode of syncope  I tried to do EKG in the office, when nurse told him to lie down, he experienced chest pain  Patient has complex cardiac history  Advised patient to go to the hospital, he has niece , she will drive him to the hospital  I spoke with the ER physician, given signout  Critically sick patient  Will need evaluation by cardiologist patient need evaluation by cardiologyNeeds eval by cardiologist   Around 45 minutes spent with the patient    I suspect polypharmacy is playing a role, continued on Reglan,, trazodone, muscle relaxant  Reducing dose of Lopressor  · No follow-ups on file. · Reviewed prior labs and health maintenance. · Discussed use, benefit, and side effects of prescribed medications. Barriers to medication compliance addressed. All patient questions answered. Pt voiced understanding. MD TERA StewartFreeman [facility]  4/21/2022, 4:22 PM    Please note that this chart was generated using voice recognition Dragon dictation software. Although every effort was made to ensure the accuracy of this automated transcription, some errors in transcription may have occurred. Visit Information    Have you changed or started any medications since your last visit including any over-the-counter medicines, vitamins, or herbal medicines?  no   Are you having any side effects from any of your medications? -  no  Have you stopped taking any of your medications? Is so, why? -  no    Have you seen any other physician or provider since your last visit? No  Have you had any other diagnostic tests since your last visit? No  Have you been seen in the emergency room and/or had an admission to a hospital since we last saw you? No  Have you had your routine dental cleaning in the past 6 months? no    Have you activated your [employer] account? If not, what are your barriers?  Yes     Patient Care Team:  Moses Alcantara MD as PCP - General (Internal Medicine)  Moses Alcantara MD as PCP - [facility]  Ric Cole as Surgeon (Cardiothoracic Surgery)  Tahmina Bryson MD (Cardiology)  Julien Reveles MD as Orthopedic Surgeon (Orthopedic Surgery)  Niya Zapata MD as Surgeon (Orthopedic Surgery)  Damien Wright MD as Surgeon (Neurosurgery)  Joanie Altman MD as Consulting Physician (Neurology)  Jamal Gill MD as Consulting Physician (Pulmonology)  Srini Smith RN as Imaging Navigator    Medical History Review  Past Medical, Family, and Social History reviewed and does contribute to the patient presenting condition    Health Maintenance   Topic Date Due    COVID-19 Vaccine (1) Never done    Diabetic microalbuminuria test  Never done    Diabetic retinal exam  Never done    Hepatitis B vaccine (1 of 3 - Risk 3-dose series) Never done    Shingles Vaccine (1 of 2) Never done    Pneumococcal 0-64 years Vaccine (3 - PCV) 03/14/2018    Low dose CT lung screening  02/24/2021    Diabetic foot exam  10/08/2021    Lipid screen  11/07/2021    A1C test (Diabetic or Prediabetic)  04/21/2022    Depression Monitoring  04/21/2022    Flu vaccine (Season Ended) 09/01/2022    Potassium monitoring  03/17/2023    Creatinine monitoring  03/17/2023    DTaP/Tdap/Td vaccine (2 - Td or Tdap) 11/17/2026    Colorectal Cancer Screen  07/30/2029    Hepatitis C screen  Completed    HIV screen Completed    Hepatitis A vaccine  Aged Out    Hib vaccine  Aged Out    Meningococcal (ACWY) vaccine  Aged Out   ,h
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
## 2022-04-15 NOTE — TELEPHONE ENCOUNTER
Writer contacted Dr. Tanesha Perez to inform of 30 day readmission risk. Dr. Tanesha Perez informed writer of readmission.

## 2022-04-15 NOTE — ED TRIAGE NOTES
Mode of arrival (squad #, walk in, police, etc) : walk in      Chief complaint(s): lightheaded, syncope and chest pain      Arrival Note (brief scenario, treatment PTA, etc). : pt seen at PCP for follow up since cardiac arrest in March. Pt does have on life vest on and in place. Pt has passed out 3 times since discharge from hospital.        C= \"Have you ever felt that you should Cut down on your drinking? \" no  A= \"Have people Annoyed you by criticizing your drinking? \" no  G= \"Have you ever felt bad or Guilty about your drinking? \" no  E= \"Have you ever had a drink as an Eye-opener first thing in the morning to steady your nerves or to help a hangover? \"  no      Deferred []      Reason for deferring: na    *If yes to two or more: probable alcohol abuse. *

## 2022-04-16 ENCOUNTER — APPOINTMENT (OUTPATIENT)
Dept: NON INVASIVE DIAGNOSTICS | Age: 57
DRG: 201 | End: 2022-04-16
Payer: COMMERCIAL

## 2022-04-16 VITALS
BODY MASS INDEX: 31.02 KG/M2 | OXYGEN SATURATION: 93 % | SYSTOLIC BLOOD PRESSURE: 142 MMHG | DIASTOLIC BLOOD PRESSURE: 77 MMHG | TEMPERATURE: 98.3 F | HEIGHT: 69 IN | WEIGHT: 209.44 LBS | HEART RATE: 54 BPM | RESPIRATION RATE: 16 BRPM

## 2022-04-16 LAB
ANION GAP SERPL CALCULATED.3IONS-SCNC: 10 MMOL/L (ref 9–17)
BUN BLDV-MCNC: 10 MG/DL (ref 6–20)
CALCIUM SERPL-MCNC: 8.8 MG/DL (ref 8.6–10.4)
CHLORIDE BLD-SCNC: 101 MMOL/L (ref 98–107)
CO2: 25 MMOL/L (ref 20–31)
CREAT SERPL-MCNC: 1.12 MG/DL (ref 0.7–1.2)
GFR AFRICAN AMERICAN: >60 ML/MIN
GFR NON-AFRICAN AMERICAN: >60 ML/MIN
GFR SERPL CREATININE-BSD FRML MDRD: ABNORMAL ML/MIN/{1.73_M2}
GLUCOSE BLD-MCNC: 102 MG/DL (ref 70–99)
HCT VFR BLD CALC: 34.3 % (ref 41–53)
HEMOGLOBIN: 11.2 G/DL (ref 13.5–17.5)
LV EF: 55 %
LVEF MODALITY: NORMAL
MCH RBC QN AUTO: 28.6 PG (ref 26–34)
MCHC RBC AUTO-ENTMCNC: 32.7 G/DL (ref 31–37)
MCV RBC AUTO: 87.3 FL (ref 80–100)
PDW BLD-RTO: 18.1 % (ref 11.5–14.9)
PLATELET # BLD: 177 K/UL (ref 150–450)
PMV BLD AUTO: 7.9 FL (ref 6–12)
POTASSIUM SERPL-SCNC: 4.5 MMOL/L (ref 3.7–5.3)
RBC # BLD: 3.93 M/UL (ref 4.5–5.9)
SODIUM BLD-SCNC: 136 MMOL/L (ref 135–144)
TROPONIN, HIGH SENSITIVITY: 22 NG/L (ref 0–22)
WBC # BLD: 4.6 K/UL (ref 3.5–11)

## 2022-04-16 PROCEDURE — 94760 N-INVAS EAR/PLS OXIMETRY 1: CPT

## 2022-04-16 PROCEDURE — 6370000000 HC RX 637 (ALT 250 FOR IP): Performed by: INTERNAL MEDICINE

## 2022-04-16 PROCEDURE — 85027 COMPLETE CBC AUTOMATED: CPT

## 2022-04-16 PROCEDURE — 80048 BASIC METABOLIC PNL TOTAL CA: CPT

## 2022-04-16 PROCEDURE — 36415 COLL VENOUS BLD VENIPUNCTURE: CPT

## 2022-04-16 PROCEDURE — 99223 1ST HOSP IP/OBS HIGH 75: CPT | Performed by: INTERNAL MEDICINE

## 2022-04-16 PROCEDURE — 93306 TTE W/DOPPLER COMPLETE: CPT

## 2022-04-16 PROCEDURE — 94640 AIRWAY INHALATION TREATMENT: CPT

## 2022-04-16 PROCEDURE — 94664 DEMO&/EVAL PT USE INHALER: CPT

## 2022-04-16 PROCEDURE — 2580000003 HC RX 258: Performed by: STUDENT IN AN ORGANIZED HEALTH CARE EDUCATION/TRAINING PROGRAM

## 2022-04-16 PROCEDURE — 84484 ASSAY OF TROPONIN QUANT: CPT

## 2022-04-16 RX ORDER — HYDROXYZINE HYDROCHLORIDE 25 MG/1
50 TABLET, FILM COATED ORAL 3 TIMES DAILY PRN
Status: DISCONTINUED | OUTPATIENT
Start: 2022-04-16 | End: 2022-04-16 | Stop reason: HOSPADM

## 2022-04-16 RX ORDER — ALBUTEROL SULFATE 90 UG/1
2 AEROSOL, METERED RESPIRATORY (INHALATION) EVERY 4 HOURS PRN
Status: DISCONTINUED | OUTPATIENT
Start: 2022-04-16 | End: 2022-04-16 | Stop reason: HOSPADM

## 2022-04-16 RX ORDER — DULOXETIN HYDROCHLORIDE 30 MG/1
30 CAPSULE, DELAYED RELEASE ORAL DAILY
Status: DISCONTINUED | OUTPATIENT
Start: 2022-04-16 | End: 2022-04-16 | Stop reason: HOSPADM

## 2022-04-16 RX ORDER — ASPIRIN 81 MG/1
81 TABLET ORAL DAILY
Status: DISCONTINUED | OUTPATIENT
Start: 2022-04-16 | End: 2022-04-16 | Stop reason: HOSPADM

## 2022-04-16 RX ORDER — PANTOPRAZOLE SODIUM 40 MG/1
40 TABLET, DELAYED RELEASE ORAL DAILY
Status: DISCONTINUED | OUTPATIENT
Start: 2022-04-16 | End: 2022-04-16 | Stop reason: HOSPADM

## 2022-04-16 RX ORDER — SPIRONOLACTONE 25 MG/1
25 TABLET ORAL DAILY
Status: DISCONTINUED | OUTPATIENT
Start: 2022-04-16 | End: 2022-04-16 | Stop reason: HOSPADM

## 2022-04-16 RX ORDER — ACETAMINOPHEN 325 MG/1
650 TABLET ORAL EVERY 6 HOURS PRN
Status: DISCONTINUED | OUTPATIENT
Start: 2022-04-16 | End: 2022-04-16 | Stop reason: SDUPTHER

## 2022-04-16 RX ORDER — NITROGLYCERIN 0.4 MG/1
0.4 TABLET SUBLINGUAL EVERY 5 MIN PRN
Status: DISCONTINUED | OUTPATIENT
Start: 2022-04-16 | End: 2022-04-16 | Stop reason: HOSPADM

## 2022-04-16 RX ORDER — METOPROLOL SUCCINATE 25 MG/1
12.5 TABLET, EXTENDED RELEASE ORAL DAILY
Status: DISCONTINUED | OUTPATIENT
Start: 2022-04-16 | End: 2022-04-16 | Stop reason: HOSPADM

## 2022-04-16 RX ORDER — BUDESONIDE AND FORMOTEROL FUMARATE DIHYDRATE 80; 4.5 UG/1; UG/1
2 AEROSOL RESPIRATORY (INHALATION) 2 TIMES DAILY
Refills: 3 | Status: DISCONTINUED | OUTPATIENT
Start: 2022-04-16 | End: 2022-04-16 | Stop reason: HOSPADM

## 2022-04-16 RX ORDER — CLONIDINE HYDROCHLORIDE 0.2 MG/1
0.2 TABLET ORAL 2 TIMES DAILY
Status: DISCONTINUED | OUTPATIENT
Start: 2022-04-16 | End: 2022-04-16

## 2022-04-16 RX ORDER — ATORVASTATIN CALCIUM 80 MG/1
80 TABLET, FILM COATED ORAL DAILY
Status: DISCONTINUED | OUTPATIENT
Start: 2022-04-16 | End: 2022-04-16 | Stop reason: HOSPADM

## 2022-04-16 RX ORDER — ISOSORBIDE MONONITRATE 30 MG/1
30 TABLET, EXTENDED RELEASE ORAL DAILY
Status: DISCONTINUED | OUTPATIENT
Start: 2022-04-16 | End: 2022-04-16

## 2022-04-16 RX ORDER — LISINOPRIL 5 MG/1
5 TABLET ORAL DAILY
Status: DISCONTINUED | OUTPATIENT
Start: 2022-04-16 | End: 2022-04-16 | Stop reason: HOSPADM

## 2022-04-16 RX ADMIN — BUDESONIDE AND FORMOTEROL FUMARATE DIHYDRATE 2 PUFF: 80; 4.5 AEROSOL RESPIRATORY (INHALATION) at 09:05

## 2022-04-16 RX ADMIN — SODIUM CHLORIDE, PRESERVATIVE FREE 10 ML: 5 INJECTION INTRAVENOUS at 10:39

## 2022-04-16 RX ADMIN — ASPIRIN 81 MG: 81 TABLET, COATED ORAL at 10:34

## 2022-04-16 RX ADMIN — ATORVASTATIN CALCIUM 80 MG: 80 TABLET, FILM COATED ORAL at 10:34

## 2022-04-16 RX ADMIN — PANTOPRAZOLE SODIUM 40 MG: 40 TABLET, DELAYED RELEASE ORAL at 10:34

## 2022-04-16 RX ADMIN — METOPROLOL SUCCINATE 12.5 MG: 25 TABLET, EXTENDED RELEASE ORAL at 10:34

## 2022-04-16 RX ADMIN — DULOXETINE HYDROCHLORIDE 30 MG: 30 CAPSULE, DELAYED RELEASE ORAL at 10:34

## 2022-04-16 RX ADMIN — LISINOPRIL 5 MG: 5 TABLET ORAL at 10:35

## 2022-04-16 RX ADMIN — SPIRONOLACTONE 25 MG: 25 TABLET, FILM COATED ORAL at 10:34

## 2022-04-16 NOTE — CARE COORDINATION
CASE MANAGEMENT NOTE:    Admission Date:  4/15/2022 Sanaz Yap is a 59 y.o.  male    Admitted for : Syncope and collapse [R55]    Met with:  Patient    PCP:  Dr. Mary Zuleta:  Lashonda Rushing      Is patient alert and oriented at time of discussion:  Yes    Current Residence/ Living Arrangements:  independently at home with his niece, Mendoza Olea. Current Services PTA:  No    Does patient go to outpatient dialysis: No  If yes, location and chair time: n/a    Is patient agreeable to VNS: No    Freedom of choice provided:  Yes    List of [address] provided: No    VNS chosen:  NA    DME:  straight cane and [employer] Life Vest    Home Oxygen: No    Nebulizer: No    CPAP/BIPAP: No    Supplier: N/A    Potential Assistance Needed: No    SNF needed: No, pt was recently at [facility] in [place] for a week, but does not feel he needs to return. Freedom of choice and list provided: NA    Pharmacy:  [facility]       Does Patient want to use MEDS to BEDS? No    Is patient currently receiving oral anticoagulation therapy? No    Is the Patient an [facility] HEL[facility] [place] with Readmission Risk Score greater than 14%? No  If yes, pt needs a follow up appointment made within 7 days. Family Members/Caregivers that pt would like involved in their care:    Yes    If yes, list name here:  Mendoza Olea    Transportation Provider:  Family             Discharge Plan:  Home without needs.                  Electronically signed by: Blessing Stephenson RN on 4/16/2022 at 11:13 AM

## 2022-04-16 NOTE — PLAN OF CARE
Problem: Skin Integrity:  Goal: Will show no infection signs and symptoms  Description: Will show no infection signs and symptoms  Outcome: Ongoing     Problem: Skin Integrity:  Goal: Absence of new skin breakdown  Description: Absence of new skin breakdown  Outcome: Ongoing     Problem: Falls - Risk of:  Goal: Will remain free from falls  Description: Will remain free from falls  Outcome: Ongoing     Problem: Pain:  Goal: Pain level will decrease  Description: Pain level will decrease  Outcome: Ongoing     Problem: Pain:  Goal: Control of acute pain  Description: Control of acute pain  Outcome: Ongoing     Problem: Pain:  Goal: Control of chronic pain  Description: Control of chronic pain  Outcome: Ongoing     Problem: Pain:  Goal: Patient's pain/discomfort is manageable  Description: Patient's pain/discomfort is manageable  Outcome: Ongoing     Problem: Infection:  Goal: Will remain free from infection  Description: Will remain free from infection  Outcome: Ongoing     Problem: Safety:  Goal: Free from accidental physical injury  Description: Free from accidental physical injury  Outcome: Ongoing     Problem: Daily Care:  Goal: Daily care needs are met  Description: Daily care needs are met  Outcome: Ongoing     Problem: Discharge Planning:  Goal: Patients continuum of care needs are met  Description: Patients continuum of care needs are met  Outcome: Ongoing

## 2022-04-16 NOTE — PROGRESS NOTES
Dr. Ovidio Jerez notified of admission. Lab orders received. Linh Subramanian will address patient's home medications when she rounds.

## 2022-04-16 NOTE — ED NOTES
Report called to [address] [place], [place] [address]U room 6558     Doris Ramos RN  04/15/22 0502

## 2022-04-16 NOTE — PROGRESS NOTES
Pt. Admitted to room 2096 via cart accompanied by ER. VSS. Assessment completed. Pt oriented to call light system and bed controls. Pt denies any dizziness or chest pain at this time. Pratik Hernández 125 remains on patient. Dr. Nicho Abreu was notified by the ER per [facility] in ER.
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
## 2022-04-16 NOTE — DISCHARGE SUMMARY
[place] [address] Internal Medicine    Discharge Summary     Patient ID: Shakira Moore  :  1965   MRN: [id number]     ACCOUNT:  [de-identified]   Patient's PCP: Amarjit Stacy MD  Admit Date: 4/15/2022   Discharge Date: 2022  Length of Stay: 1  Code Status:  Full Code  Admitting Physician: Kg Brown MD  Discharge Physician: Angel Salgado MD     Active Discharge Diagnoses:     Primary Problem  Syncope and collapse      [place] Problems    Diagnosis Date Noted    Syncope and collapse [R55] 2020       Admission Condition:  fair     Discharged Condition: fair    Hospital Stay:     Hospital Course:  Shakira Moore is a 59 y.o. male who was admitted for the management of Syncope and collapse , presented to ER with Loss of Consciousness and Chest Pain    57-year-old male presenting with bradycardia recurrent syncope associated with generalized fatigue shortness of breath  History of coronary artery disease status post CABG , history of V. fib arrest in 2022, has LifeVest, chronic combined systolic diastolic heart failure-last EF 45% 2022, COPD, hypertension. Cardiac cath in 2022 showed significant CAD, no stents placed, needs to follow-up with cardiologist for possible AICD placement        1. Multiple syncopal episodes likely secondary to bradycardia reduced cardiac output, troponins flat, CTA chest done for elevated D-dimer is negative for PE. LifeVest to be interrogated, echo ordered, cardiology consulted  2. Coronary artery disease status post CABG-patient on aspirin statin beta-blocker Imdur, lisinopril  3. Chronic anemia Baseline hemoglobin 10-11  4. Chronic heart failure-continue beta-blocker ACE inhibitor Aldactone  5. COPD-continue home inhalers  6. History of ANCA vasculitis  7.  Bilateral hand tremors since cardiac arrest-likely hypoxic brain injury       Discussed with cardiology LifeVest has been interrogated no bradycardia noted  Syncopal attacks deemed to be secondary to hypotension related to clonidine  Clonidine and Imdur discontinued  Cardiology recommend discharge patient today with outpatient follow-up      Significant therapeutic interventions: As above    Significant Diagnostic Studies:   Labs / Micro:    Lab Results   Component Value Date    WBC 4.6 04/16/2022    HGB 11.2 (L) 04/16/2022    HCT 34.3 (L) 04/16/2022    MCV 87.3 04/16/2022     04/16/2022          Lab Results   Component Value Date     04/16/2022    K 4.5 04/16/2022     04/16/2022    CO2 25 04/16/2022    BUN 10 04/16/2022    CREATININE 1.12 04/16/2022    GLUCOSE 102 04/16/2022    GLUCOSE 104 01/25/2012    CALCIUM 8.8 04/16/2022          Radiology:    XR CHEST PORTABLE    Result Date: 4/15/2022  EXAMINATION: ONE XRAY VIEW OF THE CHEST 4/15/2022 4:46 pm COMPARISON: 03/11/2022 HISTORY: ORDERING SYSTEM PROVIDED HISTORY: shortness of breath TECHNOLOGIST PROVIDED HISTORY: shortness of breath Reason for Exam: Pt states chest pain, hx of recent heartache. Pt has a life vest on. FINDINGS: There is artifact related to a life vest.  Prior sternal splitting procedure. Heart size is normal and the lungs are grossly clear. There is mild apical predominant emphysema and there are chronically increased markings at the bilateral lung bases. No pneumothorax or pleural effusion. No acute bone finding. Lower cervical fusion hardware. Stable chest x-ray. No acute disease. Life vest artifact. CT CHEST PULMONARY EMBOLISM W CONTRAST    Result Date: 4/15/2022  EXAMINATION: CTA OF THE CHEST 4/15/2022 6:02 pm TECHNIQUE: CTA of the chest was performed after the administration of intravenous contrast.  Multiplanar reformatted images are provided for review. MIP images are provided for review.  Dose modulation, iterative reconstruction, and/or weight based adjustment of the mA/kV was utilized to reduce the radiation dose to as low as reasonably achievable. COMPARISON: 03/02/2020 HISTORY: ORDERING SYSTEM PROVIDED HISTORY: elevated d-dimer, syncope TECHNOLOGIST PROVIDED HISTORY: elevated d-dimer, syncope Decision Support Exception - unselect if not a suspected or confirmed emergency medical condition->Emergency Medical Condition (MA) Reason for Exam: syncope with SOB, chest pain Relevant Medical/Surgical History: cardiac cath, quad bypass, copd, chf, nstemi FINDINGS: Pulmonary Arteries: Pulmonary arteries are adequately opacified for evaluation. No evidence of intraluminal filling defect to suggest pulmonary embolism. Main pulmonary artery is normal in caliber. The administered contrast refluxes back into the inferior vena cava and hepatics veins. This appearance may be related to right-sided cardiac failure. Mediastinum: No evidence of mediastinal lymphadenopathy. The heart and pericardium demonstrate no acute abnormality. There is no acute abnormality of the thoracic aorta. Mild calcification the coronary arteries. Mild calcification of aortic arch and origin of great arteries. Lungs/pleura:Findings suggestive fibrosis and bronchiectatic changes within the lower lobes, much more extensive on the right compared to the left side, significantly progressed since prior CT examination, likely related to and interstitial lung disease or prior insult. Mild emphysematous changes of the lungs. Small right pleural effusion. No pneumothorax. Upper Abdomen: Limited images of the upper abdomen are unremarkable. Soft Tissues/Bones: No acute bone or soft tissue abnormality. No evidence of pulmonary embolism. . The administered contrast refluxes back into the inferior vena cava and hepatics veins. This appearance may be related to right-sided cardiac failure. Clinical correlation is recommended. Mild emphysematous changes of the lungs.  Findings suggestive fibrosis and bronchiectatic changes within the lower lobes, much more extensive on the right compared to the left side, significantly progressed since prior CT examination, likely related to and interstitial lung disease or prior insult. Consultations:    Consults:     Final Specialist Recommendations/Findings:   IP CONSULT TO CARDIOLOGY  IP CONSULT TO INTERNAL MEDICINE      The patient was seen and examined on day of discharge and this discharge summary is in conjunction with any daily progress note from day of discharge. Discharge plan:     Disposition: Home      Instructions to Patient: Keep follow-up appointments      Requiring Further Evaluation/Follow Up POST HOSPITALIZATION/Incidental Findings:     Physician Follow Up:     No follow-up provider specified.      Activity: Resume as directed    Discharge Medications:      Medication List      CONTINUE taking these medications    acetaminophen 325 MG tablet  Commonly known as: Tylenol  Take 2 tablets by mouth every 6 hours as needed for Pain     albuterol sulfate  (90 Base) MCG/ACT inhaler  inhale 2 puffs by mouth every 6 hours if needed for wheezing     aspirin EC 81 MG EC tablet  Take 1 tablet by mouth daily     atorvastatin 80 MG tablet  Commonly known as: LIPITOR  Take 1 tablet by mouth daily     azaTHIOprine 50 MG tablet  Commonly known as: Imuran  Take 1.5 tablets by mouth daily     Breo Ellipta 200-25 MCG/INH Aepb inhaler  Generic drug: Fluticasone furoate-vilanterol  Inhale 1 puff into the lungs daily     calcium carbonate-vitamin D3 600-400 MG-UNIT Tabs per tab  Commonly known as: Calcium 600-D  Take 1 tablet by mouth 2 times daily     DULoxetine 30 MG extended release capsule  Commonly known as: CYMBALTA  TAKE 1 CAPSULE BY MOUTH DAILY     hydrOXYzine 50 MG tablet  Commonly known as: ATARAX     lisinopril 5 MG tablet  Commonly known as: PRINIVIL;ZESTRIL     metoprolol succinate 25 MG extended release tablet  Commonly known as: TOPROL XL  Take 0.5 tablets by mouth daily     nitroGLYCERIN 0.4 MG SL tablet  Commonly known as: Nitrostat  Place 1 tablet under the tongue every 5 minutes as needed for Chest pain up to max of 3 total doses. If no relief after 1 dose, call 911. pantoprazole 40 MG tablet  Commonly known as: PROTONIX  Take 1 tablet by mouth daily     spironolactone 25 MG tablet  Commonly known as: ALDACTONE        STOP taking these medications    cloNIDine 0.2 MG tablet  Commonly known as: CATAPRES     Flovent  MCG/ACT inhaler  Generic drug: fluticasone     furosemide 40 MG tablet  Commonly known as: LASIX     ipratropium-albuterol 0.5-2.5 (3) MG/3ML Soln nebulizer solution  Commonly known as: DUONEB     isosorbide mononitrate 30 MG extended release tablet  Commonly known as: IMDUR     magnesium oxide 400 (240 Mg) MG tablet  Commonly known as: MAG-OX     melatonin 1 MG tablet     nicotine 21 MG/24HR  Commonly known as: NICODERM CQ     OLANZapine 5 MG tablet  Commonly known as: ZYPREXA            Time Spent on discharge is  35 mins in patient examination, evaluation, counseling as well as medication reconciliation, prescriptions for required medications, discharge plan and follow up. Electronically signed by   Lars Santana MD  4/16/2022  10:51 AM      Thank you Dr. Nikki Prakash MD for the opportunity to be involved in this patient's care.

## 2022-04-16 NOTE — ED NOTES
Called [facility] back, they are faxing reports from Life Vest monitor to ED.  Information is from before 0500hrs this am.       Marita Espinoza RN  04/15/22 1350

## 2022-04-18 DIAGNOSIS — K21.9 GASTROESOPHAGEAL REFLUX DISEASE: ICD-10-CM

## 2022-04-18 LAB
EKG ATRIAL RATE: 49 BPM
EKG P AXIS: 11 DEGREES
EKG P-R INTERVAL: 180 MS
EKG Q-T INTERVAL: 472 MS
EKG QRS DURATION: 88 MS
EKG QTC CALCULATION (BAZETT): 426 MS
EKG R AXIS: -27 DEGREES
EKG T AXIS: 18 DEGREES
EKG VENTRICULAR RATE: 49 BPM

## 2022-04-18 PROCEDURE — 93010 ELECTROCARDIOGRAM REPORT: CPT | Performed by: INTERNAL MEDICINE

## 2022-04-18 RX ORDER — PANTOPRAZOLE SODIUM 40 MG/1
40 TABLET, DELAYED RELEASE ORAL DAILY
Qty: 90 TABLET | Refills: 1 | Status: ON HOLD | OUTPATIENT
Start: 2022-04-18 | End: 2022-07-23 | Stop reason: HOSPADM

## 2022-04-20 ENCOUNTER — TELEPHONE (OUTPATIENT)
Dept: INTERNAL MEDICINE CLINIC | Age: 57
End: 2022-04-20

## 2022-04-20 NOTE — TELEPHONE ENCOUNTER
Tu [facility] Transitions Initial Follow Up Call    Outreach made within 2 business days of discharge: Yes    Patient: Curt Smith Patient : 1965   MRN: [id number]  Reason for Admission: There are no discharge diagnoses documented for the most recent discharge. Discharge Date: 22       Spoke with: Patient     Discharge department/facility: [facility]     TCM Interactive Patient Contact:  Was patient able to fill all prescriptions: Yes  Was patient instructed to bring all medications to the follow-up visit: Yes  Is patient taking all medications as directed in the discharge summary?  Yes  Does patient understand their discharge instructions: Yes  Does patient have questions or concerns that need addressed prior to 7-14 day follow up office visit: no    Scheduled appointment with PCP within 7-14 days    Follow Up  Future Appointments   Date Time Provider Gregg Monroy   2022 10:30 AM Caren Reed MD [facility]       [place]

## 2022-04-21 ASSESSMENT — ENCOUNTER SYMPTOMS
ABDOMINAL DISTENTION: 0
FACIAL SWELLING: 0
BACK PAIN: 0
WHEEZING: 0
DIARRHEA: 0
APNEA: 0
SHORTNESS OF BREATH: 1
ABDOMINAL PAIN: 0
CONSTIPATION: 0
CHEST TIGHTNESS: 1
COUGH: 0
COLOR CHANGE: 0

## 2022-04-29 ENCOUNTER — TELEPHONE (OUTPATIENT)
Dept: INTERNAL MEDICINE CLINIC | Age: 57
End: 2022-04-29

## 2022-04-29 NOTE — TELEPHONE ENCOUNTER
Home care delivered called asking if the medical necessity form was received and being worked on. It was for a Blood Pressure Monitor.

## 2022-05-03 NOTE — TELEPHONE ENCOUNTER
Attempted to call patient to inform him we have not received it, no answer, attempted to call home care delivered.  Was on hold then line disconnected

## 2022-06-01 DIAGNOSIS — E11.9 TYPE 2 DIABETES MELLITUS WITHOUT COMPLICATION, WITHOUT LONG-TERM CURRENT USE OF INSULIN (HCC): ICD-10-CM

## 2022-06-01 DIAGNOSIS — I25.810 CORONARY ARTERY DISEASE INVOLVING CORONARY BYPASS GRAFT OF NATIVE HEART WITHOUT ANGINA PECTORIS: ICD-10-CM

## 2022-06-01 DIAGNOSIS — I21.4 NSTEMI (NON-ST ELEVATED MYOCARDIAL INFARCTION) (HCC): ICD-10-CM

## 2022-06-01 RX ORDER — METOPROLOL SUCCINATE 25 MG/1
TABLET, EXTENDED RELEASE ORAL
Qty: 30 TABLET | Refills: 0 | Status: ON HOLD | OUTPATIENT
Start: 2022-06-01 | End: 2022-07-21 | Stop reason: HOSPADM

## 2022-06-01 RX ORDER — ATORVASTATIN CALCIUM 40 MG/1
TABLET, FILM COATED ORAL
Qty: 30 TABLET | Refills: 3 | Status: SHIPPED | OUTPATIENT
Start: 2022-06-01

## 2022-06-02 PROBLEM — N18.32 CHRONIC KIDNEY DISEASE, STAGE 3B (HCC): Status: ACTIVE | Noted: 2022-03-17

## 2022-06-02 PROBLEM — M19.90 UNSPECIFIED OSTEOARTHRITIS, UNSPECIFIED SITE: Status: ACTIVE | Noted: 2022-03-17

## 2022-06-02 PROBLEM — Z95.810 PRESENCE OF AUTOMATIC (IMPLANTABLE) CARDIAC DEFIBRILLATOR: Status: ACTIVE | Noted: 2022-03-17

## 2022-06-02 PROBLEM — F90.9 ATTENTION-DEFICIT HYPERACTIVITY DISORDER, UNSPECIFIED TYPE: Status: ACTIVE | Noted: 2022-03-17

## 2022-06-02 PROBLEM — K21.9 GASTRO-ESOPHAGEAL REFLUX DISEASE WITHOUT ESOPHAGITIS: Status: ACTIVE | Noted: 2022-03-17

## 2022-06-14 RX ORDER — ASPIRIN 81 MG/1
TABLET, COATED ORAL
Qty: 30 TABLET | Refills: 6 | Status: ON HOLD | OUTPATIENT
Start: 2022-06-14 | End: 2022-07-21 | Stop reason: SDUPTHER

## 2022-06-27 RX ORDER — METOCLOPRAMIDE 10 MG/1
TABLET ORAL
Qty: 30 TABLET | Refills: 3 | Status: SHIPPED | OUTPATIENT
Start: 2022-06-27 | End: 2022-07-23

## 2022-06-28 DIAGNOSIS — I10 ESSENTIAL HYPERTENSION: ICD-10-CM

## 2022-06-28 RX ORDER — CLONIDINE HYDROCHLORIDE 0.2 MG/1
0.2 TABLET ORAL 2 TIMES DAILY
Qty: 180 TABLET | Refills: 2 | OUTPATIENT
Start: 2022-06-28

## 2022-06-28 RX ORDER — DULOXETIN HYDROCHLORIDE 30 MG/1
30 CAPSULE, DELAYED RELEASE ORAL DAILY
Qty: 30 CAPSULE | Refills: 2 | Status: SHIPPED | OUTPATIENT
Start: 2022-06-28 | End: 2022-10-31

## 2022-07-05 ENCOUNTER — HOSPITAL ENCOUNTER (OUTPATIENT)
Age: 57
Discharge: HOME | End: 2022-07-05
Payer: COMMERCIAL

## 2022-07-05 LAB
ANION GAP SERPL CALCULATED.3IONS-SCNC: 9 MMOL/L (ref 9–17)
BUN BLDV-MCNC: 14 MG/DL (ref 6–20)
CALCIUM SERPL-MCNC: 9.1 MG/DL (ref 8.6–10.4)
CHLORIDE BLD-SCNC: 98 MMOL/L (ref 98–107)
CO2: 27 MMOL/L (ref 20–31)
CREAT SERPL-MCNC: 1.17 MG/DL (ref 0.7–1.2)
GFR AFRICAN AMERICAN: >60 ML/MIN
GFR NON-AFRICAN AMERICAN: >60 ML/MIN
GFR SERPL CREATININE-BSD FRML MDRD: ABNORMAL ML/MIN/{1.73_M2}
GLUCOSE BLD-MCNC: 107 MG/DL (ref 70–99)
MAGNESIUM: 1.8 MG/DL (ref 1.6–2.6)
POTASSIUM SERPL-SCNC: 4.8 MMOL/L (ref 3.7–5.3)
SODIUM BLD-SCNC: 134 MMOL/L (ref 135–144)

## 2022-07-05 PROCEDURE — 80048 BASIC METABOLIC PNL TOTAL CA: CPT

## 2022-07-05 PROCEDURE — 83735 ASSAY OF MAGNESIUM: CPT

## 2022-07-05 PROCEDURE — 36415 COLL VENOUS BLD VENIPUNCTURE: CPT

## 2022-07-18 ENCOUNTER — TELEPHONE (OUTPATIENT)
Dept: INTERNAL MEDICINE CLINIC | Age: 57
End: 2022-07-18

## 2022-07-18 NOTE — TELEPHONE ENCOUNTER
----- Message from Kalen Stafford sent at 7/18/2022  2:10 PM EDT -----  Subject: Appointment Request    Reason for Call: Established Patient Appointment needed: Routine Existing   Condition Follow Up    QUESTIONS    Reason for appointment request? No appointments available during search     Additional Information for Provider? Jackie Olea is calling to schedule his   follow up visit with Dr Mason Arnold. He did have one scheduled for Aug 17th and   it was canceled. i tried rescheduling this appt and no available coming up   with Dr Mason Arnold.  please give pt. a call back to reschedule.   ---------------------------------------------------------------------------  --------------  [id number]  [phone]; OK to leave message on voicemail  ---------------------------------------------------------------------------  --------------  SCRIPT ANSWERS  COVID Screen: Kj Alicia

## 2022-07-19 ENCOUNTER — HOSPITAL ENCOUNTER (INPATIENT)
Age: 57
LOS: 4 days | Discharge: HOME HEALTH | DRG: 179 | End: 2022-07-23
Attending: EMERGENCY MEDICINE | Admitting: INTERNAL MEDICINE
Payer: COMMERCIAL

## 2022-07-19 ENCOUNTER — APPOINTMENT (OUTPATIENT)
Dept: GENERAL RADIOLOGY | Age: 57
DRG: 179 | End: 2022-07-19
Payer: COMMERCIAL

## 2022-07-19 DIAGNOSIS — I16.0 HYPERTENSIVE URGENCY: Primary | ICD-10-CM

## 2022-07-19 DIAGNOSIS — J44.9 MIXED TYPE COPD (CHRONIC OBSTRUCTIVE PULMONARY DISEASE) (HCC): ICD-10-CM

## 2022-07-19 PROBLEM — I16.1 HYPERTENSIVE EMERGENCY: Status: ACTIVE | Noted: 2022-07-19

## 2022-07-19 LAB
ABSOLUTE EOS #: 0.3 K/UL (ref 0–0.44)
ABSOLUTE IMMATURE GRANULOCYTE: <0.03 K/UL (ref 0–0.3)
ABSOLUTE LYMPH #: 1.53 K/UL (ref 1.1–3.7)
ABSOLUTE MONO #: 0.42 K/UL (ref 0.1–1.2)
ALBUMIN SERPL-MCNC: 4 G/DL (ref 3.5–5.2)
ALBUMIN/GLOBULIN RATIO: 1.3 (ref 1–2.5)
ALP BLD-CCNC: 193 U/L (ref 40–129)
ALT SERPL-CCNC: 5 U/L (ref 5–41)
ANION GAP SERPL CALCULATED.3IONS-SCNC: 11 MMOL/L (ref 9–17)
AST SERPL-CCNC: 12 U/L
BASOPHILS # BLD: 1 % (ref 0–2)
BASOPHILS ABSOLUTE: 0.09 K/UL (ref 0–0.2)
BILIRUB SERPL-MCNC: 0.53 MG/DL (ref 0.3–1.2)
BUN BLDV-MCNC: 13 MG/DL (ref 6–20)
CALCIUM SERPL-MCNC: 8.9 MG/DL (ref 8.6–10.4)
CHLORIDE BLD-SCNC: 99 MMOL/L (ref 98–107)
CO2: 27 MMOL/L (ref 20–31)
CREAT SERPL-MCNC: 1.3 MG/DL (ref 0.7–1.2)
EOSINOPHILS RELATIVE PERCENT: 4 % (ref 1–4)
GFR AFRICAN AMERICAN: >60 ML/MIN
GFR NON-AFRICAN AMERICAN: 57 ML/MIN
GFR SERPL CREATININE-BSD FRML MDRD: ABNORMAL ML/MIN/{1.73_M2}
GGT: 14 U/L (ref 8–61)
GLUCOSE BLD-MCNC: 107 MG/DL (ref 70–99)
HCT VFR BLD CALC: 45.9 % (ref 40.7–50.3)
HEMOGLOBIN: 14.8 G/DL (ref 13–17)
IMMATURE GRANULOCYTES: 0 %
LYMPHOCYTES # BLD: 23 % (ref 24–43)
MCH RBC QN AUTO: 27.5 PG (ref 25.2–33.5)
MCHC RBC AUTO-ENTMCNC: 32.2 G/DL (ref 28.4–34.8)
MCV RBC AUTO: 85.2 FL (ref 82.6–102.9)
MONOCYTES # BLD: 6 % (ref 3–12)
NRBC AUTOMATED: 0 PER 100 WBC
PDW BLD-RTO: 16.5 % (ref 11.8–14.4)
PLATELET # BLD: 179 K/UL (ref 138–453)
PMV BLD AUTO: 10.9 FL (ref 8.1–13.5)
POTASSIUM SERPL-SCNC: 3.9 MMOL/L (ref 3.7–5.3)
RBC # BLD: 5.39 M/UL (ref 4.21–5.77)
RBC # BLD: ABNORMAL 10*6/UL
SEG NEUTROPHILS: 66 % (ref 36–65)
SEGMENTED NEUTROPHILS ABSOLUTE COUNT: 4.43 K/UL (ref 1.5–8.1)
SODIUM BLD-SCNC: 137 MMOL/L (ref 135–144)
TOTAL PROTEIN: 7.1 G/DL (ref 6.4–8.3)
TROPONIN, HIGH SENSITIVITY: 14 NG/L (ref 0–22)
WBC # BLD: 6.8 K/UL (ref 3.5–11.3)

## 2022-07-19 PROCEDURE — 2060000000 HC ICU INTERMEDIATE R&B

## 2022-07-19 PROCEDURE — 6370000000 HC RX 637 (ALT 250 FOR IP): Performed by: EMERGENCY MEDICINE

## 2022-07-19 PROCEDURE — 6370000000 HC RX 637 (ALT 250 FOR IP): Performed by: NURSE PRACTITIONER

## 2022-07-19 PROCEDURE — 2500000003 HC RX 250 WO HCPCS: Performed by: EMERGENCY MEDICINE

## 2022-07-19 PROCEDURE — 6360000002 HC RX W HCPCS: Performed by: EMERGENCY MEDICINE

## 2022-07-19 PROCEDURE — 36415 COLL VENOUS BLD VENIPUNCTURE: CPT

## 2022-07-19 PROCEDURE — 99223 1ST HOSP IP/OBS HIGH 75: CPT | Performed by: INTERNAL MEDICINE

## 2022-07-19 PROCEDURE — 80053 COMPREHEN METABOLIC PANEL: CPT

## 2022-07-19 PROCEDURE — 96365 THER/PROPH/DIAG IV INF INIT: CPT

## 2022-07-19 PROCEDURE — 93005 ELECTROCARDIOGRAM TRACING: CPT | Performed by: EMERGENCY MEDICINE

## 2022-07-19 PROCEDURE — 99285 EMERGENCY DEPT VISIT HI MDM: CPT

## 2022-07-19 PROCEDURE — 6360000002 HC RX W HCPCS: Performed by: STUDENT IN AN ORGANIZED HEALTH CARE EDUCATION/TRAINING PROGRAM

## 2022-07-19 PROCEDURE — 82977 ASSAY OF GGT: CPT

## 2022-07-19 PROCEDURE — 96375 TX/PRO/DX INJ NEW DRUG ADDON: CPT

## 2022-07-19 PROCEDURE — 84484 ASSAY OF TROPONIN QUANT: CPT

## 2022-07-19 PROCEDURE — 85025 COMPLETE CBC W/AUTO DIFF WBC: CPT

## 2022-07-19 PROCEDURE — 71045 X-RAY EXAM CHEST 1 VIEW: CPT

## 2022-07-19 RX ORDER — SODIUM CHLORIDE 9 MG/ML
INJECTION, SOLUTION INTRAVENOUS PRN
Status: DISCONTINUED | OUTPATIENT
Start: 2022-07-19 | End: 2022-07-22 | Stop reason: SDUPTHER

## 2022-07-19 RX ORDER — ACETAMINOPHEN 500 MG
1000 TABLET ORAL ONCE
Status: COMPLETED | OUTPATIENT
Start: 2022-07-19 | End: 2022-07-19

## 2022-07-19 RX ORDER — HEPARIN SODIUM 5000 [USP'U]/ML
5000 INJECTION, SOLUTION INTRAVENOUS; SUBCUTANEOUS EVERY 8 HOURS SCHEDULED
Status: DISCONTINUED | OUTPATIENT
Start: 2022-07-19 | End: 2022-07-23 | Stop reason: HOSPADM

## 2022-07-19 RX ORDER — ACETAMINOPHEN 650 MG/1
650 SUPPOSITORY RECTAL EVERY 6 HOURS PRN
Status: DISCONTINUED | OUTPATIENT
Start: 2022-07-19 | End: 2022-07-23 | Stop reason: HOSPADM

## 2022-07-19 RX ORDER — HYDRALAZINE HYDROCHLORIDE 20 MG/ML
10 INJECTION INTRAMUSCULAR; INTRAVENOUS ONCE
Status: COMPLETED | OUTPATIENT
Start: 2022-07-19 | End: 2022-07-19

## 2022-07-19 RX ORDER — ONDANSETRON 2 MG/ML
4 INJECTION INTRAMUSCULAR; INTRAVENOUS EVERY 6 HOURS PRN
Status: DISCONTINUED | OUTPATIENT
Start: 2022-07-19 | End: 2022-07-23 | Stop reason: HOSPADM

## 2022-07-19 RX ORDER — ONDANSETRON 4 MG/1
4 TABLET, ORALLY DISINTEGRATING ORAL EVERY 8 HOURS PRN
Status: DISCONTINUED | OUTPATIENT
Start: 2022-07-19 | End: 2022-07-23 | Stop reason: HOSPADM

## 2022-07-19 RX ORDER — ACETAMINOPHEN 325 MG/1
650 TABLET ORAL EVERY 6 HOURS PRN
Status: DISCONTINUED | OUTPATIENT
Start: 2022-07-19 | End: 2022-07-23 | Stop reason: HOSPADM

## 2022-07-19 RX ORDER — CHOLECALCIFEROL (VITAMIN D3) 125 MCG
5 CAPSULE ORAL ONCE
Status: COMPLETED | OUTPATIENT
Start: 2022-07-19 | End: 2022-07-19

## 2022-07-19 RX ORDER — NICARDIPINE HYDROCHLORIDE 0.1 MG/ML
2.5-15 INJECTION INTRAVENOUS CONTINUOUS
Status: DISCONTINUED | OUTPATIENT
Start: 2022-07-19 | End: 2022-07-19

## 2022-07-19 RX ORDER — POLYETHYLENE GLYCOL 3350 17 G/17G
17 POWDER, FOR SOLUTION ORAL DAILY PRN
Status: DISCONTINUED | OUTPATIENT
Start: 2022-07-19 | End: 2022-07-23 | Stop reason: HOSPADM

## 2022-07-19 RX ORDER — SODIUM CHLORIDE 0.9 % (FLUSH) 0.9 %
5-40 SYRINGE (ML) INJECTION PRN
Status: DISCONTINUED | OUTPATIENT
Start: 2022-07-19 | End: 2022-07-22 | Stop reason: SDUPTHER

## 2022-07-19 RX ORDER — SODIUM CHLORIDE 0.9 % (FLUSH) 0.9 %
5-40 SYRINGE (ML) INJECTION EVERY 12 HOURS SCHEDULED
Status: DISCONTINUED | OUTPATIENT
Start: 2022-07-19 | End: 2022-07-22 | Stop reason: SDUPTHER

## 2022-07-19 RX ADMIN — ACETAMINOPHEN 1000 MG: 500 TABLET ORAL at 20:32

## 2022-07-19 RX ADMIN — NICARDIPINE HYDROCHLORIDE 7.5 MG/HR: 0.1 INJECTION INTRAVENOUS at 20:19

## 2022-07-19 RX ADMIN — NICARDIPINE HYDROCHLORIDE 5 MG/HR: 0.1 INJECTION INTRAVENOUS at 14:58

## 2022-07-19 RX ADMIN — HYDRALAZINE HYDROCHLORIDE 10 MG: 20 INJECTION INTRAMUSCULAR; INTRAVENOUS at 13:40

## 2022-07-19 RX ADMIN — HEPARIN SODIUM 5000 UNITS: 5000 INJECTION INTRAVENOUS; SUBCUTANEOUS at 21:55

## 2022-07-19 RX ADMIN — Medication 5 MG: at 21:55

## 2022-07-19 ASSESSMENT — ENCOUNTER SYMPTOMS
TACHYPNEA: 1
SHORTNESS OF BREATH: 0
DIARRHEA: 0
CONSTIPATION: 0
BACK PAIN: 0
NAUSEA: 0
ABDOMINAL PAIN: 0
SORE THROAT: 0
RHINORRHEA: 0
VOMITING: 0

## 2022-07-19 ASSESSMENT — PAIN - FUNCTIONAL ASSESSMENT: PAIN_FUNCTIONAL_ASSESSMENT: 0-10

## 2022-07-19 ASSESSMENT — PAIN DESCRIPTION - LOCATION
LOCATION: HEAD
LOCATION: HEAD

## 2022-07-19 ASSESSMENT — PAIN SCALES - GENERAL
PAINLEVEL_OUTOF10: 4
PAINLEVEL_OUTOF10: 8

## 2022-07-19 ASSESSMENT — LIFESTYLE VARIABLES
HOW MANY STANDARD DRINKS CONTAINING ALCOHOL DO YOU HAVE ON A TYPICAL DAY: PATIENT DOES NOT DRINK
HOW OFTEN DO YOU HAVE A DRINK CONTAINING ALCOHOL: NEVER

## 2022-07-19 ASSESSMENT — PAIN DESCRIPTION - FREQUENCY: FREQUENCY: CONTINUOUS

## 2022-07-19 ASSESSMENT — PAIN DESCRIPTION - PAIN TYPE
TYPE: ACUTE PAIN
TYPE: ACUTE PAIN

## 2022-07-19 ASSESSMENT — PAIN DESCRIPTION - ORIENTATION: ORIENTATION: ANTERIOR

## 2022-07-19 NOTE — PROGRESS NOTES
I did not participate in the management of this patient    Electronically signed by Tremayne Wright MD on 7/19/2022 at 1:14 PM

## 2022-07-19 NOTE — ED NOTES
Writer attempted report  Floor nurse refused report d/t room status   Writer to attempt again     Mita Dubois RN  07/19/22 2952

## 2022-07-19 NOTE — Clinical Note
Discharge Plan[de-identified] Other/[facility])   Telemetry/Cardiac Monitoring Required?: Yes

## 2022-07-19 NOTE — CARE COORDINATION
07/19/22 1828   Service Assessment   Patient Orientation Alert and Oriented   Cognition Alert   History Provided By Patient   Primary Caregiver Self   Support Systems Family Members   PCP Verified by CM Yes   Last Visit to PCP Within last 3 months   Prior Functional Level Independent in ADLs/IADLs   Current Functional Level Independent in ADLs/IADLs   Can patient return to prior living arrangement Yes   Ability to make needs known: Good   Social/Functional History   Lives With Family  (niece)   Home Layout Two level;Bed/Bath upstairs   ADL Assistance Independent   Homemaking Assistance Independent   Homemaking Responsibilities Yes   Ambulation Assistance Independent   Transfer Assistance Independent   Active  No   Discharge Planning   Type of Residence House   Living Arrangements Family Members  (niece)   Current Services Prior To Admission None   Potential Assistance Needed N/A   Potential Assistance Purchasing Medications No   Patient expects to be discharged to: House   Condition of Participation: Discharge Planning   The Plan for Transition of Care is related to the following treatment goals: increased comfort level   Return home with niece

## 2022-07-19 NOTE — PROGRESS NOTES
Attending Physician Statement  I have discussed the case, including pertinent history and exam findings with the resident and the team.  I have seen and examined the patient and the key elements of the encounter have been performed by me. I agree with the assessment, plan and orders as documented by the resident. Review of Systems:   In addition to the pertinent positives and negatives as stated within HPI and the review of systems as documented in their notes, all other systems were reviewed when able to and are reported negative. 59years old gentleman presented because of headache. No focal deficits. Patient was found to be in hypertensive urgency. Patient took his medicines this morning, he is on lisinopril and Toprol-XL. Also has LifeVest and supposed to get ICD tomorrow. Started on Cardene drip. We will continue him on that. Change Toprol to Coreg and increase the dose of lisinopril. Monitor creatinine.   Continue Imuran  Avoid sudden drop in blood pressure      Mariann Pena MD  Attending Physician, Internal Medicine Service    Internal Medicine Residency Program  7/19/2022, 11:10 PM

## 2022-07-19 NOTE — ED PROVIDER NOTES
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 CONSULT TO CASE MANAGEMENT  IP CONSULT TO CARDIOLOGY  IP CONSULT TO HOME CARE NEEDS        ED Course as of 07/26/22 1350   Tue Jul 19, 2022   1516 BP still elevated in the 872D systolic after hydralazine. Patient reports he is still having a headache. Will place patinet on a cardene drip and give 1 g tylenol for HA and admit to medicine. [BL]   [id number] Spoke with medicine, they will admit the patient. [BL]      ED Course User Index  [BL] Curt Harris DO       FINAL IMPRESSION      1. Hypertensive urgency    2. Mixed type COPD (chronic obstructive pulmonary disease) ([address]          DISPOSITION / PLAN     DISPOSITION Admitted 07/19/2022 03:19:28 PM      PATIENT REFERRED TO:  [facility]  [address] [place] [id number]  [phone]  Follow up on 7/27/2022  For wound re-check at 3:00pm    Neil Xavier, [address] [facility]  [phone]    Schedule an appointment as soon as possible for a visit  Post discharge evaluation and blood pressure evaluation. Started on 3 different antihypertensive medication.     [address] [facility]  [phone]        DISCHARGE MEDICATIONS:  Discharge Medication List as of 7/23/2022  4:32 PM        START taking these medications    Details   isosorbide mononitrate (IMDUR) 60 MG extended release tablet Take 1 tablet by mouth in the morning., Disp-30 tablet, R-3Normal      carvedilol (COREG) 25 MG tablet Take 1 tablet by mouth in the morning and 1 tablet in the evening. Take with meals. , Disp-60 tablet, R-3Normal             Francine Torre,   Emergency Medicine Resident    (Please note that portions of thisnote were completed with a voice recognition program.  Efforts were made to edit the dictations but occasionally words are mis-transcribed.)        Francine Torre,   Resident  07/19/22 3433 [facility]   Resident  07/26/22 0991
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
## 2022-07-19 NOTE — ED NOTES
Pt. Resting on stretcher, eyes open, RR even and non-labored  Pt.  Updated on POC  Will continue to monitor        Rossana Guzman RN  07/19/22 [id number]

## 2022-07-19 NOTE — ED NOTES
Report to Elif Barker, with verbal understanding of the patients needs and concerns   All personal belongings sent up to [address], RN  07/19/22 1958

## 2022-07-19 NOTE — ED NOTES
Pt. Resting on stretcher, eyes open, RR even and non-labored  Pt.  Updated on POC  Will continue to monitor        Michael Velazquez RN  07/19/22 9685

## 2022-07-19 NOTE — ED PROVIDER NOTES
[address] [facility]     Emergency Department     Faculty Attestation    I performed a history and physical examination of the patient and discussed management with the resident. I reviewed the residents note and agree with the documented findings and plan of care. Any areas of disagreement are noted on the chart. I was personally present for the key portions of any procedures. I have documented in the chart those procedures where I was not present during the key portions. I have reviewed the emergency nurses triage note. I agree with the chief complaint, past medical history, past surgical history, allergies, medications, social and family history as documented unless otherwise noted below. For Physician Assistant/ Nurse Practitioner cases/documentation I have personally evaluated this patient and have completed at least one if not all key elements of the E/M (history, physical exam, and MDM). Additional findings are as noted. Patient sent to ED for elevated blood pressure. Patient states he had a headache. Home health nurse was at the house today checked his blood pressure was systolic 702E to 71Z. Headache was not thunderclap in onset. States he normally has headaches like this when his blood pressure is elevated. He took his home medication this morning no relief and thus he presents. No chest pain no trouble breathing with this. Is currently wearing a LifeVest, states he is post to have a AICD pacemaker placed tomorrow here. On exam resting comfortably well-appearing. Lungs clear heart normal equal pulses. Normal pupils normal speech normal mental status no neurodeficits. Will treat blood pressure, labs, EKG, probable admit. Given no deficits not thunderclap not worst headache of life do not feel needs head CT at this time. EKG interpretation: Sinus rhythm 65. Normal intervals. Left axis. No acute ST or T changes, similar to 4/15/2022.   No acute findings      Critical Care     none    Adam Ragsdale MD, Azeem Cast  Attending Emergency  Physician           Adam Ragsdale MD  07/19/22 7697

## 2022-07-19 NOTE — ED NOTES
Pt. Resting on stretcher, eyes open, RR even and non-labored  Pt.  Updated on POC  Will continue to monitor        Jeanie Holcomb RN  07/19/22 0337

## 2022-07-19 NOTE — ED NOTES
Walk in from home for c/o hypertension. Pt. Wears a life vest until pacemaker placement with cardiology. Pt. Reports his home nurse arrived today for his daily visit and found SBP 230s. Pt states he took his daily medications and is complaint.        Brandi Mon RN  07/19/22 1724

## 2022-07-20 ENCOUNTER — HOSPITAL ENCOUNTER (OUTPATIENT)
Dept: CARDIAC CATH/INVASIVE PROCEDURES | Age: 57
Discharge: HOME | End: 2022-07-20
Payer: COMMERCIAL

## 2022-07-20 ENCOUNTER — APPOINTMENT (OUTPATIENT)
Dept: CARDIAC CATH/INVASIVE PROCEDURES | Age: 57
DRG: 179 | End: 2022-07-20
Payer: COMMERCIAL

## 2022-07-20 PROBLEM — I42.9 CARDIOMYOPATHY (HCC): Status: ACTIVE | Noted: 2022-07-20

## 2022-07-20 LAB
ABSOLUTE EOS #: 0.39 K/UL (ref 0–0.44)
ABSOLUTE IMMATURE GRANULOCYTE: <0.03 K/UL (ref 0–0.3)
ABSOLUTE LYMPH #: 1.73 K/UL (ref 1.1–3.7)
ABSOLUTE MONO #: 0.48 K/UL (ref 0.1–1.2)
ANION GAP SERPL CALCULATED.3IONS-SCNC: 10 MMOL/L (ref 9–17)
BASOPHILS # BLD: 2 % (ref 0–2)
BASOPHILS ABSOLUTE: 0.11 K/UL (ref 0–0.2)
BUN BLDV-MCNC: 11 MG/DL (ref 6–20)
CALCIUM SERPL-MCNC: 8.8 MG/DL (ref 8.6–10.4)
CHLORIDE BLD-SCNC: 99 MMOL/L (ref 98–107)
CO2: 23 MMOL/L (ref 20–31)
CREAT SERPL-MCNC: 1.06 MG/DL (ref 0.7–1.2)
EKG ATRIAL RATE: 65 BPM
EKG P AXIS: 45 DEGREES
EKG P-R INTERVAL: 162 MS
EKG Q-T INTERVAL: 390 MS
EKG QRS DURATION: 90 MS
EKG QTC CALCULATION (BAZETT): 405 MS
EKG R AXIS: -33 DEGREES
EKG T AXIS: 44 DEGREES
EKG VENTRICULAR RATE: 65 BPM
EOSINOPHILS RELATIVE PERCENT: 6 % (ref 1–4)
GFR AFRICAN AMERICAN: >60 ML/MIN
GFR NON-AFRICAN AMERICAN: >60 ML/MIN
GFR SERPL CREATININE-BSD FRML MDRD: ABNORMAL ML/MIN/{1.73_M2}
GLUCOSE BLD-MCNC: 112 MG/DL (ref 70–99)
HCT VFR BLD CALC: 44.9 % (ref 40.7–50.3)
HEMOGLOBIN: 14.9 G/DL (ref 13–17)
IMMATURE GRANULOCYTES: 0 %
LYMPHOCYTES # BLD: 24 % (ref 24–43)
MCH RBC QN AUTO: 27.9 PG (ref 25.2–33.5)
MCHC RBC AUTO-ENTMCNC: 33.2 G/DL (ref 28.4–34.8)
MCV RBC AUTO: 83.9 FL (ref 82.6–102.9)
MONOCYTES # BLD: 7 % (ref 3–12)
NRBC AUTOMATED: 0 PER 100 WBC
PDW BLD-RTO: 16.9 % (ref 11.8–14.4)
PLATELET # BLD: 223 K/UL (ref 138–453)
PMV BLD AUTO: 10.9 FL (ref 8.1–13.5)
POTASSIUM SERPL-SCNC: 3.8 MMOL/L (ref 3.7–5.3)
RBC # BLD: 5.35 M/UL (ref 4.21–5.77)
RBC # BLD: ABNORMAL 10*6/UL
SARS-COV-2, RAPID: NOT DETECTED
SEG NEUTROPHILS: 61 % (ref 36–65)
SEGMENTED NEUTROPHILS ABSOLUTE COUNT: 4.39 K/UL (ref 1.5–8.1)
SODIUM BLD-SCNC: 132 MMOL/L (ref 135–144)
SPECIMEN DESCRIPTION: NORMAL
WBC # BLD: 7.1 K/UL (ref 3.5–11.3)

## 2022-07-20 PROCEDURE — C1889 IMPLANT/INSERT DEVICE, NOC: HCPCS

## 2022-07-20 PROCEDURE — 99232 SBSQ HOSP IP/OBS MODERATE 35: CPT | Performed by: INTERNAL MEDICINE

## 2022-07-20 PROCEDURE — 02HK3KZ INSERTION OF DEFIBRILLATOR LEAD INTO RIGHT VENTRICLE, PERCUTANEOUS APPROACH: ICD-10-PCS | Performed by: INTERNAL MEDICINE

## 2022-07-20 PROCEDURE — 6370000000 HC RX 637 (ALT 250 FOR IP): Performed by: INTERNAL MEDICINE

## 2022-07-20 PROCEDURE — 2500000003 HC RX 250 WO HCPCS: Performed by: EMERGENCY MEDICINE

## 2022-07-20 PROCEDURE — 6360000002 HC RX W HCPCS

## 2022-07-20 PROCEDURE — 0JH608Z INSERTION OF DEFIBRILLATOR GENERATOR INTO CHEST SUBCUTANEOUS TISSUE AND FASCIA, OPEN APPROACH: ICD-10-PCS | Performed by: INTERNAL MEDICINE

## 2022-07-20 PROCEDURE — 93010 ELECTROCARDIOGRAM REPORT: CPT | Performed by: INTERNAL MEDICINE

## 2022-07-20 PROCEDURE — 2580000003 HC RX 258

## 2022-07-20 PROCEDURE — C1777 LEAD, AICD, ENDO SINGLE COIL: HCPCS

## 2022-07-20 PROCEDURE — 6370000000 HC RX 637 (ALT 250 FOR IP): Performed by: STUDENT IN AN ORGANIZED HEALTH CARE EDUCATION/TRAINING PROGRAM

## 2022-07-20 PROCEDURE — 36415 COLL VENOUS BLD VENIPUNCTURE: CPT

## 2022-07-20 PROCEDURE — 6360000004 HC RX CONTRAST MEDICATION

## 2022-07-20 PROCEDURE — C1722 AICD, SINGLE CHAMBER: HCPCS

## 2022-07-20 PROCEDURE — 87635 SARS-COV-2 COVID-19 AMP PRB: CPT

## 2022-07-20 PROCEDURE — C1892 INTRO/SHEATH,FIXED,PEEL-AWAY: HCPCS

## 2022-07-20 PROCEDURE — 2060000000 HC ICU INTERMEDIATE R&B

## 2022-07-20 PROCEDURE — 2580000003 HC RX 258: Performed by: STUDENT IN AN ORGANIZED HEALTH CARE EDUCATION/TRAINING PROGRAM

## 2022-07-20 PROCEDURE — 6360000002 HC RX W HCPCS: Performed by: INTERNAL MEDICINE

## 2022-07-20 PROCEDURE — 33249 INSJ/RPLCMT DEFIB W/LEAD(S): CPT

## 2022-07-20 PROCEDURE — 2580000003 HC RX 258: Performed by: INTERNAL MEDICINE

## 2022-07-20 PROCEDURE — 80048 BASIC METABOLIC PNL TOTAL CA: CPT

## 2022-07-20 PROCEDURE — 2580000003 HC RX 258: Performed by: EMERGENCY MEDICINE

## 2022-07-20 PROCEDURE — 2709999900 HC NON-CHARGEABLE SUPPLY

## 2022-07-20 PROCEDURE — 85025 COMPLETE CBC W/AUTO DIFF WBC: CPT

## 2022-07-20 PROCEDURE — 6360000002 HC RX W HCPCS: Performed by: STUDENT IN AN ORGANIZED HEALTH CARE EDUCATION/TRAINING PROGRAM

## 2022-07-20 PROCEDURE — 2500000003 HC RX 250 WO HCPCS

## 2022-07-20 RX ORDER — AMLODIPINE BESYLATE 5 MG/1
5 TABLET ORAL DAILY
Status: DISCONTINUED | OUTPATIENT
Start: 2022-07-20 | End: 2022-07-21

## 2022-07-20 RX ORDER — ACETAMINOPHEN 325 MG/1
650 TABLET ORAL EVERY 4 HOURS PRN
Status: DISCONTINUED | OUTPATIENT
Start: 2022-07-20 | End: 2022-07-22 | Stop reason: SDUPTHER

## 2022-07-20 RX ORDER — SODIUM CHLORIDE 0.9 % (FLUSH) 0.9 %
5-40 SYRINGE (ML) INJECTION EVERY 12 HOURS SCHEDULED
Status: DISCONTINUED | OUTPATIENT
Start: 2022-07-20 | End: 2022-07-23 | Stop reason: HOSPADM

## 2022-07-20 RX ORDER — ASPIRIN 81 MG/1
81 TABLET ORAL DAILY
Status: DISCONTINUED | OUTPATIENT
Start: 2022-07-20 | End: 2022-07-23 | Stop reason: HOSPADM

## 2022-07-20 RX ORDER — SODIUM CHLORIDE 9 MG/ML
INJECTION, SOLUTION INTRAVENOUS PRN
Status: DISCONTINUED | OUTPATIENT
Start: 2022-07-20 | End: 2022-07-23 | Stop reason: HOSPADM

## 2022-07-20 RX ORDER — VANCOMYCIN HYDROCHLORIDE 1 G/200ML
1000 INJECTION, SOLUTION INTRAVENOUS ONCE
Status: COMPLETED | OUTPATIENT
Start: 2022-07-20 | End: 2022-07-20

## 2022-07-20 RX ORDER — ACETAMINOPHEN 325 MG/1
650 TABLET ORAL ONCE
Status: DISCONTINUED | OUTPATIENT
Start: 2022-07-20 | End: 2022-07-23 | Stop reason: HOSPADM

## 2022-07-20 RX ORDER — CARVEDILOL 12.5 MG/1
12.5 TABLET ORAL 2 TIMES DAILY WITH MEALS
Status: DISCONTINUED | OUTPATIENT
Start: 2022-07-20 | End: 2022-07-21

## 2022-07-20 RX ORDER — LISINOPRIL 20 MG/1
20 TABLET ORAL DAILY
Status: DISCONTINUED | OUTPATIENT
Start: 2022-07-20 | End: 2022-07-21

## 2022-07-20 RX ORDER — CARVEDILOL 12.5 MG/1
12.5 TABLET ORAL 2 TIMES DAILY WITH MEALS
Status: DISCONTINUED | OUTPATIENT
Start: 2022-07-20 | End: 2022-07-20

## 2022-07-20 RX ORDER — DULOXETIN HYDROCHLORIDE 30 MG/1
30 CAPSULE, DELAYED RELEASE ORAL DAILY
Status: DISCONTINUED | OUTPATIENT
Start: 2022-07-20 | End: 2022-07-23 | Stop reason: HOSPADM

## 2022-07-20 RX ORDER — ATORVASTATIN CALCIUM 40 MG/1
40 TABLET, FILM COATED ORAL DAILY
Status: DISCONTINUED | OUTPATIENT
Start: 2022-07-20 | End: 2022-07-23 | Stop reason: HOSPADM

## 2022-07-20 RX ORDER — SODIUM CHLORIDE 0.9 % (FLUSH) 0.9 %
5-40 SYRINGE (ML) INJECTION PRN
Status: DISCONTINUED | OUTPATIENT
Start: 2022-07-20 | End: 2022-07-23 | Stop reason: HOSPADM

## 2022-07-20 RX ORDER — LISINOPRIL 20 MG/1
20 TABLET ORAL DAILY
Status: DISCONTINUED | OUTPATIENT
Start: 2022-07-20 | End: 2022-07-20

## 2022-07-20 RX ORDER — SPIRONOLACTONE 25 MG/1
25 TABLET ORAL DAILY
Status: DISCONTINUED | OUTPATIENT
Start: 2022-07-20 | End: 2022-07-21

## 2022-07-20 RX ORDER — METOPROLOL SUCCINATE 25 MG/1
25 TABLET, EXTENDED RELEASE ORAL DAILY
Status: CANCELLED | OUTPATIENT
Start: 2022-07-20

## 2022-07-20 RX ORDER — ALBUTEROL SULFATE 90 UG/1
2 AEROSOL, METERED RESPIRATORY (INHALATION) EVERY 4 HOURS PRN
Status: DISCONTINUED | OUTPATIENT
Start: 2022-07-20 | End: 2022-07-23 | Stop reason: HOSPADM

## 2022-07-20 RX ORDER — CARVEDILOL 6.25 MG/1
6.25 TABLET ORAL 2 TIMES DAILY WITH MEALS
Status: DISCONTINUED | OUTPATIENT
Start: 2022-07-20 | End: 2022-07-20

## 2022-07-20 RX ADMIN — ACETAMINOPHEN 325MG 650 MG: 325 TABLET ORAL at 18:59

## 2022-07-20 RX ADMIN — DEXTROSE MONOHYDRATE 5 MG/HR: 50 INJECTION, SOLUTION INTRAVENOUS at 00:04

## 2022-07-20 RX ADMIN — DESMOPRESSIN ACETATE 40 MG: 0.2 TABLET ORAL at 08:13

## 2022-07-20 RX ADMIN — VANCOMYCIN HYDROCHLORIDE 1000 MG: 1 INJECTION, SOLUTION INTRAVENOUS at 17:31

## 2022-07-20 RX ADMIN — DULOXETINE HYDROCHLORIDE 30 MG: 30 CAPSULE, DELAYED RELEASE ORAL at 08:13

## 2022-07-20 RX ADMIN — VANCOMYCIN HYDROCHLORIDE 1000 MG: 1 INJECTION, POWDER, LYOPHILIZED, FOR SOLUTION INTRAVENOUS at 17:31

## 2022-07-20 RX ADMIN — SODIUM CHLORIDE, PRESERVATIVE FREE 10 ML: 5 INJECTION INTRAVENOUS at 22:59

## 2022-07-20 RX ADMIN — HEPARIN SODIUM 5000 UNITS: 5000 INJECTION INTRAVENOUS; SUBCUTANEOUS at 22:58

## 2022-07-20 RX ADMIN — LISINOPRIL 20 MG: 20 TABLET ORAL at 08:13

## 2022-07-20 RX ADMIN — AMLODIPINE BESYLATE 5 MG: 5 TABLET ORAL at 17:25

## 2022-07-20 RX ADMIN — CARVEDILOL 12.5 MG: 12.5 TABLET, FILM COATED ORAL at 18:18

## 2022-07-20 RX ADMIN — CARVEDILOL 6.25 MG: 6.25 TABLET, FILM COATED ORAL at 08:37

## 2022-07-20 RX ADMIN — SODIUM CHLORIDE, PRESERVATIVE FREE 10 ML: 5 INJECTION INTRAVENOUS at 08:36

## 2022-07-20 RX ADMIN — CARVEDILOL 12.5 MG: 12.5 TABLET, FILM COATED ORAL at 11:31

## 2022-07-20 RX ADMIN — SPIRONOLACTONE 25 MG: 25 TABLET ORAL at 17:25

## 2022-07-20 ASSESSMENT — PAIN SCALES - GENERAL
PAINLEVEL_OUTOF10: 7
PAINLEVEL_OUTOF10: 0
PAINLEVEL_OUTOF10: 7
PAINLEVEL_OUTOF10: 7
PAINLEVEL_OUTOF10: 0

## 2022-07-20 ASSESSMENT — PAIN DESCRIPTION - DESCRIPTORS
DESCRIPTORS: SHARP
DESCRIPTORS: SHARP

## 2022-07-20 ASSESSMENT — PAIN DESCRIPTION - PAIN TYPE
TYPE: ACUTE PAIN
TYPE: ACUTE PAIN

## 2022-07-20 ASSESSMENT — PAIN DESCRIPTION - ORIENTATION
ORIENTATION: LEFT

## 2022-07-20 ASSESSMENT — PAIN DESCRIPTION - LOCATION
LOCATION: SHOULDER

## 2022-07-20 ASSESSMENT — PAIN DESCRIPTION - FREQUENCY: FREQUENCY: CONTINUOUS

## 2022-07-20 NOTE — PROGRESS NOTES
Temp (24hrs), Av.8 °F (36.6 °C), Min:97.2 °F (36.2 °C), Max:98.2 °F (36.8 °C)    In: -   Out: 500 [Urine:500]    Physical Exam:  Constitutional: This is a well developed, well nourished, 25-29.9 - Overweight 59y.o. year old male who is alert, oriented, cooperative and in no apparent distress. Head:normocephalic and atraumatic. EENT:  PERRLA. No conjunctival injections. Septum was midline, mucosa was without erythema, exudates or cobblestoning. No thrush was noted. Neck: Supple without thyromegaly. No elevated JVP. Trachea was midline. Respiratory: Chest was symmetrical without dullness to percussion. Breath sounds bilaterally were clear to auscultation. There were no wheezes, rhonchi or rales. There is no intercostal retraction or use of accessory muscles. No egophony noted. Cardiovascular: Regular without murmur, clicks, gallops or rubs. Abdomen: Slightly rounded and soft without organomegaly. No rebound, rigidity or guarding was appreciated. Lymphatic: No lymphadenopathy. Musculoskeletal: Normal curvature of the spine. No gross muscle weakness. Extremities:  No lower extremity edema, ulcerations, tenderness, varicosities or erythema. Muscle size, tone and strength are normal.  No involuntary movements are noted. Skin:  Warm and dry. Good color, turgor and pigmentation. No lesions or scars.   No cyanosis or clubbing  Neurological/Psychiatric: The patient's general behavior, level of consciousness, thought content and emotional status is normal.        Medications:  Scheduled Medications:    sodium chloride flush  5-40 mL IntraVENous 2 times per day    heparin (porcine)  5,000 Units SubCUTAneous 3 times per day     Continuous Infusions:    sodium chloride      niCARdipine 5 mg/hr (22 0004)     PRN Medicationssodium chloride flush, 5-40 mL, PRN  sodium chloride, , PRN  ondansetron, 4 mg, Q8H PRN   Or  ondansetron, 4 mg, Q6H PRN  polyethylene glycol, 17 g, Daily PRN  acetaminophen, 650 mg, Q6H PRN   Or  acetaminophen, 650 mg, Q6H PRN        Diagnostic Labs:  CBC:   Recent Labs     07/19/22  1335   WBC 6.8   RBC 5.39   HGB 14.8   HCT 45.9   MCV 85.2   RDW 16.5*        BMP:   Recent Labs     07/19/22  1335      K 3.9   CL 99   CO2 27   BUN 13   CREATININE 1.30*     BNP: No results for input(s): BNP in the last 72 hours. PT/INR: No results for input(s): PROTIME, INR in the last 72 hours. APTT: No results for input(s): APTT in the last 72 hours. CARDIAC ENZYMES: No results for input(s): CKMB, CKMBINDEX, TROPONINI in the last 72 hours. Invalid input(s): CKTOTAL;3  FASTING LIPID PANEL:  Lab Results   Component Value Date    CHOL 188 11/07/2020    HDL 52 11/07/2020    TRIG 235 (H) 11/07/2020     LIVER PROFILE:   Recent Labs     07/19/22  1335   AST 12   ALT 5   BILITOT 0.53   ALKPHOS 193*      MICROBIOLOGY:   Lab Results   Component Value Date/Time    CULTURE NO GROWTH 5 DAYS 03/10/2022 07:06 PM       Imaging:    XR CHEST PORTABLE    Result Date: 7/19/2022  Minimal bibasilar atelectasis. Lungs otherwise grossly clear. Cardiomegaly. ASSESSMENT & PLAN     ASSESSMENT / PLAN:     IMPRESSION  This is a 59 y.o. male who presented with headache and found to have hypertensive urgency. Principal Problem:  Hypertensive emergency/Hypertensive urgency  - Blood pressure on admission 196/91. Currently is 134/80.  - Nicardipine gtt started in the ED. D/c'd. - Will avoid sudden drop in blood pressure. - Hydralazine 10 was given in the ED. - Will change Toprol to Coreg.  - Lisinopril dose will be increased from 10 mg OD to 20 mg OD. - Will continue Imuran. CKD stage III  - Not on hemodialysis. - Cr is 1.3 we will continue to monitor.  - We will monitor I/Os. - Avoid nephrotoxic drugs. DVT ppx: Heparin     PT/OT: Consulted. Will follow up. Discharge Planning:  consulted. Will follow up.       Felipe Bautista MD  Internal Medicine Resident, PGY-1  [address] [place], [place]  7/20/2022, 1:56 AM
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
## 2022-07-20 NOTE — PROGRESS NOTES
Occupational [address]  Occupational Therapy Not Seen Note    DATE: 2022    NAME: Alexander Rachel  MRN: [id number]   : 1965      Patient not seen this date for Occupational Therapy due to: Other: Await cardiology consult and plan of care prior to EOB/OOB activity with occupational therapy.        Electronically signed by Gaby Mejia OT on 2022 at 8:28 AM

## 2022-07-20 NOTE — PLAN OF CARE
Problem: Discharge Planning  Goal: Discharge to home or other facility with appropriate resources  7/20/2022 0824 by Lissy Rhodes RN  Outcome: Progressing     Problem: Cardiovascular - Adult  Goal: Maintains optimal cardiac output and hemodynamic stability  Outcome: Progressing     Problem: Hematologic - Adult  Goal: Maintains hematologic stability  Outcome: Progressing

## 2022-07-20 NOTE — PROGRESS NOTES
Patient arrived , consent signed, all questions answered. Pt ready for procedure. Bed in low position, call light to reach with side rails up 2 of 2. Chest clipped.    Prep completed to upper chest area with 2% Chlorhexidine Gluconate

## 2022-07-20 NOTE — BRIEF OP NOTE
Brief Postoperative Note      Patient: Jennifer Munguia  YOB: 1965  MRN: [id number]    DATE OF PROCEDURE:  7/20/22    Pre-Op Diagnosis:  Primary VF arrest    Post-Op Diagnosis:  Same    PROCEDURE:  1)  Implantation of ICD, single chamber  2)  Conscious sedation  3) Contrast venogram  4)  Intraoperative lead testing  5) Defibrillation testing       Assistant:  None    Anesthesia: Conscious sedation; IV Versed and Fentanyl    Estimated Blood Loss (mL): 10 mL    Complications: None    Specimens:  None    Implants:  Medtronic Cobalt XT VR ICD                     Medtronic [id number] lead      Drains: None    FINDINGS:  All impedances and thresholds of the Medtronic [id number] lead were stable and WNL. Defibrillation threshold </= 30 J ( 1/1 , standard polarity). The device is programmed with single-zone tachyarrhythmia detection (  msec; 30/40 intervals for detection) with all shock energies at 40 J.     Electronically signed by Angelita Nichols MD on 7/20/2022 at 5:36 PM

## 2022-07-20 NOTE — PROGRESS NOTES
Patient's niece Chris Espinal called for update. States she is the POA and lives with the patient. [phone] - Patient returned from cath lab.

## 2022-07-20 NOTE — PROGRESS NOTES
Comprehensive Nutrition Assessment    Type and Reason for Visit:  Positive Nutrition Screen    Nutrition Recommendations/Plan:   Continue NPO, monitor for start of nutrition  Monitor labs, wt, plan of care     Malnutrition Assessment:  Malnutrition Status:  Insufficient data (07/20/22 1115)    Context:  Chronic Illness     Findings of the 6 clinical characteristics of malnutrition:  Energy Intake:  No significant decrease in energy intake  Weight Loss:  Unable to assess (wt flux noted; possible 4% x 3 months, 8 % x 6 months)     Body Fat Loss:  Unable to assess     Muscle Mass Loss:  Unable to assess    Fluid Accumulation:  No significant fluid accumulation     Strength:  Not Performed    Nutrition Assessment:    Pt admitted for hypertensive emergency. PMH: HTN, CKD, COPD, CAD, quadruple bypass. Pt NPO, scheduled for a defibrillator to be placed. Pt denies wt changes or a decreased appetite. Wt hx reviewed: flux noted, possible ~4% past 3 months (not significant), ~ 8% wt loss past 6 months (not significant). Will reassess need/desire for ONS. Nutrition Related Findings:    labs/meds reviewed. Wound Type: None       Current Nutrition Intake & Therapies:    Average Meal Intake: NPO  Average Supplements Intake: NPO  Diet NPO    Anthropometric Measures:  Height: 5' 9\" (175.3 cm)  Ideal Body Weight (IBW): 160 lbs (73 kg)       Current Body Weight: 191 lb 9.3 oz (86.9 kg),   IBW. Weight Source: Bed Scale  Current BMI (kg/m2): 28.3                          BMI Categories: Overweight (BMI 25.0-29. 9)    Estimated Daily Nutrient Needs:  Energy Requirements Based On: Kcal/kg  Weight Used for Energy Requirements: Current  Energy (kcal/day): 6892-1202 kcal/day (20-22 kcal/kg)  Weight Used for Protein Requirements: Ideal  Protein (g/day): 80-90 g/day (1.2 g/kg)     Fluid (ml/day): per MD    Nutrition Diagnosis:   No nutrition diagnosis at this time     Nutrition Interventions:   Food and/or Nutrient Delivery: Continue NPO  Nutrition Education/Counseling: No recommendation at this time  Coordination of Nutrition Care: Continue to monitor while inpatient       Goals:     Goals: Initiate PO diet, by next RD assessment       Nutrition Monitoring and Evaluation:   Behavioral-Environmental Outcomes: None Identified  Food/Nutrient Intake Outcomes: Food and Nutrient Intake  Physical Signs/Symptoms Outcomes: Biochemical Data, Nutrition Focused Physical Findings, Weight, Skin    Discharge Planning:     Too soon to determine     Luzma Ahumada, [address]  Contact: [phone]
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
## 2022-07-21 ENCOUNTER — APPOINTMENT (OUTPATIENT)
Dept: GENERAL RADIOLOGY | Age: 57
DRG: 179 | End: 2022-07-21
Payer: COMMERCIAL

## 2022-07-21 PROBLEM — Z71.89: Status: ACTIVE | Noted: 2022-07-21

## 2022-07-21 PROBLEM — N18.9 ACUTE KIDNEY INJURY SUPERIMPOSED ON CKD (HCC): Status: ACTIVE | Noted: 2020-03-17

## 2022-07-21 LAB
ABSOLUTE EOS #: 0.5 K/UL (ref 0–0.44)
ABSOLUTE IMMATURE GRANULOCYTE: <0.03 K/UL (ref 0–0.3)
ABSOLUTE LYMPH #: 1.63 K/UL (ref 1.1–3.7)
ABSOLUTE MONO #: 0.55 K/UL (ref 0.1–1.2)
ANION GAP SERPL CALCULATED.3IONS-SCNC: 14 MMOL/L (ref 9–17)
BASOPHILS # BLD: 2 % (ref 0–2)
BASOPHILS ABSOLUTE: 0.13 K/UL (ref 0–0.2)
BUN BLDV-MCNC: 21 MG/DL (ref 6–20)
CALCIUM SERPL-MCNC: 8.5 MG/DL (ref 8.6–10.4)
CHLORIDE BLD-SCNC: 100 MMOL/L (ref 98–107)
CO2: 22 MMOL/L (ref 20–31)
CREAT SERPL-MCNC: 1.6 MG/DL (ref 0.7–1.2)
EOSINOPHILS RELATIVE PERCENT: 7 % (ref 1–4)
GFR AFRICAN AMERICAN: 54 ML/MIN
GFR NON-AFRICAN AMERICAN: 44 ML/MIN
GFR SERPL CREATININE-BSD FRML MDRD: ABNORMAL ML/MIN/{1.73_M2}
GLUCOSE BLD-MCNC: 98 MG/DL (ref 70–99)
HCT VFR BLD CALC: 44.1 % (ref 40.7–50.3)
HEMOGLOBIN: 14.3 G/DL (ref 13–17)
IMMATURE GRANULOCYTES: 0 %
LYMPHOCYTES # BLD: 21 % (ref 24–43)
MCH RBC QN AUTO: 28.1 PG (ref 25.2–33.5)
MCHC RBC AUTO-ENTMCNC: 32.4 G/DL (ref 28.4–34.8)
MCV RBC AUTO: 86.8 FL (ref 82.6–102.9)
MONOCYTES # BLD: 7 % (ref 3–12)
NRBC AUTOMATED: 0 PER 100 WBC
PDW BLD-RTO: 16.9 % (ref 11.8–14.4)
PLATELET # BLD: 142 K/UL (ref 138–453)
PMV BLD AUTO: 10.3 FL (ref 8.1–13.5)
POTASSIUM SERPL-SCNC: 4.3 MMOL/L (ref 3.7–5.3)
RBC # BLD: 5.08 M/UL (ref 4.21–5.77)
RBC # BLD: ABNORMAL 10*6/UL
SEG NEUTROPHILS: 63 % (ref 36–65)
SEGMENTED NEUTROPHILS ABSOLUTE COUNT: 4.77 K/UL (ref 1.5–8.1)
SODIUM BLD-SCNC: 136 MMOL/L (ref 135–144)
WBC # BLD: 7.6 K/UL (ref 3.5–11.3)

## 2022-07-21 PROCEDURE — 71045 X-RAY EXAM CHEST 1 VIEW: CPT

## 2022-07-21 PROCEDURE — 6370000000 HC RX 637 (ALT 250 FOR IP): Performed by: INTERNAL MEDICINE

## 2022-07-21 PROCEDURE — 80048 BASIC METABOLIC PNL TOTAL CA: CPT

## 2022-07-21 PROCEDURE — 6360000002 HC RX W HCPCS: Performed by: STUDENT IN AN ORGANIZED HEALTH CARE EDUCATION/TRAINING PROGRAM

## 2022-07-21 PROCEDURE — 6360000002 HC RX W HCPCS: Performed by: INTERNAL MEDICINE

## 2022-07-21 PROCEDURE — 97535 SELF CARE MNGMENT TRAINING: CPT

## 2022-07-21 PROCEDURE — 2580000003 HC RX 258

## 2022-07-21 PROCEDURE — 6370000000 HC RX 637 (ALT 250 FOR IP): Performed by: STUDENT IN AN ORGANIZED HEALTH CARE EDUCATION/TRAINING PROGRAM

## 2022-07-21 PROCEDURE — 85025 COMPLETE CBC W/AUTO DIFF WBC: CPT

## 2022-07-21 PROCEDURE — 2580000003 HC RX 258: Performed by: INTERNAL MEDICINE

## 2022-07-21 PROCEDURE — 6370000000 HC RX 637 (ALT 250 FOR IP): Performed by: NURSE PRACTITIONER

## 2022-07-21 PROCEDURE — 93005 ELECTROCARDIOGRAM TRACING: CPT | Performed by: INTERNAL MEDICINE

## 2022-07-21 PROCEDURE — 97166 OT EVAL MOD COMPLEX 45 MIN: CPT

## 2022-07-21 PROCEDURE — 97530 THERAPEUTIC ACTIVITIES: CPT

## 2022-07-21 PROCEDURE — 2500000003 HC RX 250 WO HCPCS

## 2022-07-21 PROCEDURE — 2060000000 HC ICU INTERMEDIATE R&B

## 2022-07-21 PROCEDURE — 99232 SBSQ HOSP IP/OBS MODERATE 35: CPT | Performed by: INTERNAL MEDICINE

## 2022-07-21 PROCEDURE — 36415 COLL VENOUS BLD VENIPUNCTURE: CPT

## 2022-07-21 PROCEDURE — 6370000000 HC RX 637 (ALT 250 FOR IP)

## 2022-07-21 PROCEDURE — 2580000003 HC RX 258: Performed by: STUDENT IN AN ORGANIZED HEALTH CARE EDUCATION/TRAINING PROGRAM

## 2022-07-21 RX ORDER — ASPIRIN 81 MG/1
TABLET ORAL
Qty: 30 TABLET | Refills: 6 | Status: SHIPPED | OUTPATIENT
Start: 2022-07-21 | End: 2022-07-23 | Stop reason: HOSPADM

## 2022-07-21 RX ORDER — CARVEDILOL 12.5 MG/1
12.5 TABLET ORAL ONCE
Status: COMPLETED | OUTPATIENT
Start: 2022-07-21 | End: 2022-07-21

## 2022-07-21 RX ORDER — LISINOPRIL 20 MG/1
40 TABLET ORAL DAILY
Status: DISCONTINUED | OUTPATIENT
Start: 2022-07-21 | End: 2022-07-21

## 2022-07-21 RX ORDER — LABETALOL HYDROCHLORIDE 5 MG/ML
10 INJECTION, SOLUTION INTRAVENOUS EVERY 6 HOURS PRN
Status: DISCONTINUED | OUTPATIENT
Start: 2022-07-21 | End: 2022-07-23 | Stop reason: HOSPADM

## 2022-07-21 RX ORDER — AMLODIPINE BESYLATE 5 MG/1
5 TABLET ORAL DAILY
Qty: 30 TABLET | Refills: 3 | Status: SHIPPED | OUTPATIENT
Start: 2022-07-22 | End: 2022-07-23 | Stop reason: HOSPADM

## 2022-07-21 RX ORDER — AMLODIPINE BESYLATE 10 MG/1
10 TABLET ORAL DAILY
Status: DISCONTINUED | OUTPATIENT
Start: 2022-07-22 | End: 2022-07-23 | Stop reason: HOSPADM

## 2022-07-21 RX ORDER — CARVEDILOL 25 MG/1
25 TABLET ORAL 2 TIMES DAILY WITH MEALS
Qty: 60 TABLET | Refills: 3 | Status: ON HOLD | OUTPATIENT
Start: 2022-07-21 | End: 2022-08-19 | Stop reason: SDUPTHER

## 2022-07-21 RX ORDER — NITROGLYCERIN 0.4 MG/1
0.4 TABLET SUBLINGUAL EVERY 5 MIN PRN
Qty: 25 TABLET | Refills: 3 | Status: ON HOLD | OUTPATIENT
Start: 2022-07-21 | End: 2022-08-19 | Stop reason: SDUPTHER

## 2022-07-21 RX ORDER — 0.9 % SODIUM CHLORIDE 0.9 %
1000 INTRAVENOUS SOLUTION INTRAVENOUS ONCE
Status: COMPLETED | OUTPATIENT
Start: 2022-07-21 | End: 2022-07-21

## 2022-07-21 RX ORDER — SPIRONOLACTONE 25 MG/1
25 TABLET ORAL DAILY
Qty: 30 TABLET | Refills: 11 | Status: SHIPPED | OUTPATIENT
Start: 2022-07-21 | End: 2022-07-23 | Stop reason: HOSPADM

## 2022-07-21 RX ORDER — CARVEDILOL 25 MG/1
25 TABLET ORAL 2 TIMES DAILY WITH MEALS
Status: DISCONTINUED | OUTPATIENT
Start: 2022-07-21 | End: 2022-07-23 | Stop reason: HOSPADM

## 2022-07-21 RX ORDER — LISINOPRIL 20 MG/1
20 TABLET ORAL DAILY
Status: DISCONTINUED | OUTPATIENT
Start: 2022-07-22 | End: 2022-07-23 | Stop reason: HOSPADM

## 2022-07-21 RX ORDER — LISINOPRIL 40 MG/1
40 TABLET ORAL DAILY
Qty: 30 TABLET | Refills: 3 | Status: SHIPPED | OUTPATIENT
Start: 2022-07-22 | End: 2022-07-23 | Stop reason: HOSPADM

## 2022-07-21 RX ADMIN — SODIUM CHLORIDE, PRESERVATIVE FREE 10 ML: 5 INJECTION INTRAVENOUS at 19:21

## 2022-07-21 RX ADMIN — ACETAMINOPHEN 650 MG: 325 TABLET ORAL at 04:56

## 2022-07-21 RX ADMIN — ACETAMINOPHEN 325MG 650 MG: 325 TABLET ORAL at 23:17

## 2022-07-21 RX ADMIN — CARVEDILOL 12.5 MG: 12.5 TABLET, FILM COATED ORAL at 08:02

## 2022-07-21 RX ADMIN — HEPARIN SODIUM 5000 UNITS: 5000 INJECTION INTRAVENOUS; SUBCUTANEOUS at 21:19

## 2022-07-21 RX ADMIN — AMLODIPINE BESYLATE 5 MG: 5 TABLET ORAL at 08:02

## 2022-07-21 RX ADMIN — HEPARIN SODIUM 5000 UNITS: 5000 INJECTION INTRAVENOUS; SUBCUTANEOUS at 16:20

## 2022-07-21 RX ADMIN — CARVEDILOL 12.5 MG: 12.5 TABLET, FILM COATED ORAL at 11:22

## 2022-07-21 RX ADMIN — Medication 1000 MG: at 05:03

## 2022-07-21 RX ADMIN — Medication 10 MG: at 23:34

## 2022-07-21 RX ADMIN — SODIUM CHLORIDE, PRESERVATIVE FREE 10 ML: 5 INJECTION INTRAVENOUS at 08:02

## 2022-07-21 RX ADMIN — SODIUM CHLORIDE 1000 ML: 9 INJECTION, SOLUTION INTRAVENOUS at 11:18

## 2022-07-21 RX ADMIN — LISINOPRIL 40 MG: 20 TABLET ORAL at 09:45

## 2022-07-21 RX ADMIN — DULOXETINE HYDROCHLORIDE 30 MG: 30 CAPSULE, DELAYED RELEASE ORAL at 08:02

## 2022-07-21 RX ADMIN — Medication 10 MG: at 16:29

## 2022-07-21 RX ADMIN — ACETAMINOPHEN 325MG 650 MG: 325 TABLET ORAL at 16:24

## 2022-07-21 RX ADMIN — Medication 81 MG: at 08:02

## 2022-07-21 RX ADMIN — SPIRONOLACTONE 25 MG: 25 TABLET ORAL at 08:02

## 2022-07-21 RX ADMIN — HEPARIN SODIUM 5000 UNITS: 5000 INJECTION INTRAVENOUS; SUBCUTANEOUS at 04:56

## 2022-07-21 RX ADMIN — DESMOPRESSIN ACETATE 40 MG: 0.2 TABLET ORAL at 08:02

## 2022-07-21 RX ADMIN — CARVEDILOL 25 MG: 25 TABLET, FILM COATED ORAL at 16:20

## 2022-07-21 ASSESSMENT — PAIN SCALES - GENERAL
PAINLEVEL_OUTOF10: 2
PAINLEVEL_OUTOF10: 3
PAINLEVEL_OUTOF10: 6
PAINLEVEL_OUTOF10: 6
PAINLEVEL_OUTOF10: 4

## 2022-07-21 ASSESSMENT — PAIN DESCRIPTION - ORIENTATION
ORIENTATION: LEFT
ORIENTATION: LEFT

## 2022-07-21 ASSESSMENT — PAIN DESCRIPTION - DESCRIPTORS
DESCRIPTORS: SHARP
DESCRIPTORS: ACHING
DESCRIPTORS: SHARP

## 2022-07-21 ASSESSMENT — PAIN DESCRIPTION - PAIN TYPE
TYPE: ACUTE PAIN

## 2022-07-21 ASSESSMENT — PAIN DESCRIPTION - LOCATION
LOCATION: SHOULDER

## 2022-07-21 ASSESSMENT — PAIN DESCRIPTION - FREQUENCY: FREQUENCY: CONTINUOUS

## 2022-07-21 NOTE — PROGRESS NOTES
;Decreased endurance  Assessment: Pt agreeable to OT session this date. Pt demonstrated bed mobility with CGA and HOB elevated. Pt demonstrated static/dynamic sitting balance at EOB with SBA for safety during MMT/ROM assessment, sling management, and donning of shoes. Pt educated on shoulder precautions and sling mangement. Pt required Gregory during sling management. Pt completed sit <> stand functional transfers with CGA. Pt demonstrated functional mobility to/from the bathroom and around the room with CGA, no LOB/unsteadiness observed. Pt engaged dynamic standing balance with functional reach to high/low surfaces to retrieve two items. Slight unsteadiness observed. Pt retired to lying supine in bed with HOB elevated. Pt demonstrated deficits in functional mobility and balance inhibiting independence in ADLs/IADLs. Pt will require continued OT services during acute hospitlization to address the above noted deficits with skilled OT interventions to increase independence in ADLs/IADLs. Prognosis: Good  Decision Making: Medium Complexity  REQUIRES OT FOLLOW-UP: Yes  Activity Tolerance  Activity Tolerance: Patient Tolerated treatment well        Plan   Plan  Times per Week: 2-3x/wk  Current Treatment Recommendations: Balance training, Functional mobility training, Endurance training, Safety education & training, Patient/Caregiver education & training, Equipment evaluation, education, & procurement, Self-Care / ADL, Home management training     Restrictions  Restrictions/Precautions  Restrictions/Precautions: Up as Tolerated  Required Braces or Orthoses?: Yes  Required Braces or Orthoses  Left Upper Extremity Brace/Splint: Sling (Sling & swathe affected arm for 24 hours)  Position Activity Restriction  Other position/activity restrictions: 7/20 s/p Implantation of ICD, single chamber;  No elevation of shoulder for 30 days over 90 degrees    Subjective   General  Patient assessed for rehabilitation services?: Yes  Family / Caregiver Present: No  General Comment  Comments: RN ok'd OT eval this date. Pt agreeable to OT session. Pt cooperative/pleasant throughout. Pt reports 3/10 pain in L shoulder upon arrival, pain decreased with activity. Social/Functional History  Social/Functional History  Lives With: Family (Niece)  Type of Home: House  Home Layout: Two level, Bed/Bath upstairs  Home Access: Stairs to enter with rails  Entrance Stairs - Number of Steps: ~8  Entrance Stairs - Rails: Both  Bathroom Shower/Tub: Tub/Shower unit  Bathroom Toilet: Standard  Bathroom Equipment: Hand-held shower  Home Equipment: malik Conley (Has not been using AD recently)  ADL Assistance: Independent  Homemaking Assistance:  (Niece completes, pt reports he could physically complete if needed)  Homemaking Responsibilities: Yes  Meal Prep Responsibility: Secondary  Laundry Responsibility: Secondary  Cleaning Responsibility: Secondary  Shopping Responsibility: Secondary  Ambulation Assistance: Independent  Transfer Assistance: Independent  Active : No  Patient's  Info: Niece  Occupation: Retired  Type of Occupation: Demolition  Leisure & Hobbies: Watching his [employer] play  Additional Comments: Niece able to provide 24/7 assist as needed upon discharge. Objective   Safety Devices  Type of Devices: Gait belt;Left in bed;Call light within reach  Restraints  Restraints Initially in Place: No  Balance  Sitting:  (Pt demonstrated static/dynamic sitting balance at EOB with SBA to assess ROM/MMT, sling management, and donning shoes ~8 minutes.)  Standing:  (Pt demonstrated static/dynamic standing balance at EOB and at sinkside with CGA for safety for ~5 minutes. Pt demonstrated dynamic standing balance with functional reach to high/low surfaces while retrieving two items with CGA.)  Gait  Overall Level of Assistance: Contact-guard assistance (Pt completed functional mobility to/from the bathroom and around the room with CGA.  No LOB/unsteadiness observed.)     AROM: Within functional limits  Strength: Within functional limits (LUE not assessed d/t precautions; RUE grossly 4+/5)  Coordination: Within functional limits (Observed tremor in BUE, however did not impact thumb oppostion or functional activities. Pt reports tremor is baseline.)  Tone: Normal  Sensation: Impaired (Acute L shoulder/lateral L chest numbness/tingling)  ADL  Feeding: Independent;Setup  Grooming: Independent;Setup  Grooming Skilled Clinical Factors: Pt completed face washing task at sinkside independently after setup. UE Bathing: Contact guard assistance  LE Bathing: Contact guard assistance  UE Dressing: Contact guard assistance  UE Dressing Skilled Clinical Factors: Pt required min A for sling management. Pt expected to require CGA for UE dressing. LE Dressing: Contact guard assistance  LE Dressing Skilled Clinical Factors: Pt demonstrated donning shoes while sitting at EOB indepedently after setup. Pt expected tor require CGA for standing portions of LE dressing.   Toileting: Contact guard assistance        Bed mobility  Supine to Sit: Contact guard assistance  Sit to Supine: Stand by assistance  Bed Mobility Comments: HOB elevated and use of bed rail  Transfers  Sit to stand: Contact guard assistance  Stand to sit: Contact guard assistance  Vision  Vision: Within Functional Limits  Hearing  Hearing: Within functional limits  Cognition  Overall Cognitive Status: WFL                  Education Given To: Patient  Education Provided Comments: Pt educated on OT role, OT POC, sling management and shoulder precautions, transfer training- good return  Education Method: Demonstration;Verbal  Barriers to Learning: None  Education Outcome: Verbalized understanding;Demonstrated understanding  LUE AROM (degrees)  LUE General AROM: Not formally asessed d/t precautions  Left Hand AROM (degrees)  Left Hand General AROM: Not formally asessed d/t precautions  RUE AROM (degrees)  RUE AROM : WFL  Right Hand AROM (degrees)  Right Hand AROM: [facility]          AM-PAC Score        AM-PAC Inpatient Daily Activity Raw Score: 20 (07/21/22 1711)  AM-PAC Inpatient ADL T-Scale Score : 42.03 (07/21/22 1711)  ADL Inpatient CMS 0-100% Score: 38.32 (07/21/22 1711)  ADL Inpatient CMS G-Code Modifier : CJ (07/21/22 1711)         Goals  Short Term Goals  Time Frame for Short term goals: Before discharge, pt will:  Short Term Goal 1: demo functional transfers and functional mobility IND to promote increased independence in functional activities  Short Term Goal 2: demo UB ADLs IND  Short Term Goal 3: demo LB ADLs/toileting IND  Short Term Goal 4: demo 10 minutes of dynamic standing balance with incorporation of functional reach to high/low surfaces with SUP for increased engagement in ADLs/IADLs  Short Term Goal 5: demo adherence to shoulder precautions IND with 100% accuracy during functional activities each session       Therapy Time   Individual Concurrent Group Co-treatment   Time In 1015         Time Out 1046         Minutes 31         Timed Code Treatment Minutes: 25 Carmencita [address], SOT

## 2022-07-21 NOTE — CARE COORDINATION
Transitional Planning:  Spoke with pt re: homecare. He states he already has a nurse who visits weekly. He does not know agency name but thinks it might be [facility]. CM called [facility] and he is not current with them.   [facility] without the name as there are many on his insurance list.

## 2022-07-21 NOTE — PROGRESS NOTES
[facility]  Internal Medicine Teaching Residency Program  Inpatient Daily Progress Note  ______________________________________________________________________________    Patient: Sonam Rosa  YOB: 1965   AGX:[id number]    Acct: [de-identified]     [facility]  Admit date: 7/19/2022  Today's date: 07/21/22  Number of days in the hospital: 2    SUBJECTIVE   Admitting Diagnosis: Hypertensive emergency  CC: Headache  Pt examined at bedside. Chart & results reviewed. - Patient is heme stable and afebrile. - Reported total relieve of headache.  - had ICD Placement 7/20/2022  -Creatinine today is 1.6-it was 1.06 yesterday  -BUN-is 21-it was 11 yesterday       ROS:  Constitutional:  negative for chills, fevers, sweats  Respiratory:  negative for cough, dyspnea on exertion, hemoptysis, shortness of breath, wheezing  Cardiovascular:  negative for chest pain, chest pressure/discomfort, lower extremity edema, palpitations  Gastrointestinal:  negative for abdominal pain, constipation, diarrhea, nausea, vomiting  Neurological:  negative for dizziness, headache  BRIEF HISTORY     The patient is a pleasant 59 y.o. male presents with a chief complaint of headache and high blood pressure measured by his home health nurse which was in the 332G systolic. The headache is constant, generalized not associated with eye redness or tearing. Patient denies lightheadedness, confusion, bluriness of vision, neck pain. He also denies chest pain, palptiation, cough or shortness of bleeding. Denies numbness, tingling. He reported mild weakness on his right foot. He has a history of CAD with a quadruple bypass and a MI in May. He currently is wearing a life vest and is scheduled for a defibrillator to be placed tomorrow with cardiology.         OBJECTIVE     Vital Signs:  BP (!) 155/114   Pulse 86   Temp 98.4 °F (36.9 °C) (Oral)   Resp 16   Ht 5' 9\" (1.753 m)   Wt 191 lb 9.3 oz (86.9 kg)   SpO2 95%   BMI 28.29 kg/m²     Temp (24hrs), Av.2 °F (36.8 °C), Min:97.9 °F (36.6 °C), Max:98.4 °F (36.9 °C)    In: 240   Out: 300 [Urine:300]    Physical Exam:  Constitutional: This is a well developed, well nourished, 25-29.9 - Overweight 59y.o. year old male who is alert, oriented, cooperative and in no apparent distress. Head:normocephalic and atraumatic. EENT:  PERRLA. No conjunctival injections. Septum was midline, mucosa was without erythema, exudates or cobblestoning. No thrush was noted. Neck: Supple without thyromegaly. No elevated JVP. Trachea was midline. Respiratory: Chest was symmetrical without dullness to percussion. Breath sounds bilaterally were clear to auscultation. There were no wheezes, rhonchi or rales. There is no intercostal retraction or use of accessory muscles. No egophony noted. Cardiovascular: Regular without murmur, clicks, gallops or rubs. Abdomen: Slightly rounded and soft without organomegaly. No rebound, rigidity or guarding was appreciated. Lymphatic: No lymphadenopathy. Musculoskeletal: Normal curvature of the spine. No gross muscle weakness. Extremities:  No lower extremity edema, ulcerations, tenderness, varicosities or erythema. Muscle size, tone and strength are normal.  No involuntary movements are noted. Skin:  Warm and dry. Good color, turgor and pigmentation. No lesions or scars.   No cyanosis or clubbing  Neurological/Psychiatric: The patient's general behavior, level of consciousness, thought content and emotional status is normal.        Medications:  Scheduled Medications:    carvedilol  25 mg Oral BID     [START ON 2022] lisinopril  20 mg Oral Daily    [START ON 2022] amLODIPine  10 mg Oral Daily    atorvastatin  40 mg Oral Daily    DULoxetine  30 mg Oral Daily    aspirin  81 mg Oral Daily    sodium chloride flush  5-40 mL IntraVENous 2 times per day    acetaminophen  650 mg Oral Once    sodium chloride flush 5-40 mL IntraVENous 2 times per day    heparin (porcine)  5,000 Units SubCUTAneous 3 times per day     Continuous Infusions:    sodium chloride      sodium chloride       PRN Medicationsalbuterol sulfate HFA, 2 puff, Q4H PRN  sodium chloride flush, 5-40 mL, PRN  sodium chloride, , PRN  acetaminophen, 650 mg, Q4H PRN  sodium chloride flush, 5-40 mL, PRN  sodium chloride, , PRN  ondansetron, 4 mg, Q8H PRN   Or  ondansetron, 4 mg, Q6H PRN  polyethylene glycol, 17 g, Daily PRN  acetaminophen, 650 mg, Q6H PRN   Or  acetaminophen, 650 mg, Q6H PRN        Diagnostic Labs:  CBC:   Recent Labs     07/19/22  1335 07/20/22  0253 07/21/22  0652   WBC 6.8 7.1 7.6   RBC 5.39 5.35 5.08   HGB 14.8 14.9 14.3   HCT 45.9 44.9 44.1   MCV 85.2 83.9 86.8   RDW 16.5* 16.9* 16.9*    223 142     BMP:   Recent Labs     07/19/22  1335 07/20/22  0253 07/21/22  0652    132* 136   K 3.9 3.8 4.3   CL 99 99 100   CO2 27 23 22   BUN 13 11 21*   CREATININE 1.30* 1.06 1.60*     BNP: No results for input(s): BNP in the last 72 hours. PT/INR: No results for input(s): PROTIME, INR in the last 72 hours. APTT: No results for input(s): APTT in the last 72 hours. CARDIAC ENZYMES: No results for input(s): CKMB, CKMBINDEX, TROPONINI in the last 72 hours. Invalid input(s): CKTOTAL;3  FASTING LIPID PANEL:  Lab Results   Component Value Date    CHOL 188 11/07/2020    HDL 52 11/07/2020    TRIG 235 (H) 11/07/2020     LIVER PROFILE:   Recent Labs     07/19/22  1335   AST 12   ALT 5   BILITOT 0.53   ALKPHOS 193*      MICROBIOLOGY:   Lab Results   Component Value Date/Time    CULTURE NO GROWTH 5 DAYS 03/10/2022 07:06 PM       Imaging:    XR CHEST PORTABLE    Result Date: 7/21/2022  Status post left subclavian approach AICD. No pneumothorax. XR CHEST PORTABLE    Result Date: 7/19/2022  Minimal bibasilar atelectasis. Lungs otherwise grossly clear. Cardiomegaly.        ASSESSMENT & PLAN     IMPRESSION  This is a 59 y.o. male who presented with headache and found to have hypertensive urgency. Principal Problem:  Hypertensive emergency/Hypertensive urgency  - Blood pressure on admission 196/91. Currently is 155/99.  - Nicardipine gtt started in the ED. D/c'd. - Lisinopril will be adjusted to 20 mg.  -We will increase the dose of Norvasc to 10 mg.  -We will discontinue Aldactone  - Will continue Imuran. Acute kidney injury:  Creatinine today was 1.6. Yesterday it was 1.06  -Unsure whether it is because of the hypertensive emergency or lisinopril  -We will reduce the dose of lisinopril to 20 mg,  Discontinue Aldactone and increase Norvasc to 10 mg.  -Monitor with daily CMP. CKD stage III  - Not on hemodialysis. - Cr is 1.3 we will continue to monitor.  - We will monitor I/Os. - Avoid nephrotoxic drugs. DVT ppx: Heparin     PT/OT: Consulted. Will follow up. Discharge Planning:  consulted. Will follow up. Radha Hodges MD  Internal Medicine Resident, PGY-1  [facility];  [employer], [place]  7/21/2022, 12:56 PM
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
## 2022-07-21 NOTE — PLAN OF CARE
Problem: Discharge Planning  Goal: Discharge to home or other facility with appropriate resources  Outcome: Progressing  Flowsheets (Taken 7/21/2022 0758)  Discharge to home or other facility with appropriate resources: Identify barriers to discharge with patient and caregiver     Problem: Pain  Goal: Verbalizes/displays adequate comfort level or baseline comfort level  Outcome: Progressing  Flowsheets (Taken 7/21/2022 0754)  Verbalizes/displays adequate comfort level or baseline comfort level: Encourage patient to monitor pain and request assistance     Problem: Cardiovascular - Adult  Goal: Maintains optimal cardiac output and hemodynamic stability  Outcome: Progressing  Flowsheets (Taken 7/21/2022 0758)  Maintains optimal cardiac output and hemodynamic stability: Monitor blood pressure and heart rate  Goal: Absence of cardiac dysrhythmias or at baseline  Outcome: Progressing  Flowsheets (Taken 7/21/2022 0758)  Absence of cardiac dysrhythmias or at baseline: Monitor cardiac rate and rhythm     Problem: Hematologic - Adult  Goal: Maintains hematologic stability  Outcome: Progressing  Flowsheets (Taken 7/21/2022 0758)  Maintains hematologic stability: Assess for signs and symptoms of bleeding or hemorrhage     Problem: Safety - Adult  Goal: Free from fall injury  Outcome: Progressing  Flowsheets (Taken 7/21/2022 1009)  Free From Fall Injury: Instruct family/caregiver on patient safety

## 2022-07-22 DIAGNOSIS — J44.9 CHRONIC OBSTRUCTIVE PULMONARY DISEASE, UNSPECIFIED COPD TYPE (HCC): ICD-10-CM

## 2022-07-22 LAB
ABSOLUTE EOS #: 0.44 K/UL (ref 0–0.44)
ABSOLUTE IMMATURE GRANULOCYTE: <0.03 K/UL (ref 0–0.3)
ABSOLUTE LYMPH #: 1.81 K/UL (ref 1.1–3.7)
ABSOLUTE MONO #: 0.58 K/UL (ref 0.1–1.2)
ANION GAP SERPL CALCULATED.3IONS-SCNC: 11 MMOL/L (ref 9–17)
BASOPHILS # BLD: 1 % (ref 0–2)
BASOPHILS ABSOLUTE: 0.07 K/UL (ref 0–0.2)
BUN BLDV-MCNC: 19 MG/DL (ref 6–20)
CALCIUM SERPL-MCNC: 8.4 MG/DL (ref 8.6–10.4)
CHLORIDE BLD-SCNC: 103 MMOL/L (ref 98–107)
CO2: 21 MMOL/L (ref 20–31)
CREAT SERPL-MCNC: 1.38 MG/DL (ref 0.7–1.2)
EKG ATRIAL RATE: 82 BPM
EKG P AXIS: 22 DEGREES
EKG P-R INTERVAL: 166 MS
EKG Q-T INTERVAL: 378 MS
EKG QRS DURATION: 84 MS
EKG QTC CALCULATION (BAZETT): 441 MS
EKG R AXIS: -39 DEGREES
EKG T AXIS: 82 DEGREES
EKG VENTRICULAR RATE: 82 BPM
EOSINOPHILS RELATIVE PERCENT: 7 % (ref 1–4)
GFR AFRICAN AMERICAN: >60 ML/MIN
GFR NON-AFRICAN AMERICAN: 53 ML/MIN
GFR SERPL CREATININE-BSD FRML MDRD: ABNORMAL ML/MIN/{1.73_M2}
GLUCOSE BLD-MCNC: 92 MG/DL (ref 70–99)
HCT VFR BLD CALC: 41.1 % (ref 40.7–50.3)
HEMOGLOBIN: 13.5 G/DL (ref 13–17)
IMMATURE GRANULOCYTES: 0 %
LYMPHOCYTES # BLD: 30 % (ref 24–43)
MAGNESIUM: 1.5 MG/DL (ref 1.6–2.6)
MCH RBC QN AUTO: 28.3 PG (ref 25.2–33.5)
MCHC RBC AUTO-ENTMCNC: 32.8 G/DL (ref 28.4–34.8)
MCV RBC AUTO: 86.2 FL (ref 82.6–102.9)
MONOCYTES # BLD: 10 % (ref 3–12)
NRBC AUTOMATED: 0 PER 100 WBC
PDW BLD-RTO: 17 % (ref 11.8–14.4)
PLATELET # BLD: ABNORMAL K/UL (ref 138–453)
PLATELET, FLUORESCENCE: 133 K/UL (ref 138–453)
PLATELET, IMMATURE FRACTION: 3.4 % (ref 1.1–10.3)
POTASSIUM SERPL-SCNC: 4.2 MMOL/L (ref 3.7–5.3)
RBC # BLD: 4.77 M/UL (ref 4.21–5.77)
RBC # BLD: ABNORMAL 10*6/UL
SEG NEUTROPHILS: 52 % (ref 36–65)
SEGMENTED NEUTROPHILS ABSOLUTE COUNT: 3.21 K/UL (ref 1.5–8.1)
SODIUM BLD-SCNC: 135 MMOL/L (ref 135–144)
WBC # BLD: 6.1 K/UL (ref 3.5–11.3)

## 2022-07-22 PROCEDURE — 6370000000 HC RX 637 (ALT 250 FOR IP): Performed by: INTERNAL MEDICINE

## 2022-07-22 PROCEDURE — 6360000002 HC RX W HCPCS: Performed by: STUDENT IN AN ORGANIZED HEALTH CARE EDUCATION/TRAINING PROGRAM

## 2022-07-22 PROCEDURE — 6370000000 HC RX 637 (ALT 250 FOR IP): Performed by: STUDENT IN AN ORGANIZED HEALTH CARE EDUCATION/TRAINING PROGRAM

## 2022-07-22 PROCEDURE — 2500000003 HC RX 250 WO HCPCS

## 2022-07-22 PROCEDURE — 83735 ASSAY OF MAGNESIUM: CPT

## 2022-07-22 PROCEDURE — 6370000000 HC RX 637 (ALT 250 FOR IP): Performed by: NURSE PRACTITIONER

## 2022-07-22 PROCEDURE — 6370000000 HC RX 637 (ALT 250 FOR IP)

## 2022-07-22 PROCEDURE — 2580000003 HC RX 258: Performed by: STUDENT IN AN ORGANIZED HEALTH CARE EDUCATION/TRAINING PROGRAM

## 2022-07-22 PROCEDURE — 80048 BASIC METABOLIC PNL TOTAL CA: CPT

## 2022-07-22 PROCEDURE — 2500000003 HC RX 250 WO HCPCS: Performed by: STUDENT IN AN ORGANIZED HEALTH CARE EDUCATION/TRAINING PROGRAM

## 2022-07-22 PROCEDURE — 36415 COLL VENOUS BLD VENIPUNCTURE: CPT

## 2022-07-22 PROCEDURE — 85025 COMPLETE CBC W/AUTO DIFF WBC: CPT

## 2022-07-22 PROCEDURE — 85055 RETICULATED PLATELET ASSAY: CPT

## 2022-07-22 PROCEDURE — 2580000003 HC RX 258: Performed by: INTERNAL MEDICINE

## 2022-07-22 PROCEDURE — 99232 SBSQ HOSP IP/OBS MODERATE 35: CPT | Performed by: INTERNAL MEDICINE

## 2022-07-22 PROCEDURE — 2060000000 HC ICU INTERMEDIATE R&B

## 2022-07-22 RX ORDER — LISINOPRIL 10 MG/1
10 TABLET ORAL DAILY
Status: ON HOLD | COMMUNITY
End: 2022-07-23 | Stop reason: SDUPTHER

## 2022-07-22 RX ORDER — ISOSORBIDE MONONITRATE 30 MG/1
30 TABLET, EXTENDED RELEASE ORAL DAILY
Status: DISCONTINUED | OUTPATIENT
Start: 2022-07-22 | End: 2022-07-22

## 2022-07-22 RX ORDER — METOPROLOL SUCCINATE 25 MG/1
25 TABLET, EXTENDED RELEASE ORAL DAILY
COMMUNITY
End: 2022-07-23

## 2022-07-22 RX ORDER — ASPIRIN 81 MG/1
81 TABLET ORAL DAILY
COMMUNITY

## 2022-07-22 RX ORDER — OXYCODONE HYDROCHLORIDE AND ACETAMINOPHEN 5; 325 MG/1; MG/1
1 TABLET ORAL EVERY 6 HOURS PRN
Status: DISCONTINUED | OUTPATIENT
Start: 2022-07-22 | End: 2022-07-23 | Stop reason: HOSPADM

## 2022-07-22 RX ORDER — AZITHROMYCIN 500 MG/1
TABLET, FILM COATED ORAL
Qty: 3 TABLET | Refills: 0 | OUTPATIENT
Start: 2022-07-22

## 2022-07-22 RX ORDER — SODIUM CHLORIDE 9 MG/ML
INJECTION, SOLUTION INTRAVENOUS CONTINUOUS
Status: DISCONTINUED | OUTPATIENT
Start: 2022-07-22 | End: 2022-07-23 | Stop reason: HOSPADM

## 2022-07-22 RX ORDER — LABETALOL HYDROCHLORIDE 5 MG/ML
10 INJECTION, SOLUTION INTRAVENOUS ONCE
Status: COMPLETED | OUTPATIENT
Start: 2022-07-22 | End: 2022-07-22

## 2022-07-22 RX ORDER — ISOSORBIDE MONONITRATE 60 MG/1
60 TABLET, EXTENDED RELEASE ORAL DAILY
Status: DISCONTINUED | OUTPATIENT
Start: 2022-07-23 | End: 2022-07-23 | Stop reason: HOSPADM

## 2022-07-22 RX ORDER — MAGNESIUM SULFATE IN WATER 40 MG/ML
2000 INJECTION, SOLUTION INTRAVENOUS ONCE
Status: COMPLETED | OUTPATIENT
Start: 2022-07-22 | End: 2022-07-22

## 2022-07-22 RX ADMIN — SODIUM CHLORIDE, PRESERVATIVE FREE 10 ML: 5 INJECTION INTRAVENOUS at 08:28

## 2022-07-22 RX ADMIN — ISOSORBIDE MONONITRATE 30 MG: 30 TABLET ORAL at 14:10

## 2022-07-22 RX ADMIN — OXYCODONE HYDROCHLORIDE AND ACETAMINOPHEN 1 TABLET: 5; 325 TABLET ORAL at 14:40

## 2022-07-22 RX ADMIN — SODIUM CHLORIDE: 9 INJECTION, SOLUTION INTRAVENOUS at 10:55

## 2022-07-22 RX ADMIN — DULOXETINE HYDROCHLORIDE 30 MG: 30 CAPSULE, DELAYED RELEASE ORAL at 08:25

## 2022-07-22 RX ADMIN — Medication 10 MG: at 07:14

## 2022-07-22 RX ADMIN — HEPARIN SODIUM 5000 UNITS: 5000 INJECTION INTRAVENOUS; SUBCUTANEOUS at 06:24

## 2022-07-22 RX ADMIN — Medication 10 MG: at 10:51

## 2022-07-22 RX ADMIN — HEPARIN SODIUM 5000 UNITS: 5000 INJECTION INTRAVENOUS; SUBCUTANEOUS at 21:49

## 2022-07-22 RX ADMIN — SODIUM CHLORIDE, PRESERVATIVE FREE 10 ML: 5 INJECTION INTRAVENOUS at 08:26

## 2022-07-22 RX ADMIN — AMLODIPINE BESYLATE 10 MG: 10 TABLET ORAL at 08:25

## 2022-07-22 RX ADMIN — CARVEDILOL 25 MG: 25 TABLET, FILM COATED ORAL at 17:12

## 2022-07-22 RX ADMIN — DESMOPRESSIN ACETATE 40 MG: 0.2 TABLET ORAL at 08:25

## 2022-07-22 RX ADMIN — MAGNESIUM SULFATE HEPTAHYDRATE 2000 MG: 40 INJECTION, SOLUTION INTRAVENOUS at 14:12

## 2022-07-22 RX ADMIN — ACETAMINOPHEN 325MG 650 MG: 325 TABLET ORAL at 06:23

## 2022-07-22 RX ADMIN — Medication 81 MG: at 08:25

## 2022-07-22 RX ADMIN — CARVEDILOL 25 MG: 25 TABLET, FILM COATED ORAL at 08:25

## 2022-07-22 RX ADMIN — LISINOPRIL 20 MG: 20 TABLET ORAL at 08:25

## 2022-07-22 RX ADMIN — SODIUM CHLORIDE, PRESERVATIVE FREE 10 ML: 5 INJECTION INTRAVENOUS at 20:01

## 2022-07-22 RX ADMIN — HEPARIN SODIUM 5000 UNITS: 5000 INJECTION INTRAVENOUS; SUBCUTANEOUS at 14:40

## 2022-07-22 RX ADMIN — OXYCODONE HYDROCHLORIDE AND ACETAMINOPHEN 1 TABLET: 5; 325 TABLET ORAL at 20:29

## 2022-07-22 ASSESSMENT — PAIN DESCRIPTION - ORIENTATION: ORIENTATION: LEFT

## 2022-07-22 ASSESSMENT — PAIN DESCRIPTION - DESCRIPTORS: DESCRIPTORS: ACHING;DISCOMFORT

## 2022-07-22 ASSESSMENT — PAIN DESCRIPTION - LOCATION: LOCATION: ARM

## 2022-07-22 ASSESSMENT — PAIN SCALES - GENERAL: PAINLEVEL_OUTOF10: 6

## 2022-07-22 NOTE — FLOWSHEET NOTE
[facility]  PROGRESS NOTE    Shift date: 07/22/22  Shift day: Friday   Shift # 1    Room # 0108/9640-16   Name: Coreen Smith                Mormonism:    Place of Rastafari:     Referral: Routine Visit    Admit Date & Time: 7/19/2022 12:59 PM    Assessment:  Coreen Smith is a 59 y.o. male       Intervention:  Writer introduced self and title as . Patient appeared to be receptive to  presence. Patient engaged in conversation about his health and its impact. Writer offered space for patient  to express feelings, needs, and concerns and provided a ministry presence. Outcome:  Patient expressed gratitude for visit. Plan:  Chaplains will remain available to offer spiritual and emotional support as needed.       Electronically signed by Christopher Alcantar, on 7/22/2022 at 6:55 PM.  [facility]  [phone]

## 2022-07-22 NOTE — PROGRESS NOTES
[facility]  Internal Medicine Teaching Residency Program  Inpatient Daily Progress Note  ______________________________________________________________________________    Patient: Sonam Rosa  YOB: 1965   EBB:[id number]    Acct: [de-identified]     Room: [id number]  Admit date: 7/19/2022  Today's date: 07/22/22  Number of days in the hospital: 3    SUBJECTIVE   Admitting Diagnosis: Hypertensive emergency  CC: Headache  Pt examined at bedside. Chart & results reviewed. - Patient is heme stable and afebrile. - At 7 am BP went up to 200s and received two doses of Labetalol.  - Reported total relieve of headache.  - Had ICD Placement 7/20/2022  - Creatinine today is 1.38 -it was 1.6 yesterday  - BUN is 19, it was 21 yesterday. ROS:  Constitutional:  negative for chills, fevers, sweats  Respiratory:  negative for cough, dyspnea on exertion, hemoptysis, shortness of breath, wheezing  Cardiovascular:  negative for chest pain, chest pressure/discomfort, lower extremity edema, palpitations  Gastrointestinal:  negative for abdominal pain, constipation, diarrhea, nausea, vomiting  Neurological:  negative for dizziness, headache  BRIEF HISTORY     The patient is a pleasant 59 y.o. male presents with a chief complaint of headache and high blood pressure measured by his home health nurse which was in the 506T systolic. The headache is constant, generalized not associated with eye redness or tearing. Patient denies lightheadedness, confusion, bluriness of vision, neck pain. He also denies chest pain, palptiation, cough or shortness of bleeding. Denies numbness, tingling. He reported mild weakness on his right foot. He has a history of CAD with a quadruple bypass and a MI in May. He currently is wearing a life vest and is scheduled for a defibrillator to be placed with cardiology.         OBJECTIVE     Vital Signs:  /86   Pulse 65   Temp 97.1 °F (36.2 °C) (Oral)   Resp 20   Ht 5' 9\" (1.753 m)   Wt 191 lb 9.3 oz (86.9 kg)   SpO2 95%   BMI 28.29 kg/m²     Temp (24hrs), Av.8 °F (36.6 °C), Min:97 °F (36.1 °C), Max:98.3 °F (36.8 °C)    In: 1930   Out: 900 [Urine:900]    Physical Exam:  Constitutional: This is a well developed, well nourished, 25-29.9 - Overweight 59y.o. year old male who is alert, oriented, cooperative and in no apparent distress. Head:normocephalic and atraumatic. EENT:  PERRLA. No conjunctival injections. Septum was midline, mucosa was without erythema, exudates or cobblestoning. No thrush was noted. Neck: Supple without thyromegaly. No elevated JVP. Trachea was midline. Respiratory: Chest was symmetrical without dullness to percussion. Breath sounds bilaterally were clear to auscultation. There were no wheezes, rhonchi or rales. There is no intercostal retraction or use of accessory muscles. No egophony noted. Cardiovascular: Regular without murmur, clicks, gallops or rubs. Abdomen: Slightly rounded and soft without organomegaly. No rebound, rigidity or guarding was appreciated. Lymphatic: No lymphadenopathy. Musculoskeletal: Normal curvature of the spine. No gross muscle weakness. Extremities:  No lower extremity edema, ulcerations, tenderness, varicosities or erythema. Muscle size, tone and strength are normal.  No involuntary movements are noted. Skin:  Warm and dry. Good color, turgor and pigmentation. No lesions or scars.   No cyanosis or clubbing  Neurological/Psychiatric: The patient's general behavior, level of consciousness, thought content and emotional status is normal.        Medications:  Scheduled Medications:    carvedilol  25 mg Oral BID WC    lisinopril  20 mg Oral Daily    amLODIPine  10 mg Oral Daily    atorvastatin  40 mg Oral Daily    DULoxetine  30 mg Oral Daily    aspirin  81 mg Oral Daily    sodium chloride flush  5-40 mL IntraVENous 2 times per day    acetaminophen  650 mg Oral Once sodium chloride flush  5-40 mL IntraVENous 2 times per day    heparin (porcine)  5,000 Units SubCUTAneous 3 times per day     Continuous Infusions:    sodium chloride 75 mL/hr at 07/22/22 1055    sodium chloride      sodium chloride       PRN Medicationslabetalol, 10 mg, Q6H PRN  albuterol sulfate HFA, 2 puff, Q4H PRN  sodium chloride flush, 5-40 mL, PRN  sodium chloride, , PRN  acetaminophen, 650 mg, Q4H PRN  sodium chloride flush, 5-40 mL, PRN  sodium chloride, , PRN  ondansetron, 4 mg, Q8H PRN   Or  ondansetron, 4 mg, Q6H PRN  polyethylene glycol, 17 g, Daily PRN  acetaminophen, 650 mg, Q6H PRN   Or  acetaminophen, 650 mg, Q6H PRN        Diagnostic Labs:  CBC:   Recent Labs     07/20/22 0253 07/21/22  0652 07/22/22  0432   WBC 7.1 7.6 6.1   RBC 5.35 5.08 4.77   HGB 14.9 14.3 13.5   HCT 44.9 44.1 41.1   MCV 83.9 86.8 86.2   RDW 16.9* 16.9* 17.0*    142 See Reflexed IPF Result     BMP:   Recent Labs     07/20/22 0253 07/21/22  0652 07/22/22  0432   * 136 135   K 3.8 4.3 4.2   CL 99 100 103   CO2 23 22 21   BUN 11 21* 19   CREATININE 1.06 1.60* 1.38*     BNP: No results for input(s): BNP in the last 72 hours. PT/INR: No results for input(s): PROTIME, INR in the last 72 hours. APTT: No results for input(s): APTT in the last 72 hours. CARDIAC ENZYMES: No results for input(s): CKMB, CKMBINDEX, TROPONINI in the last 72 hours. Invalid input(s): CKTOTAL;3  FASTING LIPID PANEL:  Lab Results   Component Value Date    CHOL 188 11/07/2020    HDL 52 11/07/2020    TRIG 235 (H) 11/07/2020     LIVER PROFILE:   Recent Labs     07/19/22  1335   AST 12   ALT 5   BILITOT 0.53   ALKPHOS 193*      MICROBIOLOGY:   Lab Results   Component Value Date/Time    CULTURE NO GROWTH 5 DAYS 03/10/2022 07:06 PM       Imaging:    XR CHEST PORTABLE    Result Date: 7/21/2022  Status post left subclavian approach AICD. No pneumothorax. XR CHEST PORTABLE    Result Date: 7/19/2022  Minimal bibasilar atelectasis.   Lungs otherwise grossly clear. Cardiomegaly. ASSESSMENT & PLAN     ASSESSMENT / PLAN:     IMPRESSION  This is a 59 y.o. male who presented with headache and found to have hypertensive urgency. Principal Problem:  Hypertensive emergency/Hypertensive urgency  - Blood pressure on admission 196/91. Currently is 155/99.  - Nicardipine gtt started in the ED. D/c'd. - Lisinopril will be adjusted from 40 to 20 mg.  - The dose of Norvasc is increased to 10 mg.  - We will discontinue Aldactone  - Will continue Imuran. - Percoset for pain at the ICD placement site. - Imdur 30 mg added. Acute kidney injury:  - Creatinine today was 1.6. Yesterday it was 1.06  - Unsure whether it is because of the hypertensive emergency or lisinopril. - We will reduce the dose of lisinopril to 20 mg,  - Discontinue Aldactone and increase Norvasc to 10 mg.  - Monitor with daily CMP. CKD stage III  - Not on hemodialysis. - Cr is 1.3 we will continue to monitor.  - We will monitor I/Os. - Avoid nephrotoxic drugs. DVT ppx: Heparin     PT/OT: Consulted. Will follow up. Discharge Planning:  consulted. Will follow up. Praveena Peck MD  Internal Medicine Resident, PGY-1  [facility];  [place], [place]  7/22/2022, 12:56 PM

## 2022-07-22 NOTE — PLAN OF CARE
Problem: Discharge Planning  Goal: Discharge to home or other facility with appropriate resources  Outcome: Progressing     Problem: Pain  Goal: Verbalizes/displays adequate comfort level or baseline comfort level  Outcome: Progressing     Problem: Cardiovascular - Adult  Goal: Maintains optimal cardiac output and hemodynamic stability  Outcome: Progressing

## 2022-07-23 VITALS
HEART RATE: 79 BPM | HEIGHT: 69 IN | WEIGHT: 190 LBS | DIASTOLIC BLOOD PRESSURE: 96 MMHG | BODY MASS INDEX: 28.14 KG/M2 | TEMPERATURE: 97.7 F | SYSTOLIC BLOOD PRESSURE: 139 MMHG | OXYGEN SATURATION: 95 % | RESPIRATION RATE: 15 BRPM

## 2022-07-23 LAB
ABSOLUTE EOS #: 0.56 K/UL (ref 0–0.44)
ABSOLUTE IMMATURE GRANULOCYTE: 0.04 K/UL (ref 0–0.3)
ABSOLUTE LYMPH #: 1.54 K/UL (ref 1.1–3.7)
ABSOLUTE MONO #: 0.51 K/UL (ref 0.1–1.2)
ANION GAP SERPL CALCULATED.3IONS-SCNC: 10 MMOL/L (ref 9–17)
BASOPHILS # BLD: 2 % (ref 0–2)
BASOPHILS ABSOLUTE: 0.11 K/UL (ref 0–0.2)
BUN BLDV-MCNC: 15 MG/DL (ref 6–20)
CALCIUM SERPL-MCNC: 8.5 MG/DL (ref 8.6–10.4)
CHLORIDE BLD-SCNC: 103 MMOL/L (ref 98–107)
CO2: 23 MMOL/L (ref 20–31)
CREAT SERPL-MCNC: 1.15 MG/DL (ref 0.7–1.2)
EOSINOPHILS RELATIVE PERCENT: 9 % (ref 1–4)
GFR AFRICAN AMERICAN: >60 ML/MIN
GFR NON-AFRICAN AMERICAN: >60 ML/MIN
GFR SERPL CREATININE-BSD FRML MDRD: ABNORMAL ML/MIN/{1.73_M2}
GLUCOSE BLD-MCNC: 101 MG/DL (ref 70–99)
HCT VFR BLD CALC: 42.1 % (ref 40.7–50.3)
HEMOGLOBIN: 13.2 G/DL (ref 13–17)
IMMATURE GRANULOCYTES: 1 %
LYMPHOCYTES # BLD: 24 % (ref 24–43)
MCH RBC QN AUTO: 27.3 PG (ref 25.2–33.5)
MCHC RBC AUTO-ENTMCNC: 31.4 G/DL (ref 28.4–34.8)
MCV RBC AUTO: 87 FL (ref 82.6–102.9)
MONOCYTES # BLD: 8 % (ref 3–12)
NRBC AUTOMATED: 0 PER 100 WBC
PDW BLD-RTO: 17 % (ref 11.8–14.4)
PLATELET # BLD: 142 K/UL (ref 138–453)
PMV BLD AUTO: 10.3 FL (ref 8.1–13.5)
POTASSIUM SERPL-SCNC: 4.4 MMOL/L (ref 3.7–5.3)
RBC # BLD: 4.84 M/UL (ref 4.21–5.77)
RBC # BLD: ABNORMAL 10*6/UL
SEG NEUTROPHILS: 56 % (ref 36–65)
SEGMENTED NEUTROPHILS ABSOLUTE COUNT: 3.59 K/UL (ref 1.5–8.1)
SODIUM BLD-SCNC: 136 MMOL/L (ref 135–144)
WBC # BLD: 6.4 K/UL (ref 3.5–11.3)

## 2022-07-23 PROCEDURE — 6370000000 HC RX 637 (ALT 250 FOR IP): Performed by: INTERNAL MEDICINE

## 2022-07-23 PROCEDURE — 6370000000 HC RX 637 (ALT 250 FOR IP): Performed by: NURSE PRACTITIONER

## 2022-07-23 PROCEDURE — 6360000002 HC RX W HCPCS: Performed by: STUDENT IN AN ORGANIZED HEALTH CARE EDUCATION/TRAINING PROGRAM

## 2022-07-23 PROCEDURE — 6370000000 HC RX 637 (ALT 250 FOR IP)

## 2022-07-23 PROCEDURE — 80048 BASIC METABOLIC PNL TOTAL CA: CPT

## 2022-07-23 PROCEDURE — 2580000003 HC RX 258: Performed by: INTERNAL MEDICINE

## 2022-07-23 PROCEDURE — 6370000000 HC RX 637 (ALT 250 FOR IP): Performed by: STUDENT IN AN ORGANIZED HEALTH CARE EDUCATION/TRAINING PROGRAM

## 2022-07-23 PROCEDURE — 85025 COMPLETE CBC W/AUTO DIFF WBC: CPT

## 2022-07-23 PROCEDURE — 97161 PT EVAL LOW COMPLEX 20 MIN: CPT

## 2022-07-23 PROCEDURE — 99232 SBSQ HOSP IP/OBS MODERATE 35: CPT | Performed by: INTERNAL MEDICINE

## 2022-07-23 PROCEDURE — 97530 THERAPEUTIC ACTIVITIES: CPT

## 2022-07-23 PROCEDURE — 36415 COLL VENOUS BLD VENIPUNCTURE: CPT

## 2022-07-23 RX ORDER — ALBUTEROL SULFATE 90 UG/1
AEROSOL, METERED RESPIRATORY (INHALATION)
Qty: 18 G | Refills: 5 | Status: ON HOLD | OUTPATIENT
Start: 2022-07-23 | End: 2022-08-19 | Stop reason: HOSPADM

## 2022-07-23 RX ORDER — ISOSORBIDE MONONITRATE 60 MG/1
60 TABLET, EXTENDED RELEASE ORAL DAILY
Qty: 30 TABLET | Refills: 3 | Status: ON HOLD | OUTPATIENT
Start: 2022-07-23 | End: 2022-08-19 | Stop reason: HOSPADM

## 2022-07-23 RX ORDER — AMLODIPINE BESYLATE 10 MG/1
10 TABLET ORAL DAILY
Qty: 30 TABLET | Refills: 3 | Status: ON HOLD | OUTPATIENT
Start: 2022-07-24 | End: 2022-08-19 | Stop reason: SDUPTHER

## 2022-07-23 RX ORDER — AMLODIPINE BESYLATE 10 MG/1
10 TABLET ORAL DAILY
Qty: 30 TABLET | Refills: 3 | Status: CANCELLED | OUTPATIENT
Start: 2022-07-23

## 2022-07-23 RX ORDER — LISINOPRIL 20 MG/1
10 TABLET ORAL DAILY
Qty: 30 TABLET | Refills: 2 | Status: ON HOLD | OUTPATIENT
Start: 2022-07-23 | End: 2022-08-19 | Stop reason: HOSPADM

## 2022-07-23 RX ORDER — LISINOPRIL 20 MG/1
10 TABLET ORAL DAILY
Qty: 30 TABLET | Refills: 2 | Status: CANCELLED | OUTPATIENT
Start: 2022-07-23

## 2022-07-23 RX ADMIN — OXYCODONE HYDROCHLORIDE AND ACETAMINOPHEN 1 TABLET: 5; 325 TABLET ORAL at 16:25

## 2022-07-23 RX ADMIN — ISOSORBIDE MONONITRATE 60 MG: 60 TABLET ORAL at 10:35

## 2022-07-23 RX ADMIN — CARVEDILOL 25 MG: 25 TABLET, FILM COATED ORAL at 16:25

## 2022-07-23 RX ADMIN — OXYCODONE HYDROCHLORIDE AND ACETAMINOPHEN 1 TABLET: 5; 325 TABLET ORAL at 05:36

## 2022-07-23 RX ADMIN — HEPARIN SODIUM 5000 UNITS: 5000 INJECTION INTRAVENOUS; SUBCUTANEOUS at 05:36

## 2022-07-23 RX ADMIN — HEPARIN SODIUM 5000 UNITS: 5000 INJECTION INTRAVENOUS; SUBCUTANEOUS at 15:07

## 2022-07-23 RX ADMIN — AMLODIPINE BESYLATE 10 MG: 10 TABLET ORAL at 10:35

## 2022-07-23 RX ADMIN — CARVEDILOL 25 MG: 25 TABLET, FILM COATED ORAL at 10:35

## 2022-07-23 RX ADMIN — DULOXETINE HYDROCHLORIDE 30 MG: 30 CAPSULE, DELAYED RELEASE ORAL at 10:35

## 2022-07-23 RX ADMIN — SODIUM CHLORIDE, PRESERVATIVE FREE 10 ML: 5 INJECTION INTRAVENOUS at 10:35

## 2022-07-23 RX ADMIN — Medication 81 MG: at 10:35

## 2022-07-23 RX ADMIN — LISINOPRIL 20 MG: 20 TABLET ORAL at 10:35

## 2022-07-23 RX ADMIN — DESMOPRESSIN ACETATE 40 MG: 0.2 TABLET ORAL at 10:35

## 2022-07-23 ASSESSMENT — PAIN SCALES - GENERAL
PAINLEVEL_OUTOF10: 8
PAINLEVEL_OUTOF10: 0
PAINLEVEL_OUTOF10: 0

## 2022-07-23 ASSESSMENT — PAIN DESCRIPTION - ORIENTATION: ORIENTATION: LEFT

## 2022-07-23 ASSESSMENT — PAIN DESCRIPTION - LOCATION: LOCATION: ARM

## 2022-07-23 NOTE — PROGRESS NOTES
Spoke with pt about his current home care. Pt is with [facility] and provided his nurse \"Bella\"'s phone number: [phone].

## 2022-07-23 NOTE — DISCHARGE SUMMARY
Pt discharged home with [facility]. Meds to beds delivered. Discharge instructions reviewed and all questions answered. Pt in no distress. Pt left unit with all documented belongings. Pt's niece will drive him home. Pt ambulated off unit independently.  Electronically signed by Raudel Henson RN on 7/23/2022 at 5:16 PM
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
## 2022-07-23 NOTE — PLAN OF CARE
Problem: Discharge Planning  Goal: Discharge to home or other facility with appropriate resources  7/23/2022 0028 by She Medley RN  Outcome: Progressing  7/22/2022 1212 by Saadia Turner RN  Outcome: Progressing     Problem: Pain  Goal: Verbalizes/displays adequate comfort level or baseline comfort level  7/23/2022 0028 by She Medley RN  Outcome: Progressing  7/22/2022 1212 by Saadia Turner RN  Outcome: Progressing     Problem: Cardiovascular - Adult  Goal: Maintains optimal cardiac output and hemodynamic stability  7/23/2022 0028 by She Medley RN  Outcome: Progressing  7/22/2022 1212 by Saadia Turner RN  Outcome: Progressing  Goal: Absence of cardiac dysrhythmias or at baseline  Outcome: Progressing     Problem: Hematologic - Adult  Goal: Maintains hematologic stability  Outcome: Progressing     Problem: Safety - Adult  Goal: Free from fall injury  Outcome: Progressing  Flowsheets (Taken 7/22/2022 2000)  Free From Fall Injury: Instruct family/caregiver on patient safety     Problem: ABCDS Injury Assessment  Goal: Absence of physical injury  Outcome: Progressing  Flowsheets (Taken 7/22/2022 2000)  Absence of Physical Injury: Implement safety measures based on patient assessment     Problem: Chronic Conditions and Co-morbidities  Goal: Patient's chronic conditions and co-morbidity symptoms are monitored and maintained or improved  Outcome: Progressing

## 2022-07-23 NOTE — PROGRESS NOTES
[facility]  Internal Medicine Teaching Residency Program  Inpatient Daily Progress Note  ______________________________________________________________________________    Patient: Khadar Freeman  YOB: 1965   [id number]    Acct: [de-identified]     Room: [address] [place] [address]  Admit date: 7/19/2022  Today's date: 07/23/22  Number of days in the hospital: 4    SUBJECTIVE   Admitting Diagnosis: Hypertensive emergency  CC: Headache  Pt examined at bedside. Chart & results reviewed. - Patient is heme stable and afebrile. - Reported total relieve of headache.  - Had ICD Placement 7/20/2022  - Pain relieved at injection site. - Creatinine today is 1.1   - To be discharged today. ROS:  Constitutional:  negative for chills, fevers, sweats  Respiratory:  negative for cough, dyspnea on exertion, hemoptysis, shortness of breath, wheezing  Cardiovascular:  negative for chest pain, chest pressure/discomfort, lower extremity edema, palpitations  Gastrointestinal:  negative for abdominal pain, constipation, diarrhea, nausea, vomiting  Neurological:  negative for dizziness, headache  BRIEF HISTORY     The patient is a pleasant 59 y.o. male presents with a chief complaint of headache and high blood pressure measured by his home health nurse which was in the 409C systolic. The headache is constant, generalized not associated with eye redness or tearing. Patient denies lightheadedness, confusion, bluriness of vision, neck pain. He also denies chest pain, palptiation, cough or shortness of bleeding. Denies numbness, tingling. He reported mild weakness on his right foot. He has a history of CAD with a quadruple bypass and a MI in May. He currently is wearing a life vest and is scheduled for a defibrillator to be placed with cardiology.      OBJECTIVE     Vital Signs:  BP (!) 139/96   Pulse 79   Temp 97.7 °F (36.5 °C) (Oral)   Resp 15   Ht 5' 9\" (1.753 m)   Wt 190 lb (86.2 kg) SpO2 95%   BMI 28.06 kg/m²     Temp (24hrs), Av.8 °F (36.6 °C), Min:97.2 °F (36.2 °C), Max:98.7 °F (37.1 °C)    In: 338.8   Out: -     Physical Exam:  Constitutional: This is a well developed, well nourished, 25-29.9 - Overweight 59y.o. year old male who is alert, oriented, cooperative and in no apparent distress. Head:normocephalic and atraumatic. EENT:  PERRLA. No conjunctival injections. Septum was midline, mucosa was without erythema, exudates or cobblestoning. No thrush was noted. Neck: Supple without thyromegaly. No elevated JVP. Trachea was midline. Respiratory: Chest was symmetrical without dullness to percussion. Breath sounds bilaterally were clear to auscultation. There were no wheezes, rhonchi or rales. There is no intercostal retraction or use of accessory muscles. No egophony noted. Cardiovascular: Regular without murmur, clicks, gallops or rubs. Abdomen: Slightly rounded and soft without organomegaly. No rebound, rigidity or guarding was appreciated. Lymphatic: No lymphadenopathy. Musculoskeletal: Normal curvature of the spine. No gross muscle weakness. Extremities:  No lower extremity edema, ulcerations, tenderness, varicosities or erythema. Muscle size, tone and strength are normal.  No involuntary movements are noted. Skin:  Warm and dry. Good color, turgor and pigmentation. No lesions or scars.   No cyanosis or clubbing  Neurological/Psychiatric: The patient's general behavior, level of consciousness, thought content and emotional status is normal.        Medications:  Scheduled Medications:    isosorbide mononitrate  60 mg Oral Daily    carvedilol  25 mg Oral BID WC    lisinopril  20 mg Oral Daily    amLODIPine  10 mg Oral Daily    atorvastatin  40 mg Oral Daily    DULoxetine  30 mg Oral Daily    aspirin  81 mg Oral Daily    sodium chloride flush  5-40 mL IntraVENous 2 times per day    acetaminophen  650 mg Oral Once    heparin (porcine)  5,000 Units SubCUTAneous 3 times per day     Continuous Infusions:    sodium chloride 75 mL/hr at 07/22/22 1726    sodium chloride       PRN MedicationsoxyCODONE-acetaminophen, 1 tablet, Q6H PRN  labetalol, 10 mg, Q6H PRN  albuterol sulfate HFA, 2 puff, Q4H PRN  sodium chloride flush, 5-40 mL, PRN  sodium chloride, , PRN  ondansetron, 4 mg, Q8H PRN   Or  ondansetron, 4 mg, Q6H PRN  polyethylene glycol, 17 g, Daily PRN  acetaminophen, 650 mg, Q6H PRN   Or  acetaminophen, 650 mg, Q6H PRN        Diagnostic Labs:  CBC:   Recent Labs     07/21/22  0652 07/22/22  0432 07/23/22  0641   WBC 7.6 6.1 6.4   RBC 5.08 4.77 4.84   HGB 14.3 13.5 13.2   HCT 44.1 41.1 42.1   MCV 86.8 86.2 87.0   RDW 16.9* 17.0* 17.0*    See Reflexed IPF Result 142     BMP:   Recent Labs     07/21/22  0652 07/22/22 0432 07/23/22  0641    135 136   K 4.3 4.2 4.4    103 103   CO2 22 21 23   BUN 21* 19 15   CREATININE 1.60* 1.38* 1.15     BNP: No results for input(s): BNP in the last 72 hours. PT/INR: No results for input(s): PROTIME, INR in the last 72 hours. APTT: No results for input(s): APTT in the last 72 hours. CARDIAC ENZYMES: No results for input(s): CKMB, CKMBINDEX, TROPONINI in the last 72 hours. Invalid input(s): CKTOTAL;3  FASTING LIPID PANEL:  Lab Results   Component Value Date    CHOL 188 11/07/2020    HDL 52 11/07/2020    TRIG 235 (H) 11/07/2020     LIVER PROFILE: No results for input(s): AST, ALT, ALB, BILIDIR, BILITOT, ALKPHOS in the last 72 hours. MICROBIOLOGY:   Lab Results   Component Value Date/Time    CULTURE NO GROWTH 5 DAYS 03/10/2022 07:06 PM       Imaging:    XR CHEST PORTABLE    Result Date: 7/21/2022  Status post left subclavian approach AICD. No pneumothorax. XR CHEST PORTABLE    Result Date: 7/19/2022  Minimal bibasilar atelectasis. Lungs otherwise grossly clear. Cardiomegaly.        ASSESSMENT & PLAN     IMPRESSION  This is a 59 y.o. male who presented with headache and found to have hypertensive urgency. Principal Problem:  Hypertensive emergency/Hypertensive urgency  - Blood pressure on admission 196/91. Currently is 139/96.  - Nicardipine gtt started in the ED. D/c'd. - Lisinopril will be adjusted from 40 to 20 mg.  - The dose of Norvasc is increased to 10 mg.  - We will discontinue Aldactone  - Will continue Imuran. - Percoset for pain at the ICD placement site. - Imdur 30 mg added. - Aldactone will be removed from the medication list.     Acute kidney injury:  - Creatinine today was 1.1. Yesterday it was 1.6  - Unsure whether it is because of the hypertensive emergency or lisinopril. - We reduced the dose of lisinopril to 20 mg,  - Discontinue Aldactone and increase Norvasc to 10 mg.  - Monitor with daily CMP. CKD stage III  - Not on hemodialysis. - Cr is 1.1 we will continue to monitor.  - We will monitor I/Os. - Avoid nephrotoxic drugs. - Follow up with nephrologist.        DVT ppx: Heparin     PT/OT: Consulted. Will follow up. Discharge Planning:  consulted. For discharge today       Russell Gant MD  Internal Medicine Resident, PGY-1  [address], [place]  7/23/2022, 3:30 PM

## 2022-07-23 NOTE — PLAN OF CARE
Problem: Discharge Planning  Goal: Discharge to home or other facility with appropriate resources  7/23/2022 0030 by Carrie Gtz RN  Outcome: Progressing  7/23/2022 0028 by Carrie Gtz RN  Outcome: Progressing  7/22/2022 1212 by Vanessa Bradshaw RN  Outcome: Progressing     Problem: Pain  Goal: Verbalizes/displays adequate comfort level or baseline comfort level  7/23/2022 0030 by Carrie Gtz RN  Outcome: Progressing  7/23/2022 0028 by Carrie Gtz RN  Outcome: Progressing  7/22/2022 1212 by Vanessa Bradshaw RN  Outcome: Progressing     Problem: Cardiovascular - Adult  Goal: Maintains optimal cardiac output and hemodynamic stability  7/23/2022 0030 by Carrie Gtz RN  Outcome: Progressing  7/23/2022 0028 by Carrie Gtz RN  Outcome: Progressing  7/22/2022 1212 by Vanessa Bradshaw RN  Outcome: Progressing  Goal: Absence of cardiac dysrhythmias or at baseline  7/23/2022 0030 by Carrie Gtz RN  Outcome: Progressing  7/23/2022 0028 by Carrie Gtz RN  Outcome: Progressing     Problem: Hematologic - Adult  Goal: Maintains hematologic stability  7/23/2022 0030 by Carrie Gtz RN  Outcome: Progressing  7/23/2022 0028 by Carrie Gtz RN  Outcome: Progressing     Problem: Safety - Adult  Goal: Free from fall injury  7/23/2022 0030 by Carrie Gtz RN  Outcome: Progressing  7/23/2022 0028 by Carrie Gtz RN  Outcome: Progressing  Flowsheets (Taken 7/22/2022 2000)  Free From Fall Injury: Instruct family/caregiver on patient safety     Problem: ABCDS Injury Assessment  Goal: Absence of physical injury  7/23/2022 0030 by Carrie Gtz RN  Outcome: Progressing  7/23/2022 0028 by Carrie Gtz RN  Outcome: Progressing  Flowsheets (Taken 7/22/2022 2000)  Absence of Physical Injury: Implement safety measures based on patient assessment     Problem: Chronic Conditions and Co-morbidities  Goal: Patient's chronic conditions and co-morbidity symptoms are monitored and maintained or improved  7/23/2022 0030 by Pramod Camejo RN  Outcome: Progressing  7/23/2022 0028 by Pramod Camejo RN  Outcome: Progressing

## 2022-07-23 NOTE — DISCHARGE INSTRUCTIONS
You have been admitted with elevated blood pressure and some modifications for your blood pressure medication done. You are started on lisinopril 20 mg, carvedilol 25 mg, Imdur 60 mg and amlodipine 10 mg. We stopped Aldactone due to ARON. Please continue to take your blood pressure pills regularly and check your blood pressure at home. Cardiology also placed AICD and you are advised to follow-up at cardiology clinic as advised above. Please follow-up with your PCP within 2 weeks of discharge and take your blood pressure. In case of worsening symptoms, seek medical attention come to emergency department.
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
## 2022-07-25 NOTE — PROGRESS NOTES
CLINICAL PHARMACY NOTE: MEDS TO BEDS    Total # of Prescriptions Filled: 5   The following medications were delivered to the patient:  Ventolin  Carvedilol  Nitroglycerin  Amlodipine  isosorbide    Additional Documentation:  Delivered by LENNOX at 1500 7/23. One visitor in room with patient, no copay.

## 2022-07-27 ENCOUNTER — TELEPHONE (OUTPATIENT)
Dept: INTERNAL MEDICINE CLINIC | Age: 57
End: 2022-07-27

## 2022-07-27 NOTE — TELEPHONE ENCOUNTER
----- Message from Gigi Gross sent at 7/26/2022  1:53 PM EDT -----  Subject: Appointment Request    Reason for Call: Established Patient Appointment needed: Routine Hospital   Follow Up    QUESTIONS    Reason for appointment request? Available appointments did not meet   patient need     Additional Information for Provider? Patient is calling because he needs a   Hosp f/u 7/23, pacemaker, ELIEZER. Patient states he needs to   be seen as soon as possible. First available appointment with Dr. Gianna Britt   is 10/20/22, patient would like to be seen sooner.  Please advise  ---------------------------------------------------------------------------  --------------  Rand HAY  [phone]; OK to leave message on voicemail  ---------------------------------------------------------------------------  --------------  SCRIPT ANSWERS  COVID Screen: Dola Friday

## 2022-07-27 NOTE — TELEPHONE ENCOUNTER
Patient will also need appointment to discuss home orders and getting them signed by a physician until Dr. Gianna Britt returns

## 2022-07-28 ENCOUNTER — APPOINTMENT (OUTPATIENT)
Dept: CT IMAGING | Age: 57
DRG: 030 | End: 2022-07-28
Payer: COMMERCIAL

## 2022-07-28 ENCOUNTER — APPOINTMENT (OUTPATIENT)
Dept: GENERAL RADIOLOGY | Age: 57
DRG: 030 | End: 2022-07-28
Payer: COMMERCIAL

## 2022-07-28 ENCOUNTER — HOSPITAL ENCOUNTER (INPATIENT)
Age: 57
LOS: 12 days | Discharge: NURSING, REHAB OR LONG-TERM CARE | DRG: 030 | End: 2022-08-10
Attending: EMERGENCY MEDICINE | Admitting: PSYCHIATRY & NEUROLOGY
Payer: COMMERCIAL

## 2022-07-28 DIAGNOSIS — R55 SYNCOPE AND COLLAPSE: Primary | ICD-10-CM

## 2022-07-28 LAB
ALBUMIN SERPL-MCNC: 4 G/DL (ref 3.5–5.2)
ALBUMIN/GLOBULIN RATIO: 1.3 (ref 1–2.5)
ALP BLD-CCNC: 169 U/L (ref 40–129)
ALT SERPL-CCNC: 14 U/L (ref 5–41)
ANION GAP SERPL CALCULATED.3IONS-SCNC: 13 MMOL/L (ref 9–17)
AST SERPL-CCNC: 16 U/L
BILIRUB SERPL-MCNC: 0.56 MG/DL (ref 0.3–1.2)
BUN BLDV-MCNC: 15 MG/DL (ref 6–20)
CALCIUM SERPL-MCNC: 9.1 MG/DL (ref 8.6–10.4)
CHLORIDE BLD-SCNC: 96 MMOL/L (ref 98–107)
CO2: 25 MMOL/L (ref 20–31)
CREAT SERPL-MCNC: 1.51 MG/DL (ref 0.7–1.2)
GFR AFRICAN AMERICAN: 58 ML/MIN
GFR NON-AFRICAN AMERICAN: 48 ML/MIN
GFR SERPL CREATININE-BSD FRML MDRD: ABNORMAL ML/MIN/{1.73_M2}
GLUCOSE BLD-MCNC: 163 MG/DL (ref 70–99)
HCT VFR BLD CALC: 42.7 % (ref 40.7–50.3)
HEMOGLOBIN: 13.9 G/DL (ref 13–17)
MCH RBC QN AUTO: 27.7 PG (ref 25.2–33.5)
MCHC RBC AUTO-ENTMCNC: 32.6 G/DL (ref 28.4–34.8)
MCV RBC AUTO: 85.1 FL (ref 82.6–102.9)
NRBC AUTOMATED: 0 PER 100 WBC
PDW BLD-RTO: 17.2 % (ref 11.8–14.4)
PLATELET # BLD: 182 K/UL (ref 138–453)
PMV BLD AUTO: 10.3 FL (ref 8.1–13.5)
POTASSIUM SERPL-SCNC: 4.3 MMOL/L (ref 3.7–5.3)
RBC # BLD: 5.02 M/UL (ref 4.21–5.77)
SARS-COV-2, RAPID: NOT DETECTED
SODIUM BLD-SCNC: 134 MMOL/L (ref 135–144)
SPECIMEN DESCRIPTION: NORMAL
TOTAL PROTEIN: 7 G/DL (ref 6.4–8.3)
TROPONIN, HIGH SENSITIVITY: 15 NG/L (ref 0–22)
TROPONIN, HIGH SENSITIVITY: 17 NG/L (ref 0–22)
WBC # BLD: 9.1 K/UL (ref 3.5–11.3)

## 2022-07-28 PROCEDURE — 80053 COMPREHEN METABOLIC PANEL: CPT

## 2022-07-28 PROCEDURE — 85027 COMPLETE CBC AUTOMATED: CPT

## 2022-07-28 PROCEDURE — G0378 HOSPITAL OBSERVATION PER HR: HCPCS

## 2022-07-28 PROCEDURE — 87635 SARS-COV-2 COVID-19 AMP PRB: CPT

## 2022-07-28 PROCEDURE — 70450 CT HEAD/BRAIN W/O DYE: CPT

## 2022-07-28 PROCEDURE — 99285 EMERGENCY DEPT VISIT HI MDM: CPT

## 2022-07-28 PROCEDURE — 96375 TX/PRO/DX INJ NEW DRUG ADDON: CPT

## 2022-07-28 PROCEDURE — 2580000003 HC RX 258: Performed by: STUDENT IN AN ORGANIZED HEALTH CARE EDUCATION/TRAINING PROGRAM

## 2022-07-28 PROCEDURE — 6360000004 HC RX CONTRAST MEDICATION: Performed by: STUDENT IN AN ORGANIZED HEALTH CARE EDUCATION/TRAINING PROGRAM

## 2022-07-28 PROCEDURE — 93005 ELECTROCARDIOGRAM TRACING: CPT | Performed by: STUDENT IN AN ORGANIZED HEALTH CARE EDUCATION/TRAINING PROGRAM

## 2022-07-28 PROCEDURE — 96374 THER/PROPH/DIAG INJ IV PUSH: CPT

## 2022-07-28 PROCEDURE — 6360000002 HC RX W HCPCS: Performed by: STUDENT IN AN ORGANIZED HEALTH CARE EDUCATION/TRAINING PROGRAM

## 2022-07-28 PROCEDURE — 71260 CT THORAX DX C+: CPT | Performed by: STUDENT IN AN ORGANIZED HEALTH CARE EDUCATION/TRAINING PROGRAM

## 2022-07-28 PROCEDURE — 84484 ASSAY OF TROPONIN QUANT: CPT

## 2022-07-28 PROCEDURE — 71045 X-RAY EXAM CHEST 1 VIEW: CPT

## 2022-07-28 RX ORDER — CARVEDILOL 25 MG/1
25 TABLET ORAL 2 TIMES DAILY WITH MEALS
Status: DISCONTINUED | OUTPATIENT
Start: 2022-07-29 | End: 2022-08-10 | Stop reason: HOSPADM

## 2022-07-28 RX ORDER — ONDANSETRON 2 MG/ML
4 INJECTION INTRAMUSCULAR; INTRAVENOUS EVERY 6 HOURS PRN
Status: DISCONTINUED | OUTPATIENT
Start: 2022-07-28 | End: 2022-08-10 | Stop reason: HOSPADM

## 2022-07-28 RX ORDER — SODIUM CHLORIDE 9 MG/ML
INJECTION, SOLUTION INTRAVENOUS PRN
Status: DISCONTINUED | OUTPATIENT
Start: 2022-07-28 | End: 2022-08-10 | Stop reason: HOSPADM

## 2022-07-28 RX ORDER — DULOXETIN HYDROCHLORIDE 30 MG/1
30 CAPSULE, DELAYED RELEASE ORAL DAILY
Status: DISCONTINUED | OUTPATIENT
Start: 2022-07-29 | End: 2022-08-10 | Stop reason: HOSPADM

## 2022-07-28 RX ORDER — 0.9 % SODIUM CHLORIDE 0.9 %
1000 INTRAVENOUS SOLUTION INTRAVENOUS ONCE
Status: COMPLETED | OUTPATIENT
Start: 2022-07-28 | End: 2022-07-28

## 2022-07-28 RX ORDER — KETOROLAC TROMETHAMINE 30 MG/ML
30 INJECTION, SOLUTION INTRAMUSCULAR; INTRAVENOUS ONCE
Status: COMPLETED | OUTPATIENT
Start: 2022-07-28 | End: 2022-07-28

## 2022-07-28 RX ORDER — ONDANSETRON 4 MG/1
4 TABLET, ORALLY DISINTEGRATING ORAL EVERY 8 HOURS PRN
Status: DISCONTINUED | OUTPATIENT
Start: 2022-07-28 | End: 2022-08-10 | Stop reason: HOSPADM

## 2022-07-28 RX ORDER — ENOXAPARIN SODIUM 100 MG/ML
40 INJECTION SUBCUTANEOUS DAILY
Status: DISCONTINUED | OUTPATIENT
Start: 2022-07-29 | End: 2022-07-31

## 2022-07-28 RX ORDER — ASPIRIN 81 MG/1
81 TABLET ORAL DAILY
Status: DISCONTINUED | OUTPATIENT
Start: 2022-07-29 | End: 2022-08-10 | Stop reason: HOSPADM

## 2022-07-28 RX ORDER — ACETAMINOPHEN 500 MG
1000 TABLET ORAL ONCE
Status: DISCONTINUED | OUTPATIENT
Start: 2022-07-28 | End: 2022-08-02

## 2022-07-28 RX ORDER — SODIUM CHLORIDE 9 MG/ML
INJECTION, SOLUTION INTRAVENOUS CONTINUOUS
Status: DISCONTINUED | OUTPATIENT
Start: 2022-07-28 | End: 2022-08-02

## 2022-07-28 RX ORDER — SODIUM CHLORIDE 0.9 % (FLUSH) 0.9 %
5-40 SYRINGE (ML) INJECTION EVERY 12 HOURS SCHEDULED
Status: DISCONTINUED | OUTPATIENT
Start: 2022-07-28 | End: 2022-08-10 | Stop reason: HOSPADM

## 2022-07-28 RX ORDER — ACETAMINOPHEN 325 MG/1
650 TABLET ORAL EVERY 4 HOURS PRN
Status: DISCONTINUED | OUTPATIENT
Start: 2022-07-28 | End: 2022-08-10 | Stop reason: HOSPADM

## 2022-07-28 RX ORDER — ONDANSETRON 2 MG/ML
4 INJECTION INTRAMUSCULAR; INTRAVENOUS ONCE
Status: COMPLETED | OUTPATIENT
Start: 2022-07-28 | End: 2022-07-28

## 2022-07-28 RX ORDER — SODIUM CHLORIDE 0.9 % (FLUSH) 0.9 %
5-40 SYRINGE (ML) INJECTION PRN
Status: DISCONTINUED | OUTPATIENT
Start: 2022-07-28 | End: 2022-08-10 | Stop reason: HOSPADM

## 2022-07-28 RX ORDER — LISINOPRIL 10 MG/1
10 TABLET ORAL DAILY
Status: DISCONTINUED | OUTPATIENT
Start: 2022-07-29 | End: 2022-08-10 | Stop reason: HOSPADM

## 2022-07-28 RX ORDER — ISOSORBIDE MONONITRATE 60 MG/1
60 TABLET, EXTENDED RELEASE ORAL DAILY
Status: DISCONTINUED | OUTPATIENT
Start: 2022-07-29 | End: 2022-07-29

## 2022-07-28 RX ORDER — ATORVASTATIN CALCIUM 40 MG/1
40 TABLET, FILM COATED ORAL DAILY
Status: DISCONTINUED | OUTPATIENT
Start: 2022-07-29 | End: 2022-08-10 | Stop reason: HOSPADM

## 2022-07-28 RX ORDER — AMLODIPINE BESYLATE 10 MG/1
10 TABLET ORAL DAILY
Status: DISCONTINUED | OUTPATIENT
Start: 2022-07-29 | End: 2022-07-29

## 2022-07-28 RX ADMIN — SODIUM CHLORIDE, PRESERVATIVE FREE 10 ML: 5 INJECTION INTRAVENOUS at 22:41

## 2022-07-28 RX ADMIN — SODIUM CHLORIDE: 9 INJECTION, SOLUTION INTRAVENOUS at 22:40

## 2022-07-28 RX ADMIN — KETOROLAC TROMETHAMINE 30 MG: 30 INJECTION, SOLUTION INTRAMUSCULAR at 19:10

## 2022-07-28 RX ADMIN — ONDANSETRON 4 MG: 2 INJECTION INTRAMUSCULAR; INTRAVENOUS at 16:56

## 2022-07-28 RX ADMIN — IOPAMIDOL 75 ML: 755 INJECTION, SOLUTION INTRAVENOUS at 18:14

## 2022-07-28 RX ADMIN — SODIUM CHLORIDE 1000 ML: 9 INJECTION, SOLUTION INTRAVENOUS at 18:03

## 2022-07-28 ASSESSMENT — ENCOUNTER SYMPTOMS
SORE THROAT: 0
CHEST TIGHTNESS: 0
COLOR CHANGE: 0
ABDOMINAL PAIN: 0
COUGH: 0
DIARRHEA: 0
FACIAL SWELLING: 0
TROUBLE SWALLOWING: 0
CONSTIPATION: 0
VOMITING: 0
SINUS PRESSURE: 0
WHEEZING: 0
SINUS PAIN: 0
NAUSEA: 0
SHORTNESS OF BREATH: 0

## 2022-07-28 ASSESSMENT — PAIN - FUNCTIONAL ASSESSMENT: PAIN_FUNCTIONAL_ASSESSMENT: 0-10

## 2022-07-28 ASSESSMENT — PAIN DESCRIPTION - PAIN TYPE: TYPE: ACUTE PAIN

## 2022-07-28 ASSESSMENT — PAIN DESCRIPTION - LOCATION
LOCATION: HEAD
LOCATION: HEAD

## 2022-07-28 ASSESSMENT — PAIN SCALES - GENERAL
PAINLEVEL_OUTOF10: 4
PAINLEVEL_OUTOF10: 10
PAINLEVEL_OUTOF10: 5

## 2022-07-28 NOTE — DISCHARGE INSTRUCTIONS
I highly recommend that you be admitted to the hospital for cardiac evaluation given your significant past medical history and your syncopal episode. You are of sound state of mind and I cannot force you to be admitted to the hospital.  Risks and benefits were discussed with you including further illness including death. At that point you decided to leave [address] You have presented with a complaint of chest pain are at risk of having a heart attack. We have recommended that you stay for further evaluation and to see a cardiologist, but you have chosen to leave against medical advice. You have been instructed that you are at risk of having a myocardial infarction, pulmonary embolism, aortic rupture, pneumothorax which these could lead to your death. You have voiced your understanding of your condition and the possibility of death and are still choosing to leave without further testing. You should return immediately to the Emergency Department for any chest pain, shortness of breath, nausea / vomiting, feeling of your heart fluttering or racing, any other care or concern.
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
## 2022-07-28 NOTE — ED PROVIDER NOTES
[facility] ED     Emergency Department     Faculty Attestation        I performed a history and physical examination of the patient and discussed management with the resident. I reviewed the residents note and agree with the documented findings and plan of care. Any areas of disagreement are noted on the chart. I was personally present for the key portions of any procedures. I have documented in the chart those procedures where I was not present during the key portions. I have reviewed the emergency nurses triage note. I agree with the chief complaint, past medical history, past surgical history, allergies, medications, social and family history as documented unless otherwise noted below. For mid-level providers such as nurse practitioners as well as physicians assistants:    I have personally seen and evaluated the patient. I find the patient's history and physical exam are consistent with NP/PA documentation. I agree with the care provided, treatment rendered, disposition, & follow-up plan. Additional findings are as noted. Vital Signs: /83   Pulse 74   Temp 97.9 °F (36.6 °C) (Oral)   Resp 16   SpO2 97%   PCP:  Elwin Aase, MD    Pertinent Comments:                                              [facility] ED     Emergency Department     Faculty Attestation        I performed a history and physical examination of the patient and discussed management with the resident. I reviewed the residents note and agree with the documented findings and plan of care. Any areas of disagreement are noted on the chart. I was personally present for the key portions of any procedures. I have documented in the chart those procedures where I was not present during the key portions. I have reviewed the emergency nurses triage note.  I agree with the chief complaint, past medical history, past surgical history, allergies, medications, social and family history as documented unless otherwise noted below. For mid-level providers such as nurse practitioners as well as physicians assistants:    I have personally seen and evaluated the patient. I find the patient's history and physical exam are consistent with NP/PA documentation. I agree with the care provided, treatment rendered, disposition, & follow-up plan. Additional findings are as noted. Vital Signs: /83   Pulse 74   Temp 97.9 °F (36.6 °C) (Oral)   Resp 16   SpO2 97%   PCP:  Luisito Goddard MD    Pertinent Comments:     Patient presents with near syncopal episode he was sitting in a chair when he felt dizzy lightheaded and vomited and thinks he may have passed out. States he did not fall out of the chair. He has extensive cardiac history risk he had a pacemaker defibrillator placed. He is requiring a nonrebreather to maintain his sats complains some shortness of breath. We will do a full laboratory work-up interrogation of AICD. Also patient complaining of a headache CT brain, CT of chest without PE, anticipate admission      The patient has decided not to proceed with further recommended testing or treatment to determine the cause of their symptoms. By leaving, the patient is putting themselves at risk for the following, including but not limited to; death, significant disability, prolonged hospitalization, financial loss, emotional turmoil. The risk and alternatives to the recommendations were discussed and the patient voiced understanding. The patient appears clinically to have capacity to make this decision. Patient states they will follow-up with primary care physician and/or return to the emergency department should they experience a change or worsening of symptoms. The patient was instructed that at any time they may return to the ER to complete the testing or treatment. Patient states that they understand the plan and agree with the plan.  Patient has the decisional capacity and understands the consequences of leaving against medical advice.         Critical Care  None          Jenaro Knapp MD    Attending Emergency Medicine Physician            Critical Care  None          Jenaro Knapp MD    Attending Emergency Medicine Physician            Davey Cortez MD  07/28/22 [id number] [place] Gonzales MD  07/28/22 [id number]

## 2022-07-29 PROBLEM — R40.4 TRANSIENT ALTERATION OF AWARENESS: Status: ACTIVE | Noted: 2022-07-29

## 2022-07-29 LAB
EKG ATRIAL RATE: 70 BPM
EKG P AXIS: 57 DEGREES
EKG P-R INTERVAL: 170 MS
EKG Q-T INTERVAL: 408 MS
EKG QRS DURATION: 88 MS
EKG QTC CALCULATION (BAZETT): 440 MS
EKG R AXIS: -52 DEGREES
EKG T AXIS: 72 DEGREES
EKG VENTRICULAR RATE: 70 BPM
MAGNESIUM: 1.6 MG/DL (ref 1.6–2.6)
PRO-BNP: 551 PG/ML
PROCALCITONIN: 0.05 NG/ML

## 2022-07-29 PROCEDURE — 83880 ASSAY OF NATRIURETIC PEPTIDE: CPT

## 2022-07-29 PROCEDURE — 83735 ASSAY OF MAGNESIUM: CPT

## 2022-07-29 PROCEDURE — 99222 1ST HOSP IP/OBS MODERATE 55: CPT | Performed by: PSYCHIATRY & NEUROLOGY

## 2022-07-29 PROCEDURE — 96365 THER/PROPH/DIAG IV INF INIT: CPT

## 2022-07-29 PROCEDURE — 6360000002 HC RX W HCPCS: Performed by: STUDENT IN AN ORGANIZED HEALTH CARE EDUCATION/TRAINING PROGRAM

## 2022-07-29 PROCEDURE — 2580000003 HC RX 258: Performed by: STUDENT IN AN ORGANIZED HEALTH CARE EDUCATION/TRAINING PROGRAM

## 2022-07-29 PROCEDURE — 36415 COLL VENOUS BLD VENIPUNCTURE: CPT

## 2022-07-29 PROCEDURE — 6370000000 HC RX 637 (ALT 250 FOR IP): Performed by: STUDENT IN AN ORGANIZED HEALTH CARE EDUCATION/TRAINING PROGRAM

## 2022-07-29 PROCEDURE — 95819 EEG AWAKE AND ASLEEP: CPT

## 2022-07-29 PROCEDURE — 96372 THER/PROPH/DIAG INJ SC/IM: CPT

## 2022-07-29 PROCEDURE — 97530 THERAPEUTIC ACTIVITIES: CPT

## 2022-07-29 PROCEDURE — 2060000000 HC ICU INTERMEDIATE R&B

## 2022-07-29 PROCEDURE — 84145 PROCALCITONIN (PCT): CPT

## 2022-07-29 PROCEDURE — 97162 PT EVAL MOD COMPLEX 30 MIN: CPT

## 2022-07-29 PROCEDURE — 93010 ELECTROCARDIOGRAM REPORT: CPT | Performed by: INTERNAL MEDICINE

## 2022-07-29 RX ORDER — AMLODIPINE BESYLATE 5 MG/1
5 TABLET ORAL DAILY
Status: DISCONTINUED | OUTPATIENT
Start: 2022-07-30 | End: 2022-07-30

## 2022-07-29 RX ORDER — MAGNESIUM SULFATE IN WATER 40 MG/ML
2000 INJECTION, SOLUTION INTRAVENOUS ONCE
Status: COMPLETED | OUTPATIENT
Start: 2022-07-29 | End: 2022-07-29

## 2022-07-29 RX ORDER — ISOSORBIDE MONONITRATE 30 MG/1
30 TABLET, EXTENDED RELEASE ORAL DAILY
Status: DISCONTINUED | OUTPATIENT
Start: 2022-07-30 | End: 2022-08-10 | Stop reason: HOSPADM

## 2022-07-29 RX ADMIN — DESMOPRESSIN ACETATE 40 MG: 0.2 TABLET ORAL at 11:14

## 2022-07-29 RX ADMIN — DULOXETINE HYDROCHLORIDE 30 MG: 30 CAPSULE, DELAYED RELEASE ORAL at 11:14

## 2022-07-29 RX ADMIN — Medication 81 MG: at 11:14

## 2022-07-29 RX ADMIN — CARVEDILOL 25 MG: 25 TABLET, FILM COATED ORAL at 17:51

## 2022-07-29 RX ADMIN — SODIUM CHLORIDE: 9 INJECTION, SOLUTION INTRAVENOUS at 21:45

## 2022-07-29 RX ADMIN — SODIUM CHLORIDE, PRESERVATIVE FREE 10 ML: 5 INJECTION INTRAVENOUS at 11:14

## 2022-07-29 RX ADMIN — CARVEDILOL 25 MG: 25 TABLET, FILM COATED ORAL at 11:14

## 2022-07-29 RX ADMIN — MAGNESIUM SULFATE HEPTAHYDRATE 2000 MG: 40 INJECTION, SOLUTION INTRAVENOUS at 17:08

## 2022-07-29 RX ADMIN — ENOXAPARIN SODIUM 40 MG: 100 INJECTION SUBCUTANEOUS at 11:14

## 2022-07-29 RX ADMIN — LISINOPRIL 10 MG: 10 TABLET ORAL at 11:16

## 2022-07-29 RX ADMIN — SODIUM CHLORIDE: 9 INJECTION, SOLUTION INTRAVENOUS at 12:09
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
## 2022-07-29 NOTE — PLAN OF CARE
Problem: Chronic Conditions and Co-morbidities  Goal: Patient's chronic conditions and co-morbidity symptoms are monitored and maintained or improved  Outcome: Progressing     Problem: Pain  Goal: Verbalizes/displays adequate comfort level or baseline comfort level  Outcome: Progressing     Problem: Safety - Adult  Goal: Free from fall injury  Outcome: Progressing  Flowsheets (Taken 7/29/2022 0800)  Free From Fall Injury: Instruct family/caregiver on patient safety

## 2022-07-29 NOTE — PROGRESS NOTES
CDU Transfer Summary        Patient:  Liban Ojeda  YOB: 1965    MRN: [id number]   Acct: [de-identified]    Primary Care Physician: Shanda Preston MD    Admit date:  7/28/2022  4:33 PM  Transfer date: No discharge date for patient encounter. Gurdeepana 7/29/22      Transfer Diagnoses:     1.)  Acute mental status changes, uncertain etiology, work-up per neurology and cardiology. Uncertain etiology so patient being transferred to medicine service for ongoing work-up given significant medical comorbidities     2. Recent cardiac arrest and ICU stay. Concern for embolic disease versus new seizure           Medication List        ASK your doctor about these medications      albuterol sulfate  (90 Base) MCG/ACT inhaler  Commonly known as: PROVENTIL;VENTOLIN;PROAIR  inhale 2 puffs by mouth every 6 hours if needed for wheezing     amLODIPine 10 MG tablet  Commonly known as: NORVASC  Take 1 tablet by mouth in the morning. aspirin 81 MG EC tablet     atorvastatin 40 MG tablet  Commonly known as: LIPITOR  TAKE 1 TABLET BY MOUTH DAILY     carvedilol 25 MG tablet  Commonly known as: COREG  Take 1 tablet by mouth in the morning and 1 tablet in the evening. Take with meals. DULoxetine 30 MG extended release capsule  Commonly known as: CYMBALTA  TAKE 1 CAPSULE BY MOUTH DAILY     isosorbide mononitrate 60 MG extended release tablet  Commonly known as: IMDUR  Take 1 tablet by mouth in the morning. lisinopril 20 MG tablet  Commonly known as: PRINIVIL;ZESTRIL  Take 0.5 tablets by mouth in the morning. metoclopramide 10 MG tablet  Commonly known as: REGLAN  TAKE 1 TABLET BY MOUTH 3 TIMES DAILY     metoprolol succinate 25 MG extended release tablet  Commonly known as: TOPROL XL     nitroGLYCERIN 0.4 MG SL tablet  Commonly known as: Nitrostat  Place 1 tablet under the tongue every 5 minutes as needed for Chest pain up to max of 3 total doses. If no relief after 1 dose, call 911.      pantoprazole 40 MG tablet  Commonly known as: PROTONIX  TAKE 1 TABLET BY MOUTH DAILY     spironolactone 25 MG tablet  Commonly known as: ALDACTONE  Take 1 tablet by mouth in the morning. Diet:  ADULT DIET; Regular, advance as tolerated     Activity:  As tolerated    Consultants: IP CONSULT TO CARDIOLOGY  IP CONSULT TO NEUROLOGY    Procedures:  Not indicated      Diagnostic Test:   Results for orders placed or performed during the hospital encounter of 07/28/22   COVID-19, Rapid    Specimen: Nasopharyngeal Swab   Result Value Ref Range    Specimen Description . NASOPHARYNGEAL SWAB     SARS-CoV-2, Rapid Not Detected Not Detected   CBC   Result Value Ref Range    WBC 9.1 3.5 - 11.3 k/uL    RBC 5.02 4.21 - 5.77 m/uL    Hemoglobin 13.9 13.0 - 17.0 g/dL    Hematocrit 42.7 40.7 - 50.3 %    MCV 85.1 82.6 - 102.9 fL    MCH 27.7 25.2 - 33.5 pg    MCHC 32.6 28.4 - 34.8 g/dL    RDW 17.2 (H) 11.8 - 14.4 %    Platelets 554 836 - 599 k/uL    MPV 10.3 8.1 - 13.5 fL    NRBC Automated 0.0 0.0 per 100 WBC   Comprehensive Metabolic Panel   Result Value Ref Range    Glucose 163 (H) 70 - 99 mg/dL    BUN 15 6 - 20 mg/dL    Creatinine 1.51 (H) 0.70 - 1.20 mg/dL    Calcium 9.1 8.6 - 10.4 mg/dL    Sodium 134 (L) 135 - 144 mmol/L    Potassium 4.3 3.7 - 5.3 mmol/L    Chloride 96 (L) 98 - 107 mmol/L    CO2 25 20 - 31 mmol/L    Anion Gap 13 9 - 17 mmol/L    Alkaline Phosphatase 169 (H) 40 - 129 U/L    ALT 14 5 - 41 U/L    AST 16 <40 U/L    Total Bilirubin 0.56 0.3 - 1.2 mg/dL    Total Protein 7.0 6.4 - 8.3 g/dL    Albumin 4.0 3.5 - 5.2 g/dL    Albumin/Globulin Ratio 1.3 1.0 - 2.5    GFR Non- 48 (L) >60 mL/min    GFR  58 (L) >60 mL/min    GFR Comment         Troponin   Result Value Ref Range    Troponin, High Sensitivity 17 0 - 22 ng/L   Troponin   Result Value Ref Range    Troponin, High Sensitivity 15 0 - 22 ng/L   Magnesium   Result Value Ref Range    Magnesium 1.6 1.6 - 2.6 mg/dL   EKG 12 Lead   Result Value Ref Range    Ventricular Rate 70 BPM    Atrial Rate 70 BPM    P-R Interval 170 ms    QRS Duration 88 ms    Q-T Interval 408 ms    QTc Calculation (Bazett) 440 ms    P Axis 57 degrees    R Axis -52 degrees    T Axis 72 degrees     CT HEAD WO CONTRAST    Result Date: 7/28/2022  EXAMINATION: CT OF THE HEAD WITHOUT CONTRAST  7/28/2022 6:03 pm TECHNIQUE: CT of the head was performed without the administration of intravenous contrast. Automated exposure control, iterative reconstruction, and/or weight based adjustment of the mA/kV was utilized to reduce the radiation dose to as low as reasonably achievable. COMPARISON: 02/25/2022 HISTORY: ORDERING SYSTEM PROVIDED HISTORY: Syncope, Headache TECHNOLOGIST PROVIDED HISTORY: Syncope, Headache Decision Support Exception - unselect if not a suspected or confirmed emergency medical condition->Emergency Medical Condition (MA) Reason for Exam: Syncope, recent admission FINDINGS: BRAIN/VENTRICLES: There is no acute intracranial hemorrhage, mass effect or midline shift. No abnormal extra-axial fluid collection. The gray-white differentiation is maintained without evidence of an acute infarct. There is no evidence of hydrocephalus. ORBITS: The visualized portion of the orbits demonstrate no acute abnormality. SINUSES: Scattered mucosal thickening in the paranasal sinuses. Right mastoid tip effusion. SOFT TISSUES/SKULL:  No acute abnormality of the visualized skull or soft tissues. No acute intracranial abnormality. XR CHEST PORTABLE    Result Date: 7/28/2022  EXAMINATION: ONE XRAY VIEW OF THE CHEST 7/28/2022 5:16 pm COMPARISON: Chest x-ray dated 21 July 2022 HISTORY: ORDERING SYSTEM PROVIDED HISTORY: Syncope TECHNOLOGIST PROVIDED HISTORY: Syncope FINDINGS: Left-sided ICD in stable position. Stable cardiomediastinal silhouette status post median sternotomy. There are mild bibasilar airspace opacities. No pneumothorax or pleural effusion.      Mild bibasilar airspace opacities favored to represent atelectasis. Otherwise, no acute cardiopulmonary findings. CT CHEST PULMONARY EMBOLISM W CONTRAST    Result Date: 7/28/2022  EXAMINATION: CTA OF THE CHEST 7/28/2022 6:03 pm TECHNIQUE: CTA of the chest was performed after the administration of intravenous contrast.  Multiplanar reformatted images are provided for review. MIP images are provided for review. Automated exposure control, iterative reconstruction, and/or weight based adjustment of the mA/kV was utilized to reduce the radiation dose to as low as reasonably achievable. COMPARISON: Chest radiograph 07/28/2022. CT chest angiogram 04/15/2022. HISTORY: ORDERING SYSTEM PROVIDED HISTORY: Syncope, recent admission TECHNOLOGIST PROVIDED HISTORY: Syncope, recent admission Decision Support Exception - unselect if not a suspected or confirmed emergency medical condition->Emergency Medical Condition (MA) FINDINGS: Pulmonary Arteries: Pulmonary arteries are adequately opacified for evaluation. No evidence of intraluminal filling defect to suggest pulmonary embolism. Main pulmonary artery is normal in caliber. Mediastinum: The thoracic aorta is normal in caliber with mild atherosclerosis. Status post CABG. The heart is mildly enlarged with an intracardiac AICD lead terminating in the right ventricle. No pericardial effusion. The mediastinal esophagus and thyroid gland are unremarkable. No lymphadenopathy or pneumomediastinum. Lungs/pleura: The central airways are patent. Mild emphysematous changes. No pleural effusion or pneumothorax. Mild chronic right basilar bronchiectasis. No consolidation or interlobular septal thickening. Mild bibasilar atelectasis. Upper Abdomen: Cholelithiasis. Images through the upper abdomen demonstrate no acute process. Soft Tissues/Bones: Status post median sternotomy. No acute osseous or soft tissue abnormality. Chronic nondisplaced healing fracture of the mid sternal body.      No pulmonary embolism or other acute process in the chest.           Hospital Course:  Clinical course has improved, labs and imaging reviewed. Anne Ferrara originally presented to the hospital on 7/28/2022  4:33 PM with complaints of altered mental status and unresponsiveness. .  At that time it was determined that He required further observation and ongoing reevaluation. Patient was seen by neurology. Work-up so far negative but patient with significant comorbidities and uncertain etiology of what appears to be loss of function. Patient hemodynamically stable. Seen by neurology. [place] to require ongoing work-up of AMS. Discussed with medicine service. .     Pt discussed directly with the admitting team    Disposition: Transfer  Condition: Good    Time Spent: 0 day      --  Willem Suero MD  Emergency Medicine Attending Physician    This dictation was generated by voice recognition computer software. Although all attempts are made to edit the dictation for accuracy, there may be errors in the transcription that are not intended.

## 2022-07-29 NOTE — PROGRESS NOTES
Neurology in to see pt and obs resident in updates given regarding patients POA's series of events that brought him to the hospital. Orders written.

## 2022-07-29 NOTE — CARE COORDINATION
07/29/22 1157   Service Assessment   Patient Orientation Alert and Oriented   Cognition Alert   History Provided By Patient   Support Systems Family Members   PCP Verified by CM Yes  (unsure)   Prior Functional Level Independent in ADLs/IADLs   Current Functional Level Independent in ADLs/IADLs   Can patient return to prior living arrangement Unknown at present   Ability to make needs known: [address] [facility]/Home Care   Social/Functional History   Lives With Family   Type of [address] Two level;Bed/Bath upstairs   Receives Help From [address] Responsibilities Yes   Ambulation Assistance Independent   Transfer Assistance Independent   Active  No   Discharge Planning   Type of [facility] Family Members   Current Services Prior To Admission [facility]  ([address])   [address] Medications No   Meds-to-Beds: Does the patient want to have any new prescriptions delivered to bedside prior to discharge?  Yes   Patient expects to be discharged to: House   Condition of Participation: Discharge Planning   The Plan for Transition of Care is related to the following treatment goals: increased comfort level   Wants to return home with [facility], watch PT/OT, was at [facility] earlier this year
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
## 2022-07-30 ENCOUNTER — APPOINTMENT (OUTPATIENT)
Dept: CT IMAGING | Age: 57
DRG: 030 | End: 2022-07-30
Payer: COMMERCIAL

## 2022-07-30 ENCOUNTER — APPOINTMENT (OUTPATIENT)
Dept: GENERAL RADIOLOGY | Age: 57
DRG: 030 | End: 2022-07-30
Payer: COMMERCIAL

## 2022-07-30 PROBLEM — R47.02 DYSPHASIA: Status: ACTIVE | Noted: 2022-07-30

## 2022-07-30 PROBLEM — I63.512 ACUTE ISCHEMIC LEFT MCA STROKE (HCC): Status: ACTIVE | Noted: 2022-07-30

## 2022-07-30 LAB
-: ABNORMAL
ABSOLUTE EOS #: 0.72 K/UL (ref 0–0.44)
ABSOLUTE IMMATURE GRANULOCYTE: 0.04 K/UL (ref 0–0.3)
ABSOLUTE LYMPH #: 2.07 K/UL (ref 1.1–3.7)
ABSOLUTE MONO #: 0.65 K/UL (ref 0.1–1.2)
ALBUMIN SERPL-MCNC: 3.6 G/DL (ref 3.5–5.2)
ALBUMIN/GLOBULIN RATIO: 1 (ref 1–2.5)
ALP BLD-CCNC: 168 U/L (ref 40–129)
ALT SERPL-CCNC: 10 U/L (ref 5–41)
AMMONIA: 154 UMOL/L (ref 16–60)
AMPHETAMINE SCREEN URINE: NEGATIVE
ANION GAP SERPL CALCULATED.3IONS-SCNC: 11 MMOL/L (ref 9–17)
AST SERPL-CCNC: 15 U/L
BARBITURATE SCREEN URINE: NEGATIVE
BASOPHILS # BLD: 1 % (ref 0–2)
BASOPHILS ABSOLUTE: 0.11 K/UL (ref 0–0.2)
BENZODIAZEPINE SCREEN, URINE: NEGATIVE
BILIRUB SERPL-MCNC: 0.54 MG/DL (ref 0.3–1.2)
BILIRUBIN DIRECT: 0.16 MG/DL
BILIRUBIN URINE: NEGATIVE
BILIRUBIN, INDIRECT: 0.38 MG/DL (ref 0–1)
BUN BLDV-MCNC: 12 MG/DL (ref 6–20)
CALCIUM SERPL-MCNC: 9 MG/DL (ref 8.6–10.4)
CANNABINOID SCREEN URINE: NEGATIVE
CHLORIDE BLD-SCNC: 101 MMOL/L (ref 98–107)
CHOLESTEROL/HDL RATIO: 4.3
CHOLESTEROL: 149 MG/DL
CO2: 21 MMOL/L (ref 20–31)
COCAINE METABOLITE, URINE: NEGATIVE
COLOR: YELLOW
CREAT SERPL-MCNC: 1.04 MG/DL (ref 0.7–1.2)
EOSINOPHILS RELATIVE PERCENT: 6 % (ref 1–4)
EPITHELIAL CELLS UA: ABNORMAL /HPF (ref 0–5)
FENTANYL URINE: NEGATIVE
FOLATE: 2.3 NG/ML
GFR AFRICAN AMERICAN: >60 ML/MIN
GFR NON-AFRICAN AMERICAN: >60 ML/MIN
GFR SERPL CREATININE-BSD FRML MDRD: ABNORMAL ML/MIN/{1.73_M2}
GLUCOSE BLD-MCNC: 91 MG/DL (ref 70–99)
GLUCOSE URINE: NEGATIVE
HCT VFR BLD CALC: 45.2 % (ref 40.7–50.3)
HDLC SERPL-MCNC: 35 MG/DL
HEMOGLOBIN: 14.3 G/DL (ref 13–17)
IMMATURE GRANULOCYTES: 0 %
KETONES, URINE: NEGATIVE
LDL CHOLESTEROL: 80 MG/DL (ref 0–130)
LEUKOCYTE ESTERASE, URINE: NEGATIVE
LYMPHOCYTES # BLD: 18 % (ref 24–43)
MAGNESIUM: 2 MG/DL (ref 1.6–2.6)
MCH RBC QN AUTO: 27.3 PG (ref 25.2–33.5)
MCHC RBC AUTO-ENTMCNC: 31.6 G/DL (ref 28.4–34.8)
MCV RBC AUTO: 86.4 FL (ref 82.6–102.9)
METHADONE SCREEN, URINE: NEGATIVE
MONOCYTES # BLD: 6 % (ref 3–12)
NITRITE, URINE: NEGATIVE
NRBC AUTOMATED: 0 PER 100 WBC
OPIATES, URINE: NEGATIVE
OXYCODONE SCREEN URINE: NEGATIVE
PDW BLD-RTO: 17.2 % (ref 11.8–14.4)
PH UA: 6 (ref 5–8)
PHENCYCLIDINE, URINE: NEGATIVE
PLATELET # BLD: 163 K/UL (ref 138–453)
PMV BLD AUTO: 10.1 FL (ref 8.1–13.5)
POTASSIUM SERPL-SCNC: 4.2 MMOL/L (ref 3.7–5.3)
PROTEIN UA: NEGATIVE
RBC # BLD: 5.23 M/UL (ref 4.21–5.77)
RBC # BLD: ABNORMAL 10*6/UL
RBC UA: ABNORMAL /HPF (ref 0–4)
SEDIMENTATION RATE, ERYTHROCYTE: 15 MM/HR (ref 0–20)
SEG NEUTROPHILS: 68 % (ref 36–65)
SEGMENTED NEUTROPHILS ABSOLUTE COUNT: 7.78 K/UL (ref 1.5–8.1)
SODIUM BLD-SCNC: 133 MMOL/L (ref 135–144)
SPECIFIC GRAVITY UA: 1.03 (ref 1–1.03)
TEST INFORMATION: NORMAL
TOTAL PROTEIN: 7.1 G/DL (ref 6.4–8.3)
TRIGL SERPL-MCNC: 168 MG/DL
TURBIDITY: CLEAR
URINE HGB: NEGATIVE
UROBILINOGEN, URINE: NORMAL
VITAMIN B-12: 363 PG/ML (ref 232–1245)
WBC # BLD: 11.4 K/UL (ref 3.5–11.3)
WBC UA: ABNORMAL /HPF (ref 0–5)

## 2022-07-30 PROCEDURE — 6360000002 HC RX W HCPCS: Performed by: STUDENT IN AN ORGANIZED HEALTH CARE EDUCATION/TRAINING PROGRAM

## 2022-07-30 PROCEDURE — 6370000000 HC RX 637 (ALT 250 FOR IP): Performed by: STUDENT IN AN ORGANIZED HEALTH CARE EDUCATION/TRAINING PROGRAM

## 2022-07-30 PROCEDURE — 99232 SBSQ HOSP IP/OBS MODERATE 35: CPT | Performed by: PSYCHIATRY & NEUROLOGY

## 2022-07-30 PROCEDURE — 95816 EEG AWAKE AND DROWSY: CPT | Performed by: PSYCHIATRY & NEUROLOGY

## 2022-07-30 PROCEDURE — 80076 HEPATIC FUNCTION PANEL: CPT

## 2022-07-30 PROCEDURE — 82746 ASSAY OF FOLIC ACID SERUM: CPT

## 2022-07-30 PROCEDURE — 85025 COMPLETE CBC W/AUTO DIFF WBC: CPT

## 2022-07-30 PROCEDURE — 83036 HEMOGLOBIN GLYCOSYLATED A1C: CPT

## 2022-07-30 PROCEDURE — 81001 URINALYSIS AUTO W/SCOPE: CPT

## 2022-07-30 PROCEDURE — 80061 LIPID PANEL: CPT

## 2022-07-30 PROCEDURE — 99223 1ST HOSP IP/OBS HIGH 75: CPT | Performed by: INTERNAL MEDICINE

## 2022-07-30 PROCEDURE — 6360000004 HC RX CONTRAST MEDICATION: Performed by: STUDENT IN AN ORGANIZED HEALTH CARE EDUCATION/TRAINING PROGRAM

## 2022-07-30 PROCEDURE — 70498 CT ANGIOGRAPHY NECK: CPT

## 2022-07-30 PROCEDURE — 2060000000 HC ICU INTERMEDIATE R&B

## 2022-07-30 PROCEDURE — 80048 BASIC METABOLIC PNL TOTAL CA: CPT

## 2022-07-30 PROCEDURE — 6370000000 HC RX 637 (ALT 250 FOR IP): Performed by: INTERNAL MEDICINE

## 2022-07-30 PROCEDURE — 74018 RADEX ABDOMEN 1 VIEW: CPT

## 2022-07-30 PROCEDURE — 82607 VITAMIN B-12: CPT

## 2022-07-30 PROCEDURE — 6370000000 HC RX 637 (ALT 250 FOR IP): Performed by: NURSE PRACTITIONER

## 2022-07-30 PROCEDURE — 36415 COLL VENOUS BLD VENIPUNCTURE: CPT

## 2022-07-30 PROCEDURE — 83735 ASSAY OF MAGNESIUM: CPT

## 2022-07-30 PROCEDURE — 85652 RBC SED RATE AUTOMATED: CPT

## 2022-07-30 PROCEDURE — 80307 DRUG TEST PRSMV CHEM ANLYZR: CPT

## 2022-07-30 PROCEDURE — 82140 ASSAY OF AMMONIA: CPT

## 2022-07-30 PROCEDURE — 93880 EXTRACRANIAL BILAT STUDY: CPT

## 2022-07-30 RX ORDER — LACTULOSE 10 G/15ML
20 SOLUTION ORAL 3 TIMES DAILY
Status: DISCONTINUED | OUTPATIENT
Start: 2022-07-30 | End: 2022-08-03

## 2022-07-30 RX ORDER — MAGNESIUM SULFATE IN WATER 40 MG/ML
2000 INJECTION, SOLUTION INTRAVENOUS ONCE
Status: COMPLETED | OUTPATIENT
Start: 2022-07-30 | End: 2022-07-30

## 2022-07-30 RX ORDER — CLOPIDOGREL BISULFATE 75 MG/1
300 TABLET ORAL ONCE
Status: COMPLETED | OUTPATIENT
Start: 2022-07-30 | End: 2022-07-30

## 2022-07-30 RX ORDER — DEXTROSE MONOHYDRATE 100 MG/ML
INJECTION, SOLUTION INTRAVENOUS CONTINUOUS PRN
Status: DISCONTINUED | OUTPATIENT
Start: 2022-07-30 | End: 2022-08-10 | Stop reason: HOSPADM

## 2022-07-30 RX ORDER — CLOPIDOGREL BISULFATE 75 MG/1
75 TABLET ORAL DAILY
Status: DISCONTINUED | OUTPATIENT
Start: 2022-07-31 | End: 2022-08-10 | Stop reason: HOSPADM

## 2022-07-30 RX ORDER — AMLODIPINE BESYLATE 5 MG/1
5 TABLET ORAL 2 TIMES DAILY
Status: DISCONTINUED | OUTPATIENT
Start: 2022-07-30 | End: 2022-08-03

## 2022-07-30 RX ADMIN — LACTULOSE 20 G: 20 SOLUTION ORAL at 15:29

## 2022-07-30 RX ADMIN — IOPAMIDOL 90 ML: 755 INJECTION, SOLUTION INTRAVENOUS at 14:20

## 2022-07-30 RX ADMIN — DULOXETINE HYDROCHLORIDE 30 MG: 30 CAPSULE, DELAYED RELEASE ORAL at 08:41

## 2022-07-30 RX ADMIN — CARVEDILOL 25 MG: 25 TABLET, FILM COATED ORAL at 08:45

## 2022-07-30 RX ADMIN — ISOSORBIDE MONONITRATE 30 MG: 30 TABLET ORAL at 08:41

## 2022-07-30 RX ADMIN — AMLODIPINE BESYLATE 5 MG: 5 TABLET ORAL at 20:59

## 2022-07-30 RX ADMIN — LISINOPRIL 10 MG: 10 TABLET ORAL at 08:45

## 2022-07-30 RX ADMIN — CLOPIDOGREL 300 MG: 75 TABLET, FILM COATED ORAL at 18:35

## 2022-07-30 RX ADMIN — DESMOPRESSIN ACETATE 40 MG: 0.2 TABLET ORAL at 08:42

## 2022-07-30 RX ADMIN — LACTULOSE 20 G: 20 SOLUTION ORAL at 21:24

## 2022-07-30 RX ADMIN — AMLODIPINE BESYLATE 5 MG: 10 TABLET ORAL at 08:45

## 2022-07-30 RX ADMIN — ENOXAPARIN SODIUM 40 MG: 100 INJECTION SUBCUTANEOUS at 08:41

## 2022-07-30 RX ADMIN — Medication 81 MG: at 08:41

## 2022-07-30 RX ADMIN — CARVEDILOL 25 MG: 25 TABLET, FILM COATED ORAL at 19:13

## 2022-07-30 RX ADMIN — MAGNESIUM SULFATE HEPTAHYDRATE 2000 MG: 40 INJECTION, SOLUTION INTRAVENOUS at 12:10

## 2022-07-30 NOTE — PROGRESS NOTES
complaining of any symptoms. Neurology was notified. OBJECTIVE     Vital Signs:  BP (!) 159/105   Pulse 74   Temp 98 °F (36.7 °C) (Oral)   Resp 17   SpO2 95%     Temp (24hrs), Av.1 °F (36.7 °C), Min:97.6 °F (36.4 °C), Max:98.5 °F (36.9 °C)    In: 3605.1   Out: 600 [Urine:600]    PHYSICAL EXAMINATION:  Physical Exam  Constitutional:       General: He is not in acute distress. Appearance: Normal appearance. He is not ill-appearing. HENT:     Head: Normocephalic and atraumatic. Nose: Nose normal.  Eyes:     Extraocular Movements: Extraocular movements intact. Pupils: Pupils are equal, round, and reactive to light. Cardiovascular:     Rate and Rhythm: Normal rate and regular rhythm. Pulses: Normal pulses. Heart sounds: Normal heart sounds. No murmur heard. Pulmonary:     Effort: Pulmonary effort is normal. No respiratory distress. Breath sounds: Rhonchi present. No wheezing. Abdominal:     General: Bowel sounds are normal. There is no distension. Palpations: Abdomen is soft. Tenderness: There is no abdominal tenderness. Musculoskeletal:     Right lower leg: No edema. Left lower leg: No edema. Neurological:     Mental Status: He is alert. He is disoriented. Sensory: No sensory deficit.      Motor: Right face drop, right sided weakness  Psychiatric:         Mood and Affect: Mood normal.         Behavior: Behavior normal.    Medications:  Scheduled Medications:    magnesium sulfate  2,000 mg IntraVENous Once    amLODIPine  5 mg Oral Daily    isosorbide mononitrate  30 mg Oral Daily    acetaminophen  1,000 mg Oral Once    aspirin  81 mg Oral Daily    atorvastatin  40 mg Oral Daily    carvedilol  25 mg Oral BID WC    lisinopril  10 mg Oral Daily    DULoxetine  30 mg Oral Daily    sodium chloride flush  5-40 mL IntraVENous 2 times per day    enoxaparin  40 mg SubCUTAneous Daily     Continuous Infusions:    sodium chloride      sodium chloride 75 mL/hr at 22 0545     PRN Medicationssodium chloride flush, 5-40 mL, PRN  sodium chloride, , PRN  acetaminophen, 650 mg, Q4H PRN  ondansetron, 4 mg, Q8H PRN   Or  ondansetron, 4 mg, Q6H PRN        Diagnostic Labs:  CBC:   Recent Labs     07/28/22  1707 07/30/22  0821   WBC 9.1 11.4*   RBC 5.02 5.23   HGB 13.9 14.3   HCT 42.7 45.2   MCV 85.1 86.4   RDW 17.2* 17.2*    163     BMP:   Recent Labs     07/28/22  1707 07/30/22  0821   * 133*   K 4.3 4.2   CL 96* 101   CO2 25 21   BUN 15 12   CREATININE 1.51* 1.04     BNP: No results for input(s): BNP in the last 72 hours. PT/INR: No results for input(s): PROTIME, INR in the last 72 hours. APTT: No results for input(s): APTT in the last 72 hours. CARDIAC ENZYMES: No results for input(s): CKMB, CKMBINDEX, TROPONINI in the last 72 hours. Invalid input(s): CKTOTAL;3  FASTING LIPID PANEL:  Lab Results   Component Value Date    CHOL 149 07/30/2022    HDL 35 (L) 07/30/2022    TRIG 168 (H) 07/30/2022     LIVER PROFILE:   Recent Labs     07/28/22  1707   AST 16   ALT 14   BILITOT 0.56   ALKPHOS 169*      MICROBIOLOGY:   Lab Results   Component Value Date/Time    CULTURE NO GROWTH 5 DAYS 03/10/2022 07:06 PM       Imaging:    CT HEAD WO CONTRAST    Result Date: 7/28/2022  No acute intracranial abnormality. XR CHEST PORTABLE    Result Date: 7/28/2022  Mild bibasilar airspace opacities favored to represent atelectasis. Otherwise, no acute cardiopulmonary findings. CT CHEST PULMONARY EMBOLISM W CONTRAST    Result Date: 7/28/2022  No pulmonary embolism or other acute process in the chest.       ASSESSMENT & PLAN     ASSESSMENT / PLAN:      IMPRESSION  This is a 59 y.o. male who presented with ast medical history of significant coronary artery disease with CABG in 2011, V. fib arrest in February 2022, AICD placement on 07/20/2022, ANCA positive vasculitis with complicated CKD stage III presented with chief complaints of syncope.      Principal Problem:  Syncope and collapse  - Dose of Norvasc decreased from 10 to 5.  - Dose of lisinopril decreased from 20 to 10  - Dose of Imdur decreased from 60 to 30  - Patient getting fluids at 75 mL/h  - AICD interrogation pending  - Neurology following, EEG completed but read is pending.  - Orthostatic is negative. - MRI Brain ordered. Could not be done because of recent AICD placement. - Neuro recommends repeating CT after 24 hours. - Carotid doppler  - ESR level ordered. - Urine analysis ordered. - Vit B 12 level    CKD Stage III  - Creatinine level is 1.04  -  ml   - Avoid nephrotoxic drugs. - Monitor I/Os    History of coronary artery disease  -Continue aspirin 81 mg, Lipitor 40 mg  -Continue Coreg 25 mg twice daily  -Continue lisinopril 10 mg after dose adjustment    Altered mental status  -Transient episodes of altered mentation likely from stroke  -Seizure disorder possible etiology. EEG completed but read pending  -Neurology following           DVT ppx: Lovenox  GI ppx: Not indicated     PT/OT/SW: Consulted  Discharge Planning:  consulted    Yanet Abebe MD  Internal Medicine Resident, PGY-1  [facility] Matthew;  [place], [place]  7/30/2022, 9:31 AM

## 2022-07-30 NOTE — DISCHARGE SUMMARY
[facility]     Department of Internal Medicine - Staff Internal Medicine Teaching Service    INPATIENT DISCHARGE SUMMARY      Patient Identification:  Anne Ferrara is a 59 y.o. male. :  1965  MRN: [id number]     Acct: [de-identified]   PCP: Norma Seo MD  Admit Date:  2022  Discharge date and time: 2022  5:16 PM   Attending Provider: No att. providers found                                     [address] Problem Lists:  Principal Problem:    Hypertensive emergency  Active Problems:    Encounter for implantable cardioverter-defibrillator discussion    Chronic kidney disease, stage 3b ([address]    Cardiomyopathy ([address]    Essential hypertension    Hypertensive urgency    Coronary artery disease involving coronary bypass graft of native heart    Acute kidney injury superimposed on CKD ([address]    Type 2 diabetes mellitus without complication ([address]    Ischemic cardiomyopathy  Resolved Problems:    * No resolved hospital problems. *      HOSPITAL STAY     Brief Inpatient course:   Anne Ferrara is a 59 y.o. male who was admitted for the management of Hypertensive emergency, presented to the emergency department with chief complaint of headache and high blood pressure measured by his home health nurse which was in the 898W systolic. The headache is constant, generalized not associated with eye redness or tearing. Patient denies lightheadedness, confusion, bluriness of vision, neck pain. He also denies chest pain, palptiation, cough or shortness of bleeding. Denies numbness, tingling. He reported mild weakness on his right foot. He has a history of CAD with a quadruple bypass and a MI in May. He was wearing a life vest and is scheduled for a defibrillator to be placed with cardiology. Over the course of the patient stay, blood pressure was stabilized and the patient underwent the procedure to place the ICD.  Kidney function improved and home medications were reevaluated. Procedures/ Significant Interventions:    ICD     Consults:     Consults:     Final Specialist Recommendations/Findings:   IP CONSULT TO INTERNAL MEDICINE  IP CONSULT TO CASE MANAGEMENT  IP CONSULT TO CARDIOLOGY  IP CONSULT TO HOME CARE NEEDS      Any Hospital Acquired Infections: none    Discharge Functional Status:  stable    DISCHARGE PLAN     Disposition: home    Patient Instructions:   Discharge Medication List as of 7/23/2022  4:32 PM        START taking these medications    Details   isosorbide mononitrate (IMDUR) 60 MG extended release tablet Take 1 tablet by mouth in the morning., Disp-30 tablet, R-3Normal      carvedilol (COREG) 25 MG tablet Take 1 tablet by mouth in the morning and 1 tablet in the evening. Take with meals. , Disp-60 tablet, R-3Normal           CONTINUE these medications which have CHANGED    Details   albuterol sulfate HFA (PROVENTIL;VENTOLIN;PROAIR) 108 (90 Base) MCG/ACT inhaler inhale 2 puffs by mouth every 6 hours if needed for wheezing, Disp-18 g, R-5Normal      amLODIPine (NORVASC) 10 MG tablet Take 1 tablet by mouth in the morning., Disp-30 tablet, R-3Normal      lisinopril (PRINIVIL;ZESTRIL) 20 MG tablet Take 0.5 tablets by mouth in the morning., Disp-30 tablet, R-2Normal      nitroGLYCERIN (NITROSTAT) 0.4 MG SL tablet Place 1 tablet under the tongue every 5 minutes as needed for Chest pain up to max of 3 total doses. If no relief after 1 dose, call 911., Disp-25 tablet, R-3Normal           CONTINUE these medications which have NOT CHANGED    Details   aspirin 81 MG EC tablet Take 81 mg by mouth in the morning. Historical Med      DULoxetine (CYMBALTA) 30 MG extended release capsule TAKE 1 CAPSULE BY MOUTH DAILY, Disp-30 capsule, R-2Normal      atorvastatin (LIPITOR) 40 MG tablet TAKE 1 TABLET BY MOUTH DAILY, Disp-30 tablet, R-3Normal           STOP taking these medications       metoprolol succinate (TOPROL XL) 25 MG extended release tablet Comments:   Reason for Stopping:         spironolactone (ALDACTONE) 25 MG tablet Comments:   Reason for Stopping:         metoclopramide (REGLAN) 10 MG tablet Comments:   Reason for Stopping:         metoprolol succinate (TOPROL XL) 25 MG extended release tablet Comments:   Reason for Stopping:         pantoprazole (PROTONIX) 40 MG tablet Comments:   Reason for Stopping:         acetaminophen (TYLENOL) 325 MG tablet Comments:   Reason for Stopping:               Activity: activity as tolerated    Diet: cardiac diet    Follow-up:    [facility]  [address] [place] [address]  [phone]  Follow up on 7/27/2022  For wound re-check at 3:00pm    Y[facility] [address] [place] [address]    Schedule an appointment as soon as possible for a visit  Post discharge evaluation and blood pressure evaluation. Started on 3 different antihypertensive medication. [address] [facility] [address] [phone]          Patient Instructions: You have been admitted with elevated blood pressure and some modifications for your blood pressure medication done. You are started on lisinopril 20 mg, carvedilol 25 mg, Imdur 60 mg and amlodipine 10 mg. We stopped Aldactone due to ARON. Please continue to take your blood pressure pills regularly and check your blood pressure at home. Cardiology also placed AICD and you are advised to follow-up at cardiology clinic as advised above. Please follow-up with your PCP within 2 weeks of discharge and take your blood pressure. In case of worsening symptoms, seek medical attention come to emergency department. Follow up labs: None  Follow up imaging: None    Note that over 30 minutes was spent in preparing discharge papers, discussing discharge with patient, medication review, etc.      Phuc Garnica MD, MD  Internal Medicine Resident, [address] [place] [address];  [facility], [facility]  7/29/2022, 10:04 PM

## 2022-07-30 NOTE — PROGRESS NOTES
Patient arrives via w/c from [facility]. Transferred to room 146. In bed awake. Calm, cooperative, and stable. Very unsteady and unable to bear weight adequately on BLE. Oriented to to room/unit. Call light within reach. Verbalizes understanding of his plan of care. Will continue to monitor.

## 2022-07-30 NOTE — PLAN OF CARE
Problem: Chronic Conditions and Co-morbidities  Goal: Patient's chronic conditions and co-morbidity symptoms are monitored and maintained or improved  Outcome: Progressing     Problem: Pain  Goal: Verbalizes/displays adequate comfort level or baseline comfort level  Outcome: Progressing     Problem: Safety - Adult  Goal: Free from fall injury  Outcome: Progressing     Problem: Discharge Planning  Goal: Discharge to home or other facility with appropriate resources  Outcome: Progressing     Problem: ABCDS Injury Assessment  Goal: Absence of physical injury  Outcome: Progressing

## 2022-07-30 NOTE — PLAN OF CARE
PLAN OF CARE    Received page from internal medicine with concern over altered mentation. Spoke with patient's niece, Ms. Bobbi Hummel, over the phone, who lives with patient in order to yen patient's baseline mental status. She reports patient is completely independent without any cognitive deficits at baseline; patient reported to normally be alert, oriented, able to take care of all ADLs and prepares his own meals but does not work or drive and lives with niece. Patient had been behaving normally for the past few days after getting home from hospital for recent admission for cardiac problems with pacemaker placement. Then, on day of presentation, patient was sitting on the porch talking to his niece when he started complaining of headache, suddenly became a dazed, was seen to slump towards the left, had phone slide out of his hand, and then stopped responding/reacting to niece. Patient then completely lost consciousness, at which point niece called ambulance and patient was brought over to   [facility] [address]  Niece said since then patient has not returned to baseline mentation. [address] was 7/28/2022 per niece. Patient seen and examined at bedside;  During my exam, patient is alert and oriented to self but disoriented to time and place. He is able to follow commands but has expressive aphasia and perseveration in speech which limits exam. He has good repetition of words but unable to tell me what is going on in picture shown, unable to tell me why he is in hospital or his age, and does not read words given nor does he correctly identify objects shown. Performed NIH scale as part of physical exam and obtained the following findings:    Time Performed:  12:35 AM     1a. Level of consciousness:  0 - alert; keenly responsive  1b. Level of consciousness questions:  2 - answers neither question correctly  1c. Level of consciousness questions:  2 - performs neither task correctly  2.     Best Gaze:  0 - normal  3. Visual:  0 - unable to assess due to AMS and speech perseveration (first showed two fingers when testing visual fields and patient proceeded to answer \"two\" when asked how many fingers where shown in all subsequent visual fields tested)  4. Facial Palsy:  0 - normal symmetric movement  5a. Motor left arm:  0 - no drift, limb holds 90 (or 45) degrees for full 10 seconds  5b. Motor right arm:  2 - some effort against gravity, limb cannot get to or maintain (if cued) 90 (or 45) degrees, drifts down to bed, but has some effort against gravity   6a. Motor left le - no drift; leg holds 30 degree position for full 5 seconds  6b. Motor right le - drift; leg falls by the end of the 5 second period but does not hit bed  7. Limb Ataxia:  0 - absent  8. Sensory:  0 - unable to assess due to AMS  9. Best Language:  2 - severe aphasia; all communication is through fragmentary expression; great need for inference, questioning, and guessing by the listener. Range of information that can be exchanged is limited; listener carries burden of communication. Examiner cannot identify materials provided from patient response. 10.  Dysarthria:  1 - mild to moderate, patient slurs at least some words and at worst, can be understood with some difficulty  11. Extinction and Inattention:  0 - unable to assess    TOTAL:  10    Given newly obtained HPI and physical exam during nighttime encounter, I believe pt has had acute alteration in mental status concerning for possible stroke. Given LKW was over 24 hours ago, pt is out of time window for any urgent treatment/intervention. Will order MRI brain wo contrast to rule out stroke and refer to day team for further workup. Further recommendations may follow.     Electronically signed by Patience Daly DO on 2022
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
## 2022-07-30 NOTE — PROCEDURES
EEG REPORT       Patient: Mac Pod Age: 59 y.o. MRN: [id number]      Referring Provider: No ref. provider found    History: This routine 30 minute scalp EEG was recorded with video- monitoring for a 59 y.o.. male  who presented with encephalopathy. This EEG was performed to evaluate for focal and epileptiform abnormalities.      Mac Pod   Current Facility-Administered Medications   Medication Dose Route Frequency Provider Last Rate Last Admin    magnesium sulfate 2000 mg in 50 mL IVPB premix  2,000 mg IntraVENous Once Natacha Arrieta MD        amLODIPine (NORVASC) tablet 5 mg  5 mg Oral Daily Bertndermpancho Acevedo MD   5 mg at 07/30/22 0845    isosorbide mononitrate (IMDUR) extended release tablet 30 mg  30 mg Oral Daily Ash Ordoñez MD   30 mg at 07/30/22 0841    acetaminophen (TYLENOL) tablet 1,000 mg  1,000 mg Oral Once Laurel Battiest, DO        aspirin EC tablet 81 mg  81 mg Oral Daily Laurel Battiest, DO   81 mg at 07/30/22 0841    atorvastatin (LIPITOR) tablet 40 mg  40 mg Oral Daily Laurel Battiest, DO   40 mg at 07/30/22 8253    carvedilol (COREG) tablet 25 mg  25 mg Oral BID WC Laurel Battiest, DO   25 mg at 07/30/22 0845    lisinopril (PRINIVIL;ZESTRIL) tablet 10 mg  10 mg Oral Daily Laurel Battiest, DO   10 mg at 07/30/22 0845    DULoxetine (CYMBALTA) extended release capsule 30 mg  30 mg Oral Daily Laurel Battiest, DO   30 mg at 07/30/22 0841    sodium chloride flush 0.9 % injection 5-40 mL  5-40 mL IntraVENous 2 times per day Laurel Battiest, DO   10 mL at 07/29/22 1114    sodium chloride flush 0.9 % injection 5-40 mL  5-40 mL IntraVENous PRN Laurel Battiest, DO        0.9 % sodium chloride infusion   IntraVENous PRN Laurel Battiest, DO        enoxaparin (LOVENOX) injection 40 mg  40 mg SubCUTAneous Daily Laurel Battiest, DO   40 mg at 07/30/22 0841    acetaminophen (TYLENOL) tablet 650 mg  650 mg Oral Q4H PRN Laurel Battiest, DO        ondansetron (ZOFRAN-ODT) disintegrating tablet 4 mg  4 mg Oral Q8H PRN Benedetta Backbone, DO        Or    ondansetron [facility]) injection 4 mg  4 mg IntraVENous Q6H PRN Benedetta Backbone, DO        0.9 % sodium chloride infusion   IntraVENous Continuous Benedetta Backbone, DO 75 mL/hr at 07/30/22 0545 Rate Verify at 07/30/22 0545        Technical Description: This is a 21 channel digital EEG recording with time-locked video. Electrodes were placed in accordance with the 10-20 International System of Electrode Placement. Single lead EKG monitoring as well as temporal electrodes were included. The patient was not sleep deprived. This recording was obtained during wakefulness. EEG Description: The activity over the right hemisphere consisted of 8 to 9 Hz of alpha activity of 25 to 30 µV. The activity over the left hemisphere was slower, consistent with 5 to 6 Hz of polymorphic theta activity. This asymmetry between the 2 hemispheres persisted throughout the recording. During drowsiness, there was waxing and waning alpha activity. Photic stimulation - stepwise photic stimulation at 2-30 Hz was performed and there was a biposterior, symmetric, driving response. Hyperventilation - was not preformed. No abnormalities were activated by photic stimulation     The EKG channel demonstrated a normal sinus rhythm. Interpretation  This EEG was abnormal.  There was continuous polymorphic theta slowing over the left hemisphere. Clinical correlation  This EEG was abnormal.  Continuous left hemispheric polymorphic theta slowing suggested underlying structural defect. No abnormal epileptiform activity was seen.      Angelito Dumont MD  Diplomate, American Board of Psychiatry and Neurology  Diplomate, American Board of Clinical Neurophysiology  Diplomate, American Board of Epilepsy

## 2022-07-31 ENCOUNTER — APPOINTMENT (OUTPATIENT)
Dept: INTERVENTIONAL RADIOLOGY/VASCULAR | Age: 57
DRG: 030 | End: 2022-07-31
Payer: COMMERCIAL

## 2022-07-31 ENCOUNTER — APPOINTMENT (OUTPATIENT)
Dept: CT IMAGING | Age: 57
DRG: 030 | End: 2022-07-31
Payer: COMMERCIAL

## 2022-07-31 ENCOUNTER — ANESTHESIA (OUTPATIENT)
Dept: INTERVENTIONAL RADIOLOGY/VASCULAR | Age: 57
DRG: 030 | End: 2022-07-31
Payer: COMMERCIAL

## 2022-07-31 ENCOUNTER — ANESTHESIA EVENT (OUTPATIENT)
Dept: INTERVENTIONAL RADIOLOGY/VASCULAR | Age: 57
DRG: 030 | End: 2022-07-31
Payer: COMMERCIAL

## 2022-07-31 LAB
ABSOLUTE EOS #: 0.4 K/UL (ref 0–0.44)
ABSOLUTE IMMATURE GRANULOCYTE: 0.04 K/UL (ref 0–0.3)
ABSOLUTE LYMPH #: 1.38 K/UL (ref 1.1–3.7)
ABSOLUTE MONO #: 0.44 K/UL (ref 0.1–1.2)
ANION GAP SERPL CALCULATED.3IONS-SCNC: 11 MMOL/L (ref 9–17)
BASOPHILS # BLD: 1 % (ref 0–2)
BASOPHILS ABSOLUTE: 0.09 K/UL (ref 0–0.2)
BUN BLDV-MCNC: 11 MG/DL (ref 6–20)
CALCIUM SERPL-MCNC: 8.8 MG/DL (ref 8.6–10.4)
CHLORIDE BLD-SCNC: 101 MMOL/L (ref 98–107)
CO2: 18 MMOL/L (ref 20–31)
CREAT SERPL-MCNC: 0.97 MG/DL (ref 0.7–1.2)
EOSINOPHILS RELATIVE PERCENT: 4 % (ref 1–4)
ESTIMATED AVERAGE GLUCOSE: 114 MG/DL
GFR AFRICAN AMERICAN: >60 ML/MIN
GFR NON-AFRICAN AMERICAN: >60 ML/MIN
GFR SERPL CREATININE-BSD FRML MDRD: ABNORMAL ML/MIN/{1.73_M2}
GLUCOSE BLD-MCNC: 110 MG/DL (ref 70–99)
HBA1C MFR BLD: 5.6 % (ref 4–6)
HCT VFR BLD CALC: 39.6 % (ref 40.7–50.3)
HEMOGLOBIN: 13.1 G/DL (ref 13–17)
IMMATURE GRANULOCYTES: 0 %
LYMPHOCYTES # BLD: 14 % (ref 24–43)
MCH RBC QN AUTO: 27.3 PG (ref 25.2–33.5)
MCHC RBC AUTO-ENTMCNC: 33.1 G/DL (ref 28.4–34.8)
MCV RBC AUTO: 82.7 FL (ref 82.6–102.9)
MONOCYTES # BLD: 4 % (ref 3–12)
NRBC AUTOMATED: 0 PER 100 WBC
PDW BLD-RTO: 17.1 % (ref 11.8–14.4)
PLATELET # BLD: ABNORMAL K/UL (ref 138–453)
PLATELET, FLUORESCENCE: 142 K/UL (ref 138–453)
PLATELET, IMMATURE FRACTION: 2.4 % (ref 1.1–10.3)
POTASSIUM SERPL-SCNC: 4.3 MMOL/L (ref 3.7–5.3)
RBC # BLD: 4.79 M/UL (ref 4.21–5.77)
RBC # BLD: ABNORMAL 10*6/UL
SEG NEUTROPHILS: 77 % (ref 36–65)
SEGMENTED NEUTROPHILS ABSOLUTE COUNT: 7.68 K/UL (ref 1.5–8.1)
SODIUM BLD-SCNC: 130 MMOL/L (ref 135–144)
WBC # BLD: 10 K/UL (ref 3.5–11.3)

## 2022-07-31 PROCEDURE — 99255 IP/OBS CONSLTJ NEW/EST HI 80: CPT | Performed by: PSYCHIATRY & NEUROLOGY

## 2022-07-31 PROCEDURE — 2709999900 HC NON-CHARGEABLE SUPPLY

## 2022-07-31 PROCEDURE — 2709999900 IR ANGIOGRAM CAROTID CEREBRAL BILATERAL

## 2022-07-31 PROCEDURE — 36415 COLL VENOUS BLD VENIPUNCTURE: CPT

## 2022-07-31 PROCEDURE — C1887 CATHETER, GUIDING: HCPCS

## 2022-07-31 PROCEDURE — 2000000000 HC ICU R&B

## 2022-07-31 PROCEDURE — 6360000004 HC RX CONTRAST MEDICATION: Performed by: STUDENT IN AN ORGANIZED HEALTH CARE EDUCATION/TRAINING PROGRAM

## 2022-07-31 PROCEDURE — 2580000003 HC RX 258

## 2022-07-31 PROCEDURE — 2580000003 HC RX 258: Performed by: STUDENT IN AN ORGANIZED HEALTH CARE EDUCATION/TRAINING PROGRAM

## 2022-07-31 PROCEDURE — C1894 INTRO/SHEATH, NON-LASER: HCPCS

## 2022-07-31 PROCEDURE — 85055 RETICULATED PLATELET ASSAY: CPT

## 2022-07-31 PROCEDURE — 99233 SBSQ HOSP IP/OBS HIGH 50: CPT | Performed by: INTERNAL MEDICINE

## 2022-07-31 PROCEDURE — B41F1ZZ FLUOROSCOPY OF RIGHT LOWER EXTREMITY ARTERIES USING LOW OSMOLAR CONTRAST: ICD-10-PCS | Performed by: PSYCHIATRY & NEUROLOGY

## 2022-07-31 PROCEDURE — 37799 UNLISTED PX VASCULAR SURGERY: CPT

## 2022-07-31 PROCEDURE — 6370000000 HC RX 637 (ALT 250 FOR IP): Performed by: STUDENT IN AN ORGANIZED HEALTH CARE EDUCATION/TRAINING PROGRAM

## 2022-07-31 PROCEDURE — 85025 COMPLETE CBC W/AUTO DIFF WBC: CPT

## 2022-07-31 PROCEDURE — 6360000002 HC RX W HCPCS: Performed by: NURSE ANESTHETIST, CERTIFIED REGISTERED

## 2022-07-31 PROCEDURE — 80048 BASIC METABOLIC PNL TOTAL CA: CPT

## 2022-07-31 PROCEDURE — 76937 US GUIDE VASCULAR ACCESS: CPT

## 2022-07-31 PROCEDURE — 2500000003 HC RX 250 WO HCPCS: Performed by: NURSE ANESTHETIST, CERTIFIED REGISTERED

## 2022-07-31 PROCEDURE — 37184 PRIM ART M-THRMBC 1ST VSL: CPT | Performed by: PSYCHIATRY & NEUROLOGY

## 2022-07-31 PROCEDURE — C1725 CATH, TRANSLUMIN NON-LASER: HCPCS

## 2022-07-31 PROCEDURE — C1757 CATH, THROMBECTOMY/EMBOLECT: HCPCS

## 2022-07-31 PROCEDURE — 6360000002 HC RX W HCPCS: Performed by: STUDENT IN AN ORGANIZED HEALTH CARE EDUCATION/TRAINING PROGRAM

## 2022-07-31 PROCEDURE — C1876 STENT, NON-COA/NON-COV W/DEL: HCPCS

## 2022-07-31 PROCEDURE — 6360000004 HC RX CONTRAST MEDICATION: Performed by: INTERNAL MEDICINE

## 2022-07-31 PROCEDURE — 037L3DZ DILATION OF LEFT INTERNAL CAROTID ARTERY WITH INTRALUMINAL DEVICE, PERCUTANEOUS APPROACH: ICD-10-PCS | Performed by: PSYCHIATRY & NEUROLOGY

## 2022-07-31 PROCEDURE — 03VL3DZ RESTRICTION OF LEFT INTERNAL CAROTID ARTERY WITH INTRALUMINAL DEVICE, PERCUTANEOUS APPROACH: ICD-10-PCS | Performed by: PSYCHIATRY & NEUROLOGY

## 2022-07-31 PROCEDURE — 3700000000 HC ANESTHESIA ATTENDED CARE

## 2022-07-31 PROCEDURE — 70450 CT HEAD/BRAIN W/O DYE: CPT

## 2022-07-31 PROCEDURE — B3141ZZ FLUOROSCOPY OF LEFT COMMON CAROTID ARTERY USING LOW OSMOLAR CONTRAST: ICD-10-PCS | Performed by: PSYCHIATRY & NEUROLOGY

## 2022-07-31 PROCEDURE — 6370000000 HC RX 637 (ALT 250 FOR IP): Performed by: INTERNAL MEDICINE

## 2022-07-31 PROCEDURE — C1769 GUIDE WIRE: HCPCS

## 2022-07-31 PROCEDURE — 61645 PERQ ART M-THROMBECT &/NFS: CPT

## 2022-07-31 PROCEDURE — 3700000001 HC ADD 15 MINUTES (ANESTHESIA)

## 2022-07-31 PROCEDURE — 0042T CT BRAIN PERFUSION: CPT

## 2022-07-31 PROCEDURE — 37215 TRANSCATH STENT CCA W/EPS: CPT | Performed by: PSYCHIATRY & NEUROLOGY

## 2022-07-31 PROCEDURE — C1760 CLOSURE DEV, VASC: HCPCS

## 2022-07-31 PROCEDURE — 03CL3Z7 EXTIRPATION OF MATTER FROM LEFT INTERNAL CAROTID ARTERY USING STENT RETRIEVER, PERCUTANEOUS APPROACH: ICD-10-PCS | Performed by: PSYCHIATRY & NEUROLOGY

## 2022-07-31 RX ORDER — FENTANYL CITRATE 50 UG/ML
100 INJECTION, SOLUTION INTRAMUSCULAR; INTRAVENOUS ONCE
Status: DISCONTINUED | OUTPATIENT
Start: 2022-07-31 | End: 2022-08-02

## 2022-07-31 RX ORDER — HEPARIN SODIUM 1000 [USP'U]/ML
2000 INJECTION, SOLUTION INTRAVENOUS; SUBCUTANEOUS ONCE
Status: COMPLETED | OUTPATIENT
Start: 2022-07-31 | End: 2022-07-31

## 2022-07-31 RX ORDER — FOLIC ACID 1 MG/1
1 TABLET ORAL DAILY
Status: DISCONTINUED | OUTPATIENT
Start: 2022-07-31 | End: 2022-08-10 | Stop reason: HOSPADM

## 2022-07-31 RX ORDER — BISACODYL 10 MG
10 SUPPOSITORY, RECTAL RECTAL DAILY PRN
Status: DISCONTINUED | OUTPATIENT
Start: 2022-07-31 | End: 2022-08-10 | Stop reason: HOSPADM

## 2022-07-31 RX ORDER — HYDRALAZINE HYDROCHLORIDE 20 MG/ML
10 INJECTION INTRAMUSCULAR; INTRAVENOUS EVERY 4 HOURS PRN
Status: DISCONTINUED | OUTPATIENT
Start: 2022-07-31 | End: 2022-08-10 | Stop reason: HOSPADM

## 2022-07-31 RX ORDER — PROTAMINE SULFATE 10 MG/ML
INJECTION, SOLUTION INTRAVENOUS PRN
Status: DISCONTINUED | OUTPATIENT
Start: 2022-07-31 | End: 2022-07-31 | Stop reason: SDUPTHER

## 2022-07-31 RX ORDER — MIDAZOLAM HYDROCHLORIDE 2 MG/2ML
1 INJECTION, SOLUTION INTRAMUSCULAR; INTRAVENOUS ONCE
Status: DISCONTINUED | OUTPATIENT
Start: 2022-07-31 | End: 2022-08-02

## 2022-07-31 RX ORDER — EPHEDRINE SULFATE/0.9% NACL/PF 50 MG/5 ML
SYRINGE (ML) INTRAVENOUS PRN
Status: DISCONTINUED | OUTPATIENT
Start: 2022-07-31 | End: 2022-07-31 | Stop reason: SDUPTHER

## 2022-07-31 RX ORDER — HEPARIN SODIUM 1000 [USP'U]/ML
INJECTION, SOLUTION INTRAVENOUS; SUBCUTANEOUS PRN
Status: DISCONTINUED | OUTPATIENT
Start: 2022-07-31 | End: 2022-07-31 | Stop reason: SDUPTHER

## 2022-07-31 RX ORDER — FAMOTIDINE 20 MG/1
20 TABLET, FILM COATED ORAL 2 TIMES DAILY
Status: DISCONTINUED | OUTPATIENT
Start: 2022-07-31 | End: 2022-08-10 | Stop reason: HOSPADM

## 2022-07-31 RX ORDER — IODIXANOL 320 MG/ML
200 INJECTION, SOLUTION INTRAVASCULAR
Status: COMPLETED | OUTPATIENT
Start: 2022-07-31 | End: 2022-07-31

## 2022-07-31 RX ORDER — 0.9 % SODIUM CHLORIDE 0.9 %
1000 INTRAVENOUS SOLUTION INTRAVENOUS ONCE
Status: COMPLETED | OUTPATIENT
Start: 2022-07-31 | End: 2022-07-31

## 2022-07-31 RX ORDER — CEFAZOLIN SODIUM 1 G/3ML
INJECTION, POWDER, FOR SOLUTION INTRAMUSCULAR; INTRAVENOUS PRN
Status: DISCONTINUED | OUTPATIENT
Start: 2022-07-31 | End: 2022-07-31 | Stop reason: SDUPTHER

## 2022-07-31 RX ADMIN — CLOPIDOGREL 75 MG: 75 TABLET, FILM COATED ORAL at 07:58

## 2022-07-31 RX ADMIN — IODIXANOL 107 ML: 320 INJECTION, SOLUTION INTRAVASCULAR at 12:21

## 2022-07-31 RX ADMIN — PROTAMINE SULFATE 50 MG: 10 INJECTION, SOLUTION INTRAVENOUS at 11:58

## 2022-07-31 RX ADMIN — SODIUM CHLORIDE: 9 INJECTION, SOLUTION INTRAVENOUS at 21:45

## 2022-07-31 RX ADMIN — DESMOPRESSIN ACETATE 40 MG: 0.2 TABLET ORAL at 07:59

## 2022-07-31 RX ADMIN — ISOSORBIDE MONONITRATE 30 MG: 30 TABLET ORAL at 07:58

## 2022-07-31 RX ADMIN — SODIUM CHLORIDE: 9 INJECTION, SOLUTION INTRAVENOUS at 10:53

## 2022-07-31 RX ADMIN — SODIUM CHLORIDE, PRESERVATIVE FREE 10 ML: 5 INJECTION INTRAVENOUS at 07:59

## 2022-07-31 RX ADMIN — LACTULOSE 20 G: 20 SOLUTION ORAL at 07:58

## 2022-07-31 RX ADMIN — Medication 81 MG: at 07:59

## 2022-07-31 RX ADMIN — IOPAMIDOL 50 ML: 755 INJECTION, SOLUTION INTRAVENOUS at 09:24

## 2022-07-31 RX ADMIN — DULOXETINE HYDROCHLORIDE 30 MG: 30 CAPSULE, DELAYED RELEASE ORAL at 07:58

## 2022-07-31 RX ADMIN — HEPARIN SODIUM 2000 UNITS: 1000 INJECTION INTRAVENOUS; SUBCUTANEOUS at 10:07

## 2022-07-31 RX ADMIN — Medication 10 MG: at 11:44

## 2022-07-31 RX ADMIN — HEPARIN SODIUM 4000 UNITS: 1000 INJECTION INTRAVENOUS; SUBCUTANEOUS at 11:04

## 2022-07-31 RX ADMIN — CARVEDILOL 25 MG: 25 TABLET, FILM COATED ORAL at 07:59

## 2022-07-31 RX ADMIN — CEFAZOLIN 2000 MG: 1 INJECTION, POWDER, FOR SOLUTION INTRAMUSCULAR; INTRAVENOUS at 10:54

## 2022-07-31 RX ADMIN — SODIUM CHLORIDE, PRESERVATIVE FREE 5 ML: 5 INJECTION INTRAVENOUS at 22:10

## 2022-07-31 RX ADMIN — SODIUM CHLORIDE 1000 ML: 9 INJECTION, SOLUTION INTRAVENOUS at 09:51

## 2022-07-31 RX ADMIN — SODIUM CHLORIDE: 9 INJECTION, SOLUTION INTRAVENOUS at 07:53

## 2022-07-31 ASSESSMENT — ENCOUNTER SYMPTOMS
SHORTNESS OF BREATH: 1
DYSPNEA ACTIVITY LEVEL: NO INTERVAL CHANGE

## 2022-07-31 ASSESSMENT — PULMONARY FUNCTION TESTS: PIF_VALUE: 0

## 2022-07-31 ASSESSMENT — COPD QUESTIONNAIRES: CAT_SEVERITY: NO INTERVAL CHANGE

## 2022-07-31 NOTE — PROGRESS NOTES
Occupational [address]  Occupational Therapy Not Seen Note    DATE: 2022    NAME: Brett Rivera  MRN: [id number]   : 1965      Patient not seen this date for Occupational Therapy due to:    Surgery/Procedure: Pt off floor at IR and then transferring to ICU per RN.        Electronically signed by Juan M Mcarthur OT on 2022 at 12:50 PM
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
## 2022-07-31 NOTE — SEDATION DOCUMENTATION
Closure device deployed, manual pressure applied dr Jaswant Fontaine.  Neuro status remains unchanged
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
## 2022-07-31 NOTE — FLOWSHEET NOTE
Patient presents with eyes open spont, pupils 2+, patient is mute , right sided facial droop, moves left sided extremities , follows simple commands intermittently . Right sided extremities move to pain . Pedal pulses palpable and marked. Blackman intact.   Cont to monitor

## 2022-07-31 NOTE — FLOWSHEET NOTE
Post Procedure Transfer from IR Table  [x] Tubes and Lines intact     Post Procedure Neuro-Checks/NIHSS/Vitals  [x] Completed post procedure  [x] Completed bedside handoff  [x] Frequency ordered  [x] Verbal communication of frequency      Post Procedure Puncture Site Checks  [x] Completed post procedure  [x] Completed bedside handoff  [x] Frequency ordered  [x] Verbal communication of frequency    Post Procedure Pulse  Checks  [x] Completed post procedure  [x] Completed bedside handoff  [x] Frequency ordered  [x] Verbal communication of frequency    Order Set  [x] Post Neuro-Endo Procedure  [] Stroke  [] t-PA     B/P control  [x] Verbal Communication   [x] Order in Care Path    Medication Review  [x] Given during procedure  [x] Current drips/meds/fluids    [] Physician Notified of All changes in Assessment    Family  [] Location Post Procedure     Report given to devi salguero groin check is [id number]
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
## 2022-07-31 NOTE — BRIEF OP NOTE
Brief Postoperative Note                    [facility]    NEUROENDOVASCULAR SERVICE: POST-OP NOTE: 7/31/2022    Pt Name: Anne Ferrara  MRN: [id number]  YOB: 1965  Date of Procedure: 7/31/2022  Primary Care Physician: Norma Seo MD  Referring Physician:Dr. Iza Toribio MD      Pre-Procedural Diagnosis:Left ICA cervical segment acute on chronic occlusion   Post-Procedural Diagnosis:Same as above       Procedure Performed:Diagnostic Cerebral Angiogram with Left ICA occlusion mechanical thrombectomy with stent retriever and mechanical aspiration, pre and post stenting balloon angioplasty, stenting with carotid wallstent     Surgeon:   Poppy Boyd MD    Fellow:  Wellington Colon MD     Assisting Tech:  [employer]    PRE-PROCEDURAL EXAM:  Prestroke baseline mRS MODIFIED MAYRA SCORE: 4 - Moderate severe disability:  unable to walk or attend to own bodily needs without assistance. Neurological exam performed and unchanged from initial H&P or consult  CT head ASPECT score: 7    Anesthesia: MAC anesthesia  Complications: none    Intra-Operative EXAM:  Neurological exam performed and unchanged from initial H&P or consult    EBL: < less than 100       Cc            Specimens: Were not Obtained  Contrast:     Visipaque 320 low osmolar 107 Cc             Fluoro: 34.3 min    Findings:  Please see dictated Radiology note for further details  Left ICA cervical segment acute occlusion with reconstitution of flow in the left ICA ophthalmic segment through ophthalmic artery through ECA.    The above lesion was treated with Embo trap stent retriever 6.5 mm x 45 mm in the distal ICA cervical segment, mechanical aspiration through  [facility] with penumbra engine, Pre-stenting balloon angioplasty with 5.0 mm x 40 mm loren balloon using seimless technique, 10 mm x 31 mm carotid wallstent, post stenting balloon angioplasty with Emboguard [facility]   Final TICI score 03  Residual stenosis less than 10% POST-PROCEDURAL EXAM :   Stable neurological Exam  Neurological exam performed and unchanged from initial H&P or consult    Closure:  right Angioseal 8   F        POST-PROCEDURAL MONITORING : see orders  Disposition: Neuro ICU      Recommendations:  Back to Neuro ICU   Do not bend right leg for 3 hours. Groin checks per protocol. Peripheral pulse checks per protocol. SBP goal 110-140 mm Hg   C/w ASA 81 and Plavix 75 from tomorrow   CT Perfusion in am  Follow up with Emmett Mendez MD  2 weeks after discharge and Dr. Kathi Pabon 3 months after discharge.         MD Colton Kelly MD   Pager: [phone]  Stroke, [facility]  [place]  [facility] [place] [facility] [address]  Electronically signed 7/31/2022 at 12:31 PM

## 2022-07-31 NOTE — SEDATION DOCUMENTATION
Anesthesia: MAC    Wheel in time:   1030  Groin puncture:  1103    Guide Cath placement: 1117    TICI Baseline:       1st run  Deployment time: 1124    1st run Retrieval time: 6974 few small red clots obtained  1st pass TICI score: TICI 3      Decision to stent left ICA     Closure time: 0041

## 2022-07-31 NOTE — PROGRESS NOTES
Call [facility] and find out why pt not getting refill losartan and vytorin, refill is available? We will probably have to send losartan short term supply to [facility] or pharmacy of choice due to patient is out of medication.      thanks [facility]   Progress Note                   Date:   7/31/2022  Patient name: Anne Ferrara  Date of admission:  7/28/2022  4:33 PM  MRN:   [id number]  YOB: 1965  PCP: Norma Seo MD    Reason for Admission: Syncope and collapse [R55]    Subjective:       Clinical Changes / Abnormalities: Pt seen and examined in the room. Pt aphasic currently. RN states has been since last night. Neuro at bedside. Medications:   Scheduled Meds:   folic acid  1 mg Oral Daily    sodium chloride  1,000 mL IntraVENous Once    [Held by provider] amLODIPine  5 mg Oral BID    lactulose  20 g Oral TID    clopidogrel  75 mg Oral Daily    isosorbide mononitrate  30 mg Oral Daily    acetaminophen  1,000 mg Oral Once    aspirin  81 mg Oral Daily    atorvastatin  40 mg Oral Daily    carvedilol  25 mg Oral BID WC    [Held by provider] lisinopril  10 mg Oral Daily    DULoxetine  30 mg Oral Daily    sodium chloride flush  5-40 mL IntraVENous 2 times per day    enoxaparin  40 mg SubCUTAneous Daily     Continuous Infusions:   dextrose      sodium chloride      sodium chloride 75 mL/hr at 07/31/22 0753     CBC:   Recent Labs     07/28/22  1707 07/30/22  0821 07/31/22  0349   WBC 9.1 11.4* 10.0   HGB 13.9 14.3 13.1    163 See Reflexed IPF Result       BMP:    Recent Labs     07/28/22  1707 07/30/22  0821 07/31/22  0349   * 133* 130*   K 4.3 4.2 4.3   CL 96* 101 101   CO2 25 21 18*   BUN 15 12 11   CREATININE 1.51* 1.04 0.97   GLUCOSE 163* 91 110*       Hepatic:   Recent Labs     07/28/22  1707 07/30/22  0821   AST 16 15   ALT 14 10   BILITOT 0.56 0.54   ALKPHOS 169* 168*       Troponin:   Recent Labs     07/28/22  1707 07/28/22  1909   TROPHS 17 15       BNP: No results for input(s): BNP in the last 72 hours. Lipids:   Recent Labs     07/30/22  0821   CHOL 149   HDL 35*     INR: No results for input(s): INR in the last 72 hours.     Objective:   Vitals: BP (!) 150/87   Pulse 73   Temp 98.2 °F stenosis of spine     Balance problem     Prediabetes     Status post cervical spinal fusion     History of syncope     Slow transit constipation     Cornu cutaneum, right arm     Neck pain of over 3 months duration     Ex-smoker     Dry skin     EDUARDO (dyspnea on exertion)     Abnormal craving     Chronic obstructive pulmonary disease with acute lower respiratory infection (HCC)     Mastoiditis of right side     Hypertensive urgency     Coronary artery disease involving coronary bypass graft of native heart     Depression with suicidal ideation     Gastroesophageal reflux disease with esophagitis     Positive FIT (fecal immunochemical test)     Constipation     Degenerative disc disease, cervical     Chest pain     Tobacco abuse counseling     Polyp of transverse colon     Polyp of descending colon     Rectal polyp     Hypomagnesemia     Pleural effusion     COPD (chronic obstructive pulmonary disease) (HCC)     CAD (coronary artery disease)     Microscopic hematuria     Acute on chronic diastolic (congestive) heart failure (HCC)     CHF (congestive heart failure), NYHA class I, acute, diastolic (HCC)     Pneumonia     Liver lesion     Mild malnutrition (HCC)     Dysphagia     Gastroparesis     Acute kidney injury superimposed on CKD (HCC)     Major depressive disorder, single episode     Unintentional weight loss of 10% body weight within 6 months     Esophageal dysphagia     Moderate malnutrition (HCC)     Anxiety     Closed fracture of fifth metatarsal bone     Interstitial lung disease (HCC)     NSTEMI (non-ST elevated myocardial infarction) (HCC)     Type 2 diabetes mellitus without complication (HCC)     Elevated serum immunoglobulin free light chain level     Abnormal ANCA (antineutrophil cytoplasmic antibody)     Chronic kidney disease     Hypocalcemia     Anemia in stage 3 chronic kidney disease     Acute kidney failure with lesion of tubular necrosis (HCC)     Nephrotic syndrome with focal glomerulosclerosis     Rapid progressv nephritic syndrm, diffuse crescentc glomerulonephritis     Peripheral edema     Syncope and collapse     Primary pauci-immune necrotizing and crescentic glomerulonephritis     Cardiac arrest (HCC)     Cervical stenosis of spinal canal     Ischemic cardiomyopathy     Attention-deficit hyperactivity disorder, unspecified type     Chronic kidney disease, stage 3b (HCC)     Gastro-esophageal reflux disease without esophagitis     Presence of automatic (implantable) cardiac defibrillator     Unspecified osteoarthritis, unspecified site     Hypertensive emergency     Cardiomyopathy [facility])     Encounter for implantable cardioverter-defibrillator discussion     Transient alteration of awareness      Plan of Treatment:   Near syncope. IV fluids ovnernight. Negative orthostatic BPs.   Continue compression stockings   ICD interrogation   Imdur/ACE cut in half  Continue norvasc 10mg daily   Continue ASA, statin, BB   Await neuro recs     Electronically signed by PJ Akins CNP on 7/31/2022 at 8:49 AM  [address]  [phone]

## 2022-07-31 NOTE — PLAN OF CARE
Problem: Chronic Conditions and Co-morbidities  Goal: Patient's chronic conditions and co-morbidity symptoms are monitored and maintained or improved  7/31/2022 0453 by Edwin Butler RN  Outcome: Progressing  7/30/2022 1512 by Willa Pereyra RN  Outcome: Progressing     Problem: Pain  Goal: Verbalizes/displays adequate comfort level or baseline comfort level  7/31/2022 0453 by Edwin Butler RN  Outcome: Progressing  7/30/2022 1512 by Willa Pereyra RN  Outcome: Progressing     Problem: Safety - Adult  Goal: Free from fall injury  7/31/2022 0453 by Edwin Butler RN  Outcome: Progressing  7/30/2022 1512 by Willa Pereyra RN  Outcome: Progressing     Problem: Discharge Planning  Goal: Discharge to home or other facility with appropriate resources  7/31/2022 0453 by Edwin Butler RN  Outcome: Progressing  7/30/2022 1512 by Willa Pereyra RN  Outcome: Progressing     Problem: ABCDS Injury Assessment  Goal: Absence of physical injury  7/31/2022 0453 by Edwin Butler RN  Outcome: Progressing  7/30/2022 1512 by Willa Pereyra RN  Outcome: Progressing

## 2022-07-31 NOTE — ANESTHESIA POSTPROCEDURE EVALUATION
Department of Anesthesiology  Postprocedure Note    Patient: Yari Nugent  MRN: [id number]  YOB: 1965  Date of evaluation: 7/31/2022      Procedure Summary     Date: 07/31/22 Room / Location: [address] [place] [address] 83 Procedures    Anesthesia Start: 1053 Anesthesia Stop: 8071    Procedure: IR ANGIOGRAM CAROTID CEREBRAL BILATERAL Diagnosis: (thrombectomy)    Scheduled Providers:  Responsible Provider: Bee Haines MD    Anesthesia Type: MAC ASA Status: 4 - Emergent          Anesthesia Type: No value filed.     Don Phase I:      Don Phase II:        Anesthesia Post Evaluation    Patient location during evaluation: ICU  Patient participation: complete - patient cannot participate  Level of consciousness: obtunded/minimal responses  Pain score: 0  Airway patency: patent  Nausea & Vomiting: no nausea and no vomiting  Complications: no  Cardiovascular status: hemodynamically stable  Respiratory status: acceptable  Hydration status: stable

## 2022-07-31 NOTE — PROGRESS NOTES
Physical Therapy        Physical Therapy Cancel Note      DATE: 2022    NAME: Regina Yin  MRN: [id number]   : 1965      Patient not seen this date for Physical Therapy due to:    Testing: Pt leaving for CT then transferring to ICU  per RN.  Will check back       Electronically signed by Tonja Murray PTA on 2022 at 9:42 AM
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
## 2022-08-01 ENCOUNTER — APPOINTMENT (OUTPATIENT)
Dept: CT IMAGING | Age: 57
DRG: 030 | End: 2022-08-01
Payer: COMMERCIAL

## 2022-08-01 LAB
ABSOLUTE EOS #: 0.34 K/UL (ref 0–0.44)
ABSOLUTE IMMATURE GRANULOCYTE: 0.03 K/UL (ref 0–0.3)
ABSOLUTE LYMPH #: 1.33 K/UL (ref 1.1–3.7)
ABSOLUTE MONO #: 0.47 K/UL (ref 0.1–1.2)
ANION GAP SERPL CALCULATED.3IONS-SCNC: 10 MMOL/L (ref 9–17)
BASOPHILS # BLD: 1 % (ref 0–2)
BASOPHILS ABSOLUTE: 0.07 K/UL (ref 0–0.2)
BUN BLDV-MCNC: 10 MG/DL (ref 6–20)
CALCIUM IONIZED: 1.09 MMOL/L (ref 1.13–1.33)
CALCIUM SERPL-MCNC: 7.9 MG/DL (ref 8.6–10.4)
CHLORIDE BLD-SCNC: 103 MMOL/L (ref 98–107)
CO2: 18 MMOL/L (ref 20–31)
CREAT SERPL-MCNC: 0.86 MG/DL (ref 0.7–1.2)
EOSINOPHILS RELATIVE PERCENT: 4 % (ref 1–4)
GFR AFRICAN AMERICAN: >60 ML/MIN
GFR NON-AFRICAN AMERICAN: >60 ML/MIN
GFR SERPL CREATININE-BSD FRML MDRD: ABNORMAL ML/MIN/{1.73_M2}
GLUCOSE BLD-MCNC: 139 MG/DL (ref 75–110)
GLUCOSE BLD-MCNC: 63 MG/DL (ref 70–99)
GLUCOSE BLD-MCNC: 67 MG/DL (ref 75–110)
GLUCOSE BLD-MCNC: 88 MG/DL (ref 75–110)
GLUCOSE BLD-MCNC: 88 MG/DL (ref 75–110)
HCT VFR BLD CALC: 35.4 % (ref 40.7–50.3)
HEMOGLOBIN: 11.8 G/DL (ref 13–17)
IMMATURE GRANULOCYTES: 0 %
LYMPHOCYTES # BLD: 16 % (ref 24–43)
MCH RBC QN AUTO: 28.7 PG (ref 25.2–33.5)
MCHC RBC AUTO-ENTMCNC: 33.3 G/DL (ref 28.4–34.8)
MCV RBC AUTO: 86.1 FL (ref 82.6–102.9)
MONOCYTES # BLD: 6 % (ref 3–12)
NRBC AUTOMATED: 0 PER 100 WBC
PDW BLD-RTO: 17.2 % (ref 11.8–14.4)
PLATELET # BLD: 130 K/UL (ref 138–453)
PMV BLD AUTO: 10.6 FL (ref 8.1–13.5)
POTASSIUM SERPL-SCNC: 3.9 MMOL/L (ref 3.7–5.3)
RBC # BLD: 4.11 M/UL (ref 4.21–5.77)
RBC # BLD: ABNORMAL 10*6/UL
REASON FOR REJECTION: NORMAL
SEG NEUTROPHILS: 73 % (ref 36–65)
SEGMENTED NEUTROPHILS ABSOLUTE COUNT: 6.09 K/UL (ref 1.5–8.1)
SODIUM BLD-SCNC: 131 MMOL/L (ref 135–144)
WBC # BLD: 8.3 K/UL (ref 3.5–11.3)
ZZ NTE CLEAN UP: ORDERED TEST: NORMAL
ZZ NTE WITH NAME CLEAN UP: SPECIMEN SOURCE: NORMAL

## 2022-08-01 PROCEDURE — 0042T CT BRAIN PERFUSION: CPT

## 2022-08-01 PROCEDURE — 6370000000 HC RX 637 (ALT 250 FOR IP)

## 2022-08-01 PROCEDURE — 2000000000 HC ICU R&B

## 2022-08-01 PROCEDURE — 99291 CRITICAL CARE FIRST HOUR: CPT | Performed by: PSYCHIATRY & NEUROLOGY

## 2022-08-01 PROCEDURE — 2500000003 HC RX 250 WO HCPCS: Performed by: STUDENT IN AN ORGANIZED HEALTH CARE EDUCATION/TRAINING PROGRAM

## 2022-08-01 PROCEDURE — 2580000003 HC RX 258: Performed by: STUDENT IN AN ORGANIZED HEALTH CARE EDUCATION/TRAINING PROGRAM

## 2022-08-01 PROCEDURE — 36415 COLL VENOUS BLD VENIPUNCTURE: CPT

## 2022-08-01 PROCEDURE — 6360000004 HC RX CONTRAST MEDICATION: Performed by: STUDENT IN AN ORGANIZED HEALTH CARE EDUCATION/TRAINING PROGRAM

## 2022-08-01 PROCEDURE — 82330 ASSAY OF CALCIUM: CPT

## 2022-08-01 PROCEDURE — 6370000000 HC RX 637 (ALT 250 FOR IP): Performed by: STUDENT IN AN ORGANIZED HEALTH CARE EDUCATION/TRAINING PROGRAM

## 2022-08-01 PROCEDURE — 6370000000 HC RX 637 (ALT 250 FOR IP): Performed by: INTERNAL MEDICINE

## 2022-08-01 PROCEDURE — 82947 ASSAY GLUCOSE BLOOD QUANT: CPT

## 2022-08-01 PROCEDURE — 99024 POSTOP FOLLOW-UP VISIT: CPT | Performed by: PSYCHIATRY & NEUROLOGY

## 2022-08-01 PROCEDURE — 80048 BASIC METABOLIC PNL TOTAL CA: CPT

## 2022-08-01 PROCEDURE — 85025 COMPLETE CBC W/AUTO DIFF WBC: CPT

## 2022-08-01 PROCEDURE — 92523 SPEECH SOUND LANG COMPREHEN: CPT

## 2022-08-01 PROCEDURE — 92610 EVALUATE SWALLOWING FUNCTION: CPT

## 2022-08-01 RX ORDER — CALCIUM GLUCONATE 20 MG/ML
1000 INJECTION, SOLUTION INTRAVENOUS ONCE
Status: COMPLETED | OUTPATIENT
Start: 2022-08-01 | End: 2022-08-01

## 2022-08-01 RX ORDER — CALCIUM GLUCONATE 20 MG/ML
2000 INJECTION, SOLUTION INTRAVENOUS ONCE
Status: DISCONTINUED | OUTPATIENT
Start: 2022-08-01 | End: 2022-08-01

## 2022-08-01 RX ORDER — SENNA AND DOCUSATE SODIUM 50; 8.6 MG/1; MG/1
2 TABLET, FILM COATED ORAL DAILY PRN
Status: DISCONTINUED | OUTPATIENT
Start: 2022-08-01 | End: 2022-08-10 | Stop reason: HOSPADM

## 2022-08-01 RX ORDER — ENOXAPARIN SODIUM 100 MG/ML
30 INJECTION SUBCUTANEOUS DAILY
Status: DISCONTINUED | OUTPATIENT
Start: 2022-08-02 | End: 2022-08-02

## 2022-08-01 RX ADMIN — Medication 81 MG: at 09:30

## 2022-08-01 RX ADMIN — SODIUM CHLORIDE: 9 INJECTION, SOLUTION INTRAVENOUS at 05:05

## 2022-08-01 RX ADMIN — CARVEDILOL 25 MG: 25 TABLET, FILM COATED ORAL at 17:08

## 2022-08-01 RX ADMIN — ISOSORBIDE MONONITRATE 30 MG: 30 TABLET ORAL at 09:30

## 2022-08-01 RX ADMIN — DULOXETINE HYDROCHLORIDE 30 MG: 30 CAPSULE, DELAYED RELEASE ORAL at 09:30

## 2022-08-01 RX ADMIN — IOPAMIDOL 50 ML: 755 INJECTION, SOLUTION INTRAVENOUS at 09:12

## 2022-08-01 RX ADMIN — FAMOTIDINE 20 MG: 20 TABLET, FILM COATED ORAL at 20:21

## 2022-08-01 RX ADMIN — SODIUM CHLORIDE, PRESERVATIVE FREE 10 ML: 5 INJECTION INTRAVENOUS at 20:21

## 2022-08-01 RX ADMIN — CALCIUM GLUCONATE 1000 MG: 20 INJECTION, SOLUTION INTRAVENOUS at 10:50

## 2022-08-01 RX ADMIN — CLOPIDOGREL 75 MG: 75 TABLET, FILM COATED ORAL at 09:30

## 2022-08-01 RX ADMIN — DESMOPRESSIN ACETATE 40 MG: 0.2 TABLET ORAL at 09:30

## 2022-08-01 RX ADMIN — FAMOTIDINE 20 MG: 20 TABLET, FILM COATED ORAL at 09:30

## 2022-08-01 RX ADMIN — FOLIC ACID 1 MG: 1 TABLET ORAL at 09:30

## 2022-08-01 RX ADMIN — CARVEDILOL 25 MG: 25 TABLET, FILM COATED ORAL at 09:30

## 2022-08-01 RX ADMIN — CALCIUM GLUCONATE 1000 MG: 20 INJECTION, SOLUTION INTRAVENOUS at 17:08

## 2022-08-01 NOTE — PROGRESS NOTES
Endovascular Neurosurgery Progress Note    SUBJECTIVE:     Stable overnight, no acute event    Review of Systems:  CONSTITUTIONAL:  negative for fevers, chills, fatigue and malaise    EYES:  negative for double vision, blurred vision and photophobia     HEENT:  negative for tinnitus, epistaxis and sore throat    RESPIRATORY:  negative for cough, shortness of breath, wheezing    CARDIOVASCULAR:  negative for chest pain, palpitations, syncope, edema    GASTROINTESTINAL:  negative for nausea, vomiting    GENITOURINARY:  negative for incontinence    MUSCULOSKELETAL:  negative for neck or back pain    NEUROLOGICAL:  Negative for weakness and tingling  negative for headaches and dizziness    PSYCHIATRIC:  negative for anxiety      Review of systems otherwise negative. OBJECTIVE:     Vitals:    08/01/22 0600   BP: 123/73   Pulse: 75   Resp: (!) 31   Temp:    SpO2: 93%        General:  Gen: normal habitus, NAD  HEENT: NCAT, mucosa moist  Cvs: RRR, S1 S2 normal  Resp: symmetric unlabored breathing  Abd: s/nd/nt  Ext: no edema  Skin: no lesions seen, warm and dry    Neuro:  Gen: awake and alert, muted most of the time  Lang/speech: muted most of the time, when pt speaks, he speaks in single words and his speech is clear  CN: PERRL, EOMI, VFF, V1-3 intact, face symmetric, hearing intact, shoulder shrug symmetric, tongue midline  Motor: grossly 5/5 UE and LE on the left, right side 3/5  Sense: LT intact in all 4 ext. Coord: FTN and HTS intact b/l  DTR: deferred  Gait: not tested    NIH Stroke Scale:   1a  Level of consciousness: 0 - alert; keenly responsive   1b. LOC questions:  2 - answers neither question correctly   1c. LOC commands: 0 - performs both tasks correctly   2. Best Gaze: 0 - normal   3. Visual: 0 - no visual loss   4. Facial Palsy: 1 - minor paralysis (flattened nasolabial fold, asymmetric on smiling)   5a. Motor left arm: 0 - no drift, limb holds 90 (or 45) degrees for full 10 seconds   5b.   Motor right arm: 2 - some effort against gravity, limb cannot get to or maintain (if cued) 90 (or 45) degrees, drifts down to bed, but has some effort against gravity    6a. Motor left le - no drift; leg holds 30 degree position for full 5 seconds   6b  Motor right le - some effort against gravity; leg falls to bed by 5 seconds but has some effort against gravity   7. Limb Ataxia: 0 - absent   8. Sensory: 0 - normal; no sensory loss   9. Best Language:  2 - severe aphasia; all communication is through fragmentary expression; great need for inference, questioning, and guessing by the listener. Range of information that can be exchanged is limited; listener carries burden of communication. Examiner cannot identify materials provided from patient response. 10. Dysarthria: 1 - mild to moderate, patient slurs at least some words and at worst, can be understood with some difficulty   11. Extinction and Inattention: 0 - no abnormality         Total:   10     MRS: 4      LABS:   Reviewed. Lab Results   Component Value Date    HGB 11.8 (L) 2022    WBC 8.3 2022     (L) 2022     (L) 2022    BUN 10 2022    CREATININE 0.86 2022    AST 15 2022    ALT 10 2022    MG 2.0 2022    APTT 27.6 2022    INR 1.2 2022      Lab Results   Component Value Date/Time    COVID19 Not Detected 2022 08:47 PM    COVID19 Not Detected 2020 11:08 AM       RADIOLOGY:     Images were personally reviewed including:     CTP 22  Large mismatch in the left hemisphere     Cerebral angiogram 22  1. Left ICA cervical segment acute occlusion with reconstitution of flow in the left ICA ophthalmic segment through ophthalmic artery through ECA branches. 2.  The above lesion was treated with EmboTrap stent retriever 6.5 mm x 45 mm deployed in the distal ICA cervical segment, mechanical aspiration through ? [facility] with [employer] engine, Pre-stenting balloon angioplasty with 5.0 mm x 40 mm loren balloon   using seimless technique, 10 mm x 31 mm carotid wallstent, post stenting balloon angioplasty with Emboguard [facility]   3. Final TICI score?03   4. Residual stenosis less than 10%? CTP: 8/1/22  - no more mismatch      ASSESSMENT:     58 y/o M with pmh significant for Htn, CAD s/p CABG in 2011, V. Fib PEA in 02/2022, AICD on 07/20, CKD stage III, presented on 07/28 with a syncope and right sided weakness, CTA completed on 07/30 which showed left ICA cervical segment occlusion. Patient had worsening neuro exam overnight, became non verbal, weaker on right UE and LE, NIHSS was 15. CT perfusion was emergently performed which showed ischemic penumbra in left MCA, DARYN territory. Patient was taken for emergent thrombectomy (7/31/22) and L ICA stenting (7/31/22)        PLAN:     - con't aspirin and plavix  - PT/OT and acute rehab  - SBP in the 110-140 in the immediate 24 hours post stenting  - CT perfusion done this AM showed no mismatch      Case discussed with Dr. Charli Richard attending.     Marietta Duke MD PGY 7  Stroke, [facility]  [address] [place]  Electronically signed 8/1/2022 at 6:55 AM

## 2022-08-01 NOTE — PROGRESS NOTES
Daily Progress Note  Neuro Critical Care    Patient Name: Megan Salgado  Patient : 1965  Room/Bed: 0124/0124-01  Code Status: full code   Allergies: Allergies   Allergen Reactions    Morphine Itching       CHIEF COMPLAINT:      AMS      INTERVAL HISTORY    Initial Presentation (Admitted 2022): The patient is a 59 y.o. 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 Presented to ED on 2022. EMS was called after a syncopal event. Patient was complaining of headache for 2 days. Apparently, patient was trying to leave AMA but had fallen down when he tried to walk. Then was agreeable to be admitted. CT head was unremarkable. Patient was hypoxic initially and CXR showed atelectasis. CTPE was negative for PE. Patient was admitted to obs unit. Cardiology were consulted who planned for ICD interrogation and decreased Norvasc to 5 and imdur to 30mg. Neurology were consulted for AMS on . Planned to get an EEG and patient was transferred to IM service for further workup. On  Patient was alert but oriented to place only. Was found to be having expressive aphasia and perseveration in speech. His NIH score was 10 initially, with weakness of right upper and lower extremities, aphasia and mild dysarthria. Patient is independent and without cognitive deficits at baseline as per niece, unclear if he had focal deficits prior to this admission however notes report some gait instability since 2016. EEG reports was abnormal for Continuous left hemispheric polymorphic theta slowing suggested underlying structural defect. A Bilateral Carotid doppler showed complete occlusion of cervical left ICA. Endovascular were consulted. CTA showed same finding.   his NIH was 15 with worsening right sided weakness, sensory loss , and became mute.  CT perfusion was emergently performed which showed ischemic penumbra in left MCA, DARYN territory  Was given a bolus of heparin 2000 units stat and had a Diagnostic Cerebral Angiogram with Left ICA occlusion mechanical thrombectomy with stent retriever and mechanical aspiration, pre and post stenting balloon angioplasty, stenting with carotid wall stent. Patient was then sent to NICU. Off note: Recent MRI of cervical cord demonstrated evidence of likely myelomalacia at about C6. There is a Hx of V. Fib arrest 2/2021. MRI of brain at that time revealed early changes of hypoxic anoxic injury    Hospital Course:   7/31/2022: DSA showed left ICA acute on chronic occlusion, underwent left ICA mechanical thrombectomy, pre and post stenting balloon angioplasty. Last 24h:    No acute events overnight. This morning patient's bsl was 63 and 67 was given orange juice and apple sauce. Patient passed bedside swallow.  Repeat bsl 139    CURRENT MEDICATIONS:  SCHEDULED MEDICATIONS:   calcium gluconate  2,000 mg IntraVENous Once    folic acid  1 mg Oral Daily    ceFAZolin  2,000 mg IntraVENous Once    fentanNYL  100 mcg IntraVENous Once    midazolam  1 mg IntraVENous Once    famotidine  20 mg Oral BID    [Held by provider] amLODIPine  5 mg Oral BID    [Held by provider] lactulose  20 g Oral TID    clopidogrel  75 mg Oral Daily    isosorbide mononitrate  30 mg Oral Daily    acetaminophen  1,000 mg Oral Once    aspirin  81 mg Oral Daily    atorvastatin  40 mg Oral Daily    carvedilol  25 mg Oral BID WC    [Held by provider] lisinopril  10 mg Oral Daily    DULoxetine  30 mg Oral Daily    sodium chloride flush  5-40 mL IntraVENous 2 times per day     CONTINUOUS INFUSIONS:   dextrose      sodium chloride      sodium chloride 100 mL/hr at 08/01/22 1232     PRN MEDICATIONS:   sennosides-docusate sodium, hydrALAZINE, bisacodyl, glucose, dextrose bolus **OR** dextrose bolus, glucagon (rDNA), dextrose, sodium chloride flush, sodium chloride, acetaminophen, ondansetron **OR** ondansetron    VITALS:  Temperature Range: Temp: 97.7 °F (36.5 °C) Temp  Av.1 °F (36.7 °C)  Min: 97.7 °F (36.5 °C)  Max: 98.6 °F (37 °C)  BP Range: Systolic (40PLO), HAW:772 , Min:102 , BIO:499     Diastolic (67UDV), CIZ:34, Min:63, Max:89    Pulse Range: Pulse  Av.9  Min: 63  Max: 81  Respiration Range: Resp  Av.5  Min: 7  Max: 31  Current Pulse Ox: SpO2: 95 %  24HR Pulse Ox Range: SpO2  Av.1 %  Min: 92 %  Max: 97 %  Patient Vitals for the past 12 hrs:   BP Temp Temp src Pulse Resp SpO2   22 1200 106/63 97.7 °F (36.5 °C) Oral 63 23 95 %   22 1100 106/72 -- -- 70 27 95 %   22 1000 108/76 -- -- 66 23 93 %   22 0938 138/80 -- -- 72 25 94 %   22 0801 -- 98.5 °F (36.9 °C) Oral -- -- --   22 0800 122/73 -- -- 73 26 93 %   22 0700 121/88 -- -- 72 23 94 %   22 0600 123/73 -- -- 75 (!) 31 93 %   22 0500 122/74 -- -- 74 24 92 %   22 0400 123/86 98 °F (36.7 °C) Oral 78 22 94 %   22 0300 127/88 -- -- 81 26 94 %     Estimated body mass index is 28.06 kg/m² as calculated from the following:    Height as of 22: 5' 9\" (1.753 m).     Weight as of 22: 190 lb (86.2 kg).  []<16 Severe malnutrition  []16-16.99 Moderate malnutrition  []17-18.49 Mild malnutrition  []18.5-24.9 Normal  []25-29.9 Overweight (not obese)  []30-34.9 Obese class 1 (Low Risk)  []35-39.9 Obese class 2 (Moderate Risk)  []?40 Obese class 3 (High Risk)    RECENT LABS:   Lab Results   Component Value Date    WBC 8.3 2022    HGB 11.8 (L) 2022    HCT 35.4 (L) 2022     (L) 2022    CHOL 149 2022    TRIG 168 (H) 2022    HDL 35 (L) 2022    ALT 10 2022    AST 15 2022     (L) 2022    K 3.9 2022     2022    CREATININE 0.86 2022    BUN 10 2022    CO2 18 (L) 2022    TSH 2.00 2022    PSA 0.22 2016    INR 1.2 2022    LABA1C 5.6 2022     24 HOUR INTAKE/OUTPUT:  Intake/Output Summary (Last 24 hours) at 8/1/2022 1438  Last data filed at 8/1/2022 1200  Gross per 24 hour   Intake 1821.1 ml   Output 2775 ml   Net -953.9 ml       IMAGING:    Place summary of recent imaging here. Labs and Images reviewed with:  [x] Dr. Rafael Fermin    [] Dr. Venecia Bay  [] Dr. Mayur Yao  [] There are no new interval images to review. REVIEW OF SYSTEMS     CONSTITUTIONAL: negative for fatigue and malaise   EYES: negative for double vision and photophobia    HEENT: negative for tinnitus and sore throat   RESPIRATORY: negative for cough, shortness of breath   CARDIOVASCULAR: negative for chest pain, palpitations, or syncope   GASTROINTESTINAL: negative for abdominal pain, nausea, vomiting, diarrhea, or constipation    GENITOURINARY: negative for incontinence or retention    MUSCULOSKELETAL: negative for neck or back pain, negative for extremity pain   NEUROLOGICAL: Negative for seizures, headaches, weakness, numbness, confusion,  moderate aphasia, and dysarthria    PSYCHIATRIC: negative for agitation, hallucination, SI/HI   SKIN Negative for spontaneous contusions, rashes, or lesions      PHYSICAL EXAM:       PHYSICAL EXAM       CONSTITUTIONAL:  Well developed, well nourished, alert and oriented , in no acute distress. Nontoxic. Moderate dysarthria  and severe aphasia .    HEAD:  normocephalic, atraumatic    EYES:  PERRLA, EOMI.   ENT:  moist mucous membranes   NECK:  supple, symmetric   LUNGS:  Equal air entry bilaterally   CARDIOVASCULAR:  normal s1 / s2, RRR, distal pulses intact   ABDOMEN:  Soft, no rigidity   NEUROLOGIC:  Mental Status:  awake             Cranial Nerves:    V: Facial sensation:  abnormal      Motor Exam:    Drift:  present -  right upper and lower   Tone:  normal    Motor exam is 5 out of 5 left upper and left lower extremities  Right upper and lower 3/5    Sensory:    Touch:    Right Upper Extremity:  normal  Left Upper Extremity: normal  Right Lower Extremity:  normal  Left Lower Extremity:  normal    Deep Tendon Reflexes:    Right Bicep:  2+  Left Bicep:  2+  Right Knee:  2+  Left Knee:  2+    Plantar Response:  Right:  downgoing  Left:  downgoing    Clonus:  absent  Matos's:  absent    Coordination/Dysmetria:  Heel to Shin:  Unable to assess   Finger to Nose:   Unable to assess   Dysdiadochokinesia:  unable to assess     Gait:  not assessed    NIH Stroke Scale Total (if not done complete detailed one below):    1a.  Level of consciousness:  0 - alert; keenly responsive  1b. Level of consciousness questions:  2 - answers neither question correctly  1c. Level of consciousness questions:  2 - performs neither task correctly  2. Best Gaze:  0 - normal  3. Visual:  0 - no visual loss  4. Facial Palsy:  1 - minor paralysis (flattened nasolabial fold, asymmetric on smiling)  5a. Motor left arm:  0 - no drift, limb holds 90 (or 45) degrees for full 10 seconds  5b. Motor right arm:  1 - drift, limb holds 90 (or 45) degrees but drifts down before full 10 seconds: does not hit bed  6a. Motor left le - no drift; leg holds 30 degree position for full 5 seconds  6b. Motor right le - drift; leg falls by the end of the 5 second period but does not hit bed  7. Limb Ataxia:  0 - absent  8. Sensory:  0 - normal; no sensory loss  9. Best Language:  2 - severe aphasia; all communication is through fragmentary expression; great need for inference, questioning, and guessing by the listener. Range of information that can be exchanged is limited; listener carries burden of communication. Examiner cannot identify materials provided from patient response. 10.  Dysarthria:  1 - mild to moderate, patient slurs at least some words and at worst, can be understood with some difficulty  11. Extinction and Inattention:  0 - no abnormality  TOTAL: 10    DRAINS:  [x] There are no drains for Neuro Critical Care to monitor at this time. ASSESSMENT AND PLAN:       18-year-old male with past medical history of CAD s/p CABG, s/p AICD, CKD stage III, history of V. fib arrest was initially admitted to observation unit for evaluation of syncope, neurology was consulted for altered mental status, on the floor he became aphasic, with right-sided weakness, NIH was 7 from 10-15. Initial head CT was unremarkable. CTA showed complete ICA occlusion, patient underwent emergent mechanical thrombectomy with angioplasty and stenting. Patient was transferred to ICU for close monitoring     NEUROLOGIC:  - Imaging   CT perfusion:  large mismatch in left cerebral hemisphere  CT perfusion 08/01/22  No significant residual perfusion mismatch.   - Goal -140  - Neuro checks per protocol    CARDIOVASCULAR:  - Goal -140  - Continue telemetry  -ICD interrogation  -Cardiology following  -Continue aspirin, statin, beta-blocker  -Keep potassium greater than 4, magnesium greater than 2    PULMONARY:   On room air           RENAL/FLUID/ELECTROLYTE:  - BUN 10/ Creatinine 0.86  - IVF: 100 ml/hr  - Replace electrolytes PRN  - Daily BMP    Intake/Output Summary (Last 24 hours) at 8/1/2022 0855  Last data filed at 8/1/2022 0500  Gross per 24 hour   Intake 1043.9 ml   Output 1900 ml   Net -856.1 ml     GI/NUTRITION:  NUTRITION:  ADULT DIET; Easy to Chew  - Bowel regimen: Senna -s   - GI prophylaxis: Pepcid 20 mg bid      ID:  - Tmax 98.6  - WBC 8.3  - Continue to monitor for fevers  - Daily CBC    HEME:   - H&H 11.8 and 35.4  - Platelets 129  - Daily CBC    ENDOCRINE:  - Continue to monitor blood glucose, goal <180       OTHER:  - PT/OT/ST   - Code Status: Full code     PROPHYLAXIS:  Stress ulcer: Pepcid  20 mg     DVT PROPHYLAXIS:  - SCD sleeves - Thigh High      DISPOSITION:  [x] To remain ICU:   [] OK for out of ICU from Neuro Critical Care standpoint    We will continue to follow along.      For any changes in exam or patient status please contact Neuro Critical Care.      Ramy Bowser MD  Neuro Critical Care  Pager [phone]  8/1/2022     2:38 PM

## 2022-08-01 NOTE — PLAN OF CARE
Problem: Chronic Conditions and Co-morbidities  Goal: Patient's chronic conditions and co-morbidity symptoms are monitored and maintained or improved  Outcome: Not Progressing     Problem: Pain  Goal: Verbalizes/displays adequate comfort level or baseline comfort level  Outcome: Progressing     Problem: Safety - Adult  Goal: Free from fall injury  Outcome: Progressing     Problem: Discharge Planning  Goal: Discharge to home or other facility with appropriate resources  Outcome: Progressing     Problem: ABCDS Injury Assessment  Goal: Absence of physical injury  Outcome: Progressing     Problem: Skin/Tissue Integrity  Goal: Absence of new skin breakdown  Description: 1. Monitor for areas of redness and/or skin breakdown  2. Assess vascular access sites hourly  3. Every 4-6 hours minimum:  Change oxygen saturation probe site  4. Every 4-6 hours:  If on nasal continuous positive airway pressure, respiratory therapy assess nares and determine need for appliance change or resting period.   Outcome: Progressing     Problem: Chronic Conditions and Co-morbidities  Goal: Patient's chronic conditions and co-morbidity symptoms are monitored and maintained or improved  Outcome: Not Progressing
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
## 2022-08-01 NOTE — PROGRESS NOTES
Physician Progress Note      PATIENT:               Marlen Turpin  CSN #:                  [id number]  :                       1965  ADMIT DATE:       2022 4:33 PM  [address] [place] DATE:  RESPONDING  PROVIDER #:        Margarette Gar MD          QUERY TEXT:    Pt admitted with syncope and AMS. Pt noted to have dehydration with CXR   showing atelectasis, hypoxia and supplemental oxygen. If possible, please   document in the progress notes and discharge summary if you are evaluating and   / or treating any of the following: The medical record reflects the following:  Risk Factors: Recent ACID placement 22. Hx anoxic brain injury. Clinical Indicators: Cardio consult note: appears to be dehydrated on exam.   . Creatine  of 1.51 with prior creatine on   of 1.15 Patient   with fall upon standing. Negative orthostatics x2 this admission. CTH negative   for acute process. Per Neurology consult note: Pre-existing anoxic brain   injury postcardiac arrest 2022. Patient is confused, disoriented. CXR   showing Mild bibasilar airspace opacities favored to represent atelectasis. Afebrile. Normal WBC. Treatment: IVFB, and maintenance IVF. CTH. Neurology and Cardio consults. EEG. Labs/monitoring    Thank-you,  Mick Nelson RN, BETHANY [email or web address]. [employer]  Options provided:  -- Metabolic encephalopathy secondary to dehydration and hypoxia secondary to   atelectasis  -- Toxic encephalopathy  -- Toxic metabolic encephalopathy  -- Delirium due to, Please specify cause. -- Delirium  -- Other - I will add my own diagnosis  -- Disagree - Not applicable / Not valid  -- Disagree - Clinically unable to determine / Unknown  -- Refer to Clinical Documentation Reviewer    PROVIDER RESPONSE TEXT:    This patient has metabolic encephalopathy secondary to dehydration and hypoxia   secondary to atelectasis.     Query created by: Deana Reeder on 2022 5:27 AM      Electronically signed by:  Margarette Gar MD 8/1/2022 2:26 PM
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
## 2022-08-01 NOTE — PROGRESS NOTES
Physical Therapy        Physical Therapy Cancel Note      DATE: 2022    NAME: Will Thomas  MRN: [id number]   : 1965      Patient not seen this date for Physical Therapy due to: Other: Pt currently out of room receiving CT SCAN, will check back as able.        Electronically signed by Johnathan Early PTA on 2022 at 2:11 PM

## 2022-08-02 PROBLEM — R41.82 ALTERED MENTAL STATUS: Status: ACTIVE | Noted: 2022-08-02

## 2022-08-02 LAB
ABSOLUTE EOS #: 0.4 K/UL (ref 0–0.44)
ABSOLUTE IMMATURE GRANULOCYTE: <0.03 K/UL (ref 0–0.3)
ABSOLUTE LYMPH #: 1.67 K/UL (ref 1.1–3.7)
ABSOLUTE MONO #: 0.46 K/UL (ref 0.1–1.2)
AMMONIA: 30 UMOL/L (ref 16–60)
ANION GAP SERPL CALCULATED.3IONS-SCNC: 9 MMOL/L (ref 9–17)
BASOPHILS # BLD: 2 % (ref 0–2)
BASOPHILS ABSOLUTE: 0.11 K/UL (ref 0–0.2)
BUN BLDV-MCNC: 9 MG/DL (ref 6–20)
CALCIUM SERPL-MCNC: 8.2 MG/DL (ref 8.6–10.4)
CHLORIDE BLD-SCNC: 107 MMOL/L (ref 98–107)
CO2: 20 MMOL/L (ref 20–31)
CREAT SERPL-MCNC: 0.92 MG/DL (ref 0.7–1.2)
EOSINOPHILS RELATIVE PERCENT: 5 % (ref 1–4)
GFR AFRICAN AMERICAN: >60 ML/MIN
GFR NON-AFRICAN AMERICAN: >60 ML/MIN
GFR SERPL CREATININE-BSD FRML MDRD: ABNORMAL ML/MIN/{1.73_M2}
GLUCOSE BLD-MCNC: 100 MG/DL (ref 75–110)
GLUCOSE BLD-MCNC: 170 MG/DL (ref 75–110)
GLUCOSE BLD-MCNC: 68 MG/DL (ref 70–99)
GLUCOSE BLD-MCNC: 84 MG/DL (ref 75–110)
GLUCOSE BLD-MCNC: 88 MG/DL (ref 75–110)
HCT VFR BLD CALC: 35.1 % (ref 40.7–50.3)
HEMOGLOBIN: 11.2 G/DL (ref 13–17)
IMMATURE GRANULOCYTES: 0 %
LYMPHOCYTES # BLD: 22 % (ref 24–43)
MCH RBC QN AUTO: 27.7 PG (ref 25.2–33.5)
MCHC RBC AUTO-ENTMCNC: 31.9 G/DL (ref 28.4–34.8)
MCV RBC AUTO: 86.9 FL (ref 82.6–102.9)
MONOCYTES # BLD: 6 % (ref 3–12)
NRBC AUTOMATED: 0 PER 100 WBC
PDW BLD-RTO: 17.3 % (ref 11.8–14.4)
PLATELET # BLD: 130 K/UL (ref 138–453)
PMV BLD AUTO: 10.9 FL (ref 8.1–13.5)
POTASSIUM SERPL-SCNC: 3.7 MMOL/L (ref 3.7–5.3)
RBC # BLD: 4.04 M/UL (ref 4.21–5.77)
RBC # BLD: ABNORMAL 10*6/UL
SEG NEUTROPHILS: 65 % (ref 36–65)
SEGMENTED NEUTROPHILS ABSOLUTE COUNT: 4.8 K/UL (ref 1.5–8.1)
SODIUM BLD-SCNC: 136 MMOL/L (ref 135–144)
WBC # BLD: 7.5 K/UL (ref 3.5–11.3)

## 2022-08-02 PROCEDURE — 6370000000 HC RX 637 (ALT 250 FOR IP)

## 2022-08-02 PROCEDURE — 82947 ASSAY GLUCOSE BLOOD QUANT: CPT

## 2022-08-02 PROCEDURE — 85025 COMPLETE CBC W/AUTO DIFF WBC: CPT

## 2022-08-02 PROCEDURE — 99024 POSTOP FOLLOW-UP VISIT: CPT | Performed by: PSYCHIATRY & NEUROLOGY

## 2022-08-02 PROCEDURE — 97530 THERAPEUTIC ACTIVITIES: CPT

## 2022-08-02 PROCEDURE — 2580000003 HC RX 258: Performed by: PEDIATRICS

## 2022-08-02 PROCEDURE — 80048 BASIC METABOLIC PNL TOTAL CA: CPT

## 2022-08-02 PROCEDURE — 2060000000 HC ICU INTERMEDIATE R&B

## 2022-08-02 PROCEDURE — 6360000002 HC RX W HCPCS: Performed by: PEDIATRICS

## 2022-08-02 PROCEDURE — 97110 THERAPEUTIC EXERCISES: CPT

## 2022-08-02 PROCEDURE — 2580000003 HC RX 258: Performed by: STUDENT IN AN ORGANIZED HEALTH CARE EDUCATION/TRAINING PROGRAM

## 2022-08-02 PROCEDURE — 97535 SELF CARE MNGMENT TRAINING: CPT

## 2022-08-02 PROCEDURE — 6370000000 HC RX 637 (ALT 250 FOR IP): Performed by: STUDENT IN AN ORGANIZED HEALTH CARE EDUCATION/TRAINING PROGRAM

## 2022-08-02 PROCEDURE — 92507 TX SP LANG VOICE COMM INDIV: CPT

## 2022-08-02 PROCEDURE — 6360000002 HC RX W HCPCS

## 2022-08-02 PROCEDURE — 97116 GAIT TRAINING THERAPY: CPT

## 2022-08-02 PROCEDURE — 99254 IP/OBS CNSLTJ NEW/EST MOD 60: CPT | Performed by: PHYSICAL MEDICINE & REHABILITATION

## 2022-08-02 PROCEDURE — 6370000000 HC RX 637 (ALT 250 FOR IP): Performed by: INTERNAL MEDICINE

## 2022-08-02 PROCEDURE — 97166 OT EVAL MOD COMPLEX 45 MIN: CPT

## 2022-08-02 PROCEDURE — 36415 COLL VENOUS BLD VENIPUNCTURE: CPT

## 2022-08-02 PROCEDURE — 82140 ASSAY OF AMMONIA: CPT

## 2022-08-02 PROCEDURE — 6360000002 HC RX W HCPCS: Performed by: STUDENT IN AN ORGANIZED HEALTH CARE EDUCATION/TRAINING PROGRAM

## 2022-08-02 RX ORDER — ENOXAPARIN SODIUM 100 MG/ML
40 INJECTION SUBCUTANEOUS DAILY
Status: DISCONTINUED | OUTPATIENT
Start: 2022-08-03 | End: 2022-08-10 | Stop reason: HOSPADM

## 2022-08-02 RX ORDER — SODIUM CHLORIDE 9 MG/ML
INJECTION, SOLUTION INTRAVENOUS CONTINUOUS
Status: DISCONTINUED | OUTPATIENT
Start: 2022-08-02 | End: 2022-08-03

## 2022-08-02 RX ORDER — CALCIUM GLUCONATE 20 MG/ML
2000 INJECTION, SOLUTION INTRAVENOUS ONCE
Status: COMPLETED | OUTPATIENT
Start: 2022-08-02 | End: 2022-08-02

## 2022-08-02 RX ORDER — POTASSIUM CHLORIDE 7.45 MG/ML
10 INJECTION INTRAVENOUS
Status: COMPLETED | OUTPATIENT
Start: 2022-08-02 | End: 2022-08-02

## 2022-08-02 RX ORDER — POTASSIUM CHLORIDE 7.45 MG/ML
INJECTION INTRAVENOUS
Status: COMPLETED
Start: 2022-08-02 | End: 2022-08-02

## 2022-08-02 RX ORDER — CALCIUM GLUCONATE 20 MG/ML
INJECTION, SOLUTION INTRAVENOUS
Status: COMPLETED
Start: 2022-08-02 | End: 2022-08-02

## 2022-08-02 RX ORDER — CALCIUM GLUCONATE 20 MG/ML
1000 INJECTION, SOLUTION INTRAVENOUS ONCE
Status: CANCELLED | OUTPATIENT
Start: 2022-08-02 | End: 2022-08-02

## 2022-08-02 RX ADMIN — ENOXAPARIN SODIUM 30 MG: 100 INJECTION SUBCUTANEOUS at 08:23

## 2022-08-02 RX ADMIN — CLOPIDOGREL 75 MG: 75 TABLET, FILM COATED ORAL at 08:23

## 2022-08-02 RX ADMIN — POTASSIUM CHLORIDE 10 MEQ: 10 INJECTION, SOLUTION INTRAVENOUS at 15:27

## 2022-08-02 RX ADMIN — CALCIUM GLUCONATE 2000 MG: 20 INJECTION, SOLUTION INTRAVENOUS at 10:46

## 2022-08-02 RX ADMIN — POTASSIUM CHLORIDE 10 MEQ: 10 INJECTION, SOLUTION INTRAVENOUS at 16:35

## 2022-08-02 RX ADMIN — FAMOTIDINE 20 MG: 20 TABLET, FILM COATED ORAL at 08:23

## 2022-08-02 RX ADMIN — POTASSIUM CHLORIDE 10 MEQ: 10 INJECTION, SOLUTION INTRAVENOUS at 11:57

## 2022-08-02 RX ADMIN — POTASSIUM CHLORIDE 10 MEQ: 10 INJECTION, SOLUTION INTRAVENOUS at 13:02

## 2022-08-02 RX ADMIN — POTASSIUM CHLORIDE 10 MEQ: 7.46 INJECTION, SOLUTION INTRAVENOUS at 11:57

## 2022-08-02 RX ADMIN — DULOXETINE HYDROCHLORIDE 30 MG: 30 CAPSULE, DELAYED RELEASE ORAL at 08:23

## 2022-08-02 RX ADMIN — DESMOPRESSIN ACETATE 40 MG: 0.2 TABLET ORAL at 08:23

## 2022-08-02 RX ADMIN — ISOSORBIDE MONONITRATE 30 MG: 30 TABLET ORAL at 08:23

## 2022-08-02 RX ADMIN — CARVEDILOL 25 MG: 25 TABLET, FILM COATED ORAL at 08:23

## 2022-08-02 RX ADMIN — FOLIC ACID 1 MG: 1 TABLET ORAL at 08:23

## 2022-08-02 RX ADMIN — SODIUM CHLORIDE, PRESERVATIVE FREE 10 ML: 5 INJECTION INTRAVENOUS at 20:15

## 2022-08-02 RX ADMIN — SODIUM CHLORIDE: 9 INJECTION, SOLUTION INTRAVENOUS at 00:53

## 2022-08-02 RX ADMIN — Medication 81 MG: at 08:23

## 2022-08-02 RX ADMIN — CARVEDILOL 25 MG: 25 TABLET, FILM COATED ORAL at 20:15

## 2022-08-02 RX ADMIN — FAMOTIDINE 20 MG: 20 TABLET, FILM COATED ORAL at 20:15

## 2022-08-02 ASSESSMENT — PAIN SCALES - GENERAL
PAINLEVEL_OUTOF10: 0
PAINLEVEL_OUTOF10: 0

## 2022-08-02 NOTE — CARE COORDINATION
Met with pt to assess for support and complete PHQ-9 depression screen, screening was negative. Pt states that he lives with his niece Dannie Artis. Dannie Artis is his main support system but he also has a daughter Tenzin Mason. Pt states he was independent prior to admission. He is unsure if he will need to go to a SNF at discharge or will be able to return home. Pt denies hx of depression. Patient Health Questionnaire-9 (PHQ-9)    Over the last 2 weeks, how often have you been bothered by any of the following problems? 1. Little Interest or pleasure in doing things? [x] Not at all  [] Several Days  [] More than half the day  []  Nearly every day    2. Feeling down, depressed or hopeless? [x] Not at all  [] Several Days  [] More than half the day  []  Nearly every day    3. Trouble falling or staying asleep, or sleeping too much? [x] Not at all  [] Several Days  [] More than half the day  []  Nearly every day    4. Feeling tired or having little energy? [x] Not at all  [] Several Days  [] More than half the day  []  Nearly every day    5. Poor apettite or overeating? [x] Not at all  [] Several Days  [] More than half the day  []  Nearly every day    6. Feeling bad about yourself-or that you are a failure or have let yourself or your family down? [x] Not at all  [] Several Days  [] More than half the day  []  Nearly every day    7. Trouble concentrating on things, such as reading the newspaper or watching television? [x] Not at all  [] Several Days  [] More than half the day  []  Nearly every day    8. Moving or speaking so slowly that other people could have noticed? Or the opposite-being so fidgety or restless that you have been moving around a lot more than usual?   [x] Not at all  [] Several Days  [] More than half the day  []  Nearly every day    9. Thoughts that you would be better off dead or of hurting yourself in some way?    [x] Not at all  [] Several Days  [] More than half the day  []  Nearly every day    Total Score: 0    If you checked off any problems, how difficult have these problems made it for you to do your work, take care of things at home, or get along with other people?    [] Not difficult at all  [] Somewhat Difficult  [] Very Difficult  []  Extremely Difficult
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
## 2022-08-02 NOTE — PLAN OF CARE
0100-Pt had >20 beat run of Vtach at 0100. BP stable at 138/72. Pt back in NSR. Cardiology notified. No new orders at this time, will continue to monitor and notify cardiology if pt has another run. Problem: Pain  Goal: Verbalizes/displays adequate comfort level or baseline comfort level  8/2/2022 0616 by Paulina Miranda  Outcome: Progressing  Flowsheets (Taken 8/1/2022 2000 by Juan Pablo Duran RN)  Verbalizes/displays adequate comfort level or baseline comfort level:   Encourage patient to monitor pain and request assistance   Assess pain using appropriate pain scale  8/1/2022 1758 by Monica Laureano RN  Outcome: Progressing     Problem: Safety - Adult  Goal: Free from fall injury  8/2/2022 0616 by Paulina Miranda  Outcome: Progressing  8/1/2022 1758 by Monica Laureano RN  Outcome: Progressing     Problem: Discharge Planning  Goal: Discharge to home or other facility with appropriate resources  8/2/2022 0616 by Paulina Miranda  Outcome: Progressing  8/1/2022 1758 by Monica Laureano RN  Outcome: Progressing     Problem: ABCDS Injury Assessment  Goal: Absence of physical injury  8/2/2022 0616 by Paulina Miranda  Outcome: Progressing  8/1/2022 1758 by Monica Laureano RN  Outcome: Progressing     Problem: Skin/Tissue Integrity  Goal: Absence of new skin breakdown  Description: 1. Monitor for areas of redness and/or skin breakdown  2. Assess vascular access sites hourly  3. Every 4-6 hours minimum:  Change oxygen saturation probe site  4. Every 4-6 hours:  If on nasal continuous positive airway pressure, respiratory therapy assess nares and determine need for appliance change or resting period.   8/2/2022 0616 by Paulina Miranda  Outcome: Progressing  8/1/2022 1758 by Monica Laureano RN  Outcome: Progressing     Problem: Neurosensory - Adult  Goal: Achieves stable or improved neurological status  Outcome: Progressing  Goal: Absence of seizures  Outcome: Progressing  Goal: Remains free of injury related to seizures activity  Outcome: Progressing  Goal: Achieves maximal functionality and self care  Outcome: Progressing     Problem: Respiratory - Adult  Goal: Achieves optimal ventilation and oxygenation  Outcome: Progressing     Problem: Cardiovascular - Adult  Goal: Maintains optimal cardiac output and hemodynamic stability  Outcome: Progressing  Goal: Absence of cardiac dysrhythmias or at baseline  Outcome: Progressing     Problem: Skin/Tissue Integrity - Adult  Goal: Skin integrity remains intact  Outcome: Progressing  Goal: Incisions, wounds, or drain sites healing without S/S of infection  Outcome: Progressing  Goal: Oral mucous membranes remain intact  Outcome: Progressing     Problem: Musculoskeletal - Adult  Goal: Return mobility to safest level of function  Outcome: Progressing  Goal: Maintain proper alignment of affected body part  Outcome: Progressing  Goal: Return ADL status to a safe level of function  Outcome: Progressing     Problem: Gastrointestinal - Adult  Goal: Minimal or absence of nausea and vomiting  Outcome: Progressing  Goal: Maintains or returns to baseline bowel function  Outcome: Progressing  Goal: Establish and maintain optimal ostomy function  Outcome: Progressing     Problem: Genitourinary - Adult  Goal: Absence of urinary retention  Outcome: Progressing     Problem: Infection - Adult  Goal: Absence of infection at discharge  Outcome: Progressing  Goal: Absence of infection during hospitalization  Outcome: Progressing  Goal: Absence of fever/infection during anticipated neutropenic period  Outcome: Progressing     Problem: Metabolic/Fluid and Electrolytes - Adult  Goal: Electrolytes maintained within normal limits  Outcome: Progressing  Goal: Hemodynamic stability and optimal renal function maintained  Outcome: Progressing  Goal: Glucose maintained within prescribed range  Outcome: Progressing     Problem: Hematologic - Adult  Goal: Maintains hematologic stability  Outcome: Progressing     Problem: Chronic Conditions and Co-morbidities  Goal: Patient's chronic conditions and co-morbidity symptoms are monitored and maintained or improved  8/2/2022 0616 by Kelly Bedoya  Outcome: Progressing  8/1/2022 1758 by Shawn Valenzuela RN  Outcome: Not Progressing

## 2022-08-02 NOTE — PROGRESS NOTES
(1982); Upper gastrointestinal endoscopy (06/29/2015); Cervical spine surgery (05/19/2016); back surgery; Shoulder arthroscopy (Right, 09/12/2016); Colonoscopy (N/A, 07/30/2019); Cardiac surgery; Cardiac catheterization (10/30/2018); Foot surgery (Left); Cystoscopy (N/A, 02/18/2020); Upper gastrointestinal endoscopy (N/A, 03/06/2020); CT BIOPSY RENAL (07/30/2020); and Pacemaker insertion. Assessment   Performance deficits / Impairments: Decreased functional mobility ; Decreased safe awareness;Decreased balance;Decreased coordination;Decreased ADL status; Decreased cognition;Decreased posture;Decreased ROM; Decreased endurance;Decreased high-level IADLs;Decreased strength;Decreased fine motor control  Assessment: Pt ability to perform functional tasks is significantly limited by deficits listed above. Pt would benefit from skilled occupational therapy throughout acute care stay and after discharge for increasing participation in functional tasks. Based on current level of function, pt unsafe to return to prior living situation and requires 24 hr assist/supervision. Prognosis: Fair  Decision Making: Medium Complexity  REQUIRES OT FOLLOW-UP: Yes  Activity Tolerance  Activity Tolerance: Patient Tolerated treatment well;Treatment limited secondary to decreased cognition      Education Given To: Patient  Education Provided: Role of Therapy;Plan of Care;ADL Adaptive Strategies;Transfer Training;Orientation;Equipment  Education Method: Verbal  Barriers to Learning: Cognition  Education Outcome: Demonstrated understanding;Verbalized understanding;Continued education needed  Safety Devices  Type of Devices: Nurse notified; Patient at risk for falls;Call light within reach; Chair alarm in place; Left in chair  Restraints  Restraints Initially in Place: No    Plan   Plan  Times per Week: 4-5x/wk  Times per Day: Daily  Current Treatment Recommendations: Strengthening, ROM, Balance training, Functional mobility training, Endurance training, Cognitive reorientation, Neuromuscular re-education, Safety education & training, Patient/Caregiver education & training, Self-Care / ADL, Home management training, Coordination training       Restrictions  Restrictions/Precautions  Restrictions/Precautions: Up as Tolerated  Required Braces or Orthoses?: No    Subjective   General  Patient assessed for rehabilitation services?: Yes  General Comment  Comments: RN ok'd pt to be seen for therapy this AM. Pt was agreeable to therapy and cooperative throughout the session. Pt denies any pain this date. Social/Functional History  Social/Functional History  Lives With:  (Lives with niece and nephew age 15 and 21)  Type of Home: House  Home Layout: Two level, Bed/Bath upstairs  Home Access: Stairs to enter without rails  Entrance Stairs - Number of Steps: 4-5  Bathroom Shower/Tub: Tub/Shower unit  Bathroom Toilet: Standard  Bathroom Equipment:  (No bathroom equipment)  Home Equipment:  (No home equipment)  Receives Help From: Family  ADL Assistance: Independent  Homemaking Assistance: Needs assistance  Laundry:  (Pt stated that he is unable to do laundry and no one does it for him)  Homemaking Responsibilities:  (Pt stated that he is unable to cook but will make cereal. Niece does the grocery shopping.)  Ambulation Assistance: Independent  Transfer Assistance: Independent  Active : No  Patient's  Info: Niece  Occupation: On disability  Leisure & Hobbies: games on the [email or web address]    Objective   Vision  Vision: Within Functional Limits  Hearing  Hearing: Within functional limits    [employer]: Yes  Overall Level of Assistance: Minimum assistance; Additional time; Adaptive equipment  Interventions: Verbal cues  Supine to Sit: Minimum assistance; Additional time; Adaptive equipment (Pt performed supine>sit with min A for trunk progression and HOB raised ~25*)  Sit to Supine:  (Pt retired to chair at end of session)  Scooting: Stand-by assistance; Additional time (Pt performed scooting to EOB with SBA and increased time/effort to complete)    Balance  Sitting:  (Pt demonstrated static/dynamic sitting balance unsupported at EOB with SBA to assess ROM/MMT and donning/doffing socks ~15 minutes. Pt required min A for 1 significant lean R while donning socks.)  Standing:  (Pt performed standing at EOB with CGA and use of RW. Mod VC required for body positioning.)    Transfer Training  Transfer Training: Yes  Overall Level of Assistance: Minimum assistance;Assist X2;Adaptive equipment; Additional time  Interventions: Verbal cues (VC for hand placement with use of RW)  Sit to Stand: Minimum assistance;Assist X2;Additional time; Adaptive equipment (Pt performed sit>stand 2x from EOB with min Ax2 and use of RW and Danika Stady)  Stand to Sit: Minimum assistance; Additional time; Adaptive equipment;Assist X2 (Pt performed stand>sit 2x (1x EOB and 1x recliner) with in A from therapsit to complete. Pt performed stand>sit 1x with RW and 1x with Joanna Cranker.)    Functional Mobility  Overall Level of Assistance: Total assistance (Pt able to sway in place but could not clear R foot from ground. Pt attempted steps laterally to R at EOB but was not able to clear R foot from ground. Pt able to perform functional mobility from bed>chair with Joanna Cranker.)  Interventions: Verbal cues; Tactile cues (Verbal and Tactile cues for clearing R foot, hand placement on RW and Joanna Cranker.)  Assistive Device: Walker, rolling     AROM: Within functional limits (BUE WFL except shoulder flexion grossly decreased but functional)  Strength: Within functional limits (LUE MMT 4/5, RUE MMT 3+/5)  Coordination: Generally decreased, functional (LUE WFL, RUE generally decreased but functional with increased time and VC)  Tone: Normal (BUE)  Sensation: Intact (Pt denies any numbness/tingling)    ADL  Feeding: Setup; Increased time to complete  Feeding Skilled Clinical Factors: Pt provided lunch setup at therapist exit. Pt initiated eating and able to manage utensils with increased time/effort. Grooming: Increased time to complete;Minimal assistance;Verbal cueing  UE Bathing: Moderate assistance;Verbal cueing; Increased time to complete  LE Bathing: Verbal cueing; Increased time to complete;Minimal assistance  UE Dressing: Moderate assistance;Verbal cueing; Increased time to complete  LE Dressing: Moderate assistance;Verbal cueing; Increased time to complete  LE Dressing Skilled Clinical Factors: Pt able to doff/don L sock seated EOB with increased time to complete and VC. Pt able to doff R sock but required mod A to don sock. Toileting: Moderate assistance; Increased time to complete; Adaptive equipment    Cognition  Overall Cognitive Status: Exceptions  Arousal/Alertness: Delayed responses to stimuli  Following Commands: Follows multistep commands with increased time; Follows multistep commands with repitition  Attention Span: Attends with cues to redirect  Memory: Decreased recall of biographical Information;Decreased recall of recent events  Safety Judgement: Decreased awareness of need for safety  Problem Solving: Decreased awareness of errors  Insights: Decreased awareness of deficits  Initiation: Requires cues for some  Sequencing: Requires cues for some  Cognition Comment: Pt with expressive aphasia however able to respond to yes/no questions and communicate with short sentence responses.     Orientation  Overall Orientation Status: Impaired  Orientation Level: Oriented to person;Disoriented to situation;Disoriented to place      AM-PAC Score  AM-[facility] Inpatient Daily Activity Raw Score: 14 (08/02/22 1401)  AM-PAC Inpatient ADL T-Scale Score : 33.39 (08/02/22 1401)  ADL Inpatient CMS 0-100% Score: 59.67 (08/02/22 1401)  ADL Inpatient CMS G-Code Modifier : CK (08/02/22 1401)      Goals  Short Term Goals  Time Frame for Short term goals: by discharge  Short Term Goal 1: pt will tolerate 2+minutes of standingwith use of least restrictive AD for increasing participation in functional tasks  Short Term Goal 2: pt will perform UB ADLs with min A and use of least restrictive AE/DME  Short Term Goal 3: pt will perform functional mobility with mod A utilizing least restrictive AD  Short Term Goal 4: pt will perform LB ADLs with modified independence and use of least restrictive AE/DME  Short Term Goal 5: pt will utilize compensatory strategies with mod VC for increasing participation in functional tasks   Short Term Goal 6: pt will perform functional transfers with CGA and use of least restrictive AD for increasing participation in functional tasks  Short Term Goal 7: pt will tolerate 5+ minutes of HEP to RUE for increasing participation in functional tasks       Therapy Time   Individual Concurrent Group Co-treatment   Time In 4054         Time Out 1210         Minutes 44         Timed Code Treatment Minutes: 44 Minutes    Evaluation/treatment performed by Student OT under the supervision of co-signing OT who agrees with all evaluation/treatment and documentation.      Verónica Mosley S/OT

## 2022-08-02 NOTE — PROGRESS NOTES
Notified DECLAN Valdez CM, per Dr Trejo Fly pt is approp for ARU. ARU will follow to determine if pt is interested in [facility] [facility] and when pt will likely be medically ready.
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
## 2022-08-02 NOTE — PROGRESS NOTES
Daily Progress Note  Neuro Critical Care    Patient Name: Alexander Rachel  Patient : 1965  Room/Bed: 0124/0124-01  Code Status: full code   Allergies: Allergies   Allergen Reactions    Morphine Itching       CHIEF COMPLAINT:      AMS      INTERVAL HISTORY    Initial Presentation (Admitted 2022): The patient is a 59 y.o. 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 Presented to ED on 2022. EMS was called after a syncopal event. Patient was complaining of headache for 2 days. Apparently, patient was trying to leave AMA but had fallen down when he tried to walk. Then was agreeable to be admitted. CT head was unremarkable. Patient was hypoxic initially and CXR showed atelectasis. CTPE was negative for PE. Patient was admitted to obs unit. Cardiology were consulted who planned for ICD interrogation and decreased Norvasc to 5 and imdur to 30mg. Neurology were consulted for AMS on . Planned to get an EEG and patient was transferred to IM service for further workup. On  Patient was alert but oriented to place only. Was found to be having expressive aphasia and perseveration in speech. His NIH score was 10 initially, with weakness of right upper and lower extremities, aphasia and mild dysarthria. Patient is independent and without cognitive deficits at baseline as per niece, unclear if he had focal deficits prior to this admission however notes report some gait instability since 2016. EEG reports was abnormal for Continuous left hemispheric polymorphic theta slowing suggested underlying structural defect. A Bilateral Carotid doppler showed complete occlusion of cervical left ICA. Endovascular were consulted. CTA showed same finding.   his NIH was 15 with worsening right sided weakness, sensory loss , and became mute.  CT perfusion was emergently performed which showed ischemic penumbra in left MCA, DARYN territory  Was given a bolus of heparin 2000 units stat and had a Diagnostic Cerebral Angiogram with Left ICA occlusion mechanical thrombectomy with stent retriever and mechanical aspiration, pre and post stenting balloon angioplasty, stenting with carotid wall stent. Patient was then sent to NICU. Off note: Recent MRI of cervical cord demonstrated evidence of likely myelomalacia at about C6. There is a Hx of V. Fib arrest 2/2021. MRI of brain at that time revealed early changes of hypoxic anoxic injury    Hospital Course:   7/31/2022: DSA showed left ICA acute on chronic occlusion, underwent left ICA mechanical thrombectomy, pre and post stenting balloon angioplasty. 8/1   No acute events overnight. This morning patient's bsl was 63 and 67 was given orange juice and apple sauce. Patient passed bedside swallow. Repeat bsl 139    Last 24h:  No acute event overnight. Patient blood glucose has been stable. Patient appears more alert, awake, following commands, moving all 4 extremities.     Cardiology was consulted and recommended that patient will need at least 4 weeks of the insertion of AICD,       CURRENT MEDICATIONS:  SCHEDULED MEDICATIONS:   [START ON 8/3/2022] enoxaparin  40 mg SubCUTAneous Daily    folic acid  1 mg Oral Daily    famotidine  20 mg Oral BID    [Held by provider] amLODIPine  5 mg Oral BID    [Held by provider] lactulose  20 g Oral TID    clopidogrel  75 mg Oral Daily    isosorbide mononitrate  30 mg Oral Daily    aspirin  81 mg Oral Daily    atorvastatin  40 mg Oral Daily    carvedilol  25 mg Oral BID WC    [Held by provider] lisinopril  10 mg Oral Daily    DULoxetine  30 mg Oral Daily    sodium chloride flush  5-40 mL IntraVENous 2 times per day     CONTINUOUS INFUSIONS:   sodium chloride      dextrose      sodium chloride       PRN MEDICATIONS:   sennosides-docusate sodium, hydrALAZINE, bisacodyl, glucose, dextrose bolus **OR** dextrose bolus, PSA 0.22 01/13/2016    INR 1.2 02/22/2022    LABA1C 5.6 07/30/2022     24 HOUR INTAKE/OUTPUT:  Intake/Output Summary (Last 24 hours) at 8/2/2022 1713  Last data filed at 8/2/2022 1204  Gross per 24 hour   Intake 1973.72 ml   Output 950 ml   Net 1023.72 ml       IMAGING:    Place summary of recent imaging here. Labs and Images reviewed with:  [x] Dr. Smion Almonte. Zander    [] Dr. Ben Doran  [] Dr. Carlos Fagan  [] There are no new interval images to review. REVIEW OF SYSTEMS     CONSTITUTIONAL: negative for fatigue and malaise   EYES: negative for double vision and photophobia    HEENT: negative for tinnitus and sore throat   RESPIRATORY: negative for cough, shortness of breath   CARDIOVASCULAR: negative for chest pain, palpitations, or syncope   GASTROINTESTINAL: negative for abdominal pain, nausea, vomiting, diarrhea, or constipation    GENITOURINARY: negative for incontinence or retention    MUSCULOSKELETAL: negative for neck or back pain, negative for extremity pain   NEUROLOGICAL: Negative for seizures, headaches, weakness, numbness, confusion,  moderate aphasia, and dysarthria    PSYCHIATRIC: negative for agitation, hallucination, SI/HI   SKIN Negative for spontaneous contusions, rashes, or lesions      PHYSICAL EXAM:       PHYSICAL EXAM       CONSTITUTIONAL:  Well developed, well nourished, alert and oriented , in no acute distress. Nontoxic. Moderate dysarthria  and severe aphasia .    HEAD:  normocephalic, atraumatic    EYES:  PERRLA, EOMI.   ENT:  moist mucous membranes   NECK:  supple, symmetric   LUNGS:  Equal air entry bilaterally   CARDIOVASCULAR:  normal s1 / s2, RRR, distal pulses intact   ABDOMEN:  Soft, no rigidity   NEUROLOGIC:  Mental Status:  awake             Cranial Nerves:    V: Facial sensation:  abnormal      Motor Exam:    Drift:  present -  right upper and lower   Tone:  normal    Motor exam is 5 out of 5 left upper and left lower extremities  Right upper and lower 3/5    Sensory:    Touch:    Right Upper Extremity:  normal  Left Upper Extremity:  normal  Right Lower Extremity:  normal  Left Lower Extremity:  normal    Deep Tendon Reflexes:    Right Bicep:  2+  Left Bicep:  2+  Right Knee:  2+  Left Knee:  2+    Plantar Response:  Right:  downgoing  Left:  downgoing    Clonus:  absent  Matos's:  absent    Coordination/Dysmetria:  Heel to Shin:  Unable to assess   Finger to Nose:   Unable to assess   Dysdiadochokinesia:  unable to assess     Gait:  not assessed    NIH Stroke Scale Total (if not done complete detailed one below):    1a.  Level of consciousness:  0 - alert; keenly responsive  1b. Level of consciousness questions:  2 - answers neither question correctly  1c. Level of consciousness questions:  2 - performs neither task correctly  2. Best Gaze:  0 - normal  3. Visual:  0 - no visual loss  4. Facial Palsy:  1 - minor paralysis (flattened nasolabial fold, asymmetric on smiling)  5a. Motor left arm:  0 - no drift, limb holds 90 (or 45) degrees for full 10 seconds  5b. Motor right arm:  1 - drift, limb holds 90 (or 45) degrees but drifts down before full 10 seconds: does not hit bed  6a. Motor left le - no drift; leg holds 30 degree position for full 5 seconds  6b. Motor right le - drift; leg falls by the end of the 5 second period but does not hit bed  7. Limb Ataxia:  0 - absent  8. Sensory:  0 - normal; no sensory loss  9. Best Language:  2 - severe aphasia; all communication is through fragmentary expression; great need for inference, questioning, and guessing by the listener. Range of information that can be exchanged is limited; listener carries burden of communication. Examiner cannot identify materials provided from patient response. 10.  Dysarthria:  1 - mild to moderate, patient slurs at least some words and at worst, can be understood with some difficulty  11.   Extinction and Inattention:  0 - no abnormality  TOTAL: 10    DRAINS:  [x] There are no drains for Neuro Critical Care to monitor at this time. ASSESSMENT AND PLAN:       61-year-old male with past medical history of CAD s/p CABG, s/p AICD, CKD stage III, history of V. fib arrest was initially admitted to observation unit for evaluation of syncope, neurology was consulted for altered mental status, on the floor he became aphasic, with right-sided weakness, NIH was 7 from 10-15. Initial head CT was unremarkable. CTA showed complete ICA occlusion, patient underwent emergent mechanical thrombectomy with angioplasty and stenting. Patient was transferred to ICU for close monitoring     NEUROLOGIC:  - Imaging   CT perfusion:  large mismatch in left cerebral hemisphere  CT perfusion 08/02/22  No significant residual perfusion mismatch.   - Goal -140  - Neuro checks per protocol    CARDIOVASCULAR:  - Goal -140  - Continue telemetry  -ICD interrogation  -Cardiology following  -Continue aspirin, statin, beta-blocker  -Keep potassium greater than 4, magnesium greater than 2  -Wait 4 weeks from date of insertion of AICD  for  MRI.      PULMONARY:   On room air           RENAL/FLUID/ELECTROLYTE:  - BUN 10/ Creatinine 0.86  - IVF: 100 ml/hr  - Replace electrolytes PRN  - Daily BMP    Intake/Output Summary (Last 24 hours) at 8/1/2022 0855  Last data filed at 8/1/2022 0500  Gross per 24 hour   Intake 1043.9 ml   Output 1900 ml   Net -856.1 ml     GI/NUTRITION:  NUTRITION:  ADULT DIET; Easy to Chew  - Bowel regimen: Senna -s   - GI prophylaxis: Pepcid 20 mg bid      ID:  - Tmax 98.6  - WBC 8.3  - Continue to monitor for fevers  - Daily CBC    HEME:   - H&H 11.8 and 35.4  - Platelets 835  - Daily CBC    ENDOCRINE:  - Continue to monitor blood glucose, goal <180       OTHER:  - PT/OT/ST   - Code Status: Full code     PROPHYLAXIS:  Stress ulcer: Pepcid  20 mg     DVT PROPHYLAXIS:  - SCD sleeves - Thigh High      DISPOSITION:  [x] To remain ICU:   [] OK for out of ICU from Neuro Critical Care standpoint    We will continue to follow along. For any changes in exam or patient status please contact Neuro Critical Care.       Sujata Garcia MD  Neuro Critical Care  Pager [phone]  8/2/2022     5:13 PM

## 2022-08-02 NOTE — PROGRESS NOTES
Endovascular Neurosurgery Progress Note    SUBJECTIVE:     Improving overnight, no acute event, patient is talking more normally now and right side is much stronger    Review of Systems:  CONSTITUTIONAL:  negative for fevers, chills, fatigue and malaise    EYES:  negative for double vision, blurred vision and photophobia     HEENT:  negative for tinnitus, epistaxis and sore throat    RESPIRATORY:  negative for cough, shortness of breath, wheezing    CARDIOVASCULAR:  negative for chest pain, palpitations, syncope, edema    GASTROINTESTINAL:  negative for nausea, vomiting    GENITOURINARY:  negative for incontinence    MUSCULOSKELETAL:  negative for neck or back pain    NEUROLOGICAL:  Negative for weakness and tingling  negative for headaches and dizziness    PSYCHIATRIC:  negative for anxiety      Review of systems otherwise negative. OBJECTIVE:     Vitals:    08/02/22 0600   BP: (!) 134/98   Pulse: 75   Resp: 28   Temp:    SpO2: 94%        General:  Gen: normal habitus, NAD  HEENT: NCAT, mucosa moist  Cvs: RRR, S1 S2 normal  Resp: symmetric unlabored breathing  Abd: s/nd/nt  Ext: no edema  Skin: no lesions seen, warm and dry    Neuro:  Gen: awake and alert, able to speak in simple complete sentence  Lang/speech: speak is clear, able to repeat perfectly  CN: PERRL, EOMI, VFF, V1-3 intact, face symmetric, hearing intact, shoulder shrug symmetric, tongue midline  Motor: grossly 5/5 UE and LE on the left, 4/5 on the right  Sense: LT intact in all 4 ext. Coord: FTN and HTS intact b/l  DTR: deferred  Gait: not tested    NIH Stroke Scale:   1a  Level of consciousness: 0 - alert; keenly responsive   1b. LOC questions:  0 - answers both questions correctly   1c. LOC commands: 0 - performs both tasks correctly   2. Best Gaze: 0 - normal   3. Visual: 0 - no visual loss   4. Facial Palsy: 1 - minor paralysis (flattened nasolabial fold, asymmetric on smiling)   5a.  Motor left arm: 0 - no drift, limb holds 90 (or 45) degrees for full 10 seconds   5b. Motor right arm: 1   6a. Motor left le - no drift; leg holds 30 degree position for full 5 seconds   6b  Motor right le - no drift; leg holds 30 degree position for full 5 seconds   7. Limb Ataxia: 0 - absent   8. Sensory: 0 - normal; no sensory loss   9. Best Language:  1    10. Dysarthria: 0 - normal   11. Extinction and Inattention: 0 - no abnormality         Total:   3     MRS: 3      LABS:   Reviewed. Lab Results   Component Value Date    HGB 11.2 (L) 2022    WBC 7.5 2022     (L) 2022     2022    BUN 9 2022    CREATININE 0.92 2022    AST 15 2022    ALT 10 2022    MG 2.0 2022    APTT 27.6 2022    INR 1.2 2022      Lab Results   Component Value Date/Time    COVID19 Not Detected 2022 08:47 PM    COVID19 Not Detected 2020 11:08 AM       RADIOLOGY:   Images were personally reviewed including:    CTP 22  Large mismatch in the left hemisphere    Cerebral angiogram 22  1. Left ICA cervical segment acute occlusion with reconstitution of flow in the left ICA ophthalmic segment through ophthalmic artery through ECA branches. 2.  The above lesion was treated with EmboTrap stent retriever 6.5 mm x 45 mm deployed in the distal ICA cervical segment, mechanical aspiration through ? LBC with penumbra engine, Pre-stenting balloon angioplasty with 5.0 mm x 40 mm loren balloon    using seimless technique, 10 mm x 31 mm carotid wallstent, post stenting balloon angioplasty with Emboguard BGC   3. Final TICI score?03   4. Residual stenosis less than 10%? CTP: 22  - no more mismatch      ASSESSMENT:     58 y/o M with pmh significant for Htn, CAD s/p CABG in , V. Fib PEA in 2022, AICD on , CKD stage III, presented on  with a syncope and right sided weakness, CTA completed on  which showed left ICA cervical segment occlusion.  Patient had worsening neuro exam overnight, became non verbal, weaker on right UE and LE, NIHSS was 15. CT perfusion was emergently performed which showed ischemic penumbra in left MCA, DARYN territory. Patient was taken for emergent thrombectomy (7/31/22) and L ICA stenting (7/31/22), since the intervention patient improved signficantly        PLAN:     - patient is significantly improved compared to yesterday  - con't aspirin and plavix  - PT/OT and acute rehab  - gradual normalization of BP  - patient can be discharged from neuroendovascular standpoint    - f/u with Dr. Montrell Torres in 2 weeks and Dr. Tyler Sood in 3 months      Case discussed with Dr. Tyler Sood attending.     Carlynn Curling, MD PGY 7  Stroke, [facility]  [address] [place]  Electronically signed 8/2/2022 at 6:59 AM
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
## 2022-08-03 LAB
ABSOLUTE EOS #: 0.45 K/UL (ref 0–0.44)
ABSOLUTE IMMATURE GRANULOCYTE: 0.03 K/UL (ref 0–0.3)
ABSOLUTE LYMPH #: 1.42 K/UL (ref 1.1–3.7)
ABSOLUTE MONO #: 0.5 K/UL (ref 0.1–1.2)
AMMONIA: 15 UMOL/L (ref 16–60)
ANION GAP SERPL CALCULATED.3IONS-SCNC: 10 MMOL/L (ref 9–17)
BASOPHILS # BLD: 2 % (ref 0–2)
BASOPHILS ABSOLUTE: 0.12 K/UL (ref 0–0.2)
BUN BLDV-MCNC: 8 MG/DL (ref 6–20)
CALCIUM SERPL-MCNC: 8.5 MG/DL (ref 8.6–10.4)
CHLORIDE BLD-SCNC: 100 MMOL/L (ref 98–107)
CO2: 21 MMOL/L (ref 20–31)
CREAT SERPL-MCNC: 0.93 MG/DL (ref 0.7–1.2)
EOSINOPHILS RELATIVE PERCENT: 6 % (ref 1–4)
GFR AFRICAN AMERICAN: >60 ML/MIN
GFR NON-AFRICAN AMERICAN: >60 ML/MIN
GFR SERPL CREATININE-BSD FRML MDRD: ABNORMAL ML/MIN/{1.73_M2}
GLUCOSE BLD-MCNC: 104 MG/DL (ref 75–110)
GLUCOSE BLD-MCNC: 108 MG/DL (ref 75–110)
GLUCOSE BLD-MCNC: 128 MG/DL (ref 75–110)
GLUCOSE BLD-MCNC: 132 MG/DL (ref 75–110)
GLUCOSE BLD-MCNC: 75 MG/DL (ref 70–99)
HCT VFR BLD CALC: 35.2 % (ref 40.7–50.3)
HEMOGLOBIN: 11.7 G/DL (ref 13–17)
IMMATURE GRANULOCYTES: 0 %
LV EF: 53 %
LVEF MODALITY: NORMAL
LYMPHOCYTES # BLD: 20 % (ref 24–43)
MAGNESIUM: 1.5 MG/DL (ref 1.6–2.6)
MCH RBC QN AUTO: 28.5 PG (ref 25.2–33.5)
MCHC RBC AUTO-ENTMCNC: 33.2 G/DL (ref 28.4–34.8)
MCV RBC AUTO: 85.6 FL (ref 82.6–102.9)
MONOCYTES # BLD: 7 % (ref 3–12)
NRBC AUTOMATED: 0 PER 100 WBC
PDW BLD-RTO: 17.5 % (ref 11.8–14.4)
PLATELET # BLD: 134 K/UL (ref 138–453)
PMV BLD AUTO: 10.7 FL (ref 8.1–13.5)
POTASSIUM SERPL-SCNC: 4 MMOL/L (ref 3.7–5.3)
RBC # BLD: 4.11 M/UL (ref 4.21–5.77)
RBC # BLD: ABNORMAL 10*6/UL
SEG NEUTROPHILS: 65 % (ref 36–65)
SEGMENTED NEUTROPHILS ABSOLUTE COUNT: 4.53 K/UL (ref 1.5–8.1)
SODIUM BLD-SCNC: 131 MMOL/L (ref 135–144)
WBC # BLD: 7.1 K/UL (ref 3.5–11.3)

## 2022-08-03 PROCEDURE — 6370000000 HC RX 637 (ALT 250 FOR IP)

## 2022-08-03 PROCEDURE — 6370000000 HC RX 637 (ALT 250 FOR IP): Performed by: INTERNAL MEDICINE

## 2022-08-03 PROCEDURE — 2060000000 HC ICU INTERMEDIATE R&B

## 2022-08-03 PROCEDURE — 82947 ASSAY GLUCOSE BLOOD QUANT: CPT

## 2022-08-03 PROCEDURE — 6360000002 HC RX W HCPCS

## 2022-08-03 PROCEDURE — 6370000000 HC RX 637 (ALT 250 FOR IP): Performed by: STUDENT IN AN ORGANIZED HEALTH CARE EDUCATION/TRAINING PROGRAM

## 2022-08-03 PROCEDURE — 6360000002 HC RX W HCPCS: Performed by: NURSE PRACTITIONER

## 2022-08-03 PROCEDURE — 36415 COLL VENOUS BLD VENIPUNCTURE: CPT

## 2022-08-03 PROCEDURE — 6370000000 HC RX 637 (ALT 250 FOR IP): Performed by: PSYCHIATRY & NEUROLOGY

## 2022-08-03 PROCEDURE — 92507 TX SP LANG VOICE COMM INDIV: CPT

## 2022-08-03 PROCEDURE — 99233 SBSQ HOSP IP/OBS HIGH 50: CPT | Performed by: PSYCHIATRY & NEUROLOGY

## 2022-08-03 PROCEDURE — 83735 ASSAY OF MAGNESIUM: CPT

## 2022-08-03 PROCEDURE — 80048 BASIC METABOLIC PNL TOTAL CA: CPT

## 2022-08-03 PROCEDURE — 82140 ASSAY OF AMMONIA: CPT

## 2022-08-03 PROCEDURE — 6360000002 HC RX W HCPCS: Performed by: STUDENT IN AN ORGANIZED HEALTH CARE EDUCATION/TRAINING PROGRAM

## 2022-08-03 PROCEDURE — 2580000003 HC RX 258: Performed by: STUDENT IN AN ORGANIZED HEALTH CARE EDUCATION/TRAINING PROGRAM

## 2022-08-03 PROCEDURE — 85025 COMPLETE CBC W/AUTO DIFF WBC: CPT

## 2022-08-03 PROCEDURE — 93306 TTE W/DOPPLER COMPLETE: CPT

## 2022-08-03 RX ORDER — MAGNESIUM SULFATE 1 G/100ML
1000 INJECTION INTRAVENOUS ONCE
Status: COMPLETED | OUTPATIENT
Start: 2022-08-03 | End: 2022-08-03

## 2022-08-03 RX ORDER — MAGNESIUM SULFATE IN WATER 40 MG/ML
2000 INJECTION, SOLUTION INTRAVENOUS ONCE
Status: COMPLETED | OUTPATIENT
Start: 2022-08-03 | End: 2022-08-03

## 2022-08-03 RX ORDER — AMLODIPINE BESYLATE 5 MG/1
5 TABLET ORAL DAILY
Status: DISCONTINUED | OUTPATIENT
Start: 2022-08-03 | End: 2022-08-10 | Stop reason: HOSPADM

## 2022-08-03 RX ADMIN — CLOPIDOGREL 75 MG: 75 TABLET, FILM COATED ORAL at 08:28

## 2022-08-03 RX ADMIN — Medication 81 MG: at 08:28

## 2022-08-03 RX ADMIN — SODIUM CHLORIDE, PRESERVATIVE FREE 10 ML: 5 INJECTION INTRAVENOUS at 20:36

## 2022-08-03 RX ADMIN — DOCUSATE SODIUM 50 MG AND SENNOSIDES 8.6 MG 2 TABLET: 8.6; 5 TABLET, FILM COATED ORAL at 08:27

## 2022-08-03 RX ADMIN — CARVEDILOL 25 MG: 25 TABLET, FILM COATED ORAL at 08:27

## 2022-08-03 RX ADMIN — ISOSORBIDE MONONITRATE 30 MG: 30 TABLET ORAL at 08:28

## 2022-08-03 RX ADMIN — FOLIC ACID 1 MG: 1 TABLET ORAL at 08:27

## 2022-08-03 RX ADMIN — ACETAMINOPHEN 650 MG: 325 TABLET ORAL at 03:00

## 2022-08-03 RX ADMIN — FAMOTIDINE 20 MG: 20 TABLET, FILM COATED ORAL at 20:35

## 2022-08-03 RX ADMIN — DULOXETINE HYDROCHLORIDE 30 MG: 30 CAPSULE, DELAYED RELEASE ORAL at 08:28

## 2022-08-03 RX ADMIN — CARVEDILOL 25 MG: 25 TABLET, FILM COATED ORAL at 16:36

## 2022-08-03 RX ADMIN — MAGNESIUM SULFATE HEPTAHYDRATE 2000 MG: 40 INJECTION, SOLUTION INTRAVENOUS at 08:36

## 2022-08-03 RX ADMIN — AMLODIPINE BESYLATE 5 MG: 5 TABLET ORAL at 14:29

## 2022-08-03 RX ADMIN — MAGNESIUM SULFATE HEPTAHYDRATE 1000 MG: 1 INJECTION, SOLUTION INTRAVENOUS at 06:48

## 2022-08-03 RX ADMIN — FAMOTIDINE 20 MG: 20 TABLET, FILM COATED ORAL at 08:28

## 2022-08-03 RX ADMIN — ENOXAPARIN SODIUM 40 MG: 100 INJECTION SUBCUTANEOUS at 08:27

## 2022-08-03 RX ADMIN — DESMOPRESSIN ACETATE 40 MG: 0.2 TABLET ORAL at 08:27

## 2022-08-03 ASSESSMENT — PAIN DESCRIPTION - DESCRIPTORS: DESCRIPTORS: ACHING;DISCOMFORT

## 2022-08-03 ASSESSMENT — PAIN DESCRIPTION - LOCATION: LOCATION: HEAD

## 2022-08-03 ASSESSMENT — PAIN SCALES - GENERAL
PAINLEVEL_OUTOF10: 0
PAINLEVEL_OUTOF10: 6
PAINLEVEL_OUTOF10: 0

## 2022-08-03 ASSESSMENT — PAIN DESCRIPTION - ORIENTATION: ORIENTATION: ANTERIOR
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
## 2022-08-03 NOTE — PROGRESS NOTES
effusion J90    COPD (chronic obstructive pulmonary disease) ([facility]) J44.9    CAD (coronary artery disease) I25.10    Microscopic hematuria R31.29    Acute on chronic diastolic (congestive) heart failure ([facility]) I50.33    CHF (congestive heart failure), NYHA class I, acute, diastolic ([facility]) Z67.54    Pneumonia J18.9    Liver lesion K76.9    Mild malnutrition ([facility]) E44.1    Dysphagia R13.10    Gastroparesis K31.84    Acute kidney injury superimposed on CKD ([facility]) N17.9, N18.9    Major depressive disorder, single episode F32.9    Unintentional weight loss of 10% body weight within 6 months R63.4    Esophageal dysphagia R13.19    Moderate malnutrition ([facility]) E44.0    Anxiety F41.9    Closed fracture of fifth metatarsal bone S92.353A    Interstitial lung disease ([facility]) J84.9    NSTEMI (non-ST elevated myocardial infarction) ([facility]) I21.4    Type 2 diabetes mellitus without complication ([facility]) A58.0    Elevated serum immunoglobulin free light chain level R76.8    Abnormal ANCA (antineutrophil cytoplasmic antibody) R76.8    Chronic kidney disease N18.9    Hypocalcemia E83.51    Anemia in stage 3 chronic kidney disease N18.30, D63.1    Acute kidney failure with lesion of tubular necrosis ([facility]) N17.0    Nephrotic syndrome with focal glomerulosclerosis N04.1    Rapid progressv nephritic syndrm, 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    Peripheral edema R60.9    Syncope and collapse R55    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 I46.9    Cervical stenosis of spinal canal M48.02    Ischemic cardiomyopathy I25.5    Attention-deficit hyperactivity disorder, unspecified type F90.9    Chronic kidney disease, stage 3b ([facility]) N18.32    Gastro-esophageal reflux disease without esophagitis K21.9    Presence of automatic (implantable) cardiac defibrillator Z95.810    Unspecified osteoarthritis, unspecified site M19.90    Hypertensive emergency I16.1    Cardiomyopathy ([facility] I42.9 Encounter for implantable cardioverter-defibrillator discussion Z71.89    Transient alteration of awareness R40.4    Acute ischemic left MCA stroke ([facility] I63.512    Dysphasia R47.02    Altered mental status R41.82       Pain: 0/10    Speech and Language Treatment  Treatment time: 4305-7584    Subjective: [x] Alert [x] Cooperative     [] Confused     [] Agitated    [] Lethargic      Objective/Assessment:  Auditory Comprehension: Simple y/n questions: 10/15           Answering [facility] questions: 9/10 increased to 10/10 with repetition     Verbal Expression: Category members given first letter: 7/12 increased to 9/12 with min-max verbal cues and phonemic cues            Opposites: 11/15 increased to 14/15 with min-max verbal cues             Other: Pt. Continues with increased S/L skills. Cognition to be assessed in next session. Plan:  [x] Continue ST services    [] Discharge from ST:      Discharge recommendations: []  Further therapy recommended at discharge. The patient should be able to tolerate at least 3 hours of therapy per day over 5 days or 15 hours over 7 days. [x] Further therapy recommended at discharge. [] No therapy recommended at discharge. Treatment completed by: Magalie Garcia, SLP, M.A.  ALVARADO-SLP

## 2022-08-03 NOTE — PROGRESS NOTES
Physical Therapy        Physical Therapy Cancel Note      DATE: 8/3/2022    NAME: Marcia Salvador  MRN: [id number]   : 1965      Patient not seen this date for Physical Therapy due to: Other: Pt currently out of room at [facility], will check back as able.       Electronically signed by Natalie Austin PTA on 8/3/2022 at 9:01 AM

## 2022-08-03 NOTE — PROGRESS NOTES
Endovascular Neurosurgery Progress Note    SUBJECTIVE:     No overnight event, no acute event, patient continue to improve gradually    Review of Systems:  CONSTITUTIONAL:  negative for fevers, chills, fatigue and malaise    EYES:  negative for double vision, blurred vision and photophobia     HEENT:  negative for tinnitus, epistaxis and sore throat    RESPIRATORY:  negative for cough, shortness of breath, wheezing    CARDIOVASCULAR:  negative for chest pain, palpitations, syncope, edema    GASTROINTESTINAL:  negative for nausea, vomiting    GENITOURINARY:  negative for incontinence    MUSCULOSKELETAL:  negative for neck or back pain    NEUROLOGICAL:  Negative for weakness and tingling  negative for headaches and dizziness    PSYCHIATRIC:  negative for anxiety      Review of systems otherwise negative. OBJECTIVE:     Vitals:    08/03/22 0754   BP: (!) 143/86   Pulse: 65   Resp: 26   Temp: 97.4 °F (36.3 °C)   SpO2: 93%        General:  Gen: normal habitus, NAD  HEENT: NCAT, mucosa moist  Cvs: RRR, S1 S2 normal  Resp: symmetric unlabored breathing  Abd: s/nd/nt  Ext: no edema  Skin: no lesions seen, warm and dry    Neuro:  Gen: awake and alert, able to speak in simple complete sentence  Lang/speech: speak is clear, able to repeat perfectly, not able to name well, but able to name colors well  CN: PERRL, EOMI, VFF, V1-3 intact, face symmetric, hearing intact, shoulder shrug symmetric, tongue midline  Motor: grossly 5/5 UE and LE on the left, 4/5 on the right  Sense: LT intact in all 4 ext. Coord: FTN and HTS intact b/l  DTR: deferred  Gait: not tested    NIH Stroke Scale:   1a  Level of consciousness: 0 - alert; keenly responsive   1b. LOC questions:  0 - answers both questions correctly   1c. LOC commands: 0 - performs both tasks correctly   2. Best Gaze: 0 - normal   3. Visual: 0 - no visual loss   4. Facial Palsy: 1 - minor paralysis (flattened nasolabial fold, asymmetric on smiling)   5a.  Motor left arm: segment occlusion. Patient had worsening neuro exam overnight, became non verbal, weaker on right UE and LE, NIHSS was 15. CT perfusion was emergently performed which showed ischemic penumbra in left MCA, DARYN territory. Patient was taken for emergent thrombectomy (7/31/22) and L ICA stenting (7/31/22), since the intervention patient improved signficantly        PLAN:     - patient is significantly improved compared prior   - con't aspirin and plavix  - PT/OT and acute rehab  - gradual normalization of BP  - patient can be discharged from neuroendovascular standpoint    - f/u with Dr. Wil Ferguson in 2 weeks and Dr. Germaine Duarte in 3 months      Case discussed with Dr. Germaine Duarte attending.     Elly Valencia MD PGY 7  Stroke, [facility]  [id number]  Electronically signed 8/3/2022 at 9:21 AM

## 2022-08-03 NOTE — PLAN OF CARE
of seizures  Outcome: Progressing  Flowsheets  Taken 8/3/2022 1536  Absence of seizures:   Monitor for seizure activity. If seizure occurs, document type and location of movements and any associated apnea   Support airway/breathing, administer oxygen as needed  Taken 8/3/2022 1206  Absence of seizures: Diagnostic studies as ordered  Taken 8/3/2022 0743  Absence of seizures:   Monitor for seizure activity.   If seizure occurs, document type and location of movements and any associated apnea   If seizure occurs, turn head to side and suction secretions as needed   Administer anticonvulsants as ordered   Support airway/breathing, administer oxygen as needed  Goal: Remains free of injury related to seizures activity  Outcome: Progressing  Flowsheets  Taken 8/3/2022 1536  Remains free of injury related to seizure activity:   Maintain airway, patient safety  and administer oxygen as ordered   Monitor patient for seizure activity, document and report duration and description of seizure to Licensed Independent Practitioner   Instruct patient/family to call for assistance with activity based on assessment  Taken 8/3/2022 1206  Remains free of injury related to seizure activity:   Maintain airway, patient safety  and administer oxygen as ordered   Monitor patient for seizure activity, document and report duration and description of seizure to Licensed Independent Practitioner   Instruct patient/family to call for assistance with activity based on assessment  Taken 8/3/2022 0743  Remains free of injury related to seizure activity:   Maintain airway, patient safety  and administer oxygen as ordered   Monitor patient for seizure activity, document and report duration and description of seizure to Licensed Independent Practitioner   If seizure occurs, turn patient to side and suction secretions as needed   Reorient patient post seizure  Goal: Achieves maximal functionality and self care  Outcome: Progressing  Flowsheets  Taken 8/3/2022 1536  Achieves maximal functionality and self care:   Monitor swallowing and airway patency with patient fatigue and changes in neurological status   Encourage and assist patient to increase activity and self care with guidance from physical therapy/occupational therapy   Encourage visually impaired, hearing impaired and aphasic patients to use assistive/communication devices  Taken 8/3/2022 1206  Achieves maximal functionality and self care:   Monitor swallowing and airway patency with patient fatigue and changes in neurological status   Encourage and assist patient to increase activity and self care with guidance from physical therapy/occupational therapy   Encourage visually impaired, hearing impaired and aphasic patients to use assistive/communication devices  Taken 8/3/2022 0743  Achieves maximal functionality and self care:   Monitor swallowing and airway patency with patient fatigue and changes in neurological status   Encourage and assist patient to increase activity and self care with guidance from physical therapy/occupational therapy   Encourage visually impaired, hearing impaired and aphasic patients to use assistive/communication devices     Problem: Cardiovascular - Adult  Goal: Maintains optimal cardiac output and hemodynamic stability  Outcome: Progressing  Flowsheets (Taken 8/3/2022 0743)  Maintains optimal cardiac output and hemodynamic stability:   Monitor blood pressure and heart rate   Monitor urine output and notify Licensed Independent Practitioner for values outside of normal range   Assess for signs of decreased cardiac output   Administer fluid and/or volume expanders as ordered  Goal: Absence of cardiac dysrhythmias or at baseline  Outcome: Progressing  Flowsheets (Taken 8/3/2022 0743)  Absence of cardiac dysrhythmias or at baseline:   Monitor cardiac rate and rhythm   Assess for signs of decreased cardiac output     Problem: Respiratory - Adult  Goal: Achieves optimal ventilation and oxygenation  Outcome: Progressing  Flowsheets (Taken 8/3/2022 8329)  Achieves optimal ventilation and oxygenation:   Assess for changes in respiratory status   Assess for changes in mentation and behavior   Position to facilitate oxygenation and minimize respiratory effort   Oxygen supplementation based on oxygen saturation or arterial blood gases
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
## 2022-08-03 NOTE — PROGRESS NOTES
Called [facility] to determine if pt is interested in [facility] and when pt will likely be medically ready. Left  with request for a return call.

## 2022-08-04 ENCOUNTER — APPOINTMENT (OUTPATIENT)
Dept: CT IMAGING | Age: 57
DRG: 030 | End: 2022-08-04
Payer: COMMERCIAL

## 2022-08-04 PROBLEM — G81.91 RIGHT HEMIPARESIS (HCC): Status: ACTIVE | Noted: 2022-08-04

## 2022-08-04 LAB
ABSOLUTE EOS #: 0.54 K/UL (ref 0–0.44)
ABSOLUTE IMMATURE GRANULOCYTE: 0.03 K/UL (ref 0–0.3)
ABSOLUTE LYMPH #: 1.62 K/UL (ref 1.1–3.7)
ABSOLUTE MONO #: 0.53 K/UL (ref 0.1–1.2)
ANION GAP SERPL CALCULATED.3IONS-SCNC: 10 MMOL/L (ref 9–17)
BASOPHILS # BLD: 1 % (ref 0–2)
BASOPHILS ABSOLUTE: 0.11 K/UL (ref 0–0.2)
BUN BLDV-MCNC: 9 MG/DL (ref 6–20)
CALCIUM SERPL-MCNC: 8.5 MG/DL (ref 8.6–10.4)
CHLORIDE BLD-SCNC: 102 MMOL/L (ref 98–107)
CO2: 23 MMOL/L (ref 20–31)
CREAT SERPL-MCNC: 1.01 MG/DL (ref 0.7–1.2)
EOSINOPHILS RELATIVE PERCENT: 7 % (ref 1–4)
GFR AFRICAN AMERICAN: >60 ML/MIN
GFR NON-AFRICAN AMERICAN: >60 ML/MIN
GFR SERPL CREATININE-BSD FRML MDRD: ABNORMAL ML/MIN/{1.73_M2}
GLUCOSE BLD-MCNC: 104 MG/DL (ref 70–99)
GLUCOSE BLD-MCNC: 115 MG/DL (ref 75–110)
GLUCOSE BLD-MCNC: 115 MG/DL (ref 75–110)
GLUCOSE BLD-MCNC: 131 MG/DL (ref 75–110)
GLUCOSE BLD-MCNC: 88 MG/DL (ref 75–110)
HCT VFR BLD CALC: 37.1 % (ref 40.7–50.3)
HEMOGLOBIN: 12.1 G/DL (ref 13–17)
IMMATURE GRANULOCYTES: 0 %
LYMPHOCYTES # BLD: 21 % (ref 24–43)
MAGNESIUM: 1.8 MG/DL (ref 1.6–2.6)
MCH RBC QN AUTO: 27.8 PG (ref 25.2–33.5)
MCHC RBC AUTO-ENTMCNC: 32.6 G/DL (ref 28.4–34.8)
MCV RBC AUTO: 85.1 FL (ref 82.6–102.9)
MONOCYTES # BLD: 7 % (ref 3–12)
NRBC AUTOMATED: 0 PER 100 WBC
PDW BLD-RTO: 17.6 % (ref 11.8–14.4)
PLATELET # BLD: 150 K/UL (ref 138–453)
PMV BLD AUTO: 10.6 FL (ref 8.1–13.5)
POTASSIUM SERPL-SCNC: 4 MMOL/L (ref 3.7–5.3)
RBC # BLD: 4.36 M/UL (ref 4.21–5.77)
RBC # BLD: ABNORMAL 10*6/UL
SEG NEUTROPHILS: 64 % (ref 36–65)
SEGMENTED NEUTROPHILS ABSOLUTE COUNT: 4.88 K/UL (ref 1.5–8.1)
SODIUM BLD-SCNC: 135 MMOL/L (ref 135–144)
WBC # BLD: 7.7 K/UL (ref 3.5–11.3)

## 2022-08-04 PROCEDURE — 6370000000 HC RX 637 (ALT 250 FOR IP): Performed by: PSYCHIATRY & NEUROLOGY

## 2022-08-04 PROCEDURE — 6370000000 HC RX 637 (ALT 250 FOR IP): Performed by: STUDENT IN AN ORGANIZED HEALTH CARE EDUCATION/TRAINING PROGRAM

## 2022-08-04 PROCEDURE — 85025 COMPLETE CBC W/AUTO DIFF WBC: CPT

## 2022-08-04 PROCEDURE — 97116 GAIT TRAINING THERAPY: CPT

## 2022-08-04 PROCEDURE — 92526 ORAL FUNCTION THERAPY: CPT

## 2022-08-04 PROCEDURE — 6370000000 HC RX 637 (ALT 250 FOR IP)

## 2022-08-04 PROCEDURE — 99232 SBSQ HOSP IP/OBS MODERATE 35: CPT | Performed by: PHYSICAL MEDICINE & REHABILITATION

## 2022-08-04 PROCEDURE — 99233 SBSQ HOSP IP/OBS HIGH 50: CPT | Performed by: PSYCHIATRY & NEUROLOGY

## 2022-08-04 PROCEDURE — 92507 TX SP LANG VOICE COMM INDIV: CPT

## 2022-08-04 PROCEDURE — 36415 COLL VENOUS BLD VENIPUNCTURE: CPT

## 2022-08-04 PROCEDURE — 97129 THER IVNTJ 1ST 15 MIN: CPT

## 2022-08-04 PROCEDURE — 6360000002 HC RX W HCPCS: Performed by: STUDENT IN AN ORGANIZED HEALTH CARE EDUCATION/TRAINING PROGRAM

## 2022-08-04 PROCEDURE — 6370000000 HC RX 637 (ALT 250 FOR IP): Performed by: INTERNAL MEDICINE

## 2022-08-04 PROCEDURE — 94761 N-INVAS EAR/PLS OXIMETRY MLT: CPT

## 2022-08-04 PROCEDURE — 2060000000 HC ICU INTERMEDIATE R&B

## 2022-08-04 PROCEDURE — 80048 BASIC METABOLIC PNL TOTAL CA: CPT

## 2022-08-04 PROCEDURE — 83735 ASSAY OF MAGNESIUM: CPT

## 2022-08-04 PROCEDURE — 70450 CT HEAD/BRAIN W/O DYE: CPT

## 2022-08-04 PROCEDURE — 2580000003 HC RX 258: Performed by: STUDENT IN AN ORGANIZED HEALTH CARE EDUCATION/TRAINING PROGRAM

## 2022-08-04 PROCEDURE — 82947 ASSAY GLUCOSE BLOOD QUANT: CPT

## 2022-08-04 RX ADMIN — DESMOPRESSIN ACETATE 40 MG: 0.2 TABLET ORAL at 08:32

## 2022-08-04 RX ADMIN — DULOXETINE HYDROCHLORIDE 30 MG: 30 CAPSULE, DELAYED RELEASE ORAL at 08:32

## 2022-08-04 RX ADMIN — SODIUM CHLORIDE, PRESERVATIVE FREE 10 ML: 5 INJECTION INTRAVENOUS at 21:26

## 2022-08-04 RX ADMIN — SODIUM CHLORIDE, PRESERVATIVE FREE 10 ML: 5 INJECTION INTRAVENOUS at 08:40

## 2022-08-04 RX ADMIN — FAMOTIDINE 20 MG: 20 TABLET, FILM COATED ORAL at 08:32

## 2022-08-04 RX ADMIN — CLOPIDOGREL 75 MG: 75 TABLET, FILM COATED ORAL at 08:32

## 2022-08-04 RX ADMIN — FAMOTIDINE 20 MG: 20 TABLET, FILM COATED ORAL at 21:26

## 2022-08-04 RX ADMIN — FOLIC ACID 1 MG: 1 TABLET ORAL at 08:33

## 2022-08-04 RX ADMIN — ENOXAPARIN SODIUM 40 MG: 100 INJECTION SUBCUTANEOUS at 08:33

## 2022-08-04 RX ADMIN — AMLODIPINE BESYLATE 5 MG: 5 TABLET ORAL at 08:32

## 2022-08-04 RX ADMIN — CARVEDILOL 25 MG: 25 TABLET, FILM COATED ORAL at 16:49

## 2022-08-04 RX ADMIN — Medication 81 MG: at 08:32

## 2022-08-04 RX ADMIN — ISOSORBIDE MONONITRATE 30 MG: 30 TABLET ORAL at 08:33

## 2022-08-04 RX ADMIN — CARVEDILOL 25 MG: 25 TABLET, FILM COATED ORAL at 08:32

## 2022-08-04 NOTE — PROGRESS NOTES
Physical Medicine & Rehabilitation  Progress Note        Admitting Physician: [place], *    Primary Care Provider: Luis Eduardo Mera MD     Chief Complaint: Headache, Loss of consciousness    Brief History: This is a follow up to the initial consult on Mr. Regina Yin is a 59 y.o. right handed male who was admitted to [facility] on 7/28/2022 with Loss of Consciousness, Headache, and Shortness of Breath    Patient admitted after syncope and c/o headache, SOB. CT head was WNL. CT chest negative for PE. CXR showed atelectasis. He was admitted to observation status. Cardiology was consulted for syncope. Performed ICD interrogation and adjusted his antihypertensive medications. Known history of CABG, VFib arrest with recent ICD placement By Dr. Kathy Nj on 7/20/22. He developed AMS on 7/29/22. Neurology was consulted and performed EEG which showed possible structural defect. He was found to have expressive aphasia and a NIHSS 10 with R sided weakness. 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 Neuro endovascular was consulted and CTA showed the same ICA occlusion. On 7/31/22 his NIHSS was worsened to 15. CT perfusion emergently showed ischemic penumbra L MCA and DARYN territory. Dr. Lachelle Goldberg performed emergent mechanical thrombectomy and placed L carotid stent with balloon angioplasty on 7/31/22. Subjective: The patient is resting comfortably. The patient's mobility is improved. Observed him transfer to stretcher with min A and RW with physical therapy.        ROS:  Constitutional: negative for anorexia, chills, fatigue, fevers, sweats and weight loss  Eyes: negative for redness and visual disturbance  Ears, nose, mouth, throat, and face: negative for earaches, epistaxis, sore throat and tinnitus  Respiratory: negative for cough and shortness of breath  Cardiovascular: negative for chest pain, dyspnea and palpitations  Gastrointestinal: negative for abdominal pain, change in bowel habits, constipation, nausea and vomiting  Genitourinary:negative for dysuria, frequency, hesitancy and urinary incontinence  Integument/breast: negative for pruritus and rash  Musculoskeletal:negative for stiff joints  Neurological: negative for dizziness, headaches   Behavioral/Psych: negative for decreased appetite, depression     Rehabilitation:   Progressing in therapies. PT:    Bed Mobility-  Bed mobility  Supine to Sit: Minimal assistance  Sit to Supine: Minimal assistance  Scooting: Minimal assistance   Bed Mobility Training  Bed Mobility Training: Yes  Overall Level of Assistance: Minimum assistance, Additional time, Adaptive equipment  Interventions: Verbal cues  Supine to Sit: Minimum assistance, Additional time, Adaptive equipment (Pt performed supine>sit with min A for trunk progression and HOB raised ~25*)  Sit to Supine:  (Pt retired to chair at end of session)  Scooting: Stand-by assistance, Additional time (Pt performed scooting to EOB with SBA and increased time/effort to complete)     Transfers-  Transfers  Sit to Stand: Minimal Assistance  Stand to sit: Minimal Assistance   Transfer Training  Transfer Training: Yes  Overall Level of Assistance: Minimum assistance, Assist X2, Adaptive equipment, Additional time  Interventions: Verbal cues (VC for hand placement with use of RW)  Sit to Stand: Minimum assistance, Assist X2, Additional time, Adaptive equipment (Pt performed sit>stand 2x from EOB with min Ax2 and use of RW and Danika Stady)  Stand to Sit: Minimum assistance, Additional time, Adaptive equipment, Assist X2 (Pt performed stand>sit 2x (1x EOB and 1x recliner) with in A from therapsit to complete. Pt performed stand>sit 1x with RW and 1x with Anyi Miller.)     Ambulation-  Ambulation  Surface: level tile  Device: Rolling Walker  Assistance: Moderate assistance  Gait Deviations: Staggers;Decreased step height;Decreased step length;Deviated path  Distance: 10 ft.   Comments: The pt was unsteady, leaned to th R side, and had a narrow BRAD  More Ambulation?: No           OT:   ADLS-   ADL  Feeding: Setup, Increased time to complete  Feeding Skilled Clinical Factors: Pt provided lunch setup at therapist exit. Pt initiated eating and able to manage utensils with increased time/effort. Grooming: Increased time to complete, Minimal assistance, Verbal cueing  UE Bathing: Moderate assistance, Verbal cueing, Increased time to complete  LE Bathing: Verbal cueing, Increased time to complete, Minimal assistance  UE Dressing: Moderate assistance, Verbal cueing, Increased time to complete  LE Dressing: Moderate assistance, Verbal cueing, Increased time to complete  LE Dressing Skilled Clinical Factors: Pt able to doff/don L sock seated EOB with increased time to complete and VC. Pt able to doff R sock but required mod A to don sock. Toileting: Moderate assistance, Increased time to complete, Adaptive equipment       SLP:  Subjective: [x] Alert     [x] Cooperative     [] Confused     [] Agitated    [] Lethargic        Objective/Assessment:  Auditory Comprehension: Simple y/n questions: 10/15                                            Answering 520 [employer] questions: 9/10 increased to 10/10 with repetition     Verbal Expression: Category members given first letter: 7/12 increased to 9/12 with min-max verbal cues and phonemic cues                                  Opposites: 11/15 increased to 14/15 with min-max verbal cues                                   Other: Pt. Continues with increased S/L skills. Cognition to be assessed in next session. Objective:  /83   Pulse 62   Temp 97.7 °F (36.5 °C) (Oral)   Resp 18   SpO2 97%       GEN: Well developed, well nourished, no acute distress. HEENT: NCAT, PERRL, EOMI, mucous membranes pink and moist.  RESP: Lungs clear to auscultation. No rales or rhonchi. Respirations WNL and unlabored. CV: Regular rate rhythm. No murmurs, rubs, or gallops.   ABD: soft, non-distended, non-tender. BS+ and equal.  NEURO: A&O x 3. Sensation intact to light touch. DTRs 2+. Mild verbal apraxia  MSK: Functional ROM. Muscle tone and bulk are normal bilaterally. Strength 4+/5 key muscles LUE and LLE. 4-/5 key muscles RUE and RLE.   EXT: No calf tenderness to palpation or edema BLEs. SKIN: Warm dry and intact with good turgor. PSYCH: Mood WNL. Affect WNL. Appropriately interactive.     Current Medications:   Current Facility-Administered Medications: amLODIPine (NORVASC) tablet 5 mg, 5 mg, Oral, Daily  enoxaparin (LOVENOX) injection 40 mg, 40 mg, SubCUTAneous, Daily  sennosides-docusate sodium (SENOKOT-S) 8.6-50 MG tablet 2 tablet, 2 tablet, Oral, Daily PRN  folic acid (FOLVITE) tablet 1 mg, 1 mg, Oral, Daily  hydrALAZINE (APRESOLINE) injection 10 mg, 10 mg, IntraVENous, Q4H PRN  bisacodyl (DULCOLAX) suppository 10 mg, 10 mg, Rectal, Daily PRN  famotidine (PEPCID) tablet 20 mg, 20 mg, Oral, BID  glucose chewable tablet 16 g, 4 tablet, Oral, PRN  dextrose bolus 10% 125 mL, 125 mL, IntraVENous, PRN **OR** dextrose bolus 10% 250 mL, 250 mL, IntraVENous, PRN  glucagon (rDNA) injection 1 mg, 1 mg, SubCUTAneous, PRN  dextrose 10 % infusion, , IntraVENous, Continuous PRN  clopidogrel (PLAVIX) tablet 75 mg, 75 mg, Oral, Daily  isosorbide mononitrate (IMDUR) extended release tablet 30 mg, 30 mg, Oral, Daily  aspirin EC tablet 81 mg, 81 mg, Oral, Daily  atorvastatin (LIPITOR) tablet 40 mg, 40 mg, Oral, Daily  carvedilol (COREG) tablet 25 mg, 25 mg, Oral, BID WC  [Held by provider] lisinopril (PRINIVIL;ZESTRIL) tablet 10 mg, 10 mg, Oral, Daily  DULoxetine (CYMBALTA) extended release capsule 30 mg, 30 mg, Oral, Daily  sodium chloride flush 0.9 % injection 5-40 mL, 5-40 mL, IntraVENous, 2 times per day  sodium chloride flush 0.9 % injection 5-40 mL, 5-40 mL, IntraVENous, PRN  0.9 % sodium chloride infusion, , IntraVENous, PRN  acetaminophen (TYLENOL) tablet 650 mg, 650 mg, Oral, Q4H PRN  ondansetron (ZOFRAN-ODT) disintegrating tablet 4 mg, 4 mg, Oral, Q8H PRN **OR** ondansetron (ZOFRAN) injection 4 mg, 4 mg, IntraVENous, Q6H PRN      Diagnostics:     CBC:   Recent Labs     08/02/22 0429 08/03/22 0400 08/04/22  0312   WBC 7.5 7.1 7.7   RBC 4.04* 4.11* 4.36   HGB 11.2* 11.7* 12.1*   HCT 35.1* 35.2* 37.1*   MCV 86.9 85.6 85.1   RDW 17.3* 17.5* 17.6*   * 134* 150     BMP:    Recent Labs     08/02/22 0429 08/03/22 0400 08/04/22 0312   GLUCOSE 68* 75 104*   BUN 9 8 9   CREATININE 0.92 0.93 1.01   CALCIUM 8.2* 8.5* 8.5*    131* 135   K 3.7 4.0 4.0    100 102   CO2 20 21 23   ANIONGAP 9 10 10   LABGLOM >60 >60 >60   GFRAA >60 >60 >60   GFR                    HbA1c:   Lab Results   Component Value Date    LABA1C 5.6 07/30/2022     BNP: No results for input(s): BNP in the last 72 hours. PT/INR: No results for input(s): PROTIME, INR in the last 72 hours. APTT: No results for input(s): APTT in the last 72 hours. CARDIAC ENZYMES: No results for input(s): CKMB, CKMBINDEX, TROPONINT, TROPHS, TROPII in the last 72 hours. Invalid input(s): CKTOTAL;3   FASTING LIPID PANEL:  Lab Results   Component Value Date    CHOL 149 07/30/2022    HDL 35 (L) 07/30/2022    TRIG 168 (H) 07/30/2022     LIVER PROFILE: No results for input(s): AST, ALT, ALB, BILIDIR, BILITOT, ALKPHOS in the last 72 hours. Radiology:    CT HEAD 8/4/22  FINDINGS:   BRAIN/VENTRICLES: There is continued evolution of areas of ischemia in the   subcortical and periventricular white matter of the left cerebral hemisphere. There is no hemorrhage or mass effect. There is no midline shift. The   ventricular structures are unremarkable. The infratentorial structures are   unremarkable. ORBITS: The visualized portion of the orbits demonstrate no acute abnormality. SINUSES: The visualized paranasal sinuses and mastoid air cells demonstrate   no acute abnormality.      SOFT TISSUES/SKULL:  No acute abnormality of the visualized skull or soft   tissues. Impression:     Continued evolution of areas of ischemia in the subcortical and   periventricular white matter of the left cerebral hemisphere. Impression/Plan:    Ischemic CVA with R sided weakness:s/p mechanical thrombectomy and ICA stent. On ASA, Plavix  HTN: on carvedilol, Imdur, Lisinopril  CAD: s/p CABG  Hx V Fib: recent AICD placement  COPD  CKD  GERD  Major Depressive Disorder, Recurrent: on Duloxetine    Recommendation:  Patient will benefit from acute inpatient rehabilitation when medically cleared by primary and consulting services  DVT Prophylaxis: on Lovenox        Electronically signed by Naomy Ralph MD on 8/4/2022 at 9:22 AM       This note is created with the assistance of a speech recognition program.  While intending to generate a document that actually reflects the content of the visit, the document can still have some errors including those of syntax and sound a like substitutions which may escape proof reading. In such instances, actual meaning can be extrapolated by contextual diversion.

## 2022-08-04 NOTE — PLAN OF CARE
Problem: Chronic Conditions and Co-morbidities  Goal: Patient's chronic conditions and co-morbidity symptoms are monitored and maintained or improved  8/4/2022 0459 by Vasu Barnes RN  Outcome: Progressing  8/3/2022 1655 by Ellen Almanza RN  Outcome: Progressing  Flowsheets (Taken 8/3/2022 3144)  Care Plan - Patient's Chronic Conditions and Co-Morbidity Symptoms are Monitored and Maintained or Improved:   Monitor and assess patient's chronic conditions and comorbid symptoms for stability, deterioration, or improvement   Collaborate with multidisciplinary team to address chronic and comorbid conditions and prevent exacerbation or deterioration   Update acute care plan with appropriate goals if chronic or comorbid symptoms are exacerbated and prevent overall improvement and discharge     Problem: Pain  Goal: Verbalizes/displays adequate comfort level or baseline comfort level  8/4/2022 0459 by Vasu Barnes RN  Outcome: Progressing  8/3/2022 1655 by Ellen Almanza RN  Outcome: Progressing     Problem: Safety - Adult  Goal: Free from fall injury  8/4/2022 0459 by Vasu Barnes RN  Outcome: Progressing  8/3/2022 1655 by Ellen Almanza RN  Outcome: Progressing     Problem: Discharge Planning  Goal: Discharge to home or other facility with appropriate resources  8/4/2022 0459 by Vasu Barnes RN  Outcome: Progressing  8/3/2022 1655 by Ellen Almanza RN  Outcome: Progressing  Flowsheets (Taken 8/3/2022 6521)  Discharge to home or other facility with appropriate resources:   Identify barriers to discharge with patient and caregiver   Arrange for needed discharge resources and transportation as appropriate   Identify discharge learning needs (meds, wound care, etc)   Refer to discharge planning if patient needs post-hospital services based on physician order or complex needs related to functional status, cognitive ability or social support system     Problem: ABCDS Injury Assessment  Goal: Absence of physical injury  8/4/2022 0459 by Zaheer Wade RN  Outcome: Progressing  8/3/2022 1655 by Effie Story RN  Outcome: Progressing     Problem: Skin/Tissue Integrity  Goal: Absence of new skin breakdown  Description: 1. Monitor for areas of redness and/or skin breakdown  2. Assess vascular access sites hourly  3. Every 4-6 hours minimum:  Change oxygen saturation probe site  4. Every 4-6 hours:  If on nasal continuous positive airway pressure, respiratory therapy assess nares and determine need for appliance change or resting period. 8/4/2022 0459 by Zaheer Wade RN  Outcome: Progressing  8/3/2022 1655 by Effie Story RN  Outcome: Progressing     Problem: Neurosensory - Adult  Goal: Achieves stable or improved neurological status  8/4/2022 0459 by Zaheer Wade RN  Outcome: Progressing  8/3/2022 1655 by Effie Story RN  Outcome: Progressing  Flowsheets  Taken 8/3/2022 1536  Achieves stable or improved neurological status:   Initiate measures to prevent increased intracranial pressure   Maintain blood pressure and fluid volume within ordered parameters to optimize cerebral perfusion and minimize risk of hemorrhage   Monitor temperature, glucose, and sodium. Initiate appropriate interventions as ordered  Taken 8/3/2022 1206  Achieves stable or improved neurological status:   Assess for and report changes in neurological status   Initiate measures to prevent increased intracranial pressure   Maintain blood pressure and fluid volume within ordered parameters to optimize cerebral perfusion and minimize risk of hemorrhage   Monitor temperature, glucose, and sodium.  Initiate appropriate interventions as ordered  Taken 8/3/2022 0743  Achieves stable or improved neurological status:   Assess for and report changes in neurological status   Initiate measures to prevent increased intracranial pressure   Maintain blood pressure and fluid volume within ordered parameters to optimize cerebral perfusion and minimize risk of hemorrhage   Monitor temperature, glucose, and sodium. Initiate appropriate interventions as ordered  Goal: Absence of seizures  8/4/2022 0459 by Vasu Barnes RN  Outcome: Progressing  8/3/2022 1655 by Ellen Almanza RN  Outcome: Progressing  Flowsheets  Taken 8/3/2022 1536  Absence of seizures:   Monitor for seizure activity. If seizure occurs, document type and location of movements and any associated apnea   Support airway/breathing, administer oxygen as needed  Taken 8/3/2022 1206  Absence of seizures: Diagnostic studies as ordered  Taken 8/3/2022 0743  Absence of seizures:   Monitor for seizure activity.   If seizure occurs, document type and location of movements and any associated apnea   If seizure occurs, turn head to side and suction secretions as needed   Administer anticonvulsants as ordered   Support airway/breathing, administer oxygen as needed  Goal: Remains free of injury related to seizures activity  8/4/2022 0459 by Vasu Barnes RN  Outcome: Progressing  8/3/2022 1655 by Ellen Almanza RN  Outcome: Progressing  Flowsheets  Taken 8/3/2022 1536  Remains free of injury related to seizure activity:   Maintain airway, patient safety  and administer oxygen as ordered   Monitor patient for seizure activity, document and report duration and description of seizure to Licensed Independent Practitioner   Instruct patient/family to call for assistance with activity based on assessment  Taken 8/3/2022 1206  Remains free of injury related to seizure activity:   Maintain airway, patient safety  and administer oxygen as ordered   Monitor patient for seizure activity, document and report duration and description of seizure to Licensed Independent Practitioner   Instruct patient/family to call for assistance with activity based on assessment  Taken 8/3/2022 0743  Remains free of injury related to seizure activity:   Maintain airway, patient safety  and administer oxygen as ordered   Monitor patient for seizure activity, document and report duration and description of seizure to Licensed Independent Practitioner   If seizure occurs, turn patient to side and suction secretions as needed   Reorient patient post seizure  Goal: Achieves maximal functionality and self care  8/4/2022 0459 by Manuel Benavides RN  Outcome: Progressing  8/3/2022 1655 by Leslie Muniz RN  Outcome: Progressing  Flowsheets  Taken 8/3/2022 1536  Achieves maximal functionality and self care:   Monitor swallowing and airway patency with patient fatigue and changes in neurological status   Encourage and assist patient to increase activity and self care with guidance from physical therapy/occupational therapy   Encourage visually impaired, hearing impaired and aphasic patients to use assistive/communication devices  Taken 8/3/2022 1206  Achieves maximal functionality and self care:   Monitor swallowing and airway patency with patient fatigue and changes in neurological status   Encourage and assist patient to increase activity and self care with guidance from physical therapy/occupational therapy   Encourage visually impaired, hearing impaired and aphasic patients to use assistive/communication devices  Taken 8/3/2022 0743  Achieves maximal functionality and self care:   Monitor swallowing and airway patency with patient fatigue and changes in neurological status   Encourage and assist patient to increase activity and self care with guidance from physical therapy/occupational therapy   Encourage visually impaired, hearing impaired and aphasic patients to use assistive/communication devices     Problem: Respiratory - Adult  Goal: Achieves optimal ventilation and oxygenation  8/4/2022 0459 by Manuel Benavides RN  Outcome: Progressing  8/3/2022 1655 by Leslie Muniz RN  Outcome: Progressing  Flowsheets (Taken 8/3/2022 0743)  Achieves optimal ventilation and oxygenation:   Assess for changes in respiratory status   Assess for changes in mentation and behavior   Position to facilitate oxygenation and minimize respiratory effort   Oxygen supplementation based on oxygen saturation or arterial blood gases     Problem: Cardiovascular - Adult  Goal: Maintains optimal cardiac output and hemodynamic stability  8/4/2022 0459 by Martin Sigala RN  Outcome: Progressing  8/3/2022 1655 by Sheila Marcum RN  Outcome: Progressing  Flowsheets (Taken 8/3/2022 1598)  Maintains optimal cardiac output and hemodynamic stability:   Monitor blood pressure and heart rate   Monitor urine output and notify Licensed Independent Practitioner for values outside of normal range   Assess for signs of decreased cardiac output   Administer fluid and/or volume expanders as ordered  Goal: Absence of cardiac dysrhythmias or at baseline  8/4/2022 0459 by Martin Sigala RN  Outcome: Progressing  8/3/2022 1655 by Sheila Marcum RN  Outcome: Progressing  Flowsheets (Taken 8/3/2022 2806)  Absence of cardiac dysrhythmias or at baseline:   Monitor cardiac rate and rhythm   Assess for signs of decreased cardiac output     Problem: Skin/Tissue Integrity - Adult  Goal: Skin integrity remains intact  8/4/2022 0459 by Martin Sigala RN  Outcome: Progressing  8/3/2022 1655 by Sheila Marcum RN  Outcome: Progressing  Flowsheets  Taken 8/3/2022 0915  Skin Integrity Remains Intact: Monitor for areas of redness and/or skin breakdown  Taken 8/3/2022 0743  Skin Integrity Remains Intact: Monitor for areas of redness and/or skin breakdown  Goal: Incisions, wounds, or drain sites healing without S/S of infection  8/4/2022 0459 by Martin Sigala RN  Outcome: Progressing  8/3/2022 1655 by Sheila Marcum RN  Outcome: Progressing  Flowsheets (Taken 8/3/2022 0743)  Incisions, Wounds, or Drain Sites Healing Without Sign and Symptoms of Infection: ADMISSION and DAILY: Assess and document risk factors for pressure ulcer development  Goal: Oral mucous membranes remain intact  8/4/2022 0459 by Sarah Presley RN  Outcome: Progressing  8/3/2022 1655 by Stephan Pablo RN  Outcome: Progressing  Flowsheets (Taken 8/3/2022 3027)  Oral Mucous Membranes Remain Intact:   Assess oral mucosa and hygiene practices   Implement preventative oral hygiene regimen   Implement oral medicated treatments as ordered     Problem: Musculoskeletal - Adult  Goal: Return mobility to safest level of function  8/4/2022 0459 by Sarah Presley RN  Outcome: Progressing  8/3/2022 1655 by Stephan Pablo RN  Outcome: Progressing  Flowsheets (Taken 8/3/2022 2322)  Return Mobility to Safest Level of Function:   Assist with transfers and ambulation using safe patient handling equipment as needed   Assess patient stability and activity tolerance for standing, transferring and ambulating with or without assistive devices   Ensure adequate protection for wounds/incisions during mobilization   Obtain physical therapy/occupational therapy consults as needed   Apply continuous passive motion per provider or physical therapy orders to increase flexion toward goal   Instruct patient/family in ordered activity level  Goal: Maintain proper alignment of affected body part  8/4/2022 0459 by Sarah Presley RN  Outcome: Progressing  8/3/2022 1655 by Stephan Pablo RN  Outcome: Progressing  Flowsheets (Taken 8/3/2022 3758)  Maintain proper alignment of affected body part:   Support and protect limb and body alignment per provider's orders   Instruct and reinforce with patient and family use of appropriate assistive device and precautions (e.g. spinal or hip dislocation precautions)  Goal: Return ADL status to a safe level of function  8/4/2022 0459 by Sarah Presley RN  Outcome: Progressing  8/3/2022 1655 by Stephan Pablo RN  Outcome: Progressing  Flowsheets (Taken 8/3/2022 1949)  Return ADL Status to a Safe Level of Function:   Administer medication as ordered   Assess activities of daily living deficits and provide assistive devices as needed   Obtain physical therapy/occupational therapy consults as needed   Assist and instruct patient to increase activity and self care as tolerated     Problem: Gastrointestinal - Adult  Goal: Minimal or absence of nausea and vomiting  8/4/2022 0459 by Adriana Albright RN  Outcome: Progressing  8/3/2022 1655 by Maegan Zimmerman RN  Outcome: Progressing  Flowsheets  Taken 8/3/2022 1206  Minimal or absence of nausea and vomiting:   Administer IV fluids as ordered to ensure adequate hydration   Maintain NPO status until nausea and vomiting are resolved  Taken 8/3/2022 0743  Minimal or absence of nausea and vomiting: Administer IV fluids as ordered to ensure adequate hydration  Goal: Maintains or returns to baseline bowel function  8/4/2022 0459 by Adriana Albright RN  Outcome: Progressing  8/3/2022 1655 by Maegan Zimmerman RN  Outcome: Progressing  Flowsheets (Taken 8/3/2022 3284)  Maintains or returns to baseline bowel function:   Assess bowel function   Encourage oral fluids to ensure adequate hydration   Administer IV fluids as ordered to ensure adequate hydration   Administer ordered medications as needed   Encourage mobilization and activity  Goal: Establish and maintain optimal ostomy function  8/4/2022 0459 by Adriana Albright RN  Outcome: Progressing  8/3/2022 1655 by Maegan Zimmerman RN  Outcome: Progressing  Flowsheets (Taken 8/3/2022 2918)  Establish and maintain optimal ostomy function:   Monitor output from ostomies   Administer IV fluids and TPN as ordered   Infuse IV Fluids/TPN as ordered     Problem: Genitourinary - Adult  Goal: Absence of urinary retention  8/4/2022 0459 by Adriana Albright RN  Outcome: Progressing  8/3/2022 1655 by Maegan Zimmerman RN  Outcome: Progressing  Flowsheets (Taken 8/3/2022 0743)  Absence of urinary retention:   Assess patients ability to void and empty bladder   Monitor intake/output and perform bladder scan as needed     Problem: Infection - Adult  Goal: Absence of infection at discharge  8/4/2022 0459 by Jae Trevino RN  Outcome: Progressing  8/3/2022 1655 by Tj Stark RN  Outcome: Progressing  Flowsheets (Taken 8/3/2022 9766)  Absence of infection at discharge:   Assess and monitor for signs and symptoms of infection   Monitor lab/diagnostic results   Administer medications as ordered   Instruct and encourage patient and family to use good hand hygiene technique   Identify and instruct in appropriate isolation precautions for identified infection/condition  Goal: Absence of infection during hospitalization  8/4/2022 0459 by Jae Trevino RN  Outcome: Progressing  8/3/2022 1655 by Tj Stark RN  Outcome: Progressing  Flowsheets (Taken 8/3/2022 1672)  Absence of infection during hospitalization:   Assess and monitor for signs and symptoms of infection   Monitor lab/diagnostic results   Instruct and encourage patient and family to use good hand hygiene technique  Goal: Absence of fever/infection during anticipated neutropenic period  8/4/2022 0459 by Jae Trevino RN  Outcome: Progressing  8/3/2022 1655 by Tj Stark RN  Outcome: Progressing  Flowsheets (Taken 8/3/2022 2660)  Absence of fever/infection during anticipated neutropenic period: Monitor white blood cell count     Problem: Metabolic/Fluid and Electrolytes - Adult  Goal: Electrolytes maintained within normal limits  8/4/2022 0459 by Jae Trevino RN  Outcome: Progressing  8/3/2022 1655 by Tj Stark RN  Outcome: Progressing  Flowsheets (Taken 8/3/2022 0835)  Electrolytes maintained within normal limits:   Monitor labs and assess patient for signs and symptoms of electrolyte imbalances   Administer electrolyte replacement as ordered   Monitor response to electrolyte replacements, including repeat lab results as appropriate  Goal: Hemodynamic stability and optimal renal function maintained  8/4/2022 0459 by Jos Grullon RN  Outcome: Progressing  8/3/2022 1655 by Maritza Morgan RN  Outcome: Progressing  Flowsheets (Taken 8/3/2022 0596)  Hemodynamic stability and optimal renal function maintained:   Monitor labs and assess for signs and symptoms of volume excess or deficit   Monitor intake, output and patient weight   Monitor urine specific gravity, serum osmolarity and serum sodium as indicated or ordered   Monitor response to interventions for patient's volume status, including labs, urine output, blood pressure (other measures as available)   Encourage oral intake as appropriate  Goal: Glucose maintained within prescribed range  8/4/2022 0459 by Jos Grullon RN  Outcome: Progressing  8/3/2022 1655 by Maritza Morgan RN  Outcome: Progressing  Flowsheets (Taken 8/3/2022 0743)  Glucose maintained within prescribed range:   Monitor blood glucose as ordered   Assess for signs and symptoms of hyperglycemia and hypoglycemia   Administer ordered medications to maintain glucose within target range     Problem: Hematologic - Adult  Goal: Maintains hematologic stability  8/4/2022 0459 by Jos Grullon RN  Outcome: Progressing  8/3/2022 1655 by Maritza Morgan RN  Outcome: Progressing  Flowsheets (Taken 8/3/2022 7355)  Maintains hematologic stability:   Assess for signs and symptoms of bleeding or hemorrhage   Monitor labs for bleeding or clotting disorders   Administer blood products/factors as ordered     Problem: Anxiety  Goal: Will report anxiety at manageable levels  Description: INTERVENTIONS:  1. Administer medication as ordered  2. Teach and rehearse alternative coping skills  3.  Provide emotional support with 1:1 interaction with staff  8/4/2022 0459 by Jos Grullon RN  Outcome: Progressing  8/3/2022 1655 by Maritza Morgan RN  Outcome: Progressing  Flowsheets  Taken 8/3/2022 1536  Will report anxiety at manageable levels:   Administer medication as ordered   Teach and rehearse alternative coping skills   Provide emotional support with 1:1 interaction with staff  Taken 8/3/2022 0743  Will report anxiety at manageable levels:   Administer medication as ordered   Provide emotional support with 1:1 interaction with staff     Problem: Coping  Goal: Pt/Family able to verbalize concerns and demonstrate effective coping strategies  Description: INTERVENTIONS:  1. Assist patient/family to identify coping skills, available support systems and cultural and spiritual values  2. Provide emotional support, including active listening and acknowledgement of concerns of patient and caregivers  3. Reduce environmental stimuli, as able  4. Instruct patient/family in relaxation techniques, as appropriate  5.  Assess for spiritual pain/suffering and initiate Spiritual Care, Psychosocial Clinical Specialist consults as needed  8/4/2022 0459 by Cyril Dolan RN  Outcome: Progressing  8/3/2022 1655 by Dannie Briseno RN  Outcome: Progressing  Flowsheets  Taken 8/3/2022 1536  Patient/family able to verbalize anxieties, fears, and concerns, and demonstrate effective coping:   Assist patient/family to identify coping skills, available support systems and cultural and spiritual values   Provide emotional support, including active listening and acknowledgement of concerns of patient and caregivers   Reduce environmental stimuli, as able   Instruct patient/family in relaxation techniques, as appropriate   Assess for spiritual pain/suffering and initiate Spiritual Care, Psychosocial Clinical Specialist consults as needed  Taken 8/3/2022 0743  Patient/family able to verbalize anxieties, fears, and concerns, and demonstrate effective coping:   Assist patient/family to identify coping skills, available support systems and cultural and spiritual values   Provide emotional support, including active listening and acknowledgement of concerns of patient and caregivers   Reduce environmental stimuli, as able   Instruct patient/family in relaxation techniques, as appropriate     Problem: Decision Making  Goal: Pt/Family able to effectively weigh alternatives and participate in decision making related to treatment and care  Description: INTERVENTIONS:  1. Determine when there are differences between patient's view, family's view, and healthcare provider's view of condition  2. Facilitate patient and family articulation of goals for care  3. Help patient and family identify pros/cons of alternative solutions  4. Provide information as requested by patient/family  5. Respect patient/family right to receive or not to receive information  6. Serve as a liaison between patient and family and health care team  7. Initiate Consults from Ethics, Palliative Care or initiate [address] as is appropriate  8/4/2022 0459 by Jos Grullon RN  Outcome: Progressing  8/3/2022 1655 by Maritza Morgan RN  Outcome: Progressing  Flowsheets  Taken 8/3/2022 1536  Patient/family able to effectively weigh alternatives and participate in decision making related to treatment and care:   Determine when there are differences between patient's view, family's view, and healthcare provider's view of condition   Facilitate patient and family articulation of goals for care   Help patient and family identify pros/cons of alternative solutions  Taken 8/3/2022 0743  Patient/family able to effectively weigh alternatives and participate in decision making related to treatment and care:   Determine when there are differences between patient's view, family's view, and healthcare provider's view of condition   Provide information as requested by patient/family   Respect patient/family right to receive or not to receive information     Problem: Behavior  Goal: Pt/Family maintain appropriate behavior and adhere to behavioral management agreement, if implemented  Description: INTERVENTIONS:  1.  Assess patient/family's coping skills and  non-compliant behavior (including use of illegal substances)  2. Notify security of behavior or suspected illegal substances which indicate the need for search of the family and/or belongings  3. Encourage verbalization of thoughts and concerns in a socially appropriate manner  4. Utilize positive, consistent limit setting strategies supporting safety of patient, staff and others  5. Encourage participation in the decision making process about the behavioral management agreement  6. If a visitor's behavior poses a threat to safety call refer to organization policy.   7. Initiate consult with , Psychosocial CNS, Spiritual Care as appropriate  8/4/2022 0459 by Cyril Dolan, RN  Outcome: Progressing  8/3/2022 1655 by Dannie Briseno RN  Outcome: Progressing  Flowsheets  Taken 8/3/2022 1536  Patient/family maintains appropriate behavior and adheres to behavioral management agreement, if implemented:   Assess patient/familys coping skills and  non-compliant behavior (including use of illegal substances)   Notify security of behavior or suspected illegal substances which indicate the need for search of the patient and/or belongings   Encourage verbalization of thoughts and concerns in a socially appropriate manner   Initiate consult with , Psychosocial Clinical Nurse Specialist, Spiritual Care as appropriate  Taken 8/3/2022 0743  Patient/family maintains appropriate behavior and adheres to behavioral management agreement, if implemented:   Assess patient/familys coping skills and  non-compliant behavior (including use of illegal substances)   Notify security of behavior or suspected illegal substances which indicate the need for search of the patient and/or belongings   Encourage verbalization of thoughts and concerns in a socially appropriate manner     Problem: Spiritual Care  Goal: Pt/Family able to move forward in process of forgiving self, others, and/or higher power  Description: INTERVENTIONS:  1. Assist patient/family to overcome blocks to healing by use of spiritual practices (prayer, meditation, guided imagery, reiki, breath work, etc). 2. De-myth guilt and help patient/family identify possible irrational spiritual/cultural beliefs and values. 3. Explore possibilities of making amends & reconciliation with self, others, and/or a greater power. 4. Guide patient/family in identifying painful feelings of guilt. 5. Help patient/famiy explore and identify spiritual beliefs, cultural understandings or values that may help or hinder letting go of issue. 6. Help patient/family explore feelings of anger, bitterness, resentment. 7. Help patient/family identify and examine the situation in which these feelings are experienced. 8. Help patient/family identify destructive displacement of feelings onto other individuals. 9. Invite use of sacraments/rituals/ceremonies as appropriate (e.g. - confession, anointing, smudging). 10. Refer patient/family to formal counseling and/or to shanthi community for further support work.   8/4/2022 0459 by Connor Klein RN  Outcome: Progressing  8/3/2022 1655 by Pelon Sabillon RN  Outcome: Progressing  Flowsheets  Taken 8/3/2022 1536  Patient/family able to move forward in process of forgiving self, others, and/or higher power: Assist patient to overcome blocks to healing by use of spiritual practices (prayer, meditation, guided imagery, reiki, breath work, etc)  Taken 8/3/2022 0743  Patient/family able to move forward in process of forgiving self, others, and/or higher power: Assist patient to overcome blocks to healing by use of spiritual practices (prayer, meditation, guided imagery, reiki, breath work, etc)
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
## 2022-08-04 NOTE — PROCEDURES
aqueous lidocaine with epinephrine was then used  to infiltrate subcutaneous tissues of the prepectoral fascia, spanning  approximately 6 cm inferior to the incision. Along this plane, blunt  dissection and Bovie cautery were used to create a subcutaneous pocket. The pocket was irrigated in antibiotic saline solution and hemostasis  was maintained with Bovie cautery. Using an 18-gauge Cook needle with modified Seldinger technique, several  passes were made in standard fashion under the left clavicle. However,  the left subclavian vein could not be located. As such, a contrast  venogram was then made using 20 mL of IV Visipaque injected in a left  arm peripheral IV. The IV was then flushed with 20 mL of normal saline  and appropriate venogram was then made showing wide patency of the left  subclavian vein. Using the venogram for visual guidance, the vein was  then more easily located and successful access was then made with  advancement of a 0.038-inch J guidewire through the Keaton Cheadle needle into the  left subclavian vein advancing under fluoroscopic view to the superior  vena cava. The Cook needle was withdrawn. Over the guidewire, a Medtronic 9-Amharic introducer sheath was then  advanced under fluoroscopic view into the left subclavian vein. From  this sheath, guidewire and obturator core were removed. Good blood  return was observed and through this sheath a Medtronic 4218-61 lead was  advanced under fluoroscopic view to the right ventricular apex. At this  site, good R-wave sensing was obtained and viewing the lead in 25-degree  right anterior oblique projection, the active fixation mechanism was  then deployed using 12 clockwise turns with Medtronic torque tool over  the connector pin of the lead.   Deployment of the active fixation  mechanism was observed under fluoroscopic view and as the stylet was  carefully removed from the lead under fluoroscopic view, the lead was  judged to be firmly fixed to the right ventricular myocardium. The lead  was stable with cough and deep inspiration and there was no evidence of  diaphragmatic stimulation at 10 volt pacing. All pacing and sensing  thresholds and lead impedances were stable as assessed by the Medtronic  pacing systems analyzer. The sheath was peeled away and discarded and  the lead was then sutured to the prepectoral fascia using 3-0 silk  sutures over the suture sleeve of the lead. The pocket was then  irrigated in antibiotic saline solution and hemostasis was verified. Connector pins of the leads were wiped clean and dry and were placed  into the appropriate header ports of the new device, Medtronic Cobalt XT  VR. All set screws were carefully fastened over each connector pin  using the [employer] torque wrench over each set screw, and each  connector pin was then judged to be firmly fixed inside the header port  of the device by virtue of firm traction placed upon each pin. At this  point demand ventricular pacing was observed on monitor indicating  normal device hookup. The device was carefully placed in the subcutaneous pocket using a  Dispoptronic Tyrex antibiotic mesh pouch. The pocket was then covered in a  sterile towel and conscious sedation was then deepened with IV propofol. The patient remained stable. Device based testing was then carried out. Using a T-wave shock commanded via telemetry, ventricular fibrillation  was then induced with a average cycle length of 190 milliseconds. There  was no sensing dropout at 0.3 mV sensitivity setting. After a charge  time of 6.2 seconds, a 30-joule shock was then delivered through a 70  ohms resistance, in standard polarity. The shock defibrillated to sinus  rhythm and the patient remained stable.     The pocket was then closed in a deep layer of interrupted 2-0 Vicryl  suture, an intermediate layer of interrupted 3-0 Vicryl suture, and a  superficial layer of running 4-0 Vicryl subcuticular suture. The wound  was then dressed in Steri-Strips and sterile fluff gauze bandage and the  final parameter settings were then verified via telemetry with the  Medtronic representative present. The patient was taken in good condition to the recovery area where  findings were discussed with the patient and family. FINDINGS:  1. In the right ventricle, after device hookup, the R-wave measured 20  mV with a pacing impedance of 684 ohms and a pacing threshold of 0.5  volts at 0.4 milliseconds. 2.  Successful defibrillation testing using a single 30 joules shock and  standard polarity (see text). Ventricular fibrillation sensing was  excellent with no dropout intervals at 0.3 mV sensitivity setting. 3.  Final parameter settings show a single zone tachyarrhythmia  detection scheme with a rate cutoff of 188 beats per minute (320  milliseconds) with all shock energies programmed at 40 joules in  standard polarity with the exception of the fifth shock which has  reversed polarity. Charmaine Carolus pacing is programmed to VVI mode with lower  rate of 40 with pacing output of 3.5 volts at 0.4 milliseconds with  ventricular sensitivity of 0.3 mV.         Tiffani Ozuna MD    D: 08/03/2022 18:48:08       T: 08/03/2022 18:52:44     [email or web address]  Job#: [id number]     Doc#: [id number]    CC:  Yoshi Harden MD , [facility]

## 2022-08-04 NOTE — PROGRESS NOTES
Limitations Requiring Skilled Therapeutic Intervention: Decreased functional mobility ; Decreased strength;Decreased cognition;Decreased endurance  Assessment: Pt ambulated 15 ft. in the room with CGA using a RW along with repeated verbal cues for sequencing. Pt is limited by decreased cognition and presents with balance deficits. Pt is currently unsafe to return to prior living arrangements due to high fall risk. Pt would benefit from continued PT following discharge to address functional deficits. Therapy Prognosis: Good  Decision Making: Medium Complexity  Requires PT Follow-Up: Yes  Activity Tolerance  Activity Tolerance: Patient limited by fatigue;Patient limited by endurance;Treatment limited secondary to decreased cognition     Plan   Plan  Plan:  (5-6x wk)  Current Treatment Recommendations: Strengthening, Functional mobility training, Transfer training, Endurance training, Cognitive/Perceptual training, Stair training, Gait training, Cognitive reorientation, Safety education & training  Safety Devices  Type of Devices: Nurse notified, Patient at risk for falls, Call light within reach, Chair alarm in place, Left in chair  Restraints  Restraints Initially in Place: No     Restrictions  Restrictions/Precautions  Restrictions/Precautions: Up as Tolerated  Required Braces or Orthoses?: No     Subjective   General  Chart Reviewed: Yes  Response To Previous Treatment: Patient with no complaints from previous session. Family / Caregiver Present: No  Follows Commands: Within Functional Limits  Other (Comment): Pt follows commands with increased time. General Comment  Comments: Pt and RN agreeable to PT at this time. Subjective  Subjective: pt denies pain.            Cognition   Orientation  Overall Orientation Status: Impaired  Orientation Level: Oriented to person;Disoriented to situation;Disoriented to place  Cognition  Overall Cognitive Status: Exceptions  Arousal/Alertness: Delayed responses to stimuli  Following Commands: Follows multistep commands with increased time; Follows multistep commands with repitition  Attention Span: Attends with cues to redirect  Memory: Decreased recall of biographical Information;Decreased recall of recent events  Safety Judgement: Decreased awareness of need for safety  Problem Solving: Decreased awareness of errors  Insights: Decreased awareness of deficits  Initiation: Requires cues for some  Sequencing: Requires cues for some     Objective   Bed mobility  Supine to Sit: Unable to assess  Sit to Supine: Unable to assess  Scooting: Contact guard assistance  Bed Mobility Comments: upright in chair upon arrival/ exit. Transfers  Sit to Stand: Minimal Assistance  Stand to sit: Minimal Assistance  Ambulation  Surface: level tile  Device: Rolling Walker  Assistance: Contact guard assistance  Gait Deviations: Staggers;Decreased step height;Decreased step length;Deviated path  Distance: 15 ft. More Ambulation?: No  Stairs/Curb  Stairs?: No     Balance  Posture: Good  Sitting - Static: Fair  Sitting - Dynamic: Poor  Standing - Static: Poor  Standing - Dynamic: Poor  Comments: Assessed with RW.  A/AROM Exercises: Ankle pumps x20 BLE, LAQs x20 BLE.         AM-PAC Score  -PAC Inpatient Mobility Raw Score : 14 (08/04/22 1121)  AM-PAC Inpatient T-Scale Score : 38.1 (08/04/22 1121)  Mobility Inpatient CMS 0-100% Score: 61.29 (08/04/22 1121)  Mobility Inpatient CMS G-Code Modifier : CL (08/04/22 1121)            Goals  Short Term Goals  Time Frame for Short term goals: 10  visits  Short term goal 1: transfers with SBA  Short term goal 2: amb 50 ft with a RW x SBA  Short term goal 3: ascend/descend 4 steps with SBA  Short term goal 4: 20 min strengthening exercise program x SBA         Therapy Time   Individual Concurrent Group Co-treatment   Time In 0958         Time Out 1006         Minutes 8         Timed Code Treatment Minutes: 8 Minutes       Shakira Garcia PTA

## 2022-08-04 NOTE — FLOWSHEET NOTE
SPIRITUAL CARE DEPARTMENT - [facility]  PRO[facility]ESS [facility]    Shift date: 08/03/22  Shift day: Wednesday   Shift # 2    Room # 4618/6016-44   Name: Rita Jimenez                Tenriism:    Place of Baptism:     Referral: Routine Visit    Admit Date & Time: 7/28/2022  4:33 PM    Assessment:  Rita Jimenez is a 59 y.o. male       Intervention:  Writer introduced self and title as . Patient appeared to be receptive to  visit and engaged in conversation. Writer offered space for patient  to express feelings, needs, and concerns and provided a ministry presence. Patient appeared to be resting and tired. Outcome:  While shanthi/spirituality were not discussed, patient expressed gratitude for visit. Plan:  Chaplains will remain available to offer spiritual and emotional support as needed.       Electronically signed by Harry Alcantar, on 8/3/2022 at 9:49 PM.  Bryan Hassan  [phone]
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
## 2022-08-04 NOTE — PROGRESS NOTES
Endovascular Neurosurgery Progress Note    SUBJECTIVE:     No overnight event, no acute event, patient continue to improve, pt has no complaint and feels good overall    Review of Systems:  CONSTITUTIONAL:  negative for fevers, chills, fatigue and malaise    EYES:  negative for double vision, blurred vision and photophobia     HEENT:  negative for tinnitus, epistaxis and sore throat    RESPIRATORY:  negative for cough, shortness of breath, wheezing    CARDIOVASCULAR:  negative for chest pain, palpitations, syncope, edema    GASTROINTESTINAL:  negative for nausea, vomiting    GENITOURINARY:  negative for incontinence    MUSCULOSKELETAL:  negative for neck or back pain    NEUROLOGICAL:  Negative for weakness and tingling  negative for headaches and dizziness    PSYCHIATRIC:  negative for anxiety      Review of systems otherwise negative. OBJECTIVE:     Vitals:    08/04/22 0415   BP: 109/83   Pulse: 67   Resp: (!) 7   Temp: 97.8 °F (36.6 °C)   SpO2: 97%        General:  Gen: normal habitus, NAD  HEENT: NCAT, mucosa moist  Cvs: RRR, S1 S2 normal  Resp: symmetric unlabored breathing  Abd: s/nd/nt  Ext: no edema  Skin: no lesions seen, warm and dry    Neuro:  Gen: awake and alert, able to speak in simple complete sentence  Lang/speech: speak is clear, able to repeat perfectly, not able to name well, but able to name colors well  CN: PERRL, EOMI, VFF, V1-3 intact, face symmetric, hearing intact, shoulder shrug symmetric, tongue midline  Motor: grossly 5/5 UE and LE on the left, 4/5 on the right  Sense: LT intact in all 4 ext. Coord: FTN and HTS intact b/l  DTR: deferred  Gait: not tested    NIH Stroke Scale:   1a  Level of consciousness: 0 - alert; keenly responsive   1b. LOC questions:  0 - answers both questions correctly   1c. LOC commands: 0 - performs both tasks correctly   2. Best Gaze: 0 - normal   3. Visual: 0 - no visual loss   4.  Facial Palsy: 1 - minor paralysis (flattened nasolabial fold, asymmetric on smiling)   5a. Motor left arm: 0 - no drift, limb holds 90 (or 45) degrees for full 10 seconds   5b. Motor right arm: 1   6a. Motor left le - no drift; leg holds 30 degree position for full 5 seconds   6b  Motor right le - no drift; leg holds 30 degree position for full 5 seconds   7. Limb Ataxia: 0 - absent   8. Sensory: 0 - normal; no sensory loss   9. Best Language:  1    10. Dysarthria: 0 - normal   11. Extinction and Inattention: 0 - no abnormality         Total:   3     MRS: 3      LABS:   Reviewed. Lab Results   Component Value Date    HGB 12.1 (L) 2022    WBC 7.7 2022     2022     2022    BUN 9 2022    CREATININE 1.01 2022    AST 15 2022    ALT 10 2022    MG 1.8 2022    APTT 27.6 2022    INR 1.2 2022      Lab Results   Component Value Date/Time    COVID19 Not Detected 2022 08:47 PM    COVID19 Not Detected 2020 11:08 AM       RADIOLOGY:   Images were personally reviewed including:    CTP 22  Large mismatch in the left hemisphere    Cerebral angiogram 22  1. Left ICA cervical segment acute occlusion with reconstitution of flow in the left ICA ophthalmic segment through ophthalmic artery through ECA branches. 2.  The above lesion was treated with EmboTrap stent retriever 6.5 mm x 45 mm deployed in the distal ICA cervical segment, mechanical aspiration through ? LBC with penumbra engine, Pre-stenting balloon angioplasty with 5.0 mm x 40 mm loren balloon    using seimless technique, 10 mm x 31 mm carotid wallstent, post stenting balloon angioplasty with Emboguard [facility]   3. Final TICI score?03   4. Residual stenosis less than 10%? CTP: 22  - no more mismatch      ASSESSMENT:     58 y/o M with pmh significant for Htn, CAD s/p CABG in , V. Fib PEA in 2022, AICD on , CKD stage III, presented on  with a syncope and right sided weakness, CTA completed on  which showed left ICA cervical segment occlusion. Patient had worsening neuro exam overnight, became non verbal, weaker on right UE and LE, NIHSS was 15. CT perfusion was emergently performed which showed ischemic penumbra in left MCA, DARYN territory. Patient was taken for emergent thrombectomy (7/31/22) and L ICA stenting (7/31/22), since the intervention patient improved signficantly        PLAN:     - patient continue to improve compared to yesterday, his language is improving  - con't aspirin and plavix  - PT/OT and acute rehab  - gradual normalization of BP  - patient can be discharged from neuroendovascular standpoint    - f/u with Dr. Yoshi Connolly in 2 weeks and Dr. Ysabel Haynes in 3 months      Case discussed with Dr. Ysabel Haynes attending.     Sally Olivera MD PGY 7  Stroke, [facility]  [id number] [place]  Electronically signed 8/4/2022 at 7:08 AM

## 2022-08-04 NOTE — PLAN OF CARE
Problem: Chronic Conditions and Co-morbidities  Goal: Patient's chronic conditions and co-morbidity symptoms are monitored and maintained or improved  Outcome: Progressing  Flowsheets (Taken 8/4/2022 0746)  Care Plan - Patient's Chronic Conditions and Co-Morbidity Symptoms are Monitored and Maintained or Improved:   Monitor and assess patient's chronic conditions and comorbid symptoms for stability, deterioration, or improvement   Collaborate with multidisciplinary team to address chronic and comorbid conditions and prevent exacerbation or deterioration   Update acute care plan with appropriate goals if chronic or comorbid symptoms are exacerbated and prevent overall improvement and discharge     Problem: Pain  Goal: Verbalizes/displays adequate comfort level or baseline comfort level  Outcome: Progressing     Problem: Safety - Adult  Goal: Free from fall injury  Outcome: Progressing     Problem: Discharge Planning  Goal: Discharge to home or other facility with appropriate resources  Outcome: Progressing  Flowsheets (Taken 8/4/2022 0746)  Discharge to home or other facility with appropriate resources:   Identify barriers to discharge with patient and caregiver   Arrange for needed discharge resources and transportation as appropriate   Identify discharge learning needs (meds, wound care, etc)   Refer to discharge planning if patient needs post-hospital services based on physician order or complex needs related to functional status, cognitive ability or social support system     Problem: ABCDS Injury Assessment  Goal: Absence of physical injury  Outcome: Progressing     Problem: Skin/Tissue Integrity  Goal: Absence of new skin breakdown  Description: 1. Monitor for areas of redness and/or skin breakdown  2. Assess vascular access sites hourly  3. Every 4-6 hours minimum:  Change oxygen saturation probe site  4.   Every 4-6 hours:  If on nasal continuous positive airway pressure, respiratory therapy assess nares and determine need for appliance change or resting period. Outcome: Progressing     Problem: Neurosensory - Adult  Goal: Achieves stable or improved neurological status  Outcome: Progressing  Flowsheets (Taken 8/4/2022 0746)  Achieves stable or improved neurological status:   Assess for and report changes in neurological status   Initiate measures to prevent increased intracranial pressure   Maintain blood pressure and fluid volume within ordered parameters to optimize cerebral perfusion and minimize risk of hemorrhage   Monitor temperature, glucose, and sodium. Initiate appropriate interventions as ordered  Goal: Absence of seizures  Outcome: Progressing  Flowsheets (Taken 8/4/2022 0746)  Absence of seizures:   Monitor for seizure activity.   If seizure occurs, document type and location of movements and any associated apnea   If seizure occurs, turn head to side and suction secretions as needed  Goal: Remains free of injury related to seizures activity  Outcome: Progressing  Flowsheets (Taken 8/4/2022 0746)  Remains free of injury related to seizure activity:   Maintain airway, patient safety  and administer oxygen as ordered   Monitor patient for seizure activity, document and report duration and description of seizure to Licensed Independent Practitioner   Instruct patient/family to call for assistance with activity based on assessment  Goal: Achieves maximal functionality and self care  Outcome: Progressing  Flowsheets (Taken 8/4/2022 0746)  Achieves maximal functionality and self care:   Monitor swallowing and airway patency with patient fatigue and changes in neurological status   Encourage and assist patient to increase activity and self care with guidance from physical therapy/occupational therapy   Encourage visually impaired, hearing impaired and aphasic patients to use assistive/communication devices     Problem: Respiratory - Adult  Goal: Achieves optimal ventilation and oxygenation  Outcome: Progressing     Problem: Cardiovascular - Adult  Goal: Maintains optimal cardiac output and hemodynamic stability  Outcome: Progressing  Flowsheets (Taken 8/4/2022 0746)  Maintains optimal cardiac output and hemodynamic stability:   Monitor blood pressure and heart rate   Monitor urine output and notify Licensed Independent Practitioner for values outside of normal range   Assess for signs of decreased cardiac output   Administer fluid and/or volume expanders as ordered  Goal: Absence of cardiac dysrhythmias or at baseline  Outcome: Progressing  Flowsheets (Taken 8/4/2022 0746)  Absence of cardiac dysrhythmias or at baseline:   Monitor cardiac rate and rhythm   Assess for signs of decreased cardiac output     Problem: Skin/Tissue Integrity - Adult  Goal: Skin integrity remains intact  Outcome: Progressing  Flowsheets (Taken 8/4/2022 0746)  Skin Integrity Remains Intact: Monitor for areas of redness and/or skin breakdown  Goal: Incisions, wounds, or drain sites healing without S/S of infection  Outcome: Progressing  Flowsheets (Taken 8/4/2022 0746)  Incisions, Wounds, or Drain Sites Healing Without Sign and Symptoms of Infection: ADMISSION and DAILY: Assess and document risk factors for pressure ulcer development  Goal: Oral mucous membranes remain intact  Outcome: Progressing  Flowsheets (Taken 8/4/2022 0746)  Oral Mucous Membranes Remain Intact: Assess oral mucosa and hygiene practices     Problem: Musculoskeletal - Adult  Goal: Return mobility to safest level of function  Outcome: Progressing  Flowsheets (Taken 8/4/2022 0746)  Return Mobility to Safest Level of Function:   Assess patient stability and activity tolerance for standing, transferring and ambulating with or without assistive devices   Assist with transfers and ambulation using safe patient handling equipment as needed   Ensure adequate protection for wounds/incisions during mobilization   Obtain physical therapy/occupational therapy consults as needed   Instruct patient/family in ordered activity level  Goal: Maintain proper alignment of affected body part  Outcome: Progressing  Flowsheets (Taken 8/4/2022 0746)  Maintain proper alignment of affected body part:   Support and protect limb and body alignment per provider's orders   Instruct and reinforce with patient and family use of appropriate assistive device and precautions (e.g. spinal or hip dislocation precautions)  Goal: Return ADL status to a safe level of function  Outcome: Progressing  Flowsheets (Taken 8/4/2022 0746)  Return ADL Status to a Safe Level of Function:   Administer medication as ordered   Assess activities of daily living deficits and provide assistive devices as needed   Assist and instruct patient to increase activity and self care as tolerated   Obtain physical therapy/occupational therapy consults as needed     Problem: Gastrointestinal - Adult  Goal: Minimal or absence of nausea and vomiting  Outcome: Progressing  Flowsheets (Taken 8/4/2022 0746)  Minimal or absence of nausea and vomiting:   Provide nonpharmacologic comfort measures as appropriate   Advance diet as tolerated, if ordered  Goal: Maintains or returns to baseline bowel function  Outcome: Progressing  Flowsheets (Taken 8/4/2022 0746)  Maintains or returns to baseline bowel function:   Encourage oral fluids to ensure adequate hydration   Encourage mobilization and activity  Goal: Establish and maintain optimal ostomy function  Outcome: Progressing     Problem: Genitourinary - Adult  Goal: Absence of urinary retention  Outcome: Progressing  Flowsheets (Taken 8/4/2022 0746)  Absence of urinary retention:   Assess patients ability to void and empty bladder   Monitor intake/output and perform bladder scan as needed     Problem: Infection - Adult  Goal: Absence of infection at discharge  Outcome: Progressing  Flowsheets (Taken 8/4/2022 0746)  Absence of infection at discharge:   Assess and monitor for signs and symptoms of infection   Monitor lab/diagnostic results   Administer medications as ordered   Instruct and encourage patient and family to use good hand hygiene technique  Goal: Absence of infection during hospitalization  Outcome: Progressing  Flowsheets (Taken 8/4/2022 0746)  Absence of infection during hospitalization:   Assess and monitor for signs and symptoms of infection   Monitor lab/diagnostic results  Goal: Absence of fever/infection during anticipated neutropenic period  Outcome: Progressing     Problem: Metabolic/Fluid and Electrolytes - Adult  Goal: Electrolytes maintained within normal limits  Outcome: Progressing  Flowsheets (Taken 8/4/2022 0746)  Electrolytes maintained within normal limits:   Monitor labs and assess patient for signs and symptoms of electrolyte imbalances   Administer electrolyte replacement as ordered   Monitor response to electrolyte replacements, including repeat lab results as appropriate  Goal: Hemodynamic stability and optimal renal function maintained  Outcome: Progressing  Flowsheets (Taken 8/4/2022 0746)  Hemodynamic stability and optimal renal function maintained:   Monitor labs and assess for signs and symptoms of volume excess or deficit   Monitor intake, output and patient weight   Monitor urine specific gravity, serum osmolarity and serum sodium as indicated or ordered   Monitor response to interventions for patient's volume status, including labs, urine output, blood pressure (other measures as available)   Instruct patient on fluid and nutrition restrictions as appropriate  Goal: Glucose maintained within prescribed range  Outcome: Progressing  Flowsheets (Taken 8/4/2022 0746)  Glucose maintained within prescribed range: Monitor blood glucose as ordered     Problem: Hematologic - Adult  Goal: Maintains hematologic stability  Outcome: Progressing  Flowsheets (Taken 8/4/2022 0746)  Maintains hematologic stability: Assess for signs and symptoms of bleeding or hemorrhage     Problem: Anxiety  Goal: Will report anxiety at manageable levels  Description: INTERVENTIONS:  1. Administer medication as ordered  2. Teach and rehearse alternative coping skills  3. Provide emotional support with 1:1 interaction with staff  Outcome: Progressing  Flowsheets (Taken 8/4/2022 0746)  Will report anxiety at manageable levels:   Administer medication as ordered   Provide emotional support with 1:1 interaction with staff     Problem: Coping  Goal: Pt/Family able to verbalize concerns and demonstrate effective coping strategies  Description: INTERVENTIONS:  1. Assist patient/family to identify coping skills, available support systems and cultural and spiritual values  2. Provide emotional support, including active listening and acknowledgement of concerns of patient and caregivers  3. Reduce environmental stimuli, as able  4. Instruct patient/family in relaxation techniques, as appropriate  5. Assess for spiritual pain/suffering and initiate Spiritual Care, Psychosocial Clinical Specialist consults as needed  Outcome: Progressing  Flowsheets (Taken 8/4/2022 0746)  Patient/family able to verbalize anxieties, fears, and concerns, and demonstrate effective coping:   Assist patient/family to identify coping skills, available support systems and cultural and spiritual values   Provide emotional support, including active listening and acknowledgement of concerns of patient and caregivers   Reduce environmental stimuli, as able   Instruct patient/family in relaxation techniques, as appropriate   Assess for spiritual pain/suffering and initiate Spiritual Care, Psychosocial Clinical Specialist consults as needed     Problem: Decision Making  Goal: Pt/Family able to effectively weigh alternatives and participate in decision making related to treatment and care  Description: INTERVENTIONS:  1. Determine when there are differences between patient's view, family's view, and healthcare provider's view of condition  2.  Facilitate patient and family articulation of goals for care  3. Help patient and family identify pros/cons of alternative solutions  4. Provide information as requested by patient/family  5. Respect patient/family right to receive or not to receive information  6. Serve as a liaison between patient and family and health care team  7. Initiate Consults from Ethics, Palliative Care or initiate [facility] as is appropriate  Outcome: Progressing  Flowsheets (Taken 8/4/2022 5308)  Patient/family able to effectively weigh alternatives and participate in decision making related to treatment and care:   Determine when there are differences between patient's view, family's view, and healthcare provider's view of condition   Facilitate patient and family articulation of goals for care   Help patient and family identify pros/cons of alternative solutions   Provide information as requested by patient/family   Respect patient/family right to receive or not to receive information     Problem: Behavior  Goal: Pt/Family maintain appropriate behavior and adhere to behavioral management agreement, if implemented  Description: INTERVENTIONS:  1. Assess patient/family's coping skills and  non-compliant behavior (including use of illegal substances)  2. Notify security of behavior or suspected illegal substances which indicate the need for search of the family and/or belongings  3. Encourage verbalization of thoughts and concerns in a socially appropriate manner  4. Utilize positive, consistent limit setting strategies supporting safety of patient, staff and others  5. Encourage participation in the decision making process about the behavioral management agreement  6. If a visitor's behavior poses a threat to safety call refer to organization policy.   7. Initiate consult with , Psychosocial CNS, Spiritual Care as appropriate  Outcome: Progressing  Flowsheets (Taken 8/4/2022 0746)  Patient/family maintains appropriate behavior and adheres to behavioral management agreement, if implemented:   Assess patient/familys coping skills and  non-compliant behavior (including use of illegal substances)   Encourage verbalization of thoughts and concerns in a socially appropriate manner   Utilize positive, consistent limit setting strategies supporting safety of patient, staff and others   Encourage participation in the decision making process about the behavioral management agreement   Implement a Health Care Agreement if patient meets criteria   If a patients behavior jeopardizes the safety of the patient, staff, or others refer to organization policy. If a visitors behavior poses a threat to safety refer to organization policy     Problem: Spiritual Care  Goal: Pt/Family able to move forward in process of forgiving self, others, and/or higher power  Description: INTERVENTIONS:  1. Assist patient/family to overcome blocks to healing by use of spiritual practices (prayer, meditation, guided imagery, reiki, breath work, etc). 2. De-myth guilt and help patient/family identify possible irrational spiritual/cultural beliefs and values. 3. Explore possibilities of making amends & reconciliation with self, others, and/or a greater power. 4. Guide patient/family in identifying painful feelings of guilt. 5. Help patient/famiy explore and identify spiritual beliefs, cultural understandings or values that may help or hinder letting go of issue. 6. Help patient/family explore feelings of anger, bitterness, resentment. 7. Help patient/family identify and examine the situation in which these feelings are experienced. 8. Help patient/family identify destructive displacement of feelings onto other individuals. 9. Invite use of sacraments/rituals/ceremonies as appropriate (e.g. - confession, anointing, smudging). 10. Refer patient/family to formal counseling and/or to shanthi community for further support work.   Outcome: Progressing  Flowsheets (Taken 8/4/2022 7415)  Patient/family able to move forward in process of forgiving self, others, and/or higher power: Assist patient to overcome blocks to healing by use of spiritual practices (prayer, meditation, guided imagery, reiki, breath work, etc)

## 2022-08-05 LAB
ABSOLUTE EOS #: 0.5 K/UL (ref 0–0.44)
ABSOLUTE IMMATURE GRANULOCYTE: 0.05 K/UL (ref 0–0.3)
ABSOLUTE LYMPH #: 1.91 K/UL (ref 1.1–3.7)
ABSOLUTE MONO #: 0.81 K/UL (ref 0.1–1.2)
ANION GAP SERPL CALCULATED.3IONS-SCNC: 9 MMOL/L (ref 9–17)
BASOPHILS # BLD: 2 % (ref 0–2)
BASOPHILS ABSOLUTE: 0.12 K/UL (ref 0–0.2)
BUN BLDV-MCNC: 12 MG/DL (ref 6–20)
CALCIUM SERPL-MCNC: 8.5 MG/DL (ref 8.6–10.4)
CHLORIDE BLD-SCNC: 101 MMOL/L (ref 98–107)
CO2: 23 MMOL/L (ref 20–31)
CREAT SERPL-MCNC: 1.01 MG/DL (ref 0.7–1.2)
EOSINOPHILS RELATIVE PERCENT: 6 % (ref 1–4)
GFR AFRICAN AMERICAN: >60 ML/MIN
GFR NON-AFRICAN AMERICAN: >60 ML/MIN
GFR SERPL CREATININE-BSD FRML MDRD: ABNORMAL ML/MIN/{1.73_M2}
GLUCOSE BLD-MCNC: 113 MG/DL (ref 75–110)
GLUCOSE BLD-MCNC: 88 MG/DL (ref 70–99)
GLUCOSE BLD-MCNC: 99 MG/DL (ref 75–110)
HCT VFR BLD CALC: 35.4 % (ref 40.7–50.3)
HEMOGLOBIN: 11.6 G/DL (ref 13–17)
IMMATURE GRANULOCYTES: 1 %
LYMPHOCYTES # BLD: 23 % (ref 24–43)
MAGNESIUM: 1.7 MG/DL (ref 1.6–2.6)
MCH RBC QN AUTO: 28.4 PG (ref 25.2–33.5)
MCHC RBC AUTO-ENTMCNC: 32.8 G/DL (ref 28.4–34.8)
MCV RBC AUTO: 86.6 FL (ref 82.6–102.9)
MONOCYTES # BLD: 10 % (ref 3–12)
NRBC AUTOMATED: 0 PER 100 WBC
PDW BLD-RTO: 17.9 % (ref 11.8–14.4)
PLATELET # BLD: 162 K/UL (ref 138–453)
PMV BLD AUTO: 10.6 FL (ref 8.1–13.5)
POTASSIUM SERPL-SCNC: 4 MMOL/L (ref 3.7–5.3)
RBC # BLD: 4.09 M/UL (ref 4.21–5.77)
RBC # BLD: ABNORMAL 10*6/UL
SEG NEUTROPHILS: 58 % (ref 36–65)
SEGMENTED NEUTROPHILS ABSOLUTE COUNT: 4.86 K/UL (ref 1.5–8.1)
SODIUM BLD-SCNC: 133 MMOL/L (ref 135–144)
WBC # BLD: 8.3 K/UL (ref 3.5–11.3)

## 2022-08-05 PROCEDURE — 2580000003 HC RX 258: Performed by: STUDENT IN AN ORGANIZED HEALTH CARE EDUCATION/TRAINING PROGRAM

## 2022-08-05 PROCEDURE — 99232 SBSQ HOSP IP/OBS MODERATE 35: CPT | Performed by: PSYCHIATRY & NEUROLOGY

## 2022-08-05 PROCEDURE — 85025 COMPLETE CBC W/AUTO DIFF WBC: CPT

## 2022-08-05 PROCEDURE — 6370000000 HC RX 637 (ALT 250 FOR IP)

## 2022-08-05 PROCEDURE — 83735 ASSAY OF MAGNESIUM: CPT

## 2022-08-05 PROCEDURE — 6360000002 HC RX W HCPCS: Performed by: STUDENT IN AN ORGANIZED HEALTH CARE EDUCATION/TRAINING PROGRAM

## 2022-08-05 PROCEDURE — 2060000000 HC ICU INTERMEDIATE R&B

## 2022-08-05 PROCEDURE — 6370000000 HC RX 637 (ALT 250 FOR IP): Performed by: STUDENT IN AN ORGANIZED HEALTH CARE EDUCATION/TRAINING PROGRAM

## 2022-08-05 PROCEDURE — 97110 THERAPEUTIC EXERCISES: CPT

## 2022-08-05 PROCEDURE — 97116 GAIT TRAINING THERAPY: CPT

## 2022-08-05 PROCEDURE — 36415 COLL VENOUS BLD VENIPUNCTURE: CPT

## 2022-08-05 PROCEDURE — 6370000000 HC RX 637 (ALT 250 FOR IP): Performed by: INTERNAL MEDICINE

## 2022-08-05 PROCEDURE — 6370000000 HC RX 637 (ALT 250 FOR IP): Performed by: PSYCHIATRY & NEUROLOGY

## 2022-08-05 PROCEDURE — 99233 SBSQ HOSP IP/OBS HIGH 50: CPT | Performed by: PSYCHIATRY & NEUROLOGY

## 2022-08-05 PROCEDURE — 80048 BASIC METABOLIC PNL TOTAL CA: CPT

## 2022-08-05 PROCEDURE — 97535 SELF CARE MNGMENT TRAINING: CPT

## 2022-08-05 PROCEDURE — 82947 ASSAY GLUCOSE BLOOD QUANT: CPT

## 2022-08-05 RX ADMIN — DOCUSATE SODIUM 50 MG AND SENNOSIDES 8.6 MG 2 TABLET: 8.6; 5 TABLET, FILM COATED ORAL at 08:25

## 2022-08-05 RX ADMIN — DESMOPRESSIN ACETATE 40 MG: 0.2 TABLET ORAL at 08:26

## 2022-08-05 RX ADMIN — CLOPIDOGREL 75 MG: 75 TABLET, FILM COATED ORAL at 08:25

## 2022-08-05 RX ADMIN — ENOXAPARIN SODIUM 40 MG: 100 INJECTION SUBCUTANEOUS at 08:26

## 2022-08-05 RX ADMIN — DULOXETINE HYDROCHLORIDE 30 MG: 30 CAPSULE, DELAYED RELEASE ORAL at 08:26

## 2022-08-05 RX ADMIN — Medication 81 MG: at 08:26

## 2022-08-05 RX ADMIN — CARVEDILOL 25 MG: 25 TABLET, FILM COATED ORAL at 08:26

## 2022-08-05 RX ADMIN — FOLIC ACID 1 MG: 1 TABLET ORAL at 08:26

## 2022-08-05 RX ADMIN — FAMOTIDINE 20 MG: 20 TABLET, FILM COATED ORAL at 08:26

## 2022-08-05 RX ADMIN — AMLODIPINE BESYLATE 5 MG: 5 TABLET ORAL at 08:26

## 2022-08-05 RX ADMIN — ISOSORBIDE MONONITRATE 30 MG: 30 TABLET ORAL at 08:26

## 2022-08-05 RX ADMIN — FAMOTIDINE 20 MG: 20 TABLET, FILM COATED ORAL at 20:19

## 2022-08-05 RX ADMIN — CARVEDILOL 25 MG: 25 TABLET, FILM COATED ORAL at 17:29

## 2022-08-05 RX ADMIN — SODIUM CHLORIDE, PRESERVATIVE FREE 10 ML: 5 INJECTION INTRAVENOUS at 09:15

## 2022-08-05 RX ADMIN — SODIUM CHLORIDE, PRESERVATIVE FREE 10 ML: 5 INJECTION INTRAVENOUS at 20:19

## 2022-08-05 NOTE — PROGRESS NOTES
[address] [facility] [employer]  Speech Language Pathology    Date: 8/5/2022  Patient Name: Radha Salcedo  YOB: 1965   AGE: 59 y.o. MRN: [id number]        Patient Not Available for Speech Therapy     Due to:  [] Testing  [] Hemodialysis  [] Cancelled by RN  [] Surgery   [] Intubation/Sedation/Pain Medication  [] Medical instability  [x] Other: Pt with other therapy at time of attempt. Next scheduled treatment: as able  Completed by: Sujey López M.A. CCC-SLP

## 2022-08-05 NOTE — PLAN OF CARE
Problem: Chronic Conditions and Co-morbidities  Goal: Patient's chronic conditions and co-morbidity symptoms are monitored and maintained or improved  8/5/2022 0415 by Elly Rajan RN  Outcome: Progressing  8/4/2022 1937 by Denis Perdomo RN  Outcome: Progressing  Flowsheets (Taken 8/4/2022 0746)  Care Plan - Patient's Chronic Conditions and Co-Morbidity Symptoms are Monitored and Maintained or Improved:   Monitor and assess patient's chronic conditions and comorbid symptoms for stability, deterioration, or improvement   Collaborate with multidisciplinary team to address chronic and comorbid conditions and prevent exacerbation or deterioration   Update acute care plan with appropriate goals if chronic or comorbid symptoms are exacerbated and prevent overall improvement and discharge     Problem: Pain  Goal: Verbalizes/displays adequate comfort level or baseline comfort level  8/5/2022 0415 by Elly Rajan RN  Outcome: Progressing  8/4/2022 1937 by Denis Perdomo RN  Outcome: Progressing     Problem: Safety - Adult  Goal: Free from fall injury  8/5/2022 0415 by Elly Rajan RN  Outcome: Progressing  8/4/2022 1937 by Denis Perdomo RN  Outcome: Progressing     Problem: Discharge Planning  Goal: Discharge to home or other facility with appropriate resources  8/5/2022 0415 by Elly Rajan RN  Outcome: Progressing  8/4/2022 1937 by Denis Perdomo RN  Outcome: Progressing  Flowsheets (Taken 8/4/2022 9603)  Discharge to home or other facility with appropriate resources:   Identify barriers to discharge with patient and caregiver   Arrange for needed discharge resources and transportation as appropriate   Identify discharge learning needs (meds, wound care, etc)   Refer to discharge planning if patient needs post-hospital services based on physician order or complex needs related to functional status, cognitive ability or social support system     Problem: ABCDS Injury Assessment  Goal: Absence of physical injury  8/5/2022 0415 by Connor Klein RN  Outcome: Progressing  8/4/2022 1937 by Pelon Sabillon RN  Outcome: Progressing     Problem: Skin/Tissue Integrity  Goal: Absence of new skin breakdown  Description: 1. Monitor for areas of redness and/or skin breakdown  2. Assess vascular access sites hourly  3. Every 4-6 hours minimum:  Change oxygen saturation probe site  4. Every 4-6 hours:  If on nasal continuous positive airway pressure, respiratory therapy assess nares and determine need for appliance change or resting period. 8/5/2022 0415 by Connor Klein RN  Outcome: Progressing  8/4/2022 1937 by Pelon Sabillon RN  Outcome: Progressing     Problem: Neurosensory - Adult  Goal: Achieves stable or improved neurological status  8/5/2022 0415 by Connor Klein RN  Outcome: Progressing  8/4/2022 1937 by Pelon Sabillon RN  Outcome: Progressing  Flowsheets (Taken 8/4/2022 0746)  Achieves stable or improved neurological status:   Assess for and report changes in neurological status   Initiate measures to prevent increased intracranial pressure   Maintain blood pressure and fluid volume within ordered parameters to optimize cerebral perfusion and minimize risk of hemorrhage   Monitor temperature, glucose, and sodium. Initiate appropriate interventions as ordered  Goal: Absence of seizures  8/5/2022 0415 by Connor Klein RN  Outcome: Progressing  8/4/2022 1937 by Pelon Sabillon RN  Outcome: Progressing  Flowsheets (Taken 8/4/2022 0746)  Absence of seizures:   Monitor for seizure activity.   If seizure occurs, document type and location of movements and any associated apnea   If seizure occurs, turn head to side and suction secretions as needed  Goal: Remains free of injury related to seizures activity  8/5/2022 0415 by Connor Klein RN  Outcome: Progressing  8/4/2022 1937 by Pelon Sabillon RN  Outcome: Progressing  Flowsheets (Taken 8/4/2022 0746)  Remains free of injury related to seizure activity:   Maintain airway, patient safety  and administer oxygen as ordered   Monitor patient for seizure activity, document and report duration and description of seizure to Licensed Independent Practitioner   Instruct patient/family to call for assistance with activity based on assessment  Goal: Achieves maximal functionality and self care  8/5/2022 0415 by Sameera Lewis RN  Outcome: Progressing  8/4/2022 1937 by Ely Leos RN  Outcome: Progressing  Flowsheets (Taken 8/4/2022 0746)  Achieves maximal functionality and self care:   Monitor swallowing and airway patency with patient fatigue and changes in neurological status   Encourage and assist patient to increase activity and self care with guidance from physical therapy/occupational therapy   Encourage visually impaired, hearing impaired and aphasic patients to use assistive/communication devices     Problem: Respiratory - Adult  Goal: Achieves optimal ventilation and oxygenation  8/5/2022 0415 by Sameera Lewis RN  Outcome: Progressing  8/4/2022 1937 by Ely Leso RN  Outcome: Progressing     Problem: Cardiovascular - Adult  Goal: Maintains optimal cardiac output and hemodynamic stability  8/5/2022 0415 by Sameera Lewis RN  Outcome: Progressing  8/4/2022 1937 by Ely Leos RN  Outcome: Progressing  Flowsheets (Taken 8/4/2022 0746)  Maintains optimal cardiac output and hemodynamic stability:   Monitor blood pressure and heart rate   Monitor urine output and notify Licensed Independent Practitioner for values outside of normal range   Assess for signs of decreased cardiac output   Administer fluid and/or volume expanders as ordered  Goal: Absence of cardiac dysrhythmias or at baseline  8/5/2022 0415 by Sameera Lewis RN  Outcome: Progressing  8/4/2022 1937 by Ely Leos RN  Outcome: Progressing  Flowsheets (Taken 8/4/2022 0746)  Absence of cardiac dysrhythmias or at baseline:   Monitor cardiac rate and rhythm   Assess for signs of decreased cardiac output     Problem: Skin/Tissue Integrity - Adult  Goal: Skin integrity remains intact  8/5/2022 0415 by Sarah Presley RN  Outcome: Progressing  8/4/2022 1937 by Stephan Pablo RN  Outcome: Progressing  Flowsheets (Taken 8/4/2022 0746)  Skin Integrity Remains Intact: Monitor for areas of redness and/or skin breakdown  Goal: Incisions, wounds, or drain sites healing without S/S of infection  8/5/2022 0415 by Sarah Presley RN  Outcome: Progressing  8/4/2022 1937 by Stephan Pablo RN  Outcome: Progressing  Flowsheets (Taken 8/4/2022 0746)  Incisions, Wounds, or Drain Sites Healing Without Sign and Symptoms of Infection: ADMISSION and DAILY: Assess and document risk factors for pressure ulcer development  Goal: Oral mucous membranes remain intact  8/5/2022 0415 by Sarah Presley RN  Outcome: Progressing  8/4/2022 1937 by Stephan Pablo RN  Outcome: Progressing  Flowsheets (Taken 8/4/2022 0746)  Oral Mucous Membranes Remain Intact: Assess oral mucosa and hygiene practices     Problem: Musculoskeletal - Adult  Goal: Return mobility to safest level of function  8/5/2022 0415 by Sarah Presley RN  Outcome: Progressing  8/4/2022 1937 by Stephan Pablo RN  Outcome: Progressing  Flowsheets (Taken 8/4/2022 0746)  Return Mobility to Safest Level of Function:   Assess patient stability and activity tolerance for standing, transferring and ambulating with or without assistive devices   Assist with transfers and ambulation using safe patient handling equipment as needed   Ensure adequate protection for wounds/incisions during mobilization   Obtain physical therapy/occupational therapy consults as needed   Instruct patient/family in ordered activity level  Goal: Maintain proper alignment of affected body part  8/5/2022 0415 by Sarah Presley RN  Outcome: Progressing  8/4/2022 1937 by Stephan Pablo RN  Outcome: Progressing  Flowsheets (Taken 8/4/2022 0746)  Maintain proper alignment of affected body part:   Support and protect limb and body alignment per provider's orders   Instruct and reinforce with patient and family use of appropriate assistive device and precautions (e.g. spinal or hip dislocation precautions)  Goal: Return ADL status to a safe level of function  8/5/2022 0415 by Leopold Penner, RN  Outcome: Progressing  8/4/2022 1937 by Mita Kraft RN  Outcome: Progressing  Flowsheets (Taken 8/4/2022 0746)  Return ADL Status to a Safe Level of Function:   Administer medication as ordered   Assess activities of daily living deficits and provide assistive devices as needed   Assist and instruct patient to increase activity and self care as tolerated   Obtain physical therapy/occupational therapy consults as needed     Problem: Gastrointestinal - Adult  Goal: Minimal or absence of nausea and vomiting  8/5/2022 0415 by Leopold Penner, RN  Outcome: Progressing  8/4/2022 1937 by Mita Kraft RN  Outcome: Progressing  Flowsheets (Taken 8/4/2022 0746)  Minimal or absence of nausea and vomiting:   Provide nonpharmacologic comfort measures as appropriate   Advance diet as tolerated, if ordered  Goal: Maintains or returns to baseline bowel function  8/5/2022 0415 by Leopold Penner, RN  Outcome: Progressing  8/4/2022 1937 by Mita Kraft RN  Outcome: Progressing  Flowsheets (Taken 8/4/2022 0746)  Maintains or returns to baseline bowel function:   Encourage oral fluids to ensure adequate hydration   Encourage mobilization and activity  Goal: Establish and maintain optimal ostomy function  8/5/2022 0415 by Leopold Penner, RN  Outcome: Progressing  8/4/2022 1937 by Mita Kraft RN  Outcome: Progressing     Problem: Genitourinary - Adult  Goal: Absence of urinary retention  8/5/2022 0415 by Leopold Penner, RN  Outcome: Progressing  8/4/2022 1937 by Mita Kraft RN  Outcome: Progressing  Flowsheets (Taken 8/4/2022 0746)  Absence of urinary retention:   Assess patients ability to void and empty bladder   Monitor intake/output and perform bladder scan as needed     Problem: Infection - Adult  Goal: Absence of infection at discharge  8/5/2022 0415 by Alex Marquez RN  Outcome: Progressing  8/4/2022 1937 by Courtney Nicholson RN  Outcome: Progressing  Flowsheets (Taken 8/4/2022 0746)  Absence of infection at discharge:   Assess and monitor for signs and symptoms of infection   Monitor lab/diagnostic results   Administer medications as ordered   Instruct and encourage patient and family to use good hand hygiene technique  Goal: Absence of infection during hospitalization  8/5/2022 0415 by Alex Marquez RN  Outcome: Progressing  8/4/2022 1937 by Courtney Nicholson RN  Outcome: Progressing  Flowsheets (Taken 8/4/2022 0746)  Absence of infection during hospitalization:   Assess and monitor for signs and symptoms of infection   Monitor lab/diagnostic results  Goal: Absence of fever/infection during anticipated neutropenic period  8/5/2022 0415 by Alex Marquez RN  Outcome: Progressing  8/4/2022 1937 by Courtney Nicholson RN  Outcome: Progressing     Problem: Metabolic/Fluid and Electrolytes - Adult  Goal: Electrolytes maintained within normal limits  8/5/2022 0415 by Alex Marquez RN  Outcome: Progressing  8/4/2022 1937 by Courtney Nicholson RN  Outcome: Progressing  Flowsheets (Taken 8/4/2022 0746)  Electrolytes maintained within normal limits:   Monitor labs and assess patient for signs and symptoms of electrolyte imbalances   Administer electrolyte replacement as ordered   Monitor response to electrolyte replacements, including repeat lab results as appropriate  Goal: Hemodynamic stability and optimal renal function maintained  8/5/2022 0415 by Alex Marquez RN  Outcome: Progressing  8/4/2022 1937 by Courtney Nicholson RN  Outcome: Progressing  Flowsheets (Taken 8/4/2022 5310)  Hemodynamic stability and optimal renal function maintained:   Monitor labs and assess for signs and symptoms of volume excess or deficit   Monitor intake, output and patient weight   Monitor urine specific gravity, serum osmolarity and serum sodium as indicated or ordered   Monitor response to interventions for patient's volume status, including labs, urine output, blood pressure (other measures as available)   Instruct patient on fluid and nutrition restrictions as appropriate  Goal: Glucose maintained within prescribed range  8/5/2022 0415 by Michael Woods RN  Outcome: Progressing  8/4/2022 1937 by Carolina Jamison RN  Outcome: Michele Banister (Taken 8/4/2022 0746)  Glucose maintained within prescribed range: Monitor blood glucose as ordered     Problem: Hematologic - Adult  Goal: Maintains hematologic stability  8/5/2022 0415 by Michael Woods RN  Outcome: Progressing  8/4/2022 1937 by Carolina Jamison RN  Outcome: Progressing  Flowsheets (Taken 8/4/2022 0746)  Maintains hematologic stability: Assess for signs and symptoms of bleeding or hemorrhage     Problem: Anxiety  Goal: Will report anxiety at manageable levels  Description: INTERVENTIONS:  1. Administer medication as ordered  2. Teach and rehearse alternative coping skills  3. Provide emotional support with 1:1 interaction with staff  8/5/2022 0415 by Michael Woods RN  Outcome: Progressing  8/4/2022 1937 by Carolina Jamison RN  Outcome: Progressing  Flowsheets (Taken 8/4/2022 4249)  Will report anxiety at manageable levels:   Administer medication as ordered   Provide emotional support with 1:1 interaction with staff     Problem: Coping  Goal: Pt/Family able to verbalize concerns and demonstrate effective coping strategies  Description: INTERVENTIONS:  1. Assist patient/family to identify coping skills, available support systems and cultural and spiritual values  2.  Provide emotional support, including active listening and acknowledgement of concerns of patient and caregivers  3. Reduce environmental stimuli, as able  4. Instruct patient/family in relaxation techniques, as appropriate  5. Assess for spiritual pain/suffering and initiate Spiritual Care, Psychosocial Clinical Specialist consults as needed  8/5/2022 0415 by Adriana Albright RN  Outcome: Progressing  8/4/2022 1937 by Maegan Zimmerman RN  Outcome: Progressing  Flowsheets (Taken 8/4/2022 8207)  Patient/family able to verbalize anxieties, fears, and concerns, and demonstrate effective coping:   Assist patient/family to identify coping skills, available support systems and cultural and spiritual values   Provide emotional support, including active listening and acknowledgement of concerns of patient and caregivers   Reduce environmental stimuli, as able   Instruct patient/family in relaxation techniques, as appropriate   Assess for spiritual pain/suffering and initiate Spiritual Care, Psychosocial Clinical Specialist consults as needed     Problem: Decision Making  Goal: Pt/Family able to effectively weigh alternatives and participate in decision making related to treatment and care  Description: INTERVENTIONS:  1. Determine when there are differences between patient's view, family's view, and healthcare provider's view of condition  2. Facilitate patient and family articulation of goals for care  3. Help patient and family identify pros/cons of alternative solutions  4. Provide information as requested by patient/family  5. Respect patient/family right to receive or not to receive information  6. Serve as a liaison between patient and family and health care team  7.  Initiate Consults from Ethics, Palliative Care or initiate [address] as is appropriate  8/5/2022 0415 by Adriana Albright RN  Outcome: Progressing  8/4/2022 1937 by Maegan Zimmerman, RN  Outcome: Progressing  Flowsheets (Taken 8/4/2022 1470)  Patient/family able to effectively weigh alternatives and participate in decision making related to treatment and care:   Determine when there are differences between patient's view, family's view, and healthcare provider's view of condition   Facilitate patient and family articulation of goals for care   Help patient and family identify pros/cons of alternative solutions   Provide information as requested by patient/family   Respect patient/family right to receive or not to receive information     Problem: Behavior  Goal: Pt/Family maintain appropriate behavior and adhere to behavioral management agreement, if implemented  Description: INTERVENTIONS:  1. Assess patient/family's coping skills and  non-compliant behavior (including use of illegal substances)  2. Notify security of behavior or suspected illegal substances which indicate the need for search of the family and/or belongings  3. Encourage verbalization of thoughts and concerns in a socially appropriate manner  4. Utilize positive, consistent limit setting strategies supporting safety of patient, staff and others  5. Encourage participation in the decision making process about the behavioral management agreement  6. If a visitor's behavior poses a threat to safety call refer to organization policy.   7. Initiate consult with , Psychosocial CNS, Spiritual Care as appropriate  8/5/2022 0415 by Connor Klein RN  Outcome: Progressing  8/4/2022 1937 by Pelon Sabillon, RN  Outcome: Progressing  Flowsheets (Taken 8/4/2022 3962)  Patient/family maintains appropriate behavior and adheres to behavioral management agreement, if implemented:   Assess patient/familys coping skills and  non-compliant behavior (including use of illegal substances)   Encourage verbalization of thoughts and concerns in a socially appropriate manner   Utilize positive, consistent limit setting strategies supporting safety of patient, staff and others   Encourage participation in the decision making process about the
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
## 2022-08-05 NOTE — PROGRESS NOTES
Occupational Therapy  Facility/Department: [address] STEP DOWN  Occupational Therapy Daily Treatment Note    Name: Mirza Mackey  : 1965  MRN: [id number]  Date of Service: 2022    Discharge Recommendations:  Patient would benefit from continued therapy after discharge       Patient Diagnosis(es): The encounter diagnosis was Syncope and collapse. Assessment   Performance deficits / Impairments: Decreased functional mobility ; Decreased safe awareness;Decreased balance;Decreased coordination;Decreased ADL status; Decreased cognition;Decreased posture;Decreased ROM; Decreased endurance;Decreased high-level IADLs;Decreased strength;Decreased fine motor control  Assessment: Pt making godd gains towards goals and will continue to benefit from OT services following discharge from [facility]  Prognosis: Fair  Activity Tolerance  Activity Tolerance: Patient Tolerated treatment well;Patient limited by fatigue;Treatment limited secondary to decreased cognition  Activity Tolerance Comments: Pt participated in OT session with focus on ADL task and transfers with x3 short 1-2 minute rest breaks required        Plan   Plan  Times per Week: 5-6x/wk  Times per Day: Daily  Current Treatment Recommendations: Strengthening, ROM, Balance training, Functional mobility training, Endurance training, Cognitive reorientation, Neuromuscular re-education, Safety education & training, Patient/Caregiver education & training, Self-Care / ADL, Home management training, Coordination training     Restrictions  Restrictions/Precautions  Restrictions/Precautions: Up as Tolerated, General Precautions  Required Braces or Orthoses?: No    Subjective   General  Patient assessed for rehabilitation services?: Yes  Response to previous treatment: Patient with no complaints from previous session  Family / Caregiver Present: No  Subjective  Subjective: RN ok'd patient for OT treatment this morning.  Pt seated upon ALVARADO arrival. Pt agreeable to session and denied pain. Objective   Safety Devices  Type of Devices: Nurse notified; Patient at risk for falls;Call light within reach; Bed alarm in place; Left in bed  Restraints  Restraints Initially in Place: No  Balance  Sitting: Without support (sat on shower chair ~15-20 minutes and EOB ~10 minutes SBA>CGA for safety and no LOB demonstrated)  Standing: With support (Stood 2-3 minutes in shower CGA and EOB 3-4 minutes ADL CGA)  Transfer Training  Transfer Training: Yes  Overall Level of Assistance: Minimum assistance  Interventions: Verbal cues; Safety awareness training  Sit to Stand: Contact-guard assistance;Minimum assistance (CGA>MIN A with cues for safe hand placement)  Stand to Sit: Contact-guard assistance (verbal cues for safe hand placement)  Gait  Overall Level of Assistance: Contact-guard assistance;Minimum assistance  Interventions: Verbal cues; Safety awareness training  Distance (ft):  (room distance only to/from shower stall and bed)  Assistive Device: Walker, rolling  Shower Transfers  Shower - Transfer From: Patricio & Candie - Transfer Type: To and From  Shower - Transfer To: Shower seat with back  Shower - Technique: Ambulating  Shower Transfers: Contact Guard;Minimal assistance  Shower Transfers Comments: CGA>MIN A with grab bar use and min verbal cues for safety, technique and grab bar use     ADL  Grooming: Stand by assistance;Setup;Verbal cueing; Increased time to complete  Grooming Skilled Clinical Factors: wash face and hands  UE Bathing: Stand by assistance;Verbal cueing;Setup; Increased time to complete  UE Bathing Skilled Clinical Factors: wash hair, arms, chest while seated  LE Bathing: Setup;Verbal cueing; Increased time to complete;Contact guard assistance;Minimal assistance  LE Bathing Skilled Clinical Factors: CGA>MIN A during stand for caro hygiene.  Pt utilized cross leg technique for washing feet  UE Dressing: Minimal assistance;Setup  UE Dressing Skilled Clinical Factors: Thread sleeves  LE Dressing: Minimal assistance  LE Dressing Skilled Clinical Factors: Pt able to doff/don L sock seated EOB with increased time to complete and VC. Pt able to doff R sock but required SBA and verbal cue to don sock. Toileting: Moderate assistance  Toileting Skilled Clinical Factors: Brief mgt  Additional Comments: Pt completed shower seated on shower chair. Pt required min verbal cues for initiating task and for safety. Pt required extra time to complete. Bed mobility  Supine to Sit: Unable to assess (Pt seated on chair upon ALVARADO arrival)  Sit to Supine: Stand by assistance  Scooting: Stand by assistance  Bed Mobility Comments: Pt requires min verbal cues for safe hand placement during stand>sit phase and completes transition to supine safety        Cognition  Overall Cognitive Status: Exceptions  Arousal/Alertness: Delayed responses to stimuli  Following Commands: Follows multistep commands with increased time; Follows multistep commands with repitition  Attention Span: Attends with cues to redirect  Memory: Decreased recall of biographical Information;Decreased recall of recent events  Safety Judgement: Decreased awareness of need for safety  Problem Solving: Decreased awareness of errors  Insights: Decreased awareness of deficits  Initiation: Requires cues for some  Sequencing: Requires cues for some  Cognition Comment: Pt able to respond and communicate with short sentence responses.   Orientation  Overall Orientation Status: Impaired  Orientation Level: Oriented to person;Disoriented to situation;Disoriented to place         Education Given To: Patient  Education Provided: ADL Adaptive Strategies;Transfer Training  Education Provided Comments: safety, shower transfer, and body mechanics  Education Method: Verbal;Demonstration  Barriers to Learning: Cognition  Education Outcome: Demonstrated understanding;Verbalized understanding;Continued education needed     AM-PAC Score  AM-PAC Inpatient Daily Activity Raw Score: 16 (08/05/22 1110)  AM-PAC Inpatient ADL T-Scale Score : 35.96 (08/05/22 1110)  ADL Inpatient CMS 0-100% Score: 53.32 (08/05/22 1110)  ADL Inpatient CMS G-Code Modifier : CK (08/05/22 1110)    Goals  Short Term Goals  Time Frame for Short term goals: by discharge  Short Term Goal 1: pt will tolerate 2+minutes of standingwith use of least restrictive AD for increasing participation in functional tasks  Short Term Goal 2: pt will perform UB ADLs with min A and use of least restrictive AE/DME  Short Term Goal 3: pt will perform functional mobility with mod A utilizing least restrictive AD  Short Term Goal 4: pt will perform LB ADLs with modified independence and use of least restrictive AE/DME  Short Term Goal 5: pt will utilize compensatory strategies with mod VC for increasing participation in functional tasks (pt will perform functional transfers with CGA and use of least restrictive AD for increasing participation in functional tasks)       Therapy Time   Individual Concurrent Group Co-treatment   Time In 1010         Time Out 1055         Minutes 45         Timed Code Treatment Minutes: [address] [facility], ALVARADO/L
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
## 2022-08-05 NOTE — PROGRESS NOTES
completed on 07/30 which showed left ICA cervical segment occlusion. Patient had worsening neuro exam overnight, became non verbal, weaker on right UE and LE, NIHSS was 15. CT perfusion was emergently performed which showed ischemic penumbra in left MCA, DARYN territory. Patient was taken for emergent thrombectomy (7/31/22) and L ICA stenting (7/31/22), since the intervention patient improved signficantly        PLAN:     - patient continue to improve,  momo his language is improving  - con't aspirin and plavix  - PT/OT and acute rehab  - gradual normalization of BP  - patient can be discharged from neuroendovascular standpoint    - f/u with Dr. Mis Ye in 2 weeks and Dr. Emanuel Parson in 3 months      Case discussed with Dr. Emanuel Parson attending.     Agnieszka Murry MD PGY 7  Stroke, [facility]  [id number]  Electronically signed 8/5/2022 at 8:29 AM

## 2022-08-05 NOTE — PLAN OF CARE
Problem: Chronic Conditions and Co-morbidities  Goal: Patient's chronic conditions and co-morbidity symptoms are monitored and maintained or improved  8/5/2022 1504 by Aydee Silva RN  Outcome: Progressing  Flowsheets (Taken 8/5/2022 0745)  Care Plan - Patient's Chronic Conditions and Co-Morbidity Symptoms are Monitored and Maintained or Improved:   Collaborate with multidisciplinary team to address chronic and comorbid conditions and prevent exacerbation or deterioration   Monitor and assess patient's chronic conditions and comorbid symptoms for stability, deterioration, or improvement  8/5/2022 0415 by Leno Urbano RN  Outcome: Progressing     Problem: Pain  Goal: Verbalizes/displays adequate comfort level or baseline comfort level  8/5/2022 1504 by Aydee Silva RN  Outcome: Progressing  8/5/2022 0415 by Leno Urbano RN  Outcome: Progressing     Problem: Safety - Adult  Goal: Free from fall injury  8/5/2022 1504 by Ayede Silva RN  Outcome: Progressing  8/5/2022 0415 by Leno Urbano RN  Outcome: Progressing     Problem: Discharge Planning  Goal: Discharge to home or other facility with appropriate resources  8/5/2022 1504 by Aydee Silva RN  Outcome: Progressing  Flowsheets (Taken 8/5/2022 0745)  Discharge to home or other facility with appropriate resources:   Identify barriers to discharge with patient and caregiver   Arrange for needed discharge resources and transportation as appropriate   Identify discharge learning needs (meds, wound care, etc)   Refer to discharge planning if patient needs post-hospital services based on physician order or complex needs related to functional status, cognitive ability or social support system  8/5/2022 0415 by Leno Urbano RN  Outcome: Progressing     Problem: ABCDS Injury Assessment  Goal: Absence of physical injury  8/5/2022 1504 by Aydee Silva RN  Outcome: Progressing  8/5/2022 0415 by Leno Urbano RN  Outcome: Progressing     Problem: Skin/Tissue Integrity  Goal: Absence of new skin breakdown  Description: 1. Monitor for areas of redness and/or skin breakdown  2. Assess vascular access sites hourly  3. Every 4-6 hours minimum:  Change oxygen saturation probe site  4. Every 4-6 hours:  If on nasal continuous positive airway pressure, respiratory therapy assess nares and determine need for appliance change or resting period. 8/5/2022 1504 by Allie Guerra RN  Outcome: Progressing  8/5/2022 0415 by Laura Davenport RN  Outcome: Progressing     Problem: Neurosensory - Adult  Goal: Achieves stable or improved neurological status  8/5/2022 1504 by Allie Guerra RN  Outcome: Progressing  Flowsheets (Taken 8/5/2022 0745)  Achieves stable or improved neurological status:   Assess for and report changes in neurological status   Maintain blood pressure and fluid volume within ordered parameters to optimize cerebral perfusion and minimize risk of hemorrhage   Monitor temperature, glucose, and sodium. Initiate appropriate interventions as ordered  8/5/2022 0415 by Laura Davenport RN  Outcome: Progressing  Goal: Absence of seizures  8/5/2022 1504 by Allie Guerra RN  Outcome: Progressing  Flowsheets (Taken 8/5/2022 0745)  Absence of seizures:   Monitor for seizure activity.   If seizure occurs, document type and location of movements and any associated apnea   Support airway/breathing, administer oxygen as needed  8/5/2022 0415 by Laura Davenport RN  Outcome: Progressing  Goal: Remains free of injury related to seizures activity  8/5/2022 1504 by Allie Guerra RN  Outcome: Progressing  Flowsheets (Taken 8/5/2022 0745)  Remains free of injury related to seizure activity:   Maintain airway, patient safety  and administer oxygen as ordered   Monitor patient for seizure activity, document and report duration and description of seizure to Licensed Independent Practitioner   Instruct patient/family to call for assistance with activity based on assessment  8/5/2022 0415 by Cony Calderón RN  Outcome: Progressing  Goal: Achieves maximal functionality and self care  8/5/2022 1504 by Damaso Osborne RN  Outcome: Progressing  Flowsheets (Taken 8/5/2022 0745)  Achieves maximal functionality and self care:   Monitor swallowing and airway patency with patient fatigue and changes in neurological status   Encourage and assist patient to increase activity and self care with guidance from physical therapy/occupational therapy  8/5/2022 0415 by Cony Calderón RN  Outcome: Progressing     Problem: Respiratory - Adult  Goal: Achieves optimal ventilation and oxygenation  8/5/2022 1504 by Damaso Osborne RN  Outcome: Progressing  Flowsheets (Taken 8/5/2022 0745)  Achieves optimal ventilation and oxygenation:   Assess for changes in respiratory status   Assess for changes in mentation and behavior  8/5/2022 0415 by Cony Calderón RN  Outcome: Progressing     Problem: Cardiovascular - Adult  Goal: Maintains optimal cardiac output and hemodynamic stability  8/5/2022 1504 by Damaso Osborne RN  Outcome: Progressing  Flowsheets (Taken 8/5/2022 0745)  Maintains optimal cardiac output and hemodynamic stability:   Monitor blood pressure and heart rate   Monitor urine output and notify Licensed Independent Practitioner for values outside of normal range   Assess for signs of decreased cardiac output  8/5/2022 0415 by Cony Calderón RN  Outcome: Progressing  Goal: Absence of cardiac dysrhythmias or at baseline  8/5/2022 1504 by Damaso Osborne RN  Outcome: Progressing  Flowsheets (Taken 8/5/2022 0745)  Absence of cardiac dysrhythmias or at baseline: Monitor cardiac rate and rhythm  8/5/2022 0415 by Cony Calderón RN  Outcome: Progressing     Problem: Skin/Tissue Integrity - Adult  Goal: Skin integrity remains intact  8/5/2022 1504 by Damaso Osborne RN  Outcome: Progressing  Flowsheets  Taken 8/5/2022 1029  Skin Integrity Remains Intact: Monitor for areas of redness and/or skin breakdown  Taken 8/5/2022 0745  Skin Integrity Remains Intact: Monitor for areas of redness and/or skin breakdown  8/5/2022 0415 by Laura Davenport RN  Outcome: Progressing  Goal: Incisions, wounds, or drain sites healing without S/S of infection  8/5/2022 1504 by Allie Guerra RN  Outcome: Progressing  Flowsheets  Taken 8/5/2022 1029  Incisions, Wounds, or Drain Sites Healing Without Sign and Symptoms of Infection: ADMISSION and DAILY: Assess and document risk factors for pressure ulcer development  Taken 8/5/2022 0745  Incisions, Wounds, or Drain Sites Healing Without Sign and Symptoms of Infection: ADMISSION and DAILY: Assess and document risk factors for pressure ulcer development  8/5/2022 0415 by Laura Davenport RN  Outcome: Progressing  Goal: Oral mucous membranes remain intact  8/5/2022 1504 by Allie Guerra RN  Outcome: Progressing  Flowsheets  Taken 8/5/2022 1029  Oral Mucous Membranes Remain Intact: Assess oral mucosa and hygiene practices  Taken 8/5/2022 0745  Oral Mucous Membranes Remain Intact: Assess oral mucosa and hygiene practices  8/5/2022 0415 by Laura Davenport RN  Outcome: Progressing     Problem: Musculoskeletal - Adult  Goal: Return mobility to safest level of function  8/5/2022 1504 by Allie Guerra RN  Outcome: Progressing  Flowsheets (Taken 8/5/2022 0745)  Return Mobility to Safest Level of Function:   Assess patient stability and activity tolerance for standing, transferring and ambulating with or without assistive devices   Assist with transfers and ambulation using safe patient handling equipment as needed   Ensure adequate protection for wounds/incisions during mobilization  8/5/2022 0415 by Laura Davenport RN  Outcome: Progressing  Goal: Maintain proper alignment of affected body part  8/5/2022 1504 by Allie Guerra RN  Outcome: Progressing  Flowsheets (Taken 8/5/2022 0745)  Maintain proper alignment of affected body part: Support and protect limb and body alignment per provider's orders  8/5/2022 0415 by Marietta Martinez RN  Outcome: Progressing  Goal: Return ADL status to a safe level of function  8/5/2022 1504 by Landry Hogue RN  Outcome: Progressing  Flowsheets (Taken 8/5/2022 0745)  Return ADL Status to a Safe Level of Function:   Administer medication as ordered   Assess activities of daily living deficits and provide assistive devices as needed   Assist and instruct patient to increase activity and self care as tolerated  8/5/2022 0415 by Marietta Martinez RN  Outcome: Progressing     Problem: Gastrointestinal - Adult  Goal: Minimal or absence of nausea and vomiting  8/5/2022 1504 by Landry Hogue RN  Outcome: Progressing  Flowsheets (Taken 8/5/2022 0745)  Minimal or absence of nausea and vomiting:   Administer IV fluids as ordered to ensure adequate hydration   Advance diet as tolerated, if ordered  8/5/2022 0415 by Marietta Martinez RN  Outcome: Progressing  Goal: Maintains or returns to baseline bowel function  8/5/2022 1504 by Landry Hogue RN  Outcome: Progressing  8/5/2022 0415 by Marietta Martinez RN  Outcome: Progressing  Goal: Establish and maintain optimal ostomy function  8/5/2022 1504 by Landry Hogue RN  Outcome: Progressing  Flowsheets (Taken 8/5/2022 0745)  Establish and maintain optimal ostomy function: Monitor output from ostomies  8/5/2022 0415 by Marietta Martinez RN  Outcome: Progressing     Problem: Genitourinary - Adult  Goal: Absence of urinary retention  8/5/2022 1504 by Landry Hogue RN  Outcome: Progressing  Flowsheets (Taken 8/5/2022 0745)  Absence of urinary retention:   Assess patients ability to void and empty bladder   Monitor intake/output and perform bladder scan as needed  8/5/2022 0415 by Marietta Martinez RN  Outcome: Progressing     Problem: Infection - Adult  Goal: Absence of infection at discharge  8/5/2022 1504 by Kosta Rojas Alberta Zambrano RN  Outcome: Progressing  Flowsheets (Taken 8/5/2022 0745)  Absence of infection at discharge:   Assess and monitor for signs and symptoms of infection   Monitor lab/diagnostic results   Identify and instruct in appropriate isolation precautions for identified infection/condition  8/5/2022 0415 by Jordi Jean RN  Outcome: Progressing  Goal: Absence of infection during hospitalization  8/5/2022 1504 by Christel Junior RN  Outcome: Progressing  Flowsheets (Taken 8/5/2022 0745)  Absence of infection during hospitalization:   Assess and monitor for signs and symptoms of infection   Monitor lab/diagnostic results   Instruct and encourage patient and family to use good hand hygiene technique  8/5/2022 0415 by Jordi Jean RN  Outcome: Progressing  Goal: Absence of fever/infection during anticipated neutropenic period  8/5/2022 1504 by Christel Junior RN  Outcome: Progressing  Flowsheets (Taken 8/5/2022 0745)  Absence of fever/infection during anticipated neutropenic period: Monitor white blood cell count  8/5/2022 0415 by Jordi Jean RN  Outcome: Progressing     Problem: Metabolic/Fluid and Electrolytes - Adult  Goal: Electrolytes maintained within normal limits  8/5/2022 1504 by Christel Junior RN  Outcome: Progressing  Flowsheets (Taken 8/5/2022 0745)  Electrolytes maintained within normal limits:   Monitor labs and assess patient for signs and symptoms of electrolyte imbalances   Administer electrolyte replacement as ordered   Monitor response to electrolyte replacements, including repeat lab results as appropriate   Fluid restriction as ordered  8/5/2022 0415 by Jordi Jean RN  Outcome: Progressing  Goal: Hemodynamic stability and optimal renal function maintained  8/5/2022 1504 by Christel Junior RN  Outcome: Progressing  Flowsheets (Taken 8/5/2022 0745)  Hemodynamic stability and optimal renal function maintained:   Monitor intake, output and patient weight   Monitor labs and assess for signs and symptoms of volume excess or deficit   Monitor urine specific gravity, serum osmolarity and serum sodium as indicated or ordered  8/5/2022 0415 by Niki Driscoll RN  Outcome: Progressing  Goal: Glucose maintained within prescribed range  8/5/2022 1504 by Larissa Man RN  Outcome: Progressing  Flowsheets (Taken 8/5/2022 0745)  Glucose maintained within prescribed range: Monitor blood glucose as ordered  8/5/2022 0415 by Niki Driscoll RN  Outcome: Progressing     Problem: Hematologic - Adult  Goal: Maintains hematologic stability  8/5/2022 1504 by Larissa Man RN  Outcome: Progressing  Flowsheets (Taken 8/5/2022 0745)  Maintains hematologic stability:   Assess for signs and symptoms of bleeding or hemorrhage   Monitor labs for bleeding or clotting disorders  8/5/2022 0415 by Niki Driscoll RN  Outcome: Progressing     Problem: Anxiety  Goal: Will report anxiety at manageable levels  Description: INTERVENTIONS:  1. Administer medication as ordered  2. Teach and rehearse alternative coping skills  3. Provide emotional support with 1:1 interaction with staff  8/5/2022 1504 by Larissa Man RN  Outcome: Progressing  Flowsheets (Taken 8/5/2022 0745)  Will report anxiety at manageable levels:   Administer medication as ordered   Provide emotional support with 1:1 interaction with staff  8/5/2022 0415 by Niki Driscoll RN  Outcome: Progressing     Problem: Coping  Goal: Pt/Family able to verbalize concerns and demonstrate effective coping strategies  Description: INTERVENTIONS:  1. Assist patient/family to identify coping skills, available support systems and cultural and spiritual values  2. Provide emotional support, including active listening and acknowledgement of concerns of patient and caregivers  3. Reduce environmental stimuli, as able  4. Instruct patient/family in relaxation techniques, as appropriate  5.  Assess for spiritual pain/suffering and initiate

## 2022-08-05 NOTE — PROGRESS NOTES
Nutrition Assessment     Type and Reason for Visit: Initial (LOS)    Nutrition Recommendations/Plan:   Continue current diet. Encourage/monitor PO intakes as tolerated. Will monitor labs, weights, and plan of care. Malnutrition Assessment:  Malnutrition Status: No malnutrition    Nutrition Assessment:  Chart reviewed/pt seen for length of stay. Admitted s/p syncopal episode and right-sided weakness. Pt with ischemic penumbra in left MCA, DARYN territory. S/p emergent thrombectomy (7/31/22) and L ICA stenting (7/31/22). PMH includes: CAD, COPD, GERD, HTN. Pt reports he has a good appetite and has been eating well. Reports he ate 100% of breakfast this morning. Per chart review, recorded PO intakes of 50-75% and fluids noted. Labs reviewed: Na 133 mmol/L. Meds reviewed. Estimated Daily Nutrient Needs:  Energy (kcal):  9947-4131 kcals/day Weight Used for Energy Requirements: Current     Protein (g):   gm pro/day Weight Used for Protein Requirements: Ideal        Fluid (ml/day):  25 mL/kg = 4947-3536 mL/day or per MD Method Used for Fluid Requirements: ml/Kg    Nutrition Related Findings:   Labs/Meds reviewed.  Wound Type: None    Current Nutrition Therapies:    ADULT DIET; Easy to Chew    Anthropometric Measures:  Height: 5' 9\" (175.3 cm)  Current Body Wt: 190 lb (86.2 kg)   BMI: 28    Nutrition Diagnosis:   No nutrition diagnosis at this time     Nutrition Interventions:   Food and/or Nutrient Delivery: Continue Current Diet  Nutrition Education/Counseling: No recommendation at this time  Coordination of Nutrition Care: Continue to monitor while inpatient       Goals:  Previous Goal Met:  (Goal Set)  Goals: Meet at least 75% of estimated needs, prior to discharge       Nutrition Monitoring and Evaluation:   Behavioral-Environmental Outcomes: None Identified  Food/Nutrient Intake Outcomes: Food and Nutrient Intake  Physical Signs/Symptoms Outcomes: Biochemical Data, GI Status, Nutrition Focused Physical Findings, Skin, Weight    Discharge Planning:    Continue current diet     Rebecca Land RD, LD  Contact: [phone]

## 2022-08-06 LAB
ABSOLUTE EOS #: 0.52 K/UL (ref 0–0.44)
ABSOLUTE IMMATURE GRANULOCYTE: 0.06 K/UL (ref 0–0.3)
ABSOLUTE LYMPH #: 1.82 K/UL (ref 1.1–3.7)
ABSOLUTE MONO #: 0.71 K/UL (ref 0.1–1.2)
ANION GAP SERPL CALCULATED.3IONS-SCNC: 11 MMOL/L (ref 9–17)
BASOPHILS # BLD: 1 % (ref 0–2)
BASOPHILS ABSOLUTE: 0.1 K/UL (ref 0–0.2)
BUN BLDV-MCNC: 10 MG/DL (ref 6–20)
CALCIUM SERPL-MCNC: 8.7 MG/DL (ref 8.6–10.4)
CHLORIDE BLD-SCNC: 100 MMOL/L (ref 98–107)
CO2: 24 MMOL/L (ref 20–31)
CREAT SERPL-MCNC: 0.96 MG/DL (ref 0.7–1.2)
EOSINOPHILS RELATIVE PERCENT: 7 % (ref 1–4)
GFR AFRICAN AMERICAN: >60 ML/MIN
GFR NON-AFRICAN AMERICAN: >60 ML/MIN
GFR SERPL CREATININE-BSD FRML MDRD: NORMAL ML/MIN/{1.73_M2}
GLUCOSE BLD-MCNC: 144 MG/DL (ref 75–110)
GLUCOSE BLD-MCNC: 71 MG/DL (ref 75–110)
GLUCOSE BLD-MCNC: 76 MG/DL (ref 70–99)
GLUCOSE BLD-MCNC: 89 MG/DL (ref 75–110)
HCT VFR BLD CALC: 37.6 % (ref 40.7–50.3)
HEMOGLOBIN: 12.7 G/DL (ref 13–17)
IMMATURE GRANULOCYTES: 1 %
LYMPHOCYTES # BLD: 23 % (ref 24–43)
MCH RBC QN AUTO: 28.2 PG (ref 25.2–33.5)
MCHC RBC AUTO-ENTMCNC: 33.8 G/DL (ref 28.4–34.8)
MCV RBC AUTO: 83.4 FL (ref 82.6–102.9)
MONOCYTES # BLD: 9 % (ref 3–12)
NRBC AUTOMATED: 0 PER 100 WBC
PDW BLD-RTO: 17.8 % (ref 11.8–14.4)
PLATELET # BLD: 157 K/UL (ref 138–453)
PMV BLD AUTO: 11.2 FL (ref 8.1–13.5)
POTASSIUM SERPL-SCNC: 4 MMOL/L (ref 3.7–5.3)
RBC # BLD: 4.51 M/UL (ref 4.21–5.77)
RBC # BLD: ABNORMAL 10*6/UL
SEG NEUTROPHILS: 59 % (ref 36–65)
SEGMENTED NEUTROPHILS ABSOLUTE COUNT: 4.6 K/UL (ref 1.5–8.1)
SODIUM BLD-SCNC: 135 MMOL/L (ref 135–144)
WBC # BLD: 7.8 K/UL (ref 3.5–11.3)

## 2022-08-06 PROCEDURE — 85025 COMPLETE CBC W/AUTO DIFF WBC: CPT

## 2022-08-06 PROCEDURE — 6370000000 HC RX 637 (ALT 250 FOR IP)

## 2022-08-06 PROCEDURE — 99232 SBSQ HOSP IP/OBS MODERATE 35: CPT | Performed by: PSYCHIATRY & NEUROLOGY

## 2022-08-06 PROCEDURE — 2060000000 HC ICU INTERMEDIATE R&B

## 2022-08-06 PROCEDURE — 6370000000 HC RX 637 (ALT 250 FOR IP): Performed by: STUDENT IN AN ORGANIZED HEALTH CARE EDUCATION/TRAINING PROGRAM

## 2022-08-06 PROCEDURE — 2580000003 HC RX 258: Performed by: STUDENT IN AN ORGANIZED HEALTH CARE EDUCATION/TRAINING PROGRAM

## 2022-08-06 PROCEDURE — 6370000000 HC RX 637 (ALT 250 FOR IP): Performed by: INTERNAL MEDICINE

## 2022-08-06 PROCEDURE — 82947 ASSAY GLUCOSE BLOOD QUANT: CPT

## 2022-08-06 PROCEDURE — 80048 BASIC METABOLIC PNL TOTAL CA: CPT

## 2022-08-06 PROCEDURE — 99233 SBSQ HOSP IP/OBS HIGH 50: CPT | Performed by: PSYCHIATRY & NEUROLOGY

## 2022-08-06 PROCEDURE — 6370000000 HC RX 637 (ALT 250 FOR IP): Performed by: PSYCHIATRY & NEUROLOGY

## 2022-08-06 PROCEDURE — 6360000002 HC RX W HCPCS: Performed by: STUDENT IN AN ORGANIZED HEALTH CARE EDUCATION/TRAINING PROGRAM

## 2022-08-06 PROCEDURE — 36415 COLL VENOUS BLD VENIPUNCTURE: CPT

## 2022-08-06 RX ADMIN — AMLODIPINE BESYLATE 5 MG: 5 TABLET ORAL at 09:21

## 2022-08-06 RX ADMIN — Medication 81 MG: at 09:20

## 2022-08-06 RX ADMIN — CLOPIDOGREL 75 MG: 75 TABLET, FILM COATED ORAL at 09:21

## 2022-08-06 RX ADMIN — FAMOTIDINE 20 MG: 20 TABLET, FILM COATED ORAL at 20:12

## 2022-08-06 RX ADMIN — DESMOPRESSIN ACETATE 40 MG: 0.2 TABLET ORAL at 09:21

## 2022-08-06 RX ADMIN — FAMOTIDINE 20 MG: 20 TABLET, FILM COATED ORAL at 09:21

## 2022-08-06 RX ADMIN — ISOSORBIDE MONONITRATE 30 MG: 30 TABLET ORAL at 09:20

## 2022-08-06 RX ADMIN — FOLIC ACID 1 MG: 1 TABLET ORAL at 09:20

## 2022-08-06 RX ADMIN — ENOXAPARIN SODIUM 40 MG: 100 INJECTION SUBCUTANEOUS at 09:21

## 2022-08-06 RX ADMIN — SODIUM CHLORIDE, PRESERVATIVE FREE 10 ML: 5 INJECTION INTRAVENOUS at 09:22

## 2022-08-06 RX ADMIN — DULOXETINE HYDROCHLORIDE 30 MG: 30 CAPSULE, DELAYED RELEASE ORAL at 09:21

## 2022-08-06 RX ADMIN — SODIUM CHLORIDE, PRESERVATIVE FREE 10 ML: 5 INJECTION INTRAVENOUS at 20:12

## 2022-08-06 RX ADMIN — CARVEDILOL 25 MG: 25 TABLET, FILM COATED ORAL at 09:21

## 2022-08-06 RX ADMIN — CARVEDILOL 25 MG: 25 TABLET, FILM COATED ORAL at 18:44

## 2022-08-06 ASSESSMENT — ENCOUNTER SYMPTOMS
COUGH: 0
NAUSEA: 0
VOMITING: 0
PHOTOPHOBIA: 0
SHORTNESS OF BREATH: 0
ABDOMINAL PAIN: 0
BACK PAIN: 0

## 2022-08-06 NOTE — PROGRESS NOTES
Endovascular Neurosurgery Progress Note    SUBJECTIVE:     No overnight event, no acute event, patient continue to improve, he is speaking much better, pt has no complaint and feels good overall, eating breakfast now    Review of Systems:  CONSTITUTIONAL:  negative for fevers, chills, fatigue and malaise    EYES:  negative for double vision, blurred vision and photophobia     HEENT:  negative for tinnitus, epistaxis and sore throat    RESPIRATORY:  negative for cough, shortness of breath, wheezing    CARDIOVASCULAR:  negative for chest pain, palpitations, syncope, edema    GASTROINTESTINAL:  negative for nausea, vomiting    GENITOURINARY:  negative for incontinence    MUSCULOSKELETAL:  negative for neck or back pain    NEUROLOGICAL:  Negative for weakness and tingling  negative for headaches and dizziness    PSYCHIATRIC:  negative for anxiety      Review of systems otherwise negative. OBJECTIVE:     Vitals:    08/06/22 0400   BP: 124/86   Pulse: 65   Resp: 25   Temp: 97.1 °F (36.2 °C)   SpO2: 97%        General:  Gen: normal habitus, NAD  HEENT: NCAT, mucosa moist  Cvs: RRR, S1 S2 normal  Resp: symmetric unlabored breathing  Abd: s/nd/nt  Ext: no edema  Skin: no lesions seen, warm and dry    Neuro:  Gen: awake and alert, able to speak in simple complete sentence  Lang/speech: speak is clear, able to repeat perfectly, not able to name well, but able to name colors well  CN: PERRL, EOMI, VFF, V1-3 intact, face symmetric, hearing intact, shoulder shrug symmetric, tongue midline  Motor: grossly 5/5 UE and LE on the left, 4/5 on the right  Sense: LT intact in all 4 ext. Coord: FTN and HTS intact b/l  DTR: deferred  Gait: not tested    NIH Stroke Scale:   1a  Level of consciousness: 0 - alert; keenly responsive   1b. LOC questions:  0 - answers both questions correctly   1c. LOC commands: 0 - performs both tasks correctly   2. Best Gaze: 0 - normal   3. Visual: 0 - no visual loss   4.  Facial Palsy: 1 - minor paralysis (flattened nasolabial fold, asymmetric on smiling)   5a. Motor left arm: 0 - no drift, limb holds 90 (or 45) degrees for full 10 seconds   5b. Motor right arm: 1   6a. Motor left le - no drift; leg holds 30 degree position for full 5 seconds   6b  Motor right le - no drift; leg holds 30 degree position for full 5 seconds   7. Limb Ataxia: 0 - absent   8. Sensory: 0 - normal; no sensory loss   9. Best Language:  1    10. Dysarthria: 0 - normal   11. Extinction and Inattention: 0 - no abnormality         Total:   3     MRS: 3      LABS:   Reviewed. Lab Results   Component Value Date    HGB 11.6 (L) 2022    WBC 8.3 2022     2022     (L) 2022    BUN 12 2022    CREATININE 1.01 2022    AST 15 2022    ALT 10 2022    MG 1.7 2022    APTT 27.6 2022    INR 1.2 2022      Lab Results   Component Value Date/Time    COVID19 Not Detected 2022 08:47 PM    COVID19 Not Detected 2020 11:08 AM       RADIOLOGY:   Images were personally reviewed including:    CTP 22  Large mismatch in the left hemisphere    Cerebral angiogram 22  1. Left ICA cervical segment acute occlusion with reconstitution of flow in the left ICA ophthalmic segment through ophthalmic artery through ECA branches. 2.  The above lesion was treated with EmboTrap stent retriever 6.5 mm x 45 mm deployed in the distal ICA cervical segment, mechanical aspiration through ? LBC with penumbra engine, Pre-stenting balloon angioplasty with 5.0 mm x 40 mm loren balloon    using seimless technique, 10 mm x 31 mm carotid wallstent, post stenting balloon angioplasty with Emboguard [facility]   3. Final TICI score?03   4. Residual stenosis less than 10%? CTP: 22  - no more mismatch      ASSESSMENT:     58 y/o M with pmh significant for Htn, CAD s/p CABG in , V. Fib PEA in 2022, AICD on , CKD stage III, presented on  with a syncope and right sided weakness, CTA completed on 07/30 which showed left ICA cervical segment occlusion. Patient had worsening neuro exam overnight, became non verbal, weaker on right UE and LE, NIHSS was 15. CT perfusion was emergently performed which showed ischemic penumbra in left MCA, DARYN territory. Patient was taken for emergent thrombectomy (7/31/22) and L ICA stenting (7/31/22), since the intervention patient improved signficantly        PLAN:     - patient continue to improve,  momo his language is improving  - con't aspirin and plavix  - PT/OT and acute rehab  - gradual normalization of BP  - patient can be discharged from neuroendovascular standpoint    - f/u with Dr. Terese Carlson in 2 weeks and Dr. Dick Johnston in 3 months      Case discussed with Dr. Dick Johnston attending.     Mateo Diana MD PGY 7  Stroke, [facility]  [address] [place]  Electronically signed 8/6/2022 at 7:29 AM

## 2022-08-06 NOTE — PLAN OF CARE
Problem: Chronic Conditions and Co-morbidities  Goal: Patient's chronic conditions and co-morbidity symptoms are monitored and maintained or improved  8/6/2022 0504 by Hemal Newberry RN  Outcome: Progressing  8/6/2022 0504 by Hemal Newberry RN  Outcome: Progressing  8/5/2022 1504 by Aydee Silva RN  Outcome: Progressing  Flowsheets (Taken 8/5/2022 0745)  Care Plan - Patient's Chronic Conditions and Co-Morbidity Symptoms are Monitored and Maintained or Improved:   Collaborate with multidisciplinary team to address chronic and comorbid conditions and prevent exacerbation or deterioration   Monitor and assess patient's chronic conditions and comorbid symptoms for stability, deterioration, or improvement     Problem: Pain  Goal: Verbalizes/displays adequate comfort level or baseline comfort level  8/6/2022 0504 by Hemal Newberry RN  Outcome: Progressing  8/6/2022 0504 by Hemal Newberry RN  Outcome: Progressing  8/5/2022 1504 by Aydee Silva RN  Outcome: Progressing     Problem: Safety - Adult  Goal: Free from fall injury  8/6/2022 0504 by Hemal Newberry RN  Outcome: Progressing  8/6/2022 0504 by Hemal Newberry RN  Outcome: Progressing  8/5/2022 1504 by Aydee Silva RN  Outcome: Progressing     Problem: Discharge Planning  Goal: Discharge to home or other facility with appropriate resources  8/6/2022 0504 by Hemal Newberry RN  Outcome: Progressing  8/6/2022 0504 by Hemal Newberry RN  Outcome: Progressing  8/5/2022 1504 by Aydee Silva RN  Outcome: Progressing  Flowsheets (Taken 8/5/2022 0745)  Discharge to home or other facility with appropriate resources:   Identify barriers to discharge with patient and caregiver   Arrange for needed discharge resources and transportation as appropriate   Identify discharge learning needs (meds, wound care, etc)   Refer to discharge planning if patient needs post-hospital services based on physician order or complex needs related to functional status, cognitive ability or social support system     Problem: ABCDS Injury Assessment  Goal: Absence of physical injury  8/6/2022 0504 by Nahomy Jhaveri RN  Outcome: Progressing  8/6/2022 0504 by Nahomy Jhaveri RN  Outcome: Progressing  8/5/2022 1504 by Maritza Morgan RN  Outcome: Progressing     Problem: Skin/Tissue Integrity  Goal: Absence of new skin breakdown  Description: 1. Monitor for areas of redness and/or skin breakdown  2. Assess vascular access sites hourly  3. Every 4-6 hours minimum:  Change oxygen saturation probe site  4. Every 4-6 hours:  If on nasal continuous positive airway pressure, respiratory therapy assess nares and determine need for appliance change or resting period. 8/6/2022 0504 by Nahomy Jhaveri RN  Outcome: Progressing  8/6/2022 0504 by Nahomy Jhaveri RN  Outcome: Progressing  8/5/2022 1504 by Maritza Morgan RN  Outcome: Progressing     Problem: Neurosensory - Adult  Goal: Achieves stable or improved neurological status  8/6/2022 0504 by Nahomy Jhaveri RN  Outcome: Progressing  8/6/2022 0504 by Nahomy Jhaveri RN  Outcome: Progressing  8/5/2022 1504 by Maritza Morgan RN  Outcome: Progressing  Flowsheets (Taken 8/5/2022 0745)  Achieves stable or improved neurological status:   Assess for and report changes in neurological status   Maintain blood pressure and fluid volume within ordered parameters to optimize cerebral perfusion and minimize risk of hemorrhage   Monitor temperature, glucose, and sodium. Initiate appropriate interventions as ordered  Goal: Absence of seizures  8/6/2022 0504 by Nahomy Jhaveri RN  Outcome: Progressing  8/6/2022 0504 by Nahomy Jhaveri RN  Outcome: Progressing  8/5/2022 1504 by Maritza Morgan RN  Outcome: Progressing  Flowsheets (Taken 8/5/2022 0745)  Absence of seizures:   Monitor for seizure activity.   If seizure occurs, document type and location of movements and any associated apnea   Support airway/breathing, administer oxygen as needed  Goal: Remains free of injury related to seizures activity  8/6/2022 0504 by Martina Drummond RN  Outcome: Progressing  8/6/2022 0504 by Martina Drummond RN  Outcome: Progressing  8/5/2022 1504 by Pelon Sabillon RN  Outcome: Progressing  Flowsheets (Taken 8/5/2022 0745)  Remains free of injury related to seizure activity:   Maintain airway, patient safety  and administer oxygen as ordered   Monitor patient for seizure activity, document and report duration and description of seizure to Licensed Independent Practitioner   Instruct patient/family to call for assistance with activity based on assessment  Goal: Achieves maximal functionality and self care  8/6/2022 0504 by Martina Drummond RN  Outcome: Progressing  8/6/2022 0504 by Martina Drummond RN  Outcome: Progressing  8/5/2022 1504 by Pelon Sabillon RN  Outcome: Progressing  Flowsheets (Taken 8/5/2022 0745)  Achieves maximal functionality and self care:   Monitor swallowing and airway patency with patient fatigue and changes in neurological status   Encourage and assist patient to increase activity and self care with guidance from physical therapy/occupational therapy     Problem: Respiratory - Adult  Goal: Achieves optimal ventilation and oxygenation  8/6/2022 0504 by Martina Drummond RN  Outcome: Progressing  8/6/2022 0504 by Martina Drummond RN  Outcome: Progressing  8/5/2022 1504 by Pelon Sabillon RN  Outcome: Progressing  Flowsheets (Taken 8/5/2022 0745)  Achieves optimal ventilation and oxygenation:   Assess for changes in respiratory status   Assess for changes in mentation and behavior     Problem: Cardiovascular - Adult  Goal: Maintains optimal cardiac output and hemodynamic stability  8/6/2022 0504 by Martina Drummond RN  Outcome: Progressing  8/6/2022 0504 by Martina Drummond RN  Outcome: Progressing  8/5/2022 1504 by Pelon Sabillon RN  Outcome: Progressing  8/5/2022 1504 by Carolina Jamison RN  Outcome: Progressing  Flowsheets  Taken 8/5/2022 1029  Oral Mucous Membranes Remain Intact: Assess oral mucosa and hygiene practices  Taken 8/5/2022 0745  Oral Mucous Membranes Remain Intact: Assess oral mucosa and hygiene practices     Problem: Musculoskeletal - Adult  Goal: Return mobility to safest level of function  8/6/2022 0504 by Fredis Nix RN  Outcome: Progressing  8/6/2022 0504 by Fredis Nix RN  Outcome: Progressing  8/5/2022 1504 by Carolina Jamison RN  Outcome: Progressing  Flowsheets (Taken 8/5/2022 0745)  Return Mobility to Safest Level of Function:   Assess patient stability and activity tolerance for standing, transferring and ambulating with or without assistive devices   Assist with transfers and ambulation using safe patient handling equipment as needed   Ensure adequate protection for wounds/incisions during mobilization  Goal: Maintain proper alignment of affected body part  8/6/2022 0504 by Fredis Nix RN  Outcome: Progressing  8/6/2022 0504 by Fredis Nix RN  Outcome: Progressing  8/5/2022 1504 by Carolina Jamison RN  Outcome: Progressing  Flowsheets (Taken 8/5/2022 0745)  Maintain proper alignment of affected body part: Support and protect limb and body alignment per provider's orders  Goal: Return ADL status to a safe level of function  8/6/2022 0504 by Fredis Nix RN  Outcome: Progressing  8/6/2022 0504 by Fredis Nix RN  Outcome: Progressing  8/5/2022 1504 by Carolina Jamison RN  Outcome: Progressing  Flowsheets (Taken 8/5/2022 0745)  Return ADL Status to a Safe Level of Function:   Administer medication as ordered   Assess activities of daily living deficits and provide assistive devices as needed   Assist and instruct patient to increase activity and self care as tolerated     Problem: Gastrointestinal - Adult  Goal: Minimal or absence of nausea and vomiting  8/6/2022 0504 by Harriet Almonte Fahad Santo RN  Outcome: Progressing  8/6/2022 0504 by Arjun Sabillon RN  Outcome: Progressing  8/5/2022 1504 by Courtney Nicholson RN  Outcome: Progressing  Flowsheets (Taken 8/5/2022 0745)  Minimal or absence of nausea and vomiting:   Administer IV fluids as ordered to ensure adequate hydration   Advance diet as tolerated, if ordered  Goal: Maintains or returns to baseline bowel function  8/6/2022 0504 by Arjun Sabillon RN  Outcome: Progressing  8/6/2022 0504 by Arjun Sabillon RN  Outcome: Progressing  8/5/2022 1504 by Courtney Nicholson RN  Outcome: Progressing  Goal: Establish and maintain optimal ostomy function  8/6/2022 0504 by Arjun Sabillon RN  Outcome: Progressing  8/6/2022 0504 by Arjun Sabillon RN  Outcome: Progressing  8/5/2022 1504 by Courtney Nicholson RN  Outcome: Progressing  Flowsheets (Taken 8/5/2022 0745)  Establish and maintain optimal ostomy function: Monitor output from ostomies     Problem: Genitourinary - Adult  Goal: Absence of urinary retention  8/6/2022 0504 by Arjun Sabillon RN  Outcome: Progressing  8/6/2022 0504 by Arjun Sabillon RN  Outcome: Progressing  8/5/2022 1504 by Courtney Nicholson RN  Outcome: Progressing  Flowsheets (Taken 8/5/2022 0745)  Absence of urinary retention:   Assess patients ability to void and empty bladder   Monitor intake/output and perform bladder scan as needed     Problem: Infection - Adult  Goal: Absence of infection at discharge  8/6/2022 0504 by Arjun Sabillon RN  Outcome: Progressing  8/6/2022 0504 by Arjun Sabillon RN  Outcome: Progressing  8/5/2022 1504 by Courtney Nicholson RN  Outcome: Progressing  Flowsheets (Taken 8/5/2022 0745)  Absence of infection at discharge:   Assess and monitor for signs and symptoms of infection   Monitor lab/diagnostic results   Identify and instruct in appropriate isolation precautions for identified infection/condition  Goal: Absence of infection during hospitalization  8/6/2022 serum osmolarity and serum sodium as indicated or ordered  Goal: Glucose maintained within prescribed range  8/6/2022 0504 by Denis Carrizales RN  Outcome: Progressing  8/6/2022 0504 by Denis Carrizales RN  Outcome: Progressing  8/5/2022 1504 by Og Moran RN  Outcome: Progressing  Flowsheets (Taken 8/5/2022 0745)  Glucose maintained within prescribed range: Monitor blood glucose as ordered     Problem: Hematologic - Adult  Goal: Maintains hematologic stability  8/6/2022 0504 by Denis Carrizales RN  Outcome: Progressing  8/6/2022 0504 by Denis Carrizales RN  Outcome: Progressing  8/5/2022 1504 by Og Moran RN  Outcome: Progressing  Flowsheets (Taken 8/5/2022 0745)  Maintains hematologic stability:   Assess for signs and symptoms of bleeding or hemorrhage   Monitor labs for bleeding or clotting disorders     Problem: Anxiety  Goal: Will report anxiety at manageable levels  Description: INTERVENTIONS:  1. Administer medication as ordered  2. Teach and rehearse alternative coping skills  3. Provide emotional support with 1:1 interaction with staff  8/6/2022 0504 by Denis Carrizales RN  Outcome: Progressing  8/6/2022 0504 by Denis Carrizales RN  Outcome: Progressing  8/5/2022 1504 by Og Moran RN  Outcome: Progressing  Flowsheets (Taken 8/5/2022 0745)  Will report anxiety at manageable levels:   Administer medication as ordered   Provide emotional support with 1:1 interaction with staff     Problem: Coping  Goal: Pt/Family able to verbalize concerns and demonstrate effective coping strategies  Description: INTERVENTIONS:  1. Assist patient/family to identify coping skills, available support systems and cultural and spiritual values  2. Provide emotional support, including active listening and acknowledgement of concerns of patient and caregivers  3. Reduce environmental stimuli, as able  4. Instruct patient/family in relaxation techniques, as appropriate  5.  Assess for spiritual pain/suffering and initiate Spiritual Care, Psychosocial Clinical Specialist consults as needed  8/6/2022 0504 by Sujata Marvin RN  Outcome: Progressing  8/6/2022 0504 by Sujata Marvin RN  Outcome: Progressing  8/5/2022 1504 by Camila Mccarthy RN  Outcome: Progressing  Flowsheets (Taken 8/5/2022 0745)  Patient/family able to verbalize anxieties, fears, and concerns, and demonstrate effective coping:   Assist patient/family to identify coping skills, available support systems and cultural and spiritual values   Provide emotional support, including active listening and acknowledgement of concerns of patient and caregivers   Assess for spiritual pain/suffering and initiate Spiritual Care, Psychosocial Clinical Specialist consults as needed     Problem: Decision Making  Goal: Pt/Family able to effectively weigh alternatives and participate in decision making related to treatment and care  Description: INTERVENTIONS:  1. Determine when there are differences between patient's view, family's view, and healthcare provider's view of condition  2. Facilitate patient and family articulation of goals for care  3. Help patient and family identify pros/cons of alternative solutions  4. Provide information as requested by patient/family  5. Respect patient/family right to receive or not to receive information  6. Serve as a liaison between patient and family and health care team  7.  Initiate Consults from Ethics, Palliative Care or initiate [facility] as is appropriate  8/6/2022 0504 by Sujata Marvin RN  Outcome: Progressing  8/6/2022 0504 by Sujata Marvin RN  Outcome: Progressing  8/5/2022 1504 by Camila Mccarthy RN  Outcome: Progressing  Flowsheets (Taken 8/5/2022 0745)  Patient/family able to effectively weigh alternatives and participate in decision making related to treatment and care:   Determine when there are differences between patient's view, family's view, and healthcare provider's view of condition   Facilitate patient and family articulation of goals for care     Problem: Behavior  Goal: Pt/Family maintain appropriate behavior and adhere to behavioral management agreement, if implemented  Description: INTERVENTIONS:  1. Assess patient/family's coping skills and  non-compliant behavior (including use of illegal substances)  2. Notify security of behavior or suspected illegal substances which indicate the need for search of the family and/or belongings  3. Encourage verbalization of thoughts and concerns in a socially appropriate manner  4. Utilize positive, consistent limit setting strategies supporting safety of patient, staff and others  5. Encourage participation in the decision making process about the behavioral management agreement  6. If a visitor's behavior poses a threat to safety call refer to organization policy. 7. Initiate consult with , Psychosocial CNS, Spiritual Care as appropriate  8/6/2022 0504 by Fredis Nix RN  Outcome: Progressing  8/6/2022 0504 by Fredis Nix RN  Outcome: Progressing  8/5/2022 1504 by Carolina Jamison RN  Outcome: Progressing  Flowsheets (Taken 8/5/2022 0745)  Patient/family maintains appropriate behavior and adheres to behavioral management agreement, if implemented: Assess patient/familys coping skills and  non-compliant behavior (including use of illegal substances)     Problem: Spiritual Care  Goal: Pt/Family able to move forward in process of forgiving self, others, and/or higher power  Description: INTERVENTIONS:  1. Assist patient/family to overcome blocks to healing by use of spiritual practices (prayer, meditation, guided imagery, reiki, breath work, etc). 2. De-myth guilt and help patient/family identify possible irrational spiritual/cultural beliefs and values. 3. Explore possibilities of making amends & reconciliation with self, others, and/or a greater power.   4. Guide patient/family in identifying painful feelings of guilt. 5. Help patient/famiy explore and identify spiritual beliefs, cultural understandings or values that may help or hinder letting go of issue. 6. Help patient/family explore feelings of anger, bitterness, resentment. 7. Help patient/family identify and examine the situation in which these feelings are experienced. 8. Help patient/family identify destructive displacement of feelings onto other individuals. 9. Invite use of sacraments/rituals/ceremonies as appropriate (e.g. - confession, anointing, smudging). 10. Refer patient/family to formal counseling and/or to shanthi community for further support work.   8/6/2022 0504 by Julio Cesar Cotto RN  Outcome: Progressing  8/6/2022 0504 by Julio Cesar Cotto RN  Outcome: Progressing  8/5/2022 1504 by Ajit Aguilar RN  Outcome: Progressing  Flowsheets (Taken 8/5/2022 0237)  Patient/family able to move forward in process of forgiving self, others, and/or higher power: Assist patient to overcome blocks to healing by use of spiritual practices (prayer, meditation, guided imagery, reiki, breath work, etc)
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
## 2022-08-07 LAB — GLUCOSE BLD-MCNC: 91 MG/DL (ref 75–110)

## 2022-08-07 PROCEDURE — 6360000002 HC RX W HCPCS: Performed by: STUDENT IN AN ORGANIZED HEALTH CARE EDUCATION/TRAINING PROGRAM

## 2022-08-07 PROCEDURE — 6370000000 HC RX 637 (ALT 250 FOR IP)

## 2022-08-07 PROCEDURE — 97116 GAIT TRAINING THERAPY: CPT

## 2022-08-07 PROCEDURE — 2580000003 HC RX 258: Performed by: STUDENT IN AN ORGANIZED HEALTH CARE EDUCATION/TRAINING PROGRAM

## 2022-08-07 PROCEDURE — 97535 SELF CARE MNGMENT TRAINING: CPT

## 2022-08-07 PROCEDURE — 2060000000 HC ICU INTERMEDIATE R&B

## 2022-08-07 PROCEDURE — 82947 ASSAY GLUCOSE BLOOD QUANT: CPT

## 2022-08-07 PROCEDURE — 97110 THERAPEUTIC EXERCISES: CPT

## 2022-08-07 PROCEDURE — 6370000000 HC RX 637 (ALT 250 FOR IP): Performed by: PSYCHIATRY & NEUROLOGY

## 2022-08-07 PROCEDURE — 6370000000 HC RX 637 (ALT 250 FOR IP): Performed by: STUDENT IN AN ORGANIZED HEALTH CARE EDUCATION/TRAINING PROGRAM

## 2022-08-07 PROCEDURE — 99233 SBSQ HOSP IP/OBS HIGH 50: CPT | Performed by: PSYCHIATRY & NEUROLOGY

## 2022-08-07 PROCEDURE — 97530 THERAPEUTIC ACTIVITIES: CPT

## 2022-08-07 PROCEDURE — 99232 SBSQ HOSP IP/OBS MODERATE 35: CPT | Performed by: PSYCHIATRY & NEUROLOGY

## 2022-08-07 PROCEDURE — 6370000000 HC RX 637 (ALT 250 FOR IP): Performed by: INTERNAL MEDICINE

## 2022-08-07 RX ADMIN — FAMOTIDINE 20 MG: 20 TABLET, FILM COATED ORAL at 08:31

## 2022-08-07 RX ADMIN — FAMOTIDINE 20 MG: 20 TABLET, FILM COATED ORAL at 21:09

## 2022-08-07 RX ADMIN — FOLIC ACID 1 MG: 1 TABLET ORAL at 08:30

## 2022-08-07 RX ADMIN — CARVEDILOL 25 MG: 25 TABLET, FILM COATED ORAL at 16:10

## 2022-08-07 RX ADMIN — Medication 81 MG: at 08:30

## 2022-08-07 RX ADMIN — AMLODIPINE BESYLATE 5 MG: 5 TABLET ORAL at 08:30

## 2022-08-07 RX ADMIN — SODIUM CHLORIDE, PRESERVATIVE FREE 10 ML: 5 INJECTION INTRAVENOUS at 21:09

## 2022-08-07 RX ADMIN — CARVEDILOL 25 MG: 25 TABLET, FILM COATED ORAL at 08:30

## 2022-08-07 RX ADMIN — CLOPIDOGREL 75 MG: 75 TABLET, FILM COATED ORAL at 08:30

## 2022-08-07 RX ADMIN — SODIUM CHLORIDE, PRESERVATIVE FREE 10 ML: 5 INJECTION INTRAVENOUS at 08:32

## 2022-08-07 RX ADMIN — DULOXETINE HYDROCHLORIDE 30 MG: 30 CAPSULE, DELAYED RELEASE ORAL at 08:30

## 2022-08-07 RX ADMIN — ENOXAPARIN SODIUM 40 MG: 100 INJECTION SUBCUTANEOUS at 08:30

## 2022-08-07 RX ADMIN — ISOSORBIDE MONONITRATE 30 MG: 30 TABLET ORAL at 08:30

## 2022-08-07 RX ADMIN — DOCUSATE SODIUM 50 MG AND SENNOSIDES 8.6 MG 2 TABLET: 8.6; 5 TABLET, FILM COATED ORAL at 08:30

## 2022-08-07 RX ADMIN — DESMOPRESSIN ACETATE 40 MG: 0.2 TABLET ORAL at 08:30

## 2022-08-07 ASSESSMENT — PAIN SCALES - GENERAL: PAINLEVEL_OUTOF10: 0

## 2022-08-07 NOTE — PLAN OF CARE
Problem: Chronic Conditions and Co-morbidities  Goal: Patient's chronic conditions and co-morbidity symptoms are monitored and maintained or improved  Outcome: Progressing     Problem: Pain  Goal: Verbalizes/displays adequate comfort level or baseline comfort level  Outcome: Progressing     Problem: Safety - Adult  Goal: Free from fall injury  Outcome: Progressing  Flowsheets (Taken 8/6/2022 2000)  Free From Fall Injury: Instruct family/caregiver on patient safety     Problem: Discharge Planning  Goal: Discharge to home or other facility with appropriate resources  Outcome: Progressing     Problem: ABCDS Injury Assessment  Goal: Absence of physical injury  Outcome: Progressing  Flowsheets (Taken 8/6/2022 2000)  Absence of Physical Injury: Implement safety measures based on patient assessment     Problem: Skin/Tissue Integrity  Goal: Absence of new skin breakdown  Description: 1. Monitor for areas of redness and/or skin breakdown  2. Assess vascular access sites hourly  3. Every 4-6 hours minimum:  Change oxygen saturation probe site  4. Every 4-6 hours:  If on nasal continuous positive airway pressure, respiratory therapy assess nares and determine need for appliance change or resting period.   Outcome: Progressing     Problem: Neurosensory - Adult  Goal: Achieves stable or improved neurological status  Outcome: Progressing  Flowsheets (Taken 8/6/2022 2000)  Achieves stable or improved neurological status: Assess for and report changes in neurological status  Goal: Absence of seizures  Outcome: Progressing  Goal: Remains free of injury related to seizures activity  Outcome: Progressing  Goal: Achieves maximal functionality and self care  Outcome: Progressing     Problem: Respiratory - Adult  Goal: Achieves optimal ventilation and oxygenation  Outcome: Progressing     Problem: Cardiovascular - Adult  Goal: Maintains optimal cardiac output and hemodynamic stability  Outcome: Progressing  Flowsheets (Taken 8/6/2022 2000)  Maintains optimal cardiac output and hemodynamic stability: Monitor blood pressure and heart rate  Goal: Absence of cardiac dysrhythmias or at baseline  Outcome: Progressing  Flowsheets (Taken 8/6/2022 2000)  Absence of cardiac dysrhythmias or at baseline: Monitor cardiac rate and rhythm     Problem: Skin/Tissue Integrity - Adult  Goal: Skin integrity remains intact  Outcome: Progressing  Flowsheets (Taken 8/6/2022 2000)  Skin Integrity Remains Intact: Monitor for areas of redness and/or skin breakdown  Goal: Incisions, wounds, or drain sites healing without S/S of infection  Outcome: Progressing  Goal: Oral mucous membranes remain intact  Outcome: Progressing     Problem: Musculoskeletal - Adult  Goal: Return mobility to safest level of function  Outcome: Progressing  Flowsheets (Taken 8/6/2022 2000)  Return Mobility to Safest Level of Function:   Assess patient stability and activity tolerance for standing, transferring and ambulating with or without assistive devices   Assist with transfers and ambulation using safe patient handling equipment as needed  Goal: Maintain proper alignment of affected body part  Outcome: Progressing  Flowsheets (Taken 8/6/2022 2000)  Maintain proper alignment of affected body part: Support and protect limb and body alignment per provider's orders  Goal: Return ADL status to a safe level of function  Outcome: Progressing  Flowsheets (Taken 8/6/2022 2000)  Return ADL Status to a Safe Level of Function: Administer medication as ordered     Problem: Gastrointestinal - Adult  Goal: Minimal or absence of nausea and vomiting  Outcome: Progressing  Goal: Maintains or returns to baseline bowel function  Outcome: Progressing  Goal: Establish and maintain optimal ostomy function  Outcome: Progressing     Problem: Genitourinary - Adult  Goal: Absence of urinary retention  Outcome: Progressing     Problem: Infection - Adult  Goal: Absence of infection at discharge  Outcome: Progressing  Goal: Absence of infection during hospitalization  Outcome: Progressing  Goal: Absence of fever/infection during anticipated neutropenic period  Outcome: Progressing     Problem: Metabolic/Fluid and Electrolytes - Adult  Goal: Electrolytes maintained within normal limits  Outcome: Progressing  Goal: Hemodynamic stability and optimal renal function maintained  Outcome: Progressing  Goal: Glucose maintained within prescribed range  Outcome: Progressing     Problem: Hematologic - Adult  Goal: Maintains hematologic stability  Outcome: Progressing     Problem: Anxiety  Goal: Will report anxiety at manageable levels  Description: INTERVENTIONS:  1. Administer medication as ordered  2. Teach and rehearse alternative coping skills  3. Provide emotional support with 1:1 interaction with staff  Outcome: Progressing  Flowsheets (Taken 8/6/2022 2000)  Will report anxiety at manageable levels: Administer medication as ordered     Problem: Coping  Goal: Pt/Family able to verbalize concerns and demonstrate effective coping strategies  Description: INTERVENTIONS:  1. Assist patient/family to identify coping skills, available support systems and cultural and spiritual values  2. Provide emotional support, including active listening and acknowledgement of concerns of patient and caregivers  3. Reduce environmental stimuli, as able  4. Instruct patient/family in relaxation techniques, as appropriate  5. Assess for spiritual pain/suffering and initiate Spiritual Care, Psychosocial Clinical Specialist consults as needed  Outcome: Progressing     Problem: Decision Making  Goal: Pt/Family able to effectively weigh alternatives and participate in decision making related to treatment and care  Description: INTERVENTIONS:  1. Determine when there are differences between patient's view, family's view, and healthcare provider's view of condition  2. Facilitate patient and family articulation of goals for care  3.  Help patient and family identify pros/cons of alternative solutions  4. Provide information as requested by patient/family  5. Respect patient/family right to receive or not to receive information  6. Serve as a liaison between patient and family and health care team  7. Initiate Consults from Ethics, Palliative Care or initiate [address] [place] [address] as is appropriate  Outcome: Progressing     Problem: Behavior  Goal: Pt/Family maintain appropriate behavior and adhere to behavioral management agreement, if implemented  Description: INTERVENTIONS:  1. Assess patient/family's coping skills and  non-compliant behavior (including use of illegal substances)  2. Notify security of behavior or suspected illegal substances which indicate the need for search of the family and/or belongings  3. Encourage verbalization of thoughts and concerns in a socially appropriate manner  4. Utilize positive, consistent limit setting strategies supporting safety of patient, staff and others  5. Encourage participation in the decision making process about the behavioral management agreement  6. If a visitor's behavior poses a threat to safety call refer to organization policy. 7. Initiate consult with , Psychosocial CNS, Spiritual Care as appropriate  Outcome: Progressing     Problem: Spiritual Care  Goal: Pt/Family able to move forward in process of forgiving self, others, and/or higher power  Description: INTERVENTIONS:  1. Assist patient/family to overcome blocks to healing by use of spiritual practices (prayer, meditation, guided imagery, reiki, breath work, etc). 2. De-myth guilt and help patient/family identify possible irrational spiritual/cultural beliefs and values. 3. Explore possibilities of making amends & reconciliation with self, others, and/or a greater power. 4. Guide patient/family in identifying painful feelings of guilt.   5. Help patient/famiy explore and identify spiritual beliefs, cultural understandings or values that may help or hinder letting go of issue. 6. Help patient/family explore feelings of anger, bitterness, resentment. 7. Help patient/family identify and examine the situation in which these feelings are experienced. 8. Help patient/family identify destructive displacement of feelings onto other individuals. 9. Invite use of sacraments/rituals/ceremonies as appropriate (e.g. - confession, anointing, smudging). 10. Refer patient/family to formal counseling and/or to shanthi community for further support work.   Outcome: Progressing
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
## 2022-08-07 NOTE — PLAN OF CARE
Problem: Chronic Conditions and Co-morbidities  Goal: Patient's chronic conditions and co-morbidity symptoms are monitored and maintained or improved  8/7/2022 1237 by Edwardo Beard RN  Outcome: Progressing     Problem: Pain  Goal: Verbalizes/displays adequate comfort level or baseline comfort level  8/7/2022 1237 by Edwardo Beard RN  Outcome: Progressing     Problem: Safety - Adult  Goal: Free from fall injury  8/7/2022 1237 by Edwardo Beard RN  Outcome: Progressing     Problem: Discharge Planning  Goal: Discharge to home or other facility with appropriate resources  8/7/2022 1237 by Edwardo Beard RN  Outcome: Progressing     Problem: ABCDS Injury Assessment  Goal: Absence of physical injury  8/7/2022 1237 by Edwardo Beard RN  Outcome: Progressing     Problem: Skin/Tissue Integrity  Goal: Absence of new skin breakdown  Description: 1. Monitor for areas of redness and/or skin breakdown  2. Assess vascular access sites hourly  3. Every 4-6 hours minimum:  Change oxygen saturation probe site  4. Every 4-6 hours:  If on nasal continuous positive airway pressure, respiratory therapy assess nares and determine need for appliance change or resting period. 8/7/2022 1237 by Edwardo Beard RN  Outcome: Progressing     Problem: Neurosensory - Adult  Goal: Achieves stable or improved neurological status  8/7/2022 1237 by Edwardo Beard RN  Outcome: Progressing  Flowsheets (Taken 8/7/2022 0830)  Achieves stable or improved neurological status:   Assess for and report changes in neurological status   Initiate measures to prevent increased intracranial pressure   Maintain blood pressure and fluid volume within ordered parameters to optimize cerebral perfusion and minimize risk of hemorrhage   Monitor temperature, glucose, and sodium.  Initiate appropriate interventions as ordered     Problem: Neurosensory - Adult  Goal: Absence of seizures  8/7/2022 1237 by Edwardo Beard RN  Outcome: Intact: Monitor for areas of redness and/or skin breakdown     Problem: Skin/Tissue Integrity - Adult  Goal: Incisions, wounds, or drain sites healing without S/S of infection  8/7/2022 1237 by Marquita Arellano RN  Outcome: Progressing     Problem: Skin/Tissue Integrity - Adult  Goal: Oral mucous membranes remain intact  8/7/2022 1237 by Marquita Arellano RN  Outcome: Progressing     Problem: Musculoskeletal - Adult  Goal: Return mobility to safest level of function  8/7/2022 1237 by Marquita Arellano RN  Outcome: Progressing  Flowsheets (Taken 8/7/2022 0830)  Return Mobility to Safest Level of Function:   Assess patient stability and activity tolerance for standing, transferring and ambulating with or without assistive devices   Assist with transfers and ambulation using safe patient handling equipment as needed   Ensure adequate protection for wounds/incisions during mobilization     Problem: Musculoskeletal - Adult  Goal: Maintain proper alignment of affected body part  8/7/2022 1237 by Marquita Arellano RN  Outcome: Progressing  Flowsheets (Taken 8/7/2022 0830)  Maintain proper alignment of affected body part:   Support and protect limb and body alignment per provider's orders   Instruct and reinforce with patient and family use of appropriate assistive device and precautions (e.g. spinal or hip dislocation precautions)     Problem: Musculoskeletal - Adult  Goal: Return ADL status to a safe level of function  8/7/2022 1237 by Marquita Arellano RN  Outcome: Progressing  Flowsheets (Taken 8/7/2022 0830)  Return ADL Status to a Safe Level of Function: Administer medication as ordered     Problem: Gastrointestinal - Adult  Goal: Minimal or absence of nausea and vomiting  8/7/2022 1237 by Marquita Arellano RN  Outcome: Progressing     Problem: Gastrointestinal - Adult  Goal: Maintains or returns to baseline bowel function  8/7/2022 1237 by Marquita Arellano RN  Outcome: Progressing     Problem: Genitourinary - Adult  Goal: active listening and acknowledgement of concerns of patient and caregivers   Reduce environmental stimuli, as able     Problem: Decision Making  Goal: Pt/Family able to effectively weigh alternatives and participate in decision making related to treatment and care  Description: INTERVENTIONS:  1. Determine when there are differences between patient's view, family's view, and healthcare provider's view of condition  2. Facilitate patient and family articulation of goals for care  3. Help patient and family identify pros/cons of alternative solutions  4. Provide information as requested by patient/family  5. Respect patient/family right to receive or not to receive information  6. Serve as a liaison between patient and family and health care team  7. Initiate Consults from Ethics, Palliative Care or initiate [address] as is appropriate  8/7/2022 1237 by Suki De La Garza RN  Outcome: Progressing  Flowsheets (Taken 8/7/2022 0830)  Patient/family able to effectively weigh alternatives and participate in decision making related to treatment and care:   Determine when there are differences between patient's view, family's view, and healthcare provider's view of condition   Provide information as requested by patient/family   Respect patient/family right to receive or not to receive information     Problem: Behavior  Goal: Pt/Family maintain appropriate behavior and adhere to behavioral management agreement, if implemented  Description: INTERVENTIONS:  1. Assess patient/family's coping skills and  non-compliant behavior (including use of illegal substances)  2. Notify security of behavior or suspected illegal substances which indicate the need for search of the family and/or belongings  3. Encourage verbalization of thoughts and concerns in a socially appropriate manner  4. Utilize positive, consistent limit setting strategies supporting safety of patient, staff and others  5.  Encourage participation in the decision making process about the behavioral management agreement  6. If a visitor's behavior poses a threat to safety call refer to organization policy.   7. Initiate consult with , Psychosocial CNS, Spiritual Care as appropriate  8/7/2022 1237 by Yee Christensen RN  Outcome: Progressing
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
## 2022-08-07 NOTE — PROGRESS NOTES
Endovascular Neurosurgery Progress Note    SUBJECTIVE:     No overnight event, no acute event, patient continue to improve, he is speaking much better, pt has no complaint and feels good overall, drinking a lot of Pepsi today    Review of Systems:  CONSTITUTIONAL:  negative for fevers, chills, fatigue and malaise    EYES:  negative for double vision, blurred vision and photophobia     HEENT:  negative for tinnitus, epistaxis and sore throat    RESPIRATORY:  negative for cough, shortness of breath, wheezing    CARDIOVASCULAR:  negative for chest pain, palpitations, syncope, edema    GASTROINTESTINAL:  negative for nausea, vomiting    GENITOURINARY:  negative for incontinence    MUSCULOSKELETAL:  negative for neck or back pain    NEUROLOGICAL:  Negative for weakness and tingling  negative for headaches and dizziness    PSYCHIATRIC:  negative for anxiety      Review of systems otherwise negative. OBJECTIVE:     Vitals:    08/07/22 1915   BP: (!) 119/91   Pulse: 66   Resp: 14   Temp: 97.6 °F (36.4 °C)   SpO2: 97%        General:  Gen: normal habitus, NAD  HEENT: NCAT, mucosa moist  Cvs: RRR, S1 S2 normal  Resp: symmetric unlabored breathing  Abd: s/nd/nt  Ext: no edema  Skin: no lesions seen, warm and dry    Neuro:  Gen: awake and alert, able to speak in simple complete sentence  Lang/speech: speak is clear, able to repeat perfectly, not able to name well, but able to name colors well  CN: PERRL, EOMI, VFF, V1-3 intact, face symmetric, hearing intact, shoulder shrug symmetric, tongue midline  Motor: grossly 5/5 UE and LE on the left, 4/5 on the right  Sense: LT intact in all 4 ext. Coord: FTN and HTS intact b/l  DTR: deferred  Gait: not tested    NIH Stroke Scale:   1a  Level of consciousness: 0 - alert; keenly responsive   1b. LOC questions:  0 - answers both questions correctly   1c. LOC commands: 0 - performs both tasks correctly   2. Best Gaze: 0 - normal   3. Visual: 0 - no visual loss   4.  Facial Palsy: 1 - minor paralysis (flattened nasolabial fold, asymmetric on smiling)   5a. Motor left arm: 0 - no drift, limb holds 90 (or 45) degrees for full 10 seconds   5b. Motor right arm: 0   6a. Motor left le - no drift; leg holds 30 degree position for full 5 seconds   6b  Motor right le - no drift; leg holds 30 degree position for full 5 seconds   7. Limb Ataxia: 0 - absent   8. Sensory: 0 - normal; no sensory loss   9. Best Language:  1    10. Dysarthria: 0 - normal   11. Extinction and Inattention: 0 - no abnormality         Total:   2     MRS: 2      LABS:   Reviewed. Lab Results   Component Value Date    HGB 12.7 (L) 2022    WBC 7.8 2022     2022     2022    BUN 10 2022    CREATININE 0.96 2022    AST 15 2022    ALT 10 2022    MG 1.7 2022    APTT 27.6 2022    INR 1.2 2022      Lab Results   Component Value Date/Time    COVID19 Not Detected 2022 08:47 PM    COVID19 Not Detected 2020 11:08 AM       RADIOLOGY:   Images were personally reviewed including:    CTP 22  Large mismatch in the left hemisphere    Cerebral angiogram 22  1. Left ICA cervical segment acute occlusion with reconstitution of flow in the left ICA ophthalmic segment through ophthalmic artery through ECA branches. 2.  The above lesion was treated with EmboTrap stent retriever 6.5 mm x 45 mm deployed in the distal ICA cervical segment, mechanical aspiration through ? LBC with penumbra engine, Pre-stenting balloon angioplasty with 5.0 mm x 40 mm loren balloon    using seimless technique, 10 mm x 31 mm carotid wallstent, post stenting balloon angioplasty with Emboguard [facility]   3. Final TICI score?03   4. Residual stenosis less than 10%? CTP: 22  - no more mismatch      ASSESSMENT:     58 y/o M with pmh significant for Htn, CAD s/p CABG in , V. Fib PEA in 2022, AICD on , CKD stage III, presented on  with a syncope and right sided weakness, CTA completed on 07/30 which showed left ICA cervical segment occlusion. Patient had worsening neuro exam overnight, became non verbal, weaker on right UE and LE, NIHSS was 15. CT perfusion was emergently performed which showed ischemic penumbra in left MCA, DARYN territory. Patient was taken for emergent thrombectomy (7/31/22) and L ICA stenting (7/31/22), since the intervention patient improved signficantly        PLAN:     - patient continue to improve day after day  - con't aspirin and plavix  - PT/OT and acute rehab  - gradual normalization of BP  - patient can be discharged from neuroendovascular standpoint    - f/u with Dr. Negro Hinds in 2 weeks and Dr. Sue Guerra in 3 months      Case discussed with Dr. Sue Guerra attending.     Justen Randhawa MD PGY 7  Stroke, [facility]  [id number] [address]  Electronically signed 8/7/2022 at 7:51 PM
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
## 2022-08-08 PROBLEM — I63.232 CEREBROVASCULAR ACCIDENT (CVA) DUE TO OCCLUSION OF LEFT CAROTID ARTERY (HCC): Status: ACTIVE | Noted: 2022-08-08

## 2022-08-08 PROBLEM — I65.22 ICAO (INTERNAL CAROTID ARTERY OCCLUSION), LEFT: Status: ACTIVE | Noted: 2022-08-08

## 2022-08-08 LAB
GLUCOSE BLD-MCNC: 102 MG/DL (ref 75–110)
GLUCOSE BLD-MCNC: 80 MG/DL (ref 75–110)

## 2022-08-08 PROCEDURE — 6370000000 HC RX 637 (ALT 250 FOR IP)

## 2022-08-08 PROCEDURE — 99233 SBSQ HOSP IP/OBS HIGH 50: CPT | Performed by: PSYCHIATRY & NEUROLOGY

## 2022-08-08 PROCEDURE — 6370000000 HC RX 637 (ALT 250 FOR IP): Performed by: PSYCHIATRY & NEUROLOGY

## 2022-08-08 PROCEDURE — 2580000003 HC RX 258: Performed by: STUDENT IN AN ORGANIZED HEALTH CARE EDUCATION/TRAINING PROGRAM

## 2022-08-08 PROCEDURE — 97116 GAIT TRAINING THERAPY: CPT

## 2022-08-08 PROCEDURE — 6360000002 HC RX W HCPCS: Performed by: STUDENT IN AN ORGANIZED HEALTH CARE EDUCATION/TRAINING PROGRAM

## 2022-08-08 PROCEDURE — 99232 SBSQ HOSP IP/OBS MODERATE 35: CPT | Performed by: PHYSICAL MEDICINE & REHABILITATION

## 2022-08-08 PROCEDURE — 6370000000 HC RX 637 (ALT 250 FOR IP): Performed by: INTERNAL MEDICINE

## 2022-08-08 PROCEDURE — 99024 POSTOP FOLLOW-UP VISIT: CPT | Performed by: PSYCHIATRY & NEUROLOGY

## 2022-08-08 PROCEDURE — 97535 SELF CARE MNGMENT TRAINING: CPT

## 2022-08-08 PROCEDURE — 2060000000 HC ICU INTERMEDIATE R&B

## 2022-08-08 PROCEDURE — 97110 THERAPEUTIC EXERCISES: CPT

## 2022-08-08 PROCEDURE — 6370000000 HC RX 637 (ALT 250 FOR IP): Performed by: STUDENT IN AN ORGANIZED HEALTH CARE EDUCATION/TRAINING PROGRAM

## 2022-08-08 PROCEDURE — 82947 ASSAY GLUCOSE BLOOD QUANT: CPT

## 2022-08-08 RX ADMIN — FAMOTIDINE 20 MG: 20 TABLET, FILM COATED ORAL at 20:15

## 2022-08-08 RX ADMIN — DESMOPRESSIN ACETATE 40 MG: 0.2 TABLET ORAL at 10:01

## 2022-08-08 RX ADMIN — CLOPIDOGREL 75 MG: 75 TABLET, FILM COATED ORAL at 10:01

## 2022-08-08 RX ADMIN — DULOXETINE HYDROCHLORIDE 30 MG: 30 CAPSULE, DELAYED RELEASE ORAL at 10:01

## 2022-08-08 RX ADMIN — FOLIC ACID 1 MG: 1 TABLET ORAL at 10:01

## 2022-08-08 RX ADMIN — ENOXAPARIN SODIUM 40 MG: 100 INJECTION SUBCUTANEOUS at 10:02

## 2022-08-08 RX ADMIN — Medication 81 MG: at 10:01

## 2022-08-08 RX ADMIN — FAMOTIDINE 20 MG: 20 TABLET, FILM COATED ORAL at 10:01

## 2022-08-08 RX ADMIN — SODIUM CHLORIDE, PRESERVATIVE FREE 10 ML: 5 INJECTION INTRAVENOUS at 20:15

## 2022-08-08 RX ADMIN — ISOSORBIDE MONONITRATE 30 MG: 30 TABLET ORAL at 14:26

## 2022-08-08 RX ADMIN — AMLODIPINE BESYLATE 5 MG: 5 TABLET ORAL at 14:26

## 2022-08-08 RX ADMIN — SODIUM CHLORIDE, PRESERVATIVE FREE 10 ML: 5 INJECTION INTRAVENOUS at 10:02

## 2022-08-08 ASSESSMENT — ENCOUNTER SYMPTOMS
BACK PAIN: 0
SHORTNESS OF BREATH: 0
NAUSEA: 0
PHOTOPHOBIA: 0
COUGH: 0
ABDOMINAL PAIN: 0
VOMITING: 0

## 2022-08-08 NOTE — CARE COORDINATION
Transitional Plannin: Returned [facility] 's call, left voicemail on secured voicemail with updates on patient as requested.

## 2022-08-08 NOTE — FLOWSHEET NOTE
[facility]  PROGRESS NOTE    Shift date: 08/08/22  Shift day: Monday   Shift # 1    Room # 3078/0219-09   Name: Britni Ghosh                Bahai:    Place of [facility]:     Referral: Routine Visit    Admit Date & Time: 7/28/2022  4:33 PM    Assessment:  Britni Ghosh is a 59 y.o. male       Intervention:  Writer introduced self and title as . Patient appeared to welcome  presence into room and engaged in conversation about his health. Writer offered space for patient  to express feelings, needs, and concerns and provided a ministry presence. Outcome:  Patient expressed gratitude for  visit. Plan:  Chaplains will remain available to offer spiritual and emotional support as needed.       Electronically signed by Chanel Alcantar, on 8/8/2022 at 2:23 PM.  [facility]  [phone]

## 2022-08-08 NOTE — PROGRESS NOTES
Endovascular Neurosurgery Progress Note    SUBJECTIVE:     No overnight event, no acute event, patient continue to improve,  Review of Systems:  CONSTITUTIONAL:  negative for fevers, chills, fatigue and malaise    EYES:  negative for double vision, blurred vision and photophobia     HEENT:  negative for tinnitus, epistaxis and sore throat    RESPIRATORY:  negative for cough, shortness of breath, wheezing    CARDIOVASCULAR:  negative for chest pain, palpitations, syncope, edema    GASTROINTESTINAL:  negative for nausea, vomiting    GENITOURINARY:  negative for incontinence    MUSCULOSKELETAL:  negative for neck or back pain    NEUROLOGICAL:  Negative for weakness and tingling  negative for headaches and dizziness    PSYCHIATRIC:  negative for anxiety      Review of systems otherwise negative. OBJECTIVE:     Vitals:    08/08/22 0355   BP: (!) 129/93   Pulse: 68   Resp: 22   Temp: 98.4 °F (36.9 °C)   SpO2: 97%        General:  Gen: normal habitus, NAD  HEENT: NCAT, mucosa moist  Cvs: RRR, S1 S2 normal  Resp: symmetric unlabored breathing  Abd: s/nd/nt  Ext: no edema  Skin: no lesions seen, warm and dry    Neuro:  Gen: awake and alert, able to speak in simple complete sentence  Lang/speech: speak is clear, able to repeat perfectly, not able to name well, but able to name colors well  CN: PERRL, EOMI, VFF, V1-3 intact, face symmetric, hearing intact, shoulder shrug symmetric, tongue midline  Motor: grossly 5/5 UE and LE on the left, 4/5 on the right  Sense: LT intact in all 4 ext. Coord: FTN and HTS intact b/l  DTR: deferred  Gait: not tested    NIH Stroke Scale:   1a  Level of consciousness: 0 - alert; keenly responsive   1b. LOC questions:  0 - answers both questions correctly   1c. LOC commands: 0 - performs both tasks correctly   2. Best Gaze: 0 - normal   3. Visual: 0 - no visual loss   4. Facial Palsy: 1 - minor paralysis (flattened nasolabial fold, asymmetric on smiling)   5a.  Motor left arm: 0 - no drift, limb holds 90 (or 45) degrees for full 10 seconds   5b. Motor right arm: 0   6a. Motor left le - no drift; leg holds 30 degree position for full 5 seconds   6b  Motor right le - no drift; leg holds 30 degree position for full 5 seconds   7. Limb Ataxia: 0 - absent   8. Sensory: 0 - normal; no sensory loss   9. Best Language:  1    10. Dysarthria: 0 - normal   11. Extinction and Inattention: 0 - no abnormality         Total:   2     MRS: 2      LABS:   Reviewed. Lab Results   Component Value Date    HGB 12.7 (L) 2022    WBC 7.8 2022     2022     2022    BUN 10 2022    CREATININE 0.96 2022    AST 15 2022    ALT 10 2022    MG 1.7 2022    APTT 27.6 2022    INR 1.2 2022      Lab Results   Component Value Date/Time    COVID19 Not Detected 2022 08:47 PM    COVID19 Not Detected 2020 11:08 AM       RADIOLOGY:   Images were personally reviewed including:    CTP 22  Large mismatch in the left hemisphere    Cerebral angiogram 22  1. Left ICA cervical segment acute occlusion with reconstitution of flow in the left ICA ophthalmic segment through ophthalmic artery through ECA branches. 2.  The above lesion was treated with EmboTrap stent retriever 6.5 mm x 45 mm deployed in the distal ICA cervical segment, mechanical aspiration through ? LBC with penumbra engine, Pre-stenting balloon angioplasty with 5.0 mm x 40 mm loren balloon    using seimless technique, 10 mm x 31 mm carotid wallstent, post stenting balloon angioplasty with Emboguard BGC   3. Final TICI score?03   4. Residual stenosis less than 10%? CTP: 22  - no more mismatch      ASSESSMENT:     58 y/o M with pmh significant for Htn, CAD s/p CABG in , V. Fib PEA in 2022, AICD on , CKD stage III, presented on  with a syncope and right sided weakness, CTA completed on  which showed left ICA cervical segment occlusion. Patient had worsening neuro exam overnight, became non verbal, weaker on right UE and LE, NIHSS was 15. CT perfusion was emergently performed which showed ischemic penumbra in left MCA, DARYN territory. Patient was taken for emergent thrombectomy (7/31/22) and L ICA stenting (7/31/22), since the intervention patient improved signficantly    PLAN:     - con't aspirin and plavix  - PT/OT and acute rehab  - gradual normalization of BP  - patient can be discharged from neuroendovascular standpoint    - f/u with Dr. Vasquez Joe in 2 weeks and Dr. Guilherme Rivera in 3 months      Case discussed with Dr. Guilherme Rivera attending.     Arun Reynoso MD   Stroke, [facility]  [address] [place]  Electronically signed 8/8/2022 at 8:43 AM

## 2022-08-08 NOTE — PROGRESS NOTES
[facility]  Speech Language Pathology    Date: 8/8/2022  Patient Name: Katelynn Brink  YOB: 1965   AGE: 59 y.o. MRN: [id number]        Patient Not Available for Speech Therapy     Due to:  [] Testing  [] Hemodialysis  [] Cancelled by RN  [] Surgery   [] Intubation/Sedation/Pain Medication  [] Medical instability  [x] Other: Pt. With OT in AM, Pt. With nursing care in PM    Next scheduled treatment: 8/9  Completed by: Mario Mcgregor, SLP, M.A.  ALVARADO-SLP
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
## 2022-08-08 NOTE — PROGRESS NOTES
support (Pt tolerated approx 10 min static/dynamic sitting unsupported at EOB)  Standing: With support (Min A sec to post lean static standing at EOB with RW tolerating approx 2 min)  Gait  Overall Level of Assistance: Minimum assistance (utilizing RW from [facility] approx 2-3 post minor LOB with small shuffled steps)        ADL  Feeding: Modified independent   Feeding Skilled Clinical Factors: req assist with set up of utensils  Grooming: Modified independent   Grooming Skilled Clinical Factors: face washing and hand hygiene faciltiated seated in chair  UE Dressing: Stand by assistance;Setup  UE Dressing Skilled Clinical Factors: To doff/don gown seated at EOB  Toileting: Maximum assistance;Setup; Increased time to complete  Toileting Skilled Clinical Factors: Max A for brief managemetn seated/standing with RW, Mod A for personal hygiene seated/standing pt able to complete caro care req assist with buttocks  Additional Comments: Throughout session pt limited per fatigue, decreased strength and decreased cognition        Bed mobility  Supine to Sit: Stand by assistance  Scooting: Contact guard assistance  Bed Mobility Comments: HOB elevated, utilizing bed rails  Transfers  Sit to stand: Minimal assistance  Stand to sit: Minimal assistance  Transfer Comments: utilizing RW     Cognition  Overall Cognitive Status: Exceptions  Arousal/Alertness: Delayed responses to stimuli  Following Commands: Follows multistep commands with repitition; Follows multistep commands with increased time  Attention Span: Attends with cues to redirect  Memory: Decreased recall of recent events  Safety Judgement: Decreased awareness of need for assistance;Decreased awareness of need for safety  Problem Solving: Assistance required to identify errors made;Assistance required to correct errors made;Decreased awareness of errors  Insights: Decreased awareness of deficits  Initiation: Requires cues for some  Sequencing: Requires cues for some  Cognition Comment: Able to respond with one word answers  Orientation  Overall Orientation Status: Impaired (oriented to year not month; oriented to name not age)  Orientation Level: Oriented to place;Oriented to situation;Oriented to person;Disoriented to time                  Education Given To: Patient  Education Provided: Role of Therapy;Transfer Training  Education Provided Comments: proper hand and foot placement; balance maintaince; walker management-F carry over  Education Method: Demonstration;Verbal  Education Outcome: Continued education needed                                                           AM-PAC Score        AM-PAC Inpatient Daily Activity Raw Score: 17 (08/08/22 1422)  AM-PAC Inpatient ADL T-Scale Score : 37.26 (08/08/22 1422)  ADL Inpatient CMS 0-100% Score: 50.11 (08/08/22 1422)  ADL Inpatient CMS G-Code Modifier : CK (08/08/22 1422)      Goals  Short Term Goals  Time Frame for Short term goals: by discharge  Short Term Goal 1: pt will tolerate 2+minutes of standingwith use of least restrictive AD for increasing participation in functional tasks  Short Term Goal 2: pt will perform UB ADLs with min A and use of least restrictive AE/DME  Short Term Goal 3: pt will perform functional mobility with mod A utilizing least restrictive AD  Short Term Goal 4: pt will perform LB ADLs with modified independence and use of least restrictive AE/DME  Short Term Goal 5: pt will utilize compensatory strategies with mod VC for increasing participation in functional tasks (pt will perform functional transfers with CGA and use of least restrictive AD for increasing participation in functional tasks)       Therapy Time   Individual Concurrent Group Co-treatment   Time In 1010         Time Out 1048         Minutes 38         Timed Code Treatment Minutes: [address], [place]/L

## 2022-08-08 NOTE — PROGRESS NOTES
Physical Medicine & Rehabilitation  Progress Note        Admitting Physician: Ely Becerril, *    Primary Care Provider: María Sin MD     Chief Complaint: Headache, Loss of consciousness    Brief History: This is a follow up to the initial consult on Mr. Marcia Salvador is a 59 y.o. right handed male who was admitted to [facility] on 7/28/2022 with Loss of Consciousness, Headache, and Shortness of Breath    Patient admitted after syncope and c/o headache, SOB. CT head was WNL. CT chest negative for PE. CXR showed atelectasis. He was admitted to observation status. Cardiology was consulted for syncope. Performed ICD interrogation and adjusted his antihypertensive medications. Known history of CABG, VFib arrest with recent ICD placement By Dr. Zain Reed on 7/20/22. He developed AMS on 7/29/22. Neurology was consulted and performed EEG which showed possible structural defect. He was found to have expressive aphasia and a NIHSS 10 with R sided weakness. 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 Neuro endovascular was consulted and CTA showed the same ICA occlusion. On 7/31/22 his NIHSS was worsened to 15. CT perfusion emergently showed ischemic penumbra L MCA and DARYN territory. Dr. Julieth Avila performed emergent mechanical thrombectomy and placed L carotid stent with balloon angioplasty on 7/31/22.,  Continue aspirin and Plavix, follow-up neurology recommends dual antiplatelet therapy and Lipitor    Subjective:  No complaints. Working with therapy. Expressive aphasia.       ROS:  Constitutional: negative for anorexia, chills, fatigue, fevers, sweats and weight loss  Eyes: negative for redness and visual disturbance  Ears, nose, mouth, throat, and face: negative for earaches, epistaxis, sore throat and tinnitus  Respiratory: negative for cough and shortness of breath  Cardiovascular: negative for chest pain, dyspnea and palpitations  Gastrointestinal: negative for abdominal pain, change in bowel habits, constipation, nausea and vomiting  Genitourinary:negative for dysuria, frequency, hesitancy and urinary incontinence  Integument/breast: negative for pruritus and rash  Musculoskeletal:negative for stiff joints  Neurological: negative for dizziness, headaches   Behavioral/Psych: negative for decreased appetite, depression     Rehabilitation:   Progressing in therapies. PT:    Bed Mobility-  Bed mobility  Supine to Sit: Minimal assistance  Sit to Supine: Minimal assistance  Scooting: Minimal assistance   Bed Mobility Training  Bed Mobility Training: Yes  Overall Level of Assistance: Additional time, Adaptive equipment, Contact-guard assistance  Interventions: Safety awareness training, Tactile cues, Verbal cues  Supine to Sit: Additional time, Adaptive equipment, Contact-guard assistance  Sit to Supine:  (Pt retired to chair at end of session)  Scooting: Stand-by assistance, Additional time     Transfers-  Transfers  Sit to Stand: Minimal Assistance  Stand to sit: Minimal Assistance   Transfer Training  Transfer Training: Yes  Overall Level of Assistance: Contact-guard assistance, Additional time  Interventions: Verbal cues, Safety awareness training (verbal/tactile cues for L UE awearness and mainataining  to RW with L UE, pt. demo F carryover.)  Sit to Stand: Contact-guard assistance, Additional time  Stand to Sit: Contact-guard assistance, Additional time  Stand Pivot Transfers: Contact-guard assistance, Additional time  Toilet Transfer: Contact-guard assistance, Additional time   Bed mobility  Supine to Sit: Contact guard assistance  Sit to Supine: Contact guard assistance  Scooting: Contact guard assistance  Bed Mobility Comments: HOB elevated for supine to sit, bed flat for return to supine, utilized bed rails, increased time/effort noted. Transfers  Sit to Stand: Minimal Assistance  Stand to sit: Minimal Assistance  Comment: RW used for transfers.   Ambulation  Surface: level tile  Device: Rolling Walker  Assistance: Contact guard assistance  Quality of Gait: very narrow BRAD, step-to gait pattern. Gait Deviations: Slow Claudia;Decreased step length;Decreased step height  Distance: ~50ft  Comments: Pt ambulated very slow with very small steps, narrow BRAD. Pt would stop multiple times while ambulating and just stare, not answering therapists questions. OT:     ADL  Grooming: Setup;Verbal cueing; Increased time to complete;Contact guard assistance  Grooming Skilled Clinical Factors: CGA standing at sink to ensure stability. Min verbal cues to incorporate L UE into all activity with F carryover. CGA + verbal cues to complete action of brushing teeth/washing face. Toileting: Contact guard assistance  Toileting Skilled Clinical Factors: Toilet transfer- CGA + grab bars, no need to void. Additional Comments: Pt. demo F tolerance to ADL activity, lacking FCM/GMC/awareness to L UE. Activity Tolerance  Activity Tolerance: Patient limited by fatigue;Patient limited by endurance;Treatment limited secondary to decreased cognition      ADLS-   ADL  Feeding: Setup, Increased time to complete  Feeding Skilled Clinical Factors: Pt provided lunch setup at therapist exit. Pt initiated eating and able to manage utensils with increased time/effort. Grooming: Setup, Verbal cueing, Increased time to complete, Contact guard assistance  Grooming Skilled Clinical Factors: CGA standing at sink to ensure stability. Min verbal cues to encorporate L UE into all activity with F carryover. CGA + verbal cues to complete action of brushing teeth/washing face. UE Bathing: Stand by assistance, Verbal cueing, Setup, Increased time to complete  UE Bathing Skilled Clinical Factors: wash hair, arms, chest while seated  LE Bathing: Setup, Verbal cueing, Increased time to complete, Contact guard assistance, Minimal assistance  LE Bathing Skilled Clinical Factors: CGA>MIN A during stand for caro hygiene.  Pt utilized cross leg technique for washing feet  UE Dressing: Minimal assistance, Setup  UE Dressing Skilled Clinical Factors: Thread sleeves  LE Dressing: Minimal assistance  LE Dressing Skilled Clinical Factors: Pt able to doff/don L sock seated EOB with increased time to complete and VC. Pt able to doff R sock but required SBA and verbal cue to don sock. Toileting: Contact guard assistance  Toileting Skilled Clinical Factors: Toilet transfer- CGA + grab bars, no need to void. Additional Comments: Pt. demo F tolerance to ADL actyivity, lacking FCM/GMC/awearness to L UE. SLP:  Subjective: [x] Alert     [x] Cooperative     [] Confused     [] Agitated    [] Lethargic        Objective/Assessment:  Auditory Comprehension: Simple y/n questions: 10/15                                            Answering [facility] questions: 9/10 increased to 10/10 with repetition     Verbal Expression: Category members given first letter: 7/12 increased to 9/12 with min-max verbal cues and phonemic cues                                  Opposites: 11/15 increased to 14/15 with min-max verbal cues                                   Other: Pt. Continues with increased S/L skills. Cognition to be assessed in next session. Objective:  /86   Pulse 69   Temp 97.6 °F (36.4 °C) (Oral)   Resp 18   Ht 5' 9\" (1.753 m)   SpO2 99%   BMI 28.06 kg/m²       GEN: Well developed, well nourished, no acute distress. HEENT: NCAT, PERRL, EOMI, mucous membranes pink and moist.  RESP: Lungs clear to auscultation. No rales or rhonchi. Respirations WNL and unlabored. CV: Regular rate rhythm. No murmurs, rubs, or gallops. ABD: soft, non-distended, non-tender. BS+ and equal.  NEURO: verbal apraxia knew was in hospital,, follows commands, names objects sensation intact to light touch. DTRs 2+. MSK: Functional ROM. Muscle tone and bulk are normal bilaterally. Strength 4+/5 key muscles LUE and LLE.  4-/5 key muscles RUE and RLE.   EXT: No calf tenderness to palpation or edema BLEs. SKIN: Warm dry and intact with good turgor. PSYCH: Mood WNL. Affect WNL. Appropriately interactive.     Current Medications:   Current Facility-Administered Medications: amLODIPine (NORVASC) tablet 5 mg, 5 mg, Oral, Daily  enoxaparin (LOVENOX) injection 40 mg, 40 mg, SubCUTAneous, Daily  sennosides-docusate sodium (SENOKOT-S) 8.6-50 MG tablet 2 tablet, 2 tablet, Oral, Daily PRN  folic acid (FOLVITE) tablet 1 mg, 1 mg, Oral, Daily  hydrALAZINE (APRESOLINE) injection 10 mg, 10 mg, IntraVENous, Q4H PRN  bisacodyl (DULCOLAX) suppository 10 mg, 10 mg, Rectal, Daily PRN  famotidine (PEPCID) tablet 20 mg, 20 mg, Oral, BID  glucose chewable tablet 16 g, 4 tablet, Oral, PRN  dextrose bolus 10% 125 mL, 125 mL, IntraVENous, PRN **OR** dextrose bolus 10% 250 mL, 250 mL, IntraVENous, PRN  glucagon (rDNA) injection 1 mg, 1 mg, SubCUTAneous, PRN  dextrose 10 % infusion, , IntraVENous, Continuous PRN  clopidogrel (PLAVIX) tablet 75 mg, 75 mg, Oral, Daily  isosorbide mononitrate (IMDUR) extended release tablet 30 mg, 30 mg, Oral, Daily  aspirin EC tablet 81 mg, 81 mg, Oral, Daily  atorvastatin (LIPITOR) tablet 40 mg, 40 mg, Oral, Daily  carvedilol (COREG) tablet 25 mg, 25 mg, Oral, BID WC  [Held by provider] lisinopril (PRINIVIL;ZESTRIL) tablet 10 mg, 10 mg, Oral, Daily  DULoxetine (CYMBALTA) extended release capsule 30 mg, 30 mg, Oral, Daily  sodium chloride flush 0.9 % injection 5-40 mL, 5-40 mL, IntraVENous, 2 times per day  sodium chloride flush 0.9 % injection 5-40 mL, 5-40 mL, IntraVENous, PRN  0.9 % sodium chloride infusion, , IntraVENous, PRN  acetaminophen (TYLENOL) tablet 650 mg, 650 mg, Oral, Q4H PRN  ondansetron (ZOFRAN-ODT) disintegrating tablet 4 mg, 4 mg, Oral, Q8H PRN **OR** ondansetron (ZOFRAN) injection 4 mg, 4 mg, IntraVENous, Q6H PRN      Diagnostics:     CBC:   Recent Labs     08/06/22  0559   WBC 7.8   RBC 4.51   HGB 12.7*   HCT 37.6*   MCV 83.4   RDW 17.8*    BMP:    Recent Labs     08/06/22  0559   GLUCOSE 76   BUN 10   CREATININE 0.96   CALCIUM 8.7      K 4.0      CO2 24   ANIONGAP 11   LABGLOM >60   GFRAA >60   GFR            HbA1c:   Lab Results   Component Value Date    LABA1C 5.6 07/30/2022     BNP: No results for input(s): BNP in the last 72 hours. PT/INR: No results for input(s): PROTIME, INR in the last 72 hours. APTT: No results for input(s): APTT in the last 72 hours. CARDIAC ENZYMES: No results for input(s): CKMB, CKMBINDEX, TROPONINT, TROPHS, TROPII in the last 72 hours. Invalid input(s): CKTOTAL;3   FASTING LIPID PANEL:  Lab Results   Component Value Date    CHOL 149 07/30/2022    HDL 35 (L) 07/30/2022    TRIG 168 (H) 07/30/2022     LIVER PROFILE: No results for input(s): AST, ALT, ALB, BILIDIR, BILITOT, ALKPHOS in the last 72 hours. Radiology:    CT HEAD 8/4/22  FINDINGS:   BRAIN/VENTRICLES: There is continued evolution of areas of ischemia in the   subcortical and periventricular white matter of the left cerebral hemisphere. There is no hemorrhage or mass effect. There is no midline shift. The   ventricular structures are unremarkable. The infratentorial structures are   unremarkable. ORBITS: The visualized portion of the orbits demonstrate no acute abnormality. SINUSES: The visualized paranasal sinuses and mastoid air cells demonstrate   no acute abnormality. SOFT TISSUES/SKULL:  No acute abnormality of the visualized skull or soft   tissues. Impression:     Continued evolution of areas of ischemia in the subcortical and   periventricular white matter of the left cerebral hemisphere. No results found.      Lives With: Family (Niece)  Type of Home: House  Home Layout: Two level, Bed/Bath upstairs  Home Access: Stairs to enter without rails  Entrance Stairs - Number of Steps: 4-5  Bathroom Shower/Tub: Tub/Shower unit  Bathroom Toilet: Standard  Receives Help From: Family  ADL Assistance: Independent  Homemaking Assistance: Independent  Homemaking Responsibilities: Yes  Ambulation Assistance: Independent  Transfer Assistance: Independent  Active : No  Patient's  Info: Niece  Occupation: On disability  Leisure & Hobbies: games on the computer    Impression/Plan:    Ischemic CVA with R sided weakness:s/p mechanical thrombectomy and ICA stent. On ASA, Plavix  HTN: on carvedilol, Imdur, Lisinopril  CAD: s/p CABG  Hx V Fib: recent AICD placement  COPD  CKD  GERD  Major Depressive Disorder, Recurrent: on Duloxetine  Hypertension-Norvasc, Coreg, Imdur    Recommendation:  Patient will benefit from acute inpatient rehabilitation when medically cleared by primary and consulting services  DVT Prophylaxis: on Lovenox        Electronically signed by Suellen Ocampo. Nini Phillips MD on 8/8/2022 at 10:56 AM       This note is created with the assistance of a speech recognition program.  While intending to generate a document that actually reflects the content of the visit, the document can still have some errors including those of syntax and sound a like substitutions which may escape proof reading. In such instances, actual meaning can be extrapolated by contextual diversion.
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
## 2022-08-08 NOTE — PROGRESS NOTES
Rcv'd fax from [address] with approval for ARU, however the fax states [place] [facility] is [facility]. Writer believes this to be an error, and reached out to [employer] for verification of network status. [facility] after verification of network status rcv'd. [facility] CM notified via .

## 2022-08-08 NOTE — PLAN OF CARE
Problem: Chronic Conditions and Co-morbidities  Goal: Patient's chronic conditions and co-morbidity symptoms are monitored and maintained or improved  8/8/2022 0500 by Mandie Woodard RN  Outcome: Progressing  Flowsheets (Taken 8/7/2022 2000)  Care Plan - Patient's Chronic Conditions and Co-Morbidity Symptoms are Monitored and Maintained or Improved: Update acute care plan with appropriate goals if chronic or comorbid symptoms are exacerbated and prevent overall improvement and discharge     Problem: Pain  Goal: Verbalizes/displays adequate comfort level or baseline comfort level  8/8/2022 1703 by Zina Francisco RN  Outcome: Progressing  8/8/2022 0500 by Mandie Woodard RN  Outcome: Progressing     Problem: Safety - Adult  Goal: Free from fall injury  8/8/2022 1703 by Zina Francisco RN  Outcome: Progressing  8/8/2022 0500 by Mandie Woodard RN  Outcome: Progressing     Problem: Discharge Planning  Goal: Discharge to home or other facility with appropriate resources  8/8/2022 1703 by Zina Francisco RN  Outcome: Progressing  8/8/2022 0500 by Mandie Woodard RN  Outcome: Progressing  Flowsheets (Taken 8/7/2022 2000)  Discharge to home or other facility with appropriate resources: Identify barriers to discharge with patient and caregiver     Problem: ABCDS Injury Assessment  Goal: Absence of physical injury  8/8/2022 0500 by Mandie Woodard RN  Outcome: Progressing  Flowsheets (Taken 8/7/2022 2000)  Absence of Physical Injury: Implement safety measures based on patient assessment     Problem: Skin/Tissue Integrity  Goal: Absence of new skin breakdown  Description: 1. Monitor for areas of redness and/or skin breakdown  2. Assess vascular access sites hourly  3. Every 4-6 hours minimum:  Change oxygen saturation probe site  4.   Every 4-6 hours:  If on nasal continuous positive airway pressure, respiratory therapy assess nares and determine need for appliance change or resting period. 8/8/2022 0500 by Linda Pearce RN  Outcome: Progressing     Problem: Neurosensory - Adult  Goal: Achieves stable or improved neurological status  8/8/2022 1703 by Natalia Green RN  Outcome: Progressing  8/8/2022 0500 by Linda Pearce RN  Outcome: Progressing  Flowsheets (Taken 8/7/2022 2000)  Achieves stable or improved neurological status: Assess for and report changes in neurological status  Goal: Absence of seizures  8/8/2022 1703 by Natalia Green RN  Outcome: Progressing  8/8/2022 0500 by Linda Pearce RN  Outcome: Progressing  Flowsheets (Taken 8/7/2022 2000)  Absence of seizures: Monitor for seizure activity.   If seizure occurs, document type and location of movements and any associated apnea  Goal: Remains free of injury related to seizures activity  8/8/2022 1703 by Natalia Green RN  Outcome: Progressing  8/8/2022 0500 by Linda Pearce RN  Outcome: Progressing  Flowsheets (Taken 8/7/2022 2000)  Remains free of injury related to seizure activity: Maintain airway, patient safety  and administer oxygen as ordered  Goal: Achieves maximal functionality and self care  8/8/2022 1703 by Natalia Green RN  Outcome: Progressing  8/8/2022 0500 by Linda Pearce RN  Outcome: Progressing  Flowsheets (Taken 8/7/2022 2000)  Achieves maximal functionality and self care: Encourage and assist patient to increase activity and self care with guidance from physical therapy/occupational therapy     Problem: Respiratory - Adult  Goal: Achieves optimal ventilation and oxygenation  8/8/2022 1703 by Natalia Green RN  Outcome: Progressing  8/8/2022 0500 by Linda Pearce RN  Outcome: Progressing     Problem: Cardiovascular - Adult  Goal: Maintains optimal cardiac output and hemodynamic stability  8/8/2022 1703 by Natalia Green RN  Outcome: Progressing  8/8/2022 0500 by Linda Pearce RN  Outcome: Progressing  Goal: Absence of cardiac dysrhythmias or at baseline  8/8/2022 1703 by Marbella Freeman RN  Outcome: Progressing  8/8/2022 0500 by Gigi Stein RN  Outcome: Progressing     Problem: Skin/Tissue Integrity - Adult  Goal: Skin integrity remains intact  8/8/2022 1703 by Marbella Freeman RN  Outcome: Progressing  8/8/2022 0500 by Gigi Stein RN  Outcome: Progressing  Flowsheets (Taken 8/7/2022 2000)  Skin Integrity Remains Intact: Monitor for areas of redness and/or skin breakdown  Goal: Incisions, wounds, or drain sites healing without S/S of infection  8/8/2022 1703 by Marbella Freeman RN  Outcome: Progressing  8/8/2022 0500 by Gigi Stein RN  Outcome: Progressing  Goal: Oral mucous membranes remain intact  8/8/2022 1703 by Marbella Freeman RN  Outcome: Progressing  8/8/2022 0500 by Gigi Stein RN  Outcome: Progressing     Problem: Musculoskeletal - Adult  Goal: Return mobility to safest level of function  8/8/2022 1703 by Mrabella Freeman RN  Outcome: Progressing  8/8/2022 0500 by Gigi Stein RN  Outcome: Progressing  Flowsheets (Taken 8/7/2022 2000)  Return Mobility to Safest Level of Function: Assess patient stability and activity tolerance for standing, transferring and ambulating with or without assistive devices  Goal: Maintain proper alignment of affected body part  8/8/2022 1703 by Marbella Freeman RN  Outcome: Progressing  8/8/2022 0500 by Gigi Stein RN  Outcome: Progressing  Flowsheets (Taken 8/7/2022 2000)  Maintain proper alignment of affected body part: Support and protect limb and body alignment per provider's orders  Goal: Return ADL status to a safe level of function  8/8/2022 1703 by Marbella Freeman RN  Outcome: Progressing  8/8/2022 0500 by Gigi Stein RN  Outcome: Progressing  Flowsheets (Taken 8/7/2022 2000)  Return ADL Status to a Safe Level of Function: Assist and instruct patient to increase activity and self care as tolerated     Problem: Gastrointestinal - Adult  Goal: Minimal or absence of nausea and vomiting  8/8/2022 1703 by Viviana Hood RN  Outcome: Progressing  8/8/2022 0500 by Zak Harrington RN  Outcome: Progressing  Goal: Maintains or returns to baseline bowel function  8/8/2022 1703 by Viviana Hood RN  Outcome: Progressing  8/8/2022 0500 by Zak Harrington RN  Outcome: Progressing  Goal: Establish and maintain optimal ostomy function  8/8/2022 1703 by Viviana Hood RN  Outcome: Progressing  8/8/2022 0500 by Zak Harrington RN  Outcome: Progressing     Problem: Genitourinary - Adult  Goal: Absence of urinary retention  8/8/2022 1703 by Viviana Hood RN  Outcome: Progressing  8/8/2022 0500 by Zak Harrington RN  Outcome: Progressing     Problem: Infection - Adult  Goal: Absence of infection at discharge  8/8/2022 1703 by Viviana Hood RN  Outcome: Progressing  8/8/2022 0500 by Zak Harrington RN  Outcome: Progressing  Flowsheets (Taken 8/7/2022 2000)  Absence of infection at discharge: Assess and monitor for signs and symptoms of infection  Goal: Absence of infection during hospitalization  8/8/2022 1703 by Viviana Hood RN  Outcome: Progressing  8/8/2022 0500 by Zak Harrington RN  Outcome: Progressing  Flowsheets (Taken 8/7/2022 2000)  Absence of infection during hospitalization: Assess and monitor for signs and symptoms of infection  Goal: Absence of fever/infection during anticipated neutropenic period  8/8/2022 1703 by Viviana Hood RN  Outcome: Progressing  8/8/2022 0500 by Zak Harrington RN  Outcome: Progressing     Problem: Metabolic/Fluid and Electrolytes - Adult  Goal: Electrolytes maintained within normal limits  8/8/2022 1703 by Viviana Hood RN  Outcome: Progressing  8/8/2022 0500 by Zak Harrington RN  Outcome: Progressing  Flowsheets (Taken 8/7/2022 2000)  Electrolytes maintained within normal limits: Monitor labs and assess patient for signs and symptoms of electrolyte imbalances  Goal: Hemodynamic stability and optimal renal function maintained  8/8/2022 1703 by Dhiraj Clay RN  Outcome: Progressing  8/8/2022 0500 by Rosetta Dunn RN  Outcome: Progressing  Flowsheets (Taken 8/7/2022 2000)  Hemodynamic stability and optimal renal function maintained: Monitor response to interventions for patient's volume status, including labs, urine output, blood pressure (other measures as available)  Goal: Glucose maintained within prescribed range  8/8/2022 1703 by Dhiraj Clay RN  Outcome: Progressing  8/8/2022 0500 by Rosetta Dunn RN  Outcome: Progressing     Problem: Hematologic - Adult  Goal: Maintains hematologic stability  8/8/2022 1703 by Dhiraj Clay RN  Outcome: Progressing  8/8/2022 0500 by Rosetta Dunn RN  Outcome: Progressing  Flowsheets (Taken 8/7/2022 2000)  Maintains hematologic stability: Assess for signs and symptoms of bleeding or hemorrhage     Problem: Anxiety  Goal: Will report anxiety at manageable levels  Description: INTERVENTIONS:  1. Administer medication as ordered  2. Teach and rehearse alternative coping skills  3. Provide emotional support with 1:1 interaction with staff  8/8/2022 1703 by Dhiraj Clay RN  Outcome: Progressing  8/8/2022 0500 by Rosetta Dunn RN  Outcome: Progressing  Flowsheets (Taken 8/7/2022 2000)  Will report anxiety at manageable levels: Provide emotional support with 1:1 interaction with staff     Problem: Coping  Goal: Pt/Family able to verbalize concerns and demonstrate effective coping strategies  Description: INTERVENTIONS:  1. Assist patient/family to identify coping skills, available support systems and cultural and spiritual values  2. Provide emotional support, including active listening and acknowledgement of concerns of patient and caregivers  3. Reduce environmental stimuli, as able  4. Instruct patient/family in relaxation techniques, as appropriate  5.  Assess for spiritual pain/suffering and initiate Spiritual Care, Psychosocial Clinical Specialist consults as needed  8/8/2022 1703 by Nara Nicole RN  Outcome: Progressing  8/8/2022 0500 by Jennifer Smalls RN  Outcome: Progressing  Flowsheets (Taken 8/7/2022 2000)  Patient/family able to verbalize anxieties, fears, and concerns, and demonstrate effective coping: Assist patient/family to identify coping skills, available support systems and cultural and spiritual values     Problem: Decision Making  Goal: Pt/Family able to effectively weigh alternatives and participate in decision making related to treatment and care  Description: INTERVENTIONS:  1. Determine when there are differences between patient's view, family's view, and healthcare provider's view of condition  2. Facilitate patient and family articulation of goals for care  3. Help patient and family identify pros/cons of alternative solutions  4. Provide information as requested by patient/family  5. Respect patient/family right to receive or not to receive information  6. Serve as a liaison between patient and family and health care team  7. Initiate Consults from Ethics, Palliative Care or initiate [facility] as is appropriate  8/8/2022 1703 by Nara Nicole RN  Outcome: Progressing  8/8/2022 0500 by Jennifer Smalls RN  Outcome: Progressing  Flowsheets (Taken 8/7/2022 2000)  Patient/family able to effectively weigh alternatives and participate in decision making related to treatment and care: Respect patient/family right to receive or not to receive information     Problem: Behavior  Goal: Pt/Family maintain appropriate behavior and adhere to behavioral management agreement, if implemented  Description: INTERVENTIONS:  1. Assess patient/family's coping skills and  non-compliant behavior (including use of illegal substances)  2. Notify security of behavior or suspected illegal substances which indicate the need for search of the family and/or belongings  3.  Encourage verbalization of thoughts and concerns in a socially appropriate manner  4. Utilize positive, consistent limit setting strategies supporting safety of patient, staff and others  5. Encourage participation in the decision making process about the behavioral management agreement  6. If a visitor's behavior poses a threat to safety call refer to organization policy. 7. Initiate consult with , Psychosocial CNS, Spiritual Care as appropriate  8/8/2022 1703 by Talia Salgado RN  Outcome: Progressing  8/8/2022 0500 by Sophie Skelton RN  Outcome: Progressing  Flowsheets (Taken 8/7/2022 2000)  Patient/family maintains appropriate behavior and adheres to behavioral management agreement, if implemented: Assess patient/familys coping skills and  non-compliant behavior (including use of illegal substances)     Problem: Spiritual Care  Goal: Pt/Family able to move forward in process of forgiving self, others, and/or higher power  Description: INTERVENTIONS:  1. Assist patient/family to overcome blocks to healing by use of spiritual practices (prayer, meditation, guided imagery, reiki, breath work, etc). 2. De-myth guilt and help patient/family identify possible irrational spiritual/cultural beliefs and values. 3. Explore possibilities of making amends & reconciliation with self, others, and/or a greater power. 4. Guide patient/family in identifying painful feelings of guilt. 5. Help patient/famiy explore and identify spiritual beliefs, cultural understandings or values that may help or hinder letting go of issue. 6. Help patient/family explore feelings of anger, bitterness, resentment. 7. Help patient/family identify and examine the situation in which these feelings are experienced. 8. Help patient/family identify destructive displacement of feelings onto other individuals. 9. Invite use of sacraments/rituals/ceremonies as appropriate (e.g. - confession, anointing, smudging).   10. Refer patient/family to formal counseling and/or to shanthi community for further support work.   8/8/2022 1703 by Jessica Stephens RN  Outcome: Progressing  8/8/2022 0500 by Annette Trinidad RN  Outcome: Progressing  Flowsheets (Taken 8/7/2022 2000)  Patient/family able to move forward in process of forgiving self, others, and/or higher power: Assist patient to overcome blocks to healing by use of spiritual practices (prayer, meditation, guided imagery, reiki, breath work, etc)

## 2022-08-08 NOTE — PLAN OF CARE
Problem: Chronic Conditions and Co-morbidities  Goal: Patient's chronic conditions and co-morbidity symptoms are monitored and maintained or improved  Outcome: Progressing  Flowsheets (Taken 8/7/2022 2000)  Care Plan - Patient's Chronic Conditions and Co-Morbidity Symptoms are Monitored and Maintained or Improved: Update acute care plan with appropriate goals if chronic or comorbid symptoms are exacerbated and prevent overall improvement and discharge     Problem: Pain  Goal: Verbalizes/displays adequate comfort level or baseline comfort level  Outcome: Progressing     Problem: Safety - Adult  Goal: Free from fall injury  Outcome: Progressing     Problem: Discharge Planning  Goal: Discharge to home or other facility with appropriate resources  Outcome: Progressing  Flowsheets (Taken 8/7/2022 2000)  Discharge to home or other facility with appropriate resources: Identify barriers to discharge with patient and caregiver     Problem: ABCDS Injury Assessment  Goal: Absence of physical injury  Outcome: Progressing  Flowsheets (Taken 8/7/2022 2000)  Absence of Physical Injury: Implement safety measures based on patient assessment     Problem: Skin/Tissue Integrity  Goal: Absence of new skin breakdown  Description: 1. Monitor for areas of redness and/or skin breakdown  2. Assess vascular access sites hourly  3. Every 4-6 hours minimum:  Change oxygen saturation probe site  4. Every 4-6 hours:  If on nasal continuous positive airway pressure, respiratory therapy assess nares and determine need for appliance change or resting period.   Outcome: Progressing     Problem: Neurosensory - Adult  Goal: Achieves stable or improved neurological status  Outcome: Progressing  Flowsheets (Taken 8/7/2022 2000)  Achieves stable or improved neurological status: Assess for and report changes in neurological status  Goal: Absence of seizures  Outcome: Progressing  Flowsheets (Taken 8/7/2022 2000)  Absence of seizures: Monitor for seizure activity.   If seizure occurs, document type and location of movements and any associated apnea  Goal: Remains free of injury related to seizures activity  Outcome: Progressing  Flowsheets (Taken 8/7/2022 2000)  Remains free of injury related to seizure activity: Maintain airway, patient safety  and administer oxygen as ordered  Goal: Achieves maximal functionality and self care  Outcome: Progressing  Flowsheets (Taken 8/7/2022 2000)  Achieves maximal functionality and self care: Encourage and assist patient to increase activity and self care with guidance from physical therapy/occupational therapy     Problem: Respiratory - Adult  Goal: Achieves optimal ventilation and oxygenation  Outcome: Progressing     Problem: Cardiovascular - Adult  Goal: Maintains optimal cardiac output and hemodynamic stability  Outcome: Progressing  Goal: Absence of cardiac dysrhythmias or at baseline  Outcome: Progressing     Problem: Skin/Tissue Integrity - Adult  Goal: Skin integrity remains intact  Outcome: Progressing  Flowsheets (Taken 8/7/2022 2000)  Skin Integrity Remains Intact: Monitor for areas of redness and/or skin breakdown  Goal: Incisions, wounds, or drain sites healing without S/S of infection  Outcome: Progressing  Goal: Oral mucous membranes remain intact  Outcome: Progressing     Problem: Musculoskeletal - Adult  Goal: Return mobility to safest level of function  Outcome: Progressing  Flowsheets (Taken 8/7/2022 2000)  Return Mobility to Safest Level of Function: Assess patient stability and activity tolerance for standing, transferring and ambulating with or without assistive devices  Goal: Maintain proper alignment of affected body part  Outcome: Progressing  Flowsheets (Taken 8/7/2022 2000)  Maintain proper alignment of affected body part: Support and protect limb and body alignment per provider's orders  Goal: Return ADL status to a safe level of function  Outcome: Progressing  Flowsheets (Taken 8/7/2022 2000)  Return ADL Status to a Safe Level of Function: Assist and instruct patient to increase activity and self care as tolerated     Problem: Gastrointestinal - Adult  Goal: Minimal or absence of nausea and vomiting  Outcome: Progressing  Goal: Maintains or returns to baseline bowel function  Outcome: Progressing  Goal: Establish and maintain optimal ostomy function  Outcome: Progressing     Problem: Genitourinary - Adult  Goal: Absence of urinary retention  Outcome: Progressing     Problem: Infection - Adult  Goal: Absence of infection at discharge  Outcome: Progressing  Flowsheets (Taken 8/7/2022 2000)  Absence of infection at discharge: Assess and monitor for signs and symptoms of infection  Goal: Absence of infection during hospitalization  Outcome: Progressing  Flowsheets (Taken 8/7/2022 2000)  Absence of infection during hospitalization: Assess and monitor for signs and symptoms of infection  Goal: Absence of fever/infection during anticipated neutropenic period  Outcome: Progressing     Problem: Metabolic/Fluid and Electrolytes - Adult  Goal: Electrolytes maintained within normal limits  Outcome: Progressing  Flowsheets (Taken 8/7/2022 2000)  Electrolytes maintained within normal limits: Monitor labs and assess patient for signs and symptoms of electrolyte imbalances  Goal: Hemodynamic stability and optimal renal function maintained  Outcome: Progressing  Flowsheets (Taken 8/7/2022 2000)  Hemodynamic stability and optimal renal function maintained: Monitor response to interventions for patient's volume status, including labs, urine output, blood pressure (other measures as available)  Goal: Glucose maintained within prescribed range  Outcome: Progressing     Problem: Hematologic - Adult  Goal: Maintains hematologic stability  Outcome: Progressing  Flowsheets (Taken 8/7/2022 2000)  Maintains hematologic stability: Assess for signs and symptoms of bleeding or hemorrhage     Problem: Anxiety  Goal: Will report anxiety at manageable levels  Description: INTERVENTIONS:  1. Administer medication as ordered  2. Teach and rehearse alternative coping skills  3. Provide emotional support with 1:1 interaction with staff  Outcome: Progressing  Flowsheets (Taken 8/7/2022 2000)  Will report anxiety at manageable levels: Provide emotional support with 1:1 interaction with staff     Problem: Coping  Goal: Pt/Family able to verbalize concerns and demonstrate effective coping strategies  Description: INTERVENTIONS:  1. Assist patient/family to identify coping skills, available support systems and cultural and spiritual values  2. Provide emotional support, including active listening and acknowledgement of concerns of patient and caregivers  3. Reduce environmental stimuli, as able  4. Instruct patient/family in relaxation techniques, as appropriate  5. Assess for spiritual pain/suffering and initiate Spiritual Care, Psychosocial Clinical Specialist consults as needed  Outcome: Progressing  Flowsheets (Taken 8/7/2022 2000)  Patient/family able to verbalize anxieties, fears, and concerns, and demonstrate effective coping: Assist patient/family to identify coping skills, available support systems and cultural and spiritual values     Problem: Decision Making  Goal: Pt/Family able to effectively weigh alternatives and participate in decision making related to treatment and care  Description: INTERVENTIONS:  1. Determine when there are differences between patient's view, family's view, and healthcare provider's view of condition  2. Facilitate patient and family articulation of goals for care  3. Help patient and family identify pros/cons of alternative solutions  4. Provide information as requested by patient/family  5. Respect patient/family right to receive or not to receive information  6. Serve as a liaison between patient and family and health care team  7.  Initiate Consults from Ethics, Palliative Care or initiate Family Care Conference as is appropriate  Outcome: Progressing  Flowsheets (Taken 8/7/2022 2000)  Patient/family able to effectively weigh alternatives and participate in decision making related to treatment and care: Respect patient/family right to receive or not to receive information     Problem: Behavior  Goal: Pt/Family maintain appropriate behavior and adhere to behavioral management agreement, if implemented  Description: INTERVENTIONS:  1. Assess patient/family's coping skills and  non-compliant behavior (including use of illegal substances)  2. Notify security of behavior or suspected illegal substances which indicate the need for search of the family and/or belongings  3. Encourage verbalization of thoughts and concerns in a socially appropriate manner  4. Utilize positive, consistent limit setting strategies supporting safety of patient, staff and others  5. Encourage participation in the decision making process about the behavioral management agreement  6. If a visitor's behavior poses a threat to safety call refer to organization policy. 7. Initiate consult with , Psychosocial CNS, Spiritual Care as appropriate  Outcome: Progressing  Flowsheets (Taken 8/7/2022 2000)  Patient/family maintains appropriate behavior and adheres to behavioral management agreement, if implemented: Assess patient/familys coping skills and  non-compliant behavior (including use of illegal substances)     Problem: Spiritual Care  Goal: Pt/Family able to move forward in process of forgiving self, others, and/or higher power  Description: INTERVENTIONS:  1. Assist patient/family to overcome blocks to healing by use of spiritual practices (prayer, meditation, guided imagery, reiki, breath work, etc). 2. De-myth guilt and help patient/family identify possible irrational spiritual/cultural beliefs and values. 3. Explore possibilities of making amends & reconciliation with self, others, and/or a greater power.   4. Guide patient/family in identifying painful feelings of guilt. 5. Help patient/famiy explore and identify spiritual beliefs, cultural understandings or values that may help or hinder letting go of issue. 6. Help patient/family explore feelings of anger, bitterness, resentment. 7. Help patient/family identify and examine the situation in which these feelings are experienced. 8. Help patient/family identify destructive displacement of feelings onto other individuals. 9. Invite use of sacraments/rituals/ceremonies as appropriate (e.g. - confession, anointing, smudging). 10. Refer patient/family to formal counseling and/or to shanthi community for further support work.   Outcome: Progressing  Flowsheets (Taken 8/7/2022 2000)  Patient/family able to move forward in process of forgiving self, others, and/or higher power: Assist patient to overcome blocks to healing by use of spiritual practices (prayer, meditation, guided imagery, reiki, breath work, etc)

## 2022-08-09 LAB
GLUCOSE BLD-MCNC: 111 MG/DL (ref 75–110)
GLUCOSE BLD-MCNC: 126 MG/DL (ref 75–110)

## 2022-08-09 PROCEDURE — 6370000000 HC RX 637 (ALT 250 FOR IP)

## 2022-08-09 PROCEDURE — 97129 THER IVNTJ 1ST 15 MIN: CPT

## 2022-08-09 PROCEDURE — 6360000002 HC RX W HCPCS: Performed by: STUDENT IN AN ORGANIZED HEALTH CARE EDUCATION/TRAINING PROGRAM

## 2022-08-09 PROCEDURE — 97110 THERAPEUTIC EXERCISES: CPT

## 2022-08-09 PROCEDURE — 6370000000 HC RX 637 (ALT 250 FOR IP): Performed by: STUDENT IN AN ORGANIZED HEALTH CARE EDUCATION/TRAINING PROGRAM

## 2022-08-09 PROCEDURE — 99024 POSTOP FOLLOW-UP VISIT: CPT | Performed by: PSYCHIATRY & NEUROLOGY

## 2022-08-09 PROCEDURE — 99233 SBSQ HOSP IP/OBS HIGH 50: CPT | Performed by: PSYCHIATRY & NEUROLOGY

## 2022-08-09 PROCEDURE — 82947 ASSAY GLUCOSE BLOOD QUANT: CPT

## 2022-08-09 PROCEDURE — 2580000003 HC RX 258: Performed by: STUDENT IN AN ORGANIZED HEALTH CARE EDUCATION/TRAINING PROGRAM

## 2022-08-09 PROCEDURE — 92507 TX SP LANG VOICE COMM INDIV: CPT

## 2022-08-09 PROCEDURE — 2060000000 HC ICU INTERMEDIATE R&B

## 2022-08-09 PROCEDURE — 99232 SBSQ HOSP IP/OBS MODERATE 35: CPT | Performed by: PHYSICAL MEDICINE & REHABILITATION

## 2022-08-09 PROCEDURE — 97116 GAIT TRAINING THERAPY: CPT

## 2022-08-09 PROCEDURE — 6370000000 HC RX 637 (ALT 250 FOR IP): Performed by: INTERNAL MEDICINE

## 2022-08-09 PROCEDURE — 97535 SELF CARE MNGMENT TRAINING: CPT

## 2022-08-09 PROCEDURE — 6370000000 HC RX 637 (ALT 250 FOR IP): Performed by: PSYCHIATRY & NEUROLOGY

## 2022-08-09 RX ORDER — ENOXAPARIN SODIUM 100 MG/ML
40 INJECTION SUBCUTANEOUS DAILY
Status: CANCELLED | OUTPATIENT
Start: 2022-08-09

## 2022-08-09 RX ORDER — AMLODIPINE BESYLATE 10 MG/1
10 TABLET ORAL DAILY
Status: CANCELLED | OUTPATIENT
Start: 2022-08-09

## 2022-08-09 RX ORDER — ATORVASTATIN CALCIUM 40 MG/1
1 TABLET, FILM COATED ORAL DAILY
Status: CANCELLED | OUTPATIENT
Start: 2022-08-09

## 2022-08-09 RX ORDER — ALBUTEROL SULFATE 90 UG/1
2 AEROSOL, METERED RESPIRATORY (INHALATION) EVERY 4 HOURS PRN
Status: CANCELLED | OUTPATIENT
Start: 2022-08-09

## 2022-08-09 RX ORDER — CARVEDILOL 25 MG/1
25 TABLET ORAL 2 TIMES DAILY WITH MEALS
Status: CANCELLED | OUTPATIENT
Start: 2022-08-09

## 2022-08-09 RX ORDER — CLOPIDOGREL BISULFATE 75 MG/1
75 TABLET ORAL DAILY
Status: CANCELLED | OUTPATIENT
Start: 2022-08-09

## 2022-08-09 RX ORDER — ISOSORBIDE MONONITRATE 60 MG/1
60 TABLET, EXTENDED RELEASE ORAL DAILY
Status: CANCELLED | OUTPATIENT
Start: 2022-08-09

## 2022-08-09 RX ORDER — ASPIRIN 81 MG/1
81 TABLET ORAL DAILY
Status: CANCELLED | OUTPATIENT
Start: 2022-08-09

## 2022-08-09 RX ORDER — DULOXETIN HYDROCHLORIDE 30 MG/1
30 CAPSULE, DELAYED RELEASE ORAL DAILY
Status: CANCELLED | OUTPATIENT
Start: 2022-08-09

## 2022-08-09 RX ADMIN — FAMOTIDINE 20 MG: 20 TABLET, FILM COATED ORAL at 09:01

## 2022-08-09 RX ADMIN — DESMOPRESSIN ACETATE 40 MG: 0.2 TABLET ORAL at 09:01

## 2022-08-09 RX ADMIN — AMLODIPINE BESYLATE 5 MG: 5 TABLET ORAL at 09:01

## 2022-08-09 RX ADMIN — ISOSORBIDE MONONITRATE 30 MG: 30 TABLET ORAL at 09:01

## 2022-08-09 RX ADMIN — CARVEDILOL 25 MG: 25 TABLET, FILM COATED ORAL at 09:01

## 2022-08-09 RX ADMIN — Medication 81 MG: at 09:01

## 2022-08-09 RX ADMIN — ENOXAPARIN SODIUM 40 MG: 100 INJECTION SUBCUTANEOUS at 09:01

## 2022-08-09 RX ADMIN — SODIUM CHLORIDE, PRESERVATIVE FREE 10 ML: 5 INJECTION INTRAVENOUS at 09:02

## 2022-08-09 RX ADMIN — CLOPIDOGREL 75 MG: 75 TABLET, FILM COATED ORAL at 09:01

## 2022-08-09 RX ADMIN — SODIUM CHLORIDE, PRESERVATIVE FREE 10 ML: 5 INJECTION INTRAVENOUS at 19:48

## 2022-08-09 RX ADMIN — DULOXETINE HYDROCHLORIDE 30 MG: 30 CAPSULE, DELAYED RELEASE ORAL at 09:01

## 2022-08-09 RX ADMIN — CARVEDILOL 25 MG: 25 TABLET, FILM COATED ORAL at 18:38

## 2022-08-09 RX ADMIN — FOLIC ACID 1 MG: 1 TABLET ORAL at 09:01

## 2022-08-09 RX ADMIN — FAMOTIDINE 20 MG: 20 TABLET, FILM COATED ORAL at 19:48

## 2022-08-09 ASSESSMENT — ENCOUNTER SYMPTOMS
ABDOMINAL PAIN: 0
NAUSEA: 0
VOMITING: 0
BACK PAIN: 0
PHOTOPHOBIA: 0
COUGH: 0
SHORTNESS OF BREATH: 0

## 2022-08-09 NOTE — PROGRESS NOTES
Physical Medicine & Rehabilitation  Progress Note        Admitting Physician: Kristie Pastor, *    Primary Care Provider: Arnie Rutherford MD     Chief Complaint: Headache, Loss of consciousness    Brief History: This is a follow up to the initial consult on MrRaza Brnik is a 59 y.o. right handed male who was admitted to [facility] on 7/28/2022 with Loss of Consciousness, Headache, and Shortness of Breath    Patient admitted after syncope and c/o headache, SOB. CT head was WNL. CT chest negative for PE. CXR showed atelectasis. He was admitted to observation status. Cardiology was consulted for syncope. Performed ICD interrogation and adjusted his antihypertensive medications. Known history of CABG, VFib arrest with recent ICD placement By Dr. Assunta Cheadle on 7/20/22. He developed AMS on 7/29/22. Neurology was consulted and performed EEG which showed possible structural defect. He was found to have expressive aphasia and a NIHSS 10 with R sided weakness. 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 Neuro endovascular was consulted and CTA showed the same ICA occlusion. On 7/31/22 his NIHSS was worsened to 15. CT perfusion emergently showed ischemic penumbra L MCA and DARYN territory. Dr. Dontae Wade performed emergent mechanical thrombectomy and placed L carotid stent with balloon angioplasty on 7/31/22.,  Continue aspirin and Plavix, follow-up neurology recommends dual antiplatelet therapy and Lipitor,   /u with Dr. Josette Costa in 2 weeks and Dr. Dontae Wade in 3 months     Subjective:  No complaints. Working with therapy. Expressive aphasia.       ROS:  Constitutional: negative for anorexia, chills, fatigue, fevers, sweats and weight loss  Eyes: negative for redness and visual disturbance  Ears, nose, mouth, throat, and face: negative for earaches, epistaxis, sore throat and tinnitus  Respiratory: negative for cough and shortness of breath  Cardiovascular: negative for chest pain, dyspnea and palpitations  Gastrointestinal: negative for abdominal pain, change in bowel habits, constipation, nausea and vomiting  Genitourinary:negative for dysuria, frequency, hesitancy and urinary incontinence  Integument/breast: negative for pruritus and rash  Musculoskeletal:negative for stiff joints  Neurological: negative for dizziness, headaches   Behavioral/Psych: negative for decreased appetite, depression     Rehabilitation:   Progressing in therapies. PT:    Bed Mobility-  Bed mobility  Supine to Sit: Minimal assistance  Sit to Supine: Minimal assistance  Scooting: Minimal assistance   Bed Mobility Training  Bed Mobility Training: Yes  Overall Level of Assistance:  Additional time, Adaptive equipment, Contact-guard assistance  Interventions: Safety awareness training, Tactile cues, Verbal cues  Supine to Sit: Additional time, Adaptive equipment, Contact-guard assistance  Sit to Supine:  (Pt retired to chair at end of session)  Scooting: Stand-by assistance, Additional time     Transfers-  Transfers  Sit to Stand: Minimal Assistance  Stand to sit: Minimal Assistance   Transfer Training  Transfer Training: Yes  Overall Level of Assistance: Contact-guard assistance, Additional time  Interventions: Verbal cues, Safety awareness training (verbal/tactile cues for L UE awearness and mainataining  to RW with L UE, pt. demo F carryover.)  Sit to Stand: Contact-guard assistance, Additional time  Stand to Sit: Contact-guard assistance, Additional time  Stand Pivot Transfers: Contact-guard assistance, Additional time  Toilet Transfer: Contact-guard assistance, Additional time   Bed mobility  Sit to Supine: Contact guard assistance  Scooting: Contact guard assistance  Bed Mobility Comments: increased time needed, bed flat for return to supine  Transfers  Sit to Stand: Contact guard assistance  Stand to sit: Contact guard assistance  Comment: Transfers completed w/RW  Ambulation  WB Status: FWB  Ambulation  Surface: level tile  Device: Rolling Walker  Assistance: Contact guard assistance;Minimal assistance  Quality of Gait: Narrow BRAD, step to gait pattern with LLE leading  Gait Deviations: Slow [place];Decreased step length;Decreased step height  Distance: 30ft x 2  Comments: Extremely slow [place] with narrow BRAD. Pt experienced one episode of LOB while turning with RW, required Min A to correct  More Ambulation?: No      OT:   ADL  Feeding: Modified independent  Feeding Skilled Clinical Factors: req assist with set up of utensils  Grooming: Modified independent  Grooming Skilled Clinical Factors: face washing and hand hygiene faciltiated seated in chair  UE Dressing: Stand by assistance;Setup  UE Dressing Skilled Clinical Factors: To doff/don gown seated at EOB  Toileting: Maximum assistance;Setup; Increased time to complete  Toileting Skilled Clinical Factors: Max A for brief managemetn seated/standing with RW, Mod A for personal hygiene seated/standing pt able to complete caro care req assist with buttocks  Additional Comments: Throughout session pt limited per fatigue, decreased strength and decreased cognition      ADLS-   ADL  Feeding: Modified independent   Feeding Skilled Clinical Factors: req assist with set up of utensils  Grooming: Modified independent   Grooming Skilled Clinical Factors: face washing and hand hygiene faciltiated seated in chair  UE Bathing: Stand by assistance, Verbal cueing, Setup, Increased time to complete  UE Bathing Skilled Clinical Factors: wash hair, arms, chest while seated  LE Bathing: Setup, Verbal cueing, Increased time to complete, Contact guard assistance, Minimal assistance  LE Bathing Skilled Clinical Factors: CGA>MIN A during stand for caro hygiene. Pt utilized cross leg technique for washing feet  UE Dressing: Stand by assistance, Setup  UE Dressing Skilled Clinical Factors:  To doff/don gown seated at EOB  LE Dressing: Minimal assistance  LE Dressing Skilled Clinical Factors: Pt able to doff/don L sock seated EOB with increased time to complete and VC. Pt able to doff R sock but required SBA and verbal cue to don sock. Toileting: Maximum assistance, Setup, Increased time to complete  Toileting Skilled Clinical Factors: Max A for brief managemetn seated/standing with RW, Mod A for personal hygiene seated/standing pt able to complete caro care req assist with buttocks  Additional Comments: Throughout session pt limited per fatigue, decreased strength and decreased cognition       SLP:    Subjective: [x] Alert     [x] Cooperative     [] Confused     [] Agitated    [] Lethargic        Objective/Assessment:     Recall: Delayed recall of 3 units related:  0/3 increased to 1/3 with min verbal cues     Organization: Word generation, concrete 5 units:  7/20 increased to 18/20 with min verbal cues and phonemic cues     Problem Solving/Reasoning: Stating situational problems: 2/10 increased to 8/10 with min-max verbal cues and phonemic cues      Other: Synonyms and Opposites: 3/16 increased to 15/16 with min-mod verbal cues and phonemic cues     *Pt. With occasional increased processing time with tasks. Pt. With increased success when provided with phonemic cues. Plan:  [x] Continue  services    [] Discharge from :      Objective:  BP (!) 140/83   Pulse 82   Temp 97.9 °F (36.6 °C) (Oral)   Resp 21   Ht 5' 9\" (1.753 m)   SpO2 99%   BMI 28.06 kg/m²       GEN: Well developed, well nourished, no acute distress. HEENT: NCAT, PERRL, EOMI, mucous membranes pink and moist.  RESP: Lungs clear to auscultation. No rales or rhonchi. Respirations WNL and unlabored. CV: Regular rate rhythm. No murmurs, rubs, or gallops. ABD: soft, non-distended, non-tender. BS+ and equal.  NEURO: verbal apraxia knew was in hospital,, follows commands, names objects sensation intact to light touch. DTRs 2+. MSK: Functional ROM. Muscle tone and bulk are normal bilaterally.  Strength 4+/5 key muscles LUE and LLE. 4-/5 key muscles RUE and RLE.   EXT: No calf tenderness to palpation or edema BLEs. SKIN: Warm dry and intact with good turgor. PSYCH: Mood WNL. Affect WNL. Appropriately interactive.     Current Medications:   Current Facility-Administered Medications: amLODIPine (NORVASC) tablet 5 mg, 5 mg, Oral, Daily  enoxaparin (LOVENOX) injection 40 mg, 40 mg, SubCUTAneous, Daily  sennosides-docusate sodium (SENOKOT-S) 8.6-50 MG tablet 2 tablet, 2 tablet, Oral, Daily PRN  folic acid (FOLVITE) tablet 1 mg, 1 mg, Oral, Daily  hydrALAZINE (APRESOLINE) injection 10 mg, 10 mg, IntraVENous, Q4H PRN  bisacodyl (DULCOLAX) suppository 10 mg, 10 mg, Rectal, Daily PRN  famotidine (PEPCID) tablet 20 mg, 20 mg, Oral, BID  glucose chewable tablet 16 g, 4 tablet, Oral, PRN  dextrose bolus 10% 125 mL, 125 mL, IntraVENous, PRN **OR** dextrose bolus 10% 250 mL, 250 mL, IntraVENous, PRN  glucagon (rDNA) injection 1 mg, 1 mg, SubCUTAneous, PRN  dextrose 10 % infusion, , IntraVENous, Continuous PRN  clopidogrel (PLAVIX) tablet 75 mg, 75 mg, Oral, Daily  isosorbide mononitrate (IMDUR) extended release tablet 30 mg, 30 mg, Oral, Daily  aspirin EC tablet 81 mg, 81 mg, Oral, Daily  atorvastatin (LIPITOR) tablet 40 mg, 40 mg, Oral, Daily  carvedilol (COREG) tablet 25 mg, 25 mg, Oral, BID WC  [Held by provider] lisinopril (PRINIVIL;ZESTRIL) tablet 10 mg, 10 mg, Oral, Daily  DULoxetine (CYMBALTA) extended release capsule 30 mg, 30 mg, Oral, Daily  sodium chloride flush 0.9 % injection 5-40 mL, 5-40 mL, IntraVENous, 2 times per day  sodium chloride flush 0.9 % injection 5-40 mL, 5-40 mL, IntraVENous, PRN  0.9 % sodium chloride infusion, , IntraVENous, PRN  acetaminophen (TYLENOL) tablet 650 mg, 650 mg, Oral, Q4H PRN  ondansetron (ZOFRAN-ODT) disintegrating tablet 4 mg, 4 mg, Oral, Q8H PRN **OR** ondansetron (ZOFRAN) injection 4 mg, 4 mg, IntraVENous, Q6H PRN      Diagnostics:     CBC:   No results for input(s): WBC, RBC, Independent  Homemaking Assistance: Independent  Homemaking Responsibilities: Yes  Ambulation Assistance: Independent  Transfer Assistance: Independent  Active : No  Patient's  Info: Niece  Occupation: On disability  Leisure & Hobbies: games on the computer    Impression/Plan:    Ischemic CVA with R sided weakness:s/p mechanical thrombectomy and ICA stent. On ASA, Plavix  HTN hyperlipidemia: on carvedilol, Imdur, Lisinopril Norvasc, Lipitor, Coreg, Imdur, currently lisinopril on hold Hx V Fib: recent AICD placement  COPD  CKD  GERD  Major Depressive Disorder, Recurrent: on Duloxetine  Hypertension-Norvasc, Coreg, Imdur    Recommendation:  Patient will benefit from acute inpatient rehabilitation when medically cleared by primary and consulting services-pre-CERT pending  DVT Prophylaxis: on Lovenox        Electronically signed by Yadira Madden. Ramon Booker MD on 8/9/2022 at 11:03 AM       This note is created with the assistance of a speech recognition program.  While intending to generate a document that actually reflects the content of the visit, the document can still have some errors including those of syntax and sound a like substitutions which may escape proof reading. In such instances, actual meaning can be extrapolated by contextual diversion.

## 2022-08-09 NOTE — PLAN OF CARE
Problem: Chronic Conditions and Co-morbidities  Goal: Patient's chronic conditions and co-morbidity symptoms are monitored and maintained or improved  Outcome: Progressing     Problem: Pain  Goal: Verbalizes/displays adequate comfort level or baseline comfort level  8/9/2022 0545 by Isabela Sevilla RN  Outcome: Progressing  8/8/2022 1703 by Moises Shepard RN  Outcome: Progressing     Problem: Safety - Adult  Goal: Free from fall injury  8/9/2022 0545 by Isabela Sevilla RN  Outcome: Progressing  8/8/2022 1703 by Moises Shepard RN  Outcome: Progressing     Problem: Discharge Planning  Goal: Discharge to home or other facility with appropriate resources  8/9/2022 0545 by Isabela Sevilla RN  Outcome: Progressing  8/8/2022 1703 by Moises Shepard RN  Outcome: Progressing     Problem: ABCDS Injury Assessment  Goal: Absence of physical injury  Outcome: Progressing     Problem: Skin/Tissue Integrity  Goal: Absence of new skin breakdown  Description: 1. Monitor for areas of redness and/or skin breakdown  2. Assess vascular access sites hourly  3. Every 4-6 hours minimum:  Change oxygen saturation probe site  4. Every 4-6 hours:  If on nasal continuous positive airway pressure, respiratory therapy assess nares and determine need for appliance change or resting period.   Outcome: Progressing     Problem: Neurosensory - Adult  Goal: Achieves stable or improved neurological status  8/9/2022 0545 by Isabela Sevilla RN  Outcome: Progressing  8/8/2022 1703 by Moises Shepard RN  Outcome: Progressing  Goal: Absence of seizures  8/9/2022 0545 by Isabela Sevilla RN  Outcome: Progressing  8/8/2022 1703 by Moises Shepard RN  Outcome: Progressing  Goal: Remains free of injury related to seizures activity  8/9/2022 0545 by Isabela Sevilla RN  Outcome: Progressing  8/8/2022 1703 by Moises Shepard RN  Outcome: Progressing  Goal: Achieves maximal functionality and self care  8/9/2022 [id number] by Kurt Parr RN  Outcome: Progressing  8/8/2022 1703 by Dhiraj Clay RN  Outcome: Progressing     Problem: Respiratory - Adult  Goal: Achieves optimal ventilation and oxygenation  8/9/2022 0545 by Kurt Parr RN  Outcome: Progressing  8/8/2022 1703 by Dhiraj Clay RN  Outcome: Progressing     Problem: Cardiovascular - Adult  Goal: Maintains optimal cardiac output and hemodynamic stability  8/9/2022 0545 by Kurt Parr RN  Outcome: Progressing  8/8/2022 1703 by Dhiraj Clay RN  Outcome: Progressing  Goal: Absence of cardiac dysrhythmias or at baseline  8/9/2022 0545 by Kurt Parr RN  Outcome: Progressing  8/8/2022 1703 by Dhiraj Clay RN  Outcome: Progressing     Problem: Skin/Tissue Integrity - Adult  Goal: Skin integrity remains intact  8/9/2022 0545 by Kurt Parr RN  Outcome: Progressing  8/8/2022 1703 by Dhiraj Clay RN  Outcome: Progressing  Goal: Incisions, wounds, or drain sites healing without S/S of infection  8/9/2022 0545 by Kurt Parr RN  Outcome: Progressing  8/8/2022 1703 by Dhiraj Clay RN  Outcome: Progressing  Goal: Oral mucous membranes remain intact  8/9/2022 0545 by Kurt Parr RN  Outcome: Progressing  8/8/2022 1703 by Dhiraj Clay RN  Outcome: Progressing     Problem: Musculoskeletal - Adult  Goal: Return mobility to safest level of function  8/9/2022 0545 by Kurt Parr RN  Outcome: Progressing  8/8/2022 1703 by Dhiraj Clay RN  Outcome: Progressing  Goal: Maintain proper alignment of affected body part  8/9/2022 0545 by Kurt Parr RN  Outcome: Progressing  8/8/2022 1703 by Dhiraj Clay RN  Outcome: Progressing  Goal: Return ADL status to a safe level of function  8/9/2022 0545 by Kurt Parr RN  Outcome: Progressing  8/8/2022 1703 by Dhiraj Clay RN  Outcome: Progressing     Problem: Gastrointestinal - Adult  Goal: Minimal or absence of nausea and vomiting  8/9/2022 0545 by Hemal Newberry, RN  Outcome: Progressing  8/8/2022 1703 by Natalia Green, RN  Outcome: Progressing  Goal: Maintains or returns to baseline bowel function  8/9/2022 0545 by Hemal Newberry, RN  Outcome: Progressing  8/8/2022 1703 by Natalia Green, RN  Outcome: Progressing  Goal: Establish and maintain optimal ostomy function  8/9/2022 0545 by Hemal Newberry, RN  Outcome: Progressing  8/8/2022 1703 by Natalia Green, RN  Outcome: Progressing     Problem: Genitourinary - Adult  Goal: Absence of urinary retention  8/9/2022 0545 by Hemal Newberry, RN  Outcome: Progressing  8/8/2022 1703 by Natalia Green, RN  Outcome: Progressing     Problem: Infection - Adult  Goal: Absence of infection at discharge  8/9/2022 0545 by Hemal Newberry, RN  Outcome: Progressing  8/8/2022 1703 by Natalia Green, RN  Outcome: Progressing  Goal: Absence of infection during hospitalization  8/9/2022 0545 by Hemal Newberry, RN  Outcome: Progressing  8/8/2022 1703 by Natalia Green, RN  Outcome: Progressing  Goal: Absence of fever/infection during anticipated neutropenic period  8/9/2022 0545 by Hemal Newberry, RN  Outcome: Progressing  8/8/2022 1703 by Natalia Green, RN  Outcome: Progressing     Problem: Metabolic/Fluid and Electrolytes - Adult  Goal: Electrolytes maintained within normal limits  8/9/2022 0545 by Hemal Newberry, RN  Outcome: Progressing  8/8/2022 1703 by Natalia Green, RN  Outcome: Progressing  Goal: Hemodynamic stability and optimal renal function maintained  8/9/2022 0545 by Hemal Newberry, RN  Outcome: Progressing  8/8/2022 1703 by Natalia Green, RN  Outcome: Progressing  Goal: Glucose maintained within prescribed range  8/9/2022 0545 by Hemal Newberry, RN  Outcome: Progressing  8/8/2022 1703 by Natalia Green, RN  Outcome: Progressing     Problem: Hematologic - Adult  Goal: Maintains hematologic stability  8/9/2022 0545 by Marilee Peters RN  Outcome: Progressing  8/8/2022 1703 by Daniel Robles RN  Outcome: Progressing     Problem: Anxiety  Goal: Will report anxiety at manageable levels  Description: INTERVENTIONS:  1. Administer medication as ordered  2. Teach and rehearse alternative coping skills  3. Provide emotional support with 1:1 interaction with staff  8/9/2022 0545 by Marilee Peters RN  Outcome: Progressing  8/8/2022 1703 by Daniel Robles RN  Outcome: Progressing     Problem: Coping  Goal: Pt/Family able to verbalize concerns and demonstrate effective coping strategies  Description: INTERVENTIONS:  1. Assist patient/family to identify coping skills, available support systems and cultural and spiritual values  2. Provide emotional support, including active listening and acknowledgement of concerns of patient and caregivers  3. Reduce environmental stimuli, as able  4. Instruct patient/family in relaxation techniques, as appropriate  5. Assess for spiritual pain/suffering and initiate Spiritual Care, Psychosocial Clinical Specialist consults as needed  8/9/2022 0545 by Marilee Peters RN  Outcome: Progressing  8/8/2022 1703 by Daniel Robles RN  Outcome: Progressing     Problem: Decision Making  Goal: Pt/Family able to effectively weigh alternatives and participate in decision making related to treatment and care  Description: INTERVENTIONS:  1. Determine when there are differences between patient's view, family's view, and healthcare provider's view of condition  2. Facilitate patient and family articulation of goals for care  3. Help patient and family identify pros/cons of alternative solutions  4. Provide information as requested by patient/family  5. Respect patient/family right to receive or not to receive information  6. Serve as a liaison between patient and family and health care team  7.  Initiate Consults from Ethics, Palliative Care or initiate Family Care Conference as is appropriate  8/9/2022 0545 by Gretta Beyer RN  Outcome: Progressing  8/8/2022 1703 by Russ Man RN  Outcome: Progressing     Problem: Behavior  Goal: Pt/Family maintain appropriate behavior and adhere to behavioral management agreement, if implemented  Description: INTERVENTIONS:  1. Assess patient/family's coping skills and  non-compliant behavior (including use of illegal substances)  2. Notify security of behavior or suspected illegal substances which indicate the need for search of the family and/or belongings  3. Encourage verbalization of thoughts and concerns in a socially appropriate manner  4. Utilize positive, consistent limit setting strategies supporting safety of patient, staff and others  5. Encourage participation in the decision making process about the behavioral management agreement  6. If a visitor's behavior poses a threat to safety call refer to organization policy. 7. Initiate consult with , Psychosocial CNS, Spiritual Care as appropriate  8/9/2022 0545 by Gretta Beyer RN  Outcome: Progressing  8/8/2022 1703 by Russ Man RN  Outcome: Progressing     Problem: Spiritual Care  Goal: Pt/Family able to move forward in process of forgiving self, others, and/or higher power  Description: INTERVENTIONS:  1. Assist patient/family to overcome blocks to healing by use of spiritual practices (prayer, meditation, guided imagery, reiki, breath work, etc). 2. De-myth guilt and help patient/family identify possible irrational spiritual/cultural beliefs and values. 3. Explore possibilities of making amends & reconciliation with self, others, and/or a greater power. 4. Guide patient/family in identifying painful feelings of guilt. 5. Help patient/famiy explore and identify spiritual beliefs, cultural understandings or values that may help or hinder letting go of issue.   6. Help patient/family explore feelings of anger, bitterness, resentment. 7. Help patient/family identify and examine the situation in which these feelings are experienced. 8. Help patient/family identify destructive displacement of feelings onto other individuals. 9. Invite use of sacraments/rituals/ceremonies as appropriate (e.g. - confession, anointing, smudging). 10. Refer patient/family to formal counseling and/or to shanthi community for further support work.   8/9/2022 0545 by Fredis iNx RN  Outcome: Progressing  8/8/2022 1703 by Ophelia Butt RN  Outcome: Progressing

## 2022-08-09 NOTE — PROGRESS NOTES
Endovascular Neurosurgery Progress Note    SUBJECTIVE:     No overnight event, no acute event, patient continue to improve,  Review of Systems:  CONSTITUTIONAL:  negative for fevers, chills, fatigue and malaise    EYES:  negative for double vision, blurred vision and photophobia     HEENT:  negative for tinnitus, epistaxis and sore throat    RESPIRATORY:  negative for cough, shortness of breath, wheezing    CARDIOVASCULAR:  negative for chest pain, palpitations, syncope, edema    GASTROINTESTINAL:  negative for nausea, vomiting    GENITOURINARY:  negative for incontinence    MUSCULOSKELETAL:  negative for neck or back pain    NEUROLOGICAL:  Negative for weakness and tingling  negative for headaches and dizziness    PSYCHIATRIC:  negative for anxiety      Review of systems otherwise negative. OBJECTIVE:     Vitals:    08/09/22 0901   BP: (!) 140/83   Pulse: 82   Resp:    Temp:    SpO2:         General:  Gen: normal habitus, NAD  HEENT: NCAT, mucosa moist  Cvs: RRR, S1 S2 normal  Resp: symmetric unlabored breathing  Abd: s/nd/nt  Ext: no edema  Skin: no lesions seen, warm and dry    Neuro:  Gen: awake and alert, able to speak in simple complete sentence  Lang/speech: speak is clear, able to repeat perfectly, not able to name well, but able to name colors well  CN: PERRL, EOMI, VFF, V1-3 intact, face symmetric, hearing intact, shoulder shrug symmetric, tongue midline  Motor: grossly 5/5 UE and LE on the left, 4/5 on the right  Sense: LT intact in all 4 ext. Coord: FTN and HTS intact b/l  DTR: deferred  Gait: not tested    NIH Stroke Scale:   1a  Level of consciousness: 0 - alert; keenly responsive   1b. LOC questions:  0 - answers both questions correctly   1c. LOC commands: 0 - performs both tasks correctly   2. Best Gaze: 0 - normal   3. Visual: 0 - no visual loss   4. Facial Palsy: 1 - minor paralysis (flattened nasolabial fold, asymmetric on smiling)   5a.  Motor left arm: 0 - no drift, limb holds 90 (or 45) degrees for full 10 seconds   5b. Motor right arm: 0   6a. Motor left le - no drift; leg holds 30 degree position for full 5 seconds   6b  Motor right le - no drift; leg holds 30 degree position for full 5 seconds   7. Limb Ataxia: 0 - absent   8. Sensory: 0 - normal; no sensory loss   9. Best Language:  1    10. Dysarthria: 0 - normal   11. Extinction and Inattention: 0 - no abnormality         Total:   2     MRS: 2      LABS:   Reviewed. Lab Results   Component Value Date    HGB 12.7 (L) 2022    WBC 7.8 2022     2022     2022    BUN 10 2022    CREATININE 0.96 2022    AST 15 2022    ALT 10 2022    MG 1.7 2022    APTT 27.6 2022    INR 1.2 2022      Lab Results   Component Value Date/Time    COVID19 Not Detected 2022 08:47 PM    COVID19 Not Detected 2020 11:08 AM       RADIOLOGY:   Images were personally reviewed including:    CTP 22  Large mismatch in the left hemisphere    Cerebral angiogram 22  1. Left ICA cervical segment acute occlusion with reconstitution of flow in the left ICA ophthalmic segment through ophthalmic artery through ECA branches. 2.  The above lesion was treated with EmboTrap stent retriever 6.5 mm x 45 mm deployed in the distal ICA cervical segment, mechanical aspiration through ? LBC with penumbra engine, Pre-stenting balloon angioplasty with 5.0 mm x 40 mm loren balloon    using seimless technique, 10 mm x 31 mm carotid wallstent, post stenting balloon angioplasty with Emboguard C   3. Final TICI score?03   4. Residual stenosis less than 10%? CTP: 22  - no more mismatch      ASSESSMENT:     60 y/o M with pmh significant for Htn, CAD s/p CABG in , V. Fib PEA in 2022, AICD on , CKD stage III, presented on  with a syncope and right sided weakness, CTA completed on  which showed left ICA cervical segment occlusion.  Patient had worsening neuro exam overnight, became non verbal, weaker on right UE and LE, NIHSS was 15. CT perfusion was emergently performed which showed ischemic penumbra in left MCA, DARYN territory. Patient was taken for emergent thrombectomy (7/31/22) and L ICA stenting (7/31/22), since the intervention patient improved signficantly    PLAN:     - con't aspirin and plavix  - PT/OT and acute rehab  - gradual normalization of BP  - patient can be discharged from neuroendovascular standpoint    - f/u with Dr. Faustino Lin in 2 weeks and Dr. Tomas Springer in 3 months      Case discussed with Dr. Tomas Springer attending.     Juan R Dozier MD   Stroke, [facility]  [id number] [place]  Electronically signed 8/9/2022 at 9:08 AM
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
## 2022-08-09 NOTE — PROGRESS NOTES
Awaiting clarification re:  if [facility] is in network with [facility]. Notified DECLAN Ochoa CM. Called [facility] to determine network status. Left vm with Samira Oliveira with request for a return call. Notified Romulo Cabral Director, writer rcv'd fax from [employer] with approval for ARU, however the fax states [facility]. [facility] await response.

## 2022-08-09 NOTE — PROGRESS NOTES
Occupational Therapy  Facility/Department: [address] STEP DOWN  Occupational Therapy Daily Treatment Note    Name: Liban Ojeda  : 1965  MRN: [id number]  Date of Service: 2022    Discharge Recommendations:  Further therapy recommended at discharge. The patient should be able to tolerate at least 3 hours of therapy per day over 5 days or 15 hours over 7 days. This patient may benefit from a Physical Medicine and Rehab consult. Assessment   Performance deficits / Impairments: Decreased functional mobility ; Decreased safe awareness;Decreased balance;Decreased ADL status; Decreased cognition;Decreased endurance;Decreased high-level IADLs;Decreased strength  Prognosis: Good  REQUIRES OT FOLLOW-UP: Yes  Activity Tolerance  Activity Tolerance: Patient Tolerated treatment well;Patient limited by fatigue        Plan   Plan  Times per Week: 5-6x/wk  Times per Day: Daily  Current Treatment Recommendations: Strengthening, ROM, Balance training, Functional mobility training, Endurance training, Cognitive reorientation, Neuromuscular re-education, Safety education & training, Patient/Caregiver education & training, Self-Care / ADL, Home management training, Coordination training     Restrictions  Restrictions/Precautions  Restrictions/Precautions: Up as Tolerated, General Precautions  Required Braces or Orthoses?: No    Subjective   General  Chart Reviewed: Yes  Family / Caregiver Present: No  General Comment  Comments: Pt and RN agreeable to therapy this day. Pt supine in bed at start of session and retired to seated in chair at session end with chair alarm on, BLE elevated and call light in reach. Pt denied pain during session            Objective           Safety Devices  Type of Devices: Gait belt;Left in chair;Chair alarm in place;Call light within reach; Patient at risk for falls;Nurse notified  Restraints  Restraints Initially in Place: No  Balance  Sitting: Without support (SUP static/dynamic sitting unsupported for approx 10 min)  Standing: With support (CGA static/dynamic standing during ADLs utilizing RW and 0-2 hand support tolerating approx 5-6 min)  Gait  Overall Level of Assistance: Minimum assistance (EOB>bathroom>chair utilizing RW req assist for walker management and balance maintaince)  Toilet Transfers  Toilet - Technique: Ambulating  Equipment Used: Standard toilet  Toilet Transfer: Minimal assistance  Toilet Transfers Comments: utilizing RW and hand rails     ADL  Feeding: Modified independent   Feeding Skilled Clinical Factors: req assist with set up of utensils  Grooming: Contact guard assistance;Setup  Grooming Skilled Clinical Factors: Hand hygiene facilitated standing at sink with RW and 0-1 hand support at [facility]  Toileting: Minimal assistance;Setup; Increased time to complete  Toileting Skilled Clinical Factors: Min A for toilet transfer with RW and grab bars, Min A for brief management seated/standing req assist for pull up  Additional Comments: Throughout session pt limited per fatigue, decreased strength and decreased cognition        Bed mobility  Supine to Sit: Stand by assistance  Scooting: Stand by assistance  Bed Mobility Comments: HOB elevated, utilizing bed rails  Transfers  Sit to stand: Contact guard assistance  Stand to sit: Contact guard assistance  Transfer Comments: utilizing RW     Cognition  Overall Cognitive Status: Exceptions  Arousal/Alertness: Appropriate responses to stimuli  Following Commands: Follows multistep commands with repitition; Follows multistep commands with increased time  Attention Span: Attends with cues to redirect  Safety Judgement: Decreased awareness of need for assistance;Decreased awareness of need for safety  Problem Solving: Assistance required to correct errors made;Assistance required to identify errors made  Insights: Decreased awareness of deficits  Initiation: Requires cues for some  Sequencing: Requires cues for some  Orientation  Overall Orientation Status: Impaired  Orientation Level: Oriented to place;Oriented to situation;Oriented to person;Disoriented to time                  Education Given To: Patient  Education Provided: Role of Therapy;Transfer Training  Education Provided Comments: proper hand and foot placement; balance maintaince; walker management-F carry over  Education Method: Demonstration;Verbal  Education Outcome: Continued education needed                                            AM-PAC Score        AM-[facility] Inpatient Daily Activity Raw Score: 17 (08/09/22 1157)  AM-PAC Inpatient ADL T-Scale Score : 37.26 (08/09/22 1157)  ADL Inpatient CMS 0-100% Score: 50.11 (08/09/22 1157)  ADL Inpatient CMS G-Code Modifier : CK (08/09/22 1157)        Goals  Short Term Goals  Time Frame for Short term goals: by discharge  Short Term Goal 1: pt will tolerate 5+ minutes of standing with use of least restrictive AD, w/out rest break (updated by SRINIVASAN Bowen on 8/8)  Short Term Goal 2: pt will perform UB ADLs at mod I and use of least restrictive AE/DME (updated by SRINIVASAN Andrews Party on 8/8)  Short Term Goal 3: pt will perform functional mobility with at SBA utilizing least restrictive AD (updated by SRINIVASAN Andrews Party on 8/8)  Short Term Goal 4: pt will perform LB ADLs with modified independence and use of least restrictive AE/DME  Short Term Goal 5: pt will utilize compensatory strategies with mod VC for increasing participation in functional tasks (pt will perform functional transfers with CGA and use of least restrictive AD for increasing participation in functional tasks)       Therapy Time   Individual Concurrent Group Co-treatment   Time In 0721         Time Out 0745         Minutes 24         Timed Code Treatment Minutes: Sy[address] 25, ALVARADO/L
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
## 2022-08-09 NOTE — CARE COORDINATION
Transitional Plannin: Lyndsay  called from [facility] [place]. She has to verify the Auth that was received and will call back tomorrow morning to update me.

## 2022-08-09 NOTE — PLAN OF CARE
Progressing  8/9/2022 0545 by Sujata Marvin RN  Outcome: Progressing  Goal: Achieves maximal functionality and self care  8/9/2022 1638 by Loi Mattson RN  Outcome: Progressing  8/9/2022 0545 by Sujata Marvin RN  Outcome: Progressing     Problem: Respiratory - Adult  Goal: Achieves optimal ventilation and oxygenation  8/9/2022 1638 by Loi Mattson RN  Outcome: Progressing  8/9/2022 0545 by Sujata Marvin RN  Outcome: Progressing     Problem: Cardiovascular - Adult  Goal: Maintains optimal cardiac output and hemodynamic stability  8/9/2022 1638 by Loi Mattson RN  Outcome: Progressing  8/9/2022 0545 by Sujata Marvin RN  Outcome: Progressing  Goal: Absence of cardiac dysrhythmias or at baseline  8/9/2022 1638 by Loi Mattson RN  Outcome: Progressing  8/9/2022 0545 by Sujata Marvin RN  Outcome: Progressing     Problem: Skin/Tissue Integrity - Adult  Goal: Skin integrity remains intact  8/9/2022 1638 by Loi Mattson RN  Outcome: Progressing  8/9/2022 0545 by Sujata Marvin RN  Outcome: Progressing  Goal: Incisions, wounds, or drain sites healing without S/S of infection  8/9/2022 1638 by Loi Mattson RN  Outcome: Progressing  8/9/2022 0545 by Sujata Marvin RN  Outcome: Progressing  Goal: Oral mucous membranes remain intact  8/9/2022 1638 by Loi Mattson RN  Outcome: Progressing  8/9/2022 0545 by Sujata Marvin RN  Outcome: Progressing     Problem: Musculoskeletal - Adult  Goal: Return mobility to safest level of function  8/9/2022 1638 by Loi Mattson RN  Outcome: Progressing  8/9/2022 0545 by Sujata Marvin RN  Outcome: Progressing  Goal: Maintain proper alignment of affected body part  8/9/2022 1638 by Loi Mattson RN  Outcome: Progressing  8/9/2022 0545 by Sujata Marvin RN  Outcome: Progressing  Goal: Return ADL status to a safe level of function  8/9/2022 1638 by Loi Mattson RN  Outcome: Progressing  8/9/2022 0545 by Amira Torres RN  Outcome: Progressing     Problem: Gastrointestinal - Adult  Goal: Minimal or absence of nausea and vomiting  8/9/2022 1638 by Al Suero RN  Outcome: Progressing  8/9/2022 0545 by Amira Torres RN  Outcome: Progressing  Goal: Maintains or returns to baseline bowel function  8/9/2022 1638 by Al Suero RN  Outcome: Progressing  8/9/2022 0545 by Amira Torres RN  Outcome: Progressing  Goal: Establish and maintain optimal ostomy function  8/9/2022 1638 by Al Suero RN  Outcome: Progressing  8/9/2022 0545 by Amira Torres RN  Outcome: Progressing     Problem: Genitourinary - Adult  Goal: Absence of urinary retention  8/9/2022 1638 by Al Suero RN  Outcome: Progressing  8/9/2022 0545 by Amira Torres RN  Outcome: Progressing     Problem: Infection - Adult  Goal: Absence of infection at discharge  8/9/2022 1638 by Al Suero RN  Outcome: Progressing  8/9/2022 0545 by Amira Torres RN  Outcome: Progressing  Goal: Absence of infection during hospitalization  8/9/2022 1638 by Al Suero RN  Outcome: Progressing  8/9/2022 0545 by Amira Torres RN  Outcome: Progressing  Goal: Absence of fever/infection during anticipated neutropenic period  8/9/2022 1638 by Al Suero RN  Outcome: Progressing  8/9/2022 0545 by Amira Torres RN  Outcome: Progressing     Problem: Metabolic/Fluid and Electrolytes - Adult  Goal: Electrolytes maintained within normal limits  8/9/2022 1638 by Al Suero RN  Outcome: Progressing  8/9/2022 0545 by Amira Torres RN  Outcome: Progressing  Goal: Hemodynamic stability and optimal renal function maintained  8/9/2022 1638 by Al Suero RN  Outcome: Progressing  8/9/2022 0545 by Amira Torres RN  Outcome: Progressing  Goal: Glucose maintained within prescribed range  8/9/2022 1638 by Al Suero RN  Outcome: Progressing  8/9/2022 0545 by Denisse Hall RN  Outcome: Progressing     Problem: Hematologic - Adult  Goal: Maintains hematologic stability  8/9/2022 1638 by Hamlet Maya RN  Outcome: Progressing  8/9/2022 0545 by Denisse Hall RN  Outcome: Progressing     Problem: Anxiety  Goal: Will report anxiety at manageable levels  Description: INTERVENTIONS:  1. Administer medication as ordered  2. Teach and rehearse alternative coping skills  3. Provide emotional support with 1:1 interaction with staff  8/9/2022 1638 by Hamlet Maya RN  Outcome: Progressing  8/9/2022 0545 by Deinsse Hall RN  Outcome: Progressing     Problem: Coping  Goal: Pt/Family able to verbalize concerns and demonstrate effective coping strategies  Description: INTERVENTIONS:  1. Assist patient/family to identify coping skills, available support systems and cultural and spiritual values  2. Provide emotional support, including active listening and acknowledgement of concerns of patient and caregivers  3. Reduce environmental stimuli, as able  4. Instruct patient/family in relaxation techniques, as appropriate  5. Assess for spiritual pain/suffering and initiate Spiritual Care, Psychosocial Clinical Specialist consults as needed  8/9/2022 1638 by Hamlet Maya RN  Outcome: Progressing  8/9/2022 0545 by Denisse Hall RN  Outcome: Progressing     Problem: Decision Making  Goal: Pt/Family able to effectively weigh alternatives and participate in decision making related to treatment and care  Description: INTERVENTIONS:  1. Determine when there are differences between patient's view, family's view, and healthcare provider's view of condition  2. Facilitate patient and family articulation of goals for care  3. Help patient and family identify pros/cons of alternative solutions  4. Provide information as requested by patient/family  5. Respect patient/family right to receive or not to receive information  6.  Serve as a liaison between patient and family and health care team  7. Initiate Consults from Ethics, Palliative Care or initiate [facility] as is appropriate  8/9/2022 1638 by Bolling Dancer, RN  Outcome: Progressing  8/9/2022 0545 by Malachi Alcantara RN  Outcome: Progressing     Problem: Behavior  Goal: Pt/Family maintain appropriate behavior and adhere to behavioral management agreement, if implemented  Description: INTERVENTIONS:  1. Assess patient/family's coping skills and  non-compliant behavior (including use of illegal substances)  2. Notify security of behavior or suspected illegal substances which indicate the need for search of the family and/or belongings  3. Encourage verbalization of thoughts and concerns in a socially appropriate manner  4. Utilize positive, consistent limit setting strategies supporting safety of patient, staff and others  5. Encourage participation in the decision making process about the behavioral management agreement  6. If a visitor's behavior poses a threat to safety call refer to organization policy. 7. Initiate consult with , Psychosocial CNS, Spiritual Care as appropriate  8/9/2022 1638 by Bolling Dancer, RN  Outcome: Progressing  8/9/2022 0545 by Malachi Alcantara RN  Outcome: Progressing     Problem: Spiritual Care  Goal: Pt/Family able to move forward in process of forgiving self, others, and/or higher power  Description: INTERVENTIONS:  1. Assist patient/family to overcome blocks to healing by use of spiritual practices (prayer, meditation, guided imagery, reiki, breath work, etc). 2. De-myth guilt and help patient/family identify possible irrational spiritual/cultural beliefs and values. 3. Explore possibilities of making amends & reconciliation with self, others, and/or a greater power. 4. Guide patient/family in identifying painful feelings of guilt.   5. Help patient/famiy explore and identify spiritual beliefs, cultural understandings or values that may help or hinder letting go of issue. 6. Help patient/family explore feelings of anger, bitterness, resentment. 7. Help patient/family identify and examine the situation in which these feelings are experienced. 8. Help patient/family identify destructive displacement of feelings onto other individuals. 9. Invite use of sacraments/rituals/ceremonies as appropriate (e.g. - confession, anointing, smudging). 10. Refer patient/family to formal counseling and/or to shanthi community for further support work.   8/9/2022 1638 by Majo Cooper, RN  Outcome: Progressing  8/9/2022 0545 by Erick Woods RN  Outcome: Progressing

## 2022-08-10 ENCOUNTER — HOSPITAL ENCOUNTER (INPATIENT)
Age: 57
LOS: 10 days | Discharge: HOME HEALTH | DRG: 058 | End: 2022-08-20
Attending: STUDENT IN AN ORGANIZED HEALTH CARE EDUCATION/TRAINING PROGRAM | Admitting: STUDENT IN AN ORGANIZED HEALTH CARE EDUCATION/TRAINING PROGRAM
Payer: COMMERCIAL

## 2022-08-10 VITALS
TEMPERATURE: 97.1 F | HEIGHT: 69 IN | RESPIRATION RATE: 22 BRPM | BODY MASS INDEX: 28.06 KG/M2 | HEART RATE: 73 BPM | SYSTOLIC BLOOD PRESSURE: 122 MMHG | OXYGEN SATURATION: 97 % | DIASTOLIC BLOOD PRESSURE: 83 MMHG

## 2022-08-10 PROBLEM — I63.9 ACUTE CVA (CEREBROVASCULAR ACCIDENT) (HCC): Status: ACTIVE | Noted: 2022-08-10

## 2022-08-10 PROCEDURE — 6370000000 HC RX 637 (ALT 250 FOR IP)

## 2022-08-10 PROCEDURE — 97535 SELF CARE MNGMENT TRAINING: CPT

## 2022-08-10 PROCEDURE — 6370000000 HC RX 637 (ALT 250 FOR IP): Performed by: STUDENT IN AN ORGANIZED HEALTH CARE EDUCATION/TRAINING PROGRAM

## 2022-08-10 PROCEDURE — 1180000000 HC REHAB R&B

## 2022-08-10 PROCEDURE — 97129 THER IVNTJ 1ST 15 MIN: CPT

## 2022-08-10 PROCEDURE — 92507 TX SP LANG VOICE COMM INDIV: CPT

## 2022-08-10 PROCEDURE — 2580000003 HC RX 258: Performed by: STUDENT IN AN ORGANIZED HEALTH CARE EDUCATION/TRAINING PROGRAM

## 2022-08-10 PROCEDURE — 6360000002 HC RX W HCPCS: Performed by: STUDENT IN AN ORGANIZED HEALTH CARE EDUCATION/TRAINING PROGRAM

## 2022-08-10 PROCEDURE — 6370000000 HC RX 637 (ALT 250 FOR IP): Performed by: PSYCHIATRY & NEUROLOGY

## 2022-08-10 PROCEDURE — 6370000000 HC RX 637 (ALT 250 FOR IP): Performed by: INTERNAL MEDICINE

## 2022-08-10 PROCEDURE — 99254 IP/OBS CNSLTJ NEW/EST MOD 60: CPT | Performed by: INTERNAL MEDICINE

## 2022-08-10 PROCEDURE — 99239 HOSP IP/OBS DSCHRG MGMT >30: CPT | Performed by: PSYCHIATRY & NEUROLOGY

## 2022-08-10 RX ORDER — ATORVASTATIN CALCIUM 40 MG/1
40 TABLET, FILM COATED ORAL DAILY
Status: DISCONTINUED | OUTPATIENT
Start: 2022-08-11 | End: 2022-08-11

## 2022-08-10 RX ORDER — BISACODYL 10 MG
10 SUPPOSITORY, RECTAL RECTAL DAILY PRN
Status: DISCONTINUED | OUTPATIENT
Start: 2022-08-10 | End: 2022-08-20 | Stop reason: HOSPADM

## 2022-08-10 RX ORDER — DULOXETIN HYDROCHLORIDE 30 MG/1
30 CAPSULE, DELAYED RELEASE ORAL DAILY
Status: DISCONTINUED | OUTPATIENT
Start: 2022-08-11 | End: 2022-08-20 | Stop reason: HOSPADM

## 2022-08-10 RX ORDER — ASPIRIN 81 MG/1
81 TABLET ORAL DAILY
Status: DISCONTINUED | OUTPATIENT
Start: 2022-08-11 | End: 2022-08-20 | Stop reason: HOSPADM

## 2022-08-10 RX ORDER — ALBUTEROL SULFATE 90 UG/1
2 AEROSOL, METERED RESPIRATORY (INHALATION) EVERY 4 HOURS PRN
Status: DISCONTINUED | OUTPATIENT
Start: 2022-08-10 | End: 2022-08-20 | Stop reason: HOSPADM

## 2022-08-10 RX ORDER — SENNA PLUS 8.6 MG/1
2 TABLET ORAL DAILY PRN
Status: DISCONTINUED | OUTPATIENT
Start: 2022-08-10 | End: 2022-08-20 | Stop reason: HOSPADM

## 2022-08-10 RX ORDER — ACETAMINOPHEN 325 MG/1
650 TABLET ORAL EVERY 4 HOURS PRN
Status: DISCONTINUED | OUTPATIENT
Start: 2022-08-10 | End: 2022-08-20 | Stop reason: HOSPADM

## 2022-08-10 RX ORDER — CARVEDILOL 25 MG/1
25 TABLET ORAL 2 TIMES DAILY WITH MEALS
Status: DISCONTINUED | OUTPATIENT
Start: 2022-08-10 | End: 2022-08-20 | Stop reason: HOSPADM

## 2022-08-10 RX ORDER — CLOPIDOGREL BISULFATE 75 MG/1
75 TABLET ORAL DAILY
Status: DISCONTINUED | OUTPATIENT
Start: 2022-08-11 | End: 2022-08-20 | Stop reason: HOSPADM

## 2022-08-10 RX ORDER — POLYETHYLENE GLYCOL 3350 17 G/17G
17 POWDER, FOR SOLUTION ORAL DAILY
Status: DISCONTINUED | OUTPATIENT
Start: 2022-08-10 | End: 2022-08-20 | Stop reason: HOSPADM

## 2022-08-10 RX ORDER — AMLODIPINE BESYLATE 10 MG/1
10 TABLET ORAL DAILY
Status: DISCONTINUED | OUTPATIENT
Start: 2022-08-11 | End: 2022-08-11

## 2022-08-10 RX ORDER — ENOXAPARIN SODIUM 100 MG/ML
40 INJECTION SUBCUTANEOUS DAILY
Status: DISCONTINUED | OUTPATIENT
Start: 2022-08-11 | End: 2022-08-20 | Stop reason: HOSPADM

## 2022-08-10 RX ORDER — ISOSORBIDE MONONITRATE 30 MG/1
60 TABLET, EXTENDED RELEASE ORAL DAILY
Status: DISCONTINUED | OUTPATIENT
Start: 2022-08-11 | End: 2022-08-11

## 2022-08-10 RX ADMIN — AMLODIPINE BESYLATE 5 MG: 5 TABLET ORAL at 09:00

## 2022-08-10 RX ADMIN — Medication 81 MG: at 09:00

## 2022-08-10 RX ADMIN — DESMOPRESSIN ACETATE 40 MG: 0.2 TABLET ORAL at 09:00

## 2022-08-10 RX ADMIN — DULOXETINE HYDROCHLORIDE 30 MG: 30 CAPSULE, DELAYED RELEASE ORAL at 09:00

## 2022-08-10 RX ADMIN — SODIUM CHLORIDE, PRESERVATIVE FREE 10 ML: 5 INJECTION INTRAVENOUS at 09:00

## 2022-08-10 RX ADMIN — ENOXAPARIN SODIUM 40 MG: 100 INJECTION SUBCUTANEOUS at 09:00

## 2022-08-10 RX ADMIN — CARVEDILOL 25 MG: 25 TABLET, FILM COATED ORAL at 17:27

## 2022-08-10 RX ADMIN — FOLIC ACID 1 MG: 1 TABLET ORAL at 09:00

## 2022-08-10 RX ADMIN — ISOSORBIDE MONONITRATE 30 MG: 30 TABLET ORAL at 09:00

## 2022-08-10 RX ADMIN — CARVEDILOL 25 MG: 25 TABLET, FILM COATED ORAL at 09:00

## 2022-08-10 RX ADMIN — FAMOTIDINE 20 MG: 20 TABLET, FILM COATED ORAL at 09:00

## 2022-08-10 RX ADMIN — CLOPIDOGREL 75 MG: 75 TABLET, FILM COATED ORAL at 09:00

## 2022-08-10 ASSESSMENT — PAIN SCALES - GENERAL: PAINLEVEL_OUTOF10: 0

## 2022-08-10 ASSESSMENT — ENCOUNTER SYMPTOMS: TACHYPNEA: 1

## 2022-08-10 NOTE — PROGRESS NOTES
[address] [facility] [address]   PRE-ADMISSION ASSESSMENT    Patient Name: Regina Yin        MRN: [id number]    : 1965  (59 y.o.)  Gender: male     Admitted from:   [facility]  [x][facility]   [][facility]   [facility]   []Outside Admission - Location:                                 [x]Initial         []Updated    Date of Onset / Admission to the acute hospital:  22    Inpatient Rehabilitation Admitting Diagnosis:  CVA    Did patient have surgery/procedures?   []No  [x]Yes:      22 PROCEDURE:  1)  Implantation of ICD, single chamber  2)  Conscious sedation  3) Contrast venogram  4)  Intraoperative lead testing  5) Defibrillation testing    22  Procedure Performed:  Diagnostic Cerebral Angiogram with Left ICA occlusion mechanical thrombectomy with stent retriever and mechanical aspiration, pre and post stenting balloon angioplasty, stenting with carotid wallstent           Physicians: Dayanna Guzman (Neurology), Lachelle Goldberg (Endovascular), Kathy Nj (Electrophysiology), Kimberly Siegel (IM), Zander (Neuro-CC)    Risk for clinical complications:  High    Co-morbidities:      HTN  CAD: s/p CABG  Hx V Fib: recent AICD placement  COPD  CKD  GERD  Major Depressive Disorder, Recurrent    Financial Information  Primary insurance:  []Medicare [] Medicare HMO  []Commercial insurance    []Medicaid   [x] Medicaid HMO []Workers Compensation   []Personal Pay    Secondary Insurance:  []Medicare [] Medicare HMO  []Commercial insurance    []Medicaid  []Medicaid HMO  []Workers Compensation [x]None    Precautions:   []Cardiac Precautions:    []Total hip precautions:    []Weight Bearing status:  [x]Safety Precautions/Concerns:  Fall Risk, General Precautions, No elevation of shoulder for 30 days over 90 degrees d/t implantation of ICD 22  []Visually impaired:     []Hard of Hearing:   []Communication Aids, Devices or Interpretation Services:    Isolation Precautions:         []Yes  [x]No  If Yes:  [] Droplet []Contact []Airborne     []VRE     []MRSA     []C-diff         [] TB       [] ESBL [] MDRO          [] Other:        Physiatrist:  [x]   Dr. Ksenia Weiner     []   Dr. Diane Johnson  []   Dr. Minal Cruz  []   Dr. Hugo Ruano    Patients Occupation: Retired    Reviewed Lab and Diagnostic reports from Current Admission: Yes    Patients Prior Functional  Level: Prior Function  Receives Help From: Family  ADL Assistance: Independent  Homemaking Assistance: Needs assistance  Laundry:  (Pt stated that he is unable to do laundry and no one does it for him)  Ambulation Assistance: Independent  Transfer Assistance: Independent  Additional Comments: Pt questionable historian at this time and had difficulty recalling social/functional information    History of current illness, per PM&R Consult:  Anne Ferrara is a 59 y.o. RHD male admitted to [facility] on 7/28/2022. Patient admitted after syncope and c/o headache, SOB. CT head was WNL. CT chest negative for PE. CXR showed atelectasis. He was admitted to observation status. Cardiology was consulted for syncope. Performed ICD interrogation and adjusted his antihypertensive medications. Known history of CABG, VFib arrest with recent ICD placement By Dr. Emile Morris on 7/20/22. He developed AMS on 7/29/22. Neurology was consulted and performed EEG which showed possible structural defect. He was found to have expressive aphasia and a NIHSS 10 with R sided weakness. 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 Neuro endovascular was consulted and CTA showed the same ICA occlusion. On 7/31/22 his NIHSS was worsened to 15. CT perfusion emergently showed ischemic penumbra L MCA and DARYN territory. Dr. Burton Monroe performed emergent mechanical thrombectomy and placed L carotid stent with balloon angioplasty on 7/31/22.      Prognosis: Fair    Current functional status for upper extremity ADLs:  UE Bathing: Stand by assistance, Verbal cueing, Setup, Increased time to complete  UE Dressing: Stand by assistance, Setup    Current functional status for lower extremity ADLs:  LE Bathing: Setup, Verbal cueing, Increased time to complete, Contact guard assistance, Minimal assistance  LE Dressing: Minimal assistance    Current functional status for bed, chair, wheelchair transfers:  Transfers  Sit to Stand: Contact guard assistance  Stand to sit: Contact guard assistance  Comment: Transfers completed w/RW 3x    Current functional status for toilet transfers: Toilet Transfers  Toilet - Technique: Ambulating  Equipment Used: Standard toilet  Toilet Transfer: Minimal assistance  Toilet Transfers Comments: utilizing RW and hand rails    Current functional status for locomotion:  Ambulation  WB Status: FWB  Ambulation  Surface: level tile  Device: Rolling Walker  Assistance: Contact guard assistance  Quality of Gait: Narrow BRAD, step to gait pattern with LLE leading  Gait Deviations: Slow [place], Decreased step length, Decreased step height  Distance: 30ft x 2  Comments: Extremely slow orquidea with narrow BRAD. No LOB experienced. VC's required to steer clear from walls/corners, VC's to increase step length  More Ambulation?: No    Current functional status for comprehension: Moderate assistance    Current functional status for expression: Maximal Assistance    Current functional status for social interaction: Close supervision    Current functional status for problem solving: Moderate assistance    Current functional status for memory:  Moderate assistance    Current Deficits R/T Impairment: Impaired Functional Mobility and Decreased ADLs    Required Therapy:   [x] Physical Therapy  [x] Occupational Therapy   [x] Speech Therapy, as appropriate    Additional Services:    [x]     [] Recreational Therapy, as appropriate    [x] Nutrition Consult, as appropriate  [] Dietary Needs/Preferences  [] Dialysis  [] Cultural Needs  [] Special Equipment Needs  [] Special Medication Needs  [] Other    Rehab Justification:  Needs 3 hrs therapy per day or 15 hours per week:  Yes  Identified Rehab Nursing needs: Yes  Intense Interdisciplinary need:  Yes  Need for 24 hr physician supervision:  Yes  Measurable improved quality of life:  Yes  Willingness to participate:  Yes  Medical Necessity:  Yes  Patient able to tolerate care proposed:  Yes    Expected Discharge Destination/Functional Level:  Home with assist  Expected length of time to achieve that level of improvement: 1-2 weeks  Expected Post Discharge Treatments: Home with possible Home Care    Other information relevant to patient's care needs:  N/A    Acute Inpatient Rehabilitation Disclosure Statement will be provided to patient upon admission to ARU with patient's verbalization of understanding. I have reviewed and concur with the findings and results of the pre-admission screening assessment completed by the Inpatient Rehabilitation Admissions Coordinator.

## 2022-08-10 NOTE — CARE COORDINATION
Transitional Plannin: Patient approved for [facility]. Transport to pick him up at 2:00 PM. Packet Made.  Updated Patient, Facility and Nurse
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
## 2022-08-10 NOTE — PLAN OF CARE
Problem: Chronic Conditions and Co-morbidities  Goal: Patient's chronic conditions and co-morbidity symptoms are monitored and maintained or improved  Outcome: Progressing     Problem: Pain  Goal: Verbalizes/displays adequate comfort level or baseline comfort level  8/10/2022 0532 by Thang Hidalgo RN  Outcome: Progressing  8/9/2022 1638 by Roman Coto RN  Outcome: Progressing     Problem: Safety - Adult  Goal: Free from fall injury  8/10/2022 0532 by Thang Hidalgo RN  Outcome: Progressing  8/9/2022 1638 by Roman Coto RN  Outcome: Progressing     Problem: Discharge Planning  Goal: Discharge to home or other facility with appropriate resources  8/10/2022 0532 by Thang Hidalgo RN  Outcome: Progressing  8/9/2022 1638 by Roman Coto RN  Outcome: Progressing     Problem: ABCDS Injury Assessment  Goal: Absence of physical injury  Outcome: Progressing     Problem: Skin/Tissue Integrity  Goal: Absence of new skin breakdown  Description: 1. Monitor for areas of redness and/or skin breakdown  2. Assess vascular access sites hourly  3. Every 4-6 hours minimum:  Change oxygen saturation probe site  4. Every 4-6 hours:  If on nasal continuous positive airway pressure, respiratory therapy assess nares and determine need for appliance change or resting period.   Outcome: Progressing     Problem: Neurosensory - Adult  Goal: Achieves stable or improved neurological status  8/10/2022 0532 by Thang Hidalgo RN  Outcome: Progressing  8/9/2022 1638 by Roman Coto RN  Outcome: Progressing  Goal: Absence of seizures  8/10/2022 0532 by Thang Hidalgo RN  Outcome: Progressing  8/9/2022 1638 by Roman Coto RN  Outcome: Progressing  Goal: Remains free of injury related to seizures activity  8/10/2022 0532 by Thang Hidalgo RN  Outcome: Progressing  8/9/2022 1638 by Roman Coto RN  Outcome: Progressing  Goal: Achieves maximal functionality and self care  8/10/2022 0532 by Suha Waller RN  Outcome: Progressing  8/9/2022 1638 by Marbella Freeman RN  Outcome: Progressing     Problem: Respiratory - Adult  Goal: Achieves optimal ventilation and oxygenation  8/10/2022 0532 by Suha Waller RN  Outcome: Progressing  8/9/2022 1638 by Marbella Freeman RN  Outcome: Progressing     Problem: Cardiovascular - Adult  Goal: Maintains optimal cardiac output and hemodynamic stability  8/10/2022 0532 by Suha Waller RN  Outcome: Progressing  8/9/2022 1638 by Marbella Freeman RN  Outcome: Progressing  Goal: Absence of cardiac dysrhythmias or at baseline  8/10/2022 0532 by Suha Waller RN  Outcome: Progressing  8/9/2022 1638 by Marbella Freeman RN  Outcome: Progressing     Problem: Skin/Tissue Integrity - Adult  Goal: Skin integrity remains intact  8/10/2022 0532 by Suha Waller RN  Outcome: Progressing  8/9/2022 1638 by Marbella Freeman RN  Outcome: Progressing  Goal: Incisions, wounds, or drain sites healing without S/S of infection  8/10/2022 0532 by Shua Waller RN  Outcome: Progressing  8/9/2022 1638 by Marbella Freeman RN  Outcome: Progressing  Goal: Oral mucous membranes remain intact  8/10/2022 0532 by Shua Waller RN  Outcome: Progressing  8/9/2022 1638 by Marbella Freeman RN  Outcome: Progressing     Problem: Musculoskeletal - Adult  Goal: Return mobility to safest level of function  8/10/2022 0532 by Suha Waller RN  Outcome: Progressing  8/9/2022 1638 by Marbella Freeman RN  Outcome: Progressing  Goal: Maintain proper alignment of affected body part  8/10/2022 0532 by Suha Waller RN  Outcome: Progressing  8/9/2022 1638 by Marbella Freeman RN  Outcome: Progressing  Goal: Return ADL status to a safe level of function  8/10/2022 0532 by Suha Waller RN  Outcome: Progressing  8/9/2022 1638 by Marbella Freeman RN  Outcome: Progressing     Problem: Gastrointestinal - Adult  Goal: Minimal or absence of nausea and vomiting  8/10/2022 0532 by Denis Carrizales RN  Outcome: Progressing  8/9/2022 1638 by Barbara Cheung RN  Outcome: Progressing  Goal: Maintains or returns to baseline bowel function  8/10/2022 0532 by Denis Carrizales RN  Outcome: Progressing  8/9/2022 1638 by Barbara Cheung RN  Outcome: Progressing  Goal: Establish and maintain optimal ostomy function  8/10/2022 0532 by Denis Carrizales RN  Outcome: Progressing  8/9/2022 1638 by Barbara Cheung RN  Outcome: Progressing     Problem: Genitourinary - Adult  Goal: Absence of urinary retention  8/10/2022 0532 by Denis Carrizales RN  Outcome: Progressing  8/9/2022 1638 by Barbara Cheung RN  Outcome: Progressing     Problem: Infection - Adult  Goal: Absence of infection at discharge  8/10/2022 0532 by Denis Carrizales RN  Outcome: Progressing  8/9/2022 1638 by Barbara Cheung RN  Outcome: Progressing  Goal: Absence of infection during hospitalization  8/10/2022 0532 by Denis Carrizales RN  Outcome: Progressing  8/9/2022 1638 by Barbara Cheung RN  Outcome: Progressing  Goal: Absence of fever/infection during anticipated neutropenic period  8/10/2022 0532 by Denis Carrizales RN  Outcome: Progressing  8/9/2022 1638 by Barbara Cheung RN  Outcome: Progressing     Problem: Metabolic/Fluid and Electrolytes - Adult  Goal: Electrolytes maintained within normal limits  8/10/2022 0532 by Denis Carrizales RN  Outcome: Progressing  8/9/2022 1638 by Barbara Cheung RN  Outcome: Progressing  Goal: Hemodynamic stability and optimal renal function maintained  8/10/2022 0532 by Denis Carrizales RN  Outcome: Progressing  8/9/2022 1638 by Barbara Cheung RN  Outcome: Progressing  Goal: Glucose maintained within prescribed range  8/10/2022 0532 by Denis Carrizales RN  Outcome: Progressing  8/9/2022 1638 by Barbara Cheung RN  Outcome: Progressing     Problem: Hematologic - Adult  Goal: Maintains hematologic stability  8/10/2022 0532 by Malachi Alcantara RN  Outcome: Progressing  8/9/2022 1638 by Bolling Dancer, RN  Outcome: Progressing     Problem: Anxiety  Goal: Will report anxiety at manageable levels  Description: INTERVENTIONS:  1. Administer medication as ordered  2. Teach and rehearse alternative coping skills  3. Provide emotional support with 1:1 interaction with staff  8/10/2022 0532 by Malachi Alcantara RN  Outcome: Progressing  8/9/2022 1638 by Bolling Dancer, RN  Outcome: Progressing     Problem: Coping  Goal: Pt/Family able to verbalize concerns and demonstrate effective coping strategies  Description: INTERVENTIONS:  1. Assist patient/family to identify coping skills, available support systems and cultural and spiritual values  2. Provide emotional support, including active listening and acknowledgement of concerns of patient and caregivers  3. Reduce environmental stimuli, as able  4. Instruct patient/family in relaxation techniques, as appropriate  5. Assess for spiritual pain/suffering and initiate Spiritual Care, Psychosocial Clinical Specialist consults as needed  8/10/2022 0532 by Malachi Alcantara RN  Outcome: Progressing  8/9/2022 1638 by Bolling Dancer, RN  Outcome: Progressing     Problem: Decision Making  Goal: Pt/Family able to effectively weigh alternatives and participate in decision making related to treatment and care  Description: INTERVENTIONS:  1. Determine when there are differences between patient's view, family's view, and healthcare provider's view of condition  2. Facilitate patient and family articulation of goals for care  3. Help patient and family identify pros/cons of alternative solutions  4. Provide information as requested by patient/family  5. Respect patient/family right to receive or not to receive information  6. Serve as a liaison between patient and family and health care team  7.  Initiate Consults from Ethics, Palliative Care or initiate [facility] as is appropriate  8/10/2022 0532 by Suha Waller RN  Outcome: Progressing  8/9/2022 1638 by Marbella Freeman RN  Outcome: Progressing     Problem: Behavior  Goal: Pt/Family maintain appropriate behavior and adhere to behavioral management agreement, if implemented  Description: INTERVENTIONS:  1. Assess patient/family's coping skills and  non-compliant behavior (including use of illegal substances)  2. Notify security of behavior or suspected illegal substances which indicate the need for search of the family and/or belongings  3. Encourage verbalization of thoughts and concerns in a socially appropriate manner  4. Utilize positive, consistent limit setting strategies supporting safety of patient, staff and others  5. Encourage participation in the decision making process about the behavioral management agreement  6. If a visitor's behavior poses a threat to safety call refer to organization policy. 7. Initiate consult with , Psychosocial CNS, Spiritual Care as appropriate  8/10/2022 0532 by Suha Waller RN  Outcome: Progressing  8/9/2022 1638 by Marbella Freeman RN  Outcome: Progressing     Problem: Spiritual Care  Goal: Pt/Family able to move forward in process of forgiving self, others, and/or higher power  Description: INTERVENTIONS:  1. Assist patient/family to overcome blocks to healing by use of spiritual practices (prayer, meditation, guided imagery, reiki, breath work, etc). 2. De-myth guilt and help patient/family identify possible irrational spiritual/cultural beliefs and values. 3. Explore possibilities of making amends & reconciliation with self, others, and/or a greater power. 4. Guide patient/family in identifying painful feelings of guilt. 5. Help patient/famiy explore and identify spiritual beliefs, cultural understandings or values that may help or hinder letting go of issue.   6. Help patient/family explore feelings of anger, bitterness, resentment. 7. Help patient/family identify and examine the situation in which these feelings are experienced. 8. Help patient/family identify destructive displacement of feelings onto other individuals. 9. Invite use of sacraments/rituals/ceremonies as appropriate (e.g. - confession, anointing, smudging). 10. Refer patient/family to formal counseling and/or to shanthi community for further support work.   8/10/2022 0532 by Kurt Parr RN  Outcome: Progressing  8/9/2022 1638 by Dhiraj Clay RN  Outcome: Progressing
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
## 2022-08-10 NOTE — PROGRESS NOTES
3717  Report called to [address] [place] [address]. Report given to Josette Oliva RN. All questions were answered. All belongings sent with transport. Transported by [employer] to [address] [place] [address]. negative - no cough

## 2022-08-10 NOTE — PROGRESS NOTES
Rcv'd vm from Dago @ [facility] who states pt is approved for ARU and [place] [facility] is in network. Also rcv'd approval from Romulo Wu Director for pt to admit to [facility]. Notified DECLAN Ochoa CM, and requested d/c readmit be completed with continuation of DVT prophylaxis and [place] to [phone]. Admission Assessment completed and Dr Jaimie Guy notified.

## 2022-08-10 NOTE — DISCHARGE SUMMARY
Renal note:    Events noted from yesterday  Patient is now level 4 comfort care  We will sign off  Please call with any questions  stenting, patient has been improving since then, echo was done EF 55% with a negative bubble study. PM&R recommended acute rehab. Procedures:   7/30- EEG    7/31-Diagnostic Cerebral Angiogram with Left ICA occlusion mechanical thrombectomy with stent retriever and mechanical aspiration, pre and post stenting balloon angioplasty, stenting with carotid wallstent. Any Hospital Acquired Infections: none    Discharge Functional Status:  stable    Disposition: [facility]    Patient Instructions: Follow up with Dr. Gayle Yeboah in 2 weeks and Dr. Bhavna Story in 3 months      Discharge Medications:  Discharge Medication List as of 8/10/2022 12:41 PM        CONTINUE these medications which have NOT CHANGED    Details   !! lisinopril (PRINIVIL;ZESTRIL) 10 MG tablet Historical Med      pantoprazole (PROTONIX) 40 MG tablet Historical Med      albuterol sulfate HFA (PROVENTIL;VENTOLIN;PROAIR) 108 (90 Base) MCG/ACT inhaler inhale 2 puffs by mouth every 6 hours if needed for wheezing, Disp-18 g, R-5Normal      isosorbide mononitrate (IMDUR) 60 MG extended release tablet Take 1 tablet by mouth in the morning., Disp-30 tablet, R-3Normal      amLODIPine (NORVASC) 10 MG tablet Take 1 tablet by mouth in the morning., Disp-30 tablet, R-3Normal      !! lisinopril (PRINIVIL;ZESTRIL) 20 MG tablet Take 0.5 tablets by mouth in the morning., Disp-30 tablet, R-2Normal      aspirin 81 MG EC tablet Take 81 mg by mouth in the morning. Historical Med      carvedilol (COREG) 25 MG tablet Take 1 tablet by mouth in the morning and 1 tablet in the evening. Take with meals. , Disp-60 tablet, R-3Normal      nitroGLYCERIN (NITROSTAT) 0.4 MG SL tablet Place 1 tablet under the tongue every 5 minutes as needed for Chest pain up to max of 3 total doses.  If no relief after 1 dose, call 911., Disp-25 tablet, R-3Normal      DULoxetine (CYMBALTA) 30 MG extended release capsule TAKE 1 CAPSULE BY MOUTH DAILY, Disp-30 capsule, R-2Normal      atorvastatin (LIPITOR) 40 MG tablet TAKE 1 TABLET BY MOUTH DAILY, Disp-30 tablet, R-3Normal       !! - Potential duplicate medications found. Please discuss with provider.         STOP taking these medications       metoprolol succinate (TOPROL XL) 25 MG extended release tablet Comments:   Reason for Stopping:         spironolactone (ALDACTONE) 25 MG tablet Comments:   Reason for Stopping:         metoclopramide (REGLAN) 10 MG tablet Comments:   Reason for Stopping:               Activity: activity as tolerated    Restrictions: Driving Yes, Swimming Yes, Operating heavy machinery Yes, Compromising heights Yes    Diet: regular diet and Easy to Chew    Follow-up:    Raj Lima, [address] [address] [address] [place] [address] [address] [phone]    Call in 1 day  For ER follow-up    [facility]  [address] [facility] [address] [phone]  Go to   If symptoms worsen    Shekhar Adair, [address] [facility]  [phone]    Schedule an appointment as soon as possible for a visit in 2 week(s)  Follow up for stroke    Amanda Bobby MD  [address] [place] [address], [facility] [address]  MOB # [address] [facility]  [phone]    Follow up in 3 month(s)  follow up for stroke      Follow up labs: None    Follow up imaging: None    Note that over 30 minutes was spent in preparing discharge papers, discussing discharge with patient, medication review, etc.      Candelario Chan MD,  Department of Neurology  [address]  8/10/2022, 5:45 PM

## 2022-08-10 NOTE — PROGRESS NOTES
ACUTE REHABILITATION ADMISSION    Patient admitted to the Inpatient Rehabilitation Unit via Wheelchair at [phone]   (Time of Arrival). Patient oriented to room, unit and fall prevention safety measures. Education provided on the rehabilitation routine: three hours of therapy five days per week. Admitting medication orders compared with acute stay medications; home medication list reviewed with patient/family. Medication issues identified No  Medication issue: N/A    Admitting medication orders reviewed with Acute Rehab PM&R Physician: Mathew Torres MD    Skin assessment performed by Luis Tavera and Tanna Ramirez all active alterations in skin integrity, wounds, lines, drains and airways assessed, measured, recorded and reconciled. Refer to [facility] avatar and LDA flowsheet for additional information. Wound care consult order needed for complex wounds or pressure injuries? No If yes, contact physician for order. Bladder and Bowel Function Assessment:  1. Prior history of bladder problems: Incontinent  2. Number of pads used per day:  N/A  3. Frequency of night time voiding:  Unsure at this time  4. Fluid intake volume and pattern: Unkown at this time  5. Last BM: 8/8/22  6. Prior history of bowel problems: No      Incontinence     Frequent diarrhea     Constipation     Hemorrhoids     Diverticulitis     Bowel Surgery    Care plan was created with patient's input and goals were agreed upon. Admission folder with the following documents provided:  1. \"[facility] [address] [address] Individualized Disclosure Statement\"  2. \"Data Collection Information Summary for Patients in Inpatient Rehabilitation Facilities\"  3. \"Privacy Act Statement - Health Care Records\"    Please refer to the admission navigator for further information.

## 2022-08-10 NOTE — PLAN OF CARE
Problem: Discharge Planning  Goal: Discharge to home or other facility with appropriate resources  Outcome: Progressing     Problem: Safety - Adult  Goal: Free from fall injury  Outcome: Progressing     Problem: ABCDS Injury Assessment  Goal: Absence of physical injury  Outcome: Progressing  Flowsheets (Taken 8/10/2022 9408)  Absence of Physical Injury: Implement safety measures based on patient assessment     Problem: Skin/Tissue Integrity  Goal: Absence of new skin breakdown  Description: 1. Monitor for areas of redness and/or skin breakdown  2. Assess vascular access sites hourly  3. Every 4-6 hours minimum:  Change oxygen saturation probe site  4. Every 4-6 hours:  If on nasal continuous positive airway pressure, respiratory therapy assess nares and determine need for appliance change or resting period.   Outcome: Progressing
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
## 2022-08-10 NOTE — PROGRESS NOTES
Occupational Therapy  Facility/Department: [address] STEP DOWN  Occupational Therapy Daily Treatment Note    Name: Genet Chua  : 1965  MRN: [id number]  Date of Service: 8/10/2022    Discharge Recommendations:  Further therapy recommended at discharge. The patient should be able to tolerate at least 3 hours of therapy per day over 5 days or 15 hours over 7 days. This patient may benefit from a Physical Medicine and Rehab consult. Assessment   Performance deficits / Impairments: Decreased functional mobility ; Decreased safe awareness;Decreased balance;Decreased ADL status; Decreased cognition;Decreased endurance;Decreased high-level IADLs;Decreased strength  Prognosis: Good  REQUIRES OT FOLLOW-UP: Yes  Activity Tolerance  Activity Tolerance: Patient Tolerated treatment well;Patient limited by fatigue;Treatment limited secondary to decreased cognition        Plan   Plan  Times per Week: 5-6x/wk  Times per Day: Daily  Current Treatment Recommendations: Strengthening, ROM, Balance training, Functional mobility training, Endurance training, Cognitive reorientation, Neuromuscular re-education, Safety education & training, Patient/Caregiver education & training, Self-Care / ADL, Home management training, Coordination training     Restrictions  Restrictions/Precautions  Restrictions/Precautions: Up as Tolerated, General Precautions  Required Braces or Orthoses?: No    Subjective   General  Chart Reviewed: Yes  Family / Caregiver Present: No  General Comment  Comments: Pt and RN agreeable to therapy this day. Pt supine in bed at start of session and retired to supine in bed at session end with bed alarm on and call light in reach. Pt denied pain during session            Objective           Safety Devices  Type of Devices: Gait belt;Left in bed;Call light within reach; Bed alarm in place; Patient at risk for falls;Nurse notified  Restraints  Restraints Initially in Place: No  Balance  Sitting: Without support (SUP static/dynamic sitting during ADLs at shower and EOB tolerating approx 20 min)  Standing: With support (Min A static/dynamic standing during ADLs with RW and grab bars tolerating approx 5 min)  Gait  Overall Level of Assistance: Minimum assistance (EOB><bathroom utilizing RW req assist for room navigation pt made multiple points of contact with furniture req assist to correct)  [address] - Transfer From: Asia Martinez - Transfer Type: To and From  Shower - Transfer To: Shower seat with back  Shower - Technique: Ambulating  Shower Transfers: Minimal assistance  Shower Transfers Comments: req assist for proper hand placement and walker management     ADL  UE Bathing: Stand by assistance;Setup;Verbal cueing  UE Bathing Skilled Clinical Factors: seated in shower  LE Bathing: Minimal assistance;Setup;Verbal cueing; Increased time to complete  LE Bathing Skilled Clinical Factors: seated/standing utilizing grab bars req assist with B feet  UE Dressing: Minimal assistance;Verbal cueing;Setup  UE Dressing Skilled Clinical Factors: To doff/don gown standing req assist with threading  LE Dressing: Moderate assistance;Setup;Verbal cueing; Increased time to complete  LE Dressing Skilled Clinical Factors: To doff/don socks pt able to doff B socks req assist to don demo decreased hip flexion  Additional Comments: Throughout session pt limited per fatigue, decreased strength and decreased cognition        Bed mobility  Supine to Sit: Minimal assistance (req assist with trunk)  Sit to Supine: Stand by assistance  Scooting: Stand by assistance  Bed Mobility Comments: HOB flat utilizing bed rails req increased time  Transfers  Sit to stand: Contact guard assistance  Stand to sit: Contact guard assistance  Transfer Comments: utilizing RW     Cognition  Overall Cognitive Status: Exceptions  Following Commands: Follows multistep commands with repitition; Follows multistep commands with increased time  Attention Span: Attends with cues to redirect  Safety Judgement: Decreased awareness of need for assistance;Decreased awareness of need for safety  Problem Solving: Assistance required to correct errors made;Assistance required to identify errors made  Insights: Decreased awareness of deficits  Initiation: Requires cues for some  Sequencing: Requires cues for some  Orientation  Overall Orientation Status: Impaired  Orientation Level: Oriented to place;Oriented to situation;Oriented to person;Disoriented to time                  Education Given To: Patient  Education Provided: Role of Therapy;Transfer Training;ADL Adaptive Strategies  Education Provided Comments: proper hand and foot placement; balance maintaince; walker management; 4 figure technique-F carry over  Education Method: Demonstration;Verbal  Education Outcome: Continued education needed                                                                     AM-PAC Score        AM-[facility] Inpatient Daily Activity Raw Score: 17 (08/10/22 1703)  AM-PAC Inpatient ADL T-Scale Score : 37.26 (08/10/22 1703)  ADL Inpatient CMS 0-100% Score: 50.11 (08/10/22 1703)  ADL Inpatient CMS G-Code Modifier : CK (08/10/22 1703)      Goals  Short Term Goals  Time Frame for Short term goals: by discharge  Short Term Goal 1: pt will tolerate 5+ minutes of standing with use of least restrictive AD, w/out rest break (updated by SRINIVASAN Bowen on 8/8)  Short Term Goal 2: pt will perform UB ADLs at mod I and use of least restrictive AE/DME (updated by SRINIVASAN Hart on 8/8)  Short Term Goal 3: pt will perform functional mobility with at SBA utilizing least restrictive AD (updated by SRINIVASAN Hart on 8/8)  Short Term Goal 4: pt will perform LB ADLs with modified independence and use of least restrictive AE/DME  Short Term Goal 5: pt will utilize compensatory strategies with mod VC for increasing participation in functional tasks (pt will perform functional transfers with CGA and use of least restrictive AD for increasing participation in functional tasks)       Therapy Time   Individual Concurrent Group Co-treatment   Time In 0939         Time Out 1010         Minutes 31         Timed Code Treatment Minutes: [address] [facility]

## 2022-08-11 LAB
ABSOLUTE EOS #: 0.6 K/UL (ref 0–0.4)
ABSOLUTE LYMPH #: 1.5 K/UL (ref 1–4.8)
ABSOLUTE MONO #: 0.5 K/UL (ref 0.1–1.3)
ALBUMIN SERPL-MCNC: 3.6 G/DL (ref 3.5–5.2)
ALP BLD-CCNC: 151 U/L (ref 40–129)
ALT SERPL-CCNC: 9 U/L (ref 5–41)
ANION GAP SERPL CALCULATED.3IONS-SCNC: 9 MMOL/L (ref 9–17)
AST SERPL-CCNC: 14 U/L
BASOPHILS # BLD: 2 % (ref 0–2)
BASOPHILS ABSOLUTE: 0.1 K/UL (ref 0–0.2)
BILIRUB SERPL-MCNC: 0.51 MG/DL (ref 0.3–1.2)
BILIRUBIN DIRECT: 0.14 MG/DL
BILIRUBIN, INDIRECT: 0.37 MG/DL (ref 0–1)
BUN BLDV-MCNC: 16 MG/DL (ref 6–20)
CALCIUM SERPL-MCNC: 9.2 MG/DL (ref 8.6–10.4)
CHLORIDE BLD-SCNC: 100 MMOL/L (ref 98–107)
CO2: 26 MMOL/L (ref 20–31)
CREAT SERPL-MCNC: 1.35 MG/DL (ref 0.7–1.2)
EOSINOPHILS RELATIVE PERCENT: 9 % (ref 0–4)
GFR AFRICAN AMERICAN: >60 ML/MIN
GFR NON-AFRICAN AMERICAN: 54 ML/MIN
GFR SERPL CREATININE-BSD FRML MDRD: ABNORMAL ML/MIN/{1.73_M2}
GLUCOSE BLD-MCNC: 100 MG/DL (ref 75–110)
GLUCOSE BLD-MCNC: 97 MG/DL (ref 70–99)
HCT VFR BLD CALC: 36.6 % (ref 41–53)
HEMOGLOBIN: 12.2 G/DL (ref 13.5–17.5)
LYMPHOCYTES # BLD: 24 % (ref 24–44)
MCH RBC QN AUTO: 27.7 PG (ref 26–34)
MCHC RBC AUTO-ENTMCNC: 33.3 G/DL (ref 31–37)
MCV RBC AUTO: 83.3 FL (ref 80–100)
MONOCYTES # BLD: 7 % (ref 1–7)
PDW BLD-RTO: 19.1 % (ref 11.5–14.9)
PLATELET # BLD: 171 K/UL (ref 150–450)
PMV BLD AUTO: 8.3 FL (ref 6–12)
POTASSIUM SERPL-SCNC: 3.8 MMOL/L (ref 3.7–5.3)
RBC # BLD: 4.39 M/UL (ref 4.5–5.9)
SEG NEUTROPHILS: 58 % (ref 36–66)
SEGMENTED NEUTROPHILS ABSOLUTE COUNT: 3.6 K/UL (ref 1.3–9.1)
SODIUM BLD-SCNC: 135 MMOL/L (ref 135–144)
TOTAL PROTEIN: 6.5 G/DL (ref 6.4–8.3)
WBC # BLD: 6.3 K/UL (ref 3.5–11)

## 2022-08-11 PROCEDURE — 80076 HEPATIC FUNCTION PANEL: CPT

## 2022-08-11 PROCEDURE — 97166 OT EVAL MOD COMPLEX 45 MIN: CPT

## 2022-08-11 PROCEDURE — 6370000000 HC RX 637 (ALT 250 FOR IP)

## 2022-08-11 PROCEDURE — 80048 BASIC METABOLIC PNL TOTAL CA: CPT

## 2022-08-11 PROCEDURE — 85025 COMPLETE CBC W/AUTO DIFF WBC: CPT

## 2022-08-11 PROCEDURE — 6360000002 HC RX W HCPCS

## 2022-08-11 PROCEDURE — 97110 THERAPEUTIC EXERCISES: CPT

## 2022-08-11 PROCEDURE — 36415 COLL VENOUS BLD VENIPUNCTURE: CPT

## 2022-08-11 PROCEDURE — 1180000000 HC REHAB R&B

## 2022-08-11 PROCEDURE — 97530 THERAPEUTIC ACTIVITIES: CPT

## 2022-08-11 PROCEDURE — 82947 ASSAY GLUCOSE BLOOD QUANT: CPT

## 2022-08-11 PROCEDURE — 99232 SBSQ HOSP IP/OBS MODERATE 35: CPT | Performed by: INTERNAL MEDICINE

## 2022-08-11 PROCEDURE — 97535 SELF CARE MNGMENT TRAINING: CPT

## 2022-08-11 PROCEDURE — 99232 SBSQ HOSP IP/OBS MODERATE 35: CPT | Performed by: STUDENT IN AN ORGANIZED HEALTH CARE EDUCATION/TRAINING PROGRAM

## 2022-08-11 PROCEDURE — 97116 GAIT TRAINING THERAPY: CPT

## 2022-08-11 PROCEDURE — 92523 SPEECH SOUND LANG COMPREHEN: CPT

## 2022-08-11 PROCEDURE — 97162 PT EVAL MOD COMPLEX 30 MIN: CPT

## 2022-08-11 PROCEDURE — 6370000000 HC RX 637 (ALT 250 FOR IP): Performed by: STUDENT IN AN ORGANIZED HEALTH CARE EDUCATION/TRAINING PROGRAM

## 2022-08-11 RX ORDER — AMLODIPINE BESYLATE 5 MG/1
5 TABLET ORAL DAILY
Status: DISCONTINUED | OUTPATIENT
Start: 2022-08-12 | End: 2022-08-12

## 2022-08-11 RX ORDER — ATORVASTATIN CALCIUM 40 MG/1
40 TABLET, FILM COATED ORAL NIGHTLY
Status: DISCONTINUED | OUTPATIENT
Start: 2022-08-12 | End: 2022-08-20 | Stop reason: HOSPADM

## 2022-08-11 RX ORDER — ISOSORBIDE MONONITRATE 30 MG/1
30 TABLET, EXTENDED RELEASE ORAL DAILY
Status: DISCONTINUED | OUTPATIENT
Start: 2022-08-12 | End: 2022-08-20 | Stop reason: HOSPADM

## 2022-08-11 RX ADMIN — CARVEDILOL 25 MG: 25 TABLET, FILM COATED ORAL at 08:23

## 2022-08-11 RX ADMIN — ASPIRIN 81 MG: 81 TABLET, COATED ORAL at 08:24

## 2022-08-11 RX ADMIN — POLYETHYLENE GLYCOL 3350 17 G: 17 POWDER, FOR SOLUTION ORAL at 08:24

## 2022-08-11 RX ADMIN — ENOXAPARIN SODIUM 40 MG: 100 INJECTION SUBCUTANEOUS at 08:24

## 2022-08-11 RX ADMIN — CLOPIDOGREL BISULFATE 75 MG: 75 TABLET ORAL at 08:23

## 2022-08-11 RX ADMIN — ISOSORBIDE MONONITRATE 60 MG: 30 TABLET, EXTENDED RELEASE ORAL at 08:23

## 2022-08-11 RX ADMIN — ATORVASTATIN CALCIUM 40 MG: 40 TABLET, FILM COATED ORAL at 08:23

## 2022-08-11 RX ADMIN — AMLODIPINE BESYLATE 10 MG: 10 TABLET ORAL at 08:23

## 2022-08-11 RX ADMIN — CARVEDILOL 25 MG: 25 TABLET, FILM COATED ORAL at 17:17

## 2022-08-11 RX ADMIN — DULOXETINE 30 MG: 30 CAPSULE, DELAYED RELEASE ORAL at 08:23

## 2022-08-11 ASSESSMENT — ENCOUNTER SYMPTOMS
ABDOMINAL PAIN: 0
DOUBLE VISION: 0
BACK PAIN: 0
BLURRED VISION: 0
SHORTNESS OF BREATH: 0

## 2022-08-11 ASSESSMENT — 9 HOLE PEG TEST
TESTTIME_SECONDS: 77
TEST_RESULT: IMPAIRED
TESTTIME_SECONDS: 78
TEST_RESULT: IMPAIRED
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
## 2022-08-11 NOTE — CARE COORDINATION
Discharge [facility] Case Management Department  Written by: India Monae RN    Patient Name: Emelyn Hicks  Attending Provider: No att. providers found  Admit Date: 2022  4:33 PM  MRN: [id number]  Account: [de-identified]                     : 1965  Discharge Date: 8/10/2022      Disposition: [facility]-. Yeimi Robles RN

## 2022-08-11 NOTE — CARE COORDINATION
Patient Health Questionnaire-9 (PHQ-9)    Over the last 2 weeks, how often have you been bothered by any of the following problems? 1. Little Interest or pleasure in doing things? [x] Not at all  [] Several Days  [] More than half the day  []  Nearly every day    2. Feeling down, depressed or hopeless? [x] Not at all  [] Several Days  [] More than half the day  []  Nearly every day    3. Trouble falling or staying asleep, or sleeping too much? [x] Not at all  [] Several Days  [] More than half the day  []  Nearly every day    4. Feeling tired or having little energy? [x] Not at all  [] Several Days  [] More than half the day  []  Nearly every day    5. Poor apettite or overeating? [x] Not at all  [] Several Days  [] More than half the day  []  Nearly every day    6. Feeling bad about yourself-or that you are a failure or have let yourself or your family down? [x] Not at all  [] Several Days  [] More than half the day  []  Nearly every day    7. Trouble concentrating on things, such as reading the newspaper or watching television? [x] Not at all  [] Several Days  [] More than half the day  []  Nearly every day    8. Moving or speaking so slowly that other people could have noticed? Or the opposite-being so fidgety or restless that you have been moving around a lot more than usual?   [x] Not at all  [] Several Days  [] More than half the day  []  Nearly every day    9. Thoughts that you would be better off dead or of hurting yourself in some way? [x] Not at all  [] Several Days  [] More than half the day  []  Nearly every day    Total Score: 0    If you checked off any problems, how difficult have these problems made it for you to do your work, take care of things at home, or get along with other people?    [x] Not difficult at all  [] Somewhat Difficult  [] Very Difficult  []  Extremely Difficult     SW was not able to complete screening as pt was confused and not able to understand questions provided. SW will try back at a later date.
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
## 2022-08-11 NOTE — PROGRESS NOTES
Occupational Therapy  Facility/Department: [facility] ACUTE REHAB  Rehabilitation Occupational Therapy Evaluation       Date: 22  Patient Name: Marcia Salvador       Room: 0659/1206-37  MRN: [id number]  Account: [de-identified]   : 1965  (59 y.o.) Gender: male     Referring Practitioner: Kayla Worley MD  Diagnosis: Acute CVA (cerebrovascular accident)  Additional Pertinent Hx: Marcia Salvador is a 59 y.o. RHD male admitted to [facility] on 2022. Patient admitted after syncope and c/o headache, SOB. CT head was WNL. CT chest negative for PE. CXR showed atelectasis. He was admitted to observation status. Cardiology was consulted for syncope. Performed ICD interrogation and adjusted his antihypertensive medications. Known history of CABG, VFib arrest with recent ICD placement By Dr. Zain Reed on 22. He developed AMS on 22. Neurology was consulted and performed EEG which showed possible structural defect. He was found to have expressive aphasia and a NIHSS 10 with R sided weakness. 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 Neuro endovascular was consulted and CTA showed the same ICA occlusion. On 22 his NIHSS was worsened to 15. CT perfusion emergently showed ischemic penumbra L MCA and DARYN territory. Dr. Julieth Avila performed emergent mechanical thrombectomy and placed L carotid stent with balloon angioplasty on 22.     Restrictions  Restrictions/Precautions: Up as Tolerated;General Precautions  Implants present? :  (Recent [facility] at [facility]  Other position/activity restrictions: Do not elevate affected arm above shoulder for 30 days  -ICD implanted on 22  Required Braces or Orthoses?: No    Subjective  Subjective: Pt pleasant and agreeable to OT evaluation  Comments: Okay for OT per RN          Vitals  Temp: 97.5 °F (36.4 °C)  Heart Rate: 81  Resp: 16  BP: (!) 129/92  Oxygen Therapy  SpO2: 94 %  O2 Device: None (Room air)  Level of Consciousness: Alert (0)     08/11/22 1000   Vision   Vision [facility]   Hearing   Hearing [facility]     Objective     Cognition  Overall Cognitive Status: Exceptions  Arousal/Alertness: Delayed responses to stimuli  Following Commands: Follows one step commands with increased time; Follows one step commands with repetition  Attention Span: Attends with cues to redirect; Difficulty dividing attention  Memory: Decreased recall of recent events;Decreased recall of precautions;Decreased short term memory  Safety Judgement: Decreased awareness of need for assistance;Decreased awareness of need for safety  Problem Solving: Assistance required to correct errors made;Assistance required to identify errors made;Assistance required to implement solutions;Assistance required to generate solutions  Insights: Decreased awareness of deficits  Initiation: Requires cues for all  Sequencing: Requires cues for all  Cognition Comment: Able to respond with one word answers/short sentences  Orientation  Overall Orientation Status: Impaired  Orientation Level: Oriented to situation;Oriented to person;Disoriented to time;Disoriented to place   Perception  Overall Perceptual Status: [facility]  Sensation  Overall Sensation Status:  (Per pt, no deficits.)     ROM  LUE AROM (degrees)  LUE AROM : [facility]  LUE General AROM: Only tested to 90 due to ICD implanted  Left Hand AROM (degrees)  Left Hand AROM: WF  RUE AROM (degrees)  RUE AROM : [facility]  RUE General AROM: Only tested to 90 due to ICD implanted  Right Hand AROM (degrees)  Right Hand AROM: WFL  LUE Strength  Gross LUE Strength: [facility]  L Hand General: 4/5  LUE Strength Comment: Grossly 4/5  RUE Strength  Gross RUE Strength: WFL  R Hand General: 3+/5  RUE Strength Comment: Shoulder 3/5, otherwise 3+/5    Fine Motor Skills  Coordination  Movements Are Fluid And Coordinated: No  Coordination and Movement Description: Fine motor impairments;Decreased speed;Decreased accuracy; Right UE;Left UE       Hand Assessment  Hand peg board utilizing tripod grase and RUE. Pt demonstrated decreased speed with RUE, however demonstrated good accuracy with no droppage noted. Pt then participated in peg jumping activity to incorporate a cognitive component to the [facility] activity. Pt able to complete with RUE and utilizing a tripod grasp, leaving only 4 pegs in place. OT then facilitated pt completing puzzle pattern board using various shapes. Pt with increased time required to complete activity due to difficulty with arranging shapes in correct direction, however pattern able to correctly complete pattern.      OT scores     Eating  Assistance Needed: Setup or clean-up assistance  CARE Score: 5  Discharge Goal: Independent  Oral Hygiene  Assistance Needed: Supervision or touching assistance  CARE Score: 4  Discharge Goal: [address] needed: Partial/moderate assistance  CARE Score: 3  Discharge Goal: Independent  Shower/Bathe Self  Assistance Needed: Partial/moderate assistance  CARE Score: 3  Discharge Goal: Independent  Upper Body Dressing  Assistance Needed: Partial/moderate assistance  CARE Score: 3  Discharge Goal: Independent  Lower Body Dressing  Assistance Needed: Partial/moderate assistance  CARE Score: 3  Discharge Goal: Independent  Putting On/Taking Off Footwear  Assistance Needed: Partial/moderate assistance  CARE Score: 3  Discharge Goal: Independent  Toilet Transfer  Assistance needed: Partial/moderate assistance  CARE Score: 3  Discharge Goal: Independent    Goals  Patient Goals   Patient goals : Pt unable to verbalize  Short Term Goals  Time Frame for Short term goals: By 1 week  Short Term Goal 1: Pt will perform UB ADLs with Supervision and fair safety  Short Term Goal 2: Pt will perform LB ADLs with SBA, fair safety, and use of AE/mod techniques as needed  Short Term Goal 3: Pt will perform functional mobility/functional transfers with SBA, fair safety, with use of least restrictive device  Short Term Goal 4: Pt will actively participate in 30+ minutes of therapeutic exercise/functional activity to promote safety and independence with self-care and mobility  Short Term Goal 5: Pt will tolerate standing for 5+ minutes, SBA, with 0-1 UE support and no LOB while engaged in self-care/functional activity  Short Term Goal 6: Pt will follow 75% of 2-step commands to address cognitive concerns for improved participation in meaningful occupations  Short Term Goal 7: Pt will be educated on and explore use of DME/AE and modified techniques for increasing ease and independence with ADLs upon d/c  Long Term Goals  Time Frame for Long term goals : By discharge  Long Term Goal 1: Pt will perform BADLs with Mod I, fair safety, and use of AE/mod techiques as needed  Long Term Goal 2: Pt will perform functional transfers/mobility with Mod I, fair safety, and use of least restrictive device  Long Term Goal 3: Pt will tolerate standing for 10+ minutes, Mod I, with 0-1 UE support and no LOB noted during functional activity  Long Term Goal 4: Pt will perform self-care with improved L hand coordination as evidenced by 5 second improvement in 9 hole peg test  Long Term Goal 5: Pt will follow 100% of 2-step commands to address cognitive concerns for improved participation in meaningful occupations  Long Term Goal 6: Pt will demonstrate improved safety awareness with Min cues or less for hand placement/body mechanics/perceptual awareness during ADLs/functional transfers  Long Term Goal 7: Pt will participate in BUE FMC/strengthening tasks to improve ability to open small containers during self-care tasks  Long Term Goal 8: Pt will safely perform light housekeeping/meal preparation with supervision, good safety, and use of least restrictive device to improve independence with ADLs/IADLs    Assessment  Performance deficits / Impairments: Decreased functional mobility ; Decreased ADL status; Decreased ROM; Decreased strength;Decreased safe awareness;Decreased cognition;Decreased endurance;Decreased balance;Decreased high-level IADLs;Decreased fine motor control;Decreased coordination  Treatment Diagnosis: Impaired self-care status  Prognosis: Good  Decision Making: Medium Complexity  Discharge Recommendations: Continue to assess pending progress  Plan  Times per Week: 5-7  Times per Day: Twice a day  Current Treatment Recommendations: Strengthening;ROM;Balance training;Functional mobility training; Endurance training;Cognitive reorientation; Safety education & training;Patient/Caregiver education & training;Equipment evaluation, education, & procurement;Self-Care / ADL; Home management training;Coordination training  Education  Education Given To: Patient  Education Provided: Role of Therapy;Plan of Care;ADL Function;Mobility Training;Transfer Training  Education Method: Verbal  Barriers to Learning: Cognition  Education Outcome: Continued education needed    Therapy Time   Individual Concurrent Group Co-treatment   Time In 1000         Time Out 1130         Minutes MERRILL/Joe Patiño OTR/L

## 2022-08-11 NOTE — CONSULTS
Comprehensive Nutrition Assessment    Type and Reason for Visit:  Initial, Consult (Evaluate and Treat)    Nutrition Recommendations/Plan:   Continue current diet      Malnutrition Assessment:  Malnutrition Status:  No malnutrition (08/11/22 1750)    Context:  Acute Illness     Findings of the 6 clinical characteristics of malnutrition:  Energy Intake:  No significant decrease in energy intake  Weight Loss:  No significant weight loss     Body Fat Loss:  No significant body fat loss (Fat and mucle based on assessment 8/5)     Muscle Mass Loss:  No significant muscle mass loss    Fluid Accumulation:  Mild (Likely not related to nutritional status) Extremities   Strength:  Not Performed    Nutrition Assessment:    Pt admitted to rehab due to ADL and mobility deficits secondary to CVA. Pt is on an Easy to Chew. Spoke with SLP who states this diet appears appropriate in view of missing teeth and recent bedside swallow study. Pt unavailable upon several attempted visits. Nutrition Related Findings:    Edema: +1 RUE. Labs and meds reviewed. Hx: COPD, ADHD, CAD, GERD. Wound Type: Skin Tears       Current Nutrition Intake & Therapies:    Average Meal Intake: 51-75%, %     ADULT DIET; Easy to Chew    Anthropometric Measures:  Height: 5' 9\" (175.3 cm)  Ideal Body Weight (IBW): 160 lbs (73 kg)    Admission Body Weight: 195 lb 15.8 oz (88.9 kg) (method not specified)  Current Body Weight: 195 lb 15.8 oz (88.9 kg), 122.5 % IBW. Weight Source: Not Specified  Current BMI (kg/m2): 28.9  Usual Body Weight: 197 lb 12 oz (89.7 kg) (2/22/22)  % Weight Change (Calculated): -0.9                    BMI Categories: Overweight (BMI 25.0-29. 9)    Estimated Daily Nutrient Needs:  Energy Requirements Based On: Formula  Weight Used for Energy Requirements: Admission  Energy (kcal/day): 3539-1148 based on [address] with 1.1-1.2 factor  Weight Used for Protein Requirements: Ideal  Protein (g/day): 88-95 based on 1.2-1.3 gm per kg      Nutrition Diagnosis:   Biting/chewing (masticatory) difficulty related to partial or complete edentulism as evidenced by swallow study results    Nutrition Interventions:   Food and/or Nutrient Delivery: Continue Current Diet  Nutrition Education/Counseling: No recommendation at this time  Coordination of Nutrition Care: Continue to monitor while inpatient       Goals:     Goals: Meet at least 75% of estimated needs (with good tolerance to diet)       Nutrition Monitoring and Evaluation:   Behavioral-Environmental Outcomes: None Identified     Physical Signs/Symptoms Outcomes: Biochemical Data, Chewing or Swallowing, Weight, Skin    Discharge Planning:     Too soon to determine     Ingrid Vidales RD, LD  Contact: [phone]

## 2022-08-11 NOTE — CARE COORDINATION
CASE MANAGEMENT NOTE:    Admission Date:  8/10/2022 Ender Meléndez is a 59 y.o.  male    Admitted for : Acute CVA (cerebrovascular accident) [facility]) [I63.9]    Met with:  Family, SW called Kristen Isidro pt niece who is also POA as pt was confused and not able to answer questions. PCP:  Isamar Yang MD                                Insurance:  Cecily Reyna      Is patient alert and oriented at time of discussion:  NA    Current Residence/ Living Arrangements:  at home dependent on family care           Does patient have 24 hour assistance at home:  Yes, Kristen Isidro said she would make it happen, has family in healthcare who could help potentially. Current Services PTA:  No    Does patient go to outpatient dialysis: No  If yes, location and chair time: n/a    Is patient agreeable to VNS/Outpatient therapy Yes    Irons of choice provided:  Yes    List of Home Care Agencies/Outpatient therapy provided: No    VNS/Outpatient therapy chosen: Has had [facility] in the past.    DME:  shower chair, blood pressure machine    Home Oxygen: No    Nebulizer: No    CPAP/BIPAP: No    Supplier: N/A    Handicap Placard: No    Potential Assistance Needed: Yes    Pharmacy:  [address] [place] [address], gets delivered. Does Patient want to use MEDS to BEDS? No    Is patient currently receiving oral anticoagulation therapy? No    Family Members/Caregivers that pt would like involved in their care:    Yes    If yes, list name here:  Zoe Green    Transportation Provider:  Family             Discharge Plan:  Home with Kristen Isidro and [facility]. if needed. Electronically signed by:  MARJORIE Saldivar on 8/11/2022 at 8:57 AM
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
## 2022-08-11 NOTE — PLAN OF CARE
Problem: Safety - Adult  Goal: Free from fall injury  8/11/2022 1611 by Mahogany Go RN  Outcome: Progressing  Flowsheets (Taken 8/11/2022 1611)  Free From Fall Injury: Instruct family/caregiver on patient safety  Note: Patient remains free of falls and injuries throughout shift. Bed remains in the lowest position, wheels locked, call light and bedside table are within reach. Problem: Skin/Tissue Integrity  Goal: Absence of new skin breakdown  Description: 1. Monitor for areas of redness and/or skin breakdown  2. Assess vascular access sites hourly  3. Every 4-6 hours minimum:  Change oxygen saturation probe site  4. Every 4-6 hours:  If on nasal continuous positive airway pressure, respiratory therapy assess nares and determine need for appliance change or resting period. 8/11/2022 1611 by Mahogany Go RN  Outcome: Progressing  Note: No signs of increased skin or tissue breakdown is noted. See Head to Toe/LDA assessments in flowsheets.       Problem: Chronic Conditions and Co-morbidities  Goal: Patient's chronic conditions and co-morbidity symptoms are monitored and maintained or improved  Outcome: Progressing     Problem: Discharge Planning  Goal: Discharge to home or other facility with appropriate resources  8/11/2022 1611 by Mahogany Go RN  Outcome: Progressing

## 2022-08-11 NOTE — PLAN OF CARE
Problem: Discharge Planning  Goal: Discharge to home or other facility with appropriate resources  8/11/2022 0246 by Marco Rodriguez RN  Outcome: Progressing  8/10/2022 1707 by Durga Flaherty  Outcome: Progressing     Problem: Safety - Adult  Goal: Free from fall injury  8/11/2022 0246 by Marco Rodriguez RN  Outcome: Progressing  Flowsheets (Taken 8/11/2022 0130)  Free From Fall Injury: Instruct family/caregiver on patient safety  8/10/2022 1707 by Durga Flaherty  Outcome: Progressing     Problem: ABCDS Injury Assessment  Goal: Absence of physical injury  8/11/2022 0246 by Marco Rodriguez RN  Outcome: Progressing  Flowsheets (Taken 8/11/2022 0130)  Absence of Physical Injury: Implement safety measures based on patient assessment  8/10/2022 1707 by Durga Flaherty  Outcome: Progressing  Flowsheets (Taken 8/10/2022 1658)  Absence of Physical Injury: Implement safety measures based on patient assessment     Problem: Skin/Tissue Integrity  Goal: Absence of new skin breakdown  Description: 1. Monitor for areas of redness and/or skin breakdown  2. Assess vascular access sites hourly  3. Every 4-6 hours minimum:  Change oxygen saturation probe site  4. Every 4-6 hours:  If on nasal continuous positive airway pressure, respiratory therapy assess nares and determine need for appliance change or resting period.   8/11/2022 0246 by Marco Rodriguez RN  Outcome: Progressing  8/10/2022 1707 by Durag Flaherty  Outcome: Progressing

## 2022-08-12 LAB — GLUCOSE BLD-MCNC: 138 MG/DL (ref 75–110)

## 2022-08-12 PROCEDURE — 99231 SBSQ HOSP IP/OBS SF/LOW 25: CPT | Performed by: INTERNAL MEDICINE

## 2022-08-12 PROCEDURE — 6370000000 HC RX 637 (ALT 250 FOR IP)

## 2022-08-12 PROCEDURE — 92507 TX SP LANG VOICE COMM INDIV: CPT

## 2022-08-12 PROCEDURE — 82947 ASSAY GLUCOSE BLOOD QUANT: CPT

## 2022-08-12 PROCEDURE — 97116 GAIT TRAINING THERAPY: CPT

## 2022-08-12 PROCEDURE — 97530 THERAPEUTIC ACTIVITIES: CPT

## 2022-08-12 PROCEDURE — 97110 THERAPEUTIC EXERCISES: CPT

## 2022-08-12 PROCEDURE — 1180000000 HC REHAB R&B

## 2022-08-12 PROCEDURE — 97130 THER IVNTJ EA ADDL 15 MIN: CPT

## 2022-08-12 PROCEDURE — 99232 SBSQ HOSP IP/OBS MODERATE 35: CPT | Performed by: STUDENT IN AN ORGANIZED HEALTH CARE EDUCATION/TRAINING PROGRAM

## 2022-08-12 PROCEDURE — 6360000002 HC RX W HCPCS

## 2022-08-12 PROCEDURE — 97129 THER IVNTJ 1ST 15 MIN: CPT

## 2022-08-12 PROCEDURE — 6370000000 HC RX 637 (ALT 250 FOR IP): Performed by: STUDENT IN AN ORGANIZED HEALTH CARE EDUCATION/TRAINING PROGRAM

## 2022-08-12 RX ORDER — SODIUM CHLORIDE 9 MG/ML
INJECTION, SOLUTION INTRAVENOUS CONTINUOUS
Status: DISCONTINUED | OUTPATIENT
Start: 2022-08-12 | End: 2022-08-12

## 2022-08-12 RX ORDER — FAMOTIDINE 20 MG/1
20 TABLET, FILM COATED ORAL 2 TIMES DAILY
Status: DISCONTINUED | OUTPATIENT
Start: 2022-08-12 | End: 2022-08-20 | Stop reason: HOSPADM

## 2022-08-12 RX ORDER — AMLODIPINE BESYLATE 10 MG/1
10 TABLET ORAL DAILY
Status: DISCONTINUED | OUTPATIENT
Start: 2022-08-13 | End: 2022-08-20 | Stop reason: HOSPADM

## 2022-08-12 RX ORDER — LANOLIN ALCOHOL/MO/W.PET/CERES
3 CREAM (GRAM) TOPICAL NIGHTLY PRN
Status: DISCONTINUED | OUTPATIENT
Start: 2022-08-12 | End: 2022-08-16

## 2022-08-12 RX ADMIN — CARVEDILOL 25 MG: 25 TABLET, FILM COATED ORAL at 08:06

## 2022-08-12 RX ADMIN — FAMOTIDINE 20 MG: 20 TABLET ORAL at 20:45

## 2022-08-12 RX ADMIN — DULOXETINE 30 MG: 30 CAPSULE, DELAYED RELEASE ORAL at 08:06

## 2022-08-12 RX ADMIN — ISOSORBIDE MONONITRATE 30 MG: 30 TABLET, EXTENDED RELEASE ORAL at 08:06

## 2022-08-12 RX ADMIN — CARVEDILOL 25 MG: 25 TABLET, FILM COATED ORAL at 17:04

## 2022-08-12 RX ADMIN — CLOPIDOGREL BISULFATE 75 MG: 75 TABLET ORAL at 08:06

## 2022-08-12 RX ADMIN — ATORVASTATIN CALCIUM 40 MG: 80 TABLET, FILM COATED ORAL at 20:44

## 2022-08-12 RX ADMIN — ENOXAPARIN SODIUM 40 MG: 100 INJECTION SUBCUTANEOUS at 08:06

## 2022-08-12 RX ADMIN — FAMOTIDINE 20 MG: 20 TABLET ORAL at 08:06

## 2022-08-12 RX ADMIN — POLYETHYLENE GLYCOL 3350 17 G: 17 POWDER, FOR SOLUTION ORAL at 08:06

## 2022-08-12 RX ADMIN — ASPIRIN 81 MG: 81 TABLET, COATED ORAL at 08:06

## 2022-08-12 RX ADMIN — AMLODIPINE BESYLATE 5 MG: 5 TABLET ORAL at 08:06

## 2022-08-12 NOTE — PLAN OF CARE
Problem: Discharge Planning  Goal: Discharge to home or other facility with appropriate resources  8/12/2022 0447 by Zully Roper RN  Outcome: Progressing  8/11/2022 1611 by Courtney Mario RN  Outcome: Progressing     Problem: Safety - Adult  Goal: Free from fall injury  8/12/2022 0447 by Zully Roper RN  Outcome: Progressing  8/11/2022 1611 by Courtney Mario RN  Outcome: Progressing  Flowsheets (Taken 8/11/2022 1611)  Free From Fall Injury: Instruct family/caregiver on patient safety  Note: Patient remains free of falls and injuries throughout shift. Bed remains in the lowest position, wheels locked, call light and bedside table are within reach. Problem: ABCDS Injury Assessment  Goal: Absence of physical injury  8/12/2022 0447 by Zully Roper RN  Outcome: Progressing  8/11/2022 1611 by Courtney Mario RN  Outcome: Progressing     Problem: Skin/Tissue Integrity  Goal: Absence of new skin breakdown  Description: 1. Monitor for areas of redness and/or skin breakdown  2. Assess vascular access sites hourly  3. Every 4-6 hours minimum:  Change oxygen saturation probe site  4. Every 4-6 hours:  If on nasal continuous positive airway pressure, respiratory therapy assess nares and determine need for appliance change or resting period. 8/12/2022 0447 by Zully Roper RN  Outcome: Progressing  8/11/2022 1611 by Courtney Mario RN  Outcome: Progressing  Note: No signs of increased skin or tissue breakdown is noted. See Head to Toe/LDA assessments in flowsheets.       Problem: Chronic Conditions and Co-morbidities  Goal: Patient's chronic conditions and co-morbidity symptoms are monitored and maintained or improved  8/12/2022 0447 by Zully Roper RN  Outcome: Progressing  8/11/2022 1611 by Courtney Mario RN  Outcome: Progressing     Problem: Nutrition Deficit:  Goal: Optimize nutritional status  Outcome: Progressing
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
## 2022-08-12 NOTE — PROGRESS NOTES
Nurse addressed urinary retention with Dr Fawn Hennessy. Pt able to urinate if nurse or aide ambulates him to the restroom. Dr Fawn Hennessy instructed to wait on continuous fluids at this time. Will revisit in the morning.
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
## 2022-08-13 LAB — GLUCOSE BLD-MCNC: 84 MG/DL (ref 75–110)

## 2022-08-13 PROCEDURE — 99231 SBSQ HOSP IP/OBS SF/LOW 25: CPT | Performed by: INTERNAL MEDICINE

## 2022-08-13 PROCEDURE — 97535 SELF CARE MNGMENT TRAINING: CPT

## 2022-08-13 PROCEDURE — 82947 ASSAY GLUCOSE BLOOD QUANT: CPT

## 2022-08-13 PROCEDURE — 6370000000 HC RX 637 (ALT 250 FOR IP): Performed by: STUDENT IN AN ORGANIZED HEALTH CARE EDUCATION/TRAINING PROGRAM

## 2022-08-13 PROCEDURE — 97530 THERAPEUTIC ACTIVITIES: CPT

## 2022-08-13 PROCEDURE — 97129 THER IVNTJ 1ST 15 MIN: CPT

## 2022-08-13 PROCEDURE — 6370000000 HC RX 637 (ALT 250 FOR IP)

## 2022-08-13 PROCEDURE — 1180000000 HC REHAB R&B

## 2022-08-13 PROCEDURE — 97110 THERAPEUTIC EXERCISES: CPT

## 2022-08-13 PROCEDURE — 6360000002 HC RX W HCPCS

## 2022-08-13 PROCEDURE — 97116 GAIT TRAINING THERAPY: CPT

## 2022-08-13 RX ADMIN — Medication 3 MG: at 20:18

## 2022-08-13 RX ADMIN — CARVEDILOL 25 MG: 25 TABLET, FILM COATED ORAL at 09:20

## 2022-08-13 RX ADMIN — DULOXETINE 30 MG: 30 CAPSULE, DELAYED RELEASE ORAL at 09:16

## 2022-08-13 RX ADMIN — FAMOTIDINE 20 MG: 20 TABLET ORAL at 20:18

## 2022-08-13 RX ADMIN — MAGNESIUM HYDROXIDE 30 ML: 400 SUSPENSION ORAL at 17:44

## 2022-08-13 RX ADMIN — ATORVASTATIN CALCIUM 40 MG: 80 TABLET, FILM COATED ORAL at 20:18

## 2022-08-13 RX ADMIN — ISOSORBIDE MONONITRATE 30 MG: 30 TABLET, EXTENDED RELEASE ORAL at 09:16

## 2022-08-13 RX ADMIN — AMLODIPINE BESYLATE 10 MG: 10 TABLET ORAL at 09:18

## 2022-08-13 RX ADMIN — ENOXAPARIN SODIUM 40 MG: 100 INJECTION SUBCUTANEOUS at 09:16

## 2022-08-13 RX ADMIN — SENNOSIDES 17.2 MG: 8.6 TABLET, FILM COATED ORAL at 09:21

## 2022-08-13 RX ADMIN — POLYETHYLENE GLYCOL 3350 17 G: 17 POWDER, FOR SOLUTION ORAL at 09:14

## 2022-08-13 RX ADMIN — ASPIRIN 81 MG: 81 TABLET, COATED ORAL at 09:16

## 2022-08-13 RX ADMIN — FAMOTIDINE 20 MG: 20 TABLET ORAL at 09:18

## 2022-08-13 RX ADMIN — CARVEDILOL 25 MG: 25 TABLET, FILM COATED ORAL at 17:44

## 2022-08-13 RX ADMIN — CLOPIDOGREL BISULFATE 75 MG: 75 TABLET ORAL at 09:16
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
## 2022-08-13 NOTE — PROGRESS NOTES
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 Objective     Cognition  Arousal/Alertness: Inconsistent responses to stimuli  Following Commands: Follows one step commands with increased time; Follows one step commands with repetition  Attention Span: Attends with cues to redirect; Difficulty dividing attention  Memory: Decreased recall of recent events;Decreased recall of precautions;Decreased short term memory  Safety Judgement: Decreased awareness of need for assistance;Decreased awareness of need for safety  Problem Solving: Assistance required to correct errors made;Assistance required to identify errors made;Assistance required to implement solutions;Assistance required to generate solutions  Insights: Decreased awareness of deficits  Initiation: Requires cues for some  Sequencing: Requires cues for some  Cognition Comment: Able to respond with one word answers/short sentences- pt does not believe this is different from his baseline. \"I don't have any here. \"  clothes (pt does have clothes here)  pt able to inconsistently realize he's had a stroke but was unable to state he is here to work on his balance despite pt agreement that he is not safe to walk across the room by himself. demo poor awareness of bladder control am and pm. inconsistent throughout: pt was able to follow 4 step directions once. pt was able to remember how to navigate to return to his room from therapy gym without any cues. Orientation  Orientation Level: Oriented to person;Disoriented to place; Disoriented to situation         ADL  Feeding  Assistance Level: Independent  Grooming/Oral Hygiene  Skilled Clinical Factors: Pt declined, no teeth in mouth, declined mouthwash  Upper Extremity Bathing  Assistance Level: Supervision  Skilled Clinical Factors: partially eaten breakfast observed in front of pt.   Lower Extremity Bathing  Assistance Level: Supervision  Skilled Clinical Factors: Pt declined, but when found to be wearing wet briefs, agreed to wash caro and bottom. Upper Extremity Dressing  Assistance Level: Supervision  Skilled Clinical Factors: standing to complete  Lower Extremity Dressing  Assistance Level: Minimal assistance  Skilled Clinical Factors: due to LOB in standing  Putting On/Taking Off Footwear  Assistance Level: Minimal assistance  Skilled Clinical Factors: TA to don teds, set up to don socks. Toileting  Assistance Level: Stand by assist  Skilled Clinical Factors: am wearing wet briefs- ed to toilet and keep pants dry. pm pt told nsg he did not need to toilet but nsg notes bladder scan shows pt should need to urinate. when taken into bathroom he stood to urinate into toilet. Tub/Shower Transfers  Skilled Clinical Factors: pt declined to shower but states he will tomorrow    Light Housekeeping  Light Housekeeping Level of Assistance: Stand by assistance  Light Housekeeping: pt amb to bathroom and collected  dirty linens and clothes and placed in hamper/ bag. SBA for balance, cues for cognition     Functional Mobility  Activity: To/From therapy gym;Retrieve items;Transport items; To/From bathroom  Assistance Level: Stand by assist  Skilled Clinical Factors: stand, ambulate for adls with 1 significant LOB during which pt sat suddenly into w/c and OT guided pt.  able to pick items up from the floor with SBA. Sit to Supine  Assistance Level: Supervision  Skilled Clinical Factors: cues to position self in middle of bed. Supine to Sit  Assistance Level: Supervision  Transfers  Surface: From bed; Wheelchair  Sit to [employer] Level: Stand by assist  Stand to Sit  Assistance Level: Stand by assist  Stand Pivot  Assistance Level: Stand by assist   OT Exercises  Dynamic Standing Balance Exercises: rina 7-9 min x 3 and 2-3 min x 2 in am with SBA without UE support during adls , rina 6 min, 2 min in pm  Motor Control/Coordination: mild decreased hand coord

## 2022-08-13 NOTE — PLAN OF CARE
Problem: Discharge Planning  Goal: Discharge to home or other facility with appropriate resources  8/13/2022 1756 by Bradford Swann RN  Outcome: Progressing  Flowsheets (Taken 8/13/2022 1756)  Discharge to home or other facility with appropriate resources:   Identify barriers to discharge with patient and caregiver   Identify discharge learning needs (meds, wound care, etc)   Refer to discharge planning if patient needs post-hospital services based on physician order or complex needs related to functional status, cognitive ability or social support system   Arrange for needed discharge resources and transportation as appropriate  8/13/2022 0441 by Skinny Guevara RN  Outcome: Progressing     Problem: Safety - Adult  Goal: Free from fall injury  8/13/2022 1756 by Bradford Swann RN  Outcome: Progressing  Flowsheets (Taken 8/13/2022 1756)  Free From Fall Injury:   Instruct family/caregiver on patient safety   Based on caregiver fall risk screen, instruct family/caregiver to ask for assistance with transferring infant if caregiver noted to have fall risk factors  Note: Bed and personal alarms on. Remains free from injury. 8/13/2022 0441 by Skinny Guevara RN  Outcome: Progressing     Problem: ABCDS Injury Assessment  Goal: Absence of physical injury  8/13/2022 1756 by Bradford Swann RN  Outcome: Progressing  Flowsheets (Taken 8/13/2022 1756)  Absence of Physical Injury: Implement safety measures based on patient assessment  8/13/2022 0441 by Skinny Guevara RN  Outcome: Progressing     Problem: Skin/Tissue Integrity  Goal: Absence of new skin breakdown  Description: 1. Monitor for areas of redness and/or skin breakdown  2. Assess vascular access sites hourly  3. Every 4-6 hours minimum:  Change oxygen saturation probe site  4. Every 4-6 hours:  If on nasal continuous positive airway pressure, respiratory therapy assess nares and determine need for appliance change or resting period.   8/13/2022 1756 by Diony Perez RN  Outcome: Progressing  Note: Assess and document skin every shift.   8/13/2022 0441 by Ngoc Godfrey RN  Outcome: Progressing     Problem: Chronic Conditions and Co-morbidities  Goal: Patient's chronic conditions and co-morbidity symptoms are monitored and maintained or improved  8/13/2022 1756 by Diony Perez RN  Outcome: Progressing  Flowsheets (Taken 8/13/2022 1756)  Care Plan - Patient's Chronic Conditions and Co-Morbidity Symptoms are Monitored and Maintained or Improved:   Monitor and assess patient's chronic conditions and comorbid symptoms for stability, deterioration, or improvement   Collaborate with multidisciplinary team to address chronic and comorbid conditions and prevent exacerbation or deterioration   Update acute care plan with appropriate goals if chronic or comorbid symptoms are exacerbated and prevent overall improvement and discharge  8/13/2022 0441 by Ngoc Godfrey RN  Outcome: Progressing     Problem: Nutrition Deficit:  Goal: Optimize nutritional status  8/13/2022 1756 by Diony Perez RN  Outcome: Progressing  Flowsheets (Taken 8/13/2022 1756)  Nutrient intake appropriate for improving, restoring, or maintaining nutritional needs:   Assess nutritional status and recommend course of action   Recommend appropriate diets, oral nutritional supplements, and vitamin/mineral supplements   Provide specific nutrition education to patient or family as appropriate   Monitor oral intake, labs, and treatment plans  8/13/2022 0441 by Ngoc Godfrey RN  Outcome: Progressing
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
## 2022-08-13 NOTE — PLAN OF CARE
Individualized Plan of Care  [facility] Inpatient Rehabilitation Unit    Rehabilitation physician: Dr. Freddie Saenz Date: 8/10/2022     Rehabilitation Diagnosis: Acute CVA (cerebrovascular accident) [facility]) [I63.9]     Rehabilitation impairments: self care, mobility, and communication    Factors facilitating achievement of predicted outcomes: Family support, Motivated, Cooperative, and Pleasant  Barriers to the achievement of predicted outcomes: Communication deficit, Skin Care, and Medication managment    Patient Goals: Improve independence with mobility, Increase overall strength and endurance, Increase balance, Increase endurance, Increase independence with activities of daily living, Improve cognition, Increase safety awareness, Integrate appropriate pain management plan, Assure adequate nutritional option for discharge, and Provide appropriate patient and family education      NURSING:  Nursing goals for Britni Ghosh while on the rehabilitation unit will include:  Adequate number of bowel movements, Urinate with no urinary retention >300ml in bladder, Maintain O2 SATs at an acceptable level during stay, Effective pain management while on the rehabilitation unit, Establish adequate pain control plan for discharge, Absence of skin breakdown while on the rehabilitation unit, Improved skin integrity via assessments including wound measurements, Avoidance of any hospital acquired infections, No signs/symptoms of infection at the wound site, Freedom from injury during hospitalization, and Complete education with patient/family with understanding demonstrated regarding disease process and resultant impairment     In order to achieve these goals, nursing interventions may include bowel/bladder training, education for medical assistive devices, medication education, O2 saturation management, energy conservation, stress management techniques, fall prevention, alarms protocol, seating and positioning, skin/wound care, pressure relief instruction, dressing changes, infection protection, DVT prophylaxis, assistance with safe transfers , and/or assistance with bathroom activities and hygiene. PHYSICAL THERAPY:  Goals:        Short Term Goals  Time Frame for Short term goals: 5 days  Short term goal 1: Bed mobility SBA without use of bed rails. Short term goal 2: Transfers SBA with minimal verbal cues. Short term goal 3: Ambulation with rolling walker distance of 200 ft CGA, with minimal cues for safety on level surfaces. Short term goal 4: Pt able to go up and down 10 steps with 2 rail, SBA  Long Term Goals  Time Frame for Long term goals : By DC  Long term goal 1: Pt able to perform transfers at supervsion level from varied surfaces. Long term goal 2: Pt able to ambulate with least restrrictive device distance of 200 ft on level as well as outside terraine, at supervsion level. Long term goal 3: Pt able to go up and down flight of steps with 1 Handrail, SBA  Long term goal 4: Improve 2MWT distance to atleast 200 ft to improve overall function. Long term goal 5: Improve Tinetti score to atleast 26/28 to reduce fall risk. Plan of Care: Pt to be seen by physical therapy services 1 Hour 30 Minutes per day at least 5 out of 7 days per week     Anticipated interventions may include therapeutic exercises, gait training, neuromuscular re-ed, transfer training, community reintegration, bed mobility, w/c mobility and training.       OCCUPATIONAL THERAPY:  Goals:             Short Term Goals  Time Frame for Short term goals: By 1 week  Short Term Goal 1: Pt will perform UB ADLs with Supervision and fair safety  Short Term Goal 2: Pt will perform LB ADLs with SBA, fair safety, and use of AE/mod techniques as needed  Short Term Goal 3: Pt will perform functional mobility/functional transfers with SBA, fair safety, with use of least restrictive device  Short Term Goal 4: Pt will actively participate in 30+ minutes of therapeutic exercise/functional activity to promote safety and independence with self-care and mobility  Short Term Goal 5: Pt will tolerate standing for 5+ minutes, SBA, with 0-1 UE support and no LOB while engaged in self-care/functional activity  Additional Goals?: Yes  Short Term Goal 6: Pt will follow 75% of 2-step commands to address cognitive concerns for improved participation in meaningful occupations  Short Term Goal 7: Pt will be educated on and explore use of DME/AE and modified techniques for increasing ease and independence with ADLs upon d/c  Long Term Goals  Time Frame for Long term goals : By discharge  Long Term Goal 1: Pt will perform BADLs with Mod I, fair safety, and use of AE/mod techiques as needed  Long Term Goal 2: Pt will perform functional transfers/mobility with Mod I, fair safety, and use of least restrictive device  Long Term Goal 3: Pt will tolerate standing for 10+ minutes, Mod I, with 0-1 UE support and no LOB noted during functional activity  Long Term Goal 4: Pt will perform self-care with improved L hand coordination as evidenced by 5 second improvement in 9 hole peg test  Long Term Goal 5: Pt will follow 100% of 2-step commands to address cognitive concerns for improved participation in meaningful occupations  Additional Goals?: Yes  Long Term Goal 6: Pt will demonstrate improved safety awareness with Min cues or less for hand placement/body mechanics/perceptual awareness during ADLs/functional transfers  Long Term Goal 7: Pt will participate in BUE FMC/strengthening tasks to improve ability to open small containers during self-care tasks  Long Term Goal 8: Pt will safely perform light housekeeping/meal preparation with supervision, good safety, and use of least restrictive device to improve independence with ADLs/IADLs    Plan of Care: Patient to be seen by occupational therapy services 1 Hour 30 Minutes per day at least 5 out of 7 days per week     Anticipated interventions may include ADL and IADL retraining, strengthening, safety education and training, patient/caregiver education and training, equipment evaluation/ training/procurement, neuromuscular reeducation, wheelchair mobility training. SPEECH THERAPY:   Goals:             Short-term Goals  Goal 1: Increase oreintation to 80%  Goal 2: Increase responsive naming to 90%  Goal 3: Increase convergent and divergent naming to 80%  Goal 4: Increase 3 step directions to 80%  Goal 5: Further goals at increasing levels of difficulty as pt. progress allows. Plan of Care: Pt to be seen by speech therapy services 1 Hour per day at least 5 out of 7 days per week    Anticipated interventions may include speech/language/communication therapy, cognitive training, group therapy, education, and/or dysphagia therapy based on the above goals. CASE MANAGEMENT:  Goals:   Assist patient/family with discharge planning, patient/family counseling,  and coordination with insurance during the inpatient rehabilitation stay. Other members of the multidisciplinary rehabilitation team that will be involved in the patient's plan of care include dietary, respiratory therapy. Medical issues being managed closely and that require 24 hour availability of a physician:  Bowel/Bladder function, Pain management, Infection protection, DVT prophylaxis, Fall precautions, Fluid/Electrolyte balance, Nutritional status, Respiratory needs, and Anemia                                           Physician anticipated functional outcomes: Improved independence with functional measures   Estimated length of stay for this admission:  1- 2 weeks  Medical Prognosis: Fair  Anticipated disposition: Home. The potential to achieve the above medical and rehabilitative goals is good. This plan of care has been developed with the assistance and input of the multidisciplinary rehabilitation team.  The plan was reviewed with the patient on 8/12/2022.   The patient has had the opportunity to provide input to the therapy team.    I have reviewed this Individualized Plan of Care and agree with its contents. Above documentation has been expanded, modified, adjusted to reflect the findings of my evaluations and goals for the patient.     Physician:  Electronically signed by Kodak Reynoso MD on 8/12/22 at 11:28 PM EDT

## 2022-08-14 LAB — GLUCOSE BLD-MCNC: 104 MG/DL (ref 75–110)

## 2022-08-14 PROCEDURE — 97110 THERAPEUTIC EXERCISES: CPT

## 2022-08-14 PROCEDURE — 82947 ASSAY GLUCOSE BLOOD QUANT: CPT

## 2022-08-14 PROCEDURE — 97530 THERAPEUTIC ACTIVITIES: CPT

## 2022-08-14 PROCEDURE — 6370000000 HC RX 637 (ALT 250 FOR IP): Performed by: STUDENT IN AN ORGANIZED HEALTH CARE EDUCATION/TRAINING PROGRAM

## 2022-08-14 PROCEDURE — 6360000002 HC RX W HCPCS

## 2022-08-14 PROCEDURE — 97535 SELF CARE MNGMENT TRAINING: CPT

## 2022-08-14 PROCEDURE — 6370000000 HC RX 637 (ALT 250 FOR IP)

## 2022-08-14 PROCEDURE — 97116 GAIT TRAINING THERAPY: CPT

## 2022-08-14 PROCEDURE — 1180000000 HC REHAB R&B

## 2022-08-14 PROCEDURE — 92507 TX SP LANG VOICE COMM INDIV: CPT

## 2022-08-14 PROCEDURE — 99231 SBSQ HOSP IP/OBS SF/LOW 25: CPT | Performed by: INTERNAL MEDICINE

## 2022-08-14 RX ORDER — LACTULOSE 10 G/15ML
20 SOLUTION ORAL DAILY PRN
Status: DISCONTINUED | OUTPATIENT
Start: 2022-08-14 | End: 2022-08-20 | Stop reason: HOSPADM

## 2022-08-14 RX ADMIN — ENOXAPARIN SODIUM 40 MG: 100 INJECTION SUBCUTANEOUS at 07:36

## 2022-08-14 RX ADMIN — CLOPIDOGREL BISULFATE 75 MG: 75 TABLET ORAL at 07:37

## 2022-08-14 RX ADMIN — ATORVASTATIN CALCIUM 40 MG: 80 TABLET, FILM COATED ORAL at 19:35

## 2022-08-14 RX ADMIN — ISOSORBIDE MONONITRATE 30 MG: 30 TABLET, EXTENDED RELEASE ORAL at 07:37

## 2022-08-14 RX ADMIN — POLYETHYLENE GLYCOL 3350 17 G: 17 POWDER, FOR SOLUTION ORAL at 07:37

## 2022-08-14 RX ADMIN — CARVEDILOL 25 MG: 25 TABLET, FILM COATED ORAL at 17:26

## 2022-08-14 RX ADMIN — FAMOTIDINE 20 MG: 20 TABLET ORAL at 07:37

## 2022-08-14 RX ADMIN — ASPIRIN 81 MG: 81 TABLET, COATED ORAL at 07:37

## 2022-08-14 RX ADMIN — FAMOTIDINE 20 MG: 20 TABLET ORAL at 19:35

## 2022-08-14 RX ADMIN — DULOXETINE 30 MG: 30 CAPSULE, DELAYED RELEASE ORAL at 07:37

## 2022-08-14 RX ADMIN — SENNOSIDES 17.2 MG: 8.6 TABLET, FILM COATED ORAL at 07:38

## 2022-08-14 RX ADMIN — CARVEDILOL 25 MG: 25 TABLET, FILM COATED ORAL at 07:37

## 2022-08-14 RX ADMIN — MAGNESIUM HYDROXIDE 30 ML: 400 SUSPENSION ORAL at 13:07

## 2022-08-14 RX ADMIN — AMLODIPINE BESYLATE 10 MG: 10 TABLET ORAL at 07:37

## 2022-08-14 RX ADMIN — ACETAMINOPHEN 650 MG: 325 TABLET, FILM COATED ORAL at 08:06

## 2022-08-14 ASSESSMENT — PAIN DESCRIPTION - LOCATION: LOCATION: SHOULDER

## 2022-08-14 ASSESSMENT — PAIN DESCRIPTION - ORIENTATION: ORIENTATION: RIGHT

## 2022-08-14 ASSESSMENT — PAIN DESCRIPTION - DESCRIPTORS: DESCRIPTORS: SHARP

## 2022-08-14 ASSESSMENT — PAIN SCALES - GENERAL: PAINLEVEL_OUTOF10: 5

## 2022-08-14 NOTE — PLAN OF CARE
Problem: Discharge Planning  Goal: Discharge to home or other facility with appropriate resources  Outcome: Progressing  Flowsheets (Taken 8/14/2022 1547)  Discharge to home or other facility with appropriate resources:   Identify discharge learning needs (meds, wound care, etc)   Identify barriers to discharge with patient and caregiver   Refer to discharge planning if patient needs post-hospital services based on physician order or complex needs related to functional status, cognitive ability or social support system   Arrange for needed discharge resources and transportation as appropriate     Problem: Safety - Adult  Goal: Free from fall injury  8/14/2022 1547 by Lisa Fung RN  Outcome: Progressing  Flowsheets (Taken 8/14/2022 1547)  Free From Fall Injury:   Instruct family/caregiver on patient safety   Based on caregiver fall risk screen, instruct family/caregiver to ask for assistance with transferring infant if caregiver noted to have fall risk factors  Note: Remind pt to call out for assistance. Call light in reach at all times. Personal and bed alarms on. Remains free from injury. 8/14/2022 0736 by Puneet Tom RN  Outcome: Progressing  Note: No falls or injuries sustained this shift. Problem: ABCDS Injury Assessment  Goal: Absence of physical injury  8/14/2022 1547 by Lisa Fung RN  Outcome: Progressing  Flowsheets (Taken 8/14/2022 1547)  Absence of Physical Injury: Implement safety measures based on patient assessment  8/14/2022 0736 by Puneet Tom RN  Flowsheets (Taken 8/13/2022 2016)  Absence of Physical Injury: Implement safety measures based on patient assessment  Note: No physical injuries sustained this shift. Problem: Skin/Tissue Integrity  Goal: Absence of new skin breakdown  Description: 1. Monitor for areas of redness and/or skin breakdown  2. Assess vascular access sites hourly  3. Every 4-6 hours minimum:  Change oxygen saturation probe site  4. Every 4-6 hours:  If on nasal continuous positive airway pressure, respiratory therapy assess nares and determine need for appliance change or resting period. 8/14/2022 1547 by Bernard Peterson RN  Outcome: Progressing  Note: Assess and document skin issues every shift. 8/14/2022 0736 by Digna Monae RN  Note: Skin assessment completed this shift. Nutrition and Hydration status assessed with adequate intake. Azeem Score as charted. Bilateral heels remain elevated on pillows throughout the shift. Patient able to reposition self for comfort and to prevent breakdown. Patient verbalizes understanding of pressure ulcer prevention measures. Skin integrity maintained. No new skin breakdown noted. Skin to high risk pressure areas including coccyx and heels are clear. Imani / Incontinence care provided as needed throughout the shift. Brief changes performed as needed.                Problem: Chronic Conditions and Co-morbidities  Goal: Patient's chronic conditions and co-morbidity symptoms are monitored and maintained or improved  Outcome: Progressing  Flowsheets (Taken 8/14/2022 1547)  Care Plan - Patient's Chronic Conditions and Co-Morbidity Symptoms are Monitored and Maintained or Improved:   Monitor and assess patient's chronic conditions and comorbid symptoms for stability, deterioration, or improvement   Collaborate with multidisciplinary team to address chronic and comorbid conditions and prevent exacerbation or deterioration   Update acute care plan with appropriate goals if chronic or comorbid symptoms are exacerbated and prevent overall improvement and discharge     Problem: Nutrition Deficit:  Goal: Optimize nutritional status  Outcome: Progressing  Flowsheets (Taken 8/14/2022 1547)  Nutrient intake appropriate for improving, restoring, or maintaining nutritional needs:   Assess nutritional status and recommend course of action   Recommend appropriate diets, oral nutritional supplements, and vitamin/mineral supplements   Provide specific nutrition education to patient or family as appropriate   Monitor oral intake, labs, and treatment plans
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
## 2022-08-14 NOTE — PLAN OF CARE
Problem: Safety - Adult  Goal: Free from fall injury  8/14/2022 0736 by Kyung Fletcher RN  Outcome: Progressing  Note: No falls or injuries sustained this shift. Problem: ABCDS Injury Assessment  Goal: Absence of physical injury  8/14/2022 0736 by Kyung Fletcher RN  Flowsheets (Taken 8/13/2022 2016)  Absence of Physical Injury: Implement safety measures based on patient assessment  Note: No physical injuries sustained this shift. Problem: Skin/Tissue Integrity  Goal: Absence of new skin breakdown  Description: 1. Monitor for areas of redness and/or skin breakdown  2. Assess vascular access sites hourly  3. Every 4-6 hours minimum:  Change oxygen saturation probe site  4. Every 4-6 hours:  If on nasal continuous positive airway pressure, respiratory therapy assess nares and determine need for appliance change or resting period. 8/14/2022 0736 by Kyung Fletcher RN  Note: Skin assessment completed this shift. Nutrition and Hydration status assessed with adequate intake. Azeem Score as charted. Bilateral heels remain elevated on pillows throughout the shift. Patient able to reposition self for comfort and to prevent breakdown. Patient verbalizes understanding of pressure ulcer prevention measures. Skin integrity maintained. No new skin breakdown noted. Skin to high risk pressure areas including coccyx and heels are clear. Imani / Incontinence care provided as needed throughout the shift. Brief changes performed as needed.
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
## 2022-08-14 NOTE — PROGRESS NOTES
Brief checked every 4 hours. Urinal offered at each time and patient refusing. Pt. States he will let me know when he needs to use the bathroom. Pt. Also offered a suppository this shift d/t last BM being 8/8 and pt. Also refused.   Elijah Loya RN  8/14/22  0600
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
## 2022-08-15 LAB — GLUCOSE BLD-MCNC: 138 MG/DL (ref 75–110)

## 2022-08-15 PROCEDURE — 99232 SBSQ HOSP IP/OBS MODERATE 35: CPT | Performed by: PHYSICAL MEDICINE & REHABILITATION

## 2022-08-15 PROCEDURE — 97530 THERAPEUTIC ACTIVITIES: CPT

## 2022-08-15 PROCEDURE — 6370000000 HC RX 637 (ALT 250 FOR IP): Performed by: STUDENT IN AN ORGANIZED HEALTH CARE EDUCATION/TRAINING PROGRAM

## 2022-08-15 PROCEDURE — 97116 GAIT TRAINING THERAPY: CPT

## 2022-08-15 PROCEDURE — 1180000000 HC REHAB R&B

## 2022-08-15 PROCEDURE — 6370000000 HC RX 637 (ALT 250 FOR IP)

## 2022-08-15 PROCEDURE — 82947 ASSAY GLUCOSE BLOOD QUANT: CPT

## 2022-08-15 PROCEDURE — 97112 NEUROMUSCULAR REEDUCATION: CPT

## 2022-08-15 PROCEDURE — 6360000002 HC RX W HCPCS

## 2022-08-15 PROCEDURE — 99231 SBSQ HOSP IP/OBS SF/LOW 25: CPT | Performed by: INTERNAL MEDICINE

## 2022-08-15 PROCEDURE — 97110 THERAPEUTIC EXERCISES: CPT

## 2022-08-15 PROCEDURE — 92507 TX SP LANG VOICE COMM INDIV: CPT

## 2022-08-15 RX ADMIN — AMLODIPINE BESYLATE 10 MG: 10 TABLET ORAL at 07:36

## 2022-08-15 RX ADMIN — ASPIRIN 81 MG: 81 TABLET, COATED ORAL at 07:36

## 2022-08-15 RX ADMIN — CARVEDILOL 25 MG: 25 TABLET, FILM COATED ORAL at 07:36

## 2022-08-15 RX ADMIN — ATORVASTATIN CALCIUM 40 MG: 80 TABLET, FILM COATED ORAL at 21:24

## 2022-08-15 RX ADMIN — ISOSORBIDE MONONITRATE 30 MG: 30 TABLET, EXTENDED RELEASE ORAL at 07:36

## 2022-08-15 RX ADMIN — DULOXETINE 30 MG: 30 CAPSULE, DELAYED RELEASE ORAL at 07:36

## 2022-08-15 RX ADMIN — FAMOTIDINE 20 MG: 20 TABLET ORAL at 07:36

## 2022-08-15 RX ADMIN — ENOXAPARIN SODIUM 40 MG: 100 INJECTION SUBCUTANEOUS at 07:36

## 2022-08-15 RX ADMIN — CLOPIDOGREL BISULFATE 75 MG: 75 TABLET ORAL at 07:36

## 2022-08-15 RX ADMIN — CARVEDILOL 25 MG: 25 TABLET, FILM COATED ORAL at 17:09

## 2022-08-15 RX ADMIN — FAMOTIDINE 20 MG: 20 TABLET ORAL at 21:24
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
## 2022-08-15 NOTE — CARE COORDINATION
Renee Bryant, RN   Registered Nurse   Case Management   Progress Notes      Signed   Date of Service:  8/10/2022  9:05 AM          Related encounter: ED to Hosp-Admission (Discharged) from 2022 in [facility]  PRE-ADMISSION ASSESSMENT     Patient Name: Andreas Mccracken        MRN:   [id number]    : 1965  (59 y.o.)  Gender: male      Admitted from:   [facility]  [x][facility]   [][facility]   [][facility]   []Outside Admission - Location:                                 [x]Initial         []Updated     Date of Onset / Admission to the acute hospital:  22     Inpatient Rehabilitation Admitting Diagnosis:  CVA     Did patient have surgery/procedures?   []No  [x]Yes:       22 PROCEDURE:  1)  Implantation of ICD, single chamber  2)  Conscious sedation  3) Contrast venogram  4)  Intraoperative lead testing  5) Defibrillation testing     22  Procedure Performed:  Diagnostic Cerebral Angiogram with Left ICA occlusion mechanical thrombectomy with stent retriever and mechanical aspiration, pre and post stenting balloon angioplasty, stenting with carotid wallstent           Physicians: Heather Dejesus (Neurology), Chani Ann (Endovascular), Madhu Lobo (Electrophysiology), Nikita Carl (IM), Zander (Neuro-CC)     Risk for clinical complications:  High     Co-morbidities:       HTN  CAD: s/p CABG  Hx V Fib: recent AICD placement  COPD  CKD  GERD  Major Depressive Disorder, Recurrent     Financial Information  Primary insurance:  []Medicare     [] Medicare HMO      []Commercial insurance    []Medicaid      [x] Medicaid HMO       []Workers Compensation        []Personal Pay     Secondary Insurance:  []Medicare     [] Medicare [facility]      []Commercial insurance    []Medicaid      []Medicaid NIHSS [id number] with R sided weakness. 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 Neuro endovascular was consulted and CTA showed the same ICA occlusion. On 7/31/22 his NIHSS was worsened to 15. CT perfusion emergently showed ischemic penumbra L MCA and DARYN territory. Dr. Timo Lowe performed emergent mechanical thrombectomy and placed L carotid stent with balloon angioplasty on 7/31/22. Prognosis: Fair     Current functional status for upper extremity ADLs:  UE Bathing: Stand by assistance, Verbal cueing, Setup, Increased time to complete  UE Dressing: Stand by assistance, Setup     Current functional status for lower extremity ADLs:  LE Bathing: Setup, Verbal cueing, Increased time to complete, Contact guard assistance, Minimal assistance  LE Dressing: Minimal assistance     Current functional status for bed, chair, wheelchair transfers:  Transfers  Sit to Stand: Contact guard assistance  Stand to sit: Contact guard assistance  Comment: Transfers completed w/RW 3x     Current functional status for toilet transfers: Toilet Transfers  Toilet - Technique: Ambulating  Equipment Used: Standard toilet  Toilet Transfer: Minimal assistance  Toilet Transfers Comments: utilizing RW and hand rails     Current functional status for locomotion:  Ambulation  WB Status: FWB  Ambulation  Surface: level tile  Device: Rolling Walker  Assistance: Contact guard assistance  Quality of Gait: Narrow BRAD, step to gait pattern with LLE leading  Gait Deviations: Slow Claudia, Decreased step length, Decreased step height  Distance: 30ft x 2  Comments: Extremely slow claudia with narrow BRAD. No LOB experienced.  VC's required to steer clear from walls/corners, VC's to increase step length  More Ambulation?: No     Current functional status for comprehension: Moderate assistance     Current functional status for expression: Maximal Assistance     Current functional status for social interaction: Close supervision Current functional status for problem solving: Moderate assistance     Current functional status for memory: Moderate assistance     Current Deficits R/T Impairment: Impaired Functional Mobility and Decreased ADLs     Required Therapy:   [x] Physical Therapy  [x] Occupational Therapy   [x] Speech Therapy, as appropriate     Additional Services:    [x]     [] Recreational Therapy, as appropriate    [x] Nutrition Consult, as appropriate  [] Dietary Needs/Preferences  [] Dialysis  [] Cultural Needs  [] Special Equipment Needs  [] Special Medication Needs  [] Other     Rehab Justification:  Needs 3 hrs therapy per day or 15 hours per week:  Yes  Identified Rehab Nursing needs: Yes  Intense Interdisciplinary need:  Yes  Need for 24 hr physician supervision:  Yes  Measurable improved quality of life:  Yes  Willingness to participate:  Yes  Medical Necessity:  Yes  Patient able to tolerate care proposed:  Yes     Expected Discharge Destination/Functional Level:  Home with assist  Expected length of time to achieve that level of improvement: 1-2 weeks  Expected Post Discharge Treatments: Home with possible Home Care     Other information relevant to patient's care needs:  N/A     Acute Inpatient Rehabilitation Disclosure Statement will be provided to patient upon admission to ARU with patient's verbalization of understanding. I have reviewed and concur with the findings and results of the pre-admission screening assessment completed by the Inpatient Rehabilitation Admissions Coordinator.                Cosigned by: Nirali Miranda MD at 8/10/2022  9:36 AM
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
## 2022-08-15 NOTE — PROGRESS NOTES
Physical Therapy  Facility/Department: [facility] ACUTE REHAB  Rehabilitation Physical Therapy Daily Treatment Note    NAME: Liban Ojeda  : 1965 (59 y.o.)  MRN: [id number]  CODE STATUS: Full Code    Date of Service: 8/15/22    Chart Reviewed: Yes  Patient assessed for rehabilitation services?: Yes  Additional Pertinent Hx: Liban Ojeda is a 59 y.o. RHD male admitted to [facility] on 2022. Patient admitted after syncope and c/o headache, SOB. CT head was WNL. CT chest negative for PE. CXR showed atelectasis. He was admitted to observation status. Cardiology was consulted for syncope. Performed ICD interrogation and adjusted his antihypertensive medications. Known history of CABG, VFib arrest with recent ICD placement By Dr. Raquel Munoz on 22. He developed AMS on 22. Neurology was consulted and performed EEG which showed possible structural defect. He was found to have expressive aphasia and a NIHSS 10 with R sided weakness. 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 Neuro endovascular was consulted and CTA showed the same ICA occlusion. On 22 his NIHSS was worsened to 15. CT perfusion emergently showed ischemic penumbra L MCA and DARYN territory. Dr. Morgan Wood performed emergent mechanical thrombectomy and placed L carotid stent with balloon angioplasty on 22. Family / Caregiver Present: No  Referring Practitioner: Dr Erika Rae  Diagnosis: Ischemic CVA with R sided weakness:s/p mechanical thrombectomy and ICA stent  Other (Comment): Pt follows commands with increased time, slow to respond  General Comment  Comments: Pt is in bed upon arrival. Agreeable to PT. Reports sleeping well last night.     Restrictions:  Restrictions/Precautions: Up as Tolerated;General Precautions  Position Activity Restriction  Other position/activity restrictions: Do not elevate affected arm above shoulder for 30 days  -ICD implanted on 22     SUBJECTIVE  Subjective: Pt denies any pain this date. Pain: Denies pain this date. OBJECTIVE  Vision  Vision: Within Functional Limits    Hearing  Hearing: Within functional limits    Cognition  Overall Cognitive Status: Exceptions  Arousal/Alertness: Inconsistent responses to stimuli  Following Commands: Follows one step commands consistently  Attention Span: Difficulty dividing attention  Memory: Decreased short term memory (fair was able to state what he ate for lunch, was able to remember directions to therapy gym.)  Safety Judgement: Decreased awareness of need for assistance;Decreased awareness of need for safety  Problem Solving: Assistance required to generate solutions  Insights: Decreased awareness of deficits  Initiation: Requires cues for some  Sequencing: Requires cues for some  Cognition Comment: speaks little. was able to correctly navigate ambulating from his room to therapy gym without cues. needed cues to initiate wash caro, bottom and B feet in shower he initiated washing remainder of body then stated he was done. with cues he completed this bathing- is cooperative. Sensation  Overall Sensation Status:  (Per pt, no deficits.)    Functional Mobility  Bed mobility  Rolling to Left: Stand by assistance  Rolling to Right: Stand by assistance  Supine to Sit: Stand by assistance  Sit to Supine: Stand by assistance  Scooting: Stand by assistance  Bed Mobility Comments: HOB elevated, utilized bed rails. Transfers  Sit to Stand: Contact guard assistance  Stand to sit: Contact guard assistance  Bed to Chair: Contact guard assistance  Stand Pivot Transfers: Contact guard assistance  Comment: no AD utilized for transfers. Pt can be unsteady at times however no LOB noted. Pt demo momentum building for STS from low surfaces, increased difficulty.   Balance  Posture: Good  Sitting - Static: Good  Sitting - Dynamic: Fair;+  Standing - Static: Fair;+  Standing - Dynamic: Fair  Comments: Standing balance assessed with no AD, sitting at EOB. Environmental Mobility  Ambulation  WB Status: FWB  Ambulation  Surface: level tile  Device: No Device  Assistance: Stand by assistance;Contact guard assistance  Quality of Gait: Wide BRAD, improved step through gait pattern, lacked R TKE, R toe out  Gait Deviations: Slow Claudia; Increased BRAD; Decreased step length;Decreased step height;Decreased arm swing;Decreased head and trunk rotation;Deviated path  Distance: 305  Comments: Requires VC's for direction, obstacle avoidance, and maintaining attention as he is easily distracted. More Ambulation?: Yes  Ambulation 2  Surface - 2: level tile  Device 2: No device  Assistance 2: Contact guard assistance  Quality of Gait 2: Same as above  Gait Deviations: Slow Claudia;Decreased step height;Decreased step length;Decreased head and trunk rotation;Decreased arm swing  Distance: 30'  Stairs/Curb  Stairs?: Yes  Stairs  # Steps : 17  Stairs Height:  (7.5)  Rails: Right ascending;Bilateral (Pt utilized Bilat hand rails intermittently despite cues for one rail.)  Assistance: Contact guard assistance  Comment: Pt requires cues for R foot placement with fair carryover. Pt edu in proper sequencing of step to gait pattern, however pt demos intermixing step to and step through. No LOB noted. PT Exercises  Exercise Treatment: .  Resistive Exercises: Seated BLE exercises [id number] with #2 ankle weights. Dynamic Standing Balance Exercises: Obstacle courase with sm/lg hurdles, dyno disc, bosu ball, and 8\" step box. Reps: x2. Pt is able to retreive items from the ground with RUE support on [place] rail. x6 items. Pt completes standing on dyno disc and bosu ball with RUE support x30 sec each. Disease-specific Exercises: Standing R TKE with PTB. Completed in // bars.  Reps: 15    ASSESSMENT  Vitals  BP Location: Left upper arm  O2 Device: None (Room air)    Activity Tolerance  Activity Tolerance: Patient limited by fatigue;Patient limited by endurance;Treatment limited secondary Improve Tinetti score to atleast 26/28 to reduce fall risk. PLAN OF CARE    Plan  Plan:  minutes of therapy at least 5 out of 7 days a week  Plan weeks: 5-7x/ week  Current Treatment Recommendations: Strengthening; Functional mobility training;Transfer training; Endurance training;Cognitive/Perceptual training;Stair training;Gait training;Cognitive reorientation; Safety education & training;Balance training;Neuromuscular re-education;Home exercise program;Patient/Caregiver education & training;Equipment evaluation, education, & procurement; Therapeutic activities  Safety Devices  Type of Devices: Gait belt;Call light within reach; Patient at risk for falls;Nurse notified; Bed alarm in place; Left in bed; All fall risk precautions in place  Restraints  Restraints Initially in Place: No    EDUCATION  Education  Education Given To: Patient  Education Provided: Role of Therapy;Plan of Care;Safety; Mobility Training;Transfer Training;Energy Conservation; Fall Prevention Strategies  Education Provided Comments: Edu on technique with all functional mobility. Edu on the benefits of each ex.   Education Method: Demonstration;Verbal  Barriers to Learning: Cognition  Education Outcome: Verbalized understanding;Demonstrated understanding;Continued education needed    ELOS:   Plan weeks: 5-7x/ week      Therapy Time     08/15/22 0748 08/15/22 1403   PT Individual Minutes   Time In 1550 74   Time Out [id number]   Minutes 61 990 Anthony Monroy PTA, 08/15/22 at 4:02 PM
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
## 2022-08-15 NOTE — PLAN OF CARE
Problem: Discharge Planning  Goal: Discharge to home or other facility with appropriate resources  8/15/2022 1048 by Chloe Salazar  Outcome: Progressing     Problem: Safety - Adult  Goal: Free from fall injury  8/15/2022 1048 by Chloe Salazar  Outcome: Progressing     Problem: ABCDS Injury Assessment  Goal: Absence of physical injury  8/15/2022 1048 by Chloe Salazar  Outcome: Progressing     Problem: Skin/Tissue Integrity  Goal: Absence of new skin breakdown  Description: 1. Monitor for areas of redness and/or skin breakdown  2. Assess vascular access sites hourly  3. Every 4-6 hours minimum:  Change oxygen saturation probe site  4. Every 4-6 hours:  If on nasal continuous positive airway pressure, respiratory therapy assess nares and determine need for appliance change or resting period.   8/15/2022 1048 by Chloe Salazar  Outcome: Progressing     Problem: Nutrition Deficit:  Goal: Optimize nutritional status  8/15/2022 1048 by Chloe Salazar  Outcome: Progressing

## 2022-08-15 NOTE — PLAN OF CARE
Problem: Safety - Adult  Goal: Free from fall injury  8/15/2022 0507 by Tacos Mesa RN  Outcome: Progressing     Problem: ABCDS Injury Assessment  Goal: Absence of physical injury  8/15/2022 0507 by Tacos Mesa RN  Outcome: Progressing     Problem: Skin/Tissue Integrity  Goal: Absence of new skin breakdown  Description: 1. Monitor for areas of redness and/or skin breakdown  2. Assess vascular access sites hourly  3. Every 4-6 hours minimum:  Change oxygen saturation probe site  4. Every 4-6 hours:  If on nasal continuous positive airway pressure, respiratory therapy assess nares and determine need for appliance change or resting period.   8/15/2022 0507 by Tacos Mesa RN  Outcome: Progressing     Problem: Chronic Conditions and Co-morbidities  Goal: Patient's chronic conditions and co-morbidity symptoms are monitored and maintained or improved  8/15/2022 0507 by Tacos Mesa RN  Outcome: Progressing

## 2022-08-15 NOTE — PROGRESS NOTES
Physical Medicine & Rehabilitation  Progress Note      Subjective:      59year-old male with ischemic CVA with R sided weakness. Patient is well, and has had no acute complaints or problems    ROS:  Denies fevers, chills, sweats. No chest pain, palpitations, lightheadedness. Denies coughing, wheezing or shortness of breath. Denies abdominal pain, nausea, diarrhea or constipation. No new areas of joint pain. Denies new areas of numbness or weakness. Denies new anxiety or depression issues. No new skin problems. Rehabilitation:   Progressing in therapies. PT:    Bed mobility  Rolling to Left: Stand by assistance  Rolling to Right: Stand by assistance  Supine to Sit: Stand by assistance  Sit to Supine: Stand by assistance  Scooting: Stand by assistance  Bed Mobility Comments: HOB elevated, utilized bed rails. Transfers  Sit to Stand: Contact guard assistance  Stand to sit: Contact guard assistance  Bed to Chair: Contact guard assistance  Stand Pivot Transfers: Contact guard assistance  Comment: no AD utilized for transfers. Pt can be unsteady at times however no LOB noted. Pt demo momentum building for STS from low surfaces, increased difficulty. Ambulation  WB Status: FWB  Ambulation  Surface: level tile  Device: No Device  Assistance: Stand by assistance, Contact guard assistance  Quality of Gait: Wide BRAD, improved step through gait pattern, lacked R TKE, R toe out  Gait Deviations: Slow Claudia, Increased BRAD, Decreased step length, Decreased step height, Decreased arm swing, Decreased head and trunk rotation, Deviated path  Distance: 305  Comments: Requires VC's for direction, obstacle avoidance, and maintaining attention as he is easily distracted. More Ambulation?: Yes  Ambulation 2  Surface - 2: level tile  Device 2: No device  Assistance 2: Contact guard assistance  Quality of Gait 2: Narrow BRAD, mildly unsteady.   Gait Deviations: Slow Claudia, Decreased step height, Decreased step length, Decreased head and trunk rotation, Decreased arm swing  Distance: 224  Comments: 2MWT. Cues for safety, obstacle avoidance, and technique with fair return. OT:  Grooming/Oral Hygiene  Assistance Level: Supervision  Skilled Clinical Factors: Pt declined  Upper Extremity Bathing  Assistance Level: Set-up  Skilled Clinical Factors: Pt declined  Lower Extremity Bathing  Assistance Level: Supervision  Skilled Clinical Factors: Pt declined  Upper Extremity Dressing  Assistance Level: Set-up  Skilled Clinical Factors: Pt declined  Lower Extremity Dressing  Assistance Level: Set-up  Skilled Clinical Factors: Pt declined  Putting On/Taking Off Footwear  Assistance Level: Set-up  Skilled Clinical Factors: Pt declined  Toileting  Assistance Level: Supervision  Skilled Clinical Factors: Pt declined   Toilet Transfers  Equipment: Grab bars  Assistance Level: Supervision  Skilled Clinical Factors: Pt declined      SPEECH:  Subjective: [x] Alert     [x] Cooperative     [] Confused     [] Agitated    [] Lethargic        Objective/Assessment:  Auditory Comprehension: Pt. Occasionally requested repetition. Verbal Expression: Confrontation naming- 70%, name 3 items in category- 84%, 100% c cues. Responsive naming- 63%, 88% c cues. Orientation- 100%, pt. Jasmyne using environment for cues. Objective:  /80   Pulse 67   Temp 98 °F (36.7 °C)   Resp 18   Ht 5' 9\" (1.753 m)   Wt 189 lb 12.8 oz (86.1 kg)   SpO2 98%   BMI 28.03 kg/m²       GEN: Well developed, well nourished, no acute distress. HEENT: NCAT, PERRL, EOMI, mucous membranes pink and moist.  RESP: Lungs clear to auscultation. No rales or rhonchi. Respirations WNL and unlabored. CV: Bradycardic rate regular rhythm. No murmurs, rubs, or gallops. ABD: soft, non-distended, non-tender. BS+ and equal.  NEURO: A&O x 3. Sensation intact to light touch. Mild verbal apraxia - stable  MSK: Functional ROM.  Muscle tone and bulk are normal

## 2022-08-15 NOTE — PATIENT CARE CONFERENCE
[address] [facility] Acute Inpatient Rehabilitation  TEAM CONFERENCE NOTE  Date: 22  Patient Name: Mirza Mackey       Room: 2050/4803-00  MRN: [id number]       : 1965  (59 y.o.)     Gender: male   Referring Practitioner: Dr Shalini Narvaez CVA (cerebrovascular accident) [facility]) [I63.9]  Diagnosis: Acute CVA (cerebrovascular accident)  acute left cerebral ischemic stroke with  carotid ultrasound showing severe left ICA obstruction. s/p emergent left ICA thrombectomy. expressive aphasia and R sided weakness. CAD s/p CABG, history of V-fib arrest:  S/p ICD placement on      NURSING  Bladder  Incontinent Daily  Bowel   Contienent  Date of Last BM: 22  Intervention    Both Bowel & Bladder Program     Wounds/Incisions/Ulcers: No skin issues identified  Medication Education Program: Patient currently unable to manage medications and family being educated  Pain: Patient's pain is currently controlled with -  acetaminophen 650 mg every 4 hours PRN    Fall Risk:  Falling star program initiated    PHYSICAL THERAPY    Bed Mobility:    Rolling to Left: Stand by assistance  Rolling to Right: Stand by assistance  Supine to Sit: Stand by assistance  Sit to Supine: Stand by assistance  Scooting: Stand by assistance  Bed Mobility Comments: HOB elevated, utilized bed rails        Transfers:  Sit to Stand: Contact guard assistance  Stand to sit: Contact guard assistance  Bed to Chair: Contact guard assistance    WB Status: FWB  Ambulation  Surface: level tile  Device: No Device  Assistance: Contact guard assistance  Quality of Gait: Wide BRAD, improved step through gait pattern, lacked R TKE, R toe out  Gait Deviations: Slow Claudia; Increased BRAD; Decreased step length;Decreased step height;Decreased arm swing;Decreased head and trunk rotation;Deviated path  Distance: 110ft; 95ft  Comments: Verbal cues for R foot clearance, pt tends to drag R foot during ambulation.  Verbal cues to correct with poor follow through. More Ambulation?: Yes  Ambulation 2  Surface - 2: level tile  Device 2: No device  Assistance 2: Contact guard assistance  Quality of Gait 2: Same as above  Gait Deviations: Slow Claudia;Decreased step height;Decreased step length;Decreased head and trunk rotation;Decreased arm swing  Distance: 30'    Ambulation:  # Steps : 17  Stairs Height:  (7.5)  Rails: Right ascending;Bilateral (Pt utilized Bilat hand rails intermittently despite cues for one rail.)  Assistance: Contact guard assistance  Comment: Pt requires cues for R foot placement with fair carryover. Pt edu in proper sequencing of step to gait pattern, however pt demos intermixing step to and step through. No LOB noted. Goals  Time Frame for Short term goals: 5 days  Short term goal 1: Bed mobility SBA without use of bed rails. Short term goal 2: Transfers SBA with minimal verbal cues. Short term goal 3: Ambulation with rolling walker distance of 200 ft CGA, with minimal cues for safety on level surfaces. Short term goal 4: Pt able to go up and down 10 steps with 2 rail, SBA                OCCUPATIONAL THERAPY  SELF CARE          Feeding  Assistance Level:  Independent  Skilled Clinical Factors: observed pt obtain own set up and feeding self his lunch          Grooming/Oral Hygiene  Assistance Level: Supervision       Upper Extremity Bathing  Assistance Level: Set-up  Lower Extremity Bathing  Assistance Level: Supervision     Upper Extremity Dressing  Assistance Level: Set-up        Lower Extremity Dressing  Assistance Level: Minimal assistance  Skilled Clinical Factors: due to LOB in standing       Putting On/Taking Off Footwear  Assistance Level: Set-up       Toileting  Assistance Level: Supervision       Toilet Transfers  Equipment: Grab bars  Assistance Level: Supervision    Short Term Goals  Time Frame for Short term goals: By 1 week  Short Term Goal 1: Pt will perform UB ADLs with Supervision and fair safety  Short Term Goal (Pt stated that he is unable to cook but will make cereal. 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 Leisure & Hobbies: games on the computer  IADL Comments: Sleep in a flat bed at home. per chart- at baseline- pt unable to read or write. Additional Comments: Pt questionable historian at this time and had difficulty recalling social/functional information. No family present. Family Education: Need to make contact with family to initiate education    Percentage Risk for Readmission: High 28% - 100%   Readmission Risk              Risk of Unplanned Readmission:  28       %    Critical Items: None       Problem / Barrier Intervention / Plan  Results   Impaired receptive and expressive language Language retraining exercises    Altered ability to care for self Remediation and training in modified care strategies    Impaired function Strengthening, balance activities, functional mobility training, progress to stair training                          Total Self Care Score    Total Mobility Score  Admission Score:  24      Admission Score:  34  Goal:  42/42         Goal:  70/90   `  Discharge Plan   Estimated Discharge Date: 8/20/2022  Home evaluation needed?  Home Evaluation Indication (NO, Requires ReEval, YES/Date): No home evaluation need indicated for patient at this time  Overnight or Day Pass: No  Factors facilitating achievement of predicted outcomes: Family support, Motivated, Cooperative, Pleasant, Good insight into deficits, and Has homemaker services  Barriers to the achievement of predicted outcomes: Decreased endurance, Upper extremity weakness, Lower extremity weakness, Medical complications, Medication managment, and Impaired Balance    Functional Goals at discharge:  Predicted Outcome: Home with familyPATIENT'S LEVEL OF ASSISTANCE: Stand By Assistance   Discharge therapy goals:  PT: Long Term Goals  Time Frame for Long term goals : By DC  Long term goal 1: Pt able to perform transfers at supervsion level from varied surfaces. Long term goal 2: Pt able to ambulate with least restrrictive device distance of 200 ft on level as well as outside terraine, at supervsion level. Long term goal 3: Pt able to go up and down flight of steps with 1 Handrail, SBA  Long term goal 4: Improve 2MWT distance to atleast 200 ft to improve overall function. Long term goal 5: Improve Tinetti score to atleast 26/28 to reduce fall risk.   OT:Long Term Goals  Time Frame for Long term goals : By discharge  Long Term Goal 1: Pt will perform BADLs with Mod I, fair safety, and use of AE/mod techiques as needed  Long Term Goal 2: Pt will perform functional transfers/mobility with Mod I, fair safety, and use of least restrictive device  Long Term Goal 3: Pt will tolerate standing for 10+ minutes, Mod I, with 0-1 UE support and no LOB noted during functional activity  Long Term Goal 4: Pt will perform self-care with improved L hand coordination as evidenced by 5 second improvement in 9 hole peg test  Long Term Goal 5: Pt will follow 100% of 2-step commands to address cognitive concerns for improved participation in meaningful occupations  Additional Goals?: Yes  Long Term Goal 6: Pt will demonstrate improved safety awareness with Min cues or less for hand placement/body mechanics/perceptual awareness during ADLs/functional transfers  Long Term Goal 7: Pt will participate in BUE FMC/strengthening tasks to improve ability to open small containers during self-care tasks  Long Term Goal 8: Pt will safely perform light housekeeping/meal preparation with supervision, good safety, and use of least restrictive device to improve independence with ADLs/IADLs  ST: supervision comprehension, min A expression    Participating Team Members:  /:  Servando Ivy   Occupational Therapist: Norbert Candelario OT    Physical Therapist: Magalie Askew PT  Speech Therapist:  Adeline Brown MA CCC-SLP  Nurse: Mikal Guillaume RN   Dietary/Nutrition: Samira Miller RD, LD  Pastoral Care: Chaplain Aydee Silva  CMG: Felipe Navarrete RN    I approve the established interdisciplinary plan of care as documented within the medical record of Rena Quintero.     Lizandro Valladares MD

## 2022-08-16 LAB — GLUCOSE BLD-MCNC: 86 MG/DL (ref 75–110)

## 2022-08-16 PROCEDURE — 6360000002 HC RX W HCPCS

## 2022-08-16 PROCEDURE — 97530 THERAPEUTIC ACTIVITIES: CPT

## 2022-08-16 PROCEDURE — 97535 SELF CARE MNGMENT TRAINING: CPT

## 2022-08-16 PROCEDURE — 92507 TX SP LANG VOICE COMM INDIV: CPT

## 2022-08-16 PROCEDURE — 82947 ASSAY GLUCOSE BLOOD QUANT: CPT

## 2022-08-16 PROCEDURE — 99232 SBSQ HOSP IP/OBS MODERATE 35: CPT | Performed by: INTERNAL MEDICINE

## 2022-08-16 PROCEDURE — 6370000000 HC RX 637 (ALT 250 FOR IP): Performed by: STUDENT IN AN ORGANIZED HEALTH CARE EDUCATION/TRAINING PROGRAM

## 2022-08-16 PROCEDURE — 6370000000 HC RX 637 (ALT 250 FOR IP): Performed by: PHYSICAL MEDICINE & REHABILITATION

## 2022-08-16 PROCEDURE — 97130 THER IVNTJ EA ADDL 15 MIN: CPT

## 2022-08-16 PROCEDURE — 97116 GAIT TRAINING THERAPY: CPT

## 2022-08-16 PROCEDURE — 6370000000 HC RX 637 (ALT 250 FOR IP)

## 2022-08-16 PROCEDURE — 1180000000 HC REHAB R&B

## 2022-08-16 PROCEDURE — 99232 SBSQ HOSP IP/OBS MODERATE 35: CPT | Performed by: PHYSICAL MEDICINE & REHABILITATION

## 2022-08-16 PROCEDURE — 97110 THERAPEUTIC EXERCISES: CPT

## 2022-08-16 PROCEDURE — 97129 THER IVNTJ 1ST 15 MIN: CPT

## 2022-08-16 RX ORDER — LANOLIN ALCOHOL/MO/W.PET/CERES
6 CREAM (GRAM) TOPICAL NIGHTLY
Status: DISCONTINUED | OUTPATIENT
Start: 2022-08-16 | End: 2022-08-20 | Stop reason: HOSPADM

## 2022-08-16 RX ADMIN — CARVEDILOL 25 MG: 25 TABLET, FILM COATED ORAL at 16:34

## 2022-08-16 RX ADMIN — ISOSORBIDE MONONITRATE 30 MG: 30 TABLET, EXTENDED RELEASE ORAL at 07:35

## 2022-08-16 RX ADMIN — ASPIRIN 81 MG: 81 TABLET, COATED ORAL at 07:35

## 2022-08-16 RX ADMIN — FAMOTIDINE 20 MG: 20 TABLET ORAL at 07:35

## 2022-08-16 RX ADMIN — DULOXETINE 30 MG: 30 CAPSULE, DELAYED RELEASE ORAL at 07:35

## 2022-08-16 RX ADMIN — CARVEDILOL 25 MG: 25 TABLET, FILM COATED ORAL at 07:35

## 2022-08-16 RX ADMIN — AMLODIPINE BESYLATE 10 MG: 10 TABLET ORAL at 07:35

## 2022-08-16 RX ADMIN — ENOXAPARIN SODIUM 40 MG: 100 INJECTION SUBCUTANEOUS at 07:35

## 2022-08-16 RX ADMIN — SENNOSIDES 17.2 MG: 8.6 TABLET, FILM COATED ORAL at 19:40

## 2022-08-16 RX ADMIN — CLOPIDOGREL BISULFATE 75 MG: 75 TABLET ORAL at 07:35

## 2022-08-16 RX ADMIN — ATORVASTATIN CALCIUM 40 MG: 80 TABLET, FILM COATED ORAL at 19:40

## 2022-08-16 RX ADMIN — FAMOTIDINE 20 MG: 20 TABLET ORAL at 19:40

## 2022-08-16 RX ADMIN — Medication 6 MG: at 19:40

## 2022-08-16 RX ADMIN — POLYETHYLENE GLYCOL 3350 17 G: 17 POWDER, FOR SOLUTION ORAL at 07:35

## 2022-08-16 NOTE — PROGRESS NOTES
Pt noted to be incontinent of urine. Writer assisted pt to bathroom where Pt was able to void. Pt given care,linens changed, and pt assisted back into bed. Assessment complete. pt sitting up in bed enjoying a snack. Call light within reach.
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
## 2022-08-16 NOTE — PLAN OF CARE
Problem: Discharge Planning  Goal: Discharge to home or other facility with appropriate resources  8/16/2022 1251 by Katelynn Lopez  Outcome: Progressing     Problem: Safety - Adult  Goal: Free from fall injury  8/16/2022 1251 by Katelynn Lopez  Outcome: Progressing     Problem: ABCDS Injury Assessment  Goal: Absence of physical injury  8/16/2022 1251 by Katelynn Lopez  Outcome: Progressing     Problem: Skin/Tissue Integrity  Goal: Absence of new skin breakdown  Description: 1. Monitor for areas of redness and/or skin breakdown  2. Assess vascular access sites hourly  3. Every 4-6 hours minimum:  Change oxygen saturation probe site  4. Every 4-6 hours:  If on nasal continuous positive airway pressure, respiratory therapy assess nares and determine need for appliance change or resting period.   8/16/2022 1251 by Katelynn Lopez  Outcome: Progressing     Problem: Chronic Conditions and Co-morbidities  Goal: Patient's chronic conditions and co-morbidity symptoms are monitored and maintained or improved  8/16/2022 1251 by Katelynn Lopez  Outcome: Progressing     Problem: Nutrition Deficit:  Goal: Optimize nutritional status  8/16/2022 1251 by Katelynn Lopez  Outcome: Progressing
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
## 2022-08-16 NOTE — PROGRESS NOTES
Physical Therapy  Facility/Department: [facility] ACUTE REHAB  Rehabilitation Physical Therapy Daily Treatment Note    NAME: Ender Meléndez  : 1965 (59 y.o.)  MRN: [id number]  CODE STATUS: Full Code    Date of Service: 22    Chart Reviewed: Yes  Patient assessed for rehabilitation services?: Yes  Additional Pertinent Hx: Ender Meléndez is a 59 y.o. RHD male admitted to [facility] on 2022. Patient admitted after syncope and c/o headache, SOB. CT head was WNL. CT chest negative for PE. CXR showed atelectasis. He was admitted to observation status. Cardiology was consulted for syncope. Performed ICD interrogation and adjusted his antihypertensive medications. Known history of CABG, VFib arrest with recent ICD placement By Dr. Taylor Alcantara on 22. He developed AMS on 22. Neurology was consulted and performed EEG which showed possible structural defect. He was found to have expressive aphasia and a NIHSS 10 with R sided weakness. 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 Neuro endovascular was consulted and CTA showed the same ICA occlusion. On 22 his NIHSS was worsened to 15. CT perfusion emergently showed ischemic penumbra L MCA and DARYN territory. Dr. Bhavna Story performed emergent mechanical thrombectomy and placed L carotid stent with balloon angioplasty on 22. Family / Caregiver Present: No  Referring Practitioner: Dr Alan Reeder  Diagnosis: Ischemic CVA with R sided weakness:s/p mechanical thrombectomy and ICA stent  Other (Comment): Pt follows commands with increased time, slow to respond  General Comment  Comments: Pt is in W/C ready for PT upon arrival. Reports feeling okay and slept well last night.     Restrictions:  Restrictions/Precautions: Up as Tolerated;General Precautions  Position Activity Restriction  Other position/activity restrictions: Do not elevate affected arm above shoulder for 30 days  -ICD implanted on 22     SUBJECTIVE  Subjective: Pt denies any pain this date. OBJECTIVE  Cognition  Overall Cognitive Status: Exceptions  Arousal/Alertness: Inconsistent responses to stimuli  Following Commands: Follows one step commands consistently  Attention Span: Difficulty dividing attention  Memory: Decreased short term memory  Safety Judgement: Decreased awareness of need for assistance;Decreased awareness of need for safety  Problem Solving: Assistance required to generate solutions  Insights: Decreased awareness of deficits  Initiation: Requires cues for some  Sequencing: Requires cues for some  Cognition Comment: Speaks little. When asked how old his children were he could not recall. Pt could not recall where he was or why he was in the hospital.    Functional Mobility  Transfers  Sit to Stand: Contact guard assistance (freq verbal cues to push up from W/C)  Stand to sit: Contact guard assistance (Cues to reach back for chair and slowly descend)  Bed to Chair: Contact guard assistance  Stand Pivot Transfers: Contact guard assistance    Environmental Mobility  Ambulation  WB Status: FWB  Ambulation  Surface: ramp;level tile  Device: No Device;Rolling Walker  Assistance: Contact guard assistance  Quality of Gait: Wide BRAD, lacked R TKE, R toe out  Gait Deviations: Slow Claudia; Increased BRAD; Decreased step length;Decreased step height;Decreased arm swing;Decreased head and trunk rotation;Deviated path  Distance: 110ft; 95ft no AD (AM) 190ft; 230ft (PM) with RW  Comments: Verbal cues for R foot clearance, pt tends to drag R foot during ambulation. Verbal cues to correct with poor follow through. Pt performed gait training with and without FWW, and demos increased stability/steadiness when ambulating with FWW. No LOB noted throughout. Instructed pt on correct/safe use of AD and may require cont edu.   Stairs/Curb  Stairs?: Yes  Stairs  # Steps : 9  Stairs Height: 4\"  Rails: Bilateral  Assistance: Contact guard assistance  Comment: Cues for safe R foot placement on stairs with improved follow through. Pt performed step-to stair climbing pattern this date with use of B HRs. No LOB noted. Pt performed stairs in stairwell and stairs in gym. Instructed pt to ascend with good LE and descend with weaker LE, will require cont edu. PT Exercises  A/AROM Exercises: seated ankle pumps x20, 2# LAQ x15, marches x15, ball squeezes x15  Functional Mobility Circuit Training: STS training with emphasis on correct hand placement and technique. Dynamic Sitting Balance Exercises: seated balloon taps reaching OOBOS 5 mins with BUE  Standing Open/Closed Kinetic Chain Exercises: Standing in // 2# ankle weights, marches x10, hip abd/ext x15 tactile cues to prevent compensation  Exercise Equipment: NuStep x12min, L4    ASSESSMENT  Activity Tolerance  Activity Tolerance: Treatment limited secondary to decreased cognition;Patient limited by endurance  Activity Tolerance Comments: Frequent rest breaks required throughout. Assessment  Assessment: Pt presents with R sided weakness as well as cognitive deficits and decreased follow through for safety instructions. Pt ambulates with FWW at this time with no LOB however can be unsteady at times with decreased R foot clearance. Further PT is recommended to address safety and independence with funcitonal mobility. Treatment Diagnosis: Impaired mobility  Therapy Prognosis: Good  Decision Making: Medium Complexity  History: Recent ICD placement on 7/20/22  Barriers to Learning: Cognitive  Discharge Recommendations: Patient would benefit from continued therapy after discharge;24 hour supervision or assist  PT D/C Equipment  Walker: Rolling  Other: TBD  PT Equipment Recommendations  Equipment Needed: Yes  [place] Showman: Rolling  Other: TBD    GOALS  Patient Goals   Patient goals : To not use any AD. Short Term Goals  Time Frame for Short term goals: 5 days  Short term goal 1: Bed mobility SBA without use of bed rails.   Short term goal 2: Transfers SBA with minimal verbal cues. Short term goal 3: Ambulation with rolling walker distance of 200 ft CGA, with minimal cues for safety on level surfaces. Short term goal 4: Pt able to go up and down 10 steps with 2 rail, SBA  Long Term Goals  Time Frame for Long term goals : By DC  Long term goal 1: Pt able to perform transfers at supervsion level from varied surfaces. Long term goal 2: Pt able to ambulate with least restrrictive device distance of 200 ft on level as well as outside terraine, at supervsion level. Long term goal 3: Pt able to go up and down flight of steps with 1 Handrail, SBA  Long term goal 4: Improve 2MWT distance to atleast 200 ft to improve overall function. Long term goal 5: Improve Tinetti score to atleast 26/28 to reduce fall risk. PLAN OF CARE  Frequency: 1-2 treatment sessions per day, 5-7 days per week  Plan  Plan:  minutes of therapy at least 5 out of 7 days a week  Plan weeks: 5-7x/ week  Current Treatment Recommendations: Strengthening; Functional mobility training;Transfer training; Endurance training;Cognitive/Perceptual training;Stair training;Gait training;Cognitive reorientation; Safety education & training;Balance training;Neuromuscular re-education;Home exercise program;Patient/Caregiver education & training;Equipment evaluation, education, & procurement; Therapeutic activities  Safety Devices  Type of Devices: Gait belt;Nurse notified;Call light within reach; Left in chair  Restraints  Restraints Initially in Place: No    EDUCATION  Education  Education Given To: Patient  Education Provided: Role of Therapy;Plan of Care;Safety; Mobility Training;Transfer Training;Energy Conservation; Fall Prevention Strategies  Education Provided Comments: Edu on technique with all functional mobility. Edu on the benefits of each ex.   Education Method: Demonstration;Verbal  Barriers to Learning: Cognition  Education Outcome: Verbalized understanding;Demonstrated understanding;Continued

## 2022-08-16 NOTE — PROGRESS NOTES
[facility]: JAMIL LINDER   Acute Rehabilitation Occupational Therapy Daily Treatment Note    Date: 22  Patient Name: Jennifer Munguia       Room: 6157/3754-96  MRN: [id number]  Account: [de-identified]   : 1965  (59 y.o.) Gender: male       Referring Practitioner: Aurelia Loaiza MD  Diagnosis: Acute CVA (cerebrovascular accident)  acute left cerebral ischemic stroke with  carotid ultrasound showing severe left ICA obstruction. s/p emergent left ICA thrombectomy. expressive aphasia and R sided weakness. CAD s/p CABG, history of V-fib arrest:  S/p ICD placement on   Additional Pertinent Hx: history of  Severe recurrent major depressive disorder with psychotic features. Jennifer Munguia is a 59 y.o. RHD male admitted to [facility] on 2022. Patient admitted after syncope and c/o headache, SOB. CT head was WNL. CT chest negative for PE. CXR showed atelectasis. He was admitted to observation status. Cardiology was consulted for syncope. Performed ICD interrogation and adjusted his antihypertensive medications. Known history of CABG, VFib arrest with recent ICD placement By Dr. Erika Corral on 22. He developed AMS on 22. Neurology was consulted and performed EEG which showed possible structural defect. He was found to have expressive aphasia and a NIHSS 10 with R sided weakness. 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 Neuro endovascular was consulted and CTA showed the same ICA occlusion. On 22 his NIHSS was worsened to 15. CT perfusion emergently showed ischemic penumbra L MCA and DARYN territory. Dr. Effie Ordonez performed emergent mechanical thrombectomy and placed L carotid stent with balloon angioplasty on 22.     Treatment Diagnosis: Impaired self-care status    Past Medical History:  has a past medical history of ADHD (attention deficit hyperactivity disorder), Biceps rupture, distal, CAD (coronary artery disease), Cardiac arrest [facility]), Cardiac Impaired  Orientation Level: Oriented to person;Oriented to place; Disoriented to time;Disoriented to situation (Patient stated that he thought it was October and that he could not remeber why he was here.)    Cognition  Overall Cognitive Status: Exceptions  Arousal/Alertness: Inconsistent responses to stimuli  Following Commands: Follows one step commands consistently  Attention Span: Difficulty dividing attention  Memory: Decreased short term memory  Safety Judgement: Decreased awareness of need for assistance;Decreased awareness of need for safety  Problem Solving: Assistance required to generate solutions  Insights: Decreased awareness of deficits  Initiation: Requires cues for some  Sequencing: Requires cues for some    Activities of Daily Living  Feeding  Assistance Level: Set-up  Skilled Clinical Factors: Per pt report, pt needs assist with opening small packages and cutting his food. Grooming/Oral Hygiene  Skilled Clinical Factors: Pt declines at this date due to saying he brushed them already this morning. Upper Extremity Bathing  Skilled Clinical Factors: Pt declines at this date    Lower Extremity Bathing  Skilled Clinical Factors: Pt declines at this date    Upper Extremity Dressing  Assistance Level: Stand by assist  Skilled Clinical Factors: Pt positioned in wheelchair. Pt required few verbal cues. Lower Extremity Dressing  Assistance Level: Minimal assistance  Skilled Clinical Factors: Pt positioned in wheelchair. Pt required CGA for standing and assist for pulling briefs/pants down and over feet. Pt able to thread RLE through briefs but required assist for threading LLE. Pt able to thread BLE through pants. CGA required for standing. Pt able to pull briefs/pants up with increased time and verbal cues provided. Putting On/Taking Off Footwear  Assistance Level: Minimal assistance  Skilled Clinical Factors: Pt able to don/doff B slippers. Total assist for TREVER hose.         Instrumental ADL's  Instrumental ADLs: Yes      Light Housekeeping  Light Housekeeping Level:  (No device)  Light Housekeeping Level of Assistance: Contact guard assistance  Light Housekeeping: PM: OT student facilitated pts engagement in item retrieval from cabinets. OT student placed various colored cones in cabinets and had pt retrieve them to address IND with IADL tasks. After retrieving various cones, OT student called out various household items in the cabinet and had pt walk back and forth to retreive correct items. Pt needed increased time to complete. No LOB noted. Pt fluctuated between CGA and SBA when standing and walking back and forth between cabinets. Mobility  Bed Mobility  Overall Assistance Level: Stand By Assist  Additional Factors: Head of bed raised;Verbal cues; Increased time to complete           Sit to Supine  Assistance Level: Stand by assist  Skilled Clinical Factors: HOB raised with verbal cues provided to remind pt of task of getting back into bed. Transfers  Surface: Wheelchair  Additional Factors: Verbal cues  Device:  (Stand pivot)  Sit to Stand  Assistance Level: Contact guard assist  Skilled Clinical Factors: Verbal cues provided for Good safety and hand placement. Stand to Sit  Assistance Level: Contact guard assist  Skilled Clinical Factors: Verbal cues provided for Good safety and hand placement. Bed To/From Chair  Technique: Stand pivot  Assistance Level: Contact guard assist  Skilled Clinical Factors: Verbal cues provided for Good safety and hand placement. Stand Pivot  Assistance Level: Contact guard assist  Skilled Clinical Factors: Verbal cues for slow and controlled transfer                Functional Mobility  Device: Wheelchair  Activity: To/From therapy gym  Assistance Level: Maximum assistance  Skilled Clinical Factors: Pt able to self propel for initial part of trip down to therapy gym.             OT Exercises  Static Standing Balance Exercises: PM: OT student facilitated pts engagement in static standing while engaging in functional reaching to complete ring arc tabletop. Pt instructed to cross midline and to grab various rings to bring them over the arc and onto the other side. Pt required multiple verbal reminders to only use the opposite arm as the side the rings are on. Increased time required. No LOB noted. SBA for safety with standing. Additional Activities  Additional Activities Comment: AM: OT student facilitated pts engagement in colored block design activity table top to address cognition, visual deficits, and DELTA [facility]. Increased time required for activity. Pt required visual and verbal cues to correct mistakes and to know where to start activity. Next, OT student wrote numbers 1-4 on sticky notes and put them in front of pt on table. OT student then called out 3 or 4 colored block patterns and had pt pick the correct block colors to put on the numbers in order. Pt needed verbal reminders of pattern throughout and needed assist with remembering the order. After this activity, OT student facilitated pts engagement in tabletop matching activity to address cognition, DELTA [facility], and visual defcits. OT student placed various Go fish cards on the table upside down and had pt go through and try to find the matches. Increased time provided. Finally, OT student ended morning session by placing all Go Fish cards face up. OT student then called out a 2 or 3 pattern of animals and had pt grab correct cards in that sequence. Pt needed reminders for the correct pattern and needed max encouragement to complete activity. Assessment  Assessment  Activity Tolerance: Patient tolerated treatment well;Treatment limited secondary to decreased cognition;Patient limited by endurance; Patient limited by fatigue  Discharge Recommendations: Continue to assess pending progress    Patient Education  Education  Education Given To: Patient  Education Provided: Role of Therapy;Plan of Care;Safety;Precautions; ADL Function;Mobility Training;Transfer Training  Education Method: Verbal  Barriers to Learning: Cognition  Education Outcome: Continued education needed    OT Equipment Recommendations  Other: TBD    Safety Devices  Safety Devices in place: Yes  Type of devices: All fall risk precautions in place; Bed alarm in place;Call light within reach;Gait belt;Patient at risk for falls; Left in bed  Restraints  Initially in place: No    Goals  Patient Goals   Patient goals : Pt unable to verbalize  Short Term Goals  Time Frame for Short term goals: By 1 week  Short Term Goal 1: Pt will perform UB ADLs with Supervision and fair safety  Short Term Goal 2: Pt will perform LB ADLs with SBA, fair safety, and use of AE/mod techniques as needed  Short Term Goal 3: Pt will perform functional mobility/functional transfers with SBA, fair safety, with use of least restrictive device  Short Term Goal 4: Pt will actively participate in 30+ minutes of therapeutic exercise/functional activity to promote safety and independence with self-care and mobility  Short Term Goal 5: Pt will tolerate standing for 5+ minutes, SBA, with 0-1 UE support and no LOB while engaged in self-care/functional activity  Additional Goals?: Yes  Short Term Goal 6: Pt will follow 75% of 2-step commands to address cognitive concerns for improved participation in meaningful occupations  Short Term Goal 7: Pt will be educated on and explore use of DME/AE and modified techniques for increasing ease and independence with ADLs upon d/c    Long Term Goals  Time Frame for Long term goals : By discharge  Long Term Goal 1: Pt will perform BADLs with Mod I, fair safety, and use of AE/mod techiques as needed  Long Term Goal 2: Pt will perform functional transfers/mobility with Mod I, fair safety, and use of least restrictive device  Long Term Goal 3: Pt will tolerate standing for 10+ minutes, Mod I, with 0-1 UE support and no LOB noted during functional activity  Long Term Goal 4: Pt will perform self-care with improved L hand coordination as evidenced by 5 second improvement in 9 hole peg test  Long Term Goal 5: Pt will follow 100% of 2-step commands to address cognitive concerns for improved participation in meaningful occupations  Additional Goals?: Yes  Long Term Goal 6: Pt will demonstrate improved safety awareness with Min cues or less for hand placement/body mechanics/perceptual awareness during ADLs/functional transfers  Long Term Goal 7: Pt will participate in [address] [place] [address] FMC/strengthening tasks to improve ability to open small containers during self-care tasks  Long Term Goal 8: Pt will safely perform light housekeeping/meal preparation with supervision, good safety, and use of least restrictive device to improve independence with ADLs/IADLs    Plan  Plan  Times per Week: 5-7  Times per Day: Twice a day  Current Treatment Recommendations: Strengthening, ROM, Balance training, Functional mobility training, Endurance training, Cognitive reorientation, Safety education & training, Patient/Caregiver education & training, Equipment evaluation, education, & procurement, Self-Care / ADL, Home management training, Coordination training          08/16/22 1003 08/16/22 1004   Time Code Minutes    Timed Code Treatment Minutes 62 Minutes 31 Minutes   OT Individual Minutes   Time In [address] [id number]   Minutes 62 31         Electronically signed by [facility] Tyto S/OT on 8/16/22 at 4:00 PM EDT

## 2022-08-17 LAB — GLUCOSE BLD-MCNC: 99 MG/DL (ref 75–110)

## 2022-08-17 PROCEDURE — 97110 THERAPEUTIC EXERCISES: CPT

## 2022-08-17 PROCEDURE — 99232 SBSQ HOSP IP/OBS MODERATE 35: CPT | Performed by: INTERNAL MEDICINE

## 2022-08-17 PROCEDURE — 6360000002 HC RX W HCPCS

## 2022-08-17 PROCEDURE — 1180000000 HC REHAB R&B

## 2022-08-17 PROCEDURE — 92507 TX SP LANG VOICE COMM INDIV: CPT

## 2022-08-17 PROCEDURE — 97530 THERAPEUTIC ACTIVITIES: CPT

## 2022-08-17 PROCEDURE — 99232 SBSQ HOSP IP/OBS MODERATE 35: CPT | Performed by: PHYSICAL MEDICINE & REHABILITATION

## 2022-08-17 PROCEDURE — 6370000000 HC RX 637 (ALT 250 FOR IP)

## 2022-08-17 PROCEDURE — 6370000000 HC RX 637 (ALT 250 FOR IP): Performed by: STUDENT IN AN ORGANIZED HEALTH CARE EDUCATION/TRAINING PROGRAM

## 2022-08-17 PROCEDURE — 82947 ASSAY GLUCOSE BLOOD QUANT: CPT

## 2022-08-17 PROCEDURE — 97535 SELF CARE MNGMENT TRAINING: CPT

## 2022-08-17 PROCEDURE — 97116 GAIT TRAINING THERAPY: CPT

## 2022-08-17 PROCEDURE — 51798 US URINE CAPACITY MEASURE: CPT

## 2022-08-17 PROCEDURE — 6370000000 HC RX 637 (ALT 250 FOR IP): Performed by: PHYSICAL MEDICINE & REHABILITATION

## 2022-08-17 PROCEDURE — 97129 THER IVNTJ 1ST 15 MIN: CPT

## 2022-08-17 RX ADMIN — Medication 6 MG: at 20:35

## 2022-08-17 RX ADMIN — MAGNESIUM HYDROXIDE 30 ML: 400 SUSPENSION ORAL at 13:51

## 2022-08-17 RX ADMIN — ASPIRIN 81 MG: 81 TABLET, COATED ORAL at 07:43

## 2022-08-17 RX ADMIN — ATORVASTATIN CALCIUM 40 MG: 80 TABLET, FILM COATED ORAL at 20:34

## 2022-08-17 RX ADMIN — CARVEDILOL 25 MG: 25 TABLET, FILM COATED ORAL at 17:25

## 2022-08-17 RX ADMIN — FAMOTIDINE 20 MG: 20 TABLET ORAL at 20:35

## 2022-08-17 RX ADMIN — AMLODIPINE BESYLATE 10 MG: 10 TABLET ORAL at 07:40

## 2022-08-17 RX ADMIN — CLOPIDOGREL BISULFATE 75 MG: 75 TABLET ORAL at 07:41

## 2022-08-17 RX ADMIN — CARVEDILOL 25 MG: 25 TABLET, FILM COATED ORAL at 07:40

## 2022-08-17 RX ADMIN — POLYETHYLENE GLYCOL 3350 17 G: 17 POWDER, FOR SOLUTION ORAL at 07:42

## 2022-08-17 RX ADMIN — ENOXAPARIN SODIUM 40 MG: 100 INJECTION SUBCUTANEOUS at 07:42

## 2022-08-17 RX ADMIN — ISOSORBIDE MONONITRATE 30 MG: 30 TABLET, EXTENDED RELEASE ORAL at 07:40

## 2022-08-17 RX ADMIN — ACETAMINOPHEN 650 MG: 325 TABLET, FILM COATED ORAL at 20:35

## 2022-08-17 RX ADMIN — FAMOTIDINE 20 MG: 20 TABLET ORAL at 07:44

## 2022-08-17 RX ADMIN — DULOXETINE 30 MG: 30 CAPSULE, DELAYED RELEASE ORAL at 07:41

## 2022-08-17 ASSESSMENT — PAIN SCALES - GENERAL: PAINLEVEL_OUTOF10: 2

## 2022-08-17 ASSESSMENT — PAIN SCALES - WONG BAKER: WONGBAKER_NUMERICALRESPONSE: 0

## 2022-08-17 NOTE — PROGRESS NOTES
Patient bladder scan was 150 Patient. Patient didn't need to void at the time. Will continue to monitor.
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
## 2022-08-17 NOTE — PROGRESS NOTES
Physical Therapy  Facility/Department: [facility]  Rehabilitation Physical Therapy Daily Treatment Note    NAME: Radha Salcedo  : 1965 (59 y.o.)  MRN: [id number]  CODE STATUS: Full Code    Date of Service: 22  Chart Reviewed: Yes  Patient assessed for rehabilitation services?: Yes  Additional Pertinent Hx: Radha Salcedo is a 59 y.o. RHD male admitted to [facility] on 2022. Patient admitted after syncope and c/o headache, SOB. CT head was WNL. CT chest negative for PE. CXR showed atelectasis. He was admitted to observation status. Cardiology was consulted for syncope. Performed ICD interrogation and adjusted his antihypertensive medications. Known history of CABG, VFib arrest with recent ICD placement By Dr. Fariha Morin on 22. He developed AMS on 22. Neurology was consulted and performed EEG which showed possible structural defect. He was found to have expressive aphasia and a NIHSS 10 with R sided weakness. 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 Neuro endovascular was consulted and CTA showed the same ICA occlusion. On 22 his NIHSS was worsened to 15. CT perfusion emergently showed ischemic penumbra L MCA and DARYN territory. Dr. Maximilian Simms performed emergent mechanical thrombectomy and placed L carotid stent with balloon angioplasty on 22. Family / Caregiver Present: No  Referring Practitioner: Dr Tawanna Pérez  Diagnosis: Ischemic CVA with R sided weakness:s/p mechanical thrombectomy and ICA stent  Other (Comment): Pt follows commands with increased time, slow to respond    Restrictions:  Restrictions/Precautions: Up as Tolerated;General Precautions  Position Activity Restriction  Other position/activity restrictions: Do not elevate affected arm above shoulder for 30 days  -ICD implanted on 22     SUBJECTIVE  Subjective: Pt pleasant and agreeable with PT.  Pt reports mild abdominal pain in PM but still willing to participate, RN aware.    Cognition  Cognition Comment: Speaks little. Pt tends to space out during session and requires cues to redirect focus. Pt unable to tell R side from L side. Functional Mobility  Bed mobility  Bridging: Supervision  Rolling to Left: Modified independent  Rolling to Right: Modified independent  Bed Mobility Comments: HOB flat, utilizes bed rails to assist. Pt able to perform bed mobilities with ease. Transfers  Sit to Stand: Stand by assistance  Stand to sit: Stand by assistance (cues to lower self in slow, controlled manner)  Bed to Chair: Contact guard assistance;Stand by assistance  Stand Pivot Transfers: Stand by assistance;Contact guard assistance  Comment: Pt performs transfers with and sometimes without FWW with no LOB noted. Pt demos improved ease with t/f's and STS compared to prior treatment. Pt will cont to require educational reinforcement for safe hand placement with STS. Environmental Mobility  Ambulation  WB Status: FWB  Ambulation  Surface: uneven;outdoors  Device: Rolling Walker  Assistance: Stand by assistance  Quality of Gait: Wide BRAD, lacked R TKE, R toe out  Gait Deviations: Slow Claudia; Increased BRAD; Decreased step length;Decreased step height;Decreased arm swing;Decreased head and trunk rotation;Deviated path  Distance: >200ft  Comments: Verbal/tactile cues for R foot clearance. Temporary improvement with R foot clearance with tactile cues to R quad during swing phase noted. Pt bumps into objects on R side several times throughout ambulation requiring several cues to scan surroundings.   More Ambulation?: Yes  Ambulation 2  Surface - 2: level tile  Device 2: Rolling Walker  Assistance 2: Stand by assistance  Quality of Gait 2: Same as above  Gait Deviations: Slow Claudia;Decreased step height;Decreased step length;Decreased head and trunk rotation;Decreased arm swing  Distance: 150ft  Stairs/Curb  Stairs?: Yes  Stairs  # Steps : 17  Stairs Height: 4\"  Rails: Bilateral  Curbs: Term Goals  Time Frame for Long term goals : By DC  Long term goal 1: Pt able to perform transfers at supervsion level from varied surfaces. Long term goal 2: Pt able to ambulate with least restrrictive device distance of 200 ft on level as well as outside terraine, at supervsion level. Long term goal 3: Pt able to go up and down flight of steps with 1 Handrail, SBA  Long term goal 4: Improve 2MWT distance to atleast 200 ft to improve overall function. Long term goal 5: Improve Tinetti score to atleast 26/28 to reduce fall risk. PLAN OF CARE  Frequency: 1-2 treatment sessions per day, 5-7 days per week  Plan  Plan:  minutes of therapy at least 5 out of 7 days a week  Plan weeks: 5-7x/ week  Current Treatment Recommendations: Strengthening; Functional mobility training;Transfer training; Endurance training;Cognitive/Perceptual training;Stair training;Gait training;Cognitive reorientation; Safety education & training;Balance training;Neuromuscular re-education;Home exercise program;Patient/Caregiver education & training;Equipment evaluation, education, & procurement; Therapeutic activities    EDUCATION  Education  Education Given To: Patient  Education Provided: Role of Therapy;Plan of Care;Safety; Mobility Training;Transfer Training;Energy Conservation; Fall Prevention Strategies  Education Provided Comments: Edu on technique with all functional mobility. Edu on the benefits of each ex. Education Method: Demonstration;Verbal  Barriers to Learning: Cognition  Education Outcome: Verbalized understanding;Demonstrated understanding;Continued education needed     08/17/22 1970   Plan   Plan  minutes of therapy at least 5 out of 7 days a week   Plan weeks 5-7x/ week   Current Treatment Recommendations Strengthening; Functional mobility training;Transfer training; Endurance training;Cognitive/Perceptual training;Stair training;Gait training;Cognitive reorientation; Safety education & training;Balance
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
## 2022-08-18 LAB
ABSOLUTE EOS #: 0.5 K/UL (ref 0–0.4)
ABSOLUTE LYMPH #: 1.5 K/UL (ref 1–4.8)
ABSOLUTE MONO #: 0.4 K/UL (ref 0.1–1.3)
ANION GAP SERPL CALCULATED.3IONS-SCNC: 7 MMOL/L (ref 9–17)
BASOPHILS # BLD: 2 % (ref 0–2)
BASOPHILS ABSOLUTE: 0.1 K/UL (ref 0–0.2)
BUN BLDV-MCNC: 15 MG/DL (ref 6–20)
CALCIUM SERPL-MCNC: 9 MG/DL (ref 8.6–10.4)
CHLORIDE BLD-SCNC: 102 MMOL/L (ref 98–107)
CO2: 30 MMOL/L (ref 20–31)
CREAT SERPL-MCNC: 1.48 MG/DL (ref 0.7–1.2)
EOSINOPHILS RELATIVE PERCENT: 10 % (ref 0–4)
GFR AFRICAN AMERICAN: 59 ML/MIN
GFR NON-AFRICAN AMERICAN: 49 ML/MIN
GFR SERPL CREATININE-BSD FRML MDRD: ABNORMAL ML/MIN/{1.73_M2}
GLUCOSE BLD-MCNC: 160 MG/DL (ref 75–110)
GLUCOSE BLD-MCNC: 99 MG/DL (ref 70–99)
HCT VFR BLD CALC: 36 % (ref 41–53)
HEMOGLOBIN: 11.6 G/DL (ref 13.5–17.5)
LYMPHOCYTES # BLD: 30 % (ref 24–44)
MCH RBC QN AUTO: 27.5 PG (ref 26–34)
MCHC RBC AUTO-ENTMCNC: 32.4 G/DL (ref 31–37)
MCV RBC AUTO: 84.9 FL (ref 80–100)
MONOCYTES # BLD: 8 % (ref 1–7)
PDW BLD-RTO: 19.3 % (ref 11.5–14.9)
PLATELET # BLD: 158 K/UL (ref 150–450)
PMV BLD AUTO: 7.9 FL (ref 6–12)
POTASSIUM SERPL-SCNC: 4.4 MMOL/L (ref 3.7–5.3)
RBC # BLD: 4.24 M/UL (ref 4.5–5.9)
SEG NEUTROPHILS: 50 % (ref 36–66)
SEGMENTED NEUTROPHILS ABSOLUTE COUNT: 2.4 K/UL (ref 1.3–9.1)
SODIUM BLD-SCNC: 139 MMOL/L (ref 135–144)
WBC # BLD: 4.9 K/UL (ref 3.5–11)

## 2022-08-18 PROCEDURE — 36415 COLL VENOUS BLD VENIPUNCTURE: CPT

## 2022-08-18 PROCEDURE — 6360000002 HC RX W HCPCS

## 2022-08-18 PROCEDURE — 97110 THERAPEUTIC EXERCISES: CPT

## 2022-08-18 PROCEDURE — 80048 BASIC METABOLIC PNL TOTAL CA: CPT

## 2022-08-18 PROCEDURE — 6370000000 HC RX 637 (ALT 250 FOR IP)

## 2022-08-18 PROCEDURE — 97530 THERAPEUTIC ACTIVITIES: CPT

## 2022-08-18 PROCEDURE — 85025 COMPLETE CBC W/AUTO DIFF WBC: CPT

## 2022-08-18 PROCEDURE — 6370000000 HC RX 637 (ALT 250 FOR IP): Performed by: PHYSICAL MEDICINE & REHABILITATION

## 2022-08-18 PROCEDURE — 1180000000 HC REHAB R&B

## 2022-08-18 PROCEDURE — 97112 NEUROMUSCULAR REEDUCATION: CPT

## 2022-08-18 PROCEDURE — 97116 GAIT TRAINING THERAPY: CPT

## 2022-08-18 PROCEDURE — 6370000000 HC RX 637 (ALT 250 FOR IP): Performed by: STUDENT IN AN ORGANIZED HEALTH CARE EDUCATION/TRAINING PROGRAM

## 2022-08-18 PROCEDURE — 92507 TX SP LANG VOICE COMM INDIV: CPT

## 2022-08-18 PROCEDURE — 82947 ASSAY GLUCOSE BLOOD QUANT: CPT

## 2022-08-18 PROCEDURE — 99232 SBSQ HOSP IP/OBS MODERATE 35: CPT | Performed by: INTERNAL MEDICINE

## 2022-08-18 RX ADMIN — CARVEDILOL 25 MG: 25 TABLET, FILM COATED ORAL at 17:21

## 2022-08-18 RX ADMIN — ACETAMINOPHEN 650 MG: 325 TABLET, FILM COATED ORAL at 07:30

## 2022-08-18 RX ADMIN — ENOXAPARIN SODIUM 40 MG: 100 INJECTION SUBCUTANEOUS at 07:29

## 2022-08-18 RX ADMIN — ISOSORBIDE MONONITRATE 30 MG: 30 TABLET, EXTENDED RELEASE ORAL at 07:29

## 2022-08-18 RX ADMIN — DULOXETINE 30 MG: 30 CAPSULE, DELAYED RELEASE ORAL at 07:29

## 2022-08-18 RX ADMIN — ACETAMINOPHEN 650 MG: 325 TABLET, FILM COATED ORAL at 20:19

## 2022-08-18 RX ADMIN — FAMOTIDINE 20 MG: 20 TABLET ORAL at 07:30

## 2022-08-18 RX ADMIN — CARVEDILOL 25 MG: 25 TABLET, FILM COATED ORAL at 07:29

## 2022-08-18 RX ADMIN — ATORVASTATIN CALCIUM 40 MG: 80 TABLET, FILM COATED ORAL at 20:19

## 2022-08-18 RX ADMIN — Medication 6 MG: at 20:19

## 2022-08-18 RX ADMIN — POLYETHYLENE GLYCOL 3350 17 G: 17 POWDER, FOR SOLUTION ORAL at 07:29

## 2022-08-18 RX ADMIN — FAMOTIDINE 20 MG: 20 TABLET ORAL at 20:19

## 2022-08-18 RX ADMIN — ASPIRIN 81 MG: 81 TABLET, COATED ORAL at 07:29

## 2022-08-18 RX ADMIN — AMLODIPINE BESYLATE 10 MG: 10 TABLET ORAL at 07:29

## 2022-08-18 RX ADMIN — CLOPIDOGREL BISULFATE 75 MG: 75 TABLET ORAL at 07:29

## 2022-08-18 ASSESSMENT — PAIN SCALES - GENERAL
PAINLEVEL_OUTOF10: 2
PAINLEVEL_OUTOF10: 3

## 2022-08-18 ASSESSMENT — PAIN DESCRIPTION - LOCATION: LOCATION: HEAD

## 2022-08-18 ASSESSMENT — PAIN DESCRIPTION - DESCRIPTORS: DESCRIPTORS: ACHING

## 2022-08-18 ASSESSMENT — PAIN DESCRIPTION - ORIENTATION: ORIENTATION: MID

## 2022-08-18 ASSESSMENT — PAIN - FUNCTIONAL ASSESSMENT: PAIN_FUNCTIONAL_ASSESSMENT: ACTIVITIES ARE NOT PREVENTED
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
## 2022-08-18 NOTE — PROGRESS NOTES
[] Lethargic        Objective/Assessment:  Auditory Comprehension: Pt. Occasionally requested repetition. Slow processing noted. Verbal Expression: convergent categorization- 44%, 57% c cues. Correct sentence inconsistencies- 33%, 67% c max cues. Name 5 items in category- 80%, 100% c cues. Orientation- 42%, 51% c cues. Pt. Ox2 (time of day and  place only). Attempted following simple written directions, however, pt. Stating \"I can't read. \"   Per pt., this is baseline. Other:  24 hour supervision recommended at home given pt. Receptive and expressive aphasia, cognitive deficits. Objective:  /78   Pulse 62   Temp 98.1 °F (36.7 °C) (Oral)   Resp 16   Ht 5' 9\" (1.753 m)   Wt 186 lb (84.4 kg)   SpO2 97%   BMI 27.47 kg/m²       GEN: Well developed, well nourished, no acute distress. HEENT: NCAT, PERRL, EOMI, mucous membranes pink and moist.  RESP: Lungs clear to auscultation. No rales or rhonchi. Respirations WNL and unlabored. CV: Bradycardic rate regular rhythm. No murmurs, rubs, or gallops. ABD: soft, non-distended, non-tender. BS+ and equal.  NEURO: A&O x 3. Sensation intact to light touch. Mild verbal apraxia - stable  MSK: Functional ROM. Muscle tone and bulk are normal bilaterally. Strength 4+/5 key muscles LUE and LLE. 4/5 key muscles RUE and RLE.   EXT: No calf tenderness to palpation or edema BLEs. SKIN: Warm dry and intact with good turgor. PSYCH: Mood WNL. Affect WNL. Appropriately interactive. Diagnostics:     CBC:   Recent Labs     08/18/22  0652   WBC 4.9   RBC 4.24*   HGB 11.6*   HCT 36.0*   MCV 84.9   RDW 19.3*        BMP:   Recent Labs     08/18/22  0652      K 4.4      CO2 30   BUN 15   CREATININE 1.48*   GLUCOSE 99     BNP: No results for input(s): BNP in the last 72 hours. PT/INR: No results for input(s): PROTIME, INR in the last 72 hours. APTT: No results for input(s): APTT in the last 72 hours.   CARDIAC ENZYMES: No results for input(s): CKMB, CKMBINDEX, TROPONINT in the last 72 hours. Invalid input(s): CKTOTAL;3 troponins   FASTING LIPID PANEL:  Lab Results   Component Value Date    CHOL 149 07/30/2022    HDL 35 (L) 07/30/2022    TRIG 168 (H) 07/30/2022     LIVER PROFILE: No results for input(s): AST, ALT, ALB, BILIDIR, BILITOT, ALKPHOS in the last 72 hours. Current Medications:   Current Facility-Administered Medications: melatonin tablet 6 mg, 6 mg, Oral, Nightly  lactulose (CHRONULAC) 10 GM/15ML solution 20 g, 20 g, Oral, Daily PRN  famotidine (PEPCID) tablet 20 mg, 20 mg, Oral, BID  magnesium hydroxide (MILK OF MAGNESIA) 400 MG/5ML suspension 30 mL, 30 mL, Oral, Daily PRN  amLODIPine (NORVASC) tablet 10 mg, 10 mg, Oral, Daily  atorvastatin (LIPITOR) tablet 40 mg, 40 mg, Oral, Nightly  isosorbide mononitrate (IMDUR) extended release tablet 30 mg, 30 mg, Oral, Daily  clopidogrel (PLAVIX) tablet 75 mg, 75 mg, Oral, Daily  enoxaparin (LOVENOX) injection 40 mg, 40 mg, SubCUTAneous, Daily  albuterol sulfate HFA (PROVENTIL;VENTOLIN;PROAIR) 108 (90 Base) MCG/ACT inhaler 2 puff, 2 puff, Inhalation, Q4H PRN  aspirin EC tablet 81 mg, 81 mg, Oral, Daily  carvedilol (COREG) tablet 25 mg, 25 mg, Oral, BID WC  DULoxetine (CYMBALTA) extended release capsule 30 mg, 30 mg, Oral, Daily  acetaminophen (TYLENOL) tablet 650 mg, 650 mg, Oral, Q4H PRN  polyethylene glycol (GLYCOLAX) packet 17 g, 17 g, Oral, Daily  senna (SENOKOT) tablet 17.2 mg, 2 tablet, Oral, Daily PRN  bisacodyl (DULCOLAX) suppository 10 mg, 10 mg, Rectal, Daily PRN      Impression/Plan:   Impaired ADLs, gait, and mobility due to:    Ischemic CVA with R sided weakness: s/p mechanical thrombectomy and ICA stent. PT/OT for gait, mobility, strengthening, endurance, ADLs, and self care. On aspirin, plavix, atorvastatin. Verbal apraxia/impaired cognition: SLP treating. Urinary retention:  Has difficulty urinating while in bed but was able to void when up to the toilet.   On timed

## 2022-08-18 NOTE — PROGRESS NOTES
Comprehensive Nutrition Assessment    Type and Reason for Visit:  Reassess    Nutrition Recommendations/Plan:   Continue current diet     Malnutrition Assessment:  Malnutrition Status:  No malnutrition (08/11/22 1750)    Context:  Acute Illness     Findings of the 6 clinical characteristics of malnutrition:  Energy Intake:  No significant decrease in energy intake  Weight Loss:  No significant weight loss     Body Fat Loss:  No significant body fat loss (Fat and mucle based on assessment 8/5)     Muscle Mass Loss:  No significant muscle mass loss    Fluid Accumulation:  Mild (Likely not related to nutritional status) Extremities   Strength:  Not Performed    Nutrition Assessment:    Patient has fair appetite, consumed less than 25% at lunch, ate whole banana nothing else. Patient said ate good at breakfast %. Observed stack of snacks at bedside table brought in by family. Patient said he likes to eat snacks provided by family. Will continue current diet. Patient refused offer of oral supplements. Nutrition Related Findings:    Edema: +1 RUE. Bowel sounds active. Labs and meds reviewed. Wound Type: Skin Tears       Current Nutrition Intake & Therapies:    Average Meal Intake: 1-25%, %  Average Supplements Intake: None Ordered, Refusing to take  ADULT DIET; Easy to Chew    Anthropometric Measures:  Height: 5' 9\" (175.3 cm)  Ideal Body Weight (IBW): 160 lbs (73 kg)    Admission Body Weight: 195 lb 15.8 oz (88.9 kg) (method not specified)  Current Body Weight: 186 lb (84.4 kg) (Not sure if weights are correct), 122.5 % IBW. Weight Source: Bed Scale  Current BMI (kg/m2): 27.5  Usual Body Weight: 197 lb 12 oz (89.7 kg) (2/22/22)  % Weight Change (Calculated): -0.9                    BMI Categories: Overweight (BMI 25.0-29. 9)    Estimated Daily Nutrient Needs:  Energy Requirements Based On: Formula  Weight Used for Energy Requirements: Admission  Energy (kcal/day): 0651-4298 based on Jc Reeder with 1.1-1.2 factor  Weight Used for Protein Requirements: Ideal  Protein (g/day): 88-95 based on 1.2-1.3 gm per kg       Nutrition Diagnosis:   Biting/chewing (masticatory) difficulty related to partial or complete edentulism as evidenced by swallow study results    Nutrition Interventions:   Food and/or Nutrient Delivery: Continue Current Diet  Nutrition Education/Counseling: Education not indicated  Coordination of Nutrition Care: Continue to monitor while inpatient       Goals:     Goals: Meet at least 75% of estimated needs (with good tolerance to diet)       Nutrition Monitoring and Evaluation:   Behavioral-Environmental Outcomes: None Identified     Physical Signs/Symptoms Outcomes: Biochemical Data, Chewing or Swallowing, Weight, Skin    Discharge Planning:     Too soon to determine       Some areas of assessment may be incomplete due to COVID-19 precautions    Jose Miguel Yung RD, LD  Office phone [phone]
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
## 2022-08-18 NOTE — CARE COORDINATION
CAIO spoke to Acacia Brice, pt niece who takes care of pt. CAIO informed Acacia Brice of DC date and asked if she had the home care name that we talked about last week that CAIO could set pt up with or if she would be able to take pt to outpatient therapy. Acacia Babs informed CAIO that she was unaware of the discharge date and was going to be leaving for vacation tomorrow to [place] and would not be able to pickup pt stating, \"can he just stay until Tuesday and I will pick him up then\" CAIO explained that pt met his therapy goals and Insurance would not cover those days and that she would need to figure out a way to be here or have a friend or other family member pick him up. Acaciaterrence Brice stated that she would talk to pts children who live in [place]. CAIO gave Acacia [place] the number for SW so that she can call back when she has an answer. Acacia Babs informed CAIO that her son Nabor Santos will be staying home from vacation to tale care of the pt. Acacia Brice came to the hospital and spoke to CAIO and CAIO received her son José Miguel number ([phone]) Acacia Babs informed CAIO that she would call him and ask him to come to family training the next morning which would be 8/19. CAIO also got the name of the nurse and called and left a message. CAIO talked to Marisol Anaya the director of nursing at [facility] and she informed CAIO that she would be at the house on Saturday at 3 pm after pt discharges. CAIO will fax her the AVS when it is finished at the end of today to . CAIO talked to Nabor Santos and he will be in tomorrow morning at 8:30 for family training with speech, occupational and physical. CAIO informed Physical Therapy and will add this to the whiteboard.

## 2022-08-18 NOTE — PROGRESS NOTES
Patient resting comfortably in bed, after being assisted to the bathroom. Patient urinated in toilet, fresh brief applied. Patient had an unremarkable day. Patient was assisted to the bathroom every 4 hours today. Will continue to assess.

## 2022-08-18 NOTE — PROGRESS NOTES
Physical Therapy  Facility/Department: [facility] ACUTE REHAB  Rehabilitation Physical Therapy Daily Treatment Note    NAME: Donny Hamilton  : 1965 (59 y.o.)  MRN: [id number]  CODE STATUS: Full Code    Date of Service: 22          Chart Reviewed: Yes  Patient assessed for rehabilitation services?: Yes  Additional Pertinent Hx: Donny Hamilton is a 59 y.o. RHD male admitted to [facility] on 2022. Patient admitted after syncope and c/o headache, SOB. CT head was WNL. CT chest negative for PE. CXR showed atelectasis. He was admitted to observation status. Cardiology was consulted for syncope. Performed ICD interrogation and adjusted his antihypertensive medications. Known history of CABG, VFib arrest with recent ICD placement By Dr. Ivis Bright on 22. He developed AMS on 22. Neurology was consulted and performed EEG which showed possible structural defect. He was found to have expressive aphasia and a NIHSS 10 with R sided weakness. 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 Neuro endovascular was consulted and CTA showed the same ICA occlusion. On 22 his NIHSS was worsened to 15. CT perfusion emergently showed ischemic penumbra L MCA and DARYN territory. Dr. Jaclyn Monroe performed emergent mechanical thrombectomy and placed L carotid stent with balloon angioplasty on 22. Family / Caregiver Present: No  Referring Practitioner: Dr Thao Perales  Diagnosis: Ischemic CVA with R sided weakness:s/p mechanical thrombectomy and ICA stent  Other (Comment): Pt follows commands with increased time, slow to respond    Restrictions:  Restrictions/Precautions: Up as Tolerated;General Precautions  Position Activity Restriction  Other position/activity restrictions: Do not elevate affected arm above shoulder for 30 days  -ICD implanted on 22     SUBJECTIVE  Subjective: Pt pleasant and agreeable with PT. Pt reports low back pain but still willing to participate.  Pt reports decrease sleep last night. Pain: Pt report 3/10 low back pain this date. OBJECTIVE  Vision  Vision: Within Functional Limits    Hearing  Hearing: Within functional limits    Cognition  Overall Cognitive Status: Exceptions  Arousal/Alertness: Inconsistent responses to stimuli  Following Commands: Follows one step commands consistently  Attention Span: Difficulty dividing attention  Memory: Decreased short term memory  Safety Judgement: Decreased awareness of need for assistance;Decreased awareness of need for safety  Problem Solving: Assistance required to generate solutions  Insights: Decreased awareness of deficits  Initiation: Requires cues for some  Sequencing: Requires cues for some  Cognition Comment: Speaks little. Pt tends to space out during session and requires cues to redirect focus. Pt unable to tell R side from L side. Sensation  Overall Sensation Status:  (Per pt, no deficits.)    Functional Mobility  Bed mobility  Bridging: Supervision  Rolling to Left: Modified independent  Rolling to Right: Modified independent  Supine to Sit: Modified independent  Sit to Supine: Modified independent  Scooting: Modified independent  Bed Mobility Comments: HOB flat, Pt is able to complete without using bed rails. Pt able to perform bed mobilities with ease. Transfers  Sit to Stand: Stand by assistance  Stand to sit: Stand by assistance (cues to lower self in slow, controlled manner)  Bed to Chair: Contact guard assistance;Stand by assistance  Stand Pivot Transfers: Stand by assistance;Contact guard assistance  Comment: Transfers completed without RW this date. No LOB noted, however is slightly unsteady. Balance  Posture: Good  Sitting - Static: Good  Sitting - Dynamic: Fair;+  Standing - Static: Fair;+  Standing - Dynamic: Fair  Comments: Standing balance assessed with no AD, sitting at EOB.     Environmental Mobility  Ambulation  WB Status: FWB  Ambulation  Surface: uneven;outdoors  Device: Rolling Walker  Assistance: Stand by assistance  Quality of Gait: Wide BRAD, lacked R TKE, R toe out. Only LOB noted with transition from pavement to sidewalk. Pt caught his R foot on curb and requires Min A for stability. Edu pt to look around for all surroundings/tripping hazards. Gait Deviations: Slow Claudia; Increased BRAD; Decreased step length;Decreased step height;Decreased arm swing;Decreased head and trunk rotation;Deviated path  Distance: ~80'x2  Comments: Verbal/tactile cues for R foot clearance. Temporary improvement with R foot clearance with tactile cues to R quad during swing phase noted. Pt bumps into objects on R side several times throughout ambulation requiring several cues to scan surroundings. More Ambulation?: Yes  Ambulation 2  Surface - 2: level tile  Device 2: Rolling Walker  Assistance 2: Stand by assistance  Quality of Gait 2: Same as above  Gait Deviations: Slow Claudia;Decreased step height;Decreased step length;Decreased head and trunk rotation;Decreased arm swing  Distance: 180'  Comments: Cues for safety, obstacle avoidance, and technique with fair return. Ambulation 3  Surface - 3: level tile;ramp  Device 3: No device; Rodney rail  Assistance 3: Contact guard assistance  Quality of Gait 3: Narrow BRAD, lacked R TKE, R toe out  Gait Deviations: Decreased step length;Decreased step height;Decreased arm swing;Decreased head and trunk rotation; Slow Claudia  Distance: 90'x2 and 30'x1  Comments: Requires VC's for direction, obstacle avoidance, and maintaining attention as he is easily distracted. Stairs/Curb  Stairs?: Yes  Stairs  # Steps : 16  Stairs Height: 8\"  Rails: Bilateral  Assistance: Stand by assistance;Contact guard assistance  Comment: Continues to require safety educational reinforcement throughout, unable to retain information. Pt performed full flight of stairs in stairwell with SBA/CGA. x1 minor LOB d/t pt attempting to alternate to ascend.  Pt completes an additional x2 stairs without rails to mimic home set up. Pt requires CGA for safety. Pt demos reaching for UE support to increase stability. PM: Pt is able to complete x2 (4\") curb step with CGA for safety. PT Exercises  Exercise Treatment: . A/AROM Exercises: seated ankle pumps x20, 2# LAQ x15, marches x15, ball squeezes x15  Resistive Exercises: Seated BLE exercises [id number] with #2 ankle weights. Dynamic Standing Balance Exercises: Dyn stoop and recover x4 objects without BUE support. Various different sizes and weight. Dyn amb: Tandem, modified grape vine, high knees. Standing SLS 3x~30 sec each. PT requires RUE support and intermittent touch down. Pt is able to adjust pants and brief with CGA/SBA assistance, however he did require assistance with tying his pants. Standing Open/Closed Kinetic Chain Exercises: Standing BLE ex's in grass with RUE support. No LOB noted. REps: x15-20 each. Rest breaks PRN    ASSESSMENT  Vitals  O2 Device: None (Room air)    Activity Tolerance  Activity Tolerance: Patient tolerated treatment well;Treatment limited secondary to decreased cognition  Activity Tolerance Comments: Frequent rest breaks required throughout. Assessment  Assessment: Pt presents with R sided weakness as well as cognitive deficits and decreased follow through for safety instructions. Pt ambulates with FWW at this time with no LOB however can be unsteady at times with decreased R foot clearance. Further PT is recommended to address safety and independence with funcitonal mobility. Performance Deficits/Impairments: Decreased functional mobility ; Decreased strength;Decreased safe awareness;Decreased cognition;Decreased endurance;Decreased balance  Treatment Diagnosis: Impaired mobility  Therapy Prognosis: Good  Decision Making: Medium Complexity  History: Recent ICD placement on 7/20/22  Barriers to Learning: Cognitive  Discharge Recommendations: Patient would benefit from continued therapy after discharge;24 hour supervision or assist  PT D/C Equipment  Walker: Rolling  Other: TBD  PT Equipment Recommendations  Equipment Needed: Yes  Walker: Rolling  Other: TBD    CLINICAL IMPRESSION   Pt presents with R sided weakness as well as cognitive deficits and decreased follow through for safety instructions. Pt ambulates with FWW at this time with no LOB however can be unsteady at times with decreased R foot clearance. Further PT is recommended to address safety and independence with funcitonal mobility. GOALS  Patient Goals   Patient goals : To not use any AD. Short Term Goals  Time Frame for Short term goals: 5 days  Short term goal 1: Bed mobility SBA without use of bed rails. Short term goal 2: Transfers SBA with minimal verbal cues. Short term goal 3: Ambulation with rolling walker distance of 200 ft CGA, with minimal cues for safety on level surfaces. Short term goal 4: Pt able to go up and down 10 steps with 2 rail, SBA  Long Term Goals  Time Frame for Long term goals : By DC  Long term goal 1: Pt able to perform transfers at supervsion level from varied surfaces. Long term goal 2: Pt able to ambulate with least restrrictive device distance of 200 ft on level as well as outside terraine, at supervsion level. Long term goal 3: Pt able to go up and down flight of steps with 1 Handrail, SBA  Long term goal 4: Improve 2MWT distance to atleast 200 ft to improve overall function. Long term goal 5: Improve Tinetti score to atleast 26/28 to reduce fall risk. PLAN OF CARE  Plan  Plan:  minutes of therapy at least 5 out of 7 days a week  Plan weeks: 5-7x/ week  Current Treatment Recommendations: Strengthening; Functional mobility training;Transfer training; Endurance training;Cognitive/Perceptual training;Stair training;Gait training;Cognitive reorientation; Safety education & training;Balance training;Neuromuscular re-education;Home exercise program;Patient/Caregiver education & training;Equipment evaluation, education, &

## 2022-08-19 PROCEDURE — 97535 SELF CARE MNGMENT TRAINING: CPT

## 2022-08-19 PROCEDURE — 97530 THERAPEUTIC ACTIVITIES: CPT

## 2022-08-19 PROCEDURE — 6370000000 HC RX 637 (ALT 250 FOR IP)

## 2022-08-19 PROCEDURE — 6370000000 HC RX 637 (ALT 250 FOR IP): Performed by: PHYSICAL MEDICINE & REHABILITATION

## 2022-08-19 PROCEDURE — 97129 THER IVNTJ 1ST 15 MIN: CPT

## 2022-08-19 PROCEDURE — 1180000000 HC REHAB R&B

## 2022-08-19 PROCEDURE — 97110 THERAPEUTIC EXERCISES: CPT

## 2022-08-19 PROCEDURE — 99232 SBSQ HOSP IP/OBS MODERATE 35: CPT | Performed by: INTERNAL MEDICINE

## 2022-08-19 PROCEDURE — 6360000002 HC RX W HCPCS

## 2022-08-19 PROCEDURE — 92507 TX SP LANG VOICE COMM INDIV: CPT

## 2022-08-19 PROCEDURE — 99231 SBSQ HOSP IP/OBS SF/LOW 25: CPT | Performed by: PHYSICAL MEDICINE & REHABILITATION

## 2022-08-19 PROCEDURE — 97116 GAIT TRAINING THERAPY: CPT

## 2022-08-19 PROCEDURE — 6370000000 HC RX 637 (ALT 250 FOR IP): Performed by: STUDENT IN AN ORGANIZED HEALTH CARE EDUCATION/TRAINING PROGRAM

## 2022-08-19 PROCEDURE — 97112 NEUROMUSCULAR REEDUCATION: CPT

## 2022-08-19 RX ORDER — FAMOTIDINE 20 MG/1
20 TABLET, FILM COATED ORAL 2 TIMES DAILY
Qty: 60 TABLET | Refills: 3 | Status: SHIPPED | OUTPATIENT
Start: 2022-08-20

## 2022-08-19 RX ORDER — LANOLIN ALCOHOL/MO/W.PET/CERES
6 CREAM (GRAM) TOPICAL NIGHTLY
Refills: 3 | COMMUNITY
Start: 2022-08-20

## 2022-08-19 RX ORDER — AMLODIPINE BESYLATE 10 MG/1
10 TABLET ORAL DAILY
Qty: 30 TABLET | Refills: 3 | Status: SHIPPED | OUTPATIENT
Start: 2022-08-19

## 2022-08-19 RX ORDER — CLOPIDOGREL BISULFATE 75 MG/1
75 TABLET ORAL DAILY
Qty: 30 TABLET | Refills: 3 | Status: SHIPPED | OUTPATIENT
Start: 2022-08-20

## 2022-08-19 RX ORDER — ISOSORBIDE MONONITRATE 30 MG/1
30 TABLET, EXTENDED RELEASE ORAL DAILY
Qty: 30 TABLET | Refills: 3 | Status: SHIPPED | OUTPATIENT
Start: 2022-08-20

## 2022-08-19 RX ORDER — ALBUTEROL SULFATE 90 UG/1
2 AEROSOL, METERED RESPIRATORY (INHALATION) EVERY 4 HOURS PRN
Qty: 18 G | Refills: 3 | Status: SHIPPED | OUTPATIENT
Start: 2022-08-19

## 2022-08-19 RX ORDER — CARVEDILOL 25 MG/1
25 TABLET ORAL 2 TIMES DAILY WITH MEALS
Qty: 60 TABLET | Refills: 3 | Status: SHIPPED | OUTPATIENT
Start: 2022-08-19

## 2022-08-19 RX ORDER — POLYETHYLENE GLYCOL 3350 17 G/17G
17 POWDER, FOR SOLUTION ORAL DAILY PRN
Qty: 527 G | Refills: 0 | COMMUNITY
Start: 2022-08-19 | End: 2022-09-18

## 2022-08-19 RX ORDER — NITROGLYCERIN 0.4 MG/1
0.4 TABLET SUBLINGUAL EVERY 5 MIN PRN
Qty: 25 TABLET | Refills: 0 | Status: SHIPPED | OUTPATIENT
Start: 2022-08-19

## 2022-08-19 RX ADMIN — AMLODIPINE BESYLATE 10 MG: 10 TABLET ORAL at 10:05

## 2022-08-19 RX ADMIN — ENOXAPARIN SODIUM 40 MG: 100 INJECTION SUBCUTANEOUS at 10:05

## 2022-08-19 RX ADMIN — Medication 6 MG: at 19:56

## 2022-08-19 RX ADMIN — ASPIRIN 81 MG: 81 TABLET, COATED ORAL at 10:05

## 2022-08-19 RX ADMIN — DULOXETINE 30 MG: 30 CAPSULE, DELAYED RELEASE ORAL at 10:05

## 2022-08-19 RX ADMIN — FAMOTIDINE 20 MG: 20 TABLET ORAL at 19:56

## 2022-08-19 RX ADMIN — ACETAMINOPHEN 650 MG: 325 TABLET, FILM COATED ORAL at 19:55

## 2022-08-19 RX ADMIN — CARVEDILOL 25 MG: 25 TABLET, FILM COATED ORAL at 10:05

## 2022-08-19 RX ADMIN — CLOPIDOGREL BISULFATE 75 MG: 75 TABLET ORAL at 10:05

## 2022-08-19 RX ADMIN — ISOSORBIDE MONONITRATE 30 MG: 30 TABLET, EXTENDED RELEASE ORAL at 10:05

## 2022-08-19 RX ADMIN — CARVEDILOL 25 MG: 25 TABLET, FILM COATED ORAL at 17:32

## 2022-08-19 RX ADMIN — ACETAMINOPHEN 650 MG: 325 TABLET, FILM COATED ORAL at 10:06

## 2022-08-19 RX ADMIN — FAMOTIDINE 20 MG: 20 TABLET ORAL at 10:05

## 2022-08-19 RX ADMIN — POLYETHYLENE GLYCOL 3350 17 G: 17 POWDER, FOR SOLUTION ORAL at 10:06

## 2022-08-19 RX ADMIN — ATORVASTATIN CALCIUM 40 MG: 80 TABLET, FILM COATED ORAL at 19:56

## 2022-08-19 ASSESSMENT — PAIN SCALES - GENERAL
PAINLEVEL_OUTOF10: 3
PAINLEVEL_OUTOF10: 2
PAINLEVEL_OUTOF10: 0

## 2022-08-19 ASSESSMENT — PAIN DESCRIPTION - ORIENTATION
ORIENTATION: POSTERIOR
ORIENTATION: MID

## 2022-08-19 ASSESSMENT — PAIN DESCRIPTION - DESCRIPTORS
DESCRIPTORS: ACHING
DESCRIPTORS: DULL;ACHING

## 2022-08-19 ASSESSMENT — PAIN DESCRIPTION - LOCATION
LOCATION: NECK
LOCATION: HEAD
LOCATION: HEAD

## 2022-08-19 ASSESSMENT — PAIN - FUNCTIONAL ASSESSMENT: PAIN_FUNCTIONAL_ASSESSMENT: ACTIVITIES ARE NOT PREVENTED
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
## 2022-08-19 NOTE — PROGRESS NOTES
Physical Therapy  Facility/Department: [facility] ACUTE REHAB  Rehabilitation Physical Therapy Daily Treatment Note    NAME: Ashleigh Duarte  : 1965 (59 y.o.)  MRN: [id number]  CODE STATUS: Full Code    Date of Service: 22          Chart Reviewed: Yes  Patient assessed for rehabilitation services?: Yes  Additional Pertinent Hx: Ashleigh Duarte is a 59 y.o. RHD male admitted to [facility] on 2022. Patient admitted after syncope and c/o headache, SOB. CT head was WNL. CT chest negative for PE. CXR showed atelectasis. He was admitted to observation status. Cardiology was consulted for syncope. Performed ICD interrogation and adjusted his antihypertensive medications. Known history of CABG, VFib arrest with recent ICD placement By Dr. Audra Fernandez on 22. He developed AMS on 22. Neurology was consulted and performed EEG which showed possible structural defect. He was found to have expressive aphasia and a NIHSS 10 with R sided weakness. 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 Neuro endovascular was consulted and CTA showed the same ICA occlusion. On 22 his NIHSS was worsened to 15. CT perfusion emergently showed ischemic penumbra L MCA and DARYN territory. Dr. Charli Richard performed emergent mechanical thrombectomy and placed L carotid stent with balloon angioplasty on 22. Family / Caregiver Present: No  Referring Practitioner: Dr Kate Do  Diagnosis: Ischemic CVA with R sided weakness:s/p mechanical thrombectomy and ICA stent  Other (Comment): Pt follows commands with increased time, slow to respond    Restrictions:  Restrictions/Precautions: Up as Tolerated;General Precautions  Position Activity Restriction  Other position/activity restrictions: Do not elevate affected arm above shoulder for 30 days  -ICD implanted on 22     SUBJECTIVE  Subjective: Pt pleasant and agreeable with PT. Pt reports decrease sleep last night. Pain: Pt reports min pain this date. OBJECTIVE  Vision  Vision: Within Functional Limits    Hearing  Hearing: Within functional limits    Cognition  Overall Cognitive Status: Exceptions  Arousal/Alertness: Inconsistent responses to stimuli  Following Commands: Follows one step commands consistently; Follows multi step commands inconsistently. Attention Span: Difficulty dividing attention  Memory: Decreased short term memory  Safety Judgement: Decreased awareness of need for assistance;Decreased awareness of need for safety  Problem Solving: Assistance required to generate solutions  Insights: Decreased awareness of deficits  Initiation: Requires cues for some  Sequencing: Requires cues for some  Cognition Comment: Speaks little. Pt tends to space out during session and requires cues to redirect focus. Pt unable to tell R side from L side. Sensation  Overall Sensation Status:  (Per pt, no deficits.)    Functional Mobility  Bed mobility  Bridging: Supervision  Rolling to Left: Modified independent  Rolling to Right: Modified independent  Supine to Sit: Modified independent  Sit to Supine: Modified independent  Scooting: Modified independent  Bed Mobility Comments: HOB flat, Pt is able to complete without using bed rails. Pt able to perform bed mobilities with ease. Transfers  Sit to Stand: Supervision  Stand to sit: Supervision (cues to lower self in slow, controlled manner)  Bed to Chair: Supervision  Stand Pivot Transfers: Supervision  Comment: Transfers completed withand without RW this date. No LOB noted, however is slightly unsteady. Balance  Posture: Good  Sitting - Static: Good  Sitting - Dynamic: Fair;+  Standing - Static: Fair;+  Standing - Dynamic: Fair  Comments: Standing balance assessed with no AD, sitting at EOB.     Environmental Mobility  Ambulation  WB Status: FWB  Ambulation  More Ambulation?: Yes  Ambulation 2  Surface - 2: level tile  Device 2: Rolling Walker  Assistance 2: Stand by assistance (SBA in AM and Supervision A in PM with shorter distances only (~25'). )  Quality of Gait 2: Pt completes 2MWT with RW. Pt is steady, NBOS, Occassional  cues to increase heel strike on RLE. Pt is able to amb 50'x1 with 2 turns with and without RW. Pt is steady, no LOB noted. Pt is able to maintain standing stability while looking around ramirez. Gait Deviations: Slow Claudia;Decreased step height;Decreased step length;Decreased head and trunk rotation;Decreased arm swing  Distance: 2MWT: 138', Additional 25' and 50'x2 with 2 turns. Comments: Cues for safety, obstacle avoidance, and technique with fair return. Ambulation 3  Surface - 3: level tile;ramp  Device 3: Rolling Walker  Assistance 3: Supervision  Quality of Gait 3: Narrow BRAD, lacked R TKE, R toe out. Pt slightly veers to the right. Gait Deviations: Decreased step length;Decreased step height;Decreased arm swing;Decreased head and trunk rotation; Slow Claudia  Distance: 50'x4  Comments: Requires VC's for direction, obstacle avoidance, and maintaining attention as he is easily distracted. Stairs/Curb  Stairs?: Yes  Stairs  # Steps : 16  Stairs Height: 8\"  Rails: Bilateral  Assistance: Stand by assistance;Contact guard assistance  Comment: Continues to require safety educational reinforcement throughout, unable to retain information. Pt performed full flight of stairs in stairwell with SBA/CGA. x1 minor LOB d/t pt attempting to alternate to ascend. Pt completes an additional x2-4 stairs without rails to mimic home set up. Pt requires CGA for safety. Pt demos reaching for UE support to increase stability. PT Exercises  Exercise Treatment: . A/AROM Exercises: seated ankle pumps x20, 2# LAQ x15, marches x15, ball squeezes x15  Resistive Exercises: Seated BLE exercises [id number] with #2 ankle weights.   Functional Mobility Circuit Training: STS training with emphasis on correct hand placement and technique; retrieving objects from ground with RW for support x6 items  Dynamic Standing Balance Exercises: Tinetti assessment: 18/28. Disease-specific Exercises: Fall recovery x2. Pt is able to complete on red mat with CGA/SBA for safety. Pt is able to recover from supine > quadruped> tall kneeling> standing, with RW and with w/c. Exercise Equipment: Virtual reality: Kinect x~15 min. Pt requires SBA/CGA for safety. Seated rest break x2 required per pt. Dyn bal: squating/lateral steps/BUE mobility. Pt requires cues for sequencing/technique to play. ASSESSMENT  Vitals  BP Location: Left upper arm  O2 Device: None (Room air)    Activity Tolerance  Activity Tolerance: Patient tolerated treatment well  Activity Tolerance Comments: Frequent rest breaks required throughout. Assessment  Assessment: Pt presents with R sided weakness as well as cognitive deficits and decreased follow through for safety instructions, with some improvements noted 8/19/22. Pt  will require a RW for amb at home, however has been intermittently training without an AD during tx, CGA required. Pt can be unsteady at times with decreased R foot clearance. Further PT is recommended to address safety and independence with funcitonal mobility. Performance Deficits/Impairments: Decreased functional mobility ; Decreased strength;Decreased safe awareness;Decreased cognition;Decreased endurance;Decreased balance  Treatment Diagnosis: Impaired mobility  Therapy Prognosis: Good  Decision Making: Medium Complexity  History: Recent ICD placement on 7/20/22  Barriers to Learning: Cognitive  Discharge Recommendations: Patient would benefit from continued therapy after discharge;24 hour supervision or assist  PT D/C Equipment  Walker: Rolling  Other: TBD  PT Equipment Recommendations  Equipment Needed: Yes  Ar[place]us Showman: Rolling  Other: TBD    CLINICAL IMPRESSION   Pt presents with R sided weakness as well as cognitive deficits and decreased follow through for safety instructions, with some improvements noted 8/19/22.   Pt  will require a RW for amb at home, however has been intermittently training without an AD during tx, CGA required. Pt can be unsteady at times with decreased R foot clearance. Further PT is recommended to address safety and independence with funcitonal mobility. GOALS  Patient Goals   Patient goals : To not use any AD. Short Term Goals  Time Frame for Short term goals: 5 days  Short term goal 1: Bed mobility SBA without use of bed rails. Short term goal 2: Transfers SBA with minimal verbal cues. Short term goal 3: Ambulation with rolling walker distance of 200 ft CGA, with minimal cues for safety on level surfaces. Short term goal 4: Pt able to go up and down 10 steps with 2 rail, SBA  Long Term Goals  Time Frame for Long term goals : By DC  Long term goal 1: Pt able to perform transfers at supervsion level from varied surfaces. Long term goal 2: Pt able to ambulate with least restrrictive device distance of 200 ft on level as well as outside terraine, at supervsion level. Long term goal 3: Pt able to go up and down flight of steps with 1 Handrail, SBA  Long term goal 4: Improve 2MWT distance to atleast 200 ft to improve overall function. Long term goal 5: Improve Tinetti score to atleast 26/28 to reduce fall risk. PLAN OF CARE    Plan  Plan:  minutes of therapy at least 5 out of 7 days a week  Plan weeks: 5-7x/ week  Current Treatment Recommendations: Strengthening; Functional mobility training;Transfer training; Endurance training;Cognitive/Perceptual training;Stair training;Gait training;Cognitive reorientation; Safety education & training;Balance training;Neuromuscular re-education;Home exercise program;Patient/Caregiver education & training;Equipment evaluation, education, & procurement; Therapeutic activities  Safety Devices  Type of Devices: Gait belt;Nurse notified;Call light within reach; Left in chair  Restraints  Restraints Initially in Place: No    EDUCATION  Education  Education Given To: Patient  Education Provided: Role of Therapy;Plan of Care;Safety; Mobility Training;Transfer Training;Energy Conservation; Fall Prevention Strategies  Education Provided Comments: Edu on technique with all functional mobility. Edu on the benefits of each ex. Pt edu on supine/seated BLE ex's, stretches, safety awareness, energy conservation techniques, and fall prevention/recovery techniques.   Education Method: Demonstration;Verbal  Barriers to Learning: Cognition  Education Outcome: Verbalized understanding;Demonstrated understanding;Continued education needed    Therapy Time     08/19/22 0749 08/19/22 1338   PT Individual Minutes   Time In [id number]   Time Out 0905 1408   Minutes [address], 08/19/22 at 2:49 PM

## 2022-08-19 NOTE — PLAN OF CARE
Problem: Safety - Adult  Goal: Free from fall injury  Note: Patient remains free from falls so far this shift. Will continue to round at least hourly, place bed in lowest position with call light within reach, and alarm activated. Problem: Skin/Tissue Integrity  Goal: Absence of new skin breakdown  Description: 1. Monitor for areas of redness and/or skin breakdown  2. Assess vascular access sites hourly  3. Every 4-6 hours minimum:  Change oxygen saturation probe site  4. Every 4-6 hours:  If on nasal continuous positive airway pressure, respiratory therapy assess nares and determine need for appliance change or resting period. Note: There are no new skin issues so far this shift. Will continue to assist patient with repositioning at least every two hours. Problem: Pain  Goal: Verbalizes/displays adequate comfort level or baseline comfort level  Note: Patient denies pain. Will continue to assess.

## 2022-08-19 NOTE — CARE COORDINATION
SW received a call from pts daughter Micheal Xavier and she informed SW that she would like for the pt to look at [facility] and [address] [place] [address] on [employer]. Pt has been there before but informed SW that pt has been dropped there in the past and might not be agreeable to them. SW told daughter that we are still waiting to hear back from pt first choice and SW will talk to pt about [facility].
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
## 2022-08-19 NOTE — DISCHARGE INSTR - COC
Continuity of Care Form    Patient Name: Will Thomas   :  1965  MRN:  [id number]    Admit date:  8/10/2022  Discharge date:  22    Code Status Order: Full Code   Advance Directives:     Admitting Physician:  Jane Vasquez MD  PCP: Harmony Avendaño MD    Discharging Nurse: [facility] Unit/Room#: 8544/8213-44  Discharging Unit Phone Number: [phone]    Emergency Contact:   Extended Emergency Contact Information  Primary Emergency Contact: [address] [place] [address] Phone: [phone]  Mobile Phone: [phone]  Relation: Niece/Nephew  Secondary Emergency Contact: Inessa Novak  Mobile Phone: [phone]  Relation: Child    Past Surgical History:  Past Surgical History:   Procedure Laterality Date    BACK SURGERY      CARDIAC CATHETERIZATION  10/30/2018    Dr. David Boogie  2016    C5-6 CORPECTOMY; C4-7 FUSION    COLONOSCOPY N/A 2019    COLONOSCOPY POLYPECTOMY SNARE/COLD BIOPSY OF TRANSVERSE COLON AND SIGMOID COLON & RECTAL POLYPECTOMY performed by Reginald Mantilla MD at [place] [address]  2011    [facility]/   [facility]. CT BIOPSY RENAL  2020    CT BIOPSY RENAL 2020 [facility]    CYSTOSCOPY N/A 2020    CYSTOSCOPY performed by Romi Wang MD at [facility] Left     screw placed    LUNG SURGERY  1982    Right collapsed Lung  /  [facility]  w/  [address] placement date and .  Placement between 22 and 22- 6-8 weeks post op for MRI-krm    SHOULDER ARTHROSCOPY Right 2016    [id number]    UPPER GASTROINTESTINAL ENDOSCOPY  2015    UPPER GASTROINTESTINAL ENDOSCOPY N/A 2020    EGD ESOPHAGOGASTRODUODENOSCOPY performed by Brandi Jackson MD at [address] [place] [address]       Immunization History:   Immunization History   Administered Date(s) Administered    Influenza Vaccine, transverse colon K63.5    Polyp of descending colon K63.5    Rectal polyp K62.1    Hypomagnesemia E83.42    Pleural effusion J90    COPD (chronic obstructive pulmonary disease) ([facility]) J44.9    CAD (coronary artery disease) I25.10    Microscopic hematuria R31.29    Acute on chronic diastolic (congestive) heart failure ([facility]) I50.33    CHF (congestive heart failure), NYHA class I, acute, diastolic ([facility]) T74.09    Pneumonia J18.9    Liver lesion K76.9    Mild malnutrition ([facility]) E44.1    Dysphagia R13.10    Gastroparesis K31.84    Acute kidney injury superimposed on CKD ([facility]) N17.9, N18.9    Major depressive disorder, single episode F32.9    Unintentional weight loss of 10% body weight within 6 months R63.4    Esophageal dysphagia R13.19    Moderate malnutrition ([facility]) E44.0    Anxiety F41.9    Closed fracture of fifth metatarsal bone S92.353A    Interstitial lung disease ([facility]) J84.9    NSTEMI (non-ST elevated myocardial infarction) ([facility]) I21.4    Type 2 diabetes mellitus without complication ([facility]) O60.4    Elevated serum immunoglobulin free light chain level R76.8    Abnormal ANCA (antineutrophil cytoplasmic antibody) R76.8    Chronic kidney disease N18.9    Hypocalcemia E83.51    Anemia in stage 3 chronic kidney disease N18.30, D63.1    Acute kidney failure with lesion of tubular necrosis ([facility]) N17.0    Nephrotic syndrome with focal glomerulosclerosis N04.1    Rapid progressv nephritic syndrm, 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    Peripheral edema R60.9    Syncope and collapse R55    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, 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    Cardiac arrest ([facility]) I46.9    Cervical stenosis of spinal canal M48.02    Ischemic cardiomyopathy I25.5    Attention-deficit hyperactivity disorder, unspecified type F90.9    Chronic kidney disease, stage 3b ([facility]) N18.32    Gastro-esophageal reflux disease without esophagitis K21.9    Presence of automatic (implantable) cardiac defibrillator Z95.810 (Active)   Wound Assessment Dry 08/13/22 2016   Closure Steri-Strips 08/18/22 1329   Drainage Amount None 08/18/22 1329   Dressing/Treatment Steri-strips 08/18/22 1329   Dressing Status Clean, dry & intact 08/18/22 1329   Number of days: 29       Wound 08/10/22 Scrotum Anterior;Mid Mid Anterior Tear (Active)   Dressing Status Other (Comment) 08/18/22 0830   Wound Cleansed Soap and water 08/18/22 0830   Dressing/Treatment Zinc paste 08/18/22 0830   Number of days: 8        Elimination:  Continence: Bowel: No  Bladder: No  Urinary Catheter: None   Colostomy/Ileostomy/Ileal Conduit: No       Date of Last BM: 8/18/22  No intake or output data in the 24 hours ending 08/19/22 0823  No intake/output data recorded. Safety Concerns: At Risk for Falls    Impairments/Disabilities:      None    Nutrition Therapy:  Current Nutrition Therapy:   - Oral Diet:  General    Routes of Feeding: Oral  Liquids: Thin Liquids  Daily Fluid Restriction: no  Last Modified Barium Swallow with Video (Video Swallowing Test): not done    Treatments at the Time of Hospital Discharge:   Respiratory Treatments: see MAR  Oxygen Therapy:  is not on home oxygen therapy.   Ventilator:    - No ventilator support    Rehab Therapies: Physical Therapy, Occupational Therapy, and Speech/Language Therapy  Weight Bearing Status/Restrictions: No weight bearing restrictions  Other Medical Equipment (for information only, NOT a DME order):  walker  Other Treatments: N/A    Patient's personal belongings (please select all that are sent with patient):      RN SIGNATURE:  Electronically signed by Caprice Obrien LPN on 7/18/03 at 39:57 AM EDT    CASE MANAGEMENT/SOCIAL WORK SECTION    Inpatient Status Date:       Readmission Risk Assessment Score:  Readmission Risk              Risk of Unplanned Readmission:  33           Discharging to Facility/ Agency   Name: [facility]  Address: [facility] [address] [place] [address]  Phone: [phone]  Fax: [address] will  deliver a walker to your home on Monday  [phone] please call them if it does not get delivered on Monday 8/24/22     Dialysis Facility (if applicable)   Name:  Address:  Dialysis Schedule:  Phone:  Fax:    / signature: Electronically signed by MARJORIE Cordero on 8/19/22 at 8:25 AM EDT    PHYSICIAN SECTION    Prognosis: Fair    Condition at Discharge: Stable    Rehab Potential (if transferring to Rehab): Fair    Recommended Labs or Other Treatments After Discharge: PT/OT/SLP to eval and treat. Nursing for medication management    Physician Certification: I certify the above information and transfer of Cleo Morales  is necessary for the continuing treatment of the diagnosis listed and that he requires [address] for less 30 days.      Update Admission H&P: No change in H&P    PHYSICIAN SIGNATURE:  Electronically signed by Ana Damon MD on 8/19/22 at 3:45 PM EDT

## 2022-08-19 NOTE — CARE COORDINATION
AVS will need to be faxed by RN to [facility] today when meds are reconciled. The Fax Number is [phone]. SW donny Tejeda RN.     Electronically signed by MARJORIE Rendon on 8/19/2022 at 4:28 PM

## 2022-08-19 NOTE — CARE COORDINATION
DME request for walker received. Face to Face, Demographics and order faxed to [phone]. Called and spoke with Patel Toure at [facility], they are closed on the weekend and they will deliver this walker to him at his home on Monday.
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
## 2022-08-20 VITALS
OXYGEN SATURATION: 98 % | WEIGHT: 186 LBS | HEART RATE: 70 BPM | SYSTOLIC BLOOD PRESSURE: 143 MMHG | DIASTOLIC BLOOD PRESSURE: 89 MMHG | HEIGHT: 69 IN | TEMPERATURE: 97.5 F | RESPIRATION RATE: 16 BRPM | BODY MASS INDEX: 27.55 KG/M2

## 2022-08-20 PROCEDURE — 97530 THERAPEUTIC ACTIVITIES: CPT

## 2022-08-20 PROCEDURE — 6360000002 HC RX W HCPCS

## 2022-08-20 PROCEDURE — 6370000000 HC RX 637 (ALT 250 FOR IP): Performed by: STUDENT IN AN ORGANIZED HEALTH CARE EDUCATION/TRAINING PROGRAM

## 2022-08-20 PROCEDURE — 97535 SELF CARE MNGMENT TRAINING: CPT

## 2022-08-20 PROCEDURE — 6370000000 HC RX 637 (ALT 250 FOR IP)

## 2022-08-20 RX ADMIN — CARVEDILOL 25 MG: 25 TABLET, FILM COATED ORAL at 07:52

## 2022-08-20 RX ADMIN — ENOXAPARIN SODIUM 40 MG: 100 INJECTION SUBCUTANEOUS at 07:52

## 2022-08-20 RX ADMIN — CLOPIDOGREL BISULFATE 75 MG: 75 TABLET ORAL at 07:52

## 2022-08-20 RX ADMIN — ASPIRIN 81 MG: 81 TABLET, COATED ORAL at 07:52

## 2022-08-20 RX ADMIN — ISOSORBIDE MONONITRATE 30 MG: 30 TABLET, EXTENDED RELEASE ORAL at 07:52

## 2022-08-20 RX ADMIN — AMLODIPINE BESYLATE 10 MG: 10 TABLET ORAL at 07:52

## 2022-08-20 RX ADMIN — FAMOTIDINE 20 MG: 20 TABLET ORAL at 07:52

## 2022-08-20 RX ADMIN — DULOXETINE 30 MG: 30 CAPSULE, DELAYED RELEASE ORAL at 07:52

## 2022-08-20 ASSESSMENT — PAIN SCALES - GENERAL: PAINLEVEL_OUTOF10: 0

## 2022-08-20 NOTE — PLAN OF CARE
Problem: Discharge Planning  Goal: Discharge to home or other facility with appropriate resources  8/20/2022 0928 by Harris Collins LPN  Outcome: Completed  8/20/2022 0029 by Tunde Wallis RN  Outcome: Progressing     Problem: Safety - Adult  Goal: Free from fall injury  8/20/2022 0928 by Harris Collins LPN  Outcome: Completed  8/20/2022 0029 by Tunde Wallis RN  Outcome: Progressing     Problem: ABCDS Injury Assessment  Goal: Absence of physical injury  Outcome: Completed     Problem: Skin/Tissue Integrity  Goal: Absence of new skin breakdown  Description: 1. Monitor for areas of redness and/or skin breakdown  2. Assess vascular access sites hourly  3. Every 4-6 hours minimum:  Change oxygen saturation probe site  4. Every 4-6 hours:  If on nasal continuous positive airway pressure, respiratory therapy assess nares and determine need for appliance change or resting period.   8/20/2022 0511 by Harris Collins LPN  Outcome: Completed  8/20/2022 0029 by Tunde Wallis RN  Outcome: Progressing     Problem: Chronic Conditions and Co-morbidities  Goal: Patient's chronic conditions and co-morbidity symptoms are monitored and maintained or improved  Outcome: Completed     Problem: Nutrition Deficit:  Goal: Optimize nutritional status  Outcome: Completed     Problem: Pain  Goal: Verbalizes/displays adequate comfort level or baseline comfort level  8/20/2022 0928 by Harris Collins LPN  Outcome: Completed  8/20/2022 0029 by Tunde Wallis RN  Outcome: Progressing
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
## 2022-08-20 NOTE — PROGRESS NOTES
Physical Therapy        Physical Therapy       DATE: 2022    NAME: Vero Hernández  MRN: [id number]   : 1965    Subjective: Pt states family not coming until 18, nephew arrived at 200 for family training. Pain: Denies  Provided family training for transfers, ambulation/stairs, safety, and energy conservation with no follow up questions from family or patient.              22 0833   PT Individual Minutes   Time In 7005   Time Out 0849   Minutes 16         Electronically signed by Thomas Hackett PTA on 2022 at 3:17 PM

## 2022-08-20 NOTE — PLAN OF CARE
Problem: Discharge Planning  Goal: Discharge to home or other facility with appropriate resources  Outcome: Progressing     Problem: Safety - Adult  Goal: Free from fall injury  8/20/2022 0029 by Laureano Mejia RN  Outcome: Progressing     Problem: Skin/Tissue Integrity  Goal: Absence of new skin breakdown  Description: 1. Monitor for areas of redness and/or skin breakdown  2. Assess vascular access sites hourly  3. Every 4-6 hours minimum:  Change oxygen saturation probe site  4. Every 4-6 hours:  If on nasal continuous positive airway pressure, respiratory therapy assess nares and determine need for appliance change or resting period.   8/20/2022 0029 by Laureano Mejia, RN  Outcome: Progressing     Problem: Pain  Goal: Verbalizes/displays adequate comfort level or baseline comfort level  8/20/2022 0029 by Laureano Mejia, RN  Outcome: Progressing

## 2022-08-20 NOTE — PROGRESS NOTES
Pt. Resting in bed eating breakfast. Pt reports he sleep well last night, denies pain. Pt is ready to go home today. Call-light in reach. Brief change d/t urinary incontinence caro-care provided.

## 2022-08-20 NOTE — PROGRESS NOTES
Pt discharged to home with home care. Accompanied by nephew \"Brady\". Discharge instructions reviewed all questions answered. Personal belongings sent with pt. Writer assisted pt to personal car with no assistance needed with transfer.

## 2022-08-21 NOTE — DISCHARGE SUMMARY
Physical Medicine & Rehabilitation  Discharge Summary     Patient Identification:  Regina   : 1965  Admit date: 8/10/2022  Discharge date: 2022   Attending provider: Mathew Torres MD      Discharging provider: Bianca Faulkner MD    Primary care provider: Luis Eduardo Mera MD     Discharge Diagnoses:   Ischemic CVA with R sided weakness  Verbal apraxia  Impaired cognition  Receptive and Expressive Aphasia  Urine Retention  Insomnia  Anemia  ARON  CAD s/p CABG  HTN  Hyperlipidemia  COPD  GERD  Depression  ADHD    Discharge Functional Status:    Physical Therapy:  Bed Mobility:   Bed mobility  Bridging: Supervision  Rolling to Left: Modified independent  Rolling to Right: Modified independent  Supine to Sit: Modified independent  Sit to Supine: Modified independent  Scooting: Modified independent  Bed Mobility Comments: HOB flat, Pt is able to complete without using bed rails. Pt able to perform bed mobilities with ease. Transfers:   Transfers  Sit to Stand: Supervision  Stand to sit: Supervision (cues to lower self in slow, controlled manner)  Bed to Chair: Supervision  Stand Pivot Transfers: Supervision  Comment: Transfers completed withand without RW this date. No LOB noted, however is slightly unsteady. Mobility:   Ambulation  WB Status: FWB  Ambulation  Surface: uneven, outdoors  Device: Rolling Walker  Assistance: Stand by assistance  Quality of Gait: Wide BRAD, lacked R TKE, R toe out. Only LOB noted with transition from pavement to sidewalk. Pt caught his R foot on curb and requires Min A for stability. Edu pt to look around for all surroundings/tripping hazards. Gait Deviations: Slow Claudia, Increased BRAD, Decreased step length, Decreased step height, Decreased arm swing, Decreased head and trunk rotation, Deviated path  Distance: ~80'x2  Comments: Verbal/tactile cues for R foot clearance.  Temporary improvement with R foot clearance with tactile cues to R quad during swing phase noted. Pt bumps into objects on R side several times throughout ambulation requiring several cues to scan surroundings. More Ambulation?: Yes  Ambulation 2  Surface - 2: level tile  Device 2: Rolling Walker  Assistance 2: Stand by assistance (SBA in AM and Supervision A in PM with shorter distances only (~25'). )  Quality of Gait 2: Pt completes 2MWT with RW. Pt is steady, NBOS, Occassional  cues to increase heel strike on RLE. Pt is able to amb 50'x1 with 2 turns with and without RW. Pt is steady, no LOB noted. Pt is able to maintain standing stability while looking around ramirez. Gait Deviations: Slow Claudia, Decreased step height, Decreased step length, Decreased head and trunk rotation, Decreased arm swing  Distance: 2MWT: 138', Additional 25' and 50'x2 with 2 turns. Comments: Cues for safety, obstacle avoidance, and technique with fair return.          Occupational Therapy:   Grooming/Oral Hygiene  Assistance Level: Supervision  Skilled Clinical Factors: completed oral care and face washing standing at sink  Upper Extremity Bathing  Assistance Level: Supervision  Skilled Clinical Factors: completed standing sinkside  Lower Extremity Bathing  Assistance Level: Supervision  Skilled Clinical Factors: completed seated on toilet; using figure 4 technique to reach BLEs; standing to complete caro area  Upper Extremity Dressing  Assistance Level: Supervision  Skilled Clinical Factors: to don overhead shirt  Lower Extremity Dressing  Assistance Level: Supervision  Skilled Clinical Factors: seated on toilet to thread BLEs into brief/pants; standing to pull up over hips  Putting On/Taking Off Footwear  Assistance Level: Supervision  Skilled Clinical Factors: to doff/don B footies  Toileting  Assistance Level: Supervision  Skilled Clinical Factors: clothing/brief management and caro hygiene seated on toilet   Toilet Transfers  Technique:  (ambulating)  Equipment: Grab bars  Additional Factors: Increased time to complete  Assistance Level: Supervision  Skilled Clinical Factors: utilizing GB with LUE to ensure safe descent onto toilet      Speech Therapy:  Comprehension: 3 - Patient understands basic needs 50-74% of the time  Expression: 3 - Expresses basic ideas/needs 50-74% of the time  Social Interaction: 5 - Patient is appropriate with supervision/cues  Problem Solvin - Patient solves simple/routine tasks 25%-49%  Memory: 2 - Patient remembers 25%-49% of the time      Inpatient Rehabilitation Course:   Marcia Salvador is a 59 y.o. male admitted to inpatient rehabilitation on 8/10/2022 for rehab for Ischemic CVA with R sided weakness. INITIAL HPI:  He was originally admitted to [facility] on 22. He initially presented with syncope, headache, and shortness of breath. CT head showed no acute intracranial abnormality. CTPE showed no PE or other acute process in the chest.  He developed altered mental status on 22. He was found to have expressive aphasia and right-sided weakness with NIHSS 10. Symptoms worsened, and he was noted to have NIHSS 15.  Imaging showed age-indeterminate complete occlusion of the left cervical ICA and concern for evolving left-sided watershed infarct. He underwent diagnostic cerebral angiogram with left ICA occlusion mechanical thrombectomy, pre- and post-stenting balloon angioplasty, and stenting on 22 (Dr. Julieth Avila). Of note, he had recent ICD placement on 22 (Dr. Zain Reed)     Rehab course:   Patient was continued on ASA, Plavix, atorvastatin for stroke prevention/treatment during admission. SLP treated aphasia and impaired cognition. His urine retention was managed with timed voids and prn ISC. His insomnia was managed with melatonin. His anemia was stable and monitored weekly. Chronic conditions were stable on home medications.      The patient participated in an aggressive multidisciplinary inpatient rehabilitation program involving 3 hours per day, 5 tablets by mouth at bedtime     polyethylene glycol 17 g packet  Commonly known as: GLYCOLAX  Take 17 g by mouth daily as needed for Constipation            CHANGE how you take these medications      albuterol sulfate  (90 Base) MCG/ACT inhaler  Commonly known as: PROVENTIL;VENTOLIN;PROAIR  Inhale 2 puffs into the lungs every 4 hours as needed for Wheezing  What changed:   how much to take  how to take this  when to take this  reasons to take this  additional instructions     isosorbide mononitrate 30 MG extended release tablet  Commonly known as: IMDUR  Take 1 tablet by mouth daily  What changed:   medication strength  how much to take            CONTINUE taking these medications      amLODIPine 10 MG tablet  Commonly known as: NORVASC  Take 1 tablet by mouth daily     aspirin 81 MG EC tablet     atorvastatin 40 MG tablet  Commonly known as: LIPITOR  TAKE 1 TABLET BY MOUTH DAILY     carvedilol 25 MG tablet  Commonly known as: COREG  Take 1 tablet by mouth 2 times daily (with meals)     DULoxetine 30 MG extended release capsule  Commonly known as: CYMBALTA  TAKE 1 CAPSULE BY MOUTH DAILY     nitroGLYCERIN 0.4 MG SL tablet  Commonly known as: Nitrostat  Place 1 tablet under the tongue every 5 minutes as needed for Chest pain up to max of 3 total doses. If no relief after 1 dose, call 911.             STOP taking these medications      lisinopril 10 MG tablet  Commonly known as: PRINIVIL;ZESTRIL     lisinopril 20 MG tablet  Commonly known as: PRINIVIL;ZESTRIL     metoclopramide 10 MG tablet  Commonly known as: REGLAN     metoprolol succinate 25 MG extended release tablet  Commonly known as: TOPROL XL     pantoprazole 40 MG tablet  Commonly known as: PROTONIX     spironolactone 25 MG tablet  Commonly known as: ALDACTONE               Where to Get Your Medications        These medications were sent to [address] [place] [address] [phone]  [facility] [address] Phone: [phone]   albuterol sulfate  (90 Base) MCG/ACT inhaler  amLODIPine 10 MG tablet  carvedilol 25 MG tablet  clopidogrel 75 MG tablet  famotidine 20 MG tablet  isosorbide mononitrate 30 MG extended release tablet  nitroGLYCERIN 0.4 MG SL tablet       You can get these medications from any pharmacy    You don't need a prescription for these medications  melatonin 3 MG Tabs tablet  polyethylene glycol 17 g packet                Raissa Doan MD

## 2022-08-22 ENCOUNTER — TELEPHONE (OUTPATIENT)
Dept: INTERNAL MEDICINE CLINIC | Age: 57
End: 2022-08-22

## 2022-08-22 NOTE — TELEPHONE ENCOUNTER
[facility] Transitions Initial Follow Up Call    Outreach made within 2 business days of discharge: Yes    Patient: Alexander Rachel Patient : 1965   MRN: [id number]  Reason for Admission: There are no discharge diagnoses documented for the most recent discharge. Discharge Date: 22       Spoke with: Luis Daniel    Discharge department/facility: [facility] Interactive Patient Contact:  Was patient able to fill all prescriptions: Yes  Was patient instructed to bring all medications to the follow-up visit: Yes  Is patient taking all medications as directed in the discharge summary?  Yes  Does patient understand their discharge instructions: Yes  Does patient have questions or concerns that need addressed prior to 7-14 day follow up office visit: no    Scheduled appointment with PCP within 7-14 days    Follow Up  Future Appointments   Date Time Provider Gregg Monroy   2022  2:30 PM Kaelyn Davila MD Neuro Endo [facility]   2022  2:30 PM Anibal Leroy MD [address]   10/18/2022  2:30 PM Trevon Henry MD [address]   11/15/2022  3:00 PM Aroldo Rapp MD Neuro Endo [facility]   2022 10:45 AM Kimberly Lerner MD [facility]       Denisa Harris MA

## 2022-08-24 ENCOUNTER — TELEPHONE (OUTPATIENT)
Dept: INTERNAL MEDICINE CLINIC | Age: 57
End: 2022-08-24

## 2022-08-24 NOTE — TELEPHONE ENCOUNTER
Patients niece Dannie Artis is reporting patient had 2 falls within the past 24 hours. Dannie Artis states she witnessed both falls. Patient did not hit his head and does have a few small cuts and bruises but no significant injury. Patient stated he starts to feel dizzy and then his legs give out and he falls. Dannie Artis took patients vitals while on the phone /74 HR 78    Patient does have a walker at home. Buddie Begun to ensure patient is using the walker at all time, and encourage patient to stay hydrated. Advised patient that if patient does fall again, it is recommended that patient report to ER. Patient is scheduled for a HFU with Dr. Rachel Whaley on 8/29/22 and is also scheduled with his Neurologist on 8/26/22. Dannie Artis verbalized understanding and denies no further questions at this time.

## 2022-08-29 ENCOUNTER — OFFICE VISIT (OUTPATIENT)
Dept: INTERNAL MEDICINE CLINIC | Age: 57
End: 2022-08-29
Payer: COMMERCIAL

## 2022-08-29 VITALS
DIASTOLIC BLOOD PRESSURE: 70 MMHG | SYSTOLIC BLOOD PRESSURE: 118 MMHG | WEIGHT: 179 LBS | OXYGEN SATURATION: 98 % | HEART RATE: 68 BPM | BODY MASS INDEX: 26.43 KG/M2

## 2022-08-29 DIAGNOSIS — I10 ESSENTIAL HYPERTENSION: ICD-10-CM

## 2022-08-29 DIAGNOSIS — G81.91 RIGHT HEMIPARESIS (HCC): ICD-10-CM

## 2022-08-29 DIAGNOSIS — Z09 HOSPITAL DISCHARGE FOLLOW-UP: ICD-10-CM

## 2022-08-29 DIAGNOSIS — I25.810 CORONARY ARTERY DISEASE INVOLVING CORONARY BYPASS GRAFT OF NATIVE HEART WITHOUT ANGINA PECTORIS: ICD-10-CM

## 2022-08-29 DIAGNOSIS — I63.232 CEREBROVASCULAR ACCIDENT (CVA) DUE TO OCCLUSION OF LEFT CAROTID ARTERY (HCC): Primary | ICD-10-CM

## 2022-08-29 PROCEDURE — 1111F DSCHRG MED/CURRENT MED MERGE: CPT | Performed by: INTERNAL MEDICINE

## 2022-08-29 PROCEDURE — 3017F COLORECTAL CA SCREEN DOC REV: CPT | Performed by: INTERNAL MEDICINE

## 2022-08-29 PROCEDURE — G8427 DOCREV CUR MEDS BY ELIG CLIN: HCPCS | Performed by: INTERNAL MEDICINE

## 2022-08-29 PROCEDURE — 99213 OFFICE O/P EST LOW 20 MIN: CPT | Performed by: INTERNAL MEDICINE

## 2022-08-29 PROCEDURE — 4004F PT TOBACCO SCREEN RCVD TLK: CPT | Performed by: INTERNAL MEDICINE

## 2022-08-29 PROCEDURE — G8417 CALC BMI ABV UP PARAM F/U: HCPCS | Performed by: INTERNAL MEDICINE

## 2022-08-29 ASSESSMENT — PATIENT HEALTH QUESTIONNAIRE - PHQ9
SUM OF ALL RESPONSES TO PHQ QUESTIONS 1-9: 12
6. FEELING BAD ABOUT YOURSELF - OR THAT YOU ARE A FAILURE OR HAVE LET YOURSELF OR YOUR FAMILY DOWN: 0
5. POOR APPETITE OR OVEREATING: 2
7. TROUBLE CONCENTRATING ON THINGS, SUCH AS READING THE NEWSPAPER OR WATCHING TELEVISION: 2
9. THOUGHTS THAT YOU WOULD BE BETTER OFF DEAD, OR OF HURTING YOURSELF: 0
8. MOVING OR SPEAKING SO SLOWLY THAT OTHER PEOPLE COULD HAVE NOTICED. OR THE OPPOSITE, BEING SO FIGETY OR RESTLESS THAT YOU HAVE BEEN MOVING AROUND A LOT MORE THAN USUAL: 2
SUM OF ALL RESPONSES TO PHQ QUESTIONS 1-9: 12
SUM OF ALL RESPONSES TO PHQ QUESTIONS 1-9: 12
1. LITTLE INTEREST OR PLEASURE IN DOING THINGS: 2
SUM OF ALL RESPONSES TO PHQ QUESTIONS 1-9: 12
4. FEELING TIRED OR HAVING LITTLE ENERGY: 2
SUM OF ALL RESPONSES TO PHQ9 QUESTIONS 1 & 2: 2
10. IF YOU CHECKED OFF ANY PROBLEMS, HOW DIFFICULT HAVE THESE PROBLEMS MADE IT FOR YOU TO DO YOUR WORK, TAKE CARE OF THINGS AT HOME, OR GET ALONG WITH OTHER PEOPLE: 0
3. TROUBLE FALLING OR STAYING ASLEEP: 2
2. FEELING DOWN, DEPRESSED OR HOPELESS: 0

## 2022-08-29 ASSESSMENT — COLUMBIA-SUICIDE SEVERITY RATING SCALE - C-SSRS
1. WITHIN THE PAST MONTH, HAVE YOU WISHED YOU WERE DEAD OR WISHED YOU COULD GO TO SLEEP AND NOT WAKE UP?: NO
6. HAVE YOU EVER DONE ANYTHING, STARTED TO DO ANYTHING, OR PREPARED TO DO ANYTHING TO END YOUR LIFE?: NO
2. HAVE YOU ACTUALLY HAD ANY THOUGHTS OF KILLING YOURSELF?: NO

## 2022-08-29 ASSESSMENT — LIFESTYLE VARIABLES: TOBACCO_USE: 1
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
## 2022-08-29 NOTE — PROGRESS NOTES
Visit Information    Have you changed or started any medications since your last visit including any over-the-counter medicines, vitamins, or herbal medicines? no   Are you having any side effects from any of your medications? -  no  Have you stopped taking any of your medications? Is so, why? -  no    Have you seen any other physician or provider since your last visit? Yes - Records Obtained  Have you had any other diagnostic tests since your last visit? Yes - Records Obtained  Have you been seen in the emergency room and/or had an admission to a hospital since we last saw you? Yes - Records Obtained  Have you had your routine dental cleaning in the past 6 months? no    Have you activated your [employer] account? If not, what are your barriers?  Yes     Patient Care Team:  Raj Lima MD as PCP - General (Internal Medicine)  Raj Lima MD as PCP - [facility] Provider  Maicol Younger as Surgeon (Cardiothoracic Surgery)  Ruth Miramontes MD (Cardiology)  Reny Bruce MD as Orthopedic Surgeon (Orthopedic Surgery)  Cuco Mann MD as Surgeon (Orthopedic Surgery)  Sameera Hayes MD as Surgeon (Neurosurgery)  Katerin Barahona MD as Consulting Physician (Neurology)  Freda Nye MD as Consulting Physician (Pulmonology)    Medical History Review  Past Medical, Family, and Social History reviewed and does contribute to the patient presenting condition    Health Maintenance   Topic Date Due    COVID-19 Vaccine (1) Never done    Diabetic retinal exam  Never done    Hepatitis B vaccine (1 of 3 - Risk 3-dose series) Never done    Shingles vaccine (1 of 2) Never done    Pneumococcal 0-64 years Vaccine (3 - PCV) 03/14/2018    Low dose CT lung screening  12/21/2019    Diabetic microalbuminuria test  08/21/2021    Diabetic foot exam  10/08/2021    Depression Monitoring  04/21/2022    Flu vaccine (1) 09/01/2022    A1C test (Diabetic or Prediabetic)  07/30/2023    Lipids  07/30/2023    DTaP/Tdap/Td vaccine (2 - Td or Tdap) 11/17/2026    Colorectal Cancer Screen  07/30/2029    Hepatitis C screen  Completed    HIV screen  Completed    Hepatitis A vaccine  Aged Out    Hib vaccine  Aged Out    Meningococcal (ACWY) vaccine  Aged Out

## 2022-09-26 ENCOUNTER — OFFICE VISIT (OUTPATIENT)
Dept: INTERNAL MEDICINE CLINIC | Age: 57
End: 2022-09-26
Payer: COMMERCIAL

## 2022-09-26 VITALS
SYSTOLIC BLOOD PRESSURE: 102 MMHG | DIASTOLIC BLOOD PRESSURE: 72 MMHG | HEART RATE: 74 BPM | OXYGEN SATURATION: 95 % | BODY MASS INDEX: 28.21 KG/M2 | WEIGHT: 191 LBS

## 2022-09-26 DIAGNOSIS — G81.91 RIGHT HEMIPARESIS (HCC): ICD-10-CM

## 2022-09-26 DIAGNOSIS — R42 VERTIGO: ICD-10-CM

## 2022-09-26 DIAGNOSIS — I63.232 CEREBROVASCULAR ACCIDENT (CVA) DUE TO OCCLUSION OF LEFT CAROTID ARTERY (HCC): Primary | ICD-10-CM

## 2022-09-26 PROCEDURE — 99213 OFFICE O/P EST LOW 20 MIN: CPT | Performed by: INTERNAL MEDICINE

## 2022-09-26 PROCEDURE — G8427 DOCREV CUR MEDS BY ELIG CLIN: HCPCS | Performed by: INTERNAL MEDICINE

## 2022-09-26 PROCEDURE — 4004F PT TOBACCO SCREEN RCVD TLK: CPT | Performed by: INTERNAL MEDICINE

## 2022-09-26 PROCEDURE — 3017F COLORECTAL CA SCREEN DOC REV: CPT | Performed by: INTERNAL MEDICINE

## 2022-09-26 PROCEDURE — G8417 CALC BMI ABV UP PARAM F/U: HCPCS | Performed by: INTERNAL MEDICINE

## 2022-09-26 RX ORDER — MECLIZINE HCL 12.5 MG/1
12.5 TABLET ORAL 3 TIMES DAILY PRN
Qty: 90 TABLET | Refills: 2 | Status: SHIPPED | OUTPATIENT
Start: 2022-09-26
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
## 2022-09-26 NOTE — PROGRESS NOTES
Visit Information    Have you changed or started any medications since your last visit including any over-the-counter medicines, vitamins, or herbal medicines? no   Are you having any side effects from any of your medications? -  no  Have you stopped taking any of your medications? Is so, why? -  no    Have you seen any other physician or provider since your last visit? No  Have you had any other diagnostic tests since your last visit? No  Have you been seen in the emergency room and/or had an admission to a hospital since we last saw you? No  Have you had your routine dental cleaning in the past 6 months? no    Have you activated your [employer] account? If not, what are your barriers?  Yes     Patient Care Team:  Lucy Shepherd MD as PCP - General (Internal Medicine)  Lucy Shepherd MD as PCP - [facility]  Hayden Wright as Surgeon (Cardiothoracic Surgery)  Thomas Farrell MD (Cardiology)  Jayden Velazquez MD as Orthopedic Surgeon (Orthopedic Surgery)  Walt Jaffe MD as Surgeon (Orthopedic Surgery)  Thee Salgado MD as Surgeon (Neurosurgery)  Chema Obrien MD as Consulting Physician (Neurology)  Mary Kay Graham MD as Consulting Physician (Pulmonology)    Medical History Review  Past Medical, Family, and Social History reviewed and does not contribute to the patient presenting condition    Health Maintenance   Topic Date Due    COVID-19 Vaccine (1) Never done    Diabetic retinal exam  Never done    Shingles vaccine (1 of 2) Never done    Low dose CT lung screening  12/21/2019    Diabetic microalbuminuria test  08/21/2021    Diabetic foot exam  10/08/2021    Flu vaccine (1) 08/01/2022    A1C test (Diabetic or Prediabetic)  07/30/2023    Lipids  07/30/2023    Depression Monitoring  08/29/2023    DTaP/Tdap/Td vaccine (2 - Td or Tdap) 11/17/2026    Colorectal Cancer Screen  07/30/2029    Pneumococcal 0-64 years Vaccine  Completed    Hepatitis C screen  Completed    HIV screen  Completed Hepatitis A vaccine  Aged Out    Hepatitis B vaccine  Aged Out    Hib vaccine  Aged Out    Meningococcal (ACWY) vaccine  Aged Out

## 2022-10-14 RX ORDER — DULOXETIN HYDROCHLORIDE 30 MG/1
30 CAPSULE, DELAYED RELEASE ORAL DAILY
Qty: 30 CAPSULE | Refills: 2 | OUTPATIENT
Start: 2022-10-14

## 2022-10-25 DIAGNOSIS — I25.810 CORONARY ARTERY DISEASE INVOLVING CORONARY BYPASS GRAFT OF NATIVE HEART WITHOUT ANGINA PECTORIS: ICD-10-CM

## 2022-10-25 DIAGNOSIS — E11.9 TYPE 2 DIABETES MELLITUS WITHOUT COMPLICATION, WITHOUT LONG-TERM CURRENT USE OF INSULIN (HCC): ICD-10-CM

## 2022-10-25 RX ORDER — ATORVASTATIN CALCIUM 40 MG/1
TABLET, FILM COATED ORAL
Qty: 30 TABLET | Refills: 3 | OUTPATIENT
Start: 2022-10-25

## 2022-10-25 RX ORDER — DULOXETIN HYDROCHLORIDE 30 MG/1
30 CAPSULE, DELAYED RELEASE ORAL DAILY
Qty: 30 CAPSULE | Refills: 2 | OUTPATIENT
Start: 2022-10-25

## 2022-10-31 RX ORDER — DULOXETIN HYDROCHLORIDE 30 MG/1
30 CAPSULE, DELAYED RELEASE ORAL DAILY
Qty: 30 CAPSULE | Refills: 2 | Status: SHIPPED | OUTPATIENT
Start: 2022-10-31

## 2022-11-11 DIAGNOSIS — I25.810 CORONARY ARTERY DISEASE INVOLVING CORONARY BYPASS GRAFT OF NATIVE HEART WITHOUT ANGINA PECTORIS: ICD-10-CM

## 2022-11-11 DIAGNOSIS — E11.9 TYPE 2 DIABETES MELLITUS WITHOUT COMPLICATION, WITHOUT LONG-TERM CURRENT USE OF INSULIN (HCC): ICD-10-CM

## 2022-11-14 RX ORDER — ATORVASTATIN CALCIUM 40 MG/1
TABLET, FILM COATED ORAL
Qty: 30 TABLET | Refills: 3 | Status: SHIPPED | OUTPATIENT
Start: 2022-11-14

## 2022-12-14 RX ORDER — FAMOTIDINE 20 MG/1
20 TABLET, FILM COATED ORAL 2 TIMES DAILY
Qty: 60 TABLET | Refills: 3 | OUTPATIENT
Start: 2022-12-14

## 2022-12-28 RX ORDER — AMLODIPINE BESYLATE 10 MG/1
10 TABLET ORAL DAILY
Qty: 30 TABLET | Refills: 3 | OUTPATIENT
Start: 2022-12-28

## 2023-01-05 RX ORDER — FAMOTIDINE 20 MG/1
20 TABLET, FILM COATED ORAL 2 TIMES DAILY
Qty: 60 TABLET | Refills: 3 | OUTPATIENT
Start: 2023-01-05

## 2023-01-06 RX ORDER — AMLODIPINE BESYLATE 10 MG/1
10 TABLET ORAL DAILY
Qty: 30 TABLET | Refills: 3 | Status: SHIPPED | OUTPATIENT
Start: 2023-01-06

## 2023-01-13 RX ORDER — FAMOTIDINE 20 MG/1
20 TABLET, FILM COATED ORAL 2 TIMES DAILY
Qty: 60 TABLET | Refills: 3 | Status: SHIPPED | OUTPATIENT
Start: 2023-01-13

## 2023-01-17 ENCOUNTER — OFFICE VISIT (OUTPATIENT)
Dept: INTERNAL MEDICINE CLINIC | Age: 58
End: 2023-01-17
Payer: COMMERCIAL

## 2023-01-17 VITALS
OXYGEN SATURATION: 98 % | WEIGHT: 207.8 LBS | DIASTOLIC BLOOD PRESSURE: 72 MMHG | HEIGHT: 69 IN | SYSTOLIC BLOOD PRESSURE: 120 MMHG | HEART RATE: 58 BPM | BODY MASS INDEX: 30.78 KG/M2

## 2023-01-17 DIAGNOSIS — N18.4 STAGE 4 CHRONIC KIDNEY DISEASE (HCC): ICD-10-CM

## 2023-01-17 DIAGNOSIS — J44.9 CHRONIC OBSTRUCTIVE PULMONARY DISEASE, UNSPECIFIED COPD TYPE (HCC): ICD-10-CM

## 2023-01-17 DIAGNOSIS — I10 ESSENTIAL HYPERTENSION: ICD-10-CM

## 2023-01-17 DIAGNOSIS — R21 RASH: ICD-10-CM

## 2023-01-17 DIAGNOSIS — I25.810 CORONARY ARTERY DISEASE INVOLVING CORONARY BYPASS GRAFT OF NATIVE HEART WITHOUT ANGINA PECTORIS: ICD-10-CM

## 2023-01-17 DIAGNOSIS — E11.9 TYPE 2 DIABETES MELLITUS WITHOUT COMPLICATION, WITHOUT LONG-TERM CURRENT USE OF INSULIN (HCC): Primary | ICD-10-CM

## 2023-01-17 LAB — HBA1C MFR BLD: 5.3 %

## 2023-01-17 PROCEDURE — 83036 HEMOGLOBIN GLYCOSYLATED A1C: CPT | Performed by: INTERNAL MEDICINE

## 2023-01-17 PROCEDURE — 3023F SPIROM DOC REV: CPT | Performed by: INTERNAL MEDICINE

## 2023-01-17 PROCEDURE — G8484 FLU IMMUNIZE NO ADMIN: HCPCS | Performed by: INTERNAL MEDICINE

## 2023-01-17 PROCEDURE — 2022F DILAT RTA XM EVC RTNOPTHY: CPT | Performed by: INTERNAL MEDICINE

## 2023-01-17 PROCEDURE — G8417 CALC BMI ABV UP PARAM F/U: HCPCS | Performed by: INTERNAL MEDICINE

## 2023-01-17 PROCEDURE — 4004F PT TOBACCO SCREEN RCVD TLK: CPT | Performed by: INTERNAL MEDICINE

## 2023-01-17 PROCEDURE — 3078F DIAST BP <80 MM HG: CPT | Performed by: INTERNAL MEDICINE

## 2023-01-17 PROCEDURE — 99214 OFFICE O/P EST MOD 30 MIN: CPT | Performed by: INTERNAL MEDICINE

## 2023-01-17 PROCEDURE — 3074F SYST BP LT 130 MM HG: CPT | Performed by: INTERNAL MEDICINE

## 2023-01-17 PROCEDURE — 3044F HG A1C LEVEL LT 7.0%: CPT | Performed by: INTERNAL MEDICINE

## 2023-01-17 PROCEDURE — 3017F COLORECTAL CA SCREEN DOC REV: CPT | Performed by: INTERNAL MEDICINE

## 2023-01-17 PROCEDURE — G8427 DOCREV CUR MEDS BY ELIG CLIN: HCPCS | Performed by: INTERNAL MEDICINE

## 2023-01-17 RX ORDER — DIAPER,BRIEF,INFANT-TODD,DISP
EACH MISCELLANEOUS
Qty: 30 G | Refills: 1 | Status: SHIPPED | OUTPATIENT
Start: 2023-01-17 | End: 2023-01-24

## 2023-01-17 SDOH — ECONOMIC STABILITY: FOOD INSECURITY: WITHIN THE PAST 12 MONTHS, THE FOOD YOU BOUGHT JUST DIDN'T LAST AND YOU DIDN'T HAVE MONEY TO GET MORE.: NEVER TRUE

## 2023-01-17 SDOH — ECONOMIC STABILITY: FOOD INSECURITY: WITHIN THE PAST 12 MONTHS, YOU WORRIED THAT YOUR FOOD WOULD RUN OUT BEFORE YOU GOT MONEY TO BUY MORE.: NEVER TRUE

## 2023-01-17 ASSESSMENT — PATIENT HEALTH QUESTIONNAIRE - PHQ9
SUM OF ALL RESPONSES TO PHQ QUESTIONS 1-9: 0
9. THOUGHTS THAT YOU WOULD BE BETTER OFF DEAD, OR OF HURTING YOURSELF: 0
SUM OF ALL RESPONSES TO PHQ QUESTIONS 1-9: 0
6. FEELING BAD ABOUT YOURSELF - OR THAT YOU ARE A FAILURE OR HAVE LET YOURSELF OR YOUR FAMILY DOWN: 0
SUM OF ALL RESPONSES TO PHQ9 QUESTIONS 1 & 2: 0
3. TROUBLE FALLING OR STAYING ASLEEP: 0
10. IF YOU CHECKED OFF ANY PROBLEMS, HOW DIFFICULT HAVE THESE PROBLEMS MADE IT FOR YOU TO DO YOUR WORK, TAKE CARE OF THINGS AT HOME, OR GET ALONG WITH OTHER PEOPLE: 0
2. FEELING DOWN, DEPRESSED OR HOPELESS: 0
7. TROUBLE CONCENTRATING ON THINGS, SUCH AS READING THE NEWSPAPER OR WATCHING TELEVISION: 0
SUM OF ALL RESPONSES TO PHQ QUESTIONS 1-9: 0
1. LITTLE INTEREST OR PLEASURE IN DOING THINGS: 0
5. POOR APPETITE OR OVEREATING: 0
8. MOVING OR SPEAKING SO SLOWLY THAT OTHER PEOPLE COULD HAVE NOTICED. OR THE OPPOSITE, BEING SO FIGETY OR RESTLESS THAT YOU HAVE BEEN MOVING AROUND A LOT MORE THAN USUAL: 0
SUM OF ALL RESPONSES TO PHQ QUESTIONS 1-9: 0
4. FEELING TIRED OR HAVING LITTLE ENERGY: 0

## 2023-01-17 ASSESSMENT — SOCIAL DETERMINANTS OF HEALTH (SDOH): HOW HARD IS IT FOR YOU TO PAY FOR THE VERY BASICS LIKE FOOD, HOUSING, MEDICAL CARE, AND HEATING?: NOT HARD AT ALL

## 2023-01-17 NOTE — PROGRESS NOTES
[address] [address]  Dept: [phone]  Dept Fax: [phone]    Office Progress/Follow Up Note  Date of patient's visit: 1/17/2023  Patient's Name:  Chaim Moreno YOB: 1965            Patient Care Team:  Vera Liz MD as PCP - General (Internal Medicine)  Vera Liz MD as PCP - [facility]  Joanna Cavazos as Surgeon (Cardiothoracic Surgery)  Magaly Ramos MD (Cardiology)  Ryan Carrasquillo MD as Orthopedic Surgeon (Orthopedic Surgery)  Erinn Hogue MD as Surgeon (Orthopedic Surgery)  Karthik Mata MD as Surgeon (Neurosurgery)  Bowen Greene MD as Consulting Physician (Neurology)  Soheila Kam MD as Consulting Physician (Pulmonology)    REASON FOR VISIT: Routine outpatient follow up/Same day visit/Post hospital/ED visit    HISTORY OF PRESENT ILLNESS:      Chief Complaint   Patient presents with    Diabetes     Last a1c 5.6    Rash     Pt c/o bilateral rash on hands. Pt states its itching and burning        History was obtained from the patient.  Chaim Moreno is a 59 y.o. is here for a   Patient has multiple medical problem which include diabetes, heart failure with preserved ejection fraction, CAD s/p CABG , COPD, CKD , S/P AICD , H/o stroke   He still smokes 0.5 PPD   Follows with Nephrologist with positive ANCA   NO more episodes of syncope   No Complains of Chest Pain, SOB   Has Rash on both hands , Associated with Dryness and burning sensation     Health Maintenance Due   Topic Date Due    COVID-19 Vaccine (1) Never done    Diabetic retinal exam  Never done    Hepatitis B vaccine (1 of 3 - Risk 3-dose series) Never done    Shingles vaccine (1 of 2) Never done    Low dose CT lung screening  12/21/2019    Diabetic foot exam  10/08/2021    Flu vaccine (1) 08/01/2022       Allergies   Allergen Reactions    Morphine Itching         MEDICATIONS:     Current Outpatient Medications Medication Sig Dispense Refill    famotidine (PEPCID) 20 MG tablet TAKE 1 TABLET BY MOUTH 2 TIMES DAILY 60 tablet 3    amLODIPine (NORVASC) 10 MG tablet TAKE 1 TABLET BY MOUTH DAILY 30 tablet 3    atorvastatin (LIPITOR) 40 MG tablet TAKE 1 TABLET BY MOUTH DAILY 30 tablet 3    DULoxetine (CYMBALTA) 30 MG extended release capsule TAKE 1 CAPSULE BY MOUTH DAILY 30 capsule 2    meclizine (ANTIVERT) 12.5 MG tablet Take 1 tablet by mouth 3 times daily as needed for Dizziness 90 tablet 2    isosorbide mononitrate (IMDUR) 30 MG extended release tablet Take 1 tablet by mouth daily 30 tablet 3    nitroGLYCERIN (NITROSTAT) 0.4 MG SL tablet Place 1 tablet under the tongue every 5 minutes as needed for Chest pain up to max of 3 total doses. If no relief after 1 dose, call 911. 25 tablet 0    albuterol sulfate HFA (PROVENTIL;VENTOLIN;PROAIR) 108 (90 Base) MCG/ACT inhaler Inhale 2 puffs into the lungs every 4 hours as needed for Wheezing 18 g 3    carvedilol (COREG) 25 MG tablet Take 1 tablet by mouth 2 times daily (with meals) 60 tablet 3    melatonin 3 MG TABS tablet Take 2 tablets by mouth at bedtime  3    clopidogrel (PLAVIX) 75 MG tablet Take 1 tablet by mouth daily 30 tablet 3    aspirin 81 MG EC tablet Take 81 mg by mouth in the morning. No current facility-administered medications for this visit. SOCIAL HISTORY    Reviewed and no change from previous record. Karen Owasso  reports that he has been smoking cigarettes. He has a 20.00 pack-year smoking history. He has never used smokeless tobacco.    FAMILY HISTORY:    Reviewed and No change from previous visit  family history includes Anxiety Disorder in his sister; Depression in his brother, sister, and sister; Diabetes in his father; Heart Disease in his father, maternal grandmother, and mother; High Blood Pressure in his father, mother, sister, and sister; Abdirizak Breeze in his father and mother; Thyroid Disease in his sister.     OF SYSTEMS:    General : Negative for fatigue, weight loss, appetite change  HEENT : Negative for nasal congestion, sneezing, runny nose, sinus pain  Respiratory : Negative for shortness of breath cough, congestion, wheezing  Cardiovascular: Negative for chest pain, palpitations, shortness of breath  GI: Negative for abdominal pain, nausea, vomiting, diarrhea, constipation  Genitourinary : negative for dysuria, urinary frequency, urinary urgency, hematuria  Neurological : Negative for headache, dizziness, gait change,   Psych : Negative for depression, anxiety  Skin: Rash, has pain affecting both hands  Musculoskeletal : Negative for joint pain, muscle pain      PHYSICAL EXAM:      Vitals:    01/17/23 1425   BP: 120/72   Pulse: 58   SpO2: 98%   Weight: 207 lb 12.8 oz (94.3 kg)   Height: 5' 9\" (1.753 m)     BP Readings from Last 3 Encounters:   01/17/23 120/72   09/26/22 102/72   08/29/22 118/70        Physical Exam  Vitals and nursing note reviewed. Constitutional:       General: He is not in acute distress. Appearance: He is well-developed. He is not diaphoretic. HENT:      Head: Normocephalic and atraumatic. Mouth/Throat:      Pharynx: No oropharyngeal exudate. Eyes:      General: No scleral icterus. Right eye: No discharge. Left eye: No discharge. Conjunctiva/sclera: Conjunctivae normal.      Pupils: Pupils are equal, round, and reactive to light. Neck:      Thyroid: No thyromegaly. Vascular: No JVD. Trachea: No tracheal deviation. Cardiovascular:      Rate and Rhythm: Normal rate. Heart sounds: Normal heart sounds. No murmur heard. No gallop. Pulmonary:      Effort: Pulmonary effort is normal. No respiratory distress. Breath sounds: Normal breath sounds. No stridor. No wheezing or rales. Abdominal:      General: Bowel sounds are normal. There is no distension. Palpations: Abdomen is soft. Tenderness: There is no abdominal tenderness. There is no guarding or rebound. Musculoskeletal:         General: No tenderness. Normal range of motion. Cervical back: Normal range of motion and neck supple. Skin:     General: Skin is warm and dry. Findings: Rash (Rash, dry skin affecting both hands) present. No erythema. Neurological:      Mental Status: He is alert and oriented to person, place, and time. Lab Results   Component Value Date    LABA1C 5.6 07/30/2022     Lab Results   Component Value Date     07/30/2022      LABORATORY FINDINGS:    CBC:  Lab Results   Component Value Date/Time    WBC 4.9 08/18/2022 06:52 AM    HGB 11.6 08/18/2022 06:52 AM     08/18/2022 06:52 AM     01/28/2012 07:56 AM       BMP:    Lab Results   Component Value Date/Time     08/18/2022 06:52 AM    K 4.4 08/18/2022 06:52 AM     08/18/2022 06:52 AM    CO2 30 08/18/2022 06:52 AM    BUN 15 08/18/2022 06:52 AM    CREATININE 1.48 08/18/2022 06:52 AM    GLUCOSE 99 08/18/2022 06:52 AM    GLUCOSE 104 01/25/2012 06:35 PM       HEMOGLOBIN A1C:   Lab Results   Component Value Date/Time    LABA1C 5.6 07/30/2022 08:21 AM       FASTING LIPID PANEL:  Lab Results   Component Value Date    CHOL 149 07/30/2022    HDL 35 (L) 07/30/2022    TRIG 168 (H) 07/30/2022       ASSESSMENT AND PLAN:    1. Type 2 diabetes mellitus without complication, without long-term current use of insulin ([facility])  Controlled  - POCT glycosylated hemoglobin (Hb A1C)    2. Rash    - hydrocortisone (ALA-JERED) 1 % cream; Apply topically 2 times daily. Dispense: 30 g; Refill: 1    3. Coronary artery disease involving coronary bypass graft of native heart without angina pectoris  Stable    4. Essential hypertension  Controlled    5. Chronic obstructive pulmonary disease, unspecified COPD type ([facility] [address]  Working on cutting down smoking    6. Stage 4 chronic kidney disease (HCC)  Creatinine is stable, follows with nephrologist      Return in about 3 months (around 4/17/2023).     Reviewed prior labs and health maintenance. Discussed use, benefit, and side effects of prescribed medications. Barriers to medication compliance addressed. All patient questions answered. Pt voiced understanding. MD TERA Parks [facility]  1/17/2023, 3:36 PM    Please note that this chart was generated using voice recognition Dragon dictation software. Although every effort was made to ensure the accuracy of this automated transcription, some errors in transcription may have occurred. FOLLOW UP AND INSTRUCTIONS:   No follow-ups on file. Alex Wilkinson received counseling on the following healthy behaviors: nutrition, exercise, and medication adherence, smoking cessation     Discussed use, benefit, and side effects of prescribed medications. Barriers to medication compliance addressed. All patient questions answered. Pt voiced understanding. Patient given educational materials - see patient instructions    MD TERA Ricardo [facility]  1/17/2023, 2:31 PM    Please note that this chart was generated using voice recognition Dragon dictation software. Although every effort was made to ensure the accuracy of this automatedtranscription, some errors in transcription may have occurred. Visit Information    Have you changed or started any medications since your last visit including any over-the-counter medicines, vitamins, or herbal medicines? no   Are you having any side effects from any of your medications? -  no  Have you stopped taking any of your medications? Is so, why? -  no    Have you seen any other physician or provider since your last visit? No  Have you had any other diagnostic tests since your last visit? No  Have you been seen in the emergency room and/or had an admission to a hospital since we last saw you? No  Have you had your routine dental cleaning in the past 6 months? no    Have you activated your [employer] account? If not, what are your barriers?  Yes    Patient Care Team:  Vera Liz MD as PCP - General (Internal Medicine)  Riddhi Guzman MD as PCP - [facility] Provider  Abbie Leon as Surgeon (Cardiothoracic Surgery)  Sergey Naidu MD (Cardiology)  Arlette Burkitt, MD as Orthopedic Surgeon (Orthopedic Surgery)  Raj Reagan MD as Surgeon (Orthopedic Surgery)  Ivonne Person MD as Surgeon (Neurosurgery)  Dario Salmeron MD as Consulting Physician (Neurology)  Delicia Ivan MD as Consulting Physician (Pulmonology)    Medical History Review  Past Medical, Family, and Social History reviewed and does not contribute to the patient presenting condition    Health Maintenance   Topic Date Due    COVID-19 Vaccine (1) Never done    Diabetic retinal exam  Never done    Hepatitis B vaccine (1 of 3 - Risk 3-dose series) Never done    Shingles vaccine (1 of 2) Never done    Low dose CT lung screening  12/21/2019    Diabetic foot exam  10/08/2021    Flu vaccine (1) 08/01/2022    Diabetic Alb to Cr ratio (uACR) test  02/25/2023    A1C test (Diabetic or Prediabetic)  07/30/2023    Lipids  07/30/2023    GFR test (Diabetes, CKD 3-4, OR last GFR 15-59)  08/18/2023    Depression Monitoring  08/29/2023    DTaP/Tdap/Td vaccine (2 - Td or Tdap) 11/17/2026    Colorectal Cancer Screen  07/30/2029    Pneumococcal 0-64 years Vaccine  Completed    Hepatitis C screen  Completed    HIV screen  Completed    Hepatitis A vaccine  Aged Out    Hib vaccine  Aged Out    Meningococcal (ACWY) vaccine  Aged Out

## 2023-01-18 RX ORDER — DULOXETIN HYDROCHLORIDE 30 MG/1
30 CAPSULE, DELAYED RELEASE ORAL DAILY
Qty: 30 CAPSULE | Refills: 2 | Status: SHIPPED | OUTPATIENT
Start: 2023-01-18

## 2023-01-20 RX ORDER — DULOXETIN HYDROCHLORIDE 30 MG/1
30 CAPSULE, DELAYED RELEASE ORAL DAILY
Qty: 30 CAPSULE | Refills: 2 | OUTPATIENT
Start: 2023-01-20

## 2023-01-23 ENCOUNTER — HOSPITAL ENCOUNTER (OUTPATIENT)
Age: 58
Discharge: HOME | End: 2023-01-23
Payer: COMMERCIAL

## 2023-01-23 DIAGNOSIS — N18.2 CKD (CHRONIC KIDNEY DISEASE), STAGE II: ICD-10-CM

## 2023-01-23 DIAGNOSIS — D63.1 ANEMIA IN STAGE 2 CHRONIC KIDNEY DISEASE: ICD-10-CM

## 2023-01-23 DIAGNOSIS — I12.9 BENIGN HYPERTENSION WITH CKD (CHRONIC KIDNEY DISEASE), STAGE II: ICD-10-CM

## 2023-01-23 DIAGNOSIS — I77.82 ANCA-ASSOCIATED VASCULITIS: ICD-10-CM

## 2023-01-23 DIAGNOSIS — N04.1 NEPHROTIC SYNDROME WITH FOCAL GLOMERULOSCLEROSIS: ICD-10-CM

## 2023-01-23 DIAGNOSIS — N18.2 BENIGN HYPERTENSION WITH CKD (CHRONIC KIDNEY DISEASE), STAGE II: ICD-10-CM

## 2023-01-23 DIAGNOSIS — N18.2 ANEMIA IN STAGE 2 CHRONIC KIDNEY DISEASE: ICD-10-CM

## 2023-01-23 LAB
ABSOLUTE EOS #: 0.4 K/UL (ref 0–0.4)
ABSOLUTE LYMPH #: 1.3 K/UL (ref 1–4.8)
ABSOLUTE MONO #: 0.4 K/UL (ref 0.1–1.3)
ALBUMIN SERPL-MCNC: 3.9 G/DL (ref 3.5–5.2)
ALP BLD-CCNC: 184 U/L (ref 40–129)
ALT SERPL-CCNC: 8 U/L (ref 5–41)
ANION GAP SERPL CALCULATED.3IONS-SCNC: 8 MMOL/L (ref 9–17)
AST SERPL-CCNC: 12 U/L
BACTERIA: ABNORMAL
BASOPHILS # BLD: 2 % (ref 0–2)
BASOPHILS ABSOLUTE: 0.1 K/UL (ref 0–0.2)
BILIRUB SERPL-MCNC: 0.7 MG/DL (ref 0.3–1.2)
BILIRUBIN URINE: NEGATIVE
BUN BLDV-MCNC: 10 MG/DL (ref 6–20)
CALCIUM SERPL-MCNC: 8.8 MG/DL (ref 8.6–10.4)
CASTS UA: ABNORMAL /LPF
CHLORIDE BLD-SCNC: 103 MMOL/L (ref 98–107)
CO2: 27 MMOL/L (ref 20–31)
COLOR: YELLOW
CREAT SERPL-MCNC: 1.44 MG/DL (ref 0.7–1.2)
EOSINOPHILS RELATIVE PERCENT: 7 % (ref 0–4)
EPITHELIAL CELLS UA: ABNORMAL /HPF
GFR SERPL CREATININE-BSD FRML MDRD: 56 ML/MIN/1.73M2
GLUCOSE BLD-MCNC: 94 MG/DL (ref 70–99)
GLUCOSE URINE: NEGATIVE
HCT VFR BLD CALC: 42.8 % (ref 41–53)
HEMOGLOBIN: 13.9 G/DL (ref 13.5–17.5)
KETONES, URINE: NEGATIVE
LEUKOCYTE ESTERASE, URINE: NEGATIVE
LYMPHOCYTES # BLD: 23 % (ref 24–44)
MAGNESIUM: 1.9 MG/DL (ref 1.6–2.6)
MCH RBC QN AUTO: 27.1 PG (ref 26–34)
MCHC RBC AUTO-ENTMCNC: 32.4 G/DL (ref 31–37)
MCV RBC AUTO: 83.8 FL (ref 80–100)
MONOCYTES # BLD: 8 % (ref 1–7)
NITRITE, URINE: NEGATIVE
PDW BLD-RTO: 21.3 % (ref 11.5–14.9)
PH UA: 6.5 (ref 5–8)
PHOSPHORUS: 3.7 MG/DL (ref 2.5–4.5)
PLATELET # BLD: 180 K/UL (ref 150–450)
PMV BLD AUTO: 7.9 FL (ref 6–12)
POTASSIUM SERPL-SCNC: 4.2 MMOL/L (ref 3.7–5.3)
PROTEIN UA: ABNORMAL
RBC # BLD: 5.11 M/UL (ref 4.5–5.9)
RBC UA: ABNORMAL /HPF
SEG NEUTROPHILS: 60 % (ref 36–66)
SEGMENTED NEUTROPHILS ABSOLUTE COUNT: 3.4 K/UL (ref 1.3–9.1)
SODIUM BLD-SCNC: 138 MMOL/L (ref 135–144)
SPECIFIC GRAVITY UA: 1.01 (ref 1–1.03)
TOTAL PROTEIN: 7.1 G/DL (ref 6.4–8.3)
TURBIDITY: CLEAR
URINE HGB: NEGATIVE
UROBILINOGEN, URINE: NORMAL
WBC # BLD: 5.7 K/UL (ref 3.5–11)
WBC UA: ABNORMAL /HPF

## 2023-01-23 PROCEDURE — 83516 IMMUNOASSAY NONANTIBODY: CPT

## 2023-01-23 PROCEDURE — 81001 URINALYSIS AUTO W/SCOPE: CPT

## 2023-01-23 PROCEDURE — 36415 COLL VENOUS BLD VENIPUNCTURE: CPT

## 2023-01-23 PROCEDURE — 83735 ASSAY OF MAGNESIUM: CPT

## 2023-01-23 PROCEDURE — 84100 ASSAY OF PHOSPHORUS: CPT

## 2023-01-23 PROCEDURE — 80053 COMPREHEN METABOLIC PANEL: CPT

## 2023-01-23 PROCEDURE — 85025 COMPLETE CBC W/AUTO DIFF WBC: CPT

## 2023-01-25 LAB
ANCA MYELOPEROXIDASE: 4.7 AU/ML (ref 0–3.5)
ANCA PROTEINASE 3: <0.7 AU/ML (ref 0–2)

## 2023-01-26 PROBLEM — I05.9 MITRAL VALVE DISORDER: Status: ACTIVE | Noted: 2023-01-26

## 2023-01-26 PROBLEM — I07.9 TRICUSPID VALVE DISORDER: Status: ACTIVE | Noted: 2023-01-26

## 2023-01-27 RX ORDER — ASPIRIN 81 MG/1
TABLET ORAL
Qty: 30 TABLET | Refills: 6 | Status: SHIPPED | OUTPATIENT
Start: 2023-01-27

## 2023-01-27 NOTE — TELEPHONE ENCOUNTER
Keanu Ling pt nurse called stating pt not complaint with night time medications. States pt not taking them, was told to set an alarm for them.

## 2023-03-30 DIAGNOSIS — E11.9 TYPE 2 DIABETES MELLITUS WITHOUT COMPLICATION, WITHOUT LONG-TERM CURRENT USE OF INSULIN (HCC): ICD-10-CM

## 2023-03-30 DIAGNOSIS — I25.810 CORONARY ARTERY DISEASE INVOLVING CORONARY BYPASS GRAFT OF NATIVE HEART WITHOUT ANGINA PECTORIS: ICD-10-CM

## 2023-03-30 RX ORDER — ATORVASTATIN CALCIUM 40 MG/1
TABLET, FILM COATED ORAL
Qty: 30 TABLET | Refills: 3 | Status: SHIPPED | OUTPATIENT
Start: 2023-03-30

## 2023-04-05 RX ORDER — DULOXETIN HYDROCHLORIDE 30 MG/1
30 CAPSULE, DELAYED RELEASE ORAL DAILY
Qty: 30 CAPSULE | Refills: 2 | Status: SHIPPED | OUTPATIENT
Start: 2023-04-05

## 2023-04-25 ENCOUNTER — OFFICE VISIT (OUTPATIENT)
Dept: INTERNAL MEDICINE CLINIC | Age: 58
End: 2023-04-25
Payer: COMMERCIAL

## 2023-04-25 VITALS
HEIGHT: 69 IN | SYSTOLIC BLOOD PRESSURE: 110 MMHG | WEIGHT: 208.8 LBS | HEART RATE: 64 BPM | OXYGEN SATURATION: 97 % | BODY MASS INDEX: 30.93 KG/M2 | DIASTOLIC BLOOD PRESSURE: 60 MMHG

## 2023-04-25 DIAGNOSIS — Z12.2 SCREENING FOR LUNG CANCER: Primary | ICD-10-CM

## 2023-04-25 DIAGNOSIS — Z87.891 PERSONAL HISTORY OF TOBACCO USE: ICD-10-CM

## 2023-04-25 DIAGNOSIS — E11.9 TYPE 2 DIABETES MELLITUS WITHOUT COMPLICATION, WITHOUT LONG-TERM CURRENT USE OF INSULIN (HCC): ICD-10-CM

## 2023-04-25 LAB — HBA1C MFR BLD: 5.2 %

## 2023-04-25 PROCEDURE — 2022F DILAT RTA XM EVC RTNOPTHY: CPT | Performed by: INTERNAL MEDICINE

## 2023-04-25 PROCEDURE — 99214 OFFICE O/P EST MOD 30 MIN: CPT | Performed by: INTERNAL MEDICINE

## 2023-04-25 PROCEDURE — 3017F COLORECTAL CA SCREEN DOC REV: CPT | Performed by: INTERNAL MEDICINE

## 2023-04-25 PROCEDURE — 3078F DIAST BP <80 MM HG: CPT | Performed by: INTERNAL MEDICINE

## 2023-04-25 PROCEDURE — G8427 DOCREV CUR MEDS BY ELIG CLIN: HCPCS | Performed by: INTERNAL MEDICINE

## 2023-04-25 PROCEDURE — 83036 HEMOGLOBIN GLYCOSYLATED A1C: CPT | Performed by: INTERNAL MEDICINE

## 2023-04-25 PROCEDURE — 3044F HG A1C LEVEL LT 7.0%: CPT | Performed by: INTERNAL MEDICINE

## 2023-04-25 PROCEDURE — 3074F SYST BP LT 130 MM HG: CPT | Performed by: INTERNAL MEDICINE

## 2023-04-25 PROCEDURE — G0296 VISIT TO DETERM LDCT ELIG: HCPCS | Performed by: INTERNAL MEDICINE

## 2023-04-25 PROCEDURE — 4004F PT TOBACCO SCREEN RCVD TLK: CPT | Performed by: INTERNAL MEDICINE

## 2023-04-25 PROCEDURE — G8417 CALC BMI ABV UP PARAM F/U: HCPCS | Performed by: INTERNAL MEDICINE

## 2023-04-25 SDOH — ECONOMIC STABILITY: FOOD INSECURITY: WITHIN THE PAST 12 MONTHS, YOU WORRIED THAT YOUR FOOD WOULD RUN OUT BEFORE YOU GOT MONEY TO BUY MORE.: NEVER TRUE

## 2023-04-25 SDOH — ECONOMIC STABILITY: INCOME INSECURITY: HOW HARD IS IT FOR YOU TO PAY FOR THE VERY BASICS LIKE FOOD, HOUSING, MEDICAL CARE, AND HEATING?: NOT HARD AT ALL

## 2023-04-25 SDOH — ECONOMIC STABILITY: FOOD INSECURITY: WITHIN THE PAST 12 MONTHS, THE FOOD YOU BOUGHT JUST DIDN'T LAST AND YOU DIDN'T HAVE MONEY TO GET MORE.: NEVER TRUE

## 2023-04-25 SDOH — ECONOMIC STABILITY: HOUSING INSECURITY
IN THE LAST 12 MONTHS, WAS THERE A TIME WHEN YOU DID NOT HAVE A STEADY PLACE TO SLEEP OR SLEPT IN A SHELTER (INCLUDING NOW)?: NO

## 2023-04-25 NOTE — PROGRESS NOTES
frequency. Musculoskeletal:  Negative for arthralgias, back pain, gait problem and joint swelling. Skin:  Negative for color change, pallor and rash. Neurological:  Negative for dizziness, seizures, syncope, facial asymmetry, light-headedness, numbness and headaches. Psychiatric/Behavioral:  Negative for agitation, behavioral problems, confusion, decreased concentration and dysphoric mood. PHYSICAL EXAM:      Vitals:    04/25/23 1445   BP: 110/60   Pulse: 64   SpO2: 97%   Weight: 208 lb 12.8 oz (94.7 kg)   Height: 5' 9\" (1.753 m)     BP Readings from Last 3 Encounters:   04/25/23 110/60   07/07/22 (!) 128/100   01/26/23 110/80        Physical Exam  Vitals and nursing note reviewed. Constitutional:       General: He is not in acute distress. Appearance: He is well-developed. He is not diaphoretic. HENT:      Head: Normocephalic and atraumatic. Mouth/Throat:      Pharynx: No oropharyngeal exudate. Eyes:      General: No scleral icterus. Right eye: No discharge. Left eye: No discharge. Conjunctiva/sclera: Conjunctivae normal.      Pupils: Pupils are equal, round, and reactive to light. Neck:      Thyroid: No thyromegaly. Vascular: No JVD. Trachea: No tracheal deviation. Cardiovascular:      Rate and Rhythm: Normal rate. Heart sounds: Normal heart sounds. No murmur heard. No gallop. Pulmonary:      Effort: Pulmonary effort is normal. No respiratory distress. Breath sounds: Normal breath sounds. No stridor. No wheezing or rales. Abdominal:      General: Bowel sounds are normal. There is no distension. Palpations: Abdomen is soft. Tenderness: There is no abdominal tenderness. There is no guarding or rebound. Musculoskeletal:         General: No tenderness. Normal range of motion. Cervical back: Normal range of motion and neck supple. Skin:     General: Skin is warm and dry. Findings: No erythema or rash.    Neurological:

## 2023-04-27 ASSESSMENT — ENCOUNTER SYMPTOMS
APNEA: 0
ABDOMINAL DISTENTION: 0
CHEST TIGHTNESS: 0
WHEEZING: 0
FACIAL SWELLING: 0
COLOR CHANGE: 0
BACK PAIN: 0
CONSTIPATION: 0
COUGH: 0
ABDOMINAL PAIN: 0
DIARRHEA: 0
SHORTNESS OF BREATH: 0

## 2023-05-11 RX ORDER — AMLODIPINE BESYLATE 10 MG/1
10 TABLET ORAL DAILY
Qty: 30 TABLET | Refills: 3 | Status: SHIPPED | OUTPATIENT
Start: 2023-05-11

## 2023-05-25 RX ORDER — FAMOTIDINE 20 MG/1
20 TABLET, FILM COATED ORAL 2 TIMES DAILY
Qty: 60 TABLET | Refills: 3 | Status: SHIPPED | OUTPATIENT
Start: 2023-05-25

## 2023-06-21 RX ORDER — DULOXETIN HYDROCHLORIDE 30 MG/1
30 CAPSULE, DELAYED RELEASE ORAL DAILY
Qty: 30 CAPSULE | Refills: 2 | Status: SHIPPED | OUTPATIENT
Start: 2023-06-21

## 2023-06-29 ENCOUNTER — TELEPHONE (OUTPATIENT)
Dept: INTERNAL MEDICINE CLINIC | Age: 58
End: 2023-06-29

## 2023-07-05 ENCOUNTER — HOSPITAL ENCOUNTER (OUTPATIENT)
Age: 58
Discharge: HOME | End: 2023-07-05
Payer: COMMERCIAL

## 2023-07-05 DIAGNOSIS — N04.1 NEPHROTIC SYNDROME WITH FOCAL GLOMERULOSCLEROSIS: ICD-10-CM

## 2023-07-05 DIAGNOSIS — N18.30 BENIGN HYPERTENSION WITH CKD (CHRONIC KIDNEY DISEASE) STAGE III (HCC): ICD-10-CM

## 2023-07-05 DIAGNOSIS — R80.8 OTHER PROTEINURIA: ICD-10-CM

## 2023-07-05 DIAGNOSIS — I12.9 BENIGN HYPERTENSION WITH CKD (CHRONIC KIDNEY DISEASE) STAGE III (HCC): ICD-10-CM

## 2023-07-05 DIAGNOSIS — I77.82 ANCA-ASSOCIATED VASCULITIS (HCC): ICD-10-CM

## 2023-07-05 DIAGNOSIS — N18.31 STAGE 3A CHRONIC KIDNEY DISEASE (HCC): ICD-10-CM

## 2023-07-05 LAB
ALBUMIN SERPL-MCNC: 3.7 G/DL (ref 3.5–5.2)
ALP SERPL-CCNC: 169 U/L (ref 40–129)
ALT SERPL-CCNC: 5 U/L (ref 5–41)
ANION GAP SERPL CALCULATED.3IONS-SCNC: 9 MMOL/L (ref 9–17)
AST SERPL-CCNC: 11 U/L
BACTERIA URNS QL MICRO: ABNORMAL
BASOPHILS # BLD: 0.05 K/UL (ref 0–0.2)
BASOPHILS NFR BLD: 1 % (ref 0–2)
BILIRUB SERPL-MCNC: 0.8 MG/DL (ref 0.3–1.2)
BILIRUB UR QL STRIP: ABNORMAL
BUN SERPL-MCNC: 9 MG/DL (ref 8–23)
CALCIUM SERPL-MCNC: 9.1 MG/DL (ref 8.6–10.4)
CASTS #/AREA URNS LPF: ABNORMAL /LPF
CHLORIDE SERPL-SCNC: 98 MMOL/L (ref 98–107)
CLARITY UR: CLEAR
CO2 SERPL-SCNC: 30 MMOL/L (ref 20–31)
COLOR UR: ABNORMAL
CREAT SERPL-MCNC: 1.37 MG/DL (ref 0.7–1.2)
CRP SERPL HS-MCNC: 14.4 MG/L (ref 0–5)
EOSINOPHIL # BLD: 0.19 K/UL (ref 0–0.4)
EOSINOPHILS RELATIVE PERCENT: 4 % (ref 0–4)
EPI CELLS #/AREA URNS HPF: ABNORMAL /HPF
ERYTHROCYTE [DISTWIDTH] IN BLOOD BY AUTOMATED COUNT: 21.5 % (ref 11.5–14.9)
GFR SERPL CREATININE-BSD FRML MDRD: 59 ML/MIN/1.73M2
GLUCOSE SERPL-MCNC: 94 MG/DL (ref 70–99)
GLUCOSE UR STRIP-MCNC: NEGATIVE MG/DL
HCT VFR BLD AUTO: 45.6 % (ref 41–53)
HGB BLD-MCNC: 14.7 G/DL (ref 13.5–17.5)
HGB UR QL STRIP.AUTO: NEGATIVE
KETONES UR STRIP-MCNC: ABNORMAL MG/DL
LEUKOCYTE ESTERASE UR QL STRIP: ABNORMAL
LYMPHOCYTES # BLD: 19 % (ref 24–44)
LYMPHOCYTES NFR BLD: 0.89 K/UL (ref 1–4.8)
MAGNESIUM SERPL-MCNC: 1.8 MG/DL (ref 1.6–2.6)
MCH RBC QN AUTO: 26.9 PG (ref 26–34)
MCHC RBC AUTO-ENTMCNC: 32.3 G/DL (ref 31–37)
MCV RBC AUTO: 83.3 FL (ref 80–100)
MONOCYTES NFR BLD: 0.28 K/UL (ref 0.1–1.3)
MONOCYTES NFR BLD: 6 % (ref 1–7)
MORPHOLOGY: ABNORMAL
NEUTROPHILS NFR BLD: 70 % (ref 36–66)
NEUTS SEG NFR BLD: 3.29 K/UL (ref 1.3–9.1)
NITRITE UR QL STRIP: NEGATIVE
PH UR STRIP: 6 [PH] (ref 5–8)
PHOSPHATE SERPL-MCNC: 3.3 MG/DL (ref 2.5–4.5)
PLATELET # BLD AUTO: 182 K/UL (ref 150–450)
PMV BLD AUTO: 7.5 FL (ref 6–12)
POTASSIUM SERPL-SCNC: 4.3 MMOL/L (ref 3.7–5.3)
PROT SERPL-MCNC: 7.4 G/DL (ref 6.4–8.3)
PROT UR STRIP-MCNC: ABNORMAL MG/DL
RBC # BLD AUTO: 5.48 M/UL (ref 4.5–5.9)
RBC #/AREA URNS HPF: ABNORMAL /HPF
SODIUM SERPL-SCNC: 137 MMOL/L (ref 135–144)
SP GR UR STRIP: 1.02 (ref 1–1.03)
UROBILINOGEN UR STRIP-ACNC: ABNORMAL
WBC #/AREA URNS HPF: ABNORMAL /HPF
WBC OTHER # BLD: 4.7 K/UL (ref 3.5–11)

## 2023-07-05 PROCEDURE — 84100 ASSAY OF PHOSPHORUS: CPT

## 2023-07-05 PROCEDURE — 83516 IMMUNOASSAY NONANTIBODY: CPT

## 2023-07-05 PROCEDURE — 85027 COMPLETE CBC AUTOMATED: CPT

## 2023-07-05 PROCEDURE — 80053 COMPREHEN METABOLIC PANEL: CPT

## 2023-07-05 PROCEDURE — 86140 C-REACTIVE PROTEIN: CPT

## 2023-07-05 PROCEDURE — 83735 ASSAY OF MAGNESIUM: CPT

## 2023-07-05 PROCEDURE — 81001 URINALYSIS AUTO W/SCOPE: CPT

## 2023-07-05 PROCEDURE — 36415 COLL VENOUS BLD VENIPUNCTURE: CPT

## 2023-07-07 LAB
ANCA MYELOPEROXIDASE: 3.8 AU/ML (ref 0–3.5)
ANCA PROTEINASE 3: <0.7 AU/ML (ref 0–2)

## 2023-08-01 ENCOUNTER — TELEPHONE (OUTPATIENT)
Dept: INTERNAL MEDICINE CLINIC | Age: 58
End: 2023-08-01

## 2023-08-02 ENCOUNTER — OFFICE VISIT (OUTPATIENT)
Dept: INTERNAL MEDICINE CLINIC | Age: 58
End: 2023-08-02
Payer: COMMERCIAL

## 2023-08-02 VITALS
OXYGEN SATURATION: 95 % | WEIGHT: 191 LBS | SYSTOLIC BLOOD PRESSURE: 110 MMHG | BODY MASS INDEX: 28.29 KG/M2 | HEIGHT: 69 IN | DIASTOLIC BLOOD PRESSURE: 74 MMHG | HEART RATE: 67 BPM

## 2023-08-02 DIAGNOSIS — F33.3 SEVERE RECURRENT MAJOR DEPRESSIVE DISORDER WITH PSYCHOTIC FEATURES (HCC): ICD-10-CM

## 2023-08-02 DIAGNOSIS — E11.9 TYPE 2 DIABETES MELLITUS WITHOUT COMPLICATION, WITHOUT LONG-TERM CURRENT USE OF INSULIN (HCC): Primary | ICD-10-CM

## 2023-08-02 DIAGNOSIS — G95.89 OTHER SPECIFIED DISEASES OF SPINAL CORD (HCC): ICD-10-CM

## 2023-08-02 DIAGNOSIS — R21 RASH: ICD-10-CM

## 2023-08-02 DIAGNOSIS — J84.9 INTERSTITIAL LUNG DISEASE (HCC): ICD-10-CM

## 2023-08-02 DIAGNOSIS — I42.9 CARDIOMYOPATHY, UNSPECIFIED TYPE (HCC): ICD-10-CM

## 2023-08-02 DIAGNOSIS — Z99.11 DEPENDENCE ON RESPIRATOR (VENTILATOR) STATUS (HCC): ICD-10-CM

## 2023-08-02 DIAGNOSIS — G81.91 RIGHT HEMIPARESIS (HCC): ICD-10-CM

## 2023-08-02 PROCEDURE — 83037 HB GLYCOSYLATED A1C HOME DEV: CPT | Performed by: INTERNAL MEDICINE

## 2023-08-02 PROCEDURE — 4004F PT TOBACCO SCREEN RCVD TLK: CPT | Performed by: INTERNAL MEDICINE

## 2023-08-02 PROCEDURE — 3074F SYST BP LT 130 MM HG: CPT | Performed by: INTERNAL MEDICINE

## 2023-08-02 PROCEDURE — 3078F DIAST BP <80 MM HG: CPT | Performed by: INTERNAL MEDICINE

## 2023-08-02 PROCEDURE — 2022F DILAT RTA XM EVC RTNOPTHY: CPT | Performed by: INTERNAL MEDICINE

## 2023-08-02 PROCEDURE — 3044F HG A1C LEVEL LT 7.0%: CPT | Performed by: INTERNAL MEDICINE

## 2023-08-02 PROCEDURE — 99214 OFFICE O/P EST MOD 30 MIN: CPT | Performed by: INTERNAL MEDICINE

## 2023-08-02 PROCEDURE — G8427 DOCREV CUR MEDS BY ELIG CLIN: HCPCS | Performed by: INTERNAL MEDICINE

## 2023-08-02 PROCEDURE — G8417 CALC BMI ABV UP PARAM F/U: HCPCS | Performed by: INTERNAL MEDICINE

## 2023-08-02 PROCEDURE — 3017F COLORECTAL CA SCREEN DOC REV: CPT | Performed by: INTERNAL MEDICINE

## 2023-08-02 RX ORDER — DIAPER,BRIEF,INFANT-TODD,DISP
EACH MISCELLANEOUS
Qty: 30 G | Refills: 1 | Status: SHIPPED | OUTPATIENT
Start: 2023-08-02 | End: 2023-08-09

## 2023-08-02 NOTE — PROGRESS NOTES
Subjective:      Patient ID: Lizbet Sow is a 59 y.o. male. HPIPatient has multiple medical problem which include diabetes, heart failure with preserved ejection fraction, CAD s/p CABG , COPD, CKD , S/P AICD , H/o stroke   He still smokes 0.5 PPD   Follows with Nephrologist with positive ANCA , on imuran   No complaints of chest pain, shortness of breath  Does not check his blood sugars regularly  Seen Nephrologist Recently   Did not get CT lung screening with H/o smoking   No complains   Not seen ophthalmologist and Cardiologist in long time   Has rash in both hands for long time , improved with steroid cream requesting refill   Review of Systems   Constitutional:  Positive for fatigue. Negative for activity change, appetite change, chills and diaphoresis. HENT:  Negative for congestion, dental problem, ear discharge, facial swelling and hearing loss. Respiratory:  Negative for apnea, cough, chest tightness, shortness of breath and wheezing. Cardiovascular:  Negative for chest pain and leg swelling. Gastrointestinal:  Negative for abdominal distention, abdominal pain, constipation and diarrhea. Genitourinary:  Negative for difficulty urinating, dysuria, enuresis, flank pain and frequency. Musculoskeletal:  Negative for arthralgias, back pain, gait problem and joint swelling. Skin:  Positive for rash (affecting both hnads). Negative for color change and pallor. Neurological:  Negative for dizziness, seizures, syncope, facial asymmetry, light-headedness, numbness and headaches. Psychiatric/Behavioral:  Negative for agitation, behavioral problems, confusion, decreased concentration and dysphoric mood. Objective:   Physical Exam  Vitals and nursing note reviewed. Constitutional:       General: He is not in acute distress. Appearance: He is well-developed. He is not diaphoretic. HENT:      Head: Normocephalic and atraumatic.       Mouth/Throat:      Pharynx: No oropharyngeal

## 2023-08-03 ASSESSMENT — ENCOUNTER SYMPTOMS
ABDOMINAL PAIN: 0
COLOR CHANGE: 0
CHEST TIGHTNESS: 0
ABDOMINAL DISTENTION: 0
CONSTIPATION: 0
SHORTNESS OF BREATH: 0
FACIAL SWELLING: 0
APNEA: 0
WHEEZING: 0
DIARRHEA: 0
BACK PAIN: 0
COUGH: 0

## 2023-08-16 DIAGNOSIS — E11.9 TYPE 2 DIABETES MELLITUS WITHOUT COMPLICATION, WITHOUT LONG-TERM CURRENT USE OF INSULIN (HCC): ICD-10-CM

## 2023-08-16 DIAGNOSIS — I25.810 CORONARY ARTERY DISEASE INVOLVING CORONARY BYPASS GRAFT OF NATIVE HEART WITHOUT ANGINA PECTORIS: ICD-10-CM

## 2023-08-16 RX ORDER — ATORVASTATIN CALCIUM 40 MG/1
TABLET, FILM COATED ORAL
Qty: 30 TABLET | Refills: 3 | Status: SHIPPED | OUTPATIENT
Start: 2023-08-16

## 2023-08-16 RX ORDER — ALBUTEROL SULFATE 90 UG/1
AEROSOL, METERED RESPIRATORY (INHALATION)
Qty: 18 G | Refills: 3 | OUTPATIENT
Start: 2023-08-16

## 2023-09-13 ENCOUNTER — HOSPITAL ENCOUNTER (INPATIENT)
Age: 58
LOS: 6 days | Discharge: HOME | DRG: 198 | End: 2023-09-19
Attending: INTERNAL MEDICINE | Admitting: INTERNAL MEDICINE
Payer: COMMERCIAL

## 2023-09-13 DIAGNOSIS — I20.0 UNSTABLE ANGINA (HCC): ICD-10-CM

## 2023-09-13 DIAGNOSIS — D69.6 THROMBOCYTOPENIA (HCC): Primary | ICD-10-CM

## 2023-09-13 LAB — GLUCOSE BLD-MCNC: 130 MG/DL (ref 75–110)

## 2023-09-13 PROCEDURE — 2060000000 HC ICU INTERMEDIATE R&B

## 2023-09-13 PROCEDURE — 82947 ASSAY GLUCOSE BLOOD QUANT: CPT

## 2023-09-13 PROCEDURE — 6370000000 HC RX 637 (ALT 250 FOR IP): Performed by: STUDENT IN AN ORGANIZED HEALTH CARE EDUCATION/TRAINING PROGRAM

## 2023-09-13 PROCEDURE — 6360000002 HC RX W HCPCS: Performed by: STUDENT IN AN ORGANIZED HEALTH CARE EDUCATION/TRAINING PROGRAM

## 2023-09-13 PROCEDURE — 2580000003 HC RX 258: Performed by: STUDENT IN AN ORGANIZED HEALTH CARE EDUCATION/TRAINING PROGRAM

## 2023-09-13 RX ORDER — ASPIRIN 81 MG/1
81 TABLET ORAL DAILY
Status: DISCONTINUED | OUTPATIENT
Start: 2023-09-14 | End: 2023-09-19 | Stop reason: HOSPADM

## 2023-09-13 RX ORDER — ATORVASTATIN CALCIUM 40 MG/1
40 TABLET, FILM COATED ORAL NIGHTLY
Status: DISCONTINUED | OUTPATIENT
Start: 2023-09-13 | End: 2023-09-19 | Stop reason: HOSPADM

## 2023-09-13 RX ORDER — ACETAMINOPHEN 325 MG/1
650 TABLET ORAL EVERY 6 HOURS PRN
Status: DISCONTINUED | OUTPATIENT
Start: 2023-09-13 | End: 2023-09-19 | Stop reason: HOSPADM

## 2023-09-13 RX ORDER — SODIUM CHLORIDE 0.9 % (FLUSH) 0.9 %
10 SYRINGE (ML) INJECTION PRN
Status: DISCONTINUED | OUTPATIENT
Start: 2023-09-13 | End: 2023-09-19 | Stop reason: HOSPADM

## 2023-09-13 RX ORDER — SODIUM CHLORIDE 0.9 % (FLUSH) 0.9 %
10 SYRINGE (ML) INJECTION EVERY 12 HOURS SCHEDULED
Status: DISCONTINUED | OUTPATIENT
Start: 2023-09-13 | End: 2023-09-19 | Stop reason: HOSPADM

## 2023-09-13 RX ORDER — ENOXAPARIN SODIUM 100 MG/ML
40 INJECTION SUBCUTANEOUS DAILY
Status: DISCONTINUED | OUTPATIENT
Start: 2023-09-13 | End: 2023-09-17

## 2023-09-13 RX ORDER — ONDANSETRON 4 MG/1
4 TABLET, ORALLY DISINTEGRATING ORAL EVERY 8 HOURS PRN
Status: DISCONTINUED | OUTPATIENT
Start: 2023-09-13 | End: 2023-09-19 | Stop reason: HOSPADM

## 2023-09-13 RX ORDER — DULOXETIN HYDROCHLORIDE 30 MG/1
30 CAPSULE, DELAYED RELEASE ORAL DAILY
Status: DISCONTINUED | OUTPATIENT
Start: 2023-09-14 | End: 2023-09-19 | Stop reason: HOSPADM

## 2023-09-13 RX ORDER — MECLIZINE HCL 12.5 MG/1
12.5 TABLET ORAL 3 TIMES DAILY PRN
Status: DISCONTINUED | OUTPATIENT
Start: 2023-09-13 | End: 2023-09-19 | Stop reason: HOSPADM

## 2023-09-13 RX ORDER — POLYETHYLENE GLYCOL 3350 17 G/17G
17 POWDER, FOR SOLUTION ORAL DAILY PRN
Status: DISCONTINUED | OUTPATIENT
Start: 2023-09-13 | End: 2023-09-19 | Stop reason: HOSPADM

## 2023-09-13 RX ORDER — ALBUTEROL SULFATE 90 UG/1
2 AEROSOL, METERED RESPIRATORY (INHALATION) EVERY 4 HOURS PRN
Status: DISCONTINUED | OUTPATIENT
Start: 2023-09-13 | End: 2023-09-19 | Stop reason: HOSPADM

## 2023-09-13 RX ORDER — NITROGLYCERIN 0.4 MG/1
0.4 TABLET SUBLINGUAL EVERY 5 MIN PRN
Status: DISCONTINUED | OUTPATIENT
Start: 2023-09-13 | End: 2023-09-19 | Stop reason: HOSPADM

## 2023-09-13 RX ORDER — SODIUM CHLORIDE 9 MG/ML
INJECTION, SOLUTION INTRAVENOUS PRN
Status: DISCONTINUED | OUTPATIENT
Start: 2023-09-13 | End: 2023-09-19 | Stop reason: HOSPADM

## 2023-09-13 RX ORDER — AMLODIPINE BESYLATE 10 MG/1
10 TABLET ORAL DAILY
Status: DISCONTINUED | OUTPATIENT
Start: 2023-09-14 | End: 2023-09-19 | Stop reason: HOSPADM

## 2023-09-13 RX ORDER — ISOSORBIDE MONONITRATE 30 MG/1
30 TABLET, EXTENDED RELEASE ORAL DAILY
Status: DISCONTINUED | OUTPATIENT
Start: 2023-09-14 | End: 2023-09-19 | Stop reason: HOSPADM

## 2023-09-13 RX ORDER — LANOLIN ALCOHOL/MO/W.PET/CERES
6 CREAM (GRAM) TOPICAL NIGHTLY
Status: DISCONTINUED | OUTPATIENT
Start: 2023-09-13 | End: 2023-09-19 | Stop reason: HOSPADM

## 2023-09-13 RX ORDER — ACETAMINOPHEN 650 MG/1
650 SUPPOSITORY RECTAL EVERY 6 HOURS PRN
Status: DISCONTINUED | OUTPATIENT
Start: 2023-09-13 | End: 2023-09-19 | Stop reason: HOSPADM

## 2023-09-13 RX ORDER — CLOPIDOGREL BISULFATE 75 MG/1
75 TABLET ORAL DAILY
Status: DISCONTINUED | OUTPATIENT
Start: 2023-09-14 | End: 2023-09-19 | Stop reason: HOSPADM

## 2023-09-13 RX ORDER — ONDANSETRON 2 MG/ML
4 INJECTION INTRAMUSCULAR; INTRAVENOUS EVERY 6 HOURS PRN
Status: DISCONTINUED | OUTPATIENT
Start: 2023-09-13 | End: 2023-09-19 | Stop reason: HOSPADM

## 2023-09-13 RX ADMIN — SODIUM CHLORIDE, PRESERVATIVE FREE 10 ML: 5 INJECTION INTRAVENOUS at 22:29

## 2023-09-13 RX ADMIN — Medication 6 MG: at 22:26

## 2023-09-13 RX ADMIN — DESMOPRESSIN ACETATE 40 MG: 0.2 TABLET ORAL at 22:26

## 2023-09-13 RX ADMIN — ENOXAPARIN SODIUM 40 MG: 100 INJECTION SUBCUTANEOUS at 22:28

## 2023-09-14 PROBLEM — R94.39 POSITIVE CARDIAC STRESS TEST: Status: ACTIVE | Noted: 2023-09-14

## 2023-09-14 LAB
ANION GAP SERPL CALCULATED.3IONS-SCNC: 7 MMOL/L (ref 9–17)
BASOPHILS # BLD: 0 K/UL (ref 0–0.2)
BASOPHILS NFR BLD: 0 % (ref 0–2)
BUN SERPL-MCNC: 14 MG/DL (ref 8–23)
CALCIUM SERPL-MCNC: 8.1 MG/DL (ref 8.6–10.4)
CHLORIDE SERPL-SCNC: 103 MMOL/L (ref 98–107)
CO2 SERPL-SCNC: 24 MMOL/L (ref 20–31)
CREAT SERPL-MCNC: 0.9 MG/DL (ref 0.7–1.2)
EOSINOPHIL # BLD: 0.07 K/UL (ref 0–0.4)
EOSINOPHILS RELATIVE PERCENT: 3 % (ref 1–4)
ERYTHROCYTE [DISTWIDTH] IN BLOOD BY AUTOMATED COUNT: 23.2 % (ref 11.8–14.4)
GFR SERPL CREATININE-BSD FRML MDRD: >60 ML/MIN/1.73M2
GLUCOSE SERPL-MCNC: 90 MG/DL (ref 70–99)
HCT VFR BLD AUTO: 43.3 % (ref 40.7–50.3)
HGB BLD-MCNC: 13.7 G/DL (ref 13–17)
IMM GRANULOCYTES # BLD AUTO: 0 K/UL (ref 0–0.3)
IMM GRANULOCYTES NFR BLD: 0 %
LYMPHOCYTES NFR BLD: 0.48 K/UL (ref 1–4.8)
LYMPHOCYTES RELATIVE PERCENT: 20 % (ref 24–44)
MCH RBC QN AUTO: 30.5 PG (ref 25.2–33.5)
MCHC RBC AUTO-ENTMCNC: 31.6 G/DL (ref 28.4–34.8)
MCV RBC AUTO: 96.4 FL (ref 82.6–102.9)
MONOCYTES NFR BLD: 0.19 K/UL (ref 0.1–0.8)
MONOCYTES NFR BLD: 8 % (ref 1–7)
MORPHOLOGY: ABNORMAL
NEUTROPHILS NFR BLD: 69 % (ref 36–66)
NEUTS SEG NFR BLD: 1.66 K/UL (ref 1.8–7.7)
NRBC BLD-RTO: 0 PER 100 WBC
PLATELET # BLD AUTO: ABNORMAL K/UL (ref 138–453)
PLATELET, FLUORESCENCE: 74 K/UL (ref 138–453)
PLATELETS.RETICULATED NFR BLD AUTO: 6.4 % (ref 1.1–10.3)
POTASSIUM SERPL-SCNC: 4.2 MMOL/L (ref 3.7–5.3)
RBC # BLD AUTO: 4.49 M/UL (ref 4.21–5.77)
SODIUM SERPL-SCNC: 134 MMOL/L (ref 135–144)
WBC OTHER # BLD: 2.4 K/UL (ref 3.5–11.3)

## 2023-09-14 PROCEDURE — 99232 SBSQ HOSP IP/OBS MODERATE 35: CPT | Performed by: SURGERY

## 2023-09-14 PROCEDURE — 85025 COMPLETE CBC W/AUTO DIFF WBC: CPT

## 2023-09-14 PROCEDURE — 97535 SELF CARE MNGMENT TRAINING: CPT

## 2023-09-14 PROCEDURE — 80048 BASIC METABOLIC PNL TOTAL CA: CPT

## 2023-09-14 PROCEDURE — 97166 OT EVAL MOD COMPLEX 45 MIN: CPT

## 2023-09-14 PROCEDURE — 6370000000 HC RX 637 (ALT 250 FOR IP): Performed by: STUDENT IN AN ORGANIZED HEALTH CARE EDUCATION/TRAINING PROGRAM

## 2023-09-14 PROCEDURE — 99255 IP/OBS CONSLTJ NEW/EST HI 80: CPT | Performed by: INTERNAL MEDICINE

## 2023-09-14 PROCEDURE — 2060000000 HC ICU INTERMEDIATE R&B

## 2023-09-14 PROCEDURE — 2580000003 HC RX 258: Performed by: STUDENT IN AN ORGANIZED HEALTH CARE EDUCATION/TRAINING PROGRAM

## 2023-09-14 PROCEDURE — 36415 COLL VENOUS BLD VENIPUNCTURE: CPT

## 2023-09-14 PROCEDURE — 6360000002 HC RX W HCPCS: Performed by: STUDENT IN AN ORGANIZED HEALTH CARE EDUCATION/TRAINING PROGRAM

## 2023-09-14 PROCEDURE — 85055 RETICULATED PLATELET ASSAY: CPT

## 2023-09-14 RX ADMIN — Medication 6 MG: at 20:37

## 2023-09-14 RX ADMIN — DULOXETINE HYDROCHLORIDE 30 MG: 30 CAPSULE, DELAYED RELEASE ORAL at 08:21

## 2023-09-14 RX ADMIN — SODIUM CHLORIDE, PRESERVATIVE FREE 10 ML: 5 INJECTION INTRAVENOUS at 08:21

## 2023-09-14 RX ADMIN — Medication 81 MG: at 08:21

## 2023-09-14 RX ADMIN — DESMOPRESSIN ACETATE 40 MG: 0.2 TABLET ORAL at 20:38

## 2023-09-14 RX ADMIN — SODIUM CHLORIDE, PRESERVATIVE FREE 10 ML: 5 INJECTION INTRAVENOUS at 20:40
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
## 2023-09-14 NOTE — H&P
elements of all parts of the encounter have been performed by me. I agree with the assessment, plan and orders as documented by the fellow/resident, after I modified exam findings and plan of treatments, and the final version is my approved version of the assessment. Additional Comments:   Syncope. H/O CAD and CABG. No chest pain or SOB. Positive stress test for lateral wall ischemia. Consult vascular for aortic thrombus. Check orthostatics. Device check. Plan for cath.

## 2023-09-15 PROBLEM — T45.1X5A CHEMOTHERAPY-INDUCED NEUTROPENIA (HCC): Status: ACTIVE | Noted: 2023-09-15

## 2023-09-15 PROBLEM — D70.1 CHEMOTHERAPY-INDUCED NEUTROPENIA (HCC): Status: ACTIVE | Noted: 2023-09-15

## 2023-09-15 PROBLEM — D69.6 THROMBOCYTOPENIA (HCC): Status: ACTIVE | Noted: 2023-09-15

## 2023-09-15 PROCEDURE — 99254 IP/OBS CNSLTJ NEW/EST MOD 60: CPT | Performed by: INTERNAL MEDICINE

## 2023-09-15 PROCEDURE — 97162 PT EVAL MOD COMPLEX 30 MIN: CPT

## 2023-09-15 PROCEDURE — 6370000000 HC RX 637 (ALT 250 FOR IP): Performed by: STUDENT IN AN ORGANIZED HEALTH CARE EDUCATION/TRAINING PROGRAM

## 2023-09-15 PROCEDURE — 97116 GAIT TRAINING THERAPY: CPT

## 2023-09-15 PROCEDURE — 2580000003 HC RX 258: Performed by: STUDENT IN AN ORGANIZED HEALTH CARE EDUCATION/TRAINING PROGRAM

## 2023-09-15 PROCEDURE — 99233 SBSQ HOSP IP/OBS HIGH 50: CPT | Performed by: NURSE PRACTITIONER

## 2023-09-15 PROCEDURE — 2060000000 HC ICU INTERMEDIATE R&B

## 2023-09-15 RX ADMIN — SODIUM CHLORIDE, PRESERVATIVE FREE 10 ML: 5 INJECTION INTRAVENOUS at 09:07

## 2023-09-15 RX ADMIN — Medication 6 MG: at 20:00

## 2023-09-15 RX ADMIN — ACETAMINOPHEN 650 MG: 325 TABLET ORAL at 19:59

## 2023-09-15 RX ADMIN — DULOXETINE HYDROCHLORIDE 30 MG: 30 CAPSULE, DELAYED RELEASE ORAL at 09:05

## 2023-09-15 RX ADMIN — DESMOPRESSIN ACETATE 40 MG: 0.2 TABLET ORAL at 19:59

## 2023-09-15 RX ADMIN — ISOSORBIDE MONONITRATE 30 MG: 30 TABLET, EXTENDED RELEASE ORAL at 09:05

## 2023-09-15 RX ADMIN — AMLODIPINE BESYLATE 10 MG: 10 TABLET ORAL at 09:05

## 2023-09-15 RX ADMIN — Medication 81 MG: at 09:05

## 2023-09-15 RX ADMIN — CLOPIDOGREL BISULFATE 75 MG: 75 TABLET, FILM COATED ORAL at 09:05

## 2023-09-15 RX ADMIN — SODIUM CHLORIDE, PRESERVATIVE FREE 10 ML: 5 INJECTION INTRAVENOUS at 20:00

## 2023-09-15 ASSESSMENT — PAIN SCALES - GENERAL: PAINLEVEL_OUTOF10: 2

## 2023-09-15 NOTE — PLAN OF CARE
Problem: Discharge Planning  Goal: Discharge to home or other facility with appropriate resources  9/15/2023 0023 by Leslye Garay RN  Outcome: Progressing  9/14/2023 1613 by Jevon Vaughan RN  Outcome: Progressing     Problem: Skin/Tissue Integrity  Goal: Absence of new skin breakdown  Description: 1. Monitor for areas of redness and/or skin breakdown  2. Assess vascular access sites hourly  3. Every 4-6 hours minimum:  Change oxygen saturation probe site  4. Every 4-6 hours:  If on nasal continuous positive airway pressure, respiratory therapy assess nares and determine need for appliance change or resting period.   9/15/2023 0023 by Leslye Garay RN  Outcome: Progressing  9/14/2023 1613 by Jevon Vaughan RN  Outcome: Progressing     Problem: Safety - Adult  Goal: Free from fall injury  9/15/2023 0023 by Leslye Garay RN  Outcome: Progressing  9/14/2023 1613 by Jevon Vaughan RN  Outcome: Progressing     Problem: Chronic Conditions and Co-morbidities  Goal: Patient's chronic conditions and co-morbidity symptoms are monitored and maintained or improved  9/15/2023 0023 by Leslye Garay RN  Outcome: Progressing  9/14/2023 1613 by Jevon Vaughan RN  Outcome: Progressing     Problem: Cardiovascular - Adult  Goal: Maintains optimal cardiac output and hemodynamic stability  Outcome: Progressing  Goal: Absence of cardiac dysrhythmias or at baseline  Outcome: Progressing

## 2023-09-16 LAB
BASOPHILS # BLD: 0 K/UL (ref 0–0.2)
BASOPHILS NFR BLD: 0 % (ref 0–2)
EOSINOPHIL # BLD: 0.02 K/UL (ref 0–0.4)
EOSINOPHILS RELATIVE PERCENT: 1 % (ref 1–4)
ERYTHROCYTE [DISTWIDTH] IN BLOOD BY AUTOMATED COUNT: 22.8 % (ref 11.8–14.4)
HCT VFR BLD AUTO: 44.7 % (ref 40.7–50.3)
HGB BLD-MCNC: 14.6 G/DL (ref 13–17)
IMM GRANULOCYTES # BLD AUTO: 0.03 K/UL (ref 0–0.3)
IMM GRANULOCYTES NFR BLD: 2 %
LYMPHOCYTES NFR BLD: 0.77 K/UL (ref 1–4.8)
LYMPHOCYTES RELATIVE PERCENT: 45 % (ref 24–44)
MCH RBC QN AUTO: 30.4 PG (ref 25.2–33.5)
MCHC RBC AUTO-ENTMCNC: 32.7 G/DL (ref 28.4–34.8)
MCV RBC AUTO: 92.9 FL (ref 82.6–102.9)
MONOCYTES NFR BLD: 0.22 K/UL (ref 0.1–0.8)
MONOCYTES NFR BLD: 13 % (ref 1–7)
MORPHOLOGY: ABNORMAL
NEUTROPHILS NFR BLD: 39 % (ref 36–66)
NEUTS SEG NFR BLD: 0.66 K/UL (ref 1.8–7.7)
NRBC BLD-RTO: 0 PER 100 WBC
PLATELET # BLD AUTO: 96 K/UL (ref 138–453)
PMV BLD AUTO: 10 FL (ref 8.1–13.5)
RBC # BLD AUTO: 4.81 M/UL (ref 4.21–5.77)
WBC OTHER # BLD: 1.7 K/UL (ref 3.5–11.3)

## 2023-09-16 PROCEDURE — 36415 COLL VENOUS BLD VENIPUNCTURE: CPT

## 2023-09-16 PROCEDURE — 85025 COMPLETE CBC W/AUTO DIFF WBC: CPT

## 2023-09-16 PROCEDURE — 6370000000 HC RX 637 (ALT 250 FOR IP): Performed by: STUDENT IN AN ORGANIZED HEALTH CARE EDUCATION/TRAINING PROGRAM

## 2023-09-16 PROCEDURE — 99232 SBSQ HOSP IP/OBS MODERATE 35: CPT | Performed by: INTERNAL MEDICINE

## 2023-09-16 PROCEDURE — 99233 SBSQ HOSP IP/OBS HIGH 50: CPT | Performed by: SURGERY

## 2023-09-16 PROCEDURE — 6370000000 HC RX 637 (ALT 250 FOR IP): Performed by: INTERNAL MEDICINE

## 2023-09-16 PROCEDURE — 2580000003 HC RX 258: Performed by: STUDENT IN AN ORGANIZED HEALTH CARE EDUCATION/TRAINING PROGRAM

## 2023-09-16 PROCEDURE — 6360000002 HC RX W HCPCS: Performed by: STUDENT IN AN ORGANIZED HEALTH CARE EDUCATION/TRAINING PROGRAM

## 2023-09-16 PROCEDURE — 51798 US URINE CAPACITY MEASURE: CPT

## 2023-09-16 PROCEDURE — 2060000000 HC ICU INTERMEDIATE R&B

## 2023-09-16 RX ORDER — SODIUM CHLORIDE 9 MG/ML
INJECTION, SOLUTION INTRAVENOUS CONTINUOUS
Status: DISCONTINUED | OUTPATIENT
Start: 2023-09-16 | End: 2023-09-16

## 2023-09-16 RX ORDER — PREDNISONE 10 MG/1
10 TABLET ORAL 2 TIMES DAILY
Status: DISCONTINUED | OUTPATIENT
Start: 2023-09-16 | End: 2023-09-19 | Stop reason: HOSPADM

## 2023-09-16 RX ADMIN — Medication 6 MG: at 20:20

## 2023-09-16 RX ADMIN — ISOSORBIDE MONONITRATE 30 MG: 30 TABLET, EXTENDED RELEASE ORAL at 09:50

## 2023-09-16 RX ADMIN — AMLODIPINE BESYLATE 10 MG: 10 TABLET ORAL at 09:51

## 2023-09-16 RX ADMIN — SODIUM CHLORIDE, PRESERVATIVE FREE 10 ML: 5 INJECTION INTRAVENOUS at 20:21

## 2023-09-16 RX ADMIN — DESMOPRESSIN ACETATE 40 MG: 0.2 TABLET ORAL at 20:20

## 2023-09-16 RX ADMIN — PREDNISONE 10 MG: 10 TABLET ORAL at 20:23

## 2023-09-16 RX ADMIN — DULOXETINE HYDROCHLORIDE 30 MG: 30 CAPSULE, DELAYED RELEASE ORAL at 09:51

## 2023-09-16 RX ADMIN — SODIUM CHLORIDE: 9 INJECTION, SOLUTION INTRAVENOUS at 05:38

## 2023-09-16 RX ADMIN — CLOPIDOGREL BISULFATE 75 MG: 75 TABLET, FILM COATED ORAL at 09:49

## 2023-09-16 RX ADMIN — Medication 81 MG: at 09:52

## 2023-09-16 ASSESSMENT — PAIN SCALES - GENERAL
PAINLEVEL_OUTOF10: 0
PAINLEVEL_OUTOF10: 0

## 2023-09-16 NOTE — PLAN OF CARE
Problem: Discharge Planning  Goal: Discharge to home or other facility with appropriate resources  Outcome: Progressing     Problem: Skin/Tissue Integrity  Goal: Absence of new skin breakdown  Description: 1. Monitor for areas of redness and/or skin breakdown  2. Assess vascular access sites hourly  3. Every 4-6 hours minimum:  Change oxygen saturation probe site  4. Every 4-6 hours:  If on nasal continuous positive airway pressure, respiratory therapy assess nares and determine need for appliance change or resting period.   Outcome: Progressing     Problem: Safety - Adult  Goal: Free from fall injury  Outcome: Progressing     Problem: Chronic Conditions and Co-morbidities  Goal: Patient's chronic conditions and co-morbidity symptoms are monitored and maintained or improved  Outcome: Progressing     Problem: Cardiovascular - Adult  Goal: Maintains optimal cardiac output and hemodynamic stability  Outcome: Progressing No
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
## 2023-09-16 NOTE — PLAN OF CARE
Problem: Discharge Planning  Goal: Discharge to home or other facility with appropriate resources  Outcome: Progressing     Problem: Skin/Tissue Integrity  Goal: Absence of new skin breakdown  Description: 1. Monitor for areas of redness and/or skin breakdown  2. Assess vascular access sites hourly  3. Every 4-6 hours minimum:  Change oxygen saturation probe site  4. Every 4-6 hours:  If on nasal continuous positive airway pressure, respiratory therapy assess nares and determine need for appliance change or resting period.   Outcome: Progressing     Problem: Safety - Adult  Goal: Free from fall injury  Outcome: Progressing     Problem: Chronic Conditions and Co-morbidities  Goal: Patient's chronic conditions and co-morbidity symptoms are monitored and maintained or improved  Outcome: Progressing     Problem: Cardiovascular - Adult  Goal: Maintains optimal cardiac output and hemodynamic stability  Outcome: Progressing  Goal: Absence of cardiac dysrhythmias or at baseline  Outcome: Progressing     Problem: Pain  Goal: Verbalizes/displays adequate comfort level or baseline comfort level  Outcome: Progressing

## 2023-09-17 LAB
ANION GAP SERPL CALCULATED.3IONS-SCNC: 11 MMOL/L (ref 9–17)
BASOPHILS # BLD: 0 K/UL (ref 0–0.2)
BASOPHILS NFR BLD: 0 % (ref 0–2)
BUN SERPL-MCNC: 7 MG/DL (ref 8–23)
CALCIUM SERPL-MCNC: 8 MG/DL (ref 8.6–10.4)
CHLORIDE SERPL-SCNC: 99 MMOL/L (ref 98–107)
CO2 SERPL-SCNC: 22 MMOL/L (ref 20–31)
CREAT SERPL-MCNC: 0.8 MG/DL (ref 0.7–1.2)
EOSINOPHIL # BLD: 0 K/UL (ref 0–0.4)
EOSINOPHILS RELATIVE PERCENT: 0 % (ref 1–4)
ERYTHROCYTE [DISTWIDTH] IN BLOOD BY AUTOMATED COUNT: 22.3 % (ref 11.8–14.4)
GFR SERPL CREATININE-BSD FRML MDRD: >60 ML/MIN/1.73M2
GLUCOSE SERPL-MCNC: 189 MG/DL (ref 70–99)
HCT VFR BLD AUTO: 45.6 % (ref 40.7–50.3)
HGB BLD-MCNC: 14.9 G/DL (ref 13–17)
IMM GRANULOCYTES # BLD AUTO: 0 K/UL (ref 0–0.3)
IMM GRANULOCYTES NFR BLD: 0 %
LYMPHOCYTES NFR BLD: 0.23 K/UL (ref 1–4.8)
LYMPHOCYTES RELATIVE PERCENT: 18 % (ref 24–44)
MCH RBC QN AUTO: 30.7 PG (ref 25.2–33.5)
MCHC RBC AUTO-ENTMCNC: 32.7 G/DL (ref 28.4–34.8)
MCV RBC AUTO: 94 FL (ref 82.6–102.9)
MONOCYTES NFR BLD: 0.05 K/UL (ref 0.1–0.8)
MONOCYTES NFR BLD: 4 % (ref 1–7)
MORPHOLOGY: ABNORMAL
NEUTROPHILS NFR BLD: 78 % (ref 36–66)
NEUTS SEG NFR BLD: 1.02 K/UL (ref 1.8–7.7)
NRBC BLD-RTO: 0 PER 100 WBC
PLATELET # BLD AUTO: ABNORMAL K/UL (ref 138–453)
PLATELET, FLUORESCENCE: 210 K/UL (ref 138–453)
PLATELETS.RETICULATED NFR BLD AUTO: 5.4 % (ref 1.1–10.3)
POTASSIUM SERPL-SCNC: 4.3 MMOL/L (ref 3.7–5.3)
RBC # BLD AUTO: 4.85 M/UL (ref 4.21–5.77)
SODIUM SERPL-SCNC: 132 MMOL/L (ref 135–144)
WBC OTHER # BLD: 1.3 K/UL (ref 3.5–11.3)

## 2023-09-17 PROCEDURE — 99232 SBSQ HOSP IP/OBS MODERATE 35: CPT | Performed by: INTERNAL MEDICINE

## 2023-09-17 PROCEDURE — 6370000000 HC RX 637 (ALT 250 FOR IP): Performed by: STUDENT IN AN ORGANIZED HEALTH CARE EDUCATION/TRAINING PROGRAM

## 2023-09-17 PROCEDURE — 36415 COLL VENOUS BLD VENIPUNCTURE: CPT

## 2023-09-17 PROCEDURE — 99233 SBSQ HOSP IP/OBS HIGH 50: CPT | Performed by: SURGERY

## 2023-09-17 PROCEDURE — 80048 BASIC METABOLIC PNL TOTAL CA: CPT

## 2023-09-17 PROCEDURE — 6370000000 HC RX 637 (ALT 250 FOR IP): Performed by: INTERNAL MEDICINE

## 2023-09-17 PROCEDURE — 2060000000 HC ICU INTERMEDIATE R&B

## 2023-09-17 PROCEDURE — 85055 RETICULATED PLATELET ASSAY: CPT

## 2023-09-17 PROCEDURE — 6360000002 HC RX W HCPCS: Performed by: STUDENT IN AN ORGANIZED HEALTH CARE EDUCATION/TRAINING PROGRAM

## 2023-09-17 PROCEDURE — 85025 COMPLETE CBC W/AUTO DIFF WBC: CPT

## 2023-09-17 PROCEDURE — 2580000003 HC RX 258: Performed by: STUDENT IN AN ORGANIZED HEALTH CARE EDUCATION/TRAINING PROGRAM

## 2023-09-17 RX ORDER — FAMOTIDINE 20 MG/1
20 TABLET, FILM COATED ORAL 2 TIMES DAILY
Qty: 60 TABLET | Refills: 3 | Status: SHIPPED | OUTPATIENT
Start: 2023-09-17

## 2023-09-17 RX ORDER — ASPIRIN 81 MG/1
81 TABLET, DELAYED RELEASE ORAL DAILY
Qty: 30 TABLET | Refills: 6 | Status: SHIPPED | OUTPATIENT
Start: 2023-09-17

## 2023-09-17 RX ORDER — MORPHINE SULFATE 2 MG/ML
2 INJECTION, SOLUTION INTRAMUSCULAR; INTRAVENOUS EVERY 4 HOURS PRN
Status: DISCONTINUED | OUTPATIENT
Start: 2023-09-17 | End: 2023-09-19 | Stop reason: HOSPADM

## 2023-09-17 RX ORDER — AMLODIPINE BESYLATE 10 MG/1
10 TABLET ORAL DAILY
Qty: 30 TABLET | Refills: 3 | Status: SHIPPED | OUTPATIENT
Start: 2023-09-17

## 2023-09-17 RX ADMIN — ISOSORBIDE MONONITRATE 30 MG: 30 TABLET, EXTENDED RELEASE ORAL at 09:01

## 2023-09-17 RX ADMIN — PREDNISONE 10 MG: 10 TABLET ORAL at 20:35

## 2023-09-17 RX ADMIN — AMLODIPINE BESYLATE 10 MG: 10 TABLET ORAL at 09:02

## 2023-09-17 RX ADMIN — Medication 81 MG: at 09:02

## 2023-09-17 RX ADMIN — PREDNISONE 10 MG: 10 TABLET ORAL at 09:02

## 2023-09-17 RX ADMIN — SODIUM CHLORIDE, PRESERVATIVE FREE 10 ML: 5 INJECTION INTRAVENOUS at 09:02

## 2023-09-17 RX ADMIN — DULOXETINE HYDROCHLORIDE 30 MG: 30 CAPSULE, DELAYED RELEASE ORAL at 09:01

## 2023-09-17 RX ADMIN — DESMOPRESSIN ACETATE 40 MG: 0.2 TABLET ORAL at 20:35

## 2023-09-17 RX ADMIN — SODIUM CHLORIDE, PRESERVATIVE FREE 10 ML: 5 INJECTION INTRAVENOUS at 20:36

## 2023-09-17 RX ADMIN — Medication 6 MG: at 20:35

## 2023-09-17 RX ADMIN — ENOXAPARIN SODIUM 40 MG: 100 INJECTION SUBCUTANEOUS at 09:02

## 2023-09-17 RX ADMIN — CLOPIDOGREL BISULFATE 75 MG: 75 TABLET, FILM COATED ORAL at 09:01

## 2023-09-17 NOTE — PLAN OF CARE
Problem: Discharge Planning  Goal: Discharge to home or other facility with appropriate resources  9/16/2023 2122 by Celestine Huber RN  Outcome: Progressing  9/16/2023 1849 by Ghazala Miranda RN  Outcome: Progressing     Problem: Skin/Tissue Integrity  Goal: Absence of new skin breakdown  Description: 1. Monitor for areas of redness and/or skin breakdown  2. Assess vascular access sites hourly  3. Every 4-6 hours minimum:  Change oxygen saturation probe site  4. Every 4-6 hours:  If on nasal continuous positive airway pressure, respiratory therapy assess nares and determine need for appliance change or resting period.   9/16/2023 2122 by Celestine Huber RN  Outcome: Progressing  9/16/2023 1849 by Ghazala Miranda RN  Outcome: Progressing     Problem: Safety - Adult  Goal: Free from fall injury  9/16/2023 2122 by Celestine Huber RN  Outcome: Progressing  9/16/2023 1849 by Ghazala Miranda RN  Outcome: Progressing     Problem: Chronic Conditions and Co-morbidities  Goal: Patient's chronic conditions and co-morbidity symptoms are monitored and maintained or improved  9/16/2023 2122 by Celestine Huber RN  Outcome: Progressing  9/16/2023 1849 by Ghazala iMranda RN  Outcome: Progressing     Problem: Cardiovascular - Adult  Goal: Maintains optimal cardiac output and hemodynamic stability  9/16/2023 2122 by Celestine Huber RN  Outcome: Progressing  9/16/2023 1849 by Ghazala Miranda RN  Outcome: Progressing  Goal: Absence of cardiac dysrhythmias or at baseline  9/16/2023 2122 by Celestine Huber RN  Outcome: Progressing  9/16/2023 1849 by Ghazala Miranda RN  Outcome: Progressing     Problem: Pain  Goal: Verbalizes/displays adequate comfort level or baseline comfort level  9/16/2023 1849 by Ghazala Miranda RN  Outcome: Progressing

## 2023-09-18 LAB
ANION GAP SERPL CALCULATED.3IONS-SCNC: 10 MMOL/L (ref 9–17)
BASOPHILS # BLD: 0.02 K/UL (ref 0–0.2)
BASOPHILS NFR BLD: 1 % (ref 0–2)
BUN SERPL-MCNC: 9 MG/DL (ref 8–23)
CALCIUM SERPL-MCNC: 8.6 MG/DL (ref 8.6–10.4)
CHLORIDE SERPL-SCNC: 100 MMOL/L (ref 98–107)
CO2 SERPL-SCNC: 25 MMOL/L (ref 20–31)
CREAT SERPL-MCNC: 0.9 MG/DL (ref 0.7–1.2)
EOSINOPHIL # BLD: 0 K/UL (ref 0–0.44)
EOSINOPHILS RELATIVE PERCENT: 0 % (ref 1–4)
ERYTHROCYTE [DISTWIDTH] IN BLOOD BY AUTOMATED COUNT: 21.9 % (ref 11.8–14.4)
GFR SERPL CREATININE-BSD FRML MDRD: >60 ML/MIN/1.73M2
GLUCOSE SERPL-MCNC: 124 MG/DL (ref 70–99)
HCT VFR BLD AUTO: 48.7 % (ref 40.7–50.3)
HGB BLD-MCNC: 15.9 G/DL (ref 13–17)
IMM GRANULOCYTES # BLD AUTO: 0.02 K/UL (ref 0–0.3)
IMM GRANULOCYTES NFR BLD: 1 %
LYMPHOCYTES NFR BLD: 0.49 K/UL (ref 1.1–3.7)
LYMPHOCYTES RELATIVE PERCENT: 27 % (ref 24–43)
MCH RBC QN AUTO: 30.5 PG (ref 25.2–33.5)
MCHC RBC AUTO-ENTMCNC: 32.6 G/DL (ref 28.4–34.8)
MCV RBC AUTO: 93.3 FL (ref 82.6–102.9)
MONOCYTES NFR BLD: 0.11 K/UL (ref 0.1–1.2)
MONOCYTES NFR BLD: 6 % (ref 3–12)
MORPHOLOGY: ABNORMAL
NEUTROPHILS NFR BLD: 65 % (ref 36–65)
NEUTS SEG NFR BLD: 1.16 K/UL (ref 1.5–8.1)
NRBC BLD-RTO: 0 PER 100 WBC
PLATELET # BLD AUTO: ABNORMAL K/UL (ref 138–453)
PLATELET, FLUORESCENCE: 108 K/UL (ref 138–453)
PLATELETS.RETICULATED NFR BLD AUTO: 5.4 % (ref 1.1–10.3)
POTASSIUM SERPL-SCNC: 4.5 MMOL/L (ref 3.7–5.3)
RBC # BLD AUTO: 5.22 M/UL (ref 4.21–5.77)
SODIUM SERPL-SCNC: 135 MMOL/L (ref 135–144)
WBC OTHER # BLD: 1.8 K/UL (ref 3.5–11.3)

## 2023-09-18 PROCEDURE — 85025 COMPLETE CBC W/AUTO DIFF WBC: CPT

## 2023-09-18 PROCEDURE — 80048 BASIC METABOLIC PNL TOTAL CA: CPT

## 2023-09-18 PROCEDURE — 85055 RETICULATED PLATELET ASSAY: CPT

## 2023-09-18 PROCEDURE — 36415 COLL VENOUS BLD VENIPUNCTURE: CPT

## 2023-09-18 PROCEDURE — 6370000000 HC RX 637 (ALT 250 FOR IP): Performed by: INTERNAL MEDICINE

## 2023-09-18 PROCEDURE — 6370000000 HC RX 637 (ALT 250 FOR IP): Performed by: STUDENT IN AN ORGANIZED HEALTH CARE EDUCATION/TRAINING PROGRAM

## 2023-09-18 PROCEDURE — 99232 SBSQ HOSP IP/OBS MODERATE 35: CPT | Performed by: INTERNAL MEDICINE

## 2023-09-18 PROCEDURE — 2580000003 HC RX 258: Performed by: STUDENT IN AN ORGANIZED HEALTH CARE EDUCATION/TRAINING PROGRAM

## 2023-09-18 PROCEDURE — 99233 SBSQ HOSP IP/OBS HIGH 50: CPT | Performed by: SURGERY

## 2023-09-18 PROCEDURE — 6360000002 HC RX W HCPCS: Performed by: STUDENT IN AN ORGANIZED HEALTH CARE EDUCATION/TRAINING PROGRAM

## 2023-09-18 PROCEDURE — 2060000000 HC ICU INTERMEDIATE R&B

## 2023-09-18 RX ADMIN — Medication 6 MG: at 21:29

## 2023-09-18 RX ADMIN — PREDNISONE 10 MG: 10 TABLET ORAL at 10:25

## 2023-09-18 RX ADMIN — CLOPIDOGREL BISULFATE 75 MG: 75 TABLET, FILM COATED ORAL at 10:25

## 2023-09-18 RX ADMIN — SODIUM CHLORIDE, PRESERVATIVE FREE 10 ML: 5 INJECTION INTRAVENOUS at 20:51

## 2023-09-18 RX ADMIN — DULOXETINE HYDROCHLORIDE 30 MG: 30 CAPSULE, DELAYED RELEASE ORAL at 10:24

## 2023-09-18 RX ADMIN — SODIUM CHLORIDE, PRESERVATIVE FREE 10 ML: 5 INJECTION INTRAVENOUS at 10:25

## 2023-09-18 RX ADMIN — MORPHINE SULFATE 2 MG: 2 INJECTION, SOLUTION INTRAMUSCULAR; INTRAVENOUS at 21:29

## 2023-09-18 RX ADMIN — PREDNISONE 10 MG: 10 TABLET ORAL at 20:51

## 2023-09-18 RX ADMIN — ISOSORBIDE MONONITRATE 30 MG: 30 TABLET, EXTENDED RELEASE ORAL at 10:25

## 2023-09-18 RX ADMIN — Medication 81 MG: at 10:25

## 2023-09-18 RX ADMIN — DESMOPRESSIN ACETATE 40 MG: 0.2 TABLET ORAL at 20:51

## 2023-09-18 RX ADMIN — AMLODIPINE BESYLATE 10 MG: 10 TABLET ORAL at 10:25

## 2023-09-18 ASSESSMENT — PAIN DESCRIPTION - LOCATION: LOCATION: SHOULDER

## 2023-09-18 ASSESSMENT — PAIN SCALES - GENERAL: PAINLEVEL_OUTOF10: 6

## 2023-09-18 ASSESSMENT — PAIN DESCRIPTION - DESCRIPTORS: DESCRIPTORS: DISCOMFORT

## 2023-09-18 ASSESSMENT — PAIN DESCRIPTION - ORIENTATION: ORIENTATION: RIGHT

## 2023-09-18 NOTE — PLAN OF CARE
Problem: Discharge Planning  Goal: Discharge to home or other facility with appropriate resources  9/18/2023 0119 by Jonathan Reza RN  Outcome: Progressing  9/17/2023 1819 by Ghazala Miranda RN  Outcome: Progressing     Problem: Skin/Tissue Integrity  Goal: Absence of new skin breakdown  Description: 1. Monitor for areas of redness and/or skin breakdown  2. Assess vascular access sites hourly  3. Every 4-6 hours minimum:  Change oxygen saturation probe site  4. Every 4-6 hours:  If on nasal continuous positive airway pressure, respiratory therapy assess nares and determine need for appliance change or resting period.   9/18/2023 0119 by Jonathan Reza RN  Outcome: Progressing  9/17/2023 1819 by Ghazala Miranda RN  Outcome: Progressing     Problem: Safety - Adult  Goal: Free from fall injury  9/18/2023 0119 by Jonathan Reza RN  Outcome: Progressing  9/17/2023 1819 by Ghazala Miranda RN  Outcome: Progressing     Problem: Chronic Conditions and Co-morbidities  Goal: Patient's chronic conditions and co-morbidity symptoms are monitored and maintained or improved  9/17/2023 1819 by Ghazala Miranda RN  Outcome: Progressing     Problem: Cardiovascular - Adult  Goal: Maintains optimal cardiac output and hemodynamic stability  9/18/2023 0119 by Jnoathan Reza RN  Outcome: Progressing  9/17/2023 1819 by Ghazala Miranda RN  Outcome: Progressing  Goal: Absence of cardiac dysrhythmias or at baseline  9/18/2023 0119 by Jonathan Reza RN  Outcome: Progressing  9/17/2023 1819 by Ghazala Miranda RN  Outcome: Progressing     Problem: Pain  Goal: Verbalizes/displays adequate comfort level or baseline comfort level  9/18/2023 0119 by Jonathan Reza RN  Outcome: Progressing  9/17/2023 1819 by Ghazala Miranda RN  Outcome: Progressing

## 2023-09-18 NOTE — PLAN OF CARE
Problem: Discharge Planning  Goal: Discharge to home or other facility with appropriate resources  Outcome: Progressing     Problem: Skin/Tissue Integrity  Goal: Absence of new skin breakdown  Description: 1. Monitor for areas of redness and/or skin breakdown  2. Assess vascular access sites hourly  3. Every 4-6 hours minimum:  Change oxygen saturation probe site  4. Every 4-6 hours:  If on nasal continuous positive airway pressure, respiratory therapy assess nares and determine need for appliance change or resting period.   Outcome: Progressing     Problem: Safety - Adult  Goal: Free from fall injury  Outcome: Progressing     Problem: Chronic Conditions and Co-morbidities  Goal: Patient's chronic conditions and co-morbidity symptoms are monitored and maintained or improved  Outcome: Progressing     Problem: Cardiovascular - Adult  Goal: Maintains optimal cardiac output and hemodynamic stability  Outcome: Progressing     Problem: Pain  Goal: Verbalizes/displays adequate comfort level or baseline comfort level  Outcome: Progressing

## 2023-09-18 NOTE — DISCHARGE SUMMARY
needed. EP consult to evaluate for AICD. Patient presented with cardiac arrest.        Discussed with patient and nursing. Medications and discharge instructions reviewed with patient and nursing.     Electronically signed by PJ Westbrook NP on 9/19/2023 at 8:49 [address]  [facility]      [phone]

## 2023-09-19 VITALS
SYSTOLIC BLOOD PRESSURE: 129 MMHG | DIASTOLIC BLOOD PRESSURE: 77 MMHG | BODY MASS INDEX: 27.17 KG/M2 | HEART RATE: 60 BPM | OXYGEN SATURATION: 100 % | WEIGHT: 183.42 LBS | RESPIRATION RATE: 18 BRPM | HEIGHT: 69 IN | TEMPERATURE: 97.9 F

## 2023-09-19 LAB
ANION GAP SERPL CALCULATED.3IONS-SCNC: 11 MMOL/L (ref 9–17)
ATYPICAL LYMPHOCYTE ABSOLUTE COUNT: 0.03 K/UL
ATYPICAL LYMPHOCYTES: 1 %
BASOPHILS # BLD: 0 K/UL (ref 0–0.2)
BASOPHILS NFR BLD: 0 % (ref 0–2)
BUN SERPL-MCNC: 13 MG/DL (ref 8–23)
CALCIUM SERPL-MCNC: 8.7 MG/DL (ref 8.6–10.4)
CHLORIDE SERPL-SCNC: 100 MMOL/L (ref 98–107)
CO2 SERPL-SCNC: 27 MMOL/L (ref 20–31)
CREAT SERPL-MCNC: 1.1 MG/DL (ref 0.7–1.2)
EOSINOPHIL # BLD: 0 K/UL (ref 0–0.4)
EOSINOPHILS RELATIVE PERCENT: 0 % (ref 1–4)
ERYTHROCYTE [DISTWIDTH] IN BLOOD BY AUTOMATED COUNT: 22.6 % (ref 11.8–14.4)
GFR SERPL CREATININE-BSD FRML MDRD: >60 ML/MIN/1.73M2
GLUCOSE SERPL-MCNC: 109 MG/DL (ref 70–99)
HCT VFR BLD AUTO: 50.2 % (ref 40.7–50.3)
HGB BLD-MCNC: 16.3 G/DL (ref 13–17)
IMM GRANULOCYTES # BLD AUTO: 0 K/UL (ref 0–0.3)
IMM GRANULOCYTES NFR BLD: 0 %
LYMPHOCYTES NFR BLD: 0.36 K/UL (ref 1–4.8)
LYMPHOCYTES RELATIVE PERCENT: 12 % (ref 24–44)
MCH RBC QN AUTO: 30.1 PG (ref 25.2–33.5)
MCHC RBC AUTO-ENTMCNC: 32.5 G/DL (ref 28.4–34.8)
MCV RBC AUTO: 92.6 FL (ref 82.6–102.9)
MONOCYTES NFR BLD: 0.21 K/UL (ref 0.1–0.8)
MONOCYTES NFR BLD: 7 % (ref 1–7)
MORPHOLOGY: ABNORMAL
NEUTROPHILS NFR BLD: 80 % (ref 36–66)
NEUTS SEG NFR BLD: 2.4 K/UL (ref 1.8–7.7)
NRBC BLD-RTO: 0 PER 100 WBC
NUCLEATED RED BLOOD CELLS: 1 PER 100 WBC
PLATELET # BLD AUTO: 133 K/UL (ref 138–453)
PMV BLD AUTO: 10.1 FL (ref 8.1–13.5)
POTASSIUM SERPL-SCNC: 4.5 MMOL/L (ref 3.7–5.3)
RBC # BLD AUTO: 5.42 M/UL (ref 4.21–5.77)
SODIUM SERPL-SCNC: 138 MMOL/L (ref 135–144)
WBC OTHER # BLD: 3 K/UL (ref 3.5–11.3)

## 2023-09-19 PROCEDURE — 2580000003 HC RX 258: Performed by: STUDENT IN AN ORGANIZED HEALTH CARE EDUCATION/TRAINING PROGRAM

## 2023-09-19 PROCEDURE — 85025 COMPLETE CBC W/AUTO DIFF WBC: CPT

## 2023-09-19 PROCEDURE — 80048 BASIC METABOLIC PNL TOTAL CA: CPT

## 2023-09-19 PROCEDURE — 36415 COLL VENOUS BLD VENIPUNCTURE: CPT

## 2023-09-19 PROCEDURE — 99232 SBSQ HOSP IP/OBS MODERATE 35: CPT | Performed by: INTERNAL MEDICINE

## 2023-09-19 PROCEDURE — 99238 HOSP IP/OBS DSCHRG MGMT 30/<: CPT | Performed by: SURGERY

## 2023-09-19 PROCEDURE — 6370000000 HC RX 637 (ALT 250 FOR IP): Performed by: INTERNAL MEDICINE

## 2023-09-19 PROCEDURE — 6370000000 HC RX 637 (ALT 250 FOR IP): Performed by: STUDENT IN AN ORGANIZED HEALTH CARE EDUCATION/TRAINING PROGRAM

## 2023-09-19 RX ORDER — PREDNISONE 10 MG/1
TABLET ORAL
Qty: 48 EACH | Refills: 0 | Status: SHIPPED | OUTPATIENT
Start: 2023-09-19 | End: 2023-10-13

## 2023-09-19 RX ORDER — SPIRONOLACTONE 25 MG/1
25 TABLET ORAL DAILY
Qty: 30 TABLET | Refills: 11 | Status: SHIPPED | OUTPATIENT
Start: 2023-09-19

## 2023-09-19 RX ORDER — NITROGLYCERIN 0.4 MG/1
0.4 TABLET SUBLINGUAL EVERY 5 MIN PRN
Qty: 25 TABLET | Refills: 3 | Status: SHIPPED | OUTPATIENT
Start: 2023-09-19

## 2023-09-19 RX ORDER — PREDNISONE 10 MG/1
TABLET ORAL
Qty: 48 EACH | Refills: 0 | Status: SHIPPED | OUTPATIENT
Start: 2023-09-19 | End: 2023-09-19 | Stop reason: SDUPTHER

## 2023-09-19 RX ADMIN — Medication 81 MG: at 08:28

## 2023-09-19 RX ADMIN — DULOXETINE HYDROCHLORIDE 30 MG: 30 CAPSULE, DELAYED RELEASE ORAL at 08:28

## 2023-09-19 RX ADMIN — ISOSORBIDE MONONITRATE 30 MG: 30 TABLET, EXTENDED RELEASE ORAL at 08:28

## 2023-09-19 RX ADMIN — AMLODIPINE BESYLATE 10 MG: 10 TABLET ORAL at 08:28

## 2023-09-19 RX ADMIN — PREDNISONE 10 MG: 10 TABLET ORAL at 08:28

## 2023-09-19 RX ADMIN — SODIUM CHLORIDE, PRESERVATIVE FREE 10 ML: 5 INJECTION INTRAVENOUS at 08:28

## 2023-09-19 RX ADMIN — CLOPIDOGREL BISULFATE 75 MG: 75 TABLET, FILM COATED ORAL at 08:28

## 2023-09-19 NOTE — PLAN OF CARE
Problem: Discharge Planning  Goal: Discharge to home or other facility with appropriate resources  9/18/2023 2214 by Shelly Murphy RN  Outcome: Progressing  9/18/2023 1904 by Waldemar Solis RN  Outcome: Progressing     Problem: Skin/Tissue Integrity  Goal: Absence of new skin breakdown  Description: 1. Monitor for areas of redness and/or skin breakdown  2. Assess vascular access sites hourly  3. Every 4-6 hours minimum:  Change oxygen saturation probe site  4. Every 4-6 hours:  If on nasal continuous positive airway pressure, respiratory therapy assess nares and determine need for appliance change or resting period.   9/18/2023 2214 by Shelly Murphy RN  Outcome: Progressing  9/18/2023 1904 by Waldemar Solis RN  Outcome: Progressing     Problem: Safety - Adult  Goal: Free from fall injury  9/18/2023 2214 by Shelly Murphy RN  Outcome: Progressing  9/18/2023 1904 by Waldemar Solis RN  Outcome: Progressing     Problem: Chronic Conditions and Co-morbidities  Goal: Patient's chronic conditions and co-morbidity symptoms are monitored and maintained or improved  9/18/2023 2214 by Shelly Murphy RN  Outcome: Progressing  9/18/2023 1904 by Waldemar Solis RN  Outcome: Progressing     Problem: Cardiovascular - Adult  Goal: Maintains optimal cardiac output and hemodynamic stability  9/18/2023 2214 by Shelly Murphy RN  Outcome: Progressing  9/18/2023 1904 by Waldemar Solis RN  Outcome: Progressing     Problem: Pain  Goal: Verbalizes/displays adequate comfort level or baseline comfort level  9/18/2023 2214 by Shelly Murphy RN  Outcome: Progressing  9/18/2023 1904 by Waldemar Solis RN  Outcome: Progressing

## 2023-10-25 ENCOUNTER — HOSPITAL ENCOUNTER (OUTPATIENT)
Age: 58
Discharge: HOME | End: 2023-10-25
Payer: COMMERCIAL

## 2023-10-25 DIAGNOSIS — D69.6 THROMBOCYTOPENIA (HCC): ICD-10-CM

## 2023-10-25 LAB
BASOPHILS # BLD: 0.1 K/UL (ref 0–0.2)
BASOPHILS NFR BLD: 2 % (ref 0–2)
EOSINOPHIL # BLD: 0.3 K/UL (ref 0–0.4)
EOSINOPHILS RELATIVE PERCENT: 4 % (ref 0–4)
ERYTHROCYTE [DISTWIDTH] IN BLOOD BY AUTOMATED COUNT: 21.5 % (ref 11.5–14.9)
ERYTHROCYTE [DISTWIDTH] IN BLOOD BY AUTOMATED COUNT: 21.5 % (ref 11.5–14.9)
HCT VFR BLD AUTO: 46.7 % (ref 41–53)
HCT VFR BLD AUTO: 46.7 % (ref 41–53)
HGB BLD-MCNC: 15.5 G/DL (ref 13.5–17.5)
HGB BLD-MCNC: 15.5 G/DL (ref 13.5–17.5)
LYMPHOCYTES NFR BLD: 1.3 K/UL (ref 1–4.8)
LYMPHOCYTES RELATIVE PERCENT: 19 % (ref 24–44)
MCH RBC QN AUTO: 33.7 PG (ref 26–34)
MCH RBC QN AUTO: 33.7 PG (ref 26–34)
MCHC RBC AUTO-ENTMCNC: 33.2 G/DL (ref 31–37)
MCHC RBC AUTO-ENTMCNC: 33.2 G/DL (ref 31–37)
MCV RBC AUTO: 101.6 FL (ref 80–100)
MCV RBC AUTO: 101.6 FL (ref 80–100)
MONOCYTES NFR BLD: 0.5 K/UL (ref 0.1–1.3)
MONOCYTES NFR BLD: 8 % (ref 1–7)
NEUTROPHILS NFR BLD: 67 % (ref 36–66)
NEUTS SEG NFR BLD: 4.6 K/UL (ref 1.3–9.1)
PLATELET # BLD AUTO: 181 K/UL (ref 150–450)
PLATELET # BLD AUTO: 181 K/UL (ref 150–450)
PMV BLD AUTO: 7.8 FL (ref 6–12)
PMV BLD AUTO: 7.8 FL (ref 6–12)
RBC # BLD AUTO: 4.59 M/UL (ref 4.5–5.9)
RBC # BLD AUTO: 4.59 M/UL (ref 4.5–5.9)
WBC OTHER # BLD: 6.8 K/UL (ref 3.5–11)
WBC OTHER # BLD: 6.8 K/UL (ref 3.5–11)

## 2023-10-25 PROCEDURE — 85027 COMPLETE CBC AUTOMATED: CPT

## 2023-10-25 PROCEDURE — 85025 COMPLETE CBC W/AUTO DIFF WBC: CPT

## 2023-10-25 PROCEDURE — 36415 COLL VENOUS BLD VENIPUNCTURE: CPT

## 2023-10-26 ENCOUNTER — HOSPITAL ENCOUNTER (OUTPATIENT)
Age: 58
Setting detail: OUTPATIENT SURGERY
Discharge: HOME | End: 2023-10-26
Attending: INTERNAL MEDICINE | Admitting: INTERNAL MEDICINE
Payer: COMMERCIAL

## 2023-10-26 VITALS
WEIGHT: 169 LBS | OXYGEN SATURATION: 96 % | HEART RATE: 67 BPM | DIASTOLIC BLOOD PRESSURE: 83 MMHG | TEMPERATURE: 97.3 F | SYSTOLIC BLOOD PRESSURE: 131 MMHG | HEIGHT: 69 IN | BODY MASS INDEX: 25.03 KG/M2 | RESPIRATION RATE: 25 BRPM

## 2023-10-26 DIAGNOSIS — R94.39 ABNORMAL STRESS TEST: ICD-10-CM

## 2023-10-26 LAB
BUN BLD-MCNC: 10 MG/DL (ref 8–26)
CHLORIDE BLD-SCNC: 99 MMOL/L (ref 98–107)
ECHO BSA: 1.93 M2
EGFR, POC: >60 ML/MIN/1.73M2
GLUCOSE BLD-MCNC: 92 MG/DL (ref 74–100)
HCT VFR BLD AUTO: 52 % (ref 41–53)
POC CREATININE: 1.3 MG/DL (ref 0.51–1.19)
POC HEMOGLOBIN (CALC): 17.6 G/DL (ref 13.5–17.5)
POTASSIUM BLD-SCNC: 4.7 MMOL/L (ref 3.5–4.5)
SODIUM BLD-SCNC: 137 MMOL/L (ref 138–146)

## 2023-10-26 PROCEDURE — 6360000004 HC RX CONTRAST MEDICATION: Performed by: INTERNAL MEDICINE

## 2023-10-26 PROCEDURE — 85014 HEMATOCRIT: CPT

## 2023-10-26 PROCEDURE — 93455 CORONARY ART/GRFT ANGIO S&I: CPT | Performed by: INTERNAL MEDICINE

## 2023-10-26 PROCEDURE — C1769 GUIDE WIRE: HCPCS | Performed by: INTERNAL MEDICINE

## 2023-10-26 PROCEDURE — 2580000003 HC RX 258: Performed by: INTERNAL MEDICINE

## 2023-10-26 PROCEDURE — C1894 INTRO/SHEATH, NON-LASER: HCPCS | Performed by: INTERNAL MEDICINE

## 2023-10-26 PROCEDURE — 2500000003 HC RX 250 WO HCPCS: Performed by: INTERNAL MEDICINE

## 2023-10-26 PROCEDURE — 84295 ASSAY OF SERUM SODIUM: CPT

## 2023-10-26 PROCEDURE — 93452 LEFT HRT CATH W/VENTRCLGRPHY: CPT | Performed by: INTERNAL MEDICINE

## 2023-10-26 PROCEDURE — 6360000002 HC RX W HCPCS: Performed by: INTERNAL MEDICINE

## 2023-10-26 PROCEDURE — 82947 ASSAY GLUCOSE BLOOD QUANT: CPT

## 2023-10-26 PROCEDURE — 93459 L HRT ART/GRFT ANGIO: CPT | Performed by: INTERNAL MEDICINE

## 2023-10-26 PROCEDURE — 84520 ASSAY OF UREA NITROGEN: CPT

## 2023-10-26 PROCEDURE — 2709999900 HC NON-CHARGEABLE SUPPLY: Performed by: INTERNAL MEDICINE

## 2023-10-26 PROCEDURE — 82565 ASSAY OF CREATININE: CPT

## 2023-10-26 PROCEDURE — 99152 MOD SED SAME PHYS/QHP 5/>YRS: CPT | Performed by: INTERNAL MEDICINE

## 2023-10-26 PROCEDURE — C1892 INTRO/SHEATH,FIXED,PEEL-AWAY: HCPCS | Performed by: INTERNAL MEDICINE

## 2023-10-26 PROCEDURE — 84132 ASSAY OF SERUM POTASSIUM: CPT

## 2023-10-26 PROCEDURE — 7100000010 HC PHASE II RECOVERY - FIRST 15 MIN: Performed by: INTERNAL MEDICINE

## 2023-10-26 PROCEDURE — 82435 ASSAY OF BLOOD CHLORIDE: CPT

## 2023-10-26 PROCEDURE — 7100000011 HC PHASE II RECOVERY - ADDTL 15 MIN: Performed by: INTERNAL MEDICINE

## 2023-10-26 RX ORDER — SODIUM CHLORIDE 9 MG/ML
INJECTION, SOLUTION INTRAVENOUS PRN
Status: DISCONTINUED | OUTPATIENT
Start: 2023-10-26 | End: 2023-10-26 | Stop reason: HOSPADM

## 2023-10-26 RX ORDER — ACETAMINOPHEN 325 MG/1
650 TABLET ORAL EVERY 4 HOURS PRN
Status: DISCONTINUED | OUTPATIENT
Start: 2023-10-26 | End: 2023-10-26 | Stop reason: HOSPADM

## 2023-10-26 RX ORDER — MIDAZOLAM HYDROCHLORIDE 1 MG/ML
INJECTION INTRAMUSCULAR; INTRAVENOUS PRN
Status: DISCONTINUED | OUTPATIENT
Start: 2023-10-26 | End: 2023-10-26 | Stop reason: HOSPADM

## 2023-10-26 RX ORDER — FENTANYL CITRATE 50 UG/ML
INJECTION, SOLUTION INTRAMUSCULAR; INTRAVENOUS PRN
Status: DISCONTINUED | OUTPATIENT
Start: 2023-10-26 | End: 2023-10-26 | Stop reason: HOSPADM

## 2023-10-26 RX ORDER — SODIUM CHLORIDE 9 MG/ML
INJECTION, SOLUTION INTRAVENOUS CONTINUOUS
Status: DISCONTINUED | OUTPATIENT
Start: 2023-10-26 | End: 2023-10-26 | Stop reason: HOSPADM

## 2023-10-26 RX ORDER — SODIUM CHLORIDE 0.9 % (FLUSH) 0.9 %
5-40 SYRINGE (ML) INJECTION EVERY 12 HOURS SCHEDULED
Status: DISCONTINUED | OUTPATIENT
Start: 2023-10-26 | End: 2023-10-26 | Stop reason: HOSPADM

## 2023-10-26 RX ORDER — SODIUM CHLORIDE 0.9 % (FLUSH) 0.9 %
5-40 SYRINGE (ML) INJECTION PRN
Status: DISCONTINUED | OUTPATIENT
Start: 2023-10-26 | End: 2023-10-26 | Stop reason: HOSPADM

## 2023-10-26 RX ADMIN — SODIUM CHLORIDE: 900 INJECTION INTRAVENOUS at 12:08

## 2023-10-26 NOTE — PROGRESS NOTES
Received post cardiac cath procedure to [facility] room [facility] room 6. Assessment obtained. Restrictions reviewed with patient. Post procedure pathway initiated. Right groin site soft , Dressing dry and intact. No hematoma noted. Family at side. Patient without complaints. Head of bed flat with right leg straight.

## 2023-10-26 NOTE — PROGRESS NOTES
Dr Meliton Calixto notified of creatinine of 1.32  No new orders    Patients Kike meza [facility]) will be the one to  pt at discharge.

## 2023-10-26 NOTE — PROGRESS NOTES
All discharge instructions reviewed, questions answered, paper signed and given a copy. Patient to be discharged with all belongings. Pulses obtained & unchanged. Site clean dry and intact. No bleeding, hematoma, or bruising noted. Patient to be discharged via wheelchair with niece Kristi Ibrahim. IV removed. Education given. Pt ambulatory.

## 2023-10-26 NOTE — PROGRESS NOTES
Patient admitted, consent signed and questions answered. Call light to reach with side rails up 2 of 2. Bilateral Groin clipped with writer and Cristina RN present. RN at bedside with patient. History and physical Complete. VS & Pulses obtained and documented. Will continue to monitor.

## 2023-10-26 NOTE — PROCEDURES
[facility]        Date:   10/26/2023  Patient name: Julissa Estrella  Date of admission:  10/26/2023 11:01 AM  MRN:   [id number]  YOB: 1965    CARDIAC CATHETERIZATION    Operators:  Primary: Rashard Hale MD.  Assistant:     Indications for cath: [place], Abnormal nuclear stress test.    Procedure performed: Cardiac cath. Access: Right Femoral artery      Procedure: After informed consent was obtained with explanation of the risks and benefits, patient was brought to the cath lab. The right groin were prepped and draped in sterile fashion. 1% lidocaine was used for local block. The Femoral artery was cannulated with 6  Fr sheath with brisk arterial blood return. The side port was frequently flushed and aspirated with normal saline. EBL is 10 mL    Findings:    Left main: Minimal irregularities of 20-30%. LAD:  90% proximal stenosis with competitive flow from LIMA graft   LIMA to the LAD is patent with diffuse 40-50% mid and distal LAD disease. LCX:  luminal irregularities of 20-30%   Om 1 is 100% occluded   SVG to OM 1 is known 100% occluded    RCA:  100% occluded proximally   SVG to RPDA is patent with 0% stenosis    The LV gram was performed in the MODI 30 position. LVEF: 55%. LV Wall Motion:  normal    Conclusions:   Multivessel CAD   Patent LIMA to the LAD   Patent SVG to RPDA   Known occluded SVG to OM 1   Overall preserved LV function   No change from cardiac catheterization 2022    Recommendation:  Medical treatments. Risk factors modifications.         Electronically signed by Alphonso Bartholomew MD on 10/26/2023 at 5:17 PM      [facility]  [phone]
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
## 2023-11-06 DIAGNOSIS — R21 RASH: ICD-10-CM

## 2023-11-06 RX ORDER — DULOXETIN HYDROCHLORIDE 30 MG/1
30 CAPSULE, DELAYED RELEASE ORAL DAILY
Qty: 30 CAPSULE | Refills: 2 | Status: SHIPPED | OUTPATIENT
Start: 2023-11-06

## 2023-11-06 RX ORDER — DIAPER,BRIEF,INFANT-TODD,DISP
EACH MISCELLANEOUS
Qty: 28 G | Refills: 1 | Status: SHIPPED | OUTPATIENT
Start: 2023-11-06

## 2023-12-08 ENCOUNTER — TELEPHONE (OUTPATIENT)
Dept: INTERNAL MEDICINE CLINIC | Age: 58
End: 2023-12-08

## 2023-12-08 NOTE — TELEPHONE ENCOUNTER
Patient states that he has been having a lot of aching and burning in his feet. Denies any rash's.  Patient is going to report to Urgent to evaluate due to not having any physican's in the office

## 2023-12-15 ENCOUNTER — HOSPITAL ENCOUNTER (EMERGENCY)
Age: 58
Discharge: HOME | End: 2023-12-15
Attending: STUDENT IN AN ORGANIZED HEALTH CARE EDUCATION/TRAINING PROGRAM
Payer: COMMERCIAL

## 2023-12-15 VITALS
OXYGEN SATURATION: 96 % | BODY MASS INDEX: 26.07 KG/M2 | HEIGHT: 69 IN | RESPIRATION RATE: 16 BRPM | TEMPERATURE: 98.4 F | HEART RATE: 77 BPM | SYSTOLIC BLOOD PRESSURE: 119 MMHG | DIASTOLIC BLOOD PRESSURE: 76 MMHG | WEIGHT: 176 LBS

## 2023-12-15 DIAGNOSIS — R20.2 PARESTHESIA OF FOOT, BILATERAL: ICD-10-CM

## 2023-12-15 DIAGNOSIS — L85.3 DRY SKIN: Primary | ICD-10-CM

## 2023-12-15 DIAGNOSIS — R20.2 PARESTHESIA OF HAND, BILATERAL: ICD-10-CM

## 2023-12-15 LAB — GLUCOSE BLD-MCNC: 120 MG/DL (ref 75–110)

## 2023-12-15 PROCEDURE — 6370000000 HC RX 637 (ALT 250 FOR IP): Performed by: STUDENT IN AN ORGANIZED HEALTH CARE EDUCATION/TRAINING PROGRAM

## 2023-12-15 PROCEDURE — 99283 EMERGENCY DEPT VISIT LOW MDM: CPT

## 2023-12-15 PROCEDURE — 82947 ASSAY GLUCOSE BLOOD QUANT: CPT

## 2023-12-15 RX ORDER — LANOLIN ALCOHOL/MO/W.PET/CERES
CREAM (GRAM) TOPICAL ONCE
Status: COMPLETED | OUTPATIENT
Start: 2023-12-15 | End: 2023-12-15

## 2023-12-15 RX ORDER — WATER / MINERAL OIL / WHITE PETROLATUM 16 OZ
CREAM TOPICAL
Qty: 240 G | Refills: 0 | Status: SHIPPED | OUTPATIENT
Start: 2023-12-15

## 2023-12-15 RX ADMIN — Medication: at 21:33

## 2023-12-15 ASSESSMENT — PAIN DESCRIPTION - ORIENTATION: ORIENTATION: RIGHT;LEFT

## 2023-12-15 ASSESSMENT — PAIN - FUNCTIONAL ASSESSMENT: PAIN_FUNCTIONAL_ASSESSMENT: 0-10

## 2023-12-15 ASSESSMENT — PAIN DESCRIPTION - DESCRIPTORS: DESCRIPTORS: BURNING

## 2023-12-15 ASSESSMENT — PAIN SCALES - GENERAL: PAINLEVEL_OUTOF10: 4

## 2023-12-15 ASSESSMENT — PAIN DESCRIPTION - LOCATION: LOCATION: FOOT;EAR

## 2023-12-16 NOTE — ED TRIAGE NOTES
Mode of arrival (squad #, walk in, police, etc) : family        Chief complaint(s): ear and feet burning        Arrival Note (brief scenario, treatment PTA, etc). : Burning sensation to bilateral feet and bilateral ears. States it has been ongoing x weeks      C= \"Have you ever felt that you should Cut down on your drinking? \"  No  A= \"Have people Annoyed you by criticizing your drinking? \"  No  G= \"Have you ever felt bad or Guilty about your drinking? \"  No  E= \"Have you ever had a drink as an Eye-opener first thing in the morning to steady your nerves or to help a hangover? \"  No      Deferred []      Reason for deferring: N/A    *If yes to two or more: probable alcohol abuse. *

## 2023-12-21 ENCOUNTER — APPOINTMENT (OUTPATIENT)
Dept: CT IMAGING | Age: 58
DRG: 204 | End: 2023-12-21
Payer: COMMERCIAL

## 2023-12-21 ENCOUNTER — APPOINTMENT (OUTPATIENT)
Dept: GENERAL RADIOLOGY | Age: 58
DRG: 204 | End: 2023-12-21
Payer: COMMERCIAL

## 2023-12-21 ENCOUNTER — HOSPITAL ENCOUNTER (INPATIENT)
Age: 58
LOS: 4 days | Discharge: HOME | DRG: 204 | End: 2023-12-25
Attending: EMERGENCY MEDICINE
Payer: COMMERCIAL

## 2023-12-21 DIAGNOSIS — I07.9 TRICUSPID VALVE DISORDER: ICD-10-CM

## 2023-12-21 DIAGNOSIS — I65.22 ICAO (INTERNAL CAROTID ARTERY OCCLUSION), LEFT: ICD-10-CM

## 2023-12-21 DIAGNOSIS — I05.9 MITRAL VALVE DISORDER: ICD-10-CM

## 2023-12-21 DIAGNOSIS — R55 SYNCOPE AND COLLAPSE: Primary | ICD-10-CM

## 2023-12-21 DIAGNOSIS — N17.9 AKI (ACUTE KIDNEY INJURY) (HCC): ICD-10-CM

## 2023-12-21 DIAGNOSIS — I42.0 DILATED CARDIOMYOPATHY (HCC): ICD-10-CM

## 2023-12-21 DIAGNOSIS — E87.1 HYPONATREMIA: ICD-10-CM

## 2023-12-21 PROBLEM — I95.9 HYPOTENSION: Status: ACTIVE | Noted: 2023-12-21

## 2023-12-21 LAB
ALBUMIN SERPL-MCNC: 3.2 G/DL (ref 3.5–5.2)
ALBUMIN/GLOB SERPL: 1.1 {RATIO} (ref 1–2.5)
ALP SERPL-CCNC: 87 U/L (ref 40–129)
ALT SERPL-CCNC: 6 U/L (ref 5–41)
ANION GAP SERPL CALCULATED.3IONS-SCNC: 11 MMOL/L (ref 9–17)
AST SERPL-CCNC: 15 U/L
BASOPHILS # BLD: 0.03 K/UL (ref 0–0.2)
BASOPHILS NFR BLD: 1 % (ref 0–2)
BILIRUB SERPL-MCNC: 0.7 MG/DL (ref 0.3–1.2)
BNP SERPL-MCNC: 1185 PG/ML
BUN SERPL-MCNC: 13 MG/DL (ref 8–23)
CALCIUM SERPL-MCNC: 8.7 MG/DL (ref 8.6–10.4)
CHLORIDE SERPL-SCNC: 98 MMOL/L (ref 98–107)
CHP ED QC CHECK: YES
CO2 SERPL-SCNC: 20 MMOL/L (ref 20–31)
CREAT SERPL-MCNC: 1.6 MG/DL (ref 0.7–1.2)
EOSINOPHIL # BLD: 0.04 K/UL (ref 0–0.44)
EOSINOPHILS RELATIVE PERCENT: 1 % (ref 1–4)
ERYTHROCYTE [DISTWIDTH] IN BLOOD BY AUTOMATED COUNT: 14.3 % (ref 11.8–14.4)
ERYTHROCYTE [SEDIMENTATION RATE] IN BLOOD BY PHOTOMETRIC METHOD: 7 MM/HR (ref 0–20)
GFR SERPL CREATININE-BSD FRML MDRD: 49 ML/MIN/1.73M2
GLUCOSE BLD-MCNC: 131 MG/DL
GLUCOSE BLD-MCNC: 131 MG/DL (ref 75–110)
GLUCOSE SERPL-MCNC: 125 MG/DL (ref 70–99)
HCT VFR BLD AUTO: 44.1 % (ref 40.7–50.3)
HGB BLD-MCNC: 14.6 G/DL (ref 13–17)
IMM GRANULOCYTES # BLD AUTO: <0.03 K/UL (ref 0–0.3)
IMM GRANULOCYTES NFR BLD: 0 %
LACTIC ACID, SEPSIS WHOLE BLOOD: 1.7 MMOL/L (ref 0.5–1.9)
LACTIC ACID, WHOLE BLOOD: 1.7 MMOL/L (ref 0.7–2.1)
LYMPHOCYTES NFR BLD: 0.72 K/UL (ref 1.1–3.7)
LYMPHOCYTES RELATIVE PERCENT: 19 % (ref 24–43)
MCH RBC QN AUTO: 33.9 PG (ref 25.2–33.5)
MCHC RBC AUTO-ENTMCNC: 33.1 G/DL (ref 28.4–34.8)
MCV RBC AUTO: 102.3 FL (ref 82.6–102.9)
MONOCYTES NFR BLD: 0.4 K/UL (ref 0.1–1.2)
MONOCYTES NFR BLD: 11 % (ref 3–12)
NEUTROPHILS NFR BLD: 69 % (ref 36–65)
NEUTS SEG NFR BLD: 2.6 K/UL (ref 1.5–8.1)
NRBC BLD-RTO: 0 PER 100 WBC
PLATELET # BLD AUTO: ABNORMAL K/UL (ref 138–453)
PLATELET, FLUORESCENCE: ABNORMAL K/UL (ref 138–453)
POTASSIUM SERPL-SCNC: 4.4 MMOL/L (ref 3.7–5.3)
PROT SERPL-MCNC: 6.1 G/DL (ref 6.4–8.3)
RBC # BLD AUTO: 4.31 M/UL (ref 4.21–5.77)
SODIUM SERPL-SCNC: 129 MMOL/L (ref 135–144)
TROPONIN I SERPL HS-MCNC: 17 NG/L (ref 0–22)
TROPONIN I SERPL HS-MCNC: 17 NG/L (ref 0–22)
WBC OTHER # BLD: 3.8 K/UL (ref 3.5–11.3)

## 2023-12-21 PROCEDURE — 85025 COMPLETE CBC W/AUTO DIFF WBC: CPT

## 2023-12-21 PROCEDURE — 6360000002 HC RX W HCPCS: Performed by: INTERNAL MEDICINE

## 2023-12-21 PROCEDURE — 86140 C-REACTIVE PROTEIN: CPT

## 2023-12-21 PROCEDURE — 73080 X-RAY EXAM OF ELBOW: CPT

## 2023-12-21 PROCEDURE — 85652 RBC SED RATE AUTOMATED: CPT

## 2023-12-21 PROCEDURE — 2580000003 HC RX 258

## 2023-12-21 PROCEDURE — 72125 CT NECK SPINE W/O DYE: CPT

## 2023-12-21 PROCEDURE — 94761 N-INVAS EAR/PLS OXIMETRY MLT: CPT

## 2023-12-21 PROCEDURE — 99223 1ST HOSP IP/OBS HIGH 75: CPT | Performed by: INTERNAL MEDICINE

## 2023-12-21 PROCEDURE — 2060000000 HC ICU INTERMEDIATE R&B

## 2023-12-21 PROCEDURE — 99285 EMERGENCY DEPT VISIT HI MDM: CPT

## 2023-12-21 PROCEDURE — 82533 TOTAL CORTISOL: CPT

## 2023-12-21 PROCEDURE — 93005 ELECTROCARDIOGRAM TRACING: CPT

## 2023-12-21 PROCEDURE — 80053 COMPREHEN METABOLIC PANEL: CPT

## 2023-12-21 PROCEDURE — 82947 ASSAY GLUCOSE BLOOD QUANT: CPT

## 2023-12-21 PROCEDURE — 70450 CT HEAD/BRAIN W/O DYE: CPT

## 2023-12-21 PROCEDURE — 84443 ASSAY THYROID STIM HORMONE: CPT

## 2023-12-21 PROCEDURE — 85055 RETICULATED PLATELET ASSAY: CPT

## 2023-12-21 PROCEDURE — 2580000003 HC RX 258: Performed by: INTERNAL MEDICINE

## 2023-12-21 PROCEDURE — 83735 ASSAY OF MAGNESIUM: CPT

## 2023-12-21 PROCEDURE — 36415 COLL VENOUS BLD VENIPUNCTURE: CPT

## 2023-12-21 PROCEDURE — 83880 ASSAY OF NATRIURETIC PEPTIDE: CPT

## 2023-12-21 PROCEDURE — 83605 ASSAY OF LACTIC ACID: CPT

## 2023-12-21 PROCEDURE — 84484 ASSAY OF TROPONIN QUANT: CPT

## 2023-12-21 PROCEDURE — 71045 X-RAY EXAM CHEST 1 VIEW: CPT

## 2023-12-21 RX ORDER — METOPROLOL TARTRATE 1 MG/ML
5 INJECTION, SOLUTION INTRAVENOUS EVERY 6 HOURS PRN
Status: DISCONTINUED | OUTPATIENT
Start: 2023-12-21 | End: 2023-12-25 | Stop reason: HOSPADM

## 2023-12-21 RX ORDER — ENOXAPARIN SODIUM 100 MG/ML
40 INJECTION SUBCUTANEOUS DAILY
Status: DISCONTINUED | OUTPATIENT
Start: 2023-12-21 | End: 2023-12-21

## 2023-12-21 RX ORDER — MIDODRINE HYDROCHLORIDE 5 MG/1
5 TABLET ORAL 3 TIMES DAILY PRN
Status: DISCONTINUED | OUTPATIENT
Start: 2023-12-21 | End: 2023-12-22

## 2023-12-21 RX ORDER — SODIUM CHLORIDE, SODIUM LACTATE, POTASSIUM CHLORIDE, AND CALCIUM CHLORIDE .6; .31; .03; .02 G/100ML; G/100ML; G/100ML; G/100ML
1000 INJECTION, SOLUTION INTRAVENOUS ONCE
Status: COMPLETED | OUTPATIENT
Start: 2023-12-21 | End: 2023-12-21

## 2023-12-21 RX ORDER — 0.9 % SODIUM CHLORIDE 0.9 %
1000 INTRAVENOUS SOLUTION INTRAVENOUS ONCE
Status: COMPLETED | OUTPATIENT
Start: 2023-12-21 | End: 2023-12-21

## 2023-12-21 RX ORDER — CLOPIDOGREL BISULFATE 75 MG/1
75 TABLET ORAL DAILY
Status: DISCONTINUED | OUTPATIENT
Start: 2023-12-21 | End: 2023-12-25 | Stop reason: HOSPADM

## 2023-12-21 RX ORDER — NITROGLYCERIN 0.4 MG/1
0.4 TABLET SUBLINGUAL EVERY 5 MIN PRN
Status: DISCONTINUED | OUTPATIENT
Start: 2023-12-21 | End: 2023-12-25 | Stop reason: HOSPADM

## 2023-12-21 RX ORDER — ONDANSETRON 2 MG/ML
4 INJECTION INTRAMUSCULAR; INTRAVENOUS EVERY 6 HOURS PRN
Status: DISCONTINUED | OUTPATIENT
Start: 2023-12-21 | End: 2023-12-25 | Stop reason: HOSPADM

## 2023-12-21 RX ORDER — ACETAMINOPHEN 325 MG/1
650 TABLET ORAL EVERY 6 HOURS PRN
Status: DISCONTINUED | OUTPATIENT
Start: 2023-12-21 | End: 2023-12-25 | Stop reason: HOSPADM

## 2023-12-21 RX ORDER — SODIUM CHLORIDE 9 MG/ML
INJECTION, SOLUTION INTRAVENOUS PRN
Status: DISCONTINUED | OUTPATIENT
Start: 2023-12-21 | End: 2023-12-25 | Stop reason: HOSPADM

## 2023-12-21 RX ORDER — SODIUM CHLORIDE 0.9 % (FLUSH) 0.9 %
5-40 SYRINGE (ML) INJECTION EVERY 12 HOURS SCHEDULED
Status: DISCONTINUED | OUTPATIENT
Start: 2023-12-21 | End: 2023-12-25 | Stop reason: HOSPADM

## 2023-12-21 RX ORDER — SODIUM CHLORIDE 0.9 % (FLUSH) 0.9 %
5-40 SYRINGE (ML) INJECTION PRN
Status: DISCONTINUED | OUTPATIENT
Start: 2023-12-21 | End: 2023-12-25 | Stop reason: HOSPADM

## 2023-12-21 RX ORDER — POLYETHYLENE GLYCOL 3350 17 G/17G
17 POWDER, FOR SOLUTION ORAL DAILY PRN
Status: DISCONTINUED | OUTPATIENT
Start: 2023-12-21 | End: 2023-12-25 | Stop reason: HOSPADM

## 2023-12-21 RX ORDER — MIDODRINE HYDROCHLORIDE 5 MG/1
5 TABLET ORAL 3 TIMES DAILY PRN
Status: DISCONTINUED | OUTPATIENT
Start: 2023-12-21 | End: 2023-12-25 | Stop reason: HOSPADM

## 2023-12-21 RX ORDER — ASPIRIN 81 MG/1
81 TABLET ORAL DAILY
Status: DISCONTINUED | OUTPATIENT
Start: 2023-12-21 | End: 2023-12-23

## 2023-12-21 RX ORDER — DULOXETIN HYDROCHLORIDE 30 MG/1
30 CAPSULE, DELAYED RELEASE ORAL DAILY
Status: DISCONTINUED | OUTPATIENT
Start: 2023-12-21 | End: 2023-12-25 | Stop reason: HOSPADM

## 2023-12-21 RX ORDER — ACETAMINOPHEN 650 MG/1
650 SUPPOSITORY RECTAL EVERY 6 HOURS PRN
Status: DISCONTINUED | OUTPATIENT
Start: 2023-12-21 | End: 2023-12-25 | Stop reason: HOSPADM

## 2023-12-21 RX ORDER — MAGNESIUM SULFATE IN WATER 40 MG/ML
2000 INJECTION, SOLUTION INTRAVENOUS PRN
Status: DISCONTINUED | OUTPATIENT
Start: 2023-12-21 | End: 2023-12-25 | Stop reason: HOSPADM

## 2023-12-21 RX ORDER — POTASSIUM CHLORIDE 20 MEQ/1
40 TABLET, EXTENDED RELEASE ORAL PRN
Status: DISCONTINUED | OUTPATIENT
Start: 2023-12-21 | End: 2023-12-25 | Stop reason: HOSPADM

## 2023-12-21 RX ORDER — POTASSIUM CHLORIDE 7.45 MG/ML
10 INJECTION INTRAVENOUS PRN
Status: DISCONTINUED | OUTPATIENT
Start: 2023-12-21 | End: 2023-12-25 | Stop reason: HOSPADM

## 2023-12-21 RX ORDER — ONDANSETRON 4 MG/1
4 TABLET, ORALLY DISINTEGRATING ORAL EVERY 8 HOURS PRN
Status: DISCONTINUED | OUTPATIENT
Start: 2023-12-21 | End: 2023-12-25 | Stop reason: HOSPADM

## 2023-12-21 RX ADMIN — SODIUM CHLORIDE 1000 ML: 9 INJECTION, SOLUTION INTRAVENOUS at 15:17

## 2023-12-21 RX ADMIN — SODIUM CHLORIDE 1000 ML: 9 INJECTION, SOLUTION INTRAVENOUS at 17:23

## 2023-12-21 RX ADMIN — SODIUM CHLORIDE, POTASSIUM CHLORIDE, SODIUM LACTATE AND CALCIUM CHLORIDE 1000 ML: 600; 310; 30; 20 INJECTION, SOLUTION INTRAVENOUS at 13:57

## 2023-12-21 RX ADMIN — ENOXAPARIN SODIUM 40 MG: 100 INJECTION SUBCUTANEOUS at 20:03

## 2023-12-21 RX ADMIN — SODIUM CHLORIDE, PRESERVATIVE FREE 10 ML: 5 INJECTION INTRAVENOUS at 20:02

## 2023-12-21 ASSESSMENT — PAIN DESCRIPTION - ONSET: ONSET: ON-GOING

## 2023-12-21 ASSESSMENT — PAIN - FUNCTIONAL ASSESSMENT
PAIN_FUNCTIONAL_ASSESSMENT: ACTIVITIES ARE NOT PREVENTED
PAIN_FUNCTIONAL_ASSESSMENT: 0-10

## 2023-12-21 ASSESSMENT — PAIN DESCRIPTION - DESCRIPTORS
DESCRIPTORS: BURNING
DESCRIPTORS: ACHING

## 2023-12-21 ASSESSMENT — PAIN SCALES - GENERAL
PAINLEVEL_OUTOF10: 3
PAINLEVEL_OUTOF10: 8

## 2023-12-21 ASSESSMENT — PAIN DESCRIPTION - FREQUENCY
FREQUENCY: INTERMITTENT
FREQUENCY: INTERMITTENT

## 2023-12-21 ASSESSMENT — PAIN DESCRIPTION - LOCATION
LOCATION: ELBOW
LOCATION: TOE (COMMENT WHICH ONE)

## 2023-12-21 ASSESSMENT — PAIN DESCRIPTION - ORIENTATION
ORIENTATION: LEFT
ORIENTATION: LEFT;RIGHT

## 2023-12-21 ASSESSMENT — PAIN DESCRIPTION - PAIN TYPE: TYPE: CHRONIC PAIN

## 2023-12-21 NOTE — ED TRIAGE NOTES
Patient presented to the ED today with complaints of dizziness. Patient states that he started to feel dizzy this morning and fell at home. Patient states that he did lose consciousness. Patient has a history of stroke and heart attack.

## 2023-12-21 NOTE — ED NOTES
The following labs were labeled with appropriate pt sticker and tubed to lab:     [] Blue     [] Lavender   [] on ice  [] Green/yellow  [x] Green/black [] on ice  [] Joe Sellar  [] on ice  [] Yellow  [] Red  [] Pink  [] Type/ Screen  [] ABG  [] VBG    [] COVID-19 swab    [] Rapid  [] PCR  [] Flu swab  [] Peds Viral Panel     [] Urine Sample  [] Fecal Sample  [] Pelvic Cultures  [] Blood Cultures  [] X 2  [] STREP Cultures  [] Wound Cultures

## 2023-12-21 NOTE — ED NOTES
The following labs were labeled with appropriate pt sticker and tubed to lab:     [] Blue     [] Lavender   [] on ice  [x] Green/yellow  [] Green/black [] on ice  [] Janusz Acostafeld  [] on ice  [] Yellow  [] Red  [] Pink  [] Type/ Screen  [] ABG  [] VBG    [] COVID-19 swab    [] Rapid  [] PCR  [] Flu swab  [] Peds Viral Panel     [] Urine Sample  [] Fecal Sample  [] Pelvic Cultures  [] Blood Cultures  [] X 2  [] STREP Cultures  [] Wound Cultures

## 2023-12-21 NOTE — ED TRIAGE NOTES
Pt presents to ED from  home for dizziness, syncopal about 20 mins PTA. Pt denies n/v/d, , CP, SOB, fever, chills, injury/trauma, change in bowel/bladder function,  pain, or any other sx. Pt has cardiac hx, CVA hx.  PT has a defibrillator. Pt is A&OX4, placed on full monitor, EKG done, IV established, changed into gown and given warm blankets. Labs drawn, labeled, and sent to lab. Pt vitals WNL. White board updated, and patient is updated on plan of care. Will continue to monitor.

## 2023-12-21 NOTE — ED PROVIDER NOTES
[facility] ED  Emergency Department Encounter  Emergency Medicine Resident     Pt Name:Ethan Rios  MRN: [id number]  Birthdate 1965  Date of evaluation: 12/21/23  PCP:  Keagan Street MD  Note Started: 4:20 PM EST      CHIEF COMPLAINT       Chief Complaint   Patient presents with    Dizziness    Fall    Elbow Pain       HISTORY OF PRESENT ILLNESS  (Location/Symptom, Timing/Onset, Context/Setting, Quality, Duration, Modifying Factors, Severity.)      Ethan Rios is a 58 y.o. male who presents with syncope and elbow pain.  Patient states that just prior to arrival he was at his fridge and next he knew he was on the ground.  He denies any chest pain then or now, denies any abdominal pain, denies any shortness of breath.  Patient does have significant cardiac history including CABG in 2011. He was seen recently by cardiology for syncope and abnormal stress test, was meant to have cardiac cath however did have thrombocytopenia and leukocytosis-heme-onc was consulted, likely secondary to azathioprine which the patient is on for ANCA positive vasculitis, was discharged on steroid taper for 3 to 4 weeks back in September.  He was cleared for surgery from a heme-onc perspective patient does have multivessel disease.    Patient does also have history of previous V-fib arrest in 2022, status post AICD.  Patient does also have intra-aortic thrombus seen on CTA, patient is on anticoagulation candidate from vascular surgery perspective.     Patient does endorse decreased p.o. intake over the past few weeks, states that he does try to hydrate however its mostly with energy drinks.    PAST MEDICAL / SURGICAL / SOCIAL / FAMILY HISTORY      has a past medical history of ADHD (attention deficit hyperactivity disorder), Biceps rupture, distal, CAD (coronary artery disease), Cardiac arrest (HCC), Cardiac disease, Cardiac pacemaker, Cervical disc disease, Chest pain, Chronic right shoulder pain, Colon  indeterminate    Clinical Impression: No obvious STEMI. ECG is poor quality, has been repeated twice. This is the best quality ECG. Mary Perez MD     All EKG's are interpreted by the Emergency Department Physician who either signs or Co-signs this chart in the absence of a cardiologist.    EMERGENCY DEPARTMENT COURSE:  ED Course as of 12/21/23 1729   Thu Dec 21, 2023   1421 Sodium(!): 129 [KJ]   1421 Glucose, Random(!): 125  Baseline 135 [KJ]   1421 Creatinine(!): 1.6 [KJ]   1421 ARON [KJ]   1421 AST: 15 [KJ]   1421 ALT: 6 [KJ]   1421 Potassium: 4.4 [KJ]   1421 WBC: 3.8 [KJ]   1422 Hemoglobin Quant: 14.6 [KJ]   1422 Troponin, High Sensitivity: 17 [KJ]   1422 PLT PENDING [KJ]   1452 Platelet, Fluorescence: Platelet clumps present, count appears adequate. Adequate count, clumped [KJ]   1500 BP: 97/64 [KJ]   1500 Pulse: 66 [KJ]   1500 Respirations: 11 [KJ]   1500 SpO2: 98 % [KJ]   1536 Troponin, High Sensitivity: 17  Unchanged [KJ]   1536 CT HEAD WO CONTRAST  IMPRESSION:  1. No acute intracranial findings. 2. Stable areas of white matter hypoattenuation in the left cerebral  hemisphere which could represent prior white matter infarcts. [KJ]   1536 XR CHEST PORTABLE  IMPRESSION:  No acute cardiopulmonary disease. Similar appearance of mild bibasilar airspace opacities, likely related to  atelectasis or scarring/fibrosis. [KJ]   1614 XR ELBOW LEFT (MIN 3 VIEWS)  IMPRESSION:  No acute fracture or dislocation of the left elbow. [KJ]   4355 Patient updated [RJ]   9425 CT CERVICAL SPINE WO CONTRAST  IMPRESSION:  1. No acute osseous abnormality of the cervical spine. 2. Postsurgical changes related to prior ACDF from C4 through C7.  3. Severe spinal canal stenosis at C4-5 and C5-6.   [KJ]   1639 Will plan to admit patient, he is amenable.  [KJ]      ED Course User Index  [KJ] Mary Perez MD         IMAGING:  XR ELBOW LEFT (MIN 3 VIEWS)    Result Date: 12/21/2023  EXAMINATION: THREE XRAY VIEWS OF THE

## 2023-12-21 NOTE — ED NOTES
ED to inpatient nurses report    Chief Complaint   Patient presents with    Dizziness    Fall    Elbow Pain      Present to ED from home c/o nausea, abdominal pain, vomiting x 1 day. Hx of kidney transplant  LOC: alert and orientated to name, place, date  Vital signs   Vitals:    12/21/23 1515 12/21/23 1542 12/21/23 1543 12/21/23 1545   BP: 100/67      Pulse:  67 72 65   Resp:    19   Temp:       TempSrc:       SpO2: 94%  96% 93%   Weight:       Height:          Oxygen Baseline RA    Current needs required RA   LDAs:   Peripheral IV 12/21/23 Right Forearm (Active)   Site Assessment Clean, dry & intact 12/21/23 1342   Line Status Blood return noted; Flushed;Normal saline locked;Specimen collected 12/21/23 1342     Mobility: Independent  Pending ED orders:NA  Present condition: A&OX4, not tolerating PO, pt is resting on stretcher, NAD.    Code Status: [unfilled] FULL  Consults:  [x]  Hospitalist  Completed  [] yes [] no  []  Medicine  Completed  [] yes [] No  []  Cardiology  Completed  [] yes [] No  []  GI   Completed  [] yes [] No  []  Neurology  Completed  [] yes [] No  []  Nephrology Completed  [] yes [] No  []  Vascular  Completed  [] yes [] No   []  Surgery  Completed  [] yes [] No   []  Urology  Completed  [] yes [] No   []  Plastics  Completed  [] yes [] No   []  ENT  Completed  [] yes [] No   []  Other  Completed  [] yes [] No  Pertinent event(s)  Hx of kidney transplant, no longer on dialysis since transplant    Electronically signed by Ghazal Duarte RN on 12/21/2023 at 4:46 PM

## 2023-12-22 ENCOUNTER — APPOINTMENT (OUTPATIENT)
Age: 58
DRG: 204 | End: 2023-12-22
Payer: COMMERCIAL

## 2023-12-22 ENCOUNTER — APPOINTMENT (OUTPATIENT)
Dept: VASCULAR LAB | Age: 58
DRG: 204 | End: 2023-12-22
Attending: INTERNAL MEDICINE
Payer: COMMERCIAL

## 2023-12-22 PROBLEM — Z86.74 H/O CARDIAC ARREST: Status: ACTIVE | Noted: 2022-02-22

## 2023-12-22 PROBLEM — E43 SEVERE MALNUTRITION (HCC): Status: ACTIVE | Noted: 2023-12-22

## 2023-12-22 PROBLEM — J10.1 INFLUENZA A (H1N1): Status: ACTIVE | Noted: 2023-12-22

## 2023-12-22 LAB
ALBUMIN SERPL-MCNC: 3.2 G/DL (ref 3.5–5.2)
ALBUMIN/GLOB SERPL: 1.2 {RATIO} (ref 1–2.5)
ALP SERPL-CCNC: 85 U/L (ref 40–129)
ALT SERPL-CCNC: 8 U/L (ref 5–41)
ANION GAP SERPL CALCULATED.3IONS-SCNC: 12 MMOL/L (ref 9–17)
AST SERPL-CCNC: 30 U/L
B PARAP IS1001 DNA NPH QL NAA+NON-PROBE: NOT DETECTED
B PERT DNA SPEC QL NAA+PROBE: NOT DETECTED
BASOPHILS # BLD: <0.03 K/UL (ref 0–0.2)
BASOPHILS NFR BLD: 1 % (ref 0–2)
BILIRUB SERPL-MCNC: 0.6 MG/DL (ref 0.3–1.2)
BUN SERPL-MCNC: 11 MG/DL (ref 8–23)
C PNEUM DNA NPH QL NAA+NON-PROBE: NOT DETECTED
CALCIUM SERPL-MCNC: 8.1 MG/DL (ref 8.6–10.4)
CHLORIDE SERPL-SCNC: 100 MMOL/L (ref 98–107)
CO2 SERPL-SCNC: 22 MMOL/L (ref 20–31)
CORTIS SERPL-MCNC: 11.6 UG/DL (ref 2.5–19.5)
CREAT SERPL-MCNC: 1.2 MG/DL (ref 0.7–1.2)
EKG ATRIAL RATE: 277 BPM
EKG ATRIAL RATE: 416 BPM
EKG Q-T INTERVAL: 364 MS
EKG Q-T INTERVAL: 380 MS
EKG QRS DURATION: 78 MS
EKG QRS DURATION: 80 MS
EKG QTC CALCULATION (BAZETT): 375 MS
EKG QTC CALCULATION (BAZETT): 392 MS
EKG R AXIS: -31 DEGREES
EKG R AXIS: -33 DEGREES
EKG T AXIS: 41 DEGREES
EKG T AXIS: 51 DEGREES
EKG VENTRICULAR RATE: 64 BPM
EKG VENTRICULAR RATE: 64 BPM
EOSINOPHIL # BLD: <0.03 K/UL (ref 0–0.44)
EOSINOPHILS RELATIVE PERCENT: 0 % (ref 1–4)
ERYTHROCYTE [DISTWIDTH] IN BLOOD BY AUTOMATED COUNT: 14.4 % (ref 11.8–14.4)
FLUAV H1 2009 PAN RNA NPH NAA+NON-PROBE: DETECTED
FLUAV RNA NPH QL NAA+NON-PROBE: DETECTED
FLUBV RNA NPH QL NAA+NON-PROBE: NOT DETECTED
GFR SERPL CREATININE-BSD FRML MDRD: >60 ML/MIN/1.73M2
GLUCOSE SERPL-MCNC: 71 MG/DL (ref 70–99)
HADV DNA NPH QL NAA+NON-PROBE: NOT DETECTED
HCOV 229E RNA NPH QL NAA+NON-PROBE: NOT DETECTED
HCOV HKU1 RNA NPH QL NAA+NON-PROBE: NOT DETECTED
HCOV NL63 RNA NPH QL NAA+NON-PROBE: NOT DETECTED
HCOV OC43 RNA NPH QL NAA+NON-PROBE: NOT DETECTED
HCT VFR BLD AUTO: 43.8 % (ref 40.7–50.3)
HGB BLD-MCNC: 14.2 G/DL (ref 13–17)
HMPV RNA NPH QL NAA+NON-PROBE: NOT DETECTED
HPIV1 RNA NPH QL NAA+NON-PROBE: NOT DETECTED
HPIV2 RNA NPH QL NAA+NON-PROBE: NOT DETECTED
HPIV3 RNA NPH QL NAA+NON-PROBE: NOT DETECTED
HPIV4 RNA NPH QL NAA+NON-PROBE: NOT DETECTED
IMM GRANULOCYTES # BLD AUTO: <0.03 K/UL (ref 0–0.3)
IMM GRANULOCYTES NFR BLD: 0 %
LYMPHOCYTES NFR BLD: 0.81 K/UL (ref 1.1–3.7)
LYMPHOCYTES RELATIVE PERCENT: 22 % (ref 24–43)
M PNEUMO DNA NPH QL NAA+NON-PROBE: NOT DETECTED
MAGNESIUM SERPL-MCNC: 1.6 MG/DL (ref 1.6–2.4)
MCH RBC QN AUTO: 33.5 PG (ref 25.2–33.5)
MCHC RBC AUTO-ENTMCNC: 32.4 G/DL (ref 28.4–34.8)
MCV RBC AUTO: 103.3 FL (ref 82.6–102.9)
MONOCYTES NFR BLD: 0.44 K/UL (ref 0.1–1.2)
MONOCYTES NFR BLD: 12 % (ref 3–12)
NEUTROPHILS NFR BLD: 65 % (ref 36–65)
NEUTS SEG NFR BLD: 2.46 K/UL (ref 1.5–8.1)
NRBC BLD-RTO: 0 PER 100 WBC
PLATELET # BLD AUTO: 75 K/UL (ref 138–453)
PMV BLD AUTO: 11.5 FL (ref 8.1–13.5)
POTASSIUM SERPL-SCNC: 4.4 MMOL/L (ref 3.7–5.3)
PROCALCITONIN SERPL-MCNC: 0.09 NG/ML
PROT SERPL-MCNC: 5.8 G/DL (ref 6.4–8.3)
RBC # BLD AUTO: 4.24 M/UL (ref 4.21–5.77)
RBC # BLD: ABNORMAL 10*6/UL
RSV RNA NPH QL NAA+NON-PROBE: NOT DETECTED
RV+EV RNA NPH QL NAA+NON-PROBE: NOT DETECTED
SARS-COV-2 RNA NPH QL NAA+NON-PROBE: NOT DETECTED
SODIUM SERPL-SCNC: 134 MMOL/L (ref 135–144)
SPECIMEN DESCRIPTION: ABNORMAL
TSH SERPL DL<=0.05 MIU/L-ACNC: 1.26 UIU/ML (ref 0.27–4.2)
WBC OTHER # BLD: 3.8 K/UL (ref 3.5–11.3)

## 2023-12-22 PROCEDURE — 2580000003 HC RX 258: Performed by: FAMILY MEDICINE

## 2023-12-22 PROCEDURE — 70450 CT HEAD/BRAIN W/O DYE: CPT

## 2023-12-22 PROCEDURE — 2580000003 HC RX 258: Performed by: INTERNAL MEDICINE

## 2023-12-22 PROCEDURE — 6370000000 HC RX 637 (ALT 250 FOR IP): Performed by: INTERNAL MEDICINE

## 2023-12-22 PROCEDURE — 80053 COMPREHEN METABOLIC PANEL: CPT

## 2023-12-22 PROCEDURE — 36415 COLL VENOUS BLD VENIPUNCTURE: CPT

## 2023-12-22 PROCEDURE — 2060000000 HC ICU INTERMEDIATE R&B

## 2023-12-22 PROCEDURE — 99223 1ST HOSP IP/OBS HIGH 75: CPT | Performed by: PSYCHIATRY & NEUROLOGY

## 2023-12-22 PROCEDURE — 84145 PROCALCITONIN (PCT): CPT

## 2023-12-22 PROCEDURE — 93880 EXTRACRANIAL BILAT STUDY: CPT

## 2023-12-22 PROCEDURE — 0202U NFCT DS 22 TRGT SARS-COV-2: CPT

## 2023-12-22 PROCEDURE — 6370000000 HC RX 637 (ALT 250 FOR IP): Performed by: FAMILY MEDICINE

## 2023-12-22 PROCEDURE — 51798 US URINE CAPACITY MEASURE: CPT

## 2023-12-22 PROCEDURE — 6360000002 HC RX W HCPCS: Performed by: INTERNAL MEDICINE

## 2023-12-22 PROCEDURE — 6360000004 HC RX CONTRAST MEDICATION: Performed by: PSYCHIATRY & NEUROLOGY

## 2023-12-22 PROCEDURE — 99233 SBSQ HOSP IP/OBS HIGH 50: CPT | Performed by: FAMILY MEDICINE

## 2023-12-22 PROCEDURE — 85025 COMPLETE CBC W/AUTO DIFF WBC: CPT

## 2023-12-22 PROCEDURE — 51701 INSERT BLADDER CATHETER: CPT

## 2023-12-22 PROCEDURE — 87040 BLOOD CULTURE FOR BACTERIA: CPT

## 2023-12-22 PROCEDURE — 70498 CT ANGIOGRAPHY NECK: CPT

## 2023-12-22 RX ORDER — OSELTAMIVIR PHOSPHATE 6 MG/ML
75 FOR SUSPENSION ORAL 2 TIMES DAILY
Status: DISCONTINUED | OUTPATIENT
Start: 2023-12-22 | End: 2023-12-25 | Stop reason: HOSPADM

## 2023-12-22 RX ORDER — SODIUM CHLORIDE 9 MG/ML
INJECTION, SOLUTION INTRAVENOUS CONTINUOUS
Status: ACTIVE | OUTPATIENT
Start: 2023-12-22 | End: 2023-12-23

## 2023-12-22 RX ORDER — MIDODRINE HYDROCHLORIDE 5 MG/1
5 TABLET ORAL
Status: DISCONTINUED | OUTPATIENT
Start: 2023-12-22 | End: 2023-12-25 | Stop reason: HOSPADM

## 2023-12-22 RX ORDER — MEGESTROL ACETATE 40 MG/ML
200 SUSPENSION ORAL DAILY
Status: DISCONTINUED | OUTPATIENT
Start: 2023-12-22 | End: 2023-12-25 | Stop reason: HOSPADM

## 2023-12-22 RX ADMIN — APIXABAN 5 MG: 5 TABLET, FILM COATED ORAL at 07:47

## 2023-12-22 RX ADMIN — ASPIRIN 81 MG: 81 TABLET, COATED ORAL at 07:47

## 2023-12-22 RX ADMIN — CLOPIDOGREL BISULFATE 75 MG: 75 TABLET ORAL at 07:47

## 2023-12-22 RX ADMIN — ACETAMINOPHEN 650 MG: 325 TABLET ORAL at 07:47

## 2023-12-22 RX ADMIN — MIDODRINE HYDROCHLORIDE 5 MG: 5 TABLET ORAL at 16:24

## 2023-12-22 RX ADMIN — MIDODRINE HYDROCHLORIDE 5 MG: 5 TABLET ORAL at 12:11

## 2023-12-22 RX ADMIN — OSELTAMIVIR PHOSPHATE 75 MG: 6 POWDER, FOR SUSPENSION ORAL at 23:26

## 2023-12-22 RX ADMIN — ACETAMINOPHEN 650 MG: 325 TABLET ORAL at 16:24

## 2023-12-22 RX ADMIN — MEGESTROL ACETATE 200 MG: 400 SUSPENSION ORAL at 16:24

## 2023-12-22 RX ADMIN — IOPAMIDOL 75 ML: 755 INJECTION, SOLUTION INTRAVENOUS at 22:12

## 2023-12-22 RX ADMIN — OSELTAMIVIR PHOSPHATE 75 MG: 6 POWDER, FOR SUSPENSION ORAL at 13:42

## 2023-12-22 RX ADMIN — APIXABAN 5 MG: 5 TABLET, FILM COATED ORAL at 22:43

## 2023-12-22 RX ADMIN — ONDANSETRON 4 MG: 2 INJECTION INTRAMUSCULAR; INTRAVENOUS at 10:42

## 2023-12-22 RX ADMIN — DULOXETINE HYDROCHLORIDE 30 MG: 30 CAPSULE, DELAYED RELEASE ORAL at 07:52

## 2023-12-22 RX ADMIN — SODIUM CHLORIDE: 9 INJECTION, SOLUTION INTRAVENOUS at 16:33

## 2023-12-22 RX ADMIN — SODIUM CHLORIDE, PRESERVATIVE FREE 10 ML: 5 INJECTION INTRAVENOUS at 22:45

## 2023-12-22 RX ADMIN — SODIUM CHLORIDE, PRESERVATIVE FREE 10 ML: 5 INJECTION INTRAVENOUS at 07:47

## 2023-12-22 ASSESSMENT — PAIN DESCRIPTION - FREQUENCY: FREQUENCY: INTERMITTENT

## 2023-12-22 ASSESSMENT — PAIN DESCRIPTION - ORIENTATION: ORIENTATION: MID

## 2023-12-22 ASSESSMENT — PAIN SCALES - GENERAL
PAINLEVEL_OUTOF10: 0
PAINLEVEL_OUTOF10: 0
PAINLEVEL_OUTOF10: 5

## 2023-12-22 ASSESSMENT — PAIN DESCRIPTION - ONSET: ONSET: ON-GOING

## 2023-12-22 ASSESSMENT — PAIN - FUNCTIONAL ASSESSMENT: PAIN_FUNCTIONAL_ASSESSMENT: ACTIVITIES ARE NOT PREVENTED

## 2023-12-22 ASSESSMENT — PAIN DESCRIPTION - LOCATION: LOCATION: CHEST

## 2023-12-22 ASSESSMENT — PAIN DESCRIPTION - DESCRIPTORS: DESCRIPTORS: ACHING;SORE

## 2023-12-22 ASSESSMENT — PAIN DESCRIPTION - PAIN TYPE: TYPE: CHRONIC PAIN

## 2023-12-22 NOTE — CONSULTS
[facility]    Consult / H&P               Today's Date: 12/22/2023  Patient Name: Radames Grier  Date of admission: 12/21/2023 12:47 PM  Patient's age: 59 y.o., 1965  Admission Dx: Syncope and collapse [R55]  Hyponatremia [E87.1]  Hypotension [I95.9]  ARON (acute kidney injury) ([address]) [N17.9]    Requesting Physician: No admitting provider for patient encounter. Cardiac Evaluation Reason: Syncope    History Obtained From: patient and chart review     History of Present Illness: This patient 59y.o. years old male with past medical history as below. Patient presented to hospital for evaluation of syncopal episode. Patient states that he was standing close to his friends and next thing he remembers he was on the ground. He does report feeling dizziness before passing out. States that he was out for few seconds until woken up by the family. He reports that he took double doses of his medications by mistake. Cardiology has been consulted to evaluate for syncope. Patient recently was evaluated by cardiology for syncopal episode, a stress test was done that was abnormal and it was followed up with cardiac cath that did not show any blockages with detailed report below. Patient does have history of V-fib cardiac arrest and has AICD in place. Patient's blood pressure was low on arrival.  Suspect hypotension as cause of presyncope/syncope.     Past Medical History:   has a past medical history of ADHD (attention deficit hyperactivity disorder), Biceps rupture, distal, CAD (coronary artery disease), Cardiac arrest [facility]), Cardiac disease, Cardiac pacemaker, Cervical disc disease, Chest pain, Chronic right shoulder pain, Colon cancer screening, Constipation, COPD (chronic obstructive pulmonary disease) ([address]), Cord compression ([address]) s/p decompression C5-6 CORPECTOMY; C4-7 FUSION 5/17/16, Encounter for implantable cardioverter-defibrillator discussion, GERD (gastroesophageal reflux

## 2023-12-22 NOTE — CONSULTS
Endovascular Neurosurgery Consult      Reason for evaluation: R ICA stenosis 80-90% based on carotid US    SUBJECTIVE:   History of Chief Complaint:      59year old y/o with HTN, CAD s/p CABG in 2011, V. Fib PEA in 02/2022, AICD on 07/20, CKD stage III, s/p L ICA stent in 7/31/2022 by Dr Roque Later. He also has thrombocytopenia. 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 His low PLTs were presumed to be medication induced. After completion of meds, his heme onc doc cleared him for PCI which he had scheduled in near future. In regards to his syncope, no prodrome, no post ictal state, no tongue biting, no incontinence. No head trauma reported. He did hurt his elbow on fridge this am when he fainted bending into his refrigerator. Of which he reports syncope is often position. But in general he feels lightheaded often. Patient does also have distal abdominal aortic thrombus seen on CTA abd on the 9/9/2023 and is on eliquis for that and managed by vascular surgery. Patient is tested positive for influenza. Endovascular consult    Allergies  is allergic to morphine. Medications  Prior to Admission medications    Medication Sig Start Date End Date Taking? Authorizing Provider   Skin Protectants, Misc. (EUCERIN) cream Apply topically as needed.  12/15/23   Amber Woods MD   azaTHIOprine (IMURAN) 50 MG tablet TAKE 1 TABLET BY MOUTH DAILY 11/7/23   Ulisses Armenta MD   carvedilol (COREG) 25 MG tablet TAKE 1 TABLET BY MOUTH TWICE A DAY 11/6/23   Gonzales Barron, APRN - NP   DULoxetine (CYMBALTA) 30 MG extended release capsule TAKE 1 CAPSULE BY MOUTH DAILY 11/6/23   Jaret Perez MD   hydrocortisone 1 % cream APPLY TOPICALLY TO AFFECTED AREAS TWICE A DAY 11/6/23   Jaret Perez MD   nitroGLYCERIN (NITROSTAT) 0.4 MG SL tablet Place 1 tablet under the tongue every 5 minutes as needed for Chest pain up to max of 3 total surgical history that includes Coronary artery bypass graft (12/2011); Lung surgery (1982); Upper gastrointestinal endoscopy (06/29/2015); Cervical spine surgery (05/19/2016); back surgery; Shoulder arthroscopy (Right, 09/12/2016); Colonoscopy (N/A, 07/30/2019); Cardiac surgery; Cardiac catheterization (10/30/2018); Foot surgery (Left); Cystoscopy (N/A, 02/18/2020); Upper gastrointestinal endoscopy (N/A, 03/06/2020); CT BIOPSY RENAL (07/30/2020); Pacemaker insertion; and Cardiac procedure (N/A, 10/26/2023).  Social History   reports that he has been smoking cigarettes. He has a 20.0 pack-year smoking history. He has never used smokeless tobacco.   reports no history of alcohol use.   reports that he does not currently use drugs.  Family History  family history includes Anxiety Disorder in his sister; Depression in his brother, sister, and sister; Diabetes in his father; Heart Disease in his father, maternal grandmother, and mother; High Blood Pressure in his father, mother, sister, and sister; Lung Cancer in his father and mother; Thyroid Disease in his sister.    Review of Systems:  CONSTITUTIONAL:  negative for fevers, chills, fatigue and malaise    EYES:  negative for double vision, blurred vision and photophobia     HEENT:  negative for tinnitus, epistaxis and sore throat    RESPIRATORY:  negative for cough, shortness of breath, wheezing    CARDIOVASCULAR:  negative for chest pain, palpitations, syncope, edema    GASTROINTESTINAL:  negative for nausea, vomiting    GENITOURINARY:  negative for incontinence    MUSCULOSKELETAL:  negative for neck or back pain    NEUROLOGICAL:  Negative for weakness and tingling  negative for headaches and dizziness    PSYCHIATRIC:  negative for anxiety      Review of systems otherwise negative.      OBJECTIVE:     Vitals:    12/22/23 1604   BP:    Pulse:    Resp:    Temp:    SpO2: 93%        General:  Gen: normal habitus, NAD  HEENT: NCAT, mucosa moist  Cvs: RRR, S1 S2

## 2023-12-22 NOTE — PLAN OF CARE
Problem: Discharge Planning  Goal: Discharge to home or other facility with appropriate resources  Outcome: Progressing     Problem: Pain  Goal: Verbalizes/displays adequate comfort level or baseline comfort level  Outcome: Progressing     Problem: Chronic Conditions and Co-morbidities  Goal: Patient's chronic conditions and co-morbidity symptoms are monitored and maintained or improved  Outcome: Progressing     Problem: Safety - Adult  Goal: Free from fall injury  Outcome: Progressing     Problem: Nutrition Deficit:  Goal: Optimize nutritional status  Outcome: Progressing

## 2023-12-22 NOTE — H&P
[facility]  Office: [id number]26, DO, Jignesh Rodriguez, DO, Darbyevelia Martinez, DO, Cathrynmehnaz Dumont Blood, DO, Donovan Perez MD, Indu Turner MD, Cami Simental MD, Gatito Jimenez MD,  Luz Mcneal MD, Cate Leslie MD, Pauline Garcia MD,  Beth Antonio MD, Monse Sullivan MD, Gilson Hyatt, DO, Gretta Simms MD,  Silas Riedel, DO, Arabella Hernandez MD, Yuki Cade MD, Sara Mason MD, Justyna Ward MD,  Sumit Meléndez MD, Chitra Fernandez MD, mEily Bender MD, Sheila Bennett MD, Sangita Hunter MD, Negro Perez MD, Jimbo Velasquez, DO, Noris Lundberg, DO, Nita Sanders MD,  Marisela Aguila MD, Kika Stauffer, CNP,  Titus Flood, CNP, Hamilton Severino, CNP,  Alexandra Powell, DNP, Rufino Nava, CNP, Jadon Bragg, CNP, Severiano Schilder, CNP, Elmira Lanza, CNP, Hulan Skiff, CNP, Lewis Castillo PA-C, Vane Winters PA-C, Corie, CNP, Marcella Morin, CNS, Wilmer Evangelista, CNP, Gaby Chapa, CNP, Jayjay Machado, CNP         [facility]   [address] [facility]    HISTORY AND PHYSICAL EXAMINATION            Date:   12/21/2023  Patient name:  Dori Rod  Date of admission:  12/21/2023 12:47 PM  MRN:   [id number]  Account:  [de-identified]  YOB: 1965  PCP:    Ellis Parks MD  Room:   2019/2019-01  Code Status:    Full Code    Chief Complaint:     Chief Complaint   Patient presents with    Dizziness    Fall    Elbow Pain       History Obtained From:     patient    History of Present Illness:     Dori Rod is a 59 y.o. male who presents with syncope. He has significant cardiac hx including intramural thrombus on [facility] pending vascular surgery. Says this month he had multiple syncopal effects. He reports significant cardiac history but sys no cp, no sob, no leg pain or edema. Last month he had ischemic work up for syncope with plans for cath/pci but canceled due to thrombocytopenia.  Work up at that time was wheezing  CARDIOVASCULAR:  negative for chest pain, palpitations  GASTROINTESTINAL:  negative for nausea, vomiting, diarrhea, constipation, change in bowel habits, abdominal pain   GENITOURINARY:  negative for difficulty of urination, burning with urination, frequency   INTEGUMENT:  negative for rash, skin lesions, easy bruising   HEMATOLOGIC/LYMPHATIC:  negative for swelling/edema   ALLERGIC/IMMUNOLOGIC:  negative for urticaria , itching  ENDOCRINE:  negative increase in drinking, increase in urination, hot or cold intolerance  MUSCULOSKELETAL:  negative joint pains, muscle aches, swelling of joints  NEUROLOGICAL:  negative for headaches, dizziness, lightheadedness, numbness, pain, tingling extremities  BEHAVIOR/PSYCH:  negative for depression, anxiety    Physical Exam:   /70   Pulse 70   Temp 97.3 °F (36.3 °C) (Oral)   Resp 19   Ht 1.753 m (5' 9\")   Wt 72.6 kg (160 lb)   SpO2 95%   BMI 23.63 kg/m²   Temp (24hrs), Av.3 °F (36.3 °C), Min:97.3 °F (36.3 °C), Max:97.3 °F (36.3 °C)    Recent Labs     23  1317   POCGLU 131*       Intake/Output Summary (Last 24 hours) at 2023  Last data filed at 2023 1626  Gross per 24 hour   Intake --   Output 275 ml   Net -275 ml       General Appearance: ao3, mild dysarthria, mold right sided weakness. No distress.   Mental status: oriented to person, place, and time  Head: normocephalic, atraumatic  Eye: no icterus, redness, pupils equal and reactive, extraocular eye movements intact, conjunctiva clear  Ear: normal external ear, no discharge, hearing intact  Nose: no drainage noted  Mouth: mucous membranes dry  Neck: supple, carotid bruit.   Lungs: Bilateral equal air entry, clear to ausculation, no wheezing, fine crackles in bases.  Cardiovascular: normal rate, regular rhythm, no murmur, gallop, rub  Abdomen: Soft, nontender, nondistended, normal bowel sounds, no hepatomegaly or splenomegaly  Neurologic: There are no new focal motor or sensory

## 2023-12-22 NOTE — PROGRESS NOTES
Comprehensive Nutrition Assessment    Type and Reason for Visit:  Positive Nutrition Screen    Nutrition Recommendations/Plan:   Continue current diet, keep liberalized  Recommend appetite stimulant  Patient declined ONS at this time  S/S malnutrition, dx added     Malnutrition Assessment:  Malnutrition Status:  Severe malnutrition (12/22/23 1407)    Context:  Chronic Illness     Findings of the 6 clinical characteristics of malnutrition:  Energy Intake:  75% or less estimated energy requirements for 1 month or longer  Weight Loss:  Greater than 20% over 1 year     Body Fat Loss:  Severe body fat loss Orbital, Triceps   Muscle Mass Loss:  Severe muscle mass loss Temples (temporalis), Hand (interosseous)  Fluid Accumulation:  No significant fluid accumulation     Strength:  Not Performed    Nutrition Assessment:    Patient seen for reported weight loss. Admitted for ARON with PMH major depressive disorder, moderate malnutrition (2020), T2DM. Patient currently does not have high blood sugars, no insulin in place. Carb control diet removed. Per EHR, patient has lost 22 lb (12%) in the last 3 months and 46 lb (22%) in last year. He reports this weight loss is r/t poor appetite, no interest in food. Upon visit with patient, muscle and fat wasting observed to face and arms. Discussed ONS options with patient, he declines ONS at this time. He is agreeable to appetite stimulant if attending physician feels it is appropriate. Notified Attending via perfect serve. Na 134, Ca 8.1. Meds reviewed. Nutrition Related Findings:    Labs/meds reviewed. Wound Type: None       Current Nutrition Intake & Therapies:    Average Meal Intake: 1-25%  Average Supplements Intake: None Ordered  ADULT DIET; Regular    Anthropometric Measures:  Height: 175.3 cm (5' 9.02\")  Ideal Body Weight (IBW): 160 lbs (73 kg)       Current Body Weight: 73.1 kg (161 lb 2.5 oz), 100.7 % IBW.  Weight Source: Bed Scale  Current BMI (kg/m2): 23.8

## 2023-12-22 NOTE — CARE COORDINATION
.Case Management Assessment  Initial Evaluation    Date/Time of Evaluation: 12/22/2023 11:23 AM  Assessment Completed by: Keehsa Moore RN    If patient is discharged prior to next notation, then this note serves as note for discharge by case management. Patient Name: Radames Grier                   YOB: 1965  Diagnosis: Syncope and collapse [R55]  Hyponatremia [E87.1]  Hypotension [I95.9]  ARON (acute kidney injury) ([address]) [N17.9]                   Date / Time: 12/21/2023 12:47 PM    Patient Admission Status: Inpatient   Readmission Risk (Low < 19, Mod (19-27), High > 27): Readmission Risk Score: 15.9    Current PCP: Karan Rocha MD  PCP verified by CM? (P) Yes    Chart Reviewed: Yes      History Provided by: (P) Patient  Patient Orientation: (P) Alert and Oriented    Patient Cognition: (P) Alert    Hospitalization in the last 30 days (Readmission):  No    If yes, Readmission Assessment in CM Navigator will be completed.     Advance Directives:      Code Status: Full Code   Patient's Primary Decision Maker is: (P) Legal Next of Kin    Primary Decision Maker: Rolo Roberts Niece/Nephew - [phone]    Discharge Planning:    Patient lives with: (P) Family Members Type of Home: (P) House  Primary Care Giver: (P) Self  Patient Support Systems include: (P) Family Members   Current Financial resources: (P) Medicaid  Current community resources: (P) None  Current services prior to admission: (P) Home Care            Current DME:              Type of Home Care services:  (P) Nursing Services    ADLS  Prior functional level: (P) Independent in ADLs/IADLs  Current functional level: (P) Feeding    PT AM-PAC:   /24  OT AM-PAC:   /24    Family can provide assistance at DC: (P) Yes  Would you like Case Management to discuss the discharge plan with any other family members/significant others, and if so, who? (P) Yes  Plans to Return to Present Housing: (P) Yes  Other Identified Issues/Barriers to RETURNING to

## 2023-12-22 NOTE — PLAN OF CARE
Problem: Discharge Planning  Goal: Discharge to home or other facility with appropriate resources  12/22/2023 0940 by Henrry Harry RN  Outcome: Progressing  Flowsheets (Taken 12/22/2023 9430)  Discharge to home or other facility with appropriate resources: Identify barriers to discharge with patient and caregiver  12/21/2023 2218 by Sammy Weldon RN  Outcome: Progressing     Problem: Pain  Goal: Verbalizes/displays adequate comfort level or baseline comfort level  12/22/2023 0940 by Henrry Harry RN  Outcome: Progressing  12/21/2023 2218 by Sammy Weldon RN  Outcome: Progressing     Problem: Chronic Conditions and Co-morbidities  Goal: Patient's chronic conditions and co-morbidity symptoms are monitored and maintained or improved  12/22/2023 0940 by Henrry Harry RN  Outcome: Progressing  Flowsheets (Taken 12/22/2023 0746)  Care Plan - Patient's Chronic Conditions and Co-Morbidity Symptoms are Monitored and Maintained or Improved:   Monitor and assess patient's chronic conditions and comorbid symptoms for stability, deterioration, or improvement   Collaborate with multidisciplinary team to address chronic and comorbid conditions and prevent exacerbation or deterioration   Update acute care plan with appropriate goals if chronic or comorbid symptoms are exacerbated and prevent overall improvement and discharge  12/21/2023 2218 by Sammy Weldon RN  Outcome: Progressing     Problem: Safety - Adult  Goal: Free from fall injury  12/22/2023 0940 by Henrry Harry RN  Outcome: Progressing  12/21/2023 2218 by Sammy Weldon RN  Outcome: Progressing     Problem: Nutrition Deficit:  Goal: Optimize nutritional status  12/22/2023 0940 by Henrry Harry RN  Outcome: Progressing  12/21/2023 2218 by Sammy Weldon RN  Outcome: Progressing

## 2023-12-22 NOTE — PROGRESS NOTES
[facility]  Office: [phone]  Oscar Dia DO, Ethan Llanes DO, Mateo Ward DO, Sukhwinder Aolnzo DO, Enid Beard MD, Ritika Garcia MD, Carmen Thomson MD, Marta Altamirano MD,  Nick Hughes MD, Vernon Story MD, Isamar Duron MD,  Pablo Davis DO, Emmanuel Warner MD, Jay Ortiz MD, Carrington Dia DO, Asha Ny MD,  Dawood Brown DO, Leela Clark MD, Maria T Connor MD, Alivia Wild MD, Fletcher Peterson MD,  Torin Lorenzana MD, Elidia Larios MD, Jae Fallon MD, Neo Aranda MD, Jaison Betancourt MD, Isai Bynum MD, Dionte Bueno DO, Long Calhoun DO, Josiane Rawls MD,  Thong Moreno MD, Shirley Waterhouse, CNP,  Payton Martinez, CNP, Oscar Hyde, CNP,  Donna Armenta, DNP, Eusebia Ernst, CNP, Zamzam Rios, CNP, Jennifer Owens CNP, Jeannie Bautista, CNP, Shital Streeter, CNP, Greer Sheikh, PA-C, Vane Winters, PA-C, Joyce Tamez, CNP, Ariadna Sanders, CNS, Gely Andrade, CNP, Cherise Wright, CNP, Tracy Schwab, CNP         [facility]   IN-PATIENT SERVICE   [facility]    Progress Note    12/22/2023    3:35 PM    Name:   Ethan Rios  MRN:     [id number]     Acct:      [id number]   Room:   2019/2019-01  IP Day:  1  Admit Date:  12/21/2023 12:47 PM    PCP:   Keagan Street MD  Code Status:  Full Code    Subjective:     C/C:   Chief Complaint   Patient presents with    Dizziness    Fall    Elbow Pain     Interval History Status: Slightly worsened    Patient seen and examined at bedside, he developed a fever overnight with Tmax of 101.1, feeling very tired and sick some what nauseous after couple of bites of his breakfast  Apparently overall his been having very poor appetite and lost quite a bit of his weight  Blood pressure is low, he is needing oxygen overnight but not during the day  Patient vitals, labs and all providers notes were reviewed,from overnight shift and morning updates were noted and discussed with the  evaluation, reached out to fellow and confirmed consultation     Influenza A: Positive on swab from this morning, initiate Tamiflu    Ischemic cardiomyopathy / History of V-fib arrest: S/p ICD placement 7/2022  Interrogate ICD    History of intramural aortic thrombus, was evaluated by vascular surgery at that time and anticoagulation was not recommended    History of CVA: On statin and antiplatelets    CKD 3: Continue to avoid nephrotoxic agents     Essential hypertension: Patient is hypotensive, continue to hold all antihypertensives , will add midodrine    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 Now being maintained with Imuran.      Blood CAD with history of CABG : Continue appropriate chronic meds     Gastroparesis    Severe malnutrition : Dietitian consultation, boost added still with poor intake, added appetite stimulant     Ex-smoker     COPD     Addendum: Carotid ultrasound  came back with critical right-sided stenosis and patent left ICA stent, reached out to neuroendovascular for formal evaluation    Patient condition is complex and  critical , continue to monitor very closely        Briana Kc MD  12/22/2023  3:35 PM

## 2023-12-23 PROBLEM — Z95.828 PRESENCE OF INTERNAL CAROTID STENT: Status: ACTIVE | Noted: 2023-12-23

## 2023-12-23 PROBLEM — I65.21 ASYMPTOMATIC STENOSIS OF RIGHT CAROTID ARTERY: Status: ACTIVE | Noted: 2023-12-23

## 2023-12-23 LAB
ALBUMIN SERPL-MCNC: 3.1 G/DL (ref 3.5–5.2)
ALBUMIN/GLOB SERPL: 1.2 {RATIO} (ref 1–2.5)
ALP SERPL-CCNC: 79 U/L (ref 40–129)
ALT SERPL-CCNC: 8 U/L (ref 5–41)
ANION GAP SERPL CALCULATED.3IONS-SCNC: 7 MMOL/L (ref 9–17)
AST SERPL-CCNC: 30 U/L
BASOPHILS # BLD: 0.02 K/UL (ref 0–0.2)
BASOPHILS NFR BLD: 1 % (ref 0–2)
BILIRUB SERPL-MCNC: 0.4 MG/DL (ref 0.3–1.2)
BILIRUB UR QL STRIP: NEGATIVE
BUN SERPL-MCNC: 13 MG/DL (ref 8–23)
CALCIUM SERPL-MCNC: 8.1 MG/DL (ref 8.6–10.4)
CHLORIDE SERPL-SCNC: 98 MMOL/L (ref 98–107)
CLARITY UR: CLEAR
CO2 SERPL-SCNC: 24 MMOL/L (ref 20–31)
COLOR UR: YELLOW
COMMENT: ABNORMAL
CREAT SERPL-MCNC: 1 MG/DL (ref 0.7–1.2)
CRP SERPL HS-MCNC: 22 MG/L (ref 0–5)
EOSINOPHIL # BLD: 0 K/UL (ref 0–0.4)
EOSINOPHILS RELATIVE PERCENT: 0 % (ref 1–4)
ERYTHROCYTE [DISTWIDTH] IN BLOOD BY AUTOMATED COUNT: 14.1 % (ref 11.8–14.4)
GFR SERPL CREATININE-BSD FRML MDRD: >60 ML/MIN/1.73M2
GLUCOSE SERPL-MCNC: 83 MG/DL (ref 70–99)
GLUCOSE UR STRIP-MCNC: NEGATIVE MG/DL
HCT VFR BLD AUTO: 44.6 % (ref 40.7–50.3)
HGB BLD-MCNC: 14.9 G/DL (ref 13–17)
HGB UR QL STRIP.AUTO: NEGATIVE
IMM GRANULOCYTES # BLD AUTO: 0 K/UL (ref 0–0.3)
IMM GRANULOCYTES NFR BLD: 0 %
KETONES UR STRIP-MCNC: NEGATIVE MG/DL
LEUKOCYTE ESTERASE UR QL STRIP: NEGATIVE
LYMPHOCYTES NFR BLD: 0.61 K/UL (ref 1–4.8)
LYMPHOCYTES RELATIVE PERCENT: 29 % (ref 24–44)
MCH RBC QN AUTO: 33.4 PG (ref 25.2–33.5)
MCHC RBC AUTO-ENTMCNC: 33.4 G/DL (ref 28.4–34.8)
MCV RBC AUTO: 100 FL (ref 82.6–102.9)
MONOCYTES NFR BLD: 0.23 K/UL (ref 0.1–0.8)
MONOCYTES NFR BLD: 11 % (ref 1–7)
MORPHOLOGY: NORMAL
NEUTROPHILS NFR BLD: 59 % (ref 36–66)
NEUTS SEG NFR BLD: 1.24 K/UL (ref 1.8–7.7)
NITRITE UR QL STRIP: NEGATIVE
NRBC BLD-RTO: 0 PER 100 WBC
PH UR STRIP: 6 [PH] (ref 5–8)
PLATELET # BLD AUTO: 69 K/UL (ref 138–453)
PMV BLD AUTO: 12.1 FL (ref 8.1–13.5)
POTASSIUM SERPL-SCNC: 4.1 MMOL/L (ref 3.7–5.3)
PROT SERPL-MCNC: 5.7 G/DL (ref 6.4–8.3)
PROT UR STRIP-MCNC: NEGATIVE MG/DL
RBC # BLD AUTO: 4.46 M/UL (ref 4.21–5.77)
SODIUM SERPL-SCNC: 129 MMOL/L (ref 135–144)
SP GR UR STRIP: 1.04 (ref 1–1.03)
UROBILINOGEN UR STRIP-ACNC: ABNORMAL EU/DL (ref 0–1)
WBC OTHER # BLD: 2.1 K/UL (ref 3.5–11.3)

## 2023-12-23 PROCEDURE — 2060000000 HC ICU INTERMEDIATE R&B

## 2023-12-23 PROCEDURE — 6370000000 HC RX 637 (ALT 250 FOR IP): Performed by: INTERNAL MEDICINE

## 2023-12-23 PROCEDURE — 99233 SBSQ HOSP IP/OBS HIGH 50: CPT | Performed by: PSYCHIATRY & NEUROLOGY

## 2023-12-23 PROCEDURE — 6370000000 HC RX 637 (ALT 250 FOR IP): Performed by: FAMILY MEDICINE

## 2023-12-23 PROCEDURE — 81003 URINALYSIS AUTO W/O SCOPE: CPT

## 2023-12-23 PROCEDURE — 85025 COMPLETE CBC W/AUTO DIFF WBC: CPT

## 2023-12-23 PROCEDURE — 80053 COMPREHEN METABOLIC PANEL: CPT

## 2023-12-23 PROCEDURE — 99254 IP/OBS CNSLTJ NEW/EST MOD 60: CPT | Performed by: INTERNAL MEDICINE

## 2023-12-23 PROCEDURE — 2580000003 HC RX 258: Performed by: INTERNAL MEDICINE

## 2023-12-23 PROCEDURE — 36415 COLL VENOUS BLD VENIPUNCTURE: CPT

## 2023-12-23 PROCEDURE — 99232 SBSQ HOSP IP/OBS MODERATE 35: CPT | Performed by: FAMILY MEDICINE

## 2023-12-23 RX ORDER — TRAMADOL HYDROCHLORIDE 50 MG/1
50 TABLET ORAL EVERY 6 HOURS PRN
Status: DISCONTINUED | OUTPATIENT
Start: 2023-12-23 | End: 2023-12-25 | Stop reason: HOSPADM

## 2023-12-23 RX ORDER — CARVEDILOL 3.12 MG/1
3.12 TABLET ORAL 2 TIMES DAILY WITH MEALS
Status: DISCONTINUED | OUTPATIENT
Start: 2023-12-23 | End: 2023-12-24

## 2023-12-23 RX ADMIN — TRAMADOL HYDROCHLORIDE 50 MG: 50 TABLET, COATED ORAL at 17:33

## 2023-12-23 RX ADMIN — APIXABAN 5 MG: 5 TABLET, FILM COATED ORAL at 09:26

## 2023-12-23 RX ADMIN — OSELTAMIVIR PHOSPHATE 75 MG: 6 POWDER, FOR SUSPENSION ORAL at 21:53

## 2023-12-23 RX ADMIN — TRAMADOL HYDROCHLORIDE 50 MG: 50 TABLET, COATED ORAL at 11:21

## 2023-12-23 RX ADMIN — ASPIRIN 81 MG: 81 TABLET, COATED ORAL at 09:26

## 2023-12-23 RX ADMIN — SODIUM CHLORIDE, PRESERVATIVE FREE 10 ML: 5 INJECTION INTRAVENOUS at 20:51

## 2023-12-23 RX ADMIN — OSELTAMIVIR PHOSPHATE 75 MG: 6 POWDER, FOR SUSPENSION ORAL at 11:21

## 2023-12-23 RX ADMIN — CARVEDILOL 3.12 MG: 3.12 TABLET, FILM COATED ORAL at 16:26

## 2023-12-23 RX ADMIN — MIDODRINE HYDROCHLORIDE 5 MG: 5 TABLET ORAL at 09:26

## 2023-12-23 RX ADMIN — CLOPIDOGREL BISULFATE 75 MG: 75 TABLET ORAL at 09:26

## 2023-12-23 RX ADMIN — SODIUM CHLORIDE, PRESERVATIVE FREE 10 ML: 5 INJECTION INTRAVENOUS at 09:27

## 2023-12-23 RX ADMIN — APIXABAN 5 MG: 5 TABLET, FILM COATED ORAL at 20:51

## 2023-12-23 RX ADMIN — MEGESTROL ACETATE 200 MG: 400 SUSPENSION ORAL at 11:21

## 2023-12-23 RX ADMIN — DULOXETINE HYDROCHLORIDE 30 MG: 30 CAPSULE, DELAYED RELEASE ORAL at 11:21

## 2023-12-23 ASSESSMENT — PAIN SCALES - GENERAL
PAINLEVEL_OUTOF10: 0
PAINLEVEL_OUTOF10: 0
PAINLEVEL_OUTOF10: 6
PAINLEVEL_OUTOF10: 0
PAINLEVEL_OUTOF10: 6

## 2023-12-23 ASSESSMENT — PAIN DESCRIPTION - DESCRIPTORS
DESCRIPTORS: DISCOMFORT
DESCRIPTORS: ACHING

## 2023-12-23 ASSESSMENT — PAIN - FUNCTIONAL ASSESSMENT
PAIN_FUNCTIONAL_ASSESSMENT: ACTIVITIES ARE NOT PREVENTED
PAIN_FUNCTIONAL_ASSESSMENT: ACTIVITIES ARE NOT PREVENTED

## 2023-12-23 ASSESSMENT — PAIN DESCRIPTION - LOCATION
LOCATION: SHOULDER
LOCATION: SHOULDER

## 2023-12-23 ASSESSMENT — PAIN DESCRIPTION - ORIENTATION
ORIENTATION: RIGHT
ORIENTATION: RIGHT

## 2023-12-23 NOTE — PLAN OF CARE
Problem: Discharge Planning  Goal: Discharge to home or other facility with appropriate resources  Outcome: Progressing  Flowsheets (Taken 12/23/2023 0800)  Discharge to home or other facility with appropriate resources: Identify barriers to discharge with patient and caregiver     Problem: Pain  Goal: Verbalizes/displays adequate comfort level or baseline comfort level  Outcome: Progressing     Problem: Chronic Conditions and Co-morbidities  Goal: Patient's chronic conditions and co-morbidity symptoms are monitored and maintained or improved  Outcome: Progressing  Flowsheets (Taken 12/23/2023 0800)  Care Plan - Patient's Chronic Conditions and Co-Morbidity Symptoms are Monitored and Maintained or Improved: Monitor and assess patient's chronic conditions and comorbid symptoms for stability, deterioration, or improvement     Problem: Safety - Adult  Goal: Free from fall injury  Outcome: Progressing     Problem: Nutrition Deficit:  Goal: Optimize nutritional status  Outcome: Progressing

## 2023-12-23 NOTE — PROGRESS NOTES
[facility]  Office: [phone]  Dee Dee Blanco, DO, Primo Summers, DO, Vita Gordillo, DO, Annette Overton Blood, DO, Bhargav Sanchez MD, Juan Jimenez MD, Dasia Ash MD, Piter Mujica MD,  Kyle Noble MD, Tammie Schultz MD, Valentino Taylor MD,  Sammy Whitfield MD, Mahendra Ruiz MD, Jacobo Lowery DO, Genoveva Britton MD,  Ana Heller DO, Angelo Gutiérrez MD, Debbi Man MD, Jose Ramon Murray MD, Sarah Robert MD,  Delmar Landry MD, Iram Gonzalez MD, Lam Wagner MD, Hannah Hicks MD, Kristi Puckett MD, Jc Capps MD, Yanira Mendes, DO, Renee Mcgregor DO, Oscar Solares MD,  Ganga Montelongo MD, Hasmukh Almeida, CNP,  Jerdo Mike, CNP, Cheli Bowens, CNP,  Belinda Tucker, [facility], Charles Mars, [facility], Yasmine Pate, CNP, Dasia Smith, CNP, Darius Ramos, CNP, Nara Flowers, CNP, Lucero Kelly PA-C, Vane Winters PA-C, Corie, CNP, Lyric Mares, CNS, Solo Sheffield, CNP, Bibi King, CNP, Luis Carlos Keller, [address]    Progress Note    12/23/2023    7:36 AM    Name:   Radames Grier  MRN:     [id number]     Acct:      [de-identified]   Room:   2019/2019-01  IP Day:  2  Admit Date:  12/21/2023 12:47 PM    PCP:   Karan Rocha MD  Code Status:  Full Code    Subjective:     C/C:   Chief Complaint   Patient presents with    Dizziness    Fall    Elbow Pain     Interval History Status: improved     Patient seen and examined at bedside, he is looking much better this am and feeling more energetic , able to hold conversations \Afebrile overnight  Urine output better   Appetite still poor   Blood pressure is low, he is needing oxygen overnight but not during the day  Patient vitals, labs and all providers notes were reviewed,from overnight shift and morning updates were noted and discussed with the nurse    Brief History:     Per HPI  Radames Grier is a 59 y.o. male who presents with Pupils are equal, round, and reactive to light.   Cardiovascular:      Rate and Rhythm: Normal rate and regular rhythm.      Heart sounds: No murmur heard.  Pulmonary:      Effort: Pulmonary effort is normal. No accessory muscle usage or respiratory distress.      Breath sounds: No stridor. Examination of the right-lower field reveals rales. Examination of the left-lower field reveals rales. Rales present. No decreased breath sounds, wheezing or rhonchi.   Chest:      Comments: ICD noted  Abdominal:      General: Bowel sounds are normal. There is no distension.      Palpations: Abdomen is soft. Abdomen is not rigid.      Tenderness: There is no abdominal tenderness. There is no guarding.   Musculoskeletal:         General: No tenderness.   Skin:     General: Skin is warm and dry.      Findings: No erythema, lesion or rash.   Neurological:      Mental Status: He is alert and oriented to person, place, and time.      Cranial Nerves: No cranial nerve deficit.      Motor: No seizure activity.   Psychiatric:         Speech: Speech normal.         Behavior: Behavior normal. Behavior is cooperative.         Assessment:        Hospital Problems             Last Modified POA    * (Principal) Syncope 12/22/2023 Yes    Influenza A (H1N1) 12/22/2023 Yes    Cardiomyopathy ([facility]) 12/22/2023 Yes    Essential hypertension 12/22/2023 Yes    Ex-smoker 12/22/2023 Yes    COPD (chronic obstructive pulmonary disease) ([facility]) 12/22/2023 Yes    CAD (coronary artery disease) 12/22/2023 Yes    Gastroparesis 12/22/2023 Yes    ARON (acute kidney injury) ([facility]) 12/22/2023 Yes    Hypotension 12/21/2023 Yes    Severe malnutrition ([facility]) 12/22/2023 Yes     Plan:          Syncope : Likely multifactorial, component of dehydration, dysautonomia, generalized debility, patient as well has history of ICA stenosis and stent of the left side, history of neurocardiogenic syncope  Carotid Dopplers with 90 % R ICA stenosis, neur endovascular involved   ICD

## 2023-12-24 LAB — MAGNESIUM SERPL-MCNC: 1.7 MG/DL (ref 1.6–2.6)

## 2023-12-24 PROCEDURE — 99024 POSTOP FOLLOW-UP VISIT: CPT | Performed by: PSYCHIATRY & NEUROLOGY

## 2023-12-24 PROCEDURE — 36415 COLL VENOUS BLD VENIPUNCTURE: CPT

## 2023-12-24 PROCEDURE — 6370000000 HC RX 637 (ALT 250 FOR IP): Performed by: INTERNAL MEDICINE

## 2023-12-24 PROCEDURE — 6370000000 HC RX 637 (ALT 250 FOR IP): Performed by: FAMILY MEDICINE

## 2023-12-24 PROCEDURE — 2580000003 HC RX 258: Performed by: INTERNAL MEDICINE

## 2023-12-24 PROCEDURE — 99233 SBSQ HOSP IP/OBS HIGH 50: CPT | Performed by: SURGERY

## 2023-12-24 PROCEDURE — 2060000000 HC ICU INTERMEDIATE R&B

## 2023-12-24 PROCEDURE — 99232 SBSQ HOSP IP/OBS MODERATE 35: CPT | Performed by: FAMILY MEDICINE

## 2023-12-24 PROCEDURE — 83735 ASSAY OF MAGNESIUM: CPT

## 2023-12-24 PROCEDURE — 94760 N-INVAS EAR/PLS OXIMETRY 1: CPT

## 2023-12-24 RX ORDER — METOPROLOL SUCCINATE 25 MG/1
25 TABLET, EXTENDED RELEASE ORAL NIGHTLY
Status: DISCONTINUED | OUTPATIENT
Start: 2023-12-25 | End: 2023-12-25 | Stop reason: HOSPADM

## 2023-12-24 RX ADMIN — SODIUM CHLORIDE, PRESERVATIVE FREE 10 ML: 5 INJECTION INTRAVENOUS at 09:03

## 2023-12-24 RX ADMIN — TRAMADOL HYDROCHLORIDE 50 MG: 50 TABLET, COATED ORAL at 15:18

## 2023-12-24 RX ADMIN — MEGESTROL ACETATE 200 MG: 400 SUSPENSION ORAL at 09:03

## 2023-12-24 RX ADMIN — DULOXETINE HYDROCHLORIDE 30 MG: 30 CAPSULE, DELAYED RELEASE ORAL at 09:04

## 2023-12-24 RX ADMIN — MIDODRINE HYDROCHLORIDE 5 MG: 5 TABLET ORAL at 12:52

## 2023-12-24 RX ADMIN — OSELTAMIVIR PHOSPHATE 75 MG: 6 POWDER, FOR SUSPENSION ORAL at 09:03

## 2023-12-24 RX ADMIN — TRAMADOL HYDROCHLORIDE 50 MG: 50 TABLET, COATED ORAL at 09:01

## 2023-12-24 RX ADMIN — OSELTAMIVIR PHOSPHATE 75 MG: 6 POWDER, FOR SUSPENSION ORAL at 21:53

## 2023-12-24 RX ADMIN — CLOPIDOGREL BISULFATE 75 MG: 75 TABLET ORAL at 09:01

## 2023-12-24 RX ADMIN — APIXABAN 5 MG: 5 TABLET, FILM COATED ORAL at 09:01

## 2023-12-24 RX ADMIN — CARVEDILOL 3.12 MG: 3.12 TABLET, FILM COATED ORAL at 09:01

## 2023-12-24 RX ADMIN — SODIUM CHLORIDE, PRESERVATIVE FREE 10 ML: 5 INJECTION INTRAVENOUS at 21:54

## 2023-12-24 RX ADMIN — APIXABAN 5 MG: 5 TABLET, FILM COATED ORAL at 21:53

## 2023-12-24 ASSESSMENT — PAIN SCALES - GENERAL
PAINLEVEL_OUTOF10: 5
PAINLEVEL_OUTOF10: 0
PAINLEVEL_OUTOF10: 7

## 2023-12-24 ASSESSMENT — PAIN DESCRIPTION - DESCRIPTORS
DESCRIPTORS: ACHING
DESCRIPTORS: ACHING

## 2023-12-24 ASSESSMENT — PAIN - FUNCTIONAL ASSESSMENT: PAIN_FUNCTIONAL_ASSESSMENT: ACTIVITIES ARE NOT PREVENTED

## 2023-12-24 ASSESSMENT — PAIN DESCRIPTION - LOCATION
LOCATION: SHOULDER
LOCATION: SHOULDER

## 2023-12-24 ASSESSMENT — PAIN DESCRIPTION - ORIENTATION
ORIENTATION: RIGHT
ORIENTATION: RIGHT

## 2023-12-24 NOTE — PROGRESS NOTES
[facility]  Progress Note                   Date:   12/24/2023  Patient name: Ethan Rios  Date of admission:  12/21/2023 12:47 PM  MRN:   [id number]  YOB: 1965  PCP: Keagan Street MD    Reason for Admission: Syncope and collapse [R55]  Hyponatremia [E87.1]  Hypotension [I95.9]  ARON (acute kidney injury) (HCC) [N17.9]    Subjective:       Clinical Changes /Abnormalities:Patient seen and examined. Denies chest pain or shortness of breath. Tele/vitals/labs reviewed .Wants to be discharged , states he has generalized weakness and normally doesn't walk at home , per RN pacer was interrogated yesterday with no problems but there is no report in chart    Review of Systems    Medications:   Scheduled Meds:   carvedilol  3.125 mg Oral BID WC    oseltamivir 6mg/ml  75 mg Oral BID    megestrol  200 mg Oral Daily    midodrine  5 mg Oral TID WC    sodium chloride flush  5-40 mL IntraVENous 2 times per day    clopidogrel  75 mg Oral Daily    DULoxetine  30 mg Oral Daily    apixaban  5 mg Oral BID     Continuous Infusions:   sodium chloride       CBC:   Recent Labs     12/21/23  1327 12/22/23  0644 12/23/23  0836   WBC 3.8 3.8 2.1*   HGB 14.6 14.2 14.9   PLT See Reflexed IPF Result 75* 69*     BMP:    Recent Labs     12/21/23  1327 12/21/23  1328 12/22/23  0644 12/23/23  0836   *  --  134* 129*   K 4.4  --  4.4 4.1   CL 98  --  100 98   CO2 20  --  22 24   BUN 13  --  11 13   CREATININE 1.6*  --  1.2 1.0   GLUCOSE 125* 131 71 83     Hepatic:  Recent Labs     12/21/23  1327 12/22/23  0644 12/23/23  0836   AST 15 30 30   ALT 6 8 8   BILITOT 0.7 0.6 0.4   ALKPHOS 87 85 79     Troponin:   Recent Labs     12/21/23  1327 12/21/23  1434   TROPHS 17 17     BNP: No results for input(s): \"BNP\" in the last 72 hours.  Lipids: No results for input(s): \"CHOL\", \"HDL\" in the last 72 hours.    Invalid input(s): \"LDLCALCU\"  INR: No results for input(s): \"INR\" in the last 72 hours.    Objective:  Peripheral edema     Syncope     Primary pauci-immune necrotizing and crescentic glomerulonephritis     H/O cardiac arrest     Cervical stenosis of spinal canal     Ischemic cardiomyopathy     Attention-deficit hyperactivity disorder, unspecified type     Chronic kidney disease, stage 3b (HCC)     Gastro-esophageal reflux disease without esophagitis     Presence of automatic (implantable) cardiac defibrillator     Unspecified osteoarthritis, unspecified site     Hypertensive emergency     Cardiomyopathy [facility])     Encounter for implantable cardioverter-defibrillator discussion     Transient alteration of awareness     Acute ischemic left MCA stroke ([address])     Dysphasia     Altered mental status     Right hemiparesis ([address])     ICAO (internal carotid artery occlusion), left     Cerebrovascular accident (CVA) due to occlusion of left carotid artery ([address])     Acute CVA (cerebrovascular accident) ([address])     Tricuspid valve disorder     Mitral valve disorder     Dependence on respirator (ventilator) status ([address])     Other specified diseases of spinal cord (HCC)     Unstable angina (HCC)     Positive cardiac stress test     Thrombocytopenia (HCC)     Chemotherapy-induced neutropenia (HCC)     ARON (acute kidney injury) ([address])     Hypotension     Severe malnutrition (HCC)     Influenza A (H1N1)     Asymptomatic stenosis of right carotid artery     Presence of internal carotid stent      Plan of Treatment:   Syncope secondary to dehydration and influenza continue aspirin, Plavix, statin, will switch Coreg to Toprol-XL at nighttime  Compression socks   Continue Eliquis  Replace mag per sliding scale  Obtain report If Pacer interrogation functioning well  and no arrhythmia no further cardiac workup needed      Electronically signed by Kendrick Altamirano, PJ Roberts NP on 12/24/2023 at 9:00 AM  [address] [place] [address].  [phone]

## 2023-12-24 NOTE — PROGRESS NOTES
Pressure Sister     Thyroid Disease Sister     Depression Sister     High Blood Pressure Sister     Lung Cancer Mother     Heart Disease Mother     High Blood Pressure Mother     High Blood Pressure Father     Diabetes Father     Heart Disease Father     Lung Cancer Father     Heart Disease Maternal Grandmother     Depression Brother        Vitals:  /73   Pulse 60   Temp 97.9 °F (36.6 °C) (Oral)   Resp 16   Ht 1.753 m (5' 9.02\")   Wt 73.5 kg (162 lb 0.6 oz)   SpO2 94%   BMI 23.92 kg/m²   Temp (24hrs), Av.8 °F (36.6 °C), Min:97.5 °F (36.4 °C), Max:98.5 °F (36.9 °C)    No results for input(s): \"POCGLU\" in the last 72 hours.      I/O (24Hr):    Intake/Output Summary (Last 24 hours) at 2023 1507  Last data filed at 2023 0901  Gross per 24 hour   Intake 5 ml   Output 500 ml   Net -495 ml         Labs:  Hematology:  Recent Labs     23  0644 23  0836   WBC  --  3.8 2.1*   RBC  --  4.24 4.46   HGB  --  14.2 14.9   HCT  --  43.8 44.6   MCV  --  103.3* 100.0   MCH  --  33.5 33.4   MCHC  --  32.4 33.4   RDW  --  14.4 14.1   PLT  --  75* 69*   MPV  --  11.5 12.1   SEDRATE 7  --   --    CRP 22.0*  --   --        Chemistry:  Recent Labs     23  1751 23  0644 23  0836 23  0930   NA  --   --  134* 129*  --    K  --   --  4.4 4.1  --    CL  --   --  100 98  --    CO2  --   --  22 24  --    GLUCOSE  --   --  71 83  --    BUN  --   --  11 13  --    CREATININE  --   --  1.2 1.0  --    MG  --  1.6  --   --  1.7   ANIONGAP  --   --  12 7*  --    LABGLOM  --   --  >60 >60  --    CALCIUM  --   --  8.1* 8.1*  --    LACTACIDWB 1.7  --   --   --   --        Recent Labs     23  2150 23  0644 23  0836   PROT  --  5.8* 5.7*   LABALBU  --  3.2* 3.1*   TSH 1.26  --   --    AST  --  30 30   ALT  --  8 8   ALKPHOS  --  85 79   BILITOT  --  0.6 0.4       ABG:  Lab Results   Component Value Date/Time    POCPH 7.549 2022 04:37 AM     distress. Breath sounds: No stridor. Examination of the right-lower field reveals rales. Examination of the left-lower field reveals rales. Rales present. No decreased breath sounds, wheezing or rhonchi. Chest:      Comments: ICD noted  Abdominal:      General: Bowel sounds are normal. There is no distension. Palpations: Abdomen is soft. Abdomen is not rigid. Tenderness: There is no abdominal tenderness. There is no guarding. Musculoskeletal:         General: No tenderness. Skin:     General: Skin is warm and dry. Findings: No erythema, lesion or rash. Neurological:      Mental Status: He is alert and oriented to person, place, and time. Cranial Nerves: No cranial nerve deficit. Motor: No seizure activity. Psychiatric:         Speech: Speech normal.         Behavior: Behavior normal. Behavior is cooperative.          Assessment:        Hospital Problems             Last Modified POA    * (Principal) Syncope 12/22/2023 Yes    Influenza A (H1N1) 12/22/2023 Yes    Cardiomyopathy ([address]) 12/22/2023 Yes    Essential hypertension 12/22/2023 Yes    Syncope and collapse 12/23/2023 Yes    Ex-smoker 12/22/2023 Yes    COPD (chronic obstructive pulmonary disease) ([address]) 12/22/2023 Yes    CAD (coronary artery disease) 12/22/2023 Yes    Gastroparesis 12/22/2023 Yes    ARON (acute kidney injury) ([address]) 12/22/2023 Yes    Hypotension 12/21/2023 Yes    Severe malnutrition ([address]) 12/22/2023 Yes    Asymptomatic stenosis of right carotid artery 12/23/2023 Yes    Presence of internal carotid stent 12/23/2023 Yes     Plan:          Syncope : Likely multifactorial, component of dehydration, dysautonomia, generalized debility, patient as well has history of ICA stenosis and stent of the left side, history of neurocardiogenic syncope  Carotid Dopplers with 90 % R ICA stenosis, neur endovascular involved   ICD interrogation  Received gentle hydration  Continue telemetry  Cardiology switched coreg to toprol,

## 2023-12-24 NOTE — PROGRESS NOTES
Endovascular Neurosurgery  Progress Note      Reason for evaluation: R ICA stenosis 80-90% based on carotid US    SUBJECTIVE:       No complaint today. Feel good, wants to go home      History of Chief Complaint from 12/22/2023:      58 year old y/o with HTN, CAD s/p CABG in 2011, V.Fib PEA in 02/2022, AICD on 07/20, CKD stage III, s/p L ICA stent in 7/31/2022 by Dr Neumann. He also has thrombocytopenia. 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 His low PLTs were presumed to be medication induced. After completion of meds, his heme onc doc cleared him for PCI which he had scheduled in near future.    In regards to his syncope, no prodrome, no post ictal state, no tongue biting, no incontinence. No head trauma reported. He did hurt his elbow on fridge this am when he fainted bending into his refrigerator. Of which he reports syncope is often position. But in general he feels lightheaded often.     Patient does also have distal abdominal aortic thrombus seen on CTA abd on the 9/9/2023 and is on eliquis for that and managed by vascular surgery.    Patient is tested positive for influenza.    Endovascular consult      Review of Systems:  CONSTITUTIONAL:  negative for fevers, chills, fatigue and malaise    EYES:  negative for double vision, blurred vision and photophobia     HEENT:  negative for tinnitus, epistaxis and sore throat    RESPIRATORY:  negative for cough, shortness of breath, wheezing    CARDIOVASCULAR:  negative for chest pain, palpitations, syncope, edema    GASTROINTESTINAL:  negative for nausea, vomiting    GENITOURINARY:  negative for incontinence    MUSCULOSKELETAL:  negative for neck or back pain    NEUROLOGICAL:  Negative for weakness and tingling  negative for headaches and dizziness    PSYCHIATRIC:  negative for anxiety      Review of systems otherwise negative.      OBJECTIVE:     Vitals:    12/24/23 1715   BP: 114/73  Stroke Network  [facility]  Electronically signed 12/24/2023 at 6:06 PM

## 2023-12-25 VITALS
WEIGHT: 162.04 LBS | SYSTOLIC BLOOD PRESSURE: 118 MMHG | HEART RATE: 64 BPM | HEIGHT: 69 IN | DIASTOLIC BLOOD PRESSURE: 83 MMHG | BODY MASS INDEX: 24 KG/M2 | OXYGEN SATURATION: 96 % | TEMPERATURE: 98.2 F | RESPIRATION RATE: 15 BRPM

## 2023-12-25 LAB
ANION GAP SERPL CALCULATED.3IONS-SCNC: 9 MMOL/L (ref 9–17)
BASOPHILS # BLD: 0.03 K/UL (ref 0–0.2)
BASOPHILS NFR BLD: 1 % (ref 0–2)
BUN SERPL-MCNC: 9 MG/DL (ref 8–23)
CALCIUM SERPL-MCNC: 8.1 MG/DL (ref 8.6–10.4)
CHLORIDE SERPL-SCNC: 98 MMOL/L (ref 98–107)
CO2 SERPL-SCNC: 20 MMOL/L (ref 20–31)
CREAT SERPL-MCNC: 0.9 MG/DL (ref 0.7–1.2)
EOSINOPHIL # BLD: 0.13 K/UL (ref 0–0.44)
EOSINOPHILS RELATIVE PERCENT: 4 % (ref 1–4)
ERYTHROCYTE [DISTWIDTH] IN BLOOD BY AUTOMATED COUNT: 14.1 % (ref 11.8–14.4)
GFR SERPL CREATININE-BSD FRML MDRD: >60 ML/MIN/1.73M2
GLUCOSE SERPL-MCNC: 61 MG/DL (ref 70–99)
HCT VFR BLD AUTO: 47.5 % (ref 40.7–50.3)
HGB BLD-MCNC: 15.8 G/DL (ref 13–17)
IMM GRANULOCYTES # BLD AUTO: <0.03 K/UL (ref 0–0.3)
IMM GRANULOCYTES NFR BLD: 0 %
LYMPHOCYTES NFR BLD: 0.9 K/UL (ref 1.1–3.7)
LYMPHOCYTES RELATIVE PERCENT: 31 % (ref 24–43)
MCH RBC QN AUTO: 33.6 PG (ref 25.2–33.5)
MCHC RBC AUTO-ENTMCNC: 33.3 G/DL (ref 28.4–34.8)
MCV RBC AUTO: 101.1 FL (ref 82.6–102.9)
MONOCYTES NFR BLD: 0.24 K/UL (ref 0.1–1.2)
MONOCYTES NFR BLD: 8 % (ref 3–12)
NEUTROPHILS NFR BLD: 55 % (ref 36–65)
NEUTS SEG NFR BLD: 1.62 K/UL (ref 1.5–8.1)
NRBC BLD-RTO: 0 PER 100 WBC
PLATELET # BLD AUTO: ABNORMAL K/UL (ref 138–453)
PLATELET, FLUORESCENCE: 86 K/UL (ref 138–453)
PLATELETS.RETICULATED NFR BLD AUTO: 5.2 % (ref 1.1–10.3)
POTASSIUM SERPL-SCNC: 3.9 MMOL/L (ref 3.7–5.3)
RBC # BLD AUTO: 4.7 M/UL (ref 4.21–5.77)
SODIUM SERPL-SCNC: 127 MMOL/L (ref 135–144)
WBC OTHER # BLD: 2.9 K/UL (ref 3.5–11.3)

## 2023-12-25 PROCEDURE — 99239 HOSP IP/OBS DSCHRG MGMT >30: CPT | Performed by: FAMILY MEDICINE

## 2023-12-25 PROCEDURE — 36415 COLL VENOUS BLD VENIPUNCTURE: CPT

## 2023-12-25 PROCEDURE — 99233 SBSQ HOSP IP/OBS HIGH 50: CPT | Performed by: PSYCHIATRY & NEUROLOGY

## 2023-12-25 PROCEDURE — 2580000003 HC RX 258: Performed by: INTERNAL MEDICINE

## 2023-12-25 PROCEDURE — 85055 RETICULATED PLATELET ASSAY: CPT

## 2023-12-25 PROCEDURE — 6370000000 HC RX 637 (ALT 250 FOR IP): Performed by: FAMILY MEDICINE

## 2023-12-25 PROCEDURE — 85025 COMPLETE CBC W/AUTO DIFF WBC: CPT

## 2023-12-25 PROCEDURE — 6370000000 HC RX 637 (ALT 250 FOR IP): Performed by: INTERNAL MEDICINE

## 2023-12-25 PROCEDURE — 80048 BASIC METABOLIC PNL TOTAL CA: CPT

## 2023-12-25 RX ORDER — MIDODRINE HYDROCHLORIDE 5 MG/1
5 TABLET ORAL
Qty: 90 TABLET | Refills: 3 | Status: SHIPPED | OUTPATIENT
Start: 2023-12-25

## 2023-12-25 RX ORDER — OSELTAMIVIR PHOSPHATE 6 MG/ML
75 FOR SUSPENSION ORAL 2 TIMES DAILY
Qty: 37.5 ML | Refills: 0 | Status: SHIPPED | OUTPATIENT
Start: 2023-12-25 | End: 2023-12-27

## 2023-12-25 RX ORDER — MEGESTROL ACETATE 40 MG/ML
200 SUSPENSION ORAL DAILY
Qty: 240 ML | Refills: 3 | Status: SHIPPED | OUTPATIENT
Start: 2023-12-26

## 2023-12-25 RX ORDER — METOPROLOL SUCCINATE 25 MG/1
12.5 TABLET, EXTENDED RELEASE ORAL NIGHTLY
Qty: 30 TABLET | Refills: 3 | Status: SHIPPED | OUTPATIENT
Start: 2023-12-25

## 2023-12-25 RX ADMIN — APIXABAN 5 MG: 5 TABLET, FILM COATED ORAL at 08:53

## 2023-12-25 RX ADMIN — OSELTAMIVIR PHOSPHATE 75 MG: 6 POWDER, FOR SUSPENSION ORAL at 08:53

## 2023-12-25 RX ADMIN — CLOPIDOGREL BISULFATE 75 MG: 75 TABLET ORAL at 08:53

## 2023-12-25 RX ADMIN — MEGESTROL ACETATE 200 MG: 400 SUSPENSION ORAL at 08:53

## 2023-12-25 RX ADMIN — SODIUM CHLORIDE, PRESERVATIVE FREE 10 ML: 5 INJECTION INTRAVENOUS at 08:53

## 2023-12-25 RX ADMIN — DULOXETINE HYDROCHLORIDE 30 MG: 30 CAPSULE, DELAYED RELEASE ORAL at 08:53

## 2023-12-25 ASSESSMENT — PAIN SCALES - GENERAL: PAINLEVEL_OUTOF10: 0

## 2023-12-25 NOTE — CARE COORDINATION
Patient plans on returning home. HE states that he has HC but is unsure what company. Prior admission was [facility]. Sent referral asking if he is current with their services. Media has scanned  [facility] paperwork form 10/2023  Awaiting response. 1420 Patient discharged to home. Unable to send DENISE to [facility] as the numbers have failed. Attempted to call facility unable to get through.

## 2023-12-25 NOTE — DISCHARGE SUMMARY
[facility] [address] [place]    Schedule an appointment as soon as possible for a visit in 2 week(s)      Yamile Sharma [address] [facility] [id number] [facility],[address], [address] [facility], [address] [facility] Damian Rose  [phone]    Follow up         Requiring Further Evaluation/Follow Up POST HOSPITALIZATION/Incidental Findings:     Diet: cardiac diet and renal diet    Activity: As tolerated    Instructions to Patient:     Discharge Medications:      Medication List        START taking these medications      megestrol 40 MG/ML suspension  Commonly known as: MEGACE  Take 5 mLs by mouth daily  Start taking on: December 26, 2023     midodrine 5 MG tablet  Commonly known as: PROAMATINE  Take 1 tablet by mouth 3 times daily (with meals)     oseltamivir 6mg/ml 6 MG/ML Susr suspension  Commonly known as: TAMIFLU  Take 12.5 mLs by mouth 2 times daily for 3 doses            CHANGE how you take these medications      atorvastatin 40 MG tablet  Commonly known as: LIPITOR  TAKE 1 TABLET BY MOUTH DAILY  What changed:   how much to take  how to take this  when to take this     metoprolol succinate 25 MG extended release tablet  Commonly known as: TOPROL XL  Take 0.5 tablets by mouth at bedtime  What changed:   how much to take  when to take this     nitroGLYCERIN 0.4 MG SL tablet  Commonly known as: Nitrostat  Place 1 tablet under the tongue every 5 minutes as needed for Chest pain up to max of 3 total doses. If no relief after 1 dose, call 911. What changed: Another medication with the same name was removed. Continue taking this medication, and follow the directions you see here.             CONTINUE taking these medications      albuterol sulfate  (90 Base) MCG/ACT inhaler  Commonly known as: PROVENTIL;VENTOLIN;PROAIR  Inhale 2 puffs into the lungs every 4 hours as needed for Wheezing     azaTHIOprine 50 MG tablet  Commonly known as: IMURAN  TAKE 1 TABLET BY MOUTH MD for the opportunity to be involved in this patient's care.

## 2023-12-25 NOTE — PROGRESS NOTES
Patient was given discharge instructions, which were reviewed with the patient, and all questions were answered. Patient was discharged with all of their belongings and medications were sent to . Patient was ambulated off the unit in a wheelchair.

## 2023-12-25 NOTE — PROGRESS NOTES
Endovascular Neurosurgery  Progress Note      Reason for evaluation: R ICA stenosis 80-90% based on carotid US    SUBJECTIVE:       No complaint today. Feel good, wants to go home      History of Chief Complaint from 12/22/2023:      59year old y/o with HTN, CAD s/p CABG in 2011, V. Fib PEA in 02/2022, AICD on 07/20, CKD stage III, s/p L ICA stent in 7/31/2022 by Dr Diony Davis. He also has thrombocytopenia. 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 His low PLTs were presumed to be medication induced. After completion of meds, his heme onc doc cleared him for PCI which he had scheduled in near future. In regards to his syncope, no prodrome, no post ictal state, no tongue biting, no incontinence. No head trauma reported. He did hurt his elbow on fridge this am when he fainted bending into his refrigerator. Of which he reports syncope is often position. But in general he feels lightheaded often. Patient does also have distal abdominal aortic thrombus seen on CTA abd on the 9/9/2023 and is on eliquis for that and managed by vascular surgery. Patient is tested positive for influenza. Endovascular consult      Review of Systems:  CONSTITUTIONAL:  negative for fevers, chills, fatigue and malaise    EYES:  negative for double vision, blurred vision and photophobia     HEENT:  negative for tinnitus, epistaxis and sore throat    RESPIRATORY:  negative for cough, shortness of breath, wheezing    CARDIOVASCULAR:  negative for chest pain, palpitations, syncope, edema    GASTROINTESTINAL:  negative for nausea, vomiting    GENITOURINARY:  negative for incontinence    MUSCULOSKELETAL:  negative for neck or back pain    NEUROLOGICAL:  Negative for weakness and tingling  negative for headaches and dizziness    PSYCHIATRIC:  negative for anxiety      Review of systems otherwise negative.       OBJECTIVE:     Vitals:    12/25/23 1155   BP: 112/63 Neurointervention  [facility]roke Network  [facility] Stroke [facility] [place]  Electronically signed 12/25/2023 at 2:09 PM

## 2023-12-25 NOTE — DISCHARGE INSTR - COC
Continuity of Care Form    Patient Name: Skip French   :  1965  MRN:  [id number]    Admit date:  2023  Discharge date:  2023    Code Status Order: Full Code   Advance Directives:     Admitting Physician:  No admitting provider for patient encounter. PCP: Joanie Mckeon MD    Discharging Nurse: Rizwan Bhat, [address] Unit/Room#:   Discharging Unit Phone Number: [phone]    Emergency Contact:   Extended Emergency Contact Information  [address] [facility] Contact[address] Phone: [phone]  Mobile Phone: [phone]  Relation: Niece/Nephew   needed? No  Secondary Emergency Contact: [address] [employer]  Mobile Phone: [phone]  Relation: Child    Past Surgical History:  Past Surgical History:   Procedure Laterality Date    BACK SURGERY      CARDIAC CATHETERIZATION  10/30/2018    Dr. Zack Martino N/A 10/26/2023    Urbano Dural / Left heart cath / coronary angiography performed by Eric Alberts MD at [address] [place] [address]  2016    C5-6 CORPECTOMY; C4-7 FUSION    COLONOSCOPY N/A 2019    COLONOSCOPY POLYPECTOMY SNARE/COLD BIOPSY OF TRANSVERSE COLON AND SIGMOID COLON & RECTAL POLYPECTOMY performed by Dayday Khan MD at [address] [facility], [place])  2011    [facility]/   [facility]. CT BIOPSY RENAL  2020    CT BIOPSY RENAL 2020 [facility]    CYSTOSCOPY N/A 2020    CYSTOSCOPY performed by Milad Umanzor MD at [address] [address] Left     screw placed    LUNG SURGERY  1982    Right collapsed Lung  /  [facility]  [address] placement date and .  Placement between 22 and 22- 6-8 weeks post op for MRI-krm    SHOULDER ARTHROSCOPY Right 2016    [id number]    UPPER GASTROINTESTINAL ENDOSCOPY  2015    UPPER GASTROINTESTINAL ENDOSCOPY N/A

## 2023-12-26 LAB
ECHO BSA: 1.88 M2
VAS LEFT BULB EDV: 9.1 CM/S
VAS LEFT BULB PSV: 48 CM/S
VAS LEFT CCA DIST EDV: 12.5 CM/S
VAS LEFT CCA DIST PSV: 57.7 CM/S
VAS LEFT CCA MID EDV: 9.09 CM/S
VAS LEFT CCA MID PSV: 55.8 CM/S
VAS LEFT CCA PROX EDV: 9.4 CM/S
VAS LEFT CCA PROX PSV: 60.2 CM/S
VAS LEFT ECA EDV: 0 CM/S
VAS LEFT ECA PSV: 110 CM/S
VAS LEFT ICA DIST EDV: 14.7 CM/S
VAS LEFT ICA DIST PSV: 50.2 CM/S
VAS LEFT ICA MID EDV: 21.3 CM/S
VAS LEFT ICA MID PSV: 58.6 CM/S
VAS LEFT ICA PROX EDV: 11.3 CM/S
VAS LEFT ICA PROX PSV: 42.3 CM/S
VAS LEFT VERTEBRAL EDV: 16.5 CM/S
VAS LEFT VERTEBRAL PSV: 58.8 CM/S
VAS RIGHT BULB EDV: 11 CM/S
VAS RIGHT BULB PSV: 55.7 CM/S
VAS RIGHT CCA DIST EDV: 10.6 CM/S
VAS RIGHT CCA DIST PSV: 56.4 CM/S
VAS RIGHT CCA MID EDV: 11.4 CM/S
VAS RIGHT CCA MID PSV: 65.5 CM/S
VAS RIGHT CCA PROX EDV: 6.3 CM/S
VAS RIGHT CCA PROX PSV: 58.4 CM/S
VAS RIGHT ECA EDV: 0 CM/S
VAS RIGHT ECA PSV: 125 CM/S
VAS RIGHT ICA DIST EDV: 37.3 CM/S
VAS RIGHT ICA DIST PSV: 120 CM/S
VAS RIGHT ICA MID EDV: 120 CM/S
VAS RIGHT ICA MID PSV: 304 CM/S
VAS RIGHT ICA PROX EDV: 7.1 CM/S
VAS RIGHT ICA PROX PSV: 54.9 CM/S
VAS RIGHT VERTEBRAL EDV: 10.3 CM/S
VAS RIGHT VERTEBRAL PSV: 38.6 CM/S

## 2023-12-26 PROCEDURE — 93880 EXTRACRANIAL BILAT STUDY: CPT | Performed by: STUDENT IN AN ORGANIZED HEALTH CARE EDUCATION/TRAINING PROGRAM

## 2023-12-27 ENCOUNTER — TELEPHONE (OUTPATIENT)
Dept: INTERNAL MEDICINE CLINIC | Age: 58
End: 2023-12-27

## 2023-12-27 LAB
MICROORGANISM SPEC CULT: NORMAL
SERVICE CMNT-IMP: NORMAL
SPECIMEN DESCRIPTION: NORMAL

## 2023-12-27 NOTE — TELEPHONE ENCOUNTER
Patient just discharged from hospital. He had a cardia cath with stents placed and was admitted with dizziness and lightheadedness. He was discharged on 12/25 and taken off of Amlodipine, Carvedilol, Isosorbide, Meclizine and Lisinopril. So you want the patient to stop taking those medications?     They also added Midrin 5 mg  TID to his medications      Patient does have an appt 1/3/2023

## 2024-01-02 NOTE — PROGRESS NOTES
Physician Progress Note      PATIENT:               WANDA PACE  CSN #:                  [id number]  :                       1965  ADMIT DATE:       2023 12:47 PM  DISCH DATE:        2023 5:33 PM  RESPONDING  PROVIDER #:        Marta Altamirano MD          QUERY TEXT:    Patient admitted with Syncope : Likely multifactorial, component of   dehydration, dysautonomia, generalized debility, patient as well has history   of ICA stenosis and stent of the left side, history of neuro-cardiogenic   syncope, Carotid Dopplers with 90 % R ICA stenosis.   Noted documentation of   ARON on dated problem and H&P and CKD on subsequent notes in plan of care.    If possible, please document in progress notes further regarding ARON and CKD:    The medical record reflects the following:  Risk Factors: 58 yr old hx CAD, COPD, HTN, admitted w/ syncope  Clinical Indicators:  BUN 13/Cr 1.6/ GFR 49>  BUN 13/ Cr 1.0/ GFR   >60>  BUN 9/ Cr 0.9/ GFR >60  DC summary Syncope : Likely multifactorial, component of dehydration,   dysautonomia, generalized debility, patient as well has history of ICA   stenosis and stent of the left side, history of neuro-cardiogenic syncope   Carotid Dopplers with 90 % R ICA stenosis,    Treatment: IVF, ICD interrogation, Neuro endovascular consult, Cardiology   consult> hold diuretics/anti-hypertensives, Keep midodrine, liberalize oral   intake    Thank you in advance for your time and consideration! Please reach out for any   questions.  Marisela Pan RN, CCDS, CRCR Supervisor [phone]  Options provided:  -- ARON on CKD  -- CKD only as ARON ruled out  -- ARON confirmed and CKD ruled out  -- Other - I will add my own diagnosis  -- Disagree - Not applicable / Not valid  -- Disagree - Clinically unable to determine / Unknown  -- Refer to Clinical Documentation Reviewer    PROVIDER RESPONSE TEXT:    After study, CKD only as ARON ruled out.    Query created by: Marisela Pan on 2023

## 2024-01-03 ENCOUNTER — OFFICE VISIT (OUTPATIENT)
Dept: INTERNAL MEDICINE CLINIC | Age: 59
End: 2024-01-03
Payer: COMMERCIAL

## 2024-01-03 VITALS
DIASTOLIC BLOOD PRESSURE: 80 MMHG | WEIGHT: 152 LBS | HEART RATE: 75 BPM | OXYGEN SATURATION: 100 % | BODY MASS INDEX: 22.44 KG/M2 | SYSTOLIC BLOOD PRESSURE: 118 MMHG

## 2024-01-03 DIAGNOSIS — Z87.891 PERSONAL HISTORY OF TOBACCO USE: ICD-10-CM

## 2024-01-03 DIAGNOSIS — F33.3 SEVERE RECURRENT MAJOR DEPRESSIVE DISORDER WITH PSYCHOTIC FEATURES (HCC): ICD-10-CM

## 2024-01-03 DIAGNOSIS — J84.9 INTERSTITIAL LUNG DISEASE (HCC): ICD-10-CM

## 2024-01-03 DIAGNOSIS — N18.4 STAGE 4 CHRONIC KIDNEY DISEASE (HCC): ICD-10-CM

## 2024-01-03 DIAGNOSIS — E11.9 TYPE 2 DIABETES MELLITUS WITHOUT COMPLICATION, WITHOUT LONG-TERM CURRENT USE OF INSULIN (HCC): ICD-10-CM

## 2024-01-03 DIAGNOSIS — Z13.220 SCREENING FOR HYPERLIPIDEMIA: Primary | ICD-10-CM

## 2024-01-03 DIAGNOSIS — F17.200 SMOKER: ICD-10-CM

## 2024-01-03 DIAGNOSIS — J42 CHRONIC BRONCHITIS, UNSPECIFIED CHRONIC BRONCHITIS TYPE (HCC): ICD-10-CM

## 2024-01-03 DIAGNOSIS — I77.82 ANCA-ASSOCIATED VASCULITIS (HCC): ICD-10-CM

## 2024-01-03 DIAGNOSIS — I42.6 ALCOHOLIC CARDIOMYOPATHY (HCC): ICD-10-CM

## 2024-01-03 PROCEDURE — G8484 FLU IMMUNIZE NO ADMIN: HCPCS | Performed by: INTERNAL MEDICINE

## 2024-01-03 PROCEDURE — G0296 VISIT TO DETERM LDCT ELIG: HCPCS | Performed by: INTERNAL MEDICINE

## 2024-01-03 PROCEDURE — 4004F PT TOBACCO SCREEN RCVD TLK: CPT | Performed by: INTERNAL MEDICINE

## 2024-01-03 PROCEDURE — 1111F DSCHRG MED/CURRENT MED MERGE: CPT | Performed by: INTERNAL MEDICINE

## 2024-01-03 PROCEDURE — 3023F SPIROM DOC REV: CPT | Performed by: INTERNAL MEDICINE

## 2024-01-03 PROCEDURE — 2022F DILAT RTA XM EVC RTNOPTHY: CPT | Performed by: INTERNAL MEDICINE

## 2024-01-03 PROCEDURE — 3017F COLORECTAL CA SCREEN DOC REV: CPT | Performed by: INTERNAL MEDICINE

## 2024-01-03 PROCEDURE — 99214 OFFICE O/P EST MOD 30 MIN: CPT | Performed by: INTERNAL MEDICINE

## 2024-01-03 PROCEDURE — 3079F DIAST BP 80-89 MM HG: CPT | Performed by: INTERNAL MEDICINE

## 2024-01-03 PROCEDURE — 3074F SYST BP LT 130 MM HG: CPT | Performed by: INTERNAL MEDICINE

## 2024-01-03 PROCEDURE — G8427 DOCREV CUR MEDS BY ELIG CLIN: HCPCS | Performed by: INTERNAL MEDICINE

## 2024-01-03 PROCEDURE — 3046F HEMOGLOBIN A1C LEVEL >9.0%: CPT | Performed by: INTERNAL MEDICINE

## 2024-01-03 PROCEDURE — G8420 CALC BMI NORM PARAMETERS: HCPCS | Performed by: INTERNAL MEDICINE

## 2024-01-03 RX ORDER — MIRTAZAPINE 7.5 MG/1
15 TABLET, FILM COATED ORAL NIGHTLY
Qty: 30 TABLET | Refills: 5 | Status: SHIPPED | OUTPATIENT
Start: 2024-01-03

## 2024-01-03 ASSESSMENT — PATIENT HEALTH QUESTIONNAIRE - PHQ9
5. POOR APPETITE OR OVEREATING: 0
3. TROUBLE FALLING OR STAYING ASLEEP: 0
9. THOUGHTS THAT YOU WOULD BE BETTER OFF DEAD, OR OF HURTING YOURSELF: 0
SUM OF ALL RESPONSES TO PHQ9 QUESTIONS 1 & 2: 0
SUM OF ALL RESPONSES TO PHQ QUESTIONS 1-9: 0
SUM OF ALL RESPONSES TO PHQ QUESTIONS 1-9: 0
4. FEELING TIRED OR HAVING LITTLE ENERGY: 0
SUM OF ALL RESPONSES TO PHQ QUESTIONS 1-9: 0
6. FEELING BAD ABOUT YOURSELF - OR THAT YOU ARE A FAILURE OR HAVE LET YOURSELF OR YOUR FAMILY DOWN: 0
8. MOVING OR SPEAKING SO SLOWLY THAT OTHER PEOPLE COULD HAVE NOTICED. OR THE OPPOSITE, BEING SO FIGETY OR RESTLESS THAT YOU HAVE BEEN MOVING AROUND A LOT MORE THAN USUAL: 0
SUM OF ALL RESPONSES TO PHQ QUESTIONS 1-9: 0
7. TROUBLE CONCENTRATING ON THINGS, SUCH AS READING THE NEWSPAPER OR WATCHING TELEVISION: 0
1. LITTLE INTEREST OR PLEASURE IN DOING THINGS: 0
10. IF YOU CHECKED OFF ANY PROBLEMS, HOW DIFFICULT HAVE THESE PROBLEMS MADE IT FOR YOU TO DO YOUR WORK, TAKE CARE OF THINGS AT HOME, OR GET ALONG WITH OTHER PEOPLE: 0
2. FEELING DOWN, DEPRESSED OR HOPELESS: 0

## 2024-01-03 NOTE — PROGRESS NOTES
chronic bronchitis type (HCC)  Stable    8. ANCA-associated vasculitis (HCC)  Follows with nephro, on Imuran    9. Stage 4 chronic kidney disease (HCC)  Follows with nephro    10. Personal history of tobacco use  - OK VISIT TO DISCUSS LUNG CA SCREEN W LDCT  - CT Lung Screen (Initial/Annual/Baseline); Future      Return in about 4 weeks (around 1/31/2024).    Reviewed prior labs and health maintenance.      Discussed use, benefit, and side effects of prescribed medications.  Barriers to medication compliance addressed.  All patient questions answered.  Pt voiced understanding.         Keagan Street MD  [facility]  1/4/2024, 11:10 AM    Please note that this chart was generated using voice recognition Dragon dictation software.  Although every effort was made to ensure the accuracy of this automated transcription, some errors in transcription may have occurred.       Visit Information    Have you changed or started any medications since your last visit including any over-the-counter medicines, vitamins, or herbal medicines? no   Are you having any side effects from any of your medications? -  no  Have you stopped taking any of your medications? Is so, why? -  no    Have you seen any other physician or provider since your last visit? No  Have you had any other diagnostic tests since your last visit? No  Have you been seen in the emergency room and/or had an admission to a hospital since we last saw you? No  Have you had your routine dental cleaning in the past 6 months? no    Have you activated your [employer] account? If not, what are your barriers? Yes     Patient Care Team:  Keagan Street MD as PCP - General (Internal Medicine)  Keagan Street MD [facility]  Neal Purcell as Surgeon (Cardiothoracic Surgery)  Lauri Gold MD (Cardiology)  Mark Viera MD as Orthopedic Surgeon (Orthopedic Surgery)  Daquan Heath MD as Surgeon (Orthopedic Surgery)  Lg Clemons,

## 2024-01-03 NOTE — PATIENT INSTRUCTIONS
decide your lung cancer risk.  What are the risks of screening?  CT screening for lung cancer isn't perfect. It can show an abnormal result when it turns out there wasn't any cancer. This is called a false-positive result. This means you may need more tests to make sure you don't have cancer. These tests can be harmful and cause a lot of worry.  These tests may include more CT scans and invasive testing like a lung biopsy. In a biopsy, the doctor takes a sample of tissue from inside your lung so it can be looked at under a microscope. A biopsy is the only way to tell if you have lung cancer. If the biopsy finds cancer, you and your doctor will have to decide how or whether to treat it.  Some lung cancers found on CT scans are harmless and would not have caused a problem if they had not been found through screening. But because doctors can't tell which ones will turn out to be harmless, most will be treated. This means that you may get treatment--including surgery, radiation, or chemotherapy--that you don't need.  There is a risk of damage to cells or tissue from being exposed to radiation, including the small amounts used in CTs, X-rays, and other medical tests. Over time, exposure to radiation may cause cancer and other health problems. But in most cases, the risk of getting cancer from being exposed to small amounts of radiation is low. It's not a reason to avoid these tests for most people.  What are the benefits of screening?  Your scan may be normal (negative).  For some people who are at higher risk, screening lowers the chance of dying of lung cancer. How much and how long you smoked helps to determine your risk level. Screening can find some cancers early, when treatment may be more likely to work.  What happens after screening?  The results of your CT scan will be sent to your doctor. Someone from your care team will explain the results of your scan and answer any questions you may have. If you need any

## 2024-01-04 ASSESSMENT — ENCOUNTER SYMPTOMS
ABDOMINAL PAIN: 0
COUGH: 0
CHEST TIGHTNESS: 0
ABDOMINAL DISTENTION: 0
BACK PAIN: 0
DIARRHEA: 0
COLOR CHANGE: 0
FACIAL SWELLING: 0
SHORTNESS OF BREATH: 0
APNEA: 0
WHEEZING: 0
CONSTIPATION: 0

## 2024-01-10 ENCOUNTER — TELEPHONE (OUTPATIENT)
Dept: ONCOLOGY | Age: 59
End: 2024-01-10

## 2024-01-11 DIAGNOSIS — I25.810 CORONARY ARTERY DISEASE INVOLVING CORONARY BYPASS GRAFT OF NATIVE HEART WITHOUT ANGINA PECTORIS: ICD-10-CM

## 2024-01-11 DIAGNOSIS — E11.9 TYPE 2 DIABETES MELLITUS WITHOUT COMPLICATION, WITHOUT LONG-TERM CURRENT USE OF INSULIN (HCC): ICD-10-CM

## 2024-01-11 DIAGNOSIS — R21 RASH: ICD-10-CM

## 2024-01-12 RX ORDER — ATORVASTATIN CALCIUM 40 MG/1
TABLET, FILM COATED ORAL
Qty: 30 TABLET | Refills: 3 | Status: SHIPPED | OUTPATIENT
Start: 2024-01-12

## 2024-01-12 RX ORDER — BENZOCAINE/MENTHOL 6 MG-10 MG
LOZENGE MUCOUS MEMBRANE
Qty: 28 G | Refills: 1 | Status: SHIPPED | OUTPATIENT
Start: 2024-01-12

## 2024-01-16 RX ORDER — DULOXETIN HYDROCHLORIDE 30 MG/1
30 CAPSULE, DELAYED RELEASE ORAL DAILY
Qty: 30 CAPSULE | Refills: 2 | Status: SHIPPED | OUTPATIENT
Start: 2024-01-16

## 2024-01-17 ENCOUNTER — HOSPITAL ENCOUNTER (OUTPATIENT)
Dept: CT IMAGING | Age: 59
Discharge: HOME | End: 2024-01-19
Attending: INTERNAL MEDICINE
Payer: COMMERCIAL

## 2024-01-17 DIAGNOSIS — Z87.891 PERSONAL HISTORY OF TOBACCO USE: ICD-10-CM

## 2024-01-17 PROCEDURE — 71271 CT THORAX LUNG CANCER SCR C-: CPT

## 2024-01-31 ENCOUNTER — OFFICE VISIT (OUTPATIENT)
Dept: INTERNAL MEDICINE CLINIC | Age: 59
End: 2024-01-31
Payer: COMMERCIAL

## 2024-01-31 VITALS
HEART RATE: 69 BPM | OXYGEN SATURATION: 99 % | HEIGHT: 69 IN | SYSTOLIC BLOOD PRESSURE: 132 MMHG | WEIGHT: 152 LBS | BODY MASS INDEX: 22.51 KG/M2 | DIASTOLIC BLOOD PRESSURE: 86 MMHG

## 2024-01-31 DIAGNOSIS — G81.91 RIGHT HEMIPARESIS (HCC): ICD-10-CM

## 2024-01-31 DIAGNOSIS — E11.9 TYPE 2 DIABETES MELLITUS WITHOUT COMPLICATION, WITHOUT LONG-TERM CURRENT USE OF INSULIN (HCC): ICD-10-CM

## 2024-01-31 DIAGNOSIS — I20.0 UNSTABLE ANGINA (HCC): ICD-10-CM

## 2024-01-31 DIAGNOSIS — J44.0 CHRONIC OBSTRUCTIVE PULMONARY DISEASE WITH ACUTE LOWER RESPIRATORY INFECTION (HCC): ICD-10-CM

## 2024-01-31 DIAGNOSIS — Z13.220 SCREENING FOR HYPERLIPIDEMIA: Primary | ICD-10-CM

## 2024-01-31 DIAGNOSIS — I42.9 CARDIOMYOPATHY, UNSPECIFIED TYPE (HCC): ICD-10-CM

## 2024-01-31 PROBLEM — G95.89 OTHER SPECIFIED DISEASES OF SPINAL CORD (HCC): Status: RESOLVED | Noted: 2023-08-02 | Resolved: 2024-01-31

## 2024-01-31 PROBLEM — Z99.11 DEPENDENCE ON RESPIRATOR (VENTILATOR) STATUS (HCC): Status: RESOLVED | Noted: 2023-08-02 | Resolved: 2024-01-31

## 2024-01-31 PROCEDURE — G8484 FLU IMMUNIZE NO ADMIN: HCPCS | Performed by: INTERNAL MEDICINE

## 2024-01-31 PROCEDURE — 3079F DIAST BP 80-89 MM HG: CPT | Performed by: INTERNAL MEDICINE

## 2024-01-31 PROCEDURE — 3023F SPIROM DOC REV: CPT | Performed by: INTERNAL MEDICINE

## 2024-01-31 PROCEDURE — G8427 DOCREV CUR MEDS BY ELIG CLIN: HCPCS | Performed by: INTERNAL MEDICINE

## 2024-01-31 PROCEDURE — 3075F SYST BP GE 130 - 139MM HG: CPT | Performed by: INTERNAL MEDICINE

## 2024-01-31 PROCEDURE — 4004F PT TOBACCO SCREEN RCVD TLK: CPT | Performed by: INTERNAL MEDICINE

## 2024-01-31 PROCEDURE — 99214 OFFICE O/P EST MOD 30 MIN: CPT | Performed by: INTERNAL MEDICINE

## 2024-01-31 PROCEDURE — 2022F DILAT RTA XM EVC RTNOPTHY: CPT | Performed by: INTERNAL MEDICINE

## 2024-01-31 PROCEDURE — G8420 CALC BMI NORM PARAMETERS: HCPCS | Performed by: INTERNAL MEDICINE

## 2024-01-31 PROCEDURE — 3017F COLORECTAL CA SCREEN DOC REV: CPT | Performed by: INTERNAL MEDICINE

## 2024-01-31 PROCEDURE — 3046F HEMOGLOBIN A1C LEVEL >9.0%: CPT | Performed by: INTERNAL MEDICINE

## 2024-01-31 RX ORDER — ALBUTEROL SULFATE 90 UG/1
2 AEROSOL, METERED RESPIRATORY (INHALATION) EVERY 4 HOURS PRN
Qty: 18 G | Refills: 3 | Status: SHIPPED | OUTPATIENT
Start: 2024-01-31

## 2024-01-31 NOTE — PROGRESS NOTES
Subjective:      Patient ID: Ethan Rios is a 58 y.o. male.    HPI  patient is here for evaluation of multiple medical problems.  He has history of depression, multivessel coronary artery disease s/p CABG,, V-fib arrest s/p AICD, history of CVA s/p left ICA stent, history of intra-aortic thrombus evaluated by vascular surgery, no indication for any surgery as per vascular  Patient also has c-ANCA vasculitis, CKD, follows closely with nephrologist, on imuran   Had low dose CT scan   Stable emphysema with stable 3 mm left upper lung nodule.  No new or  suspicious nodule.  Weight is stable   Compliant with diet and medication   MIssed appointment with cardiologist , will see on 4/25   No chest pain/SOB     Review of Systems   Constitutional:  Positive for appetite change (Lack of appetite), fatigue and unexpected weight change (Weight loss). Negative for activity change, chills and diaphoresis.   HENT:  Negative for congestion, dental problem, ear discharge, facial swelling and hearing loss.    Respiratory:  Positive for shortness of breath (occasional). Negative for apnea, cough, chest tightness and wheezing.    Cardiovascular:  Negative for chest pain and leg swelling.   Gastrointestinal:  Negative for abdominal distention, abdominal pain, constipation and diarrhea.   Genitourinary:  Negative for difficulty urinating, dysuria, enuresis, flank pain and frequency.   Musculoskeletal:  Negative for arthralgias, back pain, gait problem and joint swelling.   Skin:  Negative for color change, pallor and rash.   Neurological:  Negative for dizziness, seizures, facial asymmetry, light-headedness, numbness and headaches.   Psychiatric/Behavioral:  Positive for decreased concentration and dysphoric mood. Negative for agitation, behavioral problems and confusion.        Objective:   Physical Exam  Vitals and nursing note reviewed.   Constitutional:       General: He is not in acute distress.     Appearance: He is

## 2024-02-03 ASSESSMENT — ENCOUNTER SYMPTOMS
APNEA: 0
WHEEZING: 0
BACK PAIN: 0
ABDOMINAL DISTENTION: 0
FACIAL SWELLING: 0
CONSTIPATION: 0
CHEST TIGHTNESS: 0
DIARRHEA: 0
COLOR CHANGE: 0
SHORTNESS OF BREATH: 1
ABDOMINAL PAIN: 0
COUGH: 0

## 2024-02-06 RX ORDER — FAMOTIDINE 20 MG/1
20 TABLET, FILM COATED ORAL 2 TIMES DAILY
Qty: 60 TABLET | Refills: 3 | Status: SHIPPED | OUTPATIENT
Start: 2024-02-06

## 2024-03-15 ENCOUNTER — TELEPHONE (OUTPATIENT)
Dept: INTERNAL MEDICINE CLINIC | Age: 59
End: 2024-03-15

## 2024-03-15 NOTE — TELEPHONE ENCOUNTER
Patient was in the hospital and was put on midodrine. He went home on the medication he has been on it now for about a month and his BP has been consistently high running in the 187/96.    He had went to the hospital for low BP.    Should the patient stop this medication?    Please advise

## 2024-03-18 DIAGNOSIS — J44.0 CHRONIC OBSTRUCTIVE PULMONARY DISEASE WITH ACUTE LOWER RESPIRATORY INFECTION (HCC): ICD-10-CM

## 2024-03-18 RX ORDER — ALBUTEROL SULFATE 90 UG/1
2 AEROSOL, METERED RESPIRATORY (INHALATION) EVERY 4 HOURS PRN
Qty: 18 G | Refills: 3 | Status: SHIPPED | OUTPATIENT
Start: 2024-03-18

## 2024-04-11 DIAGNOSIS — F33.3 SEVERE RECURRENT MAJOR DEPRESSIVE DISORDER WITH PSYCHOTIC FEATURES (HCC): ICD-10-CM

## 2024-04-11 RX ORDER — MIRTAZAPINE 7.5 MG/1
15 TABLET, FILM COATED ORAL NIGHTLY
Qty: 30 TABLET | Refills: 5 | Status: SHIPPED | OUTPATIENT
Start: 2024-04-11

## 2024-04-16 DIAGNOSIS — I25.810 CORONARY ARTERY DISEASE INVOLVING CORONARY BYPASS GRAFT OF NATIVE HEART WITHOUT ANGINA PECTORIS: ICD-10-CM

## 2024-04-16 DIAGNOSIS — E11.9 TYPE 2 DIABETES MELLITUS WITHOUT COMPLICATION, WITHOUT LONG-TERM CURRENT USE OF INSULIN (HCC): ICD-10-CM

## 2024-04-16 RX ORDER — ATORVASTATIN CALCIUM 40 MG/1
TABLET, FILM COATED ORAL
Qty: 30 TABLET | Refills: 3 | Status: SHIPPED | OUTPATIENT
Start: 2024-04-16

## 2024-04-18 ENCOUNTER — TELEPHONE (OUTPATIENT)
Dept: INTERNAL MEDICINE CLINIC | Age: 59
End: 2024-04-18

## 2024-04-18 NOTE — TELEPHONE ENCOUNTER
Provider leaving early on 4/23 and needs to move patients to either the day before or another day that works, Contacted patient explaining this. Patient is moved to the day before Monday 4/22 at the same time. LVM for patient to call the office back to confirm this is okay.

## 2024-04-22 ENCOUNTER — OFFICE VISIT (OUTPATIENT)
Dept: INTERNAL MEDICINE CLINIC | Age: 59
End: 2024-04-22
Payer: COMMERCIAL

## 2024-04-22 VITALS
HEART RATE: 60 BPM | SYSTOLIC BLOOD PRESSURE: 120 MMHG | DIASTOLIC BLOOD PRESSURE: 82 MMHG | WEIGHT: 171 LBS | HEIGHT: 69 IN | OXYGEN SATURATION: 97 % | BODY MASS INDEX: 25.33 KG/M2

## 2024-04-22 DIAGNOSIS — I25.810 CORONARY ARTERY DISEASE INVOLVING CORONARY BYPASS GRAFT OF NATIVE HEART WITHOUT ANGINA PECTORIS: Primary | ICD-10-CM

## 2024-04-22 DIAGNOSIS — I46.9 CARDIAC ARREST, CAUSE UNSPECIFIED (HCC): ICD-10-CM

## 2024-04-22 DIAGNOSIS — E11.9 TYPE 2 DIABETES MELLITUS WITHOUT COMPLICATION, WITHOUT LONG-TERM CURRENT USE OF INSULIN (HCC): ICD-10-CM

## 2024-04-22 DIAGNOSIS — F33.3 SEVERE RECURRENT MAJOR DEPRESSIVE DISORDER WITH PSYCHOTIC FEATURES (HCC): ICD-10-CM

## 2024-04-22 DIAGNOSIS — J44.0 CHRONIC OBSTRUCTIVE PULMONARY DISEASE WITH ACUTE LOWER RESPIRATORY INFECTION (HCC): ICD-10-CM

## 2024-04-22 PROCEDURE — 4004F PT TOBACCO SCREEN RCVD TLK: CPT | Performed by: INTERNAL MEDICINE

## 2024-04-22 PROCEDURE — 2022F DILAT RTA XM EVC RTNOPTHY: CPT | Performed by: INTERNAL MEDICINE

## 2024-04-22 PROCEDURE — 3017F COLORECTAL CA SCREEN DOC REV: CPT | Performed by: INTERNAL MEDICINE

## 2024-04-22 PROCEDURE — 3079F DIAST BP 80-89 MM HG: CPT | Performed by: INTERNAL MEDICINE

## 2024-04-22 PROCEDURE — G8427 DOCREV CUR MEDS BY ELIG CLIN: HCPCS | Performed by: INTERNAL MEDICINE

## 2024-04-22 PROCEDURE — 3074F SYST BP LT 130 MM HG: CPT | Performed by: INTERNAL MEDICINE

## 2024-04-22 PROCEDURE — G8417 CALC BMI ABV UP PARAM F/U: HCPCS | Performed by: INTERNAL MEDICINE

## 2024-04-22 PROCEDURE — 3046F HEMOGLOBIN A1C LEVEL >9.0%: CPT | Performed by: INTERNAL MEDICINE

## 2024-04-22 PROCEDURE — 99214 OFFICE O/P EST MOD 30 MIN: CPT | Performed by: INTERNAL MEDICINE

## 2024-04-22 PROCEDURE — 3023F SPIROM DOC REV: CPT | Performed by: INTERNAL MEDICINE

## 2024-04-22 RX ORDER — FLUTICASONE PROPIONATE 220 UG/1
AEROSOL, METERED RESPIRATORY (INHALATION)
COMMUNITY
Start: 2022-03-18

## 2024-04-22 NOTE — PROGRESS NOTES
Subjective   Patient ID: Ethan Rios is a 59 y.o. male.    HPI  patient is here for evaluation of multiple medical problems.  He has history of depression, multivessel coronary artery disease s/p CABG,, V-fib arrest s/p AICD, history of CVA s/p left ICA stent, history of intra-aortic thrombus evaluated by vascular surgery, no indication for any surgery as per vascular  Patient also has c-ANCA vasculitis, CKD, follows closely with nephrologist, on imuran   Still smokes   Will make appointment with cardiologist soon   [facility]- ok   Patient lives  with his niece, she does all the work for him, which include driving, running errands  Patient is alert oriented time place person  Feel that his depression is controlled  Review of Systems   Constitutional:  Positive for fatigue. Negative for activity change, appetite change, chills, diaphoresis and unexpected weight change.   HENT:  Negative for congestion, dental problem, ear discharge, facial swelling and hearing loss.    Respiratory:  Positive for shortness of breath (occasional). Negative for apnea, cough, chest tightness and wheezing.    Cardiovascular:  Negative for chest pain and leg swelling.   Gastrointestinal:  Negative for abdominal distention, abdominal pain, constipation and diarrhea.   Genitourinary:  Negative for difficulty urinating, dysuria, enuresis, flank pain and frequency.   Musculoskeletal:  Negative for arthralgias, back pain, gait problem and joint swelling.   Skin:  Negative for color change, pallor and rash.   Neurological:  Negative for dizziness, seizures, facial asymmetry, light-headedness, numbness and headaches.   Psychiatric/Behavioral:  Positive for decreased concentration and dysphoric mood. Negative for agitation, behavioral problems and confusion.           Objective   Physical Exam  Vitals and nursing note reviewed.   Constitutional:       General: He is not in acute distress.     Appearance: He is well-developed. He is not

## 2024-04-23 PROBLEM — I46.9 CARDIAC ARREST, CAUSE UNSPECIFIED (HCC): Status: ACTIVE | Noted: 2024-04-23

## 2024-04-23 ASSESSMENT — ENCOUNTER SYMPTOMS
FACIAL SWELLING: 0
CHEST TIGHTNESS: 0
WHEEZING: 0
COUGH: 0
BACK PAIN: 0
APNEA: 0
CONSTIPATION: 0
COLOR CHANGE: 0
SHORTNESS OF BREATH: 1
DIARRHEA: 0
ABDOMINAL DISTENTION: 0
ABDOMINAL PAIN: 0

## 2024-05-07 RX ORDER — FAMOTIDINE 20 MG/1
20 TABLET, FILM COATED ORAL 2 TIMES DAILY
Qty: 60 TABLET | Refills: 3 | Status: SHIPPED | OUTPATIENT
Start: 2024-05-07

## 2024-06-04 ENCOUNTER — OFFICE VISIT (OUTPATIENT)
Dept: INTERNAL MEDICINE CLINIC | Age: 59
End: 2024-06-04
Payer: COMMERCIAL

## 2024-06-04 VITALS
WEIGHT: 171 LBS | SYSTOLIC BLOOD PRESSURE: 138 MMHG | OXYGEN SATURATION: 98 % | HEIGHT: 69 IN | DIASTOLIC BLOOD PRESSURE: 86 MMHG | BODY MASS INDEX: 25.33 KG/M2 | HEART RATE: 80 BPM

## 2024-06-04 DIAGNOSIS — Z13.220 SCREENING FOR HYPERLIPIDEMIA: ICD-10-CM

## 2024-06-04 DIAGNOSIS — I20.0 UNSTABLE ANGINA (HCC): Primary | ICD-10-CM

## 2024-06-04 DIAGNOSIS — J30.2 SEASONAL ALLERGIES: ICD-10-CM

## 2024-06-04 DIAGNOSIS — E11.9 TYPE 2 DIABETES MELLITUS WITHOUT COMPLICATION, WITHOUT LONG-TERM CURRENT USE OF INSULIN (HCC): ICD-10-CM

## 2024-06-04 LAB — HBA1C MFR BLD: 5.2 %

## 2024-06-04 PROCEDURE — 99214 OFFICE O/P EST MOD 30 MIN: CPT | Performed by: INTERNAL MEDICINE

## 2024-06-04 PROCEDURE — G8417 CALC BMI ABV UP PARAM F/U: HCPCS | Performed by: INTERNAL MEDICINE

## 2024-06-04 PROCEDURE — 3075F SYST BP GE 130 - 139MM HG: CPT | Performed by: INTERNAL MEDICINE

## 2024-06-04 PROCEDURE — 4004F PT TOBACCO SCREEN RCVD TLK: CPT | Performed by: INTERNAL MEDICINE

## 2024-06-04 PROCEDURE — 3017F COLORECTAL CA SCREEN DOC REV: CPT | Performed by: INTERNAL MEDICINE

## 2024-06-04 PROCEDURE — G8427 DOCREV CUR MEDS BY ELIG CLIN: HCPCS | Performed by: INTERNAL MEDICINE

## 2024-06-04 PROCEDURE — 2022F DILAT RTA XM EVC RTNOPTHY: CPT | Performed by: INTERNAL MEDICINE

## 2024-06-04 PROCEDURE — 3079F DIAST BP 80-89 MM HG: CPT | Performed by: INTERNAL MEDICINE

## 2024-06-04 PROCEDURE — 83036 HEMOGLOBIN GLYCOSYLATED A1C: CPT | Performed by: INTERNAL MEDICINE

## 2024-06-04 PROCEDURE — 3044F HG A1C LEVEL LT 7.0%: CPT | Performed by: INTERNAL MEDICINE

## 2024-06-04 SDOH — ECONOMIC STABILITY: INCOME INSECURITY: HOW HARD IS IT FOR YOU TO PAY FOR THE VERY BASICS LIKE FOOD, HOUSING, MEDICAL CARE, AND HEATING?: PATIENT DECLINED

## 2024-06-04 SDOH — ECONOMIC STABILITY: HOUSING INSECURITY
IN THE LAST 12 MONTHS, WAS THERE A TIME WHEN YOU DID NOT HAVE A STEADY PLACE TO SLEEP OR SLEPT IN A SHELTER (INCLUDING NOW)?: PATIENT DECLINED

## 2024-06-04 SDOH — ECONOMIC STABILITY: FOOD INSECURITY: WITHIN THE PAST 12 MONTHS, THE FOOD YOU BOUGHT JUST DIDN'T LAST AND YOU DIDN'T HAVE MONEY TO GET MORE.: PATIENT DECLINED

## 2024-06-04 SDOH — ECONOMIC STABILITY: FOOD INSECURITY: WITHIN THE PAST 12 MONTHS, YOU WORRIED THAT YOUR FOOD WOULD RUN OUT BEFORE YOU GOT MONEY TO BUY MORE.: PATIENT DECLINED

## 2024-06-04 NOTE — PROGRESS NOTES
foot exam  10/08/2021    Respiratory Syncytial Virus (RSV) Pregnant or age 60 yrs+ (1 - 1-dose 60+ series) Never done    Lipids  07/30/2023    A1C test (Diabetic or Prediabetic)  04/25/2024    Flu vaccine (Season Ended) 08/01/2024    GFR test (Diabetes, CKD 3-4, OR last GFR 15-59)  12/25/2024    Depression Monitoring  01/03/2025    Low dose CT lung screening &/or counseling  01/17/2025    DTaP/Tdap/Td vaccine (2 - Td or Tdap) 11/17/2026    Colorectal Cancer Screen  07/30/2029    Hepatitis C screen  Completed    HIV screen  Completed    Hepatitis A vaccine  Aged Out    Hepatitis B vaccine  Aged Out    Hib vaccine  Aged Out    Polio vaccine  Aged Out    Meningococcal (ACWY) vaccine  Aged Out    Prostate Specific Antigen (PSA) Screening or Monitoring  Discontinued

## 2024-06-06 RX ORDER — FLUTICASONE PROPIONATE 50 MCG
1 SPRAY, SUSPENSION (ML) NASAL DAILY
Qty: 32 G | Refills: 1 | Status: SHIPPED | OUTPATIENT
Start: 2024-06-06

## 2024-06-06 ASSESSMENT — ENCOUNTER SYMPTOMS
CHEST TIGHTNESS: 0
CONSTIPATION: 0
SHORTNESS OF BREATH: 1
DIARRHEA: 0
FACIAL SWELLING: 0
ABDOMINAL DISTENTION: 0
COUGH: 0
ABDOMINAL PAIN: 0
WHEEZING: 0
COLOR CHANGE: 0
SINUS PRESSURE: 1
APNEA: 0
BACK PAIN: 0

## 2024-06-26 DIAGNOSIS — F33.3 SEVERE RECURRENT MAJOR DEPRESSIVE DISORDER WITH PSYCHOTIC FEATURES (HCC): ICD-10-CM

## 2024-06-26 RX ORDER — MIRTAZAPINE 7.5 MG/1
15 TABLET, FILM COATED ORAL NIGHTLY
Qty: 30 TABLET | Refills: 5 | Status: SHIPPED | OUTPATIENT
Start: 2024-06-26

## 2024-06-28 RX ORDER — ASPIRIN 81 MG/1
81 TABLET, DELAYED RELEASE ORAL DAILY
Qty: 30 TABLET | Refills: 6 | Status: SHIPPED | OUTPATIENT
Start: 2024-06-28

## 2024-07-16 DIAGNOSIS — I25.810 CORONARY ARTERY DISEASE INVOLVING CORONARY BYPASS GRAFT OF NATIVE HEART WITHOUT ANGINA PECTORIS: ICD-10-CM

## 2024-07-16 DIAGNOSIS — E11.9 TYPE 2 DIABETES MELLITUS WITHOUT COMPLICATION, WITHOUT LONG-TERM CURRENT USE OF INSULIN (HCC): ICD-10-CM

## 2024-07-16 RX ORDER — ATORVASTATIN CALCIUM 40 MG/1
TABLET, FILM COATED ORAL
Qty: 30 TABLET | Refills: 3 | Status: SHIPPED | OUTPATIENT
Start: 2024-07-16

## 2024-08-13 RX ORDER — FAMOTIDINE 20 MG/1
20 TABLET, FILM COATED ORAL 2 TIMES DAILY
Qty: 60 TABLET | Refills: 3 | Status: SHIPPED | OUTPATIENT
Start: 2024-08-13

## 2024-08-15 ENCOUNTER — HOSPITAL ENCOUNTER (OUTPATIENT)
Age: 59
Discharge: HOME | End: 2024-08-15
Payer: COMMERCIAL

## 2024-08-15 DIAGNOSIS — E11.9 TYPE 2 DIABETES MELLITUS WITHOUT COMPLICATION, WITHOUT LONG-TERM CURRENT USE OF INSULIN (HCC): ICD-10-CM

## 2024-08-15 DIAGNOSIS — Z13.220 SCREENING FOR HYPERLIPIDEMIA: ICD-10-CM

## 2024-08-15 LAB
ANION GAP SERPL CALCULATED.3IONS-SCNC: 9 MMOL/L (ref 9–17)
BACTERIA URNS QL MICRO: ABNORMAL
BASOPHILS # BLD: 0.1 K/UL (ref 0–0.2)
BASOPHILS NFR BLD: 1 % (ref 0–2)
BILIRUB UR QL STRIP: NEGATIVE
BUN SERPL-MCNC: 12 MG/DL (ref 8–23)
CALCIUM SERPL-MCNC: 8.8 MG/DL (ref 8.6–10.4)
CASTS #/AREA URNS LPF: ABNORMAL /LPF
CHLORIDE SERPL-SCNC: 99 MMOL/L (ref 98–107)
CHOLEST SERPL-MCNC: 131 MG/DL
CHOLESTEROL/HDL RATIO: 3.3
CLARITY UR: CLEAR
CO2 SERPL-SCNC: 31 MMOL/L (ref 20–31)
COLOR UR: YELLOW
CREAT SERPL-MCNC: 1.5 MG/DL (ref 0.7–1.2)
CREAT UR-MCNC: 127 MG/DL (ref 39–259)
EOSINOPHIL # BLD: 0.3 K/UL (ref 0–0.4)
EOSINOPHILS RELATIVE PERCENT: 7 % (ref 0–4)
EPI CELLS #/AREA URNS HPF: ABNORMAL /HPF
ERYTHROCYTE [DISTWIDTH] IN BLOOD BY AUTOMATED COUNT: 15.1 % (ref 11.5–14.9)
GFR, ESTIMATED: 53 ML/MIN/1.73M2
GLUCOSE SERPL-MCNC: 86 MG/DL (ref 70–99)
GLUCOSE UR STRIP-MCNC: NEGATIVE MG/DL
HCT VFR BLD AUTO: 47.9 % (ref 41–53)
HDLC SERPL-MCNC: 40 MG/DL
HGB BLD-MCNC: 16 G/DL (ref 13.5–17.5)
HGB UR QL STRIP.AUTO: NEGATIVE
KETONES UR STRIP-MCNC: NEGATIVE MG/DL
LDLC SERPL CALC-MCNC: 69 MG/DL (ref 0–130)
LEUKOCYTE ESTERASE UR QL STRIP: ABNORMAL
LYMPHOCYTES NFR BLD: 1.1 K/UL (ref 1–4.8)
LYMPHOCYTES RELATIVE PERCENT: 22 % (ref 24–44)
MAGNESIUM SERPL-MCNC: 1.8 MG/DL (ref 1.6–2.6)
MCH RBC QN AUTO: 31.9 PG (ref 26–34)
MCHC RBC AUTO-ENTMCNC: 33.5 G/DL (ref 31–37)
MCV RBC AUTO: 95.2 FL (ref 80–100)
MICROALBUMIN UR-MCNC: 28 MG/L (ref 0–20)
MICROALBUMIN/CREAT UR-RTO: 22 MCG/MG CREAT (ref 0–17)
MONOCYTES NFR BLD: 0.4 K/UL (ref 0.1–1.3)
MONOCYTES NFR BLD: 7 % (ref 1–7)
NEUTROPHILS NFR BLD: 63 % (ref 36–66)
NEUTS SEG NFR BLD: 3.3 K/UL (ref 1.3–9.1)
NITRITE UR QL STRIP: NEGATIVE
PH UR STRIP: 6.5 [PH] (ref 5–8)
PHOSPHATE SERPL-MCNC: 3.4 MG/DL (ref 2.5–4.5)
PLATELET # BLD AUTO: 121 K/UL (ref 150–450)
PMV BLD AUTO: 9.5 FL (ref 6–12)
POTASSIUM SERPL-SCNC: 5.1 MMOL/L (ref 3.7–5.3)
PROT UR STRIP-MCNC: NEGATIVE MG/DL
RBC # BLD AUTO: 5.03 M/UL (ref 4.5–5.9)
RBC #/AREA URNS HPF: ABNORMAL /HPF
SODIUM SERPL-SCNC: 139 MMOL/L (ref 135–144)
SP GR UR STRIP: 1.01 (ref 1–1.03)
TRIGL SERPL-MCNC: 110 MG/DL
UROBILINOGEN UR STRIP-ACNC: NORMAL EU/DL (ref 0–1)
WBC #/AREA URNS HPF: ABNORMAL /HPF
WBC OTHER # BLD: 5.2 K/UL (ref 3.5–11)

## 2024-08-15 PROCEDURE — 82043 UR ALBUMIN QUANTITATIVE: CPT

## 2024-08-15 PROCEDURE — 82570 ASSAY OF URINE CREATININE: CPT

## 2024-08-15 PROCEDURE — 81001 URINALYSIS AUTO W/SCOPE: CPT

## 2024-08-15 PROCEDURE — 80048 BASIC METABOLIC PNL TOTAL CA: CPT

## 2024-08-15 PROCEDURE — 80061 LIPID PANEL: CPT

## 2024-08-15 PROCEDURE — 84100 ASSAY OF PHOSPHORUS: CPT

## 2024-08-15 PROCEDURE — 36415 COLL VENOUS BLD VENIPUNCTURE: CPT

## 2024-08-15 PROCEDURE — 85025 COMPLETE CBC W/AUTO DIFF WBC: CPT

## 2024-08-15 PROCEDURE — 83735 ASSAY OF MAGNESIUM: CPT

## 2024-08-16 PROBLEM — R09.02 HYPOXIC: Status: ACTIVE | Noted: 2023-09-10

## 2024-08-16 PROBLEM — J84.10 PULMONARY FIBROSIS (HCC): Status: ACTIVE | Noted: 2023-09-10

## 2024-08-27 RX ORDER — DULOXETIN HYDROCHLORIDE 30 MG/1
30 CAPSULE, DELAYED RELEASE ORAL DAILY
Qty: 30 CAPSULE | Refills: 2 | Status: SHIPPED | OUTPATIENT
Start: 2024-08-27

## 2024-09-04 DIAGNOSIS — F33.3 SEVERE RECURRENT MAJOR DEPRESSIVE DISORDER WITH PSYCHOTIC FEATURES (HCC): ICD-10-CM

## 2024-09-04 RX ORDER — MIRTAZAPINE 7.5 MG/1
15 TABLET, FILM COATED ORAL NIGHTLY
Qty: 30 TABLET | Refills: 5 | Status: SHIPPED | OUTPATIENT
Start: 2024-09-04

## 2024-09-05 ENCOUNTER — TELEPHONE (OUTPATIENT)
Dept: INTERNAL MEDICINE CLINIC | Age: 59
End: 2024-09-05

## 2024-09-05 DIAGNOSIS — L60.0 INGROWN NAIL: Primary | ICD-10-CM

## 2024-09-06 ENCOUNTER — OFFICE VISIT (OUTPATIENT)
Dept: INTERNAL MEDICINE CLINIC | Age: 59
End: 2024-09-06
Payer: COMMERCIAL

## 2024-09-06 VITALS
SYSTOLIC BLOOD PRESSURE: 124 MMHG | WEIGHT: 170.8 LBS | HEIGHT: 69 IN | BODY MASS INDEX: 25.3 KG/M2 | HEART RATE: 65 BPM | OXYGEN SATURATION: 98 % | DIASTOLIC BLOOD PRESSURE: 68 MMHG

## 2024-09-06 DIAGNOSIS — I25.810 CORONARY ARTERY DISEASE INVOLVING CORONARY BYPASS GRAFT OF NATIVE HEART WITHOUT ANGINA PECTORIS: ICD-10-CM

## 2024-09-06 DIAGNOSIS — F33.3 SEVERE RECURRENT MAJOR DEPRESSIVE DISORDER WITH PSYCHOTIC FEATURES (HCC): ICD-10-CM

## 2024-09-06 DIAGNOSIS — J44.0 CHRONIC OBSTRUCTIVE PULMONARY DISEASE WITH ACUTE LOWER RESPIRATORY INFECTION (HCC): ICD-10-CM

## 2024-09-06 DIAGNOSIS — E11.9 TYPE 2 DIABETES MELLITUS WITHOUT COMPLICATION, WITHOUT LONG-TERM CURRENT USE OF INSULIN (HCC): Primary | ICD-10-CM

## 2024-09-06 PROCEDURE — 2022F DILAT RTA XM EVC RTNOPTHY: CPT | Performed by: INTERNAL MEDICINE

## 2024-09-06 PROCEDURE — 4004F PT TOBACCO SCREEN RCVD TLK: CPT | Performed by: INTERNAL MEDICINE

## 2024-09-06 PROCEDURE — 3023F SPIROM DOC REV: CPT | Performed by: INTERNAL MEDICINE

## 2024-09-06 PROCEDURE — 99214 OFFICE O/P EST MOD 30 MIN: CPT | Performed by: INTERNAL MEDICINE

## 2024-09-06 PROCEDURE — G8417 CALC BMI ABV UP PARAM F/U: HCPCS | Performed by: INTERNAL MEDICINE

## 2024-09-06 PROCEDURE — 3044F HG A1C LEVEL LT 7.0%: CPT | Performed by: INTERNAL MEDICINE

## 2024-09-06 PROCEDURE — G8427 DOCREV CUR MEDS BY ELIG CLIN: HCPCS | Performed by: INTERNAL MEDICINE

## 2024-09-06 PROCEDURE — 3017F COLORECTAL CA SCREEN DOC REV: CPT | Performed by: INTERNAL MEDICINE

## 2024-09-06 PROCEDURE — 3074F SYST BP LT 130 MM HG: CPT | Performed by: INTERNAL MEDICINE

## 2024-09-06 PROCEDURE — 3078F DIAST BP <80 MM HG: CPT | Performed by: INTERNAL MEDICINE

## 2024-09-14 ASSESSMENT — ENCOUNTER SYMPTOMS
ABDOMINAL PAIN: 0
CONSTIPATION: 0
SHORTNESS OF BREATH: 1
BACK PAIN: 0
ABDOMINAL DISTENTION: 0
FACIAL SWELLING: 0
SINUS PRESSURE: 1
COLOR CHANGE: 0
COUGH: 0
APNEA: 0
DIARRHEA: 0
WHEEZING: 0
CHEST TIGHTNESS: 0

## 2024-09-19 DIAGNOSIS — J44.0 CHRONIC OBSTRUCTIVE PULMONARY DISEASE WITH ACUTE LOWER RESPIRATORY INFECTION (HCC): ICD-10-CM

## 2024-09-19 RX ORDER — ALBUTEROL SULFATE 90 UG/1
2 INHALANT RESPIRATORY (INHALATION) EVERY 4 HOURS PRN
Qty: 18 G | Refills: 3 | Status: SHIPPED | OUTPATIENT
Start: 2024-09-19

## 2024-10-22 DIAGNOSIS — E11.9 TYPE 2 DIABETES MELLITUS WITHOUT COMPLICATION, WITHOUT LONG-TERM CURRENT USE OF INSULIN (HCC): ICD-10-CM

## 2024-10-22 DIAGNOSIS — I25.810 CORONARY ARTERY DISEASE INVOLVING CORONARY BYPASS GRAFT OF NATIVE HEART WITHOUT ANGINA PECTORIS: ICD-10-CM

## 2024-10-23 RX ORDER — ATORVASTATIN CALCIUM 40 MG/1
TABLET, FILM COATED ORAL
Qty: 30 TABLET | Refills: 3 | Status: SHIPPED | OUTPATIENT
Start: 2024-10-23

## 2024-11-04 ENCOUNTER — OFFICE VISIT (OUTPATIENT)
Dept: PODIATRY | Age: 59
End: 2024-11-04
Payer: COMMERCIAL

## 2024-11-04 VITALS — HEIGHT: 69 IN | BODY MASS INDEX: 25.03 KG/M2 | WEIGHT: 169 LBS

## 2024-11-04 DIAGNOSIS — B35.1 ONYCHOMYCOSIS OF TOENAIL: Primary | ICD-10-CM

## 2024-11-04 DIAGNOSIS — I73.9 PERIPHERAL ARTERIAL DISEASE (HCC): ICD-10-CM

## 2024-11-04 DIAGNOSIS — M79.674 PAIN OF TOES OF BOTH FEET: ICD-10-CM

## 2024-11-04 DIAGNOSIS — M79.675 PAIN OF TOES OF BOTH FEET: ICD-10-CM

## 2024-11-04 PROCEDURE — G8427 DOCREV CUR MEDS BY ELIG CLIN: HCPCS | Performed by: PODIATRIST

## 2024-11-04 PROCEDURE — 4004F PT TOBACCO SCREEN RCVD TLK: CPT | Performed by: PODIATRIST

## 2024-11-04 PROCEDURE — G8420 CALC BMI NORM PARAMETERS: HCPCS | Performed by: PODIATRIST

## 2024-11-04 PROCEDURE — 99203 OFFICE O/P NEW LOW 30 MIN: CPT | Performed by: PODIATRIST

## 2024-11-04 PROCEDURE — G8484 FLU IMMUNIZE NO ADMIN: HCPCS | Performed by: PODIATRIST

## 2024-11-04 PROCEDURE — 3017F COLORECTAL CA SCREEN DOC REV: CPT | Performed by: PODIATRIST

## 2024-11-04 PROCEDURE — 11721 DEBRIDE NAIL 6 OR MORE: CPT | Performed by: PODIATRIST
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
## 2024-11-05 ASSESSMENT — ENCOUNTER SYMPTOMS
DIARRHEA: 0
SHORTNESS OF BREATH: 0
BACK PAIN: 0
COLOR CHANGE: 0
NAUSEA: 0

## 2024-11-06 DIAGNOSIS — J44.0 CHRONIC OBSTRUCTIVE PULMONARY DISEASE WITH ACUTE LOWER RESPIRATORY INFECTION (HCC): ICD-10-CM

## 2024-11-06 RX ORDER — ALBUTEROL SULFATE 90 UG/1
2 INHALANT RESPIRATORY (INHALATION) EVERY 4 HOURS PRN
Qty: 18 G | Refills: 3 | Status: SHIPPED | OUTPATIENT
Start: 2024-11-06

## 2024-11-06 RX ORDER — DULOXETIN HYDROCHLORIDE 30 MG/1
30 CAPSULE, DELAYED RELEASE ORAL DAILY
Qty: 30 CAPSULE | Refills: 2 | Status: SHIPPED | OUTPATIENT
Start: 2024-11-06

## 2024-11-20 ENCOUNTER — TELEPHONE (OUTPATIENT)
Dept: INTERNAL MEDICINE CLINIC | Age: 59
End: 2024-11-20

## 2024-11-20 RX ORDER — FAMOTIDINE 20 MG/1
20 TABLET, FILM COATED ORAL 2 TIMES DAILY
Qty: 60 TABLET | Refills: 3 | Status: SHIPPED | OUTPATIENT
Start: 2024-11-20

## 2025-01-03 DIAGNOSIS — J44.0 CHRONIC OBSTRUCTIVE PULMONARY DISEASE WITH ACUTE LOWER RESPIRATORY INFECTION (HCC): ICD-10-CM

## 2025-01-03 RX ORDER — ALBUTEROL SULFATE 90 UG/1
2 INHALANT RESPIRATORY (INHALATION) EVERY 4 HOURS PRN
Qty: 18 G | Refills: 3 | Status: SHIPPED | OUTPATIENT
Start: 2025-01-03

## 2025-01-08 DIAGNOSIS — J30.2 SEASONAL ALLERGIES: ICD-10-CM

## 2025-01-08 RX ORDER — FLUTICASONE PROPIONATE 50 MCG
SPRAY, SUSPENSION (ML) NASAL
Qty: 16 G | Refills: 1 | Status: SHIPPED | OUTPATIENT
Start: 2025-01-08

## 2025-01-13 DIAGNOSIS — R21 RASH: ICD-10-CM

## 2025-01-13 RX ORDER — BENZOCAINE/MENTHOL 6 MG-10 MG
LOZENGE MUCOUS MEMBRANE
Qty: 28 G | Refills: 1 | Status: SHIPPED | OUTPATIENT
Start: 2025-01-13

## 2025-01-21 RX ORDER — DULOXETIN HYDROCHLORIDE 30 MG/1
30 CAPSULE, DELAYED RELEASE ORAL DAILY
Qty: 30 CAPSULE | Refills: 2 | Status: SHIPPED | OUTPATIENT
Start: 2025-01-21

## 2025-01-30 DIAGNOSIS — E11.9 TYPE 2 DIABETES MELLITUS WITHOUT COMPLICATION, WITHOUT LONG-TERM CURRENT USE OF INSULIN (HCC): ICD-10-CM

## 2025-01-30 DIAGNOSIS — I25.810 CORONARY ARTERY DISEASE INVOLVING CORONARY BYPASS GRAFT OF NATIVE HEART WITHOUT ANGINA PECTORIS: ICD-10-CM

## 2025-01-30 RX ORDER — ATORVASTATIN CALCIUM 40 MG/1
TABLET, FILM COATED ORAL
Qty: 30 TABLET | Refills: 3 | Status: SHIPPED | OUTPATIENT
Start: 2025-01-30

## 2025-02-03 ENCOUNTER — OFFICE VISIT (OUTPATIENT)
Dept: PODIATRY | Age: 60
End: 2025-02-03
Payer: COMMERCIAL

## 2025-02-03 VITALS — HEIGHT: 69 IN | WEIGHT: 179 LBS | BODY MASS INDEX: 26.51 KG/M2

## 2025-02-03 DIAGNOSIS — M79.674 PAIN OF TOES OF BOTH FEET: ICD-10-CM

## 2025-02-03 DIAGNOSIS — I73.9 PERIPHERAL ARTERIAL DISEASE (HCC): ICD-10-CM

## 2025-02-03 DIAGNOSIS — M79.675 PAIN OF TOES OF BOTH FEET: ICD-10-CM

## 2025-02-03 DIAGNOSIS — B35.1 ONYCHOMYCOSIS OF TOENAIL: Primary | ICD-10-CM

## 2025-02-03 PROCEDURE — 11721 DEBRIDE NAIL 6 OR MORE: CPT | Performed by: PODIATRIST

## 2025-02-03 PROCEDURE — 99999 PR OFFICE/OUTPT VISIT,PROCEDURE ONLY: CPT | Performed by: PODIATRIST

## 2025-02-03 ASSESSMENT — ENCOUNTER SYMPTOMS
DIARRHEA: 0
BACK PAIN: 0
COLOR CHANGE: 0
NAUSEA: 0
SHORTNESS OF BREATH: 0

## 2025-02-03 NOTE — PROGRESS NOTES
SUBJECTIVE: Ethan Rios is a 59 y.o. male who returns to the office with chief complaint of painful fungal toenails. Patient relates toe nails are thickened/difficult to trim as well as painful with ambulation and with shoe gear.   Chief Complaint   Patient presents with    Nail Problem     Nail trim/last saw Dr.Puneet Street 9/6/24    Foot Pain     B/l     Review of Systems   Constitutional:  Negative for activity change, appetite change, chills, diaphoresis, fatigue and fever.   Respiratory:  Negative for shortness of breath.    Cardiovascular:  Negative for leg swelling.   Gastrointestinal:  Negative for diarrhea and nausea.   Endocrine: Negative for cold intolerance, heat intolerance and polyuria.   Musculoskeletal:  Positive for arthralgias. Negative for back pain, gait problem, joint swelling and myalgias.   Skin:  Negative for color change, pallor, rash and wound.   Allergic/Immunologic: Negative for environmental allergies and food allergies.   Neurological:  Negative for dizziness, weakness, light-headedness and numbness.   Hematological:  Does not bruise/bleed easily.   Psychiatric/Behavioral:  Negative for behavioral problems, confusion and self-injury. The patient is not nervous/anxious.      OBJECTIVE: Clinical evaluation of patient reveals nails 1,2,3,4,5 of the right foot and nails 1,2,3,4,5 of the left foot to present with thickness, elongation, discoloration, brittleness, and subungual debris. There was pain with palpation and debridement of the toenails of the bilateral feet. No open lesions noted to either foot today.   The right DP pulse is not palpable.   The left DP pulse is not palpable.   The right PT pulse is not palpable.   The left PT pulse is not palpable.     Class A Findings (1 needed)   [] Non-traumatic amputation of foot or integral skeleton portion thereof.   [] Q7.      Class B Findings (2 needed)   1. [] Absent posterior tibial pulse   2. [] Absent dorsalis pedis pulse   3. []

## 2025-02-04 DIAGNOSIS — F33.3 SEVERE RECURRENT MAJOR DEPRESSIVE DISORDER WITH PSYCHOTIC FEATURES (HCC): ICD-10-CM

## 2025-02-04 RX ORDER — ASPIRIN 81 MG/1
81 TABLET ORAL DAILY
Qty: 30 TABLET | Refills: 1 | Status: SHIPPED | OUTPATIENT
Start: 2025-02-04

## 2025-02-05 RX ORDER — MIRTAZAPINE 7.5 MG/1
15 TABLET, FILM COATED ORAL NIGHTLY
Qty: 30 TABLET | Refills: 1 | Status: SHIPPED | OUTPATIENT
Start: 2025-02-05

## 2025-02-26 DIAGNOSIS — J44.0 CHRONIC OBSTRUCTIVE PULMONARY DISEASE WITH ACUTE LOWER RESPIRATORY INFECTION (HCC): ICD-10-CM

## 2025-02-26 RX ORDER — ALBUTEROL SULFATE 90 UG/1
2 INHALANT RESPIRATORY (INHALATION) EVERY 4 HOURS PRN
Qty: 18 G | Refills: 3 | Status: SHIPPED | OUTPATIENT
Start: 2025-02-26

## 2025-02-26 RX ORDER — FAMOTIDINE 20 MG/1
20 TABLET, FILM COATED ORAL 2 TIMES DAILY
Qty: 60 TABLET | Refills: 3 | Status: SHIPPED | OUTPATIENT
Start: 2025-02-26

## 2025-03-26 RX ORDER — ASPIRIN 81 MG/1
81 TABLET ORAL DAILY
Qty: 30 TABLET | Refills: 1 | Status: SHIPPED | OUTPATIENT
Start: 2025-03-26

## 2025-04-02 RX ORDER — DULOXETIN HYDROCHLORIDE 30 MG/1
30 CAPSULE, DELAYED RELEASE ORAL DAILY
Qty: 30 CAPSULE | Refills: 2 | Status: SHIPPED | OUTPATIENT
Start: 2025-04-02

## 2025-04-08 ENCOUNTER — OFFICE VISIT (OUTPATIENT)
Dept: INTERNAL MEDICINE CLINIC | Age: 60
End: 2025-04-08
Payer: COMMERCIAL

## 2025-04-08 VITALS
SYSTOLIC BLOOD PRESSURE: 112 MMHG | DIASTOLIC BLOOD PRESSURE: 70 MMHG | HEART RATE: 57 BPM | OXYGEN SATURATION: 94 % | WEIGHT: 176 LBS | BODY MASS INDEX: 25.99 KG/M2

## 2025-04-08 DIAGNOSIS — F33.3 SEVERE RECURRENT MAJOR DEPRESSIVE DISORDER WITH PSYCHOTIC FEATURES (HCC): ICD-10-CM

## 2025-04-08 DIAGNOSIS — I46.9 CARDIAC ARREST, CAUSE UNSPECIFIED (HCC): ICD-10-CM

## 2025-04-08 DIAGNOSIS — Z12.5 PROSTATE CANCER SCREENING: ICD-10-CM

## 2025-04-08 DIAGNOSIS — E11.9 TYPE 2 DIABETES MELLITUS WITHOUT COMPLICATION, WITHOUT LONG-TERM CURRENT USE OF INSULIN: ICD-10-CM

## 2025-04-08 DIAGNOSIS — Z71.89 ACP (ADVANCE CARE PLANNING): ICD-10-CM

## 2025-04-08 DIAGNOSIS — Z87.891 PERSONAL HISTORY OF TOBACCO USE: ICD-10-CM

## 2025-04-08 DIAGNOSIS — J44.0 CHRONIC OBSTRUCTIVE PULMONARY DISEASE WITH ACUTE LOWER RESPIRATORY INFECTION: ICD-10-CM

## 2025-04-08 DIAGNOSIS — N18.4 STAGE 4 CHRONIC KIDNEY DISEASE (HCC): ICD-10-CM

## 2025-04-08 DIAGNOSIS — I77.82 ANCA-ASSOCIATED VASCULITIS: ICD-10-CM

## 2025-04-08 DIAGNOSIS — I42.9 CARDIOMYOPATHY, UNSPECIFIED TYPE (HCC): ICD-10-CM

## 2025-04-08 DIAGNOSIS — J84.9 INTERSTITIAL LUNG DISEASE (HCC): ICD-10-CM

## 2025-04-08 DIAGNOSIS — G81.91 RIGHT HEMIPARESIS (HCC): ICD-10-CM

## 2025-04-08 DIAGNOSIS — Z12.2 SCREENING FOR LUNG CANCER: Primary | ICD-10-CM

## 2025-04-08 PROCEDURE — G0296 VISIT TO DETERM LDCT ELIG: HCPCS | Performed by: INTERNAL MEDICINE

## 2025-04-08 PROCEDURE — 2022F DILAT RTA XM EVC RTNOPTHY: CPT | Performed by: INTERNAL MEDICINE

## 2025-04-08 PROCEDURE — 3074F SYST BP LT 130 MM HG: CPT | Performed by: INTERNAL MEDICINE

## 2025-04-08 PROCEDURE — 3078F DIAST BP <80 MM HG: CPT | Performed by: INTERNAL MEDICINE

## 2025-04-08 PROCEDURE — 99214 OFFICE O/P EST MOD 30 MIN: CPT | Performed by: INTERNAL MEDICINE

## 2025-04-08 PROCEDURE — 3017F COLORECTAL CA SCREEN DOC REV: CPT | Performed by: INTERNAL MEDICINE

## 2025-04-08 PROCEDURE — G8417 CALC BMI ABV UP PARAM F/U: HCPCS | Performed by: INTERNAL MEDICINE

## 2025-04-08 PROCEDURE — 3046F HEMOGLOBIN A1C LEVEL >9.0%: CPT | Performed by: INTERNAL MEDICINE

## 2025-04-08 PROCEDURE — G8427 DOCREV CUR MEDS BY ELIG CLIN: HCPCS | Performed by: INTERNAL MEDICINE

## 2025-04-08 PROCEDURE — 4004F PT TOBACCO SCREEN RCVD TLK: CPT | Performed by: INTERNAL MEDICINE

## 2025-04-08 PROCEDURE — 3023F SPIROM DOC REV: CPT | Performed by: INTERNAL MEDICINE

## 2025-04-08 ASSESSMENT — ENCOUNTER SYMPTOMS
SINUS PRESSURE: 1
BACK PAIN: 0
APNEA: 0
ABDOMINAL PAIN: 0
DIARRHEA: 0
CONSTIPATION: 0
FACIAL SWELLING: 0
COUGH: 0
COLOR CHANGE: 0
CHEST TIGHTNESS: 0
WHEEZING: 0
ABDOMINAL DISTENTION: 0
SHORTNESS OF BREATH: 1

## 2025-04-08 ASSESSMENT — PATIENT HEALTH QUESTIONNAIRE - PHQ9
10. IF YOU CHECKED OFF ANY PROBLEMS, HOW DIFFICULT HAVE THESE PROBLEMS MADE IT FOR YOU TO DO YOUR WORK, TAKE CARE OF THINGS AT HOME, OR GET ALONG WITH OTHER PEOPLE: NOT DIFFICULT AT ALL
4. FEELING TIRED OR HAVING LITTLE ENERGY: NEARLY EVERY DAY
SUM OF ALL RESPONSES TO PHQ QUESTIONS 1-9: 8
2. FEELING DOWN, DEPRESSED OR HOPELESS: NOT AT ALL
SUM OF ALL RESPONSES TO PHQ QUESTIONS 1-9: 8
8. MOVING OR SPEAKING SO SLOWLY THAT OTHER PEOPLE COULD HAVE NOTICED. OR THE OPPOSITE, BEING SO FIGETY OR RESTLESS THAT YOU HAVE BEEN MOVING AROUND A LOT MORE THAN USUAL: NOT AT ALL
9. THOUGHTS THAT YOU WOULD BE BETTER OFF DEAD, OR OF HURTING YOURSELF: NOT AT ALL
SUM OF ALL RESPONSES TO PHQ QUESTIONS 1-9: 8
5. POOR APPETITE OR OVEREATING: SEVERAL DAYS
1. LITTLE INTEREST OR PLEASURE IN DOING THINGS: SEVERAL DAYS
6. FEELING BAD ABOUT YOURSELF - OR THAT YOU ARE A FAILURE OR HAVE LET YOURSELF OR YOUR FAMILY DOWN: NOT AT ALL
3. TROUBLE FALLING OR STAYING ASLEEP: NEARLY EVERY DAY
SUM OF ALL RESPONSES TO PHQ QUESTIONS 1-9: 8
7. TROUBLE CONCENTRATING ON THINGS, SUCH AS READING THE NEWSPAPER OR WATCHING TELEVISION: NOT AT ALL

## 2025-04-08 NOTE — PATIENT INSTRUCTIONS
medical condition or this instruction, always ask your healthcare professional. [employer] disclaims any warranty or liability for your use of this information.

## 2025-04-30 DIAGNOSIS — J30.2 SEASONAL ALLERGIES: ICD-10-CM

## 2025-04-30 RX ORDER — FLUTICASONE PROPIONATE 50 MCG
SPRAY, SUSPENSION (ML) NASAL
Qty: 16 G | Refills: 1 | Status: SHIPPED | OUTPATIENT
Start: 2025-04-30

## 2025-05-01 ENCOUNTER — HOSPITAL ENCOUNTER (OUTPATIENT)
Dept: CT IMAGING | Age: 60
Discharge: HOME | End: 2025-05-03
Attending: INTERNAL MEDICINE
Payer: COMMERCIAL

## 2025-05-01 VITALS — BODY MASS INDEX: 26.07 KG/M2 | WEIGHT: 176 LBS | HEIGHT: 69 IN

## 2025-05-01 DIAGNOSIS — Z87.891 PERSONAL HISTORY OF TOBACCO USE: ICD-10-CM

## 2025-05-01 PROCEDURE — 71271 CT THORAX LUNG CANCER SCR C-: CPT

## 2025-05-05 ENCOUNTER — OFFICE VISIT (OUTPATIENT)
Dept: PODIATRY | Age: 60
End: 2025-05-05

## 2025-05-05 VITALS — WEIGHT: 175 LBS | BODY MASS INDEX: 25.92 KG/M2 | HEIGHT: 69 IN

## 2025-05-05 DIAGNOSIS — B35.1 ONYCHOMYCOSIS OF TOENAIL: Primary | ICD-10-CM

## 2025-05-05 DIAGNOSIS — M79.674 PAIN OF TOES OF BOTH FEET: ICD-10-CM

## 2025-05-05 DIAGNOSIS — I73.9 PERIPHERAL ARTERIAL DISEASE: ICD-10-CM

## 2025-05-05 DIAGNOSIS — M79.675 PAIN OF TOES OF BOTH FEET: ICD-10-CM

## 2025-05-07 ASSESSMENT — ENCOUNTER SYMPTOMS
BACK PAIN: 0
SHORTNESS OF BREATH: 0
DIARRHEA: 0
NAUSEA: 0
COLOR CHANGE: 0

## 2025-05-08 DIAGNOSIS — I25.810 CORONARY ARTERY DISEASE INVOLVING CORONARY BYPASS GRAFT OF NATIVE HEART WITHOUT ANGINA PECTORIS: ICD-10-CM

## 2025-05-08 DIAGNOSIS — E11.9 TYPE 2 DIABETES MELLITUS WITHOUT COMPLICATION, WITHOUT LONG-TERM CURRENT USE OF INSULIN (HCC): ICD-10-CM

## 2025-05-08 RX ORDER — ATORVASTATIN CALCIUM 40 MG/1
40 TABLET, FILM COATED ORAL DAILY
Qty: 30 TABLET | Refills: 3 | Status: SHIPPED | OUTPATIENT
Start: 2025-05-08

## 2025-05-08 NOTE — PROGRESS NOTES
SUBJECTIVE: Ethan Rios is a 60 y.o. male who returns to the office with chief complaint of painful fungal toenails. Patient relates toe nails are thickened/difficult to trim as well as painful with ambulation and with shoe gear.   Chief Complaint   Patient presents with    Nail Problem     B/l nail trim     Review of Systems   Constitutional:  Negative for activity change, appetite change, chills, diaphoresis, fatigue and fever.   Respiratory:  Negative for shortness of breath.    Cardiovascular:  Negative for leg swelling.   Gastrointestinal:  Negative for diarrhea and nausea.   Endocrine: Negative for cold intolerance, heat intolerance and polyuria.   Musculoskeletal:  Positive for arthralgias. Negative for back pain, gait problem, joint swelling and myalgias.   Skin:  Negative for color change, pallor, rash and wound.   Allergic/Immunologic: Negative for environmental allergies and food allergies.   Neurological:  Negative for dizziness, weakness, light-headedness and numbness.   Hematological:  Does not bruise/bleed easily.   Psychiatric/Behavioral:  Negative for behavioral problems, confusion and self-injury. The patient is not nervous/anxious.      OBJECTIVE: Clinical evaluation of patient reveals nails 1,2,3,4,5 of the right foot and nails 1,2,3,4,5 of the left foot to present with thickness, elongation, discoloration, brittleness, and subungual debris. There was pain with palpation and debridement of the toenails of the bilateral feet. No open lesions noted to either foot today.   The right DP pulse is not palpable.   The left DP pulse is not palpable.   The right PT pulse is not palpable.   The left PT pulse is not palpable.     Class A Findings (1 needed)   [] Non-traumatic amputation of foot or integral skeleton portion thereof.   [] Q7.      Class B Findings (2 needed)   1. [x] Absent posterior tibial pulse   2. [x] Absent dorsalis pedis pulse   3. [] Advanced trophic changes; three of the

## 2025-05-13 DIAGNOSIS — F33.3 SEVERE RECURRENT MAJOR DEPRESSIVE DISORDER WITH PSYCHOTIC FEATURES (HCC): ICD-10-CM

## 2025-05-14 ENCOUNTER — TELEPHONE (OUTPATIENT)
Dept: INTERNAL MEDICINE CLINIC | Age: 60
End: 2025-05-14

## 2025-05-14 DIAGNOSIS — F33.3 SEVERE RECURRENT MAJOR DEPRESSIVE DISORDER WITH PSYCHOTIC FEATURES (HCC): Primary | ICD-10-CM

## 2025-05-14 RX ORDER — MIRTAZAPINE 7.5 MG/1
15 TABLET, FILM COATED ORAL NIGHTLY
Qty: 30 TABLET | Refills: 1 | Status: SHIPPED | OUTPATIENT
Start: 2025-05-14

## 2025-05-14 RX ORDER — ASPIRIN 81 MG/1
81 TABLET ORAL DAILY
Qty: 30 TABLET | Refills: 1 | Status: SHIPPED | OUTPATIENT
Start: 2025-05-14

## 2025-05-14 NOTE — TELEPHONE ENCOUNTER
CLARIFICATION NEEDED:     mirtazapine (REMERON) 7.5 MG tablet  directions state take 2 tabs nightly and they would like to change it to 15 mg one nightly.    Please advise

## 2025-05-15 NOTE — TELEPHONE ENCOUNTER
They were just wanting to cut down on the amount of tablet's the patient takes a night so instead of taking 15mg with 2 pills they want to condense it down to 1.

## 2025-05-15 NOTE — TELEPHONE ENCOUNTER
Please asked patient, what strength he is currently taking, and pend the same dose, I did not have any intention to change his medication dosage

## 2025-05-16 RX ORDER — MIRTAZAPINE 15 MG/1
15 TABLET, FILM COATED ORAL NIGHTLY
Qty: 90 TABLET | Refills: 0 | Status: SHIPPED | OUTPATIENT
Start: 2025-05-16

## 2025-05-20 DIAGNOSIS — J44.0 CHRONIC OBSTRUCTIVE PULMONARY DISEASE WITH ACUTE LOWER RESPIRATORY INFECTION (HCC): ICD-10-CM

## 2025-05-20 RX ORDER — ALBUTEROL SULFATE 90 UG/1
2 INHALANT RESPIRATORY (INHALATION) EVERY 4 HOURS PRN
Qty: 18 G | Refills: 3 | Status: SHIPPED | OUTPATIENT
Start: 2025-05-20

## 2025-05-28 RX ORDER — FAMOTIDINE 20 MG/1
20 TABLET, FILM COATED ORAL 2 TIMES DAILY
Qty: 60 TABLET | Refills: 3 | Status: SHIPPED | OUTPATIENT
Start: 2025-05-28

## 2025-06-05 DIAGNOSIS — R21 RASH: ICD-10-CM

## 2025-06-05 RX ORDER — BENZOCAINE/MENTHOL 6 MG-10 MG
LOZENGE MUCOUS MEMBRANE
Qty: 28 G | Refills: 1 | Status: SHIPPED | OUTPATIENT
Start: 2025-06-05

## 2025-06-10 RX ORDER — DULOXETIN HYDROCHLORIDE 30 MG/1
30 CAPSULE, DELAYED RELEASE ORAL DAILY
Qty: 30 CAPSULE | Refills: 2 | Status: SHIPPED | OUTPATIENT
Start: 2025-06-10

## 2025-07-01 RX ORDER — ASPIRIN 81 MG/1
81 TABLET ORAL DAILY
Qty: 30 TABLET | Refills: 1 | Status: SHIPPED | OUTPATIENT
Start: 2025-07-01

## 2025-07-14 ENCOUNTER — HOSPITAL ENCOUNTER (OUTPATIENT)
Age: 60
Discharge: HOME | End: 2025-07-14
Payer: COMMERCIAL

## 2025-07-14 DIAGNOSIS — I12.9 BENIGN HYPERTENSION WITH CKD (CHRONIC KIDNEY DISEASE), STAGE II: ICD-10-CM

## 2025-07-14 DIAGNOSIS — N18.2 CKD (CHRONIC KIDNEY DISEASE), STAGE II: ICD-10-CM

## 2025-07-14 DIAGNOSIS — E83.51 HYPOCALCEMIA: ICD-10-CM

## 2025-07-14 DIAGNOSIS — E87.1 HYPONATREMIA: ICD-10-CM

## 2025-07-14 DIAGNOSIS — N18.2 BENIGN HYPERTENSION WITH CKD (CHRONIC KIDNEY DISEASE), STAGE II: ICD-10-CM

## 2025-07-14 LAB
ANION GAP SERPL CALCULATED.3IONS-SCNC: 9 MMOL/L (ref 9–16)
BACTERIA URNS QL MICRO: ABNORMAL
BASOPHILS # BLD: 0.08 K/UL (ref 0–0.2)
BASOPHILS NFR BLD: 2 % (ref 0–2)
BILIRUB UR QL STRIP: NEGATIVE
BUN SERPL-MCNC: 12 MG/DL (ref 8–23)
CALCIUM SERPL-MCNC: 8.8 MG/DL (ref 8.6–10.4)
CASTS #/AREA URNS LPF: ABNORMAL /LPF
CHLORIDE SERPL-SCNC: 100 MMOL/L (ref 98–107)
CLARITY UR: CLEAR
CO2 SERPL-SCNC: 29 MMOL/L (ref 20–31)
COLOR UR: YELLOW
CREAT SERPL-MCNC: 1.4 MG/DL (ref 0.7–1.2)
EOSINOPHIL # BLD: 0.36 K/UL (ref 0–0.44)
EOSINOPHILS RELATIVE PERCENT: 7 % (ref 0–4)
EPI CELLS #/AREA URNS HPF: ABNORMAL /HPF
ERYTHROCYTE [DISTWIDTH] IN BLOOD BY AUTOMATED COUNT: 14.4 % (ref 11.5–14.9)
GFR, ESTIMATED: 57 ML/MIN/1.73M2
GLUCOSE SERPL-MCNC: 93 MG/DL (ref 74–99)
GLUCOSE UR STRIP-MCNC: NEGATIVE MG/DL
HCT VFR BLD AUTO: 47.3 % (ref 41–53)
HGB BLD-MCNC: 15.3 G/DL (ref 13.5–17.5)
HGB UR QL STRIP.AUTO: NEGATIVE
IMM GRANULOCYTES # BLD AUTO: <0.03 K/UL (ref 0–0.3)
IMM GRANULOCYTES NFR BLD: 0 %
KETONES UR STRIP-MCNC: ABNORMAL MG/DL
LEUKOCYTE ESTERASE UR QL STRIP: NEGATIVE
LYMPHOCYTES NFR BLD: 1.13 K/UL (ref 1.1–3.7)
LYMPHOCYTES RELATIVE PERCENT: 23 % (ref 24–44)
MAGNESIUM SERPL-MCNC: 1.9 MG/DL (ref 1.6–2.4)
MCH RBC QN AUTO: 32.3 PG (ref 26–34)
MCHC RBC AUTO-ENTMCNC: 32.3 G/DL (ref 31–37)
MCV RBC AUTO: 100 FL (ref 80–100)
MONOCYTES NFR BLD: 0.36 K/UL (ref 0.1–1.2)
MONOCYTES NFR BLD: 7 % (ref 3–12)
NEUTROPHILS NFR BLD: 61 % (ref 36–66)
NEUTS SEG NFR BLD: 3.07 K/UL (ref 1.5–8.1)
NITRITE UR QL STRIP: NEGATIVE
NRBC BLD-RTO: 0 PER 100 WBC
PH UR STRIP: 7.5 [PH] (ref 5–8)
PHOSPHATE SERPL-MCNC: 3.3 MG/DL (ref 2.5–4.5)
PLATELET # BLD AUTO: 145 K/UL (ref 150–450)
PMV BLD AUTO: 10.1 FL (ref 8–13.5)
POTASSIUM SERPL-SCNC: 5.3 MMOL/L (ref 3.7–5.3)
PROT UR STRIP-MCNC: NEGATIVE MG/DL
RBC # BLD AUTO: 4.73 M/UL (ref 4.21–5.77)
RBC #/AREA URNS HPF: ABNORMAL /HPF
SODIUM SERPL-SCNC: 138 MMOL/L (ref 136–145)
SP GR UR STRIP: 1.02 (ref 1–1.03)
UROBILINOGEN UR STRIP-ACNC: NORMAL EU/DL (ref 0–1)
WBC #/AREA URNS HPF: ABNORMAL /HPF
WBC OTHER # BLD: 5 K/UL (ref 3.5–11)

## 2025-07-14 PROCEDURE — 80048 BASIC METABOLIC PNL TOTAL CA: CPT

## 2025-07-14 PROCEDURE — 81001 URINALYSIS AUTO W/SCOPE: CPT

## 2025-07-14 PROCEDURE — 83735 ASSAY OF MAGNESIUM: CPT

## 2025-07-14 PROCEDURE — 85025 COMPLETE CBC W/AUTO DIFF WBC: CPT

## 2025-07-14 PROCEDURE — 36415 COLL VENOUS BLD VENIPUNCTURE: CPT

## 2025-07-14 PROCEDURE — 84100 ASSAY OF PHOSPHORUS: CPT

## 2025-07-15 DIAGNOSIS — J44.0 CHRONIC OBSTRUCTIVE PULMONARY DISEASE WITH ACUTE LOWER RESPIRATORY INFECTION (HCC): ICD-10-CM

## 2025-07-15 RX ORDER — ALBUTEROL SULFATE 90 UG/1
2 INHALANT RESPIRATORY (INHALATION) EVERY 4 HOURS PRN
Qty: 18 G | Refills: 3 | Status: SHIPPED | OUTPATIENT
Start: 2025-07-15

## 2025-07-23 ENCOUNTER — OFFICE VISIT (OUTPATIENT)
Dept: INTERNAL MEDICINE CLINIC | Age: 60
End: 2025-07-23
Payer: COMMERCIAL

## 2025-07-23 VITALS
HEART RATE: 73 BPM | SYSTOLIC BLOOD PRESSURE: 98 MMHG | HEIGHT: 69 IN | OXYGEN SATURATION: 95 % | BODY MASS INDEX: 25.45 KG/M2 | DIASTOLIC BLOOD PRESSURE: 64 MMHG | WEIGHT: 171.8 LBS

## 2025-07-23 DIAGNOSIS — E11.9 TYPE 2 DIABETES MELLITUS WITHOUT COMPLICATION, WITHOUT LONG-TERM CURRENT USE OF INSULIN (HCC): ICD-10-CM

## 2025-07-23 DIAGNOSIS — N18.32 CHRONIC KIDNEY DISEASE, STAGE 3B (HCC): ICD-10-CM

## 2025-07-23 DIAGNOSIS — J44.9 CHRONIC OBSTRUCTIVE PULMONARY DISEASE, UNSPECIFIED COPD TYPE (HCC): ICD-10-CM

## 2025-07-23 DIAGNOSIS — F17.200 SMOKER: ICD-10-CM

## 2025-07-23 DIAGNOSIS — M79.672 FOOT PAIN, BILATERAL: Primary | ICD-10-CM

## 2025-07-23 DIAGNOSIS — M79.671 FOOT PAIN, BILATERAL: Primary | ICD-10-CM

## 2025-07-23 DIAGNOSIS — I25.5 ISCHEMIC CARDIOMYOPATHY: ICD-10-CM

## 2025-07-23 DIAGNOSIS — I46.9 CARDIAC ARREST, CAUSE UNSPECIFIED (HCC): ICD-10-CM

## 2025-07-23 DIAGNOSIS — I63.232 CEREBROVASCULAR ACCIDENT (CVA) DUE TO OCCLUSION OF LEFT CAROTID ARTERY (HCC): ICD-10-CM

## 2025-07-23 DIAGNOSIS — I25.810 CORONARY ARTERY DISEASE INVOLVING CORONARY BYPASS GRAFT OF NATIVE HEART WITHOUT ANGINA PECTORIS: ICD-10-CM

## 2025-07-23 PROCEDURE — 3078F DIAST BP <80 MM HG: CPT

## 2025-07-23 PROCEDURE — 4004F PT TOBACCO SCREEN RCVD TLK: CPT

## 2025-07-23 PROCEDURE — 3046F HEMOGLOBIN A1C LEVEL >9.0%: CPT

## 2025-07-23 PROCEDURE — 83036 HEMOGLOBIN GLYCOSYLATED A1C: CPT

## 2025-07-23 PROCEDURE — 3074F SYST BP LT 130 MM HG: CPT

## 2025-07-23 PROCEDURE — 2022F DILAT RTA XM EVC RTNOPTHY: CPT

## 2025-07-23 PROCEDURE — 99214 OFFICE O/P EST MOD 30 MIN: CPT

## 2025-07-23 PROCEDURE — G8417 CALC BMI ABV UP PARAM F/U: HCPCS

## 2025-07-23 PROCEDURE — G8427 DOCREV CUR MEDS BY ELIG CLIN: HCPCS

## 2025-07-23 PROCEDURE — 3023F SPIROM DOC REV: CPT

## 2025-07-23 PROCEDURE — 3017F COLORECTAL CA SCREEN DOC REV: CPT

## 2025-07-23 RX ORDER — NICOTINE 21 MG/24HR
1 PATCH, TRANSDERMAL 24 HOURS TRANSDERMAL EVERY 24 HOURS
Qty: 30 PATCH | Refills: 3 | Status: SHIPPED | OUTPATIENT
Start: 2025-07-23

## 2025-07-23 SDOH — ECONOMIC STABILITY: FOOD INSECURITY: WITHIN THE PAST 12 MONTHS, YOU WORRIED THAT YOUR FOOD WOULD RUN OUT BEFORE YOU GOT MONEY TO BUY MORE.: NEVER TRUE

## 2025-07-23 SDOH — ECONOMIC STABILITY: FOOD INSECURITY: WITHIN THE PAST 12 MONTHS, THE FOOD YOU BOUGHT JUST DIDN'T LAST AND YOU DIDN'T HAVE MONEY TO GET MORE.: NEVER TRUE

## 2025-07-23 NOTE — PROGRESS NOTES
Ethan Rios provided verbal consent for the provider to use the ASHLEE recording tool during their visit.   English

## 2025-07-23 NOTE — PROGRESS NOTES
[facility]  [address] [address]  Dept: [phone]  Dept Fax: [phone]    Office Visit Note  Date ofpatient's visit: 7/23/2025  Patient's Name:  Ethan Rios YOB: 1965            Patient Care Team:  Keagan Street MD as PCP - General (Internal Medicine)  Keagan Street MD as PCP - Empaneled Provider  eNal Purcell MD as Surgeon (Cardiothoracic Surgery)  Lauri Gold MD (Cardiology)  Mark Viera MD as Orthopedic Surgeon (Orthopedic Surgery)  Daquan Heath MD as Surgeon (Orthopedic Surgery)  Lg Clemons MD as Surgeon (Neurosurgery)  Domenico Cabrera MD as Consulting Physician (Neurology)  Tenzin Connor MD as Consulting Physician (Pulmonology)  ================================================================    REASON FOR VISIT/CHIEF COMPLAINT:  Foot Pain (Bilateral foot pain - patient states his Podiatrist told him he has a very weak pulse in his feet - no doppler ordered - )    HISTORY OF PRESENTING ILLNESS:  History was obtained from: patient. Ethan Riosis a 60 y.o. is here for a routine follow-up visit.  Patient does have significant past medical history including V-fib cardiac arrest s/p ICD placement, CAD status post CABG, history of stroke,   obstructed internal carotid artery, CKD stage III, COPD, smoker.    Bilateral foot pain: Patient reports that he has been having bilateral foot pain more so on exerting.  He has numbness and tingling.  Also reports feet feeling cold.  Denies any change in the color.  Given his significant cardiac history, he may very well have peripheral arterial disease.  Will evaluate with KATHY.      V-fib cardiac arrest/CAD status post CABG/ischemic cardiomyopathy: On aspirin, Plavix, Lipitor and beta-blocker.  Blood pressure borderline low today of 98/64.  Reports that his blood pressure is always on the higher side.  Also on Aldactone.    Hypertension: On Toprol-XL and

## 2025-08-04 ENCOUNTER — OFFICE VISIT (OUTPATIENT)
Dept: PODIATRY | Age: 60
End: 2025-08-04
Payer: COMMERCIAL

## 2025-08-04 VITALS — HEIGHT: 69 IN | WEIGHT: 171 LBS | BODY MASS INDEX: 25.33 KG/M2

## 2025-08-04 DIAGNOSIS — M79.674 PAIN OF TOES OF BOTH FEET: ICD-10-CM

## 2025-08-04 DIAGNOSIS — M79.675 PAIN OF TOES OF BOTH FEET: ICD-10-CM

## 2025-08-04 DIAGNOSIS — B35.1 ONYCHOMYCOSIS OF TOENAIL: Primary | ICD-10-CM

## 2025-08-04 DIAGNOSIS — I73.9 PERIPHERAL ARTERIAL DISEASE: ICD-10-CM

## 2025-08-04 PROCEDURE — 99999 PR OFFICE/OUTPT VISIT,PROCEDURE ONLY: CPT | Performed by: PODIATRIST

## 2025-08-04 PROCEDURE — 11721 DEBRIDE NAIL 6 OR MORE: CPT | Performed by: PODIATRIST

## 2025-08-04 ASSESSMENT — ENCOUNTER SYMPTOMS
NAUSEA: 0
BACK PAIN: 0
COLOR CHANGE: 0
DIARRHEA: 0
SHORTNESS OF BREATH: 0

## 2025-08-06 ENCOUNTER — HOSPITAL ENCOUNTER (OUTPATIENT)
Dept: VASCULAR LAB | Age: 60
Discharge: HOME | End: 2025-08-08
Payer: COMMERCIAL

## 2025-08-06 DIAGNOSIS — M79.672 FOOT PAIN, BILATERAL: ICD-10-CM

## 2025-08-06 DIAGNOSIS — M79.671 FOOT PAIN, BILATERAL: ICD-10-CM

## 2025-08-06 LAB
VAS LEFT ABI: 1.07
VAS LEFT ARM BP: 117 MMHG
VAS LEFT DORSALIS PEDIS BP: 127 MMHG
VAS LEFT PTA BP: 132 MMHG
VAS LEFT TBI: 0.91
VAS LEFT TOE PRESSURE: 112 MMHG
VAS RIGHT ABI: 1.2
VAS RIGHT ARM BP: 123 MMHG
VAS RIGHT DORSALIS PEDIS BP: 129 MMHG
VAS RIGHT PTA BP: 147 MMHG
VAS RIGHT TBI: 0.93
VAS RIGHT TOE PRESSURE: 114 MMHG

## 2025-08-06 PROCEDURE — 93923 UPR/LXTR ART STDY 3+ LVLS: CPT | Performed by: STUDENT IN AN ORGANIZED HEALTH CARE EDUCATION/TRAINING PROGRAM

## 2025-08-06 PROCEDURE — 93923 UPR/LXTR ART STDY 3+ LVLS: CPT

## 2025-08-13 DIAGNOSIS — E11.9 TYPE 2 DIABETES MELLITUS WITHOUT COMPLICATION, WITHOUT LONG-TERM CURRENT USE OF INSULIN (HCC): ICD-10-CM

## 2025-08-13 DIAGNOSIS — I25.810 CORONARY ARTERY DISEASE INVOLVING CORONARY BYPASS GRAFT OF NATIVE HEART WITHOUT ANGINA PECTORIS: ICD-10-CM

## 2025-08-13 RX ORDER — ATORVASTATIN CALCIUM 40 MG/1
40 TABLET, FILM COATED ORAL DAILY
Qty: 30 TABLET | Refills: 3 | Status: SHIPPED | OUTPATIENT
Start: 2025-08-13

## 2025-08-18 DIAGNOSIS — J30.2 SEASONAL ALLERGIES: ICD-10-CM

## 2025-08-19 RX ORDER — ASPIRIN 81 MG/1
81 TABLET, COATED ORAL DAILY
Qty: 30 TABLET | Refills: 1 | Status: SHIPPED | OUTPATIENT
Start: 2025-08-19

## 2025-08-19 RX ORDER — FLUTICASONE PROPIONATE 50 MCG
SPRAY, SUSPENSION (ML) NASAL
Qty: 16 G | Refills: 1 | Status: SHIPPED | OUTPATIENT
Start: 2025-08-19

## 2025-08-26 DIAGNOSIS — F33.3 SEVERE RECURRENT MAJOR DEPRESSIVE DISORDER WITH PSYCHOTIC FEATURES (HCC): ICD-10-CM

## 2025-08-26 RX ORDER — DULOXETIN HYDROCHLORIDE 30 MG/1
30 CAPSULE, DELAYED RELEASE ORAL DAILY
Qty: 30 CAPSULE | Refills: 2 | Status: SHIPPED | OUTPATIENT
Start: 2025-08-26

## 2025-08-27 RX ORDER — MIRTAZAPINE 15 MG/1
15 TABLET, FILM COATED ORAL NIGHTLY
Qty: 90 TABLET | Refills: 0 | Status: SHIPPED | OUTPATIENT
Start: 2025-08-27

## 2025-09-03 RX ORDER — FAMOTIDINE 20 MG/1
20 TABLET, FILM COATED ORAL 2 TIMES DAILY
Qty: 60 TABLET | Refills: 3 | Status: SHIPPED | OUTPATIENT
Start: 2025-09-03

## (undated) DEVICE — ANGIOGRAPHIC CATHETER: Brand: EXPO™

## (undated) DEVICE — FORCEPS BX L240CM WRK CHN 2.8MM STD CAP W/ NDL MIC MESH

## (undated) DEVICE — MARKER,SKIN,WI/RULER AND LABELS: Brand: MEDLINE

## (undated) DEVICE — SOLUTION IV IRRIG WATER 1000ML POUR BRL 2F7114

## (undated) DEVICE — KIT MICRO INTRO 4FR STIFF 40CM NIGHTENALL TUNGSTEN 7SMT

## (undated) DEVICE — SNARE ENDOSCP AD SM L240CM LOOP W1.3CM SHTH DIA2.4MM POLYP

## (undated) DEVICE — BITEBLOCK 54FR W/ DENT RIM BLOX

## (undated) DEVICE — Z DUP USE 2522782 SOLUTION IRRIG 1000ML STRL H2O PLAS CONTAINER UROMATIC

## (undated) DEVICE — GUIDEWIRE 35/260/FC/PTFE/3J: Brand: GUIDEWIRE

## (undated) DEVICE — INTRODUCER SHTH 6FR L11CM 0.038IN STD SIDEPRT EXTN 3 W

## (undated) DEVICE — ERBE NESSY® OMEGA PLATE USA (85+23)CM² , WITH CABLE 3 M: Brand: ERBE

## (undated) DEVICE — JELLY,LUBE,STERILE,FLIP TOP,TUBE,2-OZ: Brand: MEDLINE

## (undated) DEVICE — SYRINGE MED 50ML LUERLOCK TIP

## (undated) DEVICE — ST CHARLES CYSTO PACK: Brand: MEDLINE INDUSTRIES, INC.

## (undated) DEVICE — SURGICAL PROCEDURE TRAY CRD CATH SVMMC

## (undated) DEVICE — GLOVE ORANGE PI 8   MSG9080

## (undated) DEVICE — GOWN,POLY REINFORCED,LG: Brand: MEDLINE

## (undated) DEVICE — GLOVE ORANGE PI 7   MSG9070

## (undated) DEVICE — POLYP TRAP: Brand: TRAPEASE®

## (undated) DEVICE — DEFENDO AIR WATER SUCTION AND BIOPSY VALVE KIT FOR  OLYMPUS: Brand: DEFENDO AIR/WATER/SUCTION AND BIOPSY VALVE

## (undated) DEVICE — GLOVE SURG SZ 75 STD WHT LTX SYN POLYMER BEAD REINF ANTI RL